# Patient Record
Sex: FEMALE | Race: WHITE | NOT HISPANIC OR LATINO | Employment: OTHER | URBAN - METROPOLITAN AREA
[De-identification: names, ages, dates, MRNs, and addresses within clinical notes are randomized per-mention and may not be internally consistent; named-entity substitution may affect disease eponyms.]

---

## 2024-08-08 ENCOUNTER — HOSPITAL ENCOUNTER (INPATIENT)
Facility: CLINIC | Age: 54
LOS: 54 days | Discharge: ANOTHER HOSPITAL | DRG: 003 | End: 2024-10-01
Attending: INTERNAL MEDICINE | Admitting: SURGERY
Payer: MEDICAID

## 2024-08-08 ENCOUNTER — APPOINTMENT (OUTPATIENT)
Dept: GENERAL RADIOLOGY | Facility: CLINIC | Age: 54
DRG: 003 | End: 2024-08-08
Attending: PHYSICIAN ASSISTANT
Payer: MEDICAID

## 2024-08-08 ENCOUNTER — APPOINTMENT (OUTPATIENT)
Dept: GENERAL RADIOLOGY | Facility: CLINIC | Age: 54
DRG: 003 | End: 2024-08-08
Attending: SURGERY
Payer: MEDICAID

## 2024-08-08 ENCOUNTER — APPOINTMENT (OUTPATIENT)
Dept: GENERAL RADIOLOGY | Facility: CLINIC | Age: 54
DRG: 003 | End: 2024-08-08
Attending: INTERNAL MEDICINE
Payer: MEDICAID

## 2024-08-08 DIAGNOSIS — R19.7 DIARRHEA, UNSPECIFIED TYPE: ICD-10-CM

## 2024-08-08 DIAGNOSIS — L89.816: ICD-10-CM

## 2024-08-08 DIAGNOSIS — R45.1 AGITATION: ICD-10-CM

## 2024-08-08 DIAGNOSIS — J30.1 SEASONAL ALLERGIC RHINITIS DUE TO POLLEN: ICD-10-CM

## 2024-08-08 DIAGNOSIS — E43 SEVERE PROTEIN-CALORIE MALNUTRITION (H): ICD-10-CM

## 2024-08-08 DIAGNOSIS — I10 PRIMARY HYPERTENSION: ICD-10-CM

## 2024-08-08 DIAGNOSIS — J96.01 ACUTE RESPIRATORY FAILURE WITH HYPOXIA (H): Primary | ICD-10-CM

## 2024-08-08 DIAGNOSIS — Z45.2 PICC (PERIPHERALLY INSERTED CENTRAL CATHETER) IN PLACE: ICD-10-CM

## 2024-08-08 DIAGNOSIS — K21.00 GASTROESOPHAGEAL REFLUX DISEASE WITH ESOPHAGITIS WITHOUT HEMORRHAGE: ICD-10-CM

## 2024-08-08 DIAGNOSIS — E11.8 TYPE 2 DIABETES MELLITUS WITH UNSPECIFIED COMPLICATIONS (H): ICD-10-CM

## 2024-08-08 DIAGNOSIS — E03.9 HYPOTHYROIDISM, UNSPECIFIED TYPE: ICD-10-CM

## 2024-08-08 DIAGNOSIS — N17.9 AKI (ACUTE KIDNEY INJURY) (H): ICD-10-CM

## 2024-08-08 LAB
ABO/RH(D): NORMAL
ACT BLD: 144 SECONDS (ref 74–150)
ACT BLD: 148 SECONDS (ref 74–150)
ALBUMIN SERPL BCG-MCNC: 3 G/DL (ref 3.5–5.2)
ALBUMIN UR-MCNC: 100 MG/DL
ALLEN'S TEST: ABNORMAL
ALP SERPL-CCNC: 500 U/L (ref 40–150)
ALT SERPL W P-5'-P-CCNC: 37 U/L (ref 0–50)
AMORPH CRY #/AREA URNS HPF: ABNORMAL /HPF
ANION GAP SERPL CALCULATED.3IONS-SCNC: 14 MMOL/L (ref 7–15)
ANION GAP SERPL CALCULATED.3IONS-SCNC: 16 MMOL/L (ref 7–15)
ANION GAP SERPL CALCULATED.3IONS-SCNC: 19 MMOL/L (ref 7–15)
ANTIBODY SCREEN: NEGATIVE
APPEARANCE UR: ABNORMAL
APTT PPP: 37 SECONDS (ref 22–38)
AST SERPL W P-5'-P-CCNC: 76 U/L (ref 0–45)
BACTERIA #/AREA URNS HPF: ABNORMAL /HPF
BASE EXCESS BLDA CALC-SCNC: -10 MMOL/L (ref -3–3)
BASE EXCESS BLDA CALC-SCNC: -10.9 MMOL/L (ref -3–3)
BASE EXCESS BLDA CALC-SCNC: -10.9 MMOL/L (ref -3–3)
BASE EXCESS BLDA CALC-SCNC: -9.5 MMOL/L (ref -3–3)
BASOPHILS # BLD AUTO: ABNORMAL 10*3/UL
BASOPHILS # BLD MANUAL: 0 10E3/UL (ref 0–0.2)
BASOPHILS NFR BLD AUTO: ABNORMAL %
BASOPHILS NFR BLD MANUAL: 0 %
BILIRUB SERPL-MCNC: 0.6 MG/DL
BILIRUB UR QL STRIP: NEGATIVE
BLD PROD TYP BPU: NORMAL
BLOOD COMPONENT TYPE: NORMAL
BUN SERPL-MCNC: 42.5 MG/DL (ref 6–20)
BUN SERPL-MCNC: 43.3 MG/DL (ref 6–20)
BUN SERPL-MCNC: 43.6 MG/DL (ref 6–20)
CA-I BLD-MCNC: 4.6 MG/DL (ref 4.4–5.2)
CA-I BLD-MCNC: 5 MG/DL (ref 4.4–5.2)
CALCIUM SERPL-MCNC: 8 MG/DL (ref 8.8–10.4)
CALCIUM SERPL-MCNC: 8.1 MG/DL (ref 8.8–10.4)
CALCIUM SERPL-MCNC: 8.9 MG/DL (ref 8.8–10.4)
CHLORIDE SERPL-SCNC: 104 MMOL/L (ref 98–107)
CHLORIDE SERPL-SCNC: 105 MMOL/L (ref 98–107)
CHLORIDE SERPL-SCNC: 106 MMOL/L (ref 98–107)
CODING SYSTEM: NORMAL
COHGB MFR BLD: 88.7 % (ref 96–97)
COHGB MFR BLD: 93.9 % (ref 96–97)
COHGB MFR BLD: 97.2 % (ref 96–97)
COHGB MFR BLD: >100 % (ref 96–97)
COLOR UR AUTO: YELLOW
CREAT SERPL-MCNC: 3.28 MG/DL (ref 0.51–0.95)
CREAT SERPL-MCNC: 3.31 MG/DL (ref 0.51–0.95)
CREAT SERPL-MCNC: 3.39 MG/DL (ref 0.51–0.95)
CROSSMATCH: NORMAL
EGFRCR SERPLBLD CKD-EPI 2021: 15 ML/MIN/1.73M2
EGFRCR SERPLBLD CKD-EPI 2021: 16 ML/MIN/1.73M2
EGFRCR SERPLBLD CKD-EPI 2021: 16 ML/MIN/1.73M2
EOSINOPHIL # BLD AUTO: ABNORMAL 10*3/UL
EOSINOPHIL # BLD MANUAL: 0.3 10E3/UL (ref 0–0.7)
EOSINOPHIL NFR BLD AUTO: ABNORMAL %
EOSINOPHIL NFR BLD MANUAL: 2 %
ERYTHROCYTE [DISTWIDTH] IN BLOOD BY AUTOMATED COUNT: 13.6 % (ref 10–15)
ERYTHROCYTE [DISTWIDTH] IN BLOOD BY AUTOMATED COUNT: 13.8 % (ref 10–15)
ERYTHROCYTE [DISTWIDTH] IN BLOOD BY AUTOMATED COUNT: 13.8 % (ref 10–15)
FIBRINOGEN PPP-MCNC: 885 MG/DL (ref 170–510)
GLUCOSE BLDC GLUCOMTR-MCNC: 131 MG/DL (ref 70–99)
GLUCOSE BLDC GLUCOMTR-MCNC: 132 MG/DL (ref 70–99)
GLUCOSE SERPL-MCNC: 145 MG/DL (ref 70–99)
GLUCOSE SERPL-MCNC: 147 MG/DL (ref 70–99)
GLUCOSE SERPL-MCNC: 153 MG/DL (ref 70–99)
GLUCOSE UR STRIP-MCNC: 30 MG/DL
HCO3 BLD-SCNC: 17 MMOL/L (ref 21–28)
HCO3 BLD-SCNC: 19 MMOL/L (ref 21–28)
HCO3 BLD-SCNC: 20 MMOL/L (ref 21–28)
HCO3 BLD-SCNC: 22 MMOL/L (ref 21–28)
HCO3 SERPL-SCNC: 15 MMOL/L (ref 22–29)
HCO3 SERPL-SCNC: 17 MMOL/L (ref 22–29)
HCO3 SERPL-SCNC: 20 MMOL/L (ref 22–29)
HCT VFR BLD AUTO: 22.9 % (ref 35–47)
HCT VFR BLD AUTO: 25.4 % (ref 35–47)
HCT VFR BLD AUTO: 33.8 % (ref 35–47)
HGB BLD-MCNC: 7 G/DL (ref 11.7–15.7)
HGB BLD-MCNC: 7.5 G/DL (ref 11.7–15.7)
HGB BLD-MCNC: 9.9 G/DL (ref 11.7–15.7)
HGB UR QL STRIP: ABNORMAL
IMM GRANULOCYTES # BLD: ABNORMAL 10*3/UL
IMM GRANULOCYTES NFR BLD: ABNORMAL %
INR PPP: 1.36 (ref 0.85–1.15)
INR PPP: 1.45 (ref 0.85–1.15)
ISSUE DATE AND TIME: NORMAL
KETONES UR STRIP-MCNC: NEGATIVE MG/DL
LACTATE SERPL-SCNC: 1.2 MMOL/L (ref 0.7–2)
LACTATE SERPL-SCNC: 1.7 MMOL/L (ref 0.7–2)
LACTATE SERPL-SCNC: 1.8 MMOL/L (ref 0.7–2)
LEUKOCYTE ESTERASE UR QL STRIP: ABNORMAL
LYMPHOCYTES # BLD AUTO: ABNORMAL 10*3/UL
LYMPHOCYTES # BLD MANUAL: 1.1 10E3/UL (ref 0.8–5.3)
LYMPHOCYTES NFR BLD AUTO: ABNORMAL %
LYMPHOCYTES NFR BLD MANUAL: 8 %
MAGNESIUM SERPL-MCNC: 2.5 MG/DL (ref 1.7–2.3)
MAGNESIUM SERPL-MCNC: 2.8 MG/DL (ref 1.7–2.3)
MCH RBC QN AUTO: 32.1 PG (ref 26.5–33)
MCH RBC QN AUTO: 32.1 PG (ref 26.5–33)
MCH RBC QN AUTO: 32.3 PG (ref 26.5–33)
MCHC RBC AUTO-ENTMCNC: 29.3 G/DL (ref 31.5–36.5)
MCHC RBC AUTO-ENTMCNC: 29.5 G/DL (ref 31.5–36.5)
MCHC RBC AUTO-ENTMCNC: 30.6 G/DL (ref 31.5–36.5)
MCV RBC AUTO: 106 FL (ref 78–100)
MCV RBC AUTO: 109 FL (ref 78–100)
MCV RBC AUTO: 110 FL (ref 78–100)
METAMYELOCYTES # BLD MANUAL: 0.3 10E3/UL
METAMYELOCYTES NFR BLD MANUAL: 2 %
MONOCYTES # BLD AUTO: ABNORMAL 10*3/UL
MONOCYTES # BLD MANUAL: 1 10E3/UL (ref 0–1.3)
MONOCYTES NFR BLD AUTO: ABNORMAL %
MONOCYTES NFR BLD MANUAL: 7 %
MRSA DNA SPEC QL NAA+PROBE: NEGATIVE
MUCOUS THREADS #/AREA URNS LPF: PRESENT /LPF
MYELOCYTES # BLD MANUAL: 0.1 10E3/UL
MYELOCYTES NFR BLD MANUAL: 1 %
NEUTROPHILS # BLD AUTO: ABNORMAL 10*3/UL
NEUTROPHILS # BLD MANUAL: 11.3 10E3/UL (ref 1.6–8.3)
NEUTROPHILS NFR BLD AUTO: ABNORMAL %
NEUTROPHILS NFR BLD MANUAL: 80 %
NITRATE UR QL: NEGATIVE
NRBC # BLD AUTO: 0.1 10E3/UL
NRBC # BLD AUTO: 0.1 10E3/UL
NRBC BLD AUTO-RTO: 0 /100
NRBC BLD MANUAL-RTO: 1 %
O2/TOTAL GAS SETTING VFR VENT: 100 %
O2/TOTAL GAS SETTING VFR VENT: 100 %
O2/TOTAL GAS SETTING VFR VENT: 60 %
O2/TOTAL GAS SETTING VFR VENT: 60 %
PCO2 BLD: 38 MM HG (ref 35–45)
PCO2 BLD: 55 MM HG (ref 35–45)
PCO2 BLD: 77 MM HG (ref 35–45)
PCO2 BLD: 91 MM HG (ref 35–45)
PEEP: 10 CM H2O
PEEP: 10 CM H2O
PEEP: 12 CM H2O
PH BLD: 6.98 [PH] (ref 7.35–7.45)
PH BLD: 7.03 [PH] (ref 7.35–7.45)
PH BLD: 7.14 [PH] (ref 7.35–7.45)
PH BLD: 7.26 [PH] (ref 7.35–7.45)
PH UR STRIP: 5.5 [PH] (ref 5–7)
PHOSPHATE SERPL-MCNC: 6.8 MG/DL (ref 2.5–4.5)
PHOSPHATE SERPL-MCNC: 8.1 MG/DL (ref 2.5–4.5)
PLAT MORPH BLD: ABNORMAL
PLATELET # BLD AUTO: 325 10E3/UL (ref 150–450)
PLATELET # BLD AUTO: 326 10E3/UL (ref 150–450)
PLATELET # BLD AUTO: 568 10E3/UL (ref 150–450)
PO2 BLD: 163 MM HG (ref 80–105)
PO2 BLD: 67 MM HG (ref 80–105)
PO2 BLD: 78 MM HG (ref 80–105)
PO2 BLD: 92 MM HG (ref 80–105)
POTASSIUM SERPL-SCNC: 4.8 MMOL/L (ref 3.4–5.3)
POTASSIUM SERPL-SCNC: 5 MMOL/L (ref 3.4–5.3)
POTASSIUM SERPL-SCNC: 5.8 MMOL/L (ref 3.4–5.3)
PROT SERPL-MCNC: 6.6 G/DL (ref 6.4–8.3)
RBC # BLD AUTO: 2.17 10E6/UL (ref 3.8–5.2)
RBC # BLD AUTO: 2.34 10E6/UL (ref 3.8–5.2)
RBC # BLD AUTO: 3.08 10E6/UL (ref 3.8–5.2)
RBC MORPH BLD: ABNORMAL
RBC URINE: 31 /HPF
SA TARGET DNA: NEGATIVE
SAO2 % BLDA: 87 % (ref 92–100)
SAO2 % BLDA: 92 % (ref 92–100)
SAO2 % BLDA: 95 % (ref 92–100)
SAO2 % BLDA: 97 % (ref 92–100)
SODIUM SERPL-SCNC: 138 MMOL/L (ref 135–145)
SODIUM SERPL-SCNC: 138 MMOL/L (ref 135–145)
SODIUM SERPL-SCNC: 140 MMOL/L (ref 135–145)
SP GR UR STRIP: 1.02 (ref 1–1.03)
SPECIMEN EXPIRATION DATE: NORMAL
UFH PPP CHRO-ACNC: 0.21 IU/ML
UNIT ABO/RH: NORMAL
UNIT NUMBER: NORMAL
UNIT STATUS: NORMAL
UNIT TYPE ISBT: 9500
UROBILINOGEN UR STRIP-MCNC: NORMAL MG/DL
WBC # BLD AUTO: 14.1 10E3/UL (ref 4–11)
WBC # BLD AUTO: 16.3 10E3/UL (ref 4–11)
WBC # BLD AUTO: 27.2 10E3/UL (ref 4–11)
WBC URINE: 7 /HPF

## 2024-08-08 PROCEDURE — 82330 ASSAY OF CALCIUM: CPT | Performed by: SURGERY

## 2024-08-08 PROCEDURE — 85730 THROMBOPLASTIN TIME PARTIAL: CPT | Performed by: SURGERY

## 2024-08-08 PROCEDURE — 71045 X-RAY EXAM CHEST 1 VIEW: CPT | Mod: 26 | Performed by: STUDENT IN AN ORGANIZED HEALTH CARE EDUCATION/TRAINING PROGRAM

## 2024-08-08 PROCEDURE — C1769 GUIDE WIRE: HCPCS

## 2024-08-08 PROCEDURE — 5A09C5K ASSISTANCE WITH RESPIRATORY VENTILATION, 8-24 CONSECUTIVE HOURS, INTUBATED PRONE POSITIONING: ICD-10-PCS | Performed by: SURGERY

## 2024-08-08 PROCEDURE — 85610 PROTHROMBIN TIME: CPT | Performed by: PHYSICIAN ASSISTANT

## 2024-08-08 PROCEDURE — C1751 CATH, INF, PER/CENT/MIDLINE: HCPCS

## 2024-08-08 PROCEDURE — 84100 ASSAY OF PHOSPHORUS: CPT | Performed by: SURGERY

## 2024-08-08 PROCEDURE — 83605 ASSAY OF LACTIC ACID: CPT | Performed by: PHYSICIAN ASSISTANT

## 2024-08-08 PROCEDURE — 258N000003 HC RX IP 258 OP 636: Performed by: PHYSICIAN ASSISTANT

## 2024-08-08 PROCEDURE — 84100 ASSAY OF PHOSPHORUS: CPT | Performed by: PHYSICIAN ASSISTANT

## 2024-08-08 PROCEDURE — 82947 ASSAY GLUCOSE BLOOD QUANT: CPT | Performed by: PHYSICIAN ASSISTANT

## 2024-08-08 PROCEDURE — 94002 VENT MGMT INPAT INIT DAY: CPT

## 2024-08-08 PROCEDURE — 86923 COMPATIBILITY TEST ELECTRIC: CPT | Performed by: SURGERY

## 2024-08-08 PROCEDURE — 87641 MR-STAPH DNA AMP PROBE: CPT | Performed by: PHYSICIAN ASSISTANT

## 2024-08-08 PROCEDURE — 999N000157 HC STATISTIC RCP TIME EA 10 MIN

## 2024-08-08 PROCEDURE — P9016 RBC LEUKOCYTES REDUCED: HCPCS | Performed by: SURGERY

## 2024-08-08 PROCEDURE — 3E043XZ INTRODUCTION OF VASOPRESSOR INTO CENTRAL VEIN, PERCUTANEOUS APPROACH: ICD-10-PCS | Performed by: SURGERY

## 2024-08-08 PROCEDURE — 272N000555 HC SENSOR NIRS OXIMETER, ADULT

## 2024-08-08 PROCEDURE — 83735 ASSAY OF MAGNESIUM: CPT | Performed by: SURGERY

## 2024-08-08 PROCEDURE — 86900 BLOOD TYPING SEROLOGIC ABO: CPT | Performed by: PHYSICIAN ASSISTANT

## 2024-08-08 PROCEDURE — 999N000065 XR CHEST PORT 1 VIEW

## 2024-08-08 PROCEDURE — 83605 ASSAY OF LACTIC ACID: CPT

## 2024-08-08 PROCEDURE — 5A1522H EXTRACORPOREAL OXYGENATION, MEMBRANE, PERIPHERAL VENO-VENOUS: ICD-10-PCS | Performed by: SURGERY

## 2024-08-08 PROCEDURE — 85027 COMPLETE CBC AUTOMATED: CPT

## 2024-08-08 PROCEDURE — 250N000011 HC RX IP 250 OP 636: Performed by: PHYSICIAN ASSISTANT

## 2024-08-08 PROCEDURE — 36415 COLL VENOUS BLD VENIPUNCTURE: CPT | Performed by: SURGERY

## 2024-08-08 PROCEDURE — 250N000013 HC RX MED GY IP 250 OP 250 PS 637: Performed by: PHYSICIAN ASSISTANT

## 2024-08-08 PROCEDURE — 82805 BLOOD GASES W/O2 SATURATION: CPT | Performed by: SURGERY

## 2024-08-08 PROCEDURE — 81001 URINALYSIS AUTO W/SCOPE: CPT | Performed by: PHYSICIAN ASSISTANT

## 2024-08-08 PROCEDURE — 85610 PROTHROMBIN TIME: CPT | Performed by: SURGERY

## 2024-08-08 PROCEDURE — 94645 CONT INHLJ TX EACH ADDL HOUR: CPT

## 2024-08-08 PROCEDURE — 86901 BLOOD TYPING SEROLOGIC RH(D): CPT | Performed by: PHYSICIAN ASSISTANT

## 2024-08-08 PROCEDURE — 94644 CONT INHLJ TX 1ST HOUR: CPT

## 2024-08-08 PROCEDURE — 99291 CRITICAL CARE FIRST HOUR: CPT | Mod: 25 | Performed by: PHYSICIAN ASSISTANT

## 2024-08-08 PROCEDURE — 250N000009 HC RX 250: Performed by: STUDENT IN AN ORGANIZED HEALTH CARE EDUCATION/TRAINING PROGRAM

## 2024-08-08 PROCEDURE — 999N000185 HC STATISTIC TRANSPORT TIME EA 15 MIN

## 2024-08-08 PROCEDURE — 94640 AIRWAY INHALATION TREATMENT: CPT

## 2024-08-08 PROCEDURE — 71045 X-RAY EXAM CHEST 1 VIEW: CPT | Mod: 26 | Performed by: RADIOLOGY

## 2024-08-08 PROCEDURE — 87205 SMEAR GRAM STAIN: CPT | Performed by: PHYSICIAN ASSISTANT

## 2024-08-08 PROCEDURE — 33946 ECMO/ECLS INITIATION VENOUS: CPT

## 2024-08-08 PROCEDURE — 85520 HEPARIN ASSAY: CPT | Performed by: SURGERY

## 2024-08-08 PROCEDURE — 33946 ECMO/ECLS INITIATION VENOUS: CPT | Mod: GC | Performed by: SURGERY

## 2024-08-08 PROCEDURE — 83605 ASSAY OF LACTIC ACID: CPT | Performed by: SURGERY

## 2024-08-08 PROCEDURE — 250N000009 HC RX 250: Performed by: PHYSICIAN ASSISTANT

## 2024-08-08 PROCEDURE — 85007 BL SMEAR W/DIFF WBC COUNT: CPT | Performed by: SURGERY

## 2024-08-08 PROCEDURE — 85384 FIBRINOGEN ACTIVITY: CPT | Performed by: PHYSICIAN ASSISTANT

## 2024-08-08 PROCEDURE — 82330 ASSAY OF CALCIUM: CPT | Performed by: PHYSICIAN ASSISTANT

## 2024-08-08 PROCEDURE — 272N000237 HC CARDIOHELP CIRCUIT

## 2024-08-08 PROCEDURE — 87040 BLOOD CULTURE FOR BACTERIA: CPT | Performed by: SURGERY

## 2024-08-08 PROCEDURE — 999N000065 XR ABDOMEN PORT 1 VIEW

## 2024-08-08 PROCEDURE — 80048 BASIC METABOLIC PNL TOTAL CA: CPT | Performed by: SURGERY

## 2024-08-08 PROCEDURE — 80053 COMPREHEN METABOLIC PANEL: CPT | Performed by: PHYSICIAN ASSISTANT

## 2024-08-08 PROCEDURE — 33952 ECMO/ECLS INSJ PRPH CANNULA: CPT | Mod: GC | Performed by: SURGERY

## 2024-08-08 PROCEDURE — 250N000011 HC RX IP 250 OP 636: Performed by: SURGERY

## 2024-08-08 PROCEDURE — 200N000002 HC R&B ICU UMMC

## 2024-08-08 PROCEDURE — 82805 BLOOD GASES W/O2 SATURATION: CPT | Performed by: PHYSICIAN ASSISTANT

## 2024-08-08 PROCEDURE — 85027 COMPLETE CBC AUTOMATED: CPT | Performed by: PHYSICIAN ASSISTANT

## 2024-08-08 PROCEDURE — 272N000053 HC CANNULA, ARTERIAL FEMORAL

## 2024-08-08 PROCEDURE — 82550 ASSAY OF CK (CPK): CPT | Performed by: STUDENT IN AN ORGANIZED HEALTH CARE EDUCATION/TRAINING PROGRAM

## 2024-08-08 PROCEDURE — 85347 COAGULATION TIME ACTIVATED: CPT

## 2024-08-08 PROCEDURE — 74018 RADEX ABDOMEN 1 VIEW: CPT | Mod: 26 | Performed by: STUDENT IN AN ORGANIZED HEALTH CARE EDUCATION/TRAINING PROGRAM

## 2024-08-08 PROCEDURE — 272N000232 HC CANNULA, VENOUS FEMORAL, ADULT

## 2024-08-08 PROCEDURE — 85027 COMPLETE CBC AUTOMATED: CPT | Performed by: SURGERY

## 2024-08-08 PROCEDURE — 5A1955Z RESPIRATORY VENTILATION, GREATER THAN 96 CONSECUTIVE HOURS: ICD-10-PCS | Performed by: SURGERY

## 2024-08-08 PROCEDURE — 83735 ASSAY OF MAGNESIUM: CPT | Performed by: PHYSICIAN ASSISTANT

## 2024-08-08 PROCEDURE — C1894 INTRO/SHEATH, NON-LASER: HCPCS

## 2024-08-08 RX ORDER — NICOTINE POLACRILEX 4 MG
15-30 LOZENGE BUCCAL
Status: DISCONTINUED | OUTPATIENT
Start: 2024-08-08 | End: 2024-08-08

## 2024-08-08 RX ORDER — ACETAMINOPHEN 650 MG/1
650 SUPPOSITORY RECTAL EVERY 4 HOURS PRN
Status: DISCONTINUED | OUTPATIENT
Start: 2024-08-08 | End: 2024-09-09

## 2024-08-08 RX ORDER — HEPARIN SODIUM 5000 [USP'U]/.5ML
5000 INJECTION, SOLUTION INTRAVENOUS; SUBCUTANEOUS EVERY 8 HOURS
Status: DISCONTINUED | OUTPATIENT
Start: 2024-08-08 | End: 2024-08-08

## 2024-08-08 RX ORDER — CEFEPIME HYDROCHLORIDE 2 G/1
2 INJECTION, POWDER, FOR SOLUTION INTRAVENOUS EVERY 12 HOURS
Status: DISCONTINUED | OUTPATIENT
Start: 2024-08-09 | End: 2024-08-16

## 2024-08-08 RX ORDER — HEPARIN SODIUM 1000 [USP'U]/ML
5000 INJECTION, SOLUTION INTRAVENOUS; SUBCUTANEOUS ONCE
Status: COMPLETED | OUTPATIENT
Start: 2024-08-08 | End: 2024-08-08

## 2024-08-08 RX ORDER — ACETAMINOPHEN 325 MG/1
650 TABLET ORAL EVERY 4 HOURS PRN
Status: DISCONTINUED | OUTPATIENT
Start: 2024-08-08 | End: 2024-09-09

## 2024-08-08 RX ORDER — CEFEPIME HYDROCHLORIDE 2 G/1
2 INJECTION, POWDER, FOR SOLUTION INTRAVENOUS EVERY 24 HOURS
Status: DISCONTINUED | OUTPATIENT
Start: 2024-08-09 | End: 2024-08-08

## 2024-08-08 RX ORDER — NOREPINEPHRINE BITARTRATE 0.06 MG/ML
.01-.6 INJECTION, SOLUTION INTRAVENOUS CONTINUOUS
Status: DISCONTINUED | OUTPATIENT
Start: 2024-08-08 | End: 2024-08-13

## 2024-08-08 RX ORDER — HEPARIN SODIUM 1000 [USP'U]/ML
5000 INJECTION, SOLUTION INTRAVENOUS; SUBCUTANEOUS ONCE
Status: DISCONTINUED | OUTPATIENT
Start: 2024-08-08 | End: 2024-08-08

## 2024-08-08 RX ORDER — NICOTINE POLACRILEX 4 MG
15-30 LOZENGE BUCCAL
Status: DISCONTINUED | OUTPATIENT
Start: 2024-08-08 | End: 2024-08-19

## 2024-08-08 RX ORDER — CHLORHEXIDINE GLUCONATE ORAL RINSE 1.2 MG/ML
15 SOLUTION DENTAL EVERY 12 HOURS
Status: DISCONTINUED | OUTPATIENT
Start: 2024-08-08 | End: 2024-08-27

## 2024-08-08 RX ORDER — CALCIUM GLUCONATE 20 MG/ML
2 INJECTION, SOLUTION INTRAVENOUS EVERY 8 HOURS PRN
Status: DISCONTINUED | OUTPATIENT
Start: 2024-08-08 | End: 2024-08-12

## 2024-08-08 RX ORDER — HEPARIN SODIUM 10000 [USP'U]/100ML
10-50 INJECTION, SOLUTION INTRAVENOUS CONTINUOUS
Status: DISCONTINUED | OUTPATIENT
Start: 2024-08-08 | End: 2024-08-11

## 2024-08-08 RX ORDER — NALOXONE HYDROCHLORIDE 0.4 MG/ML
0.2 INJECTION, SOLUTION INTRAMUSCULAR; INTRAVENOUS; SUBCUTANEOUS
Status: DISCONTINUED | OUTPATIENT
Start: 2024-08-08 | End: 2024-10-01 | Stop reason: HOSPADM

## 2024-08-08 RX ORDER — PROPOFOL 10 MG/ML
5-75 INJECTION, EMULSION INTRAVENOUS CONTINUOUS
Status: DISCONTINUED | OUTPATIENT
Start: 2024-08-08 | End: 2024-08-12

## 2024-08-08 RX ORDER — MAGNESIUM SULFATE HEPTAHYDRATE 40 MG/ML
2 INJECTION, SOLUTION INTRAVENOUS EVERY 8 HOURS PRN
Status: DISCONTINUED | OUTPATIENT
Start: 2024-08-08 | End: 2024-08-12

## 2024-08-08 RX ORDER — CALCIUM CHLORIDE, MAGNESIUM CHLORIDE, SODIUM CHLORIDE, SODIUM BICARBONATE, POTASSIUM CHLORIDE AND SODIUM PHOSPHATE DIBASIC DIHYDRATE 3.68; 3.05; 6.34; 3.09; .314; .187 G/L; G/L; G/L; G/L; G/L; G/L
12.5 INJECTION INTRAVENOUS CONTINUOUS
Status: DISCONTINUED | OUTPATIENT
Start: 2024-08-08 | End: 2024-08-12

## 2024-08-08 RX ORDER — NALOXONE HYDROCHLORIDE 0.4 MG/ML
0.4 INJECTION, SOLUTION INTRAMUSCULAR; INTRAVENOUS; SUBCUTANEOUS
Status: DISCONTINUED | OUTPATIENT
Start: 2024-08-08 | End: 2024-10-01 | Stop reason: HOSPADM

## 2024-08-08 RX ORDER — CALCIUM CHLORIDE, MAGNESIUM CHLORIDE, SODIUM CHLORIDE, SODIUM BICARBONATE, POTASSIUM CHLORIDE AND SODIUM PHOSPHATE DIBASIC DIHYDRATE 3.68; 3.05; 6.34; 3.09; .314; .187 G/L; G/L; G/L; G/L; G/L; G/L
INJECTION INTRAVENOUS CONTINUOUS
Status: DISCONTINUED | OUTPATIENT
Start: 2024-08-08 | End: 2024-08-10

## 2024-08-08 RX ORDER — LEVOTHYROXINE SODIUM 25 UG/1
25 TABLET ORAL
Status: DISCONTINUED | OUTPATIENT
Start: 2024-08-09 | End: 2024-10-01 | Stop reason: HOSPADM

## 2024-08-08 RX ORDER — POTASSIUM CHLORIDE 29.8 MG/ML
20 INJECTION INTRAVENOUS EVERY 8 HOURS PRN
Status: DISCONTINUED | OUTPATIENT
Start: 2024-08-08 | End: 2024-08-12

## 2024-08-08 RX ORDER — IPRATROPIUM BROMIDE AND ALBUTEROL SULFATE 2.5; .5 MG/3ML; MG/3ML
3 SOLUTION RESPIRATORY (INHALATION)
Status: DISCONTINUED | OUTPATIENT
Start: 2024-08-08 | End: 2024-08-09

## 2024-08-08 RX ORDER — DEXTROSE MONOHYDRATE 25 G/50ML
25-50 INJECTION, SOLUTION INTRAVENOUS
Status: DISCONTINUED | OUTPATIENT
Start: 2024-08-08 | End: 2024-08-19

## 2024-08-08 RX ORDER — MINERAL OIL AND WHITE PETROLATUM 150; 830 MG/G; MG/G
1 OINTMENT OPHTHALMIC EVERY 8 HOURS
Status: DISCONTINUED | OUTPATIENT
Start: 2024-08-08 | End: 2024-08-08

## 2024-08-08 RX ORDER — DEXTROSE MONOHYDRATE 25 G/50ML
25-50 INJECTION, SOLUTION INTRAVENOUS
Status: DISCONTINUED | OUTPATIENT
Start: 2024-08-08 | End: 2024-08-08

## 2024-08-08 RX ADMIN — CEFEPIME HYDROCHLORIDE 2 G: 2 INJECTION, POWDER, FOR SOLUTION INTRAVENOUS at 23:44

## 2024-08-08 RX ADMIN — CISATRACURIUM BESYLATE 3 MCG/KG/MIN: 10 INJECTION, SOLUTION INTRAVENOUS at 17:00

## 2024-08-08 RX ADMIN — PROPOFOL 80 MCG/KG/MIN: 10 INJECTION, EMULSION INTRAVENOUS at 16:51

## 2024-08-08 RX ADMIN — PROPOFOL 60 MCG/KG/MIN: 10 INJECTION, EMULSION INTRAVENOUS at 18:25

## 2024-08-08 RX ADMIN — NOREPINEPHRINE BITARTRATE 0.07 MCG/KG/MIN: 0.06 INJECTION, SOLUTION INTRAVENOUS at 16:52

## 2024-08-08 RX ADMIN — CHLORHEXIDINE GLUCONATE 0.12% ORAL RINSE 15 ML: 1.2 LIQUID ORAL at 22:34

## 2024-08-08 RX ADMIN — HEPARIN SODIUM 500 UNITS/HR: 10000 INJECTION, SOLUTION INTRAVENOUS at 20:44

## 2024-08-08 RX ADMIN — CALCIUM CHLORIDE, MAGNESIUM CHLORIDE, SODIUM CHLORIDE, SODIUM BICARBONATE, POTASSIUM CHLORIDE AND SODIUM PHOSPHATE DIBASIC DIHYDRATE: 3.68; 3.05; 6.34; 3.09; .314; .187 INJECTION INTRAVENOUS at 23:29

## 2024-08-08 RX ADMIN — HEPARIN SODIUM 5000 UNITS: 1000 INJECTION INTRAVENOUS; SUBCUTANEOUS at 19:19

## 2024-08-08 RX ADMIN — CALCIUM CHLORIDE, MAGNESIUM CHLORIDE, SODIUM CHLORIDE, SODIUM BICARBONATE, POTASSIUM CHLORIDE AND SODIUM PHOSPHATE DIBASIC DIHYDRATE 12.5 ML/KG/HR: 3.68; 3.05; 6.34; 3.09; .314; .187 INJECTION INTRAVENOUS at 23:29

## 2024-08-08 RX ADMIN — Medication 125 MCG/HR: at 16:52

## 2024-08-08 RX ADMIN — IPRATROPIUM BROMIDE AND ALBUTEROL SULFATE 3 ML: .5; 3 SOLUTION RESPIRATORY (INHALATION) at 21:39

## 2024-08-08 RX ADMIN — Medication 20 NG/KG/MIN: at 16:42

## 2024-08-08 RX ADMIN — NOREPINEPHRINE BITARTRATE 0.02 MCG/KG/MIN: 0.06 INJECTION, SOLUTION INTRAVENOUS at 22:26

## 2024-08-08 RX ADMIN — PROPOFOL 60 MCG/KG/MIN: 10 INJECTION, EMULSION INTRAVENOUS at 21:34

## 2024-08-08 ASSESSMENT — ACTIVITIES OF DAILY LIVING (ADL)
ADLS_ACUITY_SCORE: 35
ADLS_ACUITY_SCORE: 57
ADLS_ACUITY_SCORE: 45
ADLS_ACUITY_SCORE: 57
ADLS_ACUITY_SCORE: 45

## 2024-08-08 NOTE — PROGRESS NOTES
Admitted/transferred from: [facility]  Reason for admission/transfer:ARDS   2 RN skin assessment: completed by: Marcus DELGADO  Result of skin assessment and interventions/actions:Possible pressure injuries on legs/shins/ankles from previous SCD use? Bruising on legs/thighs/shins/feet.   Height, weight, drug calc weight: Done  Patient belongings (see Flowsheet)  MDRO education added to care planN/A    Major Shift Events:     Pt admit from [facility] around 1645. Upon arrival, pt sats in the 70s. When RT clamped ETT to switch to [facility] vent, pt de-sated to 50s. SICU called to bedside and pt sats recovered to 80s with bagging. RASS -5, paralyzed with cis. BIS 20-40s. -110, sinus tach. MAP stable on low dose Levo. Vent 100% FiO2, 30/300/10. Initial CO2 90s, now 70s. Proned emergently at~1700, supinated at 1640 to cannulate for VV at the bedside. Dr. Freeman obtained consent from momDoreen.     Plan: cannulate for VV ECMO       For vital signs and complete assessments, please see documentation flowsheets.     ?

## 2024-08-08 NOTE — CONSULTS
Veno-Venous ECMO Consult Note  2024    Massiel Flaherty  : 1970    Date of Service: 2024 5:40 PM    Assessment and Plan:  Massiel Flaherty is a 53 year old female with a past medical hx of Anxiety/depression, fibromyalgia, hypothyroidism, asthma, HLD, MURIEL on CPAP, spells, off on anti seizure medications, expressive aphasia, and hypertension who was admitted an OSH with community acquired pneumonia on 8/3. She was intubated  and developed severe ARDS requiring paralysis and proning starting today. She was transferred here for management. On arrival, patient difficult to ventilate with prominent respiratory acidosis (pH 7.03) and hypercapnia. Notable ARDS with P/F of 78. She also has elevated plateau pressures (40-45 cmH2O). Remains on full strength inhaled epoprostenol.    - Continue with aggressive medical management of ARDS including paralysis, proning, inhaled vasodilators, and lung protective ventilation  - Anticipate placement of VV ECMO tonight if she does not significantly improve    Seen with staff Dr. Pete Powell  Surgical Critical Care Fellow    History of Present Illness:    Massiel Flaherty is a 53 year old female with a past medical hx of Anxiety/depression, fibromyalgia, hypothyroidism, asthma, HLD, MURIEL on CPAP, spells, off on anti seizure medications, expressive aphasia, and hypertension who was admitted an OSH with community acquired pneumonia on 8/3. She was intubated  and developed severe ARDS requiring paralysis and proning starting today. She was transferred here for management.    She was initially seen at [facility] and was placed on Augmentin outpatient on 24.  She admitted to the outside hospital on 8/3/24 for fatigue, fever and dyspnea. Xray obtained showed multifocal bilateral opacities. She was also tachypneic and tachycardic, CT chest obtained negative for PE. Initial duplex was negative. She was also started on azithromycin and  "cefepime. She received a dose on solumedrol x1. She was initially on high flow, BiPAP, progressively got worse requiring intubation on 8/6/24. Difficulties with intubation and oxygenation requiring increased sedation, paralysis and eventual proning.     Transferred to [facility] for VV ECMO consideration. Per flight crew, desaturation in enroute, attempt with paralysis with high C02 levels, patient with  sats 75% at time of evaluation, improved to 90% with proning.      Family not present at time of evaluation, calls  Mother listed on OSH Face sheet, no answer.     Past Medical History:  Anxiety/depression  Fibromyalgia  Hypothyroidism  Asthma,  HLD  HTN  MURIEL on CPAP  Expressive aphasia    Past Surgical History  Colon surgery; colonoscopy; hysterectomy; tonsillectomy; lap susp uretal sling; lap ovarian cystectomy; Carpal tunnel release; neuro stimulator phase 1 & 2; transvaginal incision cling; vaginal taping    Family History:  No family history on file.    Social History:  No reported alcohol or tobacco use      Medications:  No current outpatient medications on file.       Allergies:   Not on File    Review of Symptoms:  A 10 point review of symptoms has been conducted and is negative except for that mentioned in the above HPI.    Physical Exam:    Pulse 112, temperature 99  F (37.2  C), resp. rate 30, height 1.575 m (5' 2.01\"), weight 88.9 kg (195 lb 15.8 oz), SpO2 92%.  Gen:    Sedated, prone  HEENT: Normocephalic and atraumatic  CV:  Tachycardic, regular rhythm on monitor  Pulm:  Intubated, equal lung sounds  Abd:  Unable to evaluate  Ext:  Scattered ecchymosis  Lines:   Right internal jugular CVC    Labs:  CBC RESULTS:   Recent Labs   Lab Test 08/08/24  1641   WBC 27.2*   RBC 3.08*   HGB 9.9*   HCT 33.8*   *   MCH 32.1   MCHC 29.3*   RDW 13.8   *     Last Basic Metabolic Panel:  Lab Results   Component Value Date     08/08/2024      Lab Results   Component Value Date    POTASSIUM 5.8 08/08/2024 "     Lab Results   Component Value Date    CHLORIDE 106 08/08/2024     Lab Results   Component Value Date    QUAN 8.9 08/08/2024     Lab Results   Component Value Date    CO2 20 08/08/2024     Lab Results   Component Value Date    BUN 42.5 08/08/2024     Lab Results   Component Value Date    CR 3.28 08/08/2024     Lab Results   Component Value Date     08/08/2024     08/08/2024       Imaging:  CXR reviewed, bilateral opacities

## 2024-08-08 NOTE — PROGRESS NOTES
Patient has arrived, oxygenating and ventilating ok- though quite acidotic and requiring maximal vent support. Has high pressures on the vent.    Have been trying to reach the family to discuss ECMO.  I am planning to initiate ECMO if consent obtained.  OSH called and no other numbers known.  Have left a message and tried back repeatedly without success yet.    Will continue trying to contact family.  Perfusionist is getting ready

## 2024-08-08 NOTE — H&P
SURGICAL ICU ADMISSION NOTE  08/08/2024      PRIMARY TEAM: SICU/VV ECMO Team   PRIMARY PHYSICIAN: Barry Hatch MD  REASON FOR CRITICAL CARE ADMISSION: VV-ECMO, Respiratory Failure   ADMITTING PHYSICIAN: Barry Hatch MD  Date of Service (when I saw the patient): 08/08/2024   ASSESSMENT:  Massiel Flaherty is a 53 year old female who was admitted to [facility] on 8/9/24 for evaluation of ECMO. Patient with a past medical hx of Anxiety/depression, fibromyalgia, hypothyroidism, asthma, HLD, MURIEL on CPAP, spells, off on anti seizure medications, expressive aphasia, hypertension was seen at [facility] and was placed on Augmentin outpatient on 7/30/24.   She admitted to the hospital on 8/3/24 for fatigue, fever and dyspnea. Xray obtained showed multifocal bilateral opacities. She was also tachypneic and tachycardic, CT chest obtained negative for PE. Initial duplesplex was negative.She was also started on azithromycin and cefepime.  She received a dose on solumedrol x1. She was initially on high flow, BiPAP, progressively got worse requiring intubation  on 8/6/24.  Difficulties with intubation and oxygenation requiring increased sedation, paralysis and eventual proning.     PLAN:    Neurological:  # Fibromyalgia   # Anxiety   # sedation and analgesia for vent compliance   - Monitor neurological status. Delirium preventions and precautions.   - Pain: Fentnayl gtt         - Sedation plan: prop  - add cisatricum gtt   - TOF 2/4. BIS monitoring   - plan to prone tonight     Pulmonary:   # Asthma  # MURIEL on home CPAP   #  Acute hypoxic and hypercapnic respiratory failure   - AC/30/300/12/ 100% FiO2   - peals 45/plats 40's also   - plan to prone now   -VV EMCO team notified   - cont flolan at full strength   - serial ABG's      Cardiovascular:    # hyperlipidemia    # shock, presumed septic  - MAP >65  - norepinephrine gtt   - Vasopressin 2.4 unit/shr      Gastroenterology/Nutrition:  # GERD   # Protein calorie deficit  "malnutrition   - protonix   - OG to LIS   - RD consult in am for SBFT and TF recommendations      Fluids/Electrolytes/Renal:  # Acute kidney injury  # hyperkalemia   # hyperphosphatemia   - K 5.8, suspect from acidosis, monitor once acidosis correcte, may need to be shifted or iHD  - creat 3.28  - K 5.8, will monitor   - continue with rolle for now.     Endocrine:  # hypothyroidism   # Stress hyperglycemia due to critical illness   - synthroid   - Sliding scale for glucose management Vs. Insulin infusion.   - Goal to keep BG< 180 for optimal wound healing      ID:  # Leukocytosis  # Pneumonia   PER OSH: 8/6: RSV, COVID, parainfluenza,  negative   Viral panel negative.   - send blood cultures   - send Resp cultures   -  continue with cefepime.   - send MRSA swab     Heme:     # Acute blood loss anemia   - Hemoglobin 9.9  - Transfuse if hgb <7.0 or signs/symptoms of hypoperfusion. Monitor and trend.   - send coags now and type and screen      Musculoskeletal:  # Weakness and deconditioning of critical illness   - Physical and occupational therapy consult when able      Skin:  # ecchymosis - BLE   - bilateral lower leg ecchymosis   - WOC consult      General Cares/Prophylaxis:    DVT Prophylaxis: Heparin SQ.   GI Prophylaxis: PPI  Restraints: Restraints for medical healing needed: NO     Lines/ tubes/ drains:  -  ETT   - Right internal jugular   - OG   - Rolle   - PIV\"s      Disposition:  -  Surgical ICU      Patient seen, findings and plan discussed with surgical ICU staff, Dr. Hatch      Time spent on this encounter  Billing:  I spent 65 minutes bedside and on the inpatient unit today managing the critical care of Massiel Flaherty in relation to the issues listed in this note.      Giovanny Guidry PA-C-     - - - - - - - - - - - - - - - - - - - - - - - - - - - - - - - - - - - - - - - - - - - - -   HISTORY PRESENTING ILLNESS:  Massiel Flaherty is a 53 year old female who was admitted to [facility] on 8/9/24 for " evaluation of ECMO. Patient with a past medical hx of Anxiety/depression, fibromyalgia, hypothyroidism, asthma, HLD, MURIEL on CPAP, spells, off on anti seizure medications, expressive aphasia, hypertension was seen at [facility] and was placed on Augmentin outpatient on 7/30/24.   She admitted to the hospital on 8/3/24 for fatigue, fever and dyspnea. Xray obtained showed multifocal bilateral opacities. She was also tachypneic and tachycardic, CT chest obtained negative for PE. Initial duplesplex was negative.She was also started on azithromycin and cefepime.  She received a dose on solumedrol x1. She was initially on high flow, BiPAP, progressively got worse requiring intubation  on 8/6/24.  Difficulties with intubation and oxygenation requiring increased sedation, paralysis and eventual proning.   Transferred to [facility] for VV ECMO consideration. Per flight crew, desaturation in enroute, attempt with paralysis with high C02 levels, patient with  sats 75% at time of evaluation, improved to 90% with proning.    Family not present at time of evaluation, calls  Mother listed on OSH Face sheet, no answer.   REVIEW OF SYSTEMS: 10 point ROS not able to be performed due to intubate and sedation.     PAST MEDICAL HISTORY:   No past medical history on file.    SURGICAL HISTORY:   No past surgical history on file.    SOCIAL HISTORY:      Social Determinants of Health     Financial Resource Strain: Medium Risk (9/25/2020)    Received from [facility]    Overall Financial Resource Strain (CARDIA)     Difficulty of Paying Living Expenses: Somewhat hard   Food Insecurity: No Food Insecurity (9/25/2020)    Received from [facility] and [facility]    Hunger Vital Sign     Worried About Running Out of Food in the Last Year: Never true     Ran Out of Food in the Last Year: Never true   Transportation Needs: No Transportation Needs (9/25/2020)    Received from [facility]    [facility]   Transportation     Lack of Transportation (Medical): No     Lack of Transportation (Non-Medical): No   Physical Activity: Insufficiently Active (4/10/2023)    Received from [facility]    Exercise Vital Sign     Days of Exercise per Week: 2 days     Minutes of Exercise per Session: 30 min   Stress: Stress Concern Present (9/8/2020)    Received from [facility]    [employer] [place] [employer] - Occupational Stress Questionnaire     Feeling of Stress : Very much   Social Connections: Moderately Isolated (9/8/2020)    Received from [facility]    Social Connection and Isolation Panel [NHANES]     Frequency of Communication with Friends and Family: More than three times a week     Frequency of Social Gatherings with Friends and Family: Twice a week     Attends Sabianism Services: 1 to 4 times per year     Active Member of Clubs or Organizations: No     Attends Club or Organization Meetings: Never     Marital Status:    Interpersonal Safety: Not At Risk (9/8/2020)    Received from [facility]    Humiliation, Afraid, Rape, and Kick questionnaire     Fear of Current or Ex-Partner: No     Emotionally Abused: No     Physically Abused: No     Sexually Abused: No     FAMILY HISTORY: No bleeding/clotting disorders nor problems with anesthesia per shyam     ALLERGIES:   Not on File    MEDICATIONS:  Current Facility-Administered Medications   Medication Dose Route Frequency Provider Last Rate Last Admin    acetaminophen (TYLENOL) tablet 650 mg  650 mg Oral or Feeding Tube Q4H PRN Giovanny Guidry PA-C        Or    acetaminophen (TYLENOL) Suppository 650 mg  650 mg Rectal Q4H PRN Giovanny Guidry PA-C        cisatracurium (NIMBEX) 200 mg in D5W 100 mL infusion  3-10 mcg/kg/min (Ideal) Intravenous Continuous Giovanny Guidry PA-C 4.5 mL/hr at 08/08/24 1700 3 mcg/kg/min at 08/08/24 1700    And    artificial tears ophthalmic ointment   Both Eyes Q8H  Giovanny Guidry PA-C        [START ON 8/9/2024] ceFEPIme (MAXIPIME) 2 g vial to attach to  mL bag for ADULTS or NS 50 mL bag for PEDS  2 g Intravenous Q24H Giovanny Guidry PA-C        chlorhexidine (PERIDEX) 0.12 % solution 15 mL  15 mL Mouth/Throat Q12H Giovanny Guidry PA-C        glucose gel 15-30 g  15-30 g Oral Q15 Min PRN Giovanny Guidry PA-C        Or    dextrose 50 % injection 25-50 mL  25-50 mL Intravenous Q15 Min PRN Giovanny Guidry PA-C        Or    glucagon injection 1 mg  1 mg Subcutaneous Q15 Min PRN Giovanny Guidry PA-C        glucose gel 15-30 g  15-30 g Oral Q15 Min PRN Giovanny Guidry PA-C        Or    dextrose 50 % injection 25-50 mL  25-50 mL Intravenous Q15 Min PRN Giovanny Guidry PA-C        Or    glucagon injection 1 mg  1 mg Subcutaneous Q15 Min PRN Giovanny Guidry PA-C        epoprostenol (VELETRI) inhalation solution  20 ng/kg/min (Ideal) Nebulization Continuous Giovanny Guidry PA-C 3 mL/hr at 08/08/24 1642 20 ng/kg/min at 08/08/24 1642    fentaNYL (SUBLIMAZE) 50 mcg/mL bolus from pump  50 mcg Intravenous Q1H PRN Giovanny Guidry PA-C        fentaNYL (SUBLIMAZE) infusion   mcg/hr Intravenous Continuous Giovanny Guidry PA-C 2.5 mL/hr at 08/08/24 1700 125 mcg/hr at 08/08/24 1700    heparin ANTICOAGULANT injection 5,000 Units  5,000 Units Subcutaneous Q8H Giovanny Guidry PA-C        insulin aspart (NovoLOG) injection (RAPID ACTING)  1-12 Units Subcutaneous Q4H Giovanny Guidry PA-C        ipratropium - albuterol 0.5 mg/2.5 mg/3 mL (DUONEB) neb solution 3 mL  3 mL Nebulization Q6H Giovanny Guidry PA-C        [START ON 8/9/2024] levothyroxine (SYNTHROID/LEVOTHROID) tablet 25 mcg  25 mcg Per Feeding Tube QAM AC Giovanny Guidry PA-C        propofol (DIPRIVAN) infusion  5-75 mcg/kg/min (Dosing Weight) Intravenous Continuous Giovanny Guidry PA-C 32 mL/hr at 08/08/24 1700 60 mcg/kg/min at 08/08/24 1700    And    propofol (DIPRIVAN) bolus from bag or syringe pump  10 mg Intravenous Q15 Min PRN Giovanny Guidry,  LIV        And    Medication Instruction   Does not apply Continuous PRN Giovanny Guidry PA-C        naloxone (NARCAN) injection 0.2 mg  0.2 mg Intravenous Q2 Min PRN Barry Hatch MD        Or    naloxone (NARCAN) injection 0.4 mg  0.4 mg Intravenous Q2 Min PRN Barry Hatch MD        Or    naloxone (NARCAN) injection 0.2 mg  0.2 mg Intramuscular Q2 Min PRN Barry Hatch MD        Or    naloxone (NARCAN) injection 0.4 mg  0.4 mg Intramuscular Q2 Min PRN Barry Hatch MD        norepinephrine (LEVOPHED) 16 mg in  mL infusion MAX CONC CENTRAL LINE  0.01-0.6 mcg/kg/min (Dosing Weight) Intravenous Continuous Giovanny Guidry PA-C 1.7 mL/hr at 08/08/24 1729 0.02 mcg/kg/min at 08/08/24 1729    [START ON 8/9/2024] pantoprazole (PROTONIX) 2 mg/mL suspension 40 mg  40 mg Per Feeding Tube QAM AC Giovanny Guidry PA-C        Or    [START ON 8/9/2024] pantoprazole (PROTONIX) IV push injection 40 mg  40 mg Intravenous QAM AC Giovanny Guidry PA-C        vasopressin 1 unit/mL MAX Conc (PITRESSIN) infusion  2.4 Units/hr Intravenous Continuous Giovanny Guidry PA-C   Held at 08/08/24 1656       PHYSICAL EXAMINATION:  Temp:  [99  F (37.2  C)-100  F (37.8  C)] 99  F (37.2  C)  Pulse:  [109-123] 109  Resp:  [25-30] 30  MAP:  [70 mmHg-95 mmHg] 70 mmHg  Arterial Line BP: ()/(56-88) 93/56  FiO2 (%):  [100 %] 100 %  SpO2:  [83 %-93 %] 92 %    General: sedated.   HEENT: Pupils 3mm and reactive. ETT present and secured. OGT secured   Neck: Right internal jugular line secured   Neuro: chemically sedated.   Pulm/Resp: BBS,  lungs without wheezes   CV: tachycardiac,  S1/S2   Abdomen: abdomen soft and compressible.   : scant amount of urine in rolle catheter in place, urine yellow and clear  Incisions/Skin: scattered ecchymosis in ankles bilaterally   MSK/Extremities:  Calves soft and compressible. Generalized edema, pulses     LABS: Reviewed.   Arterial Blood Gases   Recent Labs   Lab 08/08/24  1640   PH 6.98*    PCO2 91*   PO2 67*   HCO3 22     Complete Blood Count   Recent Labs   Lab 08/08/24  1641   WBC 27.2*   HGB 9.9*   *     Basic Metabolic Panel  Recent Labs   Lab 08/08/24  1642 08/08/24  1641   NA  --  140   POTASSIUM  --  5.8*   CHLORIDE  --  106   CO2  --  20*   BUN  --  42.5*   CR  --  3.28*   * 153*     Liver Function Tests  Recent Labs   Lab 08/08/24  1641   AST 76*   ALT 37   ALKPHOS 500*   BILITOTAL 0.6   ALBUMIN 3.0*   INR 1.36*     Pancreatic Enzymes  No lab results found in last 7 days.  Coagulation Profile  Recent Labs   Lab 08/08/24  1641   INR 1.36*       IMAGING:  Recent Results (from the past 24 hour(s))   XR Chest Port 1 View    Narrative    PORTABLE CHEST X-RAY 8:13 AM    Comparison is made with a prior examination dated 8/7/2024.    FINDINGS: An endotracheal tube is in place with its tip approximately 4.5 cm from the ivy. A temperature sensor is noted overlying the cardiac silhouette. An enteric tube is in place with its tip in the stomach. A right IJ catheter is in place with its tip at the atrial caval junction. Persistent opacification of the lungs is noted bilaterally with no significant change. Blunting of the costophrenic angles is present. Increased pulmonary vascularity appears to be present particularly in the perihilar regions.    IMPRESSION:  No significant interval change since the prior examination.    Edited by: filemon 8/8/2024 9:54 AM CDT    Finalized by: Venkat Simpson on 8/8/2024 10:31 AM CDT   ECHO Congenital Transthoracic (TTE)    Narrative      Limited echocardiogram performed for assessment of LV systolic   function.    Left Ventricle: The left ventricle appears normal in size. The ejection   fraction, measured by biplane, is 71%. There are no wall motion   abnormalities.    Limited assessment of RV.  Normal appearing right ventricular chamber   size and systolic function.    Normal IVC size with minimal respirophasic changes.    No prior study for  comparison.        Left Ventricle  The left ventricle appears normal in size. Wall thickness is normal. The ejection fraction, measured by biplane, is 71%. There are no wall motion abnormalities.    IVC/SVC  Normal IVC size with minimal respirophasic changes.    Pericardium  There is no pericardial effusion.    Study Details  A limited echo was performed with limited 2D. The sonographer requested the use of Definity- imaging enhancing agent per protocol. The patient denies contraindications and gives verbal consent for imaging enhancing agent injection.   XR Chest Port 1 View    Impression    RESIDENT PRELIMINARY INTERPRETATION  Impression:   1. Endotracheal tube terminates in mid thoracic trachea.  2. Extensive diffuse pulmonary opacities.

## 2024-08-09 ENCOUNTER — APPOINTMENT (OUTPATIENT)
Dept: CT IMAGING | Facility: CLINIC | Age: 54
DRG: 003 | End: 2024-08-09
Attending: PHYSICIAN ASSISTANT
Payer: MEDICAID

## 2024-08-09 ENCOUNTER — APPOINTMENT (OUTPATIENT)
Dept: GENERAL RADIOLOGY | Facility: CLINIC | Age: 54
DRG: 003 | End: 2024-08-09
Attending: INTERNAL MEDICINE
Payer: MEDICAID

## 2024-08-09 ENCOUNTER — APPOINTMENT (OUTPATIENT)
Dept: GENERAL RADIOLOGY | Facility: CLINIC | Age: 54
DRG: 003 | End: 2024-08-09
Attending: PHYSICIAN ASSISTANT
Payer: MEDICAID

## 2024-08-09 ENCOUNTER — APPOINTMENT (OUTPATIENT)
Dept: GENERAL RADIOLOGY | Facility: CLINIC | Age: 54
DRG: 003 | End: 2024-08-09
Attending: SURGERY
Payer: MEDICAID

## 2024-08-09 LAB
ACT BLD: 123 SECONDS (ref 74–150)
ACT BLD: 132 SECONDS (ref 74–150)
ACT BLD: 136 SECONDS (ref 74–150)
ACT BLD: 136 SECONDS (ref 74–150)
ACT BLD: 140 SECONDS (ref 74–150)
ACT BLD: 140 SECONDS (ref 74–150)
ACT BLD: 144 SECONDS (ref 74–150)
ACT BLD: 153 SECONDS (ref 74–150)
ACT BLD: 153 SECONDS (ref 74–150)
ACT BLD: 157 SECONDS (ref 74–150)
ACT BLD: 157 SECONDS (ref 74–150)
ALBUMIN SERPL BCG-MCNC: 2.2 G/DL (ref 3.5–5.2)
ALBUMIN SERPL BCG-MCNC: 2.3 G/DL (ref 3.5–5.2)
ALBUMIN SERPL BCG-MCNC: 2.3 G/DL (ref 3.5–5.2)
ALBUMIN SERPL BCG-MCNC: 2.4 G/DL (ref 3.5–5.2)
ALLEN'S TEST: ABNORMAL
ALP SERPL-CCNC: 326 U/L (ref 40–150)
ALT SERPL W P-5'-P-CCNC: 25 U/L (ref 0–50)
ANION GAP SERPL CALCULATED.3IONS-SCNC: 12 MMOL/L (ref 7–15)
ANION GAP SERPL CALCULATED.3IONS-SCNC: 12 MMOL/L (ref 7–15)
ANION GAP SERPL CALCULATED.3IONS-SCNC: 13 MMOL/L (ref 7–15)
ANION GAP SERPL CALCULATED.3IONS-SCNC: 14 MMOL/L (ref 7–15)
APPEARANCE FLD: ABNORMAL
APPEARANCE FLD: ABNORMAL
APTT PPP: 31 SECONDS (ref 22–38)
APTT PPP: 34 SECONDS (ref 22–38)
APTT PPP: 47 SECONDS (ref 22–38)
APTT PPP: 73 SECONDS (ref 22–38)
AST SERPL W P-5'-P-CCNC: NORMAL U/L
AT III ACT/NOR PPP CHRO: 59 % (ref 85–135)
BACTERIA SPEC CULT: NORMAL
BACTERIA SPEC CULT: NORMAL
BASE EXCESS BLDA CALC-SCNC: -2.2 MMOL/L (ref -3–3)
BASE EXCESS BLDA CALC-SCNC: -2.4 MMOL/L (ref -3–3)
BASE EXCESS BLDA CALC-SCNC: -2.7 MMOL/L (ref -3–3)
BASE EXCESS BLDA CALC-SCNC: -2.9 MMOL/L (ref -3–3)
BASE EXCESS BLDA CALC-SCNC: -3.1 MMOL/L (ref -3–3)
BASE EXCESS BLDA CALC-SCNC: -3.6 MMOL/L (ref -3–3)
BASE EXCESS BLDA CALC-SCNC: -4.2 MMOL/L (ref -3–3)
BASE EXCESS BLDA CALC-SCNC: -5.2 MMOL/L (ref -3–3)
BASE EXCESS BLDA CALC-SCNC: -5.3 MMOL/L (ref -3–3)
BASE EXCESS BLDA CALC-SCNC: -6.2 MMOL/L (ref -3–3)
BASE EXCESS BLDA CALC-SCNC: -6.4 MMOL/L (ref -3–3)
BASE EXCESS BLDA CALC-SCNC: -7.4 MMOL/L (ref -3–3)
BASE EXCESS BLDA CALC-SCNC: -9.3 MMOL/L (ref -3–3)
BASE EXCESS BLDV CALC-SCNC: -1.3 MMOL/L (ref -3–3)
BASE EXCESS BLDV CALC-SCNC: -1.9 MMOL/L (ref -3–3)
BASE EXCESS BLDV CALC-SCNC: -5.3 MMOL/L (ref -3–3)
BASE EXCESS BLDV CALC-SCNC: -8 MMOL/L (ref -3–3)
BASOPHILS # BLD AUTO: 0 10E3/UL (ref 0–0.2)
BASOPHILS NFR BLD AUTO: 0 %
BASOPHILS NFR FLD MANUAL: 1 %
BILIRUB DIRECT SERPL-MCNC: NORMAL MG/DL
BILIRUB SERPL-MCNC: 0.3 MG/DL
BUN SERPL-MCNC: 25.7 MG/DL (ref 6–20)
BUN SERPL-MCNC: 25.7 MG/DL (ref 6–20)
BUN SERPL-MCNC: 32.1 MG/DL (ref 6–20)
BUN SERPL-MCNC: 39.7 MG/DL (ref 6–20)
CA-I BLD-MCNC: 4.3 MG/DL (ref 4.4–5.2)
CA-I BLD-MCNC: 4.4 MG/DL (ref 4.4–5.2)
CA-I BLD-MCNC: 4.5 MG/DL (ref 4.4–5.2)
CA-I BLD-MCNC: 4.6 MG/DL (ref 4.4–5.2)
CALCIUM SERPL-MCNC: 7.8 MG/DL (ref 8.8–10.4)
CALCIUM SERPL-MCNC: 7.9 MG/DL (ref 8.8–10.4)
CALCIUM SERPL-MCNC: 7.9 MG/DL (ref 8.8–10.4)
CALCIUM SERPL-MCNC: 8.2 MG/DL (ref 8.8–10.4)
CELL COUNT BODY FLUID SOURCE: ABNORMAL
CELL COUNT BODY FLUID SOURCE: ABNORMAL
CHLORIDE SERPL-SCNC: 103 MMOL/L (ref 98–107)
CHLORIDE SERPL-SCNC: 103 MMOL/L (ref 98–107)
CHLORIDE SERPL-SCNC: 104 MMOL/L (ref 98–107)
CHLORIDE SERPL-SCNC: 107 MMOL/L (ref 98–107)
CK SERPL-CCNC: 155 U/L (ref 26–192)
CK SERPL-CCNC: 158 U/L (ref 26–192)
COHGB MFR BLD: 100 % (ref 96–97)
COHGB MFR BLD: 100 % (ref 96–97)
COHGB MFR BLD: 96.6 % (ref 96–97)
COHGB MFR BLD: 96.6 % (ref 96–97)
COHGB MFR BLD: 99.2 % (ref 96–97)
COHGB MFR BLD: 99.6 % (ref 96–97)
COHGB MFR BLD: 99.7 % (ref 96–97)
COHGB MFR BLD: >100 % (ref 96–97)
COLOR FLD: COLORLESS
COLOR FLD: YELLOW
CREAT SERPL-MCNC: 2.04 MG/DL (ref 0.51–0.95)
CREAT SERPL-MCNC: 2.06 MG/DL (ref 0.51–0.95)
CREAT SERPL-MCNC: 2.31 MG/DL (ref 0.51–0.95)
CREAT SERPL-MCNC: 2.82 MG/DL (ref 0.51–0.95)
CRYPTOC AG SPEC QL: NEGATIVE
D DIMER PPP FEU-MCNC: >20 UG/ML FEU (ref 0–0.5)
D DIMER PPP FEU-MCNC: >20 UG/ML FEU (ref 0–0.5)
EGFRCR SERPLBLD CKD-EPI 2021: 19 ML/MIN/1.73M2
EGFRCR SERPLBLD CKD-EPI 2021: 25 ML/MIN/1.73M2
EGFRCR SERPLBLD CKD-EPI 2021: 28 ML/MIN/1.73M2
EGFRCR SERPLBLD CKD-EPI 2021: 28 ML/MIN/1.73M2
EOSINOPHIL # BLD AUTO: 0.2 10E3/UL (ref 0–0.7)
EOSINOPHIL # BLD AUTO: 0.3 10E3/UL (ref 0–0.7)
EOSINOPHIL # BLD AUTO: 0.3 10E3/UL (ref 0–0.7)
EOSINOPHIL NFR BLD AUTO: 3 %
EOSINOPHIL NFR BLD AUTO: 3 %
EOSINOPHIL NFR BLD AUTO: 4 %
EOSINOPHIL NFR FLD MANUAL: 1 %
EOSINOPHIL NFR FLD MANUAL: 1 %
ERYTHROCYTE [DISTWIDTH] IN BLOOD BY AUTOMATED COUNT: 14.4 % (ref 10–15)
ERYTHROCYTE [DISTWIDTH] IN BLOOD BY AUTOMATED COUNT: 14.6 % (ref 10–15)
ERYTHROCYTE [DISTWIDTH] IN BLOOD BY AUTOMATED COUNT: 14.6 % (ref 10–15)
FIBRINOGEN PPP-MCNC: 508 MG/DL (ref 170–510)
FIBRINOGEN PPP-MCNC: 659 MG/DL (ref 170–510)
GLUCOSE BLDC GLUCOMTR-MCNC: 106 MG/DL (ref 70–99)
GLUCOSE BLDC GLUCOMTR-MCNC: 107 MG/DL (ref 70–99)
GLUCOSE BLDC GLUCOMTR-MCNC: 107 MG/DL (ref 70–99)
GLUCOSE BLDC GLUCOMTR-MCNC: 120 MG/DL (ref 70–99)
GLUCOSE BLDC GLUCOMTR-MCNC: 133 MG/DL (ref 70–99)
GLUCOSE BLDC GLUCOMTR-MCNC: 74 MG/DL (ref 70–99)
GLUCOSE BLDC GLUCOMTR-MCNC: 75 MG/DL (ref 70–99)
GLUCOSE BLDC GLUCOMTR-MCNC: 81 MG/DL (ref 70–99)
GLUCOSE SERPL-MCNC: 106 MG/DL (ref 70–99)
GLUCOSE SERPL-MCNC: 118 MG/DL (ref 70–99)
GLUCOSE SERPL-MCNC: 140 MG/DL (ref 70–99)
GLUCOSE SERPL-MCNC: 81 MG/DL (ref 70–99)
GRAM STAIN RESULT: NORMAL
HAV IGM SERPL QL IA: NONREACTIVE
HBV CORE IGM SERPL QL IA: NONREACTIVE
HBV SURFACE AB SERPL IA-ACNC: 133 M[IU]/ML
HBV SURFACE AB SERPL IA-ACNC: REACTIVE M[IU]/ML
HBV SURFACE AG SERPL QL IA: NONREACTIVE
HCO3 BLD-SCNC: 17 MMOL/L (ref 21–28)
HCO3 BLD-SCNC: 18 MMOL/L (ref 21–28)
HCO3 BLD-SCNC: 19 MMOL/L (ref 21–28)
HCO3 BLD-SCNC: 20 MMOL/L (ref 21–28)
HCO3 BLD-SCNC: 20 MMOL/L (ref 21–28)
HCO3 BLD-SCNC: 21 MMOL/L (ref 21–28)
HCO3 BLD-SCNC: 22 MMOL/L (ref 21–28)
HCO3 BLD-SCNC: 23 MMOL/L (ref 21–28)
HCO3 BLD-SCNC: 24 MMOL/L (ref 21–28)
HCO3 BLDA-SCNC: 18 MMOL/L (ref 21–28)
HCO3 BLDA-SCNC: 22 MMOL/L (ref 21–28)
HCO3 BLDV-SCNC: 20 MMOL/L (ref 21–28)
HCO3 BLDV-SCNC: 21 MMOL/L (ref 21–28)
HCO3 BLDV-SCNC: 24 MMOL/L (ref 21–28)
HCO3 BLDV-SCNC: 25 MMOL/L (ref 21–28)
HCO3 SERPL-SCNC: 18 MMOL/L (ref 22–29)
HCO3 SERPL-SCNC: 21 MMOL/L (ref 22–29)
HCO3 SERPL-SCNC: 21 MMOL/L (ref 22–29)
HCO3 SERPL-SCNC: 22 MMOL/L (ref 22–29)
HCT VFR BLD AUTO: 24.7 % (ref 35–47)
HCT VFR BLD AUTO: 24.9 % (ref 35–47)
HCT VFR BLD AUTO: 26 % (ref 35–47)
HCV AB SERPL QL IA: NONREACTIVE
HGB BLD-MCNC: 7.7 G/DL (ref 11.7–15.7)
HGB BLD-MCNC: 8 G/DL (ref 11.7–15.7)
HGB BLD-MCNC: 8.2 G/DL (ref 11.7–15.7)
HGB FREE PLAS-MCNC: 30 MG/DL
HIV 1+2 AB+HIV1 P24 AG SERPL QL IA: NONREACTIVE
IGG SERPL-MCNC: 258 MG/DL (ref 610–1616)
IMM GRANULOCYTES # BLD: 0.3 10E3/UL
IMM GRANULOCYTES # BLD: 0.4 10E3/UL
IMM GRANULOCYTES # BLD: 0.4 10E3/UL
IMM GRANULOCYTES NFR BLD: 3 %
IMM GRANULOCYTES NFR BLD: 4 %
IMM GRANULOCYTES NFR BLD: 4 %
INR PPP: 1.12 (ref 0.85–1.15)
INR PPP: 1.21 (ref 0.85–1.15)
INR PPP: 1.25 (ref 0.85–1.15)
INR PPP: 1.25 (ref 0.85–1.15)
KOH PREPARATION: NORMAL
LACTATE SERPL-SCNC: 1 MMOL/L (ref 0.7–2)
LACTATE SERPL-SCNC: 1.1 MMOL/L (ref 0.7–2)
LACTATE SERPL-SCNC: 1.1 MMOL/L (ref 0.7–2)
LACTATE SERPL-SCNC: 1.2 MMOL/L (ref 0.7–2)
LACTATE SERPL-SCNC: 1.4 MMOL/L (ref 0.7–2)
LACTATE SERPL-SCNC: 4 MMOL/L (ref 0.7–2)
LYMPHOCYTES # BLD AUTO: 0.9 10E3/UL (ref 0.8–5.3)
LYMPHOCYTES # BLD AUTO: 0.9 10E3/UL (ref 0.8–5.3)
LYMPHOCYTES # BLD AUTO: 1.1 10E3/UL (ref 0.8–5.3)
LYMPHOCYTES NFR BLD AUTO: 10 %
LYMPHOCYTES NFR BLD AUTO: 10 %
LYMPHOCYTES NFR BLD AUTO: 12 %
LYMPHOCYTES NFR FLD MANUAL: 3 %
LYMPHOCYTES NFR FLD MANUAL: 4 %
MAGNESIUM SERPL-MCNC: 2.3 MG/DL (ref 1.7–2.3)
MAGNESIUM SERPL-MCNC: 2.4 MG/DL (ref 1.7–2.3)
MAGNESIUM SERPL-MCNC: 2.4 MG/DL (ref 1.7–2.3)
MAGNESIUM SERPL-MCNC: 2.5 MG/DL (ref 1.7–2.3)
MCH RBC QN AUTO: 30.9 PG (ref 26.5–33)
MCH RBC QN AUTO: 31.6 PG (ref 26.5–33)
MCH RBC QN AUTO: 31.9 PG (ref 26.5–33)
MCHC RBC AUTO-ENTMCNC: 31.2 G/DL (ref 31.5–36.5)
MCHC RBC AUTO-ENTMCNC: 31.5 G/DL (ref 31.5–36.5)
MCHC RBC AUTO-ENTMCNC: 32.1 G/DL (ref 31.5–36.5)
MCV RBC AUTO: 101 FL (ref 78–100)
MCV RBC AUTO: 98 FL (ref 78–100)
MCV RBC AUTO: 99 FL (ref 78–100)
MONOCYTES # BLD AUTO: 0.4 10E3/UL (ref 0–1.3)
MONOCYTES # BLD AUTO: 0.4 10E3/UL (ref 0–1.3)
MONOCYTES # BLD AUTO: 0.5 10E3/UL (ref 0–1.3)
MONOCYTES NFR BLD AUTO: 5 %
MONOCYTES NFR BLD AUTO: 5 %
MONOCYTES NFR BLD AUTO: 6 %
MONOS+MACROS NFR FLD MANUAL: 15 %
MONOS+MACROS NFR FLD MANUAL: 20 %
NEUTROPHILS # BLD AUTO: 6.9 10E3/UL (ref 1.6–8.3)
NEUTROPHILS # BLD AUTO: 6.9 10E3/UL (ref 1.6–8.3)
NEUTROPHILS # BLD AUTO: 7.1 10E3/UL (ref 1.6–8.3)
NEUTROPHILS NFR BLD AUTO: 75 %
NEUTROPHILS NFR BLD AUTO: 77 %
NEUTROPHILS NFR BLD AUTO: 79 %
NEUTS BAND NFR FLD MANUAL: 76 %
NEUTS BAND NFR FLD MANUAL: 82 %
NRBC # BLD AUTO: 0 10E3/UL
NRBC BLD AUTO-RTO: 0 /100
O2/TOTAL GAS SETTING VFR VENT: 100 %
O2/TOTAL GAS SETTING VFR VENT: 100 %
O2/TOTAL GAS SETTING VFR VENT: 40 %
O2/TOTAL GAS SETTING VFR VENT: 50 %
O2/TOTAL GAS SETTING VFR VENT: 50 %
O2/TOTAL GAS SETTING VFR VENT: 60 %
OXYHGB MFR BLDA: 99 % (ref 75–100)
OXYHGB MFR BLDA: 99 % (ref 75–100)
OXYHGB MFR BLDV: 42 % (ref 70–75)
OXYHGB MFR BLDV: 43 % (ref 70–75)
OXYHGB MFR BLDV: 68 %
OXYHGB MFR BLDV: 71 %
PCO2 BLD: 35 MM HG (ref 35–45)
PCO2 BLD: 36 MM HG (ref 35–45)
PCO2 BLD: 37 MM HG (ref 35–45)
PCO2 BLD: 38 MM HG (ref 35–45)
PCO2 BLD: 39 MM HG (ref 35–45)
PCO2 BLD: 40 MM HG (ref 35–45)
PCO2 BLD: 40 MM HG (ref 35–45)
PCO2 BLD: 41 MM HG (ref 35–45)
PCO2 BLD: 48 MM HG (ref 35–45)
PCO2 BLDA: 32 MM HG (ref 35–45)
PCO2 BLDA: 35 MM HG (ref 35–45)
PCO2 BLDV: 40 MM HG (ref 40–50)
PCO2 BLDV: 44 MM HG (ref 40–50)
PCO2 BLDV: 53 MM HG (ref 40–50)
PCO2 BLDV: 56 MM HG (ref 40–50)
PEEP: 10 CM H2O
PH BLD: 7.28 [PH] (ref 7.35–7.45)
PH BLD: 7.31 [PH] (ref 7.35–7.45)
PH BLD: 7.31 [PH] (ref 7.35–7.45)
PH BLD: 7.33 [PH] (ref 7.35–7.45)
PH BLD: 7.35 [PH] (ref 7.35–7.45)
PH BLD: 7.36 [PH] (ref 7.35–7.45)
PH BLD: 7.37 [PH] (ref 7.35–7.45)
PH BLDA: 7.37 [PH] (ref 7.35–7.45)
PH BLDA: 7.41 [PH] (ref 7.35–7.45)
PH BLDV: 7.17 [PH] (ref 7.32–7.43)
PH BLDV: 7.28 [PH] (ref 7.32–7.43)
PH BLDV: 7.31 [PH] (ref 7.32–7.43)
PH BLDV: 7.35 [PH] (ref 7.32–7.43)
PHOSPHATE SERPL-MCNC: 3.9 MG/DL (ref 2.5–4.5)
PHOSPHATE SERPL-MCNC: 4.3 MG/DL (ref 2.5–4.5)
PHOSPHATE SERPL-MCNC: 4.5 MG/DL (ref 2.5–4.5)
PHOSPHATE SERPL-MCNC: 5.2 MG/DL (ref 2.5–4.5)
PLATELET # BLD AUTO: 224 10E3/UL (ref 150–450)
PLATELET # BLD AUTO: 243 10E3/UL (ref 150–450)
PLATELET # BLD AUTO: 277 10E3/UL (ref 150–450)
PO2 BLD: 104 MM HG (ref 80–105)
PO2 BLD: 106 MM HG (ref 80–105)
PO2 BLD: 126 MM HG (ref 80–105)
PO2 BLD: 136 MM HG (ref 80–105)
PO2 BLD: 167 MM HG (ref 80–105)
PO2 BLD: 173 MM HG (ref 80–105)
PO2 BLD: 182 MM HG (ref 80–105)
PO2 BLD: 190 MM HG (ref 80–105)
PO2 BLD: 196 MM HG (ref 80–105)
PO2 BLD: 79 MM HG (ref 80–105)
PO2 BLD: 79 MM HG (ref 80–105)
PO2 BLDA: 365 MM HG (ref 80–105)
PO2 BLDA: 365 MM HG (ref 80–105)
PO2 BLDV: 28 MM HG (ref 25–47)
PO2 BLDV: 29 MM HG (ref 25–47)
PO2 BLDV: 38 MM HG (ref 25–47)
PO2 BLDV: 38 MM HG (ref 25–47)
POTASSIUM SERPL-SCNC: 3.7 MMOL/L (ref 3.4–5.3)
POTASSIUM SERPL-SCNC: 3.8 MMOL/L (ref 3.4–5.3)
POTASSIUM SERPL-SCNC: 3.9 MMOL/L (ref 3.4–5.3)
POTASSIUM SERPL-SCNC: 4.5 MMOL/L (ref 3.4–5.3)
PROT SERPL-MCNC: 5.1 G/DL (ref 6.4–8.3)
RBC # BLD AUTO: 2.49 10E6/UL (ref 3.8–5.2)
RBC # BLD AUTO: 2.53 10E6/UL (ref 3.8–5.2)
RBC # BLD AUTO: 2.57 10E6/UL (ref 3.8–5.2)
RHEUMATOID FACT SERPL-ACNC: 17 IU/ML
SAO2 % BLDA: 94 % (ref 92–100)
SAO2 % BLDA: 94 % (ref 92–100)
SAO2 % BLDA: 97 % (ref 92–100)
SAO2 % BLDA: 98 % (ref 92–100)
SAO2 % BLDA: 99 % (ref 92–100)
SAO2 % BLDA: >100 % (ref 96–97)
SAO2 % BLDA: >100 % (ref 96–97)
SAO2 % BLDV: 43.3 % (ref 70–75)
SAO2 % BLDV: 44 % (ref 70–75)
SAO2 % BLDV: 69.3 % (ref 70–75)
SAO2 % BLDV: 72.9 % (ref 70–75)
SODIUM SERPL-SCNC: 136 MMOL/L (ref 135–145)
SODIUM SERPL-SCNC: 137 MMOL/L (ref 135–145)
SODIUM SERPL-SCNC: 138 MMOL/L (ref 135–145)
SODIUM SERPL-SCNC: 139 MMOL/L (ref 135–145)
TRIGL SERPL-MCNC: 284 MG/DL
UFH PPP CHRO-ACNC: 0.1 IU/ML
UFH PPP CHRO-ACNC: 0.26 IU/ML
UFH PPP CHRO-ACNC: 0.34 IU/ML
UFH PPP CHRO-ACNC: 0.56 IU/ML
WBC # BLD AUTO: 8.8 10E3/UL (ref 4–11)
WBC # BLD AUTO: 9.2 10E3/UL (ref 4–11)
WBC # BLD AUTO: 9.2 10E3/UL (ref 4–11)
WBC # FLD AUTO: 108 /UL
WBC # FLD AUTO: 109 /UL

## 2024-08-09 PROCEDURE — 70450 CT HEAD/BRAIN W/O DYE: CPT

## 2024-08-09 PROCEDURE — 89051 BODY FLUID CELL COUNT: CPT | Performed by: PHYSICIAN ASSISTANT

## 2024-08-09 PROCEDURE — 258N000001 HC RX 258: Performed by: PHYSICIAN ASSISTANT

## 2024-08-09 PROCEDURE — 83735 ASSAY OF MAGNESIUM: CPT | Performed by: STUDENT IN AN ORGANIZED HEALTH CARE EDUCATION/TRAINING PROGRAM

## 2024-08-09 PROCEDURE — 87799 DETECT AGENT NOS DNA QUANT: CPT | Performed by: PHYSICIAN ASSISTANT

## 2024-08-09 PROCEDURE — 82085 ASSAY OF ALDOLASE: CPT | Performed by: STUDENT IN AN ORGANIZED HEALTH CARE EDUCATION/TRAINING PROGRAM

## 2024-08-09 PROCEDURE — 82784 ASSAY IGA/IGD/IGG/IGM EACH: CPT | Performed by: STUDENT IN AN ORGANIZED HEALTH CARE EDUCATION/TRAINING PROGRAM

## 2024-08-09 PROCEDURE — 99291 CRITICAL CARE FIRST HOUR: CPT | Mod: FS | Performed by: STUDENT IN AN ORGANIZED HEALTH CARE EDUCATION/TRAINING PROGRAM

## 2024-08-09 PROCEDURE — 999N000157 HC STATISTIC RCP TIME EA 10 MIN

## 2024-08-09 PROCEDURE — 71250 CT THORAX DX C-: CPT

## 2024-08-09 PROCEDURE — 82330 ASSAY OF CALCIUM: CPT | Performed by: SURGERY

## 2024-08-09 PROCEDURE — 80069 RENAL FUNCTION PANEL: CPT | Performed by: STUDENT IN AN ORGANIZED HEALTH CARE EDUCATION/TRAINING PROGRAM

## 2024-08-09 PROCEDURE — 82330 ASSAY OF CALCIUM: CPT | Performed by: STUDENT IN AN ORGANIZED HEALTH CARE EDUCATION/TRAINING PROGRAM

## 2024-08-09 PROCEDURE — 82550 ASSAY OF CK (CPK): CPT | Performed by: STUDENT IN AN ORGANIZED HEALTH CARE EDUCATION/TRAINING PROGRAM

## 2024-08-09 PROCEDURE — 87205 SMEAR GRAM STAIN: CPT | Performed by: PHYSICIAN ASSISTANT

## 2024-08-09 PROCEDURE — 87449 NOS EACH ORGANISM AG IA: CPT | Performed by: STUDENT IN AN ORGANIZED HEALTH CARE EDUCATION/TRAINING PROGRAM

## 2024-08-09 PROCEDURE — 05WYX3Z REVISION OF INFUSION DEVICE IN UPPER VEIN, EXTERNAL APPROACH: ICD-10-PCS | Performed by: SURGERY

## 2024-08-09 PROCEDURE — 250N000011 HC RX IP 250 OP 636: Performed by: SURGERY

## 2024-08-09 PROCEDURE — 80074 ACUTE HEPATITIS PANEL: CPT | Performed by: PHYSICIAN ASSISTANT

## 2024-08-09 PROCEDURE — 87899 AGENT NOS ASSAY W/OPTIC: CPT | Performed by: STUDENT IN AN ORGANIZED HEALTH CARE EDUCATION/TRAINING PROGRAM

## 2024-08-09 PROCEDURE — 87210 SMEAR WET MOUNT SALINE/INK: CPT | Performed by: PHYSICIAN ASSISTANT

## 2024-08-09 PROCEDURE — 5A1D90Z PERFORMANCE OF URINARY FILTRATION, CONTINUOUS, GREATER THAN 18 HOURS PER DAY: ICD-10-PCS | Performed by: INTERNAL MEDICINE

## 2024-08-09 PROCEDURE — 82248 BILIRUBIN DIRECT: CPT | Performed by: STUDENT IN AN ORGANIZED HEALTH CARE EDUCATION/TRAINING PROGRAM

## 2024-08-09 PROCEDURE — 83051 HEMOGLOBIN PLASMA: CPT | Performed by: SURGERY

## 2024-08-09 PROCEDURE — 44500 INTRO GASTROINTESTINAL TUBE: CPT

## 2024-08-09 PROCEDURE — 70450 CT HEAD/BRAIN W/O DYE: CPT | Mod: 26 | Performed by: RADIOLOGY

## 2024-08-09 PROCEDURE — 87389 HIV-1 AG W/HIV-1&-2 AB AG IA: CPT | Performed by: STUDENT IN AN ORGANIZED HEALTH CARE EDUCATION/TRAINING PROGRAM

## 2024-08-09 PROCEDURE — 71045 X-RAY EXAM CHEST 1 VIEW: CPT | Mod: 26 | Performed by: RADIOLOGY

## 2024-08-09 PROCEDURE — 87081 CULTURE SCREEN ONLY: CPT | Performed by: PHYSICIAN ASSISTANT

## 2024-08-09 PROCEDURE — 83605 ASSAY OF LACTIC ACID: CPT | Performed by: SURGERY

## 2024-08-09 PROCEDURE — 84478 ASSAY OF TRIGLYCERIDES: CPT

## 2024-08-09 PROCEDURE — 999N000065 XR CHEST PORT 1 VIEW

## 2024-08-09 PROCEDURE — 86706 HEP B SURFACE ANTIBODY: CPT | Performed by: PHYSICIAN ASSISTANT

## 2024-08-09 PROCEDURE — 31624 DX BRONCHOSCOPE/LAVAGE: CPT | Performed by: PHYSICIAN ASSISTANT

## 2024-08-09 PROCEDURE — 82805 BLOOD GASES W/O2 SATURATION: CPT | Performed by: SURGERY

## 2024-08-09 PROCEDURE — 87305 ASPERGILLUS AG IA: CPT | Performed by: STUDENT IN AN ORGANIZED HEALTH CARE EDUCATION/TRAINING PROGRAM

## 2024-08-09 PROCEDURE — 87385 HISTOPLASMA CAPSUL AG IA: CPT | Performed by: PHYSICIAN ASSISTANT

## 2024-08-09 PROCEDURE — 999N000248 HC STATISTIC IV INSERT WITH US BY RN

## 2024-08-09 PROCEDURE — 94003 VENT MGMT INPAT SUBQ DAY: CPT

## 2024-08-09 PROCEDURE — 33958 ECMO/ECLS REPOS PERPH CNULA: CPT | Performed by: SURGERY

## 2024-08-09 PROCEDURE — 31645 BRNCHSC W/THER ASPIR 1ST: CPT | Mod: GC | Performed by: SURGERY

## 2024-08-09 PROCEDURE — 85379 FIBRIN DEGRADATION QUANT: CPT | Performed by: SURGERY

## 2024-08-09 PROCEDURE — 82040 ASSAY OF SERUM ALBUMIN: CPT | Performed by: PHYSICIAN ASSISTANT

## 2024-08-09 PROCEDURE — 71045 X-RAY EXAM CHEST 1 VIEW: CPT

## 2024-08-09 PROCEDURE — 87798 DETECT AGENT NOS DNA AMP: CPT | Performed by: PHYSICIAN ASSISTANT

## 2024-08-09 PROCEDURE — 87305 ASPERGILLUS AG IA: CPT | Performed by: PHYSICIAN ASSISTANT

## 2024-08-09 PROCEDURE — 85610 PROTHROMBIN TIME: CPT | Performed by: SURGERY

## 2024-08-09 PROCEDURE — 84460 ALANINE AMINO (ALT) (SGPT): CPT | Performed by: STUDENT IN AN ORGANIZED HEALTH CARE EDUCATION/TRAINING PROGRAM

## 2024-08-09 PROCEDURE — 85730 THROMBOPLASTIN TIME PARTIAL: CPT | Performed by: SURGERY

## 2024-08-09 PROCEDURE — G0463 HOSPITAL OUTPT CLINIC VISIT: HCPCS | Mod: 25

## 2024-08-09 PROCEDURE — 71045 X-RAY EXAM CHEST 1 VIEW: CPT | Mod: 26 | Performed by: STUDENT IN AN ORGANIZED HEALTH CARE EDUCATION/TRAINING PROGRAM

## 2024-08-09 PROCEDURE — 250N000011 HC RX IP 250 OP 636

## 2024-08-09 PROCEDURE — 90947 DIALYSIS REPEATED EVAL: CPT

## 2024-08-09 PROCEDURE — 85520 HEPARIN ASSAY: CPT | Performed by: SURGERY

## 2024-08-09 PROCEDURE — 85347 COAGULATION TIME ACTIVATED: CPT

## 2024-08-09 PROCEDURE — 33948 ECMO/ECLS DAILY MGMT-VENOUS: CPT

## 2024-08-09 PROCEDURE — 87206 SMEAR FLUORESCENT/ACID STAI: CPT | Performed by: PHYSICIAN ASSISTANT

## 2024-08-09 PROCEDURE — 36415 COLL VENOUS BLD VENIPUNCTURE: CPT | Performed by: SURGERY

## 2024-08-09 PROCEDURE — 74018 RADEX ABDOMEN 1 VIEW: CPT | Mod: 26 | Performed by: RADIOLOGY

## 2024-08-09 PROCEDURE — 99222 1ST HOSP IP/OBS MODERATE 55: CPT | Mod: 25 | Performed by: INTERNAL MEDICINE

## 2024-08-09 PROCEDURE — 250N000013 HC RX MED GY IP 250 OP 250 PS 637: Performed by: PHYSICIAN ASSISTANT

## 2024-08-09 PROCEDURE — 87106 FUNGI IDENTIFICATION YEAST: CPT | Performed by: PHYSICIAN ASSISTANT

## 2024-08-09 PROCEDURE — 86705 HEP B CORE ANTIBODY IGM: CPT | Performed by: PHYSICIAN ASSISTANT

## 2024-08-09 PROCEDURE — 84075 ASSAY ALKALINE PHOSPHATASE: CPT | Performed by: STUDENT IN AN ORGANIZED HEALTH CARE EDUCATION/TRAINING PROGRAM

## 2024-08-09 PROCEDURE — 82805 BLOOD GASES W/O2 SATURATION: CPT | Performed by: PHYSICIAN ASSISTANT

## 2024-08-09 PROCEDURE — 84155 ASSAY OF PROTEIN SERUM: CPT | Performed by: STUDENT IN AN ORGANIZED HEALTH CARE EDUCATION/TRAINING PROGRAM

## 2024-08-09 PROCEDURE — 999N000185 HC STATISTIC TRANSPORT TIME EA 15 MIN

## 2024-08-09 PROCEDURE — 99222 1ST HOSP IP/OBS MODERATE 55: CPT

## 2024-08-09 PROCEDURE — 99291 CRITICAL CARE FIRST HOUR: CPT | Mod: 25 | Performed by: PHYSICIAN ASSISTANT

## 2024-08-09 PROCEDURE — 0B9M8ZX DRAINAGE OF BILATERAL LUNGS, VIA NATURAL OR ARTIFICIAL OPENING ENDOSCOPIC, DIAGNOSTIC: ICD-10-PCS | Performed by: SURGERY

## 2024-08-09 PROCEDURE — 82330 ASSAY OF CALCIUM: CPT | Performed by: PHYSICIAN ASSISTANT

## 2024-08-09 PROCEDURE — 87541 LEGION PNEUMO DNA AMP PROB: CPT | Performed by: PHYSICIAN ASSISTANT

## 2024-08-09 PROCEDURE — 250N000011 HC RX IP 250 OP 636: Performed by: PHYSICIAN ASSISTANT

## 2024-08-09 PROCEDURE — 258N000003 HC RX IP 258 OP 636: Performed by: PHYSICIAN ASSISTANT

## 2024-08-09 PROCEDURE — 86431 RHEUMATOID FACTOR QUANT: CPT | Performed by: STUDENT IN AN ORGANIZED HEALTH CARE EDUCATION/TRAINING PROGRAM

## 2024-08-09 PROCEDURE — 258N000003 HC RX IP 258 OP 636

## 2024-08-09 PROCEDURE — 87102 FUNGUS ISOLATION CULTURE: CPT | Performed by: PHYSICIAN ASSISTANT

## 2024-08-09 PROCEDURE — 87529 HSV DNA AMP PROBE: CPT | Performed by: PHYSICIAN ASSISTANT

## 2024-08-09 PROCEDURE — 85384 FIBRINOGEN ACTIVITY: CPT | Performed by: SURGERY

## 2024-08-09 PROCEDURE — 85300 ANTITHROMBIN III ACTIVITY: CPT | Performed by: SURGERY

## 2024-08-09 PROCEDURE — 250N000009 HC RX 250: Performed by: STUDENT IN AN ORGANIZED HEALTH CARE EDUCATION/TRAINING PROGRAM

## 2024-08-09 PROCEDURE — 74176 CT ABD & PELVIS W/O CONTRAST: CPT | Mod: 26 | Performed by: STUDENT IN AN ORGANIZED HEALTH CARE EDUCATION/TRAINING PROGRAM

## 2024-08-09 PROCEDURE — 86038 ANTINUCLEAR ANTIBODIES: CPT | Performed by: STUDENT IN AN ORGANIZED HEALTH CARE EDUCATION/TRAINING PROGRAM

## 2024-08-09 PROCEDURE — 85004 AUTOMATED DIFF WBC COUNT: CPT | Performed by: SURGERY

## 2024-08-09 PROCEDURE — 999N000065 XR ABDOMEN PORT 1 VIEW

## 2024-08-09 PROCEDURE — 87581 M.PNEUMON DNA AMP PROBE: CPT | Performed by: PHYSICIAN ASSISTANT

## 2024-08-09 PROCEDURE — 33948 ECMO/ECLS DAILY MGMT-VENOUS: CPT | Mod: GC | Performed by: SURGERY

## 2024-08-09 PROCEDURE — 250N000009 HC RX 250: Performed by: PHYSICIAN ASSISTANT

## 2024-08-09 PROCEDURE — 83735 ASSAY OF MAGNESIUM: CPT | Performed by: PHYSICIAN ASSISTANT

## 2024-08-09 PROCEDURE — 82247 BILIRUBIN TOTAL: CPT | Performed by: STUDENT IN AN ORGANIZED HEALTH CARE EDUCATION/TRAINING PROGRAM

## 2024-08-09 PROCEDURE — 85007 BL SMEAR W/DIFF WBC COUNT: CPT | Performed by: SURGERY

## 2024-08-09 PROCEDURE — 87071 CULTURE AEROBIC QUANT OTHER: CPT | Performed by: PHYSICIAN ASSISTANT

## 2024-08-09 PROCEDURE — 200N000002 HC R&B ICU UMMC

## 2024-08-09 PROCEDURE — 71250 CT THORAX DX C-: CPT | Mod: 26 | Performed by: STUDENT IN AN ORGANIZED HEALTH CARE EDUCATION/TRAINING PROGRAM

## 2024-08-09 PROCEDURE — 94645 CONT INHLJ TX EACH ADDL HOUR: CPT

## 2024-08-09 PROCEDURE — 86036 ANCA SCREEN EACH ANTIBODY: CPT | Performed by: STUDENT IN AN ORGANIZED HEALTH CARE EDUCATION/TRAINING PROGRAM

## 2024-08-09 PROCEDURE — 85300 ANTITHROMBIN III ACTIVITY: CPT

## 2024-08-09 PROCEDURE — 94644 CONT INHLJ TX 1ST HOUR: CPT

## 2024-08-09 RX ORDER — DEXTROSE MONOHYDRATE 50 MG/ML
10-20 INJECTION, SOLUTION INTRAVENOUS EVERY 24 HOURS
Status: DISCONTINUED | OUTPATIENT
Start: 2024-08-09 | End: 2024-08-10

## 2024-08-09 RX ORDER — MONTELUKAST SODIUM 10 MG/1
10 TABLET ORAL AT BEDTIME
Status: ON HOLD | COMMUNITY
End: 2024-10-01

## 2024-08-09 RX ORDER — DIPHENHYDRAMINE HYDROCHLORIDE 50 MG/ML
25 INJECTION INTRAMUSCULAR; INTRAVENOUS EVERY 24 HOURS
Status: DISCONTINUED | OUTPATIENT
Start: 2024-08-09 | End: 2024-08-10

## 2024-08-09 RX ORDER — GABAPENTIN 300 MG/1
300 CAPSULE ORAL 3 TIMES DAILY
Status: ON HOLD | COMMUNITY
End: 2024-10-01

## 2024-08-09 RX ORDER — CYCLOBENZAPRINE HCL 10 MG
10 TABLET ORAL 3 TIMES DAILY PRN
Status: ON HOLD | COMMUNITY
End: 2024-10-01

## 2024-08-09 RX ORDER — DIPHENHYDRAMINE HYDROCHLORIDE 50 MG/ML
25 INJECTION INTRAMUSCULAR; INTRAVENOUS EVERY 6 HOURS PRN
Status: DISCONTINUED | OUTPATIENT
Start: 2024-08-09 | End: 2024-08-10

## 2024-08-09 RX ORDER — BISACODYL 10 MG
10 SUPPOSITORY, RECTAL RECTAL DAILY PRN
Status: DISCONTINUED | OUTPATIENT
Start: 2024-08-09 | End: 2024-08-14

## 2024-08-09 RX ORDER — DEXTROSE MONOHYDRATE 100 MG/ML
INJECTION, SOLUTION INTRAVENOUS CONTINUOUS PRN
Status: DISCONTINUED | OUTPATIENT
Start: 2024-08-09 | End: 2024-09-28

## 2024-08-09 RX ORDER — DEXTROSE MONOHYDRATE 50 MG/ML
10-20 INJECTION, SOLUTION INTRAVENOUS EVERY 24 HOURS
Status: DISCONTINUED | OUTPATIENT
Start: 2024-08-09 | End: 2024-08-09

## 2024-08-09 RX ORDER — AMOXICILLIN 250 MG
1 CAPSULE ORAL AT BEDTIME
Status: DISCONTINUED | OUTPATIENT
Start: 2024-08-09 | End: 2024-08-09

## 2024-08-09 RX ORDER — DIPHENHYDRAMINE HCL 25 MG
25 CAPSULE ORAL EVERY 6 HOURS PRN
Status: DISCONTINUED | OUTPATIENT
Start: 2024-08-09 | End: 2024-08-10

## 2024-08-09 RX ORDER — POLYETHYLENE GLYCOL 3350 17 G/17G
17 POWDER, FOR SOLUTION ORAL DAILY
Status: DISCONTINUED | OUTPATIENT
Start: 2024-08-09 | End: 2024-08-10

## 2024-08-09 RX ORDER — POLYETHYLENE GLYCOL 3350 17 G/17G
17 POWDER, FOR SOLUTION ORAL DAILY
Status: DISCONTINUED | OUTPATIENT
Start: 2024-08-09 | End: 2024-08-09

## 2024-08-09 RX ORDER — HYDROCODONE BITARTRATE AND ACETAMINOPHEN 5; 325 MG/1; MG/1
1 TABLET ORAL EVERY 6 HOURS PRN
Status: ON HOLD | COMMUNITY
End: 2024-10-01

## 2024-08-09 RX ORDER — BENZONATATE 100 MG/1
100 CAPSULE ORAL 3 TIMES DAILY PRN
Status: ON HOLD | COMMUNITY
End: 2024-10-01

## 2024-08-09 RX ORDER — IPRATROPIUM BROMIDE AND ALBUTEROL SULFATE 2.5; .5 MG/3ML; MG/3ML
1 SOLUTION RESPIRATORY (INHALATION) EVERY 6 HOURS PRN
Status: ON HOLD | COMMUNITY
End: 2024-10-01

## 2024-08-09 RX ORDER — AMINO ACIDS/PROTEIN HYDROLYS 11G-40/45
1 LIQUID IN PACKET (ML) ORAL 3 TIMES DAILY
Status: DISCONTINUED | OUTPATIENT
Start: 2024-08-09 | End: 2024-08-23

## 2024-08-09 RX ORDER — SUCRALFATE 1 G/1
1 TABLET ORAL 4 TIMES DAILY
Status: ON HOLD | COMMUNITY
End: 2024-10-01

## 2024-08-09 RX ORDER — FERROUS SULFATE 325(65) MG
325 TABLET ORAL 3 TIMES DAILY
Status: ON HOLD | COMMUNITY
End: 2024-10-01

## 2024-08-09 RX ORDER — BUDESONIDE 1 MG/2ML
1 INHALANT ORAL DAILY
Status: ON HOLD | COMMUNITY
End: 2024-10-01

## 2024-08-09 RX ORDER — ALBUTEROL SULFATE 90 UG/1
1 AEROSOL, METERED RESPIRATORY (INHALATION) 4 TIMES DAILY PRN
Status: ON HOLD | COMMUNITY
End: 2024-10-01

## 2024-08-09 RX ORDER — DEXTROSE MONOHYDRATE 100 MG/ML
0-100 INJECTION, SOLUTION INTRAVENOUS CONTINUOUS
Status: DISCONTINUED | OUTPATIENT
Start: 2024-08-09 | End: 2024-08-11

## 2024-08-09 RX ORDER — ESOMEPRAZOLE MAGNESIUM 40 MG/1
40 CAPSULE, DELAYED RELEASE ORAL
Status: ON HOLD | COMMUNITY
End: 2024-10-01

## 2024-08-09 RX ORDER — PROMETHAZINE HYDROCHLORIDE AND CODEINE PHOSPHATE 6.25; 1 MG/5ML; MG/5ML
5 SYRUP ORAL 4 TIMES DAILY PRN
Status: ON HOLD | COMMUNITY
End: 2024-10-01

## 2024-08-09 RX ORDER — CETIRIZINE HYDROCHLORIDE 10 MG/1
10 TABLET ORAL DAILY
Status: ON HOLD | COMMUNITY
End: 2024-10-01

## 2024-08-09 RX ORDER — ATORVASTATIN CALCIUM 10 MG/1
10 TABLET, FILM COATED ORAL DAILY
Status: ON HOLD | COMMUNITY
End: 2024-10-01

## 2024-08-09 RX ORDER — FLUTICASONE PROPIONATE 50 MCG
1 SPRAY, SUSPENSION (ML) NASAL DAILY PRN
Status: ON HOLD | COMMUNITY
End: 2024-10-01

## 2024-08-09 RX ORDER — CALCIUM GLUCONATE 20 MG/ML
2 INJECTION, SOLUTION INTRAVENOUS ONCE
Status: COMPLETED | OUTPATIENT
Start: 2024-08-09 | End: 2024-08-09

## 2024-08-09 RX ORDER — CLONIDINE HYDROCHLORIDE 0.1 MG/1
0.1 TABLET ORAL DAILY
Status: ON HOLD | COMMUNITY
End: 2024-10-01

## 2024-08-09 RX ORDER — VENLAFAXINE HYDROCHLORIDE 150 MG/1
150 CAPSULE, EXTENDED RELEASE ORAL DAILY
Status: ON HOLD | COMMUNITY
End: 2024-10-01

## 2024-08-09 RX ORDER — HYDROXYCHLOROQUINE SULFATE 200 MG/1
200 TABLET, FILM COATED ORAL DAILY
Status: ON HOLD | COMMUNITY
End: 2024-10-01

## 2024-08-09 RX ORDER — SENNOSIDES 8.6 MG
2 TABLET ORAL 2 TIMES DAILY
Status: DISCONTINUED | OUTPATIENT
Start: 2024-08-09 | End: 2024-08-10

## 2024-08-09 RX ORDER — LIDOCAINE HYDROCHLORIDE 20 MG/ML
5 SOLUTION OROPHARYNGEAL ONCE
Status: COMPLETED | OUTPATIENT
Start: 2024-08-09 | End: 2024-08-09

## 2024-08-09 RX ORDER — DIPHENHYDRAMINE HCL 25 MG
25 CAPSULE ORAL EVERY 24 HOURS
Status: DISCONTINUED | OUTPATIENT
Start: 2024-08-09 | End: 2024-08-10

## 2024-08-09 RX ORDER — GUAIFENESIN 600 MG/1
15 TABLET, EXTENDED RELEASE ORAL DAILY
Status: DISCONTINUED | OUTPATIENT
Start: 2024-08-09 | End: 2024-08-10

## 2024-08-09 RX ORDER — CLOTRIMAZOLE 10 MG/1
10 LOZENGE ORAL
Status: ON HOLD | COMMUNITY
End: 2024-10-01

## 2024-08-09 RX ORDER — ACETAMINOPHEN 325 MG/1
650 TABLET ORAL EVERY 24 HOURS
Status: DISCONTINUED | OUTPATIENT
Start: 2024-08-09 | End: 2024-08-10

## 2024-08-09 RX ORDER — SENNOSIDES 8.6 MG
1300 CAPSULE ORAL EVERY 8 HOURS PRN
Status: ON HOLD | COMMUNITY
End: 2024-10-01

## 2024-08-09 RX ORDER — ESTRADIOL 0.07 MG/D
1 FILM, EXTENDED RELEASE TRANSDERMAL
Status: ON HOLD | COMMUNITY
End: 2024-10-01

## 2024-08-09 RX ORDER — CLONAZEPAM 0.5 MG/1
0.25 TABLET ORAL 2 TIMES DAILY
Status: ON HOLD | COMMUNITY
Start: 2024-08-01 | End: 2024-10-01

## 2024-08-09 RX ADMIN — CALCIUM CHLORIDE, MAGNESIUM CHLORIDE, SODIUM CHLORIDE, SODIUM BICARBONATE, POTASSIUM CHLORIDE AND SODIUM PHOSPHATE DIBASIC DIHYDRATE 12.5 ML/KG/HR: 3.68; 3.05; 6.34; 3.09; .314; .187 INJECTION INTRAVENOUS at 18:54

## 2024-08-09 RX ADMIN — Medication 250 UNITS: at 17:47

## 2024-08-09 RX ADMIN — CALCIUM CHLORIDE, MAGNESIUM CHLORIDE, SODIUM CHLORIDE, SODIUM BICARBONATE, POTASSIUM CHLORIDE AND SODIUM PHOSPHATE DIBASIC DIHYDRATE 12.5 ML/KG/HR: 3.68; 3.05; 6.34; 3.09; .314; .187 INJECTION INTRAVENOUS at 09:45

## 2024-08-09 RX ADMIN — Medication 1 MG/HR: at 12:18

## 2024-08-09 RX ADMIN — HEPARIN SODIUM 2350 UNITS/HR: 10000 INJECTION, SOLUTION INTRAVENOUS at 15:44

## 2024-08-09 RX ADMIN — CALCIUM CHLORIDE, MAGNESIUM CHLORIDE, SODIUM CHLORIDE, SODIUM BICARBONATE, POTASSIUM CHLORIDE AND SODIUM PHOSPHATE DIBASIC DIHYDRATE 12.5 ML/KG/HR: 3.68; 3.05; 6.34; 3.09; .314; .187 INJECTION INTRAVENOUS at 14:25

## 2024-08-09 RX ADMIN — SENNOSIDES 2 TABLET: 8.6 TABLET, FILM COATED ORAL at 21:08

## 2024-08-09 RX ADMIN — DEXTROSE MONOHYDRATE 50 ML: 25 INJECTION, SOLUTION INTRAVENOUS at 07:55

## 2024-08-09 RX ADMIN — DEXTROSE MONOHYDRATE 100 ML/HR: 100 INJECTION, SOLUTION INTRAVENOUS at 09:12

## 2024-08-09 RX ADMIN — PANTOPRAZOLE SODIUM 40 MG: 40 INJECTION, POWDER, FOR SOLUTION INTRAVENOUS at 08:21

## 2024-08-09 RX ADMIN — PROPOFOL 50 MCG/KG/MIN: 10 INJECTION, EMULSION INTRAVENOUS at 13:02

## 2024-08-09 RX ADMIN — AZITHROMYCIN MONOHYDRATE 500 MG: 500 INJECTION, POWDER, LYOPHILIZED, FOR SOLUTION INTRAVENOUS at 10:46

## 2024-08-09 RX ADMIN — AMPHOTERICIN B 450 MG: 50 INJECTION, POWDER, LYOPHILIZED, FOR SOLUTION INTRAVENOUS at 17:57

## 2024-08-09 RX ADMIN — PROPOFOL 40 MCG/KG/MIN: 10 INJECTION, EMULSION INTRAVENOUS at 08:55

## 2024-08-09 RX ADMIN — CALCIUM CHLORIDE, MAGNESIUM CHLORIDE, SODIUM CHLORIDE, SODIUM BICARBONATE, POTASSIUM CHLORIDE AND SODIUM PHOSPHATE DIBASIC DIHYDRATE 12.5 ML/KG/HR: 3.68; 3.05; 6.34; 3.09; .314; .187 INJECTION INTRAVENOUS at 05:01

## 2024-08-09 RX ADMIN — Medication 60 ML: at 15:43

## 2024-08-09 RX ADMIN — LIDOCAINE HYDROCHLORIDE 5 ML: 20 SOLUTION OROPHARYNGEAL at 10:47

## 2024-08-09 RX ADMIN — CEFEPIME HYDROCHLORIDE 2 G: 2 INJECTION, POWDER, FOR SOLUTION INTRAVENOUS at 12:51

## 2024-08-09 RX ADMIN — PROPOFOL 40 MCG/KG/MIN: 10 INJECTION, EMULSION INTRAVENOUS at 04:37

## 2024-08-09 RX ADMIN — Medication 500 UNITS: at 09:44

## 2024-08-09 RX ADMIN — Medication 50 MCG: at 12:12

## 2024-08-09 RX ADMIN — PROPOFOL 50 MCG/KG/MIN: 10 INJECTION, EMULSION INTRAVENOUS at 15:29

## 2024-08-09 RX ADMIN — SENNOSIDES 2 TABLET: 8.6 TABLET, FILM COATED ORAL at 09:06

## 2024-08-09 RX ADMIN — Medication 500 UNITS: at 04:07

## 2024-08-09 RX ADMIN — PROPOFOL 60 MCG/KG/MIN: 10 INJECTION, EMULSION INTRAVENOUS at 21:08

## 2024-08-09 RX ADMIN — LEVOTHYROXINE SODIUM 25 MCG: 0.03 TABLET ORAL at 08:20

## 2024-08-09 RX ADMIN — DIPHENHYDRAMINE HYDROCHLORIDE 25 MG: 50 INJECTION, SOLUTION INTRAMUSCULAR; INTRAVENOUS at 17:14

## 2024-08-09 RX ADMIN — Medication 10 MG: at 18:36

## 2024-08-09 RX ADMIN — MIDAZOLAM 2 MG: 1 INJECTION INTRAMUSCULAR; INTRAVENOUS at 22:03

## 2024-08-09 RX ADMIN — Medication 500 UNITS: at 06:06

## 2024-08-09 RX ADMIN — POLYETHYLENE GLYCOL 3350 17 G: 17 POWDER, FOR SOLUTION ORAL at 09:06

## 2024-08-09 RX ADMIN — CHLORHEXIDINE GLUCONATE 0.12% ORAL RINSE 15 ML: 1.2 LIQUID ORAL at 08:20

## 2024-08-09 RX ADMIN — MIDAZOLAM 2 MG: 1 INJECTION INTRAMUSCULAR; INTRAVENOUS at 21:16

## 2024-08-09 RX ADMIN — Medication 60 ML: at 21:09

## 2024-08-09 RX ADMIN — WHITE PETROLATUM 57.7 %-MINERAL OIL 31.9 % EYE OINTMENT: at 10:52

## 2024-08-09 RX ADMIN — CALCIUM GLUCONATE 2 G: 20 INJECTION, SOLUTION INTRAVENOUS at 13:05

## 2024-08-09 RX ADMIN — PROPOFOL 50 MCG/KG/MIN: 10 INJECTION, EMULSION INTRAVENOUS at 19:00

## 2024-08-09 RX ADMIN — IPRATROPIUM BROMIDE AND ALBUTEROL SULFATE 3 ML: .5; 3 SOLUTION RESPIRATORY (INHALATION) at 02:36

## 2024-08-09 RX ADMIN — IPRATROPIUM BROMIDE AND ALBUTEROL SULFATE 3 ML: .5; 3 SOLUTION RESPIRATORY (INHALATION) at 08:31

## 2024-08-09 RX ADMIN — CHLORHEXIDINE GLUCONATE 0.12% ORAL RINSE 15 ML: 1.2 LIQUID ORAL at 21:08

## 2024-08-09 RX ADMIN — ACETAMINOPHEN 650 MG: 325 TABLET ORAL at 17:16

## 2024-08-09 RX ADMIN — CALCIUM CHLORIDE, MAGNESIUM CHLORIDE, SODIUM CHLORIDE, SODIUM BICARBONATE, POTASSIUM CHLORIDE AND SODIUM PHOSPHATE DIBASIC DIHYDRATE 12.5 ML/KG/HR: 3.68; 3.05; 6.34; 3.09; .314; .187 INJECTION INTRAVENOUS at 18:53

## 2024-08-09 RX ADMIN — WHITE PETROLATUM 57.7 %-MINERAL OIL 31.9 % EYE OINTMENT: at 02:04

## 2024-08-09 RX ADMIN — Medication 15 ML: at 15:43

## 2024-08-09 RX ADMIN — DEXTROSE MONOHYDRATE 25 ML: 50 INJECTION, SOLUTION INTRAVENOUS at 17:51

## 2024-08-09 ASSESSMENT — ACTIVITIES OF DAILY LIVING (ADL)
ADLS_ACUITY_SCORE: 55
ADLS_ACUITY_SCORE: 51
ADLS_ACUITY_SCORE: 51
ADLS_ACUITY_SCORE: 53
ADLS_ACUITY_SCORE: 51
ADLS_ACUITY_SCORE: 55
ADLS_ACUITY_SCORE: 55
ADLS_ACUITY_SCORE: 59
ADLS_ACUITY_SCORE: 55
ADLS_ACUITY_SCORE: 53
ADLS_ACUITY_SCORE: 55
ADLS_ACUITY_SCORE: 53
ADLS_ACUITY_SCORE: 55
ADLS_ACUITY_SCORE: 55
ADLS_ACUITY_SCORE: 59
ADLS_ACUITY_SCORE: 55
ADLS_ACUITY_SCORE: 53
ADLS_ACUITY_SCORE: 59
ADLS_ACUITY_SCORE: 55
ADLS_ACUITY_SCORE: 59
ADLS_ACUITY_SCORE: 53
ADLS_ACUITY_SCORE: 53
ADLS_ACUITY_SCORE: 55

## 2024-08-09 NOTE — PROGRESS NOTES
Placed on ECMO-VV at 1932. 1L of LR ran at 999cc/hr on IV pump through circuit. ECMO flowing ~3.9, 3085RPM, 100% FiO2 with a sweep of 2. Levophed gtt stopped due to elevated arterial MAPs.     Cannulation   17fr neck  25fr R-fem     Vent settings changed to rest settings per SICU   PC-PSV R16, FiO2 60, PEEP 10, Pi 25.  SpO2 improved to 100% after being placed on ECMO and rested vent settings. BISstable throughout procedure 30-50s.     Vent settings adjusted per team and RT. TV <100 at this time. Xray order pending. TL CVC to be placed by team at bedside.

## 2024-08-09 NOTE — PROGRESS NOTES
[facility]    ECLS Shift Summary:     ECMO Equipment:  Console Serial Number: [id number]  Circuit Lot Number: not recorded by Perfusion  Oxygenator Lot Number: [id number]    Circuit Assessment: Free of fibrin, clot, and air    Venous Return ECMO Cannula: 17 Fr in the Right Internal Jugular Vein  Venous Drainage ECMO Cannula: 25 Fr in the Right Femoral Vein      ECMO Cannula Arterial Right internal jugular-Site Assessment: WDL, Sutured, Secure  ECMO Cannula Venous Right femoral vein-Site Assessment: WDL, Sutured, Secure  ECMO Cannula Arterial Right internal jugular-Site Intervention: No intervention needed  ECMO Cannula Venous Right femoral vein-Site Intervention: No intervention needed    Patient remains on V-V ECMO, all equipment is functioning and alarms are appropriately set. RPM's: 3250 with Blood Flow (Circuit) LPM  Av.2 LPM  Min: 4.16 LPM  Max: 4.23 LPM L/min. Sweep is at 4 LPM and 100 %. Extremities are cool and edematous.     Significant Shift Events: Sweep/FiO2 and flow unchanged overnight with improvement in blood gases.  The patient peripheral VBG was very difficult to obtain and its corresponding values appear unreliable.  Clinically the patient dies not appear to be having any recirculation issues.  Continued to increase heparin overnight per target ACT goal of 160-180, not yet in goal.  Circuit clean of clots and fibrin with unchanged delta P's.    Vent settings:  Vent Mode: PCV Plus assist  FiO2 (%): 40 %  Resp Rate (Set): 16 breaths/min  Tidal Volume (Set, mL): 300 mL  PEEP (cm H2O): 10 cmH2O  Inspiratory Pressure (cm H2O) (Drager Jessie): 25  Resp: 16      Anticoagulation:  Dose (units/hr) HEParin: 1250 Units/hr  Rate (mL/hr) HEParin: 12.5 mL/hr  Concentration HEParin: 100 Units/mL        Most recent: ACT  (seconds): 136 seconds    Urine output, patient started on CRRT.  .  Blood loss was minimal. Product given included 1 unit of PRBCs.      Intake/Output Summary (Last 24 hours) at 8/9/2024 0611  Last data filed at 8/9/2024 0606  Gross per 24 hour   Intake 1048.07 ml   Output 1271 ml   Net -222.93 ml       Labs:  Recent Labs   Lab 08/09/24  0555 08/09/24  0514 08/09/24  0339 08/09/24  0159 08/08/24  2358   PH 7.36  --  7.33* 7.31* 7.28*   PCO2 35  --  37 36 35   PO2 190*  --  173* 196* 182*   HCO3 20*  --  19* 18* 17*   O2PER 40 40 40  100  40 50 50       Lab Results   Component Value Date    HGB 8.2 (L) 08/09/2024    PHGB 30 (H) 08/09/2024     08/09/2024    FIBR 659 (H) 08/09/2024    INR 1.25 (H) 08/09/2024    PTT 31 08/09/2024    DD >20.00 (HH) 08/09/2024       Plan is continue VV ECMO support.    Tanesha Bates, RT  ECMO Specialist  8/9/2024 6:11 AM

## 2024-08-09 NOTE — CONSULTS
Palliative Care Consultation Note  [facility]      Patient: Massiel Flaherty  Date of Admission:  8/8/2024    Requesting Clinician / Team: Surgical ICU  Reason for consult: Severe ARDS, on ECMO  Decisional support  Patient and family support       Recommendations & Counseling     GOALS OF CARE:   Restorative without limits   Briefly spoke with Massiel's mother Doreen over the phone. Doreen and Mook (son) currently in the hotel resting. I introduced the role of Palliative in ECMO patient. Doreen shared they will be back to the hospital later this evening. Family is opening to meet our team on Monday.  Palliative care plans to meet with Massiel and her family on Monday.     ADVANCE CARE PLANNING:  No health care directive on file. Per  informed consent policy, next of kin should be involved if patient becomes unable.  There is no POLST form on file, defer to patient and/or next of kin for decisions   Code status: Full Code    MEDICAL MANAGEMENT:   We are not actively managing symptoms at this time.    PSYCHOSOCIAL/SPIRITUAL SUPPORT:  Family: Mook (son)  [place] community:   Spiritual:     Palliative Care will continue to follow Massiel. Thank you for the consult and allowing us to aid in the care of Massiel Flaherty.    These recommendations have been discussed with primary team, nursing and family.    DOREEN Ritchie CNP  [facility], Palliative Care  Securely message with the [employer] Web Console (learn more here) or  Text page via [facility] Paging/Directory         Assessment      Massiel Flaherty is a 53 year old female with a past medical hx of Anxiety/depression, fibromyalgia, hypothyroidism, asthma, HLD, MURIEL on CPAP, spells, off on anti seizure medications, expressive aphasia, and hypertension who was admitted an OSH with community acquired pneumonia on 8/3. She was intubated 8/6 and developed severe ARDS requiring paralysis and proning starting today. She  was transferred here for management. On arrival, patient difficult to ventilate with prominent respiratory acidosis (pH 7.03) and hypercapnia. Patient transferred to [facility] for VV ECMO consideration. Palliative care consulted in the setting of ECMO    Spoke with Dr. Freeman outside of [place] this morning. Massiel has elevated inflammatory marks, concerns for infection with unknown etiology. Likely CAP vs other infectious vs noninfectious. ID consulted.     Overall, patient with limited functional status at baseline. Primary team briefly discussed code status DNR with Massiel's mother, further discussion pending with Massiel's rest of the family.     Today, the patient was seen for:  #Acute hypoxic and hypercapnic respiratory failure   #ECMO  #Acute kidney injury  #Fibromyalgia   #Anxiety   #Palliative encounter     History of Present Illness   Met with Massiel at bedside. Patient is sedated and intubated. Doreen (mother) outside of patient's room talking with Dr. Freeman during my visit with Massiel. Unfortunately, Doreen left prior to meeting with Palliative.     This afternoon, briefly spoke with Doreen (mother) over the phone. I introduced our role as an extra layer of support and how we help patients and families dealing with serious, potentially life-limiting illnesses. I explained the composition of the palliative care team. Introduced the role of palliative care in patient with ECMO. Palliative care is an interdisciplinary team that cares for patients with serious illness to help support symptom management, communication, coping for patients and their families as well as support with medical decision making.    Prognosis, Goals, & Planning:   Functional Status just prior to this current hospitalization:  Unable to assess     Prognosis, Goals, and/or Advance Care Planning:  Not discussed     Code Status was addressed today:   Don't discussed     Patient's decision making preferences: unable to assess         Patient has decision-making capacity today for complex decisions: Unable to assess           Coping, Meaning, & Spirituality:   Unable to assess     Social:   Living situation:lives with family  Important relationships/caregivers:family, nieces and nephews, has 2 siblings  Occupation: Did not work in the last 3 yrs. Family assisting Massiel to file for disability  Areas of fulfillment/waldo: family    Medications:  Reviewed this patient's medication profile and medications from this hospitalization.    [place] Board [employer] Pharmacy Data Base Reviewed:     ROS:  Comprehensive ROS is reviewed and is negative except as here & per HPI:     Physical Exam   Vital Signs with Ranges  Temp:  [97.7  F (36.5  C)-100  F (37.8  C)] 98.2  F (36.8  C)  Pulse:  [] 81  Resp:  [16-30] 16  MAP:  [53 mmHg-177 mmHg] 69 mmHg  Arterial Line BP: ()/(42-94) 113/42  FiO2 (%):  [40 %-100 %] 40 %  SpO2:  [41 %-100 %] 97 %  Wt Readings from Last 10 Encounters:   08/09/24 93 kg (205 lb 0.4 oz)     205 lbs .44 oz    PHYSICAL EXAM:  Intubated and sedated    Data reviewed:  CMP  Recent Labs   Lab 08/09/24  0826 08/09/24  0748 08/09/24  0346 08/09/24  0339 08/08/24  2357 08/08/24  2137 08/08/24  1959 08/08/24  1954 08/08/24  1642 08/08/24  1641   NA  --   --   --  139  --  138  --  138  --  140   POTASSIUM  --   --   --  4.5  --  5.0  --  4.8  --  5.8*   CHLORIDE  --   --   --  107  --  104  --  105  --  106   CO2  --   --   --  18*  --  15*  --  17*  --  20*   ANIONGAP  --   --   --  14  --  19*  --  16*  --  14   GLC 75 74   < > 81   < > 147*   < > 145*   < > 153*   BUN  --   --   --  39.7*  --  43.6*  --  43.3*  --  42.5*   CR  --   --   --  2.82*  --  3.39*  --  3.31*  --  3.28*   GFRESTIMATED  --   --   --  19*  --  15*  --  16*  --  16*   QUAN  --   --   --  7.9*  --  8.0*  --  8.1*  --  8.9   MAG  --   --   --  2.5*  --   --   --  2.5*  --  2.8*   PHOS  --   --   --  5.2*  --   --   --  6.8*  --  8.1*   PROTTOTAL  --   --   --   5.1*  --   --   --   --   --  6.6   ALBUMIN  --   --   --  2.4*  --   --   --   --   --  3.0*   BILITOTAL  --   --   --  0.3  --   --   --   --   --  0.6   ALKPHOS  --   --   --  326*  --   --   --   --   --  500*   AST  --   --   --   --   --   --   --   --   --  76*   ALT  --   --   --  25  --   --   --   --   --  37    < > = values in this interval not displayed.     CBC  Recent Labs   Lab 08/09/24  0339 08/08/24  2137   WBC 9.2 14.1*   RBC 2.57* 2.17*   HGB 8.2* 7.0*   HCT 26.0* 22.9*   * 106*   MCH 31.9 32.3   MCHC 31.5 30.6*   RDW 14.6 13.6    325       Exam: XR CHEST PORT 1 VIEW, 8/9/2024 12:55 AM     Comparison: Radiographs 8/8/2024     History: daily ECMO check     Findings:  Endotracheal tube tip projects over the low thoracic trachea. Stable  position of the ECMO cannulas. An esophageal temperature probe. Stable  left IJ central venous catheter. Enteric tube tip extends outside of  imaging. Unchanged complete opacification of the lungs with air  bronchograms.                                                                  Impression:   1. Stable support devices.  2. Unchanged complete opacification of the lungs with air  bronchograms.     I have personally reviewed the examination and initial interpretation  and I agree with the findings.     ALDEN TO, DO     Medical Decision Making       MANAGEMENT DISCUSSED with the following over the past 24 hours: Surgical ICU, nursing and family    NOTE(S)/MEDICAL RECORDS REVIEWED over the past 24 hours: Surgical ICU, surgery and nursing   SUPPLEMENTAL HISTORY, in addition to the patient's history, over the past 24 hours obtained from: Family   60 MINUTES SPENT BY ME on the date of service doing chart review, history, exam, documentation & further activities per the note.

## 2024-08-09 NOTE — CONSULTS
[facility]  WO Nurse Inpatient Assessment     Consulted for: ECMO Pressure Injury Prevention    Patient History (according to provider note(s):       Massiel Flaherty is a 53 year old female who was admitted to [facility].   Past medical hx of Anxiety/depression, fibromyalgia, hypothyroidism, asthma, HLD, MURIEL on CPAP, spells, off on anti seizure medications, expressive aphasia, hypertension was seen at [facility] and was placed on Augmentin outpatient on 7/30/24.   She admitted to the hospital on 8/3/24 for fatigue, fever and dyspnea and intubated 8/6/24 at OSH, proned and paralyzed without improvement. Transferred to [facility] 8/8/24, hypoxic with high plateaus/peaks and placed on VV EMCO and CRRT.     Assessment:      ECMO cannula location: Right IJ ECMO cannula and Right groin ECMO cannula  Areas assessed: Mouth, Ears, Occiput, Hands, Feet, and Heels  Positioning tolerance: Fair  Date of ECMO cannulation: 8/8/2024  Presence of ischemia: No and scattered bruising to bilateral feet and right heel- photos taken and in chart under media tab.  Location of ischemia: N/A  Pressure Injury Present::No, Initially some concern for possible tongue wound, photo taken, however mucus easily wiped away shortly after photo and no clear injury visualized, does appear as though patient may be developing thrush though, RN notified.  Pressure Injury Prevention Interventions In Place:  Optifoam Dressing under ECMO Cannula, Z flow Positioner under head, Pillows for repositioning, TAPs Wedge Positioners in use, Heel off-loading boots, and Mepilex Sacral Dressing        Pressure Injury Prevention Interventions Added:  None- all interventions in place.      Treatment Plan:     ECMO pressure injury prevention plan:      Reposition patient every 1-2 hours using positioning wedges  Reposition head with each turn or every 2 hours using fluidized positioner, remold fluidized positioner with each  reposition so that pressure is redistributed along the entire head/neck. Ensure head and neck are aligned in a neutral position without any cords or tubes resting under head or ears.   Pad ECMO IJ cannula with non adhesive foam dressing (#[id number]) along face and scalp between skin and cannula and under Coban head wraps    Pad ECMO groin and chest cannula under rigid connectors with non adhesive foam dressing or Soft cloth  Heel off-loading Boots at all times  Sacral silicon adhesive dressing for Prevention, change every 5 days and prn  Low Air loss mattress      Orders: Written    RECOMMEND PRIMARY TEAM ORDER: None, at this time  Education provided: plan of care and discussed seeing if SCD's could be discontinued  Discussed plan of care with: Nurse  [facility] nurse follow-up plan: weekly  Notify WOC if wound(s) deteriorate.  Nursing to notify the Provider(s) and re-consult the [facility] Nurse if new skin concern.    DATA:     Current support surface: Standard  Low air loss (DIA pump, Isolibrium, Pulsate)  Containment of urine/stool: Incontinent pad in bed and Indwelling catheter  BMI: Body mass index is 37.5 kg/m .   Active diet order: Orders Placed This Encounter      NPO for Medical/Clinical Reasons Except for: Meds, NPO but receiving Tube Feeding     Output: I/O last 3 completed shifts:  In: 2462.95 [I.V.:2047.95; NG/GT:115]  Out: 2986 [Urine:206; Emesis/NG output:250; Other:2530]     Labs:   Recent Labs   Lab 08/09/24  1004   ALBUMIN 2.2*   HGB 7.7*   INR 1.25*   WBC 8.8     Pressure injury risk assessment:   Sensory Perception: 1-->completely limited  Moisture: 3-->occasionally moist  Activity: 1-->bedfast  Mobility: 1-->completely immobile  Nutrition: 1-->very poor  Friction and Shear: 1-->problem  Kade Score: 8    Jaycee Pal RN, CWOCN  Pager no longer is use, please contact through [facility] group: [facility] Nurse [place]  Dept. Office Number: [phone]       FAMILY HISTORY:  Mother  Still living? Unknown  FH: thyroid disease, Age at diagnosis: Age Unknown

## 2024-08-09 NOTE — CONSULTS
Nephrology Initial Consult  August 9, 2024      Massiel Flaherty MRN:[id number] YOB: 1970  Date of Admission:8/8/2024  Primary care provider: Miranda Yoder  Requesting physician: Barry Hatch MD    ASSESSMENT AND RECOMMENDATIONS:    53 year old female with a past medical hx of Anxiety/depression, fibromyalgia, hypothyroidism, asthma, HLD, MURIEL on CPAP, expressive aphasia, and hypertension    who was admitted to an OSH with community acquired pneumonia on 8/3 s/p intubation for severe ARDS requiring paralysis and proning, transferred here on 8/8 for management and initiated on CKRT for severe acidemia in the setting of oligoanuric CHACORTA    #1 CHACORTA oligoanuric secondary to severe septic shock and ARDS. Renal function normal at baseline with a creatinine level at 0.6 mg/dL on 8/1/2024. Would obtain renal imaging when feasible along with urine studies (uACR and uPCR). Pursue for now CKRT using a 25 mL/kg dose and aim at I=O  Dose all her meds per CKRT protocol  Check electrolytes q12h    #2 Anemia secondary to severe inflammation and CHACORTA. Management pper primary team    #3 Acid base status Latest pH is 7.28 slightly improved. Will increase CKRT dose to 30 mL/kg if fails to improve further    Recommendations were communicated to primary team via note      Sammie Ortega MD   Division of Renal Disease and Hypertension  [facility]  [email or web address]  Vocera Web Console      REASON FOR CONSULT: CHACORTA    HISTORY OF PRESENT ILLNESS:  Admitting provider and nursing notes reviewed  Massiel Flaherty is a 53 year old female with a past medical hx of Anxiety/depression, fibromyalgia, hypothyroidism, asthma, HLD, MURIEL on CPAP, spells, off on anti seizure medications, expressive aphasia, and hypertension    who was admitted to an OSH with community acquired pneumonia on 8/3. She was intubated 8/6 and developed severe ARDS requiring paralysis and proning . She was transferred here on 8/8 for management. On arrival, patient  was difficult to ventilate with prominent respiratory acidosis (pH 7.03) and hypercapnia. Notable ARDS with P/F of 78.   She also had elevated plateau pressures (40-45 cmH2O). Her pH improved to 7.26 after she was cannulated with VV ECMO and her PCO2 dropped from 91 to 77 to 55 to 38.  Fio2 also went down from 100% to 50%. As she was oliguric and with some evidence of volume overload she was initiated on CKRT  with 4k bath, 25ml/kg/hr with net goal of 0-100ml/hr as tolerated     PAST MEDICAL HISTORY:    No past medical history on file.    No past surgical history on file.     MEDICATIONS:  PTA Meds  Prior to Admission medications    Not on File      Current Meds  Current Facility-Administered Medications   Medication Dose Route Frequency Provider Last Rate Last Admin    artificial tears ophthalmic ointment   Both Eyes Q8H Giovanny Guidry PA-C   Given at 08/09/24 0204    azithromycin (ZITHROMAX) 500 mg in sodium chloride 0.9 % 250 mL intermittent infusion  500 mg Intravenous Q24H Cal Lisa MD        ceFEPIme (MAXIPIME) 2 g vial to attach to  mL bag for ADULTS or NS 50 mL bag for PEDS  2 g Intravenous Q12H Barry Hatch MD   2 g at 08/08/24 2344    chlorhexidine (PERIDEX) 0.12 % solution 15 mL  15 mL Mouth/Throat Q12H Giovanny Guidry PA-C   15 mL at 08/09/24 0820    levothyroxine (SYNTHROID/LEVOTHROID) tablet 25 mcg  25 mcg Per Feeding Tube QAM AC Giovanny Guidry PA-C   25 mcg at 08/09/24 0820    lidocaine (viscous) (XYLOCAINE) 2 % solution 5 mL  5 mL Topical Once Giovanny Guidry PA-C        pantoprazole (PROTONIX) 2 mg/mL suspension 40 mg  40 mg Per Feeding Tube QAM AC Giovanny Guidry PA-C        Or    pantoprazole (PROTONIX) IV push injection 40 mg  40 mg Intravenous QAM AC Giovanny Guidry PA-C   40 mg at 08/09/24 0821    polyethylene glycol (MIRALAX) Packet 17 g  17 g Per Feeding Tube Daily Giovanny Guidry PA-C   17 g at 08/09/24 0906    sennosides (SENOKOT) tablet 2 tablet  2 tablet Per Feeding Tube BID  Giovanny Guidry PA-C   2 tablet at 08/09/24 0906     Infusion Meds  Current Facility-Administered Medications   Medication Dose Route Frequency Provider Last Rate Last Admin    cisatracurium (NIMBEX) 200 mg in D5W 100 mL infusion  3-10 mcg/kg/min (Ideal) Intravenous Continuous Giovanny Guidry PA-C 4.5 mL/hr at 08/09/24 0700 3 mcg/kg/min at 08/09/24 0700    dextrose 10% infusion  0-100 mL/hr Intravenous Continuous Giovanny Guidry PA-C 100 mL/hr at 08/09/24 0912 100 mL/hr at 08/09/24 0912    dialysate for CVVHD & CVVHDF (Phoxillum BK4/2.5)  12.5 mL/kg/hr CRRT Continuous Sony Castaneda MD 1,100 mL/hr at 08/09/24 0501 12.5 mL/kg/hr at 08/09/24 0501    fentaNYL (SUBLIMAZE) infusion   mcg/hr Intravenous Continuous Giovanny Guidry PA-C 2 mL/hr at 08/09/24 0700 100 mcg/hr at 08/09/24 0700    heparin (porcine) 100 unit/mL in 0.45% Sodium Chloride ANTICOAGULANT infusion  10-50 Units/kg/hr (Ideal) Other Continuous Janes Freeman MD 15 mL/hr at 08/09/24 0810 1,500 Units/hr at 08/09/24 0810    HYDROmorphone (DILAUDID) 0.2 mg/mL infusion ADULT/PEDS GREATER than or EQUAL to 20 kg  0.3-1 mg/hr Intravenous Continuous Giovanny Guidry PA-C        propofol (DIPRIVAN) infusion  5-75 mcg/kg/min (Dosing Weight) Intravenous Continuous Giovanny Guidry PA-C 21.3 mL/hr at 08/09/24 0855 40 mcg/kg/min at 08/09/24 0855    And    Medication Instruction   Does not apply Continuous PRN Giovanny Guidry PA-C        No heparin required   Does not apply Continuous PRN Sony Castaneda MD        norepinephrine (LEVOPHED) 16 mg in  mL infusion MAX CONC CENTRAL LINE  0.01-0.6 mcg/kg/min (Dosing Weight) Intravenous Continuous Giovanny Guidry PA-C   Stopped at 08/09/24 0029    POST-filter replacement solution for CVVHD & CVVHDF (Phoxillum BK4/2.5)   CRRT Continuous Sony Castaneda  mL/hr at 08/08/24 2329 New Bag at 08/08/24 2329    PRE-filter replacement solution for CVVHD & CVVHDF (Phoxillum BK4/2.5)  12.5 mL/kg/hr CRRT Continuous  Sony Castaneda MD 1,100 mL/hr at 08/09/24 0501 12.5 mL/kg/hr at 08/09/24 0501    vasopressin 1 unit/mL MAX Conc (PITRESSIN) infusion  2.4 Units/hr Intravenous Continuous Her-Giovanny Spence PA-C   Held at 08/08/24 8369       ALLERGIES:    Allergies   Allergen Reactions    Celecoxib Unknown    Sulfa Antibiotics Unknown    Tetracycline Unknown       REVIEW OF SYSTEMS:  A comprehensive of systems was negative except as noted above.    SOCIAL HISTORY:   Social History     Socioeconomic History    Marital status: Not on file     Spouse name: Not on file    Number of children: Not on file    Years of education: Not on file    Highest education level: Not on file   Occupational History    Not on file   Tobacco Use    Smoking status: Not on file    Smokeless tobacco: Not on file   Substance and Sexual Activity    Alcohol use: Not on file    Drug use: Not on file    Sexual activity: Not on file   Other Topics Concern    Not on file   Social History Narrative    Not on file     Social Determinants of Health     Financial Resource Strain: Medium Risk (9/25/2020)    Received from [facility]    Overall Financial Resource Strain (CARDIA)     Difficulty of Paying Living Expenses: Somewhat hard   Food Insecurity: No Food Insecurity (9/25/2020)    Received from [facility]    Hunger Vital Sign     Worried About Running Out of Food in the Last Year: Never true     Ran Out of Food in the Last Year: Never true   Transportation Needs: No Transportation Needs (9/25/2020)    Received from [facility]    PRAPARE - Transportation     Lack of Transportation (Medical): No     Lack of Transportation (Non-Medical): No   Physical Activity: Insufficiently Active (4/10/2023)    Received from [facility]    Exercise Vital Sign     Days of Exercise per Week: 2 days     Minutes of Exercise per Session: 30 min   Stress: Stress Concern Present (9/8/2020)    Received from [place]    [facility] of Occupational Health - Occupational Stress Questionnaire     Feeling of Stress : Very much   Social Connections: Moderately Isolated (2020)    Received from [facility]    Social Connection and Isolation Panel [NHANES]     Frequency of Communication with Friends and Family: More than three times a week     Frequency of Social Gatherings with Friends and Family: Twice a week     Attends Evangelical Services: 1 to 4 times per year     Active Member of Clubs or Organizations: No     Attends Club or Organization Meetings: Never     Marital Status:    Interpersonal Safety: Not At Risk (2020)    Received from [facility]    Humiliation, Afraid, Rape, and Kick questionnaire     Fear of Current or Ex-Partner: No     Emotionally Abused: No     Physically Abused: No     Sexually Abused: No   Housing Stability: Not on file       FAMILY MEDICAL HISTORY:   No family history on file.    PHYSICAL EXAM:   Temp  Av.6  F (37  C)  Min: 97.7  F (36.5  C)  Max: 100  F (37.8  C)  Arterial Line BP  Min: 65/45  Max: 185/94  Arterial Line MAP (mmHg)  Av.5 mmHg  Min: 53 mmHg  Max: 177 mmHg      Pulse  Av.4  Min: 75  Max: 127 Resp  Av.1  Min: 16  Max: 30  FiO2 (%)  Av %  Min: 40 %  Max: 100 %  SpO2  Av.7 %  Min: 41 %  Max: 100 %       Pulse 78   Temp 98.1  F (36.7  C)   Resp 16   Ht 1.575 m (5' 2\")   Wt 93 kg (205 lb 0.4 oz)   SpO2 100%   BMI 37.50 kg/m     Date 24 07 - 08/10/24 0659   Shift 5052-8315 5040-5648 2898-3676 24 Hour Total   INTAKE   I.V. 175.63   175.63   NG/   115   Shift Total(mL/kg) 290.63(3.13)   290.63(3.13)   OUTPUT   Urine 10   10   Emesis/NG output 150   150   Other 272   272   Shift Total(mL/kg) 432(4.65)   432(4.65)   Weight (kg) 93 93 93 93      Admit Weight: 88.9 kg (195 lb 15.8 oz)     GENERAL APPEARANCE: Intubated and sedated and paralyzed  EYES:  scleral icterus, pupils " equal  Lymphatics: no cervical or supraclavicular LAD  Pulmonary: lungs decreased BS over the bases  CV: regular rhythm, normal rate, no rub   - Edema none  GI: soft, nontender, normal bowel sounds  MS: no evidence of inflammation in joints, no muscle tenderness  : rolle in place  SKIN: no rash, warm, dry, no cyanosis  NEURO: unable to assess     LABS:   I have reviewed the following labs:  CMP  Recent Labs   Lab 08/09/24  0826 08/09/24  0748 08/09/24  0346 08/09/24  0339 08/08/24  2357 08/08/24  2137 08/08/24  1959 08/08/24  1954 08/08/24  1642 08/08/24  1641   NA  --   --   --  139  --  138  --  138  --  140   POTASSIUM  --   --   --  4.5  --  5.0  --  4.8  --  5.8*   CHLORIDE  --   --   --  107  --  104  --  105  --  106   CO2  --   --   --  18*  --  15*  --  17*  --  20*   ANIONGAP  --   --   --  14  --  19*  --  16*  --  14   GLC 75 74 81 81   < > 147*   < > 145*   < > 153*   BUN  --   --   --  39.7*  --  43.6*  --  43.3*  --  42.5*   CR  --   --   --  2.82*  --  3.39*  --  3.31*  --  3.28*   GFRESTIMATED  --   --   --  19*  --  15*  --  16*  --  16*   QUAN  --   --   --  7.9*  --  8.0*  --  8.1*  --  8.9   MAG  --   --   --  2.5*  --   --   --  2.5*  --  2.8*   PHOS  --   --   --  5.2*  --   --   --  6.8*  --  8.1*   PROTTOTAL  --   --   --  5.1*  --   --   --   --   --  6.6   ALBUMIN  --   --   --  2.4*  --   --   --   --   --  3.0*   BILITOTAL  --   --   --  0.3  --   --   --   --   --  0.6   ALKPHOS  --   --   --  326*  --   --   --   --   --  500*   AST  --   --   --   --   --   --   --   --   --  76*   ALT  --   --   --  25  --   --   --   --   --  37    < > = values in this interval not displayed.     CBC  Recent Labs   Lab 08/09/24  0339 08/08/24 2137 08/08/24  1954 08/08/24  1641   HGB 8.2* 7.0* 7.5* 9.9*   WBC 9.2 14.1* 16.3* 27.2*   RBC 2.57* 2.17* 2.34* 3.08*   HCT 26.0* 22.9* 25.4* 33.8*   * 106* 109* 110*   MCH 31.9 32.3 32.1 32.1   MCHC 31.5 30.6* 29.5* 29.3*   RDW 14.6 13.6 13.8 13.8     325 326 568*     INR  Recent Labs   Lab 08/09/24  0339 08/08/24  2137 08/08/24  1641   INR 1.25* 1.45* 1.36*   PTT 31 37  --      ABG  Recent Labs   Lab 08/09/24  0742 08/09/24  0555 08/09/24  0514 08/09/24  0339 08/09/24  0159   PH 7.36 7.36  --  7.33* 7.31*   PCO2 35 35  --  37 36   PO2 104 190*  --  173* 196*   HCO3 20* 20*  --  19* 18*   O2PER 40 40 40 40  100  40 50      URINE STUDIES  Recent Labs   Lab Test 08/08/24  1725   COLOR Yellow   APPEARANCE Slightly Cloudy*   URINEGLC 30*   URINEBILI Negative   URINEKETONE Negative   SG 1.019   UBLD Moderate*   URINEPH 5.5   PROTEIN 100*   NITRITE Negative   LEUKEST Trace*   RBCU 31*   WBCU 7*     No lab results found.  PTH  No lab results found.  IRON STUDIES  No lab results found.    IMAGING:  All imaging studies reviewed by me.       Sammie Ortega MD

## 2024-08-09 NOTE — PROGRESS NOTES
[facility]    ECLS Shift Summary:     ECMO Equipment:  Console Serial Number: [id number]  Circuit Lot Number: not recorded by Perfusion  Oxygenator Lot Number: [id number]    Circuit Assessment: Free of fibrin, clot, and air    Arterial Cannula: 17 Fr. In the Right Internal Jugular Vein  Venous Cannula: 25 Fr. In the Right Femoral Vein    ECMO Cannula Arterial Right internal jugular-Site Assessment: WDL, Sutured, Secure  ECMO Cannula Venous Right femoral vein-Site Assessment: WDL, Sutured, Secure  ECMO Cannula Arterial Right internal jugular-Site Intervention: Dressing changed/new dressing  ECMO Cannula Venous Right femoral vein-Site Intervention: Dressing changed/new dressing    Patient remains on V-V ECMO, all equipment is functioning and alarms are appropriately set. RPM's: 3250 with Blood Flow (Circuit) LPM  Av.17 LPM  Min: 4.17 LPM  Max: 4.17 LPM L/min. Sweep is at 4 LPM and 100 %. Extremities are perfused.     Significant Shift Events: Placed on VV ECMO; cannulated by Dr Freeman    Vent settings:  Vent Mode: CMV/AC  FiO2 (%): 60 %  Resp Rate (Set): 30 breaths/min  Tidal Volume (Set, mL): 300 mL  PEEP (cm H2O): 12 cmH2O  Resp: 16      Anticoagulation:  Dose (units/hr) HEParin: 500 Units/hr  Rate (mL/hr) HEParin: 5 mL/hr  Concentration HEParin: 100 Units/mL        Most recent: ACT  (seconds): 148 seconds    Blood loss was minimal. Product given included: no product given.     Intake/Output Summary (Last 24 hours) at 2024 2147  Last data filed at 2024 2100  Gross per 24 hour   Intake 183.79 ml   Output 175 ml   Net 8.79 ml       Labs:  Recent Labs   Lab 24  1954 24  1728 24  1640   PH 7.14* 7.03* 6.98*   PCO2 55* 77* 91*   PO2 92 78* 67*   HCO3 19* 20* 22   O2PER 60 100 100       Lab Results   Component Value Date    HGB 7.5 (L) 2024     2024    FIBR 885 (H) 2024    INR 1.36 (H) 2024       Plan is to continue VV  ECMO and wean support as able.    Luis Antonio Silverman  ECMO Specialist  8/8/2024 9:47 PM

## 2024-08-09 NOTE — SIGNIFICANT EVENT
Massiel Flaherty is a 53 year old female with a past medical hx of Anxiety/depression, fibromyalgia, hypothyroidism, asthma, HLD, MURIEL on CPAP, spells, off on anti seizure medications, expressive aphasia, and hypertension     was admitted an OSH with community acquired pneumonia on 8/3. She was intubated 8/6 and developed severe ARDS requiring paralysis and proning starting today.     She was transferred here for management. On arrival, patient was difficult to ventilate with prominent respiratory acidosis (pH 7.03) and hypercapnia. Notable ARDS with P/F of 78. She also has elevated plateau pressures (40-45 cmH2O). Remains on full strength inhaled epoprostenol.     Her PH improved to 7.26 after she was cannulated with VV ECMO and her PCO2 dropped from 91 to 77 to 55 to 38    Fio2 also went down from 100% to 50%, and is on levo 0.02 to 0.09  Oliguric and edematous   UO 190ML  Cxr shows diffuse bl lung opacities      Has HAGMA from sever CHACORTA and respiratory acidosis from ARDS  K dropped from 5.8 to 5.0 without any intervention   Bicarb is 15, PH 7.26, Lactate 1.8  CK added on (pending)    Given that she has severe ARDS and is already on ECMO (no new access needed) we will start CRRT with 4k bath, 25ml/kg/hr with net goal of 0-100ml/hr as tolerated     I called her mother, Jyoti Mckoy  and obtained consent to start CRRT    Case discussed with Dr Hayes.

## 2024-08-09 NOTE — PROGRESS NOTES
Veno-venous ECMO Progress Note  8/9/2024    Massiel Flaherty is a 53 year old female who was started on ECMO due to severe respiratory failure from ARDS related to community acquired pneumonia.     Interval events: Placed on VV ECMO last evening with improvement in hypoxia and hypercapnia. Right internal jugular cannular repositioned this morning due close proximity to groin line (see procedure note). Weaned from vasopressors.    The patients vital signs today are as follows:    Temp:  [97.7  F (36.5  C)-100  F (37.8  C)] 98.1  F (36.7  C)  Pulse:  [] 86  Resp:  [16-30] 16  MAP:  [53 mmHg-177 mmHg] 79 mmHg  Arterial Line BP: ()/(42-94) 117/60  FiO2 (%):  [40 %-100 %] 40 %  SpO2:  [41 %-100 %] 96 %    Intake/Output Summary (Last 24 hours) at 8/9/2024 1049  Last data filed at 8/9/2024 1044  Gross per 24 hour   Intake 1483.7 ml   Output 1866 ml   Net -382.3 ml    Vent Mode: PCV Plus assist  FiO2 (%): 40 %  Resp Rate (Set): 16 breaths/min  Tidal Volume (Set, mL): 300 mL  PEEP (cm H2O): 10 cmH2O  Inspiratory Pressure (cm H2O) (Drager Jessie): 25  Resp: 16     Recent Labs   Lab 08/09/24  1004 08/09/24  0742 08/09/24  0555 08/09/24  0514 08/09/24  0339   PH 7.35 7.36 7.36  --  7.33*   PCO2 38 35 35  --  37   PO2 136* 104 190*  --  173*   HCO3 21 20* 20*  --  19*   O2PER 40 40 40 40 40  100  40      Recent Labs   Lab 08/09/24  1004 08/09/24  0339 08/08/24  2137 08/08/24  1954   WBC 8.8 9.2 14.1* 16.3*   HGB 7.7* 8.2* 7.0* 7.5*     Creatinine   Date Value Ref Range Status   08/09/2024 2.82 (H) 0.51 - 0.95 mg/dL Final   08/08/2024 3.39 (H) 0.51 - 0.95 mg/dL Final   08/08/2024 3.31 (H) 0.51 - 0.95 mg/dL Final   08/08/2024 3.28 (H) 0.51 - 0.95 mg/dL Final     Physical Exam:   Right internal jugular cannula retracted 5-6 cm this AM, see procedure note. Right femoral line unchanged. No bleeding from either site.  Minimal air movement  Extremities edematous. Scattered ecchymosis of bilateral calves and  toes.    ECMO Issues including assessments and plan on DOS 8/9/2024:    Neuro: Sedated for mechanical ventilation and ECMO.  RASS goal: -4 to -5  CV: Hemodynamically stable, off vasopressors.  Pulm: Severe respiratory failure requiring ECMO and mechanical ventilation. TVs 30-50 mL   Keep vent settings at rest settings: PC 25, PEEP10, FiO2 60%.  FEN/Renal: Creatinine  Lab Results   Component Value Date    CR 2.82 08/09/2024   Currently on CRRT through ECMO circuit. UOP 2-4 mL/hr since midnight  Heme: Hemoglobin 7.7.  Goals: if O2 sat >85% Hgb may drift to 7-8.  If O2 Sat <85% keep Hgb 10-12.  ID: Cefepime, added azithromycin this AM. ID consult.     All pertinent labs, imaging studies, physical exam and medications have been reviewed by me.     Discussed with staff, Dr. Pete Powell  Surgical Critical Care Fellow

## 2024-08-09 NOTE — PROGRESS NOTES
Major Shift Events:  Weaned of paralytic. R internal jugular vv cannula pulled back 6 cm. NG placed at bedside, tube feedings started. Chest/ABD Xray completed. Bronchoscopy completed at bedside, sputum samples sent.    Neuro: Pupils equal, reactive. Sedated on propofol/dilaudid.   CV: HR NSR with occasional PVCs. BPs stable, on low dose of levo 0.01-0.03. Afebrile.   Resp: RR 16, 25 Psupport/10 peep/40 fio2. Sweep 4, 100 % on oxygenator. 3250 RPM 4L Flow.  Suctioning thick secretions, patient will occasionally have cuffleak, continue to monitor.  TV 89-150s. LS diminished.   : Minimal urine output 0-10 ml/hr.   GI: TF initiated at 15 ml/hr. Bowel regimen started.  Skin: WOC at bedside to evaluate possible pressure injury on tongue, mouth cleaned and wiped off and was confirmed to not be. Skin intact. Bruising noted on feet and generalized edema.   CRRT: Pulling 0-100 ml/hr. Goal net negative 1L by midnightl. Currently net negative 500.          Plan: Will keep sedation plan as is. Pull on CRRT. Adjust sweep as needed.   For vital signs and complete assessments, please see documentation flowsheets.

## 2024-08-09 NOTE — PROGRESS NOTES
CRRT INITIATION NOTE    Consent for CRRT Completed:  YES - phone consent done by renal w/ pt's mother  Patient s Vascular Access: Catheter              Placement Confirmed: N/A - ECMO      DATA:  Procedure:  CVVHDF  Start Time:  0031  Machine#:  9  Filter:    Blood Flow:  200  ML/min  Pre-Replacement Solution:  Phoxillum BK4/2.5  Post-Replacement Solution:  Phoxillum BK4/2.5  Dialysate Solution:  Phoxillum BK4/2.5  Pre-Replacement Solution Rate:  1100 mL/hr  Post-Replacement Solution Rate:  200 mL/hr  Dialysate Flow Rate:  1100 mL/hr   Patient Removal Rate:  0 mL/hr  Anticoagulation Type and Rate:  N/A    ASSESSMENT:  How Patient Tolerated Initiation:   Vital Signs:  /67 (85), HR 81, SpO2 98%  Initial Pressures:  Access:  75  Filter:  169  Return:  113  TMP:  32  Change in Filter Pressure:  35      INTERVENTIONS:  CRRT set primed and auto-effluent noted to leaking. Set taken down and auto-effluent replaced - second attempt was successful and CRRT started.    PLAN:  Continue CRRT for 0-100ml/hr fluid removal and electrolyte management.

## 2024-08-09 NOTE — PROGRESS NOTES
SURGICAL ICU PROGRESS NOTE  08/09/2024        Date of Service (when I saw the patient): 08/09/2024    ASSESSMENT:  Massiel Flaherty is a 53 year old female who was admitted to [facility].   Past medical hx of Anxiety/depression, fibromyalgia, hypothyroidism, asthma, HLD, MURIEL on CPAP, spells, off on anti seizure medications, expressive aphasia, hypertension was seen at [facility] and was placed on Augmentin outpatient on 7/30/24.   She admitted to the hospital on 8/3/24 for fatigue, fever and dyspnea and intubated 8/6/24 at OSH, proned and paralyzed without improvement. Transferred to [facility] 8/8/24, hypoxic with high plateaus/peaks and placed on VV EMCO and CRRT.     CHANGES and MAJOR THINGS TODAY:   Stop Fentanyl gtt and start propofol gtt ( due to circuit binding)   Stop cistaricum gtt   Stop veletri inhaled   Stop duonebs today   ECMO team to adjust cannulas based on imaging.   CRRT per Renal protocol   RD to place NJ feeding tube   D10 PRN hypoglycemia   - change labs to every 6 hours  Pharm D, to aid in medication reconciliation for pta medications   ID consult for evaluation of infectious source of respiratory failure   Azithromycin added  Bowel regimen to be added     PLAN:Neurological:  # Fibromyalgia   # Anxiety   # sedation and analgesia for vent compliance   - Monitor neurological status. Delirium preventions and precautions.   - Pain: Fentnayl gtt         - Sedation plan: propofol gtt   -  Paralysis: cisatricum gtt   - TOF 2/4. BIS monitoring target goal 40-60  -  plan to stop paralysis today, transition from fentanyl to dilaudid gtt   - will review PTA medications and resume appropriate medications as able.     Pulmonary:   # Asthma  # MURIEL on home CPAP   #  Acute hypoxic and hypercapnic respiratory failure   # s/p VV EMCO 8/8  Vent Mode: PCV Plus assist  FiO2 (%): 40 %  Resp Rate (Set): 16 breaths/min  Tidal Volume (Set, mL): 300 mL  PEEP (cm H2O): 10 cmH2O  Inspiratory Pressure (cm H2O) (Paradigm  Jessie): 25  Resp: 16  - stop inhaled epoprostenol as minimal Vt's.   - VT's 30-60ml  - stop duonebs scheduled   - continue with rest vent setting on ECMO. Plan to optimize ECMO     ECMO:  Sweep 4LPM  Flow 4.08  Heparin gt 1250, ACT goal of 160--180     Cardiovascular:    # hyperlipidemia    # shock, presumed septic  - MAP >65. Monitor HD status   - norepinephrine gtt and  Vasopressin 2.4 unit/shr --OFF since 8/8/24   - recheck lactic level 4.0--> discussed with RN and ecmo specialist, likely erroneous labs, hold off LR bolus previously ordered     Gastroenterology/Nutrition:  # GERD   # Protein calorie deficit malnutrition   - Protonix (home medication)   - OG to LIS   - RD consult in am for SBFT and TF recommendations      Fluids/Electrolytes/Renal:  # Acute kidney injury  # Hyperkalemia, resolved  # Hyperphosphatemia   - K 5.8, suspect from acidosis, now resolved   - Renal Consulted.   - CRRT started. UF pull -100Ml/hr   - labs every 6 hrs to concided with ECMO.      Endocrine:  # Hypothyroidism   # concern for stress hyperglycemia due to critical illness   # hypoglycemia   - synthroid resumed   - Blood glucose 74 this am, relatives hypoglycemia, will give D50 amp now and start PRN D10 W for BG management until feeds started   - continue with sliding scale insulin due to feed start, reassess needs. Goal to keep BG< 180 for optimal wound healing      ID:  # Leukocytosis, now resumed   # Pneumonia   PER OSH: 8/6: RSV, COVID, parainfluenza,  negative . Legionella and pneumococcal urine antigens negative. Patient has been on greater than 20 mg prednisone daily for 18 days.  CULTURES: 8/8/2024: Respiratory viral panel negative. Blood cultures negative, MRSA nasal negative, Legionella urine antigen negative, respiratory culture negative.PCR trachea for PJP is negative.  COVID: Negative. Viral panel-negative PJP    [facility] labs:   8/8: Resp negative.   MRSA negative   - Cryptococcal AG negative   Pending: Blastomycosis,  "aspergillus, Coccidioides, BC     Heme:     # Acute blood loss anemia   - transfusion parameters per EMCO protocol    - one unit prbc 8/8/24  - Transfuse if hgb <7.0 or signs/symptoms of hypoperfusion. Monitor and trend.       Musculoskeletal:  # Weakness and deconditioning of critical illness   - Physical and occupational therapy when able.      Skin:  # Ecchymosis - BLE   - bilateral lower leg ecchymosis   - WOC consult      General Cares/Prophylaxis:    DVT Prophylaxis: Heparin gtt per ECMO   GI Prophylaxis: PPI  Restraints: Restraints for medical healing needed: NO     Lines/ tubes/ drains:  - ETT   - Right  ECMO cannula   - Right Femoral cannula   - Left internal jugular   - radial arterial line   - OG   - Gonzalez   - PIV\"s      Disposition:  -  Surgical ICU       Time spent on this Encounter   Billing:  I spent 55 minutes bedside and on the inpatient unit today managing the critical care of Massiel Flaherty in relation to the issues listed in this note.      Giovanny Guidry PA-C    ====================================  INTERVAL HISTORY:  Cannulated for VV ECMO  last evening. CRRT started. Weaned off pressors overnight. Hypoglycemia this am BG 74 and treated with dextrose. ROS not able to per formed due to chemical sedation and paralysis.     OBJECTIVE:   1. VITAL SIGNS:   Temp:  [97.7  F (36.5  C)-100  F (37.8  C)] 98.1  F (36.7  C)  Pulse:  [] 78  Resp:  [16-30] 16  MAP:  [53 mmHg-177 mmHg] 74 mmHg  Arterial Line BP: ()/(45-94) 100/56  FiO2 (%):  [40 %-100 %] 40 %  SpO2:  [41 %-100 %] 100 %  Vent Mode: PCV Plus assist  FiO2 (%): 40 %  Resp Rate (Set): 16 breaths/min  Tidal Volume (Set, mL): 300 mL  PEEP (cm H2O): 10 cmH2O  Inspiratory Pressure (cm H2O) (Drager Jessie): 25  Resp: 16      2. INTAKE/ OUTPUT:   I/O last 3 completed shifts:  In: 246.51 [I.V.:246.51]  Out: 175 [Urine:175]    3. PHYSICAL EXAMINATION:  General: chemically sedated. TOF 0/4. RASS -5  HEENT: PERRLA. Pupils 2mm and reactive. " ETT present and secured. OG to LIS with thin gree drainage.   RIght neck with ECMO cannula, sutured in place.  Left internal jugular in place, secured.   Neuro: chemically paralyzed.   Pulm/Resp:  minimal breath sounds, VT's 30-50   CV: RRR, S1/S2   Abdomen: Soft, non-distended, non-tender, no rebound tenderness or guarding, no masses  : (+) rolle catheter in place, urine yellow and clear  Incisions/Skin: scattered ecchymosis in BLE in toes around ankles bilaterally   MSK/Extremities:generalize BLE 1+ edema. Calves compressible, (+) doppler tones  DP/PT on feet.     4. INVESTIGATIONS:   Arterial Blood Gases   Recent Labs   Lab 08/09/24  0555 08/09/24 0339 08/09/24 0159 08/08/24 2358   PH 7.36 7.33* 7.31* 7.28*   PCO2 35 37 36 35   PO2 190* 173* 196* 182*   HCO3 20* 19* 18* 17*     Complete Blood Count   Recent Labs   Lab 08/09/24 0339 08/08/24 2137 08/08/24 1954 08/08/24  1641   WBC 9.2 14.1* 16.3* 27.2*   HGB 8.2* 7.0* 7.5* 9.9*    325 326 568*     Basic Metabolic Panel  Recent Labs   Lab 08/09/24  0346 08/09/24 0339 08/08/24 2357 08/08/24 2137 08/08/24 1959 08/08/24 1954 08/08/24  1642 08/08/24  1641   NA  --  139  --  138  --  138  --  140   POTASSIUM  --  4.5  --  5.0  --  4.8  --  5.8*   CHLORIDE  --  107  --  104  --  105  --  106   CO2  --  18*  --  15*  --  17*  --  20*   BUN  --  39.7*  --  43.6*  --  43.3*  --  42.5*   CR  --  2.82*  --  3.39*  --  3.31*  --  3.28*   GLC 81 81 120* 147*   < > 145*   < > 153*    < > = values in this interval not displayed.     Liver Function Tests  Recent Labs   Lab 08/09/24 0339 08/08/24 2137 08/08/24  1641   AST  --   --  76*   ALT 25  --  37   ALKPHOS 326*  --  500*   BILITOTAL 0.3  --  0.6   ALBUMIN 2.4*  --  3.0*   INR 1.25* 1.45* 1.36*     Pancreatic Enzymes  No lab results found in last 7 days.  Coagulation Profile  Recent Labs   Lab 08/09/24 0339 08/08/24 2137 08/08/24  1641   INR 1.25* 1.45* 1.36*   PTT 31 37  --          5. RADIOLOGY:    Recent Results (from the past 24 hour(s))   XR Chest Port 1 View    Narrative    PORTABLE CHEST X-RAY 8:13 AM    Comparison is made with a prior examination dated 8/7/2024.    FINDINGS: An endotracheal tube is in place with its tip approximately 4.5 cm from the ivy. A temperature sensor is noted overlying the cardiac silhouette. An enteric tube is in place with its tip in the stomach. A right IJ catheter is in place with its tip at the atrial caval junction. Persistent opacification of the lungs is noted bilaterally with no significant change. Blunting of the costophrenic angles is present. Increased pulmonary vascularity appears to be present particularly in the perihilar regions.    IMPRESSION:  No significant interval change since the prior examination.    Edited by: filemon 8/8/2024 9:54 AM CDT    Finalized by: Venkat Simpson on 8/8/2024 10:31 AM CDT   ECHO Congenital Transthoracic (TTE)    Narrative      Limited echocardiogram performed for assessment of LV systolic   function.    Left Ventricle: The left ventricle appears normal in size. The ejection   fraction, measured by biplane, is 71%. There are no wall motion   abnormalities.    Limited assessment of RV.  Normal appearing right ventricular chamber   size and systolic function.    Normal IVC size with minimal respirophasic changes.    No prior study for comparison.        Left Ventricle  The left ventricle appears normal in size. Wall thickness is normal. The ejection fraction, measured by biplane, is 71%. There are no wall motion abnormalities.    IVC/SVC  Normal IVC size with minimal respirophasic changes.    Pericardium  There is no pericardial effusion.    Study Details  A limited echo was performed with limited 2D. The sonographer requested the use of Definity- imaging enhancing agent per protocol. The patient denies contraindications and gives verbal consent for imaging enhancing agent injection.   XR Chest Port 1 View    Narrative    Exam: XR  CHEST PORT 1 VIEW, 8/8/2024 4:50 PM    Indication: check ETT tube    Comparison: None    Findings:   The endotracheal tube terminates in the midthoracic trachea.  Esophageal temperature probe projects over the mid thoracic esophagus.  Enteric tube terminates below the field of view. Right internal  jugular central venous catheter tip terminates in the low SVC. Diffuse  mixed/consolidative pulmonary opacities with air bronchograms and  silhouetting of the mediastinum and hemidiaphragms. No pneumothorax.      Impression    Impression:   1. Endotracheal tube terminates in mid thoracic trachea.  2. Extensive diffuse pulmonary opacities.    I have personally reviewed the examination and initial interpretation  and I agree with the findings.    KOKO FLETCHER MD         SYSTEM ID:  [id number]   XR Chest Port 1 View    Impression    RESIDENT PRELIMINARY INTERPRETATION  Impression:   1. Inferior and superior approach ECMO cannulas appear to project over  the expected location of the right atrium.  2. Complete opacification of the lung fields with air bronchograms,  progressed compared to earlier today.   XR Abdomen Port 1 View    Impression    RESIDENT PRELIMINARY INTERPRETATION  Impression:   1. Enteric tube terminates within the stomach.  2. Inferior approach echo cannula projects over the expected location  of the right atrium; however, evaluation is limited due to dense  pulmonary opacification.   XR Chest Port 1 View    Impression    RESIDENT PRELIMINARY INTERPRETATION  Impression:   1. Stable support devices.  2. Unchanged complete opacification of the lungs with air  bronchograms.       =========================================

## 2024-08-09 NOTE — CONSULTS
Palliative Care Initial Social Work Note  Location: [facility]    Patient Info:  Per EMR, Massiel Flaherty is a 53 year old female with a past medical hx of Anxiety/depression, fibromyalgia, hypothyroidism, asthma, HLD, MURIEL on CPAP, spells, off on anti seizure medications, expressive aphasia, and hypertension who was admitted an OSH with community acquired pneumonia on 8/3. She was intubated 8/6 and developed severe ARDS requiring paralysis and proning starting today. She was transferred here for management.     Brief summary of visit: Palliative care team attempted visit with family today, family left hospital for the afternoon in attempt to rest.    Palliative NP made phone contact with pt's mother and team will follow up on Monday for more in depth introduction to role of palliative care in the setting of Massiel's IC admission and VV ECMO cannulation.    Date of Admission: 8/8/2024    Reason for consult: Patient and family support    Recommendations & Plan:  PCSW will continue to follow while palliative care team remains involved; ongoing assessment of emotional and decisional support needs, adjustment to illness and coping       HUMBERTO Medina, [facility]  [facility], Palliative Care  Securely message with the [employer] Web Console (learn more here) or  Text page via "[employer] Paging/Directory

## 2024-08-09 NOTE — PROCEDURES
"Small Bowel Feeding Tube Placement Assessment  Reason for Feeding Tube Placement: small bore enteral access, gastric position acceptable  Cortrak Start Time: 1030   Cortrak End Time: 1050  Medicine Delivered During Procedure: 2% viscous lidocaine gel   Placement Successful: yes per Cortrak tracing pending AXR confirmation      Procedure Complications: none.  OG removed with RN approval  Final Placement Carlitos at exit of nare:  68 cm  Face to Face time with patient: 20 minutes    Bridle Placement:   Reason for bridle placement: securement of nasoenteric feeding tube    Medicine delivered during procedure: lidocaine gel   Procedure: Successful   Location of top of clip on FT: @ 70 cm marker   Condition of nose/skin at time of bridle placement: Unremarkable   Face to Face time with patient: < 5 minutes.      Abbey Evans, RD, LD, CNSC  Available on [employer] - can search by name or 4E Clinical Dietitian  **Clinical Nutrition is no longer available via pager  Weekend/Holiday RD - \"Weekend Clinical Dietitian\" on [employer]    "

## 2024-08-09 NOTE — PROGRESS NOTES
CRRT STATUS NOTE    DATA:  Time:  6:00 AM  Pressures WNL:  YES  Filter Status:  WDL    Problems Reported/Alarms Noted:  None.    Supplies Present:  YES    ASSESSMENT:  Patient Net Fluid Balance:  Net -147 ml @ 0600.  Vital Signs:  HR 76, /66 (85), SpO2 100%   Labs:  K 4.5, Mg 2.5, Phos 5.2, iCa 4.5, Hgb 8.2, Plt 277, lactic 4.0  Goals of Therapy:  I = O    INTERVENTIONS:   None.    PLAN:  Continue to monitor circuit daily and change set q72 hours or PRN for clotting/clogging. Please call CRRT RN with any quesitons/problems.

## 2024-08-09 NOTE — PROGRESS NOTES
[facility]    ECLS Cannulation Note:     Date on: 8/8/2024  Time on: 1932  Cannulating Physician: Pete  Pre-cannulation ABG: pH 7.03/PaCO2 77/ PaO2 78/HCO3 20/Lactate 1.7  ABG Time: 1728    Arterial Cannula: 17 Fr. In the Right Internal Jugular Vein  Venous Cannula: 25 Fr. In the Right Femoral Vein      ECMO components include:  Console Serial Number: [id number]  Circuit Lot Number: not recorded by Perfusion  Oxygenator Lot Number: [id number]    Cannulation was performed at the patient's bedside, placement was verified by CXR, cannula position is good.    Patient's blood type is O, negative.    Luis Antonio Silverman  ECMO Specialist  8/8/2024 9:12 PM

## 2024-08-09 NOTE — PHARMACY-ADMISSION MEDICATION HISTORY
Pharmacist Admission Medication History    Admission medication history is complete. The information provided in this note is only as accurate as the sources available at the time of the update.    Information Source(s): Hospital records via  EMR and paper chart    Pertinent Information: Unable to verify with patient given current status. However, per outside records, it appears the patient's primary care is through [facility], so this list was entered based off that list. The patient does see pulmonology at [facility], but does not appear to see them for anything else.     Medications that were on the [facility] list that may be out of date and were not on the [facility] list: abilify 2 mg daily, levothyroxine 25 mcg daily, etodolac 300 mg q8h prn, rifaximin 550 mg TID, ropinirole (recently discontinued by [facility]),  mg/day, escitalopram 20 mg dialy, lorazepam 1 mg qhs prn anxiety    Changes made to PTA medication list:  Added: all  Deleted: None  Changed: None    Allergies reviewed with patient and updates made in EHR: unable to assess    Medication History Completed By: Rosario Godinez RPH 8/9/2024 2:12 PM    PTA Med List   Medication Sig Note Last Dose    acetaminophen (TYLENOL 8 HOUR) 650 MG CR tablet Take 1,300 mg by mouth every 8 hours as needed for mild pain or fever  Unknown    albuterol (PROAIR HFA/PROVENTIL HFA/VENTOLIN HFA) 108 (90 Base) MCG/ACT inhaler Inhale 1 puff into the lungs 4 times daily as needed for shortness of breath, wheezing or cough  Unknown    amitriptyline (ELAVIL) 25 MG tablet Take 50 mg by mouth at bedtime  Unknown    amoxicillin-clavulanate (AUGMENTIN) 875-125 MG tablet Take 1 tablet by mouth 2 times daily Ended 8/6/24  Unknown    atorvastatin (LIPITOR) 10 MG tablet Take 10 mg by mouth daily  Unknown    benzonatate (TESSALON) 100 MG capsule Take 100 mg by mouth 3 times daily as needed for cough  Unknown    budesonide (PULMICORT) 1 MG/2ML neb solution Take 1 mg by  nebulization daily  Unknown    cetirizine (ZYRTEC) 10 MG tablet Take 10 mg by mouth daily  Unknown    clonazePAM (KLONOPIN) 0.5 MG tablet Take 0.25 mg by mouth 2 times daily  Unknown    cloNIDine (CATAPRES) 0.1 MG tablet Take 0.1 mg by mouth daily  Unknown    clotrimazole (MYCELEX) 10 MG lozenge Place 10 mg inside cheek 5 times daily 8/9/2024: 2 week course 7/18-8/1/24 Unknown    cyclobenzaprine (FLEXERIL) 10 MG tablet Take 10 mg by mouth 3 times daily as needed for muscle spasms  Unknown    esomeprazole (NEXIUM) 40 MG DR capsule Take 40 mg by mouth 2 times daily (before meals) Take 30-60 minutes before eating.  Unknown    estradiol (VIVELLE-DOT) 0.075 MG/24HR BIW patch Place 1 patch onto the skin twice a week  Unknown    ferrous sulfate (FEROSUL) 325 (65 Fe) MG tablet Take 325 mg by mouth 3 times daily  Unknown    fluticasone (FLONASE) 50 MCG/ACT nasal spray Spray 1 spray into both nostrils daily as needed for rhinitis or allergies  Unknown    Fluticasone-Umeclidin-Vilanterol (TRELEGY ELLIPTA) 200-62.5-25 MCG/ACT oral inhaler Inhale 1 puff into the lungs daily  Unknown    gabapentin (NEURONTIN) 300 MG capsule Take 300 mg by mouth 3 times daily  Unknown    HYDROcodone-acetaminophen (NORCO) 5-325 MG tablet Take 1 tablet by mouth every 6 hours as needed for severe pain  Unknown    hydroxychloroquine (PLAQUENIL) 200 MG tablet Take 200 mg by mouth daily  Unknown    ipratropium - albuterol 0.5 mg/2.5 mg/3 mL (DUONEB) 0.5-2.5 (3) MG/3ML neb solution Take 1 vial by nebulization every 6 hours as needed for shortness of breath, wheezing or cough  Unknown    montelukast (SINGULAIR) 10 MG tablet Take 10 mg by mouth at bedtime  Unknown    promethazine-codeine (PHENERGAN WITH CODEINE) 6.25-10 MG/5ML syrup Take 5 mLs by mouth 4 times daily as needed for congestion  Unknown    sucralfate (CARAFATE) 1 GM tablet Take 1 g by mouth 4 times daily  Unknown    venlafaxine (EFFEXOR XR) 150 MG 24 hr capsule Take 150 mg by mouth daily   Unknown

## 2024-08-09 NOTE — PROCEDURES
Bronchoscopy Procedure Note - 8/9/2024   Massiel Flaherty    MRN: [id number]    Indications: Acute  Respiratory Failure   Specimens: BAL- 2 specimens sent. (One  from each lung)   Findings: erythematous patches throughout left main bronchi into superior and inferior bronchi  Complications: no immediate complications     Lidocaine instilled into airway. An adult flexible bronchoscope was advanced thru the adapter on the ET Tube without difficulty. The ET Tube was seen approximately 3 cm above the ivy. Left lung main bronchus was encountered thin clear secretions, this was suctioned without difficulty. Left main, Left superior and inferior  bronchi with  mucosa with scattered erythematous patches present, no bleeding noted from patches. 60 mL of sterile saline were introduced via the bronchoscope and gently suctioned away into left lung.  There was no trauma or collapse of the bronchi visualized. A Leukens trap was utilized to catch the specimens which were sent to Microbiology cultures.    Right main bronchi was encountered, this bronchi was without erythematous patches or collapse. Scant secretions in right main, middle or  superior bronchi.  60ml of Sterile saline introduced via bronchoscope into Right  bronchus, with good return . Specimen was sent to Microbiology for cultures.   The patient tolerated the procedure well and there were no immediate complications.  See images in media.     Dr. Hatch was present throughout the procedure.     Giovanny Guidry PA-C

## 2024-08-09 NOTE — PROCEDURES
BEDSIDE PROCEDURE - VV ECMO CANNULA REPOSITIONING  NAME: Massiel Flaherty   MRN: [id number]  : 1970    Pre-procedure Diagnosis:  Acute respiratory distress syndrome  Post-procedure Diagnosis: same as above  Procedure: Veno-venous right internal jugular cathter repositioning    Procedure: Patient was paralyzed and sedated with BIS <40. A timeout was called to review the case and patient information. The patient was positioned supine. The previous dressing over the right internal jugular vein catheter was removed and the area was prepped with chlorhexidine and draped sterilely. The previous sutures holding the catheter were cut leaving the pursestring suture at the catheter insertion site intact. The ECMO cannula was then pulled back 6 cm. The ECMO perfusionist was present and confirmed flows and oxygen saturations remained the same. The cannula was then sutured in place with two additional 0 ethibond sutures. A sterile dressing was placed over the cannula. Patient tolerated the procedure well without any complications.    Plan: CXR to confirm cannula positioning.    Dr. Freeman was available for assistance throughout the entire procedure.      Hieu Powell  Surgical Critical Care Fellow

## 2024-08-09 NOTE — PHARMACY-CONSULT NOTE
Pharmacy Tube Feeding Consult    Medication reviewed for administration by feeding tube and for potential food/drug interactions.    Recommendation: No changes are needed at this time.     Pharmacy will continue to follow as new medications are ordered.    Rosario Godinez RPH on 8/9/2024 at 12:50 PM

## 2024-08-09 NOTE — PROGRESS NOTES
CLINICAL NUTRITION SERVICES - ASSESSMENT NOTE     Nutrition Prescription    RECOMMENDATIONS FOR MDs/PROVIDERS TO ORDER:  Total daily fluid adjustments per team    Malnutrition Status:    Patient does not meet two of the established criteria necessary for diagnosing malnutrition but is at risk for malnutrition    Recommendations already ordered by Registered Dietitian (RD):  EN orders placed: Vital AF 1.2 @ 35 ml/hr + 3 pkts prosource TF20 provides 840 ml volume, 1248 kcals (21 kcal/kg), 123 g pro (2.1 g/kg), 45 g Fat, 93 g CHO, 4 g fiber, 681 ml free water   - Pending AXR confirmation of FT - initiate @ 15 ml/hr, increase 10 mlq 8 hours to goal  - Do not advance unless K+ >= 3, Mg++>= 1.5 and phos >=1.9  - HOB 30 degrees with gastric feeds  - Certavite daily  - FWF 30 ml q 4 hours    Future/Additional Recommendations:  - Monitor propofol kcals / adjust EN accordingly  - If hyperphosphatemia persists, and/or hyperkalemia recurs, change EN to [employer] Renal @ 20 ml/hr + 4 pkts prosource TF20 to provide 480 ml, 1184 kcals (20 kcal/kg), 118 g protein (2 g/kg), 82 g CHO, 321 ml free water and 10 g fiber  - Metabolic cart study when TF tolerated at goal as able if sweep remains <= 4     REASON FOR ASSESSMENT  Massiel Flaherty is a/an 53 year old female assessed by the dietitian for Provider Order - Registered Dietitian to Assess and Order TF per Medical Nutrition Therapy Protocol, consult for post pyloric feeding tube (gastric access ok per SICU)    PMH / HPI  Past medical hx of Anxiety/depression, fibromyalgia, hypothyroidism, asthma, HLD, MURIEL on CPAP, spells, off on anti seizure medications, expressive aphasia, and hypertension     Pt with presumed pneumonia, intubated 8/6 @ OSH, transferred to [facility] 8/8 and cannulated for VV ECMO.  CRRT initiated.    NUTRITION HISTORY  Feeding tube placed by writer, see procedure note   Per care everywhere, EN initiated 8/7 via OGT with peptamen VHP, goal of 30ml/hr with  "significant caloric contribution from propofol.  WOCN consult pending for BLE ecchymosis  Family at bedside report good po intake prior to intubation    CURRENT NUTRITION ORDERS  Diet: NPO    LABS  Na+ 139 (WNL)  K+ 4.5 (WNL) <-- 5.8 (H)  BUN 39.7 (H)  Cr 2.82 (H)  Mg++ 2.5 (H)  Phos 5.2 (H) <-- 8.1 (H)  Lactic acid 1.2 (WNL) <--4.0 (H)    MEDICATIONS  Levothyroxine  Propofol @ 21.3 ml/hr (562 kcals in lipid emulsion)  Pressors off  Received an amp of D50 for hypoglycemia, D10 @ 100 ml/hr (240 g dextrose, GIR 2.7 mg/kg/min)    ANTHROPOMETRICS  Height: 157.5 cm (5' 2\")  Most Recent Weight: 93 kg (205 lb 0.4 oz)    IBW: 50 kg  BMI: Obesity Grade II BMI 35-39.9 - calculated dry BMI 35.6 kg/m2  Weight History: admit weight to [facility] 88.5 kg    08/07/24 89.1 kg (196 lb 6.4 oz)   04/10/24 86.2 kg (190 lb)   10/10/23 83 kg (183 lb)   09/11/23 85.3 kg (188 lb)   04/10/23 86.2 kg (190 lb)   11/08/22 82.1 kg (181 lb)       Dosing Weight: 60 kg (adjusted using driest weight of 88 kg and IBW of 50 kg)    ASSESSED NUTRITION NEEDS  Estimated Energy Needs: 1200 - 1500 kcals/day (20 - 25 kcal /kg)  Justification: Maintenance, Obese, and Vented  Estimated Protein Needs: 120 - 150 grams protein/day (2 - 2.5 grams of pro/kg)  Justification: Increased needs, ECMO and CRRT  Estimated Fluid Needs: 1 mL/kcal  Justification: Per provider pending fluid status    MALNUTRITION  % Intake: Decreased intake does not meet criteria  % Weight Loss: None noted  Subcutaneous Fat Loss: None observed  Muscle Loss: None observed  Fluid Accumulation/Edema: Does not meet criteria  Malnutrition Diagnosis: Patient does not meet two of the established criteria necessary for diagnosing malnutrition but is at risk for malnutrition    NUTRITION DIAGNOSIS  Inadequate protein-energy intake related to Intubation with EN not yet resumed as evidenced by currently meeting 0% kcal protein needs      INTERVENTIONS  Implementation  Collaboration with other providers - " "SICU rounds  Enteral Nutrition - Initiate     Goals  Total avg nutritional intake to meet a minimum of 20 kcal/kg and 2 g PRO/kg daily (per dosing wt 60 kg).     Monitoring/Evaluation  Progress toward goals will be monitored and evaluated per protocol.    Abbey Evans, RD, LD, CNSC  Available on [employer] - can search by name or 4E Clinical Dietitian  **Clinical Nutrition is no longer available via pager  Weekend/Holiday RD - \"Weekend Clinical Dietitian\" on [employer]    "

## 2024-08-09 NOTE — PROCEDURES
BEDSIDE PROCEDURE - CENTRAL LINE    Date of Procedure: 08/09/2024     Patient: Massiel Flaherty   MRN: [id number]     RESIDENT: Anika Mead, PGY2    FELLOW: Mary Kline MD    SURGEON: Janes Freeman MD    LOCATION OF CENTRAL LINE: Left Internal Jugular       SEDATION: general anesthesia       ESTIMATED BLOOD LOSS: 10 ml      COMPLICATIONS: None.        INDICATIONS FOR PROCEDURE: Central venous access    DESCRIPTION OF PROCEDURE:  The risks, benefits and alternatives were described to the patient s family, and they were willing to proceed; consent was obtained.   The patient was placed in a dependent position appropriate for central line placement based on the vein to be cannulated. The patient s left neck was prepped and draped in sterile fashion. Each lumen of a triple lumen 7-Arabic Arrowg+claritza Blue Plus catheter was evacuated of air prior to insertion and flushed with sterile saline. The catheter was introduced into the left internal jugular vein using the Seldinger technique with serial dilation and under ultrasound guidance. The catheter was threaded smoothly over the guide wire and appropriate blood return was obtained. The catheter was then sutured in place to the skin and a sterile dressing applied. CXR was obtained showing no pneumothorax and appropriate line position. Dr. Freeman was scrubbed in for the entire procedure.  Note: Multiple puncture attempts were made with the introducer needle due to short neck and difficult access. Small subcutaneous hematoma was noted around the left internal jugular vein on ultrasonography..    - - - - - - - - - - - - - - - - - -  Anika MITCHELL  PGY2 General Surgery  [facility]

## 2024-08-09 NOTE — CONSULTS
[facility]EAS[facility] CONSULTATION     Patient:  Massiel Flaherty   Date of birth 1970, Medical record number [id number]  Date of Visit:  08/09/2024  Date of Admission: 8/8/2024  Consult Requester:Barry Hatch MD          Assessment and Recommendations:   ID Problem List  Acute hypoxic and hypercapnic respiratory failure c/b ARDS and on VV-ECMO 8/8/24  Etiology unknown, possible CAP vs other infectious (fungal) vs noninfectious  Fever  Dry cough x1 month prior to admission  CHACORTA requiring CRRT  Elevated AST, alk phos  Anemia  Antibiotic allergy - hives (Sulfa, tetracycline)    RECOMMENDATION:  Start empiric IV liposomal amphotericin B 3-5 mg/kg daily  OK to continue azithromycin for now  Continue IV cefepime - dosed for CRRT  Please check both urine and serum antigens for:  Histoplasma capsulatum  Blastomyces  Check viral hepatitis serologies (HepA IgM, Hep B surface Ag, surface Ab, core Ab, Hep C Ab  Please send BAL for cell count, gram stain, KOH, aerobic/anaerobic/fungal cultures, PJP PCR and DFA, and cytology  Recommend CT CAP and head imaging (unable to MRI brain on ECMO)  Agree with pending workup  AGM  Coccidiodes Ag  Fungitell 8/9  Blood cultures OSH 8/6 (NGTD) and 8/8 [facility]  Sputum culture- OSH 8/6 (NGTD) and 8/8 [facility]  Autoimmune workup (negative EVELIO, ANCA, MPO, proteinase 3)  ID will follow    ASSESSMENT:  Massiel Flaherty is a 53 year old female with past medical history significant for asthma, MURIEL on CPAP, HTN, anxiety,depression, expressive aphasia, fibromyalgia, and hypothyroidism who presented to OSH 8/3/24 with fever, fatigue, dyspnea with c/f CAP and requiring intubation, proning, paralysis and transferred to [facility] 8/8/24 for further cares and now placed on VV ECMO and CHACORTA requiring CRRT. ID consulted for pneumonia.     Per chart review, had sought care multiple times since early July for an acute on chronic cough. No improvement with multiple courses of steroid tapers,  Z-pack, and Augmentin as outpatient. Admitted 8/3/24 with fever and hypoxia. She has completed at least 3 days of azithromycin at OSH since first admitted 8/3 and has been on cefepime since 8/3 with clinical worsening. No infectious etiology has been identified to date. Negative workup includes: COVID, flu, RVP, Fungitell, Legionella urine Ag, Strep pneumo urine Ag, sputum PJP PCR, HIV, Aspergillus antigen, cryptococcus antigen. Negative EVELIO, ANCA, MPO, proteinase 3 at OSH. MRSA nares negative. Sputum Cx 8/6 at OSH - NGTD at 48 hrs. Bcx 8/6 at OSH NGTD and repeat pending here x1. Procal 4.16. CXR with ongoing diffuse bilateral pulmonary opacities consistent with ARDS and now paralyzed, on VV-ECMO and CRRT.     Agree with workup to date and plan for bronchoscopy. Would start empiric IV ambisome while ruling out endemic fungal disease (histo, blasto, coccidio). Continue IV cefepime. Unlikely that additional azithromycin is needed, though OK for now. Recommend imaging with CT CAP to further evaluate lungs/pattern, acute renal failure and elevated AST, alk phos. Would also consider head imaging due to reported brain fog/zoning out and recent falls per son. Additional recommendations as above.     Thank you for this consult. ID will continue to follow.   Recommendations discussed with primary team.    Danni Baez PA-C  Infectious Diseases  Pager #[phone] or Vocera    90 MINUTES SPENT BY ME on the date of service doing chart review, history, exam, documentation & further activities per the note.           History of Present Illness:     Massiel Flaherty is a 53 year old female with past medical history significant for asthma, MURIEL on CPAP, HTN, anxiety,depression, expressive aphasia, fibromyalgia, obesity, and hypothyroidism who was transferred to [facility] 8/8/24 for further cares and now placed on VV ECMO and CRRT. ID consulted for pneumonia.     She was transferred to [facility] from OSH. Presented to hospital in [place], [place]  "on 8/3 with fever, fatigue, dyspnea with concern for CAP. Xray with multifocal bilateral opacities. CT chest with severe multifocal bilateral opacities, negative for PE in OSH notes, though unable to see direct report. Negative COVID, flu, and viral testing. O2 sats reportedly 60% on arrival.     She was started on azithromycin, cefepime, vancomycin, and solumedrol x1. Had reportedly been on a steroid taper recently, so had started on atovaquone ppx on 8/7 at OSH. Prior to admission, was seen in clinic and given PO Augmentin on 7/30 for acute on chronic cough x2 weeks. Was also seen at OSH ED 7/19 for asthma exacerbation, got Z-pack and prednisone taper. Was seen prior on 7/8 with cough and got additional prednisone, azithromycin. Has also had some concern for recent \"memory issues and brain fog\" and has \"zoning out episodes\". Head imaging normal, seen by neurology who felt this was more psych issue per OSH records. She has history of seizure workup and is not on any AEDs. She is on rifaximin for unknown reason. Was febrile at OSH Tmax 101.3F and required pressors. Infectious workup at OSH with negative - RSV, COVID, flu, RVP, Fungitell, Legionella urine Ag, Strep pneumococcal urine Ag, sputum PJP PCR, HIV, Aspergillus antigen, and EVELIO. MRSA nares negative. Sputum Cx 8/6 - NGTD at 48 hrs. Bcx 8/6 at OSH NGTD. Procal 4.16.     On 8/6, was transferred to [facility] and intubated for severe ARDS requiring paralysis, proning. Remained on cefepime. Transferred to [facility] on 8/8 given continued decline, started V-V ECMO and CRRT. Son, brother, and niece/nephew at bedside on 8/9 morning. Son provides additional history primarily. Reports onset of dry nonproductive cough ~2 weeks ago and followed by zoning out spells. She had no other URI symptoms - sore throat, sinus congestion, no headache, chest pain, nausea, vomiting, abdominal pain, diarrhea that he was aware. Patient had been living with her mother due to " difficulties caring for herself. Has had multiple falls in the last month. Recently fell out of a recliner - has bruising to left foot from this fall. Unsure if she hit her head. Son denies any tobacco, alcohol, or illicit drug use by patient. No recent travel. Son was sick with viral illness ~1 months ago, no other recent sick contacts. She is around her sister's dog - who is well, no illness. No livestock or other animal exposures. Says she does have a hx of colon cancer - likely polyp that was resected. Family denies chemo/radiation/surgery. 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          Review of Systems:   Unable to assess - intubated, sedated, paralyzed         Past Medical History:   No past medical history on file.         Past Surgical History:   No past surgical history on file.         Family History:     No family history on file.  No family history of lung cancer or lung diseases.         Social History:     Social History     Tobacco Use    Smoking status: Not on file    Smokeless tobacco: Not on file   Substance Use Topics    Alcohol use: Not on file     History   Sexual Activity    Sexual activity: Not on file            Current Medications:     Current Facility-Administered Medications   Medication Dose Route Frequency Provider Last Rate Last Admin    artificial tears ophthalmic ointment   Both Eyes Q8H Giovanny Guidry PA-C   Given at 08/09/24 0204    azithromycin (ZITHROMAX) 500 mg in sodium chloride 0.9 % 250 mL intermittent infusion  500 mg Intravenous Q24H Cal Lisa MD        ceFEPIme (MAXIPIME) 2 g vial to attach to  mL bag for ADULTS or NS 50 mL bag for PEDS  2 g Intravenous Q12H Barry Hatch MD   2 g at 08/08/24 2344    chlorhexidine (PERIDEX) 0.12 % solution 15 mL  15 mL Mouth/Throat Q12H Giovanny Guidry PA-C   15 mL at 08/09/24 0820    levothyroxine (SYNTHROID/LEVOTHROID) tablet 25 mcg  25  "mcg Per Feeding Tube QAM AC ValGiovanny Spence PA-C   25 mcg at 08/09/24 0820    lidocaine (viscous) (XYLOCAINE) 2 % solution 5 mL  5 mL Topical Once Giovanny Nance PA-C        pantoprazole (PROTONIX) 2 mg/mL suspension 40 mg  40 mg Per Feeding Tube QAM AC ValGiovanny Spence PA-C        Or    pantoprazole (PROTONIX) IV push injection 40 mg  40 mg Intravenous QAM AC ValGiovanny Spence PA-C   40 mg at 08/09/24 0821    polyethylene glycol (MIRALAX) Packet 17 g  17 g Per Feeding Tube Daily ValGiovanny Spence PA-C   17 g at 08/09/24 0906    sennosides (SENOKOT) tablet 2 tablet  2 tablet Per Feeding Tube BID ValGiovanny Spence PA-C   2 tablet at 08/09/24 0906            Allergies:     Allergies   Allergen Reactions    Celecoxib Unknown    Sulfa Antibiotics Unknown    Tetracycline Unknown            Physical Exam:   Vitals were reviewed  Patient Vitals for the past 24 hrs:   Temp Temp src Pulse Resp SpO2 Height Weight   08/09/24 0615 98.1  F (36.7  C) -- 78 16 100 % -- --   08/09/24 0600 98.2  F (36.8  C) -- 77 16 100 % 1.575 m (5' 2\") 93 kg (205 lb 0.4 oz)   08/09/24 0545 98.2  F (36.8  C) -- 80 16 100 % -- --   08/09/24 0530 98.2  F (36.8  C) -- 78 16 100 % -- --   08/09/24 0515 98.2  F (36.8  C) -- 79 16 100 % -- --   08/09/24 0500 98.1  F (36.7  C) -- 79 16 100 % -- --   08/09/24 0445 98.1  F (36.7  C) -- 79 16 100 % -- --   08/09/24 0430 98.1  F (36.7  C) -- 79 16 100 % -- --   08/09/24 0415 98.1  F (36.7  C) -- 77 16 100 % -- --   08/09/24 0411 -- -- 81 -- 99 % -- --   08/09/24 0400 97.9  F (36.6  C) Esophageal 79 16 100 % -- --   08/09/24 0345 98.1  F (36.7  C) -- 78 16 100 % -- --   08/09/24 0330 97.9  F (36.6  C) -- 77 16 100 % -- --   08/09/24 0315 97.7  F (36.5  C) -- 76 16 100 % -- --   08/09/24 0300 97.7  F (36.5  C) -- 77 16 100 % -- --   08/09/24 0245 97.7  F (36.5  C) -- 75 16 100 % -- --   08/09/24 0230 97.7  F (36.5  C) -- 78 16 100 % -- --   08/09/24 0215 97.7  F (36.5  C) -- 76 16 100 % -- --   08/09/24 0200 97.9  F (36.6 "  C) -- 78 16 100 % -- --   08/09/24 0145 97.9  F (36.6  C) -- 78 16 98 % -- --   08/09/24 0130 97.9  F (36.6  C) -- 78 16 99 % -- --   08/09/24 0115 97.9  F (36.6  C) -- 79 16 99 % -- --   08/09/24 0100 97.7  F (36.5  C) -- 80 16 99 % -- --   08/09/24 0045 98.1  F (36.7  C) -- 81 16 96 % -- --   08/09/24 0030 98.4  F (36.9  C) -- 85 16 100 % -- --   08/09/24 0015 98.4  F (36.9  C) -- 82 16 98 % -- --   08/09/24 0001 -- -- 83 16 100 % -- --   08/09/24 0000 98.6  F (37  C) Esophageal 83 16 100 % -- --   08/08/24 2345 98.8  F (37.1  C) -- 82 16 100 % -- --   08/08/24 2330 98.8  F (37.1  C) -- 83 16 100 % -- --   08/08/24 2315 98.8  F (37.1  C) -- 85 16 100 % -- --   08/08/24 2300 98.8  F (37.1  C) -- 89 16 99 % -- --   08/08/24 2259 98.8  F (37.1  C) -- 89 16 100 % -- --   08/08/24 2245 98.8  F (37.1  C) -- 85 16 100 % -- --   08/08/24 2230 99  F (37.2  C) -- 85 16 100 % -- --   08/08/24 2215 99  F (37.2  C) -- 86 16 100 % -- --   08/08/24 2200 99  F (37.2  C) -- 85 16 100 % -- --   08/08/24 2145 99  F (37.2  C) -- 90 16 100 % -- --   08/08/24 2130 99  F (37.2  C) -- 87 16 99 % -- --   08/08/24 2115 99  F (37.2  C) -- 90 16 100 % -- --   08/08/24 2100 99  F (37.2  C) -- 93 16 99 % -- --   08/08/24 2045 99  F (37.2  C) -- 91 16 100 % -- --   08/08/24 2038 -- -- 92 -- 100 % -- --   08/08/24 2030 98.8  F (37.1  C) -- 94 16 100 % -- --   08/08/24 2015 98.8  F (37.1  C) -- 103 16 97 % -- --   08/08/24 2000 98.8  F (37.1  C) Esophageal 102 16 98 % -- --   08/08/24 1945 98.8  F (37.1  C) -- 96 -- 100 % -- --   08/08/24 1940 98.8  F (37.1  C) -- 91 -- 100 % -- --   08/08/24 1938 -- -- 91 -- 100 % -- --   08/08/24 1935 98.8  F (37.1  C) -- 98 -- 100 % -- --   08/08/24 1930 98.8  F (37.1  C) -- 106 -- (!) 45 % -- --   08/08/24 1925 98.8  F (37.1  C) -- 107 -- (!) 43 % -- --   08/08/24 1920 98.8  F (37.1  C) -- 107 -- (!) 43 % -- --   08/08/24 1915 98.8  F (37.1  C) -- 95 -- (!) 41 % -- --   08/08/24 1910 98.8  F (37.1  C) -- (!)  "127 -- (!) 51 % -- --   08/08/24 1905 98.8  F (37.1  C) -- 105 -- (!) 48 % -- --   08/08/24 1900 98.8  F (37.1  C) -- 105 30 (!) 52 % -- --   08/08/24 1846 99  F (37.2  C) -- 103 -- (!) 51 % -- --   08/08/24 1845 99  F (37.2  C) -- 102 -- (!) 48 % -- --   08/08/24 1840 99.1  F (37.3  C) -- 100 -- (!) 44 % -- --   08/08/24 1830 99  F (37.2  C) -- 103 -- 95 % -- --   08/08/24 1815 99  F (37.2  C) -- 105 -- 94 % -- --   08/08/24 1800 99  F (37.2  C) -- 107 30 93 % -- --   08/08/24 1745 99  F (37.2  C) -- 109 -- 92 % -- --   08/08/24 1730 99  F (37.2  C) -- 112 -- 92 % -- --   08/08/24 1729 99  F (37.2  C) -- 112 -- 92 % -- --   08/08/24 1720 -- -- 113 -- 93 % -- --   08/08/24 1715 99.1  F (37.3  C) -- 114 30 91 % -- --   08/08/24 1700 99.5  F (37.5  C) -- 118 25 (!) 84 % -- --   08/08/24 1645 99.9  F (37.7  C) -- 120 -- (!) 86 % -- --   08/08/24 1639 100  F (37.8  C) -- (!) 123 -- (!) 83 % -- --   08/08/24 1500 -- -- -- -- -- 1.575 m (5' 2.01\") 88.9 kg (195 lb 15.8 oz)       Physical Examination:  Constitutional: Adult female seen laying in bed, intubated, sedated, paralyzed  HEENT: NCAT, anicteric sclerae, conjunctiva clear. Moist mucous membranes. Facial edema.   Respiratory: Intubated, on VV ECMO. Lungs are coarse bilaterally, though minimal sounds  Cardiovascular: Regular rate and rhythm with no murmur, rub or gallop.  GI: Abdomen is soft, obese, nondistended. No rash.  Skin: Warm and dry. No new rashes or lesions on exposed surfaces. Bruising to left foot. Edema.  Musculoskeletal: Extremities with 2+ edema bilaterally, 3+ upper extremity edema.  VAD: Multiple lines in place. All are c/d/i with no erythema, drainage, or tenderness. R groin ECMO access. Gonzalez.         Laboratory Data:     Inflammatory Markers  No lab results found.    Hematology Studies    Recent Labs   Lab Test 08/09/24  0339 08/08/24  2137 08/08/24  1954 08/08/24  1641   WBC 9.2 14.1* 16.3* 27.2*   ANEU  --  11.3*  --   --    AEOS  --  0.3  --   " "--    HGB 8.2* 7.0* 7.5* 9.9*   * 106* 109* 110*    325 326 568*       Metabolic Studies     Recent Labs   Lab Test 08/09/24  0339 08/08/24 2137 08/08/24  1954 08/08/24  1641    138 138 140   POTASSIUM 4.5 5.0 4.8 5.8*   CHLORIDE 107 104 105 106   CO2 18* 15* 17* 20*   BUN 39.7* 43.6* 43.3* 42.5*   CR 2.82* 3.39* 3.31* 3.28*   GFRESTIMATED 19* 15* 16* 16*       Hepatic Studies    Recent Labs   Lab Test 08/09/24  0339 08/08/24  1641   BILITOTAL 0.3 0.6   ALKPHOS 326* 500*   ALBUMIN 2.4* 3.0*   AST  --  76*   ALT 25 37       Microbiology:  Culture   Date Value Ref Range Status   08/08/2024 No growth after 12 hours  Preliminary       Urine Studies    Recent Labs   Lab Test 08/08/24  1725   LEUKEST Trace*   WBCU 7*       Vancomycin Levels  No lab results found.    Invalid input(s): \"VANCO\"    Hepatitis B Testing No lab results found.  Hepatitis C Testing   No results found for: \"HCVAB\", \"HQTG\", \"HCGENO\", \"HCPCR\", \"HQTRNA\", \"HEPRNA\"  Respiratory Virus Testing    No results found for: \"RS\", \"FLUAG\"    IMAGING  CXR 8/9/2024  Impression: 1. Inferior and superior approach ECMO cannulas appear to project over the expected location of the right atrium. 2. Complete opacification of the lung fields with air bronchograms, progressed compared to earlier today.     XR Abdomen Port 1 View  Result Date: 8/9/2024  Impression: 1. Enteric tube terminates within the stomach. 2. Inferior approach echo cannula projects over the expected location of the right atrium; however, evaluation is limited due to dense pulmonary opacification.     CXR 8/8/2024  Impression: 1. Endotracheal tube terminates in mid thoracic trachea. 2. Extensive diffuse pulmonary opacities.    CXR 8/7/2024  PORTABLE CHEST X-RAY, 6:29 a.m. Comparison is made with the prior examination dated 8/6/2024. FINDINGS: An endotracheal tube is in place with its tip approximately 5 cm from the ivy. A temperature sensor is noted overlying the cardiac " silhouette. An enteric tube is in place with its tip in the stomach. A right IJ catheter is in place with its tip in the right atrium. Areas of consolidative changes are noted in the lungs bilaterally with slight improvement on the right. Air bronchograms are still present. The cardiac silhouette is poorly visualized. IMPRESSION: Very minimal improvement in the right lung since the prior examination. Edited by: mason 8/7/2024 9:45 AM CDT Finalized by: Venkat Simpson on 8/7/2024 12:24 PM CDT    CXR 8/6/2024  XRAY CHEST PORTABLE INDICATION: Confirm central line placement FINDINGS/IMPRESSION: Extensive infiltrates throughout the lungs have increased since earlier today. ETT in good position. Esophageal probe distal esophagus. Enteric tube with tip below the diaphragm. Finalized by: Lucio Pacheco on 8/6/2024 2:06 PM CDT    ECHO  ECHO Congenital Transthoracic (TTE)  Result Date: 8/8/2024    Limited echocardiogram performed for assessment of LV systolic function.   Left Ventricle: The left ventricle appears normal in size. The ejection fraction, measured by biplane, is 71%. There are no wall motion abnormalities.   Limited assessment of RV.  Normal appearing right ventricular chamber size and systolic function.   Normal IVC size with minimal respirophasic changes.   No prior study for comparison. Left Ventricle The left ventricle appears normal in size. Wall thickness is normal. The ejection fraction, measured by biplane, is 71%. There are no wall motion abnormalities. IVC/SVC Normal IVC size with minimal respirophasic changes. Pericardium There is no pericardial effusion. Study Details A limited echo was performed with limited 2D. The sonographer requested the use of Definity- imaging enhancing agent per protocol. The patient denies contraindications and gives verbal consent for imaging enhancing agent injection.

## 2024-08-09 NOTE — PROGRESS NOTES
[facility]    ECLS Shift Summary:     ECMO Equipment:  Console Serial Number: [id number]  Circuit Lot Number: not recorded by Perfusion  Oxygenator Lot Number: [id number]    Circuit Assessment: Fibrin  Fibrin Location: Connectors    Venous Drainage ECMO Cannula: 25 Fr in the Right Femoral Vein  Venous Return ECMO Cannula: 17 Fr in the Right Internal Jugular Vein      ECMO Cannula Arterial Right internal jugular-Site Assessment: WDL  ECMO Cannula Venous Right femoral vein-Site Assessment: WDL, Sutured  ECMO Cannula Arterial Right internal jugular-Site Intervention: No intervention needed  ECMO Cannula Venous Right femoral vein-Site Intervention: No intervention needed    Patient remains on V-V ECMO, all equipment is functioning and alarms are appropriately set. RPM's: 3250 with Blood Flow (Circuit) LPM  Av.1 LPM  Min: 4.1 LPM  Max: 4.14 LPM L/min. Sweep is at 4 LPM and 100 %. Extremities are warm.     Significant Shift Events: Feeding tube placed by dietician. Venous return ECMO cannula retracted 4 cm by SICU fellow (Dr. Freeman at bedside). Veletri and paralytic stopped today. Bronchoscopy done. Unable to get into the ACT goal of 160-180 despite increased titration of heparin and boluses  (10 a @ 1600 0.56, Antithrombin 59 @ 0400 lab). I spoke with SICU PA earlier in the day about titration up and boluses of heparin given, wondering if their were other options. SICU resident was called this evening (around 1800) and notified about being at the top of the titration goal of heparin for this patient. She was going to discuss with her team and get back to us. Antithrombin 3 lab sent.    Vent settings:  Vent Mode: PCV Plus assist  FiO2 (%): 40 %  Resp Rate (Set): 16 breaths/min  Tidal Volume (Set, mL): 300 mL  PEEP (cm H2O): 10 cmH2O  Inspiratory Pressure (cm H2O) (Drager Jessie): 25  Resp: 16      Anticoagulation:  Dose (units/hr) HEParin: 2400 Units/hr  Rate (mL/hr)  HEParin: 24 mL/hr  Concentration HEParin: 100 Units/mL        Most recent: ACT  (seconds): 153 seconds    Urine output is as documented and pt is on CRRT.  .  Blood loss was minimal. Product given included none.     Intake/Output Summary (Last 24 hours) at 8/9/2024 1738  Last data filed at 8/9/2024 1700  Gross per 24 hour   Intake 2934.57 ml   Output 3218 ml   Net -283.43 ml       Labs:  Recent Labs   Lab 08/09/24  1706 08/09/24  1625 08/09/24  1624 08/09/24  1401 08/09/24  1211 08/09/24  1004   PH  --   --  7.37 7.35 7.35 7.35   PCO2  --   --  39 40 40 38   PO2  --   --  106* 79* 79* 136*   HCO3  --   --  22 22 22 21   O2PER 40 100  40 40 40 40 40       Lab Results   Component Value Date    HGB 8.0 (L) 08/09/2024    PHGB 30 (H) 08/09/2024     08/09/2024    FIBR 508 08/09/2024    INR 1.21 (H) 08/09/2024    PTT 73 (H) 08/09/2024    DD >20.00 (HH) 08/09/2024    ANTCH 59 (L) 08/09/2024       Plan is to reman on VV ECMO support at this time.    Karyn Villalta, RT  ECMO Specialist  8/9/2024 5:38 PM

## 2024-08-09 NOTE — OP NOTE
PREOPERATIVE DIAGNOSIS: Severe acute respiratory distress syndrome  POSTOPERATIVE DIAGNOSIS: Severe acute respiratory distress syndrome  PROCEDURE: Cannulation for VV ECMO (45499) in [facility] [place]- 17French in right neck. 25French in right groin, ultrasound guidance  ANESTHESIA: General endotracheal.   Fellow: Tyson Ambrose MD  SURGEON: Janes Freeman MD  RESIDENT: Anika Mead MD  ESTIMATED BLOOD LOSS: 200 ml   COMORBIDITIES:  Patient Active Problem List   Diagnosis    Acute respiratory failure with hypoxia (H)     COMPLICATIONS: None.   OPERATIVE INDICATIONS: Massiel Flaherty is a 53 year old female who presented with with signs and symptoms of severe ARDS due to presumed bacterial pneumonia. Today she is increasingly hypoxic despite maximal support including:  maximal ventilator support, paralytic, Flolan and proning. ECMO was indicated Risks, benefits and alternatives were discussed with the patient's family (mother and son) and all agreed to proceed with the operation as planned.   OPERATIVE DETAILS: The procedure was performed in the ICU with the patient is a supine position. She had an existing right internal jugular central line- this and the neck was prepped and draped in a sterile fashion.  The chest and groins were also prepped under sterile conditions. Under ultrasound guidance the rightfemoral vein was accessed with the introducer needle. A wire was placed through the needle into the vein. This wire was manipulated under US until it was seen to go up the IVC and into the heart. This was serially dilated up to 24F.  The right internal jugular vein line was exchanged for a wire.  This tract was dilated up to 15F.  We circulated heparin (5000u). I then advanced a 17 Stateless infusion port into the right internal jugular and a 25F drainage line into the right femoral vein.  Good position of the drainage line was confirmed with US- just at the edge of the IVC and right atrium. The cannula was  connected to the venous port and to the arterialized port, taking care to avoid air in the lines. ECMO was initiated.  Sats castillo to 100%.  The cannulas were sutured in place.  There were no known complications and the patient tolerated the procedure well. All sponge, needle, instrument counts were correct at the conclusion of the case.   I was present and scrubbed for the entire procedure.

## 2024-08-09 NOTE — PLAN OF CARE
Neuros: Sedated and paralyzed on propofol, fentanyl, and nimbex. 0/4 twitches on 10 mA - nimbex at lowest ordered dose. BIS 20s-30s most of the night - sedation titrated accordingly to maintain BIS 40-60. Pupils equal and reactive.  CV: Afebrile. ST at the start of the shift - improved to SR after cannulation. MAP goal  >65  - levo off since 0030.  RR: O2 sats 40s during cannulation - improved to 90s after placed on circuit. ECMO running with no issues. Blood gases improving - see lab results. PC-AC settings: FiO2 40%, RR 16, PEEP 10. Pulling TVs of 20s-40s. Flolan neb infusing.  GI/: LBM PTA. Hypoactive BS. Gonzalez patent but pt is oliguric. CRRT initiated and ran with no issues. Tolerating pull of 0-100 mL/hr. OGT to LIS.  Labs: 1 URBCs given for hgb 7.    Plan: Continue ECMO and CRRT. Monitor respiratory status. Continue POC.    For vital signs and complete assessments, please see documentation flowsheets.

## 2024-08-10 ENCOUNTER — APPOINTMENT (OUTPATIENT)
Dept: NEUROLOGY | Facility: CLINIC | Age: 54
DRG: 003 | End: 2024-08-10
Attending: STUDENT IN AN ORGANIZED HEALTH CARE EDUCATION/TRAINING PROGRAM
Payer: MEDICAID

## 2024-08-10 ENCOUNTER — APPOINTMENT (OUTPATIENT)
Dept: CT IMAGING | Facility: CLINIC | Age: 54
DRG: 003 | End: 2024-08-10
Attending: STUDENT IN AN ORGANIZED HEALTH CARE EDUCATION/TRAINING PROGRAM
Payer: MEDICAID

## 2024-08-10 ENCOUNTER — APPOINTMENT (OUTPATIENT)
Dept: GENERAL RADIOLOGY | Facility: CLINIC | Age: 54
DRG: 003 | End: 2024-08-10
Attending: SURGERY
Payer: MEDICAID

## 2024-08-10 ENCOUNTER — APPOINTMENT (OUTPATIENT)
Dept: ULTRASOUND IMAGING | Facility: CLINIC | Age: 54
DRG: 003 | End: 2024-08-10
Attending: INTERNAL MEDICINE
Payer: MEDICAID

## 2024-08-10 LAB
1,3 BETA GLUCAN SER-MCNC: 44 PG/ML
ACT BLD: 123 SECONDS (ref 74–150)
ACT BLD: 123 SECONDS (ref 74–150)
ACT BLD: 136 SECONDS (ref 74–150)
ACT BLD: 140 SECONDS (ref 74–150)
ACT BLD: 148 SECONDS (ref 74–150)
ACT BLD: 153 SECONDS (ref 74–150)
ACT BLD: 165 SECONDS (ref 74–150)
ACT BLD: 169 SECONDS (ref 74–150)
ALBUMIN SERPL BCG-MCNC: 2.2 G/DL (ref 3.5–5.2)
ALBUMIN SERPL BCG-MCNC: 2.2 G/DL (ref 3.5–5.2)
ALBUMIN SERPL BCG-MCNC: 2.3 G/DL (ref 3.5–5.2)
ALBUMIN SERPL BCG-MCNC: 2.3 G/DL (ref 3.5–5.2)
ALDOLASE SERPL-CCNC: 20.4 U/L
ALLEN'S TEST: ABNORMAL
ALP SERPL-CCNC: 376 U/L (ref 40–150)
ALT SERPL W P-5'-P-CCNC: 25 U/L (ref 0–50)
ANION GAP SERPL CALCULATED.3IONS-SCNC: 12 MMOL/L (ref 7–15)
ANION GAP SERPL CALCULATED.3IONS-SCNC: 12 MMOL/L (ref 7–15)
ANION GAP SERPL CALCULATED.3IONS-SCNC: 13 MMOL/L (ref 7–15)
ANION GAP SERPL CALCULATED.3IONS-SCNC: 13 MMOL/L (ref 7–15)
APTT PPP: 35 SECONDS (ref 22–38)
APTT PPP: 56 SECONDS (ref 22–38)
APTT PPP: 62 SECONDS (ref 22–38)
APTT PPP: 62 SECONDS (ref 22–38)
AST SERPL W P-5'-P-CCNC: 69 U/L (ref 0–45)
AT III ACT/NOR PPP CHRO: 68 % (ref 85–135)
AT III ACT/NOR PPP CHRO: 71 % (ref 85–135)
BASE EXCESS BLDA CALC-SCNC: -0.9 MMOL/L (ref -3–3)
BASE EXCESS BLDA CALC-SCNC: -1.3 MMOL/L (ref -3–3)
BASE EXCESS BLDA CALC-SCNC: -1.4 MMOL/L (ref -3–3)
BASE EXCESS BLDA CALC-SCNC: -2.1 MMOL/L (ref -3–3)
BASE EXCESS BLDA CALC-SCNC: -2.2 MMOL/L (ref -3–3)
BASE EXCESS BLDA CALC-SCNC: -2.4 MMOL/L (ref -3–3)
BASE EXCESS BLDA CALC-SCNC: -2.4 MMOL/L (ref -3–3)
BASE EXCESS BLDA CALC-SCNC: -2.6 MMOL/L (ref -3–3)
BASE EXCESS BLDA CALC-SCNC: -3.4 MMOL/L (ref -3–3)
BASE EXCESS BLDA CALC-SCNC: -3.4 MMOL/L (ref -3–3)
BASE EXCESS BLDA CALC-SCNC: -3.5 MMOL/L (ref -3–3)
BASE EXCESS BLDA CALC-SCNC: -3.6 MMOL/L (ref -3–3)
BASE EXCESS BLDA CALC-SCNC: -3.9 MMOL/L (ref -3–3)
BASE EXCESS BLDA CALC-SCNC: -4.3 MMOL/L (ref -3–3)
BASE EXCESS BLDV CALC-SCNC: -0.5 MMOL/L (ref -3–3)
BASE EXCESS BLDV CALC-SCNC: -0.6 MMOL/L (ref -3–3)
BASE EXCESS BLDV CALC-SCNC: -2 MMOL/L (ref -3–3)
BASE EXCESS BLDV CALC-SCNC: -3 MMOL/L (ref -3–3)
BASOPHILS # BLD AUTO: ABNORMAL 10*3/UL
BASOPHILS # BLD MANUAL: 0 10E3/UL (ref 0–0.2)
BASOPHILS # BLD MANUAL: 0 10E3/UL (ref 0–0.2)
BASOPHILS # BLD MANUAL: 0.1 10E3/UL (ref 0–0.2)
BASOPHILS # BLD MANUAL: 0.3 10E3/UL (ref 0–0.2)
BASOPHILS NFR BLD AUTO: ABNORMAL %
BASOPHILS NFR BLD MANUAL: 0 %
BASOPHILS NFR BLD MANUAL: 0 %
BASOPHILS NFR BLD MANUAL: 1 %
BASOPHILS NFR BLD MANUAL: 2 %
BILIRUB DIRECT SERPL-MCNC: 0.62 MG/DL (ref 0–0.3)
BILIRUB SERPL-MCNC: 0.7 MG/DL
BLD PROD TYP BPU: NORMAL
BLOOD COMPONENT TYPE: NORMAL
BUN SERPL-MCNC: 23.6 MG/DL (ref 6–20)
BUN SERPL-MCNC: 24.7 MG/DL (ref 6–20)
BUN SERPL-MCNC: 25.3 MG/DL (ref 6–20)
BUN SERPL-MCNC: 25.6 MG/DL (ref 6–20)
C PNEUM DNA SPEC QL NAA+PROBE: NOT DETECTED
CA-I BLD-MCNC: 4.3 MG/DL (ref 4.4–5.2)
CA-I BLD-MCNC: 4.4 MG/DL (ref 4.4–5.2)
CA-I BLD-MCNC: 4.6 MG/DL (ref 4.4–5.2)
CALCIUM SERPL-MCNC: 7.5 MG/DL (ref 8.8–10.4)
CALCIUM SERPL-MCNC: 7.6 MG/DL (ref 8.8–10.4)
CALCIUM SERPL-MCNC: 7.8 MG/DL (ref 8.8–10.4)
CALCIUM SERPL-MCNC: 8.2 MG/DL (ref 8.8–10.4)
CHLORIDE SERPL-SCNC: 102 MMOL/L (ref 98–107)
CHLORIDE SERPL-SCNC: 104 MMOL/L (ref 98–107)
CHLORIDE SERPL-SCNC: 104 MMOL/L (ref 98–107)
CHLORIDE SERPL-SCNC: 110 MMOL/L (ref 98–107)
CODING SYSTEM: NORMAL
COHGB MFR BLD: 89.3 % (ref 96–97)
COHGB MFR BLD: 91.7 % (ref 96–97)
COHGB MFR BLD: 92.2 % (ref 96–97)
COHGB MFR BLD: 92.8 % (ref 96–97)
COHGB MFR BLD: 93 % (ref 96–97)
COHGB MFR BLD: 93.3 % (ref 96–97)
COHGB MFR BLD: 94.3 % (ref 96–97)
COHGB MFR BLD: 94.8 % (ref 96–97)
COHGB MFR BLD: 97.5 % (ref 96–97)
COHGB MFR BLD: 98.2 % (ref 96–97)
COHGB MFR BLD: 98.4 % (ref 96–97)
COHGB MFR BLD: 99.4 % (ref 96–97)
CREAT SERPL-MCNC: 1.36 MG/DL (ref 0.51–0.95)
CREAT SERPL-MCNC: 1.5 MG/DL (ref 0.51–0.95)
CREAT SERPL-MCNC: 1.51 MG/DL (ref 0.51–0.95)
CREAT SERPL-MCNC: 1.71 MG/DL (ref 0.51–0.95)
CROSSMATCH: NORMAL
D DIMER PPP FEU-MCNC: 17.02 UG/ML FEU (ref 0–0.5)
D DIMER PPP FEU-MCNC: 9.56 UG/ML FEU (ref 0–0.5)
EGFRCR SERPLBLD CKD-EPI 2021: 35 ML/MIN/1.73M2
EGFRCR SERPLBLD CKD-EPI 2021: 41 ML/MIN/1.73M2
EGFRCR SERPLBLD CKD-EPI 2021: 41 ML/MIN/1.73M2
EGFRCR SERPLBLD CKD-EPI 2021: 46 ML/MIN/1.73M2
EOSINOPHIL # BLD AUTO: ABNORMAL 10*3/UL
EOSINOPHIL # BLD MANUAL: 0.5 10E3/UL (ref 0–0.7)
EOSINOPHIL # BLD MANUAL: 0.6 10E3/UL (ref 0–0.7)
EOSINOPHIL # BLD MANUAL: 0.8 10E3/UL (ref 0–0.7)
EOSINOPHIL # BLD MANUAL: 1.3 10E3/UL (ref 0–0.7)
EOSINOPHIL NFR BLD AUTO: ABNORMAL %
EOSINOPHIL NFR BLD MANUAL: 4 %
EOSINOPHIL NFR BLD MANUAL: 4 %
EOSINOPHIL NFR BLD MANUAL: 7 %
EOSINOPHIL NFR BLD MANUAL: 8 %
ERYTHROCYTE [DISTWIDTH] IN BLOOD BY AUTOMATED COUNT: 14.4 % (ref 10–15)
ERYTHROCYTE [DISTWIDTH] IN BLOOD BY AUTOMATED COUNT: 14.4 % (ref 10–15)
ERYTHROCYTE [DISTWIDTH] IN BLOOD BY AUTOMATED COUNT: 14.5 % (ref 10–15)
ERYTHROCYTE [DISTWIDTH] IN BLOOD BY AUTOMATED COUNT: 14.5 % (ref 10–15)
FIBRINOGEN PPP-MCNC: 540 MG/DL (ref 170–510)
FIBRINOGEN PPP-MCNC: 565 MG/DL (ref 170–510)
FLUAV H1 2009 PAND RNA SPEC QL NAA+PROBE: NOT DETECTED
FLUAV H1 RNA SPEC QL NAA+PROBE: NOT DETECTED
FLUAV H3 RNA SPEC QL NAA+PROBE: NOT DETECTED
FLUAV RNA SPEC QL NAA+PROBE: NOT DETECTED
FLUBV RNA SPEC QL NAA+PROBE: NOT DETECTED
GALACTOMANNAN AG SERPL QL IA: NEGATIVE
GALACTOMANNAN AG SPEC IA-ACNC: 0.06
GLUCOSE BLDC GLUCOMTR-MCNC: 110 MG/DL (ref 70–99)
GLUCOSE BLDC GLUCOMTR-MCNC: 121 MG/DL (ref 70–99)
GLUCOSE BLDC GLUCOMTR-MCNC: 184 MG/DL (ref 70–99)
GLUCOSE BLDC GLUCOMTR-MCNC: 88 MG/DL (ref 70–99)
GLUCOSE BLDC GLUCOMTR-MCNC: 98 MG/DL (ref 70–99)
GLUCOSE BLDC GLUCOMTR-MCNC: 99 MG/DL (ref 70–99)
GLUCOSE SERPL-MCNC: 111 MG/DL (ref 70–99)
GLUCOSE SERPL-MCNC: 117 MG/DL (ref 70–99)
GLUCOSE SERPL-MCNC: 150 MG/DL (ref 70–99)
GLUCOSE SERPL-MCNC: 208 MG/DL (ref 70–99)
HADV DNA SPEC QL NAA+PROBE: NOT DETECTED
HCO3 BLD-SCNC: 22 MMOL/L (ref 21–28)
HCO3 BLD-SCNC: 23 MMOL/L (ref 21–28)
HCO3 BLD-SCNC: 23 MMOL/L (ref 21–28)
HCO3 BLD-SCNC: 24 MMOL/L (ref 21–28)
HCO3 BLDA-SCNC: 20 MMOL/L (ref 21–28)
HCO3 BLDA-SCNC: 23 MMOL/L (ref 21–28)
HCO3 BLDV-SCNC: 22 MMOL/L (ref 21–28)
HCO3 BLDV-SCNC: 24 MMOL/L (ref 21–28)
HCO3 BLDV-SCNC: 25 MMOL/L (ref 21–28)
HCO3 BLDV-SCNC: 26 MMOL/L (ref 21–28)
HCO3 SERPL-SCNC: 20 MMOL/L (ref 22–29)
HCO3 SERPL-SCNC: 20 MMOL/L (ref 22–29)
HCO3 SERPL-SCNC: 21 MMOL/L (ref 22–29)
HCO3 SERPL-SCNC: 21 MMOL/L (ref 22–29)
HCOV PNL SPEC NAA+PROBE: NOT DETECTED
HCT VFR BLD AUTO: 22.4 % (ref 35–47)
HCT VFR BLD AUTO: 24.9 % (ref 35–47)
HCT VFR BLD AUTO: 25.4 % (ref 35–47)
HCT VFR BLD AUTO: 25.5 % (ref 35–47)
HGB BLD-MCNC: 6.9 G/DL (ref 11.7–15.7)
HGB BLD-MCNC: 8.1 G/DL (ref 11.7–15.7)
HGB BLD-MCNC: 8.1 G/DL (ref 11.7–15.7)
HGB BLD-MCNC: 8.3 G/DL (ref 11.7–15.7)
HGB FREE PLAS-MCNC: 120 MG/DL
HMPV RNA SPEC QL NAA+PROBE: NOT DETECTED
HPIV1 RNA SPEC QL NAA+PROBE: NOT DETECTED
HPIV2 RNA SPEC QL NAA+PROBE: NOT DETECTED
HPIV3 RNA SPEC QL NAA+PROBE: NOT DETECTED
HPIV4 RNA SPEC QL NAA+PROBE: NOT DETECTED
IMM GRANULOCYTES # BLD: ABNORMAL 10*3/UL
IMM GRANULOCYTES NFR BLD: ABNORMAL %
INR PPP: 1.06 (ref 0.85–1.15)
INR PPP: 1.1 (ref 0.85–1.15)
INR PPP: 1.1 (ref 0.85–1.15)
INR PPP: 1.14 (ref 0.85–1.15)
ISSUE DATE AND TIME: NORMAL
LACTATE SERPL-SCNC: 1.1 MMOL/L (ref 0.7–2)
LACTATE SERPL-SCNC: 1.1 MMOL/L (ref 0.7–2)
LACTATE SERPL-SCNC: 1.2 MMOL/L (ref 0.7–2)
LACTATE SERPL-SCNC: 1.2 MMOL/L (ref 0.7–2)
LYMPHOCYTES # BLD AUTO: ABNORMAL 10*3/UL
LYMPHOCYTES # BLD MANUAL: 0.6 10E3/UL (ref 0.8–5.3)
LYMPHOCYTES # BLD MANUAL: 0.9 10E3/UL (ref 0.8–5.3)
LYMPHOCYTES # BLD MANUAL: 1.2 10E3/UL (ref 0.8–5.3)
LYMPHOCYTES # BLD MANUAL: 1.7 10E3/UL (ref 0.8–5.3)
LYMPHOCYTES NFR BLD AUTO: ABNORMAL %
LYMPHOCYTES NFR BLD MANUAL: 10 %
LYMPHOCYTES NFR BLD MANUAL: 11 %
LYMPHOCYTES NFR BLD MANUAL: 4 %
LYMPHOCYTES NFR BLD MANUAL: 8 %
M PNEUMO DNA SPEC QL NAA+PROBE: NOT DETECTED
MAGNESIUM SERPL-MCNC: 2.3 MG/DL (ref 1.7–2.3)
MAGNESIUM SERPL-MCNC: 2.4 MG/DL (ref 1.7–2.3)
MCH RBC QN AUTO: 31.1 PG (ref 26.5–33)
MCH RBC QN AUTO: 31.6 PG (ref 26.5–33)
MCH RBC QN AUTO: 32.1 PG (ref 26.5–33)
MCH RBC QN AUTO: 32.4 PG (ref 26.5–33)
MCHC RBC AUTO-ENTMCNC: 30.8 G/DL (ref 31.5–36.5)
MCHC RBC AUTO-ENTMCNC: 31.8 G/DL (ref 31.5–36.5)
MCHC RBC AUTO-ENTMCNC: 32.5 G/DL (ref 31.5–36.5)
MCHC RBC AUTO-ENTMCNC: 32.7 G/DL (ref 31.5–36.5)
MCV RBC AUTO: 100 FL (ref 78–100)
MCV RBC AUTO: 101 FL (ref 78–100)
MCV RBC AUTO: 99 FL (ref 78–100)
MCV RBC AUTO: 99 FL (ref 78–100)
METAMYELOCYTES # BLD MANUAL: 0.2 10E3/UL
METAMYELOCYTES # BLD MANUAL: 0.4 10E3/UL
METAMYELOCYTES NFR BLD MANUAL: 2 %
METAMYELOCYTES NFR BLD MANUAL: 3 %
MONOCYTES # BLD AUTO: ABNORMAL 10*3/UL
MONOCYTES # BLD MANUAL: 0.1 10E3/UL (ref 0–1.3)
MONOCYTES # BLD MANUAL: 0.3 10E3/UL (ref 0–1.3)
MONOCYTES # BLD MANUAL: 0.5 10E3/UL (ref 0–1.3)
MONOCYTES # BLD MANUAL: 1.3 10E3/UL (ref 0–1.3)
MONOCYTES NFR BLD AUTO: ABNORMAL %
MONOCYTES NFR BLD MANUAL: 1 %
MONOCYTES NFR BLD MANUAL: 3 %
MONOCYTES NFR BLD MANUAL: 3 %
MONOCYTES NFR BLD MANUAL: 8 %
MYELOCYTES # BLD MANUAL: 0.2 10E3/UL
MYELOCYTES NFR BLD MANUAL: 1 %
NEUTROPHILS # BLD AUTO: ABNORMAL 10*3/UL
NEUTROPHILS # BLD MANUAL: 10 10E3/UL (ref 1.6–8.3)
NEUTROPHILS # BLD MANUAL: 12.2 10E3/UL (ref 1.6–8.3)
NEUTROPHILS # BLD MANUAL: 12.3 10E3/UL (ref 1.6–8.3)
NEUTROPHILS # BLD MANUAL: 8.7 10E3/UL (ref 1.6–8.3)
NEUTROPHILS NFR BLD AUTO: ABNORMAL %
NEUTROPHILS NFR BLD MANUAL: 77 %
NEUTROPHILS NFR BLD MANUAL: 78 %
NEUTROPHILS NFR BLD MANUAL: 80 %
NEUTROPHILS NFR BLD MANUAL: 82 %
NRBC # BLD AUTO: 0.1 10E3/UL
NRBC # BLD AUTO: 0.2 10E3/UL
NRBC BLD AUTO-RTO: 0 /100
NRBC BLD AUTO-RTO: 1 /100
NRBC BLD MANUAL-RTO: 2 %
O2/TOTAL GAS SETTING VFR VENT: 100 %
O2/TOTAL GAS SETTING VFR VENT: 60 %
OBSERVATION IMP: NEGATIVE
OSMOLALITY SERPL: 304 MMOL/KG (ref 275–295)
OXYHGB MFR BLDA: 98 % (ref 75–100)
OXYHGB MFR BLDA: 99 % (ref 75–100)
OXYHGB MFR BLDV: 40 % (ref 70–75)
OXYHGB MFR BLDV: 54 % (ref 70–75)
OXYHGB MFR BLDV: 59 %
OXYHGB MFR BLDV: 80 %
PCO2 BLD: 33 MM HG (ref 35–45)
PCO2 BLD: 37 MM HG (ref 35–45)
PCO2 BLD: 38 MM HG (ref 35–45)
PCO2 BLD: 38 MM HG (ref 35–45)
PCO2 BLD: 40 MM HG (ref 35–45)
PCO2 BLD: 41 MM HG (ref 35–45)
PCO2 BLD: 41 MM HG (ref 35–45)
PCO2 BLD: 43 MM HG (ref 35–45)
PCO2 BLD: 44 MM HG (ref 35–45)
PCO2 BLD: 44 MM HG (ref 35–45)
PCO2 BLD: 45 MM HG (ref 35–45)
PCO2 BLD: 48 MM HG (ref 35–45)
PCO2 BLDA: 32 MM HG (ref 35–45)
PCO2 BLDA: 34 MM HG (ref 35–45)
PCO2 BLDV: 41 MM HG (ref 40–50)
PCO2 BLDV: 44 MM HG (ref 40–50)
PCO2 BLDV: 44 MM HG (ref 40–50)
PCO2 BLDV: 50 MM HG (ref 40–50)
PEEP: 10 CM H2O
PH BLD: 7.31 [PH] (ref 7.35–7.45)
PH BLD: 7.32 [PH] (ref 7.35–7.45)
PH BLD: 7.34 [PH] (ref 7.35–7.45)
PH BLD: 7.34 [PH] (ref 7.35–7.45)
PH BLD: 7.35 [PH] (ref 7.35–7.45)
PH BLD: 7.37 [PH] (ref 7.35–7.45)
PH BLD: 7.38 [PH] (ref 7.35–7.45)
PH BLD: 7.38 [PH] (ref 7.35–7.45)
PH BLD: 7.4 [PH] (ref 7.35–7.45)
PH BLD: 7.43 [PH] (ref 7.35–7.45)
PH BLDA: 7.41 [PH] (ref 7.35–7.45)
PH BLDA: 7.43 [PH] (ref 7.35–7.45)
PH BLDV: 7.32 [PH] (ref 7.32–7.43)
PH BLDV: 7.34 [PH] (ref 7.32–7.43)
PH BLDV: 7.35 [PH] (ref 7.32–7.43)
PH BLDV: 7.37 [PH] (ref 7.32–7.43)
PHOSPHATE SERPL-MCNC: 3.7 MG/DL (ref 2.5–4.5)
PHOSPHATE SERPL-MCNC: 4.1 MG/DL (ref 2.5–4.5)
PHOSPHATE SERPL-MCNC: 4.2 MG/DL (ref 2.5–4.5)
PHOSPHATE SERPL-MCNC: 4.4 MG/DL (ref 2.5–4.5)
PLAT MORPH BLD: ABNORMAL
PLATELET # BLD AUTO: 215 10E3/UL (ref 150–450)
PLATELET # BLD AUTO: 246 10E3/UL (ref 150–450)
PLATELET # BLD AUTO: 261 10E3/UL (ref 150–450)
PLATELET # BLD AUTO: 286 10E3/UL (ref 150–450)
PO2 BLD: 129 MM HG (ref 80–105)
PO2 BLD: 57 MM HG (ref 80–105)
PO2 BLD: 61 MM HG (ref 80–105)
PO2 BLD: 62 MM HG (ref 80–105)
PO2 BLD: 68 MM HG (ref 80–105)
PO2 BLD: 70 MM HG (ref 80–105)
PO2 BLD: 72 MM HG (ref 80–105)
PO2 BLD: 83 MM HG (ref 80–105)
PO2 BLD: 91 MM HG (ref 80–105)
PO2 BLD: 97 MM HG (ref 80–105)
PO2 BLDA: 399 MM HG (ref 80–105)
PO2 BLDA: 434 MM HG (ref 80–105)
PO2 BLDV: 25 MM HG (ref 25–47)
PO2 BLDV: 31 MM HG (ref 25–47)
PO2 BLDV: 32 MM HG (ref 25–47)
PO2 BLDV: 47 MM HG (ref 25–47)
POLYCHROMASIA BLD QL SMEAR: SLIGHT
POTASSIUM SERPL-SCNC: 3.6 MMOL/L (ref 3.4–5.3)
POTASSIUM SERPL-SCNC: 3.7 MMOL/L (ref 3.4–5.3)
POTASSIUM SERPL-SCNC: 3.9 MMOL/L (ref 3.4–5.3)
POTASSIUM SERPL-SCNC: 4 MMOL/L (ref 3.4–5.3)
PROMYELOCYTES # BLD MANUAL: 0.2 10E3/UL
PROMYELOCYTES # BLD MANUAL: 0.2 10E3/UL
PROMYELOCYTES NFR BLD MANUAL: 1 %
PROMYELOCYTES NFR BLD MANUAL: 2 %
PROT SERPL-MCNC: 4.8 G/DL (ref 6.4–8.3)
RBC # BLD AUTO: 2.22 10E6/UL (ref 3.8–5.2)
RBC # BLD AUTO: 2.52 10E6/UL (ref 3.8–5.2)
RBC # BLD AUTO: 2.56 10E6/UL (ref 3.8–5.2)
RBC # BLD AUTO: 2.56 10E6/UL (ref 3.8–5.2)
RBC MORPH BLD: ABNORMAL
RSV RNA SPEC QL NAA+PROBE: NOT DETECTED
RSV RNA SPEC QL NAA+PROBE: NOT DETECTED
RV+EV RNA SPEC QL NAA+PROBE: NOT DETECTED
SAO2 % BLDA: 87 % (ref 92–100)
SAO2 % BLDA: 89 % (ref 92–100)
SAO2 % BLDA: 90 % (ref 92–100)
SAO2 % BLDA: 91 % (ref 92–100)
SAO2 % BLDA: 92 % (ref 92–100)
SAO2 % BLDA: 92 % (ref 92–100)
SAO2 % BLDA: 95 % (ref 92–100)
SAO2 % BLDA: 96 % (ref 92–100)
SAO2 % BLDA: 96 % (ref 92–100)
SAO2 % BLDA: 97 % (ref 92–100)
SAO2 % BLDA: >100 % (ref 96–97)
SAO2 % BLDA: >100 % (ref 96–97)
SAO2 % BLDV: 40.8 % (ref 70–75)
SAO2 % BLDV: 55.6 % (ref 70–75)
SAO2 % BLDV: 60.7 % (ref 70–75)
SAO2 % BLDV: 82 % (ref 70–75)
SARS-COV-2 RNA RESP QL NAA+PROBE: NEGATIVE
SODIUM SERPL-SCNC: 136 MMOL/L (ref 135–145)
SODIUM SERPL-SCNC: 137 MMOL/L (ref 135–145)
SODIUM SERPL-SCNC: 137 MMOL/L (ref 135–145)
SODIUM SERPL-SCNC: 140 MMOL/L (ref 135–145)
SODIUM SERPL-SCNC: 142 MMOL/L (ref 135–145)
SODIUM SERPL-SCNC: 143 MMOL/L (ref 135–145)
UFH PPP CHRO-ACNC: 0.19 IU/ML
UFH PPP CHRO-ACNC: 0.54 IU/ML
UFH PPP CHRO-ACNC: 0.6 IU/ML
UFH PPP CHRO-ACNC: 0.68 IU/ML
UNIT ABO/RH: NORMAL
UNIT NUMBER: NORMAL
UNIT STATUS: NORMAL
UNIT TYPE ISBT: 5100
UNIT TYPE ISBT: 6200
UNIT TYPE ISBT: 9500
WBC # BLD AUTO: 11.3 10E3/UL (ref 4–11)
WBC # BLD AUTO: 12.2 10E3/UL (ref 4–11)
WBC # BLD AUTO: 15.3 10E3/UL (ref 4–11)
WBC # BLD AUTO: 15.8 10E3/UL (ref 4–11)

## 2024-08-10 PROCEDURE — 85730 THROMBOPLASTIN TIME PARTIAL: CPT | Performed by: SURGERY

## 2024-08-10 PROCEDURE — 85379 FIBRIN DEGRADATION QUANT: CPT | Performed by: SURGERY

## 2024-08-10 PROCEDURE — 82805 BLOOD GASES W/O2 SATURATION: CPT | Performed by: SURGERY

## 2024-08-10 PROCEDURE — 33958 ECMO/ECLS REPOS PERPH CNULA: CPT | Performed by: SURGERY

## 2024-08-10 PROCEDURE — 87635 SARS-COV-2 COVID-19 AMP PRB: CPT

## 2024-08-10 PROCEDURE — 250N000013 HC RX MED GY IP 250 OP 250 PS 637: Performed by: STUDENT IN AN ORGANIZED HEALTH CARE EDUCATION/TRAINING PROGRAM

## 2024-08-10 PROCEDURE — 70450 CT HEAD/BRAIN W/O DYE: CPT | Mod: 26 | Performed by: RADIOLOGY

## 2024-08-10 PROCEDURE — 70450 CT HEAD/BRAIN W/O DYE: CPT

## 2024-08-10 PROCEDURE — 85300 ANTITHROMBIN III ACTIVITY: CPT | Performed by: SURGERY

## 2024-08-10 PROCEDURE — 999N000157 HC STATISTIC RCP TIME EA 10 MIN

## 2024-08-10 PROCEDURE — 33948 ECMO/ECLS DAILY MGMT-VENOUS: CPT

## 2024-08-10 PROCEDURE — 99207 EEG VIDEO 12-26 HR UNMONITORED: CPT | Performed by: PSYCHIATRY & NEUROLOGY

## 2024-08-10 PROCEDURE — 87486 CHLMYD PNEUM DNA AMP PROBE: CPT

## 2024-08-10 PROCEDURE — 250N000009 HC RX 250

## 2024-08-10 PROCEDURE — 258N000003 HC RX IP 258 OP 636

## 2024-08-10 PROCEDURE — 250N000009 HC RX 250: Performed by: SURGERY

## 2024-08-10 PROCEDURE — 250N000009 HC RX 250: Performed by: STUDENT IN AN ORGANIZED HEALTH CARE EDUCATION/TRAINING PROGRAM

## 2024-08-10 PROCEDURE — 99291 CRITICAL CARE FIRST HOUR: CPT | Mod: 25 | Performed by: STUDENT IN AN ORGANIZED HEALTH CARE EDUCATION/TRAINING PROGRAM

## 2024-08-10 PROCEDURE — 85610 PROTHROMBIN TIME: CPT | Performed by: SURGERY

## 2024-08-10 PROCEDURE — 250N000011 HC RX IP 250 OP 636: Performed by: PHYSICIAN ASSISTANT

## 2024-08-10 PROCEDURE — 76857 US EXAM PELVIC LIMITED: CPT | Mod: 26 | Performed by: RADIOLOGY

## 2024-08-10 PROCEDURE — 95720 EEG PHY/QHP EA INCR W/VEEG: CPT | Performed by: PSYCHIATRY & NEUROLOGY

## 2024-08-10 PROCEDURE — 250N000011 HC RX IP 250 OP 636: Performed by: STUDENT IN AN ORGANIZED HEALTH CARE EDUCATION/TRAINING PROGRAM

## 2024-08-10 PROCEDURE — 83605 ASSAY OF LACTIC ACID: CPT | Performed by: SURGERY

## 2024-08-10 PROCEDURE — P9059 PLASMA, FRZ BETWEEN 8-24HOUR: HCPCS

## 2024-08-10 PROCEDURE — 82330 ASSAY OF CALCIUM: CPT | Performed by: PHYSICIAN ASSISTANT

## 2024-08-10 PROCEDURE — 90947 DIALYSIS REPEATED EVAL: CPT

## 2024-08-10 PROCEDURE — 83735 ASSAY OF MAGNESIUM: CPT | Performed by: PHYSICIAN ASSISTANT

## 2024-08-10 PROCEDURE — 82310 ASSAY OF CALCIUM: CPT | Performed by: PHYSICIAN ASSISTANT

## 2024-08-10 PROCEDURE — 84295 ASSAY OF SERUM SODIUM: CPT | Performed by: STUDENT IN AN ORGANIZED HEALTH CARE EDUCATION/TRAINING PROGRAM

## 2024-08-10 PROCEDURE — P9016 RBC LEUKOCYTES REDUCED: HCPCS | Performed by: SURGERY

## 2024-08-10 PROCEDURE — 250N000009 HC RX 250: Performed by: PHYSICIAN ASSISTANT

## 2024-08-10 PROCEDURE — 258N000003 HC RX IP 258 OP 636: Performed by: SURGERY

## 2024-08-10 PROCEDURE — 250N000011 HC RX IP 250 OP 636: Performed by: SURGERY

## 2024-08-10 PROCEDURE — 71045 X-RAY EXAM CHEST 1 VIEW: CPT

## 2024-08-10 PROCEDURE — 71045 X-RAY EXAM CHEST 1 VIEW: CPT | Mod: 26 | Performed by: STUDENT IN AN ORGANIZED HEALTH CARE EDUCATION/TRAINING PROGRAM

## 2024-08-10 PROCEDURE — 82330 ASSAY OF CALCIUM: CPT | Performed by: SURGERY

## 2024-08-10 PROCEDURE — 83051 HEMOGLOBIN PLASMA: CPT | Performed by: SURGERY

## 2024-08-10 PROCEDURE — 85384 FIBRINOGEN ACTIVITY: CPT | Performed by: SURGERY

## 2024-08-10 PROCEDURE — 250N000011 HC RX IP 250 OP 636

## 2024-08-10 PROCEDURE — 85520 HEPARIN ASSAY: CPT | Performed by: SURGERY

## 2024-08-10 PROCEDURE — 76857 US EXAM PELVIC LIMITED: CPT

## 2024-08-10 PROCEDURE — 99292 CRITICAL CARE ADDL 30 MIN: CPT | Mod: 25 | Performed by: NURSE PRACTITIONER

## 2024-08-10 PROCEDURE — 83930 ASSAY OF BLOOD OSMOLALITY: CPT | Performed by: PSYCHIATRY & NEUROLOGY

## 2024-08-10 PROCEDURE — 85014 HEMATOCRIT: CPT | Performed by: SURGERY

## 2024-08-10 PROCEDURE — 250N000013 HC RX MED GY IP 250 OP 250 PS 637

## 2024-08-10 PROCEDURE — 71045 X-RAY EXAM CHEST 1 VIEW: CPT | Mod: 26 | Performed by: RADIOLOGY

## 2024-08-10 PROCEDURE — 82248 BILIRUBIN DIRECT: CPT | Performed by: STUDENT IN AN ORGANIZED HEALTH CARE EDUCATION/TRAINING PROGRAM

## 2024-08-10 PROCEDURE — 85347 COAGULATION TIME ACTIVATED: CPT

## 2024-08-10 PROCEDURE — 200N000002 HC R&B ICU UMMC

## 2024-08-10 PROCEDURE — 94003 VENT MGMT INPAT SUBQ DAY: CPT

## 2024-08-10 PROCEDURE — 85007 BL SMEAR W/DIFF WBC COUNT: CPT | Performed by: SURGERY

## 2024-08-10 PROCEDURE — 250N000013 HC RX MED GY IP 250 OP 250 PS 637: Performed by: SURGERY

## 2024-08-10 PROCEDURE — 80053 COMPREHEN METABOLIC PANEL: CPT | Performed by: STUDENT IN AN ORGANIZED HEALTH CARE EDUCATION/TRAINING PROGRAM

## 2024-08-10 PROCEDURE — 84132 ASSAY OF SERUM POTASSIUM: CPT | Performed by: PHYSICIAN ASSISTANT

## 2024-08-10 PROCEDURE — 33948 ECMO/ECLS DAILY MGMT-VENOUS: CPT | Mod: GC | Performed by: SURGERY

## 2024-08-10 PROCEDURE — P9059 PLASMA, FRZ BETWEEN 8-24HOUR: HCPCS | Performed by: SURGERY

## 2024-08-10 PROCEDURE — 99291 CRITICAL CARE FIRST HOUR: CPT | Mod: 25 | Performed by: NURSE PRACTITIONER

## 2024-08-10 PROCEDURE — 999N000065 XR CHEST PORT 1 VIEW

## 2024-08-10 PROCEDURE — 250N000009 HC RX 250: Performed by: PSYCHIATRY & NEUROLOGY

## 2024-08-10 PROCEDURE — 250N000013 HC RX MED GY IP 250 OP 250 PS 637: Performed by: PHYSICIAN ASSISTANT

## 2024-08-10 PROCEDURE — 82805 BLOOD GASES W/O2 SATURATION: CPT | Performed by: STUDENT IN AN ORGANIZED HEALTH CARE EDUCATION/TRAINING PROGRAM

## 2024-08-10 PROCEDURE — 250N000011 HC RX IP 250 OP 636: Performed by: PSYCHIATRY & NEUROLOGY

## 2024-08-10 PROCEDURE — 06WYX3Z REVISION OF INFUSION DEVICE IN LOWER VEIN, EXTERNAL APPROACH: ICD-10-PCS | Performed by: SURGERY

## 2024-08-10 PROCEDURE — 95714 VEEG EA 12-26 HR UNMNTR: CPT

## 2024-08-10 PROCEDURE — 999N000185 HC STATISTIC TRANSPORT TIME EA 15 MIN

## 2024-08-10 PROCEDURE — 99233 SBSQ HOSP IP/OBS HIGH 50: CPT | Performed by: INTERNAL MEDICINE

## 2024-08-10 RX ORDER — DEXTROSE MONOHYDRATE 50 MG/ML
10-20 INJECTION, SOLUTION INTRAVENOUS EVERY 24 HOURS
Status: DISCONTINUED | OUTPATIENT
Start: 2024-08-10 | End: 2024-08-11

## 2024-08-10 RX ORDER — DIPHENHYDRAMINE HCL 25 MG
25 CAPSULE ORAL EVERY 6 HOURS PRN
Status: DISCONTINUED | OUTPATIENT
Start: 2024-08-10 | End: 2024-08-14

## 2024-08-10 RX ORDER — VECURONIUM BROMIDE 1 MG/ML
10 INJECTION, POWDER, LYOPHILIZED, FOR SOLUTION INTRAVENOUS ONCE
Status: COMPLETED | OUTPATIENT
Start: 2024-08-10 | End: 2024-08-10

## 2024-08-10 RX ORDER — MIDAZOLAM HCL IN 0.9 % NACL/PF 1 MG/ML
1 PLASTIC BAG, INJECTION (ML) INTRAVENOUS CONTINUOUS
Status: DISCONTINUED | OUTPATIENT
Start: 2024-08-10 | End: 2024-08-23

## 2024-08-10 RX ORDER — SODIUM CHLORIDE 234 MG/ML
30 SOLUTION, CONCENTRATE INTRAVENOUS ONCE
Status: COMPLETED | OUTPATIENT
Start: 2024-08-10 | End: 2024-08-10

## 2024-08-10 RX ORDER — VIT B COMP NO.3/FOLIC/C/BIOTIN 1 MG-60 MG
1 TABLET ORAL DAILY
Status: DISCONTINUED | OUTPATIENT
Start: 2024-08-10 | End: 2024-08-14

## 2024-08-10 RX ORDER — DIPHENHYDRAMINE HYDROCHLORIDE 50 MG/ML
25 INJECTION INTRAMUSCULAR; INTRAVENOUS EVERY 6 HOURS PRN
Status: DISCONTINUED | OUTPATIENT
Start: 2024-08-10 | End: 2024-08-14

## 2024-08-10 RX ORDER — DIPHENHYDRAMINE HYDROCHLORIDE 50 MG/ML
25 INJECTION INTRAMUSCULAR; INTRAVENOUS EVERY 24 HOURS
Status: DISCONTINUED | OUTPATIENT
Start: 2024-08-10 | End: 2024-08-13

## 2024-08-10 RX ORDER — SODIUM CHLORIDE 3 G/100ML
250 INJECTION, SOLUTION INTRAVENOUS ONCE
Status: COMPLETED | OUTPATIENT
Start: 2024-08-11 | End: 2024-08-10

## 2024-08-10 RX ORDER — VECURONIUM BROMIDE 1 MG/ML
INJECTION, POWDER, LYOPHILIZED, FOR SOLUTION INTRAVENOUS
Status: DISCONTINUED
Start: 2024-08-10 | End: 2024-08-11 | Stop reason: HOSPADM

## 2024-08-10 RX ORDER — POLYETHYLENE GLYCOL 3350 17 G/17G
17 POWDER, FOR SOLUTION ORAL 2 TIMES DAILY
Status: DISCONTINUED | OUTPATIENT
Start: 2024-08-10 | End: 2024-08-13

## 2024-08-10 RX ORDER — AMOXICILLIN 250 MG
2 CAPSULE ORAL 2 TIMES DAILY
Status: DISCONTINUED | OUTPATIENT
Start: 2024-08-10 | End: 2024-08-13

## 2024-08-10 RX ORDER — 3% SODIUM CHLORIDE 3 G/100ML
INJECTION, SOLUTION INTRAVENOUS CONTINUOUS
Status: DISCONTINUED | OUTPATIENT
Start: 2024-08-10 | End: 2024-08-10

## 2024-08-10 RX ORDER — METOCLOPRAMIDE HYDROCHLORIDE 5 MG/ML
10 INJECTION INTRAMUSCULAR; INTRAVENOUS
Status: DISCONTINUED | OUTPATIENT
Start: 2024-08-10 | End: 2024-08-13

## 2024-08-10 RX ORDER — DIPHENHYDRAMINE HCL 25 MG
25 CAPSULE ORAL EVERY 24 HOURS
Status: DISCONTINUED | OUTPATIENT
Start: 2024-08-10 | End: 2024-08-13

## 2024-08-10 RX ORDER — ACETAMINOPHEN 325 MG/1
650 TABLET ORAL EVERY 24 HOURS
Status: DISCONTINUED | OUTPATIENT
Start: 2024-08-10 | End: 2024-08-14

## 2024-08-10 RX ADMIN — PANTOPRAZOLE SODIUM 40 MG: 40 INJECTION, POWDER, FOR SOLUTION INTRAVENOUS at 08:10

## 2024-08-10 RX ADMIN — CHLORHEXIDINE GLUCONATE 0.12% ORAL RINSE 15 ML: 1.2 LIQUID ORAL at 08:11

## 2024-08-10 RX ADMIN — PROPOFOL 75 MCG/KG/MIN: 10 INJECTION, EMULSION INTRAVENOUS at 20:24

## 2024-08-10 RX ADMIN — DEXTROSE MONOHYDRATE 20 ML: 50 INJECTION, SOLUTION INTRAVENOUS at 15:02

## 2024-08-10 RX ADMIN — CISATRACURIUM BESYLATE 3 MCG/KG/MIN: 10 INJECTION, SOLUTION INTRAVENOUS at 18:56

## 2024-08-10 RX ADMIN — Medication 1 TABLET: at 08:10

## 2024-08-10 RX ADMIN — CALCIUM CHLORIDE, MAGNESIUM CHLORIDE, SODIUM CHLORIDE, SODIUM BICARBONATE, POTASSIUM CHLORIDE AND SODIUM PHOSPHATE DIBASIC DIHYDRATE 12.5 ML/KG/HR: 3.68; 3.05; 6.34; 3.09; .314; .187 INJECTION INTRAVENOUS at 16:24

## 2024-08-10 RX ADMIN — CALCIUM CHLORIDE, MAGNESIUM CHLORIDE, SODIUM CHLORIDE, SODIUM BICARBONATE, POTASSIUM CHLORIDE AND SODIUM PHOSPHATE DIBASIC DIHYDRATE 12.5 ML/KG/HR: 3.68; 3.05; 6.34; 3.09; .314; .187 INJECTION INTRAVENOUS at 05:21

## 2024-08-10 RX ADMIN — VECURONIUM BROMIDE 10 MG: 10 INJECTION, POWDER, LYOPHILIZED, FOR SOLUTION INTRAVENOUS at 16:53

## 2024-08-10 RX ADMIN — AZITHROMYCIN MONOHYDRATE 500 MG: 500 INJECTION, POWDER, LYOPHILIZED, FOR SOLUTION INTRAVENOUS at 10:00

## 2024-08-10 RX ADMIN — CALCIUM CHLORIDE, MAGNESIUM CHLORIDE, SODIUM CHLORIDE, SODIUM BICARBONATE, POTASSIUM CHLORIDE AND SODIUM PHOSPHATE DIBASIC DIHYDRATE: 3.68; 3.05; 6.34; 3.09; .314; .187 INJECTION INTRAVENOUS at 09:58

## 2024-08-10 RX ADMIN — SODIUM CHLORIDE 30 ML: 4 INJECTION, SOLUTION, CONCENTRATE INTRAVENOUS at 09:23

## 2024-08-10 RX ADMIN — HEPARIN SODIUM 1800 UNITS/HR: 10000 INJECTION, SOLUTION INTRAVENOUS at 17:00

## 2024-08-10 RX ADMIN — PROPOFOL 50 MCG/KG/MIN: 10 INJECTION, EMULSION INTRAVENOUS at 23:22

## 2024-08-10 RX ADMIN — CEFEPIME HYDROCHLORIDE 2 G: 2 INJECTION, POWDER, FOR SOLUTION INTRAVENOUS at 12:40

## 2024-08-10 RX ADMIN — AMPHOTERICIN B 88.9 MG: 50 INJECTION, POWDER, LYOPHILIZED, FOR SOLUTION INTRAVENOUS at 15:03

## 2024-08-10 RX ADMIN — DEXTROSE MONOHYDRATE 20 ML: 50 INJECTION, SOLUTION INTRAVENOUS at 17:26

## 2024-08-10 RX ADMIN — CALCIUM CHLORIDE, MAGNESIUM CHLORIDE, SODIUM CHLORIDE, SODIUM BICARBONATE, POTASSIUM CHLORIDE AND SODIUM PHOSPHATE DIBASIC DIHYDRATE 12.5 ML/KG/HR: 3.68; 3.05; 6.34; 3.09; .314; .187 INJECTION INTRAVENOUS at 16:22

## 2024-08-10 RX ADMIN — POLYETHYLENE GLYCOL 3350 17 G: 17 POWDER, FOR SOLUTION ORAL at 08:10

## 2024-08-10 RX ADMIN — SODIUM CHLORIDE: 3 INJECTION, SOLUTION INTRAVENOUS at 20:05

## 2024-08-10 RX ADMIN — ACETAMINOPHEN 650 MG: 325 TABLET, FILM COATED ORAL at 14:17

## 2024-08-10 RX ADMIN — CALCIUM CHLORIDE, MAGNESIUM CHLORIDE, SODIUM CHLORIDE, SODIUM BICARBONATE, POTASSIUM CHLORIDE AND SODIUM PHOSPHATE DIBASIC DIHYDRATE 12.5 ML/KG/HR: 3.68; 3.05; 6.34; 3.09; .314; .187 INJECTION INTRAVENOUS at 09:56

## 2024-08-10 RX ADMIN — SODIUM CHLORIDE: 4 INJECTION, SOLUTION, CONCENTRATE INTRAVENOUS at 22:32

## 2024-08-10 RX ADMIN — DIPHENHYDRAMINE HYDROCHLORIDE 25 MG: 50 INJECTION, SOLUTION INTRAMUSCULAR; INTRAVENOUS at 14:19

## 2024-08-10 RX ADMIN — PROPOFOL 40 MCG/KG/MIN: 10 INJECTION, EMULSION INTRAVENOUS at 10:04

## 2024-08-10 RX ADMIN — SODIUM CHLORIDE 250 ML: 3 INJECTION, SOLUTION INTRAVENOUS at 23:54

## 2024-08-10 RX ADMIN — SODIUM CHLORIDE 30 ML: 4 INJECTION, SOLUTION, CONCENTRATE INTRAVENOUS at 19:35

## 2024-08-10 RX ADMIN — LEVOTHYROXINE SODIUM 25 MCG: 0.03 TABLET ORAL at 08:10

## 2024-08-10 RX ADMIN — CEFEPIME HYDROCHLORIDE 2 G: 2 INJECTION, POWDER, FOR SOLUTION INTRAVENOUS at 23:30

## 2024-08-10 RX ADMIN — CALCIUM GLUCONATE 2 G: 20 INJECTION, SOLUTION INTRAVENOUS at 05:24

## 2024-08-10 RX ADMIN — CHLORHEXIDINE GLUCONATE 0.12% ORAL RINSE 15 ML: 1.2 LIQUID ORAL at 20:24

## 2024-08-10 RX ADMIN — Medication 10 MG: at 16:31

## 2024-08-10 RX ADMIN — PROPOFOL 75 MCG/KG/MIN: 10 INJECTION, EMULSION INTRAVENOUS at 17:52

## 2024-08-10 RX ADMIN — SENNOSIDES AND DOCUSATE SODIUM 2 TABLET: 8.6; 5 TABLET ORAL at 08:10

## 2024-08-10 RX ADMIN — CALCIUM CHLORIDE, MAGNESIUM CHLORIDE, SODIUM CHLORIDE, SODIUM BICARBONATE, POTASSIUM CHLORIDE AND SODIUM PHOSPHATE DIBASIC DIHYDRATE 12.5 ML/KG/HR: 3.68; 3.05; 6.34; 3.09; .314; .187 INJECTION INTRAVENOUS at 20:45

## 2024-08-10 RX ADMIN — SODIUM CHLORIDE 50 ML: 9 INJECTION, SOLUTION INTRAVENOUS at 15:42

## 2024-08-10 RX ADMIN — PROPOFOL 20 MCG/KG/MIN: 10 INJECTION, EMULSION INTRAVENOUS at 14:16

## 2024-08-10 RX ADMIN — Medication 60 ML: at 20:25

## 2024-08-10 RX ADMIN — CALCIUM CHLORIDE, MAGNESIUM CHLORIDE, SODIUM CHLORIDE, SODIUM BICARBONATE, POTASSIUM CHLORIDE AND SODIUM PHOSPHATE DIBASIC DIHYDRATE 12.5 ML/KG/HR: 3.68; 3.05; 6.34; 3.09; .314; .187 INJECTION INTRAVENOUS at 00:49

## 2024-08-10 RX ADMIN — PROPOFOL 60 MCG/KG/MIN: 10 INJECTION, EMULSION INTRAVENOUS at 01:09

## 2024-08-10 RX ADMIN — CALCIUM GLUCONATE 2 G: 20 INJECTION, SOLUTION INTRAVENOUS at 17:26

## 2024-08-10 RX ADMIN — MIDAZOLAM HYDROCHLORIDE 1 MG/HR: 1 INJECTION, SOLUTION INTRAVENOUS at 09:29

## 2024-08-10 RX ADMIN — CEFEPIME HYDROCHLORIDE 2 G: 2 INJECTION, POWDER, FOR SOLUTION INTRAVENOUS at 00:02

## 2024-08-10 RX ADMIN — Medication 10 MG: at 09:35

## 2024-08-10 RX ADMIN — WHITE PETROLATUM 57.7 %-MINERAL OIL 31.9 % EYE OINTMENT: at 20:25

## 2024-08-10 RX ADMIN — SODIUM CHLORIDE 250 ML: 9 INJECTION, SOLUTION INTRAVENOUS at 14:23

## 2024-08-10 RX ADMIN — Medication 10 MG: at 16:15

## 2024-08-10 RX ADMIN — SENNOSIDES AND DOCUSATE SODIUM 2 TABLET: 8.6; 5 TABLET ORAL at 20:25

## 2024-08-10 RX ADMIN — Medication 60 ML: at 14:27

## 2024-08-10 RX ADMIN — Medication 1 MG/HR: at 06:50

## 2024-08-10 RX ADMIN — Medication 60 ML: at 08:11

## 2024-08-10 ASSESSMENT — ACTIVITIES OF DAILY LIVING (ADL)
ADLS_ACUITY_SCORE: 53
ADLS_ACUITY_SCORE: 55
ADLS_ACUITY_SCORE: 53
ADLS_ACUITY_SCORE: 55
ADLS_ACUITY_SCORE: 53
ADLS_ACUITY_SCORE: 55
ADLS_ACUITY_SCORE: 53
ADLS_ACUITY_SCORE: 55
ADLS_ACUITY_SCORE: 53
ADLS_ACUITY_SCORE: 53
ADLS_ACUITY_SCORE: 55
ADLS_ACUITY_SCORE: 53
ADLS_ACUITY_SCORE: 55
ADLS_ACUITY_SCORE: 55
ADLS_ACUITY_SCORE: 53

## 2024-08-10 NOTE — PLAN OF CARE
[id number]:  Neuro: sedated dilaudid 1mg and prop @50. Pt did not tolerate head being down for CT at beginning of shift (coughing and chugging in circuit)-gave 4mg Versed IV push. Pupils equal/round/reactive. Prior to versed patient slightly moving extremites.   Cardiac: SR rates 80s, afebrile. Levo @ 0.04 for MAP goal above 65. Pulses dopplerable. Ecmo circuit chugging x1-backed off on CRRT. 4L flows and 3200 RPMS.   Resp: ETT 24 @ lip. Scant secretions. Pulling -200 at beginning of shift after 0100 patient dropped to TV 50s (sicu resident made aware and RT came to assess). PaO2 100s then also dropped with TV to 50s. Heparin in circuit maxed at 2500 units/hr without reaching goal ACT. Resident made aware and now titrating based off of hep 10a levels.   CRRT: circuit down for a few hours (CT scan and then restarting with new filter). Now tolerating pulling. I-carlos replaced this AM.  GI/: scant UO. No BM. TF at 25cc/hr FWF 30@4. Next TF advancement at 0100.   Skin: no changes    Will continue with plan of care and notify MD of any changes.

## 2024-08-10 NOTE — PROGRESS NOTES
[facility]    ECLS Shift Summary: 12D     ECMO Equipment:  Console Serial Number: [id number]  Circuit Lot Number: not recorded by Perfusion  Oxygenator Lot Number: [id number]    Circuit Assessment: Fibrin  Fibrin Location: at connectors & 9:00 on oxy    Venous Drainage ECMO Cannula: 25 Fr in the Right Femoral Vein  Venous Return ECMO Cannula: 17 Fr in the Right Internal Jugular Vein    ECMO Cannula Arterial Right internal jugular-Site Assessment: WDL  ECMO Cannula Venous Right femoral vein-Site Assessment: WDL  ECMO Cannula Arterial Right internal jugular-Site Intervention: No intervention needed  ECMO Cannula Venous Right femoral vein-Site Intervention: No intervention needed    Patient remains on V-V ECMO, all equipment is functioning and alarms are appropriately set. RPM's: 3550 with Blood Flow (Circuit) LPM  Av.6 LPM  Min: 4.47 LPM  Max: 4.6 LPM L/min. Sweep is at 6 LPM and 100 %. Extremities are warm but pale in color.     Significant Shift Events: Pt taken to CT for repeat head scan this morning.  RIJ return cannula pulled out 6 cm by Dr. Freeman at bedside, resutured and redressed.    Vent settings:  Vent Mode: PCV Plus assist  FiO2 (%): 60 %  Resp Rate (Set): 16 breaths/min  PEEP (cm H2O): 10 cmH2O  Inspiratory Pressure (cm H2O) (Drager Jessie): 25  Resp: 16      Anticoagulation:  Dose (units/hr) HEParin: 1800 Units/hr  Rate (mL/hr) HEParin: 18 mL/hr  Concentration HEParin: 100 Units/mL        Most recent: ACT  (seconds): 123 seconds    Urine output is NIL on CRRT.  Blood loss was minimal. Product given included 2 plasma and 2 RBCs.     Intake/Output Summary (Last 24 hours) at 8/10/2024 1724  Last data filed at 8/10/2024 1600  Gross per 24 hour   Intake 5179.51 ml   Output 3961.9 ml   Net 1217.61 ml       Labs:  Recent Labs   Lab 08/10/24  1609 08/10/24  1546 08/10/24  1545 08/10/24  1432 08/10/24  1251 08/10/24  0947   PH  --  7.37  --  7.31* 7.32* 7.38   PCO2   --  37  --  44 43 38   PO2  --  61*  --  129* 72* 97   HCO3  --  22  --  22 22 22   O2PER 100 100 100  100 100 60 60       Lab Results   Component Value Date    HGB 6.9 (LL) 08/10/2024    PHGB 120 (H) 08/10/2024     08/10/2024    FIBR 540 (H) 08/10/2024    INR 1.06 08/10/2024    PTT 35 08/10/2024    DD 9.56 (H) 08/10/2024    ANTCH 71 (L) 08/10/2024       Plan is to remain stable on VV ECMO.    Julián Mcneil, RRT  ECMO Specialist  8/10/2024 5:24 PM

## 2024-08-10 NOTE — PROGRESS NOTES
INITIAL REPORT of Video-EEG Monitoring              DATE OF RECORDIN/10/2024         I reviewed the first 2 hours of video-EEG monitoring of Massiel Flaherty.         The EEG during sedated coma was abnormal due to an atypical generalized burst-suppression pattern, with bursts of delta-theta activities that are lacking faster alpha-beta frequencies.  No interictal epileptiform abnormalities and no electrographic seizures were observed.         These abnormalities indicate severe electrographic encephalopathy.  This recording excludes non-convulsive status epilepticus as a possible cause of this encephalopathy.    iKke Garay M.D.

## 2024-08-10 NOTE — PROGRESS NOTES
During cannula reposition patient's O2 saturations dropped to 58%. Patient received a total of 6 mg Versed, 2 10 mg Propofol bolus. Patients O2 saturations increased to mid 80s, but was noted to have increased work of breath with use of accessory muscle and rate in mid 30s. Patient received one time dose of Vecuronium with restoration of RR to 16 and O2 saturations to low 90s.   Propofol at 75 mcg/kg/min  Versed 8mg/hr  Dilaudid 1 mg hour.     Decision was made to keep patient on paralytic overnight.

## 2024-08-10 NOTE — PROGRESS NOTES
SURGICAL ICU PROGRESS NOTE  08/10/2024        Date of Service (when I saw the patient): 08/10/2024    ASSESSMENT:  Massiel Flaherty is a 53 year old female who was admitted to [facility].   Past medical hx of Anxiety/depression, fibromyalgia, hypothyroidism, asthma, HLD, MURIEL on CPAP, spells, off on anti seizure medications, expressive aphasia, hypertension was seen at [facility] and was placed on Augmentin outpatient on 7/30/24.   She admitted to the hospital on 8/3/24 for fatigue, fever and dyspnea and intubated 8/6/24 at OSH, proned and paralyzed without improvement. Transferred to [facility] 8/8/24, hypoxic with high plateaus/peaks and placed on VV EMCO and CRRT.     OVERNIGHT EVENTS:   - Head CT, CAP CT completed overnight   - Ongoing subtherapeutic ACT levels with uptrending heparin gtt rates  - Ongoing low ABGs and dropping sats overnight despite PO2 of 399 on circuit; Sweep increased to 6   - Persistent low tidal volumes     CHANGES and MAJOR THINGS TODAY:   - Continue resting on vent  - No BM since admission; Increase bowel reg today   - Ongoing Levo gtt requirements  - Continue pulling on CRRT up to 0.1 of levo   - Head CT showing hypoxic injury with diffuse cerebral edema  - give 23% bolus x 1; trend Sodium q4h   - NCC consult   - Head CT at 1200  - Give FFP for suspected low ATIII; Drop heparin drip to 1200 before infusion   - Send resp and COVID panel   - Pelvic US for adnexal collection   - EEG     PLAN:  Neurological:  # Fibromyalgia   # Anxiety   # sedation and analgesia for vent compliance   - Monitor neurological status. Delirium preventions and precautions.   - Pain: Dilaudid gtt         - Sedation plan: propofol gtt --> Versed gtt    -  Paralysis: Off 8/9/24   - will review PTA medications and resume appropriate medications as able.     # C/f hypoxic ischemic injury   # Diffuse cerebral edema   - Head CT 8/9: Findings concerning for diffuse cerebral edema with  diffuse blurring of the gray-white  differentiation. Findings are  concerning for hypoxicischemic injury.   - give 23% bolus x 1; trend Sodium q4h   - NCC consult   - Head CT at 1200  - Discuss sodium goals with NCC   - EEG     Pulmonary:   # Asthma  # MURIEL on home CPAP   #  Acute hypoxic and hypercapnic respiratory failure   # ARDS  # s/p VV EMCO 8/8  Vent Mode: PCV Plus assist  FiO2 (%): 60 %  Resp Rate (Set): 16 breaths/min  PEEP (cm H2O): 10 cmH2O  Inspiratory Pressure (cm H2O) (Drager Jessie): 25  Resp: 16  - stop inhaled epoprostenol as minimal Vt's.   - stop duonebs scheduled   - continue with rest vent setting on ECMO. Plan to optimize ECMO   - Chest CT 8/9: Diffuse patchy groundglass and consolidative densities throughout  the lungs with small bilateral pleural effusions. Findings may  represent severe pulmonary edema, and/or  infectious /inflammatory  pathology, or ARDS. Multiple prominent and a few enlarged mediastinal lymph nodes,  possibly reactive.    ECMO:  Sweep 6LPM  FiO2 100%   Flow 4.47  Heparin gtt, ACT goal of 160--180     Cardiovascular:    # hyperlipidemia    # shock, presumed septic  - MAP >65. Monitor HD status   - norepinephrine gtt      Gastroenterology/Nutrition:  # GERD   # NPO status   # constipation   - Protonix (home medication)   - OG to LIS   - RD consult in am for SBFT and TF recommendations   - NG tube in place; OK to increase to goal   - Increase bowel reg today   - CT CAP 8/9: Mild hepatomegaly. Trace pelvic ascites, nonspecific bilateral perinephric  streakiness. Asymmetric heterogeneous bulky right adnexa, consider nonemergent  pelvic ultrasound for further evaluation.      Fluids/Electrolytes/Renal:  # Acute kidney injury  # Hyperkalemia, resolved  # Hyperphosphatemia   # Volume status   # Bilateral perinephritic stranding    # Nonspecific  right adnexa collection   - Renal Consulted.   - CRRT started. UF pull -100Ml/hr   - Trending CRRT labs  - Monitor I/Os closely   - Pelvic US pending      Endocrine:  #  "Hypothyroidism   # concern for stress hyperglycemia due to critical illness   # hypoglycemia   - synthroid resumed   - PRN D10 W for BG management until feeds started   - continue with sliding scale insulin due to feed start, reassess needs. Goal to keep BG< 180 for optimal wound healing      ID:  # Leukocytosis  # Pneumonia   # ARDS   PER OSH: 8/6: RSV, COVID, parainfluenza,  negative . Legionella and pneumococcal urine antigens negative. Patient has been on greater than 20 mg prednisone daily for 18 days.  CULTURES: 8/8/2024: Respiratory viral panel negative. Blood cultures negative, MRSA nasal negative, Legionella urine antigen negative, respiratory culture negative.PCR trachea for PJP is negative.  COVID: Negative. Viral panel-negative PJP  - ID consulted   - Resend resp panel today   - Follow up bronch cultures from 8/9    Antimicrobials:   - Azithromycin   - Cefepime   - Amphotericin     [facility] labs:   8/8: Resp negative.   MRSA negative   - Cryptococcal AG negative   Pending: Blastomycosis, aspergillus, Coccidioides, BC     Heme:     # Acute blood loss anemia   - transfusion parameters per EMCO protocol    - one unit prbc 8/8/24  - Transfuse if hgb <7.0 or signs/symptoms of hypoperfusion. Monitor and trend.       Musculoskeletal:  # Weakness and deconditioning of critical illness   - Physical and occupational therapy when able.      Skin:  # Ecchymosis - BLE   - bilateral lower leg ecchymosis   - WOC consult      General Cares/Prophylaxis:    DVT Prophylaxis: Heparin gtt per ECMO   GI Prophylaxis: PPI  Restraints: Restraints for medical healing needed: NO     Lines/ tubes/ drains:  - ETT   - Right  ECMO cannula   - Right Femoral cannula   - Left internal jugular   - radial arterial line   - OG   - NG   - Gonzalez   - PIV\"s      Disposition:  -  Surgical ICU       Time spent on this Encounter   Billing:  I spent 55 minutes bedside and on the inpatient unit today managing the critical care of Massiel Oakleytler in " relation to the issues listed in this note.      Dong Rico PA-C    ====================================  INTERVAL HISTORY:  Given FFP for low ATIII, drop heparin rate. Increase bowel reg. Pull on CRRT. Pelvic US. Diffuse cerebral edema, give hypertonic saline. Versed drip. Trend sodium. Na trend. NCC consult.      OBJECTIVE:   1. VITAL SIGNS:   Temp:  [96.8  F (36  C)-98.4  F (36.9  C)] 98.2  F (36.8  C)  Pulse:  [77-98] 87  Resp:  [16-27] 16  MAP:  [58 mmHg-97 mmHg] 69 mmHg  Arterial Line BP: ()/(40-71) 105/52  FiO2 (%):  [40 %-60 %] 60 %  SpO2:  [89 %-100 %] 97 %  Vent Mode: PCV Plus assist  FiO2 (%): 60 %  Resp Rate (Set): 16 breaths/min  PEEP (cm H2O): 10 cmH2O  Inspiratory Pressure (cm H2O) (Drager Jessie): 25  Resp: 16      2. INTAKE/ OUTPUT:   I/O last 3 completed shifts:  In: 3846.5 [I.V.:3151.3; Other:0.2; NG/GT:410]  Out: 3868.9 [Urine:33; Emesis/NG output:150; Other:3685.9]    3. PHYSICAL EXAMINATION:  General: chemically sedated.   HEENT: PERRLA. Pupils 3mm and reactive. ETT present and secured. NG tube. OG to LIS with thin green drainage.   RIght neck with ECMO cannula, sutured in place.  Left internal jugular in place, secured.   Neuro: PERRL 3mm. Minimal retraction to tactile stimulation of extremities.   Pulm/Resp:  minimal breath sounds, VT's 30-50   CV: RRR, S1/S2   Abdomen: Soft, non-distended, non-tender, no rebound tenderness or guarding, no masses  : (+) rolle catheter in place, dark yellow urine. Minimal   Incisions/Skin: scattered ecchymosis in BLE in toes around ankles bilaterally. Limbs cool.    MSK/Extremities:generalize BLE 1+ edema. Calves compressible, (+) doppler tones  DP/PT on feet.     4. INVESTIGATIONS:   Arterial Blood Gases   Recent Labs   Lab 08/10/24  0746 08/10/24  0601 08/10/24  0412 08/10/24  0211   PH 7.43 7.40 7.38 7.34*   PCO2 33* 38 40 44   PO2 83 61* 62* 57*   HCO3 22 24 24 24     Complete Blood Count   Recent Labs   Lab 08/10/24  0412 08/09/24  2201  08/09/24 1625 08/09/24  1004   WBC 15.8* 12.2* 9.2 8.8   HGB 8.1* 8.3* 8.0* 7.7*    246 243 224     Basic Metabolic Panel  Recent Labs   Lab 08/10/24  0748 08/10/24  0605 08/10/24  0424 08/10/24  0412 08/10/24  0011 08/09/24 2205 08/09/24  1809 08/09/24  1625 08/09/24  1013 08/09/24  1004   NA  --   --   --  137  --  137  --  136  --  138   POTASSIUM  --   --   --  4.0  --  3.9  --  3.8  --  3.7   CHLORIDE  --   --   --  104  --  103  --  103  --  104   CO2  --   --   --  21*  --  22  --  21*  --  21*   BUN  --   --   --  25.6*  --  25.7*  --  25.7*  --  32.1*   CR  --   --   --  1.71*  --  2.04*  --  2.06*  --  2.31*   GLC 98 99 110* 111*   < > 106*   < > 118*   < > 140*    < > = values in this interval not displayed.     Liver Function Tests  Recent Labs   Lab 08/10/24  0412 08/09/24  2205 08/09/24  1625 08/09/24  1004 08/09/24  0339 08/08/24  2137 08/08/24  1641   AST 69*  --   --   --   --   --  76*   ALT 25  --   --   --  25  --  37   ALKPHOS 376*  --   --   --  326*  --  500*   BILITOTAL 0.7  --   --   --  0.3  --  0.6   ALBUMIN 2.2* 2.3* 2.3* 2.2* 2.4*  --  3.0*   INR 1.14 1.12 1.21* 1.25* 1.25*   < > 1.36*    < > = values in this interval not displayed.     Pancreatic Enzymes  No lab results found in last 7 days.  Coagulation Profile  Recent Labs   Lab 08/10/24  0412 08/09/24  2205 08/09/24  1625 08/09/24  1004   INR 1.14 1.12 1.21* 1.25*   PTT 62* 34 73* 47*         5. RADIOLOGY:   Recent Results (from the past 24 hour(s))   XR Abdomen Port 1 View    Narrative    EXAMINATION:  XR ABDOMEN PORT 1 VIEW 8/9/2024     COMPARISON: Radiograph 8/8/2024    HISTORY: Verify small bowel feeding tube bedside placement.    TECHNIQUE: One frontal supine view of the abdomen.    FINDINGS: Feeding tube tip projects towards the pylorus, not  definitely postpyloric. ECMO cannula tip at the level of T10. No  abnormally dilated air-filled loops of bowel. Dense opacities  throughout the partially visualized lung bases.       Impression    IMPRESSION:   Feeding tube tip is likely near the pylorus.    I have personally reviewed the examination and initial interpretation  and I agree with the findings.    CORTNEY LAU MD         SYSTEM ID:  [id number]   XR Chest Port 1 View    Narrative    Portable chest 8/9/2024 at 1124 hour    INDICATION: Verify ECMO cannula placement    COMPARISON: 0049 hours earlier today    FINDINGS: Diffuse opacities in the lungs consistent with ARDS.  Endotracheal tube tip approximately 3 cm above the ivy. Right IJ  approach ECMO tip presumably in the mid to lower SVC. Feeding tube  beyond the inferior margin of the image. Left IJ catheter tip at the  lateral wall of the upper SVC.       Impression    IMPRESSION: Diffuse ARDS not significantly changed from earlier this  morning.    ENMANUEL JEFFERSON MD         SYSTEM ID:  [id number]   CT Head w/o Contrast    Narrative    EXAM: CT HEAD W/O CONTRAST  8/9/2024 10:25 PM     HISTORY: s/p VV EMCO       COMPARISON: None    TECHNIQUE: Using multidetector thin collimation helical acquisition  technique, axial, coronal and sagittal CT images from the skull base  to the vertex were obtained without intravenous contrast.   (topogram) image(s) also obtained and reviewed. Dose reduction  techniques were used.    FINDINGS:  There is diffuse blurring of the gray-white differentiation with a  loss of sulcation suggesting cerebral edema. The basal cisterns are  patent. There is no midline shift. There is no abnormal intra-axial or  extra-axial fluid collection.    Atraumatic calvarium. Layering fluid throughout the paranasal sinuses.  Fluid-filled mastoid air cells bilaterally. Small amount of fluid  within the left middle ear cavity. Nonspecific debris versus soft  tissue thickening involving the left external auditory canal..  Bilateral pseudophakia. Subcutaneous edema involving the left scalp  and posterior scalp..       Impression    IMPRESSION: Findings concerning  for diffuse cerebral edema with  diffuse blurring of the gray-white differentiation. Findings are  concerning for hypoxicischemic injury. Follow-up recommended.    I have personally reviewed the examination and initial interpretation  and I agree with the findings.    SUJEY MARTÍNEZ MD         SYSTEM ID:  [id number]   CT Chest Abdomen Pelvis w/o Contrast    Narrative    EXAMINATION: CT CHEST ABDOMEN PELVIS W/O CONTRAST, 8/9/2024 10:25 PM    TECHNIQUE:  Helical CT images from the thoracic inlet through the  symphysis pubis were obtained without IV contrast.    COMPARISON: Multiple chest and abdominal radiographs same day    HISTORY: ARDS, now on VV EMCO, unclear respiratory etiology    FINDINGS:  Support devices: Endotracheal tube tip terminates in the mid thoracic  trachea. Superior ECMO cannula tip terminates in the high right  atrium. Inferior ECMO cannula tip terminates in the right atrium near  the inferior cavoatrial junction. Enteric tube terminates at the  pylorus. Left IJ central venous catheter terminates at the superior  SVC. An esophageal temperature probe with tip in the midesophagus.    CHEST:    LUNGS: Debris present in the trachea. Diffuse patchy groundglass and  consolidative opacities throughout the lungs. Small bilateral pleural  effusions. No pneumothorax. Limited evaluation for nodules or small  masses considering diffuse widespread consolidative densities.    MEDIASTINUM: Heart size is within normal limits. No pericardial  effusion. Ascending aorta and main pulmonary artery diameters are  within normal limits. Normal appearance and configuration of the great  vessels off of the aortic arch. Multiple prominent mediastinal lymph  nodes, for example a right paratracheal lymph node measures 1.7 x 1.4  cm (series 2 image 19), and an AP window lymph node measures  approximately 1.7 x 1.3 cm (series 2 image 22). Limited evaluation of  bandar due to noncontrast technique.    Visualized thyroid and esophagus  are unremarkable.    ABDOMEN/PELVIS:    Limited evaluation of the abdominal parenchymal organs due to  noncontrast technique. Streak artifacts from side placed upper  extremities limits evaluation.    LIVER: Mild hepatomegaly with the liver measuring 17.1 cm in the  midclavicular line in craniocaudal dimension (3/57). No suspicious  focal hepatic lesion.    BILIARY: Gallbladder is distended. Otherwise unremarkable. No  intrahepatic or extrahepatic biliary ductal dilation.    PANCREAS: Minimal fatty atrophy of the pancreatic parenchyma No focal  pancreatic lesion. The main pancreatic duct is not dilated.    SPLEEN: Within normal limits.    ADRENAL GLANDS: No focal adrenal nodule.    URINARY TRACT: No suspicious renal lesion. No hydronephrosis or  hydroureter. No renal stone. Urinary bladder is decompressed with a  Gonzalez catheter in place. Mild bilateral lungs perinephric streakiness.    REPRODUCTIVE ORGANS: Hysterectomy. Asymmetric mildly heterogeneous  bulky appearing right adnexa measuring 3.7 x 2.0 cm (7/248).     STOMACH: Within normal limits.    BOWEL: Postsurgical changes involving the rectosigmoid (7/248). Normal  caliber large and small bowel. Bowel wall fat deposition along the  cecum, ascending and descending colon, possibly a normal variant.  Appendix is surgically absent.    PERITONEUM/FLUID: Trace free fluid in the pelvis (7/253).    VESSELS: No aneurysmal dilatation of the abdominal aorta.      LYMPH NODES: No significant lymphadenopathy.    BONES/SOFT TISSUES: No aggressive osseous lesions. Mild multilevel  degenerative changes of the visualized spine. Small fat-containing  inguinal hernias bilaterally. Subcutaneous soft tissue streakiness in  the right upper anterior chest wall possibly post cannulation changes.  Right posterior back subcutaneous device with tubing/lead traversing  through the subcutaneous soft tissue and along the sacral foramen with  tip in the right hemipelvis (7/248). Subcutaneous  fatty streakiness  seen along the right lower quadrant (7/236)      Impression    IMPRESSION:   1. Diffuse patchy groundglass and consolidative densities throughout  the lungs with small bilateral pleural effusions. Findings may  represent severe pulmonary edema, and/or  infectious /inflammatory  pathology, or ARDS.   2. Multiple prominent and a few enlarged mediastinal lymph nodes,  possibly reactive. Recommend attention on follow-up.  3. Support devices as detailed above.  4. Mild hepatomegaly.  5. Trace pelvic ascites, nonspecific bilateral perinephric  streakiness.  6. Asymmetric heterogeneous bulky right adnexa, consider nonemergent  pelvic ultrasound for further evaluation.  7. Additional incidental/chronic findings as detailed above    I have personally reviewed the examination and initial interpretation  and I agree with the findings.    OLIVIER SALVADOR MD         SYSTEM ID:  [id number]   XR Chest Port 1 View    Impression    RESIDENT PRELIMINARY INTERPRETATION  Impression: Superior protocol cannula tip projects over the superior  cavoatrial junction, the inferior apical cannula tip projects over the  right atrium. Additional support devices are stable. Remainder of the  chest is stable compared to prior imaging.       =========================================

## 2024-08-10 NOTE — CONSULTS
Neurocritical Care Consultation    Reason for critical care consult: Concern for hypoxic brain injury  Admitting Team: SICU  Date of Service:  08/10/2024  Date of Admission:  8/8/2024  Hospital Day: 3    History of Present Illness:  Massiel Flaherty is a 53 year old female with a past medical history including severe anxiety and depression, fibromyalgia, myalgic encephalomyelitis, hypothyroidism, MURIEL on CPAP, history of GTC seizures and myoclonic epilepsy currently not on medication,  spells with staring and BUE posturing previously described as pseudoseizures, expressive aphasia, and hypertension was admitted to an OSH on 8/3/3024 for evaluation and management of CAP for which she required intubation on 8/6/2024. Hypoxia remained refractory and on 8/8/2024 she required prone positioning and paralysis for ARDS. She was deemed suitable for transfer to [facility] for consideration of VV ECMO on 8/8/2024; she was cannulated for VV ECMO on arrival to [facility].     Head CT on 8/9/2024 was concerning for diffuse cerebral edema with diffuse blurring of gray-white differentiation concerning for hypoxic injury. NCC was consulted on 8/10/2024 for concern of hypoxic brain injury. pCO2 was 91 around the time the 8/9/2024 head CT was obtained. CT this AM with improved edema with pCO2 43.     Per EHR review on 12/4/2023 she had a 3 to 4 day EEG monitor at [facility] prior the visit on 12/4/2023 which did not capture typical events, documented normal interictal EEG, and captured one- short event that occurred with photic stimulation during which the patient reported head numbness with arm and leg twitching without EEG correlate. It was felt the head numbness with arm and leg twitching was related to stress and not epilepsy.     Neuro  #Concern for anoxic brain injury   #History of GTC  #History of myoclonic epilepsy  #History of pseudoseizures   It is very likely that some of the cerebral edema on the 8/9 CT was secondary to  hypercapnia and with normocapnia this AM CT appears improved; however there remains concern for underlying cerebral edema secondary to hypoxia  -Recommend generally avoiding hypotension, optimize oxygenation, pCO2 within normal range   -vEEG  -MRI when able    TIME SPENT ON THIS ENCOUNTER   I spent 50 minutes of critical care time on the unit/floor managing the care of Massiel Flaherty excluding time performing procedures. Upon evaluation, this patient had a high probability of imminent or life-threatening deterioration due to concern for anoxic brain injury, which required my direct attention, intervention, and personal management. Greater than 50% of my time was spent at the bedside counseling the patient and/or coordinating care including chart review, history, exam, documentation, and further activities per this note. I have personally reviewed the following data/imaging over the past 24 hours.     The patient was seen and discussed with the NCC attending, Dr. Silva.    DOREEN Ruiz, CNP  Neurocritical Care *[id number]    Allergies   Allergen Reactions    Celecoxib Unknown    Sulfa Antibiotics Unknown    Tetracycline Unknown       Current Medications:  Current Facility-Administered Medications   Medication Dose Route Frequency Provider Last Rate Last Admin    acetaminophen (TYLENOL) tablet 650 mg  650 mg Oral Q24H Aniceto Adame MD        dextrose 5 % flush PRE/POST medication  10-20 mL Intravenous Q24H Aniceto Adame MD        And    amphotericin B (FUNGIZONE) 88.9 mg in D5W 1,000 mL IVPB  1 mg/kg (Dosing Weight) Intravenous Q24H Aniceto Adame MD        And    dextrose 5 % flush PRE/POST medication  10-20 mL Intravenous Q24H Aniceto Adame MD        azithromycin (ZITHROMAX) 500 mg in sodium chloride 0.9 % 250 mL intermittent infusion  500 mg Intravenous Q24H Cal Lisa MD   500 mg at 08/10/24 1000    ceFEPIme (MAXIPIME) 2 g vial to attach to  mL bag  for ADULTS or NS 50 mL bag for PEDS  2 g Intravenous Q12H Barry Hatch MD   2 g at 08/10/24 0002    chlorhexidine (PERIDEX) 0.12 % solution 15 mL  15 mL Mouth/Throat Q12H Giovanny Guidry PA-C   15 mL at 08/10/24 0811    diphenhydrAMINE (BENADRYL) capsule 25 mg  25 mg Oral Q24H Aniceto Adame MD        Or    diphenhydrAMINE (BENADRYL) injection 25 mg  25 mg Intravenous Q24H Aniceto Adame MD        levothyroxine (SYNTHROID/LEVOTHROID) tablet 25 mcg  25 mcg Per Feeding Tube QAM  Giovanny Guidry PA-C   25 mcg at 08/10/24 0810    multivitamin RENAL (RENAVITE RX/NEPHROVITE) tablet 1 tablet  1 tablet Oral or Feeding Tube Daily Barry Hatch MD   1 tablet at 08/10/24 0810    pantoprazole (PROTONIX) 2 mg/mL suspension 40 mg  40 mg Per Feeding Tube QAM  Giovanny Guidry PA-C        Or    pantoprazole (PROTONIX) IV push injection 40 mg  40 mg Intravenous QAM AC Giovanny Guidry PA-C   40 mg at 08/10/24 0810    polyethylene glycol (MIRALAX) Packet 17 g  17 g Per Feeding Tube BID Dong Rico PA-C   17 g at 08/10/24 0810    Prosource TF20 ENfit Compatibl EN LIQD (PROSOURCE TF20) packet 60 mL  1 packet Per Feeding Tube TID Giovanny Guidry PA-C   60 mL at 08/10/24 0811    senna-docusate (SENOKOT-S/PERICOLACE) 8.6-50 MG per tablet 2 tablet  2 tablet Oral or Feeding Tube BID Dong Rico PA-C   2 tablet at 08/10/24 0810    sodium chloride 0.9% BOLUS 250 mL  250 mL Intravenous Q24H Barry Hatch MD           PRN Medications:  Current Facility-Administered Medications   Medication Dose Route Frequency Provider Last Rate Last Admin    acetaminophen (TYLENOL) tablet 650 mg  650 mg Oral or Feeding Tube Q4H PRN Giovanny Guidry PA-C        Or    acetaminophen (TYLENOL) Suppository 650 mg  650 mg Rectal Q4H PRN Giovanny Guidry PA-C        bisacodyl (DULCOLAX) suppository 10 mg  10 mg Rectal Daily PRN Giovanny Guidry PA-C        calcium gluconate 2 g in  mL intermittent  infusion  2 g Intravenous Q8H PRN Sony Castaneda MD   2 g at 08/10/24 0524    calcium gluconate 4 g in sodium chloride 0.9 % 100 mL intermittent infusion  4 g Intravenous Q8H PRN Sony Castaneda MD        dextrose 10% infusion   Intravenous Continuous PRN Giovanny Guidry PA-C        glucose gel 15-30 g  15-30 g Oral Q15 Min PRN Giovanny Guidry PA-C        Or    dextrose 50 % injection 25-50 mL  25-50 mL Intravenous Q15 Min PRN Giovanny Guidry PA-C   50 mL at 08/09/24 0755    Or    glucagon injection 1 mg  1 mg Subcutaneous Q15 Min PRN Giovanny Guidry PA-C        diphenhydrAMINE (BENADRYL) capsule 25 mg  25 mg Oral Q6H PRN Aniceto Adame MD        Or    diphenhydrAMINE (BENADRYL) injection 25 mg  25 mg Intravenous Q6H PRN Aniceto Adame MD        heparin ANTICOAGULANT bolus dose from infusion pump 250.5-501 Units  5-10 Units/kg (Ideal) Other Q30 Min PRN Janes Freeman MD   250 Units at 08/09/24 1747    hydromorphone (DILAUDID) 0.2 mg/mL bolus dose from infusion pump 0.3 mg  0.3 mg Intravenous Q2H PRN Giovanny Guidry PA-C   0.3 mg at 08/10/24 0935    magnesium sulfate 2 g in 50 mL sterile water intermittent infusion  2 g Intravenous Q8H PRN Sony Castaneda MD        propofol (DIPRIVAN) bolus from bag or syringe pump  10 mg Intravenous Q15 Min PRN Giovanny Guidry PA-C   10 mg at 08/10/24 0935    And    Medication Instruction   Does not apply Continuous PRN Giovanny Guidry PA-C        metoclopramide (REGLAN) injection 10 mg  10 mg Intravenous Once PRN Dong Rico PA-C        naloxone (NARCAN) injection 0.2 mg  0.2 mg Intravenous Q2 Min PRN Barry Hatch MD        Or    naloxone (NARCAN) injection 0.4 mg  0.4 mg Intravenous Q2 Min PRN Barry Hatch MD        Or    naloxone (NARCAN) injection 0.2 mg  0.2 mg Intramuscular Q2 Min PRN Barry Hatch MD        Or    naloxone (NARCAN) injection 0.4 mg  0.4 mg Intramuscular Q2 Min PRN Barry Hatch MD        No  heparin required   Does not apply Continuous PRN Sony Castaneda MD        potassium chloride 20 mEq in 50 mL intermittent infusion  20 mEq Intravenous Q8H PRN Sony Castaneda MD        sodium chloride 0.9% BOLUS 1-250 mL  1-250 mL Intravenous Q1H PRN Aniceto Adame MD        sodium phosphate 15 mmol in sodium chloride 0.9 % 250 mL intermittent infusion  15 mmol Intravenous Q8H PRN Sony Castaneda MD           Infusions:  Current Facility-Administered Medications   Medication Dose Route Frequency Provider Last Rate Last Admin    dextrose 10% infusion  0-100 mL/hr Intravenous Continuous Giovanny Guidry PA-C   Stopped at 08/09/24 1630    dextrose 10% infusion   Intravenous Continuous PRN Giovanny Guidry PA-C        dialysate for CVVHD & CVVHDF (Phoxillum BK4/2.5)  12.5 mL/kg/hr CRRT Continuous Sony Castaneda MD 1,100 mL/hr at 08/10/24 0956 12.5 mL/kg/hr at 08/10/24 0956    heparin (porcine) 100 unit/mL in 0.45% Sodium Chloride ANTICOAGULANT infusion  10-50 Units/kg/hr (Ideal) Other Continuous Janes Freeman MD 12 mL/hr at 08/10/24 1000 1,200 Units/hr at 08/10/24 1000    HYDROmorphone (DILAUDID) 0.2 mg/mL infusion ADULT/PEDS GREATER than or EQUAL to 20 kg  0.3-1 mg/hr Intravenous Continuous Giovanny Guidry PA-C 5 mL/hr at 08/10/24 1000 1 mg/hr at 08/10/24 1000    propofol (DIPRIVAN) infusion  5-75 mcg/kg/min (Dosing Weight) Intravenous Continuous Giovanny Guidry PA-C 21.3 mL/hr at 08/10/24 1004 40 mcg/kg/min at 08/10/24 1004    And    Medication Instruction   Does not apply Continuous PRN Giovanny Guidry PA-C        midazolam (VERSED) 100 mg/100 mL NS infusion - ADULT  1-8 mg/hr Intravenous Continuous Aniceto Adame MD 2 mL/hr at 08/10/24 1000 2 mg/hr at 08/10/24 1000    No heparin required   Does not apply Continuous PRN Sony Castaneda MD        norepinephrine (LEVOPHED) 16 mg in  mL infusion MAX CONC CENTRAL LINE  0.01-0.6 mcg/kg/min (Dosing Weight) Intravenous Continuous  "-Jordy GiovannyLIV salcedo 5 mL/hr at 08/10/24 1000 0.06 mcg/kg/min at 08/10/24 1000    POST-filter replacement solution for CVVHD & CVVHDF (Phoxillum BK4/2.5)   CRRT Continuous Sony Castaneda  mL/hr at 08/10/24 0958 New Bag at 08/10/24 0958    PRE-filter replacement solution for CVVHD & CVVHDF (Phoxillum BK4/2.5)  12.5 mL/kg/hr CRRT Continuous Sony Castaneda MD 1,100 mL/hr at 08/10/24 0956 12.5 mL/kg/hr at 08/10/24 0956       Physical Examination:  Vitals: Pulse 84   Temp 98.4  F (36.9  C) (Esophageal)   Resp 16   Ht 1.575 m (5' 2\")   Wt 92.5 kg (203 lb 14.8 oz)   SpO2 98%   BMI 37.30 kg/m    General: Adult female patient, lying in bed, critically-ill.  HEENT: Normocephalic, atraumatic.  Cardiac: She appears adequately perfused.   Pulm: Synchronous with mechanical ventilator.   Abdomen: Non-distended.  Extremities: Warm, distal extremities do not appear acutely threatened.   Skin: No obvious rashes or lesions on exposed skin.   Psych: Sedated.  Neuro:  Mental status: Sedated. No eye opening, no verbal, does not follow commands.   Cranial nerves: Limited by sedation and ETT. NPI 3.6 bilaterally, pupils 3 mm bilaterally. Face appears symmetric though exam limited by ETT. No gag, cough present.   Motor: Normal bulk and tone. No abnormal movements.   Sensory: Deferred.  Coordination: Deferred.  Gait: Deferred.    Labs and Imaging:  All relevant imaging and laboratory values personally reviewed.    "

## 2024-08-10 NOTE — PROGRESS NOTES
CRRT STATUS NOTE    DATA:  Time:  5:31 PM  Pressures WNL:  YES  Filter Status:  WDL    Problems Reported/Alarms Noted:  none    Supplies Present:  YES    ASSESSMENT:  Patient Net Fluid Balance:  +913cc  Vital Signs:    Temp: 98.8  F (37.1  C)  Pulse: 108   Resp: 16 SpO2: 98 %        Labs:  Last Comprehensive Metabolic Panel:  Lab Results   Component Value Date     08/10/2024    POTASSIUM 3.6 08/10/2024    CHLORIDE 104 08/10/2024    CO2 20 (L) 08/10/2024    ANIONGAP 13 08/10/2024     (H) 08/10/2024    BUN 25.3 (H) 08/10/2024    CR 1.50 (H) 08/10/2024    GFRESTIMATED 41 (L) 08/10/2024    QUAN 7.5 (L) 08/10/2024        Goals of Therapy:  0-100mL/hr    INTERVENTIONS:   Recirc for CT run    PLAN:  Continue CRRT. Contact resource with any questions or concerns

## 2024-08-10 NOTE — PROGRESS NOTES
CRRT STATUS NOTE    DATA:  Time:  8:07 PM  Pressures WNL:  NO  Filter Status:  Clotting     Problems Reported/Alarms Noted:  none    Supplies Present:  YES    ASSESSMENT:  Patient Net Fluid Balance:  -346.96 ml since midnight  Vital Signs:  B/P: 117/56 (76), T: 97.9, P: 84, R: 16  Labs:  Last Comprehensive Metabolic Panel:  Sodium   Date Value Ref Range Status   08/09/2024 136 135 - 145 mmol/L Final     Potassium   Date Value Ref Range Status   08/09/2024 3.8 3.4 - 5.3 mmol/L Final     Chloride   Date Value Ref Range Status   08/09/2024 103 98 - 107 mmol/L Final     Carbon Dioxide (CO2)   Date Value Ref Range Status   08/09/2024 21 (L) 22 - 29 mmol/L Final     Anion Gap   Date Value Ref Range Status   08/09/2024 12 7 - 15 mmol/L Final     Glucose   Date Value Ref Range Status   08/09/2024 118 (H) 70 - 99 mg/dL Final     GLUCOSE BY METER POCT   Date Value Ref Range Status   08/09/2024 106 (H) 70 - 99 mg/dL Final     Urea Nitrogen   Date Value Ref Range Status   08/09/2024 25.7 (H) 6.0 - 20.0 mg/dL Final     Creatinine   Date Value Ref Range Status   08/09/2024 2.06 (H) 0.51 - 0.95 mg/dL Final     GFR Estimate   Date Value Ref Range Status   08/09/2024 28 (L) >60 mL/min/1.73m2 Final     Comment:     eGFR calculated using 2021 CKD-EPI equation.     Calcium   Date Value Ref Range Status   08/09/2024 8.2 (L) 8.8 - 10.4 mg/dL Final     Comment:     Reference intervals for this test were updated on 7/16/2024 to reflect our healthy population more accurately. There may be differences in the flagging of prior results with similar values performed with this method. Those prior results can be interpreted in the context of the updated reference intervals.     Bilirubin Total   Date Value Ref Range Status   08/09/2024 0.3 <=1.2 mg/dL Final     Alkaline Phosphatase   Date Value Ref Range Status   08/09/2024 326 (H) 40 - 150 U/L Final     ALT   Date Value Ref Range Status   08/09/2024 25 0 - 50 U/L Final     AST   Date Value Ref  Range Status   08/09/2024   Final     Comment:     Unsatisfactory specimen - hemolyzed      Ical 4.6          Goals of Therapy:  0-100 ml/hr    INTERVENTIONS:   none    PLAN:  Continue with plan of care. Change circuit Q72 hours and prn. Please contact CRRT resource RN with any questions via [email or web address].

## 2024-08-10 NOTE — PROGRESS NOTES
Nephrology Progress Note    Massiel Flaherty MRN:[id number] YOB: 1970  Date of Admission:8/8/2024  Primary care provider: Miranda Yoder  Requesting physician: Barry Hatch MD    ASSESSMENT AND RECOMMENDATIONS:    53 year old female with a past medical hx of Anxiety/depression, fibromyalgia, hypothyroidism, asthma, HLD, MURIEL on CPAP, expressive aphasia, and hypertension    who was admitted to an OSH with community acquired pneumonia on 8/3 s/p intubation for severe ARDS requiring paralysis and proning, transferred here on 8/8 for management and initiated on CKRT for severe acidemia in the setting of oligoanuric CHACORTA    #1 CHACORTA oligoanuric secondary to severe septic shock and ARDS. Renal function normal at baseline with a creatinine level at 0.6 mg/dL on 8/1/2024. Would obtain renal imaging when feasible along with urine studies (uACR and uPCR). Pursue for now CKRT using a 25 mL/kg dose and aim at I=O  Dose all her meds per CKRT protocol  - acidosis corrected/controlled.   - continue CKRT at current prescription. ,     #2 Anemia secondary to severe inflammation and CHACORTA. Management pper primary team    Recommendations were communicated to primary team via note    I spent a total of 45 minutes on the date of this encounter in reviewing the medical records, face-to-face evaluation and physical exam, review of labs, counseling, orders, care coordination and documentation        Marlon Armenta MD   Division of Renal Disease and Hypertension  [facility]  Vocera Web Console      REASON FOR CONSULT: CHACORTA    HISTORY OF PRESENT ILLNESS:  Admitting provider and nursing notes reviewed  Massiel Flaherty is a 53 year old female with a past medical hx of Anxiety/depression, fibromyalgia, hypothyroidism, asthma, HLD, MURIEL on CPAP, spells, off on anti seizure medications, expressive aphasia, and hypertension    who was admitted to an OSH with community acquired pneumonia on 8/3. She was intubated 8/6  and developed severe ARDS requiring paralysis and proning . She was transferred here on 8/8 for management. On arrival, patient was difficult to ventilate with prominent respiratory acidosis (pH 7.03) and hypercapnia. Notable ARDS with P/F of 78.   She also had elevated plateau pressures (40-45 cmH2O). Her pH improved to 7.26 after she was cannulated with VV ECMO and her PCO2 dropped from 91 to 77 to 55 to 38.  Fio2 also went down from 100% to 50%. As she was oliguric and with some evidence of volume overload she was initiated on CKRT  with 4k bath, 25ml/kg/hr with net goal of 0-100ml/hr as tolerated     August 10, 2024  Interval history  Provider notes and events of last 24 hours reviewed.  Patient remains on ECMO.  On Norepi gtt.  CRRT initiated yesterday around 12:30 am.  Remains anuric      MEDICATIONS:  Current Meds  Current Facility-Administered Medications   Medication Dose Route Frequency Provider Last Rate Last Admin    acetaminophen (TYLENOL) tablet 650 mg  650 mg Oral Q24H Giovanny Guidry PA-C   650 mg at 08/09/24 1716    dextrose 5 % flush PRE/POST medication  10-20 mL Intravenous Q24H Giovanny Guidry PA-C   25 mL at 08/09/24 1751    And    amphotericin B LIPOSOME (AMBISOME) 450 mg in D5W 387.5 mL intermittent infusion  5 mg/kg (Dosing Weight) Intravenous Q24H Giovanny Guidry PA-C 193.8 mL/hr at 08/09/24 1757 450 mg at 08/09/24 1757    And    dextrose 5 % flush PRE/POST medication  10-20 mL Intravenous Q24H Giovanny Guidry PA-C        azithromycin (ZITHROMAX) 500 mg in sodium chloride 0.9 % 250 mL intermittent infusion  500 mg Intravenous Q24H Cal Lisa MD   500 mg at 08/09/24 1046    ceFEPIme (MAXIPIME) 2 g vial to attach to  mL bag for ADULTS or NS 50 mL bag for PEDS  2 g Intravenous Q12H Barry Hatch MD   2 g at 08/10/24 0002    chlorhexidine (PERIDEX) 0.12 % solution 15 mL  15 mL Mouth/Throat Q12H Giovanny Guidry PA-C   15 mL at 08/09/24 2108    diphenhydrAMINE (BENADRYL) capsule 25 mg   25 mg Oral Q24H Giovanny Guidry PA-C        Or    diphenhydrAMINE (BENADRYL) injection 25 mg  25 mg Intravenous Q24H Giovanny Guidry PA-C   25 mg at 08/09/24 1714    levothyroxine (SYNTHROID/LEVOTHROID) tablet 25 mcg  25 mcg Per Feeding Tube QAM AC Giovanny Guidry PA-C   25 mcg at 08/09/24 0820    multivitamin RENAL (RENAVITE RX/NEPHROVITE) tablet 1 tablet  1 tablet Oral or Feeding Tube Daily Barry Hatch MD        pantoprazole (PROTONIX) 2 mg/mL suspension 40 mg  40 mg Per Feeding Tube QAM AC Giovanny Guidry PA-C        Or    pantoprazole (PROTONIX) IV push injection 40 mg  40 mg Intravenous QAM AC Giovanny Guidry PA-C   40 mg at 08/09/24 0821    polyethylene glycol (MIRALAX) Packet 17 g  17 g Per Feeding Tube BID Dong Rico PA-C        Prosource TF20 ENfit Compatibl EN LIQD (PROSOURCE TF20) packet 60 mL  1 packet Per Feeding Tube TID Giovanny Guidry PA-C   60 mL at 08/09/24 2109    senna-docusate (SENOKOT-S/PERICOLACE) 8.6-50 MG per tablet 2 tablet  2 tablet Oral or Feeding Tube BID Dong Rico PA-C         Infusion Meds  Current Facility-Administered Medications   Medication Dose Route Frequency Provider Last Rate Last Admin    dextrose 10% infusion  0-100 mL/hr Intravenous Continuous Giovanny Guidry PA-C   Stopped at 08/09/24 1630    dextrose 10% infusion   Intravenous Continuous PRN Giovanny Guidry PA-C        dialysate for CVVHD & CVVHDF (Phoxillum BK4/2.5)  12.5 mL/kg/hr CRRT Continuous Sony Castaneda MD 1,100 mL/hr at 08/10/24 0521 12.5 mL/kg/hr at 08/10/24 0521    heparin (porcine) 100 unit/mL in 0.45% Sodium Chloride ANTICOAGULANT infusion  10-50 Units/kg/hr (Ideal) Other Continuous Janes Freeman MD 25 mL/hr at 08/10/24 0700 2,500 Units/hr at 08/10/24 0700    HYDROmorphone (DILAUDID) 0.2 mg/mL infusion ADULT/PEDS GREATER than or EQUAL to 20 kg  0.3-1 mg/hr Intravenous Continuous Her-Giovanny Spence PA-C 5 mL/hr at 08/10/24 0700 1 mg/hr at 08/10/24 0700    propofol  "(DIPRIVAN) infusion  5-75 mcg/kg/min (Dosing Weight) Intravenous Continuous Giovanny Guidry PA-C 26.7 mL/hr at 08/10/24 0700 50 mcg/kg/min at 08/10/24 0700    And    Medication Instruction   Does not apply Continuous PRN Giovanny Guidry PA-C        No heparin required   Does not apply Continuous PRN Sony Castaneda MD        norepinephrine (LEVOPHED) 16 mg in  mL infusion MAX CONC CENTRAL LINE  0.01-0.6 mcg/kg/min (Dosing Weight) Intravenous Continuous Giovanny Guidry PA-C 2.5 mL/hr at 08/10/24 0700 0.03 mcg/kg/min at 08/10/24 0700    POST-filter replacement solution for CVVHD & CVVHDF (Phoxillum BK4/2.5)   CRRT Continuous Sony Castaneda  mL/hr at 24 2329 New Bag at 24 2329    PRE-filter replacement solution for CVVHD & CVVHDF (Phoxillum BK4/2.5)  12.5 mL/kg/hr CRRT Continuous Sony Castaneda MD 1,100 mL/hr at 08/10/24 0521 12.5 mL/kg/hr at 08/10/24 0521    vasopressin 1 unit/mL MAX Conc (PITRESSIN) infusion  2.4 Units/hr Intravenous Continuous Giovanny Guidry PA-C   Held at 24 1656       ALLERGIES:    Allergies   Allergen Reactions    Celecoxib Unknown    Sulfa Antibiotics Unknown    Tetracycline Unknown       REVIEW OF SYSTEMS:  A comprehensive of systems was negative except as noted above.    PHYSICAL EXAM:   Temp  Av.6  F (37  C)  Min: 97.7  F (36.5  C)  Max: 100  F (37.8  C)  Arterial Line BP  Min: 65/45  Max: 185/94  Arterial Line MAP (mmHg)  Av.5 mmHg  Min: 53 mmHg  Max: 177 mmHg      Pulse  Av.4  Min: 75  Max: 127 Resp  Av.1  Min: 16  Max: 30  FiO2 (%)  Av %  Min: 40 %  Max: 100 %  SpO2  Av.7 %  Min: 41 %  Max: 100 %       Pulse 80   Temp 98.1  F (36.7  C)   Resp 16   Ht 1.575 m (5' 2\")   Wt 92.5 kg (203 lb 14.8 oz)   SpO2 97%   BMI 37.30 kg/m     Date 24 07 - 08/10/24 0659   Shift 5108-5331 5651-9614 1655-3423 24 Hour Total   INTAKE   I.V. 175.63   175.63   NG/   115   Shift Total(mL/kg) 290.63(3.13)   290.63(3.13)   OUTPUT "   Urine 10   10   Emesis/NG output 150   150   Other 272   272   Shift Total(mL/kg) 432(4.65)   432(4.65)   Weight (kg) 93 93 93 93      Admit Weight: 88.9 kg (195 lb 15.8 oz)     GENERAL APPEARANCE: Intubated and sedated and paralyzed  Pulmonary: lungs decreased BS over the bases  CV: regular rhythm, normal rate, no rub   - Edema 1+  GI: soft, nontender, normal bowel sounds  MS: no evidence of inflammation in joints, no muscle tenderness  : rolle in place  SKIN:  warm, dry, no cyanosis, echymoses over lower ext.  NEURO: unable to assess     LABS:   I have reviewed the following labs:  CMP  Recent Labs   Lab 08/10/24  0605 08/10/24  0424 08/10/24  0412 08/10/24  0011 08/09/24  2205 08/09/24  1809 08/09/24  1625 08/09/24  1013 08/09/24  1004 08/09/24  0346 08/09/24  0339 08/08/24  1642 08/08/24  1641   NA  --   --  137  --  137  --  136  --  138  --  139   < > 140   POTASSIUM  --   --  4.0  --  3.9  --  3.8  --  3.7  --  4.5   < > 5.8*   CHLORIDE  --   --  104  --  103  --  103  --  104  --  107   < > 106   CO2  --   --  21*  --  22  --  21*  --  21*  --  18*   < > 20*   ANIONGAP  --   --  12  --  12  --  12  --  13  --  14   < > 14   GLC 99 110* 111* 88 106*   < > 118*   < > 140*   < > 81   < > 153*   BUN  --   --  25.6*  --  25.7*  --  25.7*  --  32.1*  --  39.7*   < > 42.5*   CR  --   --  1.71*  --  2.04*  --  2.06*  --  2.31*  --  2.82*   < > 3.28*   GFRESTIMATED  --   --  35*  --  28*  --  28*  --  25*  --  19*   < > 16*   QUAN  --   --  7.6*  --  7.9*  --  8.2*  --  7.8*  --  7.9*   < > 8.9   MAG  --   --  2.3  --  2.3  --  2.4*  --  2.4*  --  2.5*   < > 2.8*   PHOS  --   --  3.7  --  4.3  --  3.9  --  4.5  --  5.2*   < > 8.1*   PROTTOTAL  --   --  4.8*  --   --   --   --   --   --   --  5.1*  --  6.6   ALBUMIN  --   --  2.2*  --  2.3*  --  2.3*  --  2.2*  --  2.4*  --  3.0*   BILITOTAL  --   --  0.7  --   --   --   --   --   --   --  0.3  --  0.6   ALKPHOS  --   --  376*  --   --   --   --   --   --   --   326*  --  500*   AST  --   --  69*  --   --   --   --   --   --   --   --   --  76*   ALT  --   --  25  --   --   --   --   --   --   --  25  --  37    < > = values in this interval not displayed.     CBC  Recent Labs   Lab 08/10/24  0412 08/09/24  2205 08/09/24  1625 08/09/24  1004   HGB 8.1* 8.3* 8.0* 7.7*   WBC 15.8* 12.2* 9.2 8.8   RBC 2.52* 2.56* 2.53* 2.49*   HCT 24.9* 25.4* 24.9* 24.7*   MCV 99 99 98 99   MCH 32.1 32.4 31.6 30.9   MCHC 32.5 32.7 32.1 31.2*   RDW 14.4 14.5 14.4 14.6    246 243 224     INR  Recent Labs   Lab 08/10/24  0412 08/09/24  2205 08/09/24  1625 08/09/24  1004   INR 1.14 1.12 1.21* 1.25*   PTT 62* 34 73* 47*     ABG  Recent Labs   Lab 08/10/24  0601 08/10/24  0414 08/10/24  0412 08/10/24  0211 08/10/24  0006   PH 7.40  --  7.38 7.34* 7.31*   PCO2 38  --  40 44 48*   PO2 61*  --  62* 57* 91   HCO3 24  --  24 24 24   O2PER 60 60 60  100  60 60 60      URINE STUDIES  Recent Labs   Lab Test 08/08/24  1725   COLOR Yellow   APPEARANCE Slightly Cloudy*   URINEGLC 30*   URINEBILI Negative   URINEKETONE Negative   SG 1.019   UBLD Moderate*   URINEPH 5.5   PROTEIN 100*   NITRITE Negative   LEUKEST Trace*   RBCU 31*   WBCU 7*       Marlon Armenta MD

## 2024-08-10 NOTE — PROGRESS NOTES
Major Shift Events:  Repeat head CT completed d/t concerns for cerebral edema. Patient was given one dose of 23 % sodium with no improvement in sodium levels. Neurocrit was consulted, patient was placed on EEG. Pelvic ultrasound was completed at bedside. Patient stooling, rectal tube placed. Unable to achieve goal -160. Patient received 2 units of thawed plasma with no improvement in ACT levels. Spoke to SICU and increased rate back to 2400. XRAY showed RIJ ECMO catheter was too deep, was pulled back 6 cm, and repeat XRAY completed. Rectal tube placed for loose stools. Received one unit PRBCs for hgb 6.9.     Neuro: Pupils 3 mm NPIs variable (R side consistently 2 L side consistently 3). Sedated on Versed/propofol/dilaudid.  CV: HSR NSR-Sinus tachycardia. BPs labile, levo ranging from 0.03-0.08. Afebrile.   Resp: 16/25/10/100 TV runnings 13-40. LS diminished. O2 sats 91-94% post cannula reposition. Sweep currently at 6 at 100 % flowing 4.5 LPM 3550 RPM  : No urine output for shift.   GI: TF infusing at 45 ml/hr. Rectal tube placed for liquid thick stools.  Skin: Pale, cool throughout.   CRRT: Due to the critical nature of patient's condition,, travel to CT, need for product patient 1 L fluid up for this shift.         Plan:  monitor pupils. Monitor TOF. Monitor labs and notify MD as appropriate.   For vital signs and complete assessments, please see documentation flowsheets.

## 2024-08-10 NOTE — PROGRESS NOTES
Veno-venous ECMO Progress Note  8/10/2024    Massiel Flaherty is a 53 year old female who was started on ECMO due to severe respiratory failure from ARDS related to community acquired pneumonia.     Interval events: Placed on VV ECMO 2 evenings ago with improvement in hypoxia and hypercapnia. Right internal jugular cannula retracted yesterday morning due close proximity to groin line.     Today:  On Levo 0.06    Patient remains on V-V ECMO. Return and drainage cannulas noted to be only 1.8 cm from each other. . RPM's: 3550 with Blood Flow (Circuit) LPM  Av.2 LPM  Min: 4.04 LPM  Max: 4.47 LPM L/min. Sweep is at 6 LPM and 100 %. Extremities are cool.     The patients vital signs today are as follows:    Temp:  [96.8  F (36  C)-98.4  F (36.9  C)] 98.1  F (36.7  C)  Pulse:  [77-98] 80  Resp:  [16-27] 16  MAP:  [58 mmHg-97 mmHg] 69 mmHg  Arterial Line BP: ()/(40-71) 105/52  FiO2 (%):  [40 %-60 %] 60 %  SpO2:  [89 %-100 %] 97 %    Intake/Output Summary (Last 24 hours) at 2024 1049  Last data filed at 2024 1044  Gross per 24 hour   Intake 1483.7 ml   Output 1866 ml   Net -382.3 ml    Vent Mode: PCV Plus assist  FiO2 (%): 60 %  Resp Rate (Set): 16 breaths/min  PEEP (cm H2O): 10 cmH2O  Inspiratory Pressure (cm H2O) (Drager Jessie): 25  Resp: 16     Recent Labs   Lab 08/10/24  0746 08/10/24  0601 08/10/24  0414 08/10/24  0412 08/10/24  0211   PH 7.43 7.40  --  7.38 7.34*   PCO2 33* 38  --  40 44   PO2 83 61*  --  62* 57*   HCO3 22 24  --  24 24   O2PER 60 60 60 60  100  60 60      Recent Labs   Lab 08/10/24  0412 24  2205 24  1625 24  1004   WBC 15.8* 12.2* 9.2 8.8   HGB 8.1* 8.3* 8.0* 7.7*     Creatinine   Date Value Ref Range Status   08/10/2024 1.71 (H) 0.51 - 0.95 mg/dL Final   2024 2.04 (H) 0.51 - 0.95 mg/dL Final   2024 2.06 (H) 0.51 - 0.95 mg/dL Final   2024 2.31 (H) 0.51 - 0.95 mg/dL Final     Physical Exam:   Right internal jugular cannula in place. Right  femoral line unchanged. No bleeding from either site.  Minimal air movement  Extremities edematous. Scattered ecchymosis of bilateral calves and toes.    ECMO Issues including assessments and plan on DOS 8/9/2024:      Neuro: Sedated for mechanical ventilation and ECMO.  RASS goal: -4 to -5  CV: Hemodynamically stable, off vasopressors.  Pulm: Severe respiratory failure requiring ECMO and mechanical ventilation. TVs 30-50 mL   Keep vent settings at rest settings: PC 25, PEEP10, FiO2 60%.  Return ECMO cannula retracted 4-5 cm with improvement in O2 sats  FEN/Renal: Creatinine  Lab Results   Component Value Date    CR 2.82 08/09/2024   Currently on CRRT through ECMO circuit. UOP 2-4 mL/hr since midnight  Heme: Hemoglobin 8.1 from 7.7.  Goals: if O2 sat >85% Hgb may drift to 7-8.  If O2 Sat <85% keep Hgb 10-12.  ID: Cefepime, added azithromycin this AM. ID consult.     All pertinent labs, imaging studies, physical exam and medications have been reviewed by me.     Discussed with staff, Dr. Pete Schaffer MD, FACS  Surgical Critical Care Fellow

## 2024-08-10 NOTE — PROGRESS NOTES
"CRRT STATUS NOTE    DATA:  Time: 5:02 AM   Pressures WNL:  YES  Filter Status:  WDL    Problems Reported/Alarms Noted:  None    Supplies Present:  YES    ASSESSMENT:  Patient Net Fluid Balance:  At midnight -444.8mL at 0700 -92.4mL    Vital Signs:    Arterial Line BP: 105/53 (71) mmHg Pulse 92   Temp 98.2  F (36.8  C)   Resp 24   Ht 1.575 m (5' 2\")   Wt 92.5 kg (203 lb 14.8 oz)   SpO2 92%   BMI 37.30 kg/m      Labs:   Na: 137  K:  4.0  Cr: 1.71  M.3  Phos: 3.7   iCal: 4.3  Electrolytes replaced per protocol.        Goals of Therapy:  0-100mL/hr as tolerated    INTERVENTIONS:   Tried to recirc for CT but filter clotted, circuit was changed.    PLAN:  Continue with treatment goal. Call Resource RN with questions and concerns. [facility] [phone] CRRT resource  "

## 2024-08-10 NOTE — PROGRESS NOTES
[facility]    ECLS Shift Summary:     ECMO Equipment:  Console Serial Number: [id number]  Circuit Lot Number: not recorded by Perfusion  Oxygenator Lot Number: [id number]    Circuit Assessment: Fibrin  Fibrin Location: connecotrs/ 9 o'clock oxy    Venous Drainage ECMO Cannula: 25 Fr in the Right Femoral Vein  Venous Return ECMO Cannula: 17 Fr in the Right Internal Jugular Vein      ECMO Cannula Arterial Right internal jugular-Site Assessment: WDL  ECMO Cannula Venous Right femoral vein-Site Assessment: WDL  ECMO Cannula Arterial Right internal jugular-Site Intervention: No intervention needed  ECMO Cannula Venous Right femoral vein-Site Intervention: No intervention needed    Patient remains on V-V ECMO, all equipment is functioning and alarms are appropriately set. RPM's: 3550 with Blood Flow (Circuit) LPM  Av.2 LPM  Min: 4.04 LPM  Max: 4.47 LPM L/min. Sweep is at 6 LPM and 100 %. Extremities are cool.     Significant Shift Events: Patient taken to CT for a routine scan without incident.  Pt PaO2 dropped overnight with no obvious catalyst, flow increased from 4 -4.5 with minimal result.  Sweep increased overnight per ABG values.  Per provider overnight may trend Xa 0.3-0.7 d/t ACT persistently resulting outside goal at max dose of heparin, ACT at ~0400 finally in goal at 165.      Vent settings:  Vent Mode: PCV Plus assist  FiO2 (%): 60 %  Resp Rate (Set): 16 breaths/min  PEEP (cm H2O): 10 cmH2O  Inspiratory Pressure (cm H2O) (Drager Jessie): 25  Resp: 16      Anticoagulation:  Dose (units/hr) HEParin: 2500 Units/hr  Rate (mL/hr) HEParin: 25 mL/hr  Concentration HEParin: 100 Units/mL        Most recent: ACT  (seconds): 165 seconds    Urine output, pt on CRRT.  .  Blood loss was minimal. Product given included none.     Intake/Output Summary (Last 24 hours) at 8/10/2024 0559  Last data filed at 8/10/2024 0500  Gross per 24 hour   Intake 3815.1 ml   Output 3976.9 ml    Net -161.8 ml       Labs:  Recent Labs   Lab 08/10/24  0414 08/10/24  0412 08/10/24  0211 08/10/24  0006 08/09/24  2205   PH  --  7.38 7.34* 7.31* 7.31*   PCO2  --  40 44 48* 48*   PO2  --  62* 57* 91 126*   HCO3  --  24 24 24 24   O2PER 60 60  100  60 60 60 60       Lab Results   Component Value Date    HGB 8.1 (L) 08/10/2024    PHGB 120 (H) 08/10/2024     08/10/2024    FIBR 565 (H) 08/10/2024    INR 1.14 08/10/2024    PTT 62 (H) 08/10/2024    DD 17.02 (H) 08/10/2024    ANTCH 59 (L) 08/09/2024       Plan is to continue VV ECMO.    Tanesha Bates, RT  ECMO Specialist  8/10/2024 5:59 AM

## 2024-08-11 ENCOUNTER — APPOINTMENT (OUTPATIENT)
Dept: GENERAL RADIOLOGY | Facility: CLINIC | Age: 54
DRG: 003 | End: 2024-08-11
Attending: SURGERY
Payer: MEDICAID

## 2024-08-11 ENCOUNTER — APPOINTMENT (OUTPATIENT)
Dept: GENERAL RADIOLOGY | Facility: CLINIC | Age: 54
DRG: 003 | End: 2024-08-11
Attending: PSYCHIATRY & NEUROLOGY
Payer: MEDICAID

## 2024-08-11 ENCOUNTER — APPOINTMENT (OUTPATIENT)
Dept: NEUROLOGY | Facility: CLINIC | Age: 54
DRG: 003 | End: 2024-08-11
Attending: STUDENT IN AN ORGANIZED HEALTH CARE EDUCATION/TRAINING PROGRAM
Payer: MEDICAID

## 2024-08-11 LAB
ABO/RH(D): NORMAL
ACT BLD: 170 SECONDS (ref 74–150)
ACT BLD: 173 SECONDS (ref 74–150)
ACT BLD: 178 SECONDS (ref 74–150)
ACT BLD: 182 SECONDS (ref 74–150)
ALBUMIN SERPL BCG-MCNC: 2.3 G/DL (ref 3.5–5.2)
ALBUMIN SERPL BCG-MCNC: 2.4 G/DL (ref 3.5–5.2)
ALLEN'S TEST: ABNORMAL
ALP SERPL-CCNC: 318 U/L (ref 40–150)
ALT SERPL W P-5'-P-CCNC: 21 U/L (ref 0–50)
ANION GAP SERPL CALCULATED.3IONS-SCNC: 10 MMOL/L (ref 7–15)
ANION GAP SERPL CALCULATED.3IONS-SCNC: 11 MMOL/L (ref 7–15)
ANION GAP SERPL CALCULATED.3IONS-SCNC: 12 MMOL/L (ref 7–15)
ANION GAP SERPL CALCULATED.3IONS-SCNC: 12 MMOL/L (ref 7–15)
ANTIBODY SCREEN: NEGATIVE
APTT PPP: 33 SECONDS (ref 22–38)
APTT PPP: 35 SECONDS (ref 22–38)
APTT PPP: 36 SECONDS (ref 22–38)
APTT PPP: 41 SECONDS (ref 22–38)
APTT PPP: 80 SECONDS (ref 22–38)
APTT PPP: 93 SECONDS (ref 22–38)
APTT PPP: 96 SECONDS (ref 22–38)
AST SERPL W P-5'-P-CCNC: 47 U/L (ref 0–45)
AT III ACT/NOR PPP CHRO: 68 % (ref 85–135)
BACTERIA BRONCH: NO GROWTH
BACTERIA BRONCH: NO GROWTH
BACTERIA SPT CULT: ABNORMAL
BASE EXCESS BLDA CALC-SCNC: -1.8 MMOL/L (ref -3–3)
BASE EXCESS BLDA CALC-SCNC: -2.1 MMOL/L (ref -3–3)
BASE EXCESS BLDA CALC-SCNC: -2.1 MMOL/L (ref -3–3)
BASE EXCESS BLDA CALC-SCNC: -2.2 MMOL/L (ref -3–3)
BASE EXCESS BLDA CALC-SCNC: -2.3 MMOL/L (ref -3–3)
BASE EXCESS BLDA CALC-SCNC: -2.4 MMOL/L (ref -3–3)
BASE EXCESS BLDA CALC-SCNC: -2.5 MMOL/L (ref -3–3)
BASE EXCESS BLDA CALC-SCNC: -2.5 MMOL/L (ref -3–3)
BASE EXCESS BLDA CALC-SCNC: -2.6 MMOL/L (ref -3–3)
BASE EXCESS BLDA CALC-SCNC: -2.7 MMOL/L (ref -3–3)
BASE EXCESS BLDA CALC-SCNC: -2.7 MMOL/L (ref -3–3)
BASE EXCESS BLDA CALC-SCNC: -3 MMOL/L (ref -3–3)
BASE EXCESS BLDA CALC-SCNC: -3.2 MMOL/L (ref -3–3)
BASE EXCESS BLDA CALC-SCNC: -3.6 MMOL/L (ref -3–3)
BASE EXCESS BLDV CALC-SCNC: -1.4 MMOL/L (ref -3–3)
BASE EXCESS BLDV CALC-SCNC: -1.4 MMOL/L (ref -3–3)
BASE EXCESS BLDV CALC-SCNC: -1.9 MMOL/L (ref -3–3)
BASE EXCESS BLDV CALC-SCNC: -2.7 MMOL/L (ref -3–3)
BASOPHILS # BLD AUTO: ABNORMAL 10*3/UL
BASOPHILS # BLD MANUAL: 0 10E3/UL (ref 0–0.2)
BASOPHILS # BLD MANUAL: 0.1 10E3/UL (ref 0–0.2)
BASOPHILS NFR BLD AUTO: ABNORMAL %
BASOPHILS NFR BLD MANUAL: 0 %
BASOPHILS NFR BLD MANUAL: 1 %
BILIRUB DIRECT SERPL-MCNC: 0.5 MG/DL (ref 0–0.3)
BILIRUB SERPL-MCNC: 0.6 MG/DL
BUN SERPL-MCNC: 23.5 MG/DL (ref 6–20)
BUN SERPL-MCNC: 24.7 MG/DL (ref 6–20)
BUN SERPL-MCNC: 24.8 MG/DL (ref 6–20)
BUN SERPL-MCNC: 25.4 MG/DL (ref 6–20)
CA-I BLD-MCNC: 4.3 MG/DL (ref 4.4–5.2)
CA-I BLD-MCNC: 4.4 MG/DL (ref 4.4–5.2)
CA-I BLD-MCNC: 4.4 MG/DL (ref 4.4–5.2)
CA-I BLD-MCNC: 4.6 MG/DL (ref 4.4–5.2)
CALCIUM SERPL-MCNC: 7.3 MG/DL (ref 8.8–10.4)
CALCIUM SERPL-MCNC: 7.5 MG/DL (ref 8.8–10.4)
CALCIUM SERPL-MCNC: 7.6 MG/DL (ref 8.8–10.4)
CALCIUM SERPL-MCNC: 7.7 MG/DL (ref 8.8–10.4)
CHLORIDE SERPL-SCNC: 114 MMOL/L (ref 98–107)
CHLORIDE SERPL-SCNC: 114 MMOL/L (ref 98–107)
CHLORIDE SERPL-SCNC: 116 MMOL/L (ref 98–107)
CHLORIDE SERPL-SCNC: 118 MMOL/L (ref 98–107)
COHGB MFR BLD: 92.2 % (ref 96–97)
COHGB MFR BLD: 94.1 % (ref 96–97)
COHGB MFR BLD: 94.5 % (ref 96–97)
COHGB MFR BLD: 94.6 % (ref 96–97)
COHGB MFR BLD: 94.7 % (ref 96–97)
COHGB MFR BLD: 95.3 % (ref 96–97)
COHGB MFR BLD: 95.3 % (ref 96–97)
COHGB MFR BLD: 95.4 % (ref 96–97)
COHGB MFR BLD: 95.9 % (ref 96–97)
COHGB MFR BLD: 95.9 % (ref 96–97)
COHGB MFR BLD: 96.1 % (ref 96–97)
COHGB MFR BLD: 96.4 % (ref 96–97)
CREAT SERPL-MCNC: 1.27 MG/DL (ref 0.51–0.95)
CREAT SERPL-MCNC: 1.28 MG/DL (ref 0.51–0.95)
CREAT SERPL-MCNC: 1.28 MG/DL (ref 0.51–0.95)
CREAT SERPL-MCNC: 1.29 MG/DL (ref 0.51–0.95)
D DIMER PPP FEU-MCNC: 5 UG/ML FEU (ref 0–0.5)
D DIMER PPP FEU-MCNC: 8.94 UG/ML FEU (ref 0–0.5)
EGFRCR SERPLBLD CKD-EPI 2021: 49 ML/MIN/1.73M2
EGFRCR SERPLBLD CKD-EPI 2021: 50 ML/MIN/1.73M2
EOSINOPHIL # BLD AUTO: ABNORMAL 10*3/UL
EOSINOPHIL # BLD MANUAL: 0.6 10E3/UL (ref 0–0.7)
EOSINOPHIL # BLD MANUAL: 0.7 10E3/UL (ref 0–0.7)
EOSINOPHIL # BLD MANUAL: 0.8 10E3/UL (ref 0–0.7)
EOSINOPHIL # BLD MANUAL: 0.8 10E3/UL (ref 0–0.7)
EOSINOPHIL NFR BLD AUTO: ABNORMAL %
EOSINOPHIL NFR BLD MANUAL: 5 %
EOSINOPHIL NFR BLD MANUAL: 6 %
ERYTHROCYTE [DISTWIDTH] IN BLOOD BY AUTOMATED COUNT: 16.1 % (ref 10–15)
ERYTHROCYTE [DISTWIDTH] IN BLOOD BY AUTOMATED COUNT: 16.1 % (ref 10–15)
ERYTHROCYTE [DISTWIDTH] IN BLOOD BY AUTOMATED COUNT: 16.3 % (ref 10–15)
ERYTHROCYTE [DISTWIDTH] IN BLOOD BY AUTOMATED COUNT: 16.5 % (ref 10–15)
FIBRINOGEN PPP-MCNC: 593 MG/DL (ref 170–510)
FIBRINOGEN PPP-MCNC: 605 MG/DL (ref 170–510)
GALACTOMANNAN AG BAL QL: NEGATIVE
GALACTOMANNAN AG SPEC IA-ACNC: 0.03
GLUCOSE SERPL-MCNC: 116 MG/DL (ref 70–99)
GLUCOSE SERPL-MCNC: 132 MG/DL (ref 70–99)
GLUCOSE SERPL-MCNC: 140 MG/DL (ref 70–99)
GLUCOSE SERPL-MCNC: 173 MG/DL (ref 70–99)
GRAM STAIN RESULT: ABNORMAL
GRAM STAIN RESULT: ABNORMAL
HCO3 BLD-SCNC: 22 MMOL/L (ref 21–28)
HCO3 BLD-SCNC: 23 MMOL/L (ref 21–28)
HCO3 BLD-SCNC: 24 MMOL/L (ref 21–28)
HCO3 BLDA-SCNC: 21 MMOL/L (ref 21–28)
HCO3 BLDA-SCNC: 22 MMOL/L (ref 21–28)
HCO3 BLDV-SCNC: 23 MMOL/L (ref 21–28)
HCO3 BLDV-SCNC: 24 MMOL/L (ref 21–28)
HCO3 SERPL-SCNC: 20 MMOL/L (ref 22–29)
HCO3 SERPL-SCNC: 21 MMOL/L (ref 22–29)
HCO3 SERPL-SCNC: 21 MMOL/L (ref 22–29)
HCO3 SERPL-SCNC: 22 MMOL/L (ref 22–29)
HCT VFR BLD AUTO: 24.4 % (ref 35–47)
HCT VFR BLD AUTO: 24.7 % (ref 35–47)
HCT VFR BLD AUTO: 25.3 % (ref 35–47)
HCT VFR BLD AUTO: 25.8 % (ref 35–47)
HGB BLD-MCNC: 7.9 G/DL (ref 11.7–15.7)
HGB BLD-MCNC: 8 G/DL (ref 11.7–15.7)
HGB BLD-MCNC: 8.1 G/DL (ref 11.7–15.7)
HGB BLD-MCNC: 8.4 G/DL (ref 11.7–15.7)
HGB FREE PLAS-MCNC: 90 MG/DL
IMM GRANULOCYTES # BLD: ABNORMAL 10*3/UL
IMM GRANULOCYTES NFR BLD: ABNORMAL %
INR PPP: 1.11 (ref 0.85–1.15)
INR PPP: 1.14 (ref 0.85–1.15)
INR PPP: 1.15 (ref 0.85–1.15)
INR PPP: 1.24 (ref 0.85–1.15)
LACTATE SERPL-SCNC: 0.7 MMOL/L (ref 0.7–2)
LACTATE SERPL-SCNC: 0.9 MMOL/L (ref 0.7–2)
LACTATE SERPL-SCNC: 1 MMOL/L (ref 0.7–2)
LACTATE SERPL-SCNC: 1.1 MMOL/L (ref 0.7–2)
LYMPHOCYTES # BLD AUTO: ABNORMAL 10*3/UL
LYMPHOCYTES # BLD MANUAL: 0.4 10E3/UL (ref 0.8–5.3)
LYMPHOCYTES # BLD MANUAL: 0.5 10E3/UL (ref 0.8–5.3)
LYMPHOCYTES # BLD MANUAL: 0.7 10E3/UL (ref 0.8–5.3)
LYMPHOCYTES # BLD MANUAL: 1.1 10E3/UL (ref 0.8–5.3)
LYMPHOCYTES NFR BLD AUTO: ABNORMAL %
LYMPHOCYTES NFR BLD MANUAL: 3 %
LYMPHOCYTES NFR BLD MANUAL: 4 %
LYMPHOCYTES NFR BLD MANUAL: 7 %
LYMPHOCYTES NFR BLD MANUAL: 9 %
MAGNESIUM SERPL-MCNC: 2.3 MG/DL (ref 1.7–2.3)
MAGNESIUM SERPL-MCNC: 2.4 MG/DL (ref 1.7–2.3)
MAGNESIUM SERPL-MCNC: 2.5 MG/DL (ref 1.7–2.3)
MAGNESIUM SERPL-MCNC: 2.5 MG/DL (ref 1.7–2.3)
MCH RBC QN AUTO: 30.9 PG (ref 26.5–33)
MCH RBC QN AUTO: 31.3 PG (ref 26.5–33)
MCH RBC QN AUTO: 31.5 PG (ref 26.5–33)
MCH RBC QN AUTO: 31.9 PG (ref 26.5–33)
MCHC RBC AUTO-ENTMCNC: 32 G/DL (ref 31.5–36.5)
MCHC RBC AUTO-ENTMCNC: 32.4 G/DL (ref 31.5–36.5)
MCHC RBC AUTO-ENTMCNC: 32.4 G/DL (ref 31.5–36.5)
MCHC RBC AUTO-ENTMCNC: 32.6 G/DL (ref 31.5–36.5)
MCV RBC AUTO: 97 FL (ref 78–100)
MCV RBC AUTO: 98 FL (ref 78–100)
METAMYELOCYTES # BLD MANUAL: 0.1 10E3/UL
METAMYELOCYTES NFR BLD MANUAL: 1 %
MONOCYTES # BLD AUTO: ABNORMAL 10*3/UL
MONOCYTES # BLD MANUAL: 0.3 10E3/UL (ref 0–1.3)
MONOCYTES # BLD MANUAL: 0.4 10E3/UL (ref 0–1.3)
MONOCYTES # BLD MANUAL: 0.7 10E3/UL (ref 0–1.3)
MONOCYTES # BLD MANUAL: 0.9 10E3/UL (ref 0–1.3)
MONOCYTES NFR BLD AUTO: ABNORMAL %
MONOCYTES NFR BLD MANUAL: 2 %
MONOCYTES NFR BLD MANUAL: 4 %
MONOCYTES NFR BLD MANUAL: 5 %
MONOCYTES NFR BLD MANUAL: 7 %
MYELOCYTES # BLD MANUAL: 0.2 10E3/UL
MYELOCYTES # BLD MANUAL: 0.3 10E3/UL
MYELOCYTES # BLD MANUAL: 0.4 10E3/UL
MYELOCYTES NFR BLD MANUAL: 2 %
MYELOCYTES NFR BLD MANUAL: 2 %
MYELOCYTES NFR BLD MANUAL: 3 %
NEUTROPHILS # BLD AUTO: ABNORMAL 10*3/UL
NEUTROPHILS # BLD MANUAL: 11 10E3/UL (ref 1.6–8.3)
NEUTROPHILS # BLD MANUAL: 11.8 10E3/UL (ref 1.6–8.3)
NEUTROPHILS # BLD MANUAL: 8.1 10E3/UL (ref 1.6–8.3)
NEUTROPHILS # BLD MANUAL: 9.5 10E3/UL (ref 1.6–8.3)
NEUTROPHILS NFR BLD AUTO: ABNORMAL %
NEUTROPHILS NFR BLD MANUAL: 75 %
NEUTROPHILS NFR BLD MANUAL: 81 %
NEUTROPHILS NFR BLD MANUAL: 83 %
NEUTROPHILS NFR BLD MANUAL: 88 %
NRBC # BLD AUTO: 0.2 10E3/UL
NRBC # BLD AUTO: 0.3 10E3/UL
NRBC # BLD AUTO: 0.3 10E3/UL
NRBC # BLD AUTO: 0.5 10E3/UL
NRBC BLD AUTO-RTO: 1 /100
NRBC BLD AUTO-RTO: 2 /100
NRBC BLD MANUAL-RTO: 3 %
NRBC BLD MANUAL-RTO: 4 %
O2/TOTAL GAS SETTING VFR VENT: 100 %
O2/TOTAL GAS SETTING VFR VENT: 60 %
OSMOLALITY SERPL: 306 MMOL/KG (ref 275–295)
OSMOLALITY SERPL: 307 MMOL/KG (ref 275–295)
OSMOLALITY SERPL: 308 MMOL/KG (ref 275–295)
OSMOLALITY SERPL: 312 MMOL/KG (ref 275–295)
OSMOLALITY SERPL: 312 MMOL/KG (ref 275–295)
OSMOLALITY SERPL: 314 MMOL/KG (ref 275–295)
OSMOLALITY SERPL: 316 MMOL/KG (ref 275–295)
OXYHGB MFR BLDA: 98 % (ref 75–100)
OXYHGB MFR BLDA: 98 % (ref 75–100)
OXYHGB MFR BLDV: 54 % (ref 70–75)
OXYHGB MFR BLDV: 67 %
OXYHGB MFR BLDV: 68 % (ref 70–75)
OXYHGB MFR BLDV: 85 %
PCO2 BLD: 36 MM HG (ref 35–45)
PCO2 BLD: 37 MM HG (ref 35–45)
PCO2 BLD: 38 MM HG (ref 35–45)
PCO2 BLD: 39 MM HG (ref 35–45)
PCO2 BLD: 40 MM HG (ref 35–45)
PCO2 BLD: 41 MM HG (ref 35–45)
PCO2 BLD: 42 MM HG (ref 35–45)
PCO2 BLD: 42 MM HG (ref 35–45)
PCO2 BLD: 43 MM HG (ref 35–45)
PCO2 BLD: 43 MM HG (ref 35–45)
PCO2 BLD: 44 MM HG (ref 35–45)
PCO2 BLD: 46 MM HG (ref 35–45)
PCO2 BLDA: 31 MM HG (ref 35–45)
PCO2 BLDA: 34 MM HG (ref 35–45)
PCO2 BLDV: 39 MM HG (ref 40–50)
PCO2 BLDV: 39 MM HG (ref 40–50)
PCO2 BLDV: 43 MM HG (ref 40–50)
PCO2 BLDV: 43 MM HG (ref 40–50)
PEEP: 10 CM H2O
PH BLD: 7.3 [PH] (ref 7.35–7.45)
PH BLD: 7.33 [PH] (ref 7.35–7.45)
PH BLD: 7.34 [PH] (ref 7.35–7.45)
PH BLD: 7.36 [PH] (ref 7.35–7.45)
PH BLD: 7.36 [PH] (ref 7.35–7.45)
PH BLD: 7.37 [PH] (ref 7.35–7.45)
PH BLD: 7.38 [PH] (ref 7.35–7.45)
PH BLD: 7.4 [PH] (ref 7.35–7.45)
PH BLDA: 7.42 [PH] (ref 7.35–7.45)
PH BLDA: 7.45 [PH] (ref 7.35–7.45)
PH BLDV: 7.35 [PH] (ref 7.32–7.43)
PH BLDV: 7.36 [PH] (ref 7.32–7.43)
PH BLDV: 7.37 [PH] (ref 7.32–7.43)
PH BLDV: 7.39 [PH] (ref 7.32–7.43)
PHOSPHATE SERPL-MCNC: 3.6 MG/DL (ref 2.5–4.5)
PHOSPHATE SERPL-MCNC: 3.9 MG/DL (ref 2.5–4.5)
PHOSPHATE SERPL-MCNC: 3.9 MG/DL (ref 2.5–4.5)
PHOSPHATE SERPL-MCNC: 4.4 MG/DL (ref 2.5–4.5)
PLAT MORPH BLD: ABNORMAL
PLATELET # BLD AUTO: 181 10E3/UL (ref 150–450)
PLATELET # BLD AUTO: 192 10E3/UL (ref 150–450)
PLATELET # BLD AUTO: 208 10E3/UL (ref 150–450)
PLATELET # BLD AUTO: 211 10E3/UL (ref 150–450)
PO2 BLD: 66 MM HG (ref 80–105)
PO2 BLD: 67 MM HG (ref 80–105)
PO2 BLD: 70 MM HG (ref 80–105)
PO2 BLD: 71 MM HG (ref 80–105)
PO2 BLD: 72 MM HG (ref 80–105)
PO2 BLD: 75 MM HG (ref 80–105)
PO2 BLD: 76 MM HG (ref 80–105)
PO2 BLD: 77 MM HG (ref 80–105)
PO2 BLD: 77 MM HG (ref 80–105)
PO2 BLDA: 372 MM HG (ref 80–105)
PO2 BLDA: 443 MM HG (ref 80–105)
PO2 BLDV: 30 MM HG (ref 25–47)
PO2 BLDV: 35 MM HG (ref 25–47)
PO2 BLDV: 37 MM HG (ref 25–47)
PO2 BLDV: 52 MM HG (ref 25–47)
POLYCHROMASIA BLD QL SMEAR: SLIGHT
POTASSIUM SERPL-SCNC: 3.7 MMOL/L (ref 3.4–5.3)
POTASSIUM SERPL-SCNC: 3.7 MMOL/L (ref 3.4–5.3)
POTASSIUM SERPL-SCNC: 3.8 MMOL/L (ref 3.4–5.3)
POTASSIUM SERPL-SCNC: 4.1 MMOL/L (ref 3.4–5.3)
PROT SERPL-MCNC: 4.9 G/DL (ref 6.4–8.3)
RBC # BLD AUTO: 2.51 10E6/UL (ref 3.8–5.2)
RBC # BLD AUTO: 2.56 10E6/UL (ref 3.8–5.2)
RBC # BLD AUTO: 2.62 10E6/UL (ref 3.8–5.2)
RBC # BLD AUTO: 2.63 10E6/UL (ref 3.8–5.2)
RBC MORPH BLD: ABNORMAL
SAO2 % BLDA: 89 % (ref 92–100)
SAO2 % BLDA: 91 % (ref 92–100)
SAO2 % BLDA: 91 % (ref 92–100)
SAO2 % BLDA: 92 % (ref 92–100)
SAO2 % BLDA: 92 % (ref 92–100)
SAO2 % BLDA: 93 % (ref 92–100)
SAO2 % BLDA: 94 % (ref 92–100)
SAO2 % BLDA: >100 % (ref 96–97)
SAO2 % BLDA: >100 % (ref 96–97)
SAO2 % BLDV: 55.8 % (ref 70–75)
SAO2 % BLDV: 68.9 % (ref 70–75)
SAO2 % BLDV: 69.9 % (ref 70–75)
SAO2 % BLDV: 87.6 % (ref 70–75)
SODIUM SERPL-SCNC: 145 MMOL/L (ref 135–145)
SODIUM SERPL-SCNC: 146 MMOL/L (ref 135–145)
SODIUM SERPL-SCNC: 147 MMOL/L (ref 135–145)
SODIUM SERPL-SCNC: 148 MMOL/L (ref 135–145)
SODIUM SERPL-SCNC: 149 MMOL/L (ref 135–145)
SODIUM SERPL-SCNC: 150 MMOL/L (ref 135–145)
SPECIMEN EXPIRATION DATE: NORMAL
TRIGL SERPL-MCNC: 420 MG/DL
UFH PPP CHRO-ACNC: 0.72 IU/ML
UFH PPP CHRO-ACNC: 0.79 IU/ML
UFH PPP CHRO-ACNC: <0.1 IU/ML
UFH PPP CHRO-ACNC: <0.1 IU/ML
WBC # BLD AUTO: 10 10E3/UL (ref 4–11)
WBC # BLD AUTO: 12.6 10E3/UL (ref 4–11)
WBC # BLD AUTO: 13.3 10E3/UL (ref 4–11)
WBC # BLD AUTO: 13.4 10E3/UL (ref 4–11)

## 2024-08-11 PROCEDURE — 71045 X-RAY EXAM CHEST 1 VIEW: CPT | Mod: 26 | Performed by: RADIOLOGY

## 2024-08-11 PROCEDURE — 95711 VEEG 2-12 HR UNMONITORED: CPT

## 2024-08-11 PROCEDURE — 200N000002 HC R&B ICU UMMC

## 2024-08-11 PROCEDURE — 99291 CRITICAL CARE FIRST HOUR: CPT | Performed by: STUDENT IN AN ORGANIZED HEALTH CARE EDUCATION/TRAINING PROGRAM

## 2024-08-11 PROCEDURE — 258N000003 HC RX IP 258 OP 636: Performed by: STUDENT IN AN ORGANIZED HEALTH CARE EDUCATION/TRAINING PROGRAM

## 2024-08-11 PROCEDURE — 250N000009 HC RX 250: Performed by: STUDENT IN AN ORGANIZED HEALTH CARE EDUCATION/TRAINING PROGRAM

## 2024-08-11 PROCEDURE — 85027 COMPLETE CBC AUTOMATED: CPT | Performed by: SURGERY

## 2024-08-11 PROCEDURE — 84478 ASSAY OF TRIGLYCERIDES: CPT | Performed by: SURGERY

## 2024-08-11 PROCEDURE — 258N000003 HC RX IP 258 OP 636: Performed by: SURGERY

## 2024-08-11 PROCEDURE — 33948 ECMO/ECLS DAILY MGMT-VENOUS: CPT | Performed by: SURGERY

## 2024-08-11 PROCEDURE — 85379 FIBRIN DEGRADATION QUANT: CPT | Performed by: SURGERY

## 2024-08-11 PROCEDURE — 85384 FIBRINOGEN ACTIVITY: CPT | Performed by: SURGERY

## 2024-08-11 PROCEDURE — 250N000011 HC RX IP 250 OP 636

## 2024-08-11 PROCEDURE — 250N000009 HC RX 250: Performed by: SURGERY

## 2024-08-11 PROCEDURE — 94645 CONT INHLJ TX EACH ADDL HOUR: CPT

## 2024-08-11 PROCEDURE — 82040 ASSAY OF SERUM ALBUMIN: CPT | Performed by: PHYSICIAN ASSISTANT

## 2024-08-11 PROCEDURE — 94644 CONT INHLJ TX 1ST HOUR: CPT

## 2024-08-11 PROCEDURE — 250N000009 HC RX 250: Performed by: PHYSICIAN ASSISTANT

## 2024-08-11 PROCEDURE — 33948 ECMO/ECLS DAILY MGMT-VENOUS: CPT

## 2024-08-11 PROCEDURE — 250N000011 HC RX IP 250 OP 636: Mod: JZ | Performed by: STUDENT IN AN ORGANIZED HEALTH CARE EDUCATION/TRAINING PROGRAM

## 2024-08-11 PROCEDURE — 83605 ASSAY OF LACTIC ACID: CPT | Performed by: SURGERY

## 2024-08-11 PROCEDURE — 95718 EEG PHYS/QHP 2-12 HR W/VEEG: CPT | Performed by: PSYCHIATRY & NEUROLOGY

## 2024-08-11 PROCEDURE — 85730 THROMBOPLASTIN TIME PARTIAL: CPT | Performed by: SURGERY

## 2024-08-11 PROCEDURE — 80069 RENAL FUNCTION PANEL: CPT | Performed by: PHYSICIAN ASSISTANT

## 2024-08-11 PROCEDURE — 82805 BLOOD GASES W/O2 SATURATION: CPT | Performed by: SURGERY

## 2024-08-11 PROCEDURE — 83735 ASSAY OF MAGNESIUM: CPT | Performed by: PHYSICIAN ASSISTANT

## 2024-08-11 PROCEDURE — 99291 CRITICAL CARE FIRST HOUR: CPT | Mod: FS | Performed by: NURSE PRACTITIONER

## 2024-08-11 PROCEDURE — 250N000013 HC RX MED GY IP 250 OP 250 PS 637

## 2024-08-11 PROCEDURE — 85520 HEPARIN ASSAY: CPT | Performed by: SURGERY

## 2024-08-11 PROCEDURE — 82330 ASSAY OF CALCIUM: CPT | Performed by: SURGERY

## 2024-08-11 PROCEDURE — 250N000011 HC RX IP 250 OP 636: Performed by: PHYSICIAN ASSISTANT

## 2024-08-11 PROCEDURE — 85610 PROTHROMBIN TIME: CPT | Performed by: SURGERY

## 2024-08-11 PROCEDURE — 84295 ASSAY OF SERUM SODIUM: CPT | Performed by: STUDENT IN AN ORGANIZED HEALTH CARE EDUCATION/TRAINING PROGRAM

## 2024-08-11 PROCEDURE — 87385 HISTOPLASMA CAPSUL AG IA: CPT | Performed by: STUDENT IN AN ORGANIZED HEALTH CARE EDUCATION/TRAINING PROGRAM

## 2024-08-11 PROCEDURE — 83930 ASSAY OF BLOOD OSMOLALITY: CPT | Performed by: PSYCHIATRY & NEUROLOGY

## 2024-08-11 PROCEDURE — 250N000011 HC RX IP 250 OP 636: Performed by: SURGERY

## 2024-08-11 PROCEDURE — 85300 ANTITHROMBIN III ACTIVITY: CPT | Performed by: SURGERY

## 2024-08-11 PROCEDURE — 86901 BLOOD TYPING SEROLOGIC RH(D): CPT | Performed by: SURGERY

## 2024-08-11 PROCEDURE — 99233 SBSQ HOSP IP/OBS HIGH 50: CPT | Performed by: INTERNAL MEDICINE

## 2024-08-11 PROCEDURE — 82248 BILIRUBIN DIRECT: CPT | Performed by: STUDENT IN AN ORGANIZED HEALTH CARE EDUCATION/TRAINING PROGRAM

## 2024-08-11 PROCEDURE — 85007 BL SMEAR W/DIFF WBC COUNT: CPT | Performed by: SURGERY

## 2024-08-11 PROCEDURE — 94003 VENT MGMT INPAT SUBQ DAY: CPT

## 2024-08-11 PROCEDURE — 250N000013 HC RX MED GY IP 250 OP 250 PS 637: Performed by: SURGERY

## 2024-08-11 PROCEDURE — 71045 X-RAY EXAM CHEST 1 VIEW: CPT

## 2024-08-11 PROCEDURE — 82330 ASSAY OF CALCIUM: CPT | Performed by: PHYSICIAN ASSISTANT

## 2024-08-11 PROCEDURE — 999N000157 HC STATISTIC RCP TIME EA 10 MIN

## 2024-08-11 PROCEDURE — 99292 CRITICAL CARE ADDL 30 MIN: CPT | Mod: FS | Performed by: NURSE PRACTITIONER

## 2024-08-11 PROCEDURE — 250N000011 HC RX IP 250 OP 636: Performed by: STUDENT IN AN ORGANIZED HEALTH CARE EDUCATION/TRAINING PROGRAM

## 2024-08-11 PROCEDURE — 85347 COAGULATION TIME ACTIVATED: CPT

## 2024-08-11 PROCEDURE — 85730 THROMBOPLASTIN TIME PARTIAL: CPT | Performed by: STUDENT IN AN ORGANIZED HEALTH CARE EDUCATION/TRAINING PROGRAM

## 2024-08-11 PROCEDURE — 999N000065 XR ABDOMEN PORT 1 VIEW

## 2024-08-11 PROCEDURE — 272N000272 HC CONTINUOUS NEBULIZER MICRO PUMP

## 2024-08-11 PROCEDURE — 86923 COMPATIBILITY TEST ELECTRIC: CPT | Performed by: SURGERY

## 2024-08-11 PROCEDURE — 258N000003 HC RX IP 258 OP 636

## 2024-08-11 PROCEDURE — 85730 THROMBOPLASTIN TIME PARTIAL: CPT | Performed by: PSYCHIATRY & NEUROLOGY

## 2024-08-11 PROCEDURE — 82805 BLOOD GASES W/O2 SATURATION: CPT | Performed by: STUDENT IN AN ORGANIZED HEALTH CARE EDUCATION/TRAINING PROGRAM

## 2024-08-11 PROCEDURE — 250N000011 HC RX IP 250 OP 636: Performed by: PSYCHIATRY & NEUROLOGY

## 2024-08-11 PROCEDURE — 74018 RADEX ABDOMEN 1 VIEW: CPT | Mod: 26 | Performed by: RADIOLOGY

## 2024-08-11 PROCEDURE — 250N000009 HC RX 250

## 2024-08-11 PROCEDURE — 250N000013 HC RX MED GY IP 250 OP 250 PS 637: Performed by: PHYSICIAN ASSISTANT

## 2024-08-11 PROCEDURE — 84295 ASSAY OF SERUM SODIUM: CPT

## 2024-08-11 PROCEDURE — 83051 HEMOGLOBIN PLASMA: CPT | Performed by: SURGERY

## 2024-08-11 PROCEDURE — 86900 BLOOD TYPING SEROLOGIC ABO: CPT | Performed by: SURGERY

## 2024-08-11 RX ORDER — METRONIDAZOLE 500 MG/100ML
500 INJECTION, SOLUTION INTRAVENOUS EVERY 8 HOURS
Status: DISCONTINUED | OUTPATIENT
Start: 2024-08-11 | End: 2024-08-16

## 2024-08-11 RX ORDER — SODIUM CHLORIDE 3 G/100ML
250 INJECTION, SOLUTION INTRAVENOUS ONCE
Status: COMPLETED | OUTPATIENT
Start: 2024-08-11 | End: 2024-08-11

## 2024-08-11 RX ORDER — DEXTROSE MONOHYDRATE 50 MG/ML
10-20 INJECTION, SOLUTION INTRAVENOUS EVERY 24 HOURS
Status: DISCONTINUED | OUTPATIENT
Start: 2024-08-11 | End: 2024-08-13

## 2024-08-11 RX ORDER — METRONIDAZOLE 500 MG/1
500 TABLET ORAL 3 TIMES DAILY
Status: DISCONTINUED | OUTPATIENT
Start: 2024-08-11 | End: 2024-08-11

## 2024-08-11 RX ADMIN — CALCIUM CHLORIDE, MAGNESIUM CHLORIDE, SODIUM CHLORIDE, SODIUM BICARBONATE, POTASSIUM CHLORIDE AND SODIUM PHOSPHATE DIBASIC DIHYDRATE 12.5 ML/KG/HR: 3.68; 3.05; 6.34; 3.09; .314; .187 INJECTION INTRAVENOUS at 01:34

## 2024-08-11 RX ADMIN — CALCIUM CHLORIDE, MAGNESIUM CHLORIDE, SODIUM CHLORIDE, SODIUM BICARBONATE, POTASSIUM CHLORIDE AND SODIUM PHOSPHATE DIBASIC DIHYDRATE 12.5 ML/KG/HR: 3.68; 3.05; 6.34; 3.09; .314; .187 INJECTION INTRAVENOUS at 10:56

## 2024-08-11 RX ADMIN — CALCIUM CHLORIDE, MAGNESIUM CHLORIDE, SODIUM CHLORIDE, SODIUM BICARBONATE, POTASSIUM CHLORIDE AND SODIUM PHOSPHATE DIBASIC DIHYDRATE 12.5 ML/KG/HR: 3.68; 3.05; 6.34; 3.09; .314; .187 INJECTION INTRAVENOUS at 16:13

## 2024-08-11 RX ADMIN — PROPOFOL 30 MCG/KG/MIN: 10 INJECTION, EMULSION INTRAVENOUS at 08:46

## 2024-08-11 RX ADMIN — WHITE PETROLATUM 57.7 %-MINERAL OIL 31.9 % EYE OINTMENT: at 01:44

## 2024-08-11 RX ADMIN — Medication 1 MG/HR: at 22:21

## 2024-08-11 RX ADMIN — PROPOFOL 30 MCG/KG/MIN: 10 INJECTION, EMULSION INTRAVENOUS at 19:53

## 2024-08-11 RX ADMIN — WHITE PETROLATUM 57.7 %-MINERAL OIL 31.9 % EYE OINTMENT: at 10:31

## 2024-08-11 RX ADMIN — CISATRACURIUM BESYLATE 4.5 MCG/KG/MIN: 10 INJECTION, SOLUTION INTRAVENOUS at 01:34

## 2024-08-11 RX ADMIN — SODIUM CHLORIDE: 4 INJECTION, SOLUTION, CONCENTRATE INTRAVENOUS at 05:05

## 2024-08-11 RX ADMIN — DEXTROSE MONOHYDRATE 10 ML: 50 INJECTION, SOLUTION INTRAVENOUS at 14:01

## 2024-08-11 RX ADMIN — CISATRACURIUM BESYLATE 5.5 MCG/KG/MIN: 10 INJECTION, SOLUTION INTRAVENOUS at 23:06

## 2024-08-11 RX ADMIN — EPOPROSTENOL 20 NG/KG/MIN: 1.5 INJECTION, POWDER, LYOPHILIZED, FOR SOLUTION INTRAVENOUS at 11:09

## 2024-08-11 RX ADMIN — Medication 1 TABLET: at 10:30

## 2024-08-11 RX ADMIN — PANTOPRAZOLE SODIUM 40 MG: 40 INJECTION, POWDER, FOR SOLUTION INTRAVENOUS at 08:45

## 2024-08-11 RX ADMIN — SODIUM CHLORIDE 250 ML: 3 INJECTION, SOLUTION INTRAVENOUS at 18:45

## 2024-08-11 RX ADMIN — CALCIUM CHLORIDE, MAGNESIUM CHLORIDE, SODIUM CHLORIDE, SODIUM BICARBONATE, POTASSIUM CHLORIDE AND SODIUM PHOSPHATE DIBASIC DIHYDRATE 12.5 ML/KG/HR: 3.68; 3.05; 6.34; 3.09; .314; .187 INJECTION INTRAVENOUS at 06:05

## 2024-08-11 RX ADMIN — METRONIDAZOLE 500 MG: 5 INJECTION, SOLUTION INTRAVENOUS at 18:22

## 2024-08-11 RX ADMIN — MIDAZOLAM HYDROCHLORIDE 5 MG/HR: 1 INJECTION, SOLUTION INTRAVENOUS at 00:46

## 2024-08-11 RX ADMIN — SODIUM CHLORIDE: 4 INJECTION, SOLUTION, CONCENTRATE INTRAVENOUS at 01:34

## 2024-08-11 RX ADMIN — SODIUM CHLORIDE: 4 INJECTION, SOLUTION, CONCENTRATE INTRAVENOUS at 08:21

## 2024-08-11 RX ADMIN — Medication 60 ML: at 14:11

## 2024-08-11 RX ADMIN — ACETAMINOPHEN 650 MG: 325 TABLET, FILM COATED ORAL at 13:27

## 2024-08-11 RX ADMIN — CALCIUM CHLORIDE, MAGNESIUM CHLORIDE, SODIUM CHLORIDE, SODIUM BICARBONATE, POTASSIUM CHLORIDE AND SODIUM PHOSPHATE DIBASIC DIHYDRATE 12.5 ML/KG/HR: 3.68; 3.05; 6.34; 3.09; .314; .187 INJECTION INTRAVENOUS at 19:53

## 2024-08-11 RX ADMIN — WHITE PETROLATUM 57.7 %-MINERAL OIL 31.9 % EYE OINTMENT: at 18:15

## 2024-08-11 RX ADMIN — HEPARIN SODIUM 25 UNITS/KG/HR: 10000 INJECTION, SOLUTION INTRAVENOUS at 03:54

## 2024-08-11 RX ADMIN — PROPOFOL 30 MCG/KG/MIN: 10 INJECTION, EMULSION INTRAVENOUS at 15:29

## 2024-08-11 RX ADMIN — Medication 1 MG/HR: at 02:19

## 2024-08-11 RX ADMIN — LEVOTHYROXINE SODIUM 25 MCG: 0.03 TABLET ORAL at 08:45

## 2024-08-11 RX ADMIN — CHLORHEXIDINE GLUCONATE 0.12% ORAL RINSE 15 ML: 1.2 LIQUID ORAL at 19:53

## 2024-08-11 RX ADMIN — DEXTROSE MONOHYDRATE 10 ML: 50 INJECTION, SOLUTION INTRAVENOUS at 18:13

## 2024-08-11 RX ADMIN — SODIUM CHLORIDE 250 ML: 3 INJECTION, SOLUTION INTRAVENOUS at 05:27

## 2024-08-11 RX ADMIN — PROPOFOL 30 MCG/KG/MIN: 10 INJECTION, EMULSION INTRAVENOUS at 03:53

## 2024-08-11 RX ADMIN — MIDAZOLAM HYDROCHLORIDE 8 MG/HR: 1 INJECTION, SOLUTION INTRAVENOUS at 15:29

## 2024-08-11 RX ADMIN — SODIUM CHLORIDE 250 ML: 9 INJECTION, SOLUTION INTRAVENOUS at 13:31

## 2024-08-11 RX ADMIN — CALCIUM CHLORIDE, MAGNESIUM CHLORIDE, SODIUM CHLORIDE, SODIUM BICARBONATE, POTASSIUM CHLORIDE AND SODIUM PHOSPHATE DIBASIC DIHYDRATE 12.5 ML/KG/HR: 3.68; 3.05; 6.34; 3.09; .314; .187 INJECTION INTRAVENOUS at 16:14

## 2024-08-11 RX ADMIN — AZITHROMYCIN MONOHYDRATE 500 MG: 500 INJECTION, POWDER, LYOPHILIZED, FOR SOLUTION INTRAVENOUS at 10:37

## 2024-08-11 RX ADMIN — CALCIUM GLUCONATE 2 G: 20 INJECTION, SOLUTION INTRAVENOUS at 20:04

## 2024-08-11 RX ADMIN — Medication 60 ML: at 19:54

## 2024-08-11 RX ADMIN — AMPHOTERICIN B 88.9 MG: 50 INJECTION, POWDER, LYOPHILIZED, FOR SOLUTION INTRAVENOUS at 14:02

## 2024-08-11 RX ADMIN — BIVALIRUDIN 0.02 MG/KG/HR: 250 INJECTION, POWDER, LYOPHILIZED, FOR SOLUTION INTRAVENOUS at 11:20

## 2024-08-11 RX ADMIN — CHLORHEXIDINE GLUCONATE 0.12% ORAL RINSE 15 ML: 1.2 LIQUID ORAL at 08:46

## 2024-08-11 RX ADMIN — SODIUM CHLORIDE: 4 INJECTION, SOLUTION, CONCENTRATE INTRAVENOUS at 21:42

## 2024-08-11 RX ADMIN — CEFEPIME HYDROCHLORIDE 2 G: 2 INJECTION, POWDER, FOR SOLUTION INTRAVENOUS at 12:43

## 2024-08-11 RX ADMIN — METRONIDAZOLE 500 MG: 5 INJECTION, SOLUTION INTRAVENOUS at 12:20

## 2024-08-11 RX ADMIN — DIPHENHYDRAMINE HYDROCHLORIDE 25 MG: 50 INJECTION, SOLUTION INTRAMUSCULAR; INTRAVENOUS at 13:31

## 2024-08-11 ASSESSMENT — ACTIVITIES OF DAILY LIVING (ADL)
ADLS_ACUITY_SCORE: 53
ADLS_ACUITY_SCORE: 61
ADLS_ACUITY_SCORE: 57
ADLS_ACUITY_SCORE: 53
ADLS_ACUITY_SCORE: 57
ADLS_ACUITY_SCORE: 53
ADLS_ACUITY_SCORE: 61
ADLS_ACUITY_SCORE: 53
ADLS_ACUITY_SCORE: 57
ADLS_ACUITY_SCORE: 53
ADLS_ACUITY_SCORE: 57
ADLS_ACUITY_SCORE: 61
ADLS_ACUITY_SCORE: 53
ADLS_ACUITY_SCORE: 53
DEPENDENT_IADLS:: LAUNDRY
ADLS_ACUITY_SCORE: 53

## 2024-08-11 NOTE — PROGRESS NOTES
Brief nephrology note.  A 53 year old female with a past medical hx of Anxiety/depression, fibromyalgia, hypothyroidism, asthma, HLD, MURIEL on CPAP, expressive aphasia, and hypertension admitted with sepsis with septic shock and ARDS.She is in a critical condition as the result of hypoxic ischemic encephalopathy, cerebral edema, acute hypoxic respiratory failure, ARDS, VV ECMO, CRRT and undifferentiated shock.  Nephrology consulted to help prescribe hypertonic saline via CRRT to increase serum sodium  to goal  range of 150-155 for brain edema.  Current sodium 143.  Rec;    - Change CRRT Post replacement  fluid to 2% saline at a rate of 100ml / hr  - Calculated rise will be 0.5 meq/hr   - Check sodium Q4      Tati Tomlin MD  Nephrology fellow

## 2024-08-11 NOTE — PLAN OF CARE
Neuro: Sedated/paralyzed. Pupillometer q2h, NPI remain >3 throughout the day.   Cardiac: SR-ST, HR 60-90s w/ no noted ectopy. Small amts of levo needed to keep MAP >65. Normothermic.  Resp: PC + assist, 16/10/35/60%, lungs diminished. Dopplerable pulses. Minimal cloudy secretions.  ECMO: 100%, Sweep of 6. 3200 rpm, 4.5L flows. No chugging throughout the day.  GI: NG repositioned to NJ (XRAY confirmation) w/ TF at 35ml/hr with q4h 30ml free water flush.  Rectal tube with 300ml liquid/brn output.  : Gonzalez DC'd, anuric. BS q6h - 20mls.  LADs: L CVC x3, PIV x3, ETT, NJ, ECMO cannula x2,   Gtts:  -Propofol at 30mcg/kg/min  -Versed at 8ml/hr  -Dilaudid at 1mg/hr  -Cis at 5.5ml/hr  -Levo 0.01-0.06mcg/kg/hr  -Bival at 2.6ml/hr (goal PTT 44-88 seconds, currently adjusting to attain goal range)  Integ:  -Gen ecchymosis  -Yogesh erythema on coccyx  -Bruising on L foot r/t prior fall pre-hospitalization  Labs:  -Q4H Na (goal 150-155 - not currently attaining, 3% Na bolus of 250ml given)    Plan: Continue to closely monitor NPIs and sodium levels. Continue to adjust bival to achieve goal PTT range. Update MD with changes.

## 2024-08-11 NOTE — PLAN OF CARE
Goal Outcome Evaluation:      Plan of Care Reviewed With: patient          Outcome Evaluation: Pt discharge plan TBD

## 2024-08-11 NOTE — CONSULTS
"GYN chart review note    Massiel Flaherty is a 53 year old admitted to to outside hospital with fatigue, fever and dyspnea found to have acute respiratory failure who was transferred to [facility] and is now intubated and sedated on ECMO and CRRT with leukocytosis. ICU team contacted gynecology for input on possible PID given finding on CT scan 8/9. I reviewed her chart and discussed her case with Dong Rico PA-C. Per ICU team, patient's family states she was complaining of some pelvic pain prior to admission.     Review of notes indicates patient has relevant past surgical history of hysterectomy for menorrhagia, ovarian cystectomy, appendectomy, midurethral sling, interstim. Unable to find any notes from gynecology or any op notes.     CT scan 8/9:  \"Asymmetric mildly heterogeneous bulky appearing right adnexa measuring 3.7 x 2.0 cm\"    Transabdominal pelvic ultrasound 8/10 was not able to clearly visualize ovaries.   \"Post hysterectomy. No overt adnexal abnormality identified on transabdominal imaging.\"    Discussed with ICU team that 3.7cm right adnexal mass might be her ovary and may be normal. Discussed that she cannot consent to a transvaginal pelvic ultrasound in her current state, that a pelvic MRI may give more detail of the adnexa but is not feasible at this time. Regardless, I do not think that additional imaging would add much useful information at this point. If that does represent an abscess it is small and should be adequately addressed with the broad spectrum antibiotics they are already giving (cefepime and metronidazole). We do not recommend surgical management or attempted IR drainage of a potential pelvic abscess of this small size.     Radha Junior MD   OBGYN    Please contact our team for any additional questions.  "

## 2024-08-11 NOTE — PROGRESS NOTES
CRRT STATUS NOTE    DATA:  Time: 1900  Pressures WNL:  YES  Filter Status:  WDL  Problems Reported/Alarms Noted:  none  Supplies Present:  YES      ASSESSMENT:  Patient Net Fluid Balance:  Yesterday, Net even; Today thus far net -1L.    Vital Signs: On stable minimal levophed. Vented 60% PEEP 20. Paralyzed. ECMO. B/P: 139/63, T: 98.6, P: 86, R: 16      Labs: lytes stable on 4k baths. K 3.8, ical 4.3 - giving 2g. LA 1.1.     Goals of Therapy:  Net -0-100mL/hr.  Meeting goals of therapy.       INTERVENTIONS:    None needed at this time.       PLAN:  Continue CRRT to meet goals of therapy while on ECMO and pressors.     Please contact CRRT RN 1 (or CRRT RN 2) on Vocera with questions/updates.

## 2024-08-11 NOTE — PROGRESS NOTES
[facility]    ECLS Shift Summary:     ECMO Equipment:  Console Serial Number: [id number]  Circuit Lot Number: not recorded by [employer]  Oxygenator Lot Number: [id number]    Circuit Assessment: Fibrin  Fibrin Location: connectors, each corner of the oxygenator    Arterial ECMO Cannula: 17 Fr in the Right Internal Jugular Vein  Venous ECMO Cannula: 25 Fr in the Right Femoral Vein    ECMO Cannula Arterial Right internal jugular-Site Assessment: WDL, Sutured, Secure  ECMO Cannula Venous Right femoral vein-Site Assessment: WDL, Sutured, Secure  ECMO Cannula Arterial Right internal jugular-Site Intervention: No intervention needed  ECMO Cannula Venous Right femoral vein-Site Intervention: No intervention needed    Patient remains on V-V ECMO, all equipment is functioning and alarms are appropriately set. RPM's: 3550 with Blood Flow (Circuit) LPM  Av.5 LPM  Min: 4.48 LPM  Max: 4.55 LPM L/min. Sweep is at 6 LPM and 100 %.    Significant Shift Events: none    Vent settings:  Vent Mode: PCV Plus assist  FiO2 (%): 100 %  Resp Rate (Set): 16 breaths/min  PEEP (cm H2O): 10 cmH2O  Inspiratory Pressure (cm H2O) (Drager Jessie): 25  Resp: 16      Anticoagulation:  Dose (units/hr) HEParin: 1252.5 Units/hr  Rate (mL/hr) HEParin: 12.5 mL/hr  Concentration HEParin: 100 Units/mL        Most recent: ACT  (seconds): 182 seconds    The patient is on CRRT.  No blood loss was observed. No blood product given overnight.     Intake/Output Summary (Last 24 hours) at 2024 0639  Last data filed at 2024 0600  Gross per 24 hour   Intake 6259.93 ml   Output 7096 ml   Net -836.07 ml       Labs:  Recent Labs   Lab 24  0557 24  0407 24  0406 24  0405 24  0158 08/10/24  2358   PH 7.37  --  7.40  --  7.38 7.30*   PCO2 39  --  36  --  37 46*   PO2 77*  --  72*  --  72* 66*   HCO3 22  --  23  --  22 23   O2PER 60 60 60 60  100 60 100       Lab Results   Component Value  Date    HGB 8.1 (L) 08/11/2024    PHGB 90 (H) 08/11/2024     08/11/2024    FIBR 593 (H) 08/11/2024    INR 1.11 08/11/2024    PTT 80 (H) 08/11/2024    DD 5.00 (H) 08/11/2024    ANTCH 71 (L) 08/10/2024       Plan is to continue VV ECMO support and adjust settings as needed.    Venkat Barry, RT  ECMO Specialist  8/11/2024 6:39 AM

## 2024-08-11 NOTE — PROGRESS NOTES
Neurocritical Care Progress Note    Reason for critical care admission: Concern for anoxic brain injury   Admitting Team: SICU  Date of Service:  08/11/2024  Date of Admission:  8/8/2024  Hospital Day: 4    Assessment/Plan  Massiel Flaherty is a 53 year old female with a past medical history including severe anxiety and depression, fibromyalgia, myalgic encephalomyelitis, hypothyroidism, MURIEL on CPAP, history of GTC seizures and myoclonic epilepsy currently not on medication,  spells with staring and BUE posturing previously described as pseudoseizures, expressive aphasia, and hypertension was admitted to an OSH on 8/3/3024 for evaluation and management of CAP for which she required intubation on 8/6/2024. Hypoxia remained refractory and on 8/8/2024 she required prone positioning and paralysis for ARDS. She was deemed suitable for transfer to [facility] for consideration of VV ECMO on 8/8/2024; she was cannulated for VV ECMO on arrival to [facility].      Head CT on 8/9/2024 was concerning for diffuse cerebral edema with diffuse blurring of gray-white differentiation concerning for hypoxic injury. NCC was consulted on 8/10/2024 for concern of hypoxic brain injury. pCO2 was 91 around the time the 8/9/2024 head CT was obtained. CT this AM with improved edema with pCO2 43.      Per EHR review on 12/4/2023 she had a 3 to 4 day EEG monitor at [facility] prior the visit on 12/4/2023 which did not capture typical events, documented normal interictal EEG, and captured one- short event that occurred with photic stimulation during which the patient reported head numbness with arm and leg twitching without EEG correlate. It was felt the head numbness with arm and leg twitching was related to stress and not epilepsy.     24 hour update: Currently on sedation and paralytic drip for oxygenation.     Neuro  #Concern for anoxic brain injury   #History of GTC  #History of myoclonic epilepsy  #History of pseudoseizures   -Recommend  generally avoiding hypotension, optimize oxygenation, pCO2 within normal range   -stop vEEG for now; vEEG, 8/10 - atypical burst pattern  -MRI when able    NCC will continue to follow. Please call with questions or concerns.     TIME SPENT ON THIS ENCOUNTER   I spent 30 minutes of critical care time on the unit/floor managing the care of Massiel Flaherty excluding time performing procedures. Upon evaluation, this patient had a high probability of imminent or life-threatening deterioration due to concern for anoxic brain injury, which required my direct attention, intervention, and personal management. Greater than 50% of my time was spent at the bedside counseling the patient and/or coordinating care including chart review, history, exam, documentation, and further activities per this note. I have personally reviewed the following data/imaging over the past 24 hours.      The patient was seen and discussed with the NCC attending, Dr. Silva.     DOREEN Ruiz, CNP  Neurocritical Care *[id number]    24 Hour Vital Signs Summary:  Temp: 98.4  F (36.9  C) Temp  Min: 97.7  F (36.5  C)  Max: 98.8  F (37.1  C)  Resp: 16 Resp  Min: 16  Max: 16  SpO2: 93 % SpO2  Min: 90 %  Max: 100 %  Pulse: 83 Pulse  Min: 75  Max: 108    No data recorded    No data recorded.  No data recorded.      Respiratory monitoring:   Vent Mode: PCV Plus assist  FiO2 (%): 60 %  Resp Rate (Set): 16 breaths/min  PEEP (cm H2O): 10 cmH2O  Inspiratory Pressure (cm H2O) (Drager Jessie): 25  Resp: 16      I/O last 3 completed shifts:  In: 6259.93 [I.V.:4049.93; NG/GT:580; IV Piggyback:250]  Out: 7096 [Other:6021; Stool:1075]    Current Medications:  Current Facility-Administered Medications   Medication Dose Route Frequency Provider Last Rate Last Admin    acetaminophen (TYLENOL) tablet 650 mg  650 mg Oral Q24H Aniceto Adame MD   650 mg at 08/11/24 1327    dextrose 5 % flush PRE/POST medication  10-20 mL Intravenous Q24H Barry Hatch,  MD   10 mL at 08/11/24 1401    And    amphotericin B (FUNGIZONE) 88.9 mg in D5W 500 mL IVPB  1 mg/kg (Dosing Weight) Intravenous Q24H Barry Hatch  mL/hr at 08/11/24 1402 88.9 mg at 08/11/24 1402    And    dextrose 5 % flush PRE/POST medication  10-20 mL Intravenous Q24H Barry Hatch MD        artificial tears ophthalmic ointment   Both Eyes Q8H Alethea Schaffer MD   Given at 08/11/24 1031    ceFEPIme (MAXIPIME) 2 g vial to attach to  mL bag for ADULTS or NS 50 mL bag for PEDS  2 g Intravenous Q12H Barry Hatch MD   2 g at 08/11/24 1243    chlorhexidine (PERIDEX) 0.12 % solution 15 mL  15 mL Mouth/Throat Q12H Giovanny Guidry PA-C   15 mL at 08/11/24 0846    diphenhydrAMINE (BENADRYL) capsule 25 mg  25 mg Oral Q24H Aniceto Adame MD        Or    diphenhydrAMINE (BENADRYL) injection 25 mg  25 mg Intravenous Q24H Aniceto Adame MD   25 mg at 08/11/24 1331    levothyroxine (SYNTHROID/LEVOTHROID) tablet 25 mcg  25 m[facility] Giovanny Guidry PA-C   25 mcg at 08/11/24 0845    metroNIDAZOLE (FLAGYL) infusion 500 mg  500 mg Intravenous Q8H Dong Rico PA-C   500 mg at 08/11/24 1220    multivitamin RENAL (RENAVITE RX/NEPHROVITE) tablet 1 tablet  1 tablet Oral or Feeding Tube Daily Barry Hatch MD   1 tablet at 08/11/24 1030    pantoprazole (PROTONIX) 2 mg/mL suspension 40 mg  40 mg Per Feeding Tube QAM  Giovanny Guidry PA-C        Or    pantoprazole (PROTONIX) IV push injection 40 mg  40 [facility] Giovanny Guidry PA-C   40 mg at 08/11/24 0845    polyethylene glycol (MIRALAX) Packet 17 g  17 g Per Feeding Tube BID Dong Rico PA-C   17 g at 08/10/24 0810    Prosource TF20 ENfit Compatibl EN LIQD (PROSOURCE TF20) packet 60 mL  1 packet Per Feeding Tube TID -Giovanny Spence PA-C   60 mL at 08/11/24 1411    senna-docusate (SENOKOT-S/PERICOLACE) 8.6-50 MG per tablet 2 tablet  2 tablet Oral or Feeding Tube BID  Dong Rico PA-C   2 tablet at 08/10/24 2025    sodium chloride 0.9% BOLUS 250 mL  250 mL Intravenous Q24H Barry Hatch  mL/hr at 08/11/24 1331 250 mL at 08/11/24 1331       PRN Medications:  Current Facility-Administered Medications   Medication Dose Route Frequency Provider Last Rate Last Admin    acetaminophen (TYLENOL) tablet 650 mg  650 mg Oral or Feeding Tube Q4H PRN Giovanny Guidry PA-C        Or    acetaminophen (TYLENOL) Suppository 650 mg  650 mg Rectal Q4H PRN Giovanny Guidry PA-C        bisacodyl (DULCOLAX) suppository 10 mg  10 mg Rectal Daily PRN Giovanny Guidry PA-C        calcium gluconate 2 g in  mL intermittent infusion  2 g Intravenous Q8H PRN Sony Castaneda MD   2 g at 08/10/24 1726    calcium gluconate 4 g in sodium chloride 0.9 % 100 mL intermittent infusion  4 g Intravenous Q8H PRN Sony Castaneda MD        dextrose 10% infusion   Intravenous Continuous PRN Giovanny Guidry PA-C        glucose gel 15-30 g  15-30 g Oral Q15 Min PRN Giovanny Guidry PA-C        Or    dextrose 50 % injection 25-50 mL  25-50 mL Intravenous Q15 Min PRN Giovanny Guidry PA-C   50 mL at 08/09/24 0755    Or    glucagon injection 1 mg  1 mg Subcutaneous Q15 Min PRN Giovanny Guidry PA-C        diphenhydrAMINE (BENADRYL) capsule 25 mg  25 mg Oral Q6H PRN Aniceto Adame MD        Or    diphenhydrAMINE (BENADRYL) injection 25 mg  25 mg Intravenous Q6H PRN Aniceto Adame MD        hydromorphone (DILAUDID) 0.2 mg/mL bolus dose from infusion pump 0.3 mg  0.3 mg Intravenous Q2H PRN Giovanny Guidry PA-C   0.3 mg at 08/10/24 1630    magnesium sulfate 2 g in 50 mL sterile water intermittent infusion  2 g Intravenous Q8H PRN Sony Castaneda MD        propofol (DIPRIVAN) bolus from bag or syringe pump  10 mg Intravenous Q15 Min PRN Giovanny Guidry PA-C   10 mg at 08/10/24 1631    And    Medication Instruction   Does not apply Continuous PRN Giovanny Guidry PA-C        metoclopramide  (REGLAN) injection 10 mg  10 mg Intravenous Once PRN Dong Rico PA-C        naloxone (NARCAN) injection 0.2 mg  0.2 mg Intravenous Q2 Min PRN Barry Hatch MD        Or    naloxone (NARCAN) injection 0.4 mg  0.4 mg Intravenous Q2 Min PRN Barry Hatch MD        Or    naloxone (NARCAN) injection 0.2 mg  0.2 mg Intramuscular Q2 Min PRN Barry Hatch MD        Or    naloxone (NARCAN) injection 0.4 mg  0.4 mg Intramuscular Q2 Min PRN Barry Hatch MD        No heparin required   Does not apply Continuous PRN Sony Castaneda MD        potassium chloride 20 mEq in 50 mL intermittent infusion  20 mEq Intravenous Q8H PRN Sony Castaneda MD        sodium chloride 0.9% BOLUS 1-250 mL  1-250 mL Intravenous Q1H PRN Aniceto Adame MD   50 mL at 08/10/24 1542    sodium phosphate 15 mmol in sodium chloride 0.9 % 250 mL intermittent infusion  15 mmol Intravenous Q8H PRN Sony Castaneda MD           Infusions:  Current Facility-Administered Medications   Medication Dose Route Frequency Provider Last Rate Last Admin    bivalirudin (ANGIOMAX) 250 mg in sodium chloride 0.9 % 250 mL ANTICOAGULANT infusion  0-0.3 mg/kg/hr (Dosing Weight) Intravenous Continuous Dong Rico PA-C 1.8 mL/hr at 08/11/24 1400 0.02 mg/kg/hr at 08/11/24 1400    cisatracurium (NIMBEX) 200 mg in D5W 100 mL infusion  3-10 mcg/kg/min (Ideal) Intravenous Continuous Alethea Schaffer MD 8.3 mL/hr at 08/11/24 1400 5.5 mcg/kg/min at 08/11/24 1400    dextrose 10% infusion   Intravenous Continuous PRN Giovanny Guidry PA-C        dialysate for CVVHD & CVVHDF (Phoxillum BK4/2.5)  12.5 mL/kg/hr CRRT Continuous Sony Castaneda MD 1,100 mL/hr at 08/11/24 1056 12.5 mL/kg/hr at 08/11/24 1056    epoprostenol (VELETRI) inhalation solution  20 ng/kg/min (Ideal) Nebulization Continuous Dong Rico PA-C 3 mL/hr at 08/11/24 1132 20 ng/kg/min at 08/11/24 1132    HYDROmorphone (DILAUDID) 0.2  "mg/mL infusion ADULT/PEDS GREATER than or EQUAL to 20 kg  0.3-1 mg/hr Intravenous Continuous Giovanny Guidry PA-C 5 mL/hr at 08/11/24 1400 1 mg/hr at 08/11/24 1400    propofol (DIPRIVAN) infusion  5-75 mcg/kg/min (Dosing Weight) Intravenous Continuous Giovanny Guidry PA-C 16 mL/hr at 08/11/24 1400 30 mcg/kg/min at 08/11/24 1400    And    Medication Instruction   Does not apply Continuous PRN Giovanny Guidry PA-C        midazolam (VERSED) 100 mg/100 mL NS infusion - ADULT  1-8 mg/hr Intravenous Continuous Aniceto Adame MD 8 mL/hr at 08/11/24 1400 8 mg/hr at 08/11/24 1400    No heparin required   Does not apply Continuous PRN Sony Castaneda MD        norepinephrine (LEVOPHED) 16 mg in  mL infusion MAX CONC CENTRAL LINE  0.01-0.6 mcg/kg/min (Dosing Weight) Intravenous Continuous Giovanny Guidry PA-C 4.2 mL/hr at 08/11/24 1400 0.05 mcg/kg/min at 08/11/24 1400    POST-filter replacement solution for CVVHD & CVVHDF (2% sodium chloride)   Intravenous Continuous Barry Hatch  mL/hr at 08/11/24 0900 Rate Verify at 08/11/24 0900    PRE-filter replacement solution for CVVHD & CVVHDF (Phoxillum BK4/2.5)  12.5 mL/kg/hr CRRT Continuous Sony Castaneda MD 1,100 mL/hr at 08/11/24 1056 12.5 mL/kg/hr at 08/11/24 1056       Allergies   Allergen Reactions    Celecoxib Unknown    Sulfa Antibiotics Unknown    Tetracycline Unknown       Physical Examination:  Vitals: Pulse 83   Temp 98.4  F (36.9  C)   Resp 16   Ht 1.575 m (5' 2\")   Wt 92.5 kg (203 lb 14.8 oz)   SpO2 93%   BMI 37.30 kg/m    General: Adult female patient, lying in bed, critically-ill.  HEENT: Normocephalic, atraumatic.  Cardiac: She appears adequately perfused.   Pulm: Synchronous with mechanical ventilator.   Abdomen: Non-distended.  Extremities: Warm, distal extremities do not appear acutely threatened.   Skin: No obvious rashes or lesions on exposed skin.   Psych: Sedated.  Neuro:  Mental status: Sedated. No eye opening, no verbal, " does not follow commands.   Cranial nerves: Limited by sedation and ETT. NPI 4.0 bilaterally. Face appears symmetric though exam limited by ETT. No gag, cough present.   Motor: Normal bulk and tone. No abnormal movements.   Sensory: Deferred.  Coordination: Deferred.  Gait: Deferred.    Labs and Imaging:    Results for orders placed or performed during the hospital encounter of 08/08/24 (from the past 24 hour(s))   Glucose by meter   Result Value Ref Range    GLUCOSE BY METER POCT 184 (H) 70 - 99 mg/dL   Lactic acid whole blood   Result Value Ref Range    Lactic Acid 1.1 0.7 - 2.0 mmol/L   Calcium Ionized CRRT   Result Value Ref Range    Calcium Ionized Whole Blood 4.3 (L) 4.4 - 5.2 mg/dL   Blood gas venous   Result Value Ref Range    pH Venous 7.34 7.32 - 7.43    pCO2 Venous 44 40 - 50 mm Hg    pO2 Venous 31 25 - 47 mm Hg    Bicarbonate Venous 24 21 - 28 mmol/L    Base Excess/Deficit Venous -2.0 -3.0 - 3.0 mmol/L    FIO2 100     Oxyhemoglobin Venous 54 (L) 70 - 75 %    O2 Sat, Venous 55.6 (L) 70.0 - 75.0 %    Narrative    In healthy individuals, oxyhemoglobin (O2Hb) and oxygen saturation (SO2) are approximately equal. In the presence of dyshemoglobins, oxyhemoglobin can be considerably lower than oxygen saturation.   XR Chest Port 1 View    Narrative    Examination:  XR CHEST PORT 1 VIEW    Date:  8/10/2024 5:23 PM     Clinical Information: ECMO cannula repostion     Comparison: 8/10/2024.    Findings:   AP view of the chest. Interval repositioning of superior approach ECMO  cannula with tip projecting over the mid SVC. Stable positioning of  endotracheal tube, esophageal temperature probe, enteric tube, left IJ  central venous catheter and lower approach ECMO cannula. Interval  decreased bilateral lung aeration with diffuse  groundglass/consolidative opacities. Cardiomediastinal silhouette is  obscured. No pneumothorax.      Impression    Impression:  1. Interval repositioning of superior approach ECMO cannula  with tip  projecting over the mid SVC. Stable additional support devices.  2. Interval decreased bilateral lung aeration with diffuse  groundglass/consolidative opacities.    CORTNEY LAU MD         SYSTEM ID:  [id number]   Activated clotting time celite, POCT   Result Value Ref Range    Activated Clotting Time (Celite) POCT 123 74 - 150 seconds   Blood gas arterial   Result Value Ref Range    pH Arterial 7.34 (L) 7.35 - 7.45    pCO2 Arterial 41 35 - 45 mm Hg    pO2 Arterial 61 (L) 80 - 105 mm Hg    FIO2 100     Bicarbonate Arterial 22 21 - 28 mmol/L    Base Excess/Deficit Arterial -3.4 (L) -3.0 - 3.0 mmol/L    Oswald's Test Artline     Oxyhemoglobin Arterial 89 (L) 92 - 100 %    O2 Sat, Arterial 91.7 (L) 96.0 - 97.0 %    Narrative    In healthy individuals, oxyhemoglobin (O2Hb) and oxygen saturation (SO2) are approximately equal. In the presence of dyshemoglobins, oxyhemoglobin can be considerably lower than oxygen saturation.   Sodium   Result Value Ref Range    Sodium 143 135 - 145 mmol/L   Blood gas arterial   Result Value Ref Range    pH Arterial 7.35 7.35 - 7.45    pCO2 Arterial 41 35 - 45 mm Hg    pO2 Arterial 70 (L) 80 - 105 mm Hg    FIO2 100     Bicarbonate Arterial 23 21 - 28 mmol/L    Base Excess/Deficit Arterial -2.4 -3.0 - 3.0 mmol/L    Oswald's Test Artline     Oxyhemoglobin Arterial 92 92 - 100 %    O2 Sat, Arterial 94.8 (L) 96.0 - 97.0 %    Narrative    In healthy individuals, oxyhemoglobin (O2Hb) and oxygen saturation (SO2) are approximately equal. In the presence of dyshemoglobins, oxyhemoglobin can be considerably lower than oxygen saturation.   Osmolality   Result Value Ref Range    Osmolality Blood 304 (H) 275 - 295 mmol/kg    Narrative    Greater than 385 mmol/kg relates to stupor in hyperglycemia   Greater than 400 mmol/kg can relate to seizures   Greater than 420 mmol/kg can be lethal    Serum Osmalar Gap:   Normal <10   Larger suggest unmeasured substances present in serum (ethanol, methanol,  isopropanol, mannitol, ethylene glycol).   Glucose by meter   Result Value Ref Range    GLUCOSE BY METER POCT 121 (H) 70 - 99 mg/dL   Activated clotting time celite, POCT   Result Value Ref Range    Activated Clotting Time (Celite) POCT 153 (H) 74 - 150 seconds   CBC with Platelets & Differential    Narrative    The following orders were created for panel order CBC with Platelets & Differential.  Procedure                               Abnormality         Status                     ---------                               -----------         ------                     CBC with platelets and d...[[id number]]  Abnormal            Final result               Manual Differential[[id number]]          Abnormal            Final result                 Please view results for these tests on the individual orders.   Blood gas arterial   Result Value Ref Range    pH Arterial 7.31 (L) 7.35 - 7.45    pCO2 Arterial 45 35 - 45 mm Hg    pO2 Arterial 68 (L) 80 - 105 mm Hg    FIO2 100     Bicarbonate Arterial 23 21 - 28 mmol/L    Base Excess/Deficit Arterial -3.4 (L) -3.0 - 3.0 mmol/L    Oswald's Test Artline     Oxyhemoglobin Arterial 91 (L) 92 - 100 %    O2 Sat, Arterial 93.3 (L) 96.0 - 97.0 %    Narrative    In healthy individuals, oxyhemoglobin (O2Hb) and oxygen saturation (SO2) are approximately equal. In the presence of dyshemoglobins, oxyhemoglobin can be considerably lower than oxygen saturation.   Ionized Calcium   Result Value Ref Range    Calcium Ionized Whole Blood 4.4 4.4 - 5.2 mg/dL   Lactic acid whole blood   Result Value Ref Range    Lactic Acid 1.2 0.7 - 2.0 mmol/L   Heparin Unfractionated Anti Xa Level   Result Value Ref Range    Anti Xa Unfractionated Heparin 0.54 For Reference Range, See Comment IU/mL    Narrative    Therapeutic Range: UFH: 0.25-0.50 IU/mL for low intensity dosing,  0.30-0.70 IU/mL for high intensity dosing DVT and PE.  This test is not validated for other direct factor X inhibitors (e.g. rivaroxaban,  apixaban, edoxaban, betrixaban, fondaparinux) and should not be used for monitoring of other medications.   INR   Result Value Ref Range    INR 1.10 0.85 - 1.15   Partial thromboplastin time   Result Value Ref Range    aPTT 62 (H) 22 - 38 Seconds   Magnesium CRRT   Result Value Ref Range    Magnesium 2.4 (H) 1.7 - 2.3 mg/dL   Renal panel CRRT   Result Value Ref Range    Sodium 142 135 - 145 mmol/L    Potassium 3.7 3.4 - 5.3 mmol/L    Chloride 110 (H) 98 - 107 mmol/L    Carbon Dioxide (CO2) 20 (L) 22 - 29 mmol/L    Anion Gap 12 7 - 15 mmol/L    Glucose 150 (H) 70 - 99 mg/dL    Urea Nitrogen 23.6 (H) 6.0 - 20.0 mg/dL    Creatinine 1.36 (H) 0.51 - 0.95 mg/dL    GFR Estimate 46 (L) >60 mL/min/1.73m2    Calcium 7.8 (L) 8.8 - 10.4 mg/dL    Albumin 2.3 (L) 3.5 - 5.2 g/dL    Phosphorus 4.1 2.5 - 4.5 mg/dL   CBC with platelets and differential   Result Value Ref Range    WBC Count 13.4 (H) 4.0 - 11.0 10e3/uL    RBC Count 2.63 (L) 3.80 - 5.20 10e6/uL    Hemoglobin 8.4 (L) 11.7 - 15.7 g/dL    Hematocrit 25.8 (L) 35.0 - 47.0 %    MCV 98 78 - 100 fL    MCH 31.9 26.5 - 33.0 pg    MCHC 32.6 31.5 - 36.5 g/dL    RDW 16.1 (H) 10.0 - 15.0 %    Platelet Count 211 150 - 450 10e3/uL    % Neutrophils      % Lymphocytes      % Monocytes      % Eosinophils      % Basophils      % Immature Granulocytes      NRBCs per 100 WBC 1 (H) <1 /100    Absolute Neutrophils      Absolute Lymphocytes      Absolute Monocytes      Absolute Eosinophils      Absolute Basophils      Absolute Immature Granulocytes      Absolute NRBCs 0.2 10e3/uL   Manual Differential   Result Value Ref Range    % Neutrophils 88 %    % Lymphocytes 3 %    % Monocytes 2 %    % Eosinophils 6 %    % Basophils 0 %    % Metamyelocytes 1 %    Absolute Neutrophils 11.8 (H) 1.6 - 8.3 10e3/uL    Absolute Lymphocytes 0.4 (L) 0.8 - 5.3 10e3/uL    Absolute Monocytes 0.3 0.0 - 1.3 10e3/uL    Absolute Eosinophils 0.8 (H) 0.0 - 0.7 10e3/uL    Absolute Basophils 0.0 0.0 - 0.2 10e3/uL     Absolute Metamyelocytes 0.1 (H) <=0.0 10e3/uL    RBC Morphology Confirmed RBC Indices     Platelet Assessment  Automated Count Confirmed. Platelet morphology is normal.     Automated Count Confirmed. Platelet morphology is normal.   Blood gas arterial   Result Value Ref Range    pH Arterial 7.30 (L) 7.35 - 7.45    pCO2 Arterial 46 (H) 35 - 45 mm Hg    pO2 Arterial 66 (L) 80 - 105 mm Hg    FIO2 100     Bicarbonate Arterial 23 21 - 28 mmol/L    Base Excess/Deficit Arterial -3.6 (L) -3.0 - 3.0 mmol/L    Oswald's Test Artline     Oxyhemoglobin Arterial 89 (L) 92 - 100 %    O2 Sat, Arterial 92.2 (L) 96.0 - 97.0 %    Narrative    In healthy individuals, oxyhemoglobin (O2Hb) and oxygen saturation (SO2) are approximately equal. In the presence of dyshemoglobins, oxyhemoglobin can be considerably lower than oxygen saturation.   Sodium   Result Value Ref Range    Sodium 145 135 - 145 mmol/L   Osmolality   Result Value Ref Range    Osmolality Blood 308 (H) 275 - 295 mmol/kg    Narrative    Greater than 385 mmol/kg relates to stupor in hyperglycemia   Greater than 400 mmol/kg can relate to seizures   Greater than 420 mmol/kg can be lethal    Serum Osmalar Gap:   Normal <10   Larger suggest unmeasured substances present in serum (ethanol, methanol, isopropanol, mannitol, ethylene glycol).   Activated clotting time celite, POCT   Result Value Ref Range    Activated Clotting Time (Celite) POCT 170 (H) 74 - 150 seconds   Blood gas arterial   Result Value Ref Range    pH Arterial 7.38 7.35 - 7.45    pCO2 Arterial 37 35 - 45 mm Hg    pO2 Arterial 72 (L) 80 - 105 mm Hg    FIO2 60     Bicarbonate Arterial 22 21 - 28 mmol/L    Base Excess/Deficit Arterial -2.6 -3.0 - 3.0 mmol/L    Oswald's Test Artline     Oxyhemoglobin Arterial 93 92 - 100 %    O2 Sat, Arterial 95.3 (L) 96.0 - 97.0 %    Narrative    In healthy individuals, oxyhemoglobin (O2Hb) and oxygen saturation (SO2) are approximately equal. In the presence of dyshemoglobins,  oxyhemoglobin can be considerably lower than oxygen saturation.   XR Chest Port 1 View    Narrative    Exam: XR CHEST PORT 1 VIEW, 8/11/2024 2:35 AM    Comparison: Radiograph 8/10/2024, 8/9/2024, CT 8/9/2024     History: daily ECMO check    Findings:  Endotracheal tube tip projects over the midthoracic trachea. Superior  and inferior approach ECMO cannula tips project in similar position.  Stable left IJ central venous catheter. Esophageal temperature probe.  Partially visualized feeding tube. Persistent opacification of the  chest with air bronchograms.      Impression    Impression:   1. No significant change in positioning of the support devices  compared to 8/10/2024.  2. Persistent opacification of the chest with air bronchograms.    I have personally reviewed the examination and initial interpretation  and I agree with the findings.    CORTNEY LAU MD         SYSTEM ID:  [id number]   CBC with Platelets & Differential    Narrative    The following orders were created for panel order CBC with Platelets & Differential.  Procedure                               Abnormality         Status                     ---------                               -----------         ------                     CBC with platelets and d...[[id number]]  Abnormal            Final result               Manual Differential[[id number]]          Abnormal            Final result                 Please view results for these tests on the individual orders.   Heparin Unfractionated Anti Xa Level   Result Value Ref Range    Anti Xa Unfractionated Heparin 0.72 For Reference Range, See Comment IU/mL    Narrative    Therapeutic Range: UFH: 0.25-0.50 IU/mL for low intensity dosing,  0.30-0.70 IU/mL for high intensity dosing DVT and PE.  This test is not validated for other direct factor X inhibitors (e.g. rivaroxaban, apixaban, edoxaban, betrixaban, fondaparinux) and should not be used for monitoring of other medications.   Blood gas ELS venous    Result Value Ref Range    pH ELS Venous 7.39 7.32 - 7.43    pCO2 ELS Venous 39 (L) 40 - 50 mm Hg    pO2 ELS Venous 35 25 - 47 mm Hg    Bicarbonate ELS Venous 24 21 - 28 mmol/L    Base Excess/Deficit Venous -1.4 -3.0 - 3.0 mmol/L    FIO2 60     Oxyhemoglobin ELS Venous 67 %    O2 Saturation ELS Venous 68.9 (L) 70.0 - 75.0 %    Narrative    In healthy individuals, oxyhemoglobin (O2Hb) and oxygen saturation (SO2) are approximately equal. In the presence of dyshemoglobins, oxyhemoglobin can be considerably lower than oxygen saturation.   Blood gas ELS arterial   Result Value Ref Range    pH ELS Arterial 7.45 7.35 - 7.45    pCO2 ELS Arterial 31 (L) 35 - 45 mm Hg    pO2 ELS Arterial 372 (H) 80 - 105 mm Hg    Bicarbonate ELS Arterial 21 21 - 28 mmol/L    Base Excess/Deficit Arterial -2.2 -3.0 - 3.0 mmol/L    FIO2 100     Oxyhemoglobin ELS Arterial 98 75 - 100 %    O2 Saturation ELS Arterial >100.0 (H) 96.0 - 97.0 %    Narrative    In healthy individuals, oxyhemoglobin (O2Hb) and oxygen saturation (SO2) are approximately equal. In the presence of dyshemoglobins, oxyhemoglobin can be considerably lower than oxygen saturation.   Fibrinogen activity   Result Value Ref Range    Fibrinogen Activity 593 (H) 170 - 510 mg/dL   INR   Result Value Ref Range    INR 1.11 0.85 - 1.15   Partial thromboplastin time   Result Value Ref Range    aPTT 80 (H) 22 - 38 Seconds   Antithrombin III   Result Value Ref Range    Antithrombin III 68 (L) 85 - 135 %   D dimer quantitative   Result Value Ref Range    D-Dimer Quantitative 5.00 (H) 0.00 - 0.50 ug/mL FEU    Narrative    This D-dimer assay is intended for use in conjunction with a clinical pretest probability assessment model to exclude pulmonary embolism (PE) and deep venous thrombosis (DVT) in outpatients suspected of PE or DVT. The cut-off value is 0.50 ug/mL FEU.    For patients 50 years of age or older, the application of age-adjusted cut-off values for D-Dimer may increase the  specificity without significant effect on sensitivity. The literature suggested calculation age adjusted cut-off in ug/L = age in years x 10 ug/L. The results in this laboratory are reported as ug/mL rather than ug/L. The calculation for age adjusted cut off in ug/mL= age in years x 0.01 ug/mL. For example, the cut off for a 76 year old male is 76 x 0.01 ug/mL = 0.76 ug/mL (760 ug/L).    M Emelyn et al. Age adjusted D-dimer cut-off levels to rule out pulmonary embolism: The ADJUST-PE Study. JONATHON 2014;311:9028-0288.; HJ Whitney et al. Diagnostic accuracy of conventional or age adjusted D-dimer cutoff values in older patients with suspected venous thromboembolism. Systemic review and meta-analysis. BMJ 2013:346:f2492.   Hemoglobin plasma   Result Value Ref Range    Hemoglobin Plasma 90 (H) <30 mg/dL   Magnesium CRRT   Result Value Ref Range    Magnesium 2.5 (H) 1.7 - 2.3 mg/dL   Renal panel CRRT   Result Value Ref Range    Sodium 147 (H) 135 - 145 mmol/L    Potassium 3.7 3.4 - 5.3 mmol/L    Chloride 114 (H) 98 - 107 mmol/L    Carbon Dioxide (CO2) 21 (L) 22 - 29 mmol/L    Anion Gap 12 7 - 15 mmol/L    Glucose 132 (H) 70 - 99 mg/dL    Urea Nitrogen 24.7 (H) 6.0 - 20.0 mg/dL    Creatinine 1.29 (H) 0.51 - 0.95 mg/dL    GFR Estimate 49 (L) >60 mL/min/1.73m2    Calcium 7.5 (L) 8.8 - 10.4 mg/dL    Albumin 2.3 (L) 3.5 - 5.2 g/dL    Phosphorus 3.6 2.5 - 4.5 mg/dL   Triglycerides   Result Value Ref Range    Triglycerides 420 (H) <150 mg/dL   Osmolality   Result Value Ref Range    Osmolality Blood 307 (H) 275 - 295 mmol/kg    Narrative    Greater than 385 mmol/kg relates to stupor in hyperglycemia   Greater than 400 mmol/kg can relate to seizures   Greater than 420 mmol/kg can be lethal    Serum Osmalar Gap:   Normal <10   Larger suggest unmeasured substances present in serum (ethanol, methanol, isopropanol, mannitol, ethylene glycol).   ALT   Result Value Ref Range    ALT 21 0 - 50 U/L   AST   Result Value Ref Range    AST 47  (H) 0 - 45 U/L   Alkaline phosphatase   Result Value Ref Range    Alkaline Phosphatase 318 (H) 40 - 150 U/L   Bilirubin direct   Result Value Ref Range    Bilirubin Direct 0.50 (H) 0.00 - 0.30 mg/dL   Bilirubin  total   Result Value Ref Range    Bilirubin Total 0.6 <=1.2 mg/dL   Protein total   Result Value Ref Range    Protein Total 4.9 (L) 6.4 - 8.3 g/dL   Sodium   Result Value Ref Range    Sodium 147 (H) 135 - 145 mmol/L   CBC with platelets and differential   Result Value Ref Range    WBC Count 10.0 4.0 - 11.0 10e3/uL    RBC Count 2.62 (L) 3.80 - 5.20 10e6/uL    Hemoglobin 8.1 (L) 11.7 - 15.7 g/dL    Hematocrit 25.3 (L) 35.0 - 47.0 %    MCV 97 78 - 100 fL    MCH 30.9 26.5 - 33.0 pg    MCHC 32.0 31.5 - 36.5 g/dL    RDW 16.1 (H) 10.0 - 15.0 %    Platelet Count 181 150 - 450 10e3/uL    % Neutrophils      % Lymphocytes      % Monocytes      % Eosinophils      % Basophils      % Immature Granulocytes      NRBCs per 100 WBC 2 (H) <1 /100    Absolute Neutrophils      Absolute Lymphocytes      Absolute Monocytes      Absolute Eosinophils      Absolute Basophils      Absolute Immature Granulocytes      Absolute NRBCs 0.2 10e3/uL   Manual Differential   Result Value Ref Range    % Neutrophils 81 %    % Lymphocytes 7 %    % Monocytes 4 %    % Eosinophils 6 %    % Basophils 0 %    % Myelocytes 2 %    NRBCs per 100 WBC 3 (H) <=0 %    Absolute Neutrophils 8.1 1.6 - 8.3 10e3/uL    Absolute Lymphocytes 0.7 (L) 0.8 - 5.3 10e3/uL    Absolute Monocytes 0.4 0.0 - 1.3 10e3/uL    Absolute Eosinophils 0.6 0.0 - 0.7 10e3/uL    Absolute Basophils 0.0 0.0 - 0.2 10e3/uL    Absolute Myelocytes 0.2 (H) <=0.0 10e3/uL    Absolute NRBCs 0.3 (H) <=0.0 10e3/uL    RBC Morphology Confirmed RBC Indices     Platelet Assessment  Automated Count Confirmed. Platelet morphology is normal.     Automated Count Confirmed. Platelet morphology is normal.   Blood gas arterial   Result Value Ref Range    pH Arterial 7.40 7.35 - 7.45    pCO2 Arterial 36 35 - 45  mm Hg    pO2 Arterial 72 (L) 80 - 105 mm Hg    FIO2 60     Bicarbonate Arterial 23 21 - 28 mmol/L    Base Excess/Deficit Arterial -2.1 -3.0 - 3.0 mmol/L    Oswald's Test Artline     Oxyhemoglobin Arterial 93 92 - 100 %    O2 Sat, Arterial 95.9 (L) 96.0 - 97.0 %    Narrative    In healthy individuals, oxyhemoglobin (O2Hb) and oxygen saturation (SO2) are approximately equal. In the presence of dyshemoglobins, oxyhemoglobin can be considerably lower than oxygen saturation.   Lactic acid whole blood   Result Value Ref Range    Lactic Acid 1.0 0.7 - 2.0 mmol/L   Calcium Ionized CRRT   Result Value Ref Range    Calcium Ionized Whole Blood 4.4 4.4 - 5.2 mg/dL   Blood gas venous   Result Value Ref Range    pH Venous 7.36 7.32 - 7.43    pCO2 Venous 43 40 - 50 mm Hg    pO2 Venous 30 25 - 47 mm Hg    Bicarbonate Venous 24 21 - 28 mmol/L    Base Excess/Deficit Venous -1.4 -3.0 - 3.0 mmol/L    FIO2 60     Oxyhemoglobin Venous 54 (L) 70 - 75 %    O2 Sat, Venous 55.8 (L) 70.0 - 75.0 %    Narrative    In healthy individuals, oxyhemoglobin (O2Hb) and oxygen saturation (SO2) are approximately equal. In the presence of dyshemoglobins, oxyhemoglobin can be considerably lower than oxygen saturation.   Activated clotting time celite, POCT   Result Value Ref Range    Activated Clotting Time (Celite) POCT 182 (H) 74 - 150 seconds   Blood gas arterial   Result Value Ref Range    pH Arterial 7.37 7.35 - 7.45    pCO2 Arterial 39 35 - 45 mm Hg    pO2 Arterial 77 (L) 80 - 105 mm Hg    FIO2 60     Bicarbonate Arterial 22 21 - 28 mmol/L    Base Excess/Deficit Arterial -2.7 -3.0 - 3.0 mmol/L    Oswald's Test Artline     Oxyhemoglobin Arterial 93 92 - 100 %    O2 Sat, Arterial 96.1 96.0 - 97.0 %    Narrative    In healthy individuals, oxyhemoglobin (O2Hb) and oxygen saturation (SO2) are approximately equal. In the presence of dyshemoglobins, oxyhemoglobin can be considerably lower than oxygen saturation.   Sodium   Result Value Ref Range    Sodium  150 (H) 135 - 145 mmol/L   Activated clotting time celite, POCT   Result Value Ref Range    Activated Clotting Time (Celite) POCT 173 (H) 74 - 150 seconds   Sodium   Result Value Ref Range    Sodium 149 (H) 135 - 145 mmol/L   Osmolality   Result Value Ref Range    Osmolality Blood 314 (H) 275 - 295 mmol/kg    Narrative    Greater than 385 mmol/kg relates to stupor in hyperglycemia   Greater than 400 mmol/kg can relate to seizures   Greater than 420 mmol/kg can be lethal    Serum Osmalar Gap:   Normal <10   Larger suggest unmeasured substances present in serum (ethanol, methanol, isopropanol, mannitol, ethylene glycol).   Blood gas arterial   Result Value Ref Range    pH Arterial 7.36 7.35 - 7.45    pCO2 Arterial 41 35 - 45 mm Hg    pO2 Arterial 76 (L) 80 - 105 mm Hg    FIO2 60     Bicarbonate Arterial 23 21 - 28 mmol/L    Base Excess/Deficit Arterial -2.4 -3.0 - 3.0 mmol/L    Oswald's Test Artline     Oxyhemoglobin Arterial 93 92 - 100 %    O2 Sat, Arterial 95.9 (L) 96.0 - 97.0 %    Peep 10 cm H2O    Narrative    In healthy individuals, oxyhemoglobin (O2Hb) and oxygen saturation (SO2) are approximately equal. In the presence of dyshemoglobins, oxyhemoglobin can be considerably lower than oxygen saturation.   Activated clotting time celite, POCT   Result Value Ref Range    Activated Clotting Time (Celite) POCT 178 (H) 74 - 150 seconds   CBC with Platelets & Differential    Narrative    The following orders were created for panel order CBC with Platelets & Differential.  Procedure                               Abnormality         Status                     ---------                               -----------         ------                     CBC with platelets and d...[[id number]]  Abnormal            Final result               Manual Differential[[id number]]          Abnormal            Final result                 Please view results for these tests on the individual orders.   Heparin Unfractionated Anti Xa Level    Result Value Ref Range    Anti Xa Unfractionated Heparin 0.79 For Reference Range, See Comment IU/mL    Narrative    Therapeutic Range: UFH: 0.25-0.50 IU/mL for low intensity dosing,  0.30-0.70 IU/mL for high intensity dosing DVT and PE.  This test is not validated for other direct factor X inhibitors (e.g. rivaroxaban, apixaban, edoxaban, betrixaban, fondaparinux) and should not be used for monitoring of other medications.   INR   Result Value Ref Range    INR 1.14 0.85 - 1.15   Partial thromboplastin time   Result Value Ref Range    aPTT 96 (H) 22 - 38 Seconds   Magnesium CRRT   Result Value Ref Range    Magnesium 2.5 (H) 1.7 - 2.3 mg/dL   Renal panel CRRT   Result Value Ref Range    Sodium 149 (H) 135 - 145 mmol/L    Potassium 3.7 3.4 - 5.3 mmol/L    Chloride 116 (H) 98 - 107 mmol/L    Carbon Dioxide (CO2) 22 22 - 29 mmol/L    Anion Gap 11 7 - 15 mmol/L    Glucose 116 (H) 70 - 99 mg/dL    Urea Nitrogen 23.5 (H) 6.0 - 20.0 mg/dL    Creatinine 1.28 (H) 0.51 - 0.95 mg/dL    GFR Estimate 50 (L) >60 mL/min/1.73m2    Calcium 7.6 (L) 8.8 - 10.4 mg/dL    Albumin 2.3 (L) 3.5 - 5.2 g/dL    Phosphorus 3.9 2.5 - 4.5 mg/dL   ABO/Rh type and screen    Narrative    The following orders were created for panel order ABO/Rh type and screen.  Procedure                               Abnormality         Status                     ---------                               -----------         ------                     Adult Type and Screen[[id number]]                            Final result                 Please view results for these tests on the individual orders.   Partial thromboplastin time   Result Value Ref Range    aPTT 93 (H) 22 - 38 Seconds   CBC with platelets and differential   Result Value Ref Range    WBC Count 12.6 (H) 4.0 - 11.0 10e3/uL    RBC Count 2.51 (L) 3.80 - 5.20 10e6/uL    Hemoglobin 7.9 (L) 11.7 - 15.7 g/dL    Hematocrit 24.4 (L) 35.0 - 47.0 %    MCV 97 78 - 100 fL    MCH 31.5 26.5 - 33.0 pg    MCHC 32.4 31.5  - 36.5 g/dL    RDW 16.3 (H) 10.0 - 15.0 %    Platelet Count 192 150 - 450 10e3/uL    % Neutrophils      % Lymphocytes      % Monocytes      % Eosinophils      % Basophils      % Immature Granulocytes      NRBCs per 100 WBC 2 (H) <1 /100    Absolute Neutrophils      Absolute Lymphocytes      Absolute Monocytes      Absolute Eosinophils      Absolute Basophils      Absolute Immature Granulocytes      Absolute NRBCs 0.2 10e3/uL   Adult Type and Screen   Result Value Ref Range    ABO/RH(D) O NEG     Antibody Screen Negative Negative    SPECIMEN EXPIRATION DATE [id number]    Manual Differential   Result Value Ref Range    % Neutrophils 75 %    % Lymphocytes 9 %    % Monocytes 7 %    % Eosinophils 6 %    % Basophils 1 %    % Myelocytes 2 %    NRBCs per 100 WBC 4 (H) <=0 %    Absolute Neutrophils 9.5 (H) 1.6 - 8.3 10e3/uL    Absolute Lymphocytes 1.1 0.8 - 5.3 10e3/uL    Absolute Monocytes 0.9 0.0 - 1.3 10e3/uL    Absolute Eosinophils 0.8 (H) 0.0 - 0.7 10e3/uL    Absolute Basophils 0.1 0.0 - 0.2 10e3/uL    Absolute Myelocytes 0.3 (H) <=0.0 10e3/uL    Absolute NRBCs 0.5 (H) <=0.0 10e3/uL    RBC Morphology Confirmed RBC Indices     Platelet Assessment  Automated Count Confirmed. Platelet morphology is normal.     Automated Count Confirmed. Platelet morphology is normal.    Polychromasia Slight (A) None Seen   Blood gas arterial   Result Value Ref Range    pH Arterial 7.36 7.35 - 7.45    pCO2 Arterial 42 35 - 45 mm Hg    pO2 Arterial 70 (L) 80 - 105 mm Hg    FIO2 60     Bicarbonate Arterial 24 21 - 28 mmol/L    Base Excess/Deficit Arterial -1.8 -3.0 - 3.0 mmol/L    Oswald's Test Artline     Oxyhemoglobin Arterial 92 92 - 100 %    O2 Sat, Arterial 94.6 (L) 96.0 - 97.0 %    Peep 10 cm H2O    Narrative    In healthy individuals, oxyhemoglobin (O2Hb) and oxygen saturation (SO2) are approximately equal. In the presence of dyshemoglobins, oxyhemoglobin can be considerably lower than oxygen saturation.   Ionized Calcium   Result  Value Ref Range    Calcium Ionized Whole Blood 4.4 4.4 - 5.2 mg/dL   Lactic acid whole blood   Result Value Ref Range    Lactic Acid 0.9 0.7 - 2.0 mmol/L   Blood gas arterial   Result Value Ref Range    pH Arterial 7.34 (L) 7.35 - 7.45    pCO2 Arterial 44 35 - 45 mm Hg    pO2 Arterial 75 (L) 80 - 105 mm Hg    FIO2 60     Bicarbonate Arterial 23 21 - 28 mmol/L    Base Excess/Deficit Arterial -2.3 -3.0 - 3.0 mmol/L    Oswald's Test Artline     Oxyhemoglobin Arterial 93 92 - 100 %    O2 Sat, Arterial 95.4 (L) 96.0 - 97.0 %    Peep 10 cm H2O    Narrative    In healthy individuals, oxyhemoglobin (O2Hb) and oxygen saturation (SO2) are approximately equal. In the presence of dyshemoglobins, oxyhemoglobin can be considerably lower than oxygen saturation.   Sodium   Result Value Ref Range    Sodium 147 (H) 135 - 145 mmol/L   Osmolality   Result Value Ref Range    Osmolality Blood 306 (H) 275 - 295 mmol/kg    Narrative    Greater than 385 mmol/kg relates to stupor in hyperglycemia   Greater than 400 mmol/kg can relate to seizures   Greater than 420 mmol/kg can be lethal    Serum Osmalar Gap:   Normal <10   Larger suggest unmeasured substances present in serum (ethanol, methanol, isopropanol, mannitol, ethylene glycol).   XR Abdomen Port 1 View    Narrative    Exam: XR ABDOMEN PORT 1 VIEW, 8/11/2024 2:40 PM    Indication: NG repositioned to be NJ    Comparison: CT chest abdomen pelvis 8/9/2024.    Findings:   Frontal supine radiograph of the abdomen. Right lower quadrant ostomy  device with stable positioning of leads traversing the sacral foramina  and terminating in the right hemipelvis. Right lower approach ECMO  catheter with tip outside of the field of view. Feeding tube with tip  projecting postpyloric, in the proximal jejunum.    Nonobstructive bowel gas pattern, gasless abdomen no portal venous gas  or pneumatosis. No acute osseous abnormalities, mild degenerative  changes of the lumbar spine      Impression     Impression:   1. Nonobstructive bowel gas pattern.  2. Interval advancement of a feeding tube, which now projects  postpyloric with tip in proximal jejunum. Remainder of support devices  are stable.    I have personally reviewed the examination and initial interpretation  and I agree with the findings.    PILAR SAHU MD         SYSTEM ID:  [id number]   Blood gas arterial   Result Value Ref Range    pH Arterial 7.33 (L) 7.35 - 7.45    pCO2 Arterial 43 35 - 45 mm Hg    pO2 Arterial 71 (L) 80 - 105 mm Hg    FIO2 60     Bicarbonate Arterial 23 21 - 28 mmol/L    Base Excess/Deficit Arterial -3.2 (L) -3.0 - 3.0 mmol/L    Oswald's Test Artline     Oxyhemoglobin Arterial 91 (L) 92 - 100 %    O2 Sat, Arterial 94.5 (L) 96.0 - 97.0 %    Peep 10 cm H2O    Narrative    In healthy individuals, oxyhemoglobin (O2Hb) and oxygen saturation (SO2) are approximately equal. In the presence of dyshemoglobins, oxyhemoglobin can be considerably lower than oxygen saturation.   Partial thromboplastin time   Result Value Ref Range    aPTT 41 (H) 22 - 38 Seconds       All relevant imaging and laboratory values personally reviewed.

## 2024-08-11 NOTE — PLAN OF CARE
[id number]: sedated on versed 4mg, prop @ 30, Dilaudid @ 1. Paralyzed Cis @ 5.5 (still getting 4/4 twitches but compliant with ventilator). BIS 20s even with going down on sedation. Pupils equal/reactive. NPI q 2hrs per neuro-updated if below 2. R) pupil always slightly lower NPI than L).  Beginning of shift, 2, 23% boluses given. 3% NaCl given x2 (250cc each) goal Na 150-155. Na checks q 2 hrs. On eeg.   SR, rates 80s. Afebrile. Levo 0.01-0.06. edematous. Pulses with doppler. ETT 23 @ lip-cuff leak continues, passed onto SICU for possible tube exchange today. Pressure control + assist. RR 16, PEEP 10, pressure support 25, FiO2 100 at start of shift, titrated down to 60%. Patient pulling TV 20-30s throughout shift. Scant secretions, LS diminished. Sweep @ 6 RPMs 3200, 4.5L flow. ACT within goal, heparin maxed at 2400 units/hr. ACT goal 160-180. Rectal tube with 1L out since placement. Gonzalez with no UO. NG @ 68-TF @ 35cc/hr, FWF 30Q4. CRRT goal [id number] goal. Post bag 2% NaCl. Skin unchanged see flowsheets. L) CVC-prop/versed/dilaudid, SL, levo w/carrier. R) PIV cis. R) PIV TKO. L) PIV SL for VBGs.   Will continue to monitor and follow POC.

## 2024-08-11 NOTE — PROGRESS NOTES
CRRT STATUS NOTE    DATA:  Time:  6:00 AM  Pressures WNL:  YES  Filter Status:  WDL    Problems Reported/Alarms Noted:  None.    Supplies Present:  YES    ASSESSMENT:  Patient Net Fluid Balance:  Net -639 ml @ 0600.  Vital Signs:  HR 79, /42, MAP 63  Labs:  K 3.7, Mg 2.5, Phos 3.6, iCa 4.4, Hgb 8.1, Plt 181  Goals of Therapy:  0-100ml/hr as tolerated.    INTERVENTIONS:   None.    PLAN:  Continue to monitor circuit daily and change set q72 hours or PRN for clotting/clogging. Please call CRRT RN with any quesitons/problems.

## 2024-08-11 NOTE — PROGRESS NOTES
Nephrology Progress Note    Massiel Flaherty MRN:[id number] YOB: 1970  Date of Admission:8/8/2024  Primary care provider: Miranda Yoder  Requesting physician: Barry Hatch MD    ASSESSMENT AND RECOMMENDATIONS:    53 year old female with a past medical hx of Anxiety/depression, fibromyalgia, hypothyroidism, asthma, HLD, MURIEL on CPAP, expressive aphasia, and hypertension    who was admitted to an OSH with community acquired pneumonia on 8/3 s/p intubation for severe ARDS requiring paralysis and proning, transferred here on 8/8 for management and initiated on CKRT for severe acidemia in the setting of oligoanuric CHACORTA    #1 CHACORTA oligoanuric secondary to severe septic shock and ARDS. Renal function normal at baseline with a creatinine level at 0.6 mg/dL on 8/1/2024. Would obtain renal imaging when feasible along with urine studies (uACR and uPCR). Pursue for now CKRT using a 25 mL/kg dose and aim at I=O  Dose all her meds per CKRT protocol  - acidosis corrected/controlled.   - continue CKRT at current prescription, including hypertonic NaCl post filter.  Will adjust that dose as needed. It is essentially at goal of 150 mEq/l    Recommendations were communicated to primary team via note    I spent a total of 50 minutes on the date of this encounter in reviewing the medical records, face-to-face evaluation and physical exam, review of labs, counseling, orders, care coordination and documentation    Marlon Armenta MD   Division of Renal Disease and Hypertension  [facility]  [employer]  Vocera Web Console      REASON FOR CONSULT: CHACORTA    HISTORY OF PRESENT ILLNESS:  Admitting provider and nursing notes reviewed  Massiel Flaherty is a 53 year old female with a past medical hx of Anxiety/depression, fibromyalgia, hypothyroidism, asthma, HLD, MURIEL on CPAP, spells, off on anti seizure medications, expressive aphasia, and hypertension    who was admitted to an OSH with community acquired pneumonia on 8/3.  She was intubated 8/6 and developed severe ARDS requiring paralysis and proning . She was transferred here on 8/8 for management. On arrival, patient was difficult to ventilate with prominent respiratory acidosis (pH 7.03) and hypercapnia. Notable ARDS with P/F of 78.   She also had elevated plateau pressures (40-45 cmH2O). Her pH improved to 7.26 after she was cannulated with VV ECMO and her PCO2 dropped from 91 to 77 to 55 to 38.  Fio2 also went down from 100% to 50%. As she was oliguric and with some evidence of volume overload she was initiated on CKRT  with 4k bath, 25ml/kg/hr with net goal of 0-100ml/hr as tolerated     August 11, 2024  Interval history  Provider notes and events of last 24 hours reviewed.  Patient remains on ECMO.  FiO2 increased to 100%, PEEP 10  On Norepi gtt, with some BP lability.  Remains anuric.  Request from ICU team to help increase serum Na to ~ 150 for management of cerebral swelling.   Post filter replacement changed to 2% NaCl at 100 ml/hr after 10 pm last night.  S Na increased from 137  yest pm to 147 -> 149-150 this morning.         MEDICATIONS:  Current Meds  Current Facility-Administered Medications   Medication Dose Route Frequency Provider Last Rate Last Admin    acetaminophen (TYLENOL) tablet 650 mg  650 mg Oral Q24H Aniceto Adame MD   650 mg at 08/10/24 1417    dextrose 5 % flush PRE/POST medication  10-20 mL Intravenous Q24H Barry Hatch MD        And    amphotericin B (FUNGIZONE) 88.9 mg in D5W 500 mL IVPB  1 mg/kg (Dosing Weight) Intravenous Q24H Barry Hatch MD        And    dextrose 5 % flush PRE/POST medication  10-20 mL Intravenous Q24H Barry Hatch MD        artificial tears ophthalmic ointment   Both Eyes Q8H Alethea Schaffer MD   Given at 08/11/24 1031    ceFEPIme (MAXIPIME) 2 g vial to attach to  mL bag for ADULTS or NS 50 mL bag for PEDS  2 g Intravenous Q12H Barry Hatch MD   2 g at 08/10/24 4267     chlorhexidine (PERIDEX) 0.12 % solution 15 mL  15 mL Mouth/Throat Q12H Giovanny Guidry PA-C   15 mL at 08/11/24 0846    diphenhydrAMINE (BENADRYL) capsule 25 mg  25 mg Oral Q24H Aniceto Adame MD        Or    diphenhydrAMINE (BENADRYL) injection 25 mg  25 mg Intravenous Q24H Aniceto Adame MD   25 mg at 08/10/24 1419    levothyroxine (SYNTHROID/LEVOTHROID) tablet 25 mcg  25 mcg Per Feeding Tube QAM  Giovanny Guidry PA-C   25 mcg at 08/11/24 0845    metroNIDAZOLE (FLAGYL) tablet 500 mg  500 mg Oral or Feeding Tube TID Dong Rico PA-C        multivitamin RENAL (RENAVITE RX/NEPHROVITE) tablet 1 tablet  1 tablet Oral or Feeding Tube Daily Barry Hatch MD   1 tablet at 08/11/24 1030    pantoprazole (PROTONIX) 2 mg/mL suspension 40 mg  40 mg Per Feeding Tube QAM  Giovanny Guidry PA-C        Or    pantoprazole (PROTONIX) IV push injection 40 mg  40 mg Intravenous QAM  Giovanny Guidry PA-C   40 mg at 08/11/24 0845    polyethylene glycol (MIRALAX) Packet 17 g  17 g Per Feeding Tube BID Dong Rico PA-C   17 g at 08/10/24 0810    Prosource TF20 ENfit Compatibl EN LIQD (PROSOURCE TF20) packet 60 mL  1 packet Per Feeding Tube TID Giovanny Guidry PA-C   60 mL at 08/10/24 2025    senna-docusate (SENOKOT-S/PERICOLACE) 8.6-50 MG per tablet 2 tablet  2 tablet Oral or Feeding Tube BID Dong Rico PA-C   2 tablet at 08/10/24 2025    sodium chloride 0.9% BOLUS 250 mL  250 mL Intravenous Q24H Barry Hatch  mL/hr at 08/10/24 1423 250 mL at 08/10/24 1423     Infusion Meds  Current Facility-Administered Medications   Medication Dose Route Frequency Provider Last Rate Last Admin    bivalirudin (ANGIOMAX) 250 mg in sodium chloride 0.9 % 250 mL ANTICOAGULANT infusion  0-0.3 mg/kg/hr (Dosing Weight) Intravenous Continuous Dong Rico PA-C        cisatracurium (NIMBEX) 200 mg in D5W 100 mL infusion  3-10 mcg/kg/min (Ideal) Intravenous Continuous  Alethea Schaffer MD 8.3 mL/hr at 08/11/24 1000 5.5 mcg/kg/min at 08/11/24 1000    dextrose 10% infusion   Intravenous Continuous PRN Giovanny Guidry PA-C        dialysate for CVVHD & CVVHDF (Phoxillum BK4/2.5)  12.5 mL/kg/hr CRRT Continuous Sony Castaneda MD 1,100 mL/hr at 08/11/24 0605 12.5 mL/kg/hr at 08/11/24 0605    epoprostenol (VELETRI) inhalation solution  20 ng/kg/min (Ideal) Nebulization Continuous Dong Rico PA-C        HYDROmorphone (DILAUDID) 0.2 mg/mL infusion ADULT/PEDS GREATER than or EQUAL to 20 kg  0.3-1 mg/hr Intravenous Continuous Giovanny Guidry PA-C 5 mL/hr at 08/11/24 1000 1 mg/hr at 08/11/24 1000    propofol (DIPRIVAN) infusion  5-75 mcg/kg/min (Dosing Weight) Intravenous Continuous Giovanny Guidry PA-C 16 mL/hr at 08/11/24 1000 30 mcg/kg/min at 08/11/24 1000    And    Medication Instruction   Does not apply Continuous PRN Giovanny Guidry PA-C        midazolam (VERSED) 100 mg/100 mL NS infusion - ADULT  1-8 mg/hr Intravenous Continuous Aniceto Adame MD 8 mL/hr at 08/11/24 1000 8 mg/hr at 08/11/24 1000    No heparin required   Does not apply Continuous PRN Sony Castaneda MD        norepinephrine (LEVOPHED) 16 mg in  mL infusion MAX CONC CENTRAL LINE  0.01-0.6 mcg/kg/min (Dosing Weight) Intravenous Continuous Giovnany Guidry PA-C 2.5 mL/hr at 08/11/24 1000 0.03 mcg/kg/min at 08/11/24 1000    POST-filter replacement solution for CVVHD & CVVHDF (2% sodium chloride)   Intravenous Continuous Barry Hatch  mL/hr at 08/11/24 0900 Rate Verify at 08/11/24 0900    PRE-filter replacement solution for CVVHD & CVVHDF (Phoxillum BK4/2.5)  12.5 mL/kg/hr CRRT Continuous Sony Castaneda MD 1,100 mL/hr at 08/11/24 0605 12.5 mL/kg/hr at 08/11/24 0605       ALLERGIES:    Allergies   Allergen Reactions    Celecoxib Unknown    Sulfa Antibiotics Unknown    Tetracycline Unknown       REVIEW OF SYSTEMS:  A comprehensive of systems was negative except as noted  "above.    PHYSICAL EXAM:   Temp  Av.6  F (37  C)  Min: 97.7  F (36.5  C)  Max: 100  F (37.8  C)  Arterial Line BP  Min: 65/45  Max: 185/94  Arterial Line MAP (mmHg)  Av.5 mmHg  Min: 53 mmHg  Max: 177 mmHg      Pulse  Av.4  Min: 75  Max: 127 Resp  Av.1  Min: 16  Max: 30  FiO2 (%)  Av %  Min: 40 %  Max: 100 %  SpO2  Av.7 %  Min: 41 %  Max: 100 %       Pulse 81   Temp 98.4  F (36.9  C)   Resp 16   Ht 1.575 m (5' 2\")   Wt 92.5 kg (203 lb 14.8 oz)   SpO2 96%   BMI 37.30 kg/m     Date 24 07 - 08/10/24 0659   Shift 4208-0652 5397-2602 0162-5770 24 Hour Total   INTAKE   I.V. 175.63   175.63   NG/   115   Shift Total(mL/kg) 290.63(3.13)   290.63(3.13)   OUTPUT   Urine 10   10   Emesis/NG output 150   150   Other 272   272   Shift Total(mL/kg) 432(4.65)   432(4.65)   Weight (kg) 93 93 93 93      Admit Weight: 88.9 kg (195 lb 15.8 oz)     GENERAL APPEARANCE: Intubated and sedated and paralyzed  Pulmonary: lungs decreased BS over the bases  CV: regular rhythm, normal rate, no rub   - Edema 1-2+ diffuse  GI: soft, nontender, normal bowel sounds  MS: no evidence of inflammation in joints, no muscle tenderness  : rolle in place  SKIN:  warm, dry, no cyanosis, echymoses over lower ext.  NEURO: unable to assess     LABS:   I have reviewed the following labs:  CMP  Recent Labs   Lab 24  0800 24  0557 24  0405 08/10/24  2358 08/10/24  2220 08/10/24  1957 08/10/24  1951 08/10/24  1551 08/10/24  1545 08/10/24  0947 08/10/24  0424 08/10/24  0412 24  0346 24  0339 24  1642 24  1641   * 150* 147*  147* 145 142  --    < >  --  137 140  136  --  137   < > 139   < > 140   POTASSIUM  --   --  3.7  --  3.7  --   --   --  3.6 3.9  --  4.0   < > 4.5   < > 5.8*   CHLORIDE  --   --  114*  --  110*  --   --   --  104 102  --  104   < > 107   < > 106   CO2  --   --  21*  --  20*  --   --   --  20* 21*  --  21*   < > 18*   < > 20*   ANIONGAP  --   --  " 12  --  12  --   --   --  13 13  --  12   < > 14   < > 14   GLC  --   --  132*  --  150* 121*  --  184* 208* 117*   < > 111*   < > 81   < > 153*   BUN  --   --  24.7*  --  23.6*  --   --   --  25.3* 24.7*  --  25.6*   < > 39.7*   < > 42.5*   CR  --   --  1.29*  --  1.36*  --   --   --  1.50* 1.51*  --  1.71*   < > 2.82*   < > 3.28*   GFRESTIMATED  --   --  49*  --  46*  --   --   --  41* 41*  --  35*   < > 19*   < > 16*   QUAN  --   --  7.5*  --  7.8*  --   --   --  7.5* 8.2*  --  7.6*   < > 7.9*   < > 8.9   MAG  --   --  2.5*  --  2.4*  --   --   --  2.3 2.3  --  2.3   < > 2.5*   < > 2.8*   PHOS  --   --  3.6  --  4.1  --   --   --  4.4 4.2  --  3.7   < > 5.2*   < > 8.1*   PROTTOTAL  --   --  4.9*  --   --   --   --   --   --   --   --  4.8*  --  5.1*  --  6.6   ALBUMIN  --   --  2.3*  --  2.3*  --   --   --  2.3* 2.2*  --  2.2*   < > 2.4*  --  3.0*   BILITOTAL  --   --  0.6  --   --   --   --   --   --   --   --  0.7  --  0.3  --  0.6   ALKPHOS  --   --  318*  --   --   --   --   --   --   --   --  376*  --  326*  --  500*   AST  --   --  47*  --   --   --   --   --   --   --   --  69*  --   --   --  76*   ALT  --   --  21  --   --   --   --   --   --   --   --  25  --  25  --  37    < > = values in this interval not displayed.     CBC  Recent Labs   Lab 08/11/24  1021 08/11/24  0405 08/10/24  2220 08/10/24  1545   HGB 7.9* 8.1* 8.4* 6.9*   WBC 12.6* 10.0 13.4* 11.3*   RBC 2.51* 2.62* 2.63* 2.22*   HCT 24.4* 25.3* 25.8* 22.4*   MCV 97 97 98 101*   MCH 31.5 30.9 31.9 31.1   MCHC 32.4 32.0 32.6 30.8*   RDW 16.3* 16.1* 16.1* 14.5    181 211 215     INR  Recent Labs   Lab 08/11/24  0405 08/10/24  2220 08/10/24  1545 08/10/24  0947   INR 1.11 1.10 1.06 1.10   PTT 80* 62* 35 56*     ABG  Recent Labs   Lab 08/11/24  1029 08/11/24  0801 08/11/24  0557 08/11/24  0407 08/11/24  0406   PH 7.36 7.36 7.37  --  7.40   PCO2 42 41 39  --  36   PO2 70* 76* 77*  --  72*   HCO3 24 23 22  --  23   O2PER 60 60 60 60 60       URINE STUDIES  Recent Labs   Lab Test 08/08/24  1725   COLOR Yellow   APPEARANCE Slightly Cloudy*   URINEGLC 30*   URINEBILI Negative   URINEKETONE Negative   SG 1.019   UBLD Moderate*   URINEPH 5.5   PROTEIN 100*   NITRITE Negative   LEUKEST Trace*   RBCU 31*   WBCU 7*       Marlon Armenta MD

## 2024-08-11 NOTE — PROGRESS NOTES
[facility]    ECLS Shift Summary: 12D     ECMO Equipment:  Console Serial Number: [id number]  Circuit Lot Number: not recorded by Perfusion  Oxygenator Lot Number: [id number]    Circuit Assessment: Fibrin  Fibrin Location: connectors & each corner of oxygenator    Venous Drainage ECMO Cannula: 25 Fr in the Right Femoral Vein  Venous Return ECMO Cannula: 17 Fr in the Right Internal Jugular Vein      ECMO Cannula Arterial Right internal jugular-Site Assessment: WDL  ECMO Cannula Venous Right femoral vein-Site Assessment: WDL  ECMO Cannula Arterial Right internal jugular-Site Intervention: No intervention needed  ECMO Cannula Venous Right femoral vein-Site Intervention: No intervention needed    Patient remains on V-V ECMO, all equipment is functioning and alarms are appropriately set. RPM's: 3550 with Blood Flow (Circuit) LPM  Av.5 LPM  Min: 4.45 LPM  Max: 4.56 LPM L/min. Sweep is at 6 LPM and 100 %. Extremities are warm and pale.     Significant Shift Events: pt switched from heparin to Bivalirudin for anticoagulation.  Now following PTT with goal 44-88.      Vent settings:  Vent Mode: PCV Plus assist  FiO2 (%): 60 %  Resp Rate (Set): 16 breaths/min  PEEP (cm H2O): 10 cmH2O  Inspiratory Pressure (cm H2O) (Drager Jessie): 25  Resp: 16      Anticoagulation:  Dose (units/hr) HEParin: 0 Units/hr  Rate (mL/hr) HEParin: 0 mL/hr  Concentration HEParin: 100 Units/mL     Dose (mg/kg/hr) Bivalirudin: 0.029 mg/kg/hr  Rate (mL/hr) Bivalirudin: 2.6 mL/hr  Concentration Bivalirudin: 1 mg/mL  Most recent: ACT  (seconds):  (folowing PTTs)    Urine output is NIL on CRRT.  Blood loss was minimal. Product given included none.     Intake/Output Summary (Last 24 hours) at 2024 1803  Last data filed at 2024 1700  Gross per 24 hour   Intake 4857.09 ml   Output 6059 ml   Net -1201.91 ml       Labs:  Recent Labs   Lab 24  1605 24  1603 24  1601 24  1413  08/11/24  1244 08/11/24  1029   PH  --   --  7.37 7.33* 7.34* 7.36   PCO2  --   --  38 43 44 42   PO2  --   --  77* 71* 75* 70*   HCO3  --   --  22 23 23 24   O2PER 60 100  60 60 60 60 60       Lab Results   Component Value Date    HGB 8.0 (L) 08/11/2024    PHGB 90 (H) 08/11/2024     08/11/2024    FIBR 605 (H) 08/11/2024    INR 1.15 08/11/2024    PTT 35 08/11/2024    DD 8.94 (H) 08/11/2024    ANTCH 68 (L) 08/11/2024       Plan is to remain stable on VV ECMO.    Julián Mcneil, RRT  ECMO Specialist  8/11/2024 6:03 PM

## 2024-08-11 NOTE — PROGRESS NOTES
SURGICAL ICU PROGRESS NOTE  08/11/2024        Date of Service (when I saw the patient): 08/11/2024    ASSESSMENT:  Massiel Flaherty is a 53 year old female who was admitted to [facility].   Past medical hx of Anxiety/depression, fibromyalgia, hypothyroidism, asthma, HLD, MURIEL on CPAP, spells, off on anti seizure medications, expressive aphasia, hypertension was seen at [facility] and was placed on Augmentin outpatient on 7/30/24.   She admitted to the hospital on 8/3/24 for fatigue, fever and dyspnea and intubated 8/6/24 at OSH, proned and paralyzed without improvement. Transferred to [facility] 8/8/24, hypoxic with high plateaus/peaks and placed on VV EMCO and CRRT.     OVERNIGHT EVENTS:   - Ongoing pupillary changes overnight (NPI under 3), given 23% bolus x 2, 3% bolus x 2 and started on 2% infusion   - Ongoing hypoxia with acute drop in sats during cannula retraction yesterday, restarted on paralysis   - Levo weaned   - Heparin infusion increased   - Vent FiO2 weaned to 60%    CHANGES and MAJOR THINGS TODAY:   - Trend sodium per NCC for Na goal 150-155   - Increase versed drip to previous infusion rates now that on paralysis  - Gyn consult for further eval of right adnexal findings on CT   - Continue pulling on CRRT up to 0.1 of levo   - Advance NG to NJ   - Resume Veletri today  - Increase inspiratory pressure to 35   - Heparin infusion back up despite FFP yesterday  - transition to bival given concern for heparin resistance   - Remove rolle   - Stop azithromycin, add Flagyl for PID     PLAN:  Neurological:  # Fibromyalgia   # Hx myalgic encephalomyelitis   # Anxiety   # sedation and analgesia for vent compliance   - Monitor neurological status. Delirium preventions and precautions.   - Pain: Dilaudid gtt         - Sedation plan: propofol gtt --> Versed gtt    -  Paralysis: Off 8/9/24; Resumed overnight 8/10-11   - will review PTA medications and resume appropriate medications as able.     # Hx spells with staring  and BUE posturing previously described as pseudoseizures   # Hx history of GTC seizures and myoclonic epilepsy, weaned off AEDs   # C/f hypoxic ischemic injury   # Diffuse cerebral edema   - Head CT 8/9: Findings concerning for diffuse cerebral edema with  diffuse blurring of the gray-white differentiation. Findings are  concerning for hypoxicischemic injury.   - 2% NaCl infusion at 100cc/hr; trend Sodium q4h    - Sodium goals: 150-155   - NCC consult   - EEG     Pulmonary:   # Asthma  # MURIEL on home CPAP   # Acute hypoxic and hypercapnic respiratory failure   # ARDS  # s/p VV EMCO 8/8  Vent Mode: PCV Plus assist  FiO2 (%): 60 %  Resp Rate (Set): 16 breaths/min  PEEP (cm H2O): 10 cmH2O  Inspiratory Pressure (cm H2O) (Drager Jessie): 25  Resp: 16    - continue with rest vent setting on ECMO. Plan to optimize ECMO   - Chest CT 8/9: Diffuse patchy groundglass and consolidative densities throughout  the lungs with small bilateral pleural effusions. Findings may  represent severe pulmonary edema, and/or  infectious /inflammatory  pathology, or ARDS. Multiple prominent and a few enlarged mediastinal lymph nodes,  possibly reactive.  - 8/11: Restart veletri continuous neb  - 8/11: Increase inspiratory pressure to 35 to attempt achieving slightly higher tidal volumes     ECMO:  Sweep 6LPM  FiO2 100%   Flow 4.5  Bival gtt with PTT goals 44-88 // ACT goal of 160-180   - Superior cannula retracted 8/10 for ongoing low PaO2   - 8/11: Given concern for heparin resistance, transition to bival infusion     Cardiovascular:    # hyperlipidemia    # shock, presumed septic  - MAP >65. Monitor HD status   - norepinephrine gtt      Gastroenterology/Nutrition:  # GERD   # NPO status   # constipation; resolved   - Protonix (home medication)   - OG to LIS   - RD consult in am for SBFT and TF recommendations   - NG tube in place; OK to increase to goal   - CT CAP 8/9: Mild hepatomegaly. Trace pelvic ascites, nonspecific bilateral  perinephric  streakiness. Asymmetric heterogeneous bulky right adnexa, consider nonemergent  pelvic ultrasound for further evaluation.   - Advance NG to NJ      Fluids/Electrolytes/Renal:  # Acute kidney injury  # Hyperkalemia, resolved  # Hyperphosphatemia   # Volume status   # Bilateral perinephritic stranding    # Nonspecific  right adnexa collection   - Renal Consulted.   - CRRT started. UF pull -100Ml/hr   - Trending CRRT labs  - Monitor I/Os closely   - Pelvic US without findings of collection 8/10 (transabdominal, not transvaginal)    - Sodium goals as above   - 8/11: Gyn consulted for right adnexal finding on CT. Low suspicion for abscess but would treat as we are currently. Collection too small for surgical intervention and too small for IR drainage as well. Could follow up with pelvic MRI, however we will treat as current with IV antibiotics.       Endocrine:  # Hypothyroidism   # concern for stress hyperglycemia due to critical illness   # hypoglycemia; resolved    - synthroid resumed   - PRN D10 W for BG management until feeds started   - continue with sliding scale insulin due to feed start, reassess needs.   Goal to keep BG< 180 for optimal wound healing      ID:  # Leukocytosis  # Pneumonia   # ARDS   # Concern for PID   PER OSH: 8/6: RSV, COVID, parainfluenza,  negative . Legionella and pneumococcal urine antigens negative. Patient has been on greater than 20 mg prednisone daily for 18 days.  CULTURES: 8/8/2024: Respiratory viral panel negative. Blood cultures negative, MRSA nasal negative, Legionella urine antigen negative, respiratory culture negative.PCR trachea for PJP is negative.  COVID: Negative. Viral panel-negative PJP  - ID consulted   - Follow up bronch cultures from 8/9    Antimicrobials:   - Azithromycin- stopped 8/11   - Cefepime   - Amphotericin   - Flagyl added 8/11     [facility] labs:   8/8: sputum growing candida albicans    MRSA negative   - Cryptococcal AG negative   Pending:  "Blastomycosis, aspergillus, Coccidioides, BC     Heme:     # Acute blood loss anemia   - transfusion parameters per EMCO protocol    - one unit prbc 8/8/24  - Transfuse if hgb <7.0 or signs/symptoms of hypoperfusion. Monitor and trend.       Musculoskeletal:  # Weakness and deconditioning of critical illness  # Left ankle injury pre-hospitalization    - Physical and occupational therapy when able.   - Ongoing ecchymosis to LLE     Skin:  # Ecchymosis - BLE   - bilateral lower leg ecchymosis   - WOC consult      General Cares/Prophylaxis:    DVT Prophylaxis: Heparin gtt per ECMO   GI Prophylaxis: PPI  Restraints: Restraints for medical healing needed: NO     Lines/ tubes/ drains:  - ETT   - Right  ECMO cannula   - Right Femoral cannula   - Left internal jugular   - radial arterial line   - OG   - NG --> NJ   - PIV\"s      Disposition:  -  Surgical ICU       Time spent on this Encounter   Billing:  I spent 55 minutes bedside and on the inpatient unit today managing the critical care of Massiel Flaherty in relation to the issues listed in this note.      Dong Rico PA-C    ====================================  INTERVAL HISTORY:  Ongoing sodium trends for goal. Continue high sedation and paralysis. Transition to bival. Resume veletri. Add flagyl today. Gyn consult. Pull on CRRT. Sodium trending per NCC.      OBJECTIVE:   1. VITAL SIGNS:   Temp:  [97.7  F (36.5  C)-98.8  F (37.1  C)] 98.4  F (36.9  C)  Pulse:  [] 81  Resp:  [16] 16  MAP:  [59 mmHg-266 mmHg] 67 mmHg  Arterial Line BP: (100-131)/(42-68) 100/49  FiO2 (%):  [60 %-100 %] 60 %  SpO2:  [90 %-100 %] 96 %  Vent Mode: PCV Plus assist  FiO2 (%): 60 %  Resp Rate (Set): 16 breaths/min  PEEP (cm H2O): 10 cmH2O  Inspiratory Pressure (cm H2O) (Drager Jessie): 25  Resp: 16      2. INTAKE/ OUTPUT:   I/O last 3 completed shifts:  In: 6259.93 [I.V.:4049.93; NG/GT:580; IV Piggyback:250]  Out: 7096 [Other:6021; Stool:1075]    3. PHYSICAL EXAMINATION:  General: " chemically sedated.   HEENT: PERRLA. Pupils 3mm and reactive. ETT present and secured. NG tube. OG to LIS with thin green drainage.   RIght neck with ECMO cannula, sutured in place.  Left internal jugular CVC in place, secured.   Neuro: PERRL 3mm. NPI 3.9. Patient paralyzed.   Pulm/Resp:  minimal breath sounds, VT's 30-50   CV: RRR, S1/S2   Abdomen: Soft, non-distended, non-tender, no rebound tenderness or guarding, no masses  : (+) rolle catheter in place, dark yellow urine. Minimal   Incisions/Skin: scattered ecchymosis in BLE in toes around ankles bilaterally. Limbs cool.    MSK/Extremities:generalize BLE 1+ edema. Calves compressible, (+) doppler tones  DP/PT on feet.     4. INVESTIGATIONS:   Arterial Blood Gases   Recent Labs   Lab 08/11/24  1029 08/11/24  0801 08/11/24  0557 08/11/24  0406   PH 7.36 7.36 7.37 7.40   PCO2 42 41 39 36   PO2 70* 76* 77* 72*   HCO3 24 23 22 23     Complete Blood Count   Recent Labs   Lab 08/11/24  1021 08/11/24  0405 08/10/24  2220 08/10/24  1545   WBC 12.6* 10.0 13.4* 11.3*   HGB 7.9* 8.1* 8.4* 6.9*    181 211 215     Basic Metabolic Panel  Recent Labs   Lab 08/11/24  1021 08/11/24  0800 08/11/24  0557 08/11/24  0405 08/10/24  2358 08/10/24  2220 08/10/24  1957 08/10/24  1551 08/10/24  1545   * 149* 150* 147*  147*   < > 142  --    < > 137   POTASSIUM 3.7  --   --  3.7  --  3.7  --   --  3.6   CHLORIDE 116*  --   --  114*  --  110*  --   --  104   CO2 22  --   --  21*  --  20*  --   --  20*   BUN 23.5*  --   --  24.7*  --  23.6*  --   --  25.3*   CR 1.28*  --   --  1.29*  --  1.36*  --   --  1.50*   *  --   --  132*  --  150* 121*   < > 208*    < > = values in this interval not displayed.     Liver Function Tests  Recent Labs   Lab 08/11/24  1021 08/11/24  0405 08/10/24  2220 08/10/24  1545 08/10/24  0947 08/10/24  0412 08/09/24  1004 08/09/24  0339 08/08/24  2137 08/08/24  1641   AST  --  47*  --   --   --  69*  --   --   --  76*   ALT  --  21  --   --    --  25  --  25  --  37   ALKPHOS  --  318*  --   --   --  376*  --  326*  --  500*   BILITOTAL  --  0.6  --   --   --  0.7  --  0.3  --  0.6   ALBUMIN 2.3* 2.3* 2.3* 2.3*   < > 2.2*   < > 2.4*  --  3.0*   INR 1.14 1.11 1.10 1.06   < > 1.14   < > 1.25*   < > 1.36*    < > = values in this interval not displayed.     Pancreatic Enzymes  No lab results found in last 7 days.  Coagulation Profile  Recent Labs   Lab 08/11/24  1021 08/11/24  0405 08/10/24  2220 08/10/24  1545   INR 1.14 1.11 1.10 1.06   PTT 93*  96* 80* 62* 35         5. RADIOLOGY:   Recent Results (from the past 24 hour(s))   CT Head w/o Contrast    Narrative    EXAM: CT HEAD W/O CONTRAST  8/10/2024 12:21 PM     HISTORY:  recheck cerebral edema       COMPARISON:  8/9/2024    TECHNIQUE: Using multidetector thin collimation helical acquisition  technique, axial, coronal and sagittal CT images from the skull base  to the vertex were obtained without intravenous contrast.   (topogram) image(s) also obtained and reviewed.    FINDINGS:  Slightly improved differentiation of the gray-white matter and  sulcation. Basal cisterns are clear. No acute intracranial hemorrhage,  mass effect, or midline shift. The ventricles are proportionate to the  cerebral sulci.    The bony calvaria in the bones of the skull base are normal. Layering  fluid in the sphenoid sinuses. Otherwise, the visualized portions of  the paranasal sinuses are clear. Bilateral mastoid effusions. Orbits  are unremarkable.         Impression    IMPRESSION:   Slightly improved differentiation of the gray-white matter and  sulcation suggesting that the prior study may have been impacted by  artifact.    I have personally reviewed the examination and initial interpretation  and I agree with the findings.    SUJEY MARTÍNEZ MD         SYSTEM ID:  [id number]   US Pelvic Limited    Narrative    EXAMINATION: US PELVIC LIMITED, 8/10/2024 3:18 PM     COMPARISON: CT chest, abdomen and pelvis dated 8/9/2024.      HISTORY: evaluation of source    TECHNIQUE: The pelvis was scanned in standard fashion with a  transabdominal transducer(s) using both grey scale and limited color  Doppler techniques.    FINDINGS/    Impression    IMPRESSION:    Post hysterectomy. No overt adnexal abnormality identified on  transabdominal imaging.    CORTNEY LAU MD         SYSTEM ID:  [id number]   XR Chest Port 1 View    Narrative    Examination:  XR CHEST PORT 1 VIEW    Date:  8/10/2024 5:23 PM     Clinical Information: ECMO cannula repostion     Comparison: 8/10/2024.    Findings:   AP view of the chest. Interval repositioning of superior approach ECMO  cannula with tip projecting over the mid SVC. Stable positioning of  endotracheal tube, esophageal temperature probe, enteric tube, left IJ  central venous catheter and lower approach ECMO cannula. Interval  decreased bilateral lung aeration with diffuse  groundglass/consolidative opacities. Cardiomediastinal silhouette is  obscured. No pneumothorax.      Impression    Impression:  1. Interval repositioning of superior approach ECMO cannula with tip  projecting over the mid SVC. Stable additional support devices.  2. Interval decreased bilateral lung aeration with diffuse  groundglass/consolidative opacities.    CORTNEY LAU MD         SYSTEM ID:  [id number]   XR Chest Port 1 View    Narrative    Exam: XR CHEST PORT 1 VIEW, 8/11/2024 2:35 AM    Comparison: Radiograph 8/10/2024, 8/9/2024, CT 8/9/2024     History: daily ECMO check    Findings:  Endotracheal tube tip projects over the midthoracic trachea. Superior  and inferior approach ECMO cannula tips project in similar position.  Stable left IJ central venous catheter. Esophageal temperature probe.  Partially visualized feeding tube. Persistent opacification of the  chest with air bronchograms.      Impression    Impression:   1. No significant change in positioning of the support devices  compared to 8/10/2024.  2. Persistent  opacification of the chest with air bronchograms.    I have personally reviewed the examination and initial interpretation  and I agree with the findings.    CORTNEY LAU MD         SYSTEM ID:  [id number]       =========================================

## 2024-08-11 NOTE — CONSULTS
Care Management Initial Consult    General Information  Assessment completed with: Family, Pt sister Katerina and Pt son Mook  Type of CM/SW Visit: Initial Assessment    Primary Care Provider verified and updated as needed: Yes   Readmission within the last 30 days: no previous admission in last 30 days      Reason for Consult: discharge planning  Advance Care Planning: Advance Care Planning Reviewed: other (see comments) (pt doesnt have a HCD)          Communication Assessment  Patient's communication style: spoken language (English or Bilingual)             Cognitive  Cognitive/Neuro/Behavioral: .WDL except  Level of Consciousness: sedated  Arousal Level: unresponsive  Orientation: other (see comments) (saumya)  Mood/Behavior: other (see comments) (saumya)  Best Language:  (saumya)  Speech: endotracheal tube    Living Environment:   People in home: child(elicia), adult  Mook  Current living Arrangements: house      Able to return to prior arrangements: yes       Family/Social Support:  Care provided by: self, child(elicia)  Provides care for: no one, unable/limited ability to care for self  Marital Status:   Children, Sibling(s), Parent(s)          Description of Support System: Supportive, Involved    Support Assessment: Adequate family and caregiver support, Adequate social supports    Current Resources:   Patient receiving home care services: No     Community Resources: Other (see comment) (pt was receiving help cordinate DME through [facility])  Equipment currently used at home: cane, straight, shower chair, walker, standard, wheelchair, manual  Supplies currently used at home: Incontinence Supplies    Employment/Financial:  Employment Status: disabled        Financial Concerns: none   Referral to Financial Worker: No       Does the patient's insurance plan have a 3 day qualifying hospital stay waiver?  No    Lifestyle & Psychosocial Needs:  Social Determinants of Health     Food Insecurity: No  Food Insecurity (9/25/2020)    Received from [facility]    Hunger Vital Sign     Worried About Running Out of Food in the Last Year: Never true     Ran Out of Food in the Last Year: Never true   Depression: At risk (5/17/2023)    Received from [facility]    PHQ-2     PHQ-2 Score: 4   Housing Stability: Not on file   Tobacco Use: Low Risk  (4/10/2024)    Received from [facility]    Patient History     Smoking Tobacco Use: Never     Smokeless Tobacco Use: Never     Passive Exposure: Not on file   Financial Resource Strain: Medium Risk (9/25/2020)    Received from [facility]    Overall Financial Resource Strain (CARDIA)     Difficulty of Paying Living Expenses: Somewhat hard   Alcohol Use: Not At Risk (9/11/2023)    Received from [facility]    AUDIT-C     Frequency of Alcohol Consumption: Never     Average Number of Drinks: Patient does not drink     Frequency of Binge Drinking: Never   Transportation Needs: No Transportation Needs (9/25/2020)    Received from [facility]    PRAPARE - Transportation     Lack of Transportation (Medical): No     Lack of Transportation (Non-Medical): No   Physical Activity: Insufficiently Active (4/10/2023)    Received from [facility]    Exercise Vital Sign     Days of Exercise per Week: 2 days     Minutes of Exercise per Session: 30 min   Interpersonal Safety: Not At Risk (9/8/2020)    Received from [facility]    Humiliation, Afraid, Rape, and Kick questionnaire     Fear of Current or Ex-Partner: No     Emotionally Abused: No     Physically Abused: No     Sexually Abused: No   Stress: Stress Concern Present (9/8/2020)    Received from [facility]    [employer] - Occupational Stress Questionnaire     Feeling of Stress : Very much   Social Connections: Moderately Isolated  (9/8/2020)    Received from [facility]    Social Connection and Isolation Panel [NHANES]     Frequency of Communication with Friends and Family: More than three times a week     Frequency of Social Gatherings with Friends and Family: Twice a week     Attends [employer] Services: 1 to 4 times per year     Active Member of Clubs or Organizations: No     Attends Club or Organization Meetings: Never     Marital Status:    Health Literacy: Not on file       Functional Status:  Prior to admission patient needed assistance:   Dependent ADLs:: Wheelchair-independent, Ambulation-walker, Ambulation-cane  Dependent IADLs:: Laundry  Assesssment of Functional Status: Not at baseline with ADL Functioning    Mental Health Status:  Mental Health Status: Current Concern  Mental Health Management: Medication, Individual Therapy    Chemical Dependency Status:  Chemical Dependency Status: No Current Concerns             Values/Beliefs:  Spiritual, Cultural Beliefs, [employer] Practices, Values that affect care: yes  Description of Beliefs that Will Affect Care: Mera            Additional Information:  Writer met with pt who is currently sedated and family at bed side and introduced self and explain social work role. Family includes pt 22 year old son Mook and her sister Katerina. Writer met with pt in order to complete initial assessment. Pt is currently residing at a house and  lives with her adult son Mook. Pt sometimes lives with her sister (Katerina) and mother (Doreen) in their handicap accessible house . Pt was receiving services prior to admission.    At baseline, pt IS NOT independent with ADLs. Writer confirmed pt contact information and housing was accurate as of this admission. Pt doesn't have a HCD on file. Writer spoke with NOK who is pt son Mook and he is in agreement to make decisions on behalf of pt if needed. Writer reviewed insurance and PCP information on file and confirmed this is accurate  in the chart.     Pt is currently disabled and has court on the 26th this month to determine if she meets disability criteria or not. Family has been in contact with the pt's  to push back the court date. Family reports pt has no money due to being unable to work for the last 2 years. Family is curretnyl styaing at the [employer] [facility] down the street from [facility]. Family currently in [facility] includes pt mother (Doreen) pt sister (Katerina) and pt son (Mook). The family brought their dog to the [employer] and rotate out who stays back to be with the dog.     SW will continue to follow for discharge needs.       XIOMARA Lan  8/11/2024       Social Work and Care Management Department       SEARCHABLE in [facility] - search SOCIAL WORK       [place] (0800 - 1630) Saturday and Sunday     Units: 4A Vocera, 4C Vocera, & 4E Vocera        Units: 5A 7871-3708 Vocera, 5A 1414-9321 Vocera , BMT SW 1 BMT SW 2, BMT SW 3 & BMT SW 4  5C Off Service 5401 - 5416  5C Off Service [id number]     Units: 6A Vocera & 6B Vocera      Units: 6C Vocera     Units: 7A Vocera & 7B Vocera      Units: 7C Med Surg 7401 thru 7418 and 7C Med Surg 7502 thru 7521      Unit: [place] ED Vocera & [place] Obs Vocera     [facility] [place]2711-2225) Saturday and Sunday      Units: 5 Ortho Vocera, 5 Med Surg Vocera & WB ED Vocera     Units: 6 Med Surg Vocera, 8 Med Surg Vocera, & 10 ICU Vocera      [place] [facility] [place] Hours [place]     Saturday & Sunday (1630 - 0000)    Mon-Fri (5027-1100)     [facility] Recognized Holidays  ([id number])    Units: ALL   - see above VOCERA links to units and after hours

## 2024-08-12 ENCOUNTER — APPOINTMENT (OUTPATIENT)
Dept: GENERAL RADIOLOGY | Facility: CLINIC | Age: 54
DRG: 003 | End: 2024-08-12
Attending: SURGERY
Payer: MEDICAID

## 2024-08-12 ENCOUNTER — APPOINTMENT (OUTPATIENT)
Dept: CARDIOLOGY | Facility: CLINIC | Age: 54
DRG: 003 | End: 2024-08-12
Attending: STUDENT IN AN ORGANIZED HEALTH CARE EDUCATION/TRAINING PROGRAM
Payer: MEDICAID

## 2024-08-12 LAB
ALBUMIN SERPL BCG-MCNC: 2.4 G/DL (ref 3.5–5.2)
ALBUMIN SERPL BCG-MCNC: 2.5 G/DL (ref 3.5–5.2)
ALBUMIN SERPL BCG-MCNC: 2.5 G/DL (ref 3.5–5.2)
ALBUMIN SERPL BCG-MCNC: 2.6 G/DL (ref 3.5–5.2)
ALLEN'S TEST: ABNORMAL
ALP SERPL-CCNC: 281 U/L (ref 40–150)
ALT SERPL W P-5'-P-CCNC: 21 U/L (ref 0–50)
ANA SER QL IF: NEGATIVE
ANCA AB PATTERN SER IF-IMP: NORMAL
ANION GAP SERPL CALCULATED.3IONS-SCNC: 10 MMOL/L (ref 7–15)
ANION GAP SERPL CALCULATED.3IONS-SCNC: 10 MMOL/L (ref 7–15)
ANION GAP SERPL CALCULATED.3IONS-SCNC: 11 MMOL/L (ref 7–15)
ANION GAP SERPL CALCULATED.3IONS-SCNC: 9 MMOL/L (ref 7–15)
APTT PPP: 39 SECONDS (ref 22–38)
APTT PPP: 42 SECONDS (ref 22–38)
APTT PPP: 43 SECONDS (ref 22–38)
APTT PPP: 44 SECONDS (ref 22–38)
APTT PPP: 48 SECONDS (ref 22–38)
APTT PPP: 50 SECONDS (ref 22–38)
APTT PPP: 52 SECONDS (ref 22–38)
AST SERPL W P-5'-P-CCNC: 48 U/L (ref 0–45)
AT III ACT/NOR PPP CHRO: 77 % (ref 85–135)
BASE EXCESS BLDA CALC-SCNC: -1.5 MMOL/L (ref -3–3)
BASE EXCESS BLDA CALC-SCNC: -2 MMOL/L (ref -3–3)
BASE EXCESS BLDA CALC-SCNC: -2 MMOL/L (ref -3–3)
BASE EXCESS BLDA CALC-SCNC: -2.4 MMOL/L (ref -3–3)
BASE EXCESS BLDA CALC-SCNC: -2.7 MMOL/L (ref -3–3)
BASE EXCESS BLDA CALC-SCNC: -3 MMOL/L (ref -3–3)
BASE EXCESS BLDA CALC-SCNC: -3.1 MMOL/L (ref -3–3)
BASE EXCESS BLDA CALC-SCNC: -3.2 MMOL/L (ref -3–3)
BASE EXCESS BLDA CALC-SCNC: -3.5 MMOL/L (ref -3–3)
BASE EXCESS BLDA CALC-SCNC: -3.5 MMOL/L (ref -3–3)
BASE EXCESS BLDA CALC-SCNC: -3.9 MMOL/L (ref -3–3)
BASE EXCESS BLDA CALC-SCNC: -4.3 MMOL/L (ref -3–3)
BASE EXCESS BLDV CALC-SCNC: -0.7 MMOL/L (ref -3–3)
BASE EXCESS BLDV CALC-SCNC: -0.8 MMOL/L (ref -3–3)
BASE EXCESS BLDV CALC-SCNC: -2.5 MMOL/L (ref -3–3)
BASE EXCESS BLDV CALC-SCNC: -2.7 MMOL/L (ref -3–3)
BASOPHILS # BLD AUTO: ABNORMAL 10*3/UL
BASOPHILS # BLD MANUAL: 0 10E3/UL (ref 0–0.2)
BASOPHILS # BLD MANUAL: 0.1 10E3/UL (ref 0–0.2)
BASOPHILS NFR BLD AUTO: ABNORMAL %
BASOPHILS NFR BLD MANUAL: 0 %
BASOPHILS NFR BLD MANUAL: 1 %
BILIRUB DIRECT SERPL-MCNC: 0.31 MG/DL (ref 0–0.3)
BILIRUB SERPL-MCNC: 0.4 MG/DL
BUN SERPL-MCNC: 27.2 MG/DL (ref 6–20)
BUN SERPL-MCNC: 29.9 MG/DL (ref 6–20)
BUN SERPL-MCNC: 35.5 MG/DL (ref 6–20)
BUN SERPL-MCNC: 39.7 MG/DL (ref 6–20)
BURR CELLS BLD QL SMEAR: SLIGHT
C-ANCA TITR SER IF: NORMAL {TITER}
CA-I BLD-MCNC: 4.5 MG/DL (ref 4.4–5.2)
CA-I BLD-MCNC: 4.6 MG/DL (ref 4.4–5.2)
CA-I BLD-MCNC: 4.6 MG/DL (ref 4.4–5.2)
CALCIUM SERPL-MCNC: 7.5 MG/DL (ref 8.8–10.4)
CALCIUM SERPL-MCNC: 7.6 MG/DL (ref 8.8–10.4)
CALCIUM SERPL-MCNC: 7.7 MG/DL (ref 8.8–10.4)
CALCIUM SERPL-MCNC: 7.8 MG/DL (ref 8.8–10.4)
CHLORIDE SERPL-SCNC: 120 MMOL/L (ref 98–107)
CHLORIDE SERPL-SCNC: 120 MMOL/L (ref 98–107)
CHLORIDE SERPL-SCNC: 123 MMOL/L (ref 98–107)
CHLORIDE SERPL-SCNC: 124 MMOL/L (ref 98–107)
COHGB MFR BLD: 90.5 % (ref 96–97)
COHGB MFR BLD: 91.3 % (ref 96–97)
COHGB MFR BLD: 91.9 % (ref 96–97)
COHGB MFR BLD: 92.9 % (ref 96–97)
COHGB MFR BLD: 93.2 % (ref 96–97)
COHGB MFR BLD: 93.4 % (ref 96–97)
COHGB MFR BLD: 94.4 % (ref 96–97)
COHGB MFR BLD: 94.6 % (ref 96–97)
COHGB MFR BLD: 94.6 % (ref 96–97)
COHGB MFR BLD: 95.7 % (ref 96–97)
COHGB MFR BLD: 96 % (ref 96–97)
COHGB MFR BLD: 96.7 % (ref 96–97)
CREAT SERPL-MCNC: 1.3 MG/DL (ref 0.51–0.95)
CREAT SERPL-MCNC: 1.33 MG/DL (ref 0.51–0.95)
CREAT SERPL-MCNC: 1.46 MG/DL (ref 0.51–0.95)
CREAT SERPL-MCNC: 1.55 MG/DL (ref 0.51–0.95)
D DIMER PPP FEU-MCNC: 11.72 UG/ML FEU (ref 0–0.5)
D DIMER PPP FEU-MCNC: 14.58 UG/ML FEU (ref 0–0.5)
EGFRCR SERPLBLD CKD-EPI 2021: 40 ML/MIN/1.73M2
EGFRCR SERPLBLD CKD-EPI 2021: 43 ML/MIN/1.73M2
EGFRCR SERPLBLD CKD-EPI 2021: 48 ML/MIN/1.73M2
EGFRCR SERPLBLD CKD-EPI 2021: 49 ML/MIN/1.73M2
EOSINOPHIL # BLD AUTO: ABNORMAL 10*3/UL
EOSINOPHIL # BLD MANUAL: 0 10E3/UL (ref 0–0.7)
EOSINOPHIL # BLD MANUAL: 0.1 10E3/UL (ref 0–0.7)
EOSINOPHIL # BLD MANUAL: 0.3 10E3/UL (ref 0–0.7)
EOSINOPHIL # BLD MANUAL: 0.3 10E3/UL (ref 0–0.7)
EOSINOPHIL # BLD MANUAL: 0.6 10E3/UL (ref 0–0.7)
EOSINOPHIL NFR BLD AUTO: ABNORMAL %
EOSINOPHIL NFR BLD MANUAL: 0 %
EOSINOPHIL NFR BLD MANUAL: 1 %
EOSINOPHIL NFR BLD MANUAL: 2 %
EOSINOPHIL NFR BLD MANUAL: 2 %
EOSINOPHIL NFR BLD MANUAL: 4 %
ERYTHROCYTE [DISTWIDTH] IN BLOOD BY AUTOMATED COUNT: 16.6 % (ref 10–15)
ERYTHROCYTE [DISTWIDTH] IN BLOOD BY AUTOMATED COUNT: 16.6 % (ref 10–15)
ERYTHROCYTE [DISTWIDTH] IN BLOOD BY AUTOMATED COUNT: 16.8 % (ref 10–15)
ERYTHROCYTE [DISTWIDTH] IN BLOOD BY AUTOMATED COUNT: 16.8 % (ref 10–15)
ERYTHROCYTE [DISTWIDTH] IN BLOOD BY AUTOMATED COUNT: 17 % (ref 10–15)
FIBRINOGEN PPP-MCNC: 547 MG/DL (ref 170–510)
FIBRINOGEN PPP-MCNC: 588 MG/DL (ref 170–510)
GLUCOSE BLDC GLUCOMTR-MCNC: 175 MG/DL (ref 70–99)
GLUCOSE BLDC GLUCOMTR-MCNC: 179 MG/DL (ref 70–99)
GLUCOSE BLDC GLUCOMTR-MCNC: 188 MG/DL (ref 70–99)
GLUCOSE SERPL-MCNC: 149 MG/DL (ref 70–99)
GLUCOSE SERPL-MCNC: 152 MG/DL (ref 70–99)
GLUCOSE SERPL-MCNC: 199 MG/DL (ref 70–99)
GLUCOSE SERPL-MCNC: 202 MG/DL (ref 70–99)
HADV DNA SPEC QL NAA+PROBE: NOT DETECTED
HCO3 BLD-SCNC: 22 MMOL/L (ref 21–28)
HCO3 BLD-SCNC: 23 MMOL/L (ref 21–28)
HCO3 BLD-SCNC: 24 MMOL/L (ref 21–28)
HCO3 BLD-SCNC: 24 MMOL/L (ref 21–28)
HCO3 BLDA-SCNC: 21 MMOL/L (ref 21–28)
HCO3 BLDA-SCNC: 22 MMOL/L (ref 21–28)
HCO3 BLDV-SCNC: 23 MMOL/L (ref 21–28)
HCO3 BLDV-SCNC: 23 MMOL/L (ref 21–28)
HCO3 BLDV-SCNC: 24 MMOL/L (ref 21–28)
HCO3 BLDV-SCNC: 25 MMOL/L (ref 21–28)
HCO3 SERPL-SCNC: 21 MMOL/L (ref 22–29)
HCO3 SERPL-SCNC: 22 MMOL/L (ref 22–29)
HCT VFR BLD AUTO: 23.4 % (ref 35–47)
HCT VFR BLD AUTO: 24.2 % (ref 35–47)
HCT VFR BLD AUTO: 24.4 % (ref 35–47)
HCT VFR BLD AUTO: 25.7 % (ref 35–47)
HCT VFR BLD AUTO: 25.7 % (ref 35–47)
HGB BLD-MCNC: 7.6 G/DL (ref 11.7–15.7)
HGB BLD-MCNC: 7.9 G/DL (ref 11.7–15.7)
HGB BLD-MCNC: 8 G/DL (ref 11.7–15.7)
HGB BLD-MCNC: 8.2 G/DL (ref 11.7–15.7)
HGB BLD-MCNC: 8.2 G/DL (ref 11.7–15.7)
HGB FREE PLAS-MCNC: 40 MG/DL
HSV1 DNA SPEC QL NAA+PROBE: NEGATIVE
HSV1 DNA SPEC QL NAA+PROBE: NEGATIVE
HSV2 DNA SPEC QL NAA+PROBE: NEGATIVE
HSV2 DNA SPEC QL NAA+PROBE: NEGATIVE
IMM GRANULOCYTES # BLD: ABNORMAL 10*3/UL
IMM GRANULOCYTES NFR BLD: ABNORMAL %
INR PPP: 1.3 (ref 0.85–1.15)
INR PPP: 1.37 (ref 0.85–1.15)
L PNEUMO DNA SPEC QL NAA+PROBE: NOT DETECTED
LACTATE SERPL-SCNC: 0.6 MMOL/L (ref 0.7–2)
LACTATE SERPL-SCNC: 0.7 MMOL/L (ref 0.7–2)
LACTATE SERPL-SCNC: 0.8 MMOL/L (ref 0.7–2)
LACTATE SERPL-SCNC: 0.8 MMOL/L (ref 0.7–2)
LEGIONELLA DNA SPEC NAA+PROBE: NOT DETECTED
LVEF ECHO: NORMAL
LYMPHOCYTES # BLD AUTO: ABNORMAL 10*3/UL
LYMPHOCYTES # BLD MANUAL: 0.5 10E3/UL (ref 0.8–5.3)
LYMPHOCYTES # BLD MANUAL: 0.5 10E3/UL (ref 0.8–5.3)
LYMPHOCYTES # BLD MANUAL: 0.8 10E3/UL (ref 0.8–5.3)
LYMPHOCYTES # BLD MANUAL: 0.9 10E3/UL (ref 0.8–5.3)
LYMPHOCYTES # BLD MANUAL: 1.3 10E3/UL (ref 0.8–5.3)
LYMPHOCYTES NFR BLD AUTO: ABNORMAL %
LYMPHOCYTES NFR BLD MANUAL: 4 %
LYMPHOCYTES NFR BLD MANUAL: 6 %
LYMPHOCYTES NFR BLD MANUAL: 9 %
Lab: NORMAL
MAGNESIUM SERPL-MCNC: 2.4 MG/DL (ref 1.7–2.3)
MAGNESIUM SERPL-MCNC: 2.5 MG/DL (ref 1.7–2.3)
MCH RBC QN AUTO: 31.1 PG (ref 26.5–33)
MCH RBC QN AUTO: 31.3 PG (ref 26.5–33)
MCH RBC QN AUTO: 31.4 PG (ref 26.5–33)
MCH RBC QN AUTO: 31.4 PG (ref 26.5–33)
MCH RBC QN AUTO: 31.6 PG (ref 26.5–33)
MCHC RBC AUTO-ENTMCNC: 31.9 G/DL (ref 31.5–36.5)
MCHC RBC AUTO-ENTMCNC: 31.9 G/DL (ref 31.5–36.5)
MCHC RBC AUTO-ENTMCNC: 32.5 G/DL (ref 31.5–36.5)
MCHC RBC AUTO-ENTMCNC: 32.6 G/DL (ref 31.5–36.5)
MCHC RBC AUTO-ENTMCNC: 32.8 G/DL (ref 31.5–36.5)
MCV RBC AUTO: 96 FL (ref 78–100)
MCV RBC AUTO: 96 FL (ref 78–100)
MCV RBC AUTO: 97 FL (ref 78–100)
MCV RBC AUTO: 97 FL (ref 78–100)
MCV RBC AUTO: 99 FL (ref 78–100)
METAMYELOCYTES # BLD MANUAL: 0.3 10E3/UL
METAMYELOCYTES # BLD MANUAL: 0.5 10E3/UL
METAMYELOCYTES # BLD MANUAL: 0.5 10E3/UL
METAMYELOCYTES # BLD MANUAL: 1.3 10E3/UL
METAMYELOCYTES NFR BLD MANUAL: 10 %
METAMYELOCYTES NFR BLD MANUAL: 2 %
METAMYELOCYTES NFR BLD MANUAL: 2 %
METAMYELOCYTES NFR BLD MANUAL: 4 %
MONOCYTES # BLD AUTO: ABNORMAL 10*3/UL
MONOCYTES # BLD MANUAL: 0.2 10E3/UL (ref 0–1.3)
MONOCYTES # BLD MANUAL: 0.3 10E3/UL (ref 0–1.3)
MONOCYTES # BLD MANUAL: 0.3 10E3/UL (ref 0–1.3)
MONOCYTES # BLD MANUAL: 0.4 10E3/UL (ref 0–1.3)
MONOCYTES # BLD MANUAL: 0.7 10E3/UL (ref 0–1.3)
MONOCYTES NFR BLD AUTO: ABNORMAL %
MONOCYTES NFR BLD MANUAL: 1 %
MONOCYTES NFR BLD MANUAL: 2 %
MONOCYTES NFR BLD MANUAL: 2 %
MONOCYTES NFR BLD MANUAL: 3 %
MONOCYTES NFR BLD MANUAL: 5 %
MYELOCYTES # BLD MANUAL: 0.1 10E3/UL
MYELOCYTES # BLD MANUAL: 0.1 10E3/UL
MYELOCYTES # BLD MANUAL: 0.3 10E3/UL
MYELOCYTES # BLD MANUAL: 0.4 10E3/UL
MYELOCYTES # BLD MANUAL: 1.4 10E3/UL
MYELOCYTES NFR BLD MANUAL: 1 %
MYELOCYTES NFR BLD MANUAL: 1 %
MYELOCYTES NFR BLD MANUAL: 2 %
MYELOCYTES NFR BLD MANUAL: 3 %
MYELOCYTES NFR BLD MANUAL: 6 %
NEUTROPHILS # BLD AUTO: ABNORMAL 10*3/UL
NEUTROPHILS # BLD MANUAL: 10.5 10E3/UL (ref 1.6–8.3)
NEUTROPHILS # BLD MANUAL: 10.5 10E3/UL (ref 1.6–8.3)
NEUTROPHILS # BLD MANUAL: 11.5 10E3/UL (ref 1.6–8.3)
NEUTROPHILS # BLD MANUAL: 11.6 10E3/UL (ref 1.6–8.3)
NEUTROPHILS # BLD MANUAL: 20.3 10E3/UL (ref 1.6–8.3)
NEUTROPHILS NFR BLD AUTO: ABNORMAL %
NEUTROPHILS NFR BLD MANUAL: 79 %
NEUTROPHILS NFR BLD MANUAL: 80 %
NEUTROPHILS NFR BLD MANUAL: 83 %
NEUTROPHILS NFR BLD MANUAL: 87 %
NEUTROPHILS NFR BLD MANUAL: 89 %
NRBC # BLD AUTO: 0.3 10E3/UL
NRBC # BLD AUTO: 0.4 10E3/UL
NRBC # BLD AUTO: 0.7 10E3/UL
NRBC BLD AUTO-RTO: 2 /100
NRBC BLD AUTO-RTO: 3 /100
NRBC BLD MANUAL-RTO: 2 %
NRBC BLD MANUAL-RTO: 3 %
O2/TOTAL GAS SETTING VFR VENT: 100 %
O2/TOTAL GAS SETTING VFR VENT: 100 %
O2/TOTAL GAS SETTING VFR VENT: 60 %
O2/TOTAL GAS SETTING VFR VENT: 70 %
O2/TOTAL GAS SETTING VFR VENT: 70 %
OSMOLALITY SERPL: 316 MMOL/KG (ref 275–295)
OSMOLALITY SERPL: 327 MMOL/KG (ref 275–295)
OSMOLALITY SERPL: 331 MMOL/KG (ref 275–295)
OSMOLALITY SERPL: 339 MMOL/KG (ref 275–295)
OSMOLALITY SERPL: 342 MMOL/KG (ref 275–295)
OXYHGB MFR BLDA: 98 % (ref 75–100)
OXYHGB MFR BLDA: 98 % (ref 75–100)
OXYHGB MFR BLDV: 40 % (ref 70–75)
OXYHGB MFR BLDV: 57 % (ref 70–75)
OXYHGB MFR BLDV: 62 %
OXYHGB MFR BLDV: 65 %
PCO2 BLD: 35 MM HG (ref 35–45)
PCO2 BLD: 35 MM HG (ref 35–45)
PCO2 BLD: 36 MM HG (ref 35–45)
PCO2 BLD: 40 MM HG (ref 35–45)
PCO2 BLD: 41 MM HG (ref 35–45)
PCO2 BLD: 42 MM HG (ref 35–45)
PCO2 BLD: 43 MM HG (ref 35–45)
PCO2 BLD: 45 MM HG (ref 35–45)
PCO2 BLD: 45 MM HG (ref 35–45)
PCO2 BLD: 49 MM HG (ref 35–45)
PCO2 BLDA: 30 MM HG (ref 35–45)
PCO2 BLDA: 34 MM HG (ref 35–45)
PCO2 BLDV: 37 MM HG (ref 40–50)
PCO2 BLDV: 43 MM HG (ref 40–50)
PCO2 BLDV: 43 MM HG (ref 40–50)
PCO2 BLDV: 45 MM HG (ref 40–50)
PEEP: 10 CM H2O
PERFORMING LABORATORY: NORMAL
PH BLD: 7.29 [PH] (ref 7.35–7.45)
PH BLD: 7.31 [PH] (ref 7.35–7.45)
PH BLD: 7.31 [PH] (ref 7.35–7.45)
PH BLD: 7.32 [PH] (ref 7.35–7.45)
PH BLD: 7.33 [PH] (ref 7.35–7.45)
PH BLD: 7.35 [PH] (ref 7.35–7.45)
PH BLD: 7.35 [PH] (ref 7.35–7.45)
PH BLD: 7.36 [PH] (ref 7.35–7.45)
PH BLD: 7.37 [PH] (ref 7.35–7.45)
PH BLD: 7.41 [PH] (ref 7.35–7.45)
PH BLD: 7.42 [PH] (ref 7.35–7.45)
PH BLD: 7.42 [PH] (ref 7.35–7.45)
PH BLDA: 7.4 [PH] (ref 7.35–7.45)
PH BLDA: 7.47 [PH] (ref 7.35–7.45)
PH BLDV: 7.34 [PH] (ref 7.32–7.43)
PH BLDV: 7.34 [PH] (ref 7.32–7.43)
PH BLDV: 7.35 [PH] (ref 7.32–7.43)
PH BLDV: 7.41 [PH] (ref 7.32–7.43)
PHOSPHATE SERPL-MCNC: 3.9 MG/DL (ref 2.5–4.5)
PHOSPHATE SERPL-MCNC: 5 MG/DL (ref 2.5–4.5)
PHOSPHATE SERPL-MCNC: 5.3 MG/DL (ref 2.5–4.5)
PHOSPHATE SERPL-MCNC: 5.7 MG/DL (ref 2.5–4.5)
PLAT MORPH BLD: ABNORMAL
PLATELET # BLD AUTO: 132 10E3/UL (ref 150–450)
PLATELET # BLD AUTO: 187 10E3/UL (ref 150–450)
PLATELET # BLD AUTO: 187 10E3/UL (ref 150–450)
PLATELET # BLD AUTO: 188 10E3/UL (ref 150–450)
PLATELET # BLD AUTO: 196 10E3/UL (ref 150–450)
PO2 BLD: 60 MM HG (ref 80–105)
PO2 BLD: 60 MM HG (ref 80–105)
PO2 BLD: 63 MM HG (ref 80–105)
PO2 BLD: 66 MM HG (ref 80–105)
PO2 BLD: 67 MM HG (ref 80–105)
PO2 BLD: 68 MM HG (ref 80–105)
PO2 BLD: 69 MM HG (ref 80–105)
PO2 BLD: 69 MM HG (ref 80–105)
PO2 BLD: 71 MM HG (ref 80–105)
PO2 BLD: 72 MM HG (ref 80–105)
PO2 BLD: 76 MM HG (ref 80–105)
PO2 BLD: 76 MM HG (ref 80–105)
PO2 BLDA: 366 MM HG (ref 80–105)
PO2 BLDA: 384 MM HG (ref 80–105)
PO2 BLDV: 24 MM HG (ref 25–47)
PO2 BLDV: 31 MM HG (ref 25–47)
PO2 BLDV: 34 MM HG (ref 25–47)
PO2 BLDV: 34 MM HG (ref 25–47)
POLYCHROMASIA BLD QL SMEAR: SLIGHT
POTASSIUM SERPL-SCNC: 4.3 MMOL/L (ref 3.4–5.3)
POTASSIUM SERPL-SCNC: 4.6 MMOL/L (ref 3.4–5.3)
POTASSIUM SERPL-SCNC: 5.3 MMOL/L (ref 3.4–5.3)
POTASSIUM SERPL-SCNC: 5.4 MMOL/L (ref 3.4–5.3)
PROMYELOCYTES # BLD MANUAL: 0.1 10E3/UL
PROMYELOCYTES NFR BLD MANUAL: 1 %
PROT SERPL-MCNC: 5.1 G/DL (ref 6.4–8.3)
RBC # BLD AUTO: 2.42 10E6/UL (ref 3.8–5.2)
RBC # BLD AUTO: 2.52 10E6/UL (ref 3.8–5.2)
RBC # BLD AUTO: 2.53 10E6/UL (ref 3.8–5.2)
RBC # BLD AUTO: 2.61 10E6/UL (ref 3.8–5.2)
RBC # BLD AUTO: 2.64 10E6/UL (ref 3.8–5.2)
RBC MORPH BLD: ABNORMAL
SAO2 % BLDA: 88 % (ref 92–100)
SAO2 % BLDA: 89 % (ref 92–100)
SAO2 % BLDA: 89 % (ref 92–100)
SAO2 % BLDA: 90 % (ref 92–100)
SAO2 % BLDA: 91 % (ref 92–100)
SAO2 % BLDA: 91 % (ref 92–100)
SAO2 % BLDA: 92 % (ref 92–100)
SAO2 % BLDA: 93 % (ref 92–100)
SAO2 % BLDA: 94 % (ref 92–100)
SAO2 % BLDA: 94 % (ref 92–100)
SAO2 % BLDA: >100 % (ref 96–97)
SAO2 % BLDA: >100 % (ref 96–97)
SAO2 % BLDV: 40.7 % (ref 70–75)
SAO2 % BLDV: 58.7 % (ref 70–75)
SAO2 % BLDV: 63.6 % (ref 70–75)
SAO2 % BLDV: 66.9 % (ref 70–75)
SODIUM SERPL-SCNC: 149 MMOL/L (ref 135–145)
SODIUM SERPL-SCNC: 149 MMOL/L (ref 135–145)
SODIUM SERPL-SCNC: 151 MMOL/L (ref 135–145)
SODIUM SERPL-SCNC: 152 MMOL/L (ref 135–145)
SODIUM SERPL-SCNC: 152 MMOL/L (ref 135–145)
SODIUM SERPL-SCNC: 154 MMOL/L (ref 135–145)
SODIUM SERPL-SCNC: 154 MMOL/L (ref 135–145)
SPECIMEN STATUS: NORMAL
TARGETS BLD QL SMEAR: SLIGHT
TEST NAME: NORMAL
TRIGL SERPL-MCNC: 458 MG/DL
UFH PPP CHRO-ACNC: <0.1 IU/ML
WBC # BLD AUTO: 12.6 10E3/UL (ref 4–11)
WBC # BLD AUTO: 12.9 10E3/UL (ref 4–11)
WBC # BLD AUTO: 13.1 10E3/UL (ref 4–11)
WBC # BLD AUTO: 14.7 10E3/UL (ref 4–11)
WBC # BLD AUTO: 23.3 10E3/UL (ref 4–11)

## 2024-08-12 PROCEDURE — 90947 DIALYSIS REPEATED EVAL: CPT

## 2024-08-12 PROCEDURE — 87468 ANAPLSMA PHGCYTOPHLM AMP PRB: CPT | Performed by: PHYSICIAN ASSISTANT

## 2024-08-12 PROCEDURE — 82805 BLOOD GASES W/O2 SATURATION: CPT | Performed by: SURGERY

## 2024-08-12 PROCEDURE — 250N000013 HC RX MED GY IP 250 OP 250 PS 637

## 2024-08-12 PROCEDURE — 258N000003 HC RX IP 258 OP 636: Performed by: SURGERY

## 2024-08-12 PROCEDURE — 85007 BL SMEAR W/DIFF WBC COUNT: CPT | Performed by: SURGERY

## 2024-08-12 PROCEDURE — 84295 ASSAY OF SERUM SODIUM: CPT | Performed by: PHYSICIAN ASSISTANT

## 2024-08-12 PROCEDURE — 85027 COMPLETE CBC AUTOMATED: CPT | Performed by: SURGERY

## 2024-08-12 PROCEDURE — 99233 SBSQ HOSP IP/OBS HIGH 50: CPT | Performed by: PHYSICIAN ASSISTANT

## 2024-08-12 PROCEDURE — 250N000009 HC RX 250: Performed by: SURGERY

## 2024-08-12 PROCEDURE — 83930 ASSAY OF BLOOD OSMOLALITY: CPT | Performed by: PSYCHIATRY & NEUROLOGY

## 2024-08-12 PROCEDURE — 85384 FIBRINOGEN ACTIVITY: CPT | Performed by: SURGERY

## 2024-08-12 PROCEDURE — 250N000011 HC RX IP 250 OP 636: Mod: JZ | Performed by: STUDENT IN AN ORGANIZED HEALTH CARE EDUCATION/TRAINING PROGRAM

## 2024-08-12 PROCEDURE — 94003 VENT MGMT INPAT SUBQ DAY: CPT

## 2024-08-12 PROCEDURE — 250N000013 HC RX MED GY IP 250 OP 250 PS 637: Performed by: PHYSICIAN ASSISTANT

## 2024-08-12 PROCEDURE — 99418 PROLNG IP/OBS E/M EA 15 MIN: CPT | Performed by: INTERNAL MEDICINE

## 2024-08-12 PROCEDURE — 250N000009 HC RX 250: Performed by: STUDENT IN AN ORGANIZED HEALTH CARE EDUCATION/TRAINING PROGRAM

## 2024-08-12 PROCEDURE — 86720 LEPTOSPIRA ANTIBODY: CPT | Performed by: PHYSICIAN ASSISTANT

## 2024-08-12 PROCEDURE — 82330 ASSAY OF CALCIUM: CPT | Performed by: PHYSICIAN ASSISTANT

## 2024-08-12 PROCEDURE — 94645 CONT INHLJ TX EACH ADDL HOUR: CPT

## 2024-08-12 PROCEDURE — 200N000002 HC R&B ICU UMMC

## 2024-08-12 PROCEDURE — 250N000009 HC RX 250: Performed by: CLINICAL NURSE SPECIALIST

## 2024-08-12 PROCEDURE — 71045 X-RAY EXAM CHEST 1 VIEW: CPT

## 2024-08-12 PROCEDURE — 33948 ECMO/ECLS DAILY MGMT-VENOUS: CPT | Performed by: SURGERY

## 2024-08-12 PROCEDURE — 85300 ANTITHROMBIN III ACTIVITY: CPT | Performed by: SURGERY

## 2024-08-12 PROCEDURE — 84478 ASSAY OF TRIGLYCERIDES: CPT

## 2024-08-12 PROCEDURE — 250N000011 HC RX IP 250 OP 636: Performed by: STUDENT IN AN ORGANIZED HEALTH CARE EDUCATION/TRAINING PROGRAM

## 2024-08-12 PROCEDURE — 85610 PROTHROMBIN TIME: CPT | Performed by: SURGERY

## 2024-08-12 PROCEDURE — 250N000011 HC RX IP 250 OP 636: Performed by: SURGERY

## 2024-08-12 PROCEDURE — 85730 THROMBOPLASTIN TIME PARTIAL: CPT | Performed by: SURGERY

## 2024-08-12 PROCEDURE — 86790 VIRUS ANTIBODY NOS: CPT | Performed by: PHYSICIAN ASSISTANT

## 2024-08-12 PROCEDURE — 250N000011 HC RX IP 250 OP 636

## 2024-08-12 PROCEDURE — 85520 HEPARIN ASSAY: CPT | Performed by: SURGERY

## 2024-08-12 PROCEDURE — 83605 ASSAY OF LACTIC ACID: CPT | Performed by: SURGERY

## 2024-08-12 PROCEDURE — 82330 ASSAY OF CALCIUM: CPT | Performed by: CLINICAL NURSE SPECIALIST

## 2024-08-12 PROCEDURE — 84295 ASSAY OF SERUM SODIUM: CPT | Performed by: STUDENT IN AN ORGANIZED HEALTH CARE EDUCATION/TRAINING PROGRAM

## 2024-08-12 PROCEDURE — 84295 ASSAY OF SERUM SODIUM: CPT | Performed by: CLINICAL NURSE SPECIALIST

## 2024-08-12 PROCEDURE — 250N000013 HC RX MED GY IP 250 OP 250 PS 637: Performed by: SURGERY

## 2024-08-12 PROCEDURE — 71045 X-RAY EXAM CHEST 1 VIEW: CPT | Mod: 26 | Performed by: STUDENT IN AN ORGANIZED HEALTH CARE EDUCATION/TRAINING PROGRAM

## 2024-08-12 PROCEDURE — 258N000003 HC RX IP 258 OP 636: Performed by: STUDENT IN AN ORGANIZED HEALTH CARE EDUCATION/TRAINING PROGRAM

## 2024-08-12 PROCEDURE — 83051 HEMOGLOBIN PLASMA: CPT | Performed by: SURGERY

## 2024-08-12 PROCEDURE — 83735 ASSAY OF MAGNESIUM: CPT | Performed by: PHYSICIAN ASSISTANT

## 2024-08-12 PROCEDURE — 99291 CRITICAL CARE FIRST HOUR: CPT | Mod: 25 | Performed by: STUDENT IN AN ORGANIZED HEALTH CARE EDUCATION/TRAINING PROGRAM

## 2024-08-12 PROCEDURE — 250N000009 HC RX 250: Performed by: PHYSICIAN ASSISTANT

## 2024-08-12 PROCEDURE — 250N000011 HC RX IP 250 OP 636: Performed by: PHYSICIAN ASSISTANT

## 2024-08-12 PROCEDURE — 82248 BILIRUBIN DIRECT: CPT | Performed by: STUDENT IN AN ORGANIZED HEALTH CARE EDUCATION/TRAINING PROGRAM

## 2024-08-12 PROCEDURE — 85379 FIBRIN DEGRADATION QUANT: CPT | Performed by: SURGERY

## 2024-08-12 PROCEDURE — 85730 THROMBOPLASTIN TIME PARTIAL: CPT | Performed by: STUDENT IN AN ORGANIZED HEALTH CARE EDUCATION/TRAINING PROGRAM

## 2024-08-12 PROCEDURE — 83735 ASSAY OF MAGNESIUM: CPT | Performed by: CLINICAL NURSE SPECIALIST

## 2024-08-12 PROCEDURE — 99233 SBSQ HOSP IP/OBS HIGH 50: CPT | Mod: FS | Performed by: INTERNAL MEDICINE

## 2024-08-12 PROCEDURE — 93306 TTE W/DOPPLER COMPLETE: CPT

## 2024-08-12 PROCEDURE — 99291 CRITICAL CARE FIRST HOUR: CPT | Mod: FS | Performed by: NURSE PRACTITIONER

## 2024-08-12 PROCEDURE — 33948 ECMO/ECLS DAILY MGMT-VENOUS: CPT

## 2024-08-12 PROCEDURE — 999N000157 HC STATISTIC RCP TIME EA 10 MIN

## 2024-08-12 PROCEDURE — 82330 ASSAY OF CALCIUM: CPT | Performed by: SURGERY

## 2024-08-12 PROCEDURE — 93306 TTE W/DOPPLER COMPLETE: CPT | Mod: 26 | Performed by: INTERNAL MEDICINE

## 2024-08-12 PROCEDURE — 99292 CRITICAL CARE ADDL 30 MIN: CPT | Mod: FS | Performed by: NURSE PRACTITIONER

## 2024-08-12 PROCEDURE — 82805 BLOOD GASES W/O2 SATURATION: CPT | Performed by: STUDENT IN AN ORGANIZED HEALTH CARE EDUCATION/TRAINING PROGRAM

## 2024-08-12 RX ORDER — CALCIUM CHLORIDE, MAGNESIUM CHLORIDE, DEXTROSE MONOHYDRATE, LACTIC ACID, SODIUM CHLORIDE, SODIUM BICARBONATE AND POTASSIUM CHLORIDE 5.15; 2.03; 22; 5.4; 6.46; 3.09; .157 G/L; G/L; G/L; G/L; G/L; G/L; G/L
12.5 INJECTION INTRAVENOUS CONTINUOUS
Status: DISCONTINUED | OUTPATIENT
Start: 2024-08-12 | End: 2024-08-16

## 2024-08-12 RX ORDER — POTASSIUM CHLORIDE 29.8 MG/ML
20 INJECTION INTRAVENOUS EVERY 8 HOURS PRN
Status: DISCONTINUED | OUTPATIENT
Start: 2024-08-12 | End: 2024-08-16

## 2024-08-12 RX ORDER — DEXTROSE MONOHYDRATE 25 G/50ML
25-50 INJECTION, SOLUTION INTRAVENOUS
Status: DISCONTINUED | OUTPATIENT
Start: 2024-08-12 | End: 2024-08-12

## 2024-08-12 RX ORDER — CALCIUM GLUCONATE 20 MG/ML
2 INJECTION, SOLUTION INTRAVENOUS EVERY 8 HOURS PRN
Status: DISCONTINUED | OUTPATIENT
Start: 2024-08-12 | End: 2024-08-16

## 2024-08-12 RX ORDER — NICOTINE POLACRILEX 4 MG
15-30 LOZENGE BUCCAL
Status: DISCONTINUED | OUTPATIENT
Start: 2024-08-12 | End: 2024-08-12

## 2024-08-12 RX ORDER — DEXAMETHASONE SODIUM PHOSPHATE 10 MG/ML
20 INJECTION, SOLUTION INTRAMUSCULAR; INTRAVENOUS DAILY
Status: COMPLETED | OUTPATIENT
Start: 2024-08-12 | End: 2024-08-16

## 2024-08-12 RX ORDER — DEXAMETHASONE SODIUM PHOSPHATE 10 MG/ML
10 INJECTION, SOLUTION INTRAMUSCULAR; INTRAVENOUS DAILY
Status: COMPLETED | OUTPATIENT
Start: 2024-08-17 | End: 2024-08-21

## 2024-08-12 RX ORDER — MAGNESIUM SULFATE HEPTAHYDRATE 40 MG/ML
2 INJECTION, SOLUTION INTRAVENOUS EVERY 8 HOURS PRN
Status: DISCONTINUED | OUTPATIENT
Start: 2024-08-12 | End: 2024-08-16

## 2024-08-12 RX ORDER — SODIUM CHLORIDE 3 G/100ML
250 INJECTION, SOLUTION INTRAVENOUS ONCE
Status: COMPLETED | OUTPATIENT
Start: 2024-08-12 | End: 2024-08-12

## 2024-08-12 RX ORDER — CALCIUM CHLORIDE, MAGNESIUM CHLORIDE, SODIUM CHLORIDE, SODIUM BICARBONATE, POTASSIUM CHLORIDE AND SODIUM PHOSPHATE DIBASIC DIHYDRATE 3.68; 3.05; 6.34; 3.09; .314; .187 G/L; G/L; G/L; G/L; G/L; G/L
12.5 INJECTION INTRAVENOUS CONTINUOUS
Status: DISCONTINUED | OUTPATIENT
Start: 2024-08-12 | End: 2024-08-16

## 2024-08-12 RX ADMIN — DEXTROSE MONOHYDRATE 20 ML: 50 INJECTION, SOLUTION INTRAVENOUS at 13:05

## 2024-08-12 RX ADMIN — DEXAMETHASONE SODIUM PHOSPHATE 20 MG: 10 INJECTION, SOLUTION INTRAMUSCULAR; INTRAVENOUS at 09:14

## 2024-08-12 RX ADMIN — ACETAMINOPHEN 650 MG: 325 TABLET, FILM COATED ORAL at 20:05

## 2024-08-12 RX ADMIN — CEFEPIME HYDROCHLORIDE 2 G: 2 INJECTION, POWDER, FOR SOLUTION INTRAVENOUS at 00:04

## 2024-08-12 RX ADMIN — CALCIUM CHLORIDE, MAGNESIUM CHLORIDE, DEXTROSE MONOHYDRATE, LACTIC ACID, SODIUM CHLORIDE, SODIUM BICARBONATE AND POTASSIUM CHLORIDE 12.5 ML/KG/HR: 5.15; 2.03; 22; 5.4; 6.46; 3.09; .157 INJECTION INTRAVENOUS at 18:00

## 2024-08-12 RX ADMIN — CHLORHEXIDINE GLUCONATE 0.12% ORAL RINSE 15 ML: 1.2 LIQUID ORAL at 20:04

## 2024-08-12 RX ADMIN — CHLORHEXIDINE GLUCONATE 0.12% ORAL RINSE 15 ML: 1.2 LIQUID ORAL at 07:49

## 2024-08-12 RX ADMIN — CALCIUM CHLORIDE, MAGNESIUM CHLORIDE, SODIUM CHLORIDE, SODIUM BICARBONATE, POTASSIUM CHLORIDE AND SODIUM PHOSPHATE DIBASIC DIHYDRATE 12.5 ML/KG/HR: 3.68; 3.05; 6.34; 3.09; .314; .187 INJECTION INTRAVENOUS at 00:15

## 2024-08-12 RX ADMIN — SODIUM CHLORIDE 250 ML: 9 INJECTION, SOLUTION INTRAVENOUS at 10:31

## 2024-08-12 RX ADMIN — METRONIDAZOLE 500 MG: 5 INJECTION, SOLUTION INTRAVENOUS at 03:48

## 2024-08-12 RX ADMIN — ACETAMINOPHEN 650 MG: 325 TABLET, FILM COATED ORAL at 10:31

## 2024-08-12 RX ADMIN — PANTOPRAZOLE SODIUM 40 MG: 40 INJECTION, POWDER, FOR SOLUTION INTRAVENOUS at 07:49

## 2024-08-12 RX ADMIN — WHITE PETROLATUM 57.7 %-MINERAL OIL 31.9 % EYE OINTMENT: at 15:12

## 2024-08-12 RX ADMIN — CALCIUM CHLORIDE, MAGNESIUM CHLORIDE, SODIUM CHLORIDE, SODIUM BICARBONATE, POTASSIUM CHLORIDE AND SODIUM PHOSPHATE DIBASIC DIHYDRATE 12.5 ML/KG/HR: 3.68; 3.05; 6.34; 3.09; .314; .187 INJECTION INTRAVENOUS at 18:01

## 2024-08-12 RX ADMIN — MIDAZOLAM HYDROCHLORIDE 14 MG/HR: 1 INJECTION, SOLUTION INTRAVENOUS at 21:03

## 2024-08-12 RX ADMIN — Medication 60 ML: at 07:49

## 2024-08-12 RX ADMIN — DIPHENHYDRAMINE HYDROCHLORIDE 25 MG: 50 INJECTION, SOLUTION INTRAMUSCULAR; INTRAVENOUS at 10:31

## 2024-08-12 RX ADMIN — CEFEPIME HYDROCHLORIDE 2 G: 2 INJECTION, POWDER, FOR SOLUTION INTRAVENOUS at 11:08

## 2024-08-12 RX ADMIN — WHITE PETROLATUM 57.7 %-MINERAL OIL 31.9 % EYE OINTMENT: at 07:50

## 2024-08-12 RX ADMIN — LEVOTHYROXINE SODIUM 25 MCG: 0.03 TABLET ORAL at 07:49

## 2024-08-12 RX ADMIN — CALCIUM CHLORIDE, MAGNESIUM CHLORIDE, SODIUM CHLORIDE, SODIUM BICARBONATE, POTASSIUM CHLORIDE AND SODIUM PHOSPHATE DIBASIC DIHYDRATE 12.5 ML/KG/HR: 3.68; 3.05; 6.34; 3.09; .314; .187 INJECTION INTRAVENOUS at 09:22

## 2024-08-12 RX ADMIN — CISATRACURIUM BESYLATE 5.5 MCG/KG/MIN: 10 INJECTION, SOLUTION INTRAVENOUS at 10:37

## 2024-08-12 RX ADMIN — CALCIUM CHLORIDE, MAGNESIUM CHLORIDE, SODIUM CHLORIDE, SODIUM BICARBONATE, POTASSIUM CHLORIDE AND SODIUM PHOSPHATE DIBASIC DIHYDRATE 12.5 ML/KG/HR: 3.68; 3.05; 6.34; 3.09; .314; .187 INJECTION INTRAVENOUS at 13:26

## 2024-08-12 RX ADMIN — Medication 60 ML: at 20:05

## 2024-08-12 RX ADMIN — CALCIUM CHLORIDE, MAGNESIUM CHLORIDE, SODIUM CHLORIDE, SODIUM BICARBONATE, POTASSIUM CHLORIDE AND SODIUM PHOSPHATE DIBASIC DIHYDRATE 12.5 ML/KG/HR: 3.68; 3.05; 6.34; 3.09; .314; .187 INJECTION INTRAVENOUS at 09:23

## 2024-08-12 RX ADMIN — PROPOFOL 30 MCG/KG/MIN: 10 INJECTION, EMULSION INTRAVENOUS at 06:01

## 2024-08-12 RX ADMIN — EPOPROSTENOL 20 NG/KG/MIN: 1.5 INJECTION, POWDER, LYOPHILIZED, FOR SOLUTION INTRAVENOUS at 18:27

## 2024-08-12 RX ADMIN — DEXTROSE MONOHYDRATE 10 ML: 50 INJECTION, SOLUTION INTRAVENOUS at 11:01

## 2024-08-12 RX ADMIN — CALCIUM CHLORIDE, MAGNESIUM CHLORIDE, SODIUM CHLORIDE, SODIUM BICARBONATE, POTASSIUM CHLORIDE AND SODIUM PHOSPHATE DIBASIC DIHYDRATE 12.5 ML/KG/HR: 3.68; 3.05; 6.34; 3.09; .314; .187 INJECTION INTRAVENOUS at 03:48

## 2024-08-12 RX ADMIN — EPOPROSTENOL 20 NG/KG/MIN: 1.5 INJECTION, POWDER, LYOPHILIZED, FOR SOLUTION INTRAVENOUS at 02:24

## 2024-08-12 RX ADMIN — BIVALIRUDIN 0.06 MG/KG/HR: 250 INJECTION, POWDER, LYOPHILIZED, FOR SOLUTION INTRAVENOUS at 10:02

## 2024-08-12 RX ADMIN — METRONIDAZOLE 500 MG: 5 INJECTION, SOLUTION INTRAVENOUS at 10:00

## 2024-08-12 RX ADMIN — MIDAZOLAM HYDROCHLORIDE 14 MG/HR: 1 INJECTION, SOLUTION INTRAVENOUS at 14:04

## 2024-08-12 RX ADMIN — CALCIUM CHLORIDE, MAGNESIUM CHLORIDE, DEXTROSE MONOHYDRATE, LACTIC ACID, SODIUM CHLORIDE, SODIUM BICARBONATE AND POTASSIUM CHLORIDE 12.5 ML/KG/HR: 5.15; 2.03; 22; 5.4; 6.46; 3.09; .157 INJECTION INTRAVENOUS at 23:39

## 2024-08-12 RX ADMIN — SODIUM CHLORIDE 250 ML: 3 INJECTION, SOLUTION INTRAVENOUS at 15:03

## 2024-08-12 RX ADMIN — WHITE PETROLATUM 57.7 %-MINERAL OIL 31.9 % EYE OINTMENT: at 03:48

## 2024-08-12 RX ADMIN — CALCIUM CHLORIDE, MAGNESIUM CHLORIDE, SODIUM CHLORIDE, SODIUM BICARBONATE, POTASSIUM CHLORIDE AND SODIUM PHOSPHATE DIBASIC DIHYDRATE 12.5 ML/KG/HR: 3.68; 3.05; 6.34; 3.09; .314; .187 INJECTION INTRAVENOUS at 23:38

## 2024-08-12 RX ADMIN — Medication 60 ML: at 13:00

## 2024-08-12 RX ADMIN — MIDAZOLAM HYDROCHLORIDE 8 MG/HR: 1 INJECTION, SOLUTION INTRAVENOUS at 03:48

## 2024-08-12 RX ADMIN — AMPHOTERICIN B 88.9 MG: 50 INJECTION, POWDER, LYOPHILIZED, FOR SOLUTION INTRAVENOUS at 11:02

## 2024-08-12 RX ADMIN — METRONIDAZOLE 500 MG: 5 INJECTION, SOLUTION INTRAVENOUS at 17:09

## 2024-08-12 RX ADMIN — Medication 1 TABLET: at 07:49

## 2024-08-12 RX ADMIN — Medication 1.5 MG/HR: at 15:31

## 2024-08-12 ASSESSMENT — ACTIVITIES OF DAILY LIVING (ADL)
ADLS_ACUITY_SCORE: 55
ADLS_ACUITY_SCORE: 57
ADLS_ACUITY_SCORE: 55
ADLS_ACUITY_SCORE: 53
ADLS_ACUITY_SCORE: 57
ADLS_ACUITY_SCORE: 55
ADLS_ACUITY_SCORE: 57
ADLS_ACUITY_SCORE: 57
ADLS_ACUITY_SCORE: 51
ADLS_ACUITY_SCORE: 53
ADLS_ACUITY_SCORE: 57
ADLS_ACUITY_SCORE: 57
ADLS_ACUITY_SCORE: 51
ADLS_ACUITY_SCORE: 53
ADLS_ACUITY_SCORE: 51
ADLS_ACUITY_SCORE: 53
ADLS_ACUITY_SCORE: 57
ADLS_ACUITY_SCORE: 53
ADLS_ACUITY_SCORE: 57

## 2024-08-12 ASSESSMENT — VISUAL ACUITY
OU: NOT TESTABLE

## 2024-08-12 NOTE — PROGRESS NOTES
Neurocritical Care Progress Note    Reason for critical care admission: Concern for anoxic brain injury   Admitting Team: SICU  Date of Service:  08/12/2024  Date of Admission:  8/8/2024  Hospital Day: 5    Assessment/Plan  Massiel Flaherty is a 53 year old female with a past medical history including severe anxiety and depression, fibromyalgia, myalgic encephalomyelitis, hypothyroidism, MURIEL on CPAP, history of GTC seizures and myoclonic epilepsy currently not on medication,  spells with staring and BUE posturing previously described as pseudoseizures, expressive aphasia, and hypertension was admitted to an OSH on 8/3/3024 for evaluation and management of CAP for which she required intubation on 8/6/2024. Hypoxia remained refractory and on 8/8/2024 she required prone positioning and paralysis for ARDS. She was deemed suitable for transfer to [facility] for consideration of VV ECMO on 8/8/2024; she was cannulated for VV ECMO on arrival to [facility].      Head CT on 8/9/2024 was concerning for diffuse cerebral edema with diffuse blurring of gray-white differentiation concerning for hypoxic injury. NCC was consulted on 8/10/2024 for concern of hypoxic brain injury. pCO2 was 91 around the time the 8/9/2024 head CT was obtained. CT this AM with improved edema with pCO2 43.      Per EHR review on 12/4/2023 she had a 3 to 4 day EEG monitor at [facility] prior the visit on 12/4/2023 which did not capture typical events, documented normal interictal EEG, and captured one- short event that occurred with photic stimulation during which the patient reported head numbness with arm and leg twitching without EEG correlate. It was felt the head numbness with arm and leg twitching was related to stress and not epilepsy.     24 hour update: NEI.    Neuro  #Concern for anoxic brain injury   #History of GTC  #History of myoclonic epilepsy  #History of pseudoseizures   -Recommend generally avoiding hypotension, optimize oxygenation, pCO2  within normal range   -If CT is obtained for any indication please consider adding head CT  -Call NCC with NPI < 3.0 with lights off in the room   -MRI when able    NCC will continue to follow. Please call with questions or concerns.     TIME SPENT ON THIS ENCOUNTER   I spent 30 minutes of critical care time on the unit/floor managing the care of Massiel Flaherty excluding time performing procedures. Upon evaluation, this patient had a high probability of imminent or life-threatening deterioration due to concern for anoxic brain injury, which required my direct attention, intervention, and personal management. Greater than 50% of my time was spent at the bedside counseling the patient and/or coordinating care including chart review, history, exam, documentation, and further activities per this note. I have personally reviewed the following data/imaging over the past 24 hours.      The patient was seen and discussed with the NCC attending, Dr. Silva.     DOREEN Ruiz, CNP  Neurocritical Care *[id number]    24 Hour Vital Signs Summary:  Temp: 98.4  F (36.9  C) Temp  Min: 97.2  F (36.2  C)  Max: 98.8  F (37.1  C)  Resp: 10 Resp  Min: 10  Max: 16  SpO2: 90 % SpO2  Min: 90 %  Max: 100 %  Pulse: 93 Pulse  Min: 78  Max: 170    No data recorded    No data recorded.  No data recorded.      Respiratory monitoring:   FiO2 (%): 60 %, Resp: 10, Inspiratory Pressure (cm H2O) (Drager Jessie): 25, Vent Mode: PCV Plus assist, Resp Rate (Set): 10 breaths/min, PEEP (cm H2O): 10 cmH2O, Resp Rate (Set): 10 breaths/min, PEEP (cm H2O): 10 cmH2O      I/O last 3 completed shifts:  In: 4323.53 [I.V.:3158.53; NG/GT:360]  Out: 6178.1 [Urine:10; Other:5518.1; Stool:650]    Current Medications:  Current Facility-Administered Medications   Medication Dose Route Frequency Provider Last Rate Last Admin    acetaminophen (TYLENOL) tablet 650 mg  650 mg Oral Q24H Aniceto Adame MD   650 mg at 08/12/24 1031    dextrose 5 % flush  PRE/POST medication  10-20 mL Intravenous Q24H Barry Hatch MD   10 mL at 08/12/24 1101    And    amphotericin B (FUNGIZONE) 88.9 mg in D5W 500 mL IVPB  1 mg/kg (Dosing Weight) Intravenous Q24H Barry Hatch  mL/hr at 08/12/24 1102 88.9 mg at 08/12/24 1102    And    dextrose 5 % flush PRE/POST medication  10-20 mL Intravenous Q24H Barry Hatch MD   20 mL at 08/12/24 1305    artificial tears ophthalmic ointment   Both Eyes Q8H Alethea Schaffer MD   Given at 08/12/24 0750    ceFEPIme (MAXIPIME) 2 g vial to attach to  mL bag for ADULTS or NS 50 mL bag for PEDS  2 g Intravenous Q12H Barry Hatch MD   2 g at 08/12/24 1108    chlorhexidine (PERIDEX) 0.12 % solution 15 mL  15 mL Mouth/Throat Q12H Giovanny Guidry PA-C   15 mL at 08/12/24 0749    dexAMETHasone PF (DECADRON) injection 20 mg  20 mg Intravenous Daily Cal Lisa MD   20 mg at 08/12/24 0914    Followed by    [START ON 8/17/2024] dexAMETHasone PF (DECADRON) injection 10 mg  10 mg Intravenous Daily Cal Lisa MD        diphenhydrAMINE (BENADRYL) capsule 25 mg  25 mg Oral Q24H Aniceto Adame MD        Or    diphenhydrAMINE (BENADRYL) injection 25 mg  25 mg Intravenous Q24H Aniceto Adame MD   25 mg at 08/12/24 1031    insulin aspart (NovoLOG) injection (RAPID ACTING)  1-12 Units Subcutaneous Q4H Dong Rico PA-C   2 Units at 08/12/24 1154    levothyroxine (SYNTHROID/LEVOTHROID) tablet 25 mcg  25 mcg Per Feeding Tube QAM AC Giovanny Guidry PA-C   25 mcg at 08/12/24 0749    metroNIDAZOLE (FLAGYL) infusion 500 mg  500 mg Intravenous Q8H Dong Rico PA-C   500 mg at 08/12/24 1000    multivitamin RENAL (RENAVITE RX/NEPHROVITE) tablet 1 tablet  1 tablet Oral or Feeding Tube Daily Barry Hatch MD   1 tablet at 08/12/24 0749    pantoprazole (PROTONIX) 2 mg/mL suspension 40 mg  40 mg Per Feeding Tube [facility] Barry Hatch MD        polyethylene glycol (MIRALAX)  Packet 17 g  17 g Per Feeding Tube BID Dong Rico PA-C   17 g at 08/10/24 0810    Prosource TF20 ENfit Compatibl EN LIQD (PROSOURCE TF20) packet 60 mL  1 packet Per Feeding Tube TID Giovanny Guidry PA-C   60 mL at 08/12/24 1300    senna-docusate (SENOKOT-S/PERICOLACE) 8.6-50 MG per tablet 2 tablet  2 tablet Oral or Feeding Tube BID Dong Rico PA-C   2 tablet at 08/10/24 2025    sodium chloride 0.9% BOLUS 250 mL  250 mL Intravenous Q24H Barry Hatch  mL/hr at 08/12/24 1031 250 mL at 08/12/24 1031       PRN Medications:  Current Facility-Administered Medications   Medication Dose Route Frequency Provider Last Rate Last Admin    acetaminophen (TYLENOL) tablet 650 mg  650 mg Oral or Feeding Tube Q4H PRN Giovanny Guidry PA-C        Or    acetaminophen (TYLENOL) Suppository 650 mg  650 mg Rectal Q4H PRN Giovanny Guidry PA-C        bisacodyl (DULCOLAX) suppository 10 mg  10 mg Rectal Daily PRN Giovanny Guidry PA-C        calcium gluconate 2 g in  mL intermittent infusion  2 g Intravenous Q8H PRN Sony Castaneda MD   2 g at 08/11/24 2004    calcium gluconate 4 g in sodium chloride 0.9 % 100 mL intermittent infusion  4 g Intravenous Q8H PRN Sony Castaneda MD        dextrose 10% infusion   Intravenous Continuous PRN Giovanny Guidry PA-C        glucose gel 15-30 g  15-30 g Oral Q15 Min PRN Giovanny Guidry PA-C        Or    dextrose 50 % injection 25-50 mL  25-50 mL Intravenous Q15 Min PRN Giovanny Guidry PA-C   50 mL at 08/09/24 0755    Or    glucagon injection 1 mg  1 mg Subcutaneous Q15 Min PRN Giovanny Guidry PA-C        diphenhydrAMINE (BENADRYL) capsule 25 mg  25 mg Oral Q6H PRN Aniceto Adame MD        Or    diphenhydrAMINE (BENADRYL) injection 25 mg  25 mg Intravenous Q6H PRN Aniceto Adame MD        hydromorphone (DILAUDID) 0.2 mg/mL bolus dose from infusion pump 0.5 mg  0.5 mg Intravenous Q1H PRN Dong Rico PA-C        magnesium sulfate  2 g in 50 mL sterile water intermittent infusion  2 g Intravenous Q8H PRN Sony Castaneda MD        metoclopramide (REGLAN) injection 10 mg  10 mg Intravenous Once PRN Dong Rico PA-C        midazolam (VERSED) 1 mg/mL bolus from syringe/bag pump ADULT  2 mg Intravenous Q1H PRN Dong Rico PA-C   2 mg at 08/12/24 1325    naloxone (NARCAN) injection 0.2 mg  0.2 mg Intravenous Q2 Min PRN Barry Hatch MD        Or    naloxone (NARCAN) injection 0.4 mg  0.4 mg Intravenous Q2 Min PRN Barry Hatch MD        Or    naloxone (NARCAN) injection 0.2 mg  0.2 mg Intramuscular Q2 Min PRN Barry Hatch MD        Or    naloxone (NARCAN) injection 0.4 mg  0.4 mg Intramuscular Q2 Min PRN Barry Hatch MD        No heparin required   Does not apply Continuous PRN Sony Castaneda MD        potassium chloride 20 mEq in 50 mL intermittent infusion  20 mEq Intravenous Q8H PRN Sony Castaneda MD        sodium chloride 0.9% BOLUS 1-250 mL  1-250 mL Intravenous Q1H PRN Aniceto Adame MD   50 mL at 08/10/24 1542    sodium phosphate 15 mmol in sodium chloride 0.9 % 250 mL intermittent infusion  15 mmol Intravenous Q8H PRN Sony Castaneda MD           Infusions:  Current Facility-Administered Medications   Medication Dose Route Frequency Provider Last Rate Last Admin    bivalirudin (ANGIOMAX) 250 mg in sodium chloride 0.9 % 250 mL ANTICOAGULANT infusion  0-0.3 mg/kg/hr (Dosing Weight) Intravenous Continuous Dong Rico PA-C 5.3 mL/hr at 08/12/24 1200 0.06 mg/kg/hr at 08/12/24 1200    cisatracurium (NIMBEX) 200 mg in D5W 100 mL infusion  3-10 mcg/kg/min (Ideal) Intravenous Continuous Alethea Schaffer MD 9 mL/hr at 08/12/24 1231 6 mcg/kg/min at 08/12/24 1231    dextrose 10% infusion   Intravenous Continuous PRN Giovanny Guidry PA-C        dialysate for CVVHD & CVVHDF (Phoxillum BK4/2.5)  12.5 mL/kg/hr CRRT Continuous Sony Castaneda MD 1,100 mL/hr at  "08/12/24 1326 12.5 mL/kg/hr at 08/12/24 1326    epoprostenol (VELETRI) inhalation solution  20 ng/kg/min (Ideal) Nebulization Continuous Dong Rico PA-C 3 mL/hr at 08/12/24 1200 20 ng/kg/min at 08/12/24 1200    HYDROmorphone (DILAUDID) 0.2 mg/mL infusion ADULT/PEDS GREATER than or EQUAL to 20 kg  0.3-1.5 mg/hr Intravenous Continuous Dong Rico PA-C 7.5 mL/hr at 08/12/24 1325 1.5 mg/hr at 08/12/24 1325    midazolam (VERSED) 100 mg/100 mL NS infusion - ADULT  1-14 mg/hr Intravenous Continuous Dong Rico PA-C 14 mL/hr at 08/12/24 1319 14 mg/hr at 08/12/24 1319    No heparin required   Does not apply Continuous PRN Sony Castaneda MD        norepinephrine (LEVOPHED) 16 mg in  mL infusion MAX CONC CENTRAL LINE  0.01-0.6 mcg/kg/min (Dosing Weight) Intravenous Continuous -Giovanny Spence PA-C   Stopped at 08/12/24 0000    POST-Filter REPLACEMENT SOlution for CVVHD/CVVHDF (3% sodium chloride)   Intravenous Continuous Barry Hatch  mL/hr at 08/11/24 2142 New Bag at 08/11/24 2142    PRE-filter replacement solution for CVVHD & CVVHDF (Phoxillum BK4/2.5)  12.5 mL/kg/hr CRRT Continuous Sony Castaneda MD 1,100 mL/hr at 08/12/24 1326 12.5 mL/kg/hr at 08/12/24 1326       Allergies   Allergen Reactions    Celecoxib Unknown    Sulfa Antibiotics Hives     Per careeverywhere    Tetracycline Hives     Per careeverywhere       Physical Examination:  Vitals: Pulse 93   Temp 98.4  F (36.9  C)   Resp 10   Ht 1.575 m (5' 2\")   Wt 87.2 kg (192 lb 3.9 oz)   SpO2 90%   BMI 35.16 kg/m    General: Adult female patient, lying in bed, critically-ill.  HEENT: Normocephalic, atraumatic.  Cardiac: She appears adequately perfused.   Pulm: Synchronous with mechanical ventilator.   Abdomen: Non-distended.  Extremities: Warm, distal extremities do not appear acutely threatened.   Skin: No obvious rashes or lesions on exposed skin.   Psych: Sedated.  Neuro:  Mental status: Sedated. No " eye opening, no verbal, does not follow commands.   Cranial nerves: Limited by sedation and ETT. NPI 4.0 bilaterally. Face appears symmetric though exam limited by ETT.   Motor: Normal bulk and tone. No abnormal movements.   Sensory: Deferred.  Coordination: Deferred.  Gait: Deferred.    Labs and Imaging:    Results for orders placed or performed during the hospital encounter of 08/08/24 (from the past 24 hour(s))   XR Abdomen Port 1 View    Narrative    Exam: XR ABDOMEN PORT 1 VIEW, 8/11/2024 2:40 PM    Indication: NG repositioned to be NJ    Comparison: CT chest abdomen pelvis 8/9/2024.    Findings:   Frontal supine radiograph of the abdomen. Right lower quadrant ostomy  device with stable positioning of leads traversing the sacral foramina  and terminating in the right hemipelvis. Right lower approach ECMO  catheter with tip outside of the field of view. Feeding tube with tip  projecting postpyloric, in the proximal jejunum.    Nonobstructive bowel gas pattern, gasless abdomen no portal venous gas  or pneumatosis. No acute osseous abnormalities, mild degenerative  changes of the lumbar spine      Impression    Impression:   1. Nonobstructive bowel gas pattern.  2. Interval advancement of a feeding tube, which now projects  postpyloric with tip in proximal jejunum. Remainder of support devices  are stable.    I have personally reviewed the examination and initial interpretation  and I agree with the findings.    IPLAR SAHU MD         SYSTEM ID:  [id number]   Blood gas arterial   Result Value Ref Range    pH Arterial 7.33 (L) 7.35 - 7.45    pCO2 Arterial 43 35 - 45 mm Hg    pO2 Arterial 71 (L) 80 - 105 mm Hg    FIO2 60     Bicarbonate Arterial 23 21 - 28 mmol/L    Base Excess/Deficit Arterial -3.2 (L) -3.0 - 3.0 mmol/L    Oswald's Test Artline     Oxyhemoglobin Arterial 91 (L) 92 - 100 %    O2 Sat, Arterial 94.5 (L) 96.0 - 97.0 %    Peep 10 cm H2O    Narrative    In healthy individuals, oxyhemoglobin (O2Hb) and  oxygen saturation (SO2) are approximately equal. In the presence of dyshemoglobins, oxyhemoglobin can be considerably lower than oxygen saturation.   Partial thromboplastin time   Result Value Ref Range    aPTT 41 (H) 22 - 38 Seconds   Blood gas arterial   Result Value Ref Range    pH Arterial 7.37 7.35 - 7.45    pCO2 Arterial 38 35 - 45 mm Hg    pO2 Arterial 77 (L) 80 - 105 mm Hg    FIO2 60     Bicarbonate Arterial 22 21 - 28 mmol/L    Base Excess/Deficit Arterial -2.7 -3.0 - 3.0 mmol/L    Oswald's Test Artline     Oxyhemoglobin Arterial 94 92 - 100 %    O2 Sat, Arterial 96.4 96.0 - 97.0 %    Peep 10 cm H2O    Narrative    In healthy individuals, oxyhemoglobin (O2Hb) and oxygen saturation (SO2) are approximately equal. In the presence of dyshemoglobins, oxyhemoglobin can be considerably lower than oxygen saturation.   CBC with Platelets & Differential    Narrative    The following orders were created for panel order CBC with Platelets & Differential.  Procedure                               Abnormality         Status                     ---------                               -----------         ------                     CBC with platelets and d...[[id number]]  Abnormal            Final result               Manual Differential[[id number]]          Abnormal            Final result                 Please view results for these tests on the individual orders.   Heparin Unfractionated Anti Xa Level   Result Value Ref Range    Anti Xa Unfractionated Heparin <0.10 For Reference Range, See Comment IU/mL    Narrative    Therapeutic Range: UFH: 0.25-0.50 IU/mL for low intensity dosing,  0.30-0.70 IU/mL for high intensity dosing DVT and PE.  This test is not validated for other direct factor X inhibitors (e.g. rivaroxaban, apixaban, edoxaban, betrixaban, fondaparinux) and should not be used for monitoring of other medications.   Blood gas ELS venous   Result Value Ref Range    pH ELS Venous 7.37 7.32 - 7.43    pCO2 ELS Venous  39 (L) 40 - 50 mm Hg    pO2 ELS Venous 52 (H) 25 - 47 mm Hg    Bicarbonate ELS Venous 23 21 - 28 mmol/L    Base Excess/Deficit Venous -2.7 -3.0 - 3.0 mmol/L    FIO2 60     Oxyhemoglobin ELS Venous 85 %    O2 Saturation ELS Venous 87.6 (H) 70.0 - 75.0 %    Narrative    In healthy individuals, oxyhemoglobin (O2Hb) and oxygen saturation (SO2) are approximately equal. In the presence of dyshemoglobins, oxyhemoglobin can be considerably lower than oxygen saturation.   Blood gas ELS arterial   Result Value Ref Range    pH ELS Arterial 7.42 7.35 - 7.45    pCO2 ELS Arterial 34 (L) 35 - 45 mm Hg    pO2 ELS Arterial 443 (H) 80 - 105 mm Hg    Bicarbonate ELS Arterial 22 21 - 28 mmol/L    Base Excess/Deficit Arterial -2.5 -3.0 - 3.0 mmol/L    FIO2 100     Oxyhemoglobin ELS Arterial 98 75 - 100 %    O2 Saturation ELS Arterial >100.0 (H) 96.0 - 97.0 %    Narrative    In healthy individuals, oxyhemoglobin (O2Hb) and oxygen saturation (SO2) are approximately equal. In the presence of dyshemoglobins, oxyhemoglobin can be considerably lower than oxygen saturation.   Fibrinogen activity   Result Value Ref Range    Fibrinogen Activity 605 (H) 170 - 510 mg/dL   INR   Result Value Ref Range    INR 1.15 0.85 - 1.15   Partial thromboplastin time   Result Value Ref Range    aPTT 35 22 - 38 Seconds   D dimer quantitative   Result Value Ref Range    D-Dimer Quantitative 8.94 (H) 0.00 - 0.50 ug/mL FEU    Narrative    This D-dimer assay is intended for use in conjunction with a clinical pretest probability assessment model to exclude pulmonary embolism (PE) and deep venous thrombosis (DVT) in outpatients suspected of PE or DVT. The cut-off value is 0.50 ug/mL FEU.    For patients 50 years of age or older, the application of age-adjusted cut-off values for D-Dimer may increase the specificity without significant effect on sensitivity. The literature suggested calculation age adjusted cut-off in ug/L = age in years x 10 ug/L. The results in this  laboratory are reported as ug/mL rather than ug/L. The calculation for age adjusted cut off in ug/mL= age in years x 0.01 ug/mL. For example, the cut off for a 76 year old male is 76 x 0.01 ug/mL = 0.76 ug/mL (760 ug/L).    M Emelyn et al. Age adjusted D-dimer cut-off levels to rule out pulmonary embolism: The ADJUST-PE Study. JONATHON 2014;311:6838-6678.; HJ Whitney et al. Diagnostic accuracy of conventional or age adjusted D-dimer cutoff values in older patients with suspected venous thromboembolism. Systemic review and meta-analysis. BMJ 2013:346:f2492.   Magnesium CRRT   Result Value Ref Range    Magnesium 2.3 1.7 - 2.3 mg/dL   Renal panel CRRT   Result Value Ref Range    Sodium 146 (H) 135 - 145 mmol/L    Potassium 3.8 3.4 - 5.3 mmol/L    Chloride 114 (H) 98 - 107 mmol/L    Carbon Dioxide (CO2) 20 (L) 22 - 29 mmol/L    Anion Gap 12 7 - 15 mmol/L    Glucose 173 (H) 70 - 99 mg/dL    Urea Nitrogen 24.8 (H) 6.0 - 20.0 mg/dL    Creatinine 1.28 (H) 0.51 - 0.95 mg/dL    GFR Estimate 50 (L) >60 mL/min/1.73m2    Calcium 7.3 (L) 8.8 - 10.4 mg/dL    Albumin 2.3 (L) 3.5 - 5.2 g/dL    Phosphorus 3.9 2.5 - 4.5 mg/dL   Osmolality   Result Value Ref Range    Osmolality Blood 312 (H) 275 - 295 mmol/kg    Narrative    Greater than 385 mmol/kg relates to stupor in hyperglycemia   Greater than 400 mmol/kg can relate to seizures   Greater than 420 mmol/kg can be lethal    Serum Osmalar Gap:   Normal <10   Larger suggest unmeasured substances present in serum (ethanol, methanol, isopropanol, mannitol, ethylene glycol).   CBC with platelets and differential   Result Value Ref Range    WBC Count 13.3 (H) 4.0 - 11.0 10e3/uL    RBC Count 2.56 (L) 3.80 - 5.20 10e6/uL    Hemoglobin 8.0 (L) 11.7 - 15.7 g/dL    Hematocrit 24.7 (L) 35.0 - 47.0 %    MCV 97 78 - 100 fL    MCH 31.3 26.5 - 33.0 pg    MCHC 32.4 31.5 - 36.5 g/dL    RDW 16.5 (H) 10.0 - 15.0 %    Platelet Count 208 150 - 450 10e3/uL    % Neutrophils      % Lymphocytes      % Monocytes       % Eosinophils      % Basophils      % Immature Granulocytes      NRBCs per 100 WBC 2 (H) <1 /100    Absolute Neutrophils      Absolute Lymphocytes      Absolute Monocytes      Absolute Eosinophils      Absolute Basophils      Absolute Immature Granulocytes      Absolute NRBCs 0.3 10e3/uL   Manual Differential   Result Value Ref Range    % Neutrophils 83 %    % Lymphocytes 4 %    % Monocytes 5 %    % Eosinophils 5 %    % Basophils 0 %    % Myelocytes 3 %    Absolute Neutrophils 11.0 (H) 1.6 - 8.3 10e3/uL    Absolute Lymphocytes 0.5 (L) 0.8 - 5.3 10e3/uL    Absolute Monocytes 0.7 0.0 - 1.3 10e3/uL    Absolute Eosinophils 0.7 0.0 - 0.7 10e3/uL    Absolute Basophils 0.0 0.0 - 0.2 10e3/uL    Absolute Myelocytes 0.4 (H) <=0.0 10e3/uL    RBC Morphology Confirmed RBC Indices     Platelet Assessment  Automated Count Confirmed. Platelet morphology is normal.     Automated Count Confirmed. Platelet morphology is normal.   Ionized Calcium   Result Value Ref Range    Calcium Ionized Whole Blood 4.3 (L) 4.4 - 5.2 mg/dL   Lactic acid whole blood   Result Value Ref Range    Lactic Acid 1.1 0.7 - 2.0 mmol/L   Blood gas venous   Result Value Ref Range    pH Venous 7.35 7.32 - 7.43    pCO2 Venous 43 40 - 50 mm Hg    pO2 Venous 37 25 - 47 mm Hg    Bicarbonate Venous 24 21 - 28 mmol/L    Base Excess/Deficit Venous -1.9 -3.0 - 3.0 mmol/L    FIO2 60     Oxyhemoglobin Venous 68 (L) 70 - 75 %    O2 Sat, Venous 69.9 (L) 70.0 - 75.0 %    Narrative    In healthy individuals, oxyhemoglobin (O2Hb) and oxygen saturation (SO2) are approximately equal. In the presence of dyshemoglobins, oxyhemoglobin can be considerably lower than oxygen saturation.   Blood gas arterial   Result Value Ref Range    pH Arterial 7.37 7.35 - 7.45    pCO2 Arterial 40 35 - 45 mm Hg    pO2 Arterial 67 (L) 80 - 105 mm Hg    FIO2 60     Bicarbonate Arterial 23 21 - 28 mmol/L    Base Excess/Deficit Arterial -2.1 -3.0 - 3.0 mmol/L    Oswald's Test Artline      Oxyhemoglobin Arterial 91 (L) 92 - 100 %    O2 Sat, Arterial 94.1 (L) 96.0 - 97.0 %    Peep 10 cm H2O    Narrative    In healthy individuals, oxyhemoglobin (O2Hb) and oxygen saturation (SO2) are approximately equal. In the presence of dyshemoglobins, oxyhemoglobin can be considerably lower than oxygen saturation.   Partial thromboplastin time   Result Value Ref Range    aPTT 33 22 - 38 Seconds   Blood gas arterial   Result Value Ref Range    pH Arterial 7.34 (L) 7.35 - 7.45    pCO2 Arterial 42 35 - 45 mm Hg    pO2 Arterial 72 (L) 80 - 105 mm Hg    FIO2 60     Bicarbonate Arterial 23 21 - 28 mmol/L    Base Excess/Deficit Arterial -3.0 -3.0 - 3.0 mmol/L    Oswald's Test Artline     Oxyhemoglobin Arterial 93 92 - 100 %    O2 Sat, Arterial 95.3 (L) 96.0 - 97.0 %    Narrative    In healthy individuals, oxyhemoglobin (O2Hb) and oxygen saturation (SO2) are approximately equal. In the presence of dyshemoglobins, oxyhemoglobin can be considerably lower than oxygen saturation.   Sodium   Result Value Ref Range    Sodium 148 (H) 135 - 145 mmol/L   Osmolality   Result Value Ref Range    Osmolality Blood 312 (H) 275 - 295 mmol/kg    Narrative    Greater than 385 mmol/kg relates to stupor in hyperglycemia   Greater than 400 mmol/kg can relate to seizures   Greater than 420 mmol/kg can be lethal    Serum Osmalar Gap:   Normal <10   Larger suggest unmeasured substances present in serum (ethanol, methanol, isopropanol, mannitol, ethylene glycol).   CBC with Platelets & Differential    Narrative    The following orders were created for panel order CBC with Platelets & Differential.  Procedure                               Abnormality         Status                     ---------                               -----------         ------                     CBC with platelets and d...[[id number]]  Abnormal            Final result               Manual Differential[[id number]]          Abnormal            Final result                 Please  view results for these tests on the individual orders.   Blood gas arterial   Result Value Ref Range    pH Arterial 7.34 (L) 7.35 - 7.45    pCO2 Arterial 43 35 - 45 mm Hg    pO2 Arterial 72 (L) 80 - 105 mm Hg    FIO2 60     Bicarbonate Arterial 23 21 - 28 mmol/L    Base Excess/Deficit Arterial -2.5 -3.0 - 3.0 mmol/L    Oswald's Test Artline     Oxyhemoglobin Arterial 92 92 - 100 %    O2 Sat, Arterial 94.7 (L) 96.0 - 97.0 %    Narrative    In healthy individuals, oxyhemoglobin (O2Hb) and oxygen saturation (SO2) are approximately equal. In the presence of dyshemoglobins, oxyhemoglobin can be considerably lower than oxygen saturation.   Ionized Calcium   Result Value Ref Range    Calcium Ionized Whole Blood 4.6 4.4 - 5.2 mg/dL   Lactic acid whole blood   Result Value Ref Range    Lactic Acid 0.7 0.7 - 2.0 mmol/L   Heparin Unfractionated Anti Xa Level   Result Value Ref Range    Anti Xa Unfractionated Heparin <0.10 For Reference Range, See Comment IU/mL    Narrative    Therapeutic Range: UFH: 0.25-0.50 IU/mL for low intensity dosing,  0.30-0.70 IU/mL for high intensity dosing DVT and PE.  This test is not validated for other direct factor X inhibitors (e.g. rivaroxaban, apixaban, edoxaban, betrixaban, fondaparinux) and should not be used for monitoring of other medications.   INR   Result Value Ref Range    INR 1.24 (H) 0.85 - 1.15   Partial thromboplastin time   Result Value Ref Range    aPTT 36 22 - 38 Seconds   Magnesium CRRT   Result Value Ref Range    Magnesium 2.4 (H) 1.7 - 2.3 mg/dL   Renal panel CRRT   Result Value Ref Range    Sodium 149 (H) 135 - 145 mmol/L    Potassium 4.1 3.4 - 5.3 mmol/L    Chloride 118 (H) 98 - 107 mmol/L    Carbon Dioxide (CO2) 21 (L) 22 - 29 mmol/L    Anion Gap 10 7 - 15 mmol/L    Glucose 140 (H) 70 - 99 mg/dL    Urea Nitrogen 25.4 (H) 6.0 - 20.0 mg/dL    Creatinine 1.27 (H) 0.51 - 0.95 mg/dL    GFR Estimate 50 (L) >60 mL/min/1.73m2    Calcium 7.7 (L) 8.8 - 10.4 mg/dL    Albumin 2.4 (L)  3.5 - 5.2 g/dL    Phosphorus 4.4 2.5 - 4.5 mg/dL   Osmolality   Result Value Ref Range    Osmolality Blood 316 (H) 275 - 295 mmol/kg    Narrative    Greater than 385 mmol/kg relates to stupor in hyperglycemia   Greater than 400 mmol/kg can relate to seizures   Greater than 420 mmol/kg can be lethal    Serum Osmalar Gap:   Normal <10   Larger suggest unmeasured substances present in serum (ethanol, methanol, isopropanol, mannitol, ethylene glycol).   CBC with platelets and differential   Result Value Ref Range    WBC Count 12.6 (H) 4.0 - 11.0 10e3/uL    RBC Count 2.53 (L) 3.80 - 5.20 10e6/uL    Hemoglobin 8.0 (L) 11.7 - 15.7 g/dL    Hematocrit 24.4 (L) 35.0 - 47.0 %    MCV 96 78 - 100 fL    MCH 31.6 26.5 - 33.0 pg    MCHC 32.8 31.5 - 36.5 g/dL    RDW 16.6 (H) 10.0 - 15.0 %    Platelet Count 187 150 - 450 10e3/uL    % Neutrophils      % Lymphocytes      % Monocytes      % Eosinophils      % Basophils      % Immature Granulocytes      NRBCs per 100 WBC 3 (H) <1 /100    Absolute Neutrophils      Absolute Lymphocytes      Absolute Monocytes      Absolute Eosinophils      Absolute Basophils      Absolute Immature Granulocytes      Absolute NRBCs 0.4 10e3/uL   Manual Differential   Result Value Ref Range    % Neutrophils 83 %    % Lymphocytes 6 %    % Monocytes 3 %    % Eosinophils 2 %    % Basophils 1 %    % Metamyelocytes 4 %    % Myelocytes 1 %    NRBCs per 100 WBC 2 (H) <=0 %    Absolute Neutrophils 10.5 (H) 1.6 - 8.3 10e3/uL    Absolute Lymphocytes 0.8 0.8 - 5.3 10e3/uL    Absolute Monocytes 0.4 0.0 - 1.3 10e3/uL    Absolute Eosinophils 0.3 0.0 - 0.7 10e3/uL    Absolute Basophils 0.1 0.0 - 0.2 10e3/uL    Absolute Metamyelocytes 0.5 (H) <=0.0 10e3/uL    Absolute Myelocytes 0.1 (H) <=0.0 10e3/uL    Absolute NRBCs 0.3 (H) <=0.0 10e3/uL    RBC Morphology Confirmed RBC Indices     Platelet Assessment  Automated Count Confirmed. Platelet morphology is normal.     Automated Count Confirmed. Platelet morphology is normal.     [place] Cells Slight (A) None Seen    Polychromasia Slight (A) None Seen   Sodium   Result Value Ref Range    Sodium 149 (H) 135 - 145 mmol/L   Blood gas arterial   Result Value Ref Range    pH Arterial 7.42 7.35 - 7.45    pCO2 Arterial 35 35 - 45 mm Hg    pO2 Arterial 69 (L) 80 - 105 mm Hg    FIO2 60     Bicarbonate Arterial 23 21 - 28 mmol/L    Base Excess/Deficit Arterial -1.5 -3.0 - 3.0 mmol/L    Oswald's Test Artline     Oxyhemoglobin Arterial 94 92 - 100 %    O2 Sat, Arterial 96.0 96.0 - 97.0 %    Narrative    In healthy individuals, oxyhemoglobin (O2Hb) and oxygen saturation (SO2) are approximately equal. In the presence of dyshemoglobins, oxyhemoglobin can be considerably lower than oxygen saturation.   Partial thromboplastin time   Result Value Ref Range    aPTT 39 (H) 22 - 38 Seconds   XR Chest Port 1 View    Narrative    EXAM: XR CHEST PORT 1 VIEW  8/12/2024 12:45 AM      HISTORY: daily ECMO check    COMPARISON: 8/11/2024.    FINDINGS:   Single view of the chest. Endotracheal tube tip projects over the mid  thoracic trachea. Feeding tube courses outside of the field of view.  Esophageal temperature probe tip projects over the mid thoracic  esophagus. Superior and inferior approach ECMO cannulae are unchanged  in position. Trachea is midline. Cardiac silhouette is obscured. Dense  consolidative opacities bilaterally with minimally improved areas of  aeration throughout. No pneumothorax.      Impression    IMPRESSION:   1. Stable support devices.  2. Dense consolidative opacities bilaterally with minimally improved  areas of parenchymal aeration.    I have personally reviewed the examination and initial interpretation  and I agree with the findings.    ALDEN TO DO         SYSTEM ID:  [id number]   Blood gas arterial   Result Value Ref Range    pH Arterial 7.41 7.35 - 7.45    pCO2 Arterial 36 35 - 45 mm Hg    pO2 Arterial 66 (L) 80 - 105 mm Hg    FIO2 60     Bicarbonate Arterial 22 21 - 28 mmol/L    Base  Excess/Deficit Arterial -2.0 -3.0 - 3.0 mmol/L    Oswald's Test Artline     Oxyhemoglobin Arterial 92 92 - 100 %    O2 Sat, Arterial 94.6 (L) 96.0 - 97.0 %    Narrative    In healthy individuals, oxyhemoglobin (O2Hb) and oxygen saturation (SO2) are approximately equal. In the presence of dyshemoglobins, oxyhemoglobin can be considerably lower than oxygen saturation.   CBC with Platelets & Differential    Narrative    The following orders were created for panel order CBC with Platelets & Differential.  Procedure                               Abnormality         Status                     ---------                               -----------         ------                     CBC with platelets and d...[[id number]]  Abnormal            Final result               Manual Differential[[id number]]          Abnormal            Final result                 Please view results for these tests on the individual orders.   Heparin Unfractionated Anti Xa Level   Result Value Ref Range    Anti Xa Unfractionated Heparin <0.10 For Reference Range, See Comment IU/mL    Narrative    Therapeutic Range: UFH: 0.25-0.50 IU/mL for low intensity dosing,  0.30-0.70 IU/mL for high intensity dosing DVT and PE.  This test is not validated for other direct factor X inhibitors (e.g. rivaroxaban, apixaban, edoxaban, betrixaban, fondaparinux) and should not be used for monitoring of other medications.   Blood gas ELS venous   Result Value Ref Range    pH ELS Venous 7.41 7.32 - 7.43    pCO2 ELS Venous 37 (L) 40 - 50 mm Hg    pO2 ELS Venous 34 25 - 47 mm Hg    Bicarbonate ELS Venous 24 21 - 28 mmol/L    Base Excess/Deficit Venous -0.7 -3.0 - 3.0 mmol/L    FIO2 60     Oxyhemoglobin ELS Venous 65 %    O2 Saturation ELS Venous 66.9 (L) 70.0 - 75.0 %    Narrative    In healthy individuals, oxyhemoglobin (O2Hb) and oxygen saturation (SO2) are approximately equal. In the presence of dyshemoglobins, oxyhemoglobin can be considerably lower than oxygen  saturation.   Blood gas ELS arterial   Result Value Ref Range    pH ELS Arterial 7.47 (H) 7.35 - 7.45    pCO2 ELS Arterial 30 (L) 35 - 45 mm Hg    pO2 ELS Arterial 366 (H) 80 - 105 mm Hg    Bicarbonate ELS Arterial 22 21 - 28 mmol/L    Base Excess/Deficit Arterial -1.5 -3.0 - 3.0 mmol/L    FIO2 100     Oxyhemoglobin ELS Arterial 98 75 - 100 %    O2 Saturation ELS Arterial >100.0 (H) 96.0 - 97.0 %    Narrative    In healthy individuals, oxyhemoglobin (O2Hb) and oxygen saturation (SO2) are approximately equal. In the presence of dyshemoglobins, oxyhemoglobin can be considerably lower than oxygen saturation.   Fibrinogen activity   Result Value Ref Range    Fibrinogen Activity 588 (H) 170 - 510 mg/dL   INR   Result Value Ref Range    INR 1.30 (H) 0.85 - 1.15   Antithrombin III   Result Value Ref Range    Antithrombin III 77 (L) 85 - 135 %   D dimer quantitative   Result Value Ref Range    D-Dimer Quantitative 11.72 (H) 0.00 - 0.50 ug/mL FEU    Narrative    This D-dimer assay is intended for use in conjunction with a clinical pretest probability assessment model to exclude pulmonary embolism (PE) and deep venous thrombosis (DVT) in outpatients suspected of PE or DVT. The cut-off value is 0.50 ug/mL FEU.    For patients 50 years of age or older, the application of age-adjusted cut-off values for D-Dimer may increase the specificity without significant effect on sensitivity. The literature suggested calculation age adjusted cut-off in ug/L = age in years x 10 ug/L. The results in this laboratory are reported as ug/mL rather than ug/L. The calculation for age adjusted cut off in ug/mL= age in years x 0.01 ug/mL. For example, the cut off for a 76 year old male is 76 x 0.01 ug/mL = 0.76 ug/mL (760 ug/L).    M Emelyn et al. Age adjusted D-dimer cut-off levels to rule out pulmonary embolism: The ADJUST-PE Study. JONATHON 2014;311:6392-7319.; HJ Whitney et al. Diagnostic accuracy of conventional or age adjusted D-dimer cutoff  values in older patients with suspected venous thromboembolism. Systemic review and meta-analysis. BMJ 2013:346:f2492.   Hemoglobin plasma   Result Value Ref Range    Hemoglobin Plasma 40 (H) <30 mg/dL    Narrative    Results are from an ultracentrifuged sample    Magnesium CRRT   Result Value Ref Range    Magnesium 2.5 (H) 1.7 - 2.3 mg/dL   Renal panel CRRT   Result Value Ref Range    Sodium 152 (H) 135 - 145 mmol/L    Potassium 4.3 3.4 - 5.3 mmol/L    Chloride 120 (H) 98 - 107 mmol/L    Carbon Dioxide (CO2) 21 (L) 22 - 29 mmol/L    Anion Gap 11 7 - 15 mmol/L    Glucose 149 (H) 70 - 99 mg/dL    Urea Nitrogen 27.2 (H) 6.0 - 20.0 mg/dL    Creatinine 1.30 (H) 0.51 - 0.95 mg/dL    GFR Estimate 49 (L) >60 mL/min/1.73m2    Calcium 7.7 (L) 8.8 - 10.4 mg/dL    Albumin 2.4 (L) 3.5 - 5.2 g/dL    Phosphorus 3.9 2.5 - 4.5 mg/dL   Osmolality   Result Value Ref Range    Osmolality Blood 316 (H) 275 - 295 mmol/kg    Narrative    Greater than 385 mmol/kg relates to stupor in hyperglycemia   Greater than 400 mmol/kg can relate to seizures   Greater than 420 mmol/kg can be lethal    Serum Osmalar Gap:   Normal <10   Larger suggest unmeasured substances present in serum (ethanol, methanol, isopropanol, mannitol, ethylene glycol).   ALT   Result Value Ref Range    ALT 21 0 - 50 U/L   AST   Result Value Ref Range    AST 48 (H) 0 - 45 U/L   Alkaline phosphatase   Result Value Ref Range    Alkaline Phosphatase 281 (H) 40 - 150 U/L   Bilirubin direct   Result Value Ref Range    Bilirubin Direct 0.31 (H) 0.00 - 0.30 mg/dL   Bilirubin  total   Result Value Ref Range    Bilirubin Total 0.4 <=1.2 mg/dL   Protein total   Result Value Ref Range    Protein Total 5.1 (L) 6.4 - 8.3 g/dL   Sodium   Result Value Ref Range    Sodium 151 (H) 135 - 145 mmol/L   CBC with platelets and differential   Result Value Ref Range    WBC Count 13.1 (H) 4.0 - 11.0 10e3/uL    RBC Count 2.52 (L) 3.80 - 5.20 10e6/uL    Hemoglobin 7.9 (L) 11.7 - 15.7 g/dL     Hematocrit 24.2 (L) 35.0 - 47.0 %    MCV 96 78 - 100 fL    MCH 31.3 26.5 - 33.0 pg    MCHC 32.6 31.5 - 36.5 g/dL    RDW 16.6 (H) 10.0 - 15.0 %    Platelet Count 187 150 - 450 10e3/uL    % Neutrophils      % Lymphocytes      % Monocytes      % Eosinophils      % Basophils      % Immature Granulocytes      NRBCs per 100 WBC 2 (H) <1 /100    Absolute Neutrophils      Absolute Lymphocytes      Absolute Monocytes      Absolute Eosinophils      Absolute Basophils      Absolute Immature Granulocytes      Absolute NRBCs 0.3 10e3/uL   Partial thromboplastin time   Result Value Ref Range    aPTT 42 (H) 22 - 38 Seconds   Manual Differential   Result Value Ref Range    % Neutrophils 80 %    % Lymphocytes 4 %    % Monocytes 2 %    % Eosinophils 2 %    % Basophils 0 %    % Metamyelocytes 10 %    % Myelocytes 2 %    NRBCs per 100 WBC 3 (H) <=0 %    Absolute Neutrophils 10.5 (H) 1.6 - 8.3 10e3/uL    Absolute Lymphocytes 0.5 (L) 0.8 - 5.3 10e3/uL    Absolute Monocytes 0.3 0.0 - 1.3 10e3/uL    Absolute Eosinophils 0.3 0.0 - 0.7 10e3/uL    Absolute Basophils 0.0 0.0 - 0.2 10e3/uL    Absolute Metamyelocytes 1.3 (H) <=0.0 10e3/uL    Absolute Myelocytes 0.3 (H) <=0.0 10e3/uL    Absolute NRBCs 0.4 (H) <=0.0 10e3/uL    RBC Morphology Confirmed RBC Indices     Platelet Assessment  Automated Count Confirmed. Platelet morphology is normal.     Automated Count Confirmed. Platelet morphology is normal.    Polychromasia Slight (A) None Seen   Triglycerides   Result Value Ref Range    Triglycerides 458 (H) <150 mg/dL   Blood gas arterial   Result Value Ref Range    pH Arterial 7.42 7.35 - 7.45    pCO2 Arterial 35 35 - 45 mm Hg    pO2 Arterial 76 (L) 80 - 105 mm Hg    FIO2 60     Bicarbonate Arterial 23 21 - 28 mmol/L    Base Excess/Deficit Arterial -1.5 -3.0 - 3.0 mmol/L    Oswald's Test Artline     Oxyhemoglobin Arterial 94 92 - 100 %    O2 Sat, Arterial 96.7 96.0 - 97.0 %    Narrative    In healthy individuals, oxyhemoglobin (O2Hb) and oxygen  saturation (SO2) are approximately equal. In the presence of dyshemoglobins, oxyhemoglobin can be considerably lower than oxygen saturation.   Lactic acid whole blood   Result Value Ref Range    Lactic Acid 0.8 0.7 - 2.0 mmol/L   Calcium Ionized CRRT   Result Value Ref Range    Calcium Ionized Whole Blood 4.5 4.4 - 5.2 mg/dL   Blood gas venous   Result Value Ref Range    pH Venous 7.35 7.32 - 7.43    pCO2 Venous 45 40 - 50 mm Hg    pO2 Venous 24 (L) 25 - 47 mm Hg    Bicarbonate Venous 25 21 - 28 mmol/L    Base Excess/Deficit Venous -0.8 -3.0 - 3.0 mmol/L    FIO2 60     Oxyhemoglobin Venous 40 (L) 70 - 75 %    O2 Sat, Venous 40.7 (L) 70.0 - 75.0 %    Narrative    In healthy individuals, oxyhemoglobin (O2Hb) and oxygen saturation (SO2) are approximately equal. In the presence of dyshemoglobins, oxyhemoglobin can be considerably lower than oxygen saturation.   Blood gas arterial   Result Value Ref Range    pH Arterial 7.37 7.35 - 7.45    pCO2 Arterial 41 35 - 45 mm Hg    pO2 Arterial 76 (L) 80 - 105 mm Hg    FIO2 60     Bicarbonate Arterial 23 21 - 28 mmol/L    Base Excess/Deficit Arterial -2.0 -3.0 - 3.0 mmol/L    Oswald's Test Artline     Oxyhemoglobin Arterial 93 92 - 100 %    O2 Sat, Arterial 95.7 (L) 96.0 - 97.0 %    Narrative    In healthy individuals, oxyhemoglobin (O2Hb) and oxygen saturation (SO2) are approximately equal. In the presence of dyshemoglobins, oxyhemoglobin can be considerably lower than oxygen saturation.   Blood gas arterial   Result Value Ref Range    pH Arterial 7.33 (L) 7.35 - 7.45    pCO2 Arterial 45 35 - 45 mm Hg    pO2 Arterial 68 (L) 80 - 105 mm Hg    FIO2 60     Bicarbonate Arterial 24 21 - 28 mmol/L    Base Excess/Deficit Arterial -2.4 -3.0 - 3.0 mmol/L    Oswald's Test Artline     Oxyhemoglobin Arterial 90 (L) 92 - 100 %    O2 Sat, Arterial 92.9 (L) 96.0 - 97.0 %    Peep 10 cm H2O    Narrative    In healthy individuals, oxyhemoglobin (O2Hb) and oxygen saturation (SO2) are approximately  equal. In the presence of dyshemoglobins, oxyhemoglobin can be considerably lower than oxygen saturation.   Osmolality   Result Value Ref Range    Osmolality Blood 327 (H) 275 - 295 mmol/kg    Narrative    Greater than 385 mmol/kg relates to stupor in hyperglycemia   Greater than 400 mmol/kg can relate to seizures   Greater than 420 mmol/kg can be lethal    Serum Osmalar Gap:   Normal <10   Larger suggest unmeasured substances present in serum (ethanol, methanol, isopropanol, mannitol, ethylene glycol).   Partial thromboplastin time   Result Value Ref Range    aPTT 43 (H) 22 - 38 Seconds   CBC with Platelets & Differential    Narrative    The following orders were created for panel order CBC with Platelets & Differential.  Procedure                               Abnormality         Status                     ---------                               -----------         ------                     CBC with platelets and d...[[id number]]  Abnormal            Final result               Manual Differential[[id number]]          Abnormal            Final result                 Please view results for these tests on the individual orders.   Blood gas arterial   Result Value Ref Range    pH Arterial 7.29 (L) 7.35 - 7.45    pCO2 Arterial 49 (H) 35 - 45 mm Hg    pO2 Arterial 71 (L) 80 - 105 mm Hg    FIO2 60     Bicarbonate Arterial 24 21 - 28 mmol/L    Base Excess/Deficit Arterial -3.1 (L) -3.0 - 3.0 mmol/L    Oswald's Test Artline     Oxyhemoglobin Arterial 91 (L) 92 - 100 %    O2 Sat, Arterial 93.2 (L) 96.0 - 97.0 %    Peep 10 cm H2O    Narrative    In healthy individuals, oxyhemoglobin (O2Hb) and oxygen saturation (SO2) are approximately equal. In the presence of dyshemoglobins, oxyhemoglobin can be considerably lower than oxygen saturation.   Ionized Calcium   Result Value Ref Range    Calcium Ionized Whole Blood 4.6 4.4 - 5.2 mg/dL   Lactic acid whole blood   Result Value Ref Range    Lactic Acid 0.6 (L) 0.7 - 2.0 mmol/L    Heparin Unfractionated Anti Xa Level   Result Value Ref Range    Anti Xa Unfractionated Heparin <0.10 For Reference Range, See Comment IU/mL    Narrative    Therapeutic Range: UFH: 0.25-0.50 IU/mL for low intensity dosing,  0.30-0.70 IU/mL for high intensity dosing DVT and PE.  This test is not validated for other direct factor X inhibitors (e.g. rivaroxaban, apixaban, edoxaban, betrixaban, fondaparinux) and should not be used for monitoring of other medications.   INR   Result Value Ref Range    INR 1.37 (H) 0.85 - 1.15   Partial thromboplastin time   Result Value Ref Range    aPTT 44 (H) 22 - 38 Seconds   Magnesium CRRT   Result Value Ref Range    Magnesium 2.5 (H) 1.7 - 2.3 mg/dL   Renal panel CRRT   Result Value Ref Range    Sodium 152 (H) 135 - 145 mmol/L    Potassium 4.6 3.4 - 5.3 mmol/L    Chloride 120 (H) 98 - 107 mmol/L    Carbon Dioxide (CO2) 22 22 - 29 mmol/L    Anion Gap 10 7 - 15 mmol/L    Glucose 152 (H) 70 - 99 mg/dL    Urea Nitrogen 29.9 (H) 6.0 - 20.0 mg/dL    Creatinine 1.33 (H) 0.51 - 0.95 mg/dL    GFR Estimate 48 (L) >60 mL/min/1.73m2    Calcium 7.8 (L) 8.8 - 10.4 mg/dL    Albumin 2.5 (L) 3.5 - 5.2 g/dL    Phosphorus 5.0 (H) 2.5 - 4.5 mg/dL   CBC with platelets and differential   Result Value Ref Range    WBC Count 14.7 (H) 4.0 - 11.0 10e3/uL    RBC Count 2.61 (L) 3.80 - 5.20 10e6/uL    Hemoglobin 8.2 (L) 11.7 - 15.7 g/dL    Hematocrit 25.7 (L) 35.0 - 47.0 %    MCV 99 78 - 100 fL    MCH 31.4 26.5 - 33.0 pg    MCHC 31.9 31.5 - 36.5 g/dL    RDW 16.8 (H) 10.0 - 15.0 %    Platelet Count 188 150 - 450 10e3/uL    % Neutrophils      % Lymphocytes      % Monocytes      % Eosinophils      % Basophils      % Immature Granulocytes      NRBCs per 100 WBC 2 (H) <1 /100    Absolute Neutrophils      Absolute Lymphocytes      Absolute Monocytes      Absolute Eosinophils      Absolute Basophils      Absolute Immature Granulocytes      Absolute NRBCs 0.3 10e3/uL   Manual Differential   Result Value Ref Range     % Neutrophils 79 %    % Lymphocytes 9 %    % Monocytes 5 %    % Eosinophils 4 %    % Basophils 0 %    % Metamyelocytes 2 %    % Myelocytes 1 %    NRBCs per 100 WBC 3 (H) <=0 %    Absolute Neutrophils 11.6 (H) 1.6 - 8.3 10e3/uL    Absolute Lymphocytes 1.3 0.8 - 5.3 10e3/uL    Absolute Monocytes 0.7 0.0 - 1.3 10e3/uL    Absolute Eosinophils 0.6 0.0 - 0.7 10e3/uL    Absolute Basophils 0.0 0.0 - 0.2 10e3/uL    Absolute Metamyelocytes 0.3 (H) <=0.0 10e3/uL    Absolute Myelocytes 0.1 (H) <=0.0 10e3/uL    Absolute NRBCs 0.4 (H) <=0.0 10e3/uL    RBC Morphology Confirmed RBC Indices     Platelet Assessment  Automated Count Confirmed. Platelet morphology is normal.     Automated Count Confirmed. Platelet morphology is normal.    Polychromasia Slight (A) None Seen   Echo Complete   Result Value Ref Range    LVEF  60-65%     [facility]    [id number]  [id number]  [id number]  [id number]^DELFINO^TRUDY^WILLIAM     [facility],[place]  Echocardiography Laboratory  [address] [place] [id number]     Name: NA ALATORRE  MRN: [id number]  : 1970  Study Date: 2024 08:59 AM  Age: 53 yrs  Gender: Female  Patient Location: [facility]  Reason For Study: Right Ventricular Hypertrophy  Ordering Physician: TRUDY SALEH  Referring Physician: SYSTEM, PROVIDER NOT IN  Performed By: Aysha Murphy RDCS     BSA: 1.9 m2  Height: 62 in  Weight: 192 lb  HR: 102  BP: 111/51 mmHg  ______________________________________________________________________________  Procedure  Complete Portable Echo Adult.  ______________________________________________________________________________  Interpretation Summary  Patient on VV-ECMO at 4.4LPM during the study.     Left ventricular size, wall motion and function are normal. The ejection  fraction is 60-65%. Flattened septum is consistent with right ventricular  pressure and volume overload.  Right ventricular function, chamber size, wall motion, and  thickness are  normal.  Trace tricuspid insufficiency is present.  Unable to assess mean RA pressure given the patient is on a ventilator.  Venous cannula noted in the IVC No pericardial effusion is present.     There is no prior study for direct comparison.  ______________________________________________________________________________  Left Ventricle  Left ventricular size, wall motion and function are normal. The ejection  fraction is 60-65%. No regional wall motion abnormalities are seen. Flattened  septum is consistent with right ventricular pressure and volume overload.     Right Ventricle  Right ventricular function, chamber size, wall motion, and thickness are  normal.     Atria  Both atria appear normal.     Mitral Valve  The mitral valve is normal. Trace mitral insufficiency is present.     Aortic Valve  Aortic valve is normal in structure and function. The aortic valve is  tricuspid. No aortic regurgitation is present.     Tricuspid Valve  The tricuspid valve is normal. Trace tricuspid insufficiency is present. The  peak velocity of the tricuspid regurgitant jet is not obtainable.     Pulmonic Valve  The pulmonic valve is normal.     Vessels  Venous cannula noted in the IVC. Unable to assess mean RA pressure given the  patient is on a ventilator.     Pericardium  No pericardial effusion is present.     Compared to Previous Study  There is no prior study for direct comparison.  ______________________________________________________________________________  MMode/2D Measurements & Calculations  IVSd: 0.74 cm     LVIDd: 5.0 cm  LVIDs: 2.7 cm  LVPWd: 0.84 cm  FS: 44.9 %  LV mass(C)d: 132.4 grams  LV mass(C)dI: 70.5 grams/m2  LVOT diam: 2.1 cm  LVOT area: 3.5 cm2  RVOT diam: 2.3 cm  RWT: 0.34  TAPSE: 2.1 cm     Doppler Measurements & Calculations  MV E max lisa: 114.0 cm/sec  MV A max lisa: 91.7 cm/sec  MV E/A: 1.2  MV dec slope: 579.4 cm/sec2  MV dec time: 0.20 sec  LV V1 max P.0 mmHg  LV V1 max: 132.1  cm/sec  LV V1 VTI: 25.2 cm  SV(LVOT): 89.5 ml  SI(LVOT): 47.6 ml/m2  Qp/Qs: 1.0/1.2  E/E' av.4  Lateral E/e': 12.6     Medial E/e': 16.1     ______________________________________________________________________________  Report approved by: Falguni CURTIS 2024 10:38 AM         Other Laboratory; [facility]; ANGELA-CRE PPS (Laboratory Miscellaneous Order)   Result Value Ref Range    Specimen Status       Specimen received. Reordered and sent to performing laboratory. Report to follow upon completion.    Performing Laboratory [facility]     Test Name CP-CRE PPS     Test Code CARBAR    Sodium   Result Value Ref Range    Sodium 149 (H) 135 - 145 mmol/L   Osmolality   Result Value Ref Range    Osmolality Blood 331 (H) 275 - 295 mmol/kg    Narrative    Greater than 385 mmol/kg relates to stupor in hyperglycemia   Greater than 400 mmol/kg can relate to seizures   Greater than 420 mmol/kg can be lethal    Serum Osmalar Gap:   Normal <10   Larger suggest unmeasured substances present in serum (ethanol, methanol, isopropanol, mannitol, ethylene glycol).   Glucose by meter   Result Value Ref Range    GLUCOSE BY METER POCT 179 (H) 70 - 99 mg/dL   Blood gas arterial   Result Value Ref Range    pH Arterial 7.35 7.35 - 7.45    pCO2 Arterial 41 35 - 45 mm Hg    pO2 Arterial 69 (L) 80 - 105 mm Hg    FIO2 60     Bicarbonate Arterial 23 21 - 28 mmol/L    Base Excess/Deficit Arterial -2.7 -3.0 - 3.0 mmol/L    Oswald's Test Artline     Oxyhemoglobin Arterial 92 92 - 100 %    O2 Sat, Arterial 94.4 (L) 96.0 - 97.0 %    Peep 10 cm H2O    Narrative    In healthy individuals, oxyhemoglobin (O2Hb) and oxygen saturation (SO2) are approximately equal. In the presence of dyshemoglobins, oxyhemoglobin can be considerably lower than oxygen saturation.   Partial thromboplastin time   Result Value Ref Range    aPTT 48 (H) 22 - 38 Seconds       All relevant imaging and laboratory values personally reviewed.

## 2024-08-12 NOTE — PROGRESS NOTES
PALLIATIVE CARE PROGRESS NOTE  [facility]     Patient Name: Massiel Flaherty  Date of Admission: 8/8/2024   Today the patient was seen for: ECMO, ARDS     Recommendations & Counseling     Medical summary: Massiel Flaherty is a 53 year old female admitted on 8/3/2024 concerning for fatigue, fever and dyspnea requiring intubation on 8/6/2024 at OSH. Subsequently patient transferred to [facility] on 8/8/24 concerning for hypoxic with high plateaus/peaks and placed on VV ECMO.  Palliative care consulted for patient/family support.     GOALS OF CARE:   Restorative without limits     ADVANCE CARE PLANNING:  No health care directive on file. Per  informed consent policy, next of kin should be involved if patient becomes unable.  There is no POLST form on file, defer to patient and/or next of kin for decisions   Code status: Full Code     MEDICAL MANAGEMENT:   We are not actively managing symptoms at this time.     PSYCHOSOCIAL/SPIRITUAL SUPPORT:  Family: Massiel's support system consists of son Mook, mom Doreen, sister Katerina, family's dog Mara as well as Massiel's brother, sister in law and their children.   [place] community:    Spiritual: Declined     Palliative Care will continue to follow. Thank you for the consult and allowing us to aid in the care of Massiel Flaherty.    These recommendations have been discussed with primary team, nursing and family.    DOREEN Ritchie CNP  [facility], Palliative Care  Securely message with the [employer] Web Console (learn more here) or  Text page via [facility] Paging/Directory        Assessment          Massiel Flaherty is a 53 year old female with a past medical hx of Anxiety/depression, fibromyalgia, hypothyroidism, asthma, HLD, MURIEL on CPAP, spells, off on anti seizure medications, expressive aphasia, and hypertension who was admitted an OSH with community acquired pneumonia on 8/3. She was intubated 8/6 and developed severe  ARDS requiring paralysis and proning starting today. She was transferred here for management. On arrival, patient difficult to ventilate with prominent respiratory acidosis (pH 7.03) and hypercapnia. Patient transferred to [facility] for VV ECMO consideration. Palliative care consulted in the setting of ECMO.     Overall, patient with limited functional status at baseline. Primary team briefly discussed code status DNR with Massiel's mother, further discussion pending with Massiel's rest of the family.      Today, the patient was seen for:  #Acute hypoxic and hypercapnic respiratory failure   #ECMO  #Acute kidney injury  #Fibromyalgia   #Anxiety   #Palliative encounter       Interval History:     Multidisciplinary collaboration:  Chart reviewed, Massiel remains intubated and sedated and on VV EMCO and CRRT.     Notable medications:  Reviewed    Patient/family narrative  Joined visited Massiel with Palliative care SW (Shannen) this morning for patient/family support. Doreen (mother) and Mook (son) also at bedside. Family shared Massiel struggled with health complication in the past few years but more so in the last 2 years and was unable to driving due to seizures. Additionally, she also lost some of her physical functions and was depending on her family. Overall, family reports they are hopeful Massiel will pull through but remained concerned as they are not sure what is driving her to be this sick. Family plans to return to [place] this weekend and expressed interest to continue receiving medical updates. Palliative care will continue to follow for patient and family support.     Review of Systems:     Besides above, ROS was reviewed and is unremarkable        Physical Exam:   Temp:  [97.9  F (36.6  C)-98.8  F (37.1  C)] 98.4  F (36.9  C)  Pulse:  [] 102  Resp:  [10-16] 10  MAP:  [63 mmHg-89 mmHg] 71 mmHg  Arterial Line BP: ()/(44-63) 105/51  FiO2 (%):  [60 %] 60 %  SpO2:  [90 %-100 %] 93 %  192  lbs 3.86 oz    Physical Exam  Intubated and sedated on ECMO and CRRT         Data Reviewed:     CMP  Recent Labs   Lab 08/12/24  0401 08/12/24  0005 08/11/24  2154 08/11/24  0557 08/11/24  0405   *  151* 149* 149*   < > 147*  147*   POTASSIUM 4.3  --  4.1   < > 3.7   CHLORIDE 120*  --  118*   < > 114*   CO2 21*  --  21*   < > 21*   ANIONGAP 11  --  10   < > 12   *  --  140*   < > 132*   BUN 27.2*  --  25.4*   < > 24.7*   CR 1.30*  --  1.27*   < > 1.29*   GFRESTIMATED 49*  --  50*   < > 49*   QUAN 7.7*  --  7.7*   < > 7.5*   MAG 2.5*  --  2.4*   < > 2.5*   PHOS 3.9  --  4.4   < > 3.6   PROTTOTAL 5.1*  --   --   --  4.9*   ALBUMIN 2.4*  --  2.4*   < > 2.3*   BILITOTAL 0.4  --   --   --  0.6   ALKPHOS 281*  --   --   --  318*   AST 48*  --   --   --  47*   ALT 21  --   --   --  21    < > = values in this interval not displayed.     CBC  Recent Labs   Lab 08/12/24  0401 08/11/24 2154   WBC 13.1* 12.6*   RBC 2.52* 2.53*   HGB 7.9* 8.0*   HCT 24.2* 24.4*   MCV 96 96   MCH 31.3 31.6   MCHC 32.6 32.8   RDW 16.6* 16.6*    187       Medical Decision Making       MANAGEMENT DISCUSSED with the following over the past 24 hours: ECMO, nursing and family   NOTE(S)/MEDICAL RECORDS REVIEWED over the past 24 hours: ECMO, nephrology, surgical ICU and nursing  60 MINUTES SPENT BY ME on the date of service doing chart review, history, exam, documentation & further activities per the note.

## 2024-08-12 NOTE — PROGRESS NOTES
ECMO Attending Progress Note  8/12/2024    Massiel Flaherty is a 53 year old female who was started on VV ECMO due to severe respiratory failure from presumed pneumonia although source has not been identified. Treating for yeast though only a bronchial culture has been positive for this.     Interval events:   Pressors weaned off. Weight is down. Oxygenation is okay, though tidal volumes remain dismal.     The patients vital signs today are as follows:    Temp:  [97.2  F (36.2  C)-98.8  F (37.1  C)] 98.4  F (36.9  C)  Pulse:  [] 90  Resp:  [10-16] 10  MAP:  [48 mmHg-111 mmHg] 111 mmHg  Arterial Line BP: ()/(44-79) 153/79  FiO2 (%):  [60 %] 60 %  SpO2:  [90 %-100 %] 94 %    Intake/Output Summary (Last 24 hours) at 8/12/2024 1225  Last data filed at 8/12/2024 1200  Gross per 24 hour   Intake 4683.69 ml   Output 6299.9 ml   Net -1616.21 ml    FiO2 (%): 60 %, Resp: 10, Inspiratory Pressure (cm H2O) (Drager Jessie): 25, Vent Mode: PCV Plus assist, Resp Rate (Set): 10 breaths/min, PEEP (cm H2O): 10 cmH2O, Resp Rate (Set): 10 breaths/min, PEEP (cm H2O): 10 cmH2O   Recent Labs   Lab 08/12/24  1152 08/12/24  0945 08/12/24  0801 08/12/24  0601   PH 7.35 7.29* 7.33* 7.37   PCO2 41 49* 45 41   PO2 69* 71* 68* 76*   HCO3 23 24 24 23   O2PER 60 60 60 60      Recent Labs   Lab 08/12/24  0945 08/12/24  0401 08/11/24  2154 08/11/24  1603   WBC 14.7* 13.1* 12.6* 13.3*   HGB 8.2* 7.9* 8.0* 8.0*     Creatinine   Date Value Ref Range Status   08/12/2024 1.33 (H) 0.51 - 0.95 mg/dL Final   08/12/2024 1.30 (H) 0.51 - 0.95 mg/dL Final   08/11/2024 1.27 (H) 0.51 - 0.95 mg/dL Final   08/11/2024 1.28 (H) 0.51 - 0.95 mg/dL Final     Physical Exam:   Cannula unchanged. Minimal oozing.  Minimal air movement bilaterally.  Extremities edematous.    ECMO Issues including assessments and plan on DOS 8/12/2024:    Neuro: Sedated and paralyzed for mechanical ventilation and ECMO. RASS goal: -4 on paralytic. Can attempt to wean paralytic  in the coming day or two, per SICU   CV: Hemodynamically stable now off pressors   Pulm: Severe respiratory failure requiring ECMO and mechanical ventilation. Flows unchanged at ~4.3   Keep vent settings at rest settings, do not titrate FiO2 above 60%.  FEN/Renal: pulling on CRRT, agree/continue  Heme: Hemoglobin low 8's .  Goals: if O2 sat >85% Hgb may drift to 7-8.  If O2 Sat <85% keep Hgb 10-12.  ID: on broad-spectrum antimicrobials and antifungals; workup for Blasto, Aspergillosis, and Cocci still pending     All pertinent labs, imaging studies, physical exam and medications have been reviewed by me.     This patient requires continued ICU monitoring and cares.  I apprecitate the input of the SICU team for these issues.  I have discussed patient care and treatment plan with the SICU team.     ECMO [id number]    Yuriy Tee MD   Critical Care and Acute Care Surgery

## 2024-08-12 NOTE — PROVIDER NOTIFICATION
1619: Notified SICU that patient's SBP has been sitting in low 160s, not improved with boluses of Dilaudid and Versed. Of note, did receive 3% Sodium bolus.  Also was on pressors until 0000 this AM.  Requested clarification regarding BP goals and meds, awaiting response.  1650: Per SICU, keep SBP<170.  Call if sustained over 170 and not responding to other interventions.

## 2024-08-12 NOTE — PROGRESS NOTES
Nephrology CRRT Note.  August 11, 2024  8:48 PM  Patient's serum Na has drifted down over the course of the day from 150 this morning to 146 mEq/L this afternoon.  THis is despite her receiving boluses of 3% NaCl and despite the post filter 2%NaCl at 100 ml/hr.  It would be preferable and more predictable to customize the dialysate fluids to 150mEq/L Na - however this is not available this evening as it requires special preparation by the IV pharmacy.  Discussed with SICU pharmacist.  We will change the post filter fluid to 3% NaCl (from 2% NaCl).  Starting in the morning,   Will need to add 2.5ml of 4mEq/ml NaCl solution per 5 liter bag of dialysate (phoxillum BK 4/2.5), and continue CRRT with these customized dialysate bags.     Marlon Armenta MD  Division of Nephrology and Hypertension  August 11, 2024  8:53 PM

## 2024-08-12 NOTE — PROGRESS NOTES
[facility] General ID Service: Follow Up Note      Patient:  Massiel Flaherty   Date of birth 1970, Medical record number [id number]  Date of Visit:  08/12/2024  Date of Admission: 8/8/2024         Assessment and Recommendations:   ID Problem List:  Acute hypoxic and hypercapnic respiratory failure c/b ARDS and on VV-ECMO 8/8/24  Etiology unknown, possible CAP vs other infectious (fungal) vs noninfectious  Fever  Dry cough x1 month prior to admission  CHACORTA requiring CRRT  Right adnexal mass  Elevated AST, alk phos  Anemia  Recent Augmentin, Z-pack and steroid tapers  Antibiotic allergy - Sulfa (hives), tetracycline (hives)    Recommendations:  Continue cefepime  Continue Flagyl  Continue liposomal amphotericin B 3-5 mg/kg daily   - Anticipate stopping if fungal antigens negative  Diagnostics as below  Sending: tick-borne disease panel (Babesia, anaplasma, ehrlichia), leptospira IgM and hantavirus antibodies, GC/chlamydia urine (if able to collect)   Consider MRI brain when able    Discussion:  Massiel Flaherty is a 53 year old female with past medical history significant for asthma, MURIEL on CPAP, HTN, anxiety,depression, expressive aphasia, fibromyalgia, and hypothyroidism who presented to OSH 8/3/24 with fever, fatigue, dyspnea with c/f CAP and requiring intubation, proning, paralysis and transferred to [facility] 8/8/24 for further cares and now placed on VV ECMO and CHACORTA requiring CRRT. ID consulted for pneumonia.      Per chart review, had sought care multiple times since early July for an acute on chronic cough. No improvement with multiple courses of steroid tapers, Z-pack, and Augmentin as outpatient. Admitted 8/3/24 with fever and hypoxia. She has completed at least 3 days of azithromycin at OSH since first admitted 8/3 and has been on cefepime since 8/3 with clinical worsening. No infectious etiology has been identified to date. Negative ID work up outlined below. Negative EVELIO, ANCA, MPO, proteinase 3 at  OSH. CXR with ongoing diffuse bilateral pulmonary opacities consistent with ARDS and now paralyzed, on VV-ECMO and CRRT.      Started on Amphotericin B due to high level of concern for endemic fungal pneumonia given multiple presentations and lack of improvement on abx, with steroids prescribed. Will discontinue amphotericin if fungal antigens negative, given unrevealing bronch. Continues on cefepime for more typical pneumonia. Has completed 3 days of Azithromycin without improvement. Flagyl added for possible PID- will check GC/chlamydia urine if possible (oliguric on CRRT).       8/6/24 Legionella urine ag Neg    MRSA nares Neg    Bcx x2  No growth    PJP PCR Neg    Respiratory bacterial cx No growth   8/7/24 BD glucan Neg    HIV Ag Ab Neg    Aspergillus antigen blood Neg   8/8/24 IgG 302 (low)    Respiratory aerobic cx C.albicans    MRSA nares; SA target DNA Neg/Neg    BCx NGTD   8/9 Blastomyces Ag EIA blood PENDING    BD glucan Neg (44)    Aspergillus galactomannan Neg    IgG 258    HIV Ag Ab combo Neg    Cryptococcal Ag blood Neg    Coccidioides Ag EIA blood PENDING    Histoplasma capsulatum Ag blood PENDING    Hep B core Ab IgM Neg    Hep A Ab IgM Neg    Hep B surface Ab Pos    Hep C Ab  Neg    Hep B surface Ag Neg    Respiratory aerobic cx BAL No growth    AFB stain and Cx BAL  PENDING    Fungal cx BAL  Yeast    KOH prep BAL Neg    CMV PCR BAL PENDING    Nocardia cx BAL NGTD    Legionella cx BAL NGTD    HSV PCR BAL Neg    PJP PCR BAL PENDING    Aspergillus galactomannan BAL Neg    EBV PCR BAL PENDING    Adenovirus PCR BAL Neg    Respiratory aerobic cx BAL No growth    AFB culture and stain BAL PENDING    Fungal Cx BAL NGTD    KOH prep BAL Neg    Legionella species cx BAL NGTD    Nocardia species cx BAL NGTD    Legionella PCR BAL Neg    HSV PCR BAL Neg   8/10/24 Respiratory panel PCR neg    COVID-19 Neg   8/11/24 Histoplasma capsulatum BAL PENDING    Histoplasma capsulatum blood PENDING   8/12/24 Tick-borne  disease ORDERED    Leptospira IgM ORDERED    Hantavirus antibodies ORDERED       Recs were discussed with primary team today. Don't hesitate to call with questions.     Attestation:  I have reviewed today's vital signs, medications, labs and imaging.    Marce Black PA-C  Pronouns: she/her/hers  Infectious Diseases  Contact via [employer] game platform or [employer] Paging/Directory        50 MINUTES SPENT BY ME on the date of service doing chart review, history, exam, documentation & further activities per the note.             Interval History:      Afebile, off of pressors this AM. Remains on CRRT, pulling fluid. Remains on vent support.          Current Antimicrobials   Current:  - amphotericin B 8/9-prsent  - cefepime *-present  - flagyl 8/11-present    Prior:  - azithromycin 8/9-/811         History of Present Illness:    Per ID consult note 8/9/24:  Massiel Flaherty is a 53 year old female with past medical history significant for asthma, MURIEL on CPAP, HTN, anxiety,depression, expressive aphasia, fibromyalgia, obesity, and hypothyroidism who was transferred to [facility] 8/8/24 for further cares and now placed on VV ECMO and CRRT. ID consulted for pneumonia.      She was transferred to [facility] from OSH. Presented to hospital in [place], [place] on 8/3 with fever, fatigue, dyspnea with concern for CAP. Xray with multifocal bilateral opacities. CT chest with severe multifocal bilateral opacities, negative for PE in OSH notes, though unable to see direct report. Negative COVID, flu, and viral testing. O2 sats reportedly 60% on arrival.      She was started on azithromycin, cefepime, vancomycin, and solumedrol x1. Had reportedly been on a steroid taper recently, so had started on atovaquone ppx on 8/7 at OSH. Prior to admission, was seen in clinic and given PO Augmentin on 7/30 for acute on chronic cough x2 weeks. Was also seen at OSH ED 7/19 for asthma exacerbation, got Z-pack and prednisone taper. Was seen prior on 7/8 with cough and got  "additional prednisone, azithromycin. Has also had some concern for recent \"memory issues and brain fog\" and has \"zoning out episodes\". Head imaging normal, seen by neurology who felt this was more psych issue per OSH records. She has history of seizure workup and is not on any AEDs. She is on rifaximin for unknown reason. Was febrile at OSH Tmax 101.3F and required pressors. Infectious workup at OSH with negative - RSV, COVID, flu, RVP, Fungitell, Legionella urine Ag, Strep pneumococcal urine Ag, sputum PJP PCR, HIV, Aspergillus antigen, and EVELIO. MRSA nares negative. Sputum Cx 8/6 - NGTD at 48 hrs. Bcx 8/6 at OSH NGTD. Procal 4.16.      On 8/6, was transferred to [facility] and intubated for severe ARDS requiring paralysis, proning. Remained on cefepime. Transferred to [facility] on 8/8 given continued decline, started V-V ECMO and CRRT. Son, brother, and niece/nephew at bedside on 8/9 morning. Son provides additional history primarily. Reports onset of dry nonproductive cough ~2 weeks ago and followed by zoning out spells. She had no other URI symptoms - sore throat, sinus congestion, no headache, chest pain, nausea, vomiting, abdominal pain, diarrhea that he was aware. Patient had been living with her mother due to difficulties caring for herself. Has had multiple falls in the last month. Recently fell out of a recliner - has bruising to left foot from this fall. Unsure if she hit her head. Son denies any tobacco, alcohol, or illicit drug use by patient. No recent travel. Son was sick with viral illness ~1 months ago, no other recent sick contacts. She is around her sister's dog - who is well, no illness. No livestock or other animal exposures. Says she does have a hx of colon cancer - likely polyp that was resected. Family denies chemo/radiation/surgery. 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        Physical Exam:   Ranges for " vital signs:  Temp:  [97  F (36.1  C)-98.8  F (37.1  C)] 97  F (36.1  C)  Pulse:  [] 97  Resp:  [10-16] 10  MAP:  [48 mmHg-116 mmHg] 107 mmHg  Arterial Line BP: ()/(44-83) 146/78  FiO2 (%):  [60 %] 60 %  SpO2:  [88 %-100 %] 93 %    Intake/Output Summary (Last 24 hours) at 8/12/2024 1456  Last data filed at 8/12/2024 1400  Gross per 24 hour   Intake 4354.59 ml   Output 6225.9 ml   Net -1871.31 ml     Exam:  GENERAL:  Intubated, paralyzed and sedated in ICU. +VV ECMo. Supine in bed.   ENT:  Head is normocephalic, atraumatic. Oropharynx is moist, ETT in place.  EYES:  Eyes have anicteric sclerae, noninjected conjunctivae without discharge or crusting on lashes.    LUNGS:  Clear to anterior auscultation, diminished throughout. +mechanical ventilation.  CARDIOVASCULAR:  RRR, +S1/S2, no murmur appreciated  ABDOMEN:  soft, non-distended. +hypoactive bowel sounds.  EXT: Extremities warm, trace to 1+ BLE edema.  SKIN:  No acute rashes, jaundice, or diaphoresis.  Left internal jugular line and PIV x3, L radial art line, +ECMO cannula R groin in place and all without any surrounding erythema.  NEUROLOGIC:  sedated and paralyzed         Laboratory Data:   Reviewed.  Pertinent for:    Culture data:  Culture   Date Value Ref Range Status   08/09/2024 No Growth  Final   08/09/2024 Yeast (A)  Preliminary   08/09/2024 No growth after 2 days  Preliminary   08/09/2024 Culture negative, monitoring continues  Preliminary   08/09/2024 No Growth  Final   08/09/2024 No growth after 2 days  Preliminary   08/09/2024 Culture negative, monitoring continues  Preliminary   08/09/2024 No growth after 2 days  Preliminary   08/08/2024 No growth after 3 days  Preliminary   08/08/2024 1+ Candida albicans (A)  Final     Comment:     Susceptibilities not routinely done, refer to antibiogram to view typical susceptibility profiles     GS Culture   Date Value Ref Range Status   08/09/2024 See corresponding culture for results  Final    08/09/2024 See corresponding culture for results  Final       Inflammatory Markers  No lab results found.    Hematology Studies    Recent Labs   Lab Test 08/12/24  0945 08/12/24  0401 08/11/24 2154 08/11/24  1603   WBC 14.7* 13.1* 12.6* 13.3*   HGB 8.2* 7.9* 8.0* 8.0*   MCV 99 96 96 97    187 187 208     Recent Labs   Lab Test 08/12/24  0945 08/12/24  0401 08/11/24 2154 08/11/24  1603   ANEU 11.6* 10.5* 10.5* 11.0*   AEOS 0.6 0.3 0.3 0.7       Metabolic Studies     Recent Labs   Lab Test 08/12/24  1151 08/12/24  0945 08/12/24 0401 08/12/24  0005 08/11/24 2154 08/11/24 1956 08/11/24  1603   * 152* 152*  151* 149* 149*   < > 146*   POTASSIUM  --  4.6 4.3  --  4.1  --  3.8   CHLORIDE  --  120* 120*  --  118*  --  114*   CO2  --  22 21*  --  21*  --  20*   BUN  --  29.9* 27.2*  --  25.4*  --  24.8*   CR  --  1.33* 1.30*  --  1.27*  --  1.28*   GFRESTIMATED  --  48* 49*  --  50*  --  50*    < > = values in this interval not displayed.       Hepatic Studies    Recent Labs   Lab Test 08/12/24  0945 08/12/24 0401 08/11/24 2154 08/11/24  1021 08/11/24  0405 08/10/24  0947 08/10/24  0412   BILITOTAL  --  0.4  --   --  0.6  --  0.7   ALKPHOS  --  281*  --   --  318*  --  376*   ALBUMIN 2.5* 2.4* 2.4*   < > 2.3*   < > 2.2*   AST  --  48*  --   --  47*  --  69*   ALT  --  21  --   --  21  --  25    < > = values in this interval not displayed.            Imaging:     CXR 8/12/24  IMPRESSION:   1. Stable support devices.  2. Dense consolidative opacities bilaterally with minimally improved  areas of parenchymal aeration.    US pelvic limited 8/10/24  IMPRESSION: Post hysterectomy. No overt adnexal abnormality identified on  transabdominal imaging.    CT head 8/10/24  IMPRESSION:   Slightly improved differentiation of the gray-white matter and  sulcation suggesting that the prior study may have been impacted by  artifact.     CT Chest/abd/pelvis 8/9/24  IMPRESSION:   1. Diffuse patchy groundglass and  consolidative densities throughout  the lungs with small bilateral pleural effusions. Findings may  represent severe pulmonary edema, and/or  infectious /inflammatory  pathology, or ARDS.   2. Multiple prominent and a few enlarged mediastinal lymph nodes,  possibly reactive. Recommend attention on follow-up.  3. Support devices as detailed above.  4. Mild hepatomegaly.  5. Trace pelvic ascites, nonspecific bilateral perinephric  streakiness.  6. Asymmetric heterogeneous bulky right adnexa, consider nonemergent  pelvic ultrasound for further evaluation.  7. Additional incidental/chronic findings as detailed above      ECHO  ECHO Congenital Transthoracic (TTE)  Result Date: 8/8/2024    Limited echocardiogram performed for assessment of LV systolic function.   Left Ventricle: The left ventricle appears normal in size. The ejection fraction, measured by biplane, is 71%. There are no wall motion abnormalities.   Limited assessment of RV.  Normal appearing right ventricular chamber size and systolic function.   Normal IVC size with minimal respirophasic changes.   No prior study for comparison. Left Ventricle The left ventricle appears normal in size. Wall thickness is normal. The ejection fraction, measured by biplane, is 71%. There are no wall motion abnormalities. IVC/SVC Normal IVC size with minimal respirophasic changes. Pericardium There is no pericardial effusion. Study Details A limited echo was performed with limited 2D. The sonographer requested the use of Definity- imaging enhancing agent per protocol. The patient denies contraindications and gives verbal consent for imaging enhancing agent injection.

## 2024-08-12 NOTE — PROGRESS NOTES
SURGICAL ICU PROGRESS NOTE  08/12/2024        Date of Service (when I saw the patient): 08/12/2024    ASSESSMENT:  Massiel Flaherty is a 53 year old female who was admitted to [facility].   Past medical hx of Anxiety/depression, fibromyalgia, hypothyroidism, asthma, HLD, MURIEL on CPAP, spells, off on anti seizure medications, expressive aphasia, hypertension was seen at [facility] and was placed on Augmentin outpatient on 7/30/24.   She admitted to the hospital on 8/3/24 for fatigue, fever and dyspnea and intubated 8/6/24 at OSH, proned and paralyzed without improvement. Transferred to [facility] 8/8/24, hypoxic with high plateaus/peaks and placed on VV EMCO and CRRT.     OVERNIGHT EVENTS:   - Downtrending sodium levels, given hypertonic bolus per NCC; dialysate changed to 3% NaCl per nephrology   - Sweep increased to 7 overnight, back down to 6 this AM   - NE weaned off     CHANGES and MAJOR THINGS TODAY:   - Trend sodium per NCC for Na goal 150-155   - Repeat ECHO today to assess overall heart function, RV status   - Begin decardon with taper today   - Try to coordinate care conference for this coming weekend   - Vent: Drop RR to 10, insp pressure to 25 for true resting vent settings while on ECMO   - Brief run of SVT this AM, self resolved without intervention   - Update family at bedside this AM     PLAN:  Neurological:  # Fibromyalgia   # Hx myalgic encephalomyelitis   # Anxiety   # sedation and analgesia for vent compliance   - Monitor neurological status. Delirium preventions and precautions.   - Pain: Dilaudid gtt         - Sedation plan: propofol gtt and Versed gtt     - Trig continuing to rise, wean off propofol and increase versed gtt if needed   -  Paralysis: Off 8/9/24; Resumed overnight 8/10-11   - will review PTA medications and resume appropriate medications as able.     # Hx spells with staring and BUE posturing previously described as pseudoseizures   # Hx history of GTC seizures and myoclonic  epilepsy, weaned off AEDs   # C/f hypoxic ischemic injury   # Diffuse cerebral edema   - Head CT 8/9: Findings concerning for diffuse cerebral edema with  diffuse blurring of the gray-white differentiation. Findings are  concerning for hypoxicischemic injury.   - 3% NaCl dialysate in coordination with nephrology; trend Sodiums   - Sodium goals: 150-155   - NCC consult   - EEG now off     Pulmonary:   # Asthma  # MURIEL on home CPAP   # Acute hypoxic and hypercapnic respiratory failure   # ARDS  # s/p VV EMCO 8/8  FiO2 (%): 60 %, Resp: 10, Inspiratory Pressure (cm H2O) (Drager Jessie): (S) 25, Vent Mode: PCV Plus assist, Resp Rate (Set): (S) 10 breaths/min, PEEP (cm H2O): 10 cmH2O, Resp Rate (Set): (S) 10 breaths/min, PEEP (cm H2O): 10 cmH2O    - continue with rest vent setting on ECMO. Plan to optimize ECMO   - Chest CT 8/9: Diffuse patchy groundglass and consolidative densities throughout  the lungs with small bilateral pleural effusions. Findings may  represent severe pulmonary edema, and/or  infectious /inflammatory  pathology, or ARDS. Multiple prominent and a few enlarged mediastinal lymph nodes,  possibly reactive.  - 8/11: Restart veletri continuous neb  - 8/11: Increase inspiratory pressure to 35 to attempt achieving slightly higher tidal volumes   - 8/12: Decadron 20mg daily x 5 days, then 10mg x 5 days     ECMO:  Sweep 6LPM  FiO2 100%   Flow 4.44  Bival gtt with PTT goals 44-88 // ACT goal of 160-180   - Superior cannula retracted 8/10 for ongoing low PaO2   - 8/11: bival infusion replaced for heparin given concern for heparin resistance     Cardiovascular:    # hyperlipidemia    # shock, presumed septic  - MAP >65. Monitor HD status   - norepinephrine gtt   - Repeat ECHO today: Left ventricular size, wall motion and function are normal. The ejection fraction is 60-65%. Flattened septum is consistent with right ventricular pressure and volume overload. Right ventricular function, chamber size, wall motion, and  thickness are normal.     Gastroenterology/Nutrition:  # GERD   # NPO status   # constipation; resolved   # Noninfectious diarrhea   - Protonix (home medication)   - RD consult in am for SBFT and TF recommendations   - CT CAP 8/9: Mild hepatomegaly. Trace pelvic ascites, nonspecific bilateral perinephric  streakiness. Asymmetric heterogeneous bulky right adnexa, consider nonemergent  pelvic ultrasound for further evaluation.   - NJ tube for meds, TF      Fluids/Electrolytes/Renal:  # Acute kidney injury  # Hyperkalemia, resolved  # Hyperphosphatemia   # Volume status   # Bilateral perinephritic stranding    # Nonspecific  right adnexa collection   - Renal Consulted.   - CRRT started. UF pull -100Ml/hr   - Trending CRRT labs  - Monitor I/Os closely   - Pelvic US without findings of collection 8/10 (transabdominal, not transvaginal)    - Sodium goals as above   - 8/11: Gyn consulted for right adnexal finding on CT. Low suspicion for abscess but would treat as we are currently. Collection too small for surgical intervention and too small for IR drainage as well. Could follow up with pelvic MRI, however we will treat as current with IV antibiotics.       Endocrine:  # Hypothyroidism   # concern for stress hyperglycemia due to critical illness   # hypoglycemia; resolved    - synthroid resumed   - continue with sliding scale insulin due to feed start, reassess needs.   Goal to keep BG< 180 for optimal wound healing      ID:  # Leukocytosis  # Pneumonia   # ARDS   # Concern for PID   PER OSH: 8/6: RSV, COVID, parainfluenza,  negative . Legionella and pneumococcal urine antigens negative. Patient has been on greater than 20 mg prednisone daily for 18 days.  CULTURES: 8/8/2024: Respiratory viral panel negative. Blood cultures negative, MRSA nasal negative, Legionella urine antigen negative, respiratory culture negative.PCR trachea for PJP is negative.  COVID: Negative. Viral panel-negative PJP  - ID consulted   - Follow up  "bronch cultures from 8/9    Antimicrobials:   - Azithromycin- stopped 8/11   - Cefepime   - Amphotericin   - Flagyl added 8/11     [facility] labs:   8/8: sputum growing candida albicans    8/9: Bronch BAL with yeast +   MRSA negative   - Cryptococcal AG negative   Pending: Blastomycosis, aspergillus, Coccidioides, BC     Heme:     # Acute blood loss anemia   - transfusion parameters per EMCO protocol    - one unit prbc 8/8/24  - Transfuse if hgb <7.0 or signs/symptoms of hypoperfusion. Monitor and trend.       Musculoskeletal:  # Weakness and deconditioning of critical illness  # Left ankle injury pre-hospitalization    - Physical and occupational therapy when able.   - Ongoing ecchymosis to LLE     Skin:  # Ecchymosis - BLE   - bilateral lower leg ecchymosis   - WOC consult      General Cares/Prophylaxis:    DVT Prophylaxis: Bival gtt per ECMO   GI Prophylaxis: PPI  Restraints: Restraints for medical healing needed: NO     Lines/ tubes/ drains:  - ETT   - Right  ECMO cannula   - Right Femoral cannula   - Left internal jugular   - radial arterial line   - NJ   - PIV\"s      Disposition:  -  Surgical ICU       Time spent on this Encounter   Billing:  I spent 55 minutes bedside and on the inpatient unit today managing the critical care of Massiel Flaherty in relation to the issues listed in this note.      Dong Rico PA-C    ====================================  INTERVAL HISTORY:  Ongoing sodium trends for goal. Continue high sedation and paralysis. Pull on CRRT. Sodium trending per NCC. Transition to PC vent settings. Add steroids today.     OBJECTIVE:   1. VITAL SIGNS:   Temp:  [97.2  F (36.2  C)-98.8  F (37.1  C)] 98.2  F (36.8  C)  Pulse:  [] 96  Resp:  [10-16] 10  MAP:  [48 mmHg-89 mmHg] 72 mmHg  Arterial Line BP: ()/(44-63) 99/53  FiO2 (%):  [60 %] 60 %  SpO2:  [90 %-100 %] 95 %  FiO2 (%): 60 %, Resp: 10, Inspiratory Pressure (cm H2O) (Drager Jessie): (S) 25, Vent Mode: PCV Plus assist, Resp " Rate (Set): (S) 10 breaths/min, PEEP (cm H2O): 10 cmH2O, Resp Rate (Set): (S) 10 breaths/min, PEEP (cm H2O): 10 cmH2O      2. INTAKE/ OUTPUT:   I/O last 3 completed shifts:  In: 4323.53 [I.V.:3158.53; NG/GT:360]  Out: 6178.1 [Urine:10; Other:5518.1; Stool:650]    3. PHYSICAL EXAMINATION:  General: chemically sedated.   HEENT: PERRLA. Pupils 3mm and reactive. ETT present and secured. NJ tube. OG to LIS with thin green drainage.   RIght neck with ECMO cannula, sutured in place.  Left internal jugular CVC in place, secured.   Neuro: PERRL 3mm. NPI 3.9. Patient paralyzed.   Pulm/Resp:  minimal breath sounds, VT's 30-50   CV: RRR, S1/S2   Abdomen: Soft, non-distended, non-tender, no rebound tenderness or guarding, no masses  : (+) rolle catheter in place, dark yellow urine. Minimal   Incisions/Skin: scattered ecchymosis in BLE in toes around ankles bilaterally. Limbs cool.    MSK/Extremities:generalize BLE 1+ edema. Calves compressible, (+) doppler tones  DP/PT on feet.     4. INVESTIGATIONS:   Arterial Blood Gases   Recent Labs   Lab 08/12/24  0945 08/12/24  0801 08/12/24  0601 08/12/24  0402   PH 7.29* 7.33* 7.37 7.42   PCO2 49* 45 41 35   PO2 71* 68* 76* 76*   HCO3 24 24 23 23     Complete Blood Count   Recent Labs   Lab 08/12/24  0945 08/12/24  0401 08/11/24  2154 08/11/24  1603   WBC 14.7* 13.1* 12.6* 13.3*   HGB 8.2* 7.9* 8.0* 8.0*    187 187 208     Basic Metabolic Panel  Recent Labs   Lab 08/12/24  0945 08/12/24  0401 08/12/24  0005 08/11/24  2154 08/11/24  1956 08/11/24  1603   * 152*  151* 149* 149*   < > 146*   POTASSIUM 4.6 4.3  --  4.1  --  3.8   CHLORIDE 120* 120*  --  118*  --  114*   CO2 22 21*  --  21*  --  20*   BUN 29.9* 27.2*  --  25.4*  --  24.8*   CR 1.33* 1.30*  --  1.27*  --  1.28*   * 149*  --  140*  --  173*    < > = values in this interval not displayed.     Liver Function Tests  Recent Labs   Lab 08/12/24  0945 08/12/24  0401 08/11/24  2154 08/11/24  1603 08/11/24  1021  08/11/24  0405 08/10/24  0947 08/10/24  0412 08/09/24  1004 08/09/24  0339 08/08/24  2137 08/08/24  1641   AST  --  48*  --   --   --  47*  --  69*  --   --   --  76*   ALT  --  21  --   --   --  21  --  25  --  25  --  37   ALKPHOS  --  281*  --   --   --  318*  --  376*  --  326*  --  500*   BILITOTAL  --  0.4  --   --   --  0.6  --  0.7  --  0.3  --  0.6   ALBUMIN 2.5* 2.4* 2.4* 2.3*   < > 2.3*   < > 2.2*   < > 2.4*  --  3.0*   INR 1.37* 1.30* 1.24* 1.15   < > 1.11   < > 1.14   < > 1.25*   < > 1.36*    < > = values in this interval not displayed.     Pancreatic Enzymes  No lab results found in last 7 days.  Coagulation Profile  Recent Labs   Lab 08/12/24  0945 08/12/24  0844 08/12/24  0401 08/12/24  0008 08/11/24  2154 08/11/24  1831 08/11/24  1603   INR 1.37*  --  1.30*  --  1.24*  --  1.15   PTT 44* 43* 42* 39* 36   < > 35    < > = values in this interval not displayed.         5. RADIOLOGY:   Recent Results (from the past 24 hour(s))   XR Abdomen Port 1 View    Narrative    Exam: XR ABDOMEN PORT 1 VIEW, 8/11/2024 2:40 PM    Indication: NG repositioned to be NJ    Comparison: CT chest abdomen pelvis 8/9/2024.    Findings:   Frontal supine radiograph of the abdomen. Right lower quadrant ostomy  device with stable positioning of leads traversing the sacral foramina  and terminating in the right hemipelvis. Right lower approach ECMO  catheter with tip outside of the field of view. Feeding tube with tip  projecting postpyloric, in the proximal jejunum.    Nonobstructive bowel gas pattern, gasless abdomen no portal venous gas  or pneumatosis. No acute osseous abnormalities, mild degenerative  changes of the lumbar spine      Impression    Impression:   1. Nonobstructive bowel gas pattern.  2. Interval advancement of a feeding tube, which now projects  postpyloric with tip in proximal jejunum. Remainder of support devices  are stable.    I have personally reviewed the examination and initial interpretation  and I  agree with the findings.    PILAR SAHU MD         SYSTEM ID:  [id number]   XR Chest Port 1 View    Narrative    EXAM: XR CHEST PORT 1 VIEW  2024 12:45 AM      HISTORY: daily ECMO check    COMPARISON: 2024.    FINDINGS:   Single view of the chest. Endotracheal tube tip projects over the mid  thoracic trachea. Feeding tube courses outside of the field of view.  Esophageal temperature probe tip projects over the mid thoracic  esophagus. Superior and inferior approach ECMO cannulae are unchanged  in position. Trachea is midline. Cardiac silhouette is obscured. Dense  consolidative opacities bilaterally with minimally improved areas of  aeration throughout. No pneumothorax.      Impression    IMPRESSION:   1. Stable support devices.  2. Dense consolidative opacities bilaterally with minimally improved  areas of parenchymal aeration.    I have personally reviewed the examination and initial interpretation  and I agree with the findings.    ALDEN TO DO         SYSTEM ID:  [id number]   Echo Complete   Result Value    LVEF  60-65%    Narrative    [id number]  [id number]  [id number]  [id number]^DELFINO^TRUDY^WILLIAM     [facility]  Echocardiography Laboratory  [address] [place] [id number]     Name: NA ALATORRE  MRN: [id number]  : 1970  Study Date: 2024 08:59 AM  Age: 53 yrs  Gender: Female  Patient Location: [facility]  Reason For Study: Right Ventricular Hypertrophy  Ordering Physician: TRUDY SALEH  Referring Physician: SYSTEM, PROVIDER NOT IN  Performed By: Aysha Murphy RDCS     BSA: 1.9 m2  Height: 62 in  Weight: 192 lb  HR: 102  BP: 111/51 mmHg  ______________________________________________________________________________  Procedure  Complete Portable Echo Adult.  ______________________________________________________________________________  Interpretation Summary  Patient on VV-ECMO at 4.4LPM during the study.     Left ventricular  size, wall motion and function are normal. The ejection  fraction is 60-65%. Flattened septum is consistent with right ventricular  pressure and volume overload.  Right ventricular function, chamber size, wall motion, and thickness are  normal.  Trace tricuspid insufficiency is present.  Unable to assess mean RA pressure given the patient is on a ventilator.  Venous cannula noted in the IVC No pericardial effusion is present.     There is no prior study for direct comparison.  ______________________________________________________________________________  Left Ventricle  Left ventricular size, wall motion and function are normal. The ejection  fraction is 60-65%. No regional wall motion abnormalities are seen. Flattened  septum is consistent with right ventricular pressure and volume overload.     Right Ventricle  Right ventricular function, chamber size, wall motion, and thickness are  normal.     Atria  Both atria appear normal.     Mitral Valve  The mitral valve is normal. Trace mitral insufficiency is present.     Aortic Valve  Aortic valve is normal in structure and function. The aortic valve is  tricuspid. No aortic regurgitation is present.     Tricuspid Valve  The tricuspid valve is normal. Trace tricuspid insufficiency is present. The  peak velocity of the tricuspid regurgitant jet is not obtainable.     Pulmonic Valve  The pulmonic valve is normal.     Vessels  Venous cannula noted in the IVC. Unable to assess mean RA pressure given the  patient is on a ventilator.     Pericardium  No pericardial effusion is present.     Compared to Previous Study  There is no prior study for direct comparison.  ______________________________________________________________________________  MMode/2D Measurements & Calculations  IVSd: 0.74 cm     LVIDd: 5.0 cm  LVIDs: 2.7 cm  LVPWd: 0.84 cm  FS: 44.9 %  LV mass(C)d: 132.4 grams  LV mass(C)dI: 70.5 grams/m2  LVOT diam: 2.1 cm  LVOT area: 3.5 cm2  RVOT diam: 2.3 cm  RWT:  0.34  TAPSE: 2.1 cm     Doppler Measurements & Calculations  MV E max lisa: 114.0 cm/sec  MV A max lisa: 91.7 cm/sec  MV E/A: 1.2  MV dec slope: 579.4 cm/sec2  MV dec time: 0.20 sec  LV V1 max P.0 mmHg  LV V1 max: 132.1 cm/sec  LV V1 VTI: 25.2 cm  SV(LVOT): 89.5 ml  SI(LVOT): 47.6 ml/m2  Qp/Qs: 1.0/1.2  E/E' av.4  Lateral E/e': 12.6     Medial E/e': 16.1     ______________________________________________________________________________  Report approved by: Falguni CURTIS 2024 10:38 AM             =========================================

## 2024-08-12 NOTE — PROGRESS NOTES
CRRT STATUS NOTE    DATA:  Time:  1813  Pressures WNL:  increasing during shift  Filter Status: small clots at top of filter    Problems Reported/Alarms Noted:  none noted during shift    Supplies Present:  YES    ASSESSMENT:  Patient Net Fluid Balance:  - 1049 ml since midnight    Vital Signs:  Temp: 97.5  F (36.4  C) Temp src: Esophageal   Pulse: 97   Resp: 10 SpO2: 94 % O2 Device: Mechanical Ventilator            Labs:   Recent Labs   Lab 08/12/24  1539 08/12/24  1510 08/12/24  1151 08/12/24  0945 08/12/24  0401 08/12/24  0005 08/11/24  2154 08/11/24  0557 08/11/24  0405   WBC 12.9*  --   --  14.7* 13.1*  --  12.6*   < > 10.0   HGB 7.6*  --   --  8.2* 7.9*  --  8.0*   < > 8.1*   MCV 97  --   --  99 96  --  96   < > 97   *  --   --  188 187  --  187   < > 181   INR  --   --   --  1.37* 1.30*  --  1.24*   < > 1.11   *  --  149* 152* 152*  151*   < > 149*   < > 147*  147*   POTASSIUM 5.4*  --   --  4.6 4.3  --  4.1   < > 3.7   CHLORIDE 124*  --   --  120* 120*  --  118*   < > 114*   CO2 21*  --   --  22 21*  --  21*   < > 21*   BUN 35.5*  --   --  29.9* 27.2*  --  25.4*   < > 24.7*   CR 1.46*  --   --  1.33* 1.30*  --  1.27*   < > 1.29*   ANIONGAP 9  --   --  10 11  --  10   < > 12   QUAN 7.5*  --   --  7.8* 7.7*  --  7.7*   < > 7.5*   * 188* 179* 152* 149*  --  140*   < > 132*   ALBUMIN 2.5*  --   --  2.5* 2.4*  --  2.4*   < > 2.3*   PROTTOTAL  --   --   --   --  5.1*  --   --   --  4.9*   BILITOTAL  --   --   --   --  0.4  --   --   --  0.6   ALKPHOS  --   --   --   --  281*  --   --   --  318*   ALT  --   --   --   --  21  --   --   --  21   AST  --   --   --   --  48*  --   --   --  47*    < > = values in this interval not displayed.                  Goals of Therapy:  meeting goals of therapy    INTERVENTIONS:   Afternoon labs resulted K 5.4, dialysate changed to 2K bath.    PLAN:  Continue CRRT as per MD order. Notify CRRT Resource RN with any questions or concerns.

## 2024-08-12 NOTE — PROGRESS NOTES
[facility]    ECLS Shift Summary:     ECMO Equipment:  Console Serial Number: [id number]  Circuit Lot Number: not recorded by Perfusion  Oxygenator Lot Number: [id number]    Circuit Assessment: Fibrin  Fibrin Location: connectors and each corner of the oxygenator    Arterial ECMO Cannula: 17 Fr in the Right Internal Jugular Vein  Venous ECMO Cannula: 25 Fr in the Right Femoral Vein    ECMO Cannula Arterial Right internal jugular-Site Assessment: WDL, Sutured, Secure  ECMO Cannula Venous Right femoral vein-Site Assessment: WDL, Sutured, Secure  ECMO Cannula Arterial Right internal jugular-Site Intervention: No intervention needed  ECMO Cannula Venous Right femoral vein-Site Intervention: No intervention needed    Patient remains on V-V ECMO, all equipment is functioning and alarms are appropriately set. RPM's: 3550 with Blood Flow (Circuit) LPM  Av.4 LPM  Min: 4.34 LPM  Max: 4.48 LPM L/min. Sweep is at 6 LPM and 100 %.    Significant Shift Events: increased bivalirudin overnight    Vent settings:  Vent Mode: PCV Plus assist  FiO2 (%): 60 %  Resp Rate (Set): 16 breaths/min  PEEP (cm H2O): 10 cmH2O  Inspiratory Pressure (cm H2O) (Drager Jessie): 35  Resp: 16      Anticoagulation:    Dose (mg/kg/hr) Bivalirudin: 0.05 mg/kg/hr  Rate (mL/hr) Bivalirudin: 4.4 mL/hr  Concentration Bivalirudin: 1 mg/mL  Most recent: ACT  (seconds):  (folowing PTTs)    The patient is on CRRT.  No outward blood loss was observed. No blood product given overnight.     Intake/Output Summary (Last 24 hours) at 2024 0625  Last data filed at 2024 0600  Gross per 24 hour   Intake 4314.73 ml   Output 6178.1 ml   Net -1863.37 ml       Labs:  Recent Labs   Lab 24  0601 24  0403 24  0402 24  0401 24  0201 24  0006   PH 7.37  --  7.42  --  7.41 7.42   PCO2 41  --  35  --  36 35   PO2 76*  --  76*  --  66* 69*   HCO3 23  --  23  --  22 23   O2PER 60 60 60 100  60  60 60       Lab Results   Component Value Date    HGB 7.9 (L) 08/12/2024    PHGB 40 (H) 08/12/2024     08/12/2024    FIBR 588 (H) 08/12/2024    INR 1.30 (H) 08/12/2024    PTT 39 (H) 08/12/2024    DD 11.72 (H) 08/12/2024    ANTCH 68 (L) 08/11/2024       Plan is for a bronch and to continue [facility] ECMO support and adjust settings as needed.    Venkat Barry, RT  ECMO Specialist  8/12/2024 6:25 AM

## 2024-08-12 NOTE — PROGRESS NOTES
Nephrology Progress Note  08/12/2024       Mrs Flaherty is a  53 yof w/ Anxiety, depression, fibromyalgia, hypothyroidism, asthma, HLD, MURIEL on CPAP, expressive aphasia, and hypertension who was admitted to an OSH with community acquired pneumonia on 8/3 s/p intubation.  Found to have severe ARDS requiring paralysis and proning, transferred here on 8/8 for management and initiated on CKRT for severe acidemia in the setting of oligoanuric CHACORTA.  Started CRRT on 8/9 for volume management.      Interval History :   Mrs Flaherty continues on CRRT, able to be negative yesterday 1.6L, ordered for 0-50cc/h net negative as BP's allow as she was on/off pressors overnight. Na at goal of 150-155 with post filter 3% which we will continue and give more time for neuro and pulm status to stabilize.      Assessment & Recommendations:   CHACORTA-Baseline Cr 0.6, at baseline on admission.  CHACORTA due to septic shock in setting of ARDS, UA on 8/6/2024 essentially bland (30 protein but no blood or casts), no dedicated renal imaging.  No dedicated renal imaging at this time.  Started CRRT 8/9 for volume management, now anuric.    -CHACORTA, started CRRT on 8/9.  Continuing 4k baths with post filter 3% at 100cc/h, 0-50cc/h volume goal.     -Dialysis consent signed and in chart.      Volume status-Net negative 1.6L yesterday and nearly 3L overall, planning 0-50cc/h net negative today as long as BP's support.      Electrolytes/pH-Last Na 152, continuing post filter 3% at 100cc/h and adjust as needed to keep 150-155 for now.      Ca/phos/pth-No acute issues, Mg and Phos mildly up but no major intervention needed today.     Anemia-Hgb 8.2, acute management per team.      Nutrition-Vital TF started 8/9.    Time spent: 55 minutes on this date of encounter for chart review, physical exam, medical decision making and co-ordination of care.     Seen and discussed with Dr Ortega    Recommendations were communicated to primary team via verbal communication.  "    Dakota Dorsey, DOREEN CNS  Clinical Nurse Specialist  [phone]      Review of Systems:   I reviewed the following systems:  ROS not done due to vent/sedation.     Physical Exam:   I/O last 3 completed shifts:  In: 4354.59 [I.V.:3139.59; NG/GT:410]  Out: 6225.9 [Other:5300.9; Stool:925]   Pulse 97   Temp 98.6  F (37  C)   Resp 10   Ht 1.575 m (5' 2\")   Wt 87.2 kg (192 lb 3.9 oz)   SpO2 93%   BMI 35.16 kg/m       GENERAL APPEARANCE: Intubated and sedated and paralyzed  Pulmonary: Intubated and sedated,   CV: regular rhythm, normal rate   - Edema 1-2+ diffuse  GI: soft, nontender, normal bowel sounds  MS: no evidence of inflammation in joints, no muscle tenderness  SKIN:  warm, dry  NEURO: No focal deficits    Labs:   All labs reviewed by me  Electrolytes/Renal -   Recent Labs   Lab Test 08/12/24  1151 08/12/24  0945 08/12/24  0401 08/12/24  0005 08/11/24 2154   * 152* 152*  151*   < > 149*   POTASSIUM  --  4.6 4.3  --  4.1   CHLORIDE  --  120* 120*  --  118*   CO2  --  22 21*  --  21*   BUN  --  29.9* 27.2*  --  25.4*   CR  --  1.33* 1.30*  --  1.27*   * 152* 149*  --  140*   QUAN  --  7.8* 7.7*  --  7.7*   MAG  --  2.5* 2.5*  --  2.4*   PHOS  --  5.0* 3.9  --  4.4    < > = values in this interval not displayed.       CBC -   Recent Labs   Lab Test 08/12/24  0945 08/12/24  0401 08/11/24 2154   WBC 14.7* 13.1* 12.6*   HGB 8.2* 7.9* 8.0*    187 187       LFTs -   Recent Labs   Lab Test 08/12/24  0945 08/12/24  0401 08/11/24  2154 08/11/24  1021 08/11/24  0405 08/10/24  0947 08/10/24  0412   ALKPHOS  --  281*  --   --  318*  --  376*   BILITOTAL  --  0.4  --   --  0.6  --  0.7   ALT  --  21  --   --  21  --  25   AST  --  48*  --   --  47*  --  69*   PROTTOTAL  --  5.1*  --   --  4.9*  --  4.8*   ALBUMIN 2.5* 2.4* 2.4*   < > 2.3*   < > 2.2*    < > = values in this interval not displayed.       Iron Panel - No lab results found.        Current Medications:  Current Facility-Administered " Medications   Medication Dose Route Frequency Provider Last Rate Last Admin    acetaminophen (TYLENOL) tablet 650 mg  650 mg Oral Q24H Aniceto Adame MD   650 mg at 08/12/24 1031    dextrose 5 % flush PRE/POST medication  10-20 mL Intravenous Q24H Barry Hatch MD   10 mL at 08/12/24 1101    And    amphotericin B (FUNGIZONE) 88.9 mg in D5W 500 mL IVPB  1 mg/kg (Dosing Weight) Intravenous Q24H Barry Hatch  mL/hr at 08/12/24 1102 88.9 mg at 08/12/24 1102    And    dextrose 5 % flush PRE/POST medication  10-20 mL Intravenous Q24H Barry Hatch MD   20 mL at 08/12/24 1305    artificial tears ophthalmic ointment   Both Eyes Q8H Alethea Schaffer MD   Given at 08/12/24 0750    ceFEPIme (MAXIPIME) 2 g vial to attach to  mL bag for ADULTS or NS 50 mL bag for PEDS  2 g Intravenous Q12H Barry Hatch MD   2 g at 08/12/24 1108    chlorhexidine (PERIDEX) 0.12 % solution 15 mL  15 mL Mouth/Throat Q12H Giovanny Guidry PA-C   15 mL at 08/12/24 0749    dexAMETHasone PF (DECADRON) injection 20 mg  20 mg Intravenous Daily Cal Lisa MD   20 mg at 08/12/24 0914    Followed by    [START ON 8/17/2024] dexAMETHasone PF (DECADRON) injection 10 mg  10 mg Intravenous Daily Cal Lisa MD        diphenhydrAMINE (BENADRYL) capsule 25 mg  25 mg Oral Q24H Aniceto Adame MD        Or    diphenhydrAMINE (BENADRYL) injection 25 mg  25 mg Intravenous Q24H Aniceto Adame MD   25 mg at 08/12/24 1031    insulin aspart (NovoLOG) injection (RAPID ACTING)  1-12 Units Subcutaneous Q4H Dong Rico PA-C   2 Units at 08/12/24 1154    levothyroxine (SYNTHROID/LEVOTHROID) tablet 25 mcg  25 mcg Per Feeding Tube QAM AC Giovanny Guidry PA-C   25 mcg at 08/12/24 0749    metroNIDAZOLE (FLAGYL) infusion 500 mg  500 mg Intravenous Q8H Dong Rico PA-C   500 mg at 08/12/24 1000    multivitamin RENAL (RENAVITE RX/NEPHROVITE) tablet 1 tablet  1 tablet Oral or  Feeding Tube Daily Barry Hatch MD   1 tablet at 08/12/24 0749    pantoprazole (PROTONIX) 2 mg/mL suspension 40 mg  40 mg Per Feeding Tube QAM AC Barry Hatch MD        polyethylene glycol (MIRALAX) Packet 17 g  17 g Per Feeding Tube BID Dong Rico PA-C   17 g at 08/10/24 0810    Prosource TF20 ENfit Compatibl EN LIQD (PROSOURCE TF20) packet 60 mL  1 packet Per Feeding Tube TID Giovanny Guidry PA-C   60 mL at 08/12/24 1300    senna-docusate (SENOKOT-S/PERICOLACE) 8.6-50 MG per tablet 2 tablet  2 tablet Oral or Feeding Tube BID Dong Rico PA-C   2 tablet at 08/10/24 2025    sodium chloride 0.9% BOLUS 250 mL  250 mL Intravenous Q24H Barry Hatch  mL/hr at 08/12/24 1031 250 mL at 08/12/24 1031    sodium chloride 3% BOLUS 250 mL  250 mL Intravenous Once Dong Rico PA-C 600 mL/hr at 08/12/24 1503 250 mL at 08/12/24 1503     Current Facility-Administered Medications   Medication Dose Route Frequency Provider Last Rate Last Admin    bivalirudin (ANGIOMAX) 250 mg in sodium chloride 0.9 % 250 mL ANTICOAGULANT infusion  0-0.3 mg/kg/hr (Dosing Weight) Intravenous Continuous Dong Rico PA-C 5.3 mL/hr at 08/12/24 1400 0.06 mg/kg/hr at 08/12/24 1400    cisatracurium (NIMBEX) 200 mg in D5W 100 mL infusion  3-10 mcg/kg/min (Ideal) Intravenous Continuous Alethea Schaffer MD 9 mL/hr at 08/12/24 1400 6 mcg/kg/min at 08/12/24 1400    dextrose 10% infusion   Intravenous Continuous PRN Giovanny Guidry PA-C        dialysate for CVVHD & CVVHDF (Phoxillum BK4/2.5)  12.5 mL/kg/hr CRRT Continuous Sony Castaneda MD 1,100 mL/hr at 08/12/24 1326 12.5 mL/kg/hr at 08/12/24 1326    epoprostenol (VELETRI) inhalation solution  20 ng/kg/min (Ideal) Nebulization Continuous Dong Rico PA-C 3 mL/hr at 08/12/24 1200 20 ng/kg/min at 08/12/24 1200    HYDROmorphone (DILAUDID) 0.2 mg/mL infusion ADULT/PEDS GREATER than or EQUAL to 20 kg  0.3-1.5  mg/hr Intravenous Continuous Dong Rico PA-C 7.5 mL/hr at 08/12/24 1400 1.5 mg/hr at 08/12/24 1400    midazolam (VERSED) 100 mg/100 mL NS infusion - ADULT  1-14 mg/hr Intravenous Continuous Dong Rico PA-C 14 mL/hr at 08/12/24 1404 14 mg/hr at 08/12/24 1404    No heparin required   Does not apply Continuous PRN Sony Castaneda MD        norepinephrine (LEVOPHED) 16 mg in  mL infusion MAX CONC CENTRAL LINE  0.01-0.6 mcg/kg/min (Dosing Weight) Intravenous Continuous -Giovanny Spence PA-C   Stopped at 08/12/24 0000    POST-Filter REPLACEMENT SOlution for CVVHD/CVVHDF (3% sodium chloride)   Intravenous Continuous Barry Hatch  mL/hr at 08/11/24 2142 New Bag at 08/11/24 2142    PRE-filter replacement solution for CVVHD & CVVHDF (Phoxillum BK4/2.5)  12.5 mL/kg/hr CRRT Continuous Sony Castaneda MD 1,100 mL/hr at 08/12/24 1326 12.5 mL/kg/hr at 08/12/24 1326

## 2024-08-12 NOTE — PROGRESS NOTES
CRRT STATUS NOTE    DATA:  Time:  6:00 AM  Pressures WNL:  YES  Filter Status:  WDL    Problems Reported/Alarms Noted:  None.    Supplies Present:  YES    ASSESSMENT:  Patient Net Fluid Balance:  Net -1068 ml @ 0600.  Vital Signs:  HR 95, /53, MAP 76  Labs:  K 4.3, Mg 2.5, Phos 3.9, iCa 4.5, Hgb 7.9, Plt 187  Goals of Therapy:  0-100ml/hr as tolerated.     INTERVENTIONS:   None.    PLAN:  Continue to monitor circuit daily and change set q72 hours or PRN for clotting/clogging. Please call CRRT RN with any quesitons/problems.

## 2024-08-12 NOTE — PLAN OF CARE
Major Shift Events:      I/A:  Neuro: Paralyzed on Cis, sedated on max Dilaudid and Versed (Dilaudid and Versed increased today, transitioned off Propofol). Multiple boluses of Versed and Dilaudid given per discussion with SICU due to hypertension and decreased O2S. BIS 40s. Q2h pupillometer checks with all NPis>3.5.  Pulm: Lungs coarse, very diminished. ETT@ 22cm lip. PCV+ RR 10 (decreased from 16), Pinsp 25 (decreased from 35), PEEP 10, FIO2 60%. Tidal volumes 20-30. Required bag suctioning with RT X2 for small plugs. Veletri through vent.  ECMO: SWEEP 7 FiO2 100% RPMs 3550, Flows 4.5. No episodes of chugging this shift. Angiomax through circuit, therapeutic per protocol.  CV: HR 80-100s SR. Goal MAP>65 maintained without pressors, hypertensive to -160s at times. Afebrile. Had one short run of SVT w/HR 170s around 0800, resolved without intervention.  PV: 1+ palpable pulses. Mild edema throughout.  GI: Soft, obese abdomen, +BS. Rectal tube in place with watery output (bowel meds held). NJT with tube feeds at goal.  : Anuric. Bladder scanned for 12cc. On CRRT with goal now 0-50cc/hour. 3% post-blood pump.  Skin: Excoriated area upper perineum/lower abdomen, cracked/excoriated anal fissure. See flowsheets for other details.  MS: Bedrest.  Endo: Sliding scale insulin initiated today due to hyperglycemia (Decadron taper initiated today, large volume Ampho B in D5).  Lines: LIJ TL CVC, R PIVx2, L PIV, L radial art line, RIJ and L femoral ECMO cannulas.  Labs: 250cc bolus of 3% saline for Na 149 (goal 150-155).  Drips: Versed, Dilaudid, Cis, Veletri, Angiomax.  Psych/Social: Updated patient's family at bedside--supportive of patient and POC.  Goal Outcome Evaluation: Plan of Care Reviewed With: patient, parent, child, family. Overall Patient Progress: no change.    Plan: Continue to monitor, notify SICU of any concerns.  For vital signs and complete assessments, please see documentation flowsheets.

## 2024-08-12 NOTE — PROGRESS NOTES
[facility]    ECLS Shift Summary:     ECMO Equipment:  Console Serial Number: [id number]  Circuit Lot Number: not recorded by Perfusion  Oxygenator Lot Number: [id number]    Circuit Assessment: Fibrin  Fibrin Location: connectors, corners of oxygenator    Arterial ECMO Cannula: 17 Fr in the Right Internal Jugular Vein  Venous ECMO Cannula: 25 Fr in the Right Femoral Vein      Patient remains on V-V ECMO, all equipment is functioning and alarms are appropriately set. RPM's: 3600 with Blood Flow (Circuit) LPM  Av.4 LPM  Min: 4.37 LPM  Max: 4.45 LPM L/min. Sweep is at 7 LPM and 100 %. Extremities are warm.     Significant Shift Events:   Recirculation calculation continued to be high, tonight's calculation is 57%. Plan for possible readjustment of cannulas tomorrow.       Vent settings:  FiO2 (%): 60 %, Resp: 10, Inspiratory Pressure (cm H2O) (Drager Jessie): 25, Vent Mode: PCV Plus assist, Resp Rate (Set): 10 breaths/min, PEEP (cm H2O): 10 cmH2O, Resp Rate (Set): 10 breaths/min, PEEP (cm H2O): 10 cmH2O    Anticoagulation:  Dose (mg/kg/hr) Bivalirudin: 0.06 mg/kg/hr  Rate (mL/hr) Bivalirudin: 5.3 mL/hr  Concentration Bivalirudin: 1 mg/mL  Most recent: ACT  (seconds):  (folowing PTTs)    Patient on CRRT.  Blood loss was minimal. Product given included None.     Intake/Output Summary (Last 24 hours) at 2024 1655  Last data filed at 2024 1600  Gross per 24 hour   Intake 4347.67 ml   Output 5946.1 ml   Net -1598.43 ml       Labs:  Recent Labs   Lab 24  1542 24  1541 24  1539 24  1406 24  1152 24  0945   PH  --  7.36  --  7.32* 7.35 7.29*   PCO2  --  40  --  42 41 49*   PO2  --  60*  --  60* 69* 71*   HCO3  --  22  --  22 23 24   O2PER 60 60 60  100 60 60 60       Lab Results   Component Value Date    HGB 7.6 (L) 2024    PHGB 40 (H) 2024     (L) 2024    FIBR 547 (H) 2024    INR 1.37 (H) 2024     PTT 52 (H) 08/12/2024    DD 14.58 (H) 08/12/2024    ANTCH 77 (L) 08/12/2024       Plan is continue VV ECMO at this time.    Berna Rosen, RT  ECMO Specialist  8/12/2024 4:55 PM

## 2024-08-12 NOTE — PROVIDER NOTIFICATION
Notified SICU that patient's PaO2 60, SPO2 88-91, tidal volumes 20-30, remains hypertensive -160s despite increase in midazolam infusion and multiple Dilaudid and Midazolam boluses (see MAR). BIS 40s, adequately paralyzed (TOF 0-2/4). No new orders at this time.

## 2024-08-12 NOTE — PROGRESS NOTES
Veno-venous ECMO Progress Note  8/10/2024    Massiel Flaherty is a 53 year old female who was started on ECMO due to severe respiratory failure from ARDS related to community acquired pneumonia.     Interval events: Placed on VV ECMO 2 evenings ago with improvement in hypoxia and hypercapnia. Right internal jugular cannula retracted yesterday morning due close proximity to groin line.     Today:  On Levo 0.06    Patient remains on V-V ECMO. Return and drainage cannulas noted to be only 1.8 cm from each other. . RPM's: 3550 with Blood Flow (Circuit) LPM  Av.2 LPM  Min: 4.04 LPM  Max: 4.47 LPM L/min. Sweep is at 6 LPM and 100 %. Extremities are cool.     The patients vital signs today are as follows:    Temp:  [98.1  F (36.7  C)-98.8  F (37.1  C)] 98.8  F (37.1  C)  Pulse:  [75-95] 81  Resp:  [16] 16  MAP:  [59 mmHg-266 mmHg] 78 mmHg  Arterial Line BP: ()/(42-68) 118/57  FiO2 (%):  [60 %-100 %] 60 %  SpO2:  [91 %-100 %] 99 %     Vent Mode: PCV Plus assist  FiO2 (%): 60 %  Resp Rate (Set): 16 breaths/min  PEEP (cm H2O): 10 cmH2O  Inspiratory Pressure (cm H2O) (Drager Jessie): 35  Resp: 16     Recent Labs   Lab 24  1829 24  1605 24  1603 24  1601   PH 7.34* 7.34* 7.37  --   --  7.37   PCO2 43 42 40  --   --  38   PO2 72* 72* 67*  --   --  77*   HCO3 23 23 23  --   --  22   O2PER 60 60 60 60   < > 60    < > = values in this interval not displayed.      Recent Labs   Lab 24  1603 24  1021 24  0405   WBC 12.6* 13.3* 12.6* 10.0   HGB 8.0* 8.0* 7.9* 8.1*     Creatinine   Date Value Ref Range Status   2024 1.27 (H) 0.51 - 0.95 mg/dL Final   2024 1.28 (H) 0.51 - 0.95 mg/dL Final   2024 1.28 (H) 0.51 - 0.95 mg/dL Final   2024 1.29 (H) 0.51 - 0.95 mg/dL Final     Physical Exam:   Right internal jugular cannula in place. Right femoral line unchanged. No bleeding from either site.  Minimal air  movement  Extremities edematous. Scattered ecchymosis of bilateral calves and toes.    ECMO Issues including assessments and plan on /2024:      Neuro: Sedated for mechanical ventilation and ECMO.  RASS goal: -4 to -5  CV: Hemodynamically stable, off vasopressors.  Pulm: Severe respiratory failure requiring ECMO and mechanical ventilation. TVs 30-50 mL   Keep vent settings at rest settings: PC 25, PEEP10, FiO2 60%.  Return ECMO cannula retracted 4-5 cm with improvement in O2 sats  FEN/Renal: Creatinine  Lab Results   Component Value Date    CR 2.82 08/09/2024   Currently on CRRT through ECMO circuit.   Heme: Hemoglobin 8.0 from 7.7.  Goals: if O2 sat >85% Hgb may drift to 7-8.  If O2 Sat <85% keep Hgb 10-12.  ID: Cefepime, added azithromycin this AM. ID consult.     All pertinent labs, imaging studies, physical exam and medications have been reviewed by me.     Discussed with staff, Dr. Pete Schaffer MD, FACS  Surgical Critical Care Fellow

## 2024-08-12 NOTE — PLAN OF CARE
[id number]: Paralyzed on cis @ 5.5, TOF 0-2/4 twitches (inconsistent but vent compliant). Sedated on versed @ 8, dilaudid @ 1, prop @ 30. Measuring NPIs q 2 hrs-both pupils above 4 this shift. Na within goal this AM at 151. SR, afebrile. Levo off since midnight. MAP>65. Pulses dopplerable. Ecmo: 100%, sweep 6, RPMs 3200, 4.5L flow, no chugging. Bival through circuit-trending PTTs.  ETT 22 @ lip, still having intermittent cuff leaks. Pressure control + assist. RR 16, PEEP 10, pressure support 25, 60% FiO2. Pulling 50-80 TVs. PaO2s 60-70s, satting high 90s. Rectal tube with 300cc out this shift (held stool softeners and miralax per SICU). No rolle, no UO. NJ at 92 TF 35cc/hr, FWF 30 Q 4. CRRT goal 0-100/hr (easily met). Post bag 3 % NaCl @ 100cc/hr. Skin unchanged. R) PIV x2, L) PIC SL. L) CVC-sedation, levo in line off, SL. L) radial A-line. Family (mother and son) updated at bedside.   Will continue to monitor and follow POC.

## 2024-08-13 ENCOUNTER — APPOINTMENT (OUTPATIENT)
Dept: GENERAL RADIOLOGY | Facility: CLINIC | Age: 54
DRG: 003 | End: 2024-08-13
Attending: SURGERY
Payer: MEDICAID

## 2024-08-13 LAB
A PHAGOCYTOPH DNA BLD QL NAA+PROBE: NOT DETECTED
ALBUMIN SERPL BCG-MCNC: 2.5 G/DL (ref 3.5–5.2)
ALBUMIN SERPL BCG-MCNC: 2.5 G/DL (ref 3.5–5.2)
ALBUMIN SERPL BCG-MCNC: 2.9 G/DL (ref 3.5–5.2)
ALLEN'S TEST: ABNORMAL
ALP SERPL-CCNC: 237 U/L (ref 40–150)
ALT SERPL W P-5'-P-CCNC: 21 U/L (ref 0–50)
ANION GAP SERPL CALCULATED.3IONS-SCNC: 10 MMOL/L (ref 7–15)
ANION GAP SERPL CALCULATED.3IONS-SCNC: 11 MMOL/L (ref 7–15)
ANION GAP SERPL CALCULATED.3IONS-SCNC: 11 MMOL/L (ref 7–15)
ANION GAP SERPL CALCULATED.3IONS-SCNC: 8 MMOL/L (ref 7–15)
APTT PPP: 47 SECONDS (ref 22–38)
APTT PPP: 50 SECONDS (ref 22–38)
AST SERPL W P-5'-P-CCNC: 59 U/L (ref 0–45)
AT III ACT/NOR PPP CHRO: 95 % (ref 85–135)
BABESIA DNA BLD QL NAA+PROBE: NOT DETECTED
BACTERIA BLD CULT: NO GROWTH
BASE EXCESS BLDA CALC-SCNC: -0.1 MMOL/L (ref -3–3)
BASE EXCESS BLDA CALC-SCNC: -0.1 MMOL/L (ref -3–3)
BASE EXCESS BLDA CALC-SCNC: -0.2 MMOL/L (ref -3–3)
BASE EXCESS BLDA CALC-SCNC: -0.7 MMOL/L (ref -3–3)
BASE EXCESS BLDA CALC-SCNC: -1 MMOL/L (ref -3–3)
BASE EXCESS BLDA CALC-SCNC: -1.2 MMOL/L (ref -3–3)
BASE EXCESS BLDA CALC-SCNC: -1.4 MMOL/L (ref -3–3)
BASE EXCESS BLDA CALC-SCNC: -1.6 MMOL/L (ref -3–3)
BASE EXCESS BLDA CALC-SCNC: -1.7 MMOL/L (ref -3–3)
BASE EXCESS BLDA CALC-SCNC: -2 MMOL/L (ref -3–3)
BASE EXCESS BLDA CALC-SCNC: -2.4 MMOL/L (ref -3–3)
BASE EXCESS BLDA CALC-SCNC: 0.3 MMOL/L (ref -3–3)
BASE EXCESS BLDV CALC-SCNC: -0.1 MMOL/L (ref -3–3)
BASE EXCESS BLDV CALC-SCNC: -0.4 MMOL/L (ref -3–3)
BASE EXCESS BLDV CALC-SCNC: -0.4 MMOL/L (ref -3–3)
BASE EXCESS BLDV CALC-SCNC: -0.9 MMOL/L (ref -3–3)
BASOPHILS # BLD AUTO: ABNORMAL 10*3/UL
BASOPHILS # BLD MANUAL: 0 10E3/UL (ref 0–0.2)
BASOPHILS # BLD MANUAL: 0 10E3/UL (ref 0–0.2)
BASOPHILS # BLD MANUAL: 0.2 10E3/UL (ref 0–0.2)
BASOPHILS # BLD MANUAL: 0.2 10E3/UL (ref 0–0.2)
BASOPHILS NFR BLD AUTO: ABNORMAL %
BASOPHILS NFR BLD MANUAL: 0 %
BASOPHILS NFR BLD MANUAL: 0 %
BASOPHILS NFR BLD MANUAL: 1 %
BASOPHILS NFR BLD MANUAL: 1 %
BILIRUB DIRECT SERPL-MCNC: 0.25 MG/DL (ref 0–0.3)
BILIRUB SERPL-MCNC: 0.4 MG/DL
BUN SERPL-MCNC: 40.1 MG/DL (ref 6–20)
BUN SERPL-MCNC: 42.4 MG/DL (ref 6–20)
BUN SERPL-MCNC: 42.7 MG/DL (ref 6–20)
BUN SERPL-MCNC: 43.2 MG/DL (ref 6–20)
BUN SERPL-MCNC: 45.4 MG/DL (ref 6–20)
BUN SERPL-MCNC: 46.9 MG/DL (ref 6–20)
C DIFF TOX B STL QL: NEGATIVE
C TRACH DNA SPEC QL PROBE+SIG AMP: NEGATIVE
CA-I BLD-MCNC: 4.6 MG/DL (ref 4.4–5.2)
CA-I BLD-MCNC: 4.7 MG/DL (ref 4.4–5.2)
CA-I BLD-MCNC: 4.8 MG/DL (ref 4.4–5.2)
CA-I BLD-MCNC: 4.9 MG/DL (ref 4.4–5.2)
CALCIUM SERPL-MCNC: 7.8 MG/DL (ref 8.8–10.4)
CALCIUM SERPL-MCNC: 8.1 MG/DL (ref 8.8–10.4)
CALCIUM SERPL-MCNC: 8.1 MG/DL (ref 8.8–10.4)
CALCIUM SERPL-MCNC: 8.3 MG/DL (ref 8.8–10.4)
CALCIUM SERPL-MCNC: 8.7 MG/DL (ref 8.8–10.4)
CALCIUM SERPL-MCNC: 8.7 MG/DL (ref 8.8–10.4)
CHLORIDE SERPL-SCNC: 117 MMOL/L (ref 98–107)
CHLORIDE SERPL-SCNC: 117 MMOL/L (ref 98–107)
CHLORIDE SERPL-SCNC: 118 MMOL/L (ref 98–107)
CHLORIDE SERPL-SCNC: 121 MMOL/L (ref 98–107)
CHLORIDE SERPL-SCNC: 121 MMOL/L (ref 98–107)
CHLORIDE SERPL-SCNC: 122 MMOL/L (ref 98–107)
COHGB MFR BLD: 93.8 % (ref 96–97)
COHGB MFR BLD: 94.9 % (ref 96–97)
COHGB MFR BLD: 95.3 % (ref 96–97)
COHGB MFR BLD: 96 % (ref 96–97)
COHGB MFR BLD: 96.2 % (ref 96–97)
COHGB MFR BLD: 96.5 % (ref 96–97)
COHGB MFR BLD: 96.8 % (ref 96–97)
COHGB MFR BLD: 97.6 % (ref 96–97)
COHGB MFR BLD: 97.7 % (ref 96–97)
COHGB MFR BLD: 98.1 % (ref 96–97)
COHGB MFR BLD: 98.3 % (ref 96–97)
COHGB MFR BLD: 99.3 % (ref 96–97)
CREAT SERPL-MCNC: 1.39 MG/DL (ref 0.51–0.95)
CREAT SERPL-MCNC: 1.4 MG/DL (ref 0.51–0.95)
CREAT SERPL-MCNC: 1.41 MG/DL (ref 0.51–0.95)
CREAT SERPL-MCNC: 1.41 MG/DL (ref 0.51–0.95)
CREAT SERPL-MCNC: 1.44 MG/DL (ref 0.51–0.95)
CREAT SERPL-MCNC: 1.46 MG/DL (ref 0.51–0.95)
D DIMER PPP FEU-MCNC: 10.76 UG/ML FEU (ref 0–0.5)
D DIMER PPP FEU-MCNC: 3.97 UG/ML FEU (ref 0–0.5)
EGFRCR SERPLBLD CKD-EPI 2021: 43 ML/MIN/1.73M2
EGFRCR SERPLBLD CKD-EPI 2021: 43 ML/MIN/1.73M2
EGFRCR SERPLBLD CKD-EPI 2021: 44 ML/MIN/1.73M2
EGFRCR SERPLBLD CKD-EPI 2021: 44 ML/MIN/1.73M2
EGFRCR SERPLBLD CKD-EPI 2021: 45 ML/MIN/1.73M2
EGFRCR SERPLBLD CKD-EPI 2021: 45 ML/MIN/1.73M2
EHRLICHIA DNA SPEC QL NAA+PROBE: NOT DETECTED
EOSINOPHIL # BLD AUTO: ABNORMAL 10*3/UL
EOSINOPHIL # BLD MANUAL: 0 10E3/UL (ref 0–0.7)
EOSINOPHIL NFR BLD AUTO: ABNORMAL %
EOSINOPHIL NFR BLD MANUAL: 0 %
ERYTHROCYTE [DISTWIDTH] IN BLOOD BY AUTOMATED COUNT: 16.8 % (ref 10–15)
ERYTHROCYTE [DISTWIDTH] IN BLOOD BY AUTOMATED COUNT: 16.9 % (ref 10–15)
ERYTHROCYTE [DISTWIDTH] IN BLOOD BY AUTOMATED COUNT: 16.9 % (ref 10–15)
ERYTHROCYTE [DISTWIDTH] IN BLOOD BY AUTOMATED COUNT: 17.1 % (ref 10–15)
FIBRINOGEN PPP-MCNC: 410 MG/DL (ref 170–510)
FIBRINOGEN PPP-MCNC: 484 MG/DL (ref 170–510)
FRAGMENTS BLD QL SMEAR: SLIGHT
GLUCOSE BLDC GLUCOMTR-MCNC: 150 MG/DL (ref 70–99)
GLUCOSE BLDC GLUCOMTR-MCNC: 159 MG/DL (ref 70–99)
GLUCOSE BLDC GLUCOMTR-MCNC: 164 MG/DL (ref 70–99)
GLUCOSE BLDC GLUCOMTR-MCNC: 179 MG/DL (ref 70–99)
GLUCOSE BLDC GLUCOMTR-MCNC: 197 MG/DL (ref 70–99)
GLUCOSE BLDC GLUCOMTR-MCNC: 237 MG/DL (ref 70–99)
GLUCOSE SERPL-MCNC: 175 MG/DL (ref 70–99)
GLUCOSE SERPL-MCNC: 179 MG/DL (ref 70–99)
GLUCOSE SERPL-MCNC: 196 MG/DL (ref 70–99)
GLUCOSE SERPL-MCNC: 203 MG/DL (ref 70–99)
GLUCOSE SERPL-MCNC: 223 MG/DL (ref 70–99)
GLUCOSE SERPL-MCNC: 268 MG/DL (ref 70–99)
HCO3 BLD-SCNC: 22 MMOL/L (ref 21–28)
HCO3 BLD-SCNC: 23 MMOL/L (ref 21–28)
HCO3 BLD-SCNC: 24 MMOL/L (ref 21–28)
HCO3 BLD-SCNC: 25 MMOL/L (ref 21–28)
HCO3 BLDA-SCNC: 22 MMOL/L (ref 21–28)
HCO3 BLDA-SCNC: 23 MMOL/L (ref 21–28)
HCO3 BLDV-SCNC: 24 MMOL/L (ref 21–28)
HCO3 BLDV-SCNC: 25 MMOL/L (ref 21–28)
HCO3 SERPL-SCNC: 22 MMOL/L (ref 22–29)
HCO3 SERPL-SCNC: 23 MMOL/L (ref 22–29)
HCO3 SERPL-SCNC: 25 MMOL/L (ref 22–29)
HCT VFR BLD AUTO: 23.5 % (ref 35–47)
HCT VFR BLD AUTO: 23.6 % (ref 35–47)
HCT VFR BLD AUTO: 23.8 % (ref 35–47)
HCT VFR BLD AUTO: 25.4 % (ref 35–47)
HGB BLD-MCNC: 7.7 G/DL (ref 11.7–15.7)
HGB BLD-MCNC: 7.7 G/DL (ref 11.7–15.7)
HGB BLD-MCNC: 7.8 G/DL (ref 11.7–15.7)
HGB BLD-MCNC: 8.4 G/DL (ref 11.7–15.7)
HGB FREE PLAS-MCNC: 40 MG/DL
IMM GRANULOCYTES # BLD: ABNORMAL 10*3/UL
IMM GRANULOCYTES NFR BLD: ABNORMAL %
LACTATE SERPL-SCNC: 0.7 MMOL/L (ref 0.7–2)
LACTATE SERPL-SCNC: 0.8 MMOL/L (ref 0.7–2)
LACTATE SERPL-SCNC: 1.2 MMOL/L (ref 0.7–2)
LACTATE SERPL-SCNC: 1.3 MMOL/L (ref 0.7–2)
LYMPHOCYTES # BLD AUTO: ABNORMAL 10*3/UL
LYMPHOCYTES # BLD MANUAL: 0.8 10E3/UL (ref 0.8–5.3)
LYMPHOCYTES # BLD MANUAL: 1 10E3/UL (ref 0.8–5.3)
LYMPHOCYTES # BLD MANUAL: 1.9 10E3/UL (ref 0.8–5.3)
LYMPHOCYTES # BLD MANUAL: 2.2 10E3/UL (ref 0.8–5.3)
LYMPHOCYTES NFR BLD AUTO: ABNORMAL %
LYMPHOCYTES NFR BLD MANUAL: 10 %
LYMPHOCYTES NFR BLD MANUAL: 4 %
LYMPHOCYTES NFR BLD MANUAL: 6 %
LYMPHOCYTES NFR BLD MANUAL: 9 %
M PNEUMO DNA SPEC QL NAA+PROBE: NOT DETECTED
MAGNESIUM SERPL-MCNC: 2.2 MG/DL (ref 1.7–2.3)
MAGNESIUM SERPL-MCNC: 2.2 MG/DL (ref 1.7–2.3)
MAGNESIUM SERPL-MCNC: 2.3 MG/DL (ref 1.7–2.3)
MCH RBC QN AUTO: 31.3 PG (ref 26.5–33)
MCH RBC QN AUTO: 31.5 PG (ref 26.5–33)
MCH RBC QN AUTO: 31.5 PG (ref 26.5–33)
MCH RBC QN AUTO: 31.6 PG (ref 26.5–33)
MCHC RBC AUTO-ENTMCNC: 32.6 G/DL (ref 31.5–36.5)
MCHC RBC AUTO-ENTMCNC: 32.8 G/DL (ref 31.5–36.5)
MCHC RBC AUTO-ENTMCNC: 32.8 G/DL (ref 31.5–36.5)
MCHC RBC AUTO-ENTMCNC: 33.1 G/DL (ref 31.5–36.5)
MCV RBC AUTO: 95 FL (ref 78–100)
MCV RBC AUTO: 96 FL (ref 78–100)
METAMYELOCYTES # BLD MANUAL: 0.5 10E3/UL
METAMYELOCYTES # BLD MANUAL: 0.8 10E3/UL
METAMYELOCYTES # BLD MANUAL: 1 10E3/UL
METAMYELOCYTES # BLD MANUAL: 1.3 10E3/UL
METAMYELOCYTES NFR BLD MANUAL: 3 %
METAMYELOCYTES NFR BLD MANUAL: 4 %
METAMYELOCYTES NFR BLD MANUAL: 4 %
METAMYELOCYTES NFR BLD MANUAL: 7 %
MONOCYTES # BLD AUTO: ABNORMAL 10*3/UL
MONOCYTES # BLD MANUAL: 0.2 10E3/UL (ref 0–1.3)
MONOCYTES # BLD MANUAL: 1 10E3/UL (ref 0–1.3)
MONOCYTES # BLD MANUAL: 1 10E3/UL (ref 0–1.3)
MONOCYTES # BLD MANUAL: 1.5 10E3/UL (ref 0–1.3)
MONOCYTES NFR BLD AUTO: ABNORMAL %
MONOCYTES NFR BLD MANUAL: 1 %
MONOCYTES NFR BLD MANUAL: 5 %
MONOCYTES NFR BLD MANUAL: 5 %
MONOCYTES NFR BLD MANUAL: 6 %
MYELOCYTES # BLD MANUAL: 0.5 10E3/UL
MYELOCYTES # BLD MANUAL: 0.6 10E3/UL
MYELOCYTES # BLD MANUAL: 0.7 10E3/UL
MYELOCYTES # BLD MANUAL: 1 10E3/UL
MYELOCYTES NFR BLD MANUAL: 3 %
MYELOCYTES NFR BLD MANUAL: 5 %
N GONORRHOEA DNA SPEC QL NAA+PROBE: NEGATIVE
NEUTROPHILS # BLD AUTO: ABNORMAL 10*3/UL
NEUTROPHILS # BLD MANUAL: 14.2 10E3/UL (ref 1.6–8.3)
NEUTROPHILS # BLD MANUAL: 14.6 10E3/UL (ref 1.6–8.3)
NEUTROPHILS # BLD MANUAL: 16.9 10E3/UL (ref 1.6–8.3)
NEUTROPHILS # BLD MANUAL: 19.3 10E3/UL (ref 1.6–8.3)
NEUTROPHILS NFR BLD AUTO: ABNORMAL %
NEUTROPHILS NFR BLD MANUAL: 74 %
NEUTROPHILS NFR BLD MANUAL: 78 %
NEUTROPHILS NFR BLD MANUAL: 82 %
NEUTROPHILS NFR BLD MANUAL: 86 %
NRBC # BLD AUTO: 0.2 10E3/UL
NRBC # BLD AUTO: 0.3 10E3/UL
NRBC # BLD AUTO: 0.4 10E3/UL
NRBC # BLD AUTO: 0.5 10E3/UL
NRBC # BLD AUTO: 0.5 10E3/UL
NRBC # BLD AUTO: 0.6 10E3/UL
NRBC # BLD AUTO: 0.8 10E3/UL
NRBC # BLD AUTO: 1 10E3/UL
NRBC BLD AUTO-RTO: 1 /100
NRBC BLD AUTO-RTO: 2 /100
NRBC BLD MANUAL-RTO: 2 %
NRBC BLD MANUAL-RTO: 3 %
NRBC BLD MANUAL-RTO: 4 %
NRBC BLD MANUAL-RTO: 4 %
O2/TOTAL GAS SETTING VFR VENT: 100 %
O2/TOTAL GAS SETTING VFR VENT: 100 %
O2/TOTAL GAS SETTING VFR VENT: 60 %
O2/TOTAL GAS SETTING VFR VENT: 70 %
O2/TOTAL GAS SETTING VFR VENT: 70 %
OSMOLALITY SERPL: 332 MMOL/KG (ref 275–295)
OSMOLALITY SERPL: 333 MMOL/KG (ref 275–295)
OSMOLALITY SERPL: 334 MMOL/KG (ref 275–295)
OSMOLALITY SERPL: 335 MMOL/KG (ref 275–295)
OXYHGB MFR BLDA: 98 % (ref 75–100)
OXYHGB MFR BLDA: 98 % (ref 75–100)
OXYHGB MFR BLDV: 43 % (ref 70–75)
OXYHGB MFR BLDV: 55 % (ref 70–75)
OXYHGB MFR BLDV: 65 %
OXYHGB MFR BLDV: 66 %
P JIROVECII DNA SPEC QL NAA+PROBE: NOT DETECTED
PCO2 BLD: 37 MM HG (ref 35–45)
PCO2 BLD: 37 MM HG (ref 35–45)
PCO2 BLD: 38 MM HG (ref 35–45)
PCO2 BLD: 38 MM HG (ref 35–45)
PCO2 BLD: 39 MM HG (ref 35–45)
PCO2 BLD: 39 MM HG (ref 35–45)
PCO2 BLD: 40 MM HG (ref 35–45)
PCO2 BLD: 41 MM HG (ref 35–45)
PCO2 BLD: 41 MM HG (ref 35–45)
PCO2 BLD: 42 MM HG (ref 35–45)
PCO2 BLD: 42 MM HG (ref 35–45)
PCO2 BLD: 43 MM HG (ref 35–45)
PCO2 BLDA: 32 MM HG (ref 35–45)
PCO2 BLDA: 35 MM HG (ref 35–45)
PCO2 BLDV: 40 MM HG (ref 40–50)
PCO2 BLDV: 42 MM HG (ref 40–50)
PCO2 BLDV: 45 MM HG (ref 40–50)
PCO2 BLDV: 48 MM HG (ref 40–50)
PEEP: 10 CM H2O
PH BLD: 7.35 [PH] (ref 7.35–7.45)
PH BLD: 7.36 [PH] (ref 7.35–7.45)
PH BLD: 7.37 [PH] (ref 7.35–7.45)
PH BLD: 7.38 [PH] (ref 7.35–7.45)
PH BLD: 7.39 [PH] (ref 7.35–7.45)
PH BLD: 7.4 [PH] (ref 7.35–7.45)
PH BLD: 7.41 [PH] (ref 7.35–7.45)
PH BLD: 7.43 [PH] (ref 7.35–7.45)
PH BLDA: 7.43 [PH] (ref 7.35–7.45)
PH BLDA: 7.45 [PH] (ref 7.35–7.45)
PH BLDV: 7.33 [PH] (ref 7.32–7.43)
PH BLDV: 7.35 [PH] (ref 7.32–7.43)
PH BLDV: 7.38 [PH] (ref 7.32–7.43)
PH BLDV: 7.39 [PH] (ref 7.32–7.43)
PHOSPHATE SERPL-MCNC: 4.1 MG/DL (ref 2.5–4.5)
PHOSPHATE SERPL-MCNC: 4.3 MG/DL (ref 2.5–4.5)
PHOSPHATE SERPL-MCNC: 4.4 MG/DL (ref 2.5–4.5)
PLAT MORPH BLD: ABNORMAL
PLATELET # BLD AUTO: 176 10E3/UL (ref 150–450)
PLATELET # BLD AUTO: 183 10E3/UL (ref 150–450)
PLATELET # BLD AUTO: 186 10E3/UL (ref 150–450)
PLATELET # BLD AUTO: 211 10E3/UL (ref 150–450)
PO2 BLD: 114 MM HG (ref 80–105)
PO2 BLD: 64 MM HG (ref 80–105)
PO2 BLD: 67 MM HG (ref 80–105)
PO2 BLD: 70 MM HG (ref 80–105)
PO2 BLD: 76 MM HG (ref 80–105)
PO2 BLD: 77 MM HG (ref 80–105)
PO2 BLD: 77 MM HG (ref 80–105)
PO2 BLD: 78 MM HG (ref 80–105)
PO2 BLD: 85 MM HG (ref 80–105)
PO2 BLD: 85 MM HG (ref 80–105)
PO2 BLD: 89 MM HG (ref 80–105)
PO2 BLD: 93 MM HG (ref 80–105)
PO2 BLDA: 356 MM HG (ref 80–105)
PO2 BLDA: 413 MM HG (ref 80–105)
PO2 BLDV: 25 MM HG (ref 25–47)
PO2 BLDV: 30 MM HG (ref 25–47)
PO2 BLDV: 34 MM HG (ref 25–47)
PO2 BLDV: 35 MM HG (ref 25–47)
POLYCHROMASIA BLD QL SMEAR: SLIGHT
POLYCHROMASIA BLD QL SMEAR: SLIGHT
POTASSIUM SERPL-SCNC: 4.3 MMOL/L (ref 3.4–5.3)
POTASSIUM SERPL-SCNC: 4.4 MMOL/L (ref 3.4–5.3)
POTASSIUM SERPL-SCNC: 4.5 MMOL/L (ref 3.4–5.3)
POTASSIUM SERPL-SCNC: 4.6 MMOL/L (ref 3.4–5.3)
PROT SERPL-MCNC: 5 G/DL (ref 6.4–8.3)
RBC # BLD AUTO: 2.44 10E6/UL (ref 3.8–5.2)
RBC # BLD AUTO: 2.46 10E6/UL (ref 3.8–5.2)
RBC # BLD AUTO: 2.48 10E6/UL (ref 3.8–5.2)
RBC # BLD AUTO: 2.67 10E6/UL (ref 3.8–5.2)
RBC MORPH BLD: ABNORMAL
SAO2 % BLDA: 90 % (ref 92–100)
SAO2 % BLDA: 92 % (ref 92–100)
SAO2 % BLDA: 93 % (ref 92–100)
SAO2 % BLDA: 93 % (ref 92–100)
SAO2 % BLDA: 94 % (ref 92–100)
SAO2 % BLDA: 96 % (ref 92–100)
SAO2 % BLDA: >100 % (ref 96–97)
SAO2 % BLDA: >100 % (ref 96–97)
SAO2 % BLDV: 43.9 % (ref 70–75)
SAO2 % BLDV: 56.5 % (ref 70–75)
SAO2 % BLDV: 67 % (ref 70–75)
SAO2 % BLDV: 67.6 % (ref 70–75)
SCANNED LAB RESULT: NORMAL
SODIUM SERPL-SCNC: 150 MMOL/L (ref 135–145)
SODIUM SERPL-SCNC: 150 MMOL/L (ref 135–145)
SODIUM SERPL-SCNC: 151 MMOL/L (ref 135–145)
SODIUM SERPL-SCNC: 153 MMOL/L (ref 135–145)
SODIUM SERPL-SCNC: 153 MMOL/L (ref 135–145)
SODIUM SERPL-SCNC: 155 MMOL/L (ref 135–145)
TEST NAME: NORMAL
UFH PPP CHRO-ACNC: <0.1 IU/ML
WBC # BLD AUTO: 17 10E3/UL (ref 4–11)
WBC # BLD AUTO: 19.2 10E3/UL (ref 4–11)
WBC # BLD AUTO: 20.6 10E3/UL (ref 4–11)
WBC # BLD AUTO: 24.8 10E3/UL (ref 4–11)

## 2024-08-13 PROCEDURE — 250N000009 HC RX 250: Performed by: STUDENT IN AN ORGANIZED HEALTH CARE EDUCATION/TRAINING PROGRAM

## 2024-08-13 PROCEDURE — 250N000011 HC RX IP 250 OP 636: Mod: JZ | Performed by: STUDENT IN AN ORGANIZED HEALTH CARE EDUCATION/TRAINING PROGRAM

## 2024-08-13 PROCEDURE — 85300 ANTITHROMBIN III ACTIVITY: CPT | Performed by: SURGERY

## 2024-08-13 PROCEDURE — 90947 DIALYSIS REPEATED EVAL: CPT

## 2024-08-13 PROCEDURE — 83605 ASSAY OF LACTIC ACID: CPT | Performed by: SURGERY

## 2024-08-13 PROCEDURE — 250N000011 HC RX IP 250 OP 636

## 2024-08-13 PROCEDURE — 85730 THROMBOPLASTIN TIME PARTIAL: CPT | Performed by: SURGERY

## 2024-08-13 PROCEDURE — 99233 SBSQ HOSP IP/OBS HIGH 50: CPT | Mod: FS | Performed by: CLINICAL NURSE SPECIALIST

## 2024-08-13 PROCEDURE — 82805 BLOOD GASES W/O2 SATURATION: CPT | Performed by: SURGERY

## 2024-08-13 PROCEDURE — 83930 ASSAY OF BLOOD OSMOLALITY: CPT | Performed by: PSYCHIATRY & NEUROLOGY

## 2024-08-13 PROCEDURE — 258N000003 HC RX IP 258 OP 636: Performed by: STUDENT IN AN ORGANIZED HEALTH CARE EDUCATION/TRAINING PROGRAM

## 2024-08-13 PROCEDURE — 33948 ECMO/ECLS DAILY MGMT-VENOUS: CPT | Performed by: SURGERY

## 2024-08-13 PROCEDURE — 250N000013 HC RX MED GY IP 250 OP 250 PS 637: Performed by: PHYSICIAN ASSISTANT

## 2024-08-13 PROCEDURE — 82248 BILIRUBIN DIRECT: CPT | Performed by: CLINICAL NURSE SPECIALIST

## 2024-08-13 PROCEDURE — 258N000003 HC RX IP 258 OP 636: Performed by: SURGERY

## 2024-08-13 PROCEDURE — 83051 HEMOGLOBIN PLASMA: CPT | Performed by: SURGERY

## 2024-08-13 PROCEDURE — 250N000009 HC RX 250: Performed by: SURGERY

## 2024-08-13 PROCEDURE — 250N000011 HC RX IP 250 OP 636: Performed by: STUDENT IN AN ORGANIZED HEALTH CARE EDUCATION/TRAINING PROGRAM

## 2024-08-13 PROCEDURE — 87491 CHLMYD TRACH DNA AMP PROBE: CPT

## 2024-08-13 PROCEDURE — 84295 ASSAY OF SERUM SODIUM: CPT | Performed by: STUDENT IN AN ORGANIZED HEALTH CARE EDUCATION/TRAINING PROGRAM

## 2024-08-13 PROCEDURE — 99233 SBSQ HOSP IP/OBS HIGH 50: CPT | Performed by: PHYSICIAN ASSISTANT

## 2024-08-13 PROCEDURE — 250N000009 HC RX 250

## 2024-08-13 PROCEDURE — 85007 BL SMEAR W/DIFF WBC COUNT: CPT | Performed by: SURGERY

## 2024-08-13 PROCEDURE — 82805 BLOOD GASES W/O2 SATURATION: CPT

## 2024-08-13 PROCEDURE — 250N000013 HC RX MED GY IP 250 OP 250 PS 637: Performed by: SURGERY

## 2024-08-13 PROCEDURE — 83735 ASSAY OF MAGNESIUM: CPT | Performed by: CLINICAL NURSE SPECIALIST

## 2024-08-13 PROCEDURE — 71045 X-RAY EXAM CHEST 1 VIEW: CPT | Mod: 26 | Performed by: STUDENT IN AN ORGANIZED HEALTH CARE EDUCATION/TRAINING PROGRAM

## 2024-08-13 PROCEDURE — 0152U NFCT DS DNA UNTRGT NGNRJ SEQ: CPT | Performed by: PHYSICIAN ASSISTANT

## 2024-08-13 PROCEDURE — 250N000013 HC RX MED GY IP 250 OP 250 PS 637

## 2024-08-13 PROCEDURE — 80069 RENAL FUNCTION PANEL: CPT | Performed by: CLINICAL NURSE SPECIALIST

## 2024-08-13 PROCEDURE — 85379 FIBRIN DEGRADATION QUANT: CPT | Performed by: SURGERY

## 2024-08-13 PROCEDURE — 71045 X-RAY EXAM CHEST 1 VIEW: CPT

## 2024-08-13 PROCEDURE — 85520 HEPARIN ASSAY: CPT | Performed by: SURGERY

## 2024-08-13 PROCEDURE — 87493 C DIFF AMPLIFIED PROBE: CPT | Performed by: STUDENT IN AN ORGANIZED HEALTH CARE EDUCATION/TRAINING PROGRAM

## 2024-08-13 PROCEDURE — 85027 COMPLETE CBC AUTOMATED: CPT | Performed by: SURGERY

## 2024-08-13 PROCEDURE — 250N000011 HC RX IP 250 OP 636: Performed by: SURGERY

## 2024-08-13 PROCEDURE — 82435 ASSAY OF BLOOD CHLORIDE: CPT | Performed by: SURGERY

## 2024-08-13 PROCEDURE — 272N000272 HC CONTINUOUS NEBULIZER MICRO PUMP

## 2024-08-13 PROCEDURE — 99291 CRITICAL CARE FIRST HOUR: CPT | Mod: 25 | Performed by: STUDENT IN AN ORGANIZED HEALTH CARE EDUCATION/TRAINING PROGRAM

## 2024-08-13 PROCEDURE — 85384 FIBRINOGEN ACTIVITY: CPT | Performed by: SURGERY

## 2024-08-13 PROCEDURE — 82330 ASSAY OF CALCIUM: CPT | Performed by: SURGERY

## 2024-08-13 PROCEDURE — 94003 VENT MGMT INPAT SUBQ DAY: CPT

## 2024-08-13 PROCEDURE — 82330 ASSAY OF CALCIUM: CPT | Performed by: CLINICAL NURSE SPECIALIST

## 2024-08-13 PROCEDURE — 250N000009 HC RX 250: Performed by: CLINICAL NURSE SPECIALIST

## 2024-08-13 PROCEDURE — 82805 BLOOD GASES W/O2 SATURATION: CPT | Performed by: STUDENT IN AN ORGANIZED HEALTH CARE EDUCATION/TRAINING PROGRAM

## 2024-08-13 PROCEDURE — 99233 SBSQ HOSP IP/OBS HIGH 50: CPT

## 2024-08-13 PROCEDURE — 33948 ECMO/ECLS DAILY MGMT-VENOUS: CPT

## 2024-08-13 PROCEDURE — 200N000002 HC R&B ICU UMMC

## 2024-08-13 PROCEDURE — 94645 CONT INHLJ TX EACH ADDL HOUR: CPT

## 2024-08-13 PROCEDURE — 84295 ASSAY OF SERUM SODIUM: CPT | Performed by: CLINICAL NURSE SPECIALIST

## 2024-08-13 PROCEDURE — 999N000157 HC STATISTIC RCP TIME EA 10 MIN

## 2024-08-13 RX ORDER — HYDRALAZINE HYDROCHLORIDE 20 MG/ML
10 INJECTION INTRAMUSCULAR; INTRAVENOUS EVERY 4 HOURS PRN
Status: DISCONTINUED | OUTPATIENT
Start: 2024-08-13 | End: 2024-08-20

## 2024-08-13 RX ORDER — IPRATROPIUM BROMIDE AND ALBUTEROL SULFATE 2.5; .5 MG/3ML; MG/3ML
3 SOLUTION RESPIRATORY (INHALATION)
Status: DISCONTINUED | OUTPATIENT
Start: 2024-08-13 | End: 2024-08-17

## 2024-08-13 RX ORDER — LABETALOL HYDROCHLORIDE 5 MG/ML
10 INJECTION, SOLUTION INTRAVENOUS
Status: DISCONTINUED | OUTPATIENT
Start: 2024-08-13 | End: 2024-08-13

## 2024-08-13 RX ORDER — LABETALOL HYDROCHLORIDE 5 MG/ML
10-20 INJECTION, SOLUTION INTRAVENOUS
Status: DISCONTINUED | OUTPATIENT
Start: 2024-08-13 | End: 2024-08-19

## 2024-08-13 RX ORDER — ACETYLCYSTEINE 100 MG/ML
4 SOLUTION ORAL; RESPIRATORY (INHALATION) EVERY 4 HOURS
Status: DISCONTINUED | OUTPATIENT
Start: 2024-08-13 | End: 2024-08-17

## 2024-08-13 RX ADMIN — Medication 60 ML: at 19:44

## 2024-08-13 RX ADMIN — CALCIUM CHLORIDE, MAGNESIUM CHLORIDE, DEXTROSE MONOHYDRATE, LACTIC ACID, SODIUM CHLORIDE, SODIUM BICARBONATE AND POTASSIUM CHLORIDE 12.5 ML/KG/HR: 5.15; 2.03; 22; 5.4; 6.46; 3.09; .157 INJECTION INTRAVENOUS at 22:47

## 2024-08-13 RX ADMIN — CALCIUM CHLORIDE, MAGNESIUM CHLORIDE, SODIUM CHLORIDE, SODIUM BICARBONATE, POTASSIUM CHLORIDE AND SODIUM PHOSPHATE DIBASIC DIHYDRATE 12.5 ML/KG/HR: 3.68; 3.05; 6.34; 3.09; .314; .187 INJECTION INTRAVENOUS at 04:16

## 2024-08-13 RX ADMIN — IPRATROPIUM BROMIDE AND ALBUTEROL SULFATE 3 ML: .5; 3 SOLUTION RESPIRATORY (INHALATION) at 16:13

## 2024-08-13 RX ADMIN — LABETALOL HYDROCHLORIDE 20 MG: 5 INJECTION, SOLUTION INTRAVENOUS at 17:19

## 2024-08-13 RX ADMIN — IPRATROPIUM BROMIDE AND ALBUTEROL SULFATE 3 ML: .5; 3 SOLUTION RESPIRATORY (INHALATION) at 13:19

## 2024-08-13 RX ADMIN — SODIUM CHLORIDE 250 ML: 9 INJECTION, SOLUTION INTRAVENOUS at 11:38

## 2024-08-13 RX ADMIN — HYDRALAZINE HYDROCHLORIDE 10 MG: 20 INJECTION INTRAMUSCULAR; INTRAVENOUS at 18:50

## 2024-08-13 RX ADMIN — CISATRACURIUM BESYLATE 10 MCG/KG/MIN: 10 INJECTION, SOLUTION INTRAVENOUS at 14:05

## 2024-08-13 RX ADMIN — METRONIDAZOLE 500 MG: 5 INJECTION, SOLUTION INTRAVENOUS at 02:03

## 2024-08-13 RX ADMIN — MIDAZOLAM HYDROCHLORIDE 14 MG/HR: 1 INJECTION, SOLUTION INTRAVENOUS at 18:50

## 2024-08-13 RX ADMIN — CHLORHEXIDINE GLUCONATE 0.12% ORAL RINSE 15 ML: 1.2 LIQUID ORAL at 19:43

## 2024-08-13 RX ADMIN — DEXTROSE MONOHYDRATE 20 ML: 50 INJECTION, SOLUTION INTRAVENOUS at 12:17

## 2024-08-13 RX ADMIN — DIPHENHYDRAMINE HYDROCHLORIDE 25 MG: 50 INJECTION, SOLUTION INTRAMUSCULAR; INTRAVENOUS at 11:31

## 2024-08-13 RX ADMIN — CISATRACURIUM BESYLATE 9 MCG/KG/MIN: 10 INJECTION, SOLUTION INTRAVENOUS at 06:32

## 2024-08-13 RX ADMIN — LABETALOL HYDROCHLORIDE 20 MG: 5 INJECTION, SOLUTION INTRAVENOUS at 19:43

## 2024-08-13 RX ADMIN — Medication 1 TABLET: at 07:47

## 2024-08-13 RX ADMIN — LABETALOL HYDROCHLORIDE 10 MG: 5 INJECTION, SOLUTION INTRAVENOUS at 15:13

## 2024-08-13 RX ADMIN — CHLORHEXIDINE GLUCONATE 0.12% ORAL RINSE 15 ML: 1.2 LIQUID ORAL at 07:47

## 2024-08-13 RX ADMIN — ACETAMINOPHEN 650 MG: 325 TABLET, FILM COATED ORAL at 11:30

## 2024-08-13 RX ADMIN — Medication 40 MG: at 07:48

## 2024-08-13 RX ADMIN — Medication 60 ML: at 07:49

## 2024-08-13 RX ADMIN — WHITE PETROLATUM 57.7 %-MINERAL OIL 31.9 % EYE OINTMENT: at 07:48

## 2024-08-13 RX ADMIN — ACETYLCYSTEINE 4 ML: 100 SOLUTION ORAL; RESPIRATORY (INHALATION) at 16:13

## 2024-08-13 RX ADMIN — CEFEPIME HYDROCHLORIDE 2 G: 2 INJECTION, POWDER, FOR SOLUTION INTRAVENOUS at 11:31

## 2024-08-13 RX ADMIN — ACETYLCYSTEINE 4 ML: 100 SOLUTION ORAL; RESPIRATORY (INHALATION) at 20:51

## 2024-08-13 RX ADMIN — WHITE PETROLATUM 57.7 %-MINERAL OIL 31.9 % EYE OINTMENT: at 00:45

## 2024-08-13 RX ADMIN — EPOPROSTENOL 20 NG/KG/MIN: 1.5 INJECTION, POWDER, LYOPHILIZED, FOR SOLUTION INTRAVENOUS at 10:39

## 2024-08-13 RX ADMIN — BIVALIRUDIN 0.06 MG/KG/HR: 250 INJECTION, POWDER, LYOPHILIZED, FOR SOLUTION INTRAVENOUS at 07:42

## 2024-08-13 RX ADMIN — METRONIDAZOLE 500 MG: 5 INJECTION, SOLUTION INTRAVENOUS at 10:04

## 2024-08-13 RX ADMIN — Medication 1.5 MG/HR: at 17:43

## 2024-08-13 RX ADMIN — METRONIDAZOLE 500 MG: 5 INJECTION, SOLUTION INTRAVENOUS at 17:20

## 2024-08-13 RX ADMIN — CEFEPIME HYDROCHLORIDE 2 G: 2 INJECTION, POWDER, FOR SOLUTION INTRAVENOUS at 23:42

## 2024-08-13 RX ADMIN — CISATRACURIUM BESYLATE 10 MCG/KG/MIN: 10 INJECTION, SOLUTION INTRAVENOUS at 21:17

## 2024-08-13 RX ADMIN — ACETYLCYSTEINE 4 ML: 100 SOLUTION ORAL; RESPIRATORY (INHALATION) at 13:19

## 2024-08-13 RX ADMIN — DEXAMETHASONE SODIUM PHOSPHATE 20 MG: 10 INJECTION, SOLUTION INTRAMUSCULAR; INTRAVENOUS at 07:48

## 2024-08-13 RX ADMIN — CEFEPIME HYDROCHLORIDE 2 G: 2 INJECTION, POWDER, FOR SOLUTION INTRAVENOUS at 00:42

## 2024-08-13 RX ADMIN — Medication 60 ML: at 14:05

## 2024-08-13 RX ADMIN — CALCIUM CHLORIDE, MAGNESIUM CHLORIDE, SODIUM CHLORIDE, SODIUM BICARBONATE, POTASSIUM CHLORIDE AND SODIUM PHOSPHATE DIBASIC DIHYDRATE 12.5 ML/KG/HR: 3.68; 3.05; 6.34; 3.09; .314; .187 INJECTION INTRAVENOUS at 08:55

## 2024-08-13 RX ADMIN — CALCIUM CHLORIDE, MAGNESIUM CHLORIDE, DEXTROSE MONOHYDRATE, LACTIC ACID, SODIUM CHLORIDE, SODIUM BICARBONATE AND POTASSIUM CHLORIDE 12.5 ML/KG/HR: 5.15; 2.03; 22; 5.4; 6.46; 3.09; .157 INJECTION INTRAVENOUS at 18:07

## 2024-08-13 RX ADMIN — AMPHOTERICIN B 88.9 MG: 50 INJECTION, POWDER, LYOPHILIZED, FOR SOLUTION INTRAVENOUS at 12:26

## 2024-08-13 RX ADMIN — SODIUM CHLORIDE: 4 INJECTION, SOLUTION, CONCENTRATE INTRAVENOUS at 03:21

## 2024-08-13 RX ADMIN — MIDAZOLAM HYDROCHLORIDE 14 MG/HR: 1 INJECTION, SOLUTION INTRAVENOUS at 04:31

## 2024-08-13 RX ADMIN — LEVOTHYROXINE SODIUM 25 MCG: 0.03 TABLET ORAL at 07:47

## 2024-08-13 RX ADMIN — IPRATROPIUM BROMIDE AND ALBUTEROL SULFATE 3 ML: .5; 3 SOLUTION RESPIRATORY (INHALATION) at 20:51

## 2024-08-13 RX ADMIN — CALCIUM CHLORIDE, MAGNESIUM CHLORIDE, SODIUM CHLORIDE, SODIUM BICARBONATE, POTASSIUM CHLORIDE AND SODIUM PHOSPHATE DIBASIC DIHYDRATE 12.5 ML/KG/HR: 3.68; 3.05; 6.34; 3.09; .314; .187 INJECTION INTRAVENOUS at 22:47

## 2024-08-13 RX ADMIN — CALCIUM CHLORIDE, MAGNESIUM CHLORIDE, SODIUM CHLORIDE, SODIUM BICARBONATE, POTASSIUM CHLORIDE AND SODIUM PHOSPHATE DIBASIC DIHYDRATE 12.5 ML/KG/HR: 3.68; 3.05; 6.34; 3.09; .314; .187 INJECTION INTRAVENOUS at 18:07

## 2024-08-13 RX ADMIN — WHITE PETROLATUM 57.7 %-MINERAL OIL 31.9 % EYE OINTMENT: at 16:04

## 2024-08-13 RX ADMIN — MIDAZOLAM HYDROCHLORIDE 14 MG/HR: 1 INJECTION, SOLUTION INTRAVENOUS at 11:47

## 2024-08-13 RX ADMIN — CALCIUM CHLORIDE, MAGNESIUM CHLORIDE, SODIUM CHLORIDE, SODIUM BICARBONATE, POTASSIUM CHLORIDE AND SODIUM PHOSPHATE DIBASIC DIHYDRATE 12.5 ML/KG/HR: 3.68; 3.05; 6.34; 3.09; .314; .187 INJECTION INTRAVENOUS at 13:30

## 2024-08-13 RX ADMIN — CALCIUM CHLORIDE, MAGNESIUM CHLORIDE, DEXTROSE MONOHYDRATE, LACTIC ACID, SODIUM CHLORIDE, SODIUM BICARBONATE AND POTASSIUM CHLORIDE 12.5 ML/KG/HR: 5.15; 2.03; 22; 5.4; 6.46; 3.09; .157 INJECTION INTRAVENOUS at 04:17

## 2024-08-13 RX ADMIN — CALCIUM CHLORIDE, MAGNESIUM CHLORIDE, DEXTROSE MONOHYDRATE, LACTIC ACID, SODIUM CHLORIDE, SODIUM BICARBONATE AND POTASSIUM CHLORIDE 12.5 ML/KG/HR: 5.15; 2.03; 22; 5.4; 6.46; 3.09; .157 INJECTION INTRAVENOUS at 13:31

## 2024-08-13 RX ADMIN — LABETALOL HYDROCHLORIDE 10 MG: 5 INJECTION, SOLUTION INTRAVENOUS at 08:26

## 2024-08-13 RX ADMIN — CALCIUM CHLORIDE, MAGNESIUM CHLORIDE, DEXTROSE MONOHYDRATE, LACTIC ACID, SODIUM CHLORIDE, SODIUM BICARBONATE AND POTASSIUM CHLORIDE 12.5 ML/KG/HR: 5.15; 2.03; 22; 5.4; 6.46; 3.09; .157 INJECTION INTRAVENOUS at 08:52

## 2024-08-13 RX ADMIN — Medication 1.5 MG/HR: at 04:37

## 2024-08-13 ASSESSMENT — ACTIVITIES OF DAILY LIVING (ADL)
ADLS_ACUITY_SCORE: 55

## 2024-08-13 NOTE — PROGRESS NOTES
[facility] General ID Service: Follow Up Note      Patient:  Massiel Flaherty   Date of birth 1970, Medical record number [id number]  Date of Visit:  08/13/2024  Date of Admission: 8/8/2024         Assessment and Recommendations:   ID Problem List:  Acute hypoxic and hypercapnic respiratory failure c/b ARDS and on VV-ECMO 8/8/24  Etiology unknown, possible CAP vs other infectious (fungal) vs noninfectious  Fever  Dry cough x1 month prior to admission  CHACORTA requiring CRRT  Cerebral edema; intentional hypernatremia  Right adnexal mass  Elevated AST, alk phos  Anemia  Recent Augmentin, Z-pack and steroid tapers  Antibiotic allergy - Sulfa (hives), tetracycline (hives)    Recommendations:  Continue cefepime  Continue Flagyl  Stop amphotericin B- fungal antigens negative  Diagnostics as below  Sending: Demond  Consider MRI brain when able    Discussion:  Massiel Flaherty is a 53 year old female with past medical history significant for asthma, MURIEL on CPAP, HTN, anxiety,depression, expressive aphasia, fibromyalgia, and hypothyroidism who presented to OSH 8/3/24 with fever, fatigue, dyspnea with c/f CAP and requiring intubation, proning, paralysis and transferred to [facility] 8/8/24 for further cares and now placed on VV ECMO and CHACORTA requiring CRRT. ID consulted for pneumonia.      Per chart review, had sought care multiple times since early July for an acute on chronic cough. No improvement with multiple courses of steroid tapers, Z-pack, and Augmentin as outpatient. Admitted 8/3/24 with fever and hypoxia. She has completed at least 3 days of azithromycin at OSH since first admitted 8/3 and has been on cefepime since 8/3 with clinical worsening. No infectious etiology has been identified to date. Negative ID work up outlined below. Negative EVELIO, ANCA, MPO, proteinase 3 at OSH. CXR with ongoing diffuse bilateral pulmonary opacities consistent with ARDS and now paralyzed, on VV-ECMO and CRRT.      Started on  Amphotericin B due to high level of concern for endemic fungal pneumonia given multiple presentations and lack of improvement on abx, with steroids prescribed. Will discontinue amphotericin if fungal antigens negative, given unrevealing bronch. Continues on cefepime for more possible bacterial pneumonia. Respiratory cultures and testing is all negative to date. Has completed 3 days of Azithromycin without improvement. Sending Karius on 8/13 given ongoing critical illness and negative work up thus far.       8/6/24 Legionella urine ag Neg    MRSA nares Neg    Bcx x2  No growth    PJP PCR Neg    Respiratory bacterial cx No growth   8/7/24 BD glucan Neg    HIV Ag Ab Neg    Aspergillus antigen blood Neg   8/8/24 IgG 302 (low)    Respiratory aerobic cx C.albicans    MRSA nares; SA target DNA Neg/Neg    BCx NGTD   8/9 Blastomyces Ag EIA blood Neg    BD glucan Neg (44)    Aspergillus galactomannan Neg    IgG 258    HIV Ag Ab combo Neg    Cryptococcal Ag blood Neg    Coccidioides Ag EIA blood Neg    Histoplasma capsulatum Ag blood Neg    Hep B core Ab IgM Neg    Hep A Ab IgM Neg    Hep B surface Ab Pos    Hep C Ab  Neg    Hep B surface Ag Neg    Respiratory aerobic cx BAL No growth    AFB stain and Cx BAL  Stain neg, NGTD    Fungal cx BAL  Yeast    KOH prep BAL Neg    CMV PCR BAL PENDING    Nocardia cx BAL NGTD    Legionella cx BAL NGTD    HSV PCR BAL Neg    PJP PCR BAL Neg    Aspergillus galactomannan BAL Neg    EBV PCR BAL PENDING    Adenovirus PCR BAL Neg    Respiratory aerobic cx BAL No growth    AFB culture and stain BAL Stain neg, NGTD    Fungal Cx BAL NGTD    KOH prep BAL Neg    Legionella species cx BAL NGTD    Nocardia species cx BAL NGTD    Legionella PCR BAL Neg    HSV PCR BAL Neg   8/10/24 Respiratory panel PCR neg    COVID-19 Neg   8/11/24 Histoplasma capsulatum BAL PENDING    Histoplasma capsulatum blood PENDING   8/12/24 Tick-borne disease neg    Leptospira IgM PENDING    Hantavirus antibodies PENDING     GC/Chlamydia swab Neg   8/13/24 C.diff Neg       Recs were discussed with primary team today. Don't hesitate to call with questions.     Attestation:  I have reviewed today's vital signs, medications, labs and imaging.    Marce Black PA-C  Pronouns: she/her/hers  Infectious Diseases  Contact via [employer] or "[employer] Paging/Directory        50 MINUTES SPENT BY ME on the date of service doing chart review, history, exam, documentation & further activities per the note.             Interval History:      Afebile, off of pressors. Remains on CRRT. Remains on VV ECMO with high sweep gas needs 7LPM at 100% FiO2. WBC sharply increased on PM of 8/12. C.diff testing negative. Fungal antigens negative.          Current Antimicrobials   Current:  - amphotericin B 8/9-prsent  - cefepime *-present  - flagyl 8/11-present    Prior:  - azithromycin 8/9-/811         History of Present Illness:    Per ID consult note 8/9/24:  Massiel Flaherty is a 53 year old female with past medical history significant for asthma, MURIEL on CPAP, HTN, anxiety,depression, expressive aphasia, fibromyalgia, obesity, and hypothyroidism who was transferred to [facility] 8/8/24 for further cares and now placed on VV ECMO and CRRT. ID consulted for pneumonia.      She was transferred to [facility] from OSH. Presented to hospital in [place], [place] on 8/3 with fever, fatigue, dyspnea with concern for CAP. Xray with multifocal bilateral opacities. CT chest with severe multifocal bilateral opacities, negative for PE in OSH notes, though unable to see direct report. Negative COVID, flu, and viral testing. O2 sats reportedly 60% on arrival.      She was started on azithromycin, cefepime, vancomycin, and solumedrol x1. Had reportedly been on a steroid taper recently, so had started on atovaquone ppx on 8/7 at OSH. Prior to admission, was seen in clinic and given PO Augmentin on 7/30 for acute on chronic cough x2 weeks. Was also seen at OSH ED 7/19 for asthma exacerbation, got  "Z-pack and prednisone taper. Was seen prior on 7/8 with cough and got additional prednisone, azithromycin. Has also had some concern for recent \"memory issues and brain fog\" and has \"zoning out episodes\". Head imaging normal, seen by neurology who felt this was more psych issue per OSH records. She has history of seizure workup and is not on any AEDs. She is on rifaximin for unknown reason. Was febrile at OSH Tmax 101.3F and required pressors. Infectious workup at OSH with negative - RSV, COVID, flu, RVP, Fungitell, Legionella urine Ag, Strep pneumococcal urine Ag, sputum PJP PCR, HIV, Aspergillus antigen, and EVELIO. MRSA nares negative. Sputum Cx 8/6 - NGTD at 48 hrs. Bcx 8/6 at OSH NGTD. Procal 4.16.      On 8/6, was transferred to [facility] and intubated for severe ARDS requiring paralysis, proning. Remained on cefepime. Transferred to [facility] on 8/8 given continued decline, started V-V ECMO and CRRT. Son, brother, and niece/nephew at bedside on 8/9 morning. Son provides additional history primarily. Reports onset of dry nonproductive cough ~2 weeks ago and followed by zoning out spells. She had no other URI symptoms - sore throat, sinus congestion, no headache, chest pain, nausea, vomiting, abdominal pain, diarrhea that he was aware. Patient had been living with her mother due to difficulties caring for herself. Has had multiple falls in the last month. Recently fell out of a recliner - has bruising to left foot from this fall. Unsure if she hit her head. Son denies any tobacco, alcohol, or illicit drug use by patient. No recent travel. Son was sick with viral illness ~1 months ago, no other recent sick contacts. She is around her sister's dog - who is well, no illness. No livestock or other animal exposures. Says she does have a hx of colon cancer - likely polyp that was resected. Family denies chemo/radiation/surgery. 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        Physical Exam:   Ranges for vital signs:  Temp:  [96.8  F (36  C)-99.1  F (37.3  C)] 98.4  F (36.9  C)  Pulse:  [] 96  Resp:  [10] 10  BP: (142)/(84) 142/84  MAP:  [48 mmHg-268 mmHg] 94 mmHg  Arterial Line BP: ()/(44-88) 145/65  FiO2 (%):  [60 %-70 %] 60 %  SpO2:  [88 %-100 %] 97 %    Intake/Output Summary (Last 24 hours) at 8/12/2024 1456  Last data filed at 8/12/2024 1400  Gross per 24 hour   Intake 4354.59 ml   Output 6225.9 ml   Net -1871.31 ml     Exam:  GENERAL:  sedated, paralyzed, and intubated in ICU. Supine in bed.   ENT:  Head is normocephalic, atraumatic. Oropharynx is moist, ETT in place.  EYES:  Eyes have anicteric sclerae, noninjected conjunctivae without discharge or crusting on lashes.    LUNGS:  Minimal air movement on auscultation. +mechanical ventilation.   CARDIOVASCULAR:  RRR, +S1/S2, no murmur appreciated  ABDOMEN:  soft, non-distended. +hypoactive bowel sounds.  EXT: Extremities warm, trace to 1+ BLE edema.  SKIN:  No acute rashes, jaundice, or diaphoresis.  Left internal jugular line and PIV x3, L radial art line, +ECMO cannula R groin in place and all without any surrounding erythema.  NEUROLOGIC:  sedated and paralyzed         Laboratory Data:   Reviewed.  Pertinent for:    Culture data:  Culture   Date Value Ref Range Status   08/09/2024 No Growth  Final   08/09/2024 Yeast (A)  Preliminary   08/09/2024 No growth after 3 days  Preliminary   08/09/2024 Culture negative, monitoring continues  Preliminary   08/09/2024 No Growth  Final   08/09/2024 No growth after 3 days  Preliminary   08/09/2024 Culture negative, monitoring continues  Preliminary   08/09/2024 No growth after 3 days  Preliminary   08/08/2024 No growth after 4 days  Preliminary   08/08/2024 1+ Candida albicans (A)  Final     Comment:     Susceptibilities not routinely done, refer to antibiogram to view typical susceptibility profiles     GS Culture   Date Value Ref Range  Status   08/09/2024 See corresponding culture for results  Final   08/09/2024 See corresponding culture for results  Final       Inflammatory Markers  No lab results found.    Hematology Studies    Recent Labs   Lab Test 08/13/24 0405 08/12/24 2211 08/12/24  1539 08/12/24  0945   WBC 20.6* 23.3* 12.9* 14.7*   HGB 7.8* 8.2* 7.6* 8.2*   MCV 96 97 97 99    196 132* 188     Recent Labs   Lab Test 08/13/24 0405 08/12/24 2211 08/12/24  1539 08/12/24  0945   ANEU 16.9* 20.3* 11.5* 11.6*   AEOS 0.0 0.0 0.1 0.6       Metabolic Studies     Recent Labs   Lab Test 08/13/24  0756 08/13/24 0405 08/13/24  0024 08/12/24 2026 08/12/24  1539 08/12/24  1151 08/12/24  0945   * 153* 155* 154* 154*   < > 152*   POTASSIUM  --  4.5  --  5.3 5.4*  --  4.6   CHLORIDE  --  121*  --  123* 124*  --  120*   CO2  --  22  --  21* 21*  --  22   BUN  --  40.1*  --  39.7* 35.5*  --  29.9*   CR  --  1.46*  --  1.55* 1.46*  --  1.33*   GFRESTIMATED  --  43*  --  40* 43*  --  48*    < > = values in this interval not displayed.       Hepatic Studies    Recent Labs   Lab Test 08/13/24 0405 08/12/24 2026 08/12/24  1539 08/12/24  0945 08/12/24  0401 08/11/24  1021 08/11/24  0405   BILITOTAL 0.4  --   --   --  0.4  --  0.6   ALKPHOS 237*  --   --   --  281*  --  318*   ALBUMIN 2.5* 2.6* 2.5*   < > 2.4*   < > 2.3*   AST 59*  --   --   --  48*  --  47*   ALT 21  --   --   --  21  --  21    < > = values in this interval not displayed.            Imaging:     CXR 8/13/24  IMPRESSION:   1. Endotracheal tube tip is obscured and likely projects over the  upper/mid thoracic trachea. Otherwise Stable support devices.  2. Unchanged dense consolidative opacities bilaterally.    CXR 8/12/24  IMPRESSION:   1. Stable support devices.  2. Dense consolidative opacities bilaterally with minimally improved  areas of parenchymal aeration.    US pelvic limited 8/10/24  IMPRESSION: Post hysterectomy. No overt adnexal abnormality identified on  transabdominal  imaging.    CT head 8/10/24  IMPRESSION:   Slightly improved differentiation of the gray-white matter and  sulcation suggesting that the prior study may have been impacted by  artifact.     CT Chest/abd/pelvis 8/9/24  IMPRESSION:   1. Diffuse patchy groundglass and consolidative densities throughout  the lungs with small bilateral pleural effusions. Findings may  represent severe pulmonary edema, and/or  infectious /inflammatory  pathology, or ARDS.   2. Multiple prominent and a few enlarged mediastinal lymph nodes,  possibly reactive. Recommend attention on follow-up.  3. Support devices as detailed above.  4. Mild hepatomegaly.  5. Trace pelvic ascites, nonspecific bilateral perinephric  streakiness.  6. Asymmetric heterogeneous bulky right adnexa, consider nonemergent  pelvic ultrasound for further evaluation.  7. Additional incidental/chronic findings as detailed above      ECHO  ECHO Congenital Transthoracic (TTE)  Result Date: 8/8/2024    Limited echocardiogram performed for assessment of LV systolic function.   Left Ventricle: The left ventricle appears normal in size. The ejection fraction, measured by biplane, is 71%. There are no wall motion abnormalities.   Limited assessment of RV.  Normal appearing right ventricular chamber size and systolic function.   Normal IVC size with minimal respirophasic changes.   No prior study for comparison. Left Ventricle The left ventricle appears normal in size. Wall thickness is normal. The ejection fraction, measured by biplane, is 71%. There are no wall motion abnormalities. IVC/SVC Normal IVC size with minimal respirophasic changes. Pericardium There is no pericardial effusion. Study Details A limited echo was performed with limited 2D. The sonographer requested the use of Definity- imaging enhancing agent per protocol. The patient denies contraindications and gives verbal consent for imaging enhancing agent injection.

## 2024-08-13 NOTE — PROGRESS NOTES
SURGICAL ICU PROGRESS NOTE  08/13/2024        Date of Service (when I saw the patient): 08/13/2024    ASSESSMENT:  Massiel Flaherty is a 53 year old female who was admitted to [facility].   Past medical hx of Anxiety/depression, fibromyalgia, hypothyroidism, asthma, HLD, MURIEL on CPAP, spells, off on anti seizure medications, expressive aphasia, hypertension was seen at [facility] and was placed on Augmentin outpatient on 7/30/24.   She admitted to the hospital on 8/3/24 for fatigue, fever and dyspnea and intubated 8/6/24 at OSH, proned and paralyzed without improvement. Transferred to [facility] 8/8/24, hypoxic with high plateaus/peaks and placed on VV EMCO and CRRT.     OVERNIGHT EVENTS:   - WBC increasing, afebrile   - Sodium stable   - ABGs relatively stable   - Notable recirculation of 60%   - Sweep remaining 7     CHANGES and MAJOR THINGS TODAY:   - Ongoing hypertension, add PRN labetalol   - Recirculation 42% this AM; No further cannula changes indicated   - Discuss stopping ampo with ID (wanted to continue yesterday)   - Ask nephrology to increase pull rate to 100cc/hr   - Send C diff, if C diff negative, add PRN imodium today     PLAN:  Neurological:  # Fibromyalgia   # Hx myalgic encephalomyelitis   # Anxiety   # sedation and analgesia for vent compliance   - Monitor neurological status. Delirium preventions and precautions.   - Pain: Dilaudid gtt         - Sedation plan: propofol gtt and Versed gtt     - Trig continuing to rise, wean off propofol and increase versed gtt if needed   -  Paralysis: Off 8/9/24; Resumed overnight 8/10-11   - will review PTA medications and resume appropriate medications as able.     # Hx spells with staring and BUE posturing previously described as pseudoseizures   # Hx history of GTC seizures and myoclonic epilepsy, weaned off AEDs   # C/f hypoxic ischemic injury   # Diffuse cerebral edema   - Head CT 8/9: Findings concerning for diffuse cerebral edema with  diffuse blurring of  the gray-white differentiation. Findings are  concerning for hypoxicischemic injury.   - 3% NaCl dialysate in coordination with nephrology; trend Sodiums   - Sodium goals: 150-155   - NCC consult   - EEG now off     Pulmonary:   # Asthma  # MURIEL on home CPAP   # Acute hypoxic and hypercapnic respiratory failure   # ARDS  # s/p VV EMCO 8/8  FiO2 (%): 60 %, Resp: 10, Inspiratory Pressure (cm H2O) (Drager Jessie): 25, Vent Mode: PCV Plus assist, Resp Rate (Set): 10 breaths/min, PEEP (cm H2O): 10 cmH2O, Resp Rate (Set): 10 breaths/min, PEEP (cm H2O): 10 cmH2O    - continue with rest vent setting on ECMO. Plan to optimize ECMO   - Chest CT 8/9: Diffuse patchy groundglass and consolidative densities throughout  the lungs with small bilateral pleural effusions. Findings may  represent severe pulmonary edema, and/or  infectious /inflammatory  pathology, or ARDS. Multiple prominent and a few enlarged mediastinal lymph nodes,  possibly reactive.  - 8/11: Restart veletri continuous neb  - 8/11: Increase inspiratory pressure to 35 to attempt achieving slightly higher tidal volumes   - 8/12: Decadron 20mg daily x 5 days, then 10mg x 5 days   - Mucomyst, duonebs q4h     ECMO:  Sweep 7LPM  FiO2 100%   Flow 4.5  Bival gtt with PTT goals 44-88 // ACT goal of 160-180   - Superior cannula retracted 8/10 for ongoing low PaO2   - 8/11: bival infusion replaced for heparin given concern for heparin resistance     Cardiovascular:    # hyperlipidemia    # shock, presumed septic  - MAP >65  - Repeat ECHO 8/12: Left ventricular size, wall motion and function are normal. The ejection fraction is 60-65%. Flattened septum is consistent with right ventricular pressure and volume overload. Right ventricular function, chamber size, wall motion, and thickness are normal.  - OK for PRN labetalol for SBP < 140      Gastroenterology/Nutrition:  # GERD   # NPO status   # constipation; resolved   # diarrhea   - Protonix (home medication)   - RD consult in  am for SBFT and TF recommendations   - CT CAP 8/9: Mild hepatomegaly. Trace pelvic ascites, nonspecific bilateral perinephric  streakiness. Asymmetric heterogeneous bulky right adnexa, consider nonemergent  pelvic ultrasound for further evaluation.   - NJ tube for meds, TF   - Rectal tube: Once rule out C diff will add imodium      Fluids/Electrolytes/Renal:  # Acute kidney injury  # Hyperkalemia, resolved  # Hyperphosphatemia   # Volume status   # Bilateral perinephritic stranding    # Nonspecific  right adnexa collection   - Renal Consulted.   - CRRT started. UF pull -100Ml/hr   - Trending CRRT labs  - Monitor I/Os closely   - Pelvic US without findings of collection 8/10 (transabdominal, not transvaginal)    - Sodium goals as above   - 8/11: Gyn consulted for right adnexal finding on CT. Low suspicion for abscess but would treat as we are currently. Collection too small for surgical intervention and too small for IR drainage as well. Could follow up with pelvic MRI, however we will treat as current with IV antibiotics.       Endocrine:  # Hypothyroidism   # concern for stress hyperglycemia due to critical illness   # hypoglycemia; resolved    - synthroid resumed   - continue with sliding scale insulin due to feed start, reassess needs.   - Goal to keep BG< 180 for optimal wound healing      ID:  # Leukocytosis  # Pneumonia   # ARDS   # Concern for PID   PER OSH: 8/6: RSV, COVID, parainfluenza,  negative . Legionella and pneumococcal urine antigens negative. Patient has been on greater than 20 mg prednisone daily for 18 days.  CULTURES: 8/8/2024: Respiratory viral panel negative. Blood cultures negative, MRSA nasal negative, Legionella urine antigen negative, respiratory culture negative.PCR trachea for PJP is negative.  COVID: Negative. Viral panel-negative PJP  - ID consulted   - Follow up bronch cultures from 8/9    Antimicrobials:   - Azithromycin- stopped 8/11   - Cefepime   - Amphotericin   - Flagyl added  "8/11     [facility] labs:   8/8: sputum growing candida albicans    8/9: Bronch BAL with yeast +   MRSA negative   - Cryptococcal AG, aspergillus negative   Pending: Blastomycosis, histo     Heme:     # Acute blood loss anemia   - transfusion parameters per EMCO protocol    - one unit prbc 8/8/24  - Transfuse if hgb <7.0 or signs/symptoms of hypoperfusion. Monitor and trend.       Musculoskeletal:  # Weakness and deconditioning of critical illness  # Left ankle injury pre-hospitalization    - Physical and occupational therapy when able.   - Ongoing ecchymosis to LLE     Skin:  # Ecchymosis - BLE   # Left toe duskiness   - bilateral lower leg ecchymosis   - WOC consult   - 8/13: Worsening duskiness to left 3rd, 4th toes noted; Off pressors      General Cares/Prophylaxis:    DVT Prophylaxis: Bival gtt per ECMO   GI Prophylaxis: PPI  Restraints: Restraints for medical healing needed: NO     Lines/ tubes/ drains:  - ETT   - Right  ECMO cannula   - Right Femoral cannula   - Left internal jugular   - radial arterial line   - NJ   - PIV\"s      Disposition:  -  Surgical ICU       Time spent on this Encounter   Billing:  I spent 55 minutes bedside and on the inpatient unit today managing the critical care of Massiel Flaherty in relation to the issues listed in this note.      Dong Rico PA-C    ====================================  INTERVAL HISTORY:  Ongoing sodium trends for goal. Continue high sedation and paralysis. Pull on CRRT. Duonebs, mucomyst today. C diff testing.       OBJECTIVE:   1. VITAL SIGNS:   Temp:  [96.8  F (36  C)-99.1  F (37.3  C)] 98.4  F (36.9  C)  Pulse:  [] 90  Resp:  [10] 10  BP: (142)/(84) 142/84  MAP:  [78 mmHg-268 mmHg] 82 mmHg  Arterial Line BP: (127-171)/(53-88) 127/56  FiO2 (%):  [60 %-70 %] 60 %  SpO2:  [88 %-100 %] 97 %  FiO2 (%): 60 %, Resp: 10, Inspiratory Pressure (cm H2O) (Drager Jessie): 25, Vent Mode: PCV Plus assist, Resp Rate (Set): 10 breaths/min, PEEP (cm H2O): 10 cmH2O, " Resp Rate (Set): 10 breaths/min, PEEP (cm H2O): 10 cmH2O      2. INTAKE/ OUTPUT:   I/O last 3 completed shifts:  In: 3889.09 [I.V.:2359.09; NG/GT:440; IV Piggyback:250]  Out: 4453.1 [Other:3728.1; Stool:725]    3. PHYSICAL EXAMINATION:  General: chemically sedated.   HEENT: PERRLA. Pupils 3mm and reactive. ETT present and secured. NJ tube. OG to LIS with thin green drainage.   RIght neck with ECMO cannula, sutured in place.  Left internal jugular CVC in place, secured.   Neuro: PERRL 3mm. NPI 3.9. Patient paralyzed.   Pulm/Resp:  minimal breath sounds, VT's 30-50   CV: RRR, S1/S2   Abdomen: Soft, non-distended, non-tender, no rebound tenderness or guarding, no masses  : (+) rolle catheter in place, dark yellow urine. Minimal   Incisions/Skin: scattered ecchymosis in BLE in toes around ankles bilaterally. Limbs cool.    MSK/Extremities:generalize BLE 1+ edema. Calves compressible, (+) doppler tones  DP/PT on feet.     4. INVESTIGATIONS:   Arterial Blood Gases   Recent Labs   Lab 08/13/24  1011 08/13/24  0756 08/13/24  0609 08/13/24  0406   PH 7.40 7.40 7.43 7.41   PCO2 41 40 37 37   PO2 85 67* 77* 85   HCO3 25 25 24 23     Complete Blood Count   Recent Labs   Lab 08/13/24  1012 08/13/24  0405 08/12/24  2211 08/12/24  1539   WBC 19.2* 20.6* 23.3* 12.9*   HGB 7.7* 7.8* 8.2* 7.6*    183 196 132*     Basic Metabolic Panel  Recent Labs   Lab 08/13/24  1012 08/13/24  0802 08/13/24  0756 08/13/24  0407 08/13/24  0405 08/13/24  0025 08/13/24  0024 08/12/24  2026 08/12/24  1539   *  --  155*  --  153*  --  155* 154* 154*   POTASSIUM 4.3  --   --   --  4.5  --   --  5.3 5.4*   CHLORIDE 122*  --   --   --  121*  --   --  123* 124*   CO2 25  --   --   --  22  --   --  21* 21*   BUN 42.4*  --   --   --  40.1*  --   --  39.7* 35.5*   CR 1.40*  --   --   --  1.46*  --   --  1.55* 1.46*   * 159*  --  150* 179*   < >  --  175*  202* 199*    < > = values in this interval not displayed.     Liver Function  Tests  Recent Labs   Lab 08/13/24  0405 08/12/24  2026 08/12/24  1539 08/12/24  0945 08/12/24  0401 08/11/24  2154 08/11/24  1603 08/11/24  1021 08/11/24  0405 08/10/24  0947 08/10/24  0412   AST 59*  --   --   --  48*  --   --   --  47*  --  69*   ALT 21  --   --   --  21  --   --   --  21  --  25   ALKPHOS 237*  --   --   --  281*  --   --   --  318*  --  376*   BILITOTAL 0.4  --   --   --  0.4  --   --   --  0.6  --  0.7   ALBUMIN 2.5* 2.6* 2.5* 2.5* 2.4* 2.4* 2.3*   < > 2.3*   < > 2.2*   INR  --   --   --  1.37* 1.30* 1.24* 1.15   < > 1.11   < > 1.14    < > = values in this interval not displayed.     Pancreatic Enzymes  No lab results found in last 7 days.  Coagulation Profile  Recent Labs   Lab 08/13/24  1012 08/13/24  0405 08/12/24  2211 08/12/24  1539 08/12/24  1153 08/12/24  0945 08/12/24  0844 08/12/24  0401 08/12/24  0008 08/11/24  2154 08/11/24  1831 08/11/24  1603   INR  --   --   --   --   --  1.37*  --  1.30*  --  1.24*  --  1.15   PTT 47* 50* 50* 52*   < > 44*   < > 42*   < > 36   < > 35    < > = values in this interval not displayed.         5. RADIOLOGY:   Recent Results (from the past 24 hour(s))   XR Chest Port 1 View    Narrative    EXAM: XR CHEST PORT 1 VIEW  8/13/2024 1:59 AM      HISTORY: daily ECMO check    COMPARISON: 8/12/2024.    FINDINGS: Single view of the chest. Endotracheal tube tip is obscured  and likely projects over the upper/mid thoracic trachea. Feeding tube  courses outside the field of view. Right IJ and inferior approach ECMO  cannulae in stable position. Esophageal temperature probe tip projects  on the mid thoracic esophagus. Left IJ CVC with tip projecting over  the mid SVC.    Trachea is midline. Cardiac silhouette is obscured. Diffuse  consolidative opacities bilaterally.      Impression    IMPRESSION:     1. Endotracheal tube tip is obscured and likely projects over the  upper/mid thoracic trachea. Otherwise Stable support devices.  2. Unchanged dense consolidative  opacities bilaterally.    I have personally reviewed the examination and initial interpretation  and I agree with the findings.    OLIVIER SALVADOR MD         SYSTEM ID:  [id number]       =========================================

## 2024-08-13 NOTE — PLAN OF CARE
Patient is sedated on dilaudid, versed and paralyzed with cis, pupillometry q2hour, pupils brisk 2-3, BIS 30-40s; sinus rhythm/ sinus tachycardia, HR 90-110s, MAPs>65, -160s, afebrile; PC settings (60%/RR10/PC 25/peep 10), veletri via vent, ETT with red streaked secretions, ECMO sweep 7, no chugging, bival through ECMO circuit; NJ with TF at 35 mL/hour, rectal tube in place; CRRT 0-50 mL/hour.  Continue with current plan of care and notify MD as needed.    Odalis Gibson RN      Problem: ARDS (Acute Respiratory Distress Syndrome)  Goal: Effective Oxygenation  8/13/2024 0647 by Odalis Gibson RN  Outcome: Not Progressing  8/13/2024 0646 by Odalis Gibson RN  Outcome: Not Progressing  Intervention: Optimize Oxygenation, Ventilation and Perfusion  Recent Flowsheet Documentation  Taken 8/13/2024 0600 by Odalis Gibson RN  Head of Bed (HOB) Positioning: HOB at 20-30 degrees  Taken 8/13/2024 0400 by Odalis Gibson RN  Lung Protection Measures:   lung compliance monitored   ventilator synchrony promoted  Airway/Ventilation Management:   airway patency maintained   calming measures promoted   humidification applied   pulmonary hygiene promoted  Head of Bed (HOB) Positioning: HOB at 20-30 degrees  Taken 8/13/2024 0200 by Odalis Gibson RN  Head of Bed (HOB) Positioning: HOB at 20-30 degrees  Taken 8/13/2024 0100 by Odalis Gibson RN  Head of Bed (HOB) Positioning: HOB flat  Taken 8/13/2024 0000 by Odalis Gibson RN  Lung Protection Measures:   lung compliance monitored   ventilator synchrony promoted  Airway/Ventilation Management:   airway patency maintained   calming measures promoted   humidification applied   pulmonary hygiene promoted  Head of Bed (HOB) Positioning: HOB at 20 degrees  Taken 8/12/2024 2200 by Odalis Gibson RN  Head of Bed (HOB) Positioning: HOB at 20 degrees  Taken 8/12/2024 2000 by Odalis Gibson RN  Lung Protection Measures:   lung compliance monitored   ventilator synchrony  promoted  Airway/Ventilation Management:   airway patency maintained   calming measures promoted   humidification applied   pulmonary hygiene promoted  Head of Bed (HOB) Positioning: HOB at 30 degrees  Problem: Adult Inpatient Plan of Care  Goal: Plan of Care Review  Description: The Plan of Care Review/Shift note should be completed every shift.  The Outcome Evaluation is a brief statement about your assessment that the patient is improving, declining, or no change.  This information will be displayed automatically on your shift  note.  Flowsheets (Taken 8/13/2024 9873)  Plan of Care Reviewed With: patient  Overall Patient Progress: no change

## 2024-08-13 NOTE — PROGRESS NOTES
Nephrology Progress Note  08/13/2024       Mrs Flaherty is a  53 yof w/ Anxiety, depression, fibromyalgia, hypothyroidism, asthma, HLD, MURIEL on CPAP, expressive aphasia, and hypertension who was admitted to an OSH with community acquired pneumonia on 8/3 s/p intubation.  Found to have severe ARDS requiring paralysis and proning, transferred here on 8/8 for management and initiated on CKRT for severe acidemia in the setting of oligoanuric CHACORTA.  Started CRRT on 8/9 for volume management.      Interval History :   Mrs Flaherty continues on CRRT, able to be negative yesterday 1.2L so achieved the goal of 50cc/h.  Off of pressor so can increase goal a bit to 50-100cc/h.  We did need to change to mix of 2k/4k baths yesterday as K was >5, stable at 4.5 today.  Doing well from fluid removal and clearance standpoint, neuro status is biggest concern at the moment.        Assessment & Recommendations:   CHACORTA-Baseline Cr 0.6, at baseline on admission.  CHACORTA due to septic shock in setting of ARDS, UA on 8/6/2024 essentially bland (30 protein but no blood or casts), no dedicated renal imaging.  No dedicated renal imaging at this time.  Started CRRT 8/9 for volume management, now anuric.    -CHACORTA, started CRRT on 8/9.  Continuing mix of 2k/4k baths with post filter 3% at 80cc/h, 0-50cc/h volume goal.     -Access is ECMO circuit   -Dialysis consent signed and in chart.      Volume status-Net negative 1.2L yesterday, planning 50-100cc/h net negative today as long as BP's support.      Electrolytes/pH-Last Na 152, continuing post filter 3% at 100cc/h and adjust as needed to keep 150-155 for now.      Ca/phos/pth-No acute issues, Mg and Phos mildly up but no major intervention needed today.     Anemia-Hgb 7.8, acute management per team.      Nutrition-Vital TF started 8/9.    Time spent: 55 minutes on this date of encounter for chart review, physical exam, medical decision making and co-ordination of care.     Seen and discussed with   "Jordan    Recommendations were communicated to primary team via verbal communication.     DOREEN Sequeira CNS  Clinical Nurse Specialist  [phone]      Review of Systems:   I reviewed the following systems:  ROS not done due to vent/sedation.     Physical Exam:   I/O last 3 completed shifts:  In: 3889.09 [I.V.:2359.09; NG/GT:440; IV Piggyback:250]  Out: 4453.1 [Other:3728.1; Stool:725]   BP (!) 142/84   Pulse 96   Temp 98.4  F (36.9  C)   Resp 10   Ht 1.575 m (5' 2\")   Wt 86.5 kg (190 lb 11.2 oz)   SpO2 97%   BMI 34.88 kg/m       GENERAL APPEARANCE: Intubated and sedated and paralyzed  Pulmonary: Intubated and sedated,   CV: regular rhythm, normal rate   - Edema 1-2+ diffuse  GI: soft, nontender, normal bowel sounds  MS: no evidence of inflammation in joints, no muscle tenderness  SKIN:  warm, dry  NEURO: No focal deficits    Labs:   All labs reviewed by me  Electrolytes/Renal -   Recent Labs   Lab Test 08/13/24 0407 08/13/24  0405 08/13/24  0025 08/13/24  0024 08/12/24 2026 08/12/24  1539   NA  --  153*  --  155* 154* 154*   POTASSIUM  --  4.5  --   --  5.3 5.4*   CHLORIDE  --  121*  --   --  123* 124*   CO2  --  22  --   --  21* 21*   BUN  --  40.1*  --   --  39.7* 35.5*   CR  --  1.46*  --   --  1.55* 1.46*   * 179* 164*  --  175*  202* 199*   QUAN  --  7.8*  --   --  7.6* 7.5*   MAG  --  2.3  --   --  2.5* 2.4*   PHOS  --  4.4  --   --  5.7* 5.3*       CBC -   Recent Labs   Lab Test 08/13/24  0405 08/12/24 2211 08/12/24  1539   WBC 20.6* 23.3* 12.9*   HGB 7.8* 8.2* 7.6*    196 132*       LFTs -   Recent Labs   Lab Test 08/13/24  0405 08/12/24  2026 08/12/24  1539 08/12/24  0945 08/12/24  0401 08/11/24  1021 08/11/24 0405   ALKPHOS 237*  --   --   --  281*  --  318*   BILITOTAL 0.4  --   --   --  0.4  --  0.6   ALT 21  --   --   --  21  --  21   AST 59*  --   --   --  48*  --  47*   PROTTOTAL 5.0*  --   --   --  5.1*  --  4.9*   ALBUMIN 2.5* 2.6* 2.5*   < > 2.4*   < > 2.3*    < > = " values in this interval not displayed.       Iron Panel - No lab results found.        Current Medications:  Current Facility-Administered Medications   Medication Dose Route Frequency Provider Last Rate Last Admin    acetaminophen (TYLENOL) tablet 650 mg  650 mg Oral Q24H Aniceto Adame MD   650 mg at 08/12/24 1031    dextrose 5 % flush PRE/POST medication  10-20 mL Intravenous Q24H Barry Hatch MD   10 mL at 08/12/24 1101    And    amphotericin B (FUNGIZONE) 88.9 mg in D5W 500 mL IVPB  1 mg/kg (Dosing Weight) Intravenous Q24H Barry Hatch  mL/hr at 08/12/24 1102 88.9 mg at 08/12/24 1102    And    dextrose 5 % flush PRE/POST medication  10-20 mL Intravenous Q24H Barry Hatch MD   20 mL at 08/12/24 1305    artificial tears ophthalmic ointment   Both Eyes Q8H Alethea Schaffer MD   Given at 08/13/24 0045    ceFEPIme (MAXIPIME) 2 g vial to attach to  mL bag for ADULTS or NS 50 mL bag for PEDS  2 g Intravenous Q12H Barry Hatch MD   2 g at 08/13/24 0042    chlorhexidine (PERIDEX) 0.12 % solution 15 mL  15 mL Mouth/Throat Q12H Giovanny Guidry PA-C   15 mL at 08/12/24 2004    dexAMETHasone PF (DECADRON) injection 20 mg  20 mg Intravenous Daily Cal Lisa MD   20 mg at 08/12/24 0914    Followed by    [START ON 8/17/2024] dexAMETHasone PF (DECADRON) injection 10 mg  10 mg Intravenous Daily Cal Lisa MD        diphenhydrAMINE (BENADRYL) capsule 25 mg  25 mg Oral Q24H Aniceto Adame MD        Or    diphenhydrAMINE (BENADRYL) injection 25 mg  25 mg Intravenous Q24H Aniceto Adame MD   25 mg at 08/12/24 1031    insulin aspart (NovoLOG) injection (RAPID ACTING)  1-12 Units Subcutaneous Q4H Dong Rico PA-C   1 Units at 08/13/24 0408    levothyroxine (SYNTHROID/LEVOTHROID) tablet 25 mcg  25 mcg Per Feeding Tube QAM AC Giovanny Guidry PA-C   25 mcg at 08/12/24 0749    metroNIDAZOLE (FLAGYL) infusion 500 mg  500 mg Intravenous Q8H  Dong Rico PA-C   500 mg at 08/13/24 0203    multivitamin RENAL (RENAVITE RX/NEPHROVITE) tablet 1 tablet  1 tablet Oral or Feeding Tube Daily Barry Hatch MD   1 tablet at 08/12/24 0749    pantoprazole (PROTONIX) 2 mg/mL suspension 40 mg  40 mg Per Feeding Tube QAM AC Barry Hatch MD        polyethylene glycol (MIRALAX) Packet 17 g  17 g Per Feeding Tube BID Dong Rico PA-C   17 g at 08/10/24 0810    Prosource TF20 ENfit Compatibl EN LIQD (PROSOURCE TF20) packet 60 mL  1 packet Per Feeding Tube TID Giovanny Guidry PA-C   60 mL at 08/12/24 2005    senna-docusate (SENOKOT-S/PERICOLACE) 8.6-50 MG per tablet 2 tablet  2 tablet Oral or Feeding Tube BID Dong Rico PA-C   2 tablet at 08/10/24 2025    sodium chloride 0.9% BOLUS 250 mL  250 mL Intravenous Q24H Barry Hatch  mL/hr at 08/12/24 1031 250 mL at 08/12/24 1031     Current Facility-Administered Medications   Medication Dose Route Frequency Provider Last Rate Last Admin    bivalirudin (ANGIOMAX) 250 mg in sodium chloride 0.9 % 250 mL ANTICOAGULANT infusion  0-0.3 mg/kg/hr (Dosing Weight) Intravenous Continuous Dong Rico PA-C 5.3 mL/hr at 08/13/24 0700 0.06 mg/kg/hr at 08/13/24 0700    cisatracurium (NIMBEX) 200 mg in D5W 100 mL infusion  3-10 mcg/kg/min (Ideal) Intravenous Continuous Alethea Schaffer MD 13.5 mL/hr at 08/13/24 0700 9 mcg/kg/min at 08/13/24 0700    dextrose 10% infusion   Intravenous Continuous PRN Giovanny Guidry PA-C        dialysate for CVVHD & CVVHDF (PrismaSol BGK 2/3.5)  12.5 mL/kg/hr CRRT Continuous Dakota Dorsey APRN CNS 1,100 mL/hr at 08/13/24 0417 12.5 mL/kg/hr at 08/13/24 0417    epoprostenol (VELETRI) inhalation solution  20 ng/kg/min (Ideal) Nebulization Continuous Dong Rico PA-C 3 mL/hr at 08/12/24 1827 20 ng/kg/min at 08/12/24 1827    HYDROmorphone (DILAUDID) 0.2 mg/mL infusion ADULT/PEDS GREATER than or EQUAL to 20 kg   0.3-1.5 mg/hr Intravenous Continuous Dong Rico PA-C 7.5 mL/hr at 08/13/24 0700 1.5 mg/hr at 08/13/24 0700    midazolam (VERSED) 100 mg/100 mL NS infusion - ADULT  1-14 mg/hr Intravenous Continuous Dong Rico PA-C 14 mL/hr at 08/13/24 0700 14 mg/hr at 08/13/24 0700    No heparin required   Does not apply Continuous PRN Dakota Dorsey APRN CNS        norepinephrine (LEVOPHED) 16 mg in  mL infusion MAX CONC CENTRAL LINE  0.01-0.6 mcg/kg/min (Dosing Weight) Intravenous Continuous Her-Giovanny Spence PA-C   Stopped at 08/12/24 0000    POST-Filter REPLACEMENT SOlution for CVVHD/CVVHDF (3% sodium chloride)   Intravenous Continuous Barry Hatch  mL/hr at 08/13/24 0321 New Bag at 08/13/24 0321    PRE-filter replacement solution for CVVHD & CVVHDF (Phoxillum BK4/2.5)  12.5 mL/kg/hr CRRT Continuous Dakota Dorsey APRN CNS 1,100 mL/hr at 08/13/24 0416 12.5 mL/kg/hr at 08/13/24 0416

## 2024-08-13 NOTE — PROGRESS NOTES
[facility]    ECLS Shift Summary:     ECMO Equipment:  Console Serial Number: [id number]  Circuit Lot Number: not recorded by Perfusion  Oxygenator Lot Number: [id number]    Circuit Assessment: Fibrin  Fibrin Location: connectors, each corner of oxy    Arterial ECMO Cannula: 17 Fr in the Right Internal Jugular Vein  Venous ECMO Cannula: 25 Fr in the Right Femoral Vein      ECMO Cannula Arterial Right internal jugular-Site Assessment: WDL, Sutured, Secure  ECMO Cannula Venous Right femoral vein-Site Assessment: WDL, Sutured, Secure  ECMO Cannula Arterial Right internal jugular-Site Intervention: No intervention needed  ECMO Cannula Venous Right femoral vein-Site Intervention: No intervention needed    Patient remains on V-V ECMO, all equipment is functioning and alarms are appropriately set. RPM's: 3600 with Blood Flow (Circuit) LPM  Av.5 LPM  Min: 4.47 LPM  Max: 4.56 LPM L/min. Sweep is at 7 LPM and 100 %.     Significant Shift Events: Recirculation 23%    Vent settings:  FiO2 (%): 60 %, Resp: 10, Inspiratory Pressure (cm H2O) (Drager Jessie): 25, Vent Mode: PCV Plus assist, Resp Rate (Set): 10 breaths/min, PEEP (cm H2O): 10 cmH2O, Resp Rate (Set): 10 breaths/min, PEEP (cm H2O): 10 cmH2O    Anticoagulation:    Dose (mg/kg/hr) Bivalirudin: 0.06 mg/kg/hr  Rate (mL/hr) Bivalirudin: 5.3 mL/hr  Concentration Bivalirudin: 1 mg/mL  Most recent: ACT  (seconds):  (folowing PTTs)    Patient on CRRT.  Blood loss was minimal. Product given included none.     Intake/Output Summary (Last 24 hours) at 2024 1811  Last data filed at 2024 1800  Gross per 24 hour   Intake 3687.41 ml   Output 4920.1 ml   Net -1232.69 ml       Labs:  Recent Labs   Lab 24  1747 24  1549 24  1545 24  1544 24  1408 24  1151   PH 7.41  --  7.40  --  7.37 7.39   PCO2 38  --  39  --  42 41   PO2 93  --  64*  --  89 78*   HCO3 24  --  24  --  24 25   O2PER 60 60 60 100   60 60 60       Lab Results   Component Value Date    HGB 7.7 (L) 08/13/2024    PHGB 40 (H) 08/13/2024     08/13/2024    FIBR 410 08/13/2024    INR 1.37 (H) 08/12/2024    PTT 50 (H) 08/13/2024    DD 3.97 (H) 08/13/2024    ANTCH 95 08/13/2024       Plan is continue VV ECMO at this time.    Berna Rosen, RT  ECMO Specialist  8/13/2024 6:11 PM

## 2024-08-13 NOTE — PROGRESS NOTES
[facility]    ECLS Shift Summary:     ECMO Equipment:  Console Serial Number: [id number]  Circuit Lot Number: not recorded by Perfusion  Oxygenator Lot Number: [id number]    Circuit Assessment: Fibrin  Fibrin Location: connectors, each corner of the oxygenator    Arterial ECMO Cannula: 17 Fr in the Right Internal Jugular Vein  Venous ECMO Cannula: 25 Fr in the Right Femoral Vein    ECMO Cannula Arterial Right internal jugular-Site Assessment: WDL, Sutured, Secure  ECMO Cannula Venous Right femoral vein-Site Assessment: WDL, Sutured, Secure  ECMO Cannula Arterial Right internal jugular-Site Intervention: No intervention needed  ECMO Cannula Venous Right femoral vein-Site Intervention: No intervention needed    Patient remains on V-V ECMO, all equipment is functioning and alarms are appropriately set. RPM's: 3600 with Blood Flow (Circuit) LPM  Av.5 LPM  Min: 4.41 LPM  Max: 4.61 LPM L/min. Sweep is at 7 LPM and 100 %.    Significant Shift Events: none    Vent settings:  FiO2 (%): 60 %, Resp: 10, Inspiratory Pressure (cm H2O) (Drager Jessie): 25, Vent Mode: PCV Plus assist, Resp Rate (Set): 10 breaths/min, PEEP (cm H2O): 10 cmH2O, Resp Rate (Set): 10 breaths/min, PEEP (cm H2O): 10 cmH2O    Anticoagulation:  Dose (units/hr) HEParin: 0 Units/hr  Rate (mL/hr) HEParin: 0 mL/hr  Concentration HEParin: 100 Units/mL     Dose (mg/kg/hr) Bivalirudin: 0.06 mg/kg/hr  Rate (mL/hr) Bivalirudin: 5.3 mL/hr  Concentration Bivalirudin: 1 mg/mL  Most recent: ACT  (seconds):  (folowing PTTs)    The patient is on CRRT.  Blood loss was minimal. No blood product given overnight.     Intake/Output Summary (Last 24 hours) at 2024 0640  Last data filed at 2024 0600  Gross per 24 hour   Intake 3889.09 ml   Output 4453.1 ml   Net -564.01 ml       Labs:  Recent Labs   Lab 24  0609 24  0406 24  0405 24  0201 24  0024   PH 7.43 7.41  --  7.36 7.35   PCO2 37 37  --   42 43   PO2 77* 85  --  76* 70*   HCO3 24 23  --  24 24   O2PER 60 60 100  60  60 70 70       Lab Results   Component Value Date    HGB 7.8 (L) 08/13/2024    PHGB 40 (H) 08/13/2024     08/13/2024    FIBR 484 08/13/2024    INR 1.37 (H) 08/12/2024    PTT 50 (H) 08/13/2024    DD 10.76 (H) 08/13/2024    ANTCH 77 (L) 08/12/2024       Plan is for potential cannula repositioning. Will continue [facility] ECMO support and adjust settings as needed.    Venkat Barry, RT  ECMO Specialist  8/13/2024 6:40 AM

## 2024-08-13 NOTE — PROGRESS NOTES
CRRT STATUS NOTE    DATA:  Time:  5:17 AM  Pressures WNL:  YES  Filter Status:  WDL    Problems Reported/Alarms Noted:  None    Supplies Present:  YES    ASSESSMENT:  Patient Net Fluid Balance:  -1280 mL 8/12, -287 mL since MN  Vital Signs:  Temp:  [96.8  F (36  C)-99.1  F (37.3  C)] 98.4  F (36.9  C)  Pulse:  [] 92  Resp:  [10-16] 10  BP: (142)/(84) 142/84  MAP:  [48 mmHg-268 mmHg] 107 mmHg  Arterial Line BP: ()/(44-88) 156/76  FiO2 (%):  [60 %-70 %] 60 %  SpO2:  [88 %-100 %] 99 %    Labs:    Lab Results   Component Value Date    WBC 20.6 (H) 08/13/2024    HGB 7.8 (L) 08/13/2024    HCT 23.8 (L) 08/13/2024     08/13/2024     (H) 08/13/2024    POTASSIUM 4.5 08/13/2024    CHLORIDE 121 (H) 08/13/2024    CO2 22 08/13/2024    BUN 40.1 (H) 08/13/2024    CR 1.46 (H) 08/13/2024     (H) 08/13/2024    DD 10.76 (H) 08/13/2024    AST 59 (H) 08/13/2024    ALT 21 08/13/2024    ALKPHOS 237 (H) 08/13/2024    BILITOTAL 0.4 08/13/2024    INR 1.37 (H) 08/12/2024       Goals of Therapy:  net negative 0-50 mL/hr    INTERVENTIONS:   None needed.     PLAN:  Continue plan of care. Contact CRRT Resource via [employer] with any questions or concerns.

## 2024-08-13 NOTE — PLAN OF CARE
Neuro- CIS moved to 10 for 4/4 TOF on temple. Pupilometer stable, moved to q4 per neuro  CV-  New systolic goal of <140, labetalol given X3, effective X1, hydralazine given X1  Pulm- Unchanged, tidal volumes 20-25, VV ECMO- sweep remains at 7  GI- 300 ml output through rectal tube, C diff sent- negative  - CRRT moved to  goal, tolerating well  Gtts-   CIS- 10, Versed-14, dilaudid- 1.5, bival- .06,   Skin- Cool extremities, diffuse bruising    Lines remain unchanged

## 2024-08-13 NOTE — PROGRESS NOTES
Palliative Care Initial Social Work Note  Location: [facility]    Patient Info:  Per EMR, Massiel Flaherty is a 53 year old female with past medical hx of Anxiety/depression, fibromyalgia, hypothyroidism, asthma, HLD, MURIEL on CPAP, spells, off on anti seizure medications, expressive aphasia, hypertension was seen at [facility] and was placed on Augmentin outpatient on 7/30/24. She admitted to the hospital on 8/3/24 for fatigue, fever and dyspnea and intubated 8/6/24 at OSH, proned and paralyzed without improvement. Transferred to [facility] 8/8/24, hypoxic with high plateaus/peaks and placed on VV EMCO and CRRT.     Brief summary of visit: Palliative team, NP and PCSW met this morning with Massiel's mother Doreen and adult son, Mook (22) at bedside.    Introduced palliative care as an extra layer of support, helping patients and families dealing with chronic and/or serious illnesses.    Palliative care helps patients and families navigate their care while focusing on the whole person; providing emotional, social and spiritual support.   Palliative care social work can assist with adjustment to illness and coping, processing of information, emotional and decisional support needs, non-pharm techniques for stress and anxiety and can provide effective communication and caring support.    Today's visit primarily focused on learning more about Massiel and assessing coping of and emotional support needs for family.    Team learned from Mook and Doreen that Massiel has learned to acclimate to her increased dependence on others over the last 2-3 years, as she has faced health changes and struggles. Massiel has not been able to drive since late 2022 due to seizures. Doreen shared that when Massiel is having an exacerbation of symptoms or not feeling well, she will often go stay with Doreen who is home 24/7 vs staying home with Mook who has to go to work.   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    Massiel's support system consists of son Mook, mom Doreen, sister Katerina, family's dog Mara as well as Massiel's brother, sister in law and their children. Doreen and Mook able to acknowledge the stress of this situation, easily engaged in conversation and grateful for all teams involved.    Family likely to return to [place] this weekend, family members need to return to work. Validated and normalized that one of the difficulties associated with having a loved when hospitalized is that life responsibilities outside the hospital continue. Reviewed ways in which family will continue to be updated and can stay in touch with pt while they're back home.    Interventions used today: trust and rapport building, active and reflective listening, validation, re-framing and processing of feelings    Date of Admission: 8/8/2024    Reason for consult: Patient and family support    Sources of information: Family member maik Logan, son Mook  Staff bedside RN, medical teams  Other chart review    Recommendations & Plan:  PCSW will continue to follow while palliative care team remains involved; anticipate check ins with family 1-2x/week whether in person or via phone if they return home; continue to assess coping as well as emotional and decisional support needs       Symptoms & Concerns Addressed Today:  Emotional support provided to family today and needs will continue to be assessed  Family  Physical family identified pt's loss of independence over recent years due to health struggles    Strengths Identified:    Strong family support    Relationships & Support:  Aspects of relationships and support assessed today:  Identified family members: mom Doreen and son Mook; sister Katerina  Professional supports: medical teams  Family coping: adequate at present  Bereavement Risk concerns: no acute concerns identified, continue to assess    Coping, Mental Health  & Adjustment to Illness:   Adjustment to Illness/Hospitalization  Anxiety    Goals, Decision Making & Advance Care Planning:   Prognosis, Goals, and/or Advance Care Planning were assessed today: No  If yes, brief summary of discussion: Restorative goals at this time  Preferred language: English  Patient's decision making preferences: not assessed  I have concerns about the patient/family's health literacy today: No  Patient has a completed Health Care Directive: No.   Code status per chart review: Full Code      Clinical Social Work Interventions:   Assessment of palliative specific issues    Introduction of Palliative clinical social work interventions   Adjustment to illness counseling  Facilitation of processing of thoughts/feelings  Family communication facilitated  Life review facilitation  Re-framing      HUMBERTO Medina, [facility]  [facility], Palliative Care  Securely message with the [employer] Web Console (learn more here) or  Text page via [employer] Paging/Directory

## 2024-08-14 ENCOUNTER — APPOINTMENT (OUTPATIENT)
Dept: GENERAL RADIOLOGY | Facility: CLINIC | Age: 54
DRG: 003 | End: 2024-08-14
Attending: INTERNAL MEDICINE
Payer: MEDICAID

## 2024-08-14 ENCOUNTER — APPOINTMENT (OUTPATIENT)
Dept: GENERAL RADIOLOGY | Facility: CLINIC | Age: 54
DRG: 003 | End: 2024-08-14
Attending: SURGERY
Payer: MEDICAID

## 2024-08-14 LAB
ALBUMIN SERPL BCG-MCNC: 2.7 G/DL (ref 3.5–5.2)
ALBUMIN SERPL BCG-MCNC: 3 G/DL (ref 3.5–5.2)
ALBUMIN SERPL BCG-MCNC: 3 G/DL (ref 3.5–5.2)
ALLEN'S TEST: ABNORMAL
ALP SERPL-CCNC: 224 U/L (ref 40–150)
ALT SERPL W P-5'-P-CCNC: 23 U/L (ref 0–50)
ANION GAP SERPL CALCULATED.3IONS-SCNC: 12 MMOL/L (ref 7–15)
ANION GAP SERPL CALCULATED.3IONS-SCNC: 13 MMOL/L (ref 7–15)
ANION GAP SERPL CALCULATED.3IONS-SCNC: 14 MMOL/L (ref 7–15)
APTT PPP: 45 SECONDS (ref 22–38)
APTT PPP: 45 SECONDS (ref 22–38)
APTT PPP: 47 SECONDS (ref 22–38)
APTT PPP: 48 SECONDS (ref 22–38)
AST SERPL W P-5'-P-CCNC: 43 U/L (ref 0–45)
AT III ACT/NOR PPP CHRO: 122 % (ref 85–135)
BASE EXCESS BLDA CALC-SCNC: -0.1 MMOL/L (ref -3–3)
BASE EXCESS BLDA CALC-SCNC: -0.1 MMOL/L (ref -3–3)
BASE EXCESS BLDA CALC-SCNC: -0.2 MMOL/L (ref -3–3)
BASE EXCESS BLDA CALC-SCNC: -0.2 MMOL/L (ref -3–3)
BASE EXCESS BLDA CALC-SCNC: -0.4 MMOL/L (ref -3–3)
BASE EXCESS BLDA CALC-SCNC: -0.6 MMOL/L (ref -3–3)
BASE EXCESS BLDA CALC-SCNC: -1.1 MMOL/L (ref -3–3)
BASE EXCESS BLDA CALC-SCNC: -1.7 MMOL/L (ref -3–3)
BASE EXCESS BLDA CALC-SCNC: -2.9 MMOL/L (ref -3–3)
BASE EXCESS BLDA CALC-SCNC: -3 MMOL/L (ref -3–3)
BASE EXCESS BLDA CALC-SCNC: 0.1 MMOL/L (ref -3–3)
BASE EXCESS BLDA CALC-SCNC: 0.3 MMOL/L (ref -3–3)
BASE EXCESS BLDA CALC-SCNC: 0.8 MMOL/L (ref -3–3)
BASE EXCESS BLDA CALC-SCNC: 1.1 MMOL/L (ref -3–3)
BASE EXCESS BLDV CALC-SCNC: -2.3 MMOL/L (ref -3–3)
BASE EXCESS BLDV CALC-SCNC: -2.8 MMOL/L (ref -3–3)
BASE EXCESS BLDV CALC-SCNC: 0.9 MMOL/L (ref -3–3)
BASE EXCESS BLDV CALC-SCNC: 1.3 MMOL/L (ref -3–3)
BASOPHILS # BLD AUTO: ABNORMAL 10*3/UL
BASOPHILS # BLD MANUAL: 0 10E3/UL (ref 0–0.2)
BASOPHILS NFR BLD AUTO: ABNORMAL %
BASOPHILS NFR BLD MANUAL: 0 %
BILIRUB DIRECT SERPL-MCNC: 0.24 MG/DL (ref 0–0.3)
BILIRUB SERPL-MCNC: 0.4 MG/DL
BLD PROD TYP BPU: NORMAL
BLOOD COMPONENT TYPE: NORMAL
BUN SERPL-MCNC: 45.9 MG/DL (ref 6–20)
BUN SERPL-MCNC: 48.1 MG/DL (ref 6–20)
BUN SERPL-MCNC: 50.1 MG/DL (ref 6–20)
BUN SERPL-MCNC: 55.7 MG/DL (ref 6–20)
BUN SERPL-MCNC: 57.9 MG/DL (ref 6–20)
CA-I BLD-MCNC: 4.6 MG/DL (ref 4.4–5.2)
CA-I BLD-MCNC: 4.7 MG/DL (ref 4.4–5.2)
CA-I BLD-MCNC: 4.8 MG/DL (ref 4.4–5.2)
CA-I BLD-MCNC: 4.9 MG/DL (ref 4.4–5.2)
CALCIUM SERPL-MCNC: 8.1 MG/DL (ref 8.8–10.4)
CALCIUM SERPL-MCNC: 8.1 MG/DL (ref 8.8–10.4)
CALCIUM SERPL-MCNC: 8.5 MG/DL (ref 8.8–10.4)
CALCIUM SERPL-MCNC: 8.6 MG/DL (ref 8.8–10.4)
CALCIUM SERPL-MCNC: 8.7 MG/DL (ref 8.8–10.4)
CHLORIDE SERPL-SCNC: 115 MMOL/L (ref 98–107)
CHLORIDE SERPL-SCNC: 115 MMOL/L (ref 98–107)
CHLORIDE SERPL-SCNC: 116 MMOL/L (ref 98–107)
CHLORIDE SERPL-SCNC: 117 MMOL/L (ref 98–107)
CHLORIDE SERPL-SCNC: 117 MMOL/L (ref 98–107)
CODING SYSTEM: NORMAL
COHGB MFR BLD: 97.9 % (ref 96–97)
COHGB MFR BLD: 98.3 % (ref 96–97)
COHGB MFR BLD: 98.6 % (ref 96–97)
COHGB MFR BLD: >100 % (ref 96–97)
CREAT SERPL-MCNC: 1.37 MG/DL (ref 0.51–0.95)
CREAT SERPL-MCNC: 1.37 MG/DL (ref 0.51–0.95)
CREAT SERPL-MCNC: 1.48 MG/DL (ref 0.51–0.95)
CREAT SERPL-MCNC: 1.51 MG/DL (ref 0.51–0.95)
CREAT SERPL-MCNC: 1.68 MG/DL (ref 0.51–0.95)
CROSSMATCH: NORMAL
D DIMER PPP FEU-MCNC: 3.03 UG/ML FEU (ref 0–0.5)
D DIMER PPP FEU-MCNC: 3.32 UG/ML FEU (ref 0–0.5)
EGFRCR SERPLBLD CKD-EPI 2021: 36 ML/MIN/1.73M2
EGFRCR SERPLBLD CKD-EPI 2021: 41 ML/MIN/1.73M2
EGFRCR SERPLBLD CKD-EPI 2021: 42 ML/MIN/1.73M2
EGFRCR SERPLBLD CKD-EPI 2021: 46 ML/MIN/1.73M2
EGFRCR SERPLBLD CKD-EPI 2021: 46 ML/MIN/1.73M2
EOSINOPHIL # BLD AUTO: ABNORMAL 10*3/UL
EOSINOPHIL # BLD MANUAL: 0 10E3/UL (ref 0–0.7)
EOSINOPHIL NFR BLD AUTO: ABNORMAL %
EOSINOPHIL NFR BLD MANUAL: 0 %
ERYTHROCYTE [DISTWIDTH] IN BLOOD BY AUTOMATED COUNT: 17.1 % (ref 10–15)
ERYTHROCYTE [DISTWIDTH] IN BLOOD BY AUTOMATED COUNT: 17.2 % (ref 10–15)
ERYTHROCYTE [DISTWIDTH] IN BLOOD BY AUTOMATED COUNT: 17.2 % (ref 10–15)
ERYTHROCYTE [DISTWIDTH] IN BLOOD BY AUTOMATED COUNT: 17.5 % (ref 10–15)
FIBRINOGEN PPP-MCNC: 351 MG/DL (ref 170–510)
FIBRINOGEN PPP-MCNC: 407 MG/DL (ref 170–510)
GLUCOSE BLDC GLUCOMTR-MCNC: 148 MG/DL (ref 70–99)
GLUCOSE BLDC GLUCOMTR-MCNC: 161 MG/DL (ref 70–99)
GLUCOSE BLDC GLUCOMTR-MCNC: 179 MG/DL (ref 70–99)
GLUCOSE BLDC GLUCOMTR-MCNC: 181 MG/DL (ref 70–99)
GLUCOSE BLDC GLUCOMTR-MCNC: 219 MG/DL (ref 70–99)
GLUCOSE BLDC GLUCOMTR-MCNC: 221 MG/DL (ref 70–99)
GLUCOSE SERPL-MCNC: 180 MG/DL (ref 70–99)
GLUCOSE SERPL-MCNC: 204 MG/DL (ref 70–99)
GLUCOSE SERPL-MCNC: 216 MG/DL (ref 70–99)
GLUCOSE SERPL-MCNC: 243 MG/DL (ref 70–99)
GLUCOSE SERPL-MCNC: 253 MG/DL (ref 70–99)
HCO3 BLD-SCNC: 22 MMOL/L (ref 21–28)
HCO3 BLD-SCNC: 23 MMOL/L (ref 21–28)
HCO3 BLD-SCNC: 24 MMOL/L (ref 21–28)
HCO3 BLD-SCNC: 25 MMOL/L (ref 21–28)
HCO3 BLDA-SCNC: 22 MMOL/L (ref 21–28)
HCO3 BLDA-SCNC: 23 MMOL/L (ref 21–28)
HCO3 BLDV-SCNC: 23 MMOL/L (ref 21–28)
HCO3 BLDV-SCNC: 24 MMOL/L (ref 21–28)
HCO3 BLDV-SCNC: 25 MMOL/L (ref 21–28)
HCO3 BLDV-SCNC: 26 MMOL/L (ref 21–28)
HCO3 SERPL-SCNC: 20 MMOL/L (ref 22–29)
HCO3 SERPL-SCNC: 22 MMOL/L (ref 22–29)
HCO3 SERPL-SCNC: 22 MMOL/L (ref 22–29)
HCO3 SERPL-SCNC: 23 MMOL/L (ref 22–29)
HCO3 SERPL-SCNC: 23 MMOL/L (ref 22–29)
HCT VFR BLD AUTO: 24.9 % (ref 35–47)
HCT VFR BLD AUTO: 25.9 % (ref 35–47)
HCT VFR BLD AUTO: 26.3 % (ref 35–47)
HCT VFR BLD AUTO: 27.6 % (ref 35–47)
HGB BLD-MCNC: 8.2 G/DL (ref 11.7–15.7)
HGB BLD-MCNC: 8.5 G/DL (ref 11.7–15.7)
HGB BLD-MCNC: 8.7 G/DL (ref 11.7–15.7)
HGB BLD-MCNC: 9 G/DL (ref 11.7–15.7)
HGB FREE PLAS-MCNC: 40 MG/DL
IMM GRANULOCYTES # BLD: ABNORMAL 10*3/UL
IMM GRANULOCYTES NFR BLD: ABNORMAL %
ISSUE DATE AND TIME: NORMAL
LACTATE SERPL-SCNC: 1 MMOL/L (ref 0.7–2)
LACTATE SERPL-SCNC: 1.1 MMOL/L (ref 0.7–2)
LACTATE SERPL-SCNC: 1.5 MMOL/L (ref 0.7–2)
LACTATE SERPL-SCNC: 2.1 MMOL/L (ref 0.7–2)
LEPTOSPIRA IGM SER QL: NEGATIVE
LYMPHOCYTES # BLD AUTO: ABNORMAL 10*3/UL
LYMPHOCYTES # BLD MANUAL: 2.2 10E3/UL (ref 0.8–5.3)
LYMPHOCYTES # BLD MANUAL: 2.5 10E3/UL (ref 0.8–5.3)
LYMPHOCYTES # BLD MANUAL: 2.9 10E3/UL (ref 0.8–5.3)
LYMPHOCYTES # BLD MANUAL: 5.2 10E3/UL (ref 0.8–5.3)
LYMPHOCYTES NFR BLD AUTO: ABNORMAL %
LYMPHOCYTES NFR BLD MANUAL: 12 %
LYMPHOCYTES NFR BLD MANUAL: 20 %
LYMPHOCYTES NFR BLD MANUAL: 8 %
LYMPHOCYTES NFR BLD MANUAL: 9 %
MAGNESIUM SERPL-MCNC: 2.1 MG/DL (ref 1.7–2.3)
MAGNESIUM SERPL-MCNC: 2.2 MG/DL (ref 1.7–2.3)
MAGNESIUM SERPL-MCNC: 2.3 MG/DL (ref 1.7–2.3)
MCH RBC QN AUTO: 31.1 PG (ref 26.5–33)
MCH RBC QN AUTO: 31.4 PG (ref 26.5–33)
MCH RBC QN AUTO: 31.5 PG (ref 26.5–33)
MCH RBC QN AUTO: 31.5 PG (ref 26.5–33)
MCHC RBC AUTO-ENTMCNC: 32.3 G/DL (ref 31.5–36.5)
MCHC RBC AUTO-ENTMCNC: 32.6 G/DL (ref 31.5–36.5)
MCHC RBC AUTO-ENTMCNC: 32.9 G/DL (ref 31.5–36.5)
MCHC RBC AUTO-ENTMCNC: 33.6 G/DL (ref 31.5–36.5)
MCV RBC AUTO: 94 FL (ref 78–100)
MCV RBC AUTO: 96 FL (ref 78–100)
METAMYELOCYTES # BLD MANUAL: 0.5 10E3/UL
METAMYELOCYTES # BLD MANUAL: 0.5 10E3/UL
METAMYELOCYTES # BLD MANUAL: 1 10E3/UL
METAMYELOCYTES # BLD MANUAL: 1.3 10E3/UL
METAMYELOCYTES NFR BLD MANUAL: 2 %
METAMYELOCYTES NFR BLD MANUAL: 2 %
METAMYELOCYTES NFR BLD MANUAL: 4 %
METAMYELOCYTES NFR BLD MANUAL: 4 %
MONOCYTES # BLD AUTO: ABNORMAL 10*3/UL
MONOCYTES # BLD MANUAL: 0.7 10E3/UL (ref 0–1.3)
MONOCYTES # BLD MANUAL: 1.8 10E3/UL (ref 0–1.3)
MONOCYTES # BLD MANUAL: 2 10E3/UL (ref 0–1.3)
MONOCYTES # BLD MANUAL: 2.2 10E3/UL (ref 0–1.3)
MONOCYTES NFR BLD AUTO: ABNORMAL %
MONOCYTES NFR BLD MANUAL: 3 %
MONOCYTES NFR BLD MANUAL: 7 %
MONOCYTES NFR BLD MANUAL: 7 %
MONOCYTES NFR BLD MANUAL: 8 %
MYELOCYTES # BLD MANUAL: 0.6 10E3/UL
MYELOCYTES # BLD MANUAL: 1 10E3/UL
MYELOCYTES # BLD MANUAL: 1.2 10E3/UL
MYELOCYTES # BLD MANUAL: 1.7 10E3/UL
MYELOCYTES NFR BLD MANUAL: 2 %
MYELOCYTES NFR BLD MANUAL: 4 %
MYELOCYTES NFR BLD MANUAL: 5 %
MYELOCYTES NFR BLD MANUAL: 7 %
NEUTROPHILS # BLD AUTO: ABNORMAL 10*3/UL
NEUTROPHILS # BLD MANUAL: 17.3 10E3/UL (ref 1.6–8.3)
NEUTROPHILS # BLD MANUAL: 18.4 10E3/UL (ref 1.6–8.3)
NEUTROPHILS # BLD MANUAL: 18.5 10E3/UL (ref 1.6–8.3)
NEUTROPHILS # BLD MANUAL: 25.1 10E3/UL (ref 1.6–8.3)
NEUTROPHILS NFR BLD AUTO: ABNORMAL %
NEUTROPHILS NFR BLD MANUAL: 67 %
NEUTROPHILS NFR BLD MANUAL: 74 %
NEUTROPHILS NFR BLD MANUAL: 76 %
NEUTROPHILS NFR BLD MANUAL: 79 %
NRBC # BLD AUTO: 0.2 10E3/UL
NRBC # BLD AUTO: 0.3 10E3/UL
NRBC # BLD AUTO: 0.4 10E3/UL
NRBC # BLD AUTO: 0.6 10E3/UL
NRBC # BLD AUTO: 0.8 10E3/UL
NRBC # BLD AUTO: 1.1 10E3/UL
NRBC # BLD AUTO: 1.5 10E3/UL
NRBC BLD AUTO-RTO: 1 /100
NRBC BLD AUTO-RTO: 2 /100
NRBC BLD AUTO-RTO: 3 /100
NRBC BLD AUTO-RTO: 3 /100
NRBC BLD MANUAL-RTO: 1 %
NRBC BLD MANUAL-RTO: 2 %
NRBC BLD MANUAL-RTO: 6 %
O2/TOTAL GAS SETTING VFR VENT: 100 %
O2/TOTAL GAS SETTING VFR VENT: 100 %
O2/TOTAL GAS SETTING VFR VENT: 60 %
OSMOLALITY SERPL: 328 MMOL/KG (ref 275–295)
OSMOLALITY SERPL: 332 MMOL/KG (ref 275–295)
OSMOLALITY SERPL: 335 MMOL/KG (ref 275–295)
OSMOLALITY SERPL: 336 MMOL/KG (ref 275–295)
OSMOLALITY SERPL: 337 MMOL/KG (ref 275–295)
OSMOLALITY SERPL: 342 MMOL/KG (ref 275–295)
OXYHGB MFR BLDA: 98 % (ref 75–100)
OXYHGB MFR BLDA: 98 % (ref 75–100)
OXYHGB MFR BLDV: 37 % (ref 70–75)
OXYHGB MFR BLDV: 63 % (ref 70–75)
OXYHGB MFR BLDV: 69 %
OXYHGB MFR BLDV: 73 %
PCO2 BLD: 30 MM HG (ref 35–45)
PCO2 BLD: 32 MM HG (ref 35–45)
PCO2 BLD: 37 MM HG (ref 35–45)
PCO2 BLD: 38 MM HG (ref 35–45)
PCO2 BLD: 38 MM HG (ref 35–45)
PCO2 BLD: 39 MM HG (ref 35–45)
PCO2 BLD: 39 MM HG (ref 35–45)
PCO2 BLD: 40 MM HG (ref 35–45)
PCO2 BLD: 40 MM HG (ref 35–45)
PCO2 BLD: 41 MM HG (ref 35–45)
PCO2 BLDA: 27 MM HG (ref 35–45)
PCO2 BLDA: 35 MM HG (ref 35–45)
PCO2 BLDV: 33 MM HG (ref 40–50)
PCO2 BLDV: 42 MM HG (ref 40–50)
PCO2 BLDV: 43 MM HG (ref 40–50)
PCO2 BLDV: 47 MM HG (ref 40–50)
PEEP: 10 CM H2O
PH BLD: 7.38 [PH] (ref 7.35–7.45)
PH BLD: 7.39 [PH] (ref 7.35–7.45)
PH BLD: 7.4 [PH] (ref 7.35–7.45)
PH BLD: 7.41 [PH] (ref 7.35–7.45)
PH BLD: 7.42 [PH] (ref 7.35–7.45)
PH BLD: 7.42 [PH] (ref 7.35–7.45)
PH BLD: 7.47 [PH] (ref 7.35–7.45)
PH BLD: 7.51 [PH] (ref 7.35–7.45)
PH BLDA: 7.4 [PH] (ref 7.35–7.45)
PH BLDA: 7.55 [PH] (ref 7.35–7.45)
PH BLDV: 7.31 [PH] (ref 7.32–7.43)
PH BLDV: 7.35 [PH] (ref 7.32–7.43)
PH BLDV: 7.4 [PH] (ref 7.32–7.43)
PH BLDV: 7.48 [PH] (ref 7.32–7.43)
PHOSPHATE SERPL-MCNC: 3.6 MG/DL (ref 2.5–4.5)
PHOSPHATE SERPL-MCNC: 4.9 MG/DL (ref 2.5–4.5)
PHOSPHATE SERPL-MCNC: 5.4 MG/DL (ref 2.5–4.5)
PLAT MORPH BLD: ABNORMAL
PLATELET # BLD AUTO: 174 10E3/UL (ref 150–450)
PLATELET # BLD AUTO: 196 10E3/UL (ref 150–450)
PLATELET # BLD AUTO: 216 10E3/UL (ref 150–450)
PLATELET # BLD AUTO: 260 10E3/UL (ref 150–450)
PO2 BLD: 146 MM HG (ref 80–105)
PO2 BLD: 152 MM HG (ref 80–105)
PO2 BLD: 173 MM HG (ref 80–105)
PO2 BLD: 176 MM HG (ref 80–105)
PO2 BLD: 191 MM HG (ref 80–105)
PO2 BLD: 195 MM HG (ref 80–105)
PO2 BLD: 202 MM HG (ref 80–105)
PO2 BLD: 210 MM HG (ref 80–105)
PO2 BLD: 214 MM HG (ref 80–105)
PO2 BLD: 85 MM HG (ref 80–105)
PO2 BLD: 87 MM HG (ref 80–105)
PO2 BLD: 96 MM HG (ref 80–105)
PO2 BLDA: 338 MM HG (ref 80–105)
PO2 BLDA: 424 MM HG (ref 80–105)
PO2 BLDV: 23 MM HG (ref 25–47)
PO2 BLDV: 36 MM HG (ref 25–47)
PO2 BLDV: 37 MM HG (ref 25–47)
PO2 BLDV: 39 MM HG (ref 25–47)
POLYCHROMASIA BLD QL SMEAR: SLIGHT
POTASSIUM SERPL-SCNC: 3.7 MMOL/L (ref 3.4–5.3)
POTASSIUM SERPL-SCNC: 4.1 MMOL/L (ref 3.4–5.3)
POTASSIUM SERPL-SCNC: 4.1 MMOL/L (ref 3.4–5.3)
POTASSIUM SERPL-SCNC: 4.5 MMOL/L (ref 3.4–5.3)
POTASSIUM SERPL-SCNC: 4.5 MMOL/L (ref 3.4–5.3)
PROT SERPL-MCNC: 5.8 G/DL (ref 6.4–8.3)
RBC # BLD AUTO: 2.6 10E6/UL (ref 3.8–5.2)
RBC # BLD AUTO: 2.73 10E6/UL (ref 3.8–5.2)
RBC # BLD AUTO: 2.76 10E6/UL (ref 3.8–5.2)
RBC # BLD AUTO: 2.87 10E6/UL (ref 3.8–5.2)
RBC MORPH BLD: ABNORMAL
SAO2 % BLDA: 95 % (ref 92–100)
SAO2 % BLDA: 96 % (ref 92–100)
SAO2 % BLDA: 96 % (ref 92–100)
SAO2 % BLDA: 97 % (ref 92–100)
SAO2 % BLDA: 98 % (ref 92–100)
SAO2 % BLDA: 99 % (ref 92–100)
SAO2 % BLDA: 99 % (ref 92–100)
SAO2 % BLDA: >100 % (ref 96–97)
SAO2 % BLDA: >100 % (ref 96–97)
SAO2 % BLDV: 37.9 % (ref 70–75)
SAO2 % BLDV: 64.9 % (ref 70–75)
SAO2 % BLDV: 71.3 % (ref 70–75)
SAO2 % BLDV: 74.6 % (ref 70–75)
SCANNED LAB RESULT: ABNORMAL
SCANNED LAB RESULT: NORMAL
SODIUM SERPL-SCNC: 150 MMOL/L (ref 135–145)
SODIUM SERPL-SCNC: 151 MMOL/L (ref 135–145)
UNIT ABO/RH: NORMAL
UNIT NUMBER: NORMAL
UNIT STATUS: NORMAL
UNIT TYPE ISBT: 9500
WBC # BLD AUTO: 24.4 10E3/UL (ref 4–11)
WBC # BLD AUTO: 24.8 10E3/UL (ref 4–11)
WBC # BLD AUTO: 25.8 10E3/UL (ref 4–11)
WBC # BLD AUTO: 31.8 10E3/UL (ref 4–11)

## 2024-08-14 PROCEDURE — 82330 ASSAY OF CALCIUM: CPT | Performed by: CLINICAL NURSE SPECIALIST

## 2024-08-14 PROCEDURE — 90947 DIALYSIS REPEATED EVAL: CPT

## 2024-08-14 PROCEDURE — 33948 ECMO/ECLS DAILY MGMT-VENOUS: CPT | Mod: GC | Performed by: SURGERY

## 2024-08-14 PROCEDURE — 83930 ASSAY OF BLOOD OSMOLALITY: CPT | Performed by: PSYCHIATRY & NEUROLOGY

## 2024-08-14 PROCEDURE — 83051 HEMOGLOBIN PLASMA: CPT | Performed by: SURGERY

## 2024-08-14 PROCEDURE — 94003 VENT MGMT INPAT SUBQ DAY: CPT

## 2024-08-14 PROCEDURE — 250N000011 HC RX IP 250 OP 636: Performed by: STUDENT IN AN ORGANIZED HEALTH CARE EDUCATION/TRAINING PROGRAM

## 2024-08-14 PROCEDURE — 83605 ASSAY OF LACTIC ACID: CPT | Performed by: SURGERY

## 2024-08-14 PROCEDURE — 85379 FIBRIN DEGRADATION QUANT: CPT | Performed by: SURGERY

## 2024-08-14 PROCEDURE — 99232 SBSQ HOSP IP/OBS MODERATE 35: CPT | Performed by: PHYSICIAN ASSISTANT

## 2024-08-14 PROCEDURE — 87077 CULTURE AEROBIC IDENTIFY: CPT | Performed by: STUDENT IN AN ORGANIZED HEALTH CARE EDUCATION/TRAINING PROGRAM

## 2024-08-14 PROCEDURE — 272N000555 HC SENSOR NIRS OXIMETER, ADULT

## 2024-08-14 PROCEDURE — 0B978ZX DRAINAGE OF LEFT MAIN BRONCHUS, VIA NATURAL OR ARTIFICIAL OPENING ENDOSCOPIC, DIAGNOSTIC: ICD-10-PCS | Performed by: SURGERY

## 2024-08-14 PROCEDURE — 82805 BLOOD GASES W/O2 SATURATION: CPT | Performed by: SURGERY

## 2024-08-14 PROCEDURE — 258N000003 HC RX IP 258 OP 636: Performed by: STUDENT IN AN ORGANIZED HEALTH CARE EDUCATION/TRAINING PROGRAM

## 2024-08-14 PROCEDURE — 999N000157 HC STATISTIC RCP TIME EA 10 MIN

## 2024-08-14 PROCEDURE — 250N000011 HC RX IP 250 OP 636

## 2024-08-14 PROCEDURE — 250N000009 HC RX 250

## 2024-08-14 PROCEDURE — 94645 CONT INHLJ TX EACH ADDL HOUR: CPT

## 2024-08-14 PROCEDURE — 258N000003 HC RX IP 258 OP 636: Performed by: SURGERY

## 2024-08-14 PROCEDURE — 83735 ASSAY OF MAGNESIUM: CPT | Performed by: CLINICAL NURSE SPECIALIST

## 2024-08-14 PROCEDURE — 85027 COMPLETE CBC AUTOMATED: CPT | Performed by: SURGERY

## 2024-08-14 PROCEDURE — 200N000002 HC R&B ICU UMMC

## 2024-08-14 PROCEDURE — 85730 THROMBOPLASTIN TIME PARTIAL: CPT | Performed by: SURGERY

## 2024-08-14 PROCEDURE — 250N000011 HC RX IP 250 OP 636: Mod: JZ | Performed by: STUDENT IN AN ORGANIZED HEALTH CARE EDUCATION/TRAINING PROGRAM

## 2024-08-14 PROCEDURE — 250N000009 HC RX 250: Performed by: CLINICAL NURSE SPECIALIST

## 2024-08-14 PROCEDURE — 999N000065 XR CHEST PORT 1 VIEW

## 2024-08-14 PROCEDURE — 250N000009 HC RX 250: Performed by: SURGERY

## 2024-08-14 PROCEDURE — 82310 ASSAY OF CALCIUM: CPT | Performed by: SURGERY

## 2024-08-14 PROCEDURE — 94640 AIRWAY INHALATION TREATMENT: CPT | Mod: 76

## 2024-08-14 PROCEDURE — 71045 X-RAY EXAM CHEST 1 VIEW: CPT | Mod: 26 | Performed by: RADIOLOGY

## 2024-08-14 PROCEDURE — 87106 FUNGI IDENTIFICATION YEAST: CPT | Performed by: STUDENT IN AN ORGANIZED HEALTH CARE EDUCATION/TRAINING PROGRAM

## 2024-08-14 PROCEDURE — 85384 FIBRINOGEN ACTIVITY: CPT | Performed by: SURGERY

## 2024-08-14 PROCEDURE — 71045 X-RAY EXAM CHEST 1 VIEW: CPT

## 2024-08-14 PROCEDURE — 84295 ASSAY OF SERUM SODIUM: CPT | Performed by: STUDENT IN AN ORGANIZED HEALTH CARE EDUCATION/TRAINING PROGRAM

## 2024-08-14 PROCEDURE — 82805 BLOOD GASES W/O2 SATURATION: CPT | Performed by: STUDENT IN AN ORGANIZED HEALTH CARE EDUCATION/TRAINING PROGRAM

## 2024-08-14 PROCEDURE — 33948 ECMO/ECLS DAILY MGMT-VENOUS: CPT

## 2024-08-14 PROCEDURE — 250N000013 HC RX MED GY IP 250 OP 250 PS 637: Performed by: PHYSICIAN ASSISTANT

## 2024-08-14 PROCEDURE — 85300 ANTITHROMBIN III ACTIVITY: CPT | Performed by: SURGERY

## 2024-08-14 PROCEDURE — 31624 DX BRONCHOSCOPE/LAVAGE: CPT

## 2024-08-14 PROCEDURE — 85007 BL SMEAR W/DIFF WBC COUNT: CPT | Performed by: SURGERY

## 2024-08-14 PROCEDURE — 99233 SBSQ HOSP IP/OBS HIGH 50: CPT | Mod: GC | Performed by: PSYCHIATRY & NEUROLOGY

## 2024-08-14 PROCEDURE — 250N000009 HC RX 250: Performed by: STUDENT IN AN ORGANIZED HEALTH CARE EDUCATION/TRAINING PROGRAM

## 2024-08-14 PROCEDURE — P9016 RBC LEUKOCYTES REDUCED: HCPCS | Performed by: SURGERY

## 2024-08-14 PROCEDURE — 84295 ASSAY OF SERUM SODIUM: CPT | Performed by: CLINICAL NURSE SPECIALIST

## 2024-08-14 PROCEDURE — 258N000003 HC RX IP 258 OP 636

## 2024-08-14 PROCEDURE — 80069 RENAL FUNCTION PANEL: CPT | Performed by: CLINICAL NURSE SPECIALIST

## 2024-08-14 PROCEDURE — 99291 CRITICAL CARE FIRST HOUR: CPT | Mod: 25 | Performed by: STUDENT IN AN ORGANIZED HEALTH CARE EDUCATION/TRAINING PROGRAM

## 2024-08-14 PROCEDURE — 250N000012 HC RX MED GY IP 250 OP 636 PS 637

## 2024-08-14 PROCEDURE — 82805 BLOOD GASES W/O2 SATURATION: CPT

## 2024-08-14 PROCEDURE — 94640 AIRWAY INHALATION TREATMENT: CPT

## 2024-08-14 PROCEDURE — 99232 SBSQ HOSP IP/OBS MODERATE 35: CPT | Mod: FS | Performed by: INTERNAL MEDICINE

## 2024-08-14 PROCEDURE — 82248 BILIRUBIN DIRECT: CPT | Performed by: CLINICAL NURSE SPECIALIST

## 2024-08-14 PROCEDURE — 99418 PROLNG IP/OBS E/M EA 15 MIN: CPT | Mod: FS | Performed by: INTERNAL MEDICINE

## 2024-08-14 PROCEDURE — 250N000013 HC RX MED GY IP 250 OP 250 PS 637: Performed by: SURGERY

## 2024-08-14 PROCEDURE — 250N000011 HC RX IP 250 OP 636: Performed by: SURGERY

## 2024-08-14 PROCEDURE — 82330 ASSAY OF CALCIUM: CPT | Performed by: SURGERY

## 2024-08-14 PROCEDURE — 250N000013 HC RX MED GY IP 250 OP 250 PS 637

## 2024-08-14 RX ORDER — LOPERAMIDE HCL 2 MG
2 CAPSULE ORAL EVERY 6 HOURS PRN
Status: DISCONTINUED | OUTPATIENT
Start: 2024-08-14 | End: 2024-08-18

## 2024-08-14 RX ORDER — LOPERAMIDE HCL 1 MG/7.5ML
2 SOLUTION ORAL EVERY 6 HOURS PRN
Status: DISCONTINUED | OUTPATIENT
Start: 2024-08-14 | End: 2024-08-14

## 2024-08-14 RX ORDER — VIT B COMP NO.3/FOLIC/C/BIOTIN 1 MG-60 MG
1 TABLET ORAL DAILY
Status: DISCONTINUED | OUTPATIENT
Start: 2024-08-15 | End: 2024-10-01 | Stop reason: HOSPADM

## 2024-08-14 RX ORDER — DEXMEDETOMIDINE HYDROCHLORIDE 4 UG/ML
.1-1.2 INJECTION, SOLUTION INTRAVENOUS CONTINUOUS
Status: DISCONTINUED | OUTPATIENT
Start: 2024-08-14 | End: 2024-08-15

## 2024-08-14 RX ORDER — NOREPINEPHRINE BITARTRATE 0.06 MG/ML
.01-.6 INJECTION, SOLUTION INTRAVENOUS CONTINUOUS
Status: DISCONTINUED | OUTPATIENT
Start: 2024-08-14 | End: 2024-08-23

## 2024-08-14 RX ORDER — CLONIDINE HYDROCHLORIDE 0.1 MG/1
0.1 TABLET ORAL EVERY 8 HOURS
Status: DISCONTINUED | OUTPATIENT
Start: 2024-08-14 | End: 2024-08-16

## 2024-08-14 RX ADMIN — MIDAZOLAM HYDROCHLORIDE 14 MG/HR: 1 INJECTION, SOLUTION INTRAVENOUS at 02:07

## 2024-08-14 RX ADMIN — Medication 40 MG: at 07:41

## 2024-08-14 RX ADMIN — IPRATROPIUM BROMIDE AND ALBUTEROL SULFATE 3 ML: .5; 3 SOLUTION RESPIRATORY (INHALATION) at 19:52

## 2024-08-14 RX ADMIN — LEVOTHYROXINE SODIUM 25 MCG: 0.03 TABLET ORAL at 07:41

## 2024-08-14 RX ADMIN — BIVALIRUDIN 0.06 MG/KG/HR: 250 INJECTION, POWDER, LYOPHILIZED, FOR SOLUTION INTRAVENOUS at 08:03

## 2024-08-14 RX ADMIN — METRONIDAZOLE 500 MG: 5 INJECTION, SOLUTION INTRAVENOUS at 17:26

## 2024-08-14 RX ADMIN — CHLORHEXIDINE GLUCONATE 0.12% ORAL RINSE 15 ML: 1.2 LIQUID ORAL at 20:18

## 2024-08-14 RX ADMIN — MIDAZOLAM HYDROCHLORIDE 14 MG/HR: 1 INJECTION, SOLUTION INTRAVENOUS at 09:36

## 2024-08-14 RX ADMIN — METRONIDAZOLE 500 MG: 5 INJECTION, SOLUTION INTRAVENOUS at 02:14

## 2024-08-14 RX ADMIN — IPRATROPIUM BROMIDE AND ALBUTEROL SULFATE 3 ML: .5; 3 SOLUTION RESPIRATORY (INHALATION) at 08:57

## 2024-08-14 RX ADMIN — IPRATROPIUM BROMIDE AND ALBUTEROL SULFATE 3 ML: .5; 3 SOLUTION RESPIRATORY (INHALATION) at 00:56

## 2024-08-14 RX ADMIN — WHITE PETROLATUM 57.7 %-MINERAL OIL 31.9 % EYE OINTMENT: at 00:33

## 2024-08-14 RX ADMIN — WHITE PETROLATUM 57.7 %-MINERAL OIL 31.9 % EYE OINTMENT: at 07:42

## 2024-08-14 RX ADMIN — CISATRACURIUM BESYLATE 10 MCG/KG/MIN: 10 INJECTION, SOLUTION INTRAVENOUS at 04:31

## 2024-08-14 RX ADMIN — ACETYLCYSTEINE 4 ML: 100 SOLUTION ORAL; RESPIRATORY (INHALATION) at 00:56

## 2024-08-14 RX ADMIN — CHLORHEXIDINE GLUCONATE 0.12% ORAL RINSE 15 ML: 1.2 LIQUID ORAL at 07:41

## 2024-08-14 RX ADMIN — ACETYLCYSTEINE 4 ML: 100 SOLUTION ORAL; RESPIRATORY (INHALATION) at 08:57

## 2024-08-14 RX ADMIN — LABETALOL HYDROCHLORIDE 20 MG: 5 INJECTION, SOLUTION INTRAVENOUS at 00:54

## 2024-08-14 RX ADMIN — CEFEPIME HYDROCHLORIDE 2 G: 2 INJECTION, POWDER, FOR SOLUTION INTRAVENOUS at 12:15

## 2024-08-14 RX ADMIN — CALCIUM CHLORIDE, MAGNESIUM CHLORIDE, SODIUM CHLORIDE, SODIUM BICARBONATE, POTASSIUM CHLORIDE AND SODIUM PHOSPHATE DIBASIC DIHYDRATE 12.5 ML/KG/HR: 3.68; 3.05; 6.34; 3.09; .314; .187 INJECTION INTRAVENOUS at 12:29

## 2024-08-14 RX ADMIN — IPRATROPIUM BROMIDE AND ALBUTEROL SULFATE 3 ML: .5; 3 SOLUTION RESPIRATORY (INHALATION) at 05:33

## 2024-08-14 RX ADMIN — MIDAZOLAM HYDROCHLORIDE 14 MG/HR: 1 INJECTION, SOLUTION INTRAVENOUS at 17:26

## 2024-08-14 RX ADMIN — CALCIUM CHLORIDE, MAGNESIUM CHLORIDE, DEXTROSE MONOHYDRATE, LACTIC ACID, SODIUM CHLORIDE, SODIUM BICARBONATE AND POTASSIUM CHLORIDE 12.5 ML/KG/HR: 5.15; 2.03; 22; 5.4; 6.46; 3.09; .157 INJECTION INTRAVENOUS at 03:24

## 2024-08-14 RX ADMIN — LOPERAMIDE HYDROCHLORIDE 2 MG: 2 CAPSULE ORAL at 09:36

## 2024-08-14 RX ADMIN — CLONIDINE HYDROCHLORIDE 0.1 MG: 0.1 TABLET ORAL at 09:36

## 2024-08-14 RX ADMIN — ACETYLCYSTEINE 4 ML: 100 SOLUTION ORAL; RESPIRATORY (INHALATION) at 05:33

## 2024-08-14 RX ADMIN — IPRATROPIUM BROMIDE AND ALBUTEROL SULFATE 3 ML: .5; 3 SOLUTION RESPIRATORY (INHALATION) at 11:44

## 2024-08-14 RX ADMIN — METRONIDAZOLE 500 MG: 5 INJECTION, SOLUTION INTRAVENOUS at 09:37

## 2024-08-14 RX ADMIN — IPRATROPIUM BROMIDE AND ALBUTEROL SULFATE 3 ML: .5; 3 SOLUTION RESPIRATORY (INHALATION) at 16:05

## 2024-08-14 RX ADMIN — CALCIUM CHLORIDE, MAGNESIUM CHLORIDE, DEXTROSE MONOHYDRATE, LACTIC ACID, SODIUM CHLORIDE, SODIUM BICARBONATE AND POTASSIUM CHLORIDE 12.5 ML/KG/HR: 5.15; 2.03; 22; 5.4; 6.46; 3.09; .157 INJECTION INTRAVENOUS at 17:46

## 2024-08-14 RX ADMIN — SODIUM CHLORIDE: 4 INJECTION, SOLUTION, CONCENTRATE INTRAVENOUS at 13:58

## 2024-08-14 RX ADMIN — Medication 60 ML: at 13:58

## 2024-08-14 RX ADMIN — Medication 1 TABLET: at 07:41

## 2024-08-14 RX ADMIN — HYDRALAZINE HYDROCHLORIDE 10 MG: 20 INJECTION INTRAMUSCULAR; INTRAVENOUS at 00:31

## 2024-08-14 RX ADMIN — CALCIUM CHLORIDE, MAGNESIUM CHLORIDE, DEXTROSE MONOHYDRATE, LACTIC ACID, SODIUM CHLORIDE, SODIUM BICARBONATE AND POTASSIUM CHLORIDE 12.5 ML/KG/HR: 5.15; 2.03; 22; 5.4; 6.46; 3.09; .157 INJECTION INTRAVENOUS at 07:46

## 2024-08-14 RX ADMIN — Medication 1.5 MG/HR: at 07:09

## 2024-08-14 RX ADMIN — CALCIUM CHLORIDE, MAGNESIUM CHLORIDE, SODIUM CHLORIDE, SODIUM BICARBONATE, POTASSIUM CHLORIDE AND SODIUM PHOSPHATE DIBASIC DIHYDRATE 12.5 ML/KG/HR: 3.68; 3.05; 6.34; 3.09; .314; .187 INJECTION INTRAVENOUS at 22:21

## 2024-08-14 RX ADMIN — Medication 60 ML: at 07:41

## 2024-08-14 RX ADMIN — HYDRALAZINE HYDROCHLORIDE 10 MG: 20 INJECTION INTRAMUSCULAR; INTRAVENOUS at 05:02

## 2024-08-14 RX ADMIN — DEXMEDETOMIDINE HYDROCHLORIDE 0.8 MCG/KG/HR: 400 INJECTION INTRAVENOUS at 21:32

## 2024-08-14 RX ADMIN — EPOPROSTENOL 20 NG/KG/MIN: 1.5 INJECTION, POWDER, LYOPHILIZED, FOR SOLUTION INTRAVENOUS at 03:35

## 2024-08-14 RX ADMIN — ACETYLCYSTEINE 4 ML: 100 SOLUTION ORAL; RESPIRATORY (INHALATION) at 19:52

## 2024-08-14 RX ADMIN — CALCIUM CHLORIDE, MAGNESIUM CHLORIDE, SODIUM CHLORIDE, SODIUM BICARBONATE, POTASSIUM CHLORIDE AND SODIUM PHOSPHATE DIBASIC DIHYDRATE 12.5 ML/KG/HR: 3.68; 3.05; 6.34; 3.09; .314; .187 INJECTION INTRAVENOUS at 17:46

## 2024-08-14 RX ADMIN — CALCIUM CHLORIDE, MAGNESIUM CHLORIDE, DEXTROSE MONOHYDRATE, LACTIC ACID, SODIUM CHLORIDE, SODIUM BICARBONATE AND POTASSIUM CHLORIDE 12.5 ML/KG/HR: 5.15; 2.03; 22; 5.4; 6.46; 3.09; .157 INJECTION INTRAVENOUS at 12:29

## 2024-08-14 RX ADMIN — CALCIUM CHLORIDE, MAGNESIUM CHLORIDE, SODIUM CHLORIDE, SODIUM BICARBONATE, POTASSIUM CHLORIDE AND SODIUM PHOSPHATE DIBASIC DIHYDRATE 12.5 ML/KG/HR: 3.68; 3.05; 6.34; 3.09; .314; .187 INJECTION INTRAVENOUS at 07:46

## 2024-08-14 RX ADMIN — EPOPROSTENOL 20 NG/KG/MIN: 1.5 INJECTION, POWDER, LYOPHILIZED, FOR SOLUTION INTRAVENOUS at 20:17

## 2024-08-14 RX ADMIN — ACETYLCYSTEINE 4 ML: 100 SOLUTION ORAL; RESPIRATORY (INHALATION) at 11:44

## 2024-08-14 RX ADMIN — ACETYLCYSTEINE 4 ML: 100 SOLUTION ORAL; RESPIRATORY (INHALATION) at 16:05

## 2024-08-14 RX ADMIN — NOREPINEPHRINE BITARTRATE 0.03 MCG/KG/MIN: 0.06 INJECTION, SOLUTION INTRAVENOUS at 18:37

## 2024-08-14 RX ADMIN — Medication 60 ML: at 20:18

## 2024-08-14 RX ADMIN — DEXAMETHASONE SODIUM PHOSPHATE 20 MG: 10 INJECTION, SOLUTION INTRAMUSCULAR; INTRAVENOUS at 07:41

## 2024-08-14 RX ADMIN — Medication 1.5 MG/HR: at 20:45

## 2024-08-14 RX ADMIN — CALCIUM CHLORIDE, MAGNESIUM CHLORIDE, DEXTROSE MONOHYDRATE, LACTIC ACID, SODIUM CHLORIDE, SODIUM BICARBONATE AND POTASSIUM CHLORIDE 12.5 ML/KG/HR: 5.15; 2.03; 22; 5.4; 6.46; 3.09; .157 INJECTION INTRAVENOUS at 22:23

## 2024-08-14 RX ADMIN — SODIUM CHLORIDE, POTASSIUM CHLORIDE, SODIUM LACTATE AND CALCIUM CHLORIDE 1000 ML: 600; 310; 30; 20 INJECTION, SOLUTION INTRAVENOUS at 18:02

## 2024-08-14 RX ADMIN — CALCIUM CHLORIDE, MAGNESIUM CHLORIDE, SODIUM CHLORIDE, SODIUM BICARBONATE, POTASSIUM CHLORIDE AND SODIUM PHOSPHATE DIBASIC DIHYDRATE 12.5 ML/KG/HR: 3.68; 3.05; 6.34; 3.09; .314; .187 INJECTION INTRAVENOUS at 03:24

## 2024-08-14 RX ADMIN — DEXMEDETOMIDINE HYDROCHLORIDE 0.7 MCG/KG/HR: 400 INJECTION INTRAVENOUS at 16:22

## 2024-08-14 RX ADMIN — INSULIN GLARGINE 5 UNITS: 100 INJECTION, SOLUTION SUBCUTANEOUS at 12:15

## 2024-08-14 ASSESSMENT — ACTIVITIES OF DAILY LIVING (ADL)
ADLS_ACUITY_SCORE: 55

## 2024-08-14 NOTE — PROGRESS NOTES
"CLINICAL NUTRITION SERVICES - BRIEF NOTE     Reason for RD note: Adding fiber in the setting of loose stools    New Findings/Chart Review:  Nutrition support: via NJT: Vital AF 1.2 @ 35 ml/hr + 3 pkts prosource TF20 provides 840 ml volume, 1248 kcals (21 kcal/kg), 123 g pro (2.1 g/kg), 45 g Fat, 93 g CHO, 4 g fiber, 681 ml free water     Pt with 875 ml stool output yesterday  Plan to remove rectal tube  Cdiff negative, team to add PRN imodium    Interventions:  - Collaboration with providers - SICU rounds  - Nephronex (liquid, hyperosmolar) changed to Renavite in the setting of loose stools  - Banatrol TF BID added for additional 10g fiber    Future/Additional Recommendations:  - Monitor stool trends  - Metabolic cart study as able with MEEP protocol if sweep <=4    Nutrition will continue to follow per protocol.    Abbey Evans, RD, LD, CNSC  Available on [employer] - can search by name or 4E Clinical Dietitian  **Clinical Nutrition is no longer available via pager  Weekend/Holiday RD - \"Weekend Clinical Dietitian\" on [employer]    "

## 2024-08-14 NOTE — PROGRESS NOTES
CRRT STATUS NOTE    DATA:  Time:  6:35 PM  Pressures WNL:  YES  Filter Status:  Filter changed this afternoon.    Problems Reported/Alarms Noted:  None    Supplies Present:  YES    ASSESSMENT:  Patient Net Fluid Balance: +700 ml since MN.  Vital Signs:  Temp: 98.6  F (37  C) Temp src: Esophageal BP: 120/72 Pulse: 91   Resp: 10 SpO2: 100 % O2 Device: Mechanical Ventilator      Labs:  Last Comprehensive Metabolic Panel:  Sodium   Date Value Ref Range Status   08/14/2024 150 (H) 135 - 145 mmol/L Final     Potassium   Date Value Ref Range Status   08/14/2024 4.5 3.4 - 5.3 mmol/L Final     Chloride   Date Value Ref Range Status   08/14/2024 115 (H) 98 - 107 mmol/L Final     Carbon Dioxide (CO2)   Date Value Ref Range Status   08/14/2024 23 22 - 29 mmol/L Final     Anion Gap   Date Value Ref Range Status   08/14/2024 12 7 - 15 mmol/L Final     Glucose   Date Value Ref Range Status   08/14/2024 243 (H) 70 - 99 mg/dL Final     GLUCOSE BY METER POCT   Date Value Ref Range Status   08/14/2024 221 (H) 70 - 99 mg/dL Final     Urea Nitrogen   Date Value Ref Range Status   08/14/2024 50.1 (H) 6.0 - 20.0 mg/dL Final     Creatinine   Date Value Ref Range Status   08/14/2024 1.48 (H) 0.51 - 0.95 mg/dL Final     GFR Estimate   Date Value Ref Range Status   08/14/2024 42 (L) >60 mL/min/1.73m2 Final     Comment:     eGFR calculated using 2021 CKD-EPI equation.     Calcium   Date Value Ref Range Status   08/14/2024 8.5 (L) 8.8 - 10.4 mg/dL Final     Comment:     Reference intervals for this test were updated on 7/16/2024 to reflect our healthy population more accurately. There may be differences in the flagging of prior results with similar values performed with this method. Those prior results can be interpreted in the context of the updated reference intervals.      Lab Results   Component Value Date    WBC 24.4 08/14/2024     Lab Results   Component Value Date    RBC 2.73 08/14/2024     Lab Results   Component Value Date    HGB 8.5  "08/14/2024     Lab Results   Component Value Date    HCT 26.3 08/14/2024     No components found for: \"MCT\"  Lab Results   Component Value Date    MCV 96 08/14/2024     Lab Results   Component Value Date    MCH 31.1 08/14/2024     Lab Results   Component Value Date    MCHC 32.3 08/14/2024     Lab Results   Component Value Date    RDW 17.1 08/14/2024     Lab Results   Component Value Date     08/14/2024        Goals of Therapy:  50-100cc/h; not meeting fluid removal goal. Received one unit Prbc's and 1L LR for hypotension, now starting levophed.    INTERVENTIONS:   Circuit restarted this afternoon for high TMP and filter pressures.    PLAN:  Continue fluid removal per goals of care as patient tolerates. Check filter daily. Change filter q72 hours and PRN. Please call CRRT resource RN on [employer] with any questions or concerns.       "

## 2024-08-14 NOTE — PROGRESS NOTES
Veno-venous ECMO Progress Note  8/14/2024    Massiel Flaherty is a 53 year old female who was started on VV ECMO due to severe respiratory failure from presumed pneumonia although source has not been identified.      Interval events:   Additional suction events needed overnight.     The patients vital signs today are as follows:    Temp:  [98.1  F (36.7  C)-98.6  F (37  C)] 98.2  F (36.8  C)  Pulse:  [] 95  Resp:  [10] 10  BP: (120-143)/(72-83) 120/72  MAP:  [71 mmHg-123 mmHg] 71 mmHg  Arterial Line BP: ()/(51-87) 118/51  FiO2 (%):  [60 %] 60 %  SpO2:  [93 %-100 %] 98 %       FiO2 (%): 60 %, Resp: 10, Inspiratory Pressure (cm H2O) (Drager Jessie): 25, Vent Mode: PCV Plus assist, Resp Rate (Set): 10 breaths/min, PEEP (cm H2O): 10 cmH2O, Resp Rate (Set): 10 breaths/min, PEEP (cm H2O): 10 cmH2O   Recent Labs   Lab 08/14/24  0943 08/14/24  0756 08/14/24  0611 08/14/24  0427   PH 7.39 7.41 7.47* 7.51*   PCO2 41 39 32* 30*   PO2 146* 173* 152* 87   HCO3 25 24 23 24   O2PER 60 60 60 60      Recent Labs   Lab 08/14/24  0942 08/14/24  0426 08/13/24  2213 08/13/24  1544   WBC 31.8* 24.8* 24.8* 17.0*   HGB 9.0* 8.7* 8.4* 7.7*     Creatinine   Date Value Ref Range Status   08/14/2024 1.37 (H) 0.51 - 0.95 mg/dL Final   08/14/2024 1.37 (H) 0.51 - 0.95 mg/dL Final   08/13/2024 1.41 (H) 0.51 - 0.95 mg/dL Final   08/13/2024 1.39 (H) 0.51 - 0.95 mg/dL Final     Physical Exam:   Cannula unchanged. Minimal oozing.  Minimal air movement bilaterally.  Extremities edematous.    ECMO Issues including assessments and plan on DOS [id number]:    Neuro: Sedated and paralyzed for mechanical ventilation and ECMO. Will wean paralysis today. RASS goal: -1 to 0 once off paralytic.   CV: Hemodynamically stable now off pressors   Pulm: Severe respiratory failure requiring ECMO and mechanical ventilation. Bronchoscopy today with small amount of thick secretions just distal to ETT, airways otherwise clear. Flows unchanged at ~4.3 LPM   Keep  vent settings at rest settings, do not titrate FiO2 above 60%.  FEN/Renal: pulling on CRRT, agree/continue  Heme: Hemoglobin low 8's .  Goals: if O2 sat >85% Hgb may drift to 7-8.  If O2 Sat <85% keep Hgb 10-12. Bivalrudin gtt for anticoagulation, changed over the weekend due to concern for heparin resistance. PTT goal 44-88.  ID: on broad-spectrum antimicrobials, antifungals discontinued yesterday with negative fungal antigens     All pertinent labs, imaging studies, physical exam and medications have been reviewed by me.     Will discuss with staff, Dr. Jose Carlos [place]  Surgical Critical Care Fellow

## 2024-08-14 NOTE — PLAN OF CARE
Patient is sedated on dilaudid, versed and paralyzed with cis, pupillometry q4hour, pupils brisk 2-3, BIS 40s; sinus rhythm, HR 90s, MAPs>65, -160s, PRN hydralazine and labetalol given multiple times, afebrile; PC settings (60%/RR10/PC 25/peep 10), veletri via vent, lavaged and bagged by RT, large thick secretions, ECMO sweep turned down to 5, no chugging, bival through ECMO circuit; NJ with TF at 35 mL/hour, rectal tube in place; CRRT  mL/hour.  Continue with current plan of care and notify MD as needed.     Odalis Gibson RN      Problem: ARDS (Acute Respiratory Distress Syndrome)  Goal: Effective Oxygenation  Outcome: Not Progressing  Intervention: Optimize Oxygenation, Ventilation and Perfusion  Recent Flowsheet Documentation  Taken 8/14/2024 0600 by Odalis Gibson RN  Head of Bed (HOB) Positioning: HOB at 20-30 degrees  Taken 8/14/2024 0400 by Odalis Gibson RN  Lung Protection Measures:   lung compliance monitored   ventilator synchrony promoted  Airway/Ventilation Management:   airway patency maintained   calming measures promoted   humidification applied   pulmonary hygiene promoted  Head of Bed (HOB) Positioning: HOB at 20-30 degrees  Taken 8/14/2024 0200 by Odalis Gibson RN  Head of Bed (HOB) Positioning: HOB at 20-30 degrees  Taken 8/14/2024 0000 by Odalis Gibson RN  Lung Protection Measures:   lung compliance monitored   ventilator synchrony promoted  Airway/Ventilation Management:   airway patency maintained   calming measures promoted   humidification applied   pulmonary hygiene promoted  Head of Bed (HOB) Positioning: HOB at 20-30 degrees  Taken 8/13/2024 2300 by Odalis Gibson RN  Head of Bed (HOB) Positioning: HOB flat  Taken 8/13/2024 2200 by Odalis Gibson RN  Head of Bed (HOB) Positioning: HOB at 20-30 degrees  Taken 8/13/2024 2000 by Odalis Gibson RN  Lung Protection Measures:   lung compliance monitored   ventilator synchrony promoted  Airway/Ventilation Management:   airway  patency maintained   calming measures promoted   humidification applied   pulmonary hygiene promoted  Head of Bed (HOB) Positioning: HOB at 15 degrees   Goal Outcome Evaluation:      Plan of Care Reviewed With: patient    Overall Patient Progress: no changeOverall Patient Progress: no change

## 2024-08-14 NOTE — PROGRESS NOTES
"CRRT STATUS NOTE    DATA:  Time:  5:47 AM2  Pressures WNL:  YES  Filter Status:  WDL    Problems Reported/Alarms Noted:  No problems reported by bedside RN.    Supplies Present:  YES    ASSESSMENT:  Patient Net Fluid Balance:  8/13 -2663cc and -444cc since midnight  Vital Signs:  /72   Pulse 94   Temp 98.2  F (36.8  C)   Resp 10   Ht 1.575 m (5' 2\")   Wt 85.5 kg (188 lb 7.9 oz)   SpO2 100%   BMI 34.48 kg/m      Labs:  Last Renal Panel:  Sodium   Date Value Ref Range Status   08/14/2024 151 (H) 135 - 145 mmol/L Final     Potassium   Date Value Ref Range Status   08/14/2024 4.1 3.4 - 5.3 mmol/L Final     Chloride   Date Value Ref Range Status   08/14/2024 116 (H) 98 - 107 mmol/L Final     Carbon Dioxide (CO2)   Date Value Ref Range Status   08/14/2024 22 22 - 29 mmol/L Final     Anion Gap   Date Value Ref Range Status   08/14/2024 13 7 - 15 mmol/L Final     Glucose   Date Value Ref Range Status   08/14/2024 180 (H) 70 - 99 mg/dL Final     GLUCOSE BY METER POCT   Date Value Ref Range Status   08/14/2024 148 (H) 70 - 99 mg/dL Final     Urea Nitrogen   Date Value Ref Range Status   08/14/2024 45.9 (H) 6.0 - 20.0 mg/dL Final     Creatinine   Date Value Ref Range Status   08/14/2024 1.37 (H) 0.51 - 0.95 mg/dL Final     GFR Estimate   Date Value Ref Range Status   08/14/2024 46 (L) >60 mL/min/1.73m2 Final     Comment:     eGFR calculated using 2021 CKD-EPI equation.     Calcium   Date Value Ref Range Status   08/14/2024 8.7 (L) 8.8 - 10.4 mg/dL Final     Comment:     Reference intervals for this test were updated on 7/16/2024 to reflect our healthy population more accurately. There may be differences in the flagging of prior results with similar values performed with this method. Those prior results can be interpreted in the context of the updated reference intervals.     Phosphorus   Date Value Ref Range Status   08/14/2024 3.6 2.5 - 4.5 mg/dL Final     Albumin   Date Value Ref Range Status   08/14/2024 3.0 " (L) 3.5 - 5.2 g/dL Final       Goals of Therapy:  50-100ml/hr    INTERVENTIONS:   None required overnight. Na 151 with post 3% at 80cc/hr.    PLAN:  Continue with plan of care. Call CRRT resource RN with questions or concerns.

## 2024-08-14 NOTE — PROGRESS NOTES
Neurocritical Care Progress Note    Reason for critical care admission: Concern for anoxic brain injury   Admitting Team: SICU  Date of Service:  08/14/2024  Date of Admission:  8/8/2024  Hospital Day: 7    Assessment/Plan  Massiel Flaherty is a 53 year old female with a past medical history including severe anxiety and depression, fibromyalgia, myalgic encephalomyelitis, hypothyroidism, MURIEL on CPAP, history of GTC seizures and myoclonic epilepsy currently not on medication,  spells with staring and BUE posturing previously described as pseudoseizures, expressive aphasia, and hypertension was admitted to an OSH on 8/3/3024 for evaluation and management of CAP for which she required intubation on 8/6/2024. Hypoxia remained refractory and on 8/8/2024 she required prone positioning and paralysis for ARDS. She was deemed suitable for transfer to [facility] for consideration of VV ECMO on 8/8/2024; she was cannulated for VV ECMO on arrival to [facility].      Head CT on 8/9/2024 was concerning for diffuse cerebral edema with diffuse blurring of gray-white differentiation concerning for hypoxic injury. NCC was consulted on 8/10/2024 for concern of hypoxic brain injury. pCO2 was 91 around the time the 8/9/2024 head CT was obtained. CT this AM with improved edema with pCO2 43.      Per EHR review on 12/4/2023 she had a 3 to 4 day EEG monitor at [facility] prior the visit on 12/4/2023 which did not capture typical events, documented normal interictal EEG, and captured one- short event that occurred with photic stimulation during which the patient reported head numbness with arm and leg twitching without EEG correlate. It was felt the head numbness with arm and leg twitching was related to stress and not epilepsy.     24 hour update: Remains critically ill, requiring respiratory optimization including paralysis.    Neuro  #Concern for anoxic brain injury   #History of GTC  #History of myoclonic epilepsy  #History of pseudoseizures    -Continue to recommend generally avoiding hypotension, optimize oxygenation, pCO2 within normal range  -Ok liberalizing Na goal to 145-150 at this point  -Will follow tomorrow [facility] 4 am  -Call NCC with NPI < 3.0 with lights off in the room   -MRI when able    NCC will continue to follow. Please call with questions or concerns.      The patient was seen and discussed with the NCC attending, Dr. Silva.    Lorenzo Gray MD on 2024 at 6:19 PM  Neurocritical Care  *[id number]    24 Hour Vital Signs Summary:  Temp: 98.8  F (37.1  C) Temp  Min: 98.2  F (36.8  C)  Max: 98.8  F (37.1  C)  Resp: 10 Resp  Min: 10  Max: 10  SpO2: 100 % SpO2  Min: 93 %  Max: 100 %  Pulse: 103 Pulse  Min: 78  Max: 103    No data recorded  BP: 120/72 Systolic (24hrs), Av , Min:120 , Max:143   Diastolic (24hrs), Av, Min:72, Max:83      Respiratory monitoring:   FiO2 (%): 60 %, Resp: 10, Inspiratory Pressure (cm H2O) (Drager Jessie): 25, Vent Mode: PCV Plus assist, Resp Rate (Set): 10 breaths/min, PEEP (cm H2O): 10 cmH2O, Resp Rate (Set): 10 breaths/min, PEEP (cm H2O): 10 cmH2O      I/O last 3 completed shifts:  In: 3356.95 [I.V.:1966.95; NG/GT:550]  Out: 6375.7 [Other:5775.7; Stool:600]    Current Medications:  Current Facility-Administered Medications   Medication Dose Route Frequency Provider Last Rate Last Admin    acetylcysteine (MUCOMYST) 10 % nebulizer solution 4 mL  4 mL Nebulization Q4H Aniceto Adame MD   4 mL at 24 1605    ceFEPIme (MAXIPIME) 2 g vial to attach to  mL bag for ADULTS or NS 50 mL bag for PEDS  2 g Intravenous Q12H Barry Hatch MD   2 g at 24 1215    chlorhexidine (PERIDEX) 0.12 % solution 15 mL  15 mL Mouth/Throat Q12H -Giovanny Spence PA-C   15 mL at 24 0741    [Held by provider] cloNIDine (CATAPRES) tablet 0.1 mg  0.1 mg Oral or Feeding Tube Q8H Cal Lisa MD   0.1 mg at 24 0936    dexAMETHasone PF (DECADRON) injection 20 mg  20 mg  Intravenous Daily Cal Lisa MD   20 mg at 08/14/24 0741    Followed by    [START ON 8/17/2024] dexAMETHasone PF (DECADRON) injection 10 mg  10 mg Intravenous Daily Cal Lisa MD        fiber modular (BANATROL TF) packet 1 packet  1 packet Per Feeding Tube BID Giovanny Guidry PA-C        insulin aspart (NovoLOG) injection (RAPID ACTING)  1-12 Units Subcutaneous Q4H Dong Rico PA-C   4 Units at 08/14/24 1624    insulin glargine (LANTUS PEN) injection 5 Units  5 Units Subcutaneous Daily Cal Lisa MD   5 Units at 08/14/24 1215    ipratropium - albuterol 0.5 mg/2.5 mg/3 mL (DUONEB) neb solution 3 mL  3 mL Nebulization Q4H Aniceto Adame MD   3 mL at 08/14/24 1605    levothyroxine (SYNTHROID/LEVOTHROID) tablet 25 mcg  25 mcg Per Feeding Tube QAM AC Giovanny Guidry PA-C   25 mcg at 08/14/24 0741    metroNIDAZOLE (FLAGYL) infusion 500 mg  500 mg Intravenous Q8H Dong Rico PA-C   500 mg at 08/14/24 1726    [START ON 8/15/2024] multivitamin RENAL (RENAVITE RX/NEPHROVITE) tablet 1 tablet  1 tablet Oral or Feeding Tube Daily Giovanny Guidry PA-C        pantoprazole (PROTONIX) 2 mg/mL suspension 40 mg  40 mg Per Feeding Tube QAM AC Barry Hatch MD   40 mg at 08/14/24 0741    Prosource TF20 ENfit Compatibl EN LIQD (PROSOURCE TF20) packet 60 mL  1 packet Per Feeding Tube TID Giovanny Guidry PA-C   60 mL at 08/14/24 1358       PRN Medications:  Current Facility-Administered Medications   Medication Dose Route Frequency Provider Last Rate Last Admin    acetaminophen (TYLENOL) tablet 650 mg  650 mg Oral or Feeding Tube Q4H PRN Giovanny Guidry PA-C   650 mg at 08/12/24 2005    Or    acetaminophen (TYLENOL) Suppository 650 mg  650 mg Rectal Q4H PRN Giovanny Guidry PA-C        calcium gluconate 2 g in  mL intermittent infusion  2 g Intravenous Q8H PRN Dakota Dorsey, APRN CNS        calcium gluconate 4 g in sodium chloride 0.9 % 100 mL intermittent infusion  4 g  Intravenous Q8H PRN Dakota Dorsey APRN CNS        dextrose 10% infusion   Intravenous Continuous PRN Giovanny Guidry PA-C        glucose gel 15-30 g  15-30 g Oral Q15 Min PRN Giovanny Guidry PA-C        Or    dextrose 50 % injection 25-50 mL  25-50 mL Intravenous Q15 Min PRN Giovanny Guidry PA-C   50 mL at 08/09/24 0755    Or    glucagon injection 1 mg  1 mg Subcutaneous Q15 Min PRN Giovanny Guidry PA-C        hydrALAZINE (APRESOLINE) injection 10 mg  10 mg Intravenous Q4H PRN Anika Mead MD   10 mg at 08/14/24 0502    hydromorphone (DILAUDID) 0.2 mg/mL bolus dose from infusion pump 0.5 mg  0.5 mg Intravenous Q1H PRN Dong Rico PA-C   0.5 mg at 08/13/24 0822    labetalol (NORMODYNE/TRANDATE) injection 10-20 mg  10-20 mg Intravenous Q2H PRN Dong Rico PA-C   20 mg at 08/14/24 0054    loperamide (IMODIUM) capsule 2 mg  2 mg Oral or Feeding Tube Q6H PRN Barry Hatch MD   2 mg at 08/14/24 0936    magnesium sulfate 2 g in 50 mL sterile water intermittent infusion  2 g Intravenous Q8H PRN Dakota Dorsey APRN CNS        midazolam (VERSED) 1 mg/mL bolus from syringe/bag pump ADULT  2 mg Intravenous Q1H PRN Dong Rico PA-C   2 mg at 08/12/24 1534    naloxone (NARCAN) injection 0.2 mg  0.2 mg Intravenous Q2 Min PRN Barry Hatch MD        Or    naloxone (NARCAN) injection 0.4 mg  0.4 mg Intravenous Q2 Min PRN Barry Hatch MD        Or    naloxone (NARCAN) injection 0.2 mg  0.2 mg Intramuscular Q2 Min PRN Barry Hatch MD        Or    naloxone (NARCAN) injection 0.4 mg  0.4 mg Intramuscular Q2 Min PRN Barry Hatch MD        potassium chloride 20 mEq in 50 mL intermittent infusion  20 mEq Intravenous Q8H PRN Dakota Dorsey APRN CNS        sodium chloride 0.9% BOLUS 1-250 mL  1-250 mL Intravenous Q1H PRN Ainceto Adame MD   50 mL at 08/10/24 1542    sodium phosphate 15 mmol in sodium chloride 0.9 % 250 mL intermittent  infusion  15 mmol Intravenous Q8H PRN Dakota Dorsey APRN CNS           Infusions:  Current Facility-Administered Medications   Medication Dose Route Frequency Provider Last Rate Last Admin    bivalirudin (ANGIOMAX) 250 mg in sodium chloride 0.9 % 250 mL ANTICOAGULANT infusion  0-0.3 mg/kg/hr (Dosing Weight) Intravenous Continuous Dong Rico PA-C 5.3 mL/hr at 08/14/24 1600 0.06 mg/kg/hr at 08/14/24 1600    dexmedeTOMIDine (PRECEDEX) 4 mcg/mL in sodium chloride 0.9 % 100 mL infusion  0.1-1.2 mcg/kg/hr (Dosing Weight) Intravenous Continuous Dong Rico PA-C 15.6 mL/hr at 08/14/24 1622 0.7 mcg/kg/hr at 08/14/24 1622    dextrose 10% infusion   Intravenous Continuous PRN Giovanny Guidry PA-C        dialysate for CVVHD & CVVHDF (PrismaSol BGK 2/3.5)  12.5 mL/kg/hr CRRT Continuous Dakota Dorsey APRN CNS 1,100 mL/hr at 08/14/24 1229 12.5 mL/kg/hr at 08/14/24 1229    epoprostenol (VELETRI) inhalation solution  20 ng/kg/min (Ideal) Nebulization Continuous Dong Rico PA-C 3 mL/hr at 08/14/24 0537 20 ng/kg/min at 08/14/24 0537    HYDROmorphone (DILAUDID) 0.2 mg/mL infusion ADULT/PEDS GREATER than or EQUAL to 20 kg  0.3-1.5 mg/hr Intravenous Continuous Dong Rico PA-C 7.5 mL/hr at 08/14/24 1600 1.5 mg/hr at 08/14/24 1600    midazolam (VERSED) 100 mg/100 mL NS infusion - ADULT  1-14 mg/hr Intravenous Continuous Dong Rico PA-C 14 mL/hr at 08/14/24 1726 14 mg/hr at 08/14/24 1726    POST-Filter REPLACEMENT SOlution for CVVHD/CVVHDF (3% sodium chloride)   Intravenous Continuous Dakota Dorsey APRN CNS 80 mL/hr at 08/14/24 1358 New Bag at 08/14/24 1358    PRE-filter replacement solution for CVVHD & CVVHDF (Phoxillum BK4/2.5)  12.5 mL/kg/hr CRRT Continuous Dakota Dorsey, DOREEN CNS 1,100 mL/hr at 08/14/24 1229 12.5 mL/kg/hr at 08/14/24 1229       Allergies   Allergen Reactions    Celecoxib Unknown    Sulfa Antibiotics Hives     Per  "careeverywhere    Tetracycline Hives     Per careeverywhere       Physical Examination:  Vitals: /72   Pulse 103   Temp 98.8  F (37.1  C) (Esophageal)   Resp 10   Ht 1.575 m (5' 2\")   Wt 85.5 kg (188 lb 7.9 oz)   SpO2 100%   BMI 34.48 kg/m    General: Adult female patient, lying in bed, critically-ill.  HEENT: Normocephalic, atraumatic.  Cardiac: She appears adequately perfused.   Pulm: Synchronous with mechanical ventilator.   Abdomen: Non-distended.  Extremities: Warm, distal extremities do not appear acutely threatened.   Skin: No obvious rashes or lesions on exposed skin.   Psych: Sedated.  Neuro:  Mental status: Sedated. Partial eye opening to strong verbal and noxious stimuli, no verbal, does not follow commands.   Cranial nerves: Limited by sedation and ETT. NPI 4.0 bilaterally. Face appears symmetric though exam limited by ETT.   Motor: Normal bulk and tone. No abnormal movements.   Sensory: Deferred.  Coordination: Deferred.  Gait: Deferred.    Labs and Imaging:    All relevant imaging and laboratory values personally reviewed.    "

## 2024-08-14 NOTE — PROGRESS NOTES
[facility] General ID Service: Follow Up Note      Patient:  Massiel Flaherty   Date of birth 1970, Medical record number [id number]  Date of Visit:  08/14/2024  Date of Admission: 8/8/2024         Assessment and Recommendations:   ID Problem List:  Acute hypoxic and hypercapnic respiratory failure c/b ARDS and on VV-ECMO 8/8/24  Etiology unknown, possible CAP vs other infectious (fungal) vs noninfectious  Fever  Dry cough x1 month prior to admission  CHACORTA requiring CRRT  Cerebral edema; intentional hypernatremia  Right adnexal mass  Elevated AST, alk phos  Anemia  Recent Augmentin, Z-pack and steroid tapers  Antibiotic allergy - Sulfa (hives), tetracycline (hives)    Recommendations:  Continue cefepime  Continue Flagyl  Awaiting diagnostics outlined below  Consider MRI brain when able    Discussion:  Massiel Flaherty is a 53 year old female with past medical history significant for asthma, MURIEL on CPAP, HTN, anxiety,depression, expressive aphasia, fibromyalgia, and hypothyroidism who presented to OSH 8/3/24 with fever, fatigue, dyspnea with c/f CAP and requiring intubation, proning, paralysis and transferred to [facility] 8/8/24 for further cares and now placed on VV ECMO and CHACORTA requiring CRRT. ID consulted for pneumonia.      Per chart review, had sought care multiple times since early July for an acute on chronic cough. No improvement with multiple courses of steroid tapers, Z-pack, and Augmentin as outpatient. Admitted 8/3/24 with fever and hypoxia. She has completed at least 3 days of azithromycin at OSH since first admitted 8/3 and has been on cefepime since 8/3 with clinical worsening. No infectious etiology has been identified to date. Negative ID work up outlined below. Negative EVELIO, ANCA, MPO, proteinase 3 at OSH. CXR with ongoing diffuse bilateral pulmonary opacities consistent with ARDS and now paralyzed, on VV-ECMO and CRRT.      Started on Amphotericin B 8/9 due to high level of concern for  endemic fungal pneumonia given multiple presentations and lack of improvement on abx, with steroids prescribed, discontinued on 8/13 with antigens negative, unrevealing bronch. Continues on cefepime for more possible bacterial pneumonia. Respiratory cultures and testing is all negative to date. Has completed 3 days of Azithromycin without improvement. Sent Karius on 8/13 given ongoing critical illness and negative work up thus far.       8/6/24 Legionella urine ag Neg    MRSA nares Neg    Bcx x2  No growth    PJP PCR Neg    Respiratory bacterial cx No growth   8/7/24 BD glucan Neg    HIV Ag Ab Neg    Aspergillus antigen blood Neg   8/8/24 IgG 302 (low)    Respiratory aerobic cx C.albicans    MRSA nares; SA target DNA Neg/Neg    BCx NGTD   8/9 Blastomyces Ag EIA blood Neg    BD glucan Neg (44)    Aspergillus galactomannan Neg    IgG 258    HIV Ag Ab combo Neg    Cryptococcal Ag blood Neg    Coccidioides Ag EIA blood Neg    Histoplasma capsulatum Ag blood Neg    Hep B core Ab IgM Neg    Hep A Ab IgM Neg    Hep B surface Ab Pos    Hep C Ab  Neg    Hep B surface Ag Neg    Respiratory aerobic cx BAL No growth    AFB stain and Cx BAL  Stain neg, NGTD    Fungal cx BAL  C.albicans    KOH prep BAL Neg    CMV PCR BAL PENDING    Nocardia cx BAL NGTD    Legionella cx BAL NGTD    HSV PCR BAL Neg    PJP PCR BAL Neg    Aspergillus galactomannan BAL Neg    EBV PCR BAL PENDING    Adenovirus PCR BAL Neg    Respiratory aerobic cx BAL No growth    AFB culture and stain BAL Stain neg, NGTD    Fungal Cx BAL NGTD    KOH prep BAL Neg    Legionella species cx BAL NGTD    Nocardia species cx BAL NGTD    Legionella PCR BAL Neg    HSV PCR BAL Neg   8/10/24 Respiratory panel PCR neg    COVID-19 Neg   8/11/24 Histoplasma capsulatum BAL PENDING    Histoplasma capsulatum blood PENDING   8/12/24 Tick-borne disease neg    Leptospira IgM PENDING    Hantavirus antibodies PENDING    GC/Chlamydia swab Neg   8/13/24 C.diff Neg    Karius PENDING        Recs were discussed with primary team today. Don't hesitate to call with questions.     Attestation:  I have reviewed today's vital signs, medications, labs and imaging.    Marce Black PA-C  Pronouns: she/her/hers  Infectious Diseases  Contact via [employer] or [employer] Paging/Directory        40 MINUTES SPENT BY ME on the date of service doing chart review, history, exam, documentation & further activities per the note.             Interval History:      Afebile, off of pressors. Remains on CRRT. Remains on VV ECMO. Bronch this morning. C.diff negative         Current Antimicrobials   Current:  - cefepime *-present  - flagyl 8/11-present    Prior:  - amphotericin B 8/9-8/13  - azithromycin 8/9-/811         History of Present Illness:    Per ID consult note 8/9/24:  Massiel Flaherty is a 53 year old female with past medical history significant for asthma, MURIEL on CPAP, HTN, anxiety,depression, expressive aphasia, fibromyalgia, obesity, and hypothyroidism who was transferred to [facility] 8/8/24 for further cares and now placed on VV ECMO and CRRT. ID consulted for pneumonia.      She was transferred to [facility] from OSH. Presented to hospital in [place], [place] on 8/3 with fever, fatigue, dyspnea with concern for CAP. Xray with multifocal bilateral opacities. CT chest with severe multifocal bilateral opacities, negative for PE in OSH notes, though unable to see direct report. Negative COVID, flu, and viral testing. O2 sats reportedly 60% on arrival.      She was started on azithromycin, cefepime, vancomycin, and solumedrol x1. Had reportedly been on a steroid taper recently, so had started on atovaquone ppx on 8/7 at OSH. Prior to admission, was seen in clinic and given PO Augmentin on 7/30 for acute on chronic cough x2 weeks. Was also seen at OSH ED 7/19 for asthma exacerbation, got Z-pack and prednisone taper. Was seen prior on 7/8 with cough and got additional prednisone, azithromycin. Has also had some concern for  "recent \"memory issues and brain fog\" and has \"zoning out episodes\". Head imaging normal, seen by neurology who felt this was more psych issue per OSH records. She has history of seizure workup and is not on any AEDs. She is on rifaximin for unknown reason. Was febrile at OSH Tmax 101.3F and required pressors. Infectious workup at OSH with negative - RSV, COVID, flu, RVP, Fungitell, Legionella urine Ag, Strep pneumococcal urine Ag, sputum PJP PCR, HIV, Aspergillus antigen, and EVELIO. MRSA nares negative. Sputum Cx 8/6 - NGTD at 48 hrs. Bcx 8/6 at OSH NGTD. Procal 4.16.      On 8/6, was transferred to [facility] and intubated for severe ARDS requiring paralysis, proning. Remained on cefepime. Transferred to [facility] on 8/8 given continued decline, started V-V ECMO and CRRT. Son, brother, and niece/nephew at bedside on 8/9 morning. Son provides additional history primarily. Reports onset of dry nonproductive cough ~2 weeks ago and followed by zoning out spells. She had no other URI symptoms - sore throat, sinus congestion, no headache, chest pain, nausea, vomiting, abdominal pain, diarrhea that he was aware. Patient had been living with her mother due to difficulties caring for herself. Has had multiple falls in the last month. Recently fell out of a recliner - has bruising to left foot from this fall. Unsure if she hit her head. Son denies any tobacco, alcohol, or illicit drug use by patient. No recent travel. Son was sick with viral illness ~1 months ago, no other recent sick contacts. She is around her sister's dog - who is well, no illness. No livestock or other animal exposures. Says she does have a hx of colon cancer - likely polyp that was resected. Family denies chemo/radiation/surgery. 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        Physical Exam:   Ranges for vital signs:  Temp:  [97.9  F (36.6  C)-98.6  F (37  C)] 98.4  F (36.9 "  C)  Pulse:  [76-94] 93  Resp:  [10] 10  BP: (120-143)/(72-83) 120/72  MAP:  [72 mmHg-123 mmHg] 72 mmHg  Arterial Line BP: ()/(51-87) 128/51  FiO2 (%):  [60 %] 60 %  SpO2:  [93 %-100 %] 100 %    Intake/Output Summary (Last 24 hours) at 8/12/2024 1456  Last data filed at 8/12/2024 1400  Gross per 24 hour   Intake 4354.59 ml   Output 6225.9 ml   Net -1871.31 ml     Exam:  GENERAL:  sedated, paralyzed, and intubated in ICU. Supine in bed.   ENT:  Head is normocephalic, atraumatic. Oropharynx is moist, ETT in place.  EYES:  Eyes have anicteric sclerae, noninjected conjunctivae without discharge or crusting on lashes.    LUNGS:  Minimal air movement on auscultation. +mechanical ventilation.   CARDIOVASCULAR:  RRR, +S1/S2, no murmur appreciated  ABDOMEN:  soft, non-distended. +hypoactive bowel sounds.  EXT: Extremities cool, 1-2+ BLE edema.  SKIN:  No acute rashes, jaundice, or diaphoresis.  Left internal jugular line and PIV x3, L radial art line, +ECMO cannula R groin in place and all without any surrounding erythema.  NEUROLOGIC:  sedated and paralyzed         Laboratory Data:   Reviewed.  Pertinent for:    Culture data:  Culture   Date Value Ref Range Status   08/09/2024 No Growth  Final   08/09/2024 Yeast (A)  Preliminary   08/09/2024 No growth after 4 days  Preliminary   08/09/2024 Culture negative, monitoring continues  Preliminary   08/09/2024 No Growth  Final   08/09/2024 No growth after 4 days  Preliminary   08/09/2024 Culture negative, monitoring continues  Preliminary   08/09/2024 No growth after 4 days  Preliminary   08/08/2024 No Growth  Final   08/08/2024 1+ Candida albicans (A)  Final     Comment:     Susceptibilities not routinely done, refer to antibiogram to view typical susceptibility profiles     GS Culture   Date Value Ref Range Status   08/09/2024 See corresponding culture for results  Final   08/09/2024 See corresponding culture for results  Final       Inflammatory Markers  No lab results  found.    Hematology Studies    Recent Labs   Lab Test 08/14/24  0426 08/13/24 2213 08/13/24  1544 08/13/24  1012   WBC 24.8* 24.8* 17.0* 19.2*   HGB 8.7* 8.4* 7.7* 7.7*   MCV 94 95 96 96    211 186 176     Recent Labs   Lab Test 08/14/24  0426 08/13/24 2213 08/13/24  1544 08/13/24  1012   ANEU 18.4* 19.3* 14.6* 14.2*   AEOS 0.0 0.0 0.0 0.0       Metabolic Studies     Recent Labs   Lab Test 08/14/24  0747 08/14/24 0426 08/14/24  0037 08/13/24 2213 08/13/24 2002 08/13/24  1544   * 151* 150* 151* 150* 150*   POTASSIUM  --  4.1  --  4.5 4.4 4.6   CHLORIDE  --  116*  --  117* 117* 118*   CO2  --  22  --  23 22 22   BUN  --  45.9*  --  46.9* 43.2* 45.4*   CR  --  1.37*  --  1.41* 1.39* 1.44*   GFRESTIMATED  --  46*  --  44* 45* 43*       Hepatic Studies    Recent Labs   Lab Test 08/14/24 0426 08/13/24 2002 08/13/24  1151 08/13/24  0405 08/12/24  0945 08/12/24  0401   BILITOTAL 0.4  --   --  0.4  --  0.4   ALKPHOS 224*  --   --  237*  --  281*   ALBUMIN 3.0* 2.9* 2.5* 2.5*   < > 2.4*   AST 43  --   --  59*  --  48*   ALT 23  --   --  21  --  21    < > = values in this interval not displayed.            Imaging:     CXR 8/13/24  IMPRESSION:   1. Endotracheal tube tip is obscured and likely projects over the  upper/mid thoracic trachea. Otherwise Stable support devices.  2. Unchanged dense consolidative opacities bilaterally.    CXR 8/12/24  IMPRESSION:   1. Stable support devices.  2. Dense consolidative opacities bilaterally with minimally improved  areas of parenchymal aeration.    US pelvic limited 8/10/24  IMPRESSION: Post hysterectomy. No overt adnexal abnormality identified on  transabdominal imaging.    CT head 8/10/24  IMPRESSION:   Slightly improved differentiation of the gray-white matter and  sulcation suggesting that the prior study may have been impacted by  artifact.     CT Chest/abd/pelvis 8/9/24  IMPRESSION:   1. Diffuse patchy groundglass and consolidative densities throughout  the  lungs with small bilateral pleural effusions. Findings may  represent severe pulmonary edema, and/or  infectious /inflammatory  pathology, or ARDS.   2. Multiple prominent and a few enlarged mediastinal lymph nodes,  possibly reactive. Recommend attention on follow-up.  3. Support devices as detailed above.  4. Mild hepatomegaly.  5. Trace pelvic ascites, nonspecific bilateral perinephric  streakiness.  6. Asymmetric heterogeneous bulky right adnexa, consider nonemergent  pelvic ultrasound for further evaluation.  7. Additional incidental/chronic findings as detailed above      ECHO  ECHO Congenital Transthoracic (TTE)  Result Date: 8/8/2024    Limited echocardiogram performed for assessment of LV systolic function.   Left Ventricle: The left ventricle appears normal in size. The ejection fraction, measured by biplane, is 71%. There are no wall motion abnormalities.   Limited assessment of RV.  Normal appearing right ventricular chamber size and systolic function.   Normal IVC size with minimal respirophasic changes.   No prior study for comparison. Left Ventricle The left ventricle appears normal in size. Wall thickness is normal. The ejection fraction, measured by biplane, is 71%. There are no wall motion abnormalities. IVC/SVC Normal IVC size with minimal respirophasic changes. Pericardium There is no pericardial effusion. Study Details A limited echo was performed with limited 2D. The sonographer requested the use of Definity- imaging enhancing agent per protocol. The patient denies contraindications and gives verbal consent for imaging enhancing agent injection.

## 2024-08-14 NOTE — PROGRESS NOTES
[facility]    ECLS Shift Summary:     ECMO Equipment:  Console Serial Number: [id number]  Circuit Lot Number: not recorded by Perfusion  Oxygenator Lot Number: [id number]    Circuit Assessment: Fibrin  Fibrin Location: connectors, corners of oxygenator.    Venous Drainage ECMO Cannula: 25 Fr in the Right Femoral Vein  Venous Return ECMO Cannula: 17 Fr in the Right Internal Jugular Vein      ECMO Cannula Arterial Right internal jugular-Site Assessment: WDL, Sutured, Secure  ECMO Cannula Venous Right femoral vein-Site Assessment: WDL, Sutured, Secure  ECMO Cannula Arterial Right internal jugular-Site Intervention: No intervention needed  ECMO Cannula Venous Right femoral vein-Site Intervention: No intervention needed    Patient remains on V-V ECMO, all equipment is functioning and alarms are appropriately set. RPM's: 3600 with Blood Flow (Circuit) LPM  Av.5 LPM  Min: 4.46 LPM  Max: 4.51 LPM L/min. Sweep is at (S) 4 LPM and 100 %. Extremities are cool.     Significant Shift Events: Recirculation 17%    Vent settings:  FiO2 (%): 60 %, Resp: 10, Inspiratory Pressure (cm H2O) (Drager Jesise): 25, Vent Mode: PCV Plus assist, Resp Rate (Set): 10 breaths/min, PEEP (cm H2O): 10 cmH2O, Resp Rate (Set): 10 breaths/min, PEEP (cm H2O): 10 cmH2O    Anticoagulation:  Dose (units/hr) HEParin: 0 Units/hr  Rate (mL/hr) HEParin: 0 mL/hr  Concentration HEParin: 100 Units/mL     Dose (mg/kg/hr) Bivalirudin: 0.06 mg/kg/hr  Rate (mL/hr) Bivalirudin: 5.3 mL/hr  Concentration Bivalirudin: 1 mg/mL  Most recent: ACT  (seconds):  (folowing PTTs)    Patient on CRRT.  Blood loss was minimal. Product given included PRBC x1.     Intake/Output Summary (Last 24 hours) at 2024 1701  Last data filed at 2024 1644  Gross per 24 hour   Intake 3288.15 ml   Output 5275.7 ml   Net -1987.55 ml       Labs:  Recent Labs   Lab 24  1608 24  1607 24  1358 24  1204 24  0943   PH  7.38  --  7.41 7.40 7.39   PCO2 37  --  38 40 41   PO2 202*  --  214* 176* 146*   HCO3 22  --  24 25 25   O2PER 60 60  60  100 60 60 60       Lab Results   Component Value Date    HGB 8.5 (L) 08/14/2024    PHGB 40 (H) 08/14/2024     08/14/2024    FIBR 351 08/14/2024    INR 1.37 (H) 08/12/2024    PTT 48 (H) 08/14/2024    DD 3.03 (H) 08/14/2024    ANTCH 122 08/14/2024       Plan is continue VV ECMO at this time.    Berna Rosen, RT  ECMO Specialist  8/14/2024 5:01 PM

## 2024-08-14 NOTE — PROCEDURES
THORACIC SURGERY BEDSIDE PROCEDURE   NAME: Massiel Flaherty   MRN: [id number]  : 1970    Diagnosis: Respiratory failure. Mucous plugging  Procedure: Flexible bronchoscopy     Specimen: Left BAL   Premedication: Patient sedated and paralyzed. 3mL of 1% lidocaine placed in ET tube  Procedure Meds:  1% topical lidocaine solution at the ivy  Procedure: A timeout was called to review the case and patient information. The patient was positioned supine. ET tube was utilized to gain access into the trachea. appropriate apposition was achieved.  Bilateral tracheobronchial trees were inspected closely to the level of the subsegmental bronchi.  Secretions were found to be in the upper airway and moderately thickened. Lower airways had mild secretions.  These were lavaged and evacuated without difficulty. The sample from the left BAL was sent to the laboratory. The procedure was completed and the patient tolerated the procedure well and without complications.      Findings:  Moderate thick clear secretions in the upper airways, mildly thick secretions in lower airways. Amount was less than upper airway    Plan: CXR post bronchoscopy    SICU fellow and Dr. Rowe was available for assistance throughout the entire procedure.      Cal Lisa MD  SICU Resident

## 2024-08-14 NOTE — PROGRESS NOTES
"CRRT STATUS NOTE    DATA:  Time:    Pressures WNL:  YES  Filter Status:  WDL    Problems Reported/Alarms Noted:  None    Supplies Present:  Yes    ASSESSMENT:  Patient Net Fluid Balance:    Intake/Output Summary (Last 24 hours) at 8/13/2024 1954  Last data filed at 8/13/2024 1900  Gross per 24 hour   Intake 3762.71 ml   Output 5531 ml   Net -1768.29 ml       Vital Signs:  Vital signs:  Temp: 98.6  F (37  C) Temp src: Esophageal BP: (!) 143/83 Pulse: 91   Resp: 10 SpO2: 94 % O2 Device: Mechanical Ventilator   Height: 157.5 cm (5' 2\") Weight: 86.5 kg (190 lb 11.2 oz)    Labs:    Lab Results   Component Value Date    PH 7.41 08/13/2024    PO2 93 08/13/2024    PCO2 38 08/13/2024    HCO3 24 08/13/2024    THONG -0.2 08/13/2024     iCal   4.8  Mg    2.2  Phos 4.3  Lactic 0.8       Lab Results   Component Value Date     08/13/2024    Lab Results   Component Value Date    CHLORIDE 118 08/13/2024    Lab Results   Component Value Date    BUN 45.4 08/13/2024      Lab Results   Component Value Date    POTASSIUM 4.6 08/13/2024    Lab Results   Component Value Date    CO2 22 08/13/2024    Lab Results   Component Value Date    CR 1.44 08/13/2024        Lab Results   Component Value Date    WBC 17.0 (H) 08/13/2024    HGB 7.7 (L) 08/13/2024    HCT 23.6 (L) 08/13/2024    MCV 96 08/13/2024     08/13/2024        Goals of Therapy:   mL/hr    INTERVENTIONS: Goals and rates adjusted today. No other changes.    PLAN: Continue fluid removal per goals of care as patient tolerates. Check filter daily. Change filter q72 hours and PRN. Please call CRRT Resource RN on Vocera with any questions or concerns.      "

## 2024-08-14 NOTE — PROGRESS NOTES
SURGICAL ICU PROGRESS NOTE  08/14/2024        Date of Service (when I saw the patient): 08/14/2024    ASSESSMENT:  Massiel Flaherty is a 53 year old female who was admitted to [facility].   Past medical hx of Anxiety/depression, fibromyalgia, hypothyroidism, asthma, HLD, MURIEL on CPAP, spells, off on anti seizure medications, expressive aphasia, hypertension was seen at [facility] and was placed on Augmentin outpatient on 7/30/24.   She admitted to the hospital on 8/3/24 for fatigue, fever and dyspnea and intubated 8/6/24 at OSH, proned and paralyzed without improvement. Transferred to [facility] 8/8/24, hypoxic with high plateaus/peaks and placed on VV EMCO and CRRT.     OVERNIGHT EVENTS:   - Episode of abrupt drop in tidal volumes overnight; Bag lavaged and suctioned with moderate amount of thick mucous return   - Oxygen improving, sweep down to 5   - Hydral added PRN for ongoing HTN despite PRN labetalol     CHANGES and MAJOR THINGS TODAY:   - Tidal volumes this AM up to 100-130   - Stop paralysis today   - Plan for bronch ~ 1100   - Continue pulling on CRRT   - Resume PTA clonidine in setting of multiple PRNs overnight    - Remove rectal tube; OK to use pouch if needed   - Considering CPT based on bronch today   - Add lantus 5u daily   - Update family at bedside   - Head CT tomorrow per NCC     PLAN:  Neurological:  # Fibromyalgia   # Hx myalgic encephalomyelitis   # Anxiety   # sedation and analgesia for vent compliance   - Monitor neurological status. Delirium preventions and precautions.   - Pain: Dilaudid gtt         - Sedation plan: Versed gtt    -  Paralysis: Off 8/9/24; Resumed overnight 8/10-11 --> Wean off today (8/14)    - will review PTA medications and resume appropriate medications as able.     # Hx spells with staring and BUE posturing previously described as pseudoseizures   # Hx history of GTC seizures and myoclonic epilepsy, weaned off AEDs   # C/f hypoxic ischemic injury   # Diffuse cerebral edema    - Head CT 8/9: Findings concerning for diffuse cerebral edema with  diffuse blurring of the gray-white differentiation. Findings are  concerning for hypoxicischemic injury.   - 3% NaCl dialysate in coordination with nephrology; trend Sodiums   - Sodium goals: 150-155   - NCC consult   - EEG now off   - 8/14: Plan for repeat routine head CT tomorrow AM per NCC. OK to continue sodium goals at 150-155 until off paralysis and heavy sedation.     Pulmonary:   # Asthma  # MURIEL on home CPAP   # Acute hypoxic and hypercapnic respiratory failure   # ARDS  # s/p VV EMCO 8/8  FiO2 (%): 60 %, Resp: 10, Inspiratory Pressure (cm H2O) (Drager Jessie): 25, Vent Mode: PCV Plus assist, Resp Rate (Set): 10 breaths/min, PEEP (cm H2O): 10 cmH2O, Resp Rate (Set): 10 breaths/min, PEEP (cm H2O): 10 cmH2O    - continue with rest vent setting on ECMO. Plan to optimize ECMO   - Chest CT 8/9: Diffuse patchy groundglass and consolidative densities throughout  the lungs with small bilateral pleural effusions. Findings may  represent severe pulmonary edema, and/or  infectious /inflammatory  pathology, or ARDS. Multiple prominent and a few enlarged mediastinal lymph nodes,  possibly reactive.  - 8/11: Restart veletri continuous neb  - 8/11: Increase inspiratory pressure to 35 to attempt achieving slightly higher tidal volumes   - 8/12: Decadron 20mg daily x 5 days, then 10mg x 5 days   - Mucomyst, duonebs q4h   - Bronch today     ECMO:  Sweep 5LPM  FiO2 100%   Flow 4.5  Bival gtt with PTT goals 44-88 // ACT goal of 160-180   - Superior cannula retracted 8/10 for ongoing low PaO2   - 8/11: bival infusion replaced for heparin given concern for heparin resistance     Cardiovascular:    # hyperlipidemia    # shock, presumed septic  - MAP >65  - Repeat ECHO 8/12: Left ventricular size, wall motion and function are normal. The ejection fraction is 60-65%. Flattened septum is consistent with right ventricular pressure and volume overload. Right  ventricular function, chamber size, wall motion, and thickness are normal.  - PRN labetalol, hydral for SBP goal < 140   - 8/14: Start PTA clonidine 0.1mg q8h      Gastroenterology/Nutrition:  # GERD   # NPO status   # constipation; resolved   # diarrhea   - Protonix (home medication)   - RD consult in am for SBFT and TF recommendations   - CT CAP 8/9: Mild hepatomegaly. Trace pelvic ascites, nonspecific bilateral perinephric  streakiness. Asymmetric heterogeneous bulky right adnexa, consider nonemergent  pelvic ultrasound for further evaluation.   - NJ tube for meds, TF   - Rectal tube in place; remove today  - imodium PRN      Fluids/Electrolytes/Renal:  # Acute kidney injury  # Hyperkalemia, resolved  # Hyperphosphatemia   # Volume status   # Bilateral perinephritic stranding    # Nonspecific  right adnexa collection   - Renal Consulted.   - CRRT started. UF pull -100Ml/hr   - Trending CRRT labs  - Monitor I/Os closely   - Pelvic US without findings of collection 8/10 (transabdominal, not transvaginal)    - Sodium goals as above   - 8/11: Gyn consulted for right adnexal finding on CT. Low suspicion for abscess but would treat as we are currently. Collection too small for surgical intervention and too small for IR drainage as well. Could follow up with pelvic MRI, however we will treat as current with IV antibiotics.       Endocrine:  # Hypothyroidism   # concern for stress hyperglycemia due to critical illness   # hypoglycemia; resolved    - synthroid resumed   - continue with sliding scale insulin due to feed start, reassess needs.   - Goal to keep BG< 180 for optimal wound healing   - 8/14: Lantus 5u daily      ID:  # Leukocytosis  # Pneumonia   # ARDS   # Concern for PID   PER OSH: 8/6: RSV, COVID, parainfluenza,  negative . Legionella and pneumococcal urine antigens negative. Patient has been on greater than 20 mg prednisone daily for 18 days.  CULTURES: 8/8/2024: Respiratory viral panel negative. Blood  "cultures negative, MRSA nasal negative, Legionella urine antigen negative, respiratory culture negative.PCR trachea for PJP is negative.  COVID: Negative. Viral panel-negative PJP  - ID consulted   - Follow up bronch cultures from 8/14  - Bronch again today     Antimicrobials:   - Azithromycin- stopped 8/11   - Cefepime   - Amphotericin - stopped 8/13   - Flagyl added 8/11     [facility] labs:   8/8: sputum growing candida albicans    8/9: Bronch BAL with yeast +   MRSA negative   - Cryptococcal AG, aspergillus negative   - Blastomycosis, histo - negative     Heme:     # Acute blood loss anemia   - transfusion parameters per EMCO protocol    - one unit prbc 8/8/24  - Transfuse if hgb <7.0 or signs/symptoms of hypoperfusion. Monitor and trend.       Musculoskeletal:  # Weakness and deconditioning of critical illness  # Left ankle injury pre-hospitalization    - Physical and occupational therapy when able.   - Ongoing ecchymosis to LLE     Skin:  # Ecchymosis - BLE   # Left toe duskiness   - bilateral lower leg ecchymosis   - WOC consult   - 8/13: Worsening duskiness to left 3rd, 4th toes noted; Off pressors      General Cares/Prophylaxis:    DVT Prophylaxis: Bival gtt per ECMO   GI Prophylaxis: PPI  Restraints: Restraints for medical healing needed: NO     Lines/ tubes/ drains:  - ETT   - Right  ECMO cannula   - Right Femoral cannula   - Left internal jugular   - radial arterial line   - NJ   - PIV\"s      Disposition:  -  Surgical ICU       Time spent on this Encounter   Billing:  I spent 55 minutes bedside and on the inpatient unit today managing the critical care of Massiel Flaherty in relation to the issues listed in this note.      Dong Rico PA-C    ====================================  INTERVAL HISTORY:  Ongoing sodium trends for goal. Continue high sedation, however STOP paralysis. Pull on CRRT. Bronch today. Start lantus.       OBJECTIVE:   1. VITAL SIGNS:   Temp:  [98.1  F (36.7  C)-98.6  F (37  C)] " 98.2  F (36.8  C)  Pulse:  [] 95  Resp:  [10] 10  BP: (120-143)/(72-83) 120/72  MAP:  [71 mmHg-123 mmHg] 71 mmHg  Arterial Line BP: ()/(51-87) 118/51  FiO2 (%):  [60 %] 60 %  SpO2:  [93 %-100 %] 98 %  FiO2 (%): 60 %, Resp: 10, Inspiratory Pressure (cm H2O) (Drager Jessie): 25, Vent Mode: PCV Plus assist, Resp Rate (Set): 10 breaths/min, PEEP (cm H2O): 10 cmH2O, Resp Rate (Set): 10 breaths/min, PEEP (cm H2O): 10 cmH2O      2. INTAKE/ OUTPUT:   I/O last 3 completed shifts:  In: 3900.38 [I.V.:2510.38; NG/GT:550]  Out: 6975 [Other:6275; Stool:700]    3. PHYSICAL EXAMINATION:  General: chemically sedated.   HEENT: PERRLA. Pupils 3mm and reactive. ETT present and secured. NJ tube. OG to LIS with thin green drainage.   RIght neck with ECMO cannula, sutured in place.  Left internal jugular CVC in place, secured.   Neuro: PERRL 3mm. Patient paralyzed.   Pulm/Resp:  minimal breath sounds, VT's   CV: RRR, S1/S2   Abdomen: Soft, non-distended, non-tender, no rebound tenderness or guarding, no masses  Incisions/Skin: scattered ecchymosis in BLE in toes around ankles bilaterally. Limbs cool.    MSK/Extremities:generalize BLE 1+ edema. Calves compressible, (+) doppler tones  DP/PT on feet.     4. INVESTIGATIONS:   Arterial Blood Gases   Recent Labs   Lab 08/14/24  0943 08/14/24  0756 08/14/24  0611 08/14/24  0427   PH 7.39 7.41 7.47* 7.51*   PCO2 41 39 32* 30*   PO2 146* 173* 152* 87   HCO3 25 24 23 24     Complete Blood Count   Recent Labs   Lab 08/14/24  0942 08/14/24  0426 08/13/24  2213 08/13/24  1544   WBC 31.8* 24.8* 24.8* 17.0*   HGB 9.0* 8.7* 8.4* 7.7*    216 211 186     Basic Metabolic Panel  Recent Labs   Lab 08/14/24  0942 08/14/24  0804 08/14/24  0747 08/14/24  0431 08/14/24  0426 08/14/24  0038 08/14/24  0037 08/13/24  2213 08/13/24 2004 08/13/24 2002   *  --  151*  --  151*  --  150* 151*  --  150*   POTASSIUM 4.1  --   --   --  4.1  --   --  4.5  --  4.4   CHLORIDE 115*  --   --   --   116*  --   --  117*  --  117*   CO2 23  --   --   --  22  --   --  23  --  22   BUN 48.1*  --   --   --  45.9*  --   --  46.9*  --  43.2*   CR 1.37*  --   --   --  1.37*  --   --  1.41*  --  1.39*   * 181*  --  148* 180*   < >  --  175*   < > 223*    < > = values in this interval not displayed.     Liver Function Tests  Recent Labs   Lab 08/14/24  0426 08/13/24 2002 08/13/24  1151 08/13/24  0405 08/12/24  1539 08/12/24  0945 08/12/24  0401 08/11/24 2154 08/11/24  1603 08/11/24  1021 08/11/24  0405   AST 43  --   --  59*  --   --  48*  --   --   --  47*   ALT 23  --   --  21  --   --  21  --   --   --  21   ALKPHOS 224*  --   --  237*  --   --  281*  --   --   --  318*   BILITOTAL 0.4  --   --  0.4  --   --  0.4  --   --   --  0.6   ALBUMIN 3.0* 2.9* 2.5* 2.5*   < > 2.5* 2.4* 2.4* 2.3*   < > 2.3*   INR  --   --   --   --   --  1.37* 1.30* 1.24* 1.15   < > 1.11    < > = values in this interval not displayed.     Pancreatic Enzymes  No lab results found in last 7 days.  Coagulation Profile  Recent Labs   Lab 08/14/24 0942 08/14/24 0426 08/13/24 2213 08/13/24  1544 08/12/24  1153 08/12/24  0945 08/12/24  0844 08/12/24  0401 08/12/24  0008 08/11/24  2154 08/11/24  1831 08/11/24  1603   INR  --   --   --   --   --  1.37*  --  1.30*  --  1.24*  --  1.15   PTT 45* 47* 50* 50*   < > 44*   < > 42*   < > 36   < > 35    < > = values in this interval not displayed.         5. RADIOLOGY:   Recent Results (from the past 24 hour(s))   XR Chest Port 1 View    Narrative    EXAM: XR CHEST PORT 1 VIEW  8/14/2024 1:25 AM      HISTORY: daily ECMO check    COMPARISON: 8/13/2024    FINDINGS: Single view of the chest. Stable position of right IJ and  inferior approach ECMO cannulae. Esophageal temperature probe tip  projects over the mid thoracic esophagus. Endotracheal tube tip  projects in the mid thoracic trachea. Feeding tube courses outside of  the field of view. Left IJ CVC tip projects near the confluence of  the  innominate veins.    Trachea is midline. Cardiac silhouette is obscured. Dense  consolidative opacities bilaterally with diffuse air bronchograms,  which appears similar to prior. No discernible pneumothorax.      Impression    IMPRESSION:   1. Stable support devices.  2. Dense consolidative opacities bilaterally, which appear stable from  prior, overall improved since 8/11/2024 radiograph.    I have personally reviewed the examination and initial interpretation  and I agree with the findings.    PILAR SAHU MD         SYSTEM ID:  [id number]       =========================================

## 2024-08-14 NOTE — PLAN OF CARE
Neuro: BIS 40s, Versed- 14, CIS turned off, dilaudid- 1.5, started shivering 1 hour post CIS off started shivering, precedex started for shivering + bear hugger. Temp normothermic but extremities remain very cool and mottled    CV:   Rate/rhythm: Sinus rhythm, Hypotensive, 1 L fluid bolus minimally effective, levo ordered     Pulm:  60% PC-AC - RR 10, Pinsp 25 PEEP 10, pulling TVs in 200s PaO2 180s.  Mechanical: ECMO - sweep 4 flows 4.4 100% O2.     GI: NJT with feeds at goal. Rectal tube removed, pouch placed.    : Anuric, bladder scan 12cc. CRRT w/goal   cc/hour, stopped pulling around 1100 due to increasing hypotension.    Skin: scattered bruising, tongue wound    Drains:     Drips:   Versed 14  Dilaudid 1.5  Angiomax 0.06  Veletri 20  Precedex- 0.7          Goal Outcome Evaluation:      Plan of Care Reviewed With: patient    Problem: ARDS (Acute Respiratory Distress Syndrome)  Goal: Effective Oxygenation  Outcome: Progressing  Intervention: Optimize Oxygenation, Ventilation and Perfusion  Recent Flowsheet Documentation  Taken 8/14/2024 1600 by Miguel Jha, RN  Lung Protection Measures:   lung compliance monitored   ventilator synchrony promoted  Airway/Ventilation Management:   airway patency maintained   calming measures promoted   humidification applied   pulmonary hygiene promoted  Head of Bed (HOB) Positioning: HOB at 15 degrees  Taken 8/14/2024 1400 by Miguel Jha, DANNY  Head of Bed (HOB) Positioning: HOB at 15 degrees  Taken 8/14/2024 1200 by Miguel Jha, RN  Lung Protection Measures:   lung compliance monitored   ventilator synchrony promoted  Airway/Ventilation Management:   airway patency maintained   calming measures promoted   humidification applied   pulmonary hygiene promoted  Head of Bed (HOB) Positioning: HOB at 15 degrees  Taken 8/14/2024 1000 by Miguel Jha, RN  Head of Bed (HOB) Positioning: HOB at 15 degrees  Taken 8/14/2024 0800 by Miguel Jha, RN  Lung Protection Measures:    lung compliance monitored   ventilator synchrony promoted  Airway/Ventilation Management:   airway patency maintained   calming measures promoted   humidification applied   pulmonary hygiene promoted  Head of Bed (HOB) Positioning: HOB at 15 degrees

## 2024-08-14 NOTE — PROGRESS NOTES
"Bronchoscopy Risk Assessment Guidelines      A. Patient symptoms to consider when assessing pulmonary TB risk are:    I. Cough greater than 3 weeks; and fever, hemoptysis, pleuritic chest    pain, weight loss greater than 10 lbs, night sweats, fatigue, infiltrates on    upper lobes or superior segments of lower lobes, cavitation on chest    x-ray.   B. Patient risk factors to consider when assessing pulmonary TB risk are:    I. Exposure to known TB case, foreign-born persons (within 5 years of    arrival to [place]), residence in a crowded setting (correctional facility,     long-term care center, etc.), persons with HIV or immunosuppression.    Patients with symptoms and risk factors should generally be considered \"suspect risk\" and bronchoscopies should be performed in airborne precautions.    This patient has NO KNOWN RISK of Tuberculosis (proceed with bronchoscopy)    Specimens sent: yes  Complications: None  Scope used: #[id number]  Slim  Attending Physician: Dr. Jae Bird, RT on 8/14/2024 at 11:24 AM      "

## 2024-08-14 NOTE — PROGRESS NOTES
[facility]    ECLS Shift Summary:     ECMO Equipment:  Console Serial Number: [id number]  Circuit Lot Number: not recorded by Perfusion  Oxygenator Lot Number: [id number]    Circuit Assessment: Fibrin  Fibrin Location: conectors, corners of oxy    Venous Drainage ECMO Cannula: 25 Fr in the Right Femoral Vein  Venous Return ECMO Cannula: 17 Fr in the Right Internal Jugular Vein      ECMO Cannula Arterial Right internal jugular-Site Assessment: WDL, Sutured, Secure  ECMO Cannula Venous Right femoral vein-Site Assessment: WDL, Sutured, Secure  ECMO Cannula Arterial Right internal jugular-Site Intervention: No intervention needed  ECMO Cannula Venous Right femoral vein-Site Intervention: No intervention needed    Patient remains on V-V ECMO, all equipment is functioning and alarms are appropriately set. RPM's: 3600 with Blood Flow (Circuit) LPM  Av.4 LPM  Min: 4.22 LPM  Max: 4.52 LPM L/min. Sweep is at (S) 6 LPM and 10 %. Extremities are warm.     Significant Shift Events: Sweep titrated per pt ABGs.  Bivalirudin dose unchanged, PTT in range. Recirculation calculating effective flow ~2 LPM    Vent settings:  FiO2 (%): 60 %, Resp: 10, Inspiratory Pressure (cm H2O) (Drager Jessie): 25, Vent Mode: PCV Plus assist, Resp Rate (Set): 10 breaths/min, PEEP (cm H2O): 10 cmH2O, Resp Rate (Set): 10 breaths/min, PEEP (cm H2O): 10 cmH2O    Anticoagulation:  Dose (units/hr) HEParin: 0 Units/hr  Rate (mL/hr) HEParin: 0 mL/hr  Concentration HEParin: 100 Units/mL     Dose (mg/kg/hr) Bivalirudin: 0.06 mg/kg/hr  Rate (mL/hr) Bivalirudin: 5.3 mL/hr  Concentration Bivalirudin: 1 mg/mL  Most recent: ACT  (seconds):  (folowing PTTs)    Urine output, patient on CRRT.  .  Blood loss was minimal. Product given included none.     Intake/Output Summary (Last 24 hours) at 2024 0620  Last data filed at 2024 0600  Gross per 24 hour   Intake 3900.38 ml   Output 6675 ml   Net -2774.62 ml        Labs:  Recent Labs   Lab 08/14/24  0427 08/14/24  0426 08/14/24  0425 08/14/24  0211 08/14/24  0037 08/13/24  2213   PH 7.51*  --   --  7.42 7.40 7.41   PCO2 30*  --   --  39 40 39   PO2 87  --   --  96 85 114*   HCO3 24  --   --  25 25 24   O2PER 60 60  100 60 60 60 60       Lab Results   Component Value Date    HGB 8.7 (L) 08/14/2024    PHGB 40 (H) 08/14/2024     08/14/2024    FIBR 407 08/14/2024    INR 1.37 (H) 08/12/2024    PTT 47 (H) 08/14/2024    DD 3.32 (H) 08/14/2024    ANTCH 95 08/13/2024       Plan is continue VV ECMO support.    Tanesha Bates, RT  ECMO Specialist  8/14/2024 6:20 AM

## 2024-08-14 NOTE — PROGRESS NOTES
ECMO Attending Progress Note  8/13/2024    Massiel Flaherty is a 53 year old female who was started on VV ECMO due to severe respiratory failure from presumed pneumonia although source has not been identified. Treating for yeast though only a bronchial culture has been positive for this.     Interval events:   No major events or changes.     The patients vital signs today are as follows:    Temp:  [97.9  F (36.6  C)-98.6  F (37  C)] 98.4  F (36.9  C)  Pulse:  [76-94] 93  Resp:  [10] 10  BP: (120-143)/(72-83) 120/72  MAP:  [72 mmHg-123 mmHg] 72 mmHg  Arterial Line BP: ()/(51-87) 128/51  FiO2 (%):  [60 %] 60 %  SpO2:  [93 %-100 %] 100 %       FiO2 (%): 60 %, Resp: 10, Inspiratory Pressure (cm H2O) (Drager Jessie): 25, Vent Mode: PCV Plus assist, Resp Rate (Set): 10 breaths/min, PEEP (cm H2O): 10 cmH2O, Resp Rate (Set): 10 breaths/min, PEEP (cm H2O): 10 cmH2O   Recent Labs   Lab 08/14/24  0756 08/14/24  0611 08/14/24  0427 08/14/24  0426 08/14/24  0425 08/14/24  0211   PH 7.41 7.47* 7.51*  --   --  7.42   PCO2 39 32* 30*  --   --  39   PO2 173* 152* 87  --   --  96   HCO3 24 23 24  --   --  25   O2PER 60 60 60 60  100   < > 60    < > = values in this interval not displayed.      Recent Labs   Lab 08/14/24  0426 08/13/24  2213 08/13/24  1544 08/13/24  1012   WBC 24.8* 24.8* 17.0* 19.2*   HGB 8.7* 8.4* 7.7* 7.7*     Creatinine   Date Value Ref Range Status   08/14/2024 1.37 (H) 0.51 - 0.95 mg/dL Final   08/13/2024 1.41 (H) 0.51 - 0.95 mg/dL Final   08/13/2024 1.39 (H) 0.51 - 0.95 mg/dL Final   08/13/2024 1.44 (H) 0.51 - 0.95 mg/dL Final     Physical Exam:   Cannula unchanged. Minimal oozing.  Minimal air movement bilaterally.  Extremities edematous.    ECMO Issues including assessments and plan on DOS 8/13/2024:    Neuro: Sedated and paralyzed for mechanical ventilation and ECMO. RASS goal: -4 on paralytic. Can attempt to wean paralytic in the coming day or two, per SICU   CV: Hemodynamically stable now off  pressors   Pulm: Severe respiratory failure requiring ECMO and mechanical ventilation. Flows unchanged at ~4.3   Keep vent settings at rest settings, do not titrate FiO2 above 60%. Okay to wean off veletri on ecmo.  FEN/Renal: pulling on CRRT, agree/continue  Heme: Hemoglobin low 8's .  Goals: if O2 sat >85% Hgb may drift to 7-8.  If O2 Sat <85% keep Hgb 10-12.  ID: on broad-spectrum antimicrobials and antifungals; workup for Blasto, Aspergillosis, and Cocci still pending     All pertinent labs, imaging studies, physical exam and medications have been reviewed by me.     This patient requires continued ICU monitoring and cares.  I apprecitate the input of the SICU team for these issues.  I have discussed patient care and treatment plan with the SICU team.     ECMO [id number]    Yuriy Tee MD   Critical Care and Acute Care Surgery

## 2024-08-14 NOTE — PROGRESS NOTES
Nephrology Progress Note  08/14/2024       Mrs Flaherty is a  53 yof w/ Anxiety, depression, fibromyalgia, hypothyroidism, asthma, HLD, MURIEL on CPAP, expressive aphasia, and hypertension who was admitted to an OSH with community acquired pneumonia on 8/3 s/p intubation.  Found to have severe ARDS requiring paralysis and proning, transferred here on 8/8 for management and initiated on CKRT for severe acidemia in the setting of oligoanuric CHACORTA.  Started CRRT on 8/9 for volume management.      Interval History :   Mrs Flaherty continues on CRRT, doing well from hemodynamic standpoint as we have pulled ~7L in the past 4 days since initiation.  Continuing 100cc/h goal fluid removal, labs stable on 4k baths, neuro status is concerning but continuing to give more time.   Was on AmphoB but now stopped with negative fungal cultures.       Assessment & Recommendations:   CHACORTA-Baseline Cr 0.6, at baseline on admission.  CHACORTA due to septic shock in setting of ARDS, UA on 8/6/2024 essentially bland (30 protein but no blood or casts), no dedicated renal imaging.  No dedicated renal imaging at this time.  Started CRRT 8/9 for volume management, now anuric.    -CHACORTA, started CRRT on 8/9.  Continuing mix of 2k/4k baths with post filter 3% at 80cc/h, 50-100cc/h volume goal.     -Access is ECMO circuit   -Dialysis consent signed and in chart.      Volume status-Net negative ~2.5L yesterday, planning 50-100cc/h net negative today as long as BP's support.      Electrolytes/pH-Last Na 151, continuing post filter 3% at 80cc/h and adjust as needed to keep 150-155 for now.      Ca/phos/pth-No acute issues, Mg and Phos mildly up but no major intervention needed today.     Anemia-Hgb 8.7, acute management per team.      Nutrition-Vital TF started 8/9.    Time spent: 55 minutes on this date of encounter for chart review, physical exam, medical decision making and co-ordination of care.     Seen and discussed with Dr Ortega    Recommendations were  "communicated to primary team via verbal communication.     Dakota Dorsey, APRN CNS  Clinical Nurse Specialist  [phone]      Review of Systems:   I reviewed the following systems:  ROS not done due to vent/sedation.     Physical Exam:   I/O last 3 completed shifts:  In: 3900.38 [I.V.:2510.38; NG/GT:550]  Out: 6975 [Other:6275; Stool:700]   /72   Pulse 93   Temp 98.4  F (36.9  C)   Resp 10   Ht 1.575 m (5' 2\")   Wt 85.5 kg (188 lb 7.9 oz)   SpO2 100%   BMI 34.48 kg/m       GENERAL APPEARANCE: Intubated and sedated and paralyzed  Pulmonary: Intubated and sedated,   CV: regular rhythm, normal rate   - Edema 1-2+ diffuse  GI: soft, nontender, normal bowel sounds  MS: no evidence of inflammation in joints, no muscle tenderness  SKIN:  warm, dry  NEURO: No focal deficits    Labs:   All labs reviewed by me  Electrolytes/Renal -   Recent Labs   Lab Test 08/14/24  0431 08/14/24  0426 08/14/24  0038 08/14/24  0037 08/13/24  2213 08/13/24  2004 08/13/24  2002 08/13/24  1157 08/13/24  1151   NA  --  151*  --  150* 151*  --  150*   < > 153*   POTASSIUM  --  4.1  --   --  4.5  --  4.4   < > 4.5   CHLORIDE  --  116*  --   --  117*  --  117*   < > 121*   CO2  --  22  --   --  23  --  22   < > 22   BUN  --  45.9*  --   --  46.9*  --  43.2*   < > 42.7*   CR  --  1.37*  --   --  1.41*  --  1.39*   < > 1.41*   * 180* 161*  --  175*   < > 223*   < > 203*   QUAN  --  8.7*  --   --  8.7*  --  8.7*   < > 8.1*   MAG  --  2.2  --   --   --   --  2.2  --  2.2   PHOS  --  3.6  --   --   --   --  4.1  --  4.3    < > = values in this interval not displayed.       CBC -   Recent Labs   Lab Test 08/14/24 0426 08/13/24 2213 08/13/24  1544   WBC 24.8* 24.8* 17.0*   HGB 8.7* 8.4* 7.7*    211 186       LFTs -   Recent Labs   Lab Test 08/14/24 0426 08/13/24 2002 08/13/24  1151 08/13/24  0405 08/12/24  0945 08/12/24  0401   ALKPHOS 224*  --   --  237*  --  281*   BILITOTAL 0.4  --   --  0.4  --  0.4   ALT 23  --   --  " 21  --  21   AST 43  --   --  59*  --  48*   PROTTOTAL 5.8*  --   --  5.0*  --  5.1*   ALBUMIN 3.0* 2.9* 2.5* 2.5*   < > 2.4*    < > = values in this interval not displayed.       Iron Panel - No lab results found.        Current Medications:  Current Facility-Administered Medications   Medication Dose Route Frequency Provider Last Rate Last Admin    acetaminophen (TYLENOL) tablet 650 mg  650 mg Oral Q24H Aniceto Adame MD   650 mg at 08/13/24 1130    acetylcysteine (MUCOMYST) 10 % nebulizer solution 4 mL  4 mL Nebulization Q4H Aniceto Adame MD   4 mL at 08/14/24 0533    artificial tears ophthalmic ointment   Both Eyes Q8H Alethea Schaffer MD   Given at 08/14/24 0033    ceFEPIme (MAXIPIME) 2 g vial to attach to  mL bag for ADULTS or NS 50 mL bag for PEDS  2 g Intravenous Q12H Barry Hatch MD   2 g at 08/13/24 2342    chlorhexidine (PERIDEX) 0.12 % solution 15 mL  15 mL Mouth/Throat Q12H Giovanny Guidry PA-C   15 mL at 08/13/24 1943    dexAMETHasone PF (DECADRON) injection 20 mg  20 mg Intravenous Daily Cal Lisa MD   20 mg at 08/13/24 0748    Followed by    [START ON 8/17/2024] dexAMETHasone PF (DECADRON) injection 10 mg  10 mg Intravenous Daily Cal Lisa MD        insulin aspart (NovoLOG) injection (RAPID ACTING)  1-12 Units Subcutaneous Q4H Dong Rico PA-C   1 Units at 08/14/24 0432    ipratropium - albuterol 0.5 mg/2.5 mg/3 mL (DUONEB) neb solution 3 mL  3 mL Nebulization Q4H Aniceto Adame MD   3 mL at 08/14/24 0533    levothyroxine (SYNTHROID/LEVOTHROID) tablet 25 mcg  25 mcg Per Feeding Tube QAM AC Giovanny Guidry PA-C   25 mcg at 08/13/24 0747    metroNIDAZOLE (FLAGYL) infusion 500 mg  500 mg Intravenous Q8H Dong Rico PA-C   500 mg at 08/14/24 0214    multivitamin RENAL (RENAVITE RX/NEPHROVITE) tablet 1 tablet  1 tablet Oral or Feeding Tube Daily Barry Hatch MD   1 tablet at 08/13/24 0735    pantoprazole  (PROTONIX) 2 mg/mL suspension 40 mg  40 mg Per Feeding Tube QAM AC Barry Hatch MD   40 mg at 08/13/24 0748    Prosource TF20 ENfit Compatibl EN LIQD (PROSOURCE TF20) packet 60 mL  1 packet Per Feeding Tube TID Giovanny Guidry PA-C   60 mL at 08/13/24 1944    sodium chloride 0.9% BOLUS 250 mL  250 mL Intravenous Q24H Barry Hatch  mL/hr at 08/13/24 1138 250 mL at 08/13/24 1138     Current Facility-Administered Medications   Medication Dose Route Frequency Provider Last Rate Last Admin    bivalirudin (ANGIOMAX) 250 mg in sodium chloride 0.9 % 250 mL ANTICOAGULANT infusion  0-0.3 mg/kg/hr (Dosing Weight) Intravenous Continuous Dong Rico PA-C 5.3 mL/hr at 08/14/24 0600 0.06 mg/kg/hr at 08/14/24 0600    cisatracurium (NIMBEX) 200 mg in D5W 100 mL infusion  3-10 mcg/kg/min (Ideal) Intravenous Continuous Alethea Schaffer MD 15 mL/hr at 08/14/24 0600 10 mcg/kg/min at 08/14/24 0600    dextrose 10% infusion   Intravenous Continuous PRN Giovanny Guidry PA-C        dialysate for CVVHD & CVVHDF (PrismaSol BGK 2/3.5)  12.5 mL/kg/hr CRRT Continuous Dakota Dorsey APRN CNS 1,100 mL/hr at 08/14/24 0324 12.5 mL/kg/hr at 08/14/24 0324    epoprostenol (VELETRI) inhalation solution  20 ng/kg/min (Ideal) Nebulization Continuous Dong Rico PA-C 3 mL/hr at 08/14/24 0537 20 ng/kg/min at 08/14/24 0537    HYDROmorphone (DILAUDID) 0.2 mg/mL infusion ADULT/PEDS GREATER than or EQUAL to 20 kg  0.3-1.5 mg/hr Intravenous Continuous Dong Rico PA-C 7.5 mL/hr at 08/14/24 0709 1.5 mg/hr at 08/14/24 0709    midazolam (VERSED) 100 mg/100 mL NS infusion - ADULT  1-14 mg/hr Intravenous Continuous Dong Rico PA-C 14 mL/hr at 08/14/24 0600 14 mg/hr at 08/14/24 0600    POST-Filter REPLACEMENT SOlution for CVVHD/CVVHDF (3% sodium chloride)   Intravenous Continuous Dakota Dorsey APRN CNS 80 mL/hr at 08/13/24 0925 Rate Change at 08/13/24 0925    PRE-filter  replacement solution for CVVHD & CVVHDF (Phoxillum BK4/2.5)  12.5 mL/kg/hr CRRT Continuous Dakota Dorsey, APRN CNS 1,100 mL/hr at 08/14/24 0324 12.5 mL/kg/hr at 08/14/24 0324

## 2024-08-15 ENCOUNTER — APPOINTMENT (OUTPATIENT)
Dept: GENERAL RADIOLOGY | Facility: CLINIC | Age: 54
DRG: 003 | End: 2024-08-15
Attending: SURGERY
Payer: MEDICAID

## 2024-08-15 ENCOUNTER — APPOINTMENT (OUTPATIENT)
Dept: CT IMAGING | Facility: CLINIC | Age: 54
DRG: 003 | End: 2024-08-15
Attending: INTERNAL MEDICINE
Payer: MEDICAID

## 2024-08-15 LAB
ABO/RH(D): NORMAL
ALBUMIN SERPL BCG-MCNC: 2.7 G/DL (ref 3.5–5.2)
ALBUMIN SERPL BCG-MCNC: 3.6 G/DL (ref 3.5–5.2)
ALLEN'S TEST: ABNORMAL
ALP SERPL-CCNC: 170 U/L (ref 40–150)
ALT SERPL W P-5'-P-CCNC: 21 U/L (ref 0–50)
ANION GAP SERPL CALCULATED.3IONS-SCNC: 10 MMOL/L (ref 7–15)
ANION GAP SERPL CALCULATED.3IONS-SCNC: 10 MMOL/L (ref 7–15)
ANION GAP SERPL CALCULATED.3IONS-SCNC: 11 MMOL/L (ref 7–15)
ANION GAP SERPL CALCULATED.3IONS-SCNC: 12 MMOL/L (ref 7–15)
ANION GAP SERPL CALCULATED.3IONS-SCNC: 12 MMOL/L (ref 7–15)
ANTIBODY SCREEN: NEGATIVE
APTT PPP: 42 SECONDS (ref 22–38)
APTT PPP: 46 SECONDS (ref 22–38)
APTT PPP: 50 SECONDS (ref 22–38)
APTT PPP: 51 SECONDS (ref 22–38)
AST SERPL W P-5'-P-CCNC: 41 U/L (ref 0–45)
AT III ACT/NOR PPP CHRO: 112 % (ref 85–135)
BASE EXCESS BLDA CALC-SCNC: -0.3 MMOL/L (ref -3–3)
BASE EXCESS BLDA CALC-SCNC: -0.3 MMOL/L (ref -3–3)
BASE EXCESS BLDA CALC-SCNC: -0.6 MMOL/L (ref -3–3)
BASE EXCESS BLDA CALC-SCNC: -0.8 MMOL/L (ref -3–3)
BASE EXCESS BLDA CALC-SCNC: -0.8 MMOL/L (ref -3–3)
BASE EXCESS BLDA CALC-SCNC: -0.9 MMOL/L (ref -3–3)
BASE EXCESS BLDA CALC-SCNC: -0.9 MMOL/L (ref -3–3)
BASE EXCESS BLDA CALC-SCNC: -1.5 MMOL/L (ref -3–3)
BASE EXCESS BLDA CALC-SCNC: -1.8 MMOL/L (ref -3–3)
BASE EXCESS BLDA CALC-SCNC: 0 MMOL/L (ref -3–3)
BASE EXCESS BLDA CALC-SCNC: 0.1 MMOL/L (ref -3–3)
BASE EXCESS BLDA CALC-SCNC: 0.6 MMOL/L (ref -3–3)
BASE EXCESS BLDV CALC-SCNC: -0.3 MMOL/L (ref -3–3)
BASE EXCESS BLDV CALC-SCNC: -1 MMOL/L (ref -3–3)
BASE EXCESS BLDV CALC-SCNC: 0.2 MMOL/L (ref -3–3)
BASE EXCESS BLDV CALC-SCNC: 0.7 MMOL/L (ref -3–3)
BASOPHILS # BLD AUTO: ABNORMAL 10*3/UL
BASOPHILS # BLD MANUAL: 0 10E3/UL (ref 0–0.2)
BASOPHILS # BLD MANUAL: 0.4 10E3/UL (ref 0–0.2)
BASOPHILS NFR BLD AUTO: ABNORMAL %
BASOPHILS NFR BLD MANUAL: 0 %
BASOPHILS NFR BLD MANUAL: 2 %
BILIRUB DIRECT SERPL-MCNC: 0.21 MG/DL (ref 0–0.3)
BILIRUB SERPL-MCNC: 0.4 MG/DL
BLASTS # BLD MANUAL: 0.2 10E3/UL
BLASTS NFR BLD MANUAL: 1 %
BLD PROD TYP BPU: NORMAL
BLD PROD TYP BPU: NORMAL
BLOOD COMPONENT TYPE: NORMAL
BLOOD COMPONENT TYPE: NORMAL
BUN SERPL-MCNC: 49 MG/DL (ref 6–20)
BUN SERPL-MCNC: 51.4 MG/DL (ref 6–20)
BUN SERPL-MCNC: 51.8 MG/DL (ref 6–20)
BUN SERPL-MCNC: 51.9 MG/DL (ref 6–20)
BUN SERPL-MCNC: 54.1 MG/DL (ref 6–20)
CA-I BLD-MCNC: 4.6 MG/DL (ref 4.4–5.2)
CA-I BLD-MCNC: 4.7 MG/DL (ref 4.4–5.2)
CA-I BLD-MCNC: 4.8 MG/DL (ref 4.4–5.2)
CA-I BLD-MCNC: 4.8 MG/DL (ref 4.4–5.2)
CA-I BLD-MCNC: 4.9 MG/DL (ref 4.4–5.2)
CA-I BLD-MCNC: 4.9 MG/DL (ref 4.4–5.2)
CALCIUM SERPL-MCNC: 8 MG/DL (ref 8.8–10.4)
CALCIUM SERPL-MCNC: 8.3 MG/DL (ref 8.8–10.4)
CALCIUM SERPL-MCNC: 8.4 MG/DL (ref 8.8–10.4)
CALCIUM SERPL-MCNC: 8.8 MG/DL (ref 8.8–10.4)
CALCIUM SERPL-MCNC: 8.9 MG/DL (ref 8.8–10.4)
CHLORIDE SERPL-SCNC: 110 MMOL/L (ref 98–107)
CHLORIDE SERPL-SCNC: 112 MMOL/L (ref 98–107)
CHLORIDE SERPL-SCNC: 113 MMOL/L (ref 98–107)
CHLORIDE SERPL-SCNC: 115 MMOL/L (ref 98–107)
CHLORIDE SERPL-SCNC: 118 MMOL/L (ref 98–107)
CODING SYSTEM: NORMAL
CODING SYSTEM: NORMAL
COHGB MFR BLD: 100 % (ref 96–97)
COHGB MFR BLD: 100 % (ref 96–97)
COHGB MFR BLD: 99.3 % (ref 96–97)
COHGB MFR BLD: 99.9 % (ref 96–97)
COHGB MFR BLD: >100 % (ref 96–97)
CREAT SERPL-MCNC: 1.5 MG/DL (ref 0.51–0.95)
CREAT SERPL-MCNC: 1.5 MG/DL (ref 0.51–0.95)
CREAT SERPL-MCNC: 1.51 MG/DL (ref 0.51–0.95)
CREAT SERPL-MCNC: 1.52 MG/DL (ref 0.51–0.95)
CREAT SERPL-MCNC: 1.57 MG/DL (ref 0.51–0.95)
CROSSMATCH: NORMAL
CROSSMATCH: NORMAL
D DIMER PPP FEU-MCNC: 2.04 UG/ML FEU (ref 0–0.5)
D DIMER PPP FEU-MCNC: 2.76 UG/ML FEU (ref 0–0.5)
EGFRCR SERPLBLD CKD-EPI 2021: 39 ML/MIN/1.73M2
EGFRCR SERPLBLD CKD-EPI 2021: 41 ML/MIN/1.73M2
EOSINOPHIL # BLD AUTO: ABNORMAL 10*3/UL
EOSINOPHIL # BLD MANUAL: 0 10E3/UL (ref 0–0.7)
EOSINOPHIL NFR BLD AUTO: ABNORMAL %
EOSINOPHIL NFR BLD MANUAL: 0 %
ERYTHROCYTE [DISTWIDTH] IN BLOOD BY AUTOMATED COUNT: 17.1 % (ref 10–15)
ERYTHROCYTE [DISTWIDTH] IN BLOOD BY AUTOMATED COUNT: 17.4 % (ref 10–15)
ERYTHROCYTE [DISTWIDTH] IN BLOOD BY AUTOMATED COUNT: 17.7 % (ref 10–15)
ERYTHROCYTE [DISTWIDTH] IN BLOOD BY AUTOMATED COUNT: 17.9 % (ref 10–15)
FIBRINOGEN PPP-MCNC: 317 MG/DL (ref 170–510)
FIBRINOGEN PPP-MCNC: 352 MG/DL (ref 170–510)
GLUCOSE BLDC GLUCOMTR-MCNC: 147 MG/DL (ref 70–99)
GLUCOSE BLDC GLUCOMTR-MCNC: 162 MG/DL (ref 70–99)
GLUCOSE BLDC GLUCOMTR-MCNC: 177 MG/DL (ref 70–99)
GLUCOSE BLDC GLUCOMTR-MCNC: 189 MG/DL (ref 70–99)
GLUCOSE BLDC GLUCOMTR-MCNC: 190 MG/DL (ref 70–99)
GLUCOSE BLDC GLUCOMTR-MCNC: 233 MG/DL (ref 70–99)
GLUCOSE SERPL-MCNC: 179 MG/DL (ref 70–99)
GLUCOSE SERPL-MCNC: 205 MG/DL (ref 70–99)
GLUCOSE SERPL-MCNC: 211 MG/DL (ref 70–99)
GLUCOSE SERPL-MCNC: 227 MG/DL (ref 70–99)
GLUCOSE SERPL-MCNC: 257 MG/DL (ref 70–99)
HCO3 BLD-SCNC: 23 MMOL/L (ref 21–28)
HCO3 BLD-SCNC: 23 MMOL/L (ref 21–28)
HCO3 BLD-SCNC: 24 MMOL/L (ref 21–28)
HCO3 BLD-SCNC: 25 MMOL/L (ref 21–28)
HCO3 BLDA-SCNC: 24 MMOL/L (ref 21–28)
HCO3 BLDA-SCNC: 24 MMOL/L (ref 21–28)
HCO3 BLDV-SCNC: 24 MMOL/L (ref 21–28)
HCO3 BLDV-SCNC: 25 MMOL/L (ref 21–28)
HCO3 BLDV-SCNC: 26 MMOL/L (ref 21–28)
HCO3 BLDV-SCNC: 26 MMOL/L (ref 21–28)
HCO3 SERPL-SCNC: 22 MMOL/L (ref 22–29)
HCO3 SERPL-SCNC: 23 MMOL/L (ref 22–29)
HCT VFR BLD AUTO: 23 % (ref 35–47)
HCT VFR BLD AUTO: 23.9 % (ref 35–47)
HCT VFR BLD AUTO: 25.5 % (ref 35–47)
HCT VFR BLD AUTO: 25.9 % (ref 35–47)
HGB BLD-MCNC: 7.1 G/DL (ref 11.7–15.7)
HGB BLD-MCNC: 7.7 G/DL (ref 11.7–15.7)
HGB BLD-MCNC: 8.3 G/DL (ref 11.7–15.7)
HGB BLD-MCNC: 8.3 G/DL (ref 11.7–15.7)
HGB FREE PLAS-MCNC: 50 MG/DL
IMM GRANULOCYTES # BLD: ABNORMAL 10*3/UL
IMM GRANULOCYTES NFR BLD: ABNORMAL %
ISSUE DATE AND TIME: NORMAL
LACTATE SERPL-SCNC: 0.7 MMOL/L (ref 0.7–2)
LACTATE SERPL-SCNC: 0.9 MMOL/L (ref 0.7–2)
LACTATE SERPL-SCNC: 1 MMOL/L (ref 0.7–2)
LACTATE SERPL-SCNC: 1.1 MMOL/L (ref 0.7–2)
LYMPHOCYTES # BLD AUTO: ABNORMAL 10*3/UL
LYMPHOCYTES # BLD MANUAL: 0.5 10E3/UL (ref 0.8–5.3)
LYMPHOCYTES # BLD MANUAL: 1.9 10E3/UL (ref 0.8–5.3)
LYMPHOCYTES # BLD MANUAL: 2.3 10E3/UL (ref 0.8–5.3)
LYMPHOCYTES # BLD MANUAL: 2.5 10E3/UL (ref 0.8–5.3)
LYMPHOCYTES NFR BLD AUTO: ABNORMAL %
LYMPHOCYTES NFR BLD MANUAL: 10 %
LYMPHOCYTES NFR BLD MANUAL: 11 %
LYMPHOCYTES NFR BLD MANUAL: 3 %
LYMPHOCYTES NFR BLD MANUAL: 9 %
MAGNESIUM SERPL-MCNC: 2.1 MG/DL (ref 1.7–2.3)
MAGNESIUM SERPL-MCNC: 2.3 MG/DL (ref 1.7–2.3)
MCH RBC QN AUTO: 30.2 PG (ref 26.5–33)
MCH RBC QN AUTO: 30.7 PG (ref 26.5–33)
MCH RBC QN AUTO: 31.4 PG (ref 26.5–33)
MCH RBC QN AUTO: 31.4 PG (ref 26.5–33)
MCHC RBC AUTO-ENTMCNC: 30.9 G/DL (ref 31.5–36.5)
MCHC RBC AUTO-ENTMCNC: 32 G/DL (ref 31.5–36.5)
MCHC RBC AUTO-ENTMCNC: 32.2 G/DL (ref 31.5–36.5)
MCHC RBC AUTO-ENTMCNC: 32.5 G/DL (ref 31.5–36.5)
MCV RBC AUTO: 96 FL (ref 78–100)
MCV RBC AUTO: 97 FL (ref 78–100)
MCV RBC AUTO: 98 FL (ref 78–100)
MCV RBC AUTO: 98 FL (ref 78–100)
METAMYELOCYTES # BLD MANUAL: 0.5 10E3/UL
METAMYELOCYTES # BLD MANUAL: 0.8 10E3/UL
METAMYELOCYTES # BLD MANUAL: 1.1 10E3/UL
METAMYELOCYTES # BLD MANUAL: 1.1 10E3/UL
METAMYELOCYTES NFR BLD MANUAL: 3 %
METAMYELOCYTES NFR BLD MANUAL: 4 %
METAMYELOCYTES NFR BLD MANUAL: 4 %
METAMYELOCYTES NFR BLD MANUAL: 5 %
MONOCYTES # BLD AUTO: ABNORMAL 10*3/UL
MONOCYTES # BLD MANUAL: 0.5 10E3/UL (ref 0–1.3)
MONOCYTES # BLD MANUAL: 1.3 10E3/UL (ref 0–1.3)
MONOCYTES # BLD MANUAL: 1.3 10E3/UL (ref 0–1.3)
MONOCYTES # BLD MANUAL: 1.9 10E3/UL (ref 0–1.3)
MONOCYTES NFR BLD AUTO: ABNORMAL %
MONOCYTES NFR BLD MANUAL: 3 %
MONOCYTES NFR BLD MANUAL: 6 %
MONOCYTES NFR BLD MANUAL: 7 %
MONOCYTES NFR BLD MANUAL: 7 %
MRSA DNA SPEC QL NAA+PROBE: NEGATIVE
MYELOCYTES # BLD MANUAL: 0.4 10E3/UL
MYELOCYTES # BLD MANUAL: 1.1 10E3/UL
MYELOCYTES # BLD MANUAL: 1.4 10E3/UL
MYELOCYTES # BLD MANUAL: 1.7 10E3/UL
MYELOCYTES NFR BLD MANUAL: 2 %
MYELOCYTES NFR BLD MANUAL: 5 %
MYELOCYTES NFR BLD MANUAL: 6 %
MYELOCYTES NFR BLD MANUAL: 8 %
NEUTROPHILS # BLD AUTO: ABNORMAL 10*3/UL
NEUTROPHILS # BLD MANUAL: 14.4 10E3/UL (ref 1.6–8.3)
NEUTROPHILS # BLD MANUAL: 14.8 10E3/UL (ref 1.6–8.3)
NEUTROPHILS # BLD MANUAL: 15.1 10E3/UL (ref 1.6–8.3)
NEUTROPHILS # BLD MANUAL: 20.5 10E3/UL (ref 1.6–8.3)
NEUTROPHILS NFR BLD AUTO: ABNORMAL %
NEUTROPHILS NFR BLD MANUAL: 70 %
NEUTROPHILS NFR BLD MANUAL: 75 %
NEUTROPHILS NFR BLD MANUAL: 76 %
NEUTROPHILS NFR BLD MANUAL: 83 %
NRBC # BLD AUTO: 0.2 10E3/UL
NRBC # BLD AUTO: 0.3 10E3/UL
NRBC # BLD AUTO: 0.4 10E3/UL
NRBC BLD AUTO-RTO: 1 /100
NRBC BLD AUTO-RTO: 2 /100
NRBC BLD MANUAL-RTO: 1 %
NRBC BLD MANUAL-RTO: 2 %
NRBC BLD MANUAL-RTO: 2 %
O2/TOTAL GAS SETTING VFR VENT: 100 %
O2/TOTAL GAS SETTING VFR VENT: 100 %
O2/TOTAL GAS SETTING VFR VENT: 60 %
OSMOLALITY SERPL: 325 MMOL/KG (ref 275–295)
OSMOLALITY SERPL: 329 MMOL/KG (ref 275–295)
OSMOLALITY SERPL: 329 MMOL/KG (ref 275–295)
OSMOLALITY SERPL: 330 MMOL/KG (ref 275–295)
OSMOLALITY SERPL: 331 MMOL/KG (ref 275–295)
OSMOLALITY SERPL: 333 MMOL/KG (ref 275–295)
OXYHGB MFR BLDA: 98 % (ref 75–100)
OXYHGB MFR BLDA: 98 % (ref 75–100)
OXYHGB MFR BLDV: 77 %
OXYHGB MFR BLDV: 78 %
OXYHGB MFR BLDV: 78 % (ref 70–75)
OXYHGB MFR BLDV: 92 % (ref 70–75)
PCO2 BLD: 36 MM HG (ref 35–45)
PCO2 BLD: 36 MM HG (ref 35–45)
PCO2 BLD: 38 MM HG (ref 35–45)
PCO2 BLD: 39 MM HG (ref 35–45)
PCO2 BLD: 39 MM HG (ref 35–45)
PCO2 BLD: 40 MM HG (ref 35–45)
PCO2 BLD: 41 MM HG (ref 35–45)
PCO2 BLD: 41 MM HG (ref 35–45)
PCO2 BLD: 42 MM HG (ref 35–45)
PCO2 BLDA: 35 MM HG (ref 35–45)
PCO2 BLDA: 38 MM HG (ref 35–45)
PCO2 BLDV: 43 MM HG (ref 40–50)
PCO2 BLDV: 43 MM HG (ref 40–50)
PCO2 BLDV: 44 MM HG (ref 40–50)
PCO2 BLDV: 46 MM HG (ref 40–50)
PEEP: 10 CM H2O
PH BLD: 7.37 [PH] (ref 7.35–7.45)
PH BLD: 7.38 [PH] (ref 7.35–7.45)
PH BLD: 7.39 [PH] (ref 7.35–7.45)
PH BLD: 7.4 [PH] (ref 7.35–7.45)
PH BLD: 7.41 [PH] (ref 7.35–7.45)
PH BLD: 7.41 [PH] (ref 7.35–7.45)
PH BLD: 7.42 [PH] (ref 7.35–7.45)
PH BLD: 7.43 [PH] (ref 7.35–7.45)
PH BLD: 7.44 [PH] (ref 7.35–7.45)
PH BLDA: 7.41 [PH] (ref 7.35–7.45)
PH BLDA: 7.44 [PH] (ref 7.35–7.45)
PH BLDV: 7.36 [PH] (ref 7.32–7.43)
PH BLDV: 7.39 [PH] (ref 7.32–7.43)
PHOSPHATE SERPL-MCNC: 3.5 MG/DL (ref 2.5–4.5)
PHOSPHATE SERPL-MCNC: 4.7 MG/DL (ref 2.5–4.5)
PLAT MORPH BLD: ABNORMAL
PLATELET # BLD AUTO: 157 10E3/UL (ref 150–450)
PLATELET # BLD AUTO: 160 10E3/UL (ref 150–450)
PLATELET # BLD AUTO: 162 10E3/UL (ref 150–450)
PLATELET # BLD AUTO: 185 10E3/UL (ref 150–450)
PO2 BLD: 119 MM HG (ref 80–105)
PO2 BLD: 137 MM HG (ref 80–105)
PO2 BLD: 150 MM HG (ref 80–105)
PO2 BLD: 167 MM HG (ref 80–105)
PO2 BLD: 178 MM HG (ref 80–105)
PO2 BLD: 180 MM HG (ref 80–105)
PO2 BLD: 182 MM HG (ref 80–105)
PO2 BLD: 184 MM HG (ref 80–105)
PO2 BLD: 184 MM HG (ref 80–105)
PO2 BLD: 187 MM HG (ref 80–105)
PO2 BLD: 197 MM HG (ref 80–105)
PO2 BLD: 209 MM HG (ref 80–105)
PO2 BLDA: 363 MM HG (ref 80–105)
PO2 BLDA: 410 MM HG (ref 80–105)
PO2 BLDV: 42 MM HG (ref 25–47)
PO2 BLDV: 44 MM HG (ref 25–47)
PO2 BLDV: 45 MM HG (ref 25–47)
PO2 BLDV: 69 MM HG (ref 25–47)
POLYCHROMASIA BLD QL SMEAR: SLIGHT
POTASSIUM SERPL-SCNC: 3.5 MMOL/L (ref 3.4–5.3)
POTASSIUM SERPL-SCNC: 3.7 MMOL/L (ref 3.4–5.3)
POTASSIUM SERPL-SCNC: 3.8 MMOL/L (ref 3.4–5.3)
POTASSIUM SERPL-SCNC: 4 MMOL/L (ref 3.4–5.3)
POTASSIUM SERPL-SCNC: 4 MMOL/L (ref 3.4–5.3)
PROT SERPL-MCNC: 5.1 G/DL (ref 6.4–8.3)
RBC # BLD AUTO: 2.35 10E6/UL (ref 3.8–5.2)
RBC # BLD AUTO: 2.45 10E6/UL (ref 3.8–5.2)
RBC # BLD AUTO: 2.64 10E6/UL (ref 3.8–5.2)
RBC # BLD AUTO: 2.7 10E6/UL (ref 3.8–5.2)
RBC MORPH BLD: ABNORMAL
SA TARGET DNA: NEGATIVE
SAO2 % BLDA: 97 % (ref 92–100)
SAO2 % BLDA: 98 % (ref 92–100)
SAO2 % BLDA: 99 % (ref 92–100)
SAO2 % BLDA: >100 % (ref 96–97)
SAO2 % BLDA: >100 % (ref 96–97)
SAO2 % BLDV: 78.5 % (ref 70–75)
SAO2 % BLDV: 79.7 % (ref 70–75)
SAO2 % BLDV: 79.9 % (ref 70–75)
SAO2 % BLDV: 94.7 % (ref 70–75)
SCANNED LAB RESULT: ABNORMAL
SCANNED LAB RESULT: NORMAL
SCANNED LAB RESULT: NORMAL
SODIUM SERPL-SCNC: 144 MMOL/L (ref 135–145)
SODIUM SERPL-SCNC: 146 MMOL/L (ref 135–145)
SODIUM SERPL-SCNC: 147 MMOL/L (ref 135–145)
SODIUM SERPL-SCNC: 148 MMOL/L (ref 135–145)
SODIUM SERPL-SCNC: 151 MMOL/L (ref 135–145)
SODIUM SERPL-SCNC: 151 MMOL/L (ref 135–145)
SODIUM SERPL-SCNC: 152 MMOL/L (ref 135–145)
SPECIMEN EXPIRATION DATE: NORMAL
UNIT ABO/RH: NORMAL
UNIT ABO/RH: NORMAL
UNIT NUMBER: NORMAL
UNIT NUMBER: NORMAL
UNIT STATUS: NORMAL
UNIT STATUS: NORMAL
UNIT TYPE ISBT: 9500
UNIT TYPE ISBT: 9500
WBC # BLD AUTO: 18.2 10E3/UL (ref 4–11)
WBC # BLD AUTO: 19 10E3/UL (ref 4–11)
WBC # BLD AUTO: 21.2 10E3/UL (ref 4–11)
WBC # BLD AUTO: 27.3 10E3/UL (ref 4–11)

## 2024-08-15 PROCEDURE — 250N000009 HC RX 250: Performed by: CLINICAL NURSE SPECIALIST

## 2024-08-15 PROCEDURE — 82040 ASSAY OF SERUM ALBUMIN: CPT | Performed by: CLINICAL NURSE SPECIALIST

## 2024-08-15 PROCEDURE — 85384 FIBRINOGEN ACTIVITY: CPT | Performed by: SURGERY

## 2024-08-15 PROCEDURE — 94640 AIRWAY INHALATION TREATMENT: CPT | Mod: 76

## 2024-08-15 PROCEDURE — 82805 BLOOD GASES W/O2 SATURATION: CPT

## 2024-08-15 PROCEDURE — 99418 PROLNG IP/OBS E/M EA 15 MIN: CPT | Mod: FS | Performed by: INTERNAL MEDICINE

## 2024-08-15 PROCEDURE — 36415 COLL VENOUS BLD VENIPUNCTURE: CPT | Performed by: STUDENT IN AN ORGANIZED HEALTH CARE EDUCATION/TRAINING PROGRAM

## 2024-08-15 PROCEDURE — 84295 ASSAY OF SERUM SODIUM: CPT | Performed by: STUDENT IN AN ORGANIZED HEALTH CARE EDUCATION/TRAINING PROGRAM

## 2024-08-15 PROCEDURE — 94645 CONT INHLJ TX EACH ADDL HOUR: CPT

## 2024-08-15 PROCEDURE — 250N000013 HC RX MED GY IP 250 OP 250 PS 637: Performed by: NURSE PRACTITIONER

## 2024-08-15 PROCEDURE — P9016 RBC LEUKOCYTES REDUCED: HCPCS | Performed by: SURGERY

## 2024-08-15 PROCEDURE — 83051 HEMOGLOBIN PLASMA: CPT | Performed by: SURGERY

## 2024-08-15 PROCEDURE — 87040 BLOOD CULTURE FOR BACTERIA: CPT | Performed by: STUDENT IN AN ORGANIZED HEALTH CARE EDUCATION/TRAINING PROGRAM

## 2024-08-15 PROCEDURE — 82330 ASSAY OF CALCIUM: CPT | Performed by: SURGERY

## 2024-08-15 PROCEDURE — 83930 ASSAY OF BLOOD OSMOLALITY: CPT | Performed by: PSYCHIATRY & NEUROLOGY

## 2024-08-15 PROCEDURE — 258N000003 HC RX IP 258 OP 636: Performed by: STUDENT IN AN ORGANIZED HEALTH CARE EDUCATION/TRAINING PROGRAM

## 2024-08-15 PROCEDURE — 83735 ASSAY OF MAGNESIUM: CPT | Performed by: CLINICAL NURSE SPECIALIST

## 2024-08-15 PROCEDURE — 999N000157 HC STATISTIC RCP TIME EA 10 MIN

## 2024-08-15 PROCEDURE — 94681 O2 UPTK CO2 OUTP % O2 XTRC: CPT

## 2024-08-15 PROCEDURE — P9047 ALBUMIN (HUMAN), 25%, 50ML: HCPCS

## 2024-08-15 PROCEDURE — 33948 ECMO/ECLS DAILY MGMT-VENOUS: CPT

## 2024-08-15 PROCEDURE — 86900 BLOOD TYPING SEROLOGIC ABO: CPT

## 2024-08-15 PROCEDURE — 250N000011 HC RX IP 250 OP 636

## 2024-08-15 PROCEDURE — G0463 HOSPITAL OUTPT CLINIC VISIT: HCPCS | Mod: 25

## 2024-08-15 PROCEDURE — 85730 THROMBOPLASTIN TIME PARTIAL: CPT | Performed by: SURGERY

## 2024-08-15 PROCEDURE — 71045 X-RAY EXAM CHEST 1 VIEW: CPT | Mod: 26 | Performed by: RADIOLOGY

## 2024-08-15 PROCEDURE — 250N000011 HC RX IP 250 OP 636: Mod: JZ | Performed by: STUDENT IN AN ORGANIZED HEALTH CARE EDUCATION/TRAINING PROGRAM

## 2024-08-15 PROCEDURE — 87641 MR-STAPH DNA AMP PROBE: CPT

## 2024-08-15 PROCEDURE — 250N000011 HC RX IP 250 OP 636: Performed by: SURGERY

## 2024-08-15 PROCEDURE — 99233 SBSQ HOSP IP/OBS HIGH 50: CPT | Performed by: PHYSICIAN ASSISTANT

## 2024-08-15 PROCEDURE — 82805 BLOOD GASES W/O2 SATURATION: CPT | Performed by: SURGERY

## 2024-08-15 PROCEDURE — 71045 X-RAY EXAM CHEST 1 VIEW: CPT

## 2024-08-15 PROCEDURE — 70450 CT HEAD/BRAIN W/O DYE: CPT | Mod: 26 | Performed by: RADIOLOGY

## 2024-08-15 PROCEDURE — 250N000011 HC RX IP 250 OP 636: Performed by: STUDENT IN AN ORGANIZED HEALTH CARE EDUCATION/TRAINING PROGRAM

## 2024-08-15 PROCEDURE — 99291 CRITICAL CARE FIRST HOUR: CPT | Mod: 25 | Performed by: NURSE PRACTITIONER

## 2024-08-15 PROCEDURE — 82310 ASSAY OF CALCIUM: CPT | Performed by: SURGERY

## 2024-08-15 PROCEDURE — 85007 BL SMEAR W/DIFF WBC COUNT: CPT | Performed by: SURGERY

## 2024-08-15 PROCEDURE — 85379 FIBRIN DEGRADATION QUANT: CPT | Performed by: SURGERY

## 2024-08-15 PROCEDURE — 85018 HEMOGLOBIN: CPT | Performed by: SURGERY

## 2024-08-15 PROCEDURE — 80048 BASIC METABOLIC PNL TOTAL CA: CPT | Performed by: STUDENT IN AN ORGANIZED HEALTH CARE EDUCATION/TRAINING PROGRAM

## 2024-08-15 PROCEDURE — 82248 BILIRUBIN DIRECT: CPT | Performed by: CLINICAL NURSE SPECIALIST

## 2024-08-15 PROCEDURE — 82247 BILIRUBIN TOTAL: CPT | Performed by: CLINICAL NURSE SPECIALIST

## 2024-08-15 PROCEDURE — 90947 DIALYSIS REPEATED EVAL: CPT

## 2024-08-15 PROCEDURE — 94003 VENT MGMT INPAT SUBQ DAY: CPT

## 2024-08-15 PROCEDURE — 999N000185 HC STATISTIC TRANSPORT TIME EA 15 MIN

## 2024-08-15 PROCEDURE — 84075 ASSAY ALKALINE PHOSPHATASE: CPT | Performed by: CLINICAL NURSE SPECIALIST

## 2024-08-15 PROCEDURE — 83605 ASSAY OF LACTIC ACID: CPT | Performed by: SURGERY

## 2024-08-15 PROCEDURE — 84460 ALANINE AMINO (ALT) (SGPT): CPT | Performed by: CLINICAL NURSE SPECIALIST

## 2024-08-15 PROCEDURE — 250N000009 HC RX 250

## 2024-08-15 PROCEDURE — 33948 ECMO/ECLS DAILY MGMT-VENOUS: CPT | Mod: GC | Performed by: SURGERY

## 2024-08-15 PROCEDURE — 80048 BASIC METABOLIC PNL TOTAL CA: CPT | Performed by: SURGERY

## 2024-08-15 PROCEDURE — 82330 ASSAY OF CALCIUM: CPT | Performed by: CLINICAL NURSE SPECIALIST

## 2024-08-15 PROCEDURE — 250N000009 HC RX 250: Performed by: NURSE PRACTITIONER

## 2024-08-15 PROCEDURE — 250N000013 HC RX MED GY IP 250 OP 250 PS 637: Performed by: SURGERY

## 2024-08-15 PROCEDURE — 250N000009 HC RX 250: Performed by: STUDENT IN AN ORGANIZED HEALTH CARE EDUCATION/TRAINING PROGRAM

## 2024-08-15 PROCEDURE — 70450 CT HEAD/BRAIN W/O DYE: CPT

## 2024-08-15 PROCEDURE — 250N000013 HC RX MED GY IP 250 OP 250 PS 637: Performed by: PHYSICIAN ASSISTANT

## 2024-08-15 PROCEDURE — 86901 BLOOD TYPING SEROLOGIC RH(D): CPT

## 2024-08-15 PROCEDURE — 86923 COMPATIBILITY TEST ELECTRIC: CPT | Performed by: SURGERY

## 2024-08-15 PROCEDURE — 84295 ASSAY OF SERUM SODIUM: CPT | Performed by: CLINICAL NURSE SPECIALIST

## 2024-08-15 PROCEDURE — 200N000002 HC R&B ICU UMMC

## 2024-08-15 PROCEDURE — 85300 ANTITHROMBIN III ACTIVITY: CPT | Performed by: SURGERY

## 2024-08-15 PROCEDURE — 250N000011 HC RX IP 250 OP 636: Performed by: NURSE PRACTITIONER

## 2024-08-15 PROCEDURE — 99233 SBSQ HOSP IP/OBS HIGH 50: CPT | Mod: FS | Performed by: INTERNAL MEDICINE

## 2024-08-15 RX ORDER — ACETAMINOPHEN 325 MG/1
650 TABLET ORAL ONCE
Status: COMPLETED | OUTPATIENT
Start: 2024-08-15 | End: 2024-08-15

## 2024-08-15 RX ORDER — DIPHENHYDRAMINE HYDROCHLORIDE 50 MG/ML
50 INJECTION INTRAMUSCULAR; INTRAVENOUS
Status: DISCONTINUED | OUTPATIENT
Start: 2024-08-15 | End: 2024-08-16

## 2024-08-15 RX ORDER — DIPHENHYDRAMINE HCL 50 MG
50 CAPSULE ORAL ONCE
Status: COMPLETED | OUTPATIENT
Start: 2024-08-15 | End: 2024-08-15

## 2024-08-15 RX ORDER — DIPHENHYDRAMINE HCL 50 MG
50 CAPSULE ORAL
Status: DISCONTINUED | OUTPATIENT
Start: 2024-08-15 | End: 2024-08-16

## 2024-08-15 RX ORDER — DIPHENHYDRAMINE HYDROCHLORIDE 50 MG/ML
50 INJECTION INTRAMUSCULAR; INTRAVENOUS ONCE
Status: COMPLETED | OUTPATIENT
Start: 2024-08-15 | End: 2024-08-15

## 2024-08-15 RX ORDER — METHYLPREDNISOLONE SODIUM SUCCINATE 125 MG/2ML
125 INJECTION, POWDER, LYOPHILIZED, FOR SOLUTION INTRAMUSCULAR; INTRAVENOUS
Status: DISCONTINUED | OUTPATIENT
Start: 2024-08-15 | End: 2024-08-16

## 2024-08-15 RX ORDER — ACETAMINOPHEN 325 MG/1
650 TABLET ORAL
Status: DISCONTINUED | OUTPATIENT
Start: 2024-08-15 | End: 2024-08-16

## 2024-08-15 RX ORDER — DEXMEDETOMIDINE HYDROCHLORIDE 4 UG/ML
.1-1 INJECTION, SOLUTION INTRAVENOUS CONTINUOUS
Status: DISCONTINUED | OUTPATIENT
Start: 2024-08-15 | End: 2024-08-29

## 2024-08-15 RX ORDER — ALBUMIN (HUMAN) 12.5 G/50ML
25 SOLUTION INTRAVENOUS EVERY 8 HOURS
Status: COMPLETED | OUTPATIENT
Start: 2024-08-15 | End: 2024-08-16

## 2024-08-15 RX ADMIN — BIVALIRUDIN 0.06 MG/KG/HR: 250 INJECTION, POWDER, LYOPHILIZED, FOR SOLUTION INTRAVENOUS at 08:21

## 2024-08-15 RX ADMIN — HUMAN IMMUNOGLOBULIN G 25 G: 20 LIQUID INTRAVENOUS at 14:34

## 2024-08-15 RX ADMIN — MIDAZOLAM HYDROCHLORIDE 9 MG/HR: 1 INJECTION, SOLUTION INTRAVENOUS at 14:52

## 2024-08-15 RX ADMIN — CALCIUM CHLORIDE, MAGNESIUM CHLORIDE, SODIUM CHLORIDE, SODIUM BICARBONATE, POTASSIUM CHLORIDE AND SODIUM PHOSPHATE DIBASIC DIHYDRATE 12.5 ML/KG/HR: 3.68; 3.05; 6.34; 3.09; .314; .187 INJECTION INTRAVENOUS at 22:28

## 2024-08-15 RX ADMIN — IPRATROPIUM BROMIDE AND ALBUTEROL SULFATE 3 ML: .5; 3 SOLUTION RESPIRATORY (INHALATION) at 13:18

## 2024-08-15 RX ADMIN — Medication 1.5 MG/HR: at 10:07

## 2024-08-15 RX ADMIN — METRONIDAZOLE 500 MG: 5 INJECTION, SOLUTION INTRAVENOUS at 17:44

## 2024-08-15 RX ADMIN — ACETYLCYSTEINE 4 ML: 100 SOLUTION ORAL; RESPIRATORY (INHALATION) at 08:57

## 2024-08-15 RX ADMIN — DEXMEDETOMIDINE HYDROCHLORIDE IN SODIUM CHLORIDE 0.8 MCG/KG/HR: 4 INJECTION INTRAVENOUS at 17:03

## 2024-08-15 RX ADMIN — DEXAMETHASONE SODIUM PHOSPHATE 20 MG: 10 INJECTION, SOLUTION INTRAMUSCULAR; INTRAVENOUS at 07:37

## 2024-08-15 RX ADMIN — MIDAZOLAM HYDROCHLORIDE 14 MG/HR: 1 INJECTION, SOLUTION INTRAVENOUS at 07:13

## 2024-08-15 RX ADMIN — DIPHENHYDRAMINE HYDROCHLORIDE 50 MG: 50 CAPSULE ORAL at 13:54

## 2024-08-15 RX ADMIN — Medication 1.5 MG/HR: at 23:29

## 2024-08-15 RX ADMIN — CALCIUM CHLORIDE, MAGNESIUM CHLORIDE, SODIUM CHLORIDE, SODIUM BICARBONATE, POTASSIUM CHLORIDE AND SODIUM PHOSPHATE DIBASIC DIHYDRATE 12.5 ML/KG/HR: 3.68; 3.05; 6.34; 3.09; .314; .187 INJECTION INTRAVENOUS at 13:09

## 2024-08-15 RX ADMIN — CALCIUM CHLORIDE, MAGNESIUM CHLORIDE, DEXTROSE MONOHYDRATE, LACTIC ACID, SODIUM CHLORIDE, SODIUM BICARBONATE AND POTASSIUM CHLORIDE 12.5 ML/KG/HR: 5.15; 2.03; 22; 5.4; 6.46; 3.09; .157 INJECTION INTRAVENOUS at 22:28

## 2024-08-15 RX ADMIN — ALBUMIN HUMAN 25 G: 0.25 SOLUTION INTRAVENOUS at 17:41

## 2024-08-15 RX ADMIN — DEXMEDETOMIDINE HYDROCHLORIDE IN SODIUM CHLORIDE 0.8 MCG/KG/HR: 4 INJECTION INTRAVENOUS at 23:25

## 2024-08-15 RX ADMIN — IPRATROPIUM BROMIDE AND ALBUTEROL SULFATE 3 ML: .5; 3 SOLUTION RESPIRATORY (INHALATION) at 08:57

## 2024-08-15 RX ADMIN — Medication 1 TABLET: at 07:37

## 2024-08-15 RX ADMIN — CALCIUM CHLORIDE, MAGNESIUM CHLORIDE, SODIUM CHLORIDE, SODIUM BICARBONATE, POTASSIUM CHLORIDE AND SODIUM PHOSPHATE DIBASIC DIHYDRATE 12.5 ML/KG/HR: 3.68; 3.05; 6.34; 3.09; .314; .187 INJECTION INTRAVENOUS at 07:36

## 2024-08-15 RX ADMIN — Medication 60 ML: at 07:38

## 2024-08-15 RX ADMIN — DEXMEDETOMIDINE HYDROCHLORIDE IN SODIUM CHLORIDE 0.8 MCG/KG/HR: 4 INJECTION INTRAVENOUS at 12:08

## 2024-08-15 RX ADMIN — ACETYLCYSTEINE 4 ML: 100 SOLUTION ORAL; RESPIRATORY (INHALATION) at 13:18

## 2024-08-15 RX ADMIN — CISATRACURIUM BESYLATE 13.4 MG: 2 INJECTION, SOLUTION INTRAVENOUS at 09:22

## 2024-08-15 RX ADMIN — Medication 60 ML: at 21:10

## 2024-08-15 RX ADMIN — ACETYLCYSTEINE 4 ML: 100 SOLUTION ORAL; RESPIRATORY (INHALATION) at 04:52

## 2024-08-15 RX ADMIN — IPRATROPIUM BROMIDE AND ALBUTEROL SULFATE 3 ML: .5; 3 SOLUTION RESPIRATORY (INHALATION) at 04:52

## 2024-08-15 RX ADMIN — IPRATROPIUM BROMIDE AND ALBUTEROL SULFATE 3 ML: .5; 3 SOLUTION RESPIRATORY (INHALATION) at 17:23

## 2024-08-15 RX ADMIN — CALCIUM CHLORIDE, MAGNESIUM CHLORIDE, SODIUM CHLORIDE, SODIUM BICARBONATE, POTASSIUM CHLORIDE AND SODIUM PHOSPHATE DIBASIC DIHYDRATE 12.5 ML/KG/HR: 3.68; 3.05; 6.34; 3.09; .314; .187 INJECTION INTRAVENOUS at 17:47

## 2024-08-15 RX ADMIN — ACETYLCYSTEINE 4 ML: 100 SOLUTION ORAL; RESPIRATORY (INHALATION) at 20:23

## 2024-08-15 RX ADMIN — METRONIDAZOLE 500 MG: 5 INJECTION, SOLUTION INTRAVENOUS at 11:14

## 2024-08-15 RX ADMIN — ALBUMIN HUMAN 25 G: 0.25 SOLUTION INTRAVENOUS at 10:11

## 2024-08-15 RX ADMIN — METRONIDAZOLE 500 MG: 5 INJECTION, SOLUTION INTRAVENOUS at 02:19

## 2024-08-15 RX ADMIN — ACETYLCYSTEINE 4 ML: 100 SOLUTION ORAL; RESPIRATORY (INHALATION) at 01:07

## 2024-08-15 RX ADMIN — CEFEPIME HYDROCHLORIDE 2 G: 2 INJECTION, POWDER, FOR SOLUTION INTRAVENOUS at 12:05

## 2024-08-15 RX ADMIN — CEFEPIME HYDROCHLORIDE 2 G: 2 INJECTION, POWDER, FOR SOLUTION INTRAVENOUS at 00:10

## 2024-08-15 RX ADMIN — IPRATROPIUM BROMIDE AND ALBUTEROL SULFATE 3 ML: .5; 3 SOLUTION RESPIRATORY (INHALATION) at 01:07

## 2024-08-15 RX ADMIN — Medication 60 ML: at 13:57

## 2024-08-15 RX ADMIN — CALCIUM CHLORIDE, MAGNESIUM CHLORIDE, DEXTROSE MONOHYDRATE, LACTIC ACID, SODIUM CHLORIDE, SODIUM BICARBONATE AND POTASSIUM CHLORIDE 12.5 ML/KG/HR: 5.15; 2.03; 22; 5.4; 6.46; 3.09; .157 INJECTION INTRAVENOUS at 13:09

## 2024-08-15 RX ADMIN — CALCIUM CHLORIDE, MAGNESIUM CHLORIDE, SODIUM CHLORIDE, SODIUM BICARBONATE, POTASSIUM CHLORIDE AND SODIUM PHOSPHATE DIBASIC DIHYDRATE 12.5 ML/KG/HR: 3.68; 3.05; 6.34; 3.09; .314; .187 INJECTION INTRAVENOUS at 02:57

## 2024-08-15 RX ADMIN — ACETAMINOPHEN 650 MG: 325 TABLET, FILM COATED ORAL at 13:54

## 2024-08-15 RX ADMIN — LEVOTHYROXINE SODIUM 25 MCG: 0.03 TABLET ORAL at 07:37

## 2024-08-15 RX ADMIN — ACETYLCYSTEINE 4 ML: 100 SOLUTION ORAL; RESPIRATORY (INHALATION) at 17:23

## 2024-08-15 RX ADMIN — CALCIUM CHLORIDE, MAGNESIUM CHLORIDE, DEXTROSE MONOHYDRATE, LACTIC ACID, SODIUM CHLORIDE, SODIUM BICARBONATE AND POTASSIUM CHLORIDE 12.5 ML/KG/HR: 5.15; 2.03; 22; 5.4; 6.46; 3.09; .157 INJECTION INTRAVENOUS at 02:59

## 2024-08-15 RX ADMIN — MIDAZOLAM HYDROCHLORIDE 14 MG/HR: 1 INJECTION, SOLUTION INTRAVENOUS at 00:10

## 2024-08-15 RX ADMIN — CEFEPIME HYDROCHLORIDE 2 G: 2 INJECTION, POWDER, FOR SOLUTION INTRAVENOUS at 23:25

## 2024-08-15 RX ADMIN — CHLORHEXIDINE GLUCONATE 0.12% ORAL RINSE 15 ML: 1.2 LIQUID ORAL at 21:10

## 2024-08-15 RX ADMIN — CHLORHEXIDINE GLUCONATE 0.12% ORAL RINSE 15 ML: 1.2 LIQUID ORAL at 07:36

## 2024-08-15 RX ADMIN — Medication 40 MG: at 07:37

## 2024-08-15 RX ADMIN — CALCIUM CHLORIDE, MAGNESIUM CHLORIDE, DEXTROSE MONOHYDRATE, LACTIC ACID, SODIUM CHLORIDE, SODIUM BICARBONATE AND POTASSIUM CHLORIDE 12.5 ML/KG/HR: 5.15; 2.03; 22; 5.4; 6.46; 3.09; .157 INJECTION INTRAVENOUS at 17:47

## 2024-08-15 RX ADMIN — IPRATROPIUM BROMIDE AND ALBUTEROL SULFATE 3 ML: .5; 3 SOLUTION RESPIRATORY (INHALATION) at 20:23

## 2024-08-15 RX ADMIN — LOPERAMIDE HYDROCHLORIDE 2 MG: 2 CAPSULE ORAL at 13:54

## 2024-08-15 RX ADMIN — CALCIUM CHLORIDE, MAGNESIUM CHLORIDE, DEXTROSE MONOHYDRATE, LACTIC ACID, SODIUM CHLORIDE, SODIUM BICARBONATE AND POTASSIUM CHLORIDE 12.5 ML/KG/HR: 5.15; 2.03; 22; 5.4; 6.46; 3.09; .157 INJECTION INTRAVENOUS at 07:36

## 2024-08-15 RX ADMIN — DEXMEDETOMIDINE HYDROCHLORIDE 0.8 MCG/KG/HR: 400 INJECTION INTRAVENOUS at 02:40

## 2024-08-15 ASSESSMENT — ACTIVITIES OF DAILY LIVING (ADL)
ADLS_ACUITY_SCORE: 55

## 2024-08-15 NOTE — PLAN OF CARE
Neuro- CT performed at 0900, results stable. Versed weaned 1 mg/ hour following, currently at 7 mg/ hour. Has cough and gag, no other neurological activity. PERRL, NPIs always> 4.0  CV-  Levo at .03, NSR. Normothermic but aggressive shivering following turning off of precedex. Precedex resumed, shivering stopped  Pulm- Sweep weaned to 3, tidal volumes in 300s, tolerating well. Veletri turned off.  GI- Rectal pouch remains, 300 ml of output  - Anuric, CRRT, pulling as tolerated, increasing pull rate as day progresses, currently pulling 300 ml/ hour  Gtts- Dilaudid- 1.5, precedex- 0.8, versed- 7  Skin- ECMO dressing changed at 1600  1 unit rbcs given for hgb 7.0.           Problem: ARDS (Acute Respiratory Distress Syndrome)  Goal: Effective Oxygenation  Outcome: Progressing  Intervention: Optimize Oxygenation, Ventilation and Perfusion  Recent Flowsheet Documentation  Taken 8/15/2024 1800 by Miguel Jha RN  Head of Bed (HOB) Positioning: HOB at 20-30 degrees  Taken 8/15/2024 1600 by Miguel Jha RN  Lung Protection Measures:   lung compliance monitored   ventilator synchrony promoted  Airway/Ventilation Management:   airway patency maintained   calming measures promoted   humidification applied   pulmonary hygiene promoted  Head of Bed (HOB) Positioning: HOB at 20-30 degrees  Taken 8/15/2024 1400 by Miguel Jha RN  Head of Bed (HOB) Positioning: HOB at 20-30 degrees  Taken 8/15/2024 1200 by Miguel Jha RN  Lung Protection Measures:   lung compliance monitored   ventilator synchrony promoted  Airway/Ventilation Management:   airway patency maintained   calming measures promoted   humidification applied   pulmonary hygiene promoted  Head of Bed (HOB) Positioning: HOB at 20-30 degrees  Taken 8/15/2024 1000 by Miguel Jha RN  Head of Bed (HOB) Positioning: HOB at 20-30 degrees  Taken 8/15/2024 0800 by Miguel Jha RN  Lung Protection Measures:   lung compliance monitored   ventilator synchrony  promoted  Airway/Ventilation Management:   airway patency maintained   calming measures promoted   humidification applied   pulmonary hygiene promoted  Head of Bed (HOB) Positioning: HOB at 20-30 degrees

## 2024-08-15 NOTE — PROGRESS NOTES
[facility]    ECLS Shift Summary:     ECMO Equipment:  Console Serial Number: [id number]  Circuit Lot Number: not recorded by Perfusion  Oxygenator Lot Number: [id number]    Circuit Assessment: Fibrin  Fibrin Location: connectors, corner of oxy    Venous ECMO Cannula: 17 Fr in the Right Internal Jugular Vein  Venous ECMO Cannula: 25 Fr in the Right Femoral Vein      ECMO Cannula Arterial Right internal jugular-Site Assessment: WDL, Sutured, Secure  ECMO Cannula Venous Right femoral vein-Site Assessment: Bleeding, Sutured, Secure  ECMO Cannula Arterial Right internal jugular-Site Intervention: No intervention needed  ECMO Cannula Venous Right femoral vein-Site Intervention: Dressing changed/new dressing    Patient remains on V-V ECMO, all equipment is functioning and alarms are appropriately set. RPM's: 3600 with Blood Flow (Circuit) LPM  Av.7 LPM  Min: 4.52 LPM  Max: 4.73 LPM L/min. Sweep is at 3 LPM and 100 %. Extremities are warm.     Significant Shift Events: Repeat head CT done. Redressed RFV cannula. Weaned sweep x1.     Vent settings:  FiO2 (%): 60 %, Resp: 13, Inspiratory Pressure (cm H2O) (Drager Jessie): 25, Vent Mode: PCV Plus assist, Resp Rate (Set): 10 breaths/min, PEEP (cm H2O): 10 cmH2O, Resp Rate (Set): 10 breaths/min, PEEP (cm H2O): 10 cmH2O    Anticoagulation:  Dose (mg/kg/hr) Bivalirudin: 0.06 mg/kg/hr  Rate (mL/hr) Bivalirudin: 5.3 mL/hr  Concentration Bivalirudin: 1 mg/mL  Most recent: PTT 50    Urine output is nil.  .  Blood loss was mod amount of oozing from Right fem cannula site. Product given included 1 unit of PRBC's.     Intake/Output Summary (Last 24 hours) at 8/15/2024 1750  Last data filed at 8/15/2024 1700  Gross per 24 hour   Intake 4428.43 ml   Output 2548 ml   Net 1880.43 ml       Labs:  Recent Labs   Lab 08/15/24  1558 08/15/24  1557 08/15/24  1400 08/15/24  1209 08/15/24  1026   PH 7.38  --  7.39 7.37 7.38   PCO2 41  --  40 42 41    PO2 178*  --  197* 119* 137*   HCO3 24  --  24 25 24   O2PER 60 60  60  100 60 60 60       Lab Results   Component Value Date    HGB 7.1 (L) 08/15/2024    PHGB 50 (H) 08/15/2024     08/15/2024    FIBR 317 08/15/2024    INR 1.37 (H) 08/12/2024    PTT 50 (H) 08/15/2024    DD 2.04 (H) 08/15/2024    ANTCH 112 08/15/2024       Plan is to continue VV ECMO.    Otilia Jackson, RT  ECMO Specialist  8/15/2024 5:50 PM

## 2024-08-15 NOTE — PROGRESS NOTES
SURGICAL ICU PROGRESS NOTE  08/15/2024        Date of Service (when I saw the patient): 08/15/2024    ASSESSMENT:  Massiel Flaherty is a 53 year old female who was admitted to [facility].   Past medical hx of Anxiety/depression, fibromyalgia, hypothyroidism, asthma, HLD, MURIEL on CPAP, spells, off on anti seizure medications, expressive aphasia, hypertension was seen at [facility] and was placed on Augmentin outpatient on 7/30/24.   She admitted to the hospital on 8/3/24 for fatigue, fever and dyspnea and intubated 8/6/24 at OSH, proned and paralyzed without improvement. Transferred to [facility] 8/8/24, hypoxic with high plateaus/peaks and placed on VV ECMO and CRRT.         CHANGES and MAJOR THINGS TODAY:   - Repeat Head CT this am  - Liberalize sedation  - Increase lantus  - Increase UF goal  - Concentrated albumin x 3  - Hold clonidine  - Start fiber  - Stop veletri  - Stop precedex, now resumed for shivering  - Bc x 1  - MRSA swab  - IVIG x 1 today      PLAN:  Neurological:  # Fibromyalgia   # Hx myalgic encephalomyelitis   # Acute pain  # Sedation and analgesia for vent compliance   - Monitor neurological status. Delirium preventions and precautions.   - Pain: Dilaudid gtt         - Sedation plan: Versed gtt    - Shivering: Precedex. Attempted discontinuation with severe shivering. Required one push of Vecuronium in order to obtain CT head quality images. Will resume again.   -  Paralysis: Off 8/14  - will review PTA medications and resume appropriate medications as able.   - After head CT, liberalize sedation to RAAS of 0 to -1.     # Hx spells with staring and BUE posturing previously described as pseudoseizures   # Hx history of GTC seizures and myoclonic epilepsy, weaned off AEDs   # C/f hypoxic ischemic injury   # Diffuse cerebral edema - improved  - Head CT 8/9: Findings concerning for diffuse cerebral edema with  diffuse blurring of the gray-white differentiation. Findings are  concerning for  hypoxicischemic injury.   - 3% NaCl dialysate in coordination with nephrology  - Sodium goals liberalized to 140-145  - Repeat Head CT 8/15 continued improvement in gray white matter differentiation and continued effacement of the sulci and ventricles.     Pulmonary:   #ARDS s/p VV ECMO cannulation 8/8  # Asthma  # MURIEL on home CPAP   FiO2 (%): 60 %, Resp: 10, Inspiratory Pressure (cm H2O) (Drager Jessie): 25, Vent Mode: PCV Plus assist, Resp Rate (Set): 10 breaths/min, PEEP (cm H2O): 10 cmH2O, Resp Rate (Set): 10 breaths/min, PEEP (cm H2O): 10 cmH2O    - continue with rest vent setting on ECMO. Plan to optimize ECMO   - Chest CT 8/9: Diffuse patchy groundglass and consolidative densities throughout  the lungs with small bilateral pleural effusions. Findings may  represent severe pulmonary edema, and/or  infectious /inflammatory  pathology, or ARDS. Multiple prominent and a few enlarged mediastinal lymph nodes,  possibly reactive.  - 8/11: Restart veletri continuous neb  - 8/11: Increase inspiratory pressure to 35 to attempt achieving slightly higher tidal volumes   - 8/12: Decadron 20mg daily x 5 days, then 10mg x 5 days   - 8/14 Bronchoscopy with mucous plugging.  Continue Mucomyst and Duonebs q4 hours. No acute indication to repeat bronchoscopy today.   - 8/15 Stop Veletri today    ECMO:  Sweep 5LPM  FiO2 100%   Flow 4.5  Bival gtt with PTT goals 44-88 // ACT goal of 160-180   - Superior cannula retracted 8/10 for ongoing low PaO2   - 8/11: bival infusion replaced for heparin given concern for heparin resistance     Cardiovascular:    # hyperlipidemia    # Hypotension, likely iatrogenic  - MAP >65  - Prior HTN, Clonidine on hold after one dose with subsequent hypotension. Still likely medication effect on board in the setting of renal failure/Clonidine is not dialyzable.   - Continue Norepinephrine  - Lactate normal   - Repeat ECHO 8/12: Left ventricular size, wall motion and function are normal. The ejection  fraction is 60-65%. Flattened septum is consistent with right ventricular pressure and volume overload. Right ventricular function, chamber size, wall motion, and thickness are normal.       Gastroenterology/Nutrition:  # GERD   # Diarrhea  - Protonix (home medication)   - RD consult in am for SBFT and TF recommendations   - CT CAP 8/9: Mild hepatomegaly. Trace pelvic ascites, nonspecific bilateral perinephric  streakiness. Asymmetric heterogeneous bulky right adnexa, consider nonemergent  pelvic ultrasound for further evaluation.   - NJ tube for meds, TF   - Stool output 250/550  - imodium PRN      Fluids/Electrolytes/Renal:  # Acute kidney injury  # Hypernatremia  - Continue CRRT. UF has been limited by hypotension. Add trial of concentrated albumin x 3 doses today.   - Trending CRRT labs  - Monitor I/Os closely   - Normalize sodium goals.     Endocrine:  # Hypothyroidism   # Steroid and stress induced hyperglycemia  - synthroid resumed   - continue with sliding scale insulin due to feed start, reassess needs.   - Goal to keep BG< 180 for optimal wound healing   - 8/14: Lantus 5u daily, increase to 10 units      ID:  # Leukocytosis  # Presumed pneumonia  # Concern for PID   PER OSH: 8/6: RSV, COVID, parainfluenza,  negative . Legionella and pneumococcal urine antigens negative. Patient has been on greater than 20 mg prednisone daily for 18 days.  CULTURES: 8/8/2024: Respiratory viral panel negative. Blood cultures negative, MRSA nasal negative, Legionella urine antigen negative, respiratory culture negative.PCR trachea for PJP is negative.  COVID: Negative. Viral panel-negative PJP  - ID consulted   - 8/11: Gyn consulted for right adnexal finding on CT. Low suspicion for abscess but would treat as we are currently. Collection too small for surgical intervention and too small for IR drainage as well. Could follow up with pelvic MRI, however we will treat as current with IV antibiotics.  \  - EBV PCR, per ID likely  "reactivation in the setting of acute illness.   - IGG levels low, ID recommend Immunoglobulin  - Check MRSA swab. Repeat BC x 1 now  - Persistent leukocytosis, afebrile.     Antimicrobials:   - Azithromycin- stopped 8/11   - Cefepime 8/3 (started at OSH)  - Amphotericin - stopped 8/13   - Flagyl 8/11-    Heme:     # Acute blood loss anemia   - transfusion parameters per ECMO protocol    - one unit prbc 8/8/24  - Transfuse if hgb <7.0 or signs/symptoms of hypoperfusion. Monitor and trend.    - Continue Bivalrudin gtt     Musculoskeletal:  # Weakness and deconditioning of critical illness  # Left ankle injury pre-hospitalization    - Physical and occupational therapy when able.      Skin:  # Ecchymosis - BLE   # Left toe duskiness   - bilateral lower leg ecchymosis   - WOC consult   - 8/13: Worsening duskiness to left 3rd, 4th toes noted; Off pressors      General Cares/Prophylaxis:    DVT Prophylaxis: Bival gtt per ECMO   GI Prophylaxis: PPI  Restraints: Restraints for medical healing needed: NO     Lines/ tubes/ drains:  - ETT   - Right  ECMO cannula   - Right Femoral cannula   - Left internal jugular   - radial arterial line   - NJ   - PIV\"s      Disposition:  -  Surgical ICU       Time spent on this Encounter   Billing:  I spent 55 minutes bedside and on the inpatient unit today managing the critical care of Massiel Flaherty in relation to the issues listed in this note.      Tiara De La Mater    ====================================  INTERVAL HISTORY:  Intubated, sedated. VTs variable 200-350. Unable to obtain ROS      OBJECTIVE:   1. VITAL SIGNS:   Temp:  [97  F (36.1  C)-99  F (37.2  C)] 97  F (36.1  C)  Pulse:  [] 78  Resp:  [10] 10  BP: (116)/(61) 116/61  MAP:  [54 mmHg-89 mmHg] 66 mmHg  Arterial Line BP: ()/(38-64) 97/47  FiO2 (%):  [60 %] 60 %  SpO2:  [92 %-100 %] 100 %  FiO2 (%): 60 %, Resp: 10, Inspiratory Pressure (cm H2O) (Drager Jessie): 25, Vent Mode: PCV Plus assist, Resp Rate (Set): 10 " breaths/min, PEEP (cm H2O): 10 cmH2O, Resp Rate (Set): 10 breaths/min, PEEP (cm H2O): 10 cmH2O      2. INTAKE/ OUTPUT:   I/O last 3 completed shifts:  In: 4318.51 [I.V.:1673.51; NG/GT:510; IV Piggyback:1000]  Out: 3375.7 [Other:2975.7; Stool:400]    3. PHYSICAL EXAMINATION:  General: NAD  HEENT: PERRLA. Pupils 3mm and reactive. ETT present and secured. NJ tube. OG to LIS with thin green drainage.   RIght neck with ECMO cannula, sutured in place.  Left internal jugular CVC in place, secured.   Neuro: PERRL 3mm. Patient paralyzed.   Pulm/Resp:  minimal breath sounds.   CV: RRR, S1/S2   Abdomen: Soft, non-distended, non-tender, no rebound tenderness or guarding, no masses  Incisions/Skin: scattered ecchymosis in BLE in toes around ankles bilaterally. Limbs cool.    MSK/Extremities:generalize BLE 1+ edema. Calves compressible, (+) doppler tones  DP/PT on feet.     4. INVESTIGATIONS:   Arterial Blood Gases   Recent Labs   Lab 08/15/24  0612 08/15/24  0411 08/15/24  0211 08/15/24  0022   PH 7.42 7.41 7.44 7.43   PCO2 38 39 36 36   PO2 182* 167* 209* 180*   HCO3 25 25 25 24     Complete Blood Count   Recent Labs   Lab 08/15/24  0414 08/14/24 2209 08/14/24  1607 08/14/24  0942   WBC 27.3* 25.8* 24.4* 31.8*   HGB 8.3* 8.2* 8.5* 9.0*    174 196 260     Basic Metabolic Panel  Recent Labs   Lab 08/15/24  0414 08/15/24  0413 08/15/24  0024 08/15/24  0022 08/14/24 2209 08/14/24  1616 08/14/24  1607 08/14/24  1208 08/14/24  1205   *  --   --  151* 151*  --  151*  --  150*   POTASSIUM 3.5  --   --   --  3.7  --  4.5  --  4.5   CHLORIDE 118*  --   --   --  117*  --  117*  --  115*   CO2 23  --   --   --  22  --  20*  --  23   BUN 51.8*  --   --   --  55.7*  --  57.9*  --  50.1*   CR 1.51*  --   --   --  1.51*  --  1.68*  --  1.48*   * 162* 147*  --  204*   < > 253*   < > 243*    < > = values in this interval not displayed.     Liver Function Tests  Recent Labs   Lab 08/15/24  0414 08/14/24  1607 08/14/24  1204  08/14/24  0426 08/13/24  1151 08/13/24  0405 08/12/24  1539 08/12/24  0945 08/12/24  0401 08/11/24  2154 08/11/24  1603   AST 41  --   --  43  --  59*  --   --  48*  --   --    ALT 21  --   --  23  --  21  --   --  21  --   --    ALKPHOS 170*  --   --  224*  --  237*  --   --  281*  --   --    BILITOTAL 0.4  --   --  0.4  --  0.4  --   --  0.4  --   --    ALBUMIN 2.7* 2.7* 3.0* 3.0*   < > 2.5*   < > 2.5* 2.4* 2.4* 2.3*   INR  --   --   --   --   --   --   --  1.37* 1.30* 1.24* 1.15    < > = values in this interval not displayed.     Pancreatic Enzymes  No lab results found in last 7 days.  Coagulation Profile  Recent Labs   Lab 08/15/24  0414 08/14/24  2209 08/14/24  1607 08/14/24  0942 08/12/24  1153 08/12/24  0945 08/12/24  0844 08/12/24  0401 08/12/24  0008 08/11/24  2154 08/11/24  1831 08/11/24  1603   INR  --   --   --   --   --  1.37*  --  1.30*  --  1.24*  --  1.15   PTT 46* 45* 48* 45*   < > 44*   < > 42*   < > 36   < > 35    < > = values in this interval not displayed.         5. RADIOLOGY:   Recent Results (from the past 24 hour(s))   XR Chest Port 1 View    Narrative    EXAM: XR CHEST PORT 1 VIEW  8/14/2024 11:37 AM      HISTORY: The pateint will be post-bronch    COMPARISON: Same-day chest radiograph at 0118 hours 8/14/2024    FINDINGS: Single view of the chest. The trachea is midline. ET tube is  present with the tip lying in the mid thoracic trachea approximately  4.2 cm and the ivy. Esophageal temperature probe. No pneumothorax.  Mild blunting of bilateral costophrenic angles, likely trace pleural  effusions. Diffuse right greater than left consolidative opacities and  peribronchial cuffing most consistent with pulmonary edema, interval  improvement from same day radiograph with improved aeration of the  left lung. Inferior ECMO cannula projects over the right atrium.  Enteric tube is again seen with sidehole and tip projecting beyond the  field-of-view. Right IJ ECMO cannula is stable in position  with the  tip lying in the proximal SVC. Left-sided IJ catheter is stable in  position with the tip lying again at the confluence of the innominate  veins. No acute bony abnormalities. Visualized upper abdomen is  unremarkable.      Impression    IMPRESSION:   1. Interval improvement of bilateral consolidative opacities with  improved aeration of the left lung.   2. Stable position of support devices.    I have personally reviewed the examination and initial interpretation  and I agree with the findings.    ENMANUEL JEFFERSON MD         SYSTEM ID:  [id number]   XR Chest Port 1 View    Impression    RESIDENT PRELIMINARY INTERPRETATION  IMPRESSION:   1. Stable support devices.  2. Grossly unchanged patchy airspace opacities.       =========================================

## 2024-08-15 NOTE — PROGRESS NOTES
Metabolic cart study done with patient. After five minutes there are was inadequate date  so I recalibrated the machine and and attempted another 20 minutes  but was still unable to collect sufficient data.

## 2024-08-15 NOTE — PROGRESS NOTES
[facility]    ECLS Shift Summary:     ECMO Equipment:  Console Serial Number: [id number]  Circuit Lot Number: not recorded by [employer]fusion  Oxygenator Lot Number: [id number]    Circuit Assessment: Fibrin  Fibrin Location: connectors, 3 and 9 of oxy    Drain ECMO Cannula: 25 Fr in the Right Femoral Vein  Return ECMO Cannula: 17 Fr in the Right Internal Jugular Vein      ECMO Cannula Arterial Right internal jugular-Site Assessment: WDL, Sutured, Secure  ECMO Cannula Venous Right femoral vein-Site Assessment: Bleeding, Sutured, Secure  ECMO Cannula Arterial Right internal jugular-Site Intervention: Dressing reinforced  ECMO Cannula Venous Right femoral vein-Site Intervention: Dressing changed/new dressing, Dressing reinforced    Patient remains on V-V ECMO, all equipment is functioning and alarms are appropriately set. RPM's: 3600 with Blood Flow (Circuit) LPM  Av.6 LPM  Min: 4.56 LPM  Max: 4.62 LPM L/min. Sweep is at 4 LPM and 100 %. Extremities are cool.     Significant Shift Events: Recirculation 3%. Changed cannula dressings.    Vent settings:  FiO2 (%): 60 %, Resp: 10, Inspiratory Pressure (cm H2O) (Drager Jessie): 25, Vent Mode: PCV Plus assist, Resp Rate (Set): 10 breaths/min, PEEP (cm H2O): 10 cmH2O, Resp Rate (Set): 10 breaths/min, PEEP (cm H2O): 10 cmH2O    Anticoagulation:  Dose (units/hr) HEParin: 0 Units/hr  Rate (mL/hr) HEParin: 0 mL/hr  Concentration HEParin: 100 Units/mL     Dose (mg/kg/hr) Bivalirudin: 0.06 mg/kg/hr  Rate (mL/hr) Bivalirudin: 5.3 mL/hr  Concentration Bivalirudin: 1 mg/mL  Most recent: ACT  (seconds):  (folowing PTTs)    Patient is on CRRT  Blood loss was minimal. No product given.     Intake/Output Summary (Last 24 hours) at 8/15/2024 0612  Last data filed at 8/15/2024 0500  Gross per 24 hour   Intake 4256.06 ml   Output 2084.7 ml   Net 2171.36 ml       Labs:  Recent Labs   Lab 08/15/24  0500 08/15/24  0414 08/15/24  0411 08/15/24  0211  08/15/24  0022 08/14/24 2209   PH  --   --  7.41 7.44 7.43 7.41   PCO2  --   --  39 36 36 37   PO2  --   --  167* 209* 180* 210*   HCO3  --   --  25 25 24 23   O2PER 60 60  100 60 60 60 60       Lab Results   Component Value Date    HGB 8.3 (L) 08/15/2024    PHGB 50 (H) 08/15/2024     08/15/2024    FIBR 352 08/15/2024    INR 1.37 (H) 08/12/2024    PTT 46 (H) 08/15/2024    DD 2.76 (H) 08/15/2024    ANTCH 122 08/14/2024       Plan is continue with VV ECMO at this time.    Jax Burnett RN  ECMO Specialist  8/15/2024 6:12 AM

## 2024-08-15 NOTE — PROGRESS NOTES
CRRT STATUS NOTE    DATA:  Time:  4:52 PM  Pressures WNL:  YES  Filter Status:  WDL    Problems Reported/Alarms Noted:  none    Supplies Present:  YES    ASSESSMENT:  Patient Net Fluid Balance:  +424cc  Vital Signs:   Temp: 97.5  F (36.4  C) Temp src: Esophageal BP: 116/61 Pulse: 72   Resp: 13 SpO2: 100 %       Labs:  K 3.7 ical 4.6  Goals of Therapy:  50-100cc/hr    INTERVENTIONS:   Recirc for CT run    PLAN:  Continue POC. Contact resource RN with any questions or concerns

## 2024-08-15 NOTE — PROGRESS NOTES
Nephrology Progress Note  08/15/2024       Mrs Flaherty is a  53 yof w/ Anxiety, depression, fibromyalgia, hypothyroidism, asthma, HLD, MURIEL on CPAP, expressive aphasia, and hypertension who was admitted to an OSH with community acquired pneumonia on 8/3 s/p intubation.  Found to have severe ARDS requiring paralysis and proning, transferred here on 8/8 for management and initiated on CKRT for severe acidemia in the setting of oligoanuric CHACORTA.  Started CRRT on 8/9 for volume management.      Interval History :   Mrs Flaherty continues on CRRT, doing well from hemodynamic standpoint as we have pulled ~7L in the past 4 days since initiation.  Continuing 100cc/h goal fluid removal, labs stable on 4k baths, neuro status is concerning but continuing to give more time.   Was on AmphoB but now stopped with negative fungal cultures.       Assessment & Recommendations:   CHACORTA-Baseline Cr 0.6, at baseline on admission.  CHACORTA due to septic shock in setting of ARDS, UA on 8/6/2024 essentially bland (30 protein but no blood or casts), no dedicated renal imaging.  No dedicated renal imaging at this time.  Started CRRT 8/9 for volume management, now anuric.    -CHACORTA, started CRRT on 8/9.  Continuing mix of 2k/4k baths with post filter 3% at 80cc/h, 50-100cc/h volume goal.     -Access is ECMO circuit   -Dialysis consent signed and in chart.      Volume status-Net negative ~2.5L yesterday, planning 50-100cc/h net negative today as long as BP's support.      Electrolytes/pH-Last Na 149, continuing post filter 3% but decreasing to 40cc/h as goal is down to 145-150 for sodium.     Ca/phos/pth-No acute issues, Mg and Phos mildly up but no major intervention needed today.     Anemia-Hgb 7.7, acute management per team.      Nutrition-Vital TF started 8/9.    Time spent: 55 minutes on this date of encounter for chart review, physical exam, medical decision making and co-ordination of care.     Seen and discussed with Dr Jordan Vernon  "were communicated to primary team via verbal communication.     DOREEN Sequeira CNS  Clinical Nurse Specialist  [phone]      Review of Systems:   I reviewed the following systems:  ROS not done due to vent/sedation.     Physical Exam:   I/O last 3 completed shifts:  In: 4346.96 [I.V.:1701.96; NG/GT:510; IV Piggyback:1000]  Out: 2170.7 [Other:1720.7; Stool:450]   /61   Pulse 76   Temp 98.4  F (36.9  C)   Resp 10   Ht 1.575 m (5' 2\")   Wt 87.1 kg (192 lb 0.3 oz)   SpO2 100%   BMI 35.12 kg/m       GENERAL APPEARANCE: Intubated and sedated and paralyzed  Pulmonary: Intubated and sedated,   CV: regular rhythm, normal rate   - Edema 1-2+ diffuse  GI: soft, nontender, normal bowel sounds  MS: no evidence of inflammation in joints, no muscle tenderness  SKIN:  warm, dry  NEURO: No focal deficits    Labs:   All labs reviewed by me  Electrolytes/Renal -   Recent Labs   Lab Test 08/15/24  1212 08/15/24  1020 08/15/24  0747 08/15/24  0744 08/15/24  0414 08/15/24  0022 08/14/24  2209 08/14/24  2026 08/14/24  2024 08/14/24  1616 08/14/24  1607 08/14/24  1208 08/14/24  1205   NA  --  148*  --  151* 152*   < > 151*  --   --   --  151*  --  150*   POTASSIUM  --  3.7  --   --  3.5  --  3.7  --   --   --  4.5  --  4.5   CHLORIDE  --  115*  --   --  118*  --  117*  --   --   --  117*  --  115*   CO2  --  23  --   --  23  --  22  --   --   --  20*  --  23   BUN  --  49.0*  --   --  51.8*  --  55.7*  --   --   --  57.9*  --  50.1*   CR  --  1.50*  --   --  1.51*  --  1.51*  --   --   --  1.68*  --  1.48*   * 205* 177*  --  179*   < > 204*   < >  --    < > 253*   < > 243*   QUAN  --  8.0*  --   --  8.3*  --  8.1*  --   --   --  8.1*  --  8.5*   MAG  --   --   --   --  2.1  --   --   --  2.1  --   --   --  2.3   PHOS  --   --   --   --  3.5  --   --   --   --   --  4.9*  --  5.4*    < > = values in this interval not displayed.       CBC -   Recent Labs   Lab Test 08/15/24  1020 08/15/24  0414 08/14/24  0050 "   WBC 21.2* 27.3* 25.8*   HGB 7.7* 8.3* 8.2*    185 174       LFTs -   Recent Labs   Lab Test 08/15/24  0414 08/14/24  1607 08/14/24  1205 08/14/24  0426 08/13/24  1151 08/13/24  0405   ALKPHOS 170*  --   --  224*  --  237*   BILITOTAL 0.4  --   --  0.4  --  0.4   ALT 21  --   --  23  --  21   AST 41  --   --  43  --  59*   PROTTOTAL 5.1*  --   --  5.8*  --  5.0*   ALBUMIN 2.7* 2.7* 3.0* 3.0*   < > 2.5*    < > = values in this interval not displayed.       Iron Panel - No lab results found.        Current Medications:  Current Facility-Administered Medications   Medication Dose Route Frequency Provider Last Rate Last Admin    acetaminophen (TYLENOL) tablet 650 mg  650 mg Oral Once Tiara Martin CNP        acetylcysteine (MUCOMYST) 10 % nebulizer solution 4 mL  4 mL Nebulization Q4H Aniceto Adame MD   4 mL at 08/15/24 1318    albumin human 25 % injection 25 g  25 g Intravenous Q8H Aniceto Adame MD   25 g at 08/15/24 1011    ceFEPIme (MAXIPIME) 2 g vial to attach to  mL bag for ADULTS or NS 50 mL bag for PEDS  2 g Intravenous Q12H Barry Hatch MD   2 g at 08/15/24 1205    chlorhexidine (PERIDEX) 0.12 % solution 15 mL  15 mL Mouth/Throat Q12H Giovanny Guidry PA-C   15 mL at 08/15/24 0736    [Held by provider] cloNIDine (CATAPRES) tablet 0.1 mg  0.1 mg Oral or Feeding Tube Q8H Cal Lisa MD   0.1 mg at 08/14/24 0936    dexAMETHasone PF (DECADRON) injection 20 mg  20 mg Intravenous Daily Cal Lisa MD   20 mg at 08/15/24 0737    Followed by    [START ON 8/17/2024] dexAMETHasone PF (DECADRON) injection 10 mg  10 mg Intravenous Daily Cal Lisa MD        diphenhydrAMINE (BENADRYL) capsule 50 mg  50 mg Oral Once de Tiara Macedo CNP        Or    diphenhydrAMINE (BENADRYL) injection 50 mg  50 mg Intravenous Once de Tiara Macedo CNP        fiber modular (BANATROL TF) packet 1 packet  1 packet Per Feeding Tube BID -Giovanny Spence,  LIV   1 packet at 08/15/24 1205    immune globulin - sucrose free 10 % injection 25 g  25 g Intravenous Once Tiara Martin CNP        insulin aspart (NovoLOG) injection (RAPID ACTING)  1-12 Units Subcutaneous Q4H Dong Rico PA-C   2 Units at 08/15/24 1219    insulin glargine (LANTUS PEN) injection 10 Units  10 Units Subcutaneous Daily de Tiara Macedo CNP   10 Units at 08/15/24 0738    ipratropium - albuterol 0.5 mg/2.5 mg/3 mL (DUONEB) neb solution 3 mL  3 mL Nebulization Q4H Aniceto Adame MD   3 mL at 08/15/24 1318    levothyroxine (SYNTHROID/LEVOTHROID) tablet 25 mcg  25 mcg Per Feeding Tube [facility] Giovanny Guidry PA-C   25 mcg at 08/15/24 0737    metroNIDAZOLE (FLAGYL) infusion 500 mg  500 mg Intravenous Q8H Dong Rico PA-C   500 mg at 08/15/24 1114    multivitamin RENAL (RENAVITE RX/NEPHROVITE) tablet 1 tablet  1 tablet Oral or Feeding Tube Daily Giovanny Guidry PA-C   1 tablet at 08/15/24 0737    pantoprazole (PROTONIX) 2 mg/mL suspension 40 mg  40 mg Per Feeding Tube [facility] Barry Hatch MD   40 mg at 08/15/24 0737    Prosource TF20 ENfit Compatibl EN LIQD (PROSOURCE TF20) packet 60 mL  1 packet Per Feeding Tube TID Giovanny Guidry PA-C   60 mL at 08/15/24 0738     Current Facility-Administered Medications   Medication Dose Route Frequency Provider Last Rate Last Admin    bivalirudin (ANGIOMAX) 250 mg in sodium chloride 0.9 % 250 mL ANTICOAGULANT infusion  0-0.3 mg/kg/hr (Dosing Weight) Intravenous Continuous Dong Rico PA-C 5.3 mL/hr at 08/15/24 1200 0.06 mg/kg/hr at 08/15/24 1200    dexmedeTOMIDine (PRECEDEX) 4 mcg/mL in sodium chloride 0.9 % 100 mL infusion  0.1-1.2 mcg/kg/hr (Dosing Weight) Intravenous Continuous Tiara Martin, CNP 17.8 mL/hr at 08/15/24 1208 0.8 mcg/kg/hr at 08/15/24 1208    dextrose 10% infusion   Intravenous Continuous PRN Giovanny Guidry PA-C        dialysate for CVVHD & CVVHDF  (PrismaSol BGK 2/3.5)  12.5 mL/kg/hr CRRT Continuous Dakota Dorsey APRN CNS 1,100 mL/hr at 08/15/24 1309 12.5 mL/kg/hr at 08/15/24 1309    HYDROmorphone (DILAUDID) 0.2 mg/mL infusion ADULT/PEDS GREATER than or EQUAL to 20 kg  0.3-1.5 mg/hr Intravenous Continuous Dong Rico PA-C 7.5 mL/hr at 08/15/24 1200 1.5 mg/hr at 08/15/24 1200    midazolam (VERSED) 100 mg/100 mL NS infusion - ADULT  1-14 mg/hr Intravenous Continuous Dong Rico PA-C 10 mL/hr at 08/15/24 1314 10 mg/hr at 08/15/24 1314    norepinephrine (LEVOPHED) 16 mg in  mL infusion MAX CONC CENTRAL LINE  0.01-0.6 mcg/kg/min (Dosing Weight) Intravenous Continuous Anika Mead MD 3.3 mL/hr at 08/15/24 1227 0.04 mcg/kg/min at 08/15/24 1227    POST-Filter REPLACEMENT SOlution for CVVHD/CVVHDF (3% sodium chloride)   Intravenous Continuous Dakota Dorsey APRN CNS 40 mL/hr at 08/15/24 1002 Rate Change at 08/15/24 1002    PRE-filter replacement solution for CVVHD & CVVHDF (Phoxillum BK4/2.5)  12.5 mL/kg/hr CRRT Continuous Dakota Dorsey APRN CNS 1,100 mL/hr at 08/15/24 1309 12.5 mL/kg/hr at 08/15/24 1309

## 2024-08-15 NOTE — PROGRESS NOTES
Veno-venous ECMO Progress Note  8/15/2024    Massiel Flaherty is a 53 year old female who was started on VV ECMO due to severe respiratory failure from presumed pneumonia although source has not been identified.      Interval events:   Increased norepinephrine requirements overnight, stable today     The patients vital signs today are as follows:    Temp:  [97  F (36.1  C)-99  F (37.2  C)] 98.4  F (36.9  C)  Pulse:  [] 105  Resp:  [10] 10  BP: (116)/(61) 116/61  MAP:  [54 mmHg-89 mmHg] 67 mmHg  Arterial Line BP: ()/(38-64) 107/51  FiO2 (%):  [60 %] 60 %  SpO2:  [92 %-100 %] 100 %       FiO2 (%): 60 %, Resp: 10, Inspiratory Pressure (cm H2O) (Drager Jessie): 25, Vent Mode: PCV Plus assist, Resp Rate (Set): 10 breaths/min, PEEP (cm H2O): 10 cmH2O, Resp Rate (Set): 10 breaths/min, PEEP (cm H2O): 10 cmH2O   Recent Labs   Lab 08/15/24  1209 08/15/24  1026 08/15/24  0744 08/15/24  0612   PH 7.37 7.38 7.41 7.42   PCO2 42 41 40 38   PO2 119* 137* 184* 182*   HCO3 25 24 25 25   O2PER 60 60 60 60      Recent Labs   Lab 08/15/24  1020 08/15/24  0414 08/14/24  2209 08/14/24  1607   WBC 21.2* 27.3* 25.8* 24.4*   HGB 7.7* 8.3* 8.2* 8.5*     Creatinine   Date Value Ref Range Status   08/15/2024 1.50 (H) 0.51 - 0.95 mg/dL Final   08/15/2024 1.51 (H) 0.51 - 0.95 mg/dL Final   08/14/2024 1.51 (H) 0.51 - 0.95 mg/dL Final   08/14/2024 1.68 (H) 0.51 - 0.95 mg/dL Final     Physical Exam:   Cannula unchanged. No oozing.  Increased air movement bilaterally.  Extremities edematous.    ECMO Issues including assessments and plan on DOS 8/15/2024:    Neuro: Sedated, off paralytic since yesterday. RASS goal: -1 to 0 once off paralytic.   CV: Levophed restarted overnight last night following restarting home clonidine, hold clonidine  Pulm: Severe respiratory failure requiring ECMO and mechanical ventilation. Flows unchanged at ~4.3 LPM   Keep vent settings at rest settings, do not titrate FiO2 above 60%. TVs increased to 400+  mL.  FEN/Renal: pulling on CRRT, agree/continue  Heme: Hemoglobin 7.7.  Goals: if O2 sat >85% Hgb 7-8.  If O2 Sat <85% keep Hgb 10-12. Bivalrudin gtt for anticoagulation, changed over the weekend due to concern for heparin resistance. PTT goal 44-88.  ID: on broad-spectrum antimicrobials, continue     All pertinent labs, imaging studies, physical exam and medications have been reviewed by me.     Will discuss with staff, Dr. Jose Carlos Powell  Surgical Critical Care Fellow

## 2024-08-15 NOTE — PROGRESS NOTES
[facility] General ID Service: Follow Up Note      Patient:  Massiel Flaherty   Date of birth 1970, Medical record number [id number]  Date of Visit:  08/15/2024  Date of Admission: 8/8/2024         Assessment and Recommendations:   ID Problem List:  Acute hypoxic and hypercapnic respiratory failure c/b ARDS and on VV-ECMO 8/8/24  Etiology unknown, possible CAP vs other infectious (fungal) vs noninfectious  Fever  Dry cough x1 month prior to admission  CHACORTA requiring CRRT  Cerebral edema; intentional hypernatremia  Right adnexal mass  Hypogammaglobulinemia  Elevated AST, alk phos  Anemia  Recent Augmentin, Z-pack and steroid tapers  Antibiotic allergy - Sulfa (hives), tetracycline (hives)    Recommendations:  Continue cefepime (started 8/3 at OSH; today is day #12-13 of empiric 14 day course)  Continue Flagyl (today is day #5)  Recommend IVIG   Agree with Bcx  Awaiting diagnostics outlined below - awaiting Karius  EBV PCR from BAL positive- this is likely reactivation in setting of acute illness and less likely to be cause of acute illness and pneumonia. EBV reactivation in ICU patients is associated with increased morbidity.      Discussion:  Massiel Flaherty is a 53 year old female with past medical history significant for asthma, MURIEL on CPAP, HTN, anxiety,depression, expressive aphasia, fibromyalgia, and hypothyroidism who presented to OSH 8/3/24 with fever, fatigue, dyspnea with c/f CAP and requiring intubation, proning, paralysis and transferred to [facility] 8/8/24 for further cares and now placed on VV ECMO and CHACORTA requiring CRRT. ID consulted for pneumonia.      Per chart review, had sought care multiple times since early July for an acute on chronic cough. No improvement with multiple courses of steroid tapers, Z-pack, and Augmentin as outpatient. Admitted 8/3/24 with fever and hypoxia. She has completed at least 3 days of azithromycin at OSH since first admitted 8/3 and has been on cefepime since 8/3  with clinical worsening. No infectious etiology has been identified to date. Negative ID work up outlined below. Negative EVELIO, ANCA, MPO, proteinase 3 at OSH. CXR with ongoing diffuse bilateral pulmonary opacities consistent with ARDS and now paralyzed, on VV-ECMO and CRRT.      Started on Amphotericin B 8/9 due to high level of concern for endemic fungal pneumonia given multiple presentations and lack of improvement on abx, with steroids prescribed, discontinued on 8/13 with antigens negative, unrevealing bronch. Continues on cefepime for possible bacterial pneumonia. Respiratory cultures negative. Low level EBV positivity from BAL- likely reactivation rather than initial cause of worsening status. Has completed 3 days of Azithromycin at [facility] without improvement; received azithromycin at OSH prior to transfer as well. Sent Karius on 8/13 given ongoing critical illness and negative work up thus far.       8/6/24 Legionella urine ag Neg    MRSA nares Neg    Bcx x2  No growth    PJP PCR Neg    Respiratory bacterial cx No growth   8/7/24 BD glucan Neg    HIV Ag Ab Neg    Aspergillus antigen blood Neg   8/8/24 IgG 302 (low)    Respiratory aerobic cx C.albicans    MRSA nares; SA target DNA Neg/Neg    BCx NGTD   8/9 Blastomyces Ag EIA blood Neg    BD glucan Neg (44)    Aspergillus galactomannan Neg    IgG 258    HIV Ag Ab combo Neg    Cryptococcal Ag blood Neg    Coccidioides Ag EIA blood Neg    Histoplasma capsulatum Ag blood Neg    Hep B core Ab IgM Neg    Hep A Ab IgM Neg    Hep B surface Ab Pos    Hep C Ab  Neg    Hep B surface Ag Neg    Respiratory aerobic cx BAL No growth    AFB stain and Cx BAL  Stain neg, NGTD    Fungal cx BAL  C.albicans    KOH prep BAL Neg    CMV PCR BAL Neg    Nocardia cx BAL NGTD    Legionella cx BAL NGTD    HSV PCR BAL Neg    PJP PCR BAL Neg    Aspergillus galactomannan BAL Neg    EBV PCR BAL Positive    Adenovirus PCR BAL Neg    Respiratory aerobic cx BAL No growth    AFB culture and stain  BAL Stain neg, NGTD    Fungal Cx BAL NGTD    KOH prep BAL Neg    Legionella species cx BAL NGTD    Nocardia species cx BAL NGTD    Legionella PCR BAL Neg    HSV PCR BAL Neg   8/10/24 Respiratory panel PCR neg    COVID-19 Neg   8/11/24 Histoplasma capsulatum BAL Neg    Histoplasma capsulatum blood Neg   8/12/24 Tick-borne disease neg    Leptospira IgM Neg    Hantavirus antibodies PENDING    GC/Chlamydia swab Neg   8/13/24 C.diff Neg    Karius PENDING   8/14/24 Fungal/yeast cx BAL NGTD    Respiratory Aerobic cx BAL GNB   8/15/24 MRSA nares neg    BCX PENDING       Recs were discussed with primary team today. Don't hesitate to call with questions.     Attestation:  I have reviewed today's vital signs, medications, labs and imaging.    Marce Black PA-C  Pronouns: she/her/hers  Infectious Diseases  Contact via [employer] or [employer] Paging/Directory        50 MINUTES SPENT BY ME on the date of service doing chart review, history, exam, documentation & further activities per the note.             Interval History:      Afebile. Remains on CRRT. Remains on VV ECMO. Intermittent shaking when paralytic is off.          Current Antimicrobials   Current:  - cefepime 8/3-present  - flagyl 8/11-present    Prior:  - amphotericin B 8/9-8/13  - azithromycin 8/9-/811; given at OSH as well  - IV pentamidine given at OSH         History of Present Illness:    Per ID consult note 8/9/24:  Massiel Flaherty is a 53 year old female with past medical history significant for asthma, MURIEL on CPAP, HTN, anxiety,depression, expressive aphasia, fibromyalgia, obesity, and hypothyroidism who was transferred to [facility] 8/8/24 for further cares and now placed on VV ECMO and CRRT. ID consulted for pneumonia.      She was transferred to [facility] from OSH. Presented to hospital in [place], [place] on 8/3 with fever, fatigue, dyspnea with concern for CAP. Xray with multifocal bilateral opacities. CT chest with severe multifocal bilateral opacities, negative for PE  "in OSH notes, though unable to see direct report. Negative COVID, flu, and viral testing. O2 sats reportedly 60% on arrival.      She was started on azithromycin, cefepime, vancomycin, and solumedrol x1. Had reportedly been on a steroid taper recently, so had started on atovaquone ppx on 8/7 at OSH. Prior to admission, was seen in clinic and given PO Augmentin on 7/30 for acute on chronic cough x2 weeks. Was also seen at OSH ED 7/19 for asthma exacerbation, got Z-pack and prednisone taper. Was seen prior on 7/8 with cough and got additional prednisone, azithromycin. Has also had some concern for recent \"memory issues and brain fog\" and has \"zoning out episodes\". Head imaging normal, seen by neurology who felt this was more psych issue per OSH records. She has history of seizure workup and is not on any AEDs. She is on rifaximin for unknown reason. Was febrile at OSH Tmax 101.3F and required pressors. Infectious workup at OSH with negative - RSV, COVID, flu, RVP, Fungitell, Legionella urine Ag, Strep pneumococcal urine Ag, sputum PJP PCR, HIV, Aspergillus antigen, and EVELIO. MRSA nares negative. Sputum Cx 8/6 - NGTD at 48 hrs. Bcx 8/6 at OSH NGTD. Procal 4.16.      On 8/6, was transferred to [facility] and intubated for severe ARDS requiring paralysis, proning. Remained on cefepime. Transferred to [facility] on 8/8 given continued decline, started V-V ECMO and CRRT. Son, brother, and niece/nephew at bedside on 8/9 morning. Son provides additional history primarily. Reports onset of dry nonproductive cough ~2 weeks ago and followed by zoning out spells. She had no other URI symptoms - sore throat, sinus congestion, no headache, chest pain, nausea, vomiting, abdominal pain, diarrhea that he was aware. Patient had been living with her mother due to difficulties caring for herself. Has had multiple falls in the last month. Recently fell out of a recliner - has bruising to left foot from this fall. Unsure if she hit her " head. Son denies any tobacco, alcohol, or illicit drug use by patient. No recent travel. Son was sick with viral illness ~1 months ago, no other recent sick contacts. She is around her sister's dog - who is well, no illness. No livestock or other animal exposures. Says she does have a hx of colon cancer - likely polyp that was resected. Family denies chemo/radiation/surgery. 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        Physical Exam:   Ranges for vital signs:  Temp:  [97  F (36.1  C)-99  F (37.2  C)] 98.4  F (36.9  C)  Pulse:  [] 77  Resp:  [10] 10  BP: (116)/(61) 116/61  MAP:  [54 mmHg-89 mmHg] 65 mmHg  Arterial Line BP: ()/(38-64) 96/47  FiO2 (%):  [60 %] 60 %  SpO2:  [92 %-100 %] 100 %    Intake/Output Summary (Last 24 hours) at 8/12/2024 1456  Last data filed at 8/12/2024 1400  Gross per 24 hour   Intake 4354.59 ml   Output 6225.9 ml   Net -1871.31 ml     Exam:  GENERAL:  Supine in bed. Sedated in ICU. +shaking  ENT:  Head is normocephalic, atraumatic. Oropharynx is moist, ETT in place.  EYES:  Eyes have anicteric sclerae, noninjected conjunctivae without discharge or crusting on lashes.    LUNGS:  Diminished air movement on auscultation. No wheeze or crackles. +mechanical ventilation.   CARDIOVASCULAR:  RRR, +S1/S2, no murmur appreciated  ABDOMEN:  soft, non-distended. +hypoactive bowel sounds.  EXT: Extremities cool, 1-2+ BLE edema.  SKIN:  No acute rashes, jaundice, or diaphoresis.  Left internal jugular line and PIV x3, L radial art line, +ECMO cannula R groin in place and all without any surrounding erythema.  NEUROLOGIC:  sedated and paralyzed         Laboratory Data:   Reviewed.  Pertinent for:    Culture data:  Culture   Date Value Ref Range Status   08/09/2024 No Growth  Final   08/09/2024 Candida albicans (A)  Preliminary   08/09/2024 No growth after 5 days  Preliminary   08/09/2024 Culture negative,  monitoring continues  Preliminary   08/09/2024 No Growth  Final   08/09/2024 No growth after 5 days  Preliminary   08/09/2024 Culture negative, monitoring continues  Preliminary   08/09/2024 No growth after 5 days  Preliminary   08/08/2024 No Growth  Final   08/08/2024 1+ Candida albicans (A)  Final     Comment:     Susceptibilities not routinely done, refer to antibiogram to view typical susceptibility profiles     GS Culture   Date Value Ref Range Status   08/09/2024 See corresponding culture for results  Final   08/09/2024 See corresponding culture for results  Final       Inflammatory Markers  No lab results found.    Hematology Studies    Recent Labs   Lab Test 08/15/24  0414 08/14/24  2209 08/14/24  1607 08/14/24  0942   WBC 27.3* 25.8* 24.4* 31.8*   HGB 8.3* 8.2* 8.5* 9.0*   MCV 96 96 96 96    174 196 260     Recent Labs   Lab Test 08/15/24  0414 08/14/24  2209 08/14/24  1607 08/14/24  0942   ANEU 20.5* 17.3* 18.5* 25.1*   AEOS 0.0 0.0 0.0 0.0       Metabolic Studies     Recent Labs   Lab Test 08/15/24  0414 08/15/24  0022 08/14/24 2209 08/14/24  1607 08/14/24  1205   * 151* 151* 151* 150*   POTASSIUM 3.5  --  3.7 4.5 4.5   CHLORIDE 118*  --  117* 117* 115*   CO2 23  --  22 20* 23   BUN 51.8*  --  55.7* 57.9* 50.1*   CR 1.51*  --  1.51* 1.68* 1.48*   GFRESTIMATED 41*  --  41* 36* 42*       Hepatic Studies    Recent Labs   Lab Test 08/15/24  0414 08/14/24  1607 08/14/24  1205 08/14/24  0426 08/13/24  1151 08/13/24  0405   BILITOTAL 0.4  --   --  0.4  --  0.4   ALKPHOS 170*  --   --  224*  --  237*   ALBUMIN 2.7* 2.7* 3.0* 3.0*   < > 2.5*   AST 41  --   --  43  --  59*   ALT 21  --   --  23  --  21    < > = values in this interval not displayed.            Imaging:   CT head 8/15/2024  IMPRESSION: Continued improvement in gray-white matter differentiation  and continued decreased effacement of the sulci and ventricles. No  acute intracranial abnormality.     CXR 8/15/2024   IMPRESSION:   1. Stable  support devices.  2. Grossly unchanged patchy airspace opacities.    CXR 8/12/24  IMPRESSION:   1. Stable support devices.  2. Dense consolidative opacities bilaterally with minimally improved  areas of parenchymal aeration.    US pelvic limited 8/10/24  IMPRESSION: Post hysterectomy. No overt adnexal abnormality identified on  transabdominal imaging.    CT head 8/10/24  IMPRESSION:   Slightly improved differentiation of the gray-white matter and  sulcation suggesting that the prior study may have been impacted by  artifact.     CT Chest/abd/pelvis 8/9/24  IMPRESSION:   1. Diffuse patchy groundglass and consolidative densities throughout  the lungs with small bilateral pleural effusions. Findings may  represent severe pulmonary edema, and/or  infectious /inflammatory  pathology, or ARDS.   2. Multiple prominent and a few enlarged mediastinal lymph nodes,  possibly reactive. Recommend attention on follow-up.  3. Support devices as detailed above.  4. Mild hepatomegaly.  5. Trace pelvic ascites, nonspecific bilateral perinephric  streakiness.  6. Asymmetric heterogeneous bulky right adnexa, consider nonemergent  pelvic ultrasound for further evaluation.  7. Additional incidental/chronic findings as detailed above      ECHO  ECHO Congenital Transthoracic (TTE)  Result Date: 8/8/2024    Limited echocardiogram performed for assessment of LV systolic function.   Left Ventricle: The left ventricle appears normal in size. The ejection fraction, measured by biplane, is 71%. There are no wall motion abnormalities.   Limited assessment of RV.  Normal appearing right ventricular chamber size and systolic function.   Normal IVC size with minimal respirophasic changes.   No prior study for comparison. Left Ventricle The left ventricle appears normal in size. Wall thickness is normal. The ejection fraction, measured by biplane, is 71%. There are no wall motion abnormalities. IVC/SVC Normal IVC size with minimal respirophasic  changes. Pericardium There is no pericardial effusion. Study Details A limited echo was performed with limited 2D. The sonographer requested the use of Definity- imaging enhancing agent per protocol. The patient denies contraindications and gives verbal consent for imaging enhancing agent injection.

## 2024-08-15 NOTE — PLAN OF CARE
Patient is sedated on dilaudid, versed; pupillometry q4hour, pupils brisk 2-3; precedex on for shivering; sinus rhythm, HR 70-90s, levo titrated to keep MAPs>65, afebrile, nba hugger in place, warming through the ECMO circuit; PC settings (60%/RR10/PC 25/peep 10), -350s, suctioned via ETT q3 hours, veletri via vent, ECMO sweep 4, no chugging, bival through ECMO circuit; NJ with TF at 35 mL/hour, rectal pouch in place; CRRT goal  mL/hour, did not pull fluid due to hypotension, around 0200, attempted to make patient net even; continue with current plan of care and notify MD as needed.        Problem: Adult Inpatient Plan of Care  Goal: Plan of Care Review  Outcome: Not Progressing  Flowsheets (Taken 8/15/2024 0600)  Plan of Care Reviewed With: patient  Overall Patient Progress: no change     Problem: Gas Exchange Impaired  Goal: Optimal Gas Exchange  Outcome: Progressing  Intervention: Optimize Oxygenation and Ventilation  Recent Flowsheet Documentation  Taken 8/15/2024 0400 by Odalis Gibson, DANNY  Airway/Ventilation Management:   airway patency maintained   calming measures promoted   humidification applied   pulmonary hygiene promoted  Head of Bed (HOB) Positioning: HOB at 20-30 degrees  Taken 8/15/2024 0200 by Odalis Gibson, DANNY  Head of Bed (HOB) Positioning: HOB at 20-30 degrees  Taken 8/15/2024 0000 by Odalis Gibson, RN  Airway/Ventilation Management:   airway patency maintained   calming measures promoted   humidification applied   pulmonary hygiene promoted  Head of Bed (HOB) Positioning: HOB at 20-30 degrees  Taken 8/14/2024 2100 by Odalis Gibson, DANNY  Head of Bed (HOB) Positioning: HOB at 15 degrees  Taken 8/14/2024 2000 by Odalis Gibson, RN  Airway/Ventilation Management:   airway patency maintained   calming measures promoted   humidification applied   pulmonary hygiene promoted  Head of Bed (HOB) Positioning: HOB not elevated due to hypotension     Problem: ARDS (Acute Respiratory Distress  Syndrome)  Goal: Effective Oxygenation  Outcome: Progressing  Intervention: Optimize Oxygenation, Ventilation and Perfusion  Recent Flowsheet Documentation  Taken 8/15/2024 0400 by Odalis Gibson RN  Lung Protection Measures:   lung compliance monitored   ventilator synchrony promoted  Airway/Ventilation Management:   airway patency maintained   calming measures promoted   humidification applied   pulmonary hygiene promoted  Head of Bed (HOB) Positioning: HOB at 20-30 degrees  Taken 8/15/2024 0200 by Odalis Gibson RN  Head of Bed (HOB) Positioning: HOB at 20-30 degrees  Taken 8/15/2024 0000 by Odalis Gibson RN  Lung Protection Measures:   lung compliance monitored   ventilator synchrony promoted  Airway/Ventilation Management:   airway patency maintained   calming measures promoted   humidification applied   pulmonary hygiene promoted  Head of Bed (HOB) Positioning: HOB at 20-30 degrees  Taken 8/14/2024 2100 by Odalis Gibson RN  Head of Bed (HOB) Positioning: HOB at 15 degrees  Taken 8/14/2024 2000 by Odalis Gibson RN  Lung Protection Measures:   lung compliance monitored   ventilator synchrony promoted  Airway/Ventilation Management:   airway patency maintained   calming measures promoted   humidification applied   pulmonary hygiene promoted  Head of Bed (HOB) Positioning: HOB not elevated due to hypotension

## 2024-08-15 NOTE — PROGRESS NOTES
CRRT STATUS NOTE    DATA:  Time:  6:33 AM  Pressures WNL:  YES  Filter Status:  WDL    Problems Reported/Alarms Noted:  none    Supplies Present:  YES    ASSESSMENT:  Patient Net Fluid Balance:  8/14 +1208  8/15 0600 +223  Vital Signs:  Temp: 97  F (36.1  C) Temp src: Esophageal BP: 116/61 Pulse: 78   Resp: 10 SpO2: 100 % O2 Device: Mechanical Ventilator  VV ECMO  Levo to keep MAP > 65    Labs:  K 3.5    ICa 4.8  MG 2.1  LA 1.1 (decreasing)  Hgb 8.3  WBC 27.3  Goals of Therapy:  50-100ml/hr, unable to meet goals of therapy due to increase in pressor and previous shift rising lactate.     INTERVENTIONS:   none    PLAN:  Continue per Renal orders.  Replace K if <3.5 (2K dialysate).  Call CRRT resource RN via [employer] with concerns.

## 2024-08-15 NOTE — PROGRESS NOTES
Brief Note    Reason for critical care admission: Concern for anoxic brain injury   Admitting Team: SICU  Date of Service:  08/14/2024  Date of Admission:  8/8/2024  Hospital Day: 7    Interval Events  -Remains on VV ECMO and CRRT  -Shivering, precedex and bear hugger  -New CTH done    Based on the new CTH, imaging is stable, do not notice further progression of the cerebral edema.  Would continue to recommend Na goal 145-150 and MRI when able.    CTH 8/15 Final report: Continued improvement in gray-white matter differentiation and continued decreased effacement of the sulci and ventricles. No acute intracranial abnormality.    NCC will continue to follow. Please call with questions or concerns.    This patient was discussed with the NCC Attending, Dr. Tapia.    Lorenzo Gray MD on 8/15/2024 at 1:52 PM  Neurocritical Care  *[phone]

## 2024-08-15 NOTE — PROGRESS NOTES
[facility]  WO Nurse Inpatient Assessment     Consulted for: ECMO Pressure Injury Prevention    Patient History (according to provider note(s):       Massiel Flaherty is a 53 year old female who was admitted to [facility].   Past medical hx of Anxiety/depression, fibromyalgia, hypothyroidism, asthma, HLD, MURIEL on CPAP, spells, off on anti seizure medications, expressive aphasia, hypertension was seen at [facility] and was placed on Augmentin outpatient on 7/30/24.   She admitted to the hospital on 8/3/24 for fatigue, fever and dyspnea and intubated 8/6/24 at OSH, proned and paralyzed without improvement. Transferred to [facility] 8/8/24, hypoxic with high plateaus/peaks and placed on VV EMCO and CRRT.     Assessment:      ECMO cannula location: Right IJ ECMO cannula and Right groin ECMO cannula  Areas assessed: Mouth, Ears, Occiput, Hands, Feet, and Heels  Positioning tolerance: Fair  Date of ECMO cannulation: 8/8/2024  Presence of ischemia: No and scattered bruising to bilateral feet and right heel- photos taken and in chart under media tab.  Location of ischemia: N/A  Pressure Injury Present::No, Initially some concern for possible tongue wound, photo taken, however mucus easily wiped away shortly after photo and no clear injury visualized, does appear as though patient may be developing thrush though, RN notified. Blanchable erythema present on back of the head, but Z-flow in place and band holding ECMO cannulas does not appear to be consistently applying pressure. EEG monitoring in place, discussed with RN.   Pressure Injury Prevention Interventions In Place:  Optifoam Dressing under ECMO Cannula, Z flow Positioner under head, Pillows for repositioning, TAPs Wedge Positioners in use, Heel off-loading boots, and Mepilex Sacral Dressing        Pressure Injury Prevention Interventions Added:  None- all interventions in place.      Treatment Plan:     ECMO pressure injury  prevention plan:      Reposition patient every 1-2 hours using positioning wedges  Reposition head with each turn or every 2 hours using fluidized positioner, remold fluidized positioner with each reposition so that pressure is redistributed along the entire head/neck. Ensure head and neck are aligned in a neutral position without any cords or tubes resting under head or ears.   Pad ECMO IJ cannula with non adhesive foam dressing (#[id number]) along face and scalp between skin and cannula and under Coban head wraps    Pad ECMO groin and chest cannula under rigid connectors with non adhesive foam dressing or Soft cloth  Heel off-loading Boots at all times  Sacral silicon adhesive dressing for Prevention, change every 5 days and prn  Low Air loss mattress      Orders: Reviewed    RECOMMEND PRIMARY TEAM ORDER: None, at this time  Education provided: importance of repositioning, plan of care, and Off-loading pressure  Discussed plan of care with: Nurse  WOC nurse follow-up plan: weekly  Notify WOC if wound(s) deteriorate.  Nursing to notify the Provider(s) and re-consult the WOC Nurse if new skin concern.    DATA:     Current support surface: Standard  Low air loss (DIA pump, Isolibrium, Pulsate)  Containment of urine/stool: Incontinent pad in bed, Indwelling catheter, and External rectal pouch  BMI: Body mass index is 35.12 kg/m .   Active diet order: Orders Placed This Encounter      NPO for Medical/Clinical Reasons Except for: Meds, NPO but receiving Tube Feeding     Output: I/O last 3 completed shifts:  In: 4539.19 [I.V.:1734.19; NG/GT:570; IV Piggyback:1000]  Out: 1863 [Other:1263; Stool:600]     Labs:   Recent Labs   Lab 08/15/24  1020 08/15/24  0414 08/12/24  1539 08/12/24  0945   ALBUMIN  --  2.7*   < > 2.5*   HGB 7.7* 8.3*   < > 8.2*   INR  --   --   --  1.37*   WBC 21.2* 27.3*   < > 14.7*    < > = values in this interval not displayed.     Pressure injury risk assessment:   Sensory Perception: 1-->completely  limited  Moisture: 3-->occasionally moist  Activity: 1-->bedfast  Mobility: 1-->completely immobile  Nutrition: 3-->adequate  Friction and Shear: 1-->problem  Kade Score: 10    Jaycee Pal RN, CWOCN  Pager no longer is use, please contact through [facility]: [facility] Nurse [place]  Dept. Office Number: [phone]

## 2024-08-16 ENCOUNTER — APPOINTMENT (OUTPATIENT)
Dept: GENERAL RADIOLOGY | Facility: CLINIC | Age: 54
DRG: 003 | End: 2024-08-16
Attending: SURGERY
Payer: MEDICAID

## 2024-08-16 LAB
ALBUMIN SERPL BCG-MCNC: 3.6 G/DL (ref 3.5–5.2)
ALBUMIN SERPL BCG-MCNC: 3.6 G/DL (ref 3.5–5.2)
ALBUMIN SERPL BCG-MCNC: 3.9 G/DL (ref 3.5–5.2)
ALLEN'S TEST: ABNORMAL
ALP SERPL-CCNC: 132 U/L (ref 40–150)
ALT SERPL W P-5'-P-CCNC: 19 U/L (ref 0–50)
ANION GAP SERPL CALCULATED.3IONS-SCNC: 12 MMOL/L (ref 7–15)
ANION GAP SERPL CALCULATED.3IONS-SCNC: 13 MMOL/L (ref 7–15)
ANION GAP SERPL CALCULATED.3IONS-SCNC: 14 MMOL/L (ref 7–15)
ANION GAP SERPL CALCULATED.3IONS-SCNC: 14 MMOL/L (ref 7–15)
APTT PPP: 50 SECONDS (ref 22–38)
APTT PPP: 50 SECONDS (ref 22–38)
APTT PPP: 51 SECONDS (ref 22–38)
APTT PPP: 51 SECONDS (ref 22–38)
AST SERPL W P-5'-P-CCNC: 28 U/L (ref 0–45)
AT III ACT/NOR PPP CHRO: 103 % (ref 85–135)
BASE EXCESS BLDA CALC-SCNC: -0.3 MMOL/L (ref -3–3)
BASE EXCESS BLDA CALC-SCNC: -0.4 MMOL/L (ref -3–3)
BASE EXCESS BLDA CALC-SCNC: -0.6 MMOL/L (ref -3–3)
BASE EXCESS BLDA CALC-SCNC: -0.7 MMOL/L (ref -3–3)
BASE EXCESS BLDA CALC-SCNC: -1 MMOL/L (ref -3–3)
BASE EXCESS BLDA CALC-SCNC: -1.1 MMOL/L (ref -3–3)
BASE EXCESS BLDA CALC-SCNC: -1.6 MMOL/L (ref -3–3)
BASE EXCESS BLDA CALC-SCNC: -1.9 MMOL/L (ref -3–3)
BASE EXCESS BLDA CALC-SCNC: -2.3 MMOL/L (ref -3–3)
BASE EXCESS BLDA CALC-SCNC: -2.5 MMOL/L (ref -3–3)
BASE EXCESS BLDA CALC-SCNC: -2.6 MMOL/L (ref -3–3)
BASE EXCESS BLDA CALC-SCNC: -2.8 MMOL/L (ref -3–3)
BASE EXCESS BLDA CALC-SCNC: -3.8 MMOL/L (ref -3–3)
BASE EXCESS BLDV CALC-SCNC: -0.1 MMOL/L (ref -3–3)
BASE EXCESS BLDV CALC-SCNC: -0.6 MMOL/L (ref -3–3)
BASE EXCESS BLDV CALC-SCNC: -1.7 MMOL/L (ref -3–3)
BASE EXCESS BLDV CALC-SCNC: -2.5 MMOL/L (ref -3–3)
BASOPHILS # BLD AUTO: ABNORMAL 10*3/UL
BASOPHILS # BLD MANUAL: 0 10E3/UL (ref 0–0.2)
BASOPHILS # BLD MANUAL: 0 10E3/UL (ref 0–0.2)
BASOPHILS # BLD MANUAL: 0.2 10E3/UL (ref 0–0.2)
BASOPHILS # BLD MANUAL: 0.2 10E3/UL (ref 0–0.2)
BASOPHILS NFR BLD AUTO: ABNORMAL %
BASOPHILS NFR BLD MANUAL: 0 %
BASOPHILS NFR BLD MANUAL: 0 %
BASOPHILS NFR BLD MANUAL: 1 %
BASOPHILS NFR BLD MANUAL: 1 %
BILIRUB DIRECT SERPL-MCNC: 0.27 MG/DL (ref 0–0.3)
BILIRUB SERPL-MCNC: 0.6 MG/DL
BUN SERPL-MCNC: 48.9 MG/DL (ref 6–20)
BUN SERPL-MCNC: 49 MG/DL (ref 6–20)
BUN SERPL-MCNC: 49.5 MG/DL (ref 6–20)
BUN SERPL-MCNC: 51.4 MG/DL (ref 6–20)
BUN SERPL-MCNC: 52.7 MG/DL (ref 6–20)
BUN SERPL-MCNC: 53.4 MG/DL (ref 6–20)
CA-I BLD-MCNC: 4.7 MG/DL (ref 4.4–5.2)
CA-I BLD-MCNC: 4.7 MG/DL (ref 4.4–5.2)
CA-I BLD-MCNC: 4.8 MG/DL (ref 4.4–5.2)
CA-I BLD-MCNC: 4.9 MG/DL (ref 4.4–5.2)
CALCIUM SERPL-MCNC: 8.2 MG/DL (ref 8.8–10.4)
CALCIUM SERPL-MCNC: 8.3 MG/DL (ref 8.8–10.4)
CALCIUM SERPL-MCNC: 8.3 MG/DL (ref 8.8–10.4)
CALCIUM SERPL-MCNC: 8.4 MG/DL (ref 8.8–10.4)
CALCIUM SERPL-MCNC: 8.5 MG/DL (ref 8.8–10.4)
CALCIUM SERPL-MCNC: 9.1 MG/DL (ref 8.8–10.4)
CHLORIDE SERPL-SCNC: 109 MMOL/L (ref 98–107)
CHLORIDE SERPL-SCNC: 110 MMOL/L (ref 98–107)
CHLORIDE SERPL-SCNC: 111 MMOL/L (ref 98–107)
CHLORIDE SERPL-SCNC: 111 MMOL/L (ref 98–107)
CHLORIDE SERPL-SCNC: 113 MMOL/L (ref 98–107)
CHLORIDE SERPL-SCNC: 113 MMOL/L (ref 98–107)
COHGB MFR BLD: 100 % (ref 96–97)
COHGB MFR BLD: 94.6 % (ref 96–97)
COHGB MFR BLD: 99.7 % (ref 96–97)
COHGB MFR BLD: >100 % (ref 96–97)
CREAT SERPL-MCNC: 1.48 MG/DL (ref 0.51–0.95)
CREAT SERPL-MCNC: 1.49 MG/DL (ref 0.51–0.95)
CREAT SERPL-MCNC: 1.52 MG/DL (ref 0.51–0.95)
CREAT SERPL-MCNC: 1.55 MG/DL (ref 0.51–0.95)
CREAT SERPL-MCNC: 1.57 MG/DL (ref 0.51–0.95)
CREAT SERPL-MCNC: 1.61 MG/DL (ref 0.51–0.95)
D DIMER PPP FEU-MCNC: 2.2 UG/ML FEU (ref 0–0.5)
D DIMER PPP FEU-MCNC: 2.37 UG/ML FEU (ref 0–0.5)
EGFRCR SERPLBLD CKD-EPI 2021: 38 ML/MIN/1.73M2
EGFRCR SERPLBLD CKD-EPI 2021: 39 ML/MIN/1.73M2
EGFRCR SERPLBLD CKD-EPI 2021: 40 ML/MIN/1.73M2
EGFRCR SERPLBLD CKD-EPI 2021: 41 ML/MIN/1.73M2
EGFRCR SERPLBLD CKD-EPI 2021: 42 ML/MIN/1.73M2
EGFRCR SERPLBLD CKD-EPI 2021: 42 ML/MIN/1.73M2
EOSINOPHIL # BLD AUTO: ABNORMAL 10*3/UL
EOSINOPHIL # BLD MANUAL: 0 10E3/UL (ref 0–0.7)
EOSINOPHIL # BLD MANUAL: 0.5 10E3/UL (ref 0–0.7)
EOSINOPHIL NFR BLD AUTO: ABNORMAL %
EOSINOPHIL NFR BLD MANUAL: 0 %
EOSINOPHIL NFR BLD MANUAL: 3 %
ERYTHROCYTE [DISTWIDTH] IN BLOOD BY AUTOMATED COUNT: 17.4 % (ref 10–15)
ERYTHROCYTE [DISTWIDTH] IN BLOOD BY AUTOMATED COUNT: 17.5 % (ref 10–15)
ERYTHROCYTE [DISTWIDTH] IN BLOOD BY AUTOMATED COUNT: 17.6 % (ref 10–15)
ERYTHROCYTE [DISTWIDTH] IN BLOOD BY AUTOMATED COUNT: 17.6 % (ref 10–15)
FIBRINOGEN PPP-MCNC: 270 MG/DL (ref 170–510)
FIBRINOGEN PPP-MCNC: 307 MG/DL (ref 170–510)
GLUCOSE BLDC GLUCOMTR-MCNC: 134 MG/DL (ref 70–99)
GLUCOSE BLDC GLUCOMTR-MCNC: 137 MG/DL (ref 70–99)
GLUCOSE BLDC GLUCOMTR-MCNC: 142 MG/DL (ref 70–99)
GLUCOSE BLDC GLUCOMTR-MCNC: 142 MG/DL (ref 70–99)
GLUCOSE BLDC GLUCOMTR-MCNC: 147 MG/DL (ref 70–99)
GLUCOSE BLDC GLUCOMTR-MCNC: 151 MG/DL (ref 70–99)
GLUCOSE BLDC GLUCOMTR-MCNC: 157 MG/DL (ref 70–99)
GLUCOSE BLDC GLUCOMTR-MCNC: 159 MG/DL (ref 70–99)
GLUCOSE BLDC GLUCOMTR-MCNC: 161 MG/DL (ref 70–99)
GLUCOSE BLDC GLUCOMTR-MCNC: 204 MG/DL (ref 70–99)
GLUCOSE BLDC GLUCOMTR-MCNC: 207 MG/DL (ref 70–99)
GLUCOSE SERPL-MCNC: 160 MG/DL (ref 70–99)
GLUCOSE SERPL-MCNC: 162 MG/DL (ref 70–99)
GLUCOSE SERPL-MCNC: 174 MG/DL (ref 70–99)
GLUCOSE SERPL-MCNC: 213 MG/DL (ref 70–99)
GLUCOSE SERPL-MCNC: 230 MG/DL (ref 70–99)
GLUCOSE SERPL-MCNC: 266 MG/DL (ref 70–99)
HCO3 BLD-SCNC: 23 MMOL/L (ref 21–28)
HCO3 BLD-SCNC: 24 MMOL/L (ref 21–28)
HCO3 BLD-SCNC: 25 MMOL/L (ref 21–28)
HCO3 BLD-SCNC: 25 MMOL/L (ref 21–28)
HCO3 BLDA-SCNC: 24 MMOL/L (ref 21–28)
HCO3 BLDA-SCNC: 24 MMOL/L (ref 21–28)
HCO3 BLDV-SCNC: 23 MMOL/L (ref 21–28)
HCO3 BLDV-SCNC: 25 MMOL/L (ref 21–28)
HCO3 SERPL-SCNC: 20 MMOL/L (ref 22–29)
HCO3 SERPL-SCNC: 21 MMOL/L (ref 22–29)
HCO3 SERPL-SCNC: 22 MMOL/L (ref 22–29)
HCO3 SERPL-SCNC: 22 MMOL/L (ref 22–29)
HCO3 SERPL-SCNC: 23 MMOL/L (ref 22–29)
HCO3 SERPL-SCNC: 23 MMOL/L (ref 22–29)
HCT VFR BLD AUTO: 23.4 % (ref 35–47)
HCT VFR BLD AUTO: 24 % (ref 35–47)
HCT VFR BLD AUTO: 24.4 % (ref 35–47)
HCT VFR BLD AUTO: 24.9 % (ref 35–47)
HGB BLD-MCNC: 7.5 G/DL (ref 11.7–15.7)
HGB BLD-MCNC: 7.7 G/DL (ref 11.7–15.7)
HGB BLD-MCNC: 7.8 G/DL (ref 11.7–15.7)
HGB BLD-MCNC: 7.9 G/DL (ref 11.7–15.7)
HGB FREE PLAS-MCNC: 40 MG/DL
IMM GRANULOCYTES # BLD: ABNORMAL 10*3/UL
IMM GRANULOCYTES NFR BLD: ABNORMAL %
LACTATE SERPL-SCNC: 0.7 MMOL/L (ref 0.7–2)
LACTATE SERPL-SCNC: 0.8 MMOL/L (ref 0.7–2)
LACTATE SERPL-SCNC: 0.8 MMOL/L (ref 0.7–2)
LACTATE SERPL-SCNC: 0.9 MMOL/L (ref 0.7–2)
LYMPHOCYTES # BLD AUTO: ABNORMAL 10*3/UL
LYMPHOCYTES # BLD MANUAL: 0.5 10E3/UL (ref 0.8–5.3)
LYMPHOCYTES # BLD MANUAL: 1.2 10E3/UL (ref 0.8–5.3)
LYMPHOCYTES # BLD MANUAL: 1.8 10E3/UL (ref 0.8–5.3)
LYMPHOCYTES # BLD MANUAL: 2 10E3/UL (ref 0.8–5.3)
LYMPHOCYTES NFR BLD AUTO: ABNORMAL %
LYMPHOCYTES NFR BLD MANUAL: 11 %
LYMPHOCYTES NFR BLD MANUAL: 3 %
LYMPHOCYTES NFR BLD MANUAL: 6 %
LYMPHOCYTES NFR BLD MANUAL: 9 %
MAGNESIUM SERPL-MCNC: 2.2 MG/DL (ref 1.7–2.3)
MAGNESIUM SERPL-MCNC: 2.4 MG/DL (ref 1.7–2.3)
MAGNESIUM SERPL-MCNC: 2.6 MG/DL (ref 1.7–2.3)
MCH RBC QN AUTO: 31.2 PG (ref 26.5–33)
MCH RBC QN AUTO: 31.3 PG (ref 26.5–33)
MCH RBC QN AUTO: 31.4 PG (ref 26.5–33)
MCH RBC QN AUTO: 31.8 PG (ref 26.5–33)
MCHC RBC AUTO-ENTMCNC: 31.3 G/DL (ref 31.5–36.5)
MCHC RBC AUTO-ENTMCNC: 32.1 G/DL (ref 31.5–36.5)
MCHC RBC AUTO-ENTMCNC: 32.1 G/DL (ref 31.5–36.5)
MCHC RBC AUTO-ENTMCNC: 32.4 G/DL (ref 31.5–36.5)
MCV RBC AUTO: 100 FL (ref 78–100)
MCV RBC AUTO: 97 FL (ref 78–100)
MCV RBC AUTO: 98 FL (ref 78–100)
MCV RBC AUTO: 99 FL (ref 78–100)
METAMYELOCYTES # BLD MANUAL: 0.4 10E3/UL
METAMYELOCYTES # BLD MANUAL: 0.5 10E3/UL
METAMYELOCYTES # BLD MANUAL: 0.8 10E3/UL
METAMYELOCYTES # BLD MANUAL: 0.9 10E3/UL
METAMYELOCYTES NFR BLD MANUAL: 2 %
METAMYELOCYTES NFR BLD MANUAL: 3 %
METAMYELOCYTES NFR BLD MANUAL: 4 %
METAMYELOCYTES NFR BLD MANUAL: 5 %
MONOCYTES # BLD AUTO: ABNORMAL 10*3/UL
MONOCYTES # BLD MANUAL: 0.8 10E3/UL (ref 0–1.3)
MONOCYTES # BLD MANUAL: 1 10E3/UL (ref 0–1.3)
MONOCYTES # BLD MANUAL: 1.1 10E3/UL (ref 0–1.3)
MONOCYTES # BLD MANUAL: 1.3 10E3/UL (ref 0–1.3)
MONOCYTES NFR BLD AUTO: ABNORMAL %
MONOCYTES NFR BLD MANUAL: 5 %
MONOCYTES NFR BLD MANUAL: 5 %
MONOCYTES NFR BLD MANUAL: 6 %
MONOCYTES NFR BLD MANUAL: 6 %
MYELOCYTES # BLD MANUAL: 0.3 10E3/UL
MYELOCYTES # BLD MANUAL: 0.5 10E3/UL
MYELOCYTES # BLD MANUAL: 0.7 10E3/UL
MYELOCYTES # BLD MANUAL: 0.8 10E3/UL
MYELOCYTES NFR BLD MANUAL: 2 %
MYELOCYTES NFR BLD MANUAL: 3 %
MYELOCYTES NFR BLD MANUAL: 3 %
MYELOCYTES NFR BLD MANUAL: 4 %
NEUTROPHILS # BLD AUTO: ABNORMAL 10*3/UL
NEUTROPHILS # BLD MANUAL: 11.7 10E3/UL (ref 1.6–8.3)
NEUTROPHILS # BLD MANUAL: 15 10E3/UL (ref 1.6–8.3)
NEUTROPHILS # BLD MANUAL: 15.9 10E3/UL (ref 1.6–8.3)
NEUTROPHILS # BLD MANUAL: 17.5 10E3/UL (ref 1.6–8.3)
NEUTROPHILS NFR BLD AUTO: ABNORMAL %
NEUTROPHILS NFR BLD MANUAL: 73 %
NEUTROPHILS NFR BLD MANUAL: 78 %
NEUTROPHILS NFR BLD MANUAL: 83 %
NEUTROPHILS NFR BLD MANUAL: 84 %
NRBC # BLD AUTO: 0.1 10E3/UL
NRBC # BLD AUTO: 0.2 10E3/UL
NRBC # BLD AUTO: 0.6 10E3/UL
NRBC BLD AUTO-RTO: 0 /100
NRBC BLD AUTO-RTO: 1 /100
NRBC BLD MANUAL-RTO: 3 %
O2/TOTAL GAS SETTING VFR VENT: 100 %
O2/TOTAL GAS SETTING VFR VENT: 60 %
OSMOLALITY SERPL: 321 MMOL/KG (ref 275–295)
OSMOLALITY SERPL: 321 MMOL/KG (ref 275–295)
OSMOLALITY SERPL: 322 MMOL/KG (ref 275–295)
OSMOLALITY SERPL: 323 MMOL/KG (ref 275–295)
OSMOLALITY SERPL: 323 MMOL/KG (ref 275–295)
OSMOLALITY SERPL: 328 MMOL/KG (ref 275–295)
OXYHGB MFR BLDA: 98 % (ref 75–100)
OXYHGB MFR BLDA: 98 % (ref 75–100)
OXYHGB MFR BLDV: 74 %
OXYHGB MFR BLDV: 77 %
OXYHGB MFR BLDV: 92 % (ref 70–75)
OXYHGB MFR BLDV: 93 % (ref 70–75)
PCO2 BLD: 40 MM HG (ref 35–45)
PCO2 BLD: 41 MM HG (ref 35–45)
PCO2 BLD: 42 MM HG (ref 35–45)
PCO2 BLD: 43 MM HG (ref 35–45)
PCO2 BLD: 43 MM HG (ref 35–45)
PCO2 BLD: 47 MM HG (ref 35–45)
PCO2 BLD: 48 MM HG (ref 35–45)
PCO2 BLDA: 39 MM HG (ref 35–45)
PCO2 BLDA: 49 MM HG (ref 35–45)
PCO2 BLDV: 43 MM HG (ref 40–50)
PCO2 BLDV: 45 MM HG (ref 40–50)
PCO2 BLDV: 45 MM HG (ref 40–50)
PCO2 BLDV: 49 MM HG (ref 40–50)
PEEP: 10 CM H2O
PH BLD: 7.29 [PH] (ref 7.35–7.45)
PH BLD: 7.32 [PH] (ref 7.35–7.45)
PH BLD: 7.34 [PH] (ref 7.35–7.45)
PH BLD: 7.36 [PH] (ref 7.35–7.45)
PH BLD: 7.38 [PH] (ref 7.35–7.45)
PH BLD: 7.4 [PH] (ref 7.35–7.45)
PH BLDA: 7.3 [PH] (ref 7.35–7.45)
PH BLDA: 7.4 [PH] (ref 7.35–7.45)
PH BLDV: 7.31 [PH] (ref 7.32–7.43)
PH BLDV: 7.33 [PH] (ref 7.32–7.43)
PH BLDV: 7.36 [PH] (ref 7.32–7.43)
PH BLDV: 7.37 [PH] (ref 7.32–7.43)
PHOSPHATE SERPL-MCNC: 3.9 MG/DL (ref 2.5–4.5)
PHOSPHATE SERPL-MCNC: 4.9 MG/DL (ref 2.5–4.5)
PHOSPHATE SERPL-MCNC: 5.2 MG/DL (ref 2.5–4.5)
PLAT MORPH BLD: ABNORMAL
PLATELET # BLD AUTO: 146 10E3/UL (ref 150–450)
PLATELET # BLD AUTO: 147 10E3/UL (ref 150–450)
PLATELET # BLD AUTO: 155 10E3/UL (ref 150–450)
PLATELET # BLD AUTO: 178 10E3/UL (ref 150–450)
PO2 BLD: 146 MM HG (ref 80–105)
PO2 BLD: 168 MM HG (ref 80–105)
PO2 BLD: 171 MM HG (ref 80–105)
PO2 BLD: 178 MM HG (ref 80–105)
PO2 BLD: 179 MM HG (ref 80–105)
PO2 BLD: 194 MM HG (ref 80–105)
PO2 BLD: 197 MM HG (ref 80–105)
PO2 BLD: 205 MM HG (ref 80–105)
PO2 BLD: 206 MM HG (ref 80–105)
PO2 BLD: 217 MM HG (ref 80–105)
PO2 BLD: 70 MM HG (ref 80–105)
PO2 BLDA: 375 MM HG (ref 80–105)
PO2 BLDA: 396 MM HG (ref 80–105)
PO2 BLDV: 42 MM HG (ref 25–47)
PO2 BLDV: 43 MM HG (ref 25–47)
PO2 BLDV: 66 MM HG (ref 25–47)
PO2 BLDV: 75 MM HG (ref 25–47)
POLYCHROMASIA BLD QL SMEAR: ABNORMAL
POLYCHROMASIA BLD QL SMEAR: SLIGHT
POLYCHROMASIA BLD QL SMEAR: SLIGHT
POTASSIUM SERPL-SCNC: 3.3 MMOL/L (ref 3.4–5.3)
POTASSIUM SERPL-SCNC: 3.5 MMOL/L (ref 3.4–5.3)
POTASSIUM SERPL-SCNC: 4 MMOL/L (ref 3.4–5.3)
POTASSIUM SERPL-SCNC: 4.1 MMOL/L (ref 3.4–5.3)
POTASSIUM SERPL-SCNC: 4.4 MMOL/L (ref 3.4–5.3)
POTASSIUM SERPL-SCNC: 4.5 MMOL/L (ref 3.4–5.3)
PROT SERPL-MCNC: 6.5 G/DL (ref 6.4–8.3)
RBC # BLD AUTO: 2.39 10E6/UL (ref 3.8–5.2)
RBC # BLD AUTO: 2.42 10E6/UL (ref 3.8–5.2)
RBC # BLD AUTO: 2.5 10E6/UL (ref 3.8–5.2)
RBC # BLD AUTO: 2.52 10E6/UL (ref 3.8–5.2)
RBC MORPH BLD: ABNORMAL
SAO2 % BLDA: 92 % (ref 92–100)
SAO2 % BLDA: 97 % (ref 92–100)
SAO2 % BLDA: 98 % (ref 92–100)
SAO2 % BLDA: 99 % (ref 92–100)
SAO2 % BLDA: >100 % (ref 96–97)
SAO2 % BLDA: >100 % (ref 96–97)
SAO2 % BLDV: 76.4 % (ref 70–75)
SAO2 % BLDV: 79.5 % (ref 70–75)
SAO2 % BLDV: 94.3 % (ref 70–75)
SAO2 % BLDV: 95.9 % (ref 70–75)
SODIUM SERPL-SCNC: 144 MMOL/L (ref 135–145)
SODIUM SERPL-SCNC: 145 MMOL/L (ref 135–145)
SODIUM SERPL-SCNC: 146 MMOL/L (ref 135–145)
SODIUM SERPL-SCNC: 146 MMOL/L (ref 135–145)
SODIUM SERPL-SCNC: 147 MMOL/L (ref 135–145)
SODIUM SERPL-SCNC: 148 MMOL/L (ref 135–145)
WBC # BLD AUTO: 16 10E3/UL (ref 4–11)
WBC # BLD AUTO: 17.9 10E3/UL (ref 4–11)
WBC # BLD AUTO: 19.2 10E3/UL (ref 4–11)
WBC # BLD AUTO: 22.4 10E3/UL (ref 4–11)

## 2024-08-16 PROCEDURE — 82805 BLOOD GASES W/O2 SATURATION: CPT | Performed by: SURGERY

## 2024-08-16 PROCEDURE — 71045 X-RAY EXAM CHEST 1 VIEW: CPT | Mod: 26 | Performed by: RADIOLOGY

## 2024-08-16 PROCEDURE — 250N000011 HC RX IP 250 OP 636: Performed by: NURSE PRACTITIONER

## 2024-08-16 PROCEDURE — 82805 BLOOD GASES W/O2 SATURATION: CPT

## 2024-08-16 PROCEDURE — 200N000002 HC R&B ICU UMMC

## 2024-08-16 PROCEDURE — 250N000013 HC RX MED GY IP 250 OP 250 PS 637

## 2024-08-16 PROCEDURE — 250N000009 HC RX 250

## 2024-08-16 PROCEDURE — 250N000011 HC RX IP 250 OP 636: Performed by: STUDENT IN AN ORGANIZED HEALTH CARE EDUCATION/TRAINING PROGRAM

## 2024-08-16 PROCEDURE — 85730 THROMBOPLASTIN TIME PARTIAL: CPT | Performed by: SURGERY

## 2024-08-16 PROCEDURE — 250N000013 HC RX MED GY IP 250 OP 250 PS 637: Performed by: SURGERY

## 2024-08-16 PROCEDURE — 85300 ANTITHROMBIN III ACTIVITY: CPT | Performed by: SURGERY

## 2024-08-16 PROCEDURE — 83735 ASSAY OF MAGNESIUM: CPT | Performed by: CLINICAL NURSE SPECIALIST

## 2024-08-16 PROCEDURE — 84295 ASSAY OF SERUM SODIUM: CPT | Performed by: CLINICAL NURSE SPECIALIST

## 2024-08-16 PROCEDURE — 82330 ASSAY OF CALCIUM: CPT | Performed by: SURGERY

## 2024-08-16 PROCEDURE — 999N000157 HC STATISTIC RCP TIME EA 10 MIN

## 2024-08-16 PROCEDURE — P9047 ALBUMIN (HUMAN), 25%, 50ML: HCPCS | Mod: JZ

## 2024-08-16 PROCEDURE — 83930 ASSAY OF BLOOD OSMOLALITY: CPT | Performed by: PSYCHIATRY & NEUROLOGY

## 2024-08-16 PROCEDURE — 250N000011 HC RX IP 250 OP 636: Mod: JZ

## 2024-08-16 PROCEDURE — 94640 AIRWAY INHALATION TREATMENT: CPT | Mod: 76

## 2024-08-16 PROCEDURE — 94003 VENT MGMT INPAT SUBQ DAY: CPT

## 2024-08-16 PROCEDURE — 33948 ECMO/ECLS DAILY MGMT-VENOUS: CPT | Mod: GC | Performed by: SURGERY

## 2024-08-16 PROCEDURE — 85384 FIBRINOGEN ACTIVITY: CPT | Performed by: SURGERY

## 2024-08-16 PROCEDURE — 258N000003 HC RX IP 258 OP 636: Performed by: STUDENT IN AN ORGANIZED HEALTH CARE EDUCATION/TRAINING PROGRAM

## 2024-08-16 PROCEDURE — 94640 AIRWAY INHALATION TREATMENT: CPT

## 2024-08-16 PROCEDURE — 84132 ASSAY OF SERUM POTASSIUM: CPT

## 2024-08-16 PROCEDURE — 82248 BILIRUBIN DIRECT: CPT | Performed by: CLINICAL NURSE SPECIALIST

## 2024-08-16 PROCEDURE — 99233 SBSQ HOSP IP/OBS HIGH 50: CPT | Performed by: INTERNAL MEDICINE

## 2024-08-16 PROCEDURE — 250N000011 HC RX IP 250 OP 636: Mod: JZ | Performed by: STUDENT IN AN ORGANIZED HEALTH CARE EDUCATION/TRAINING PROGRAM

## 2024-08-16 PROCEDURE — 250N000011 HC RX IP 250 OP 636: Performed by: CLINICAL NURSE SPECIALIST

## 2024-08-16 PROCEDURE — 33948 ECMO/ECLS DAILY MGMT-VENOUS: CPT

## 2024-08-16 PROCEDURE — 84295 ASSAY OF SERUM SODIUM: CPT | Performed by: STUDENT IN AN ORGANIZED HEALTH CARE EDUCATION/TRAINING PROGRAM

## 2024-08-16 PROCEDURE — 250N000011 HC RX IP 250 OP 636

## 2024-08-16 PROCEDURE — 82330 ASSAY OF CALCIUM: CPT

## 2024-08-16 PROCEDURE — 83051 HEMOGLOBIN PLASMA: CPT | Performed by: SURGERY

## 2024-08-16 PROCEDURE — 250N000013 HC RX MED GY IP 250 OP 250 PS 637: Performed by: NURSE PRACTITIONER

## 2024-08-16 PROCEDURE — 71045 X-RAY EXAM CHEST 1 VIEW: CPT

## 2024-08-16 PROCEDURE — 83605 ASSAY OF LACTIC ACID: CPT | Performed by: SURGERY

## 2024-08-16 PROCEDURE — 90947 DIALYSIS REPEATED EVAL: CPT

## 2024-08-16 PROCEDURE — 99233 SBSQ HOSP IP/OBS HIGH 50: CPT | Performed by: PHYSICIAN ASSISTANT

## 2024-08-16 PROCEDURE — 250N000013 HC RX MED GY IP 250 OP 250 PS 637: Performed by: PHYSICIAN ASSISTANT

## 2024-08-16 PROCEDURE — 85007 BL SMEAR W/DIFF WBC COUNT: CPT | Performed by: SURGERY

## 2024-08-16 PROCEDURE — 84155 ASSAY OF PROTEIN SERUM: CPT | Performed by: CLINICAL NURSE SPECIALIST

## 2024-08-16 PROCEDURE — 80069 RENAL FUNCTION PANEL: CPT | Performed by: STUDENT IN AN ORGANIZED HEALTH CARE EDUCATION/TRAINING PROGRAM

## 2024-08-16 PROCEDURE — 85379 FIBRIN DEGRADATION QUANT: CPT | Performed by: SURGERY

## 2024-08-16 PROCEDURE — 250N000009 HC RX 250: Performed by: NURSE PRACTITIONER

## 2024-08-16 PROCEDURE — 82435 ASSAY OF BLOOD CHLORIDE: CPT | Performed by: SURGERY

## 2024-08-16 PROCEDURE — 85014 HEMATOCRIT: CPT | Performed by: SURGERY

## 2024-08-16 PROCEDURE — 99291 CRITICAL CARE FIRST HOUR: CPT | Mod: 25 | Performed by: NURSE PRACTITIONER

## 2024-08-16 PROCEDURE — 83735 ASSAY OF MAGNESIUM: CPT

## 2024-08-16 PROCEDURE — 250N000009 HC RX 250: Performed by: CLINICAL NURSE SPECIALIST

## 2024-08-16 PROCEDURE — 82330 ASSAY OF CALCIUM: CPT | Performed by: CLINICAL NURSE SPECIALIST

## 2024-08-16 RX ORDER — PIPERACILLIN SODIUM, TAZOBACTAM SODIUM 4; .5 G/20ML; G/20ML
4.5 INJECTION, POWDER, LYOPHILIZED, FOR SOLUTION INTRAVENOUS EVERY 6 HOURS
Status: DISCONTINUED | OUTPATIENT
Start: 2024-08-16 | End: 2024-08-19

## 2024-08-16 RX ORDER — OXYCODONE HYDROCHLORIDE 5 MG/1
5 TABLET ORAL EVERY 4 HOURS PRN
Status: DISCONTINUED | OUTPATIENT
Start: 2024-08-16 | End: 2024-08-18

## 2024-08-16 RX ORDER — POTASSIUM CHLORIDE 29.8 MG/ML
20 INJECTION INTRAVENOUS EVERY 8 HOURS PRN
Status: DISCONTINUED | OUTPATIENT
Start: 2024-08-16 | End: 2024-08-19

## 2024-08-16 RX ORDER — AMINOCAPROIC ACID 0.25 G/ML
1 SYRUP ORAL EVERY 8 HOURS SCHEDULED
Status: DISCONTINUED | OUTPATIENT
Start: 2024-08-16 | End: 2024-08-18

## 2024-08-16 RX ORDER — HYDROMORPHONE HCL IN WATER/PF 6 MG/30 ML
.2-.4 PATIENT CONTROLLED ANALGESIA SYRINGE INTRAVENOUS
Status: DISCONTINUED | OUTPATIENT
Start: 2024-08-16 | End: 2024-08-16

## 2024-08-16 RX ORDER — CALCIUM CHLORIDE, MAGNESIUM CHLORIDE, SODIUM CHLORIDE, SODIUM BICARBONATE, POTASSIUM CHLORIDE AND SODIUM PHOSPHATE DIBASIC DIHYDRATE 3.68; 3.05; 6.34; 3.09; .314; .187 G/L; G/L; G/L; G/L; G/L; G/L
12.5 INJECTION INTRAVENOUS CONTINUOUS
Status: DISCONTINUED | OUTPATIENT
Start: 2024-08-16 | End: 2024-08-19

## 2024-08-16 RX ORDER — DEXTROSE MONOHYDRATE 100 MG/ML
INJECTION, SOLUTION INTRAVENOUS CONTINUOUS PRN
Status: DISCONTINUED | OUTPATIENT
Start: 2024-08-16 | End: 2024-08-19

## 2024-08-16 RX ORDER — AMITRIPTYLINE HCL 25 MG
50 TABLET ORAL AT BEDTIME
Status: DISCONTINUED | OUTPATIENT
Start: 2024-08-16 | End: 2024-08-17

## 2024-08-16 RX ORDER — CALCIUM CHLORIDE, MAGNESIUM CHLORIDE, SODIUM CHLORIDE, SODIUM BICARBONATE, POTASSIUM CHLORIDE AND SODIUM PHOSPHATE DIBASIC DIHYDRATE 3.68; 3.05; 6.34; 3.09; .314; .187 G/L; G/L; G/L; G/L; G/L; G/L
INJECTION INTRAVENOUS CONTINUOUS
Status: DISCONTINUED | OUTPATIENT
Start: 2024-08-16 | End: 2024-08-16

## 2024-08-16 RX ORDER — VENLAFAXINE HYDROCHLORIDE 75 MG/1
75 CAPSULE, EXTENDED RELEASE ORAL 2 TIMES DAILY
Status: DISCONTINUED | OUTPATIENT
Start: 2024-08-16 | End: 2024-08-17

## 2024-08-16 RX ORDER — MAGNESIUM SULFATE HEPTAHYDRATE 40 MG/ML
2 INJECTION, SOLUTION INTRAVENOUS EVERY 8 HOURS PRN
Status: DISCONTINUED | OUTPATIENT
Start: 2024-08-16 | End: 2024-08-19

## 2024-08-16 RX ORDER — CALCIUM GLUCONATE 20 MG/ML
2 INJECTION, SOLUTION INTRAVENOUS EVERY 8 HOURS PRN
Status: DISCONTINUED | OUTPATIENT
Start: 2024-08-16 | End: 2024-08-19

## 2024-08-16 RX ADMIN — OXYCODONE HYDROCHLORIDE 5 MG: 5 TABLET ORAL at 11:08

## 2024-08-16 RX ADMIN — LEVOTHYROXINE SODIUM 25 MCG: 0.03 TABLET ORAL at 08:11

## 2024-08-16 RX ADMIN — IPRATROPIUM BROMIDE AND ALBUTEROL SULFATE 3 ML: .5; 3 SOLUTION RESPIRATORY (INHALATION) at 16:27

## 2024-08-16 RX ADMIN — INSULIN HUMAN 1.5 UNITS/HR: 1 INJECTION, SOLUTION INTRAVENOUS at 15:37

## 2024-08-16 RX ADMIN — VENLAFAXINE HYDROCHLORIDE 75 MG: 75 CAPSULE, EXTENDED RELEASE ORAL at 19:48

## 2024-08-16 RX ADMIN — OXYCODONE HYDROCHLORIDE 5 MG: 5 TABLET ORAL at 21:04

## 2024-08-16 RX ADMIN — WHITE PETROLATUM 57.7 %-MINERAL OIL 31.9 % EYE OINTMENT: at 15:10

## 2024-08-16 RX ADMIN — IPRATROPIUM BROMIDE AND ALBUTEROL SULFATE 3 ML: .5; 3 SOLUTION RESPIRATORY (INHALATION) at 00:14

## 2024-08-16 RX ADMIN — ACETYLCYSTEINE 4 ML: 100 SOLUTION ORAL; RESPIRATORY (INHALATION) at 23:36

## 2024-08-16 RX ADMIN — Medication 60 ML: at 19:47

## 2024-08-16 RX ADMIN — MIDAZOLAM HYDROCHLORIDE 3 MG/HR: 1 INJECTION, SOLUTION INTRAVENOUS at 09:45

## 2024-08-16 RX ADMIN — Medication 40 MG: at 07:49

## 2024-08-16 RX ADMIN — IPRATROPIUM BROMIDE AND ALBUTEROL SULFATE 3 ML: .5; 3 SOLUTION RESPIRATORY (INHALATION) at 12:51

## 2024-08-16 RX ADMIN — CALCIUM CHLORIDE, MAGNESIUM CHLORIDE, SODIUM CHLORIDE, SODIUM BICARBONATE, POTASSIUM CHLORIDE AND SODIUM PHOSPHATE DIBASIC DIHYDRATE 12.5 ML/KG/HR: 3.68; 3.05; 6.34; 3.09; .314; .187 INJECTION INTRAVENOUS at 16:00

## 2024-08-16 RX ADMIN — DEXAMETHASONE SODIUM PHOSPHATE 20 MG: 10 INJECTION, SOLUTION INTRAMUSCULAR; INTRAVENOUS at 09:10

## 2024-08-16 RX ADMIN — ALBUMIN HUMAN 25 G: 0.25 SOLUTION INTRAVENOUS at 00:56

## 2024-08-16 RX ADMIN — CALCIUM CHLORIDE, MAGNESIUM CHLORIDE, SODIUM CHLORIDE, SODIUM BICARBONATE, POTASSIUM CHLORIDE AND SODIUM PHOSPHATE DIBASIC DIHYDRATE 12.5 ML/KG/HR: 3.68; 3.05; 6.34; 3.09; .314; .187 INJECTION INTRAVENOUS at 18:53

## 2024-08-16 RX ADMIN — ACETYLCYSTEINE 4 ML: 100 SOLUTION ORAL; RESPIRATORY (INHALATION) at 12:51

## 2024-08-16 RX ADMIN — ACETYLCYSTEINE 4 ML: 100 SOLUTION ORAL; RESPIRATORY (INHALATION) at 16:27

## 2024-08-16 RX ADMIN — Medication 1 TABLET: at 08:11

## 2024-08-16 RX ADMIN — POTASSIUM CHLORIDE 20 MEQ: 29.8 INJECTION, SOLUTION INTRAVENOUS at 05:14

## 2024-08-16 RX ADMIN — ACETYLCYSTEINE 4 ML: 100 SOLUTION ORAL; RESPIRATORY (INHALATION) at 04:41

## 2024-08-16 RX ADMIN — IPRATROPIUM BROMIDE AND ALBUTEROL SULFATE 3 ML: .5; 3 SOLUTION RESPIRATORY (INHALATION) at 19:28

## 2024-08-16 RX ADMIN — WHITE PETROLATUM 57.7 %-MINERAL OIL 31.9 % EYE OINTMENT: at 23:08

## 2024-08-16 RX ADMIN — METRONIDAZOLE 500 MG: 5 INJECTION, SOLUTION INTRAVENOUS at 02:14

## 2024-08-16 RX ADMIN — IPRATROPIUM BROMIDE AND ALBUTEROL SULFATE 3 ML: .5; 3 SOLUTION RESPIRATORY (INHALATION) at 04:41

## 2024-08-16 RX ADMIN — Medication 60 ML: at 14:07

## 2024-08-16 RX ADMIN — DEXMEDETOMIDINE HYDROCHLORIDE IN SODIUM CHLORIDE 0.8 MCG/KG/HR: 4 INJECTION INTRAVENOUS at 10:32

## 2024-08-16 RX ADMIN — ACETYLCYSTEINE 4 ML: 100 SOLUTION ORAL; RESPIRATORY (INHALATION) at 00:14

## 2024-08-16 RX ADMIN — IPRATROPIUM BROMIDE AND ALBUTEROL SULFATE 3 ML: .5; 3 SOLUTION RESPIRATORY (INHALATION) at 23:36

## 2024-08-16 RX ADMIN — CALCIUM CHLORIDE, MAGNESIUM CHLORIDE, SODIUM CHLORIDE, SODIUM BICARBONATE, POTASSIUM CHLORIDE AND SODIUM PHOSPHATE DIBASIC DIHYDRATE 12.5 ML/KG/HR: 3.68; 3.05; 6.34; 3.09; .314; .187 INJECTION INTRAVENOUS at 12:18

## 2024-08-16 RX ADMIN — CALCIUM CHLORIDE, MAGNESIUM CHLORIDE, SODIUM CHLORIDE, SODIUM BICARBONATE, POTASSIUM CHLORIDE AND SODIUM PHOSPHATE DIBASIC DIHYDRATE 12.5 ML/KG/HR: 3.68; 3.05; 6.34; 3.09; .314; .187 INJECTION INTRAVENOUS at 10:22

## 2024-08-16 RX ADMIN — DEXMEDETOMIDINE HYDROCHLORIDE IN SODIUM CHLORIDE 0.8 MCG/KG/HR: 4 INJECTION INTRAVENOUS at 21:54

## 2024-08-16 RX ADMIN — PIPERACILLIN AND TAZOBACTAM 4.5 G: 4; .5 INJECTION, POWDER, LYOPHILIZED, FOR SOLUTION INTRAVENOUS at 13:30

## 2024-08-16 RX ADMIN — SODIUM CHLORIDE: 4 INJECTION, SOLUTION, CONCENTRATE INTRAVENOUS at 17:33

## 2024-08-16 RX ADMIN — PIPERACILLIN AND TAZOBACTAM 4.5 G: 4; .5 INJECTION, POWDER, LYOPHILIZED, FOR SOLUTION INTRAVENOUS at 08:09

## 2024-08-16 RX ADMIN — Medication 60 ML: at 08:11

## 2024-08-16 RX ADMIN — WHITE PETROLATUM 57.7 %-MINERAL OIL 31.9 % EYE OINTMENT: at 07:52

## 2024-08-16 RX ADMIN — CHLORHEXIDINE GLUCONATE 0.12% ORAL RINSE 15 ML: 1.2 LIQUID ORAL at 19:45

## 2024-08-16 RX ADMIN — CALCIUM CHLORIDE, MAGNESIUM CHLORIDE, DEXTROSE MONOHYDRATE, LACTIC ACID, SODIUM CHLORIDE, SODIUM BICARBONATE AND POTASSIUM CHLORIDE 12.5 ML/KG/HR: 5.15; 2.03; 22; 5.4; 6.46; 3.09; .157 INJECTION INTRAVENOUS at 03:04

## 2024-08-16 RX ADMIN — CALCIUM CHLORIDE, MAGNESIUM CHLORIDE, SODIUM CHLORIDE, SODIUM BICARBONATE, POTASSIUM CHLORIDE AND SODIUM PHOSPHATE DIBASIC DIHYDRATE 12.5 ML/KG/HR: 3.68; 3.05; 6.34; 3.09; .314; .187 INJECTION INTRAVENOUS at 05:43

## 2024-08-16 RX ADMIN — AMINOCAPROIC ACID 1 G: 0.25 SOLUTION ORAL at 21:43

## 2024-08-16 RX ADMIN — PIPERACILLIN AND TAZOBACTAM 4.5 G: 4; .5 INJECTION, POWDER, LYOPHILIZED, FOR SOLUTION INTRAVENOUS at 19:45

## 2024-08-16 RX ADMIN — IPRATROPIUM BROMIDE AND ALBUTEROL SULFATE 3 ML: .5; 3 SOLUTION RESPIRATORY (INHALATION) at 08:13

## 2024-08-16 RX ADMIN — CHLORHEXIDINE GLUCONATE 0.12% ORAL RINSE 15 ML: 1.2 LIQUID ORAL at 08:11

## 2024-08-16 RX ADMIN — CALCIUM CHLORIDE, MAGNESIUM CHLORIDE, SODIUM CHLORIDE, SODIUM BICARBONATE, POTASSIUM CHLORIDE AND SODIUM PHOSPHATE DIBASIC DIHYDRATE 12.5 ML/KG/HR: 3.68; 3.05; 6.34; 3.09; .314; .187 INJECTION INTRAVENOUS at 08:00

## 2024-08-16 RX ADMIN — DEXMEDETOMIDINE HYDROCHLORIDE IN SODIUM CHLORIDE 0.8 MCG/KG/HR: 4 INJECTION INTRAVENOUS at 05:14

## 2024-08-16 RX ADMIN — CALCIUM CHLORIDE, MAGNESIUM CHLORIDE, SODIUM CHLORIDE, SODIUM BICARBONATE, POTASSIUM CHLORIDE AND SODIUM PHOSPHATE DIBASIC DIHYDRATE 12.5 ML/KG/HR: 3.68; 3.05; 6.34; 3.09; .314; .187 INJECTION INTRAVENOUS at 23:08

## 2024-08-16 RX ADMIN — Medication 1 MG/HR: at 14:28

## 2024-08-16 RX ADMIN — DEXMEDETOMIDINE HYDROCHLORIDE IN SODIUM CHLORIDE 0.8 MCG/KG/HR: 4 INJECTION INTRAVENOUS at 16:18

## 2024-08-16 RX ADMIN — Medication 50 MG: at 21:44

## 2024-08-16 RX ADMIN — ACETYLCYSTEINE 4 ML: 100 SOLUTION ORAL; RESPIRATORY (INHALATION) at 19:28

## 2024-08-16 RX ADMIN — AMINOCAPROIC ACID 1 G: 0.25 SOLUTION ORAL at 14:08

## 2024-08-16 RX ADMIN — CALCIUM CHLORIDE, MAGNESIUM CHLORIDE, SODIUM CHLORIDE, SODIUM BICARBONATE, POTASSIUM CHLORIDE AND SODIUM PHOSPHATE DIBASIC DIHYDRATE 12.5 ML/KG/HR: 3.68; 3.05; 6.34; 3.09; .314; .187 INJECTION INTRAVENOUS at 03:05

## 2024-08-16 RX ADMIN — CALCIUM CHLORIDE, MAGNESIUM CHLORIDE, SODIUM CHLORIDE, SODIUM BICARBONATE, POTASSIUM CHLORIDE AND SODIUM PHOSPHATE DIBASIC DIHYDRATE 12.5 ML/KG/HR: 3.68; 3.05; 6.34; 3.09; .314; .187 INJECTION INTRAVENOUS at 14:57

## 2024-08-16 RX ADMIN — AMINOCAPROIC ACID 1 G: 0.25 SOLUTION ORAL at 09:47

## 2024-08-16 RX ADMIN — BIVALIRUDIN 0.06 MG/KG/HR: 250 INJECTION, POWDER, LYOPHILIZED, FOR SOLUTION INTRAVENOUS at 08:52

## 2024-08-16 RX ADMIN — ACETYLCYSTEINE 4 ML: 100 SOLUTION ORAL; RESPIRATORY (INHALATION) at 08:13

## 2024-08-16 ASSESSMENT — ACTIVITIES OF DAILY LIVING (ADL)
ADLS_ACUITY_SCORE: 55

## 2024-08-16 ASSESSMENT — VISUAL ACUITY
OU: NOT TESTABLE

## 2024-08-16 NOTE — PROGRESS NOTES
Veno-venous ECMO Progress Note  8/11/2024    Massiel Flaherty is a 53 year old female who was started on ECMO due to severe respiratory failure from ARDS related to community acquired pneumonia.     Interval events: Placed on VV ECMO Right internal jugular cannula repositioned again yesterday due close proximity to groin line and acute hypoxia.            The patients vital signs were reviewed  I's and O's reviewed      Physical Exam:   Right internal jugular cannula in place and well dressed Right femoral line unchanged. No bleeding from either site.  Minimal air movement  Extremities edematous. Scattered ecchymosis of bilateral calves and toes-unchanged from prior    ECMO Issues including assessments and plan      Neuro: Sedated for mechanical ventilation and ECMO.  RASS goal: -4 to -5  CV: Hemodynamically stable, off vasopressors.  Pulm: Severe respiratory failure requiring ECMO and mechanical ventilation. TVs 30-50 mL   On rest settings: PC 25, PEEP10, FiO2 60%.  Return ECMO cannula retracted 4-5 cm with improvement in O2 sats  FEN/Renal: rolle in place, on CRRT through ECMO circuit.   Heme: Hemoglobin stable at 8.  Goals: if O2 sat >85% Hgb may drift to 7-8.  If O2 Sat <85% keep Hgb 10-12.  ID:  ID consulted, on ABX     All pertinent labs, imaging studies, physical exam and medications have been reviewed by me.

## 2024-08-16 NOTE — PROGRESS NOTES
CRRT STATUS NOTE    DATA:  Time:  6:00 AM  Pressures WNL:  YES  Filter Status:  WDL    Problems Reported/Alarms Noted:  None.    Supplies Present:  YES    ASSESSMENT:  Patient Net Fluid Balance:  Net -425 ml @ midnight, -713 ml @ 0600.  Vital Signs:  , /54, MAP 71  Labs:  K 3.3, Mg 2.2, Phos 3.9, iCa 4.8, Hgb 7.9, Plt 155  Goals of Therapy:  50-100mL/hr net neg    INTERVENTIONS:   Dialysate changed to Phoxillum BK4/2.5. 3% sodium chloride POST rate changed to 50mL/hr to maintian goal Na of 145-150.     PLAN:  Continue to monitor circuit daily and change set q72 hours or PRN for clotting/clogging. Please call CRRT RN with any questions/problems.

## 2024-08-16 NOTE — PROGRESS NOTES
"Nephrology Progress Note  08/16/2024       Mrs Flaherty is a  53 yof w/ Anxiety, depression, fibromyalgia, hypothyroidism, asthma, HLD, MURIEL on CPAP, expressive aphasia, and hypertension who was admitted to an OSH with community acquired pneumonia on 8/3 s/p intubation.  Found to have severe ARDS requiring paralysis and proning, transferred here on 8/8 for management and initiated on CKRT for severe acidemia in the setting of oligoanuric CHACORTA.  Started CRRT on 8/9 for volume management.      Interval History :   Remains on CRRT in ICU on VV ECMO. On K4 bath. Intubated and sedated.    Assessment & Recommendations:   CHACORTA-Baseline Cr 0.6, at baseline on admission.  CHACORTA due to septic shock in setting of ARDS, UA on 8/6/2024 essentially bland (30 protein but no blood or casts), no dedicated renal imaging.  No dedicated renal imaging at this time.  Started CRRT 8/9 for volume management, now anuric.    -Access is ECMO circuit   -Dialysis consent signed and in chart.     - continue CRRT on K4 bath, I=O    Volume status- I=O on CRRT    Electrolytes/pH- sodium 145, goal liberalized to 140-145, CT showed improvement on 8/15, no longer receiving 3% saline    Ca/phos/pth- phos slightly up at 4.9 - no treatment indicated    Anemia- hgb 7.7, acute management per primary team    Nutrition-Vital TF started 8/9.    Recommendations were communicated to primary team via note    Radha Silva MD  [facility]  Department of Medicine  Division of Nephrology and Hypertension  Formerly [facility]  [email or web address] Web Console       Review of Systems:   I reviewed the following systems:  ROS not done due to vent/sedation.     Physical Exam:   I/O last 3 completed shifts:  In: 3743.5 [I.V.:1753.5; NG/GT:670]  Out: 5602.3 [Other:5402.3; Stool:200]   /61   Pulse 78   Temp 98.2  F (36.8  C)   Resp (!) 31   Ht 1.575 m (5' 2\")   Wt 85 kg (187 lb 6.3 oz)   SpO2 99%   BMI 34.27 kg/m       GENERAL APPEARANCE: Intubated and sedated " and paralyzed  Pulmonary: Intubated and sedated,   CV: regular rhythm, normal rate   - Edema 1-2+ diffuse  GI: soft, nontender, normal bowel sounds  MS: no evidence of inflammation in joints, no muscle tenderness  SKIN:  warm, dry  NEURO: No focal deficits    Labs:   All labs reviewed by me  Electrolytes/Renal -   Recent Labs   Lab Test 08/16/24  1724 08/16/24  1616 08/16/24  1559 08/16/24  1204 08/16/24  1158 08/16/24  1002 08/16/24  0805 08/16/24  0419 08/15/24  2020 08/15/24  2019   NA  --   --  145  --  145 146*   < > 144   < > 147*   POTASSIUM  --   --  4.5  --  4.1 3.5  --  3.3*   < > 4.0   CHLORIDE  --   --  110*  --  111* 111*  --  109*   < > 112*   CO2  --   --  23  --  22 22  --  23   < > 23   BUN  --   --  53.4*  --  49.0* 48.9*  --  49.5*   < > 51.4*   CR  --   --  1.57*  --  1.52* 1.49*  --  1.48*   < > 1.50*   * 204* 266*   < > 230* 213*   < > 174*   < > 211*   QUAN  --   --  8.2*  --  8.5* 8.3*  --  9.1   < > 8.8   MAG  --   --   --   --  2.4*  --   --  2.2  --  2.3   PHOS  --   --   --   --  4.9*  --   --  3.9  --  4.7*    < > = values in this interval not displayed.       CBC -   Recent Labs   Lab Test 08/16/24  1559 08/16/24  1002 08/16/24  0419   WBC 17.9* 16.0* 19.2*   HGB 7.7* 7.5* 7.9*   * 146* 155       LFTs -   Recent Labs   Lab Test 08/16/24  1158 08/16/24  0419 08/15/24  2019 08/15/24  0414 08/14/24  1205 08/14/24  0426   ALKPHOS  --  132  --  170*  --  224*   BILITOTAL  --  0.6  --  0.4  --  0.4   ALT  --  19  --  21  --  23   AST  --  28  --  41  --  43   PROTTOTAL  --  6.5  --  5.1*  --  5.8*   ALBUMIN 3.6 3.9 3.6 2.7*   < > 3.0*    < > = values in this interval not displayed.       Iron Panel - No lab results found.        Current Medications:  Current Facility-Administered Medications   Medication Dose Route Frequency Provider Last Rate Last Admin    acetylcysteine (MUCOMYST) 10 % nebulizer solution 4 mL  4 mL Nebulization Q4H Aniceto Adame MD   4 mL at  08/16/24 1627    aminocaproic acid (AMICAR) solution 1 g  1 g Topical Q8H TERE de Tiara Macedodra CNP   1 g at 08/16/24 1408    amitriptyline (ELAVIL) suspension 50 mg  50 mg Oral or Feeding Tube At Bedtime de Tiara Macedo Carlee, HEATHER        artificial tears ophthalmic ointment   Both Eyes Q8H de Tiara Macedo Carlee, CNP   Given at 08/16/24 1510    chlorhexidine (PERIDEX) 0.12 % solution 15 mL  15 mL Mouth/Throat Q12H Giovanny Guidry PA-C   15 mL at 08/16/24 0811    [START ON 8/17/2024] dexAMETHasone PF (DECADRON) injection 10 mg  10 mg Intravenous Daily Cal Lisa MD        fiber modular (BANATROL TF) packet 1 packet  1 packet Per Feeding Tube TID Cal Lisa MD   1 packet at 08/16/24 1407    insulin glargine (LANTUS PEN) injection 16 Units  16 Units Subcutaneous Daily de Tiara Macedo Carlee, CNP   16 Units at 08/16/24 0848    ipratropium - albuterol 0.5 mg/2.5 mg/3 mL (DUONEB) neb solution 3 mL  3 mL Nebulization Q4H Aniceto Adame MD   3 mL at 08/16/24 1627    levothyroxine (SYNTHROID/LEVOTHROID) tablet 25 mcg  25 mcg Per Feeding Tube QAM AC Giovanny Guidry PA-C   25 mcg at 08/16/24 0811    multivitamin RENAL (RENAVITE RX/NEPHROVITE) tablet 1 tablet  1 tablet Oral or Feeding Tube Daily Giovanny Guidry PA-C   1 tablet at 08/16/24 0811    pantoprazole (PROTONIX) 2 mg/mL suspension 40 mg  40 mg Per Feeding Tube QAM AC Barry Hatch MD   40 mg at 08/16/24 0749    piperacillin-tazobactam (ZOSYN) 4.5 g vial to attach to  mL bag  4.5 g Intravenous Q6H de Tiara Macedo Carlee, CNP   4.5 g at 08/16/24 1330    Prosource TF20 ENfit Compatibl EN LIQD (PROSOURCE TF20) packet 60 mL  1 packet Per Feeding Tube TID Her-Giovanny Spence, PA-C   60 mL at 08/16/24 1407    venlafaxine (EFFEXOR) suspension 75 mg  75 mg Oral or Feeding Tube BID Tiara Martin, CNP         Current Facility-Administered Medications   Medication Dose Route Frequency Provider Last Rate  Last Admin    bivalirudin (ANGIOMAX) 250 mg in sodium chloride 0.9 % 250 mL ANTICOAGULANT infusion  0-0.3 mg/kg/hr (Dosing Weight) Intravenous Continuous Dong Rico PA-C 5.3 mL/hr at 08/16/24 1600 0.06 mg/kg/hr at 08/16/24 1600    dexmedeTOMIDine (PRECEDEX) 4 mcg/mL in sodium chloride 0.9 % 100 mL infusion  0.1-1.2 mcg/kg/hr (Dosing Weight) Intravenous Continuous Tiara Martin CNP 17.8 mL/hr at 08/16/24 1618 0.8 mcg/kg/hr at 08/16/24 1618    dextrose 10% infusion   Intravenous Continuous PRN Tiara Martin CNP        dextrose 10% infusion   Intravenous Continuous PRN Giovanny Guidry PA-C        dialysate for CVVHD & CVVHDF (Phoxillum BK4/2.5)  12.5 mL/kg/hr CRRT Continuous Tati Tomlin MD 1,100 mL/hr at 08/16/24 1457 12.5 mL/kg/hr at 08/16/24 1457    HYDROmorphone (DILAUDID) 0.2 mg/mL infusion ADULT/PEDS GREATER than or EQUAL to 20 kg  0.3-1.5 mg/hr Intravenous Continuous Dong Rico PA-C 5 mL/hr at 08/16/24 1600 1 mg/hr at 08/16/24 1600    insulin regular (MYXREDLIN) 1 unit/mL infusion  0-24 Units/hr Intravenous Continuous Tiara Martin CNP 3 mL/hr at 08/16/24 1720 3 Units/hr at 08/16/24 1720    midazolam (VERSED) 100 mg/100 mL NS infusion - ADULT  1-14 mg/hr Intravenous Continuous Dong Rico PA-C 3 mL/hr at 08/16/24 1600 3 mg/hr at 08/16/24 1600    No heparin required   Does not apply Continuous PRN Tati Tomlin MD        norepinephrine (LEVOPHED) 16 mg in  mL infusion MAX CONC CENTRAL LINE  0.01-0.6 mcg/kg/min (Dosing Weight) Intravenous Continuous Anika Mead MD 3.3 mL/hr at 08/16/24 1745 0.04 mcg/kg/min at 08/16/24 1745    POST-Filter REPLACEMENT SOlution for CVVHD/CVVHDF (3% sodium chloride)   Intravenous Continuous Tati Tomlin MD 50 mL/hr at 08/16/24 1733 New Bag at 08/16/24 1733    PRE-filter replacement solution for CVVHD & CVVHDF (Phoxillum BK4/2.5)  12.5 mL/kg/hr CRRT  Continuous Tati Tomlin MD 1,100 mL/hr at 08/16/24 1218 12.5 mL/kg/hr at 08/16/24 1218

## 2024-08-16 NOTE — PLAN OF CARE
Patient is sedated on dilaudid, versed, versed being titrated down to achieve RASS 0 to -1 slight withdrawal in lower extremities, eye opening to pain; pupillometry q4hour, pupils brisk 2-3; precedex on for shivering; sinus rhythm, HR 70-90s, levo titrated to keep MAPs>65, afebrile, nba hugger in place, warming through the ECMO circuit; PC settings (60%/RR10/PC 25/peep 10), -450s, suctioned via ETT q3 hours, ECMO sweep 3, no chugging, bival through ECMO circuit, R groin ECMO site with some oozing, mucousal bleeding of the mouth; NJ with TF at 35 mL/hour, rectal pouch in place; CRRT goal  mL/hour, K 3.3, replaced and CRRT dialysate bath changed to 4K; continue with current plan of care and notify MD as needed.     Problem: ECLS (Extracorporeal Life Support)  Goal: Absence of Bleeding  Outcome: Not Progressing  Intervention: Prevent and Manage Bleeding  Recent Flowsheet Documentation  Taken 8/16/2024 0400 by Odalis Gibson RN  Bleeding Precautions:   blood pressure closely monitored   coagulation study results reviewed   foot protection facilitated   gentle oral care promoted   monitored for signs of bleeding  Bleeding Management: dressing monitored  Taken 8/16/2024 0000 by Odalis Gibson RN  Bleeding Precautions:   blood pressure closely monitored   coagulation study results reviewed   foot protection facilitated   gentle oral care promoted   monitored for signs of bleeding  Bleeding Management: dressing monitored  Taken 8/15/2024 2000 by Odalis Gibson RN  Bleeding Precautions:   blood pressure closely monitored   coagulation study results reviewed   foot protection facilitated   gentle oral care promoted   monitored for signs of bleeding  Bleeding Management: dressing monitored  Goal: Absence of Infection Signs and Symptoms  Outcome: Not Progressing  Intervention: Prevent or Manage Infection  Recent Flowsheet Documentation  Taken 8/16/2024 0400 by Odalis Gibson RN  Infection Prevention:    environmental surveillance performed   equipment surfaces disinfected   hand hygiene promoted   personal protective equipment utilized   single patient room provided   rest/sleep promoted   visitors restricted/screened  Infection Management: aseptic technique maintained  Isolation Precautions: enteric precautions initiated  Taken 8/16/2024 0000 by Odalis Gibson RN  Infection Prevention:   environmental surveillance performed   equipment surfaces disinfected   hand hygiene promoted   personal protective equipment utilized   single patient room provided   rest/sleep promoted   visitors restricted/screened  Infection Management: aseptic technique maintained  Isolation Precautions: enteric precautions initiated  Taken 8/15/2024 2000 by Odalis Gibson RN  Infection Prevention:   environmental surveillance performed   equipment surfaces disinfected   hand hygiene promoted   personal protective equipment utilized   single patient room provided   rest/sleep promoted   visitors restricted/screened  Infection Management: aseptic technique maintained  Isolation Precautions: enteric precautions initiated   Goal Outcome Evaluation:      Plan of Care Reviewed With: patient

## 2024-08-16 NOTE — PROGRESS NOTES
SURGICAL ICU PROGRESS NOTE  08/16/2024        Date of Service (when I saw the patient): 08/16/2024    ASSESSMENT:  Massiel Flaherty is a 53 year old female who was admitted to [facility].   Past medical hx of Anxiety/depression, fibromyalgia, hypothyroidism, asthma, HLD, MURIEL on CPAP, spells, off on anti seizure medications, expressive aphasia, hypertension was seen at [facility] and was placed on Augmentin outpatient on 7/30/24.   She admitted to the hospital on 8/3/24 for fatigue, fever and dyspnea and intubated 8/6/24 at OSH, proned and paralyzed without improvement. Transferred to [facility] 8/8/24, hypoxic with high plateaus/peaks and placed on VV ECMO and CRRT.         CHANGES and MAJOR THINGS TODAY:     - Midazolam weaned down overnight, hold further wean  - Continue UF   - Increase Lantus  - Start amicar oral rinse x 48 hours  - Increase fiber  - Change Cefepime/Flagyl to Zosyn  - eye ointment + shields  - Resume Venlafaxine and Amitriptyline    PLAN:  Neurological:  # Fibromyalgia   # Hx myalgic encephalomyelitis   # Acute pain  # Sedation and analgesia for vent compliance   - Monitor neurological status. Delirium preventions and precautions.   - Pain: Dilaudid gtt         - Sedation plan: Versed gtt . Weaned from 14 to 3 mg/hr overnight. Will halt further wean for now to avoid risk of benzodiazepine withdrawal as she has been exposed to high doses for greater than 1 week.   - Shivering: Precedex. Attempted discontinuation with severe shivering. Resumed again.   -  Paralysis: Off 8/14  - Resume Venlafaxine and Amitriptyline today.       # Hx spells with staring and BUE posturing previously described as pseudoseizures   # Hx history of GTC seizures and myoclonic epilepsy, weaned off AEDs   # C/f hypoxic ischemic injury   # Diffuse cerebral edema - improved  - Head CT 8/9: Findings concerning for diffuse cerebral edema with  diffuse blurring of the gray-white differentiation. Findings are  concerning for  hypoxicischemic injury.   - Sodium goals liberalized to 140-145  - Repeat Head CT 8/15 continued improvement in gray white matter differentiation and continued effacement of the sulci and ventricles.     Pulmonary:   #ARDS s/p VV ECMO cannulation 8/8  # Asthma  # MURIEL on home CPAP   FiO2 (%): 60 %, Resp: 11, Inspiratory Pressure (cm H2O) (Drager Jessie): 25, Vent Mode: PCV Plus assist, Resp Rate (Set): 10 breaths/min, PEEP (cm H2O): 10 cmH2O, Resp Rate (Set): 10 breaths/min, PEEP (cm H2O): 10 cmH2O    - continue with rest vent setting on ECMO. Plan to optimize ECMO   - Chest CT 8/9: Diffuse patchy groundglass and consolidative densities throughout  the lungs with small bilateral pleural effusions. Findings may  represent severe pulmonary edema, and/or  infectious /inflammatory  pathology, or ARDS. Multiple prominent and a few enlarged mediastinal lymph nodes,  possibly reactive.  - 8/11: Restart veletri continuous neb  - 8/11: Increase inspiratory pressure to 35 to attempt achieving slightly higher tidal volumes   - 8/12: Decadron 20mg daily x 5 days, then 10mg x 5 days   - 8/14 Bronchoscopy with mucous plugging.  Continue Mucomyst and Duonebs q4 hours. No acute indication to repeat bronchoscopy today.   - 8/15 Stop Veletri    ECMO:  Sweep 2 L. Wean down to 0 today, tolerated for about 2 hours with respiratory acidosis, tachypnea and increased work of breathing.   FiO2 100%   Flow 4.5  Bival gtt with PTT goals 44-88 // ACT goal of 160-180   - Superior cannula retracted 8/10 for ongoing low PaO2   - 8/11: bival infusion replaced for heparin given concern for heparin resistance   - Persistent oozing from R femoral cannula site. PTT currently at low end of goal. Apply pressure.     Cardiovascular:    # hyperlipidemia    # Hypotension, likely iatrogenic  - MAP >65  - Prior HTN, Clonidine on hold after one dose with subsequent hypotension.Potentially still some medication effect on board in the setting of renal  failure/Clonidine is not dialyzable.   - Continue Norepinephrine, low dose.   - Lactate normal   - Repeat ECHO 8/12: Left ventricular size, wall motion and function are normal. The ejection fraction is 60-65%. Flattened septum is consistent with right ventricular pressure and volume overload. Right ventricular function, chamber size, wall motion, and thickness are normal.       Gastroenterology/Nutrition:  # GERD   # Diarrhea  - Protonix (home medication)   - RD consult in am for SBFT and TF recommendations   - CT CAP 8/9: Mild hepatomegaly. Trace pelvic ascites, nonspecific bilateral perinephric  streakiness. Asymmetric heterogeneous bulky right adnexa, consider nonemergent  pelvic ultrasound for further evaluation.   - NJ tube for meds, TF   - Stool output 650/100  - imodium PRN   - RD to increase fiber today.      Fluids/Electrolytes/Renal:  # Acute kidney injury  # Hypernatremia  - Continue CRRT. UF has been limited by hypotension. Add trial of concentrated albumin x 3 doses today.   - Trending CRRT labs  - Monitor I/Os closely   - Normalize sodium goals.     Endocrine:  # Hypothyroidism   # Steroid and stress induced hyperglycemia  - synthroid resumed   - continue with sliding scale insulin due to feed start, reassess needs.   - Goal to keep BG< 180 for optimal wound healing   - 8/14: Lantus increase to 16     ID:  # Leukocytosis  # Presumed pneumonia  # Concern for PID   PER OSH: 8/6: RSV, COVID, parainfluenza,  negative . Legionella and pneumococcal urine antigens negative. Patient has been on greater than 20 mg prednisone daily for 18 days.  CULTURES: 8/8/2024: Respiratory viral panel negative. Blood cultures negative, MRSA nasal negative, Legionella urine antigen negative, respiratory culture negative.PCR trachea for PJP is negative.  COVID: Negative. Viral panel-negative PJP  - ID consulted   - 8/11: Gyn consulted for right adnexal finding on CT. Low suspicion for abscess but would treat as we are  "currently. Collection too small for surgical intervention and too small for IR drainage as well. Could follow up with pelvic MRI, however we will treat as current with IV antibiotics.  \  - EBV PCR, per ID likely reactivation I/n the setting of acute illness.   - IGG levels low, ID recommend IVIG infusion, done 8/15  - Persistent leukocytosis, afebrile.   - 8/15 Check MRSA swab negative Repeat BC  with NGTD  - 8/14 BAL: Pseudomonas aeruginosa grown in the setting of Cefepime since 8/3. Will change to Zosyn today. Follow susceptibilities.     Antimicrobials:   - Azithromycin- stopped 8/11   - Cefepime 8/3 (started at OSH)-8/16  - Amphotericin - stopped 8/13   - Flagyl 8/11-8/16  - Zosyn 8/16    Heme:     # Acute blood loss anemia   #Anemia of critical illness  #Oral bleeding  - transfusion parameters per ECMO protocol    - one unit prbc 8/8/24  - Transfuse if hgb <7.0 or signs/symptoms of hypoperfusion. Monitor and trend.    - Continue Bivalrudin gtt  - Increase bleeding from right femoral cannula site and gum bleeding.   - ATIII normal, no indication for FFP.  - Amicar solution swish and spit intraoral x 48 hours  - Required 1 PRBC daily over the last two days.      Musculoskeletal:  # Weakness and deconditioning of critical illness  # Left ankle injury pre-hospitalization    - Physical and occupational therapy when able.      Skin:  # Ecchymosis - BLE   # Left toe duskiness   - bilateral lower leg ecchymosis   - WOC consult   - 8/13: Worsening duskiness to left 3rd, 4th toes noted; Off pressors      General Cares/Prophylaxis:    DVT Prophylaxis: Bival gtt per ECMO   GI Prophylaxis: PPI  Restraints: Restraints for medical healing needed: NO     Lines/ tubes/ drains:  - ETT   - Right  ECMO cannula   - Right Femoral cannula   - Left internal jugular   - radial arterial line   - NJ   - PIV\"s      Disposition:  -  Surgical ICU       Time spent on this Encounter   Billing:  I spent 55 minutes bedside and on the inpatient " unit today managing the critical care of Massiel Flaherty in relation to the issues listed in this note.      Tiara De La Mater    ====================================  INTERVAL HISTORY:  Intubated, sedated. VTs variable 400s. Unable to obtain ROS      OBJECTIVE:   1. VITAL SIGNS:   Temp:  [97.2  F (36.2  C)-99.1  F (37.3  C)] 97.2  F (36.2  C)  Pulse:  [] 82  Resp:  [10-13] 11  MAP:  [61 mmHg-92 mmHg] 68 mmHg  Arterial Line BP: ()/(42-66) 104/49  FiO2 (%):  [60 %] 60 %  SpO2:  [98 %-100 %] 100 %  FiO2 (%): 60 %, Resp: 11, Inspiratory Pressure (cm H2O) (Drager Jessie): 25, Vent Mode: PCV Plus assist, Resp Rate (Set): 10 breaths/min, PEEP (cm H2O): 10 cmH2O, Resp Rate (Set): 10 breaths/min, PEEP (cm H2O): 10 cmH2O      2. INTAKE/ OUTPUT:   I/O last 3 completed shifts:  In: 3989.08 [I.V.:1909.08; NG/GT:660]  Out: 5351 [Other:4851; Stool:500]    3. PHYSICAL EXAMINATION:  General: NAD  HEENT: PERRLA. Pupils 3mm and reactive. ETT present and secured. NJ tube. OG to LIS with thin green drainage.   RIght neck with ECMO cannula, sutured in place.  Left internal jugular CVC in place, secured.   Neuro: PERRL 3mm. No movement of extremities.   Pulm/Resp:  minimal breath sounds. coarse  CV: RRR, S1/S2   Abdomen: Soft, non-distended, non-tender, no rebound tenderness or guarding, no masses  Incisions/Skin: scattered ecchymosis in BLE in toes around ankles bilaterally. Limbs warm. R femoral cannula with bleeding collected under dressing.   MSK/Extremities:generalize BLE 1+ edema. Calves compressible, (+) doppler tones  DP/PT on feet.     4. INVESTIGATIONS:   Arterial Blood Gases   Recent Labs   Lab 08/16/24  0629 08/16/24  0417 08/16/24  0039 08/15/24  2210   PH 7.38 7.38 7.40 7.38   PCO2 41 41 40 39   PO2 197* 206* 217* 150*   HCO3 24 24 24 23     Complete Blood Count   Recent Labs   Lab 08/16/24  0419 08/15/24  2210 08/15/24  1557 08/15/24  1020   WBC 19.2* 19.0* 18.2* 21.2*   HGB 7.9* 8.3* 7.1* 7.7*     160 157 162     Basic Metabolic Panel  Recent Labs   Lab 08/16/24 0419 08/16/24 0413 08/16/24  0040 08/16/24  0036 08/15/24  2210 08/15/24  2020 08/15/24  2019 08/15/24  1608 08/15/24  1557     --   --  145 144  --  147*  --  146*   POTASSIUM 3.3*  --   --   --  3.8  --  4.0  --  4.0   CHLORIDE 109*  --   --   --  110*  --  112*  --  113*   CO2 23  --   --   --  22  --  23  --  23   BUN 49.5*  --   --   --  51.9*  --  51.4*  --  54.1*   CR 1.48*  --   --   --  1.52*  --  1.50*  --  1.57*   * 159* 137*  --  227*   < > 211*   < > 257*    < > = values in this interval not displayed.     Liver Function Tests  Recent Labs   Lab 08/16/24  0419 08/15/24  2019 08/15/24  0414 08/14/24  1607 08/14/24  1205 08/14/24  0426 08/13/24  1151 08/13/24  0405 08/12/24  1539 08/12/24  0945 08/12/24  0401 08/11/24  2154 08/11/24  1603   AST 28  --  41  --   --  43  --  59*  --   --  48*  --   --    ALT 19  --  21  --   --  23  --  21  --   --  21  --   --    ALKPHOS 132  --  170*  --   --  224*  --  237*  --   --  281*  --   --    BILITOTAL 0.6  --  0.4  --   --  0.4  --  0.4  --   --  0.4  --   --    ALBUMIN 3.9 3.6 2.7* 2.7*   < > 3.0*   < > 2.5*   < > 2.5* 2.4* 2.4* 2.3*   INR  --   --   --   --   --   --   --   --   --  1.37* 1.30* 1.24* 1.15    < > = values in this interval not displayed.     Pancreatic Enzymes  No lab results found in last 7 days.  Coagulation Profile  Recent Labs   Lab 08/16/24  0419 08/15/24  2210 08/15/24  1557 08/15/24  1020 08/12/24  1153 08/12/24  0945 08/12/24  0844 08/12/24  0401 08/12/24  0008 08/11/24  2154 08/11/24  1831 08/11/24  1603   INR  --   --   --   --   --  1.37*  --  1.30*  --  1.24*  --  1.15   PTT 51* 51* 50* 42*   < > 44*   < > 42*   < > 36   < > 35    < > = values in this interval not displayed.         5. RADIOLOGY:   Recent Results (from the past 24 hour(s))   CT Head w/o Contrast    Narrative    EXAM: CT HEAD W/O CONTRAST  8/15/2024 9:40 AM     HISTORY:  Cerebral edema  monitoring       COMPARISON:  Head CT 8/10/2024, 8/9/2024    TECHNIQUE: Using multidetector thin collimation helical acquisition  technique, axial, coronal and sagittal CT images from the skull base  to the vertex were obtained without intravenous contrast.   (topogram) image(s) also obtained and reviewed.    FINDINGS:  No acute intracranial hemorrhage, mass effect, or midline shift. No  acute loss of gray-white matter differentiation in the cerebral  hemispheres. Continued improvement in previously seen sulcal and  ventricular effacement. There is normal-appearing gray-white matter  differentiation. Clear basal cisterns.    The bony calvaria and the bones of the skull base are normal. The  visualized portions of the paranasal sinuses and mastoid air cells are  clear. Grossly normal orbits.     Partially visualized feeding tube.      Impression    IMPRESSION: Continued improvement in gray-white matter differentiation  and continued decreased effacement of the sulci and ventricles. No  acute intracranial abnormality.     I have personally reviewed the examination and initial interpretation  and I agree with the findings.    SUJEY MARTÍNEZ MD         SYSTEM ID:  [id number]   XR Chest Port 1 View    Impression    RESIDENT PRELIMINARY INTERPRETATION  IMPRESSION:   1. Stable support devices.  2. Mildly improved patchy airspace opacities.       =========================================

## 2024-08-16 NOTE — PROGRESS NOTES
Veno-venous ECMO Progress Note  8/16/2024    Massiel Flaherty is a 53 year old female who was started on VV ECMO due to severe respiratory failure from presumed pneumonia although source has not been identified.      Interval events:   Attempted trial with sweep off this morning, RR increased to 40s with increased effort of breathing, sweep placed back on.    The patients vital signs today are as follows:    Temp:  [94.1  F (34.5  C)-99.1  F (37.3  C)] 94.6  F (34.8  C)  Pulse:  [] 91  Resp:  [10-34] 29  MAP:  [58 mmHg-92 mmHg] 67 mmHg  Arterial Line BP: ()/(42-66) 105/51  FiO2 (%):  [60 %] 60 %  SpO2:  [92 %-100 %] 94 %       FiO2 (%): 60 %, Resp: 29, Inspiratory Pressure (cm H2O) (Drager Jessie): 25, Vent Mode: PCV Plus assist, Resp Rate (Set): 10 breaths/min, PEEP (cm H2O): 10 cmH2O, Resp Rate (Set): 10 breaths/min, PEEP (cm H2O): 10 cmH2O   Recent Labs   Lab 08/16/24  1157 08/16/24  1002 08/16/24  0802 08/16/24  0629   PH 7.32* 7.38 7.38 7.38   PCO2 48* 41 42 41   PO2 70* 178* 205* 197*   HCO3 25 24 25 24   O2PER 60 60 60 60      Recent Labs   Lab 08/16/24  1002 08/16/24  0419 08/15/24  2210 08/15/24  1557   WBC 16.0* 19.2* 19.0* 18.2*   HGB 7.5* 7.9* 8.3* 7.1*     Creatinine   Date Value Ref Range Status   08/16/2024 1.49 (H) 0.51 - 0.95 mg/dL Final   08/16/2024 1.48 (H) 0.51 - 0.95 mg/dL Final   08/15/2024 1.52 (H) 0.51 - 0.95 mg/dL Final   08/15/2024 1.50 (H) 0.51 - 0.95 mg/dL Final     Physical Exam:   Cannula unchanged. Oozing from right groin cannula.  Air movement bilaterally.  Extremities edematous.    ECMO Issues including assessments and plan on DOS 8/16/2024:    Neuro: Sedated, lightening sedation progressively over the last 2 days. RASS goal: -1 to 0.   CV: Levophed at low dose, stable  Pulm: Severe respiratory failure requiring ECMO and mechanical ventilation. Flows unchanged at ~4.3 LPM   Keep vent settings at rest settings, do not titrate FiO2 above 60%. TVs improving, still more  than 400 mL. Increased RR with sweep off this morning, will trial again tomorrow.  FEN/Renal: pulling on CRRT, agree/continue  Heme: Hemoglobin 7.5.  Goals: if O2 sat >85% Hgb 7-8.  If O2 Sat <85% keep Hgb 10-12. Bivalrudin gtt for anticoagulation due to concern for heparin resistance. PTT goal 44-88.  ID: on broad-spectrum antimicrobials, continue     All pertinent labs, imaging studies, physical exam and medications have been reviewed by me.     Discussed with staff, Dr. Jose Carlos Powell  Surgical Critical Care Fellow

## 2024-08-16 NOTE — PROCEDURES
BEDSIDE PROCEDURE - VV ECMO CANNULA REPOSITIONING  NAME: Massiel Flaherty   MRN: [id number]  : 1970     Pre-procedure Diagnosis:  Acute respiratory distress syndrome  Post-procedure Diagnosis: same as above  Procedure: Veno-venous right internal jugular cathter repositioning     Procedure: A timeout was called to review the case and patient information. The patient was positioned supine. The previous dressing over the right internal jugular vein catheter was removed and the area was prepped with chlorhexidine and draped sterilely. The previous sutures holding the catheter were cut leaving the pursestring suture at the catheter insertion site intact. The ECMO cannula was then pulled back 6 cm. The ECMO perfusionist was present and confirmed flows and oxygen saturations remained the same. The cannula was then sutured in place with multiple 0 silk sutures. A sterile dressing was placed over the cannula. Patient tolerated the procedure well without any complications.     Plan: CXR to confirm cannula positioning.     I performed the procedure.

## 2024-08-16 NOTE — PLAN OF CARE
Major Shift Events:  Pt continues on VV ECMO and was off sweep for two hours and with slight increased in CO2 and RR more tachypneic to 35-42 and sweep returned to 1 and CO2 and RR both improved to 4.2 and mid 20's respectively. No more changes in sedation until tomorrow per SICU. Remains on CRRT but goal changed to I=O. BP stable on 0.03-0.05mcg/kg/min of levophed. RLE ECMO cannulation site continued to have a small leak and dressing changed and stat seal and quick clot placed with pressure held for 10 minutes at site and it currently appears to have stopped. Family updated at bedside.  Plan: Continue to follow POC as ordered. Will attempt to decrease VV ECMO again tomorrow for possible decannulation. Will decrease sedation tomorrow as well per SICU teams recommendations. For vital signs and complete assessments, please see documentation flowsheets.    Goal Outcome Evaluation:    Plan of Care Reviewed With: patient, family  Overall Patient Progress: improvingOverall Patient Progress: improving  Outcome Evaluation: Sweep was to 0 for two hours and currently at 1

## 2024-08-16 NOTE — PROGRESS NOTES
CRRT STATUS NOTE    DATA:  Time:  6:19 PM  Pressures WNL:  YES  Filter Status:  WDL    Problems Reported/Alarms Noted:  none    Supplies Present:  YES    ASSESSMENT:  Patient Net Fluid Balance:  -1.2 L  Vital Sign:  see chart  Labs:  K 4.5, iCa 4.7, LA 0.9, WBC 17.9, hgb 7.7, plts 147  Goals of Therapy:  I = O    INTERVENTIONS:   None    PLAN:  Continue to monitor. Notify providers of concerns.

## 2024-08-16 NOTE — PROGRESS NOTES
[facility]    ECLS Shift Summary:     ECMO Equipment:  Console Serial Number: [id number]  Circuit Lot Number: not recorded by Perfusion  Oxygenator Lot Number: [id number]    Circuit Assessment: Fibrin  Fibrin Location: connectors, 3 and 9 of oxy    Drain ECMO Cannula: 25 Fr in the Right Femoral Vein  Return ECMO Cannula: 17 Fr in the Right Internal Jugular Vein    ECMO Cannula Arterial Right internal jugular-Site Assessment: WDL, Sutured, Secure  ECMO Cannula Venous Right femoral vein-Site Assessment: Bleeding, Sutured, Secure  ECMO Cannula Arterial Right internal jugular-Site Intervention: No intervention needed  ECMO Cannula Venous Right femoral vein-Site Intervention: No intervention needed    Patient remains on V-V ECMO, all equipment is functioning and alarms are appropriately set. RPM's: 3600 with Blood Flow (Circuit) LPM  Av.5 LPM  Min: 4.4 LPM  Max: 4.63 LPM L/min. Sweep is at 3 LPM and 100 %. Extremities are cool.     Significant Shift Events: no acute events overnight. Moderate oozing from femoral cannula, reinforced dressing.    Vent settings:  FiO2 (%): 60 %, Resp: 12, Inspiratory Pressure (cm H2O) (Drager Jessie): 25, Vent Mode: PCV Plus assist, Resp Rate (Set): 10 breaths/min, PEEP (cm H2O): 10 cmH2O, Resp Rate (Set): 10 breaths/min, PEEP (cm H2O): 10 cmH2O    Anticoagulation:  Dose (units/hr) HEParin: 0 Units/hr  Rate (mL/hr) HEParin: 0 mL/hr  Concentration HEParin: 100 Units/mL     Dose (mg/kg/hr) Bivalirudin: 0.06 mg/kg/hr  Rate (mL/hr) Bivalirudin: 5.3 mL/hr  Concentration Bivalirudin: 1 mg/mL  Most recent: ACT  (seconds):  (folowing PTTs)    Pt is on CRRT.  Blood loss was small. Nno product given.    Intake/Output Summary (Last 24 hours) at 2024 0623  Last data filed at 2024 0600  Gross per 24 hour   Intake 3989.08 ml   Output 5351 ml   Net -1361.92 ml       Labs:  Recent Labs   Lab 24  0419 24  0417 24  0039 08/15/24  2210  08/15/24  2019   PH  --  7.38 7.40 7.38 7.40   PCO2  --  41 40 39 40   PO2  --  206* 217* 150* 184*   HCO3  --  24 24 23 24   O2PER 60  100  60 60 60 60 60       Lab Results   Component Value Date    HGB 7.9 (L) 08/16/2024    PHGB 40 (H) 08/16/2024     08/16/2024    FIBR 307 08/16/2024    INR 1.37 (H) 08/12/2024    PTT 51 (H) 08/16/2024    DD 2.20 (H) 08/16/2024    ANTCH 112 08/15/2024       Plan is continue with VV ECMO support.    Jax Burnett RN  ECMO Specialist  8/16/2024 6:23 AM

## 2024-08-16 NOTE — PROGRESS NOTES
[facility]    ECLS Shift Summary:     ECMO Equipment:  Console Serial Number: [id number]  Circuit Lot Number: not recorded by Perfusion  Oxygenator Lot Number: [id number]    Circuit Assessment: Fibrin  Fibrin Location: connectors, corners of oxy    Venous ECMO Cannula: 17 Fr in the Right Internal Jugular Vein  Venous ECMO Cannula: 25 Fr in the Right Femoral Vein    ECMO Cannula Arterial Right internal jugular-Site Assessment: WDL, Sutured, Secure  ECMO Cannula Venous Right femoral vein-Site Assessment: Bleeding, Sutured, Secure  ECMO Cannula Arterial Right internal jugular-Site Intervention: No intervention needed  ECMO Cannula Venous Right femoral vein-Site Intervention: Dressing changed/new dressing    Patient remains on V-V ECMO, all equipment is functioning and alarms are appropriately set. RPM's: 3600 with Blood Flow (Circuit) LPM  Av.6 LPM  Min: 4.54 LPM  Max: 4.66 LPM L/min. Sweep is at 1 LPM and 100 %. Extremities are warm.     Significant Shift Events: Attempted sweep off this morning, increased RR, SpO2 92-94%, SvO2 50's, ABG 7.32/48/70/25/92%, returned sweep to 1LPM.  Continued bleeding from Rt fem cannula site, cannula redressed.     Vent settings:  FiO2 (%): 60 %, Resp: (!) 31, Inspiratory Pressure (cm H2O) (Drager Jessie): 25, Vent Mode: PCV Plus assist, Resp Rate (Set): 10 breaths/min, PEEP (cm H2O): 10 cmH2O, Resp Rate (Set): 10 breaths/min, PEEP (cm H2O): 10 cmH2O    Anticoagulation:     Dose (mg/kg/hr) Bivalirudin: 0.06 mg/kg/hr  Rate (mL/hr) Bivalirudin: 5.3 mL/hr  Concentration Bivalirudin: 1 mg/mL  Most recent: PTT: 50    Urine output is none.  Blood loss was moderate from Rt fem cannula site. Product given included none.     Intake/Output Summary (Last 24 hours) at 2024 1740  Last data filed at 2024 1700  Gross per 24 hour   Intake 3519.01 ml   Output 5462.4 ml   Net -1943.39 ml       Labs:  Recent Labs   Lab 24  1601 24  1559  08/16/24  1400 08/16/24  1157 08/16/24  1002   PH 7.34*  --  7.36 7.32* 7.38   PCO2 43  --  42 48* 41   PO2 194*  --  179* 70* 178*   HCO3 23  --  23 25 24   O2PER 60 60  100  100 60 60 60       Lab Results   Component Value Date    HGB 7.7 (L) 08/16/2024    PHGB 40 (H) 08/16/2024     (L) 08/16/2024    FIBR 270 08/16/2024    INR 1.37 (H) 08/12/2024    PTT 50 (H) 08/16/2024    DD 2.37 (H) 08/16/2024    ANTCH 103 08/16/2024       Plan is to continue VV ECMO.    Otilia Jackson, RT  ECMO Specialist  8/16/2024 5:40 PM

## 2024-08-16 NOTE — PROGRESS NOTES
CLINICAL NUTRITION SERVICES - REASSESSMENT NOTE     Nutrition Prescription    Malnutrition Status:    Moderate malnutrition in the context of acute illness    Recommendations already ordered by Registered Dietitian (RD):  Increase Banatrol to TID - TF + fiber to provide 19 g fiber daily (within recommended range of 10 to 20 g per day per [facility] critical care guidelines)    Future/Additional Recommendations:  - Metabolic cart study when off ECMO  - Ongoing EN tolerance / stool trends  - Weight trends       EVALUATION OF THE PROGRESS TOWARD GOALS   Diet: NPO    Nutrition Support: Vital AF 1.2 @ 35 ml/hr + 3 pkts prosource TF20 provides 840 ml volume, 1248 kcals (21 kcal/kg), 123 g pro (2.1 g/kg), 45 g Fat, 93 g CHO, 4 g fiber, 681 ml free water     Access: NJT    FWF: 30 ml q 4 hours    Intake: EN initiated 8/9, reached goal 8/10 @ 1300.  7 day daily average intake of 712 ml EN + 257 ml propofol + 2.7 pkts prosource TF20 provided 1354 kcals (23 kcal /kg), 107 g protein (1.8g/kg) for 100% kcal needs, 90% protein needs     NEW FINDINGS   Weight: 85 kg, down from prior dry weight of 88 kg ~ 10 days ago (PTA at OSH).  Continue using adjusted dosing weight of 60 kg    Labs:   BUN 49 (H)  Cr 1.52 (H)  K+ 4.1 <-- 3.3 (L)  Phos 4.7 (H) - intermittent mild elevation  POC glucose ranging 137 to 207    Meds: high resistance novolog, lantus 16 units in am, renavite, norepi gtt (steady lower dose, currently @ 0.05 mcg/kg/min)    GI: rectal tube removed 8/13.  650 ml stool output (rectal pouch) yesterday, 100 ml since midnight, thicker per overnight RN. Received first two doses of banatrol and one 2 mg dose of PRN imodium.  Cdiff negative    Skin: Skin beneath nasal bridle was inspected; appears appropriate, with no skin breakdown or tension on the bridle string.    Renal: CRRT.  Currently no indication for electrolyte restricted renal formula    Respiratory: Intubated, VV ECMO with decreasing sweep, RT attempted metabolic cart  "study 8/15 but unable to get adequate data (challenging with ECMO circuit)      MALNUTRITION  % Intake: Decreased intake does not meet criteria  % Weight Loss: > 2% in 1 week (severe)  Subcutaneous Fat Loss: None observed  Muscle Loss: Temporal:  mild and Upper arm (bicep, tricep):  mild  Fluid Accumulation/Edema: Does not meet criteria  Malnutrition Diagnosis: Moderate malnutrition in the context of acute illness    Previous Goals   Total avg nutritional intake to meet a minimum of 20 kcal/kg and 2 g PRO/kg daily (per dosing wt 60 kg)   Evaluation: met for kcals (with propofol), not met for protein    Previous Nutrition Diagnosis  Inadequate protein-energy intake related to Intubation with EN not yet resumed as evidenced by currently meeting 0% kcal protein needs   Evaluation: Resolved    CURRENT NUTRITION DIAGNOSIS  Inadequate oral intake related to NPO with mechanical ventilation as evidenced by reliant on EN via NJT to meet 100% nutrition needs      INTERVENTIONS  Implementation  Collaboration with other providers  Enteral Nutrition - Continue    Goals  Total avg nutritional intake to meet a minimum of 20 kcal/kg and 2 g PRO/kg daily (per dosing wt 60 kg).    Monitoring/Evaluation  Progress toward goals will be monitored and evaluated per protocol.    Abbey Evans, RD, LD, CNSC  Available on [employer] - can search by name or 4E Clinical Dietitian  **Clinical Nutrition is no longer available via pager  Weekend/Holiday RD - \"Weekend Clinical Dietitian\" on [employer]    "

## 2024-08-16 NOTE — PROGRESS NOTES
[facility] General ID Service: Follow Up Note      Patient:  Massiel Flaherty   Date of birth 1970, Medical record number [id number]  Date of Visit:  08/16/2024  Date of Admission: 8/8/2024         Assessment and Recommendations:   ID Problem List:  Acute hypoxic and hypercapnic respiratory failure c/b ARDS and on VV-ECMO 8/8/24  Etiology unknown, possible CAP vs other infectious (fungal) vs noninfectious  Fever  Dry cough x1 month prior to admission  CHACORTA requiring CRRT  Cerebral edema; intentional hypernatremia  Right adnexal mass  Hypogammaglobulinemia  Elevated AST, alk phos  Anemia  Recent Augmentin, Z-pack and steroid tapers  Antibiotic allergy - Sulfa (hives), tetracycline (hives)    Recommendations:  Reasonable to continue Zosyn while awaiting sensitivities of PsA from BAL culture 8/14.   If PsA is Cefepime sensitive, we presume this is a respiratory colonizer and not causing active infection.   If PsA is resistant to Cefepime (as it grew while on long course of Cefepime), then Zosyn should be reasonable coverage unless susceptibilities demonstrate otherwise.  Demond returned with EBV and HSV- which along with the EBV positivity from BAL sample likely represents reactivation in setting of critical illness. No treatment recommended.   Will continue to follow pending cultures.     Infectious diseases will follow peripherally over the weekend. Dr. Mir and BARBARA Palmer will be covering the General ID services over the weekend. Dr. Pathak will take over the [facility] ID service on Monday.     Discussion:  Massiel Flaherty is a 53 year old female with past medical history significant for asthma, MURIEL on CPAP, HTN, anxiety,depression, expressive aphasia, fibromyalgia, and hypothyroidism who presented to OSH 8/3/24 with fever, fatigue, dyspnea with c/f CAP and requiring intubation, proning, paralysis and transferred to [facility] 8/8/24 for further cares and now placed on VV ECMO and CHACORTA requiring CRRT.  ID consulted for pneumonia.      Per chart review, had sought care multiple times since early July for an acute on chronic cough. No improvement with multiple courses of steroid tapers, Z-pack, and Augmentin as outpatient. Admitted 8/3/24 with fever and hypoxia. She has completed at least 3 days of azithromycin at OSH since first admitted 8/3 and has been on cefepime since 8/3 with clinical worsening. No infectious etiology has been identified to date. Negative ID work up outlined below. Negative EVELIO, ANCA, MPO, proteinase 3 at OSH. CXR with ongoing diffuse bilateral pulmonary opacities consistent with ARDS and now paralyzed, on VV-ECMO and CRRT.      Started on Amphotericin B 8/9 due to high level of concern for endemic fungal pneumonia given multiple presentations and lack of improvement on abx, with steroids prescribed, discontinued on 8/13 with antigens negative, unrevealing bronch. Low level EBV positivity from BAL- likely reactivation rather than initial cause of worsening status. BAL culture 8/14 now with growth of PsA, senses pending. This could represent colonization of respiratory tract if it is Cefepime susceptible as she has been on ~2 wks of coverage. However, if Cefepime resistant, this could represent acute VAP and would cover with a short course of treatment. ICU switched to Zosyn which we will continue while awaiting senses. Has completed 3 days of Azithromycin at [facility] without improvement; received azithromycin at OSH prior to transfer as well. Sent [facility] on 8/13 given ongoing critical illness and negative work up thus far- no bacterial or fungal organisms identified. Did reflect elevated EBV and HSV on results- but this is likely reactivation of previously suppressed viruses in setting of critical illness- does not require treatment.       8/6/24 Legionella urine ag Neg    MRSA nares Neg    Bcx x2  No growth    PJP PCR Neg    Respiratory bacterial cx No growth   8/7/24 BD glucan Neg    HIV Ag Ab  Neg    Aspergillus antigen blood Neg   8/8/24 IgG 302 (low)    Respiratory aerobic cx C.albicans    MRSA nares; SA target DNA Neg/Neg    BCx NGTD   8/9 Blastomyces Ag EIA blood Neg    BD glucan Neg (44)    Aspergillus galactomannan Neg    IgG 258    HIV Ag Ab combo Neg    Cryptococcal Ag blood Neg    Coccidioides Ag EIA blood Neg    Histoplasma capsulatum Ag blood Neg    Hep B core Ab IgM Neg    Hep A Ab IgM Neg    Hep B surface Ab Pos    Hep C Ab  Neg    Hep B surface Ag Neg    Respiratory aerobic cx BAL No growth    AFB stain and Cx BAL  Stain neg, NGTD    Fungal cx BAL  C.albicans    KOH prep BAL Neg    CMV PCR BAL Neg    Nocardia cx BAL NGTD    Legionella cx BAL NGTD    HSV PCR BAL Neg    PJP PCR BAL Neg    Aspergillus galactomannan BAL Neg    EBV PCR BAL Positive    Adenovirus PCR BAL Neg    Respiratory aerobic cx BAL No growth    AFB culture and stain BAL Stain neg, NGTD    Fungal Cx BAL NGTD    KOH prep BAL Neg    Legionella species cx BAL NGTD    Nocardia species cx BAL NGTD    Legionella PCR BAL Neg    HSV PCR BAL Neg   8/10/24 Respiratory panel PCR neg    COVID-19 Neg   8/11/24 Histoplasma capsulatum BAL Neg    Histoplasma capsulatum blood Neg   8/12/24 Tick-borne disease neg    Leptospira IgM Neg    Hantavirus antibodies PENDING    GC/Chlamydia swab Neg   8/13/24 C.diff Neg    Karius  and  (known to cause disease but may represent normal microbiota)   8/14/24 Fungal/yeast cx BAL NGTD    Respiratory Aerobic cx BAL GNB   8/15/24 MRSA nares neg    BCX PENDING       Recs were discussed with primary team today. Don't hesitate to call with questions.     Estela Meza PA-C  Infectious Diseases  Contact via [employer] or [employer] Paging/Directory       50 MINUTES SPENT BY ME on the date of service doing chart review, history, exam, documentation & further activities per the note.             Interval History:      Afebile. Remains on CRRT and VV ECMO. Switch to Zosyn per ICU today to cover PsA  "growing on BAL culture. Sedated.         Current Antimicrobials   Current:  -Zosyn 8/16-    Prior:  - amphotericin B 8/9-8/13  - azithromycin 8/9-/811; given at OSH as well  - IV pentamidine given at OSH  - cefepime 8/3-15  - flagyl 8/11-16         History of Present Illness:    Per ID consult note 8/9/24:  Massiel Flaherty is a 53 year old female with past medical history significant for asthma, MURIEL on CPAP, HTN, anxiety,depression, expressive aphasia, fibromyalgia, obesity, and hypothyroidism who was transferred to [facility] 8/8/24 for further cares and now placed on VV ECMO and CRRT. ID consulted for pneumonia.      She was transferred to [facility] from OSH. Presented to hospital in [place], [place] on 8/3 with fever, fatigue, dyspnea with concern for CAP. Xray with multifocal bilateral opacities. CT chest with severe multifocal bilateral opacities, negative for PE in OSH notes, though unable to see direct report. Negative COVID, flu, and viral testing. O2 sats reportedly 60% on arrival.      She was started on azithromycin, cefepime, vancomycin, and solumedrol x1. Had reportedly been on a steroid taper recently, so had started on atovaquone ppx on 8/7 at OSH. Prior to admission, was seen in clinic and given PO Augmentin on 7/30 for acute on chronic cough x2 weeks. Was also seen at OSH ED 7/19 for asthma exacerbation, got Z-pack and prednisone taper. Was seen prior on 7/8 with cough and got additional prednisone, azithromycin. Has also had some concern for recent \"memory issues and brain fog\" and has \"zoning out episodes\". Head imaging normal, seen by neurology who felt this was more psych issue per OSH records. She has history of seizure workup and is not on any AEDs. She is on rifaximin for unknown reason. Was febrile at OSH Tmax 101.3F and required pressors. Infectious workup at OSH with negative - RSV, COVID, flu, RVP, Fungitell, Legionella urine Ag, Strep pneumococcal urine Ag, sputum PJP PCR, HIV, Aspergillus antigen, " and EVELIO. MRSA nares negative. Sputum Cx 8/6 - NGTD at 48 hrs. Bcx 8/6 at OSH NGTD. Procal 4.16.      On 8/6, was transferred to [facility] and intubated for severe ARDS requiring paralysis, proning. Remained on cefepime. Transferred to [facility] on 8/8 given continued decline, started V-V ECMO and CRRT. Son, brother, and niece/nephew at bedside on 8/9 morning. Son provides additional history primarily. Reports onset of dry nonproductive cough ~2 weeks ago and followed by zoning out spells. She had no other URI symptoms - sore throat, sinus congestion, no headache, chest pain, nausea, vomiting, abdominal pain, diarrhea that he was aware. Patient had been living with her mother due to difficulties caring for herself. Has had multiple falls in the last month. Recently fell out of a recliner - has bruising to left foot from this fall. Unsure if she hit her head. Son denies any tobacco, alcohol, or illicit drug use by patient. No recent travel. Son was sick with viral illness ~1 months ago, no other recent sick contacts. She is around her sister's dog - who is well, no illness. No livestock or other animal exposures. Says she does have a hx of colon cancer - likely polyp that was resected. Family denies chemo/radiation/surgery. 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        Physical Exam:   Ranges for vital signs:  Temp:  [97  F (36.1  C)-99.1  F (37.3  C)] 97  F (36.1  C)  Pulse:  [] 75  Resp:  [10-13] 11  MAP:  [60 mmHg-92 mmHg] 60 mmHg  Arterial Line BP: ()/(42-66) 88/43  FiO2 (%):  [60 %] 60 %  SpO2:  [98 %-100 %] 99 %    Intake/Output Summary (Last 24 hours) at 8/12/2024 1456  Last data filed at 8/12/2024 1400  Gross per 24 hour   Intake 4354.59 ml   Output 6225.9 ml   Net -1871.31 ml     Exam:  Constitutional: Female patient seen lying in bed, in NAD. Sedated.  HEENT: NCAT, anicteric sclerae. MMM. ET tube in  place.  Respiratory: Non-labored breathing on vent. Good cough with suctioning by nursing. Lungs CTAB, mildly coarse in RUL. Minimal sputum production.  Cardiovascular: Regular rate and rhythm.  GI: Normoactive BS. Abdomen is soft, non-distended.  Skin: Warm and dry.   Musculoskeletal: Extremities grossly normal.   Neurologic: Sedated.   VAD: VV ECMO, LIJ CVC, L radial art line           Laboratory Data:   Reviewed.  Pertinent for:    Culture data:  Culture   Date Value Ref Range Status   08/15/2024 No growth after 12 hours  Preliminary   08/14/2024 Culture in progress  Preliminary   08/14/2024 10,000-50,000 CFU/mL Pseudomonas aeruginosa (A)  Preliminary     Comment:     Identification is preliminary, confirmation in progress   08/14/2024 No growth after 1 day  Preliminary   08/09/2024 No Growth  Final   08/09/2024 Candida albicans (A)  Preliminary   08/09/2024 No growth after 6 days  Preliminary   08/09/2024 Culture negative, monitoring continues  Preliminary   08/09/2024 No Growth  Final   08/09/2024 No growth after 6 days  Preliminary   08/09/2024 Culture negative, monitoring continues  Preliminary   08/09/2024 No growth after 6 days  Preliminary   08/08/2024 No Growth  Final   08/08/2024 1+ Candida albicans (A)  Final     Comment:     Susceptibilities not routinely done, refer to antibiogram to view typical susceptibility profiles     GS Culture   Date Value Ref Range Status   08/09/2024 See corresponding culture for results  Final   08/09/2024 See corresponding culture for results  Final       Inflammatory Markers  No lab results found.    Hematology Studies    Recent Labs   Lab Test 08/16/24  0419 08/15/24  2210 08/15/24  1557 08/15/24  1020   WBC 19.2* 19.0* 18.2* 21.2*   HGB 7.9* 8.3* 7.1* 7.7*   MCV 97 97 98 98    160 157 162     Recent Labs   Lab Test 08/16/24  0419 08/15/24  2210 08/15/24  1557 08/15/24  1020   ANEU 15.9* 14.4* 15.1* 14.8*   AEOS 0.0 0.0 0.0 0.0       Metabolic Studies     Recent  Labs   Lab Test 08/16/24  0805 08/16/24  0419 08/16/24  0036 08/15/24  2210 08/15/24  2019 08/15/24  1557   * 144 145 144 147* 146*   POTASSIUM  --  3.3*  --  3.8 4.0 4.0   CHLORIDE  --  109*  --  110* 112* 113*   CO2  --  23  --  22 23 23   BUN  --  49.5*  --  51.9* 51.4* 54.1*   CR  --  1.48*  --  1.52* 1.50* 1.57*   GFRESTIMATED  --  42*  --  41* 41* 39*       Hepatic Studies    Recent Labs   Lab Test 08/16/24  0419 08/15/24  2019 08/15/24  0414 08/14/24  1205 08/14/24  0426   BILITOTAL 0.6  --  0.4  --  0.4   ALKPHOS 132  --  170*  --  224*   ALBUMIN 3.9 3.6 2.7*   < > 3.0*   AST 28  --  41  --  43   ALT 19  --  21  --  23    < > = values in this interval not displayed.            Imaging:   CXR 8/16/24  RESIDENT PRELIMINARY INTERPRETATION  IMPRESSION:   1. Stable support devices.  2. Mildly improved patchy airspace opacities.   This result has not been signed. Information might be incomplete.       CT head 8/15/2024  IMPRESSION: Continued improvement in gray-white matter differentiation  and continued decreased effacement of the sulci and ventricles. No  acute intracranial abnormality.     CXR 8/15/2024   IMPRESSION:   1. Stable support devices.  2. Grossly unchanged patchy airspace opacities.    CXR 8/12/24  IMPRESSION:   1. Stable support devices.  2. Dense consolidative opacities bilaterally with minimally improved  areas of parenchymal aeration.    US pelvic limited 8/10/24  IMPRESSION: Post hysterectomy. No overt adnexal abnormality identified on  transabdominal imaging.    CT head 8/10/24  IMPRESSION:   Slightly improved differentiation of the gray-white matter and  sulcation suggesting that the prior study may have been impacted by  artifact.     CT Chest/abd/pelvis 8/9/24  IMPRESSION:   1. Diffuse patchy groundglass and consolidative densities throughout  the lungs with small bilateral pleural effusions. Findings may  represent severe pulmonary edema, and/or  infectious /inflammatory  pathology,  or ARDS.   2. Multiple prominent and a few enlarged mediastinal lymph nodes,  possibly reactive. Recommend attention on follow-up.  3. Support devices as detailed above.  4. Mild hepatomegaly.  5. Trace pelvic ascites, nonspecific bilateral perinephric  streakiness.  6. Asymmetric heterogeneous bulky right adnexa, consider nonemergent  pelvic ultrasound for further evaluation.  7. Additional incidental/chronic findings as detailed above      ECHO  ECHO Congenital Transthoracic (TTE)  Result Date: 8/8/2024    Limited echocardiogram performed for assessment of LV systolic function.   Left Ventricle: The left ventricle appears normal in size. The ejection fraction, measured by biplane, is 71%. There are no wall motion abnormalities.   Limited assessment of RV.  Normal appearing right ventricular chamber size and systolic function.   Normal IVC size with minimal respirophasic changes.   No prior study for comparison. Left Ventricle The left ventricle appears normal in size. Wall thickness is normal. The ejection fraction, measured by biplane, is 71%. There are no wall motion abnormalities. IVC/SVC Normal IVC size with minimal respirophasic changes. Pericardium There is no pericardial effusion. Study Details A limited echo was performed with limited 2D. The sonographer requested the use of Definity- imaging enhancing agent per protocol. The patient denies contraindications and gives verbal consent for imaging enhancing agent injection.

## 2024-08-17 ENCOUNTER — APPOINTMENT (OUTPATIENT)
Dept: GENERAL RADIOLOGY | Facility: CLINIC | Age: 54
DRG: 003 | End: 2024-08-17
Attending: SURGERY
Payer: MEDICAID

## 2024-08-17 LAB
ALBUMIN SERPL BCG-MCNC: 3.3 G/DL (ref 3.5–5.2)
ALLEN'S TEST: ABNORMAL
ALP SERPL-CCNC: 119 U/L (ref 40–150)
ALT SERPL W P-5'-P-CCNC: 18 U/L (ref 0–50)
ANION GAP SERPL CALCULATED.3IONS-SCNC: 11 MMOL/L (ref 7–15)
ANION GAP SERPL CALCULATED.3IONS-SCNC: 11 MMOL/L (ref 7–15)
ANION GAP SERPL CALCULATED.3IONS-SCNC: 12 MMOL/L (ref 7–15)
ANION GAP SERPL CALCULATED.3IONS-SCNC: 13 MMOL/L (ref 7–15)
APTT PPP: 47 SECONDS (ref 22–38)
APTT PPP: 49 SECONDS (ref 22–38)
APTT PPP: 51 SECONDS (ref 22–38)
APTT PPP: 52 SECONDS (ref 22–38)
AST SERPL W P-5'-P-CCNC: 37 U/L (ref 0–45)
AT III ACT/NOR PPP CHRO: 104 % (ref 85–135)
BASE EXCESS BLDA CALC-SCNC: -1.9 MMOL/L (ref -3–3)
BASE EXCESS BLDA CALC-SCNC: -2.3 MMOL/L (ref -3–3)
BASE EXCESS BLDA CALC-SCNC: -2.3 MMOL/L (ref -3–3)
BASE EXCESS BLDA CALC-SCNC: -2.6 MMOL/L (ref -3–3)
BASE EXCESS BLDA CALC-SCNC: -2.7 MMOL/L (ref -3–3)
BASE EXCESS BLDA CALC-SCNC: -2.7 MMOL/L (ref -3–3)
BASE EXCESS BLDA CALC-SCNC: -2.8 MMOL/L (ref -3–3)
BASE EXCESS BLDA CALC-SCNC: -2.9 MMOL/L (ref -3–3)
BASE EXCESS BLDA CALC-SCNC: -2.9 MMOL/L (ref -3–3)
BASE EXCESS BLDA CALC-SCNC: -3 MMOL/L (ref -3–3)
BASE EXCESS BLDA CALC-SCNC: -3.1 MMOL/L (ref -3–3)
BASE EXCESS BLDA CALC-SCNC: -3.2 MMOL/L (ref -3–3)
BASE EXCESS BLDA CALC-SCNC: -3.9 MMOL/L (ref -3–3)
BASE EXCESS BLDV CALC-SCNC: -2.2 MMOL/L (ref -3–3)
BASE EXCESS BLDV CALC-SCNC: -2.3 MMOL/L (ref -3–3)
BASE EXCESS BLDV CALC-SCNC: -3.2 MMOL/L (ref -3–3)
BASE EXCESS BLDV CALC-SCNC: -3.5 MMOL/L (ref -3–3)
BASOPHILS # BLD AUTO: ABNORMAL 10*3/UL
BASOPHILS # BLD MANUAL: 0 10E3/UL (ref 0–0.2)
BASOPHILS # BLD MANUAL: 0 10E3/UL (ref 0–0.2)
BASOPHILS # BLD MANUAL: 0.2 10E3/UL (ref 0–0.2)
BASOPHILS # BLD MANUAL: 0.3 10E3/UL (ref 0–0.2)
BASOPHILS NFR BLD AUTO: ABNORMAL %
BASOPHILS NFR BLD MANUAL: 0 %
BASOPHILS NFR BLD MANUAL: 0 %
BASOPHILS NFR BLD MANUAL: 1 %
BASOPHILS NFR BLD MANUAL: 2 %
BILIRUB DIRECT SERPL-MCNC: 0.22 MG/DL (ref 0–0.3)
BILIRUB SERPL-MCNC: 0.5 MG/DL
BLASTS # BLD MANUAL: 0.7 10E3/UL
BLASTS NFR BLD MANUAL: 4 %
BLD PROD TYP BPU: NORMAL
BLOOD COMPONENT TYPE: NORMAL
BUN SERPL-MCNC: 44.7 MG/DL (ref 6–20)
BUN SERPL-MCNC: 45.6 MG/DL (ref 6–20)
BUN SERPL-MCNC: 47.8 MG/DL (ref 6–20)
BUN SERPL-MCNC: 48 MG/DL (ref 6–20)
CA-I BLD-MCNC: 4.7 MG/DL (ref 4.4–5.2)
CA-I BLD-MCNC: 4.8 MG/DL (ref 4.4–5.2)
CALCIUM SERPL-MCNC: 7.9 MG/DL (ref 8.8–10.4)
CALCIUM SERPL-MCNC: 7.9 MG/DL (ref 8.8–10.4)
CALCIUM SERPL-MCNC: 8.1 MG/DL (ref 8.8–10.4)
CALCIUM SERPL-MCNC: 8.1 MG/DL (ref 8.8–10.4)
CALCIUM SERPL-MCNC: 8.2 MG/DL (ref 8.8–10.4)
CALCIUM SERPL-MCNC: 8.2 MG/DL (ref 8.8–10.4)
CHLORIDE SERPL-SCNC: 112 MMOL/L (ref 98–107)
CHLORIDE SERPL-SCNC: 112 MMOL/L (ref 98–107)
CHLORIDE SERPL-SCNC: 114 MMOL/L (ref 98–107)
CHLORIDE SERPL-SCNC: 115 MMOL/L (ref 98–107)
CHLORIDE SERPL-SCNC: 116 MMOL/L (ref 98–107)
CHLORIDE SERPL-SCNC: 116 MMOL/L (ref 98–107)
CODING SYSTEM: NORMAL
COHGB MFR BLD: 95.3 % (ref 96–97)
COHGB MFR BLD: 97 % (ref 96–97)
COHGB MFR BLD: 97 % (ref 96–97)
COHGB MFR BLD: 97.4 % (ref 96–97)
COHGB MFR BLD: 98.9 % (ref 96–97)
COHGB MFR BLD: 99.8 % (ref 96–97)
COHGB MFR BLD: >100 % (ref 96–97)
CREAT SERPL-MCNC: 1.55 MG/DL (ref 0.51–0.95)
CREAT SERPL-MCNC: 1.55 MG/DL (ref 0.51–0.95)
CREAT SERPL-MCNC: 1.58 MG/DL (ref 0.51–0.95)
CREAT SERPL-MCNC: 1.62 MG/DL (ref 0.51–0.95)
CREAT SERPL-MCNC: 1.62 MG/DL (ref 0.51–0.95)
CREAT SERPL-MCNC: 1.7 MG/DL (ref 0.51–0.95)
CROSSMATCH: NORMAL
D DIMER PPP FEU-MCNC: 2.32 UG/ML FEU (ref 0–0.5)
D DIMER PPP FEU-MCNC: 2.43 UG/ML FEU (ref 0–0.5)
EGFRCR SERPLBLD CKD-EPI 2021: 35 ML/MIN/1.73M2
EGFRCR SERPLBLD CKD-EPI 2021: 38 ML/MIN/1.73M2
EGFRCR SERPLBLD CKD-EPI 2021: 38 ML/MIN/1.73M2
EGFRCR SERPLBLD CKD-EPI 2021: 39 ML/MIN/1.73M2
EGFRCR SERPLBLD CKD-EPI 2021: 40 ML/MIN/1.73M2
EGFRCR SERPLBLD CKD-EPI 2021: 40 ML/MIN/1.73M2
EOSINOPHIL # BLD AUTO: ABNORMAL 10*3/UL
EOSINOPHIL # BLD MANUAL: 0 10E3/UL (ref 0–0.7)
EOSINOPHIL # BLD MANUAL: 0 10E3/UL (ref 0–0.7)
EOSINOPHIL # BLD MANUAL: 0.1 10E3/UL (ref 0–0.7)
EOSINOPHIL # BLD MANUAL: 0.2 10E3/UL (ref 0–0.7)
EOSINOPHIL NFR BLD AUTO: ABNORMAL %
EOSINOPHIL NFR BLD MANUAL: 0 %
EOSINOPHIL NFR BLD MANUAL: 0 %
EOSINOPHIL NFR BLD MANUAL: 1 %
EOSINOPHIL NFR BLD MANUAL: 1 %
ERYTHROCYTE [DISTWIDTH] IN BLOOD BY AUTOMATED COUNT: 17.6 % (ref 10–15)
ERYTHROCYTE [DISTWIDTH] IN BLOOD BY AUTOMATED COUNT: 17.7 % (ref 10–15)
ERYTHROCYTE [DISTWIDTH] IN BLOOD BY AUTOMATED COUNT: 17.7 % (ref 10–15)
ERYTHROCYTE [DISTWIDTH] IN BLOOD BY AUTOMATED COUNT: 17.8 % (ref 10–15)
FIBRINOGEN PPP-MCNC: 271 MG/DL (ref 170–510)
FIBRINOGEN PPP-MCNC: 285 MG/DL (ref 170–510)
GLUCOSE BLDC GLUCOMTR-MCNC: 105 MG/DL (ref 70–99)
GLUCOSE BLDC GLUCOMTR-MCNC: 110 MG/DL (ref 70–99)
GLUCOSE BLDC GLUCOMTR-MCNC: 113 MG/DL (ref 70–99)
GLUCOSE BLDC GLUCOMTR-MCNC: 114 MG/DL (ref 70–99)
GLUCOSE BLDC GLUCOMTR-MCNC: 116 MG/DL (ref 70–99)
GLUCOSE BLDC GLUCOMTR-MCNC: 120 MG/DL (ref 70–99)
GLUCOSE BLDC GLUCOMTR-MCNC: 126 MG/DL (ref 70–99)
GLUCOSE BLDC GLUCOMTR-MCNC: 127 MG/DL (ref 70–99)
GLUCOSE BLDC GLUCOMTR-MCNC: 128 MG/DL (ref 70–99)
GLUCOSE BLDC GLUCOMTR-MCNC: 130 MG/DL (ref 70–99)
GLUCOSE BLDC GLUCOMTR-MCNC: 131 MG/DL (ref 70–99)
GLUCOSE BLDC GLUCOMTR-MCNC: 132 MG/DL (ref 70–99)
GLUCOSE BLDC GLUCOMTR-MCNC: 135 MG/DL (ref 70–99)
GLUCOSE BLDC GLUCOMTR-MCNC: 142 MG/DL (ref 70–99)
GLUCOSE BLDC GLUCOMTR-MCNC: 153 MG/DL (ref 70–99)
GLUCOSE BLDC GLUCOMTR-MCNC: 99 MG/DL (ref 70–99)
GLUCOSE SERPL-MCNC: 116 MG/DL (ref 70–99)
GLUCOSE SERPL-MCNC: 116 MG/DL (ref 70–99)
GLUCOSE SERPL-MCNC: 129 MG/DL (ref 70–99)
GLUCOSE SERPL-MCNC: 143 MG/DL (ref 70–99)
GLUCOSE SERPL-MCNC: 165 MG/DL (ref 70–99)
GLUCOSE SERPL-MCNC: 165 MG/DL (ref 70–99)
HCO3 BLD-SCNC: 23 MMOL/L (ref 21–28)
HCO3 BLD-SCNC: 24 MMOL/L (ref 21–28)
HCO3 BLDA-SCNC: 23 MMOL/L (ref 21–28)
HCO3 BLDA-SCNC: 24 MMOL/L (ref 21–28)
HCO3 BLDV-SCNC: 23 MMOL/L (ref 21–28)
HCO3 BLDV-SCNC: 24 MMOL/L (ref 21–28)
HCO3 SERPL-SCNC: 20 MMOL/L (ref 22–29)
HCO3 SERPL-SCNC: 21 MMOL/L (ref 22–29)
HCO3 SERPL-SCNC: 21 MMOL/L (ref 22–29)
HCO3 SERPL-SCNC: 22 MMOL/L (ref 22–29)
HCT VFR BLD AUTO: 22.7 % (ref 35–47)
HCT VFR BLD AUTO: 22.8 % (ref 35–47)
HCT VFR BLD AUTO: 23.1 % (ref 35–47)
HCT VFR BLD AUTO: 23.2 % (ref 35–47)
HCT VFR BLD AUTO: 23.4 % (ref 35–47)
HCT VFR BLD AUTO: 23.7 % (ref 35–47)
HGB BLD-MCNC: 7 G/DL (ref 11.7–15.7)
HGB BLD-MCNC: 7.1 G/DL (ref 11.7–15.7)
HGB BLD-MCNC: 7.1 G/DL (ref 11.7–15.7)
HGB BLD-MCNC: 7.2 G/DL (ref 11.7–15.7)
HGB BLD-MCNC: 7.3 G/DL (ref 11.7–15.7)
HGB BLD-MCNC: 7.3 G/DL (ref 11.7–15.7)
HGB FREE PLAS-MCNC: 40 MG/DL
IMM GRANULOCYTES # BLD: ABNORMAL 10*3/UL
IMM GRANULOCYTES NFR BLD: ABNORMAL %
ISSUE DATE AND TIME: NORMAL
LACTATE SERPL-SCNC: 0.7 MMOL/L (ref 0.7–2)
LACTATE SERPL-SCNC: 0.8 MMOL/L (ref 0.7–2)
LACTATE SERPL-SCNC: 0.8 MMOL/L (ref 0.7–2)
LACTATE SERPL-SCNC: 1 MMOL/L (ref 0.7–2)
LYMPHOCYTES # BLD AUTO: ABNORMAL 10*3/UL
LYMPHOCYTES # BLD MANUAL: 0.5 10E3/UL (ref 0.8–5.3)
LYMPHOCYTES # BLD MANUAL: 1 10E3/UL (ref 0.8–5.3)
LYMPHOCYTES # BLD MANUAL: 1.4 10E3/UL (ref 0.8–5.3)
LYMPHOCYTES # BLD MANUAL: 2.1 10E3/UL (ref 0.8–5.3)
LYMPHOCYTES NFR BLD AUTO: ABNORMAL %
LYMPHOCYTES NFR BLD MANUAL: 13 %
LYMPHOCYTES NFR BLD MANUAL: 3 %
LYMPHOCYTES NFR BLD MANUAL: 8 %
LYMPHOCYTES NFR BLD MANUAL: 9 %
MAGNESIUM SERPL-MCNC: 2.4 MG/DL (ref 1.7–2.3)
MAGNESIUM SERPL-MCNC: 2.5 MG/DL (ref 1.7–2.3)
MAGNESIUM SERPL-MCNC: 2.5 MG/DL (ref 1.7–2.3)
MCH RBC QN AUTO: 31 PG (ref 26.5–33)
MCH RBC QN AUTO: 31 PG (ref 26.5–33)
MCH RBC QN AUTO: 31.2 PG (ref 26.5–33)
MCH RBC QN AUTO: 31.3 PG (ref 26.5–33)
MCH RBC QN AUTO: 31.5 PG (ref 26.5–33)
MCH RBC QN AUTO: 31.5 PG (ref 26.5–33)
MCHC RBC AUTO-ENTMCNC: 30.6 G/DL (ref 31.5–36.5)
MCHC RBC AUTO-ENTMCNC: 30.8 G/DL (ref 31.5–36.5)
MCHC RBC AUTO-ENTMCNC: 31.1 G/DL (ref 31.5–36.5)
MCHC RBC AUTO-ENTMCNC: 31.6 G/DL (ref 31.5–36.5)
MCV RBC AUTO: 100 FL (ref 78–100)
MCV RBC AUTO: 101 FL (ref 78–100)
MCV RBC AUTO: 101 FL (ref 78–100)
MCV RBC AUTO: 102 FL (ref 78–100)
METAMYELOCYTES # BLD MANUAL: 0.1 10E3/UL
METAMYELOCYTES # BLD MANUAL: 0.2 10E3/UL
METAMYELOCYTES # BLD MANUAL: 0.2 10E3/UL
METAMYELOCYTES # BLD MANUAL: 0.6 10E3/UL
METAMYELOCYTES NFR BLD MANUAL: 1 %
METAMYELOCYTES NFR BLD MANUAL: 4 %
MONOCYTES # BLD AUTO: ABNORMAL 10*3/UL
MONOCYTES # BLD MANUAL: 0.6 10E3/UL (ref 0–1.3)
MONOCYTES # BLD MANUAL: 1.1 10E3/UL (ref 0–1.3)
MONOCYTES # BLD MANUAL: 1.3 10E3/UL (ref 0–1.3)
MONOCYTES # BLD MANUAL: 2.4 10E3/UL (ref 0–1.3)
MONOCYTES NFR BLD AUTO: ABNORMAL %
MONOCYTES NFR BLD MANUAL: 10 %
MONOCYTES NFR BLD MANUAL: 14 %
MONOCYTES NFR BLD MANUAL: 4 %
MONOCYTES NFR BLD MANUAL: 7 %
MYELOCYTES # BLD MANUAL: 0.2 10E3/UL
MYELOCYTES # BLD MANUAL: 0.5 10E3/UL
MYELOCYTES # BLD MANUAL: 0.5 10E3/UL
MYELOCYTES NFR BLD MANUAL: 1 %
MYELOCYTES NFR BLD MANUAL: 3 %
MYELOCYTES NFR BLD MANUAL: 4 %
NEUTROPHILS # BLD AUTO: ABNORMAL 10*3/UL
NEUTROPHILS # BLD MANUAL: 11.9 10E3/UL (ref 1.6–8.3)
NEUTROPHILS # BLD MANUAL: 12.2 10E3/UL (ref 1.6–8.3)
NEUTROPHILS # BLD MANUAL: 12.7 10E3/UL (ref 1.6–8.3)
NEUTROPHILS # BLD MANUAL: 9.7 10E3/UL (ref 1.6–8.3)
NEUTROPHILS NFR BLD AUTO: ABNORMAL %
NEUTROPHILS NFR BLD MANUAL: 75 %
NEUTROPHILS NFR BLD MANUAL: 76 %
NEUTROPHILS NFR BLD MANUAL: 77 %
NEUTROPHILS NFR BLD MANUAL: 79 %
NRBC # BLD AUTO: 0.1 10E3/UL
NRBC # BLD AUTO: 0.2 10E3/UL
NRBC # BLD AUTO: 0.2 10E3/UL
NRBC BLD AUTO-RTO: 0 /100
NRBC BLD AUTO-RTO: 1 /100
NRBC BLD MANUAL-RTO: 1 %
NRBC BLD MANUAL-RTO: 1 %
O2/TOTAL GAS SETTING VFR VENT: 100 %
O2/TOTAL GAS SETTING VFR VENT: 60 %
OSMOLALITY SERPL: 312 MMOL/KG (ref 275–295)
OSMOLALITY SERPL: 320 MMOL/KG (ref 275–295)
OSMOLALITY SERPL: 321 MMOL/KG (ref 275–295)
OSMOLALITY SERPL: 323 MMOL/KG (ref 275–295)
OSMOLALITY SERPL: 326 MMOL/KG (ref 275–295)
OSMOLALITY SERPL: 327 MMOL/KG (ref 275–295)
OXYHGB MFR BLDA: 49 % (ref 75–100)
OXYHGB MFR BLDA: 98 % (ref 75–100)
OXYHGB MFR BLDV: 58 %
OXYHGB MFR BLDV: 76 % (ref 70–75)
OXYHGB MFR BLDV: 78 %
OXYHGB MFR BLDV: 78 % (ref 70–75)
PCO2 BLD: 42 MM HG (ref 35–45)
PCO2 BLD: 43 MM HG (ref 35–45)
PCO2 BLD: 43 MM HG (ref 35–45)
PCO2 BLD: 44 MM HG (ref 35–45)
PCO2 BLD: 45 MM HG (ref 35–45)
PCO2 BLD: 46 MM HG (ref 35–45)
PCO2 BLD: 46 MM HG (ref 35–45)
PCO2 BLD: 47 MM HG (ref 35–45)
PCO2 BLD: 51 MM HG (ref 35–45)
PCO2 BLDA: 45 MM HG (ref 35–45)
PCO2 BLDA: 56 MM HG (ref 35–45)
PCO2 BLDV: 49 MM HG (ref 40–50)
PCO2 BLDV: 50 MM HG (ref 40–50)
PCO2 BLDV: 51 MM HG (ref 40–50)
PCO2 BLDV: 56 MM HG (ref 40–50)
PH BLD: 7.26 [PH] (ref 7.35–7.45)
PH BLD: 7.31 [PH] (ref 7.35–7.45)
PH BLD: 7.32 [PH] (ref 7.35–7.45)
PH BLD: 7.33 [PH] (ref 7.35–7.45)
PH BLD: 7.33 [PH] (ref 7.35–7.45)
PH BLD: 7.34 [PH] (ref 7.35–7.45)
PH BLD: 7.36 [PH] (ref 7.35–7.45)
PH BLDA: 7.25 [PH] (ref 7.35–7.45)
PH BLDA: 7.32 [PH] (ref 7.35–7.45)
PH BLDV: 7.25 [PH] (ref 7.32–7.43)
PH BLDV: 7.27 [PH] (ref 7.32–7.43)
PH BLDV: 7.3 [PH] (ref 7.32–7.43)
PH BLDV: 7.3 [PH] (ref 7.32–7.43)
PHOSPHATE SERPL-MCNC: 4.9 MG/DL (ref 2.5–4.5)
PHOSPHATE SERPL-MCNC: 5.1 MG/DL (ref 2.5–4.5)
PHOSPHATE SERPL-MCNC: 5.2 MG/DL (ref 2.5–4.5)
PLAT MORPH BLD: ABNORMAL
PLATELET # BLD AUTO: 121 10E3/UL (ref 150–450)
PLATELET # BLD AUTO: 124 10E3/UL (ref 150–450)
PLATELET # BLD AUTO: 139 10E3/UL (ref 150–450)
PLATELET # BLD AUTO: 139 10E3/UL (ref 150–450)
PLATELET # BLD AUTO: 142 10E3/UL (ref 150–450)
PLATELET # BLD AUTO: 159 10E3/UL (ref 150–450)
PO2 BLD: 142 MM HG (ref 80–105)
PO2 BLD: 153 MM HG (ref 80–105)
PO2 BLD: 155 MM HG (ref 80–105)
PO2 BLD: 158 MM HG (ref 80–105)
PO2 BLD: 166 MM HG (ref 80–105)
PO2 BLD: 172 MM HG (ref 80–105)
PO2 BLD: 187 MM HG (ref 80–105)
PO2 BLD: 187 MM HG (ref 80–105)
PO2 BLD: 69 MM HG (ref 80–105)
PO2 BLD: 79 MM HG (ref 80–105)
PO2 BLD: 79 MM HG (ref 80–105)
PO2 BLD: 83 MM HG (ref 80–105)
PO2 BLD: 98 MM HG (ref 80–105)
PO2 BLDA: 29 MM HG (ref 80–105)
PO2 BLDA: 371 MM HG (ref 80–105)
PO2 BLDV: 33 MM HG (ref 25–47)
PO2 BLDV: 44 MM HG (ref 25–47)
PO2 BLDV: 45 MM HG (ref 25–47)
PO2 BLDV: 45 MM HG (ref 25–47)
POLYCHROMASIA BLD QL SMEAR: SLIGHT
POTASSIUM SERPL-SCNC: 3.7 MMOL/L (ref 3.4–5.3)
POTASSIUM SERPL-SCNC: 3.8 MMOL/L (ref 3.4–5.3)
POTASSIUM SERPL-SCNC: 4 MMOL/L (ref 3.4–5.3)
POTASSIUM SERPL-SCNC: 4.2 MMOL/L (ref 3.4–5.3)
POTASSIUM SERPL-SCNC: 4.2 MMOL/L (ref 3.4–5.3)
POTASSIUM SERPL-SCNC: 4.5 MMOL/L (ref 3.4–5.3)
PROMYELOCYTES # BLD MANUAL: 0.2 10E3/UL
PROMYELOCYTES NFR BLD MANUAL: 1 %
PROT SERPL-MCNC: 5.9 G/DL (ref 6.4–8.3)
RBC # BLD AUTO: 2.26 10E6/UL (ref 3.8–5.2)
RBC # BLD AUTO: 2.27 10E6/UL (ref 3.8–5.2)
RBC # BLD AUTO: 2.29 10E6/UL (ref 3.8–5.2)
RBC # BLD AUTO: 2.31 10E6/UL (ref 3.8–5.2)
RBC # BLD AUTO: 2.32 10E6/UL (ref 3.8–5.2)
RBC # BLD AUTO: 2.32 10E6/UL (ref 3.8–5.2)
RBC MORPH BLD: ABNORMAL
SAO2 % BLDA: 50.7 % (ref 96–97)
SAO2 % BLDA: 93 % (ref 92–100)
SAO2 % BLDA: 94 % (ref 92–100)
SAO2 % BLDA: 95 % (ref 92–100)
SAO2 % BLDA: 95 % (ref 92–100)
SAO2 % BLDA: 96 % (ref 92–100)
SAO2 % BLDA: 97 % (ref 92–100)
SAO2 % BLDA: 98 % (ref 92–100)
SAO2 % BLDA: >100 % (ref 96–97)
SAO2 % BLDV: 59.9 % (ref 70–75)
SAO2 % BLDV: 77.9 % (ref 70–75)
SAO2 % BLDV: 80.3 % (ref 70–75)
SAO2 % BLDV: 80.7 % (ref 70–75)
SODIUM SERPL-SCNC: 145 MMOL/L (ref 135–145)
SODIUM SERPL-SCNC: 145 MMOL/L (ref 135–145)
SODIUM SERPL-SCNC: 147 MMOL/L (ref 135–145)
SODIUM SERPL-SCNC: 148 MMOL/L (ref 135–145)
SODIUM SERPL-SCNC: 149 MMOL/L (ref 135–145)
SODIUM SERPL-SCNC: 150 MMOL/L (ref 135–145)
UNIT ABO/RH: NORMAL
UNIT NUMBER: NORMAL
UNIT STATUS: NORMAL
UNIT TYPE ISBT: 9500
WBC # BLD AUTO: 11.9 10E3/UL (ref 4–11)
WBC # BLD AUTO: 12.7 10E3/UL (ref 4–11)
WBC # BLD AUTO: 15.1 10E3/UL (ref 4–11)
WBC # BLD AUTO: 15.4 10E3/UL (ref 4–11)
WBC # BLD AUTO: 15.8 10E3/UL (ref 4–11)
WBC # BLD AUTO: 16.9 10E3/UL (ref 4–11)

## 2024-08-17 PROCEDURE — 71045 X-RAY EXAM CHEST 1 VIEW: CPT | Mod: 26 | Performed by: RADIOLOGY

## 2024-08-17 PROCEDURE — 85730 THROMBOPLASTIN TIME PARTIAL: CPT | Performed by: SURGERY

## 2024-08-17 PROCEDURE — 85007 BL SMEAR W/DIFF WBC COUNT: CPT | Performed by: SURGERY

## 2024-08-17 PROCEDURE — 33948 ECMO/ECLS DAILY MGMT-VENOUS: CPT

## 2024-08-17 PROCEDURE — 250N000013 HC RX MED GY IP 250 OP 250 PS 637: Performed by: NURSE PRACTITIONER

## 2024-08-17 PROCEDURE — 82805 BLOOD GASES W/O2 SATURATION: CPT | Performed by: SURGERY

## 2024-08-17 PROCEDURE — 82330 ASSAY OF CALCIUM: CPT

## 2024-08-17 PROCEDURE — P9016 RBC LEUKOCYTES REDUCED: HCPCS | Performed by: SURGERY

## 2024-08-17 PROCEDURE — 83930 ASSAY OF BLOOD OSMOLALITY: CPT | Performed by: PSYCHIATRY & NEUROLOGY

## 2024-08-17 PROCEDURE — 94003 VENT MGMT INPAT SUBQ DAY: CPT

## 2024-08-17 PROCEDURE — 250N000013 HC RX MED GY IP 250 OP 250 PS 637: Performed by: SURGERY

## 2024-08-17 PROCEDURE — 250N000011 HC RX IP 250 OP 636: Performed by: STUDENT IN AN ORGANIZED HEALTH CARE EDUCATION/TRAINING PROGRAM

## 2024-08-17 PROCEDURE — 85384 FIBRINOGEN ACTIVITY: CPT | Performed by: SURGERY

## 2024-08-17 PROCEDURE — 80069 RENAL FUNCTION PANEL: CPT

## 2024-08-17 PROCEDURE — 82248 BILIRUBIN DIRECT: CPT

## 2024-08-17 PROCEDURE — 250N000013 HC RX MED GY IP 250 OP 250 PS 637

## 2024-08-17 PROCEDURE — 82310 ASSAY OF CALCIUM: CPT | Performed by: SURGERY

## 2024-08-17 PROCEDURE — 83051 HEMOGLOBIN PLASMA: CPT | Performed by: SURGERY

## 2024-08-17 PROCEDURE — 250N000011 HC RX IP 250 OP 636: Mod: JZ | Performed by: NURSE PRACTITIONER

## 2024-08-17 PROCEDURE — 84460 ALANINE AMINO (ALT) (SGPT): CPT

## 2024-08-17 PROCEDURE — 85027 COMPLETE CBC AUTOMATED: CPT | Performed by: SURGERY

## 2024-08-17 PROCEDURE — 85379 FIBRIN DEGRADATION QUANT: CPT | Performed by: SURGERY

## 2024-08-17 PROCEDURE — 99233 SBSQ HOSP IP/OBS HIGH 50: CPT | Performed by: INTERNAL MEDICINE

## 2024-08-17 PROCEDURE — 82805 BLOOD GASES W/O2 SATURATION: CPT

## 2024-08-17 PROCEDURE — 83735 ASSAY OF MAGNESIUM: CPT

## 2024-08-17 PROCEDURE — 99291 CRITICAL CARE FIRST HOUR: CPT | Mod: GC | Performed by: PSYCHIATRY & NEUROLOGY

## 2024-08-17 PROCEDURE — 94640 AIRWAY INHALATION TREATMENT: CPT

## 2024-08-17 PROCEDURE — 250N000011 HC RX IP 250 OP 636: Performed by: NURSE PRACTITIONER

## 2024-08-17 PROCEDURE — 250N000013 HC RX MED GY IP 250 OP 250 PS 637: Performed by: PHYSICIAN ASSISTANT

## 2024-08-17 PROCEDURE — 33948 ECMO/ECLS DAILY MGMT-VENOUS: CPT | Mod: GC | Performed by: SURGERY

## 2024-08-17 PROCEDURE — 258N000003 HC RX IP 258 OP 636: Performed by: STUDENT IN AN ORGANIZED HEALTH CARE EDUCATION/TRAINING PROGRAM

## 2024-08-17 PROCEDURE — 83605 ASSAY OF LACTIC ACID: CPT | Performed by: SURGERY

## 2024-08-17 PROCEDURE — 80053 COMPREHEN METABOLIC PANEL: CPT

## 2024-08-17 PROCEDURE — 94640 AIRWAY INHALATION TREATMENT: CPT | Mod: 76

## 2024-08-17 PROCEDURE — 84295 ASSAY OF SERUM SODIUM: CPT | Performed by: STUDENT IN AN ORGANIZED HEALTH CARE EDUCATION/TRAINING PROGRAM

## 2024-08-17 PROCEDURE — 71045 X-RAY EXAM CHEST 1 VIEW: CPT

## 2024-08-17 PROCEDURE — 82247 BILIRUBIN TOTAL: CPT

## 2024-08-17 PROCEDURE — 84295 ASSAY OF SERUM SODIUM: CPT

## 2024-08-17 PROCEDURE — 90947 DIALYSIS REPEATED EVAL: CPT

## 2024-08-17 PROCEDURE — 250N000009 HC RX 250

## 2024-08-17 PROCEDURE — 250N000009 HC RX 250: Performed by: NURSE PRACTITIONER

## 2024-08-17 PROCEDURE — 200N000002 HC R&B ICU UMMC

## 2024-08-17 PROCEDURE — 82330 ASSAY OF CALCIUM: CPT | Performed by: SURGERY

## 2024-08-17 PROCEDURE — 84075 ASSAY ALKALINE PHOSPHATASE: CPT

## 2024-08-17 PROCEDURE — 85018 HEMOGLOBIN: CPT

## 2024-08-17 PROCEDURE — 250N000011 HC RX IP 250 OP 636

## 2024-08-17 PROCEDURE — 999N000157 HC STATISTIC RCP TIME EA 10 MIN

## 2024-08-17 PROCEDURE — 85300 ANTITHROMBIN III ACTIVITY: CPT | Performed by: SURGERY

## 2024-08-17 PROCEDURE — 99291 CRITICAL CARE FIRST HOUR: CPT | Mod: 25 | Performed by: NURSE PRACTITIONER

## 2024-08-17 RX ORDER — IPRATROPIUM BROMIDE AND ALBUTEROL SULFATE 2.5; .5 MG/3ML; MG/3ML
3 SOLUTION RESPIRATORY (INHALATION)
Status: DISCONTINUED | OUTPATIENT
Start: 2024-08-18 | End: 2024-08-29

## 2024-08-17 RX ORDER — VENLAFAXINE 37.5 MG/1
75 TABLET ORAL 2 TIMES DAILY
Status: DISCONTINUED | OUTPATIENT
Start: 2024-08-17 | End: 2024-08-29

## 2024-08-17 RX ORDER — ACETYLCYSTEINE 100 MG/ML
4 SOLUTION ORAL; RESPIRATORY (INHALATION) 4 TIMES DAILY
Status: DISCONTINUED | OUTPATIENT
Start: 2024-08-18 | End: 2024-08-29

## 2024-08-17 RX ORDER — LOPERAMIDE HCL 1 MG/7.5ML
2 SOLUTION ORAL EVERY 6 HOURS
Status: DISCONTINUED | OUTPATIENT
Start: 2024-08-17 | End: 2024-08-18

## 2024-08-17 RX ADMIN — AMINOCAPROIC ACID 1 G: 0.25 SOLUTION ORAL at 14:06

## 2024-08-17 RX ADMIN — CALCIUM CHLORIDE, MAGNESIUM CHLORIDE, SODIUM CHLORIDE, SODIUM BICARBONATE, POTASSIUM CHLORIDE AND SODIUM PHOSPHATE DIBASIC DIHYDRATE 12.5 ML/KG/HR: 3.68; 3.05; 6.34; 3.09; .314; .187 INJECTION INTRAVENOUS at 10:59

## 2024-08-17 RX ADMIN — DEXMEDETOMIDINE HYDROCHLORIDE IN SODIUM CHLORIDE 1.2 MCG/KG/HR: 4 INJECTION INTRAVENOUS at 16:08

## 2024-08-17 RX ADMIN — IPRATROPIUM BROMIDE AND ALBUTEROL SULFATE 3 ML: .5; 3 SOLUTION RESPIRATORY (INHALATION) at 16:52

## 2024-08-17 RX ADMIN — CALCIUM CHLORIDE, MAGNESIUM CHLORIDE, SODIUM CHLORIDE, SODIUM BICARBONATE, POTASSIUM CHLORIDE AND SODIUM PHOSPHATE DIBASIC DIHYDRATE 12.5 ML/KG/HR: 3.68; 3.05; 6.34; 3.09; .314; .187 INJECTION INTRAVENOUS at 06:18

## 2024-08-17 RX ADMIN — CALCIUM CHLORIDE, MAGNESIUM CHLORIDE, SODIUM CHLORIDE, SODIUM BICARBONATE, POTASSIUM CHLORIDE AND SODIUM PHOSPHATE DIBASIC DIHYDRATE 12.5 ML/KG/HR: 3.68; 3.05; 6.34; 3.09; .314; .187 INJECTION INTRAVENOUS at 08:51

## 2024-08-17 RX ADMIN — LOPERAMIDE HCL 2 MG: 1 SOLUTION ORAL at 14:06

## 2024-08-17 RX ADMIN — BIVALIRUDIN 0.06 MG/KG/HR: 250 INJECTION, POWDER, LYOPHILIZED, FOR SOLUTION INTRAVENOUS at 07:37

## 2024-08-17 RX ADMIN — PIPERACILLIN AND TAZOBACTAM 4.5 G: 4; .5 INJECTION, POWDER, LYOPHILIZED, FOR SOLUTION INTRAVENOUS at 19:38

## 2024-08-17 RX ADMIN — PIPERACILLIN AND TAZOBACTAM 4.5 G: 4; .5 INJECTION, POWDER, LYOPHILIZED, FOR SOLUTION INTRAVENOUS at 01:54

## 2024-08-17 RX ADMIN — Medication 40 MG: at 07:48

## 2024-08-17 RX ADMIN — IPRATROPIUM BROMIDE AND ALBUTEROL SULFATE 3 ML: .5; 3 SOLUTION RESPIRATORY (INHALATION) at 04:17

## 2024-08-17 RX ADMIN — CALCIUM CHLORIDE, MAGNESIUM CHLORIDE, SODIUM CHLORIDE, SODIUM BICARBONATE, POTASSIUM CHLORIDE AND SODIUM PHOSPHATE DIBASIC DIHYDRATE 12.5 ML/KG/HR: 3.68; 3.05; 6.34; 3.09; .314; .187 INJECTION INTRAVENOUS at 06:19

## 2024-08-17 RX ADMIN — CHLORHEXIDINE GLUCONATE 0.12% ORAL RINSE 15 ML: 1.2 LIQUID ORAL at 19:38

## 2024-08-17 RX ADMIN — LOPERAMIDE HCL 2 MG: 1 SOLUTION ORAL at 09:10

## 2024-08-17 RX ADMIN — DEXAMETHASONE SODIUM PHOSPHATE 10 MG: 10 INJECTION, SOLUTION INTRAMUSCULAR; INTRAVENOUS at 07:49

## 2024-08-17 RX ADMIN — ACETYLCYSTEINE 4 ML: 100 SOLUTION ORAL; RESPIRATORY (INHALATION) at 08:36

## 2024-08-17 RX ADMIN — Medication 0.5 MG/HR: at 10:48

## 2024-08-17 RX ADMIN — WHITE PETROLATUM 57.7 %-MINERAL OIL 31.9 % EYE OINTMENT: at 23:58

## 2024-08-17 RX ADMIN — IPRATROPIUM BROMIDE AND ALBUTEROL SULFATE 3 ML: .5; 3 SOLUTION RESPIRATORY (INHALATION) at 19:25

## 2024-08-17 RX ADMIN — AMINOCAPROIC ACID 1 G: 0.25 SOLUTION ORAL at 21:52

## 2024-08-17 RX ADMIN — Medication 60 ML: at 07:51

## 2024-08-17 RX ADMIN — AMITRIPTYLINE HYDROCHLORIDE 50 MG: 50 TABLET, FILM COATED ORAL at 21:52

## 2024-08-17 RX ADMIN — OXYCODONE HYDROCHLORIDE 5 MG: 5 TABLET ORAL at 12:15

## 2024-08-17 RX ADMIN — DEXMEDETOMIDINE HYDROCHLORIDE IN SODIUM CHLORIDE 1.2 MCG/KG/HR: 4 INJECTION INTRAVENOUS at 01:54

## 2024-08-17 RX ADMIN — ACETYLCYSTEINE 4 ML: 100 SOLUTION ORAL; RESPIRATORY (INHALATION) at 04:17

## 2024-08-17 RX ADMIN — Medication 60 ML: at 14:06

## 2024-08-17 RX ADMIN — Medication 1 TABLET: at 07:48

## 2024-08-17 RX ADMIN — INSULIN HUMAN 1 UNITS/HR: 1 INJECTION, SOLUTION INTRAVENOUS at 20:33

## 2024-08-17 RX ADMIN — CALCIUM CHLORIDE, MAGNESIUM CHLORIDE, SODIUM CHLORIDE, SODIUM BICARBONATE, POTASSIUM CHLORIDE AND SODIUM PHOSPHATE DIBASIC DIHYDRATE 12.5 ML/KG/HR: 3.68; 3.05; 6.34; 3.09; .314; .187 INJECTION INTRAVENOUS at 14:42

## 2024-08-17 RX ADMIN — VENLAFAXINE 75 MG: 25 TABLET ORAL at 19:38

## 2024-08-17 RX ADMIN — ACETYLCYSTEINE 4 ML: 100 SOLUTION ORAL; RESPIRATORY (INHALATION) at 13:32

## 2024-08-17 RX ADMIN — DEXMEDETOMIDINE HYDROCHLORIDE IN SODIUM CHLORIDE 1.2 MCG/KG/HR: 4 INJECTION INTRAVENOUS at 05:29

## 2024-08-17 RX ADMIN — DEXMEDETOMIDINE HYDROCHLORIDE IN SODIUM CHLORIDE 1.2 MCG/KG/HR: 4 INJECTION INTRAVENOUS at 12:47

## 2024-08-17 RX ADMIN — OXYCODONE HYDROCHLORIDE 5 MG: 5 TABLET ORAL at 07:48

## 2024-08-17 RX ADMIN — LOPERAMIDE HCL 2 MG: 1 SOLUTION ORAL at 19:38

## 2024-08-17 RX ADMIN — OXYCODONE HYDROCHLORIDE 5 MG: 5 TABLET ORAL at 21:52

## 2024-08-17 RX ADMIN — LEVOTHYROXINE SODIUM 25 MCG: 0.03 TABLET ORAL at 07:49

## 2024-08-17 RX ADMIN — WHITE PETROLATUM 57.7 %-MINERAL OIL 31.9 % EYE OINTMENT: at 07:49

## 2024-08-17 RX ADMIN — IPRATROPIUM BROMIDE AND ALBUTEROL SULFATE 3 ML: .5; 3 SOLUTION RESPIRATORY (INHALATION) at 13:33

## 2024-08-17 RX ADMIN — Medication 60 ML: at 19:38

## 2024-08-17 RX ADMIN — PIPERACILLIN AND TAZOBACTAM 4.5 G: 4; .5 INJECTION, POWDER, LYOPHILIZED, FOR SOLUTION INTRAVENOUS at 07:48

## 2024-08-17 RX ADMIN — CALCIUM CHLORIDE, MAGNESIUM CHLORIDE, SODIUM CHLORIDE, SODIUM BICARBONATE, POTASSIUM CHLORIDE AND SODIUM PHOSPHATE DIBASIC DIHYDRATE 12.5 ML/KG/HR: 3.68; 3.05; 6.34; 3.09; .314; .187 INJECTION INTRAVENOUS at 16:29

## 2024-08-17 RX ADMIN — DEXMEDETOMIDINE HYDROCHLORIDE IN SODIUM CHLORIDE 1.2 MCG/KG/HR: 4 INJECTION INTRAVENOUS at 18:36

## 2024-08-17 RX ADMIN — DEXMEDETOMIDINE HYDROCHLORIDE IN SODIUM CHLORIDE 1.2 MCG/KG/HR: 4 INJECTION INTRAVENOUS at 22:04

## 2024-08-17 RX ADMIN — PIPERACILLIN AND TAZOBACTAM 4.5 G: 4; .5 INJECTION, POWDER, LYOPHILIZED, FOR SOLUTION INTRAVENOUS at 14:06

## 2024-08-17 RX ADMIN — CALCIUM CHLORIDE, MAGNESIUM CHLORIDE, SODIUM CHLORIDE, SODIUM BICARBONATE, POTASSIUM CHLORIDE AND SODIUM PHOSPHATE DIBASIC DIHYDRATE 12.5 ML/KG/HR: 3.68; 3.05; 6.34; 3.09; .314; .187 INJECTION INTRAVENOUS at 21:37

## 2024-08-17 RX ADMIN — DEXMEDETOMIDINE HYDROCHLORIDE IN SODIUM CHLORIDE 1.2 MCG/KG/HR: 4 INJECTION INTRAVENOUS at 07:46

## 2024-08-17 RX ADMIN — CALCIUM CHLORIDE, MAGNESIUM CHLORIDE, SODIUM CHLORIDE, SODIUM BICARBONATE, POTASSIUM CHLORIDE AND SODIUM PHOSPHATE DIBASIC DIHYDRATE 12.5 ML/KG/HR: 3.68; 3.05; 6.34; 3.09; .314; .187 INJECTION INTRAVENOUS at 18:36

## 2024-08-17 RX ADMIN — ACETYLCYSTEINE 4 ML: 100 SOLUTION ORAL; RESPIRATORY (INHALATION) at 16:52

## 2024-08-17 RX ADMIN — CHLORHEXIDINE GLUCONATE 0.12% ORAL RINSE 15 ML: 1.2 LIQUID ORAL at 07:48

## 2024-08-17 RX ADMIN — VENLAFAXINE HYDROCHLORIDE 75 MG: 75 CAPSULE, EXTENDED RELEASE ORAL at 07:51

## 2024-08-17 RX ADMIN — ACETYLCYSTEINE 4 ML: 100 SOLUTION ORAL; RESPIRATORY (INHALATION) at 19:25

## 2024-08-17 RX ADMIN — OXYCODONE HYDROCHLORIDE 5 MG: 5 TABLET ORAL at 16:48

## 2024-08-17 RX ADMIN — IPRATROPIUM BROMIDE AND ALBUTEROL SULFATE 3 ML: .5; 3 SOLUTION RESPIRATORY (INHALATION) at 08:36

## 2024-08-17 RX ADMIN — MIDAZOLAM HYDROCHLORIDE 2 MG/HR: 1 INJECTION, SOLUTION INTRAVENOUS at 20:34

## 2024-08-17 RX ADMIN — AMINOCAPROIC ACID 1 G: 0.25 SOLUTION ORAL at 05:46

## 2024-08-17 RX ADMIN — WHITE PETROLATUM 57.7 %-MINERAL OIL 31.9 % EYE OINTMENT: at 16:16

## 2024-08-17 ASSESSMENT — ACTIVITIES OF DAILY LIVING (ADL)
ADLS_ACUITY_SCORE: 55
ADLS_ACUITY_SCORE: 51
ADLS_ACUITY_SCORE: 55
ADLS_ACUITY_SCORE: 59
ADLS_ACUITY_SCORE: 59
ADLS_ACUITY_SCORE: 55
ADLS_ACUITY_SCORE: 51
ADLS_ACUITY_SCORE: 59
ADLS_ACUITY_SCORE: 51
ADLS_ACUITY_SCORE: 55
ADLS_ACUITY_SCORE: 51
ADLS_ACUITY_SCORE: 51
ADLS_ACUITY_SCORE: 55

## 2024-08-17 ASSESSMENT — VISUAL ACUITY: OU: NOT TESTABLE

## 2024-08-17 NOTE — PLAN OF CARE
[id number]:  Neuro: sedated versed @ 3, dilaudid @ 1, Dex @ 1.2. PRN oxy x1 and Dilaudid bumps x3. Pupillometer q 4hrs WNL. Goal Na 140-145. + cough +gag.  Cardiac: SR rates 70-80s. MAP goal above 65. Levo 0.02-0.06. barehugger on for shivering. Cool extremities but palpable pulses. +1-2 edema throughout.   Resp: ETT 23 @lip PC +assist RR 10/ PC 25/ PEEP 10/ 60%. Moderate secretions. Bloody oral secretions at times from mouth sores. Pulling 300s TV. LS coarse/dim. Beginning of shift-pt's RR high 30s-low 40s-PRN bumps given of versed, dilaudid, oxy without much change. Maxed dex to 1.2 and repositioned-RR rest of night high teens low 20s. (Pt seemed not to like laying on R) side).   ECMO: bival @ 0.06 through circuit, PTTs therapeutic. (Goal 44-88). 4.5L flows, 3500 RPMs Sweep @ 1. Did not trial sweep off this shift-see gases.   GI/: anuric. Rectal pouch leaking x1 large amounts (600cc in bag and copious amounts around pouch)-discussed with resident and tube placed. Stool was at risk of leaking into fem cannula site. NJ TF @ 35cc/hr, FWF 30q4. Insulin gtt alg 2, 1-3 units/hr.  Skin: bruising throughout. Moisture erythema under rectal pouch site. Mouth sores-oral cares and vaseline applied. Groin site not noted to be leaking.  Access: L) PIV SL for VBGs. L) radial A-line. R) PIV SL, R) PIV insulin gtt. L) internal jugular-levo, sedation, TKO w/ abx.  AM xray resulted that internal jugular cannula had    Will continue with plan of care and notify MD of any changes.

## 2024-08-17 NOTE — PROGRESS NOTES
CRRT STATUS NOTE    DATA:  Time:  0534  Pressures WNL:  YES  Filter Status:  WDL    Problems Reported/Alarms Noted:  none    Supplies Present:  YES    ASSESSMENT:    Patient Net Fluid Balance:  -288 ml net since midnight       Intake/Output Summary (Last 24 hours) at 8/17/2024 0534  Last data filed at 8/17/2024 0500  Gross per 24 hour   Intake 3046.06 ml   Output 3802 ml   Net -755.94 ml       Vital Signs: Temp:  [94.1  F (34.5  C)-99.1  F (37.3  C)] 98.2  F (36.8  C)  Pulse:  [] 77  Resp:  [11-36] 24  MAP:  [58 mmHg-95 mmHg] 68 mmHg  Arterial Line BP: ()/(38-69) 126/49  FiO2 (%):  [60 %] 60 %  SpO2:  [91 %-100 %] 100 %       Most Recent BMP's:  Recent Labs   Lab Test 08/16/24  2354 08/16/24 2145 08/16/24 1955 08/16/24  1559 08/16/24  1158 08/16/24  1002 08/16/24  0805 08/16/24  0419   * 147* 148* 145 145 146*   < > 144   POTASSIUM  --  4.0 4.4 4.5 4.1 3.5  --  3.3*   CHLORIDE  --  113* 113* 110* 111* 111*  --  109*   CO2  --  20* 21* 23 22 22  --  23   BUN  --  52.7* 51.4* 53.4* 49.0* 48.9*  --  49.5*   CR  --  1.61* 1.55* 1.57* 1.52* 1.49*  --  1.48*   ANIONGAP  --  14 14 12 12 13  --  12   QUAN  --  8.3* 8.4* 8.2* 8.5* 8.3*  --  9.1    < > = values in this interval not displayed.     Most Recent CBC's:  Recent Labs   Lab Test 08/17/24  0402 08/16/24 2145 08/16/24  1559 08/16/24  1002   WBC 15.1* 22.4* 17.9* 16.0*   HGB 7.3* 7.8* 7.7* 7.5*    100 99 98   * 178 147* 146*     Most Recent ABG's:  Recent Labs   Lab Test 08/17/24  0403 08/17/24  0402 08/17/24  0155 08/16/24  2354   PH 7.32*  --  7.32* 7.33*   PO2 172*  --  155* 187*   PCO2 45  --  45 43   HCO3 23  --  23 23   THONG -2.8 -2.8 -3.1* -3.2*       Goals of Therapy:  I=O     INTERVENTIONS:   No interventions overnight by CRRT RN    Charting reviewed. Rounding completed. Treatment plan discussed with bedside nurse.     PLAN:  Continue with current plan of care please contact CRRT resource with any questions or concerns via  [employer].

## 2024-08-17 NOTE — PROGRESS NOTES
[facility]    ECLS Shift Summary:     ECMO Equipment:  Console Serial Number: [id number]  Circuit Lot Number: not recorded by Perfusion  Oxygenator Lot Number: [id number]    Circuit Assessment: Fibrin  Fibrin Location: connectors, oxy corners    Arterial ECMO Cannula: 17 Fr in the Right Internal Jugular Vein  Venous ECMO Cannula: 25 Fr in the Right Femoral Vein    ECMO Cannula Arterial Right internal jugular-Site Assessment: WDL, Sutured, Secure  ECMO Cannula Venous Right femoral vein-Site Assessment: Bleeding, Sutured, Secure  ECMO Cannula Arterial Right internal jugular-Site Intervention: No intervention needed  ECMO Cannula Venous Right femoral vein-Site Intervention: No intervention needed    Patient remains on V-V ECMO, all equipment is functioning and alarms are appropriately set. RPM's: 3600 with Blood Flow (Circuit) LPM  Av.5 LPM  Min: 4.48 LPM  Max: 4.59 LPM L/min. Sweep is at 1.5 LPM and 100 %. Extremities are warm and perfused.     Significant Shift Events: During shift pt began having multiple liquid stools resulting in rectal tube placement. Attempts to wean off sweep were not attempted due to the need to increase sweeps from 1-2 LPM to maintain ABG goals.     Vent settings:  FiO2 (%): 60 %, Resp: 19, Inspiratory Pressure (cm H2O) (Drager Jessie): 25, Vent Mode: PCV PLUS, Resp Rate (Set): 10 breaths/min, PEEP (cm H2O): 10 cmH2O, Resp Rate (Set): 10 breaths/min, PEEP (cm H2O): 10 cmH2O    Anticoagulation:  Dose (units/hr) HEParin: 0 Units/hr  Rate (mL/hr) HEParin: 0 mL/hr  Concentration HEParin: 100 Units/mL     Dose (mg/kg/hr) Bivalirudin: 0.06 mg/kg/hr  Rate (mL/hr) Bivalirudin: 5.3 mL/hr  Concentration Bivalirudin: 1 mg/mL  Most recent: ACT  (seconds):  (folowing PTTs)    Urine output is minimal, CRRT.  Blood loss was minimal. Product given included none.     Intake/Output Summary (Last 24 hours) at 2024 0630  Last data filed at 2024 0600  Gross  per 24 hour   Intake 3051.78 ml   Output 3927 ml   Net -875.22 ml       Labs:  Recent Labs   Lab 08/17/24  0550 08/17/24  0403 08/17/24  0402 08/17/24  0155 08/16/24  2354   PH 7.32* 7.32*  --  7.32* 7.33*   PCO2 44 45  --  45 43   PO2 158* 172*  --  155* 187*   HCO3 23 23  --  23 23   O2PER 60 60 60  100  60 60 60       Lab Results   Component Value Date    HGB 7.3 (L) 08/17/2024    PHGB 40 (H) 08/17/2024     (L) 08/17/2024    FIBR 271 08/17/2024    INR 1.37 (H) 08/12/2024    PTT 51 (H) 08/17/2024    DD 2.43 (H) 08/17/2024    ANTCH 103 08/16/2024       Plan is for continued VV-ECMO cares, attempts to wean from ECMO throughout the day.    Oneil Yañez RN  ECMO Specialist  8/17/2024 6:30 AM

## 2024-08-17 NOTE — PROGRESS NOTES
[facility]    ECLS Shift Summary:     ECMO Equipment:  Console Serial Number: [id number]  Circuit Lot Number: not recorded by Perfusion  Oxygenator Lot Number: [id number]    Circuit Assessment: Fibrin  Fibrin Location: connectors    Arterial ECMO Cannula: 17 Fr in the Right Internal Jugular Vein  Venous ECMO Cannula: 25 Fr in the Right Femoral Vein            Patient remains on V-V ECMO, all equipment is functioning and alarms are appropriately set. RPM's: 3599 with Blood Flow (Circuit) LPM  Av.7 LPM  Min: 4.59 LPM  Max: 4.7 LPM L/min. Sweep is at 0 LPM and 0 %. Extremities are warm and perfused.     Significant Shift Events: weaned sweep to off    Vent settings:  FiO2 (%): 60 %, Resp: (!) 31, Inspiratory Pressure (cm H2O) (Drager Jessie): 25, Vent Mode: PCV Plus assist, Resp Rate (Set): 10 breaths/min, PEEP (cm H2O): 10 cmH2O, Resp Rate (Set): 10 breaths/min, PEEP (cm H2O): 10 cmH2O    Anticoagulation:  Dose (units/hr) HEParin: 0 Units/hr  Rate (mL/hr) HEParin: 0 mL/hr  Concentration HEParin: 100 Units/mL     Dose (mg/kg/hr) Bivalirudin: 0.06 mg/kg/hr  Rate (mL/hr) Bivalirudin: 5.3 mL/hr  Concentration Bivalirudin: 1 mg/mL  Most recent: ACT  (seconds):  (folowing PTTs)    Urine output is per RN chart.  .  Blood loss was minimal. Product given included none.     Intake/Output Summary (Last 24 hours) at 2024 1808  Last data filed at 2024 1800  Gross per 24 hour   Intake 2831.12 ml   Output 2398.1 ml   Net 433.02 ml       Labs:  Recent Labs   Lab 24  1658 24  1545 24  1400 24  1206   PH 7.31* 7.26* 7.31* 7.33*   PCO2 46* 51* 46* 45   PO2 79* 83 166* 187*   HCO3 23 23 23 23   O2PER 60 100  100  60  60 60 60       Lab Results   Component Value Date    HGB 7.3 (L) 2024    PHGB 40 (H) 2024     (L) 2024    FIBR 285 2024    INR 1.37 (H) 2024    PTT 49 (H) 2024    DD 2.32 (H) 2024    ANTCH 104  08/17/2024       Plan is Keep sweep off overnight and decann in the morning if able to stay off sweep overnight.    Megan E. Dressler, RN  ECMO Specialist  8/17/2024 6:08 PM

## 2024-08-17 NOTE — PROGRESS NOTES
Neurocritical Care Progress Note    Reason for critical care admission: Concern for anoxic brain injury   Admitting Team: SICU  Date of Service:  08/17/2024  Date of Admission:  8/8/2024  Hospital Day: 10    Assessment/Plan  Massiel Flaherty is a 53 year old female with a past medical history including severe anxiety and depression, fibromyalgia, myalgic encephalomyelitis, hypothyroidism, MURIEL on CPAP, history of GTC seizures and myoclonic epilepsy currently not on medication,  spells with staring and BUE posturing previously described as pseudoseizures, expressive aphasia, and hypertension was admitted to an OSH on 8/3/3024 for evaluation and management of CAP for which she required intubation on 8/6/2024. Hypoxia remained refractory and on 8/8/2024 she required prone positioning and paralysis for ARDS. She was deemed suitable for transfer to [facility] for consideration of VV ECMO on 8/8/2024; she was cannulated for VV ECMO on arrival to [facility].      Head CT on 8/9/2024 was concerning for diffuse cerebral edema with diffuse blurring of gray-white differentiation concerning for hypoxic injury. NCC was consulted on 8/10/2024 for concern of hypoxic brain injury. pCO2 was 91 around the time the 8/9/2024 head CT was obtained. CT this AM with improved edema with pCO2 43.      Per EHR review on 12/4/2023 she had a 3 to 4 day EEG monitor at [facility] prior the visit on 12/4/2023 which did not capture typical events, documented normal interictal EEG, and captured one- short event that occurred with photic stimulation during which the patient reported head numbness with arm and leg twitching without EEG correlate. It was felt the head numbness with arm and leg twitching was related to stress and not epilepsy.     24 hour events  -Attempt to wean off sweep but did not tolerate yet  -Remains on CRRT    Neuro  #Concern for anoxic brain injury in the setting of severe hypercarbia (resolved)  #History of GTC  #History of myoclonic  epilepsy  #History of pseudoseizures  -Continue to recommend generally avoiding hypotension, optimize oxygenation, pCO2 within normal range  -Can slowly and progressively bring down Na to eunatremia  -MRI of the Brain wwo contrast when able after decannulation    NCC will sign off at this point. Please call with questions/concerns or if obtain MRI of the brain.      The patient was seen and discussed with the NCC attending, Dr. Silva.    Lorenzo Gray MD  Neurocritical Care  *[phone]    24 Hour Vital Signs Summary:  Temp: 98.4  F (36.9  C) Temp  Min: 97.3  F (36.3  C)  Max: 99.1  F (37.3  C)  Resp: (!) 36 Resp  Min: 19  Max: 37  SpO2: 100 % SpO2  Min: 98 %  Max: 100 %  Pulse: 75 Pulse  Min: 69  Max: 106    No data recorded    No data recorded.  No data recorded.      Respiratory monitoring:   FiO2 (%): 60 %, Resp: (!) 36, Inspiratory Pressure (cm H2O) (Drager Jessie): 25, Vent Mode: PCV Plus assist, Resp Rate (Set): 10 breaths/min, PEEP (cm H2O): 10 cmH2O, Resp Rate (Set): 10 breaths/min, PEEP (cm H2O): 10 cmH2O      I/O last 3 completed shifts:  In: 3051.78 [I.V.:1566.78; NG/GT:645]  Out: 3927 [Other:3127; Stool:800]    Current Medications:  Current Facility-Administered Medications   Medication Dose Route Frequency Provider Last Rate Last Admin    acetylcysteine (MUCOMYST) 10 % nebulizer solution 4 mL  4 mL Nebulization Q4H Aniceto Adame MD   4 mL at 08/17/24 0836    aminocaproic acid (AMICAR) solution 1 g  1 g Topical Q8H [facility] Serrato CNP   1 g at 08/17/24 0546    amitriptyline (ELAVIL) tablet 50 mg  50 mg Oral or Feeding Tube At Bedtime Barry Hatch MD        artificial tears ophthalmic ointment   Both Eyes Q8H de [place] Serrato CNP   Given at 08/17/24 0749    chlorhexidine (PERIDEX) 0.12 % solution 15 mL  15 mL Mouth/Throat Q12H Her-Giovanny Spence PA-C   15 mL at 08/17/24 0748    dexAMETHasone PF (DECADRON) injection 10 mg  10 mg Intravenous  Daily Cal Lisa MD   10 mg at 08/17/24 0749    fiber modular (BANATROL TF) packet 1 packet  1 packet Per Feeding Tube TID Cal Lisa MD   1 packet at 08/17/24 0750    insulin glargine (LANTUS PEN) injection 16 Units  16 Units Subcutaneous Daily de Chika Serrato Tiaraforrest Bejarano CNP   16 Units at 08/17/24 0802    ipratropium - albuterol 0.5 mg/2.5 mg/3 mL (DUONEB) neb solution 3 mL  3 mL Nebulization Q4H Aniceto Adame MD   3 mL at 08/17/24 0836    levothyroxine (SYNTHROID/LEVOTHROID) tablet 25 mcg  25 mcg Per Feeding Tube [facility] Giovanny Guidry PA-C   25 mcg at 08/17/24 0749    loperamide (IMODIUM) liquid 2 mg  2 mg Oral Q6H de Chika SerratoTiara CNP   2 mg at 08/17/24 0910    multivitamin RENAL (RENAVITE RX/NEPHROVITE) tablet 1 tablet  1 tablet Oral or Feeding Tube Daily Giovanny Guidry PA-C   1 tablet at 08/17/24 0748    pantoprazole (PROTONIX) 2 mg/mL suspension 40 mg  40 mg Per Feeding Tube [facility] Barry Hatch MD   40 mg at 08/17/24 0748    piperacillin-tazobactam (ZOSYN) 4.5 g vial to attach to  mL bag  4.5 g Intravenous Q6H de Chika SerratoTiara CNP   4.5 g at 08/17/24 0748    Prosource TF20 ENfit Compatibl EN LIQD (PROSOURCE TF20) packet 60 mL  1 packet Per Feeding Tube TID Giovanny Guidry PA-C   60 mL at 08/17/24 0751    venlafaxine (EFFEXOR) tablet 75 mg  75 mg Oral or Feeding Tube BID Barry Hatch MD           PRN Medications:  Current Facility-Administered Medications   Medication Dose Route Frequency Provider Last Rate Last Admin    acetaminophen (TYLENOL) tablet 650 mg  650 mg Oral or Feeding Tube Q4H PRN Giovanny Guidry PA-C   650 mg at 08/12/24 2005    Or    acetaminophen (TYLENOL) Suppository 650 mg  650 mg Rectal Q4H PRN -Giovanny Spence PA-C        calcium gluconate 2 g in  mL intermittent infusion  2 g Intravenous Q8H PRN Tati Tomlin MD        calcium gluconate 4 g in sodium chloride 0.9 % 100 mL intermittent infusion  4 g  Intravenous Q8H PRN Tati Tomlin MD        dextrose 10% infusion   Intravenous Continuous PRN de Tiara Macedo CNP        dextrose 10% infusion   Intravenous Continuous PRN Giovanny Guidry PA-C        glucose gel 15-30 g  15-30 g Oral Q15 Min PRN Giovanny Guidry PA-C        Or    dextrose 50 % injection 25-50 mL  25-50 mL Intravenous Q15 Min PRN Giovanny Guidry PA-C   50 mL at 08/09/24 0755    Or    glucagon injection 1 mg  1 mg Subcutaneous Q15 Min PRN Giovanny Guidry PA-C        hydrALAZINE (APRESOLINE) injection 10 mg  10 mg Intravenous Q4H PRN Anika Mead MD   10 mg at 08/14/24 0502    hydromorphone (DILAUDID) 0.2 mg/mL bolus dose from infusion pump 0.2 mg  0.2 mg Intravenous Q2H PRN Tiara Martin, CNP   0.2 mg at 08/17/24 1242    labetalol (NORMODYNE/TRANDATE) injection 10-20 mg  10-20 mg Intravenous Q2H PRN Dong Rico PA-C   20 mg at 08/14/24 0054    loperamide (IMODIUM) capsule 2 mg  2 mg Oral or Feeding Tube Q6H PRN Barry Hatch MD   2 mg at 08/15/24 1354    magnesium sulfate 2 g in 50 mL sterile water intermittent infusion  2 g Intravenous Q8H PRN Tati Tomlin MD        midazolam (VERSED) 1 mg/mL bolus from syringe/bag pump ADULT  2 mg Intravenous Q1H PRN Dong Rico PA-C   2 mg at 08/17/24 0624    naloxone (NARCAN) injection 0.2 mg  0.2 mg Intravenous Q2 Min PRN Barry Hatch MD        Or    naloxone (NARCAN) injection 0.4 mg  0.4 mg Intravenous Q2 Min PRN Barry Hatch MD        Or    naloxone (NARCAN) injection 0.2 mg  0.2 mg Intramuscular Q2 Min PRN Barry Hatch MD        Or    naloxone (NARCAN) injection 0.4 mg  0.4 mg Intramuscular Q2 Min PRN Barry Hatch MD        No heparin required   Does not apply Continuous PRN Tati Tomlin MD        oxyCODONE (ROXICODONE) tablet 5 mg  5 mg Oral Q4H PRN Cal Lisa MD   5 mg at 08/17/24 1215    potassium chloride 20 mEq in 50 mL  intermittent infusion  20 mEq Intravenous Q8H PRN Tati Tomlin MD        sodium chloride 0.9% BOLUS 1-250 mL  1-250 mL Intravenous Q1H PRN Aniceto Adame MD   50 mL at 08/10/24 1542    sodium phosphate 15 mmol in sodium chloride 0.9 % 250 mL intermittent infusion  15 mmol Intravenous Q8H PRN Tati Tomlin MD           Infusions:  Current Facility-Administered Medications   Medication Dose Route Frequency Provider Last Rate Last Admin    bivalirudin (ANGIOMAX) 250 mg in sodium chloride 0.9 % 250 mL ANTICOAGULANT infusion  0-0.3 mg/kg/hr (Dosing Weight) Intravenous Continuous Dong Rico PA-C 5.3 mL/hr at 08/17/24 1200 0.06 mg/kg/hr at 08/17/24 1200    dexmedeTOMIDine (PRECEDEX) 4 mcg/mL in sodium chloride 0.9 % 100 mL infusion  0.1-1.2 mcg/kg/hr (Dosing Weight) Intravenous Continuous de La MaterTiara CNP 26.7 mL/hr at 08/17/24 1247 1.2 mcg/kg/hr at 08/17/24 1247    dextrose 10% infusion   Intravenous Continuous PRN de [place] Serarto CNP        dextrose 10% infusion   Intravenous Continuous PRN Giovanny Guidry PA-C        dialysate for CVVHD & CVVHDF (Phoxillum BK4/2.5)  12.5 mL/kg/hr CRRT Continuous Tati Tomlin MD 1,100 mL/hr at 08/17/24 0851 12.5 mL/kg/hr at 08/17/24 0851    HYDROmorphone (DILAUDID) 0.2 mg/mL infusion ADULT/PEDS GREATER than or EQUAL to 20 kg  0.3-1.5 mg/hr Intravenous Continuous Dong Rico PA-C 2.5 mL/hr at 08/17/24 1200 0.5 mg/hr at 08/17/24 1200    insulin regular (MYXREDLIN) 1 unit/mL infusion  0-24 Units/hr Intravenous Continuous de [place] Serrato CNP 3 mL/hr at 08/17/24 1200 3 Units/hr at 08/17/24 1200    midazolam (VERSED) 100 mg/100 mL NS infusion - ADULT  2 mg/hr Intravenous Continuous de La MaterTiara, CNP 2 mL/hr at 08/17/24 1200 2 mg/hr at 08/17/24 1200    No heparin required   Does not apply Continuous PRN Tati Tomlin MD        norepinephrine  "(LEVOPHED) 16 mg in  mL infusion MAX CONC CENTRAL LINE  0.01-0.6 mcg/kg/min (Dosing Weight) Intravenous Continuous Anika Mead MD 1.7 mL/hr at 08/17/24 1245 0.02 mcg/kg/min at 08/17/24 1245    PRE-filter replacement solution for CVVHD & CVVHDF (Phoxillum BK4/2.5)  12.5 mL/kg/hr CRRT Continuous Tati Tomlin MD 1,100 mL/hr at 08/17/24 1059 12.5 mL/kg/hr at 08/17/24 1059       Allergies   Allergen Reactions    Celecoxib Unknown    Sulfa Antibiotics Hives     Per careeverywhere    Tetracycline Hives     Per careeverywhere       Physical Examination:  Vitals: /61   Pulse 75   Temp 98.4  F (36.9  C)   Resp (!) 36   Ht 1.575 m (5' 2\")   Wt 83.6 kg (184 lb 4.9 oz)   SpO2 100%   BMI 33.71 kg/m    General: Adult female patient, lying in bed, critically-ill.  HEENT: Normocephalic, atraumatic.  Cardiac: She appears adequately perfused.   Pulm: Synchronous with mechanical ventilator.   Abdomen: Non-distended.  Extremities: Warm, distal extremities do not appear acutely threatened.   Skin: No obvious rashes or lesions on exposed skin.   Psych: Sedated.  Neuro:  Mental status: Sedated. Partial eye opening to strong verbal and noxious stimuli, no verbal, does not follow commands.   Cranial nerves: Limited by sedation and ETT. NPI 4.0 bilaterally, pupils symmetric and dilated (size 5-6 mm). Face appears symmetric though exam limited by ETT.  Motor: Normal bulk and tone. No abnormal movements. Bilateral upgoing toes to sensory stimuli.  Sensory: See above. Deferred other stimuli.  Coordination: Deferred.  Gait: Deferred.    Labs and Imaging:    All relevant imaging and laboratory values personally reviewed.    "

## 2024-08-17 NOTE — PROGRESS NOTES
"Nephrology Progress Note  08/17/2024       Mrs Flaherty is a  53 yof w/ Anxiety, depression, fibromyalgia, hypothyroidism, asthma, HLD, MURIEL on CPAP, expressive aphasia, and hypertension who was admitted to an OSH with community acquired pneumonia on 8/3 s/p intubation.  Found to have severe ARDS requiring paralysis and proning, transferred here on 8/8 for management and initiated on CKRT for severe acidemia in the setting of oligoanuric CHACORTA.  Started CRRT on 8/9 for volume management.      Interval History :   Remains on CRRT in ICU on VV ECMO. On K4 bath. Intubated and sedated.    Assessment & Recommendations:   CHACORTA-Baseline Cr 0.6, at baseline on admission.  CHACORTA due to septic shock in setting of ARDS, UA on 8/6/2024 essentially bland (30 protein but no blood or casts), no dedicated renal imaging.  No dedicated renal imaging at this time.  Started CRRT 8/9 for volume management, now anuric.    -Access is ECMO circuit   -Dialysis consent signed and in chart.     - continue CRRT on K4 bath, I=O    Volume status- I=O on CRRT, was negative 1 liter yesterday. FiO2 60% this am, monitor    Electrolytes/pH- sodium up to 150, will stop 3% as saline goal liberalized to 140-145, CT showed improvement on 8/15    Ca/phos/pth- phos slightly up at 5.1 - no treatment indicated    Anemia- hgb 7.1, acute management per primary team    Nutrition-Vital TF started 8/9.    Recommendations were communicated to primary team via note    [employer] Web Console       Review of Systems:   I reviewed the following systems:  ROS not done due to vent/sedation.     Physical Exam:   I/O last 3 completed shifts:  In: 3051.78 [I.V.:1566.78; NG/GT:645]  Out: 3927 [Other:3127; Stool:800]   /61   Pulse 75   Temp 98.1  F (36.7  C)   Resp 27   Ht 1.575 m (5' 2\")   Wt 83.6 kg (184 lb 4.9 oz)   SpO2 100%   BMI 33.71 kg/m       GENERAL APPEARANCE: Intubated and sedated and paralyzed  Pulmonary: Intubated and sedated,   CV: regular rhythm, " normal rate   - Edema 1-2+ diffuse  GI: soft, nontender, normal bowel sounds  MS: no evidence of inflammation in joints, no muscle tenderness  SKIN:  warm, dry  NEURO: No focal deficits    Labs:   All labs reviewed by me  Electrolytes/Renal -   Recent Labs   Lab Test 08/17/24  1107 08/17/24  1009 08/17/24  1005 08/17/24  0801 08/17/24  0756 08/17/24  0423 08/17/24  0402 08/16/24 2149 08/16/24 2145 08/16/24 2001 08/16/24 1955 08/16/24  1204 08/16/24  1158   NA  --   --  150*  --  148*  --  148*   < > 147*  --  148*   < > 145   POTASSIUM  --   --  3.7  --   --   --  3.8  --  4.0  --  4.4   < > 4.1   CHLORIDE  --   --  116*  --   --   --  115*  --  113*  --  113*   < > 111*   CO2  --   --  22  --   --   --  20*  --  20*  --  21*   < > 22   BUN  --   --  44.7*  --   --   --  45.6*  --  52.7*  --  51.4*   < > 49.0*   CR  --   --  1.58*  --   --   --  1.62*  --  1.61*  --  1.55*   < > 1.52*   * 153* 165*   < >  --    < > 129*   < > 162*   < > 160*   < > 230*   QUAN  --   --  7.9*  --   --   --  8.2*  --  8.3*  --  8.4*   < > 8.5*   MAG  --   --   --   --   --   --  2.5*  --   --   --  2.6*  --  2.4*   PHOS  --   --   --   --   --   --  5.1*  --   --   --  5.2*  --  4.9*    < > = values in this interval not displayed.       CBC -   Recent Labs   Lab Test 08/17/24  1005 08/17/24  0402 08/16/24 2145   WBC 12.7* 15.1* 22.4*   HGB 7.1* 7.3* 7.8*   * 139* 178       LFTs -   Recent Labs   Lab Test 08/17/24 0402 08/16/24 1955 08/16/24  1158 08/16/24  0419 08/15/24  2019 08/15/24  0414   ALKPHOS 119  --   --  132  --  170*   BILITOTAL 0.5  --   --  0.6  --  0.4   ALT 18  --   --  19  --  21   AST 37  --   --  28  --  41   PROTTOTAL 5.9*  --   --  6.5  --  5.1*   ALBUMIN 3.3* 3.6 3.6 3.9   < > 2.7*    < > = values in this interval not displayed.       Iron Panel - No lab results found.        Current Medications:  Current Facility-Administered Medications   Medication Dose Route Frequency Provider Last Rate Last  Admin    acetylcysteine (MUCOMYST) 10 % nebulizer solution 4 mL  4 mL Nebulization Q4H Aniceto Adame MD   4 mL at 08/17/24 0836    aminocaproic acid (AMICAR) solution 1 g  1 g Topical Q8H [facility] de Chika SerratoTiara CNP   1 g at 08/17/24 0546    amitriptyline (ELAVIL) tablet 50 mg  50 mg Oral or Feeding Tube At Bedtime Barry Htach MD        artificial tears ophthalmic ointment   Both Eyes Q8H [facility] RojelioTiara, CNP   Given at 08/17/24 0749    chlorhexidine (PERIDEX) 0.12 % solution 15 mL  15 mL Mouth/Throat Q12H Giovanny Guidry PA-C   15 mL at 08/17/24 0748    dexAMETHasone PF (DECADRON) injection 10 mg  10 mg Intravenous Daily Cal Lisa MD   10 mg at 08/17/24 0749    fiber modular (BANATROL TF) packet 1 packet  1 packet Per Feeding Tube TID Cal Lisa MD   1 packet at 08/17/24 0750    insulin glargine (LANTUS PEN) injection 16 Units  16 Units Subcutaneous Daily de Chika SerratoTiara CNP   16 Units at 08/17/24 0802    ipratropium - albuterol 0.5 mg/2.5 mg/3 mL (DUONEB) neb solution 3 mL  3 mL Nebulization Q4H Aniceto Adame MD   3 mL at 08/17/24 0836    levothyroxine (SYNTHROID/LEVOTHROID) tablet 25 mcg  25 mcg Per Feeding Tube QAM AC Giovanny Guidry PA-C   25 mcg at 08/17/24 0749    loperamide (IMODIUM) liquid 2 mg  2 mg Oral Q6H de Chika SerratoTiara CNP   2 mg at 08/17/24 0910    multivitamin RENAL (RENAVITE RX/NEPHROVITE) tablet 1 tablet  1 tablet Oral or Feeding Tube Daily Giovanny Guidry PA-C   1 tablet at 08/17/24 0748    pantoprazole (PROTONIX) 2 mg/mL suspension 40 mg  40 mg Per Feeding Tube QAM AC Barry Hatch MD   40 mg at 08/17/24 0748    piperacillin-tazobactam (ZOSYN) 4.5 g vial to attach to  mL bag  4.5 g Intravenous Q6H de Tiara Macedo CNP   4.5 g at 08/17/24 0748    Prosource TF20 ENfit Compatibl EN LIQD (PROSOURCE TF20) packet 60 mL  1 packet Per Feeding Tube TID -Giovanny Spence, LIV   60 mL at  08/17/24 0751    venlafaxine (EFFEXOR) tablet 75 mg  75 mg Oral or Feeding Tube BID Barry Hatch MD         Current Facility-Administered Medications   Medication Dose Route Frequency Provider Last Rate Last Admin    bivalirudin (ANGIOMAX) 250 mg in sodium chloride 0.9 % 250 mL ANTICOAGULANT infusion  0-0.3 mg/kg/hr (Dosing Weight) Intravenous Continuous Dong Rico PA-C 5.3 mL/hr at 08/17/24 1100 0.06 mg/kg/hr at 08/17/24 1100    dexmedeTOMIDine (PRECEDEX) 4 mcg/mL in sodium chloride 0.9 % 100 mL infusion  0.1-1.2 mcg/kg/hr (Dosing Weight) Intravenous Continuous de La Mater, Tiara Carlee, CNP 26.7 mL/hr at 08/17/24 1100 1.2 mcg/kg/hr at 08/17/24 1100    dextrose 10% infusion   Intravenous Continuous PRN de [place]ater Tiara Carlee CNP        dextrose 10% infusion   Intravenous Continuous PRN Giovanny Guidry PA-C        dialysate for CVVHD & CVVHDF (Phoxillum BK4/2.5)  12.5 mL/kg/hr CRRT Continuous Tati Tomlin MD 1,100 mL/hr at 08/17/24 0851 12.5 mL/kg/hr at 08/17/24 0851    HYDROmorphone (DILAUDID) 0.2 mg/mL infusion ADULT/PEDS GREATER than or EQUAL to 20 kg  0.3-1.5 mg/hr Intravenous Continuous Dong Rico PA-C 2.5 mL/hr at 08/17/24 1100 0.5 mg/hr at 08/17/24 1100    insulin regular (MYXREDLIN) 1 unit/mL infusion  0-24 Units/hr Intravenous Continuous de La Mater, Tiara Carlee, CNP 3 mL/hr at 08/17/24 1100 3 Units/hr at 08/17/24 1100    midazolam (VERSED) 100 mg/100 mL NS infusion - ADULT  2 mg/hr Intravenous Continuous de La Mater, Tiara Carlee, CNP 2 mL/hr at 08/17/24 1100 2 mg/hr at 08/17/24 1100    No heparin required   Does not apply Continuous PRN Tati Tomlin MD        norepinephrine (LEVOPHED) 16 mg in  mL infusion MAX CONC CENTRAL LINE  0.01-0.6 mcg/kg/min (Dosing Weight) Intravenous Continuous Anika Mead MD 0.8 mL/hr at 08/17/24 1100 0.01 mcg/kg/min at 08/17/24 1100    PRE-filter replacement solution for CVVHD & CVVHDF  (Phoxillum BK4/2.5)  12.5 mL/kg/hr CRRT Continuous Ahmed, Tati Barcenas MD 1,100 mL/hr at 08/17/24 1059 12.5 mL/kg/hr at 08/17/24 1059         Emmanuelle Donato MD  Associate Professor of Medicine  Department of Nephrology  [facility]

## 2024-08-17 NOTE — PROGRESS NOTES
CRRT STATUS NOTE    DATA:  Time:  1816  Pressures WNL:  YES  Filter Status:  WDL    Problems Reported/Alarms Noted:  None    Supplies Present:  YES    ASSESSMENT:  Patient Net Fluid Balance:  +267ml/0000    Vital Signs:  Temp: 98.4  F (36.9  C) Temp src: Esophageal   Pulse: 81   Resp: (!) 31 SpO2: 98 % O2 Device: Mechanical Ventilator        Labs:   Recent Labs   Lab 08/17/24  1554 08/17/24  1545 08/17/24  1402 08/17/24  1211 08/17/24  1206 08/17/24  1009 08/17/24  1005 08/17/24  0423 08/17/24  0402 08/16/24  0805 08/16/24  0419 08/12/24  1151 08/12/24  0945 08/12/24  0401 08/12/24  0005 08/11/24  2154   WBC  --  15.8*  --   --  11.9*  --  12.7*  --  15.1*   < > 19.2*   < > 14.7* 13.1*  --  12.6*   HGB  --  7.3*  --   --  7.2*  --  7.1*  --  7.3*   < > 7.9*   < > 8.2* 7.9*  --  8.0*   MCV  --  102*  --   --  101*  --  101*  --  100   < > 97   < > 99 96  --  96   PLT  --  142*  --   --  121*  --  124*  --  139*   < > 155   < > 188 187  --  187   INR  --   --   --   --   --   --   --   --   --   --   --   --  1.37* 1.30*  --  1.24*   NA  --  147*  --   --  149*  --  150*   < > 148*   < > 144   < > 152* 152*  151*   < > 149*   POTASSIUM  --  4.5  --   --  4.0  --  3.7  --  3.8   < > 3.3*   < > 4.6 4.3  --  4.1   CHLORIDE  --  114*  --   --  116*  --  116*  --  115*   < > 109*   < > 120* 120*  --  118*   CO2  --  21*  --   --  21*  --  22  --  20*   < > 23   < > 22 21*  --  21*   BUN  --  47.8*  --   --  48.0*  --  44.7*  --  45.6*   < > 49.5*   < > 29.9* 27.2*  --  25.4*   CR  --  1.62*  --   --  1.70*  --  1.58*  --  1.62*   < > 1.48*   < > 1.33* 1.30*  --  1.27*   ANIONGAP  --  12  --   --  12  --  12  --  13   < > 12   < > 10 11  --  10   QUAN  --  7.9*  --   --  8.2*  --  7.9*  --  8.2*   < > 9.1   < > 7.8* 7.7*  --  7.7*   * 165* 126*   < > 143*   < > 165*   < > 129*   < > 174*   < > 152* 149*  --  140*   ALBUMIN  --   --   --   --  3.3*  --   --   --  3.3*   < > 3.9   < > 2.5* 2.4*  --  2.4*   PROTTOTAL   --   --   --   --   --   --   --   --  5.9*  --  6.5   < >  --  5.1*  --   --    BILITOTAL  --   --   --   --   --   --   --   --  0.5  --  0.6   < >  --  0.4  --   --    ALKPHOS  --   --   --   --   --   --   --   --  119  --  132   < >  --  281*  --   --    ALT  --   --   --   --   --   --   --   --  18  --  19   < >  --  21  --   --    AST  --   --   --   --   --   --   --   --  37  --  28   < >  --  48*  --   --     < > = values in this interval not displayed.      Goals of Therapy:  I=O    INTERVENTIONS:   New circuit at 1239, timed out.     PLAN:  Continue goals of therapy. Questions/concerns call CRRT resource RN on Vocera.

## 2024-08-17 NOTE — PROGRESS NOTES
SURGICAL ICU PROGRESS NOTE  08/17/2024        Date of Service (when I saw the patient): 08/17/2024    ASSESSMENT:  Massiel Flaherty is a 53 year old female who was admitted to [facility].   Past medical hx of Anxiety/depression, fibromyalgia, hypothyroidism, asthma, HLD, MURIEL on CPAP, spells, off on anti seizure medications, expressive aphasia, hypertension was seen at [facility] and was placed on Augmentin outpatient on 7/30/24.   She admitted to the hospital on 8/3/24 for fatigue, fever and dyspnea and intubated 8/6/24 at OSH, proned and paralyzed without improvement. Transferred to [facility] 8/8/24, hypoxic with high plateaus/peaks and placed on VV ECMO and CRRT.         CHANGES and MAJOR THINGS TODAY:     - wean sweep  - decrease midazolam to 2 mg/hr  - continue dilaudid wean  - continue additional 24 hours of amicar oral solution  - await sensitivities  - no tx for EBV and HSV in [place]  - schedule imodium  - Advance ETT      PLAN:  Neurological:  # Fibromyalgia   # Hx myalgic encephalomyelitis   # Acute pain  # Sedation and analgesia for vent compliance   - Monitor neurological status. Delirium preventions and precautions.   - Pain: Dilaudid gtt 1.5-->1 over the last 24 hours. Continue to wean further.   - Sedation plan: Versed gtt, weaned aggressively from 14 to 3 mg/hr. Wean held for 24 hours. Will decrease now from 3 to 2 mg/hr. Caution to avoid risk of benzodiazepine withdrawal as she has been exposed to high doses for greater than 1 week.   - Shivering: Precedex. Attempted discontinuation with severe shivering. Resumed again.   -  Paralysis: Off 8/14  - Resumed PTA Venlafaxine and Amitriptyline       # Hx spells with staring and BUE posturing previously described as pseudoseizures   # Hx history of GTC seizures and myoclonic epilepsy, weaned off AEDs   # C/f hypoxic ischemic injury   # Diffuse cerebral edema - improved  - Head CT 8/9: Findings concerning for diffuse cerebral edema with  diffuse blurring of  the gray-white differentiation. Findings concerning for hypoxicischemic injury.   - Sodium goals liberalized to 140-145  - Repeat Head CT 8/15 continued improvement in gray white matter differentiation and continued effacement of the sulci and ventricles.   - MRI Brain wwo contrast after decannulation  - NCC signed off.     Pulmonary:   #ARDS s/p VV ECMO cannulation 8/8  # Asthma  # MURIEL on home CPAP   FiO2 (%): 60 %, Resp: 19, Inspiratory Pressure (cm H2O) (Drager Jessie): 25, Vent Mode: PCV PLUS, Resp Rate (Set): 10 breaths/min, PEEP (cm H2O): 10 cmH2O, Resp Rate (Set): 10 breaths/min, PEEP (cm H2O): 10 cmH2O    - continue with rest vent setting on ECMO. Plan to optimize ECMO   - Advance ETT 2 cm     - Chest CT 8/9: Diffuse patchy groundglass and consolidative densities throughout  the lungs with small bilateral pleural effusions. Findings may  represent severe pulmonary edema, and/or  infectious /inflammatory  pathology, or ARDS. Multiple prominent and a few enlarged mediastinal lymph nodes,  possibly reactive.  - 8/11: Restart veletri continuous neb  - 8/11: Increase inspiratory pressure to 35 to attempt achieving slightly higher tidal volumes   - 8/12: Decadron 20mg daily x 5 days, then 10mg x 5 days   - 8/14 Bronchoscopy with mucous plugging.  Continue Mucomyst and Duonebs q4 hours. No acute indication to repeat bronchoscopy today.   - 8/15 Stop Veletri    ECMO:  Sweep 1.5 L. Trial off sweep 8/16 for two hours. Failed due to hypercapnia, increased work of breathing.   FiO2 100%   Flow 4.5  Bival gtt with PTT goals 44-88 / ACT goal of 160-180   - Superior cannula retracted 8/10 for ongoing low PaO2   - 8/11: bival infusion replaced for heparin given concern for heparin resistance     Cardiovascular:    # Hyperlipidemia  # Hypotension  - MAP >65  - Prior HTN, Clonidine on hold   - Continue Norepinephrine, low dose.   - Lactate normal   - Repeat ECHO 8/12: Left ventricular size, wall motion and function are normal.  The ejection fraction is 60-65%. Flattened septum is consistent with right ventricular pressure and volume overload. Right ventricular function, chamber size, wall motion, and thickness are normal.       Gastroenterology/Nutrition:  #Moderate protein calorie malnutrition  # GERD   # Diarrhea  - Protonix (home medication)   - RD consult in am for SBFT and TF recommendations   - CT CAP 8/9: Mild hepatomegaly. Trace pelvic ascites, nonspecific bilateral perinephric  streakiness. Asymmetric heterogeneous bulky right adnexa, consider nonemergent  pelvic ultrasound for further evaluation.   - NJ tube for meds, TF   - Stool output 200/1050  - imodium PRN   - RD to increase fiber today.      Fluids/Electrolytes/Renal:  # Acute kidney injury  # Hypernatremia  - Continue CRRT.   - Trending CRRT labs  - Monitor I/Os closely   - Normalize sodium goals. Stop 3% saline.     Endocrine:  # Hypothyroidism   # Steroid and stress induced hyperglycemia  - synthroid resumed   - Goal to keep BG< 180 for optimal wound healing   - 8/14: Lantus increase to 16  - Sliding scale changed back to gtt on 8/16 pm     ID:  # Leukocytosis  # Presumed pneumonia  # Concern for PID   PER OSH: 8/6: RSV, COVID, parainfluenza,  negative . Legionella and pneumococcal urine antigens negative. Patient has been on greater than 20 mg prednisone daily for 18 days.  CULTURES: 8/8/2024: Respiratory viral panel negative. Blood cultures negative, MRSA nasal negative, Legionella urine antigen negative, respiratory culture negative.PCR trachea for PJP is negative.  COVID: Negative. Viral panel-negative PJP  - ID consulted   - 8/11: Gyn consulted for right adnexal finding on CT. Low suspicion for abscess but would treat as we are currently. Collection too small for surgical intervention and too small for IR drainage as well. Could follow up with pelvic MRI, however we will treat as current with IV antibiotics.    - EBV PCR, per ID likely reactivation I/n the setting  "of acute illness.   - IGG levels low, ID recommend IVIG infusion, done 8/15  - Persistent leukocytosis, afebrile.   - 8/15 Check MRSA swab negative Repeat BC  with NGTD  - 8/14 BAL: Pseudomonas aeruginosa grown in the setting of Cefepime since 8/3.  8/16 Changed to Zosyn. Follow susceptibilities.   - [place] returned with EBV and HSV, along with EBV positivity in BAL, per ID represents reactivation  in critical illness, no treatment at this time.     Antimicrobials:   - Azithromycin- stopped 8/11   - Cefepime 8/3 (started at OSH)-8/16  - Amphotericin - stopped 8/13   - Flagyl 8/11-8/16  - Zosyn 8/16-    Heme:     # Acute blood loss anemia   #Anemia of critical illness  #Oral bleeding  - transfusion parameters per ECMO protocol   - Transfuse if hgb <7.0 or signs/symptoms of hypoperfusion. Monitor and trend.  No transfusions in the last 24 hours.   - Continue Bivalrudin gtt  - Intraoral bleeding improved, continue amicar oral solution for another 24 hours.      Musculoskeletal:  # Weakness and deconditioning of critical illness  # Left ankle injury pre-hospitalization    - Physical and occupational therapy when able.      Skin:  # Ecchymosis - BLE   # Left toe duskiness   - bilateral lower leg ecchymosis   - WOC consult   - 8/13: Worsening duskiness to left 3rd, 4th toes noted; Off pressors      General Cares/Prophylaxis:    DVT Prophylaxis: Bival gtt per ECMO   GI Prophylaxis: PPI  Restraints: Restraints for medical healing needed: NO     Lines/ tubes/ drains:  - ETT   - Right  ECMO cannula   - Right Femoral cannula   - Left internal jugular   - radial arterial line   - NJ   - PIV\"s      Disposition:  -  Surgical ICU       Time spent on this Encounter   Billing:  I spent 55 minutes bedside and on the inpatient unit today managing the critical care of Massiel Flaherty in relation to the issues listed in this note.      Tiara De La Mater    ====================================  INTERVAL HISTORY:  Intubated, sedated. " VTs variable 300-400s. Unable to obtain ROS      OBJECTIVE:   1. VITAL SIGNS:   Temp:  [94.1  F (34.5  C)-99.1  F (37.3  C)] 98.2  F (36.8  C)  Pulse:  [] 77  Resp:  [11-36] 19  MAP:  [58 mmHg-95 mmHg] 69 mmHg  Arterial Line BP: ()/(38-69) 115/51  FiO2 (%):  [60 %] 60 %  SpO2:  [91 %-100 %] 100 %  FiO2 (%): 60 %, Resp: 19, Inspiratory Pressure (cm H2O) (Drager Jessie): 25, Vent Mode: PCV PLUS, Resp Rate (Set): 10 breaths/min, PEEP (cm H2O): 10 cmH2O, Resp Rate (Set): 10 breaths/min, PEEP (cm H2O): 10 cmH2O      2. INTAKE/ OUTPUT:   I/O last 3 completed shifts:  In: 3216.97 [I.V.:1631.97; NG/GT:645]  Out: 4458 [Other:4258; Stool:200]    3. PHYSICAL EXAMINATION:  General: NAD  HEENT: PERRLA. Pupils 3mm and reactive. ETT present and secured. NJ tube. OG to LIS with thin green drainage.   RIght neck with ECMO cannula, sutured in place.  Left internal jugular CVC in place, secured.   Neuro: PERRL 3mm. No movement of extremities.   Pulm/Resp:  minimal breath sounds. coarse  CV: RRR, S1/S2   Abdomen: Soft, non-distended, non-tender, no rebound tenderness or guarding, no masses  Incisions/Skin: scattered ecchymosis in BLE in toes around ankles bilaterally. Limbs warm.   MSK/Extremities:generalize BLE 1+ edema. Calves compressible, (+) doppler tones  DP/PT on feet.     4. INVESTIGATIONS:   Arterial Blood Gases   Recent Labs   Lab 08/17/24  0550 08/17/24  0403 08/17/24  0155 08/16/24  2354   PH 7.32* 7.32* 7.32* 7.33*   PCO2 44 45 45 43   PO2 158* 172* 155* 187*   HCO3 23 23 23 23     Complete Blood Count   Recent Labs   Lab 08/17/24  0402 08/16/24  2145 08/16/24  1559 08/16/24  1002   WBC 15.1* 22.4* 17.9* 16.0*   HGB 7.3* 7.8* 7.7* 7.5*   * 178 147* 146*     Basic Metabolic Panel  Recent Labs   Lab 08/17/24  0620 08/17/24  0423 08/17/24  0402 08/17/24  0309 08/16/24  2359 08/16/24  2354 08/16/24 2149 08/16/24  2145 08/16/24 2001 08/16/24  1955 08/16/24  1616 08/16/24  1559   NA  --   --  148*  --   --   148*  --  147*  --  148*  --  145   POTASSIUM  --   --  3.8  --   --   --   --  4.0  --  4.4  --  4.5   CHLORIDE  --   --  115*  --   --   --   --  113*  --  113*  --  110*   CO2  --   --  20*  --   --   --   --  20*  --  21*  --  23   BUN  --   --  45.6*  --   --   --   --  52.7*  --  51.4*  --  53.4*   CR  --   --  1.62*  --   --   --   --  1.61*  --  1.55*  --  1.57*   * 128* 129* 99   < >  --    < > 162*   < > 160*   < > 266*    < > = values in this interval not displayed.     Liver Function Tests  Recent Labs   Lab 08/17/24  0402 08/16/24  1955 08/16/24  1158 08/16/24  0419 08/15/24  2019 08/15/24  0414 08/14/24  1205 08/14/24  0426 08/12/24  1539 08/12/24  0945 08/12/24  0401 08/11/24  2154 08/11/24  1603   AST 37  --   --  28  --  41  --  43   < >  --  48*  --   --    ALT 18  --   --  19  --  21  --  23   < >  --  21  --   --    ALKPHOS 119  --   --  132  --  170*  --  224*   < >  --  281*  --   --    BILITOTAL 0.5  --   --  0.6  --  0.4  --  0.4   < >  --  0.4  --   --    ALBUMIN 3.3* 3.6 3.6 3.9   < > 2.7*   < > 3.0*   < > 2.5* 2.4* 2.4* 2.3*   INR  --   --   --   --   --   --   --   --   --  1.37* 1.30* 1.24* 1.15    < > = values in this interval not displayed.     Pancreatic Enzymes  No lab results found in last 7 days.  Coagulation Profile  Recent Labs   Lab 08/17/24 0402 08/16/24  2145 08/16/24  1559 08/16/24  1002 08/12/24  1153 08/12/24  0945 08/12/24  0844 08/12/24  0401 08/12/24  0008 08/11/24  2154 08/11/24  1831 08/11/24  1603   INR  --   --   --   --   --  1.37*  --  1.30*  --  1.24*  --  1.15   PTT 51* 50* 50* 51*   < > 44*   < > 42*   < > 36   < > 35    < > = values in this interval not displayed.         5. RADIOLOGY:   Recent Results (from the past 24 hour(s))   XR Chest Port 1 View    Impression    RESIDENT PRELIMINARY INTERPRETATION  IMPRESSION:   1. Inferior ECMO cannula has been slightly advanced with the tip now  projecting over the right mid atrium. Otherwise stable  support  devices.  2. Mildly increased diffuse airspace opacities. Pleural effusions are  likely not substantially changed.       =========================================

## 2024-08-17 NOTE — PROGRESS NOTES
Veno-venous ECMO Progress Note  8/17/2024    Massiel Flaherty is a 53 year old female who was started on VV ECMO due to severe respiratory failure from presumed pneumonia although source has not been identified.      Interval events:   Sweep up to 1.5 overnight, otherwise stable.    The patients vital signs today are as follows:    Temp:  [94.1  F (34.5  C)-99.1  F (37.3  C)] 98.1  F (36.7  C)  Pulse:  [] 75  Resp:  [19-37] 27  MAP:  [53 mmHg-95 mmHg] 73 mmHg  Arterial Line BP: ()/(34-69) 123/51  FiO2 (%):  [60 %] 60 %  SpO2:  [91 %-100 %] 100 %       FiO2 (%): 60 %, Resp: 27, Inspiratory Pressure (cm H2O) (Drager Jessie): 25, Vent Mode: PCV Plus assist, Resp Rate (Set): 10 breaths/min, PEEP (cm H2O): 10 cmH2O, Resp Rate (Set): 10 breaths/min, PEEP (cm H2O): 10 cmH2O   Recent Labs   Lab 08/17/24  1005 08/17/24  0756 08/17/24  0550 08/17/24  0403   PH 7.31* 7.34* 7.32* 7.32*   PCO2 47* 44 44 45   PO2 153* 142* 158* 172*   HCO3 24 23 23 23   O2PER 60 60 60 60      Recent Labs   Lab 08/17/24  1005 08/17/24  0402 08/16/24  2145 08/16/24  1559   WBC 12.7* 15.1* 22.4* 17.9*   HGB 7.1* 7.3* 7.8* 7.7*     Creatinine   Date Value Ref Range Status   08/17/2024 1.58 (H) 0.51 - 0.95 mg/dL Final   08/17/2024 1.62 (H) 0.51 - 0.95 mg/dL Final   08/16/2024 1.61 (H) 0.51 - 0.95 mg/dL Final   08/16/2024 1.55 (H) 0.51 - 0.95 mg/dL Final     Physical Exam:   Cannula unchanged. Oozing from right groin cannula.  Air movement bilaterally.  Extremities edematous.    ECMO Issues including assessments and plan on DOS 8/17/2024:    Neuro: Sedated, lightening sedation progressively. RASS goal: -1 to 0.   CV: Levophed at low dose, stable  Pulm: Severe respiratory failure requiring ECMO and mechanical ventilation. Flows unchanged at ~4.3 LPM   Keep vent settings at rest settings, do not titrate FiO2 above 60%. TVs improving, still more than 400 mL. Will attempt trial of sweep off again today.  FEN/Renal: remains on CRRT  Heme:  Hemoglobin 7.1.  Goals: if O2 sat >85% Hgb 7-8.  If O2 Sat <85% keep Hgb 10-12. Bivalrudin gtt for anticoagulation due to concern for heparin resistance. PTT goal 44-88.  ID: on broad-spectrum antimicrobials, continue     All pertinent labs, imaging studies, physical exam and medications have been reviewed by me.     Discussed with staff, Dr. Jose Carlos Powell  Surgical Critical Care Fellow

## 2024-08-17 NOTE — PLAN OF CARE
Major Shift Events:   Neuro- Opens eyes spontaneously. Pupils brisk, equal. Pupillometer every 4 hours per order. Coughs/ bites down on ETT with suctioning. Dorsiflexion bilateral toes to pain. Not following commands. Weaned Versed to 2 mg/hr.   CV- SR with HR 70-90's. Rare ectopy. Afebrile. MAP > 65 maintained with Levophed gtt. Palpable pulses. Generalized edema.   Resp- Lung sounds crackles/diminished. Scant secretions from ETT. Continued red tinged moderate amount of oral secretions. Vent mode PCV + assist. Tv in 300's. Weaned off ECMO sweep.   GI- TF at goal. Abdomen soft, rounded. Bowel sounds auscultated X 4. Rectal  tube in place with large output.   - Anuric. CRRT with goal of I=O, met. Bladder can volume 24 mL.   Skin- Mild oozing R Fem cannula site, dressing reinforced.   Family updated at bedside.   Plan: Continue cares per orders.   For vital signs and complete assessments, please see documentation flowsheets.      Goal Outcome Evaluation: Progressing     Problem: ARDS (Acute Respiratory Distress Syndrome)  Goal: Effective Oxygenation  Intervention: Optimize Oxygenation, Ventilation and Perfusion  Recent Flowsheet Documentation  Taken 8/17/2024 1400 by Antoinette Antonio, RN  Head of Bed (HOB) Positioning: HOB at 20-30 degrees  Taken 8/17/2024 1200 by Antoinette Antonio, RN  Lung Protection Measures:   lung compliance monitored   ventilator synchrony promoted   fluid excess minimized  Airway/Ventilation Management:   airway patency maintained   calming measures promoted   humidification applied   pulmonary hygiene promoted  Head of Bed (HOB) Positioning: HOB at 20-30 degrees  Taken 8/17/2024 1000 by Antoinette Antonio, RN  Head of Bed (HOB) Positioning: HOB at 20-30 degrees  Taken 8/17/2024 0800 by Antoinette Antonio, RN  Lung Protection Measures:   lung compliance monitored   ventilator synchrony promoted   fluid excess minimized  Airway/Ventilation Management:   airway patency maintained   calming measures  promoted   humidification applied   pulmonary hygiene promoted  Head of Bed (HOB) Positioning: HOB at 20-30 degrees

## 2024-08-17 NOTE — PROGRESS NOTES
SURGICAL ICU PROGRESS NOTE  08/09/2024           Date of Service (when I saw the patient): 08/09/2024     ASSESSMENT:  Massiel Flaherty is a 53 year old female who was admitted to [facility].   Past medical hx of Anxiety/depression, fibromyalgia, hypothyroidism, asthma, HLD, MURIEL on CPAP, spells, off on anti seizure medications, expressive aphasia, hypertension was seen at [facility] and was placed on Augmentin outpatient on 7/30/24.   She admitted to the hospital on 8/3/24 for fatigue, fever and dyspnea and intubated 8/6/24 at OSH, proned and paralyzed without improvement. Transferred to [facility] 8/8/24, hypoxic with high plateaus/peaks and placed on VV EMCO and CRRT.      CHANGES and MAJOR THINGS TODAY:   Stop Fentanyl gtt and start propofol gtt ( due to circuit binding)   Stop cistaricum gtt   Stop veletri inhaled   Stop duonebs today   ECMO team to adjust cannulas based on imaging.   CRRT per Renal protocol   RD to place NJ feeding tube   D10 PRN hypoglycemia   - change labs to every 6 hours  Pharm D, to aid in medication reconciliation for pta medications   ID consult for evaluation of infectious source of respiratory failure   Azithromycin added  Bowel regimen to be added      PLAN:Neurological:  # Fibromyalgia   # Anxiety   # sedation and analgesia for vent compliance   - Monitor neurological status. Delirium preventions and precautions.   - Pain: Fentnayl gtt         - Sedation plan: propofol gtt   -  Paralysis: cisatricum gtt   - TOF 2/4. BIS monitoring target goal 40-60  -  plan to stop paralysis today, transition from fentanyl to dilaudid gtt   - will review PTA medications and resume appropriate medications as able.      Pulmonary:   # Asthma  # MURIEL on home CPAP   #  Acute hypoxic and hypercapnic respiratory failure   # s/p VV EMCO 8/8  Vent Mode: PCV Plus assist  FiO2 (%): 40 %  Resp Rate (Set): 16 breaths/min  Tidal Volume (Set, mL): 300 mL  PEEP (cm H2O): 10 cmH2O  Inspiratory Pressure (cm H2O)  (Drager Jessie): 25  Resp: 16  - stop inhaled epoprostenol as minimal Vt's.   - VT's 30-60ml  - stop duonebs scheduled   - continue with rest vent setting on ECMO. Plan to optimize ECMO      ECMO:  Sweep 4LPM  Flow 4.08  Heparin gt 1250, ACT goal of 160--180      Cardiovascular:    # hyperlipidemia    # shock, presumed septic  - MAP >65. Monitor HD status   - norepinephrine gtt and  Vasopressin 2.4 unit/shr --OFF since 8/8/24   - recheck lactic level 4.0--> discussed with RN and ecmo specialist, likely erroneous labs, hold off LR bolus previously ordered     Gastroenterology/Nutrition:  # GERD   # Protein calorie deficit malnutrition   - Protonix (home medication)   - OG to LIS   - RD consult in am for SBFT and TF recommendations      Fluids/Electrolytes/Renal:  # Acute kidney injury  # Hyperkalemia, resolved  # Hyperphosphatemia   - K 5.8, suspect from acidosis, now resolved   - Renal Consulted.               - CRRT started. UF pull -100Ml/hr   - labs every 6 hrs to concided with ECMO.      Endocrine:  # Hypothyroidism   # concern for stress hyperglycemia due to critical illness   # hypoglycemia   - synthroid resumed   - Blood glucose 74 this am, relatives hypoglycemia, will give D50 amp now and start PRN D10 W for BG management until feeds started   - continue with sliding scale insulin due to feed start, reassess needs. Goal to keep BG< 180 for optimal wound healing      ID:  # Leukocytosis, now resumed   # Pneumonia   PER OSH: 8/6: RSV, COVID, parainfluenza,  negative . Legionella and pneumococcal urine antigens negative. Patient has been on greater than 20 mg prednisone daily for 18 days.  CULTURES: 8/8/2024: Respiratory viral panel negative. Blood cultures negative, MRSA nasal negative, Legionella urine antigen negative, respiratory culture negative.PCR trachea for PJP is negative.  COVID: Negative. Viral panel-negative PJP     [facility] labs:   8/8: Resp negative.   MRSA negative   - Cryptococcal AG negative  "  Pending: Blastomycosis, aspergillus, Coccidioides, BC      Heme:     # Acute blood loss anemia   - transfusion parameters per EMCO protocol                - one unit prbc 8/8/24  - Transfuse if hgb <7.0 or signs/symptoms of hypoperfusion. Monitor and trend.       Musculoskeletal:  # Weakness and deconditioning of critical illness   - Physical and occupational therapy when able.      Skin:  # Ecchymosis - BLE   - bilateral lower leg ecchymosis   - WOC consult      General Cares/Prophylaxis:    DVT Prophylaxis: Heparin gtt per ECMO   GI Prophylaxis: PPI  Restraints: Restraints for medical healing needed: NO     Lines/ tubes/ drains:  - ETT   - Right  ECMO cannula   - Right Femoral cannula   - Left internal jugular   - radial arterial line   - OG   - Gonzalez   - PIV\"s      Disposition:  -  Surgical ICU         Time spent on this Encounter   Billing:  I spent 55 minutes bedside and on the inpatient unit today managing the critical care of Massiel Flaherty, EXCLUSIVE of procedures,  in relation to the issues listed in this note.        Giovanny Guidry PA-C     ====================================  INTERVAL HISTORY:  Cannulated for VV ECMO  last evening. CRRT started. Weaned off pressors overnight. Hypoglycemia this am BG 74 and treated with dextrose. ROS not able to per formed due to chemical sedation and paralysis.      OBJECTIVE:   1. VITAL SIGNS:   Temp:  [97.7  F (36.5  C)-100  F (37.8  C)] 98.1  F (36.7  C)  Pulse:  [] 78  Resp:  [16-30] 16  MAP:  [53 mmHg-177 mmHg] 74 mmHg  Arterial Line BP: ()/(45-94) 100/56  FiO2 (%):  [40 %-100 %] 40 %  SpO2:  [41 %-100 %] 100 %  Vent Mode: PCV Plus assist  FiO2 (%): 40 %  Resp Rate (Set): 16 breaths/min  Tidal Volume (Set, mL): 300 mL  PEEP (cm H2O): 10 cmH2O  Inspiratory Pressure (cm H2O) (Drager Jessie): 25  Resp: 16        2. INTAKE/ OUTPUT:   I/O last 3 completed shifts:  In: 246.51 [I.V.:246.51]  Out: 175 [Urine:175]     3. PHYSICAL EXAMINATION:  General: " chemically sedated. TOF 0/4. RASS -5  HEENT: PERRLA. Pupils 2mm and reactive. ETT present and secured. OG to LIS with thin gree drainage.   RIght neck with ECMO cannula, sutured in place.  Left internal jugular in place, secured.   Neuro: chemically paralyzed.   Pulm/Resp:  minimal breath sounds, VT's 30-50   CV: RRR, S1/S2   Abdomen: Soft, non-distended, non-tender, no rebound tenderness or guarding, no masses  : (+) rolle catheter in place, urine yellow and clear  Incisions/Skin: scattered ecchymosis in BLE in toes around ankles bilaterally   MSK/Extremities:generalize BLE 1+ edema. Calves compressible, (+) doppler tones  DP/PT on feet.      4. INVESTIGATIONS:   Arterial Blood Gases          Recent Labs   Lab 08/09/24  0555 08/09/24 0339 08/09/24 0159 08/08/24 2358   PH 7.36 7.33* 7.31* 7.28*   PCO2 35 37 36 35   PO2 190* 173* 196* 182*   HCO3 20* 19* 18* 17*      Complete Blood Count          Recent Labs   Lab 08/09/24  0339 08/08/24 2137 08/08/24 1954 08/08/24  1641   WBC 9.2 14.1* 16.3* 27.2*   HGB 8.2* 7.0* 7.5* 9.9*    325 326 568*      Basic Metabolic Panel             Recent Labs   Lab 08/09/24  0346 08/09/24 0339 08/08/24 2357 08/08/24 2137 08/08/24 1959 08/08/24 1954 08/08/24  1642 08/08/24  1641   NA  --  139  --  138  --  138  --  140   POTASSIUM  --  4.5  --  5.0  --  4.8  --  5.8*   CHLORIDE  --  107  --  104  --  105  --  106   CO2  --  18*  --  15*  --  17*  --  20*   BUN  --  39.7*  --  43.6*  --  43.3*  --  42.5*   CR  --  2.82*  --  3.39*  --  3.31*  --  3.28*   GLC 81 81 120* 147*   < > 145*   < > 153*    < > = values in this interval not displayed.      Liver Function Tests        Recent Labs   Lab 08/09/24  0339 08/08/24  2137 08/08/24  1641   AST  --   --  76*   ALT 25  --  37   ALKPHOS 326*  --  500*   BILITOTAL 0.3  --  0.6   ALBUMIN 2.4*  --  3.0*   INR 1.25* 1.45* 1.36*      Pancreatic Enzymes  No lab results found in last 7 days.  Coagulation Profile        Recent Labs    Lab 08/09/24  0339 08/08/24  2137 08/08/24  1641   INR 1.25* 1.45* 1.36*   PTT 31 37  --             5. RADIOLOGY:       Recent Results (from the past 24 hour(s))   XR Chest Port 1 View     Narrative     PORTABLE CHEST X-RAY 8:13 AM     Comparison is made with a prior examination dated 8/7/2024.     FINDINGS: An endotracheal tube is in place with its tip approximately 4.5 cm from the ivy. A temperature sensor is noted overlying the cardiac silhouette. An enteric tube is in place with its tip in the stomach. A right IJ catheter is in place with its tip at the atrial caval junction. Persistent opacification of the lungs is noted bilaterally with no significant change. Blunting of the costophrenic angles is present. Increased pulmonary vascularity appears to be present particularly in the perihilar regions.     IMPRESSION:  No significant interval change since the prior examination.     Edited by: filemon 8/8/2024 9:54 AM CDT     Finalized by: Venkat Simpson on 8/8/2024 10:31 AM CDT   ECHO Congenital Transthoracic (TTE)     Narrative       Limited echocardiogram performed for assessment of LV systolic   function.    Left Ventricle: The left ventricle appears normal in size. The ejection   fraction, measured by biplane, is 71%. There are no wall motion   abnormalities.    Limited assessment of RV.  Normal appearing right ventricular chamber   size and systolic function.    Normal IVC size with minimal respirophasic changes.    No prior study for comparison.           Left Ventricle  The left ventricle appears normal in size. Wall thickness is normal. The ejection fraction, measured by biplane, is 71%. There are no wall motion abnormalities.     IVC/SVC  Normal IVC size with minimal respirophasic changes.     Pericardium  There is no pericardial effusion.     Study Details  A limited echo was performed with limited 2D. The sonographer requested the use of Definity- imaging enhancing agent per protocol. The patient  denies contraindications and gives verbal consent for imaging enhancing agent injection.   XR Chest Port 1 View     Narrative     Exam: XR CHEST PORT 1 VIEW, 8/8/2024 4:50 PM     Indication: check ETT tube     Comparison: None     Findings:   The endotracheal tube terminates in the midthoracic trachea.  Esophageal temperature probe projects over the mid thoracic esophagus.  Enteric tube terminates below the field of view. Right internal  jugular central venous catheter tip terminates in the low SVC. Diffuse  mixed/consolidative pulmonary opacities with air bronchograms and  silhouetting of the mediastinum and hemidiaphragms. No pneumothorax.        Impression     Impression:   1. Endotracheal tube terminates in mid thoracic trachea.  2. Extensive diffuse pulmonary opacities.     I have personally reviewed the examination and initial interpretation  and I agree with the findings.     KOKO FLETCHER MD         SYSTEM ID:  [id number]   XR Chest Port 1 View     Impression     RESIDENT PRELIMINARY INTERPRETATION  Impression:   1. Inferior and superior approach ECMO cannulas appear to project over  the expected location of the right atrium.  2. Complete opacification of the lung fields with air bronchograms,  progressed compared to earlier today.   XR Abdomen Port 1 View     Impression     RESIDENT PRELIMINARY INTERPRETATION  Impression:   1. Enteric tube terminates within the stomach.  2. Inferior approach echo cannula projects over the expected location  of the right atrium; however, evaluation is limited due to dense  pulmonary opacification.   XR Chest Port 1 View     Impression     RESIDENT PRELIMINARY INTERPRETATION  Impression:   1. Stable support devices.  2. Unchanged complete opacification of the lungs with air  bronchograms.         =========================================TE

## 2024-08-18 ENCOUNTER — APPOINTMENT (OUTPATIENT)
Dept: GENERAL RADIOLOGY | Facility: CLINIC | Age: 54
DRG: 003 | End: 2024-08-18
Attending: SURGERY
Payer: MEDICAID

## 2024-08-18 LAB
ABO/RH(D): NORMAL
ALBUMIN SERPL BCG-MCNC: 3 G/DL (ref 3.5–5.2)
ALBUMIN SERPL BCG-MCNC: 3.1 G/DL (ref 3.5–5.2)
ALBUMIN SERPL BCG-MCNC: 3.3 G/DL (ref 3.5–5.2)
ALLEN'S TEST: ABNORMAL
ALP SERPL-CCNC: 112 U/L (ref 40–150)
ALT SERPL W P-5'-P-CCNC: 18 U/L (ref 0–50)
ANION GAP SERPL CALCULATED.3IONS-SCNC: 11 MMOL/L (ref 7–15)
ANION GAP SERPL CALCULATED.3IONS-SCNC: 12 MMOL/L (ref 7–15)
ANION GAP SERPL CALCULATED.3IONS-SCNC: 12 MMOL/L (ref 7–15)
ANION GAP SERPL CALCULATED.3IONS-SCNC: 13 MMOL/L (ref 7–15)
ANTIBODY SCREEN: NEGATIVE
APTT PPP: 32 SECONDS (ref 22–38)
APTT PPP: 45 SECONDS (ref 22–38)
AST SERPL W P-5'-P-CCNC: 34 U/L (ref 0–45)
AT III ACT/NOR PPP CHRO: 105 % (ref 85–135)
BASE EXCESS BLDA CALC-SCNC: -2.4 MMOL/L (ref -3–3)
BASE EXCESS BLDA CALC-SCNC: -2.5 MMOL/L (ref -3–3)
BASE EXCESS BLDA CALC-SCNC: -2.6 MMOL/L (ref -3–3)
BASE EXCESS BLDA CALC-SCNC: -3.2 MMOL/L (ref -3–3)
BASE EXCESS BLDA CALC-SCNC: -3.6 MMOL/L (ref -3–3)
BASE EXCESS BLDA CALC-SCNC: -4.2 MMOL/L (ref -3–3)
BASE EXCESS BLDA CALC-SCNC: -5 MMOL/L (ref -3–3)
BASE EXCESS BLDA CALC-SCNC: -5.9 MMOL/L (ref -3–3)
BASE EXCESS BLDV CALC-SCNC: -1.5 MMOL/L (ref -3–3)
BASE EXCESS BLDV CALC-SCNC: -2.1 MMOL/L (ref -3–3)
BASOPHILS # BLD AUTO: ABNORMAL 10*3/UL
BASOPHILS # BLD MANUAL: 0 10E3/UL (ref 0–0.2)
BASOPHILS # BLD MANUAL: 0.1 10E3/UL (ref 0–0.2)
BASOPHILS # BLD MANUAL: 0.3 10E3/UL (ref 0–0.2)
BASOPHILS # BLD MANUAL: 0.4 10E3/UL (ref 0–0.2)
BASOPHILS NFR BLD AUTO: ABNORMAL %
BASOPHILS NFR BLD MANUAL: 0 %
BASOPHILS NFR BLD MANUAL: 1 %
BASOPHILS NFR BLD MANUAL: 2 %
BASOPHILS NFR BLD MANUAL: 3 %
BILIRUB DIRECT SERPL-MCNC: <0.2 MG/DL (ref 0–0.3)
BILIRUB SERPL-MCNC: 0.5 MG/DL
BLD PROD TYP BPU: NORMAL
BLD PROD TYP BPU: NORMAL
BLOOD COMPONENT TYPE: NORMAL
BLOOD COMPONENT TYPE: NORMAL
BUN SERPL-MCNC: 39.8 MG/DL (ref 6–20)
BUN SERPL-MCNC: 43.1 MG/DL (ref 6–20)
BUN SERPL-MCNC: 47.4 MG/DL (ref 6–20)
BUN SERPL-MCNC: 50.2 MG/DL (ref 6–20)
BUN SERPL-MCNC: 51.5 MG/DL (ref 6–20)
BUN SERPL-MCNC: 58.2 MG/DL (ref 6–20)
CA-I BLD-MCNC: 4.4 MG/DL (ref 4.4–5.2)
CA-I BLD-MCNC: 4.6 MG/DL (ref 4.4–5.2)
CA-I BLD-MCNC: 4.6 MG/DL (ref 4.4–5.2)
CA-I BLD-MCNC: 4.7 MG/DL (ref 4.4–5.2)
CA-I BLD-MCNC: 4.7 MG/DL (ref 4.4–5.2)
CA-I BLD-MCNC: 4.8 MG/DL (ref 4.4–5.2)
CALCIUM SERPL-MCNC: 7.6 MG/DL (ref 8.8–10.4)
CALCIUM SERPL-MCNC: 7.8 MG/DL (ref 8.8–10.4)
CALCIUM SERPL-MCNC: 7.9 MG/DL (ref 8.8–10.4)
CALCIUM SERPL-MCNC: 8 MG/DL (ref 8.8–10.4)
CALCIUM SERPL-MCNC: 8.2 MG/DL (ref 8.8–10.4)
CALCIUM SERPL-MCNC: 8.2 MG/DL (ref 8.8–10.4)
CHLORIDE SERPL-SCNC: 105 MMOL/L (ref 98–107)
CHLORIDE SERPL-SCNC: 106 MMOL/L (ref 98–107)
CHLORIDE SERPL-SCNC: 106 MMOL/L (ref 98–107)
CHLORIDE SERPL-SCNC: 108 MMOL/L (ref 98–107)
CHLORIDE SERPL-SCNC: 108 MMOL/L (ref 98–107)
CHLORIDE SERPL-SCNC: 110 MMOL/L (ref 98–107)
CODING SYSTEM: NORMAL
CODING SYSTEM: NORMAL
COHGB MFR BLD: 100 % (ref 96–97)
COHGB MFR BLD: 92.1 % (ref 96–97)
COHGB MFR BLD: 92.4 % (ref 96–97)
COHGB MFR BLD: 93.2 % (ref 96–97)
COHGB MFR BLD: 93.7 % (ref 96–97)
COHGB MFR BLD: 97.3 % (ref 96–97)
COHGB MFR BLD: 97.6 % (ref 96–97)
COHGB MFR BLD: 98.3 % (ref 96–97)
COHGB MFR BLD: 99.7 % (ref 96–97)
COHGB MFR BLD: >100 % (ref 96–97)
CREAT SERPL-MCNC: 1.49 MG/DL (ref 0.51–0.95)
CREAT SERPL-MCNC: 1.51 MG/DL (ref 0.51–0.95)
CREAT SERPL-MCNC: 1.79 MG/DL (ref 0.51–0.95)
CREAT SERPL-MCNC: 1.84 MG/DL (ref 0.51–0.95)
CREAT SERPL-MCNC: 1.92 MG/DL (ref 0.51–0.95)
CREAT SERPL-MCNC: 2.08 MG/DL (ref 0.51–0.95)
CROSSMATCH: NORMAL
CROSSMATCH: NORMAL
D DIMER PPP FEU-MCNC: 2.92 UG/ML FEU (ref 0–0.5)
EGFRCR SERPLBLD CKD-EPI 2021: 28 ML/MIN/1.73M2
EGFRCR SERPLBLD CKD-EPI 2021: 31 ML/MIN/1.73M2
EGFRCR SERPLBLD CKD-EPI 2021: 32 ML/MIN/1.73M2
EGFRCR SERPLBLD CKD-EPI 2021: 33 ML/MIN/1.73M2
EGFRCR SERPLBLD CKD-EPI 2021: 41 ML/MIN/1.73M2
EGFRCR SERPLBLD CKD-EPI 2021: 42 ML/MIN/1.73M2
EOSINOPHIL # BLD AUTO: ABNORMAL 10*3/UL
EOSINOPHIL # BLD MANUAL: 0.3 10E3/UL (ref 0–0.7)
EOSINOPHIL # BLD MANUAL: 0.4 10E3/UL (ref 0–0.7)
EOSINOPHIL # BLD MANUAL: 0.5 10E3/UL (ref 0–0.7)
EOSINOPHIL # BLD MANUAL: 0.7 10E3/UL (ref 0–0.7)
EOSINOPHIL NFR BLD AUTO: ABNORMAL %
EOSINOPHIL NFR BLD MANUAL: 2 %
EOSINOPHIL NFR BLD MANUAL: 3 %
EOSINOPHIL NFR BLD MANUAL: 4 %
EOSINOPHIL NFR BLD MANUAL: 4 %
ERYTHROCYTE [DISTWIDTH] IN BLOOD BY AUTOMATED COUNT: 18.6 % (ref 10–15)
ERYTHROCYTE [DISTWIDTH] IN BLOOD BY AUTOMATED COUNT: 18.7 % (ref 10–15)
ERYTHROCYTE [DISTWIDTH] IN BLOOD BY AUTOMATED COUNT: 18.8 % (ref 10–15)
ERYTHROCYTE [DISTWIDTH] IN BLOOD BY AUTOMATED COUNT: 19 % (ref 10–15)
ERYTHROCYTE [DISTWIDTH] IN BLOOD BY AUTOMATED COUNT: 19 % (ref 10–15)
ERYTHROCYTE [DISTWIDTH] IN BLOOD BY AUTOMATED COUNT: 19.3 % (ref 10–15)
FIBRINOGEN PPP-MCNC: 289 MG/DL (ref 170–510)
GLUCOSE BLDC GLUCOMTR-MCNC: 104 MG/DL (ref 70–99)
GLUCOSE BLDC GLUCOMTR-MCNC: 109 MG/DL (ref 70–99)
GLUCOSE BLDC GLUCOMTR-MCNC: 114 MG/DL (ref 70–99)
GLUCOSE BLDC GLUCOMTR-MCNC: 121 MG/DL (ref 70–99)
GLUCOSE BLDC GLUCOMTR-MCNC: 123 MG/DL (ref 70–99)
GLUCOSE BLDC GLUCOMTR-MCNC: 127 MG/DL (ref 70–99)
GLUCOSE BLDC GLUCOMTR-MCNC: 131 MG/DL (ref 70–99)
GLUCOSE BLDC GLUCOMTR-MCNC: 149 MG/DL (ref 70–99)
GLUCOSE BLDC GLUCOMTR-MCNC: 154 MG/DL (ref 70–99)
GLUCOSE BLDC GLUCOMTR-MCNC: 157 MG/DL (ref 70–99)
GLUCOSE BLDC GLUCOMTR-MCNC: 169 MG/DL (ref 70–99)
GLUCOSE BLDC GLUCOMTR-MCNC: 85 MG/DL (ref 70–99)
GLUCOSE BLDC GLUCOMTR-MCNC: 86 MG/DL (ref 70–99)
GLUCOSE BLDC GLUCOMTR-MCNC: 98 MG/DL (ref 70–99)
GLUCOSE SERPL-MCNC: 123 MG/DL (ref 70–99)
GLUCOSE SERPL-MCNC: 129 MG/DL (ref 70–99)
GLUCOSE SERPL-MCNC: 137 MG/DL (ref 70–99)
GLUCOSE SERPL-MCNC: 138 MG/DL (ref 70–99)
GLUCOSE SERPL-MCNC: 173 MG/DL (ref 70–99)
GLUCOSE SERPL-MCNC: 183 MG/DL (ref 70–99)
HCO3 BLD-SCNC: 20 MMOL/L (ref 21–28)
HCO3 BLD-SCNC: 21 MMOL/L (ref 21–28)
HCO3 BLD-SCNC: 22 MMOL/L (ref 21–28)
HCO3 BLD-SCNC: 22 MMOL/L (ref 21–28)
HCO3 BLD-SCNC: 23 MMOL/L (ref 21–28)
HCO3 BLD-SCNC: 23 MMOL/L (ref 21–28)
HCO3 BLD-SCNC: 24 MMOL/L (ref 21–28)
HCO3 BLDV-SCNC: 25 MMOL/L (ref 21–28)
HCO3 BLDV-SCNC: 25 MMOL/L (ref 21–28)
HCO3 SERPL-SCNC: 18 MMOL/L (ref 22–29)
HCO3 SERPL-SCNC: 19 MMOL/L (ref 22–29)
HCO3 SERPL-SCNC: 20 MMOL/L (ref 22–29)
HCO3 SERPL-SCNC: 21 MMOL/L (ref 22–29)
HCO3 SERPL-SCNC: 21 MMOL/L (ref 22–29)
HCO3 SERPL-SCNC: 22 MMOL/L (ref 22–29)
HCT VFR BLD AUTO: 24.9 % (ref 35–47)
HCT VFR BLD AUTO: 25.3 % (ref 35–47)
HCT VFR BLD AUTO: 25.6 % (ref 35–47)
HCT VFR BLD AUTO: 25.6 % (ref 35–47)
HCT VFR BLD AUTO: 26 % (ref 35–47)
HCT VFR BLD AUTO: 26.8 % (ref 35–47)
HGB BLD-MCNC: 7.8 G/DL (ref 11.7–15.7)
HGB BLD-MCNC: 7.9 G/DL (ref 11.7–15.7)
HGB BLD-MCNC: 8.2 G/DL (ref 11.7–15.7)
HGB BLD-MCNC: 8.3 G/DL (ref 11.7–15.7)
HGB FREE PLAS-MCNC: 30 MG/DL
IMM GRANULOCYTES # BLD: ABNORMAL 10*3/UL
IMM GRANULOCYTES NFR BLD: ABNORMAL %
ISSUE DATE AND TIME: NORMAL
ISSUE DATE AND TIME: NORMAL
LACTATE SERPL-SCNC: 0.6 MMOL/L (ref 0.7–2)
LACTATE SERPL-SCNC: 0.6 MMOL/L (ref 0.7–2)
LACTATE SERPL-SCNC: 0.7 MMOL/L (ref 0.7–2)
LACTATE SERPL-SCNC: 1.1 MMOL/L (ref 0.7–2)
LYMPHOCYTES # BLD AUTO: ABNORMAL 10*3/UL
LYMPHOCYTES # BLD MANUAL: 0.6 10E3/UL (ref 0.8–5.3)
LYMPHOCYTES # BLD MANUAL: 0.8 10E3/UL (ref 0.8–5.3)
LYMPHOCYTES # BLD MANUAL: 1.2 10E3/UL (ref 0.8–5.3)
LYMPHOCYTES # BLD MANUAL: 2.1 10E3/UL (ref 0.8–5.3)
LYMPHOCYTES NFR BLD AUTO: ABNORMAL %
LYMPHOCYTES NFR BLD MANUAL: 12 %
LYMPHOCYTES NFR BLD MANUAL: 4 %
LYMPHOCYTES NFR BLD MANUAL: 6 %
LYMPHOCYTES NFR BLD MANUAL: 9 %
MAGNESIUM SERPL-MCNC: 2.3 MG/DL (ref 1.7–2.3)
MAGNESIUM SERPL-MCNC: 2.4 MG/DL (ref 1.7–2.3)
MAGNESIUM SERPL-MCNC: 2.4 MG/DL (ref 1.7–2.3)
MCH RBC QN AUTO: 30.1 PG (ref 26.5–33)
MCH RBC QN AUTO: 30.4 PG (ref 26.5–33)
MCH RBC QN AUTO: 30.5 PG (ref 26.5–33)
MCH RBC QN AUTO: 30.5 PG (ref 26.5–33)
MCH RBC QN AUTO: 30.6 PG (ref 26.5–33)
MCH RBC QN AUTO: 30.8 PG (ref 26.5–33)
MCHC RBC AUTO-ENTMCNC: 31 G/DL (ref 31.5–36.5)
MCHC RBC AUTO-ENTMCNC: 31.2 G/DL (ref 31.5–36.5)
MCHC RBC AUTO-ENTMCNC: 31.3 G/DL (ref 31.5–36.5)
MCHC RBC AUTO-ENTMCNC: 31.9 G/DL (ref 31.5–36.5)
MCHC RBC AUTO-ENTMCNC: 32 G/DL (ref 31.5–36.5)
MCHC RBC AUTO-ENTMCNC: 32.4 G/DL (ref 31.5–36.5)
MCV RBC AUTO: 94 FL (ref 78–100)
MCV RBC AUTO: 94 FL (ref 78–100)
MCV RBC AUTO: 96 FL (ref 78–100)
MCV RBC AUTO: 98 FL (ref 78–100)
METAMYELOCYTES # BLD MANUAL: 0.2 10E3/UL
METAMYELOCYTES # BLD MANUAL: 0.3 10E3/UL
METAMYELOCYTES NFR BLD MANUAL: 1 %
METAMYELOCYTES NFR BLD MANUAL: 2 %
MONOCYTES # BLD AUTO: ABNORMAL 10*3/UL
MONOCYTES # BLD MANUAL: 0.3 10E3/UL (ref 0–1.3)
MONOCYTES # BLD MANUAL: 0.7 10E3/UL (ref 0–1.3)
MONOCYTES # BLD MANUAL: 0.9 10E3/UL (ref 0–1.3)
MONOCYTES # BLD MANUAL: 1.2 10E3/UL (ref 0–1.3)
MONOCYTES NFR BLD AUTO: ABNORMAL %
MONOCYTES NFR BLD MANUAL: 2 %
MONOCYTES NFR BLD MANUAL: 4 %
MONOCYTES NFR BLD MANUAL: 6 %
MONOCYTES NFR BLD MANUAL: 9 %
MYELOCYTES # BLD MANUAL: 0.1 10E3/UL
MYELOCYTES # BLD MANUAL: 0.7 10E3/UL
MYELOCYTES NFR BLD MANUAL: 1 %
MYELOCYTES NFR BLD MANUAL: 4 %
NEUTROPHILS # BLD AUTO: ABNORMAL 10*3/UL
NEUTROPHILS # BLD MANUAL: 11.8 10E3/UL (ref 1.6–8.3)
NEUTROPHILS # BLD MANUAL: 12.6 10E3/UL (ref 1.6–8.3)
NEUTROPHILS # BLD MANUAL: 12.8 10E3/UL (ref 1.6–8.3)
NEUTROPHILS # BLD MANUAL: 9.9 10E3/UL (ref 1.6–8.3)
NEUTROPHILS NFR BLD AUTO: ABNORMAL %
NEUTROPHILS NFR BLD MANUAL: 73 %
NEUTROPHILS NFR BLD MANUAL: 76 %
NEUTROPHILS NFR BLD MANUAL: 86 %
NEUTROPHILS NFR BLD MANUAL: 86 %
NRBC # BLD AUTO: 0.1 10E3/UL
NRBC BLD AUTO-RTO: 1 /100
NRBC BLD MANUAL-RTO: 1 %
O2/TOTAL GAS SETTING VFR VENT: 100 %
O2/TOTAL GAS SETTING VFR VENT: 60 %
O2/TOTAL GAS SETTING VFR VENT: 70 %
O2/TOTAL GAS SETTING VFR VENT: 70 %
O2/TOTAL GAS SETTING VFR VENT: 80 %
OSMOLALITY SERPL: 302 MMOL/KG (ref 275–295)
OSMOLALITY SERPL: 308 MMOL/KG (ref 275–295)
OSMOLALITY SERPL: 312 MMOL/KG (ref 275–295)
OSMOLALITY SERPL: 323 MMOL/KG (ref 275–295)
OXYHGB MFR BLDV: 50 %
OXYHGB MFR BLDV: 64 % (ref 70–75)
PCO2 BLD: 40 MM HG (ref 35–45)
PCO2 BLD: 41 MM HG (ref 35–45)
PCO2 BLD: 43 MM HG (ref 35–45)
PCO2 BLD: 43 MM HG (ref 35–45)
PCO2 BLD: 45 MM HG (ref 35–45)
PCO2 BLD: 46 MM HG (ref 35–45)
PCO2 BLDV: 52 MM HG (ref 40–50)
PCO2 BLDV: 52 MM HG (ref 40–50)
PEEP: 10 CM H2O
PH BLD: 7.3 [PH] (ref 7.35–7.45)
PH BLD: 7.31 [PH] (ref 7.35–7.45)
PH BLD: 7.31 [PH] (ref 7.35–7.45)
PH BLD: 7.32 [PH] (ref 7.35–7.45)
PH BLD: 7.33 [PH] (ref 7.35–7.45)
PH BLDV: 7.29 [PH] (ref 7.32–7.43)
PH BLDV: 7.3 [PH] (ref 7.32–7.43)
PHOSPHATE SERPL-MCNC: 4.6 MG/DL (ref 2.5–4.5)
PHOSPHATE SERPL-MCNC: 5.8 MG/DL (ref 2.5–4.5)
PHOSPHATE SERPL-MCNC: 6 MG/DL (ref 2.5–4.5)
PLAT MORPH BLD: ABNORMAL
PLATELET # BLD AUTO: 135 10E3/UL (ref 150–450)
PLATELET # BLD AUTO: 146 10E3/UL (ref 150–450)
PLATELET # BLD AUTO: 147 10E3/UL (ref 150–450)
PLATELET # BLD AUTO: 149 10E3/UL (ref 150–450)
PLATELET # BLD AUTO: 154 10E3/UL (ref 150–450)
PLATELET # BLD AUTO: 161 10E3/UL (ref 150–450)
PO2 BLD: 124 MM HG (ref 80–105)
PO2 BLD: 150 MM HG (ref 80–105)
PO2 BLD: 208 MM HG (ref 80–105)
PO2 BLD: 60 MM HG (ref 80–105)
PO2 BLD: 62 MM HG (ref 80–105)
PO2 BLD: 63 MM HG (ref 80–105)
PO2 BLD: 64 MM HG (ref 80–105)
PO2 BLD: 84 MM HG (ref 80–105)
PO2 BLD: 85 MM HG (ref 80–105)
PO2 BLD: 92 MM HG (ref 80–105)
PO2 BLDV: 28 MM HG (ref 25–47)
PO2 BLDV: 36 MM HG (ref 25–47)
POLYCHROMASIA BLD QL SMEAR: SLIGHT
POLYCHROMASIA BLD QL SMEAR: SLIGHT
POTASSIUM SERPL-SCNC: 3.8 MMOL/L (ref 3.4–5.3)
POTASSIUM SERPL-SCNC: 4 MMOL/L (ref 3.4–5.3)
POTASSIUM SERPL-SCNC: 4.4 MMOL/L (ref 3.4–5.3)
POTASSIUM SERPL-SCNC: 4.6 MMOL/L (ref 3.4–5.3)
PROT SERPL-MCNC: 5.8 G/DL (ref 6.4–8.3)
RBC # BLD AUTO: 2.53 10E6/UL (ref 3.8–5.2)
RBC # BLD AUTO: 2.58 10E6/UL (ref 3.8–5.2)
RBC # BLD AUTO: 2.72 10E6/UL (ref 3.8–5.2)
RBC # BLD AUTO: 2.73 10E6/UL (ref 3.8–5.2)
RBC MORPH BLD: ABNORMAL
SAO2 % BLDA: 90 % (ref 92–100)
SAO2 % BLDA: 90 % (ref 92–100)
SAO2 % BLDA: 91 % (ref 92–100)
SAO2 % BLDA: 91 % (ref 92–100)
SAO2 % BLDA: 95 % (ref 92–100)
SAO2 % BLDA: 97 % (ref 92–100)
SAO2 % BLDA: 98 % (ref 92–100)
SAO2 % BLDA: 98 % (ref 92–100)
SAO2 % BLDV: 51.3 % (ref 70–75)
SAO2 % BLDV: 65.3 % (ref 70–75)
SODIUM SERPL-SCNC: 137 MMOL/L (ref 135–145)
SODIUM SERPL-SCNC: 138 MMOL/L (ref 135–145)
SODIUM SERPL-SCNC: 139 MMOL/L (ref 135–145)
SODIUM SERPL-SCNC: 140 MMOL/L (ref 135–145)
SODIUM SERPL-SCNC: 143 MMOL/L (ref 135–145)
SPECIMEN EXPIRATION DATE: NORMAL
UNIT ABO/RH: NORMAL
UNIT ABO/RH: NORMAL
UNIT NUMBER: NORMAL
UNIT NUMBER: NORMAL
UNIT STATUS: NORMAL
UNIT STATUS: NORMAL
UNIT TYPE ISBT: 9500
UNIT TYPE ISBT: 9500
WBC # BLD AUTO: 11.2 10E3/UL (ref 4–11)
WBC # BLD AUTO: 13 10E3/UL (ref 4–11)
WBC # BLD AUTO: 13.1 10E3/UL (ref 4–11)
WBC # BLD AUTO: 13.7 10E3/UL (ref 4–11)
WBC # BLD AUTO: 14.9 10E3/UL (ref 4–11)
WBC # BLD AUTO: 17.2 10E3/UL (ref 4–11)

## 2024-08-18 PROCEDURE — 94640 AIRWAY INHALATION TREATMENT: CPT | Mod: 76

## 2024-08-18 PROCEDURE — 82330 ASSAY OF CALCIUM: CPT

## 2024-08-18 PROCEDURE — 85384 FIBRINOGEN ACTIVITY: CPT | Performed by: SURGERY

## 2024-08-18 PROCEDURE — 82805 BLOOD GASES W/O2 SATURATION: CPT | Performed by: SURGERY

## 2024-08-18 PROCEDURE — 99233 SBSQ HOSP IP/OBS HIGH 50: CPT | Performed by: INTERNAL MEDICINE

## 2024-08-18 PROCEDURE — 85379 FIBRIN DEGRADATION QUANT: CPT | Performed by: SURGERY

## 2024-08-18 PROCEDURE — P9016 RBC LEUKOCYTES REDUCED: HCPCS | Performed by: SURGERY

## 2024-08-18 PROCEDURE — 83605 ASSAY OF LACTIC ACID: CPT | Performed by: SURGERY

## 2024-08-18 PROCEDURE — 250N000009 HC RX 250: Performed by: SURGERY

## 2024-08-18 PROCEDURE — 85014 HEMATOCRIT: CPT | Performed by: SURGERY

## 2024-08-18 PROCEDURE — 82248 BILIRUBIN DIRECT: CPT

## 2024-08-18 PROCEDURE — 250N000011 HC RX IP 250 OP 636: Performed by: STUDENT IN AN ORGANIZED HEALTH CARE EDUCATION/TRAINING PROGRAM

## 2024-08-18 PROCEDURE — 83735 ASSAY OF MAGNESIUM: CPT

## 2024-08-18 PROCEDURE — 36556 INSERT NON-TUNNEL CV CATH: CPT | Mod: GC | Performed by: SURGERY

## 2024-08-18 PROCEDURE — 82805 BLOOD GASES W/O2 SATURATION: CPT | Performed by: NURSE PRACTITIONER

## 2024-08-18 PROCEDURE — 86923 COMPATIBILITY TEST ELECTRIC: CPT | Performed by: SURGERY

## 2024-08-18 PROCEDURE — 82330 ASSAY OF CALCIUM: CPT | Performed by: SURGERY

## 2024-08-18 PROCEDURE — 94003 VENT MGMT INPAT SUBQ DAY: CPT

## 2024-08-18 PROCEDURE — 250N000013 HC RX MED GY IP 250 OP 250 PS 637: Performed by: PHYSICIAN ASSISTANT

## 2024-08-18 PROCEDURE — 84295 ASSAY OF SERUM SODIUM: CPT | Performed by: STUDENT IN AN ORGANIZED HEALTH CARE EDUCATION/TRAINING PROGRAM

## 2024-08-18 PROCEDURE — 250N000009 HC RX 250

## 2024-08-18 PROCEDURE — 33966 ECMO/ECLS RMVL PRPH CANNULA: CPT | Performed by: SURGERY

## 2024-08-18 PROCEDURE — 82310 ASSAY OF CALCIUM: CPT

## 2024-08-18 PROCEDURE — 06PYX3Z REMOVAL OF INFUSION DEVICE FROM LOWER VEIN, EXTERNAL APPROACH: ICD-10-PCS | Performed by: SURGERY

## 2024-08-18 PROCEDURE — 82805 BLOOD GASES W/O2 SATURATION: CPT

## 2024-08-18 PROCEDURE — 250N000013 HC RX MED GY IP 250 OP 250 PS 637

## 2024-08-18 PROCEDURE — 250N000013 HC RX MED GY IP 250 OP 250 PS 637: Performed by: SURGERY

## 2024-08-18 PROCEDURE — 999N000157 HC STATISTIC RCP TIME EA 10 MIN

## 2024-08-18 PROCEDURE — 86900 BLOOD TYPING SEROLOGIC ABO: CPT | Performed by: SURGERY

## 2024-08-18 PROCEDURE — 86850 RBC ANTIBODY SCREEN: CPT | Performed by: SURGERY

## 2024-08-18 PROCEDURE — 250N000013 HC RX MED GY IP 250 OP 250 PS 637: Performed by: NURSE PRACTITIONER

## 2024-08-18 PROCEDURE — 71045 X-RAY EXAM CHEST 1 VIEW: CPT

## 2024-08-18 PROCEDURE — 33946 ECMO/ECLS INITIATION VENOUS: CPT

## 2024-08-18 PROCEDURE — 85730 THROMBOPLASTIN TIME PARTIAL: CPT | Performed by: SURGERY

## 2024-08-18 PROCEDURE — 200N000002 HC R&B ICU UMMC

## 2024-08-18 PROCEDURE — 99291 CRITICAL CARE FIRST HOUR: CPT | Mod: 25 | Performed by: NURSE PRACTITIONER

## 2024-08-18 PROCEDURE — 83051 HEMOGLOBIN PLASMA: CPT | Performed by: SURGERY

## 2024-08-18 PROCEDURE — 250N000011 HC RX IP 250 OP 636: Performed by: NURSE PRACTITIONER

## 2024-08-18 PROCEDURE — 85007 BL SMEAR W/DIFF WBC COUNT: CPT | Performed by: SURGERY

## 2024-08-18 PROCEDURE — 83930 ASSAY OF BLOOD OSMOLALITY: CPT | Performed by: PSYCHIATRY & NEUROLOGY

## 2024-08-18 PROCEDURE — 84295 ASSAY OF SERUM SODIUM: CPT

## 2024-08-18 PROCEDURE — 33948 ECMO/ECLS DAILY MGMT-VENOUS: CPT

## 2024-08-18 PROCEDURE — 85300 ANTITHROMBIN III ACTIVITY: CPT | Performed by: SURGERY

## 2024-08-18 PROCEDURE — 250N000009 HC RX 250: Performed by: NURSE PRACTITIONER

## 2024-08-18 PROCEDURE — 05PYX3Z REMOVAL OF INFUSION DEVICE FROM UPPER VEIN, EXTERNAL APPROACH: ICD-10-PCS | Performed by: SURGERY

## 2024-08-18 PROCEDURE — 84295 ASSAY OF SERUM SODIUM: CPT | Performed by: SURGERY

## 2024-08-18 PROCEDURE — 84450 TRANSFERASE (AST) (SGOT): CPT

## 2024-08-18 PROCEDURE — 250N000011 HC RX IP 250 OP 636

## 2024-08-18 PROCEDURE — 94640 AIRWAY INHALATION TREATMENT: CPT

## 2024-08-18 PROCEDURE — 71045 X-RAY EXAM CHEST 1 VIEW: CPT | Mod: 26 | Performed by: RADIOLOGY

## 2024-08-18 PROCEDURE — 90947 DIALYSIS REPEATED EVAL: CPT

## 2024-08-18 PROCEDURE — 84460 ALANINE AMINO (ALT) (SGPT): CPT

## 2024-08-18 PROCEDURE — 85027 COMPLETE CBC AUTOMATED: CPT

## 2024-08-18 RX ORDER — OXYCODONE HYDROCHLORIDE 10 MG/1
10 TABLET ORAL EVERY 6 HOURS
Status: DISCONTINUED | OUTPATIENT
Start: 2024-08-18 | End: 2024-08-19

## 2024-08-18 RX ORDER — OXYCODONE HYDROCHLORIDE 5 MG/1
5 TABLET ORAL ONCE
Status: COMPLETED | OUTPATIENT
Start: 2024-08-18 | End: 2024-08-18

## 2024-08-18 RX ORDER — OXYCODONE HYDROCHLORIDE 10 MG/1
10 TABLET ORAL EVERY 4 HOURS PRN
Status: DISCONTINUED | OUTPATIENT
Start: 2024-08-18 | End: 2024-08-21

## 2024-08-18 RX ORDER — LOPERAMIDE HCL 1 MG/7.5ML
4 SOLUTION ORAL EVERY 6 HOURS
Status: DISCONTINUED | OUTPATIENT
Start: 2024-08-18 | End: 2024-08-19

## 2024-08-18 RX ADMIN — WHITE PETROLATUM 57.7 %-MINERAL OIL 31.9 % EYE OINTMENT: at 07:23

## 2024-08-18 RX ADMIN — OXYCODONE HYDROCHLORIDE 5 MG: 5 TABLET ORAL at 04:21

## 2024-08-18 RX ADMIN — AMINOCAPROIC ACID 1 G: 0.25 SOLUTION ORAL at 05:39

## 2024-08-18 RX ADMIN — DEXMEDETOMIDINE HYDROCHLORIDE IN SODIUM CHLORIDE 1.2 MCG/KG/HR: 4 INJECTION INTRAVENOUS at 05:57

## 2024-08-18 RX ADMIN — VENLAFAXINE 75 MG: 25 TABLET ORAL at 20:06

## 2024-08-18 RX ADMIN — CALCIUM CHLORIDE, MAGNESIUM CHLORIDE, SODIUM CHLORIDE, SODIUM BICARBONATE, POTASSIUM CHLORIDE AND SODIUM PHOSPHATE DIBASIC DIHYDRATE 12.5 ML/KG/HR: 3.68; 3.05; 6.34; 3.09; .314; .187 INJECTION INTRAVENOUS at 01:51

## 2024-08-18 RX ADMIN — ACETAMINOPHEN 650 MG: 325 TABLET, FILM COATED ORAL at 15:41

## 2024-08-18 RX ADMIN — IPRATROPIUM BROMIDE AND ALBUTEROL SULFATE 3 ML: .5; 3 SOLUTION RESPIRATORY (INHALATION) at 20:31

## 2024-08-18 RX ADMIN — ACETYLCYSTEINE 4 ML: 100 SOLUTION ORAL; RESPIRATORY (INHALATION) at 08:23

## 2024-08-18 RX ADMIN — CALCIUM CHLORIDE, MAGNESIUM CHLORIDE, SODIUM CHLORIDE, SODIUM BICARBONATE, POTASSIUM CHLORIDE AND SODIUM PHOSPHATE DIBASIC DIHYDRATE 12.5 ML/KG/HR: 3.68; 3.05; 6.34; 3.09; .314; .187 INJECTION INTRAVENOUS at 17:10

## 2024-08-18 RX ADMIN — Medication 40 MG: at 07:20

## 2024-08-18 RX ADMIN — OXYCODONE HYDROCHLORIDE 10 MG: 10 TABLET ORAL at 14:14

## 2024-08-18 RX ADMIN — CHLORHEXIDINE GLUCONATE 0.12% ORAL RINSE 15 ML: 1.2 LIQUID ORAL at 07:22

## 2024-08-18 RX ADMIN — IPRATROPIUM BROMIDE AND ALBUTEROL SULFATE 3 ML: .5; 3 SOLUTION RESPIRATORY (INHALATION) at 08:23

## 2024-08-18 RX ADMIN — IPRATROPIUM BROMIDE AND ALBUTEROL SULFATE 3 ML: .5; 3 SOLUTION RESPIRATORY (INHALATION) at 16:36

## 2024-08-18 RX ADMIN — CALCIUM CHLORIDE, MAGNESIUM CHLORIDE, SODIUM CHLORIDE, SODIUM BICARBONATE, POTASSIUM CHLORIDE AND SODIUM PHOSPHATE DIBASIC DIHYDRATE 12.5 ML/KG/HR: 3.68; 3.05; 6.34; 3.09; .314; .187 INJECTION INTRAVENOUS at 19:33

## 2024-08-18 RX ADMIN — LOPERAMIDE HCL 4 MG: 1 SOLUTION ORAL at 20:09

## 2024-08-18 RX ADMIN — CALCIUM CHLORIDE, MAGNESIUM CHLORIDE, SODIUM CHLORIDE, SODIUM BICARBONATE, POTASSIUM CHLORIDE AND SODIUM PHOSPHATE DIBASIC DIHYDRATE 12.5 ML/KG/HR: 3.68; 3.05; 6.34; 3.09; .314; .187 INJECTION INTRAVENOUS at 01:50

## 2024-08-18 RX ADMIN — ACETYLCYSTEINE 4 ML: 100 SOLUTION ORAL; RESPIRATORY (INHALATION) at 16:36

## 2024-08-18 RX ADMIN — Medication 0.5 MG/HR: at 19:55

## 2024-08-18 RX ADMIN — ACETYLCYSTEINE 4 ML: 100 SOLUTION ORAL; RESPIRATORY (INHALATION) at 20:31

## 2024-08-18 RX ADMIN — OXYCODONE HYDROCHLORIDE 10 MG: 10 TABLET ORAL at 08:14

## 2024-08-18 RX ADMIN — PIPERACILLIN AND TAZOBACTAM 4.5 G: 4; .5 INJECTION, POWDER, LYOPHILIZED, FOR SOLUTION INTRAVENOUS at 14:14

## 2024-08-18 RX ADMIN — LEVOTHYROXINE SODIUM 25 MCG: 0.03 TABLET ORAL at 07:24

## 2024-08-18 RX ADMIN — DEXAMETHASONE SODIUM PHOSPHATE 10 MG: 10 INJECTION, SOLUTION INTRAMUSCULAR; INTRAVENOUS at 07:23

## 2024-08-18 RX ADMIN — AMITRIPTYLINE HYDROCHLORIDE 50 MG: 50 TABLET, FILM COATED ORAL at 22:02

## 2024-08-18 RX ADMIN — Medication 60 ML: at 14:20

## 2024-08-18 RX ADMIN — LOPERAMIDE HCL 4 MG: 1 SOLUTION ORAL at 07:24

## 2024-08-18 RX ADMIN — IPRATROPIUM BROMIDE AND ALBUTEROL SULFATE 3 ML: .5; 3 SOLUTION RESPIRATORY (INHALATION) at 12:32

## 2024-08-18 RX ADMIN — Medication 1 TABLET: at 07:24

## 2024-08-18 RX ADMIN — CALCIUM CHLORIDE, MAGNESIUM CHLORIDE, SODIUM CHLORIDE, SODIUM BICARBONATE, POTASSIUM CHLORIDE AND SODIUM PHOSPHATE DIBASIC DIHYDRATE 12.5 ML/KG/HR: 3.68; 3.05; 6.34; 3.09; .314; .187 INJECTION INTRAVENOUS at 22:12

## 2024-08-18 RX ADMIN — VENLAFAXINE 75 MG: 25 TABLET ORAL at 07:24

## 2024-08-18 RX ADMIN — DEXMEDETOMIDINE HYDROCHLORIDE IN SODIUM CHLORIDE 1.2 MCG/KG/HR: 4 INJECTION INTRAVENOUS at 20:19

## 2024-08-18 RX ADMIN — CHLORHEXIDINE GLUCONATE 0.12% ORAL RINSE 15 ML: 1.2 LIQUID ORAL at 20:11

## 2024-08-18 RX ADMIN — PIPERACILLIN AND TAZOBACTAM 4.5 G: 4; .5 INJECTION, POWDER, LYOPHILIZED, FOR SOLUTION INTRAVENOUS at 01:48

## 2024-08-18 RX ADMIN — OXYCODONE HYDROCHLORIDE 5 MG: 5 TABLET ORAL at 05:39

## 2024-08-18 RX ADMIN — DEXMEDETOMIDINE HYDROCHLORIDE IN SODIUM CHLORIDE 1.2 MCG/KG/HR: 4 INJECTION INTRAVENOUS at 14:14

## 2024-08-18 RX ADMIN — CALCIUM CHLORIDE, MAGNESIUM CHLORIDE, SODIUM CHLORIDE, SODIUM BICARBONATE, POTASSIUM CHLORIDE AND SODIUM PHOSPHATE DIBASIC DIHYDRATE 12.5 ML/KG/HR: 3.68; 3.05; 6.34; 3.09; .314; .187 INJECTION INTRAVENOUS at 06:09

## 2024-08-18 RX ADMIN — Medication 60 ML: at 20:11

## 2024-08-18 RX ADMIN — WHITE PETROLATUM 57.7 %-MINERAL OIL 31.9 % EYE OINTMENT: at 23:55

## 2024-08-18 RX ADMIN — PIPERACILLIN AND TAZOBACTAM 4.5 G: 4; .5 INJECTION, POWDER, LYOPHILIZED, FOR SOLUTION INTRAVENOUS at 20:00

## 2024-08-18 RX ADMIN — CALCIUM CHLORIDE, MAGNESIUM CHLORIDE, SODIUM CHLORIDE, SODIUM BICARBONATE, POTASSIUM CHLORIDE AND SODIUM PHOSPHATE DIBASIC DIHYDRATE 12.5 ML/KG/HR: 3.68; 3.05; 6.34; 3.09; .314; .187 INJECTION INTRAVENOUS at 08:13

## 2024-08-18 RX ADMIN — WHITE PETROLATUM 57.7 %-MINERAL OIL 31.9 % EYE OINTMENT: at 15:42

## 2024-08-18 RX ADMIN — Medication 60 ML: at 07:25

## 2024-08-18 RX ADMIN — OXYCODONE HYDROCHLORIDE 10 MG: 10 TABLET ORAL at 20:06

## 2024-08-18 RX ADMIN — ACETYLCYSTEINE 4 ML: 100 SOLUTION ORAL; RESPIRATORY (INHALATION) at 12:32

## 2024-08-18 RX ADMIN — LOPERAMIDE HCL 2 MG: 1 SOLUTION ORAL at 01:48

## 2024-08-18 RX ADMIN — PIPERACILLIN AND TAZOBACTAM 4.5 G: 4; .5 INJECTION, POWDER, LYOPHILIZED, FOR SOLUTION INTRAVENOUS at 07:22

## 2024-08-18 RX ADMIN — LOPERAMIDE HCL 4 MG: 1 SOLUTION ORAL at 14:20

## 2024-08-18 RX ADMIN — DEXMEDETOMIDINE HYDROCHLORIDE IN SODIUM CHLORIDE 1.2 MCG/KG/HR: 4 INJECTION INTRAVENOUS at 02:12

## 2024-08-18 ASSESSMENT — ACTIVITIES OF DAILY LIVING (ADL)
ADLS_ACUITY_SCORE: 55
ADLS_ACUITY_SCORE: 59
ADLS_ACUITY_SCORE: 55
ADLS_ACUITY_SCORE: 59
ADLS_ACUITY_SCORE: 59
ADLS_ACUITY_SCORE: 55
ADLS_ACUITY_SCORE: 59
ADLS_ACUITY_SCORE: 59
ADLS_ACUITY_SCORE: 55
ADLS_ACUITY_SCORE: 59
ADLS_ACUITY_SCORE: 55
ADLS_ACUITY_SCORE: 59
ADLS_ACUITY_SCORE: 55
ADLS_ACUITY_SCORE: 59
ADLS_ACUITY_SCORE: 55

## 2024-08-18 NOTE — PROGRESS NOTES
Veno-venous ECMO Progress Note  8/18/2024    Massiel Flaherty is a 53 year old female who was started on VV ECMO due to severe respiratory failure from presumed pneumonia although source has not been identified.      Interval events:   Sweep off since yesterday afternoon, plan to decannulate today    The patients vital signs today are as follows:    Temp:  [96.1  F (35.6  C)-99.5  F (37.5  C)] 97.5  F (36.4  C)  Pulse:  [] 98  Resp:  [25-41] 35  MAP:  [51 mmHg-93 mmHg] 69 mmHg  Arterial Line BP: ()/(34-66) 107/52  FiO2 (%):  [60 %-100 %] 80 %  SpO2:  [88 %-100 %] 97 %       FiO2 (%): 80 %, Resp: (!) 35, Inspiratory Pressure (cm H2O) (Drager Jessie): 25, Vent Mode: PCV Plus assist, Resp Rate (Set): 10 breaths/min, PEEP (cm H2O): 10 cmH2O, Resp Rate (Set): 10 breaths/min, PEEP (cm H2O): 10 cmH2O   Recent Labs   Lab 08/18/24  1208 08/18/24  0946 08/18/24  0745 08/18/24  0604   PH 7.31* 7.32* 7.33* 7.33*   PCO2 46* 46* 45 45   PO2 84 92 85 208*   HCO3 23 24 24 23   O2PER 80 80 80 100      Recent Labs   Lab 08/18/24  0946 08/18/24  0433 08/17/24  2154 08/17/24  1950   WBC 13.7* 17.2* 16.9* 15.4*   HGB 7.9* 8.3* 7.0* 7.1*     Creatinine   Date Value Ref Range Status   08/18/2024 1.51 (H) 0.51 - 0.95 mg/dL Final   08/18/2024 1.49 (H) 0.51 - 0.95 mg/dL Final   08/17/2024 1.55 (H) 0.51 - 0.95 mg/dL Final   08/17/2024 1.55 (H) 0.51 - 0.95 mg/dL Final     Physical Exam:   Cannula unchanged. Oozing from right groin cannula.  Air movement bilaterally.  Extremities edematous.    ECMO Issues including assessments and plan on DOS 8/18/2024:    Neuro: Sedated, lightening sedation progressively. RASS goal: -1 to 0.   CV: Levophed at low dose, stable  Pulm: Severe respiratory failure requiring ECMO and mechanical ventilation. Flows unchanged at ~4.3 LPM   Keep vent settings at rest settings, do not titrate FiO2 above 60%. TVs improving, still more than 400 mL. Off sweep since yesterday afternoon, will plan to decannulate  today  FEN/Renal: remains on CRRT  Heme: Hemoglobin 7.9.  Goals: if O2 sat >85% Hgb 7-8.  If O2 Sat <85% keep Hgb 10-12. Bivalrudin gtt held. PTT goal 44-88.  ID: on broad-spectrum antimicrobials, continue     All pertinent labs, imaging studies, physical exam and medications have been reviewed by me.     Discussed with staff, Dr. Jose Carlos Powell  Surgical Critical Care Fellow

## 2024-08-18 NOTE — PLAN OF CARE
Major Shift Events:   Neuro- Opens eyes spontaneously. Pupils brisk, equal. Pupillometer every 4 hours per order. Coughs/ bites down on ETT. Dorsiflexion bilateral toes to pain. Not following commands. Weaned Versed to 1 mg/hr.   CV- SR with HR 80-90's. Rare ectopy. MAP > 65 or SBP > 110 maintained with Levophed gtt. Palpable pulses. Generalized edema.   Resp- Lung sounds clear over diminished. Scant secretions from ETT. Continued red tinged moderate amount of oral secretions. Vent mode PCV + assist. Tv in 300-400's. RR 35-43. Decannulated from ECMO.   GI- TF at goal. Abdomen soft, rounded. Bowel sounds auscultated X 4. Rectal  tube in place with large output.   - Anuric. CRRT with goal of net negative 0-25 mL/hr, unmet due to being off CRRT post decannulation (lost access until HD line placed).    Plan: Continue cares per orders.   For vital signs and complete assessments, please see documentation flowsheets.      Goal Outcome Evaluation: Progressing     Problem: ARDS (Acute Respiratory Distress Syndrome)  Goal: Effective Oxygenation  Intervention: Optimize Oxygenation, Ventilation and Perfusion  Recent Flowsheet Documentation  Taken 8/17/2024 1400 by Antoinette Antonio, RN  Head of Bed (HOB) Positioning: HOB at 20-30 degrees  Taken 8/17/2024 1200 by Antoinette Antonio, RN  Lung Protection Measures:   lung compliance monitored   ventilator synchrony promoted   fluid excess minimized  Airway/Ventilation Management:   airway patency maintained   calming measures promoted   humidification applied   pulmonary hygiene promoted  Head of Bed (HOB) Positioning: HOB at 20-30 degrees  Taken 8/17/2024 1000 by Antoinette Antonio RN  Head of Bed (HOB) Positioning: HOB at 20-30 degrees  Taken 8/17/2024 0800 by Antoinette Antonio, RN  Lung Protection Measures:   lung compliance monitored   ventilator synchrony promoted   fluid excess minimized  Airway/Ventilation Management:   airway patency maintained   calming measures promoted    humidification applied   pulmonary hygiene promoted  Head of Bed (HOB) Positioning: HOB at 20-30 degrees

## 2024-08-18 NOTE — PROCEDURES
PROCEDURE NOTE  8/18/24    Pre-op diagnosis: Resolving ARDS  Post-op diagnosis: Resolving ARDS  Surgeon: Blanca Branch MD  Fellow: Hieu Powell MD  Resident: Cal Lisa MD  Anesthesia: IV sedation with 2 mg Versed  Blood Loss: 50 ml  Comorbidities:   Patient Active Problem List   Diagnosis    Acute respiratory failure with hypoxia (H)     INDICATION: Massiel Flaherty is a 53 year old female who initially presented to the hospital with acute respiratory failure requiring ECMO. Patient is deemed safe for decannulation. Not hypoxemic or hypercapneic on moderate vent settings during trial off.    PROCEDURE: The ECMO circuit was turned down to 1 LPM flow.  The conventional ventilator was turned up to 100% FiO2.  Under sterile conditions, the neck and groin dressings were removed and stiches cut. The circuit was clamped off and both the cannulas were withdrawn. Steady direct pressure was held for 20 mins at both sites. Hemostasis was assured. A stitch was placed in the skin at the cannula site with 0 silk in both the neck and the groin. A sterile dressing was applied. The patient tolerated the procedure well, with no immediate complications.    Dr. Branch was present for the procedure    Hieu Powell  Surgical Critical Care Fellow

## 2024-08-18 NOTE — PLAN OF CARE
Goal Outcome Evaluation:      Plan of Care Reviewed With: patient, family    Overall Patient Progress: improvingOverall Patient Progress: improving    Outcome Evaluation: Sweep gas remains off    Neuro- PERRL. Opens eyes spontaneously but does not follow commands. Withdraws to pain on BLE. No response to stimulus in BUE. Sedated on versed, dilaudid and precedex. Shivering intermittently.  CV- Afebrile. Sinus rhythm 60s-80s. Some PACs noted. Pulses dopplered to BLE. Remains on low dose Levo to achieve MAP >65.   Pulm- On PCV+ RR 10, 25/10. Small amount of kody secretions via ETT. Bloody oral secretions noted. Increased FiO2 to 100% on vent per SICU team d/t low SvO2 on circuit. Tachypnic into the high 30s. Additional dose of dilaudid given as well as Oxycodone increased to 10mg PRN Q4.   ECMO sweep gas remains off throughout the night. Flows around 4.5. 1 unit of PRBCs given for hgb of 6.8 on the circuit. Hgb 8.3 this am.   GI- TF@ goal of 35ml/hr via NJ tube. Abdomen softly rounded. Rectal tube in place with moderate amount of loose green/brown stool.  - Anuric. On CRRT with goal I=O.    Will continue current plan of care and update team with any changes.

## 2024-08-18 NOTE — PROGRESS NOTES
[facility]    ECLS Shift Summary:     ECMO Equipment:  Console Serial Number: [id number]  Circuit Lot Number: not recorded by Perfusion  Oxygenator Lot Number: [id number]    Circuit Assessment: Fibrin  Fibrin Location: connectors    Arterial ECMO Cannula: 17 Fr in the Right Internal Jugular Vein  Venous ECMO Cannula: 25 Fr in the Right Femoral Vein  Distal Perfusion Catheter: 8 Fr in the Right Superficial Femoral Artery    ECMO Cannula Arterial Right internal jugular-Site Assessment: WDL, Sutured, Secure  ECMO Cannula Venous Right femoral vein-Site Assessment: Sutured, Secure  ECMO Cannula Arterial Right internal jugular-Site Intervention: No intervention needed  ECMO Cannula Venous Right femoral vein-Site Intervention: Dressing reinforced    Patient remains on V-V ECMO, all equipment is functioning and alarms are appropriately set. RPM's: 3601 with Blood Flow (Circuit) LPM  Av.6 LPM  Min: 4.56 LPM  Max: 4.65 LPM L/min. Sweep is at 0 LPM and 0 %. Extremities are warm and perfused.     Significant Shift Events: Throughout shift pt had periods of shivering and wakefulness, biting on ETT and blinking eyes, resulting in frequent losses of ECMO SvO2. Sweep remained off throughout shift I preparation for probable decannulation. RBC administered to improve O2 carrying capacity and maintain Hgb goals. Venous cannula dressing reinforced at beginning of shift to curtail minimal bleeding from site.    Vent settings:  FiO2 (%): (S) 100 % (Per SICU attending), Resp: (!) 32, Inspiratory Pressure (cm H2O) (Drager Jessie): 25, Vent Mode: PCV Plus assist, Resp Rate (Set): 10 breaths/min, PEEP (cm H2O): 10 cmH2O, Resp Rate (Set): 10 breaths/min, PEEP (cm H2O): 10 cmH2O    Anticoagulation:  Dose (units/hr) HEParin: 0 Units/hr  Rate (mL/hr) HEParin: 0 mL/hr  Concentration HEParin: 100 Units/mL     Dose (mg/kg/hr) Bivalirudin: 0.06 mg/kg/hr  Rate (mL/hr) Bivalirudin: 5.3  mL/hr  Concentration Bivalirudin: 1 mg/mL  Most recent: ACT  (seconds):  (folowing PTTs)    Urine output is minimal, CRRT.  Blood loss was minimal. Product given included RBC x1 unit.     Intake/Output Summary (Last 24 hours) at 8/18/2024 0624  Last data filed at 8/18/2024 0600  Gross per 24 hour   Intake 3235.36 ml   Output 2823 ml   Net 412.36 ml       Labs:  Recent Labs   Lab 08/18/24  0604 08/18/24  0433 08/18/24  0200 08/17/24  2344   PH 7.33* 7.32* 7.33* 7.36   PCO2 45 46* 45 42   PO2 208* 62* 60* 98   HCO3 23 24 24 24   O2PER 100 60  60  60 60 60       Lab Results   Component Value Date    HGB 8.3 (L) 08/18/2024    PHGB 30 (H) 08/18/2024     08/18/2024    FIBR 289 08/18/2024    INR 1.37 (H) 08/12/2024    PTT 45 (H) 08/18/2024    DD 2.92 (H) 08/18/2024    ANTCH 104 08/17/2024       Plan is for probable VV-ECMO decannulation today.    Oneil Yañez RN  ECMO Specialist  8/18/2024 6:24 AM

## 2024-08-18 NOTE — PROGRESS NOTES
CRRT STATUS NOTE    DATA:  Time:  1731  Pressures WNL:  YES  Filter Status:  WDL    Problems Reported/Alarms Noted:  none    Supplies Present:  YES    ASSESSMENT:    Patient Net Fluid Balance:  +466 ml net since midnight       Intake/Output Summary (Last 24 hours) at 8/18/2024 1731  Last data filed at 8/18/2024 1700  Gross per 24 hour   Intake 3309.29 ml   Output 3023.3 ml   Net 285.99 ml       Vital Signs: Temp:  [96.1  F (35.6  C)-101.1  F (38.4  C)] 99.5  F (37.5  C)  Pulse:  [] 87  Resp:  [25-38] 35  MAP:  [45 mmHg-108 mmHg] 70 mmHg  Arterial Line BP: ()/(36-77) 119/50  FiO2 (%):  [60 %-100 %] 60 %  SpO2:  [91 %-100 %] 97 %       Most Recent BMP's:  Recent Labs   Lab Test 08/18/24  1559 08/18/24  1208 08/18/24  0946 08/18/24  0433 08/17/24  2344 08/17/24  1950 08/17/24  1545    137 139 140  140 143 145  145 147*   POTASSIUM 4.4 4.4 4.0 3.8  --  4.2  4.2 4.5   CHLORIDE 110* 105 106 108*  --  112*  112* 114*   CO2 18* 21* 22 21*  --  22  22 21*   BUN 58.2* 50.2* 43.1* 39.8*  --  44.7*  44.7* 47.8*   CR 2.08* 1.79* 1.51* 1.49*  --  1.55*  1.55* 1.62*   ANIONGAP 12 11 11 11  --  11  11 12   QUAN 7.6* 8.2* 8.0* 8.2*  --  8.1*  8.1* 7.9*     Most Recent CBC's:  Recent Labs   Lab Test 08/18/24  1559 08/18/24  1208 08/18/24  0946 08/18/24  0433   WBC 14.9* 11.2* 13.7* 17.2*   HGB 8.3* 7.8* 7.9* 8.3*   MCV 96 98 98 98   * 135* 154 161     Most Recent ABG's:  Recent Labs   Lab Test 08/18/24  1559 08/18/24  1421 08/18/24  1208   PH 7.31* 7.30* 7.31*   PO2 124* 150* 84   PCO2 40 43 46*   HCO3 20* 21 23   THONG -5.9* -5.0* -3.2*       Goals of Therapy:  0-25 cc net negative as tolerated     INTERVENTIONS:   Pt was decannulated from VV ECMO. New dialysis line was placed in Left femoral vein. Restarted CRRT at 1555.    Charting reviewed. Rounding completed. Treatment plan discussed with bedside nurse.     PLAN:  Continue with current plan of care please contact CRRT resource with any questions or  concerns via Vocera.

## 2024-08-18 NOTE — PROGRESS NOTES
CRRT STATUS NOTE    DATA:  Time:  0559  Pressures WNL:  YES  Filter Status:  WDL    Problems Reported/Alarms Noted:  none    Supplies Present:  YES    ASSESSMENT:    Patient Net Fluid Balance:  -238 ml net since midnight       Intake/Output Summary (Last 24 hours) at 8/18/2024 0559  Last data filed at 8/18/2024 0500  Gross per 24 hour   Intake 3202.06 ml   Output 3033 ml   Net 169.06 ml       Vital Signs: Temp:  [97.3  F (36.3  C)-99  F (37.2  C)] 98.1  F (36.7  C)  Pulse:  [] 76  Resp:  [19-41] 35  MAP:  [51 mmHg-93 mmHg] 68 mmHg  Arterial Line BP: ()/(34-66) 114/48  FiO2 (%):  [60 %-100 %] 100 %  SpO2:  [88 %-100 %] 100 %       Most Recent BMP's:  Recent Labs   Lab Test 08/18/24  0433 08/17/24  2344 08/17/24  1950 08/17/24  1545 08/17/24  1206 08/17/24  1005 08/17/24  0756 08/17/24  0402     140 143 145  145 147* 149* 150*   < > 148*   POTASSIUM 3.8  --  4.2  4.2 4.5 4.0 3.7  --  3.8   CHLORIDE 108*  --  112*  112* 114* 116* 116*  --  115*   CO2 21*  --  22  22 21* 21* 22  --  20*   BUN 39.8*  --  44.7*  44.7* 47.8* 48.0* 44.7*  --  45.6*   CR 1.49*  --  1.55*  1.55* 1.62* 1.70* 1.58*  --  1.62*   ANIONGAP 11  --  11  11 12 12 12  --  13   QUAN 8.2*  --  8.1*  8.1* 7.9* 8.2* 7.9*  --  8.2*    < > = values in this interval not displayed.     Most Recent CBC's:  Recent Labs   Lab Test 08/18/24  0433 08/17/24  2154 08/17/24  1950 08/17/24  1545   WBC 17.2* 16.9* 15.4* 15.8*   HGB 8.3* 7.0* 7.1* 7.3*   MCV 98 100 100 102*    159 139* 142*     Most Recent ABG's:  Recent Labs   Lab Test 08/18/24  0433 08/18/24  0200 08/17/24  2344   PH 7.32* 7.33* 7.36   PO2 62* 60* 98   PCO2 46* 45 42   HCO3 24 24 24   THONG -2.4 -2.4 -1.9       Goals of Therapy:  I=O     INTERVENTIONS:   No interventions by CRRT RN overnight.     Charting reviewed. Rounding completed. Treatment plan discussed with bedside nurse.     PLAN:  Continue with current plan of care please contact CRRT resource with any questions  or concerns via Vocera.      Potential for decannulation soon and has no HD line when ECMO removed.

## 2024-08-18 NOTE — PROCEDURES
[facility]    Central line    Date/Time: 8/18/2024 3:19 PM    Performed by: Cal Lisa MD  Authorized by: Cal Lisa MD  Indications: vascular access (Access for CRRT)      UNIVERSAL PROTOCOL   Site Marked: Yes  Prior Images Obtained and Reviewed:  No  Required items: Required blood products, implants, devices and special equipment available    Patient identity confirmed:  Arm band, provided demographic data, hospital-assigned identification number and anonymous protocol, patient vented/unresponsive  Patient was reevaluated immediately before administering moderate or deep sedation or anesthesia  Confirmation Checklist:  Patient's identity using two indicators, correct equipment/implants were available, procedure was appropriate and matched the consent or emergent situation and relevant allergies  Time out: Immediately prior to the procedure a time out was called    Universal Protocol: the Joint Commission Universal Protocol was followed    Preparation: Patient was prepped and draped in usual sterile fashion    ESBL (mL):  10     ANESTHESIA    Local Anesthetic:  Lidocaine 1% without epinephrine  Anesthetic Total (mL):  5      SEDATION  Patient Sedated: Yes    Sedation Type:  Deep  Vital signs: Vital signs monitored during sedation      Preparation: skin prepped with 2% chlorhexidine  Skin prep agent dried: skin prep agent completely dried prior to procedure  Sterile barriers: all five maximum sterile barriers used - cap, mask, sterile gown, sterile gloves, and large sterile sheet  Hand hygiene: hand hygiene performed prior to central venous catheter insertion  Patient position: flat  Catheter type: double lumen  Catheter size: 12 Fr  Pre-procedure: landmarks identified  Ultrasound guidance: yes  Sterile ultrasound techniques: sterile gel and sterile probe covers were used  Number of attempts: 1  Successful placement: yes  Post-procedure: line sutured and dressing  applied  Assessment: blood return through all ports      PROCEDURE  Describe Procedure: The patient was given consent to undergo procedure by her son. She was laid flat. The patient was prepped and draped in a sterile fashion. A time out was called to identity the patient and the procedure: Placement of a left femoral hemodialysis catheter. The femoral vein was identified using ultrasound. The femoral vein was cannulated using a modified Seldinger technique a wire was placed into the femoral vein.  A 10 Maltese dilator was placed over the wire and removed.  A 12 Maltese dilator was then placed over the wire and removed.  The 12 Maltese dialysis catheter was placed over the wire and into the femoral vein.  The wire was removed from the catheter and bleeding was observed.  The catheter was flushed and the venous side.  The arterial side had draw back and was flushed as well.  She replaced on arterial and venous lines respectively.  The catheter was trimmed and placed with 3-0 nylon suture.  An occlusive sterile dressing was placed over the catheter.  The patient tolerated procedure well.  The SICU fellow was bedside and assisted when needed.  Dr. Rowe, SICU staff, was at bedside to assist with any aspect of the procedure.  Patient Tolerance:  Patient tolerated the procedure well with no immediate complications  Length of time physician/provider present for 1:1 monitoring during sedation: 25      Cal Lisa MD  SICU resident

## 2024-08-18 NOTE — PROGRESS NOTES
"Nephrology Progress Note  08/18/2024       Mrs Flaherty is a  53 yof w/ Anxiety, depression, fibromyalgia, hypothyroidism, asthma, HLD, MURIEL on CPAP, expressive aphasia, and hypertension who was admitted to an OSH with community acquired pneumonia on 8/3 s/p intubation.  Found to have severe ARDS requiring paralysis and proning, transferred here on 8/8 for management and initiated on CKRT for severe acidemia in the setting of oligoanuric CHACORTA.  Started CRRT on 8/9 for volume management.      Interval History :   Remains on CRRT but being decannulated today.  ICU team will replace catheter for CRRT. On K4 bath. Intubated and sedated.    Assessment & Recommendations:   CHACORTA-Baseline Cr 0.6, at baseline on admission.  CHACORTA due to septic shock in setting of ARDS, UA on 8/6/2024 essentially bland (30 protein but no blood or casts), no dedicated renal imaging.  No dedicated renal imaging at this time.  Started CRRT 8/9 for volume management, now anuric.    -Access is ECMO circuit- will have line placed today to resume CRRT   -Dialysis consent signed and in chart.     - continue CRRT on K4 bath, I=O                  Volume status- I=O on CRRT, was net even yesterday, FiO2 requirement still significant. Aim for even to mild negative    Electrolytes/pH- sodium up to 150, will stop 3% as saline goal liberalized to 140-145, CT showed improvement on 8/15    Ca/phos/pth- phos high normal - no treatment indicated    Anemia- hgb 7.8, acute management per primary team    Nutrition-Vital TF started 8/9.    Recommendations were communicated to primary team via note    [employer] Web Console       Review of Systems:   I reviewed the following systems:  ROS not done due to vent/sedation.     Physical Exam:   I/O last 3 completed shifts:  In: 3235.36 [I.V.:1615.36; NG/GT:480]  Out: 2823 [Other:2173; Stool:650]   /61   Pulse 101   Temp 100.4  F (38  C)   Resp (!) 35   Ht 1.575 m (5' 2\")   Wt 85.1 kg (187 lb 9.8 oz)   SpO2 98%  "  BMI 34.31 kg/m       GENERAL APPEARANCE: Intubated and sedated and paralyzed  Pulmonary: Intubated and sedated,   CV: regular rhythm, normal rate   - Edema 1-2+ diffuse  GI: soft, nontender, normal bowel sounds  MS: no evidence of inflammation in joints, no muscle tenderness  SKIN:  warm, dry  NEURO: No focal deficits    Labs:   All labs reviewed by me  Electrolytes/Renal -   Recent Labs   Lab Test 08/18/24  1206 08/18/24  0950 08/18/24  0946 08/18/24  0608 08/18/24  0433 08/17/24  2357 08/17/24  2344 08/17/24  1953 08/17/24  1950 08/17/24  1211 08/17/24  1206   NA  --   --  139  --  140  140  --  143  --  145  145   < > 149*   POTASSIUM  --   --  4.0  --  3.8  --   --   --  4.2  4.2   < > 4.0   CHLORIDE  --   --  106  --  108*  --   --   --  112*  112*   < > 116*   CO2  --   --  22  --  21*  --   --   --  22  22   < > 21*   BUN  --   --  43.1*  --  39.8*  --   --   --  44.7*  44.7*   < > 48.0*   CR  --   --  1.51*  --  1.49*  --   --   --  1.55*  1.55*   < > 1.70*   * 169* 183*   < > 123*   < >  --    < > 116*  116*   < > 143*   QUAN  --   --  8.0*  --  8.2*  --   --   --  8.1*  8.1*   < > 8.2*   MAG  --   --   --   --  2.4*  --   --   --  2.4*  --  2.5*   PHOS  --   --   --   --  4.6*  --   --   --  4.9*  --  5.2*    < > = values in this interval not displayed.       CBC -   Recent Labs   Lab Test 08/18/24  1208 08/18/24  0946 08/18/24  0433   WBC 11.2* 13.7* 17.2*   HGB 7.8* 7.9* 8.3*   * 154 161       LFTs -   Recent Labs   Lab Test 08/18/24  0433 08/17/24  1950 08/17/24  1206 08/17/24  0402 08/16/24  1158 08/16/24  0419   ALKPHOS 112  --   --  119  --  132   BILITOTAL 0.5  --   --  0.5  --  0.6   ALT 18  --   --  18  --  19   AST 34  --   --  37  --  28   PROTTOTAL 5.8*  --   --  5.9*  --  6.5   ALBUMIN 3.3* 3.3* 3.3* 3.3*   < > 3.9    < > = values in this interval not displayed.       Iron Panel - No lab results found.        Current Medications:  Current Facility-Administered  Medications   Medication Dose Route Frequency Provider Last Rate Last Admin    acetylcysteine (MUCOMYST) 10 % nebulizer solution 4 mL  4 mL Nebulization 4x Daily Barry Hatch MD   4 mL at 08/18/24 1232    amitriptyline (ELAVIL) tablet 50 mg  50 mg Oral or Feeding Tube At Bedtime Barry Hatch MD   50 mg at 08/17/24 2152    artificial tears ophthalmic ointment   Both Eyes Q8H de Tiara Macedo CNP   Given at 08/18/24 0723    chlorhexidine (PERIDEX) 0.12 % solution 15 mL  15 mL Mouth/Throat Q12H Giovanny Guidry PA-C   15 mL at 08/18/24 0722    dexAMETHasone PF (DECADRON) injection 10 mg  10 mg Intravenous Daily Cal Lisa MD   10 mg at 08/18/24 0723    fiber modular (BANATROL TF) packet 1 packet  1 packet Per Feeding Tube TID Cal Lisa MD   1 packet at 08/18/24 0723    insulin glargine (LANTUS PEN) injection 16 Units  16 Units Subcutaneous Daily de Tiara Macedo CNP   16 Units at 08/18/24 0745    ipratropium - albuterol 0.5 mg/2.5 mg/3 mL (DUONEB) neb solution 3 mL  3 mL Nebulization 4x daily Barry Hatch MD   3 mL at 08/18/24 1232    levothyroxine (SYNTHROID/LEVOTHROID) tablet 25 mcg  25 mcg Per Feeding Tube QAM AC Giovanny Guidry PA-C   25 mcg at 08/18/24 0724    loperamide (IMODIUM) liquid 4 mg  4 mg Oral Q6H de Tiara Macedo CNP   4 mg at 08/18/24 0724    multivitamin RENAL (RENAVITE RX/NEPHROVITE) tablet 1 tablet  1 tablet Oral or Feeding Tube Daily Giovanny Guidry PA-C   1 tablet at 08/18/24 0724    oxyCODONE IR (ROXICODONE) tablet 10 mg  10 mg Oral Q6H de Tiara Macedo CNP   10 mg at 08/18/24 0814    pantoprazole (PROTONIX) 2 mg/mL suspension 40 mg  40 mg Per Feeding Tube QAM AC Barry Hatch MD   40 mg at 08/18/24 0720    piperacillin-tazobactam (ZOSYN) 4.5 g vial to attach to  mL bag  4.5 g Intravenous Q6H Tiara Martin CNP   4.5 g at 08/18/24 0722    Prosource TF20 ENfit Compatibl EN LIQD (PROSOURCE  TF20) packet 60 mL  1 packet Per Feeding Tube TID Giovanny Guidry PA-C   60 mL at 08/18/24 0725    venlafaxine (EFFEXOR) tablet 75 mg  75 mg Oral or Feeding Tube BID Barry Hatch MD   75 mg at 08/18/24 0724     Current Facility-Administered Medications   Medication Dose Route Frequency Provider Last Rate Last Admin    [Held by provider] bivalirudin (ANGIOMAX) 250 mg in sodium chloride 0.9 % 250 mL ANTICOAGULANT infusion  0-0.3 mg/kg/hr (Dosing Weight) Intravenous Continuous Dong Rico PA-C   Stopped at 08/18/24 0655    dexmedeTOMIDine (PRECEDEX) 4 mcg/mL in sodium chloride 0.9 % 100 mL infusion  0.1-1.2 mcg/kg/hr (Dosing Weight) Intravenous Continuous de [place] Tiara Serrato CNP 26.7 mL/hr at 08/18/24 1100 1.2 mcg/kg/hr at 08/18/24 1100    dextrose 10% infusion   Intravenous Continuous PRN de [place] Tiara Serrato CNP        dextrose 10% infusion   Intravenous Continuous PRN Giovanny Guidry PA-C        dialysate for CVVHD & CVVHDF (Phoxillum BK4/2.5)  12.5 mL/kg/hr CRRT Continuous Tati Tomlin MD 1,100 mL/hr at 08/18/24 0813 12.5 mL/kg/hr at 08/18/24 0813    HYDROmorphone (DILAUDID) 0.2 mg/mL infusion ADULT/PEDS GREATER than or EQUAL to 20 kg  0.3-1.5 mg/hr Intravenous Continuous Dong Rico PA-C 2.5 mL/hr at 08/18/24 1100 0.5 mg/hr at 08/18/24 1100    insulin regular (MYXREDLIN) 1 unit/mL infusion  0-24 Units/hr Intravenous Continuous de [place] Tiara Serrato CNP 5 mL/hr at 08/18/24 1206 5 Units/hr at 08/18/24 1206    midazolam (VERSED) 100 mg/100 mL NS infusion - ADULT  1 mg/hr Intravenous Continuous de La MaterShaggyl Carlee CNP 1 mL/hr at 08/18/24 1100 1 mg/hr at 08/18/24 1100    No heparin required   Does not apply Continuous PRN Tati Tomlin MD        norepinephrine (LEVOPHED) 16 mg in  mL infusion MAX CONC CENTRAL LINE  0.01-0.6 mcg/kg/min (Dosing Weight) Intravenous Continuous Cal Lisa MD 2.5 mL/hr at 08/18/24 7186  0.03 mcg/kg/min at 08/18/24 1255    PRE-filter replacement solution for CVVHD & CVVHDF (Phoxillum BK4/2.5)  12.5 mL/kg/hr CRRT Continuous Tati Tomlin MD 1,100 mL/hr at 08/18/24 0609 12.5 mL/kg/hr at 08/18/24 0609         Emmanuelle Donato MD  Associate Professor of Medicine  Department of Nephrology  [facility]

## 2024-08-18 NOTE — PROGRESS NOTES
[facility]    ECLS Discontinuation Note:     ECLS was discontinued 8/18/2024 at 11:58.    Amparo Salazar, RT  ECMO Specialist  8/18/2024 12:03 PM

## 2024-08-18 NOTE — PROGRESS NOTES
SURGICAL ICU PROGRESS NOTE  08/18/2024        Date of Service (when I saw the patient): 08/18/2024    ASSESSMENT:  Massiel Flaherty is a 53 year old female who was admitted to [facility].   Past medical hx of Anxiety/depression, fibromyalgia, hypothyroidism, asthma, HLD, MURIEL on CPAP, spells, off on anti seizure medications, expressive aphasia, hypertension was seen at [facility] and was placed on Augmentin outpatient on 7/30/24.   She admitted to the hospital on 8/3/24 for fatigue, fever and dyspnea and intubated 8/6/24 at OSH, proned and paralyzed without improvement. Transferred to [facility] 8/8/24, hypoxic with high plateaus/peaks and placed on VV ECMO and CRRT.     CHANGES and MAJOR THINGS TODAY:     - Sweep off since 1400 8/17  - Required multiple boluses of sedation/narcotics overnight for tachypnea  - On 100% FIO2 on the vent this am, wean down  - 1 PRBC overnight  - Increase loperamide  - Schedule oxycodone 10 mg every 6 hours  - Midazolam infusion to 1 mg/hr  - hold Bivalrudin at 0645 this am in anticipation for decannulation  - Norepinephrine goal SBP > 110 or MAP > 65  - Decannulation  - 1 PRBC      PLAN:  Neurological:  # Fibromyalgia   # Hx myalgic encephalomyelitis   # Acute pain  # Sedation and analgesia for vent compliance   - Monitor neurological status. Delirium preventions and precautions.   - Pain: Dilaudid gtt 0.5  Continue to wean further.  Will add scheduled oxycodone 10 mg every 6 hours today as required multiple boluses overnight.   - Sedation plan: Will plan to decrease to 1 mg/hr. Caution to avoid risk of benzodiazepine withdrawal as she has been exposed to high doses for greater than 1 week.   - Shivering: Precedex. Attempted discontinuation with severe shivering. Resumed again. Will keep on for the time being.   -  Paralysis: Off 8/14  - Resumed PTA Venlafaxine and Amitriptyline       # Hx spells with staring and BUE posturing previously described as pseudoseizures   # Hx history of GTC  seizures and myoclonic epilepsy, weaned off AEDs   # C/f hypoxic ischemic injury   # Diffuse cerebral edema - improved  - Head CT 8/9: Findings concerning for diffuse cerebral edema with  diffuse blurring of the gray-white differentiation. Findings concerning for hypoxicischemic injury.   - Sodium goals liberalized to 140-145  - Repeat Head CT 8/15 continued improvement in gray white matter differentiation and continued effacement of the sulci and ventricles.   - MRI Brain wwo contrast after decannulation. Consider tomorrow.   - NCC signed off.     Pulmonary:   #ARDS s/p VV ECMO cannulation 8/8  # Asthma  # MURIEL on home CPAP   FiO2 (%): 85 %, Resp: (!) 32, Inspiratory Pressure (cm H2O) (Drager Jessie): 25, Vent Mode: PCV Plus assist, Resp Rate (Set): 10 breaths/min, PEEP (cm H2O): 10 cmH2O, Resp Rate (Set): 10 breaths/min, PEEP (cm H2O): 10 cmH2O    - Continue with rest vent setting on ECMO. Plan to optimize ECMO     - Chest CT 8/9: Diffuse patchy groundglass and consolidative densities throughout  the lungs with small bilateral pleural effusions. Findings may  represent severe pulmonary edema, and/or  infectious /inflammatory  pathology, or ARDS. Multiple prominent and a few enlarged mediastinal lymph nodes,  possibly reactive.  - 8/11: Restart veletri continuous neb  - 8/11: Increase inspiratory pressure to 35 to attempt achieving slightly higher tidal volumes   - 8/12: Decadron 20mg daily x 5 days, then 10mg x 5 days   - 8/14 Bronchoscopy with mucous plugging.  Continue Mucomyst and Duonebs q4 hours. No acute indication to repeat bronchoscopy today.   - 8/15 Stop Veletri    ECMO:  Sweep OFF since 1400 8/17  Flow 4.5  Bival gtt with PTT goals 44-88 / ACT goal of 160-180 . Hold in am for decannulation.   - Superior cannula retracted 8/10 for ongoing low PaO2   - 8/11: bival infusion replaced for heparin given concern for heparin resistance     Cardiovascular:    # Hyperlipidemia  # Hypotension  - MAP >65  - Prior  HTN, Clonidine on hold   - Continue Norepinephrine, low dose. Titrate to SBP > 110 or MAP > 65  - Lactate normal   - Repeat ECHO 8/12: Left ventricular size, wall motion and function are normal. The ejection fraction is 60-65%. Flattened septum is consistent with right ventricular pressure and volume overload. Right ventricular function, chamber size, wall motion, and thickness are normal.       Gastroenterology/Nutrition:  #Moderate protein calorie malnutrition  # GERD   # Diarrhea  - Protonix (home medication)   - RD consult in am for SBFT and TF recommendations   - CT CAP 8/9: Mild hepatomegaly. Trace pelvic ascites, nonspecific bilateral perinephric  streakiness. Asymmetric heterogeneous bulky right adnexa, consider nonemergent  pelvic ultrasound for further evaluation.   - NJ tube for meds, TF   - Stool output 100/-122  -Increase Imodium  - Continue fiber       Fluids/Electrolytes/Renal:  # Acute kidney injury  # Hypernatremia  - Continue CRRT.   - Trending CRRT labs  - Monitor I/Os closely   - UF goal of I=0    Endocrine:  # Hypothyroidism   # Steroid and stress induced hyperglycemia  - synthroid resumed   - Goal to keep BG< 180 for optimal wound healing   - 8/14: Lantus 16 units daily  - Insulin gtt     ID:  # Leukocytosis  # Presumed pneumonia  # Concern for PID   PER OSH: 8/6: RSV, COVID, parainfluenza,  negative . Legionella and pneumococcal urine antigens negative. Patient has been on greater than 20 mg prednisone daily for 18 days.  CULTURES: 8/8/2024: Respiratory viral panel negative. Blood cultures negative, MRSA nasal negative, Legionella urine antigen negative, respiratory culture negative.PCR trachea for PJP is negative.  COVID: Negative. Viral panel-negative PJP  - ID consulted   - 8/11: Gyn consulted for right adnexal finding on CT. Low suspicion for abscess but would treat as we are currently. Collection too small for surgical intervention and too small for IR drainage as well. Could follow up  "with pelvic MRI, however we will treat as current with IV antibiotics.    - EBV PCR, per ID likely reactivation I/n the setting of acute illness.   - IGG levels low, ID recommend IVIG infusion, done 8/15  - Persistent leukocytosis, afebrile.   - 8/15 Check MRSA swab negative Repeat BC  with NGTD  - 8/14 BAL: Pseudomonas aeruginosa grown in the setting of Cefepime since 8/3.  8/16 Changed to Zosyn. Follow susceptibilities.   - Karius returned with EBV and HSV, along with EBV positivity in BAL, per ID represents reactivation  in critical illness, no treatment at this time.     Antimicrobials:   - Azithromycin- stopped 8/11   - Cefepime 8/3 (started at OSH)-8/16  - Amphotericin - stopped 8/13   - Flagyl 8/11-8/16  - Zosyn 8/16-    Heme:     # Acute blood loss anemia   #Anemia of critical illness  #Oral bleeding  - Transfusion parameters per ECMO protocol   - Transfuse if hgb <7.0 or signs/symptoms of hypoperfusion. Monitor and trend.  No transfusions in the last 24 hours.   - Stop bivalrudin gtt. Resume prophylactic anticoagulation in am if indicated  - Intraoral bleeding improved, complete Amicar intraoral solution for 48 hours.   - 1 PRBC given overnight. 1 PRBC today post decannulation.      Musculoskeletal:  # Weakness and deconditioning of critical illness  # Left ankle injury pre-hospitalization    - Physical and occupational therapy when able.      Skin:  # Ecchymosis - BLE   # Left toe duskiness   - bilateral lower leg ecchymosis   - WOC consult   - 8/13: Worsening duskiness to left 3rd, 4th toes noted; Off pressors      General Cares/Prophylaxis:    DVT Prophylaxis: Bival gtt per ECMO   GI Prophylaxis: PPI  Restraints: Restraints for medical healing needed: NO     Lines/ tubes/ drains:  - ETT   - Right  ECMO cannula - remove  - Right Femoral cannula - remove  - Left internal jugular TLC  - radial arterial line   - NJ   - PIV\"s      Disposition:  -  Surgical ICU       Time spent on this Encounter   Billing:  I " spent 55 minutes bedside and on the inpatient unit today managing the critical care of Massiel Flaherty in relation to the issues listed in this note.      Tiara De La Mater    ====================================  INTERVAL HISTORY:  Intubated, sedated. VTs variable 300-400s. Unable to obtain ROS      OBJECTIVE:   1. VITAL SIGNS:   Temp:  [96.1  F (35.6  C)-99  F (37.2  C)] 97  F (36.1  C)  Pulse:  [] 68  Resp:  [21-41] 32  MAP:  [51 mmHg-93 mmHg] 75 mmHg  Arterial Line BP: ()/(34-66) 124/52  FiO2 (%):  [60 %-100 %] 85 %  SpO2:  [88 %-100 %] 100 %  FiO2 (%): 85 %, Resp: (!) 32, Inspiratory Pressure (cm H2O) (Drager Jessie): 25, Vent Mode: PCV Plus assist, Resp Rate (Set): 10 breaths/min, PEEP (cm H2O): 10 cmH2O, Resp Rate (Set): 10 breaths/min, PEEP (cm H2O): 10 cmH2O      2. INTAKE/ OUTPUT:   I/O last 3 completed shifts:  In: 2880.4 [I.V.:1625.4; NG/GT:415]  Out: 2712 [Other:1562; Stool:1150]    3. PHYSICAL EXAMINATION:  General: NAD  HEENT: PERRLA. Pupils 3mm and reactive. ETT present and secured. NJ tube. OG to LIS with thin green drainage.   RIght neck with ECMO cannula, sutured in place.  Left internal jugular CVC in place, secured.   Neuro: PERRL 3mm. No movement of extremities.   Pulm/Resp:  minimal breath sounds. coarse  CV: RRR, S1/S2   Abdomen: Soft, non-distended, non-tender, no rebound tenderness or guarding, no masses  Incisions/Skin: scattered ecchymosis in BLE in toes around ankles bilaterally. Limbs warm.   MSK/Extremities:generalize BLE 1+ edema. Calves compressible, (+) doppler tones  DP/PT on feet.     4. INVESTIGATIONS:   Arterial Blood Gases   Recent Labs   Lab 08/18/24  0604 08/18/24  0433 08/18/24  0200 08/17/24  2344   PH 7.33* 7.32* 7.33* 7.36   PCO2 45 46* 45 42   PO2 208* 62* 60* 98   HCO3 23 24 24 24     Complete Blood Count   Recent Labs   Lab 08/18/24  0433 08/17/24  2154 08/17/24  1950 08/17/24  1545   WBC 17.2* 16.9* 15.4* 15.8*   HGB 8.3* 7.0* 7.1* 7.3*    159  139* 142*     Basic Metabolic Panel  Recent Labs   Lab 08/18/24  0608 08/18/24  0433 08/18/24  0400 08/18/24  0158 08/17/24  2357 08/17/24  2344 08/17/24  1953 08/17/24  1950 08/17/24  1554 08/17/24  1545 08/17/24  1211 08/17/24  1206   NA  --  140  140  --   --   --  143  --  145  145  --  147*  --  149*   POTASSIUM  --  3.8  --   --   --   --   --  4.2  4.2  --  4.5  --  4.0   CHLORIDE  --  108*  --   --   --   --   --  112*  112*  --  114*  --  116*   CO2  --  21*  --   --   --   --   --  22  22  --  21*  --  21*   BUN  --  39.8*  --   --   --   --   --  44.7*  44.7*  --  47.8*  --  48.0*   CR  --  1.49*  --   --   --   --   --  1.55*  1.55*  --  1.62*  --  1.70*   * 123* 98 109*   < >  --    < > 116*  116*   < > 165*   < > 143*    < > = values in this interval not displayed.     Liver Function Tests  Recent Labs   Lab 08/18/24  0433 08/17/24  1950 08/17/24  1206 08/17/24  0402 08/16/24  1158 08/16/24  0419 08/15/24  2019 08/15/24  0414 08/12/24  1539 08/12/24  0945 08/12/24  0401 08/11/24  2154 08/11/24  1603   AST 34  --   --  37  --  28  --  41   < >  --  48*  --   --    ALT 18  --   --  18  --  19  --  21   < >  --  21  --   --    ALKPHOS 112  --   --  119  --  132  --  170*   < >  --  281*  --   --    BILITOTAL 0.5  --   --  0.5  --  0.6  --  0.4   < >  --  0.4  --   --    ALBUMIN 3.3* 3.3* 3.3* 3.3*   < > 3.9   < > 2.7*   < > 2.5* 2.4* 2.4* 2.3*   INR  --   --   --   --   --   --   --   --   --  1.37* 1.30* 1.24* 1.15    < > = values in this interval not displayed.     Pancreatic Enzymes  No lab results found in last 7 days.  Coagulation Profile  Recent Labs   Lab 08/18/24  0433 08/17/24  2154 08/17/24  1545 08/17/24  1005 08/12/24  1153 08/12/24  0945 08/12/24  0844 08/12/24  0401 08/12/24  0008 08/11/24  2154 08/11/24  1831 08/11/24  1603   INR  --   --   --   --   --  1.37*  --  1.30*  --  1.24*  --  1.15   PTT 45* 47* 49* 52*   < > 44*   < > 42*   < > 36   < > 35    < > = values in this  interval not displayed.         5. RADIOLOGY:   Recent Results (from the past 24 hour(s))   XR Chest Port 1 View    Impression    RESIDENT PRELIMINARY INTERPRETATION  IMPRESSION:   1. Stable support devices.  2. Mildly improved diffuse airspace opacities. Suspected small pleural  effusions are not substantially changed.       =========================================

## 2024-08-19 ENCOUNTER — APPOINTMENT (OUTPATIENT)
Dept: GENERAL RADIOLOGY | Facility: CLINIC | Age: 54
DRG: 003 | End: 2024-08-19
Attending: SURGERY
Payer: MEDICAID

## 2024-08-19 LAB
ALBUMIN SERPL BCG-MCNC: 3.1 G/DL (ref 3.5–5.2)
ALBUMIN SERPL BCG-MCNC: 3.1 G/DL (ref 3.5–5.2)
ALBUMIN SERPL BCG-MCNC: 3.2 G/DL (ref 3.5–5.2)
ALLEN'S TEST: ABNORMAL
ALP SERPL-CCNC: 101 U/L (ref 40–150)
ALT SERPL W P-5'-P-CCNC: 16 U/L (ref 0–50)
ANION GAP SERPL CALCULATED.3IONS-SCNC: 11 MMOL/L (ref 7–15)
ANION GAP SERPL CALCULATED.3IONS-SCNC: 12 MMOL/L (ref 7–15)
AST SERPL W P-5'-P-CCNC: 29 U/L (ref 0–45)
BACTERIA BRONCH: ABNORMAL
BASE EXCESS BLDA CALC-SCNC: -3.1 MMOL/L (ref -3–3)
BASE EXCESS BLDA CALC-SCNC: -3.1 MMOL/L (ref -3–3)
BASE EXCESS BLDA CALC-SCNC: -3.3 MMOL/L (ref -3–3)
BASE EXCESS BLDA CALC-SCNC: -3.4 MMOL/L (ref -3–3)
BASE EXCESS BLDA CALC-SCNC: -3.5 MMOL/L (ref -3–3)
BASE EXCESS BLDA CALC-SCNC: -3.5 MMOL/L (ref -3–3)
BASE EXCESS BLDA CALC-SCNC: -3.9 MMOL/L (ref -3–3)
BASE EXCESS BLDA CALC-SCNC: -4 MMOL/L (ref -3–3)
BASE EXCESS BLDA CALC-SCNC: -4.2 MMOL/L (ref -3–3)
BASE EXCESS BLDA CALC-SCNC: -5 MMOL/L (ref -3–3)
BASOPHILS # BLD AUTO: 0.1 10E3/UL (ref 0–0.2)
BASOPHILS # BLD AUTO: 0.1 10E3/UL (ref 0–0.2)
BASOPHILS # BLD AUTO: ABNORMAL 10*3/UL
BASOPHILS # BLD AUTO: ABNORMAL 10*3/UL
BASOPHILS # BLD MANUAL: 0 10E3/UL (ref 0–0.2)
BASOPHILS # BLD MANUAL: 0.4 10E3/UL (ref 0–0.2)
BASOPHILS NFR BLD AUTO: 0 %
BASOPHILS NFR BLD AUTO: 1 %
BASOPHILS NFR BLD AUTO: ABNORMAL %
BASOPHILS NFR BLD AUTO: ABNORMAL %
BASOPHILS NFR BLD MANUAL: 0 %
BASOPHILS NFR BLD MANUAL: 2 %
BILIRUB DIRECT SERPL-MCNC: <0.2 MG/DL (ref 0–0.3)
BILIRUB SERPL-MCNC: 0.4 MG/DL
BUN SERPL-MCNC: 39.6 MG/DL (ref 6–20)
BUN SERPL-MCNC: 41.2 MG/DL (ref 6–20)
BUN SERPL-MCNC: 41.8 MG/DL (ref 6–20)
BUN SERPL-MCNC: 41.8 MG/DL (ref 6–20)
BUN SERPL-MCNC: 42.5 MG/DL (ref 6–20)
BURR CELLS BLD QL SMEAR: SLIGHT
CA-I BLD-MCNC: 4.6 MG/DL (ref 4.4–5.2)
CA-I BLD-MCNC: 4.6 MG/DL (ref 4.4–5.2)
CA-I BLD-MCNC: 4.7 MG/DL (ref 4.4–5.2)
CA-I BLD-MCNC: 4.7 MG/DL (ref 4.4–5.2)
CALCIUM SERPL-MCNC: 7.8 MG/DL (ref 8.8–10.4)
CALCIUM SERPL-MCNC: 7.8 MG/DL (ref 8.8–10.4)
CALCIUM SERPL-MCNC: 7.9 MG/DL (ref 8.8–10.4)
CALCIUM SERPL-MCNC: 8.1 MG/DL (ref 8.8–10.4)
CALCIUM SERPL-MCNC: 8.2 MG/DL (ref 8.8–10.4)
CHLORIDE SERPL-SCNC: 104 MMOL/L (ref 98–107)
CHLORIDE SERPL-SCNC: 105 MMOL/L (ref 98–107)
CHLORIDE SERPL-SCNC: 107 MMOL/L (ref 98–107)
COHGB MFR BLD: 90.9 % (ref 96–97)
COHGB MFR BLD: 93.3 % (ref 96–97)
COHGB MFR BLD: 94.1 % (ref 96–97)
COHGB MFR BLD: 94.7 % (ref 96–97)
COHGB MFR BLD: 94.7 % (ref 96–97)
COHGB MFR BLD: 95.4 % (ref 96–97)
COHGB MFR BLD: 96.9 % (ref 96–97)
COHGB MFR BLD: 97.7 % (ref 96–97)
COHGB MFR BLD: 97.9 % (ref 96–97)
COHGB MFR BLD: 98.6 % (ref 96–97)
COHGB MFR BLD: 99 % (ref 96–97)
COHGB MFR BLD: >100 % (ref 96–97)
CREAT SERPL-MCNC: 1.54 MG/DL (ref 0.51–0.95)
CREAT SERPL-MCNC: 1.63 MG/DL (ref 0.51–0.95)
CREAT SERPL-MCNC: 1.73 MG/DL (ref 0.51–0.95)
CREAT SERPL-MCNC: 1.74 MG/DL (ref 0.51–0.95)
CREAT SERPL-MCNC: 1.74 MG/DL (ref 0.51–0.95)
EGFRCR SERPLBLD CKD-EPI 2021: 34 ML/MIN/1.73M2
EGFRCR SERPLBLD CKD-EPI 2021: 34 ML/MIN/1.73M2
EGFRCR SERPLBLD CKD-EPI 2021: 35 ML/MIN/1.73M2
EGFRCR SERPLBLD CKD-EPI 2021: 37 ML/MIN/1.73M2
EGFRCR SERPLBLD CKD-EPI 2021: 40 ML/MIN/1.73M2
EOSINOPHIL # BLD AUTO: 0.1 10E3/UL (ref 0–0.7)
EOSINOPHIL # BLD AUTO: 0.5 10E3/UL (ref 0–0.7)
EOSINOPHIL # BLD AUTO: ABNORMAL 10*3/UL
EOSINOPHIL # BLD AUTO: ABNORMAL 10*3/UL
EOSINOPHIL # BLD MANUAL: 0 10E3/UL (ref 0–0.7)
EOSINOPHIL # BLD MANUAL: 0.6 10E3/UL (ref 0–0.7)
EOSINOPHIL NFR BLD AUTO: 1 %
EOSINOPHIL NFR BLD AUTO: 3 %
EOSINOPHIL NFR BLD AUTO: ABNORMAL %
EOSINOPHIL NFR BLD AUTO: ABNORMAL %
EOSINOPHIL NFR BLD MANUAL: 0 %
EOSINOPHIL NFR BLD MANUAL: 3 %
ERYTHROCYTE [DISTWIDTH] IN BLOOD BY AUTOMATED COUNT: 18.3 % (ref 10–15)
ERYTHROCYTE [DISTWIDTH] IN BLOOD BY AUTOMATED COUNT: 18.4 % (ref 10–15)
ERYTHROCYTE [DISTWIDTH] IN BLOOD BY AUTOMATED COUNT: 18.6 % (ref 10–15)
ERYTHROCYTE [DISTWIDTH] IN BLOOD BY AUTOMATED COUNT: 18.7 % (ref 10–15)
GLUCOSE BLDC GLUCOMTR-MCNC: 112 MG/DL (ref 70–99)
GLUCOSE BLDC GLUCOMTR-MCNC: 113 MG/DL (ref 70–99)
GLUCOSE BLDC GLUCOMTR-MCNC: 114 MG/DL (ref 70–99)
GLUCOSE BLDC GLUCOMTR-MCNC: 117 MG/DL (ref 70–99)
GLUCOSE BLDC GLUCOMTR-MCNC: 125 MG/DL (ref 70–99)
GLUCOSE BLDC GLUCOMTR-MCNC: 134 MG/DL (ref 70–99)
GLUCOSE BLDC GLUCOMTR-MCNC: 141 MG/DL (ref 70–99)
GLUCOSE BLDC GLUCOMTR-MCNC: 166 MG/DL (ref 70–99)
GLUCOSE BLDC GLUCOMTR-MCNC: 190 MG/DL (ref 70–99)
GLUCOSE SERPL-MCNC: 123 MG/DL (ref 70–99)
GLUCOSE SERPL-MCNC: 129 MG/DL (ref 70–99)
GLUCOSE SERPL-MCNC: 184 MG/DL (ref 70–99)
GLUCOSE SERPL-MCNC: 184 MG/DL (ref 70–99)
GLUCOSE SERPL-MCNC: 212 MG/DL (ref 70–99)
HBA1C MFR BLD: 5.7 %
HCO3 BLD-SCNC: 21 MMOL/L (ref 21–28)
HCO3 BLD-SCNC: 22 MMOL/L (ref 21–28)
HCO3 BLD-SCNC: 23 MMOL/L (ref 21–28)
HCO3 SERPL-SCNC: 20 MMOL/L (ref 22–29)
HCO3 SERPL-SCNC: 21 MMOL/L (ref 22–29)
HCT VFR BLD AUTO: 25.1 % (ref 35–47)
HCT VFR BLD AUTO: 25.8 % (ref 35–47)
HCT VFR BLD AUTO: 26.5 % (ref 35–47)
HCT VFR BLD AUTO: 27 % (ref 35–47)
HGB BLD-MCNC: 8.1 G/DL (ref 11.7–15.7)
HGB BLD-MCNC: 8.2 G/DL (ref 11.7–15.7)
HGB BLD-MCNC: 8.3 G/DL (ref 11.7–15.7)
HGB BLD-MCNC: 8.6 G/DL (ref 11.7–15.7)
HGB FREE PLAS-MCNC: 30 MG/DL
IMM GRANULOCYTES # BLD: 0.6 10E3/UL
IMM GRANULOCYTES # BLD: 0.7 10E3/UL
IMM GRANULOCYTES # BLD: ABNORMAL 10*3/UL
IMM GRANULOCYTES # BLD: ABNORMAL 10*3/UL
IMM GRANULOCYTES NFR BLD: 4 %
IMM GRANULOCYTES NFR BLD: 4 %
IMM GRANULOCYTES NFR BLD: ABNORMAL %
IMM GRANULOCYTES NFR BLD: ABNORMAL %
LACTATE SERPL-SCNC: 0.5 MMOL/L (ref 0.7–2)
LACTATE SERPL-SCNC: 0.6 MMOL/L (ref 0.7–2)
LACTATE SERPL-SCNC: 0.7 MMOL/L (ref 0.7–2)
LACTATE SERPL-SCNC: 0.7 MMOL/L (ref 0.7–2)
LYMPHOCYTES # BLD AUTO: 2.7 10E3/UL (ref 0.8–5.3)
LYMPHOCYTES # BLD AUTO: 2.9 10E3/UL (ref 0.8–5.3)
LYMPHOCYTES # BLD AUTO: ABNORMAL 10*3/UL
LYMPHOCYTES # BLD AUTO: ABNORMAL 10*3/UL
LYMPHOCYTES # BLD MANUAL: 0.5 10E3/UL (ref 0.8–5.3)
LYMPHOCYTES # BLD MANUAL: 2.2 10E3/UL (ref 0.8–5.3)
LYMPHOCYTES NFR BLD AUTO: 17 %
LYMPHOCYTES NFR BLD AUTO: 18 %
LYMPHOCYTES NFR BLD AUTO: ABNORMAL %
LYMPHOCYTES NFR BLD AUTO: ABNORMAL %
LYMPHOCYTES NFR BLD MANUAL: 12 %
LYMPHOCYTES NFR BLD MANUAL: 4 %
MAGNESIUM SERPL-MCNC: 2.2 MG/DL (ref 1.7–2.3)
MAGNESIUM SERPL-MCNC: 2.3 MG/DL (ref 1.7–2.3)
MAGNESIUM SERPL-MCNC: 2.4 MG/DL (ref 1.7–2.3)
MCH RBC QN AUTO: 29.8 PG (ref 26.5–33)
MCH RBC QN AUTO: 30.4 PG (ref 26.5–33)
MCH RBC QN AUTO: 31.2 PG (ref 26.5–33)
MCH RBC QN AUTO: 31.2 PG (ref 26.5–33)
MCHC RBC AUTO-ENTMCNC: 30.9 G/DL (ref 31.5–36.5)
MCHC RBC AUTO-ENTMCNC: 31.9 G/DL (ref 31.5–36.5)
MCHC RBC AUTO-ENTMCNC: 32.2 G/DL (ref 31.5–36.5)
MCHC RBC AUTO-ENTMCNC: 32.3 G/DL (ref 31.5–36.5)
MCV RBC AUTO: 95 FL (ref 78–100)
MCV RBC AUTO: 96 FL (ref 78–100)
MCV RBC AUTO: 97 FL (ref 78–100)
MCV RBC AUTO: 97 FL (ref 78–100)
METAMYELOCYTES # BLD MANUAL: 0.6 10E3/UL
METAMYELOCYTES NFR BLD MANUAL: 3 %
MONOCYTES # BLD AUTO: 1.4 10E3/UL (ref 0–1.3)
MONOCYTES # BLD AUTO: 1.5 10E3/UL (ref 0–1.3)
MONOCYTES # BLD AUTO: ABNORMAL 10*3/UL
MONOCYTES # BLD AUTO: ABNORMAL 10*3/UL
MONOCYTES # BLD MANUAL: 0.5 10E3/UL (ref 0–1.3)
MONOCYTES # BLD MANUAL: 1.1 10E3/UL (ref 0–1.3)
MONOCYTES NFR BLD AUTO: 9 %
MONOCYTES NFR BLD AUTO: 9 %
MONOCYTES NFR BLD AUTO: ABNORMAL %
MONOCYTES NFR BLD AUTO: ABNORMAL %
MONOCYTES NFR BLD MANUAL: 4 %
MONOCYTES NFR BLD MANUAL: 6 %
MYELOCYTES # BLD MANUAL: 0.2 10E3/UL
MYELOCYTES # BLD MANUAL: 0.3 10E3/UL
MYELOCYTES NFR BLD MANUAL: 1 %
MYELOCYTES NFR BLD MANUAL: 2 %
NEUTROPHILS # BLD AUTO: 10.4 10E3/UL (ref 1.6–8.3)
NEUTROPHILS # BLD AUTO: 10.6 10E3/UL (ref 1.6–8.3)
NEUTROPHILS # BLD AUTO: ABNORMAL 10*3/UL
NEUTROPHILS # BLD AUTO: ABNORMAL 10*3/UL
NEUTROPHILS # BLD MANUAL: 11.3 10E3/UL (ref 1.6–8.3)
NEUTROPHILS # BLD MANUAL: 13.6 10E3/UL (ref 1.6–8.3)
NEUTROPHILS NFR BLD AUTO: 65 %
NEUTROPHILS NFR BLD AUTO: 69 %
NEUTROPHILS NFR BLD AUTO: ABNORMAL %
NEUTROPHILS NFR BLD AUTO: ABNORMAL %
NEUTROPHILS NFR BLD MANUAL: 73 %
NEUTROPHILS NFR BLD MANUAL: 90 %
NRBC # BLD AUTO: 0 10E3/UL
NRBC # BLD AUTO: 0.1 10E3/UL
NRBC BLD AUTO-RTO: 0 /100
NRBC BLD AUTO-RTO: 1 /100
O2/TOTAL GAS SETTING VFR VENT: 55 %
O2/TOTAL GAS SETTING VFR VENT: 60 %
O2/TOTAL GAS SETTING VFR VENT: 65 %
O2/TOTAL GAS SETTING VFR VENT: 70 %
O2/TOTAL GAS SETTING VFR VENT: 75 %
O2/TOTAL GAS SETTING VFR VENT: 80 %
PCO2 BLD: 41 MM HG (ref 35–45)
PCO2 BLD: 42 MM HG (ref 35–45)
PCO2 BLD: 42 MM HG (ref 35–45)
PCO2 BLD: 43 MM HG (ref 35–45)
PCO2 BLD: 44 MM HG (ref 35–45)
PCO2 BLD: 45 MM HG (ref 35–45)
PCO2 BLD: 47 MM HG (ref 35–45)
PEEP: 10 CM H2O
PH BLD: 7.28 [PH] (ref 7.35–7.45)
PH BLD: 7.29 [PH] (ref 7.35–7.45)
PH BLD: 7.3 [PH] (ref 7.35–7.45)
PH BLD: 7.31 [PH] (ref 7.35–7.45)
PH BLD: 7.31 [PH] (ref 7.35–7.45)
PH BLD: 7.32 [PH] (ref 7.35–7.45)
PH BLD: 7.33 [PH] (ref 7.35–7.45)
PH BLD: 7.34 [PH] (ref 7.35–7.45)
PH BLD: 7.34 [PH] (ref 7.35–7.45)
PHOSPHATE SERPL-MCNC: 4.9 MG/DL (ref 2.5–4.5)
PHOSPHATE SERPL-MCNC: 5.3 MG/DL (ref 2.5–4.5)
PHOSPHATE SERPL-MCNC: 6.4 MG/DL (ref 2.5–4.5)
PLAT MORPH BLD: ABNORMAL
PLAT MORPH BLD: ABNORMAL
PLATELET # BLD AUTO: 152 10E3/UL (ref 150–450)
PLATELET # BLD AUTO: 171 10E3/UL (ref 150–450)
PLATELET # BLD AUTO: 179 10E3/UL (ref 150–450)
PLATELET # BLD AUTO: 205 10E3/UL (ref 150–450)
PO2 BLD: 104 MM HG (ref 80–105)
PO2 BLD: 144 MM HG (ref 80–105)
PO2 BLD: 62 MM HG (ref 80–105)
PO2 BLD: 65 MM HG (ref 80–105)
PO2 BLD: 67 MM HG (ref 80–105)
PO2 BLD: 70 MM HG (ref 80–105)
PO2 BLD: 70 MM HG (ref 80–105)
PO2 BLD: 71 MM HG (ref 80–105)
PO2 BLD: 80 MM HG (ref 80–105)
PO2 BLD: 88 MM HG (ref 80–105)
PO2 BLD: 91 MM HG (ref 80–105)
PO2 BLD: 93 MM HG (ref 80–105)
POLYCHROMASIA BLD QL SMEAR: SLIGHT
POTASSIUM SERPL-SCNC: 3.8 MMOL/L (ref 3.4–5.3)
POTASSIUM SERPL-SCNC: 3.8 MMOL/L (ref 3.4–5.3)
POTASSIUM SERPL-SCNC: 3.9 MMOL/L (ref 3.4–5.3)
POTASSIUM SERPL-SCNC: 4.6 MMOL/L (ref 3.4–5.3)
POTASSIUM SERPL-SCNC: 4.7 MMOL/L (ref 3.4–5.3)
PROT SERPL-MCNC: 5.7 G/DL (ref 6.4–8.3)
RBC # BLD AUTO: 2.6 10E6/UL (ref 3.8–5.2)
RBC # BLD AUTO: 2.66 10E6/UL (ref 3.8–5.2)
RBC # BLD AUTO: 2.75 10E6/UL (ref 3.8–5.2)
RBC # BLD AUTO: 2.83 10E6/UL (ref 3.8–5.2)
RBC MORPH BLD: ABNORMAL
RBC MORPH BLD: ABNORMAL
SAO2 % BLDA: 89 % (ref 92–100)
SAO2 % BLDA: 91 % (ref 92–100)
SAO2 % BLDA: 92 % (ref 92–100)
SAO2 % BLDA: 93 % (ref 92–100)
SAO2 % BLDA: 95 % (ref 92–100)
SAO2 % BLDA: 95 % (ref 92–100)
SAO2 % BLDA: 96 % (ref 92–100)
SAO2 % BLDA: 96 % (ref 92–100)
SAO2 % BLDA: 97 % (ref 92–100)
SAO2 % BLDA: 98 % (ref 92–100)
SODIUM SERPL-SCNC: 136 MMOL/L (ref 135–145)
SODIUM SERPL-SCNC: 137 MMOL/L (ref 135–145)
SODIUM SERPL-SCNC: 139 MMOL/L (ref 135–145)
WBC # BLD AUTO: 12.5 10E3/UL (ref 4–11)
WBC # BLD AUTO: 15.5 10E3/UL (ref 4–11)
WBC # BLD AUTO: 16 10E3/UL (ref 4–11)
WBC # BLD AUTO: 18.6 10E3/UL (ref 4–11)

## 2024-08-19 PROCEDURE — 82805 BLOOD GASES W/O2 SATURATION: CPT

## 2024-08-19 PROCEDURE — 82330 ASSAY OF CALCIUM: CPT | Performed by: SURGERY

## 2024-08-19 PROCEDURE — 85007 BL SMEAR W/DIFF WBC COUNT: CPT | Performed by: SURGERY

## 2024-08-19 PROCEDURE — 99291 CRITICAL CARE FIRST HOUR: CPT | Performed by: STUDENT IN AN ORGANIZED HEALTH CARE EDUCATION/TRAINING PROGRAM

## 2024-08-19 PROCEDURE — 250N000013 HC RX MED GY IP 250 OP 250 PS 637: Performed by: SURGERY

## 2024-08-19 PROCEDURE — 250N000011 HC RX IP 250 OP 636: Performed by: CLINICAL NURSE SPECIALIST

## 2024-08-19 PROCEDURE — 250N000011 HC RX IP 250 OP 636: Performed by: STUDENT IN AN ORGANIZED HEALTH CARE EDUCATION/TRAINING PROGRAM

## 2024-08-19 PROCEDURE — 82248 BILIRUBIN DIRECT: CPT

## 2024-08-19 PROCEDURE — 85025 COMPLETE CBC W/AUTO DIFF WBC: CPT | Performed by: SURGERY

## 2024-08-19 PROCEDURE — 999N000157 HC STATISTIC RCP TIME EA 10 MIN

## 2024-08-19 PROCEDURE — 83605 ASSAY OF LACTIC ACID: CPT | Performed by: SURGERY

## 2024-08-19 PROCEDURE — 250N000009 HC RX 250: Performed by: CLINICAL NURSE SPECIALIST

## 2024-08-19 PROCEDURE — 83735 ASSAY OF MAGNESIUM: CPT | Performed by: CLINICAL NURSE SPECIALIST

## 2024-08-19 PROCEDURE — 250N000011 HC RX IP 250 OP 636: Performed by: NURSE PRACTITIONER

## 2024-08-19 PROCEDURE — 84460 ALANINE AMINO (ALT) (SGPT): CPT

## 2024-08-19 PROCEDURE — 80069 RENAL FUNCTION PANEL: CPT

## 2024-08-19 PROCEDURE — 250N000013 HC RX MED GY IP 250 OP 250 PS 637: Performed by: PHYSICIAN ASSISTANT

## 2024-08-19 PROCEDURE — 83735 ASSAY OF MAGNESIUM: CPT

## 2024-08-19 PROCEDURE — 94003 VENT MGMT INPAT SUBQ DAY: CPT

## 2024-08-19 PROCEDURE — 250N000009 HC RX 250

## 2024-08-19 PROCEDURE — 06HN33Z INSERTION OF INFUSION DEVICE INTO LEFT FEMORAL VEIN, PERCUTANEOUS APPROACH: ICD-10-PCS | Performed by: SURGERY

## 2024-08-19 PROCEDURE — 200N000002 HC R&B ICU UMMC

## 2024-08-19 PROCEDURE — 94640 AIRWAY INHALATION TREATMENT: CPT | Mod: 76

## 2024-08-19 PROCEDURE — 99233 SBSQ HOSP IP/OBS HIGH 50: CPT | Performed by: PHYSICIAN ASSISTANT

## 2024-08-19 PROCEDURE — 250N000009 HC RX 250: Performed by: NURSE PRACTITIONER

## 2024-08-19 PROCEDURE — 90947 DIALYSIS REPEATED EVAL: CPT

## 2024-08-19 PROCEDURE — 94681 O2 UPTK CO2 OUTP % O2 XTRC: CPT

## 2024-08-19 PROCEDURE — 250N000011 HC RX IP 250 OP 636

## 2024-08-19 PROCEDURE — 82310 ASSAY OF CALCIUM: CPT | Performed by: SURGERY

## 2024-08-19 PROCEDURE — 71045 X-RAY EXAM CHEST 1 VIEW: CPT

## 2024-08-19 PROCEDURE — 83051 HEMOGLOBIN PLASMA: CPT | Performed by: SURGERY

## 2024-08-19 PROCEDURE — 84100 ASSAY OF PHOSPHORUS: CPT | Performed by: CLINICAL NURSE SPECIALIST

## 2024-08-19 PROCEDURE — 250N000013 HC RX MED GY IP 250 OP 250 PS 637: Performed by: NURSE PRACTITIONER

## 2024-08-19 PROCEDURE — 99233 SBSQ HOSP IP/OBS HIGH 50: CPT | Mod: FS | Performed by: CLINICAL NURSE SPECIALIST

## 2024-08-19 PROCEDURE — 71045 X-RAY EXAM CHEST 1 VIEW: CPT | Mod: 26 | Performed by: RADIOLOGY

## 2024-08-19 PROCEDURE — 94640 AIRWAY INHALATION TREATMENT: CPT

## 2024-08-19 PROCEDURE — 250N000009 HC RX 250: Performed by: SURGERY

## 2024-08-19 PROCEDURE — 83036 HEMOGLOBIN GLYCOSYLATED A1C: CPT

## 2024-08-19 PROCEDURE — 80053 COMPREHEN METABOLIC PANEL: CPT

## 2024-08-19 PROCEDURE — 250N000013 HC RX MED GY IP 250 OP 250 PS 637

## 2024-08-19 RX ORDER — HEPARIN SODIUM 5000 [USP'U]/.5ML
5000 INJECTION, SOLUTION INTRAVENOUS; SUBCUTANEOUS EVERY 8 HOURS SCHEDULED
Status: DISCONTINUED | OUTPATIENT
Start: 2024-08-19 | End: 2024-08-19

## 2024-08-19 RX ORDER — DEXTROSE MONOHYDRATE 25 G/50ML
25-50 INJECTION, SOLUTION INTRAVENOUS
Status: DISCONTINUED | OUTPATIENT
Start: 2024-08-19 | End: 2024-10-01 | Stop reason: HOSPADM

## 2024-08-19 RX ORDER — MAGNESIUM SULFATE HEPTAHYDRATE 40 MG/ML
2 INJECTION, SOLUTION INTRAVENOUS EVERY 8 HOURS PRN
Status: DISCONTINUED | OUTPATIENT
Start: 2024-08-19 | End: 2024-08-22

## 2024-08-19 RX ORDER — POTASSIUM CHLORIDE 29.8 MG/ML
20 INJECTION INTRAVENOUS EVERY 8 HOURS PRN
Status: DISCONTINUED | OUTPATIENT
Start: 2024-08-19 | End: 2024-08-24

## 2024-08-19 RX ORDER — CALCIUM CHLORIDE, MAGNESIUM CHLORIDE, SODIUM CHLORIDE, SODIUM BICARBONATE, POTASSIUM CHLORIDE AND SODIUM PHOSPHATE DIBASIC DIHYDRATE 3.68; 3.05; 6.34; 3.09; .314; .187 G/L; G/L; G/L; G/L; G/L; G/L
12.5 INJECTION INTRAVENOUS CONTINUOUS
Status: DISCONTINUED | OUTPATIENT
Start: 2024-08-19 | End: 2024-08-24

## 2024-08-19 RX ORDER — LABETALOL HYDROCHLORIDE 5 MG/ML
10 INJECTION, SOLUTION INTRAVENOUS
Status: DISCONTINUED | OUTPATIENT
Start: 2024-08-19 | End: 2024-08-20

## 2024-08-19 RX ORDER — LOPERAMIDE HCL 2 MG
4 CAPSULE ORAL EVERY 6 HOURS
Status: DISCONTINUED | OUTPATIENT
Start: 2024-08-19 | End: 2024-09-04

## 2024-08-19 RX ORDER — CALCIUM GLUCONATE 20 MG/ML
2 INJECTION, SOLUTION INTRAVENOUS EVERY 8 HOURS PRN
Status: DISCONTINUED | OUTPATIENT
Start: 2024-08-19 | End: 2024-08-24

## 2024-08-19 RX ORDER — OXYCODONE HYDROCHLORIDE 10 MG/1
10 TABLET ORAL EVERY 4 HOURS
Status: DISCONTINUED | OUTPATIENT
Start: 2024-08-19 | End: 2024-08-21

## 2024-08-19 RX ORDER — NICOTINE POLACRILEX 4 MG
15-30 LOZENGE BUCCAL
Status: DISCONTINUED | OUTPATIENT
Start: 2024-08-19 | End: 2024-10-01 | Stop reason: HOSPADM

## 2024-08-19 RX ADMIN — DEXMEDETOMIDINE HYDROCHLORIDE IN SODIUM CHLORIDE 1.2 MCG/KG/HR: 4 INJECTION INTRAVENOUS at 08:00

## 2024-08-19 RX ADMIN — IPRATROPIUM BROMIDE AND ALBUTEROL SULFATE 3 ML: .5; 3 SOLUTION RESPIRATORY (INHALATION) at 12:35

## 2024-08-19 RX ADMIN — DEXMEDETOMIDINE HYDROCHLORIDE IN SODIUM CHLORIDE 1.2 MCG/KG/HR: 4 INJECTION INTRAVENOUS at 11:27

## 2024-08-19 RX ADMIN — LEVOTHYROXINE SODIUM 25 MCG: 0.03 TABLET ORAL at 07:47

## 2024-08-19 RX ADMIN — CALCIUM CHLORIDE, MAGNESIUM CHLORIDE, SODIUM CHLORIDE, SODIUM BICARBONATE, POTASSIUM CHLORIDE AND SODIUM PHOSPHATE DIBASIC DIHYDRATE 12.5 ML/KG/HR: 3.68; 3.05; 6.34; 3.09; .314; .187 INJECTION INTRAVENOUS at 00:07

## 2024-08-19 RX ADMIN — AMITRIPTYLINE HYDROCHLORIDE 50 MG: 50 TABLET, FILM COATED ORAL at 22:40

## 2024-08-19 RX ADMIN — DEXMEDETOMIDINE HYDROCHLORIDE IN SODIUM CHLORIDE 1.2 MCG/KG/HR: 4 INJECTION INTRAVENOUS at 03:57

## 2024-08-19 RX ADMIN — OXYCODONE HYDROCHLORIDE 10 MG: 10 TABLET ORAL at 15:51

## 2024-08-19 RX ADMIN — WHITE PETROLATUM 57.7 %-MINERAL OIL 31.9 % EYE OINTMENT: at 08:09

## 2024-08-19 RX ADMIN — ACETAMINOPHEN 650 MG: 325 TABLET, FILM COATED ORAL at 07:48

## 2024-08-19 RX ADMIN — OXYCODONE HYDROCHLORIDE 10 MG: 10 TABLET ORAL at 20:40

## 2024-08-19 RX ADMIN — WHITE PETROLATUM 57.7 %-MINERAL OIL 31.9 % EYE OINTMENT: at 23:59

## 2024-08-19 RX ADMIN — OXYCODONE HYDROCHLORIDE 10 MG: 10 TABLET ORAL at 11:41

## 2024-08-19 RX ADMIN — Medication 60 ML: at 08:09

## 2024-08-19 RX ADMIN — LOPERAMIDE HCL 4 MG: 1 SOLUTION ORAL at 08:09

## 2024-08-19 RX ADMIN — CALCIUM CHLORIDE, MAGNESIUM CHLORIDE, SODIUM CHLORIDE, SODIUM BICARBONATE, POTASSIUM CHLORIDE AND SODIUM PHOSPHATE DIBASIC DIHYDRATE 12.5 ML/KG/HR: 3.68; 3.05; 6.34; 3.09; .314; .187 INJECTION INTRAVENOUS at 14:44

## 2024-08-19 RX ADMIN — DEXMEDETOMIDINE HYDROCHLORIDE IN SODIUM CHLORIDE 1.2 MCG/KG/HR: 4 INJECTION INTRAVENOUS at 15:14

## 2024-08-19 RX ADMIN — CALCIUM CHLORIDE, MAGNESIUM CHLORIDE, SODIUM CHLORIDE, SODIUM BICARBONATE, POTASSIUM CHLORIDE AND SODIUM PHOSPHATE DIBASIC DIHYDRATE 12.5 ML/KG/HR: 3.68; 3.05; 6.34; 3.09; .314; .187 INJECTION INTRAVENOUS at 21:38

## 2024-08-19 RX ADMIN — CALCIUM CHLORIDE, MAGNESIUM CHLORIDE, SODIUM CHLORIDE, SODIUM BICARBONATE, POTASSIUM CHLORIDE AND SODIUM PHOSPHATE DIBASIC DIHYDRATE 12.5 ML/KG/HR: 3.68; 3.05; 6.34; 3.09; .314; .187 INJECTION INTRAVENOUS at 04:45

## 2024-08-19 RX ADMIN — LOPERAMIDE HYDROCHLORIDE 4 MG: 2 CAPSULE ORAL at 20:40

## 2024-08-19 RX ADMIN — DEXMEDETOMIDINE HYDROCHLORIDE IN SODIUM CHLORIDE 1.2 MCG/KG/HR: 4 INJECTION INTRAVENOUS at 19:26

## 2024-08-19 RX ADMIN — CALCIUM CHLORIDE, MAGNESIUM CHLORIDE, SODIUM CHLORIDE, SODIUM BICARBONATE, POTASSIUM CHLORIDE AND SODIUM PHOSPHATE DIBASIC DIHYDRATE 12.5 ML/KG/HR: 3.68; 3.05; 6.34; 3.09; .314; .187 INJECTION INTRAVENOUS at 17:09

## 2024-08-19 RX ADMIN — IPRATROPIUM BROMIDE AND ALBUTEROL SULFATE 3 ML: .5; 3 SOLUTION RESPIRATORY (INHALATION) at 17:10

## 2024-08-19 RX ADMIN — VENLAFAXINE 75 MG: 25 TABLET ORAL at 20:40

## 2024-08-19 RX ADMIN — IPRATROPIUM BROMIDE AND ALBUTEROL SULFATE 3 ML: .5; 3 SOLUTION RESPIRATORY (INHALATION) at 09:07

## 2024-08-19 RX ADMIN — PIPERACILLIN AND TAZOBACTAM 4.5 G: 4; .5 INJECTION, POWDER, LYOPHILIZED, FOR SOLUTION INTRAVENOUS at 02:03

## 2024-08-19 RX ADMIN — HEPARIN SODIUM 5000 UNITS: 5000 INJECTION, SOLUTION INTRAVENOUS; SUBCUTANEOUS at 09:17

## 2024-08-19 RX ADMIN — CALCIUM CHLORIDE, MAGNESIUM CHLORIDE, SODIUM CHLORIDE, SODIUM BICARBONATE, POTASSIUM CHLORIDE AND SODIUM PHOSPHATE DIBASIC DIHYDRATE 12.5 ML/KG/HR: 3.68; 3.05; 6.34; 3.09; .314; .187 INJECTION INTRAVENOUS at 23:58

## 2024-08-19 RX ADMIN — OXYCODONE HYDROCHLORIDE 10 MG: 10 TABLET ORAL at 08:09

## 2024-08-19 RX ADMIN — HEPARIN SODIUM 500 UNITS/HR: 1000 INJECTION, SOLUTION INTRAVENOUS; SUBCUTANEOUS at 11:10

## 2024-08-19 RX ADMIN — CALCIUM CHLORIDE, MAGNESIUM CHLORIDE, SODIUM CHLORIDE, SODIUM BICARBONATE, POTASSIUM CHLORIDE AND SODIUM PHOSPHATE DIBASIC DIHYDRATE 12.5 ML/KG/HR: 3.68; 3.05; 6.34; 3.09; .314; .187 INJECTION INTRAVENOUS at 19:26

## 2024-08-19 RX ADMIN — VENLAFAXINE 75 MG: 25 TABLET ORAL at 07:47

## 2024-08-19 RX ADMIN — IPRATROPIUM BROMIDE AND ALBUTEROL SULFATE 3 ML: .5; 3 SOLUTION RESPIRATORY (INHALATION) at 20:26

## 2024-08-19 RX ADMIN — Medication 40 MG: at 07:48

## 2024-08-19 RX ADMIN — LOPERAMIDE HCL 4 MG: 1 SOLUTION ORAL at 02:06

## 2024-08-19 RX ADMIN — CALCIUM CHLORIDE, MAGNESIUM CHLORIDE, SODIUM CHLORIDE, SODIUM BICARBONATE, POTASSIUM CHLORIDE AND SODIUM PHOSPHATE DIBASIC DIHYDRATE 12.5 ML/KG/HR: 3.68; 3.05; 6.34; 3.09; .314; .187 INJECTION INTRAVENOUS at 12:42

## 2024-08-19 RX ADMIN — DEXMEDETOMIDINE HYDROCHLORIDE IN SODIUM CHLORIDE 1.2 MCG/KG/HR: 4 INJECTION INTRAVENOUS at 22:40

## 2024-08-19 RX ADMIN — CALCIUM CHLORIDE, MAGNESIUM CHLORIDE, SODIUM CHLORIDE, SODIUM BICARBONATE, POTASSIUM CHLORIDE AND SODIUM PHOSPHATE DIBASIC DIHYDRATE 12.5 ML/KG/HR: 3.68; 3.05; 6.34; 3.09; .314; .187 INJECTION INTRAVENOUS at 02:42

## 2024-08-19 RX ADMIN — DEXAMETHASONE SODIUM PHOSPHATE 10 MG: 10 INJECTION, SOLUTION INTRAMUSCULAR; INTRAVENOUS at 08:09

## 2024-08-19 RX ADMIN — Medication 60 ML: at 20:41

## 2024-08-19 RX ADMIN — PIPERACILLIN AND TAZOBACTAM 4.5 G: 4; .5 INJECTION, POWDER, LYOPHILIZED, FOR SOLUTION INTRAVENOUS at 07:47

## 2024-08-19 RX ADMIN — ACETYLCYSTEINE 4 ML: 100 SOLUTION ORAL; RESPIRATORY (INHALATION) at 12:35

## 2024-08-19 RX ADMIN — Medication 1 MG/HR: at 22:39

## 2024-08-19 RX ADMIN — CHLORHEXIDINE GLUCONATE 0.12% ORAL RINSE 15 ML: 1.2 LIQUID ORAL at 20:41

## 2024-08-19 RX ADMIN — CALCIUM CHLORIDE, MAGNESIUM CHLORIDE, SODIUM CHLORIDE, SODIUM BICARBONATE, POTASSIUM CHLORIDE AND SODIUM PHOSPHATE DIBASIC DIHYDRATE 12.5 ML/KG/HR: 3.68; 3.05; 6.34; 3.09; .314; .187 INJECTION INTRAVENOUS at 10:05

## 2024-08-19 RX ADMIN — ACETYLCYSTEINE 4 ML: 100 SOLUTION ORAL; RESPIRATORY (INHALATION) at 20:26

## 2024-08-19 RX ADMIN — ACETYLCYSTEINE 4 ML: 100 SOLUTION ORAL; RESPIRATORY (INHALATION) at 09:07

## 2024-08-19 RX ADMIN — DEXMEDETOMIDINE HYDROCHLORIDE IN SODIUM CHLORIDE 1.2 MCG/KG/HR: 4 INJECTION INTRAVENOUS at 00:09

## 2024-08-19 RX ADMIN — Medication 1 TABLET: at 07:47

## 2024-08-19 RX ADMIN — LOPERAMIDE HYDROCHLORIDE 4 MG: 2 CAPSULE ORAL at 14:01

## 2024-08-19 RX ADMIN — Medication 60 ML: at 13:56

## 2024-08-19 RX ADMIN — WHITE PETROLATUM 57.7 %-MINERAL OIL 31.9 % EYE OINTMENT: at 15:51

## 2024-08-19 RX ADMIN — ACETYLCYSTEINE 4 ML: 100 SOLUTION ORAL; RESPIRATORY (INHALATION) at 17:10

## 2024-08-19 RX ADMIN — CALCIUM CHLORIDE, MAGNESIUM CHLORIDE, SODIUM CHLORIDE, SODIUM BICARBONATE, POTASSIUM CHLORIDE AND SODIUM PHOSPHATE DIBASIC DIHYDRATE 12.5 ML/KG/HR: 3.68; 3.05; 6.34; 3.09; .314; .187 INJECTION INTRAVENOUS at 08:04

## 2024-08-19 RX ADMIN — CHLORHEXIDINE GLUCONATE 0.12% ORAL RINSE 15 ML: 1.2 LIQUID ORAL at 07:47

## 2024-08-19 RX ADMIN — OXYCODONE HYDROCHLORIDE 10 MG: 10 TABLET ORAL at 02:06

## 2024-08-19 ASSESSMENT — ACTIVITIES OF DAILY LIVING (ADL)
ADLS_ACUITY_SCORE: 55

## 2024-08-19 NOTE — PLAN OF CARE
Major Shift Events:   -Adjustments to vent settings made by SICU fellow throughout the day and trended with ABGs. Initially patient was on PC, adjusted to VC for patient comfort and ultimately returned to PC this afternoon.    RASS -2. Pupils round and reactive. Wiggles bilateral toes to command inconsistently. No purposeful movement of BUE. Coughs/bites on ETT when restless. Intermittent shivering, on and off bearhugger. ETT 24 @ lip- PC-AC 60%/18/18(Pinsp)/10, RR's 25-upper 30s, team aware. SR (70s-100's). MAP goal > 65.  Rectal tube- patent. Receiving scheduled imodium. Anuric (BS for 240 @ 1600)- on CRRT 4 K baths (goal 0-50ml/hr), net - 680 mL. No new skin concerns.     Versed @ 1, dilaudid @ 1, dex @ 1.2    Plan: Wean O2 settings as able, monitor neuro status. MRI tomorrow?  For vital signs and complete assessments, please see documentation flowsheets.     Danni Bermudez RN on 8/19/2024 at 6:56 PM

## 2024-08-19 NOTE — PROGRESS NOTES
SURGICAL ICU PROGRESS NOTE  08/19/2024        Date of Service (when I saw the patient): 08/19/2024    ASSESSMENT:  Massiel Flaherty is a 53 year old female who was admitted to [facility].   Past medical hx of Anxiety/depression, fibromyalgia, hypothyroidism, asthma, HLD, MURIEL on CPAP, spells, off on anti seizure medications, expressive aphasia, hypertension was seen at [facility] and was placed on Augmentin outpatient on 7/30/24.   She admitted to the hospital on 8/3/24 for fatigue, fever and dyspnea and intubated 8/6/24 at OSH, proned and paralyzed without improvement. Transferred to [facility] 8/8/24, hypoxic with high plateaus/peaks and placed on VV ECMO and CRRT.     CHANGES and MAJOR THINGS TODAY:   - Heparinize CRRT circuit  - Increase oxy 10 mg q 4hr and continue to wean on sedation gtt as able   - Discontinue Zosyn today   - Discontinue insulin gtt and start high sliding scale insulin   - Switched vent from PC to VC for pt comfort   - Pt off of NE this afternoon and hypertensive with SBP 150s restarted PRN hydralazine and labetalol pushes     PLAN:  Neurological:  # Fibromyalgia   # Hx myalgic encephalomyelitis   # Acute pain  # Sedation and analgesia for vent compliance   - Monitor neurological status. Delirium preventions and precautions.   - Pain: Gtt: Dilaudid gtt - wean as able   Scheduled: oxy 10 mg increase to q 4 hr from q 6 hr  PRN:  Dilaudid boluses, oxy 10 mg q 4hr   - Sedation plan: Gtt: Versed - wean as able, Precedex - wean as able   - Resumed PTA Venlafaxine and Amitriptyline     # Hx spells with staring and BUE posturing previously described as pseudoseizures   # Hx history of GTC seizures and myoclonic epilepsy, weaned off AEDs   # C/f hypoxic ischemic injury   # Diffuse cerebral edema - improved  - Head CT 8/9: Findings concerning for diffuse cerebral edema with  diffuse blurring of the gray-white differentiation. Findings concerning for hypoxicischemic injury.   - Sodium goals liberalized to  140-145  - Repeat Head CT 8/15 continued improvement in gray white matter differentiation and continued effacement of the sulci and ventricles.   - MRI Brain wwo contrast after decannulation. Will hold off on 8/19 until pt is more hemodynamically stable.   - NCC signed off.     Pulmonary:   #ARDS s/p VV ECMO cannulation 8/8- decannulated on 8/18   # Asthma  # MURIEL on home CPAP   FiO2 (%): 65 %, Resp: (!) 33, Inspiratory Pressure (cm H2O) (Drager Jessie): 18, Vent Mode: PCV Plus assist, Resp Rate (Set): 16 breaths/min, PEEP (cm H2O): 10 cmH2O, Resp Rate (Set): 16 breaths/min, PEEP (cm H2O): 10 cmH2O  - Chest CT 8/9: Diffuse patchy groundglass and consolidative densities throughout  the lungs with small bilateral pleural effusions. Findings may  represent severe pulmonary edema, and/or  infectious /inflammatory  pathology, or ARDS. Multiple prominent and a few enlarged mediastinal lymph nodes,  possibly reactive.  - 8/11: Restart veletri continuous neb  - 8/11: Increase inspiratory pressure to 35 to attempt achieving slightly higher tidal volumes   - 8/12: Decadron 20mg daily x 5 days, then 10mg x 5 days   - 8/14 Bronchoscopy with mucous plugging.  Continue Mucomyst and Duonebs q4 hours. No acute indication to repeat bronchoscopy today.   - 8/15 Stop Veletri  - 8/18 pt decannulated off of VV ECMO   - Pt switched over to VC today from       Cardiovascular:    # Hyperlipidemia  # Hypotension  - MAP >65  - Prior HTN, Clonidine on hold   - NE for pressors support wean as able; Titration based on SBP goals >110, MAP >65   - Repeat ECHO 8/12: Left ventricular size, wall motion and function are normal. The ejection fraction is 60-65%. Flattened septum is consistent with right ventricular pressure and volume overload. Right ventricular function, chamber size, wall motion, and thickness are normal.       Gastroenterology/Nutrition:  #Moderate protein calorie malnutrition  # GERD   # Non-infectious Diarrhea  - Protonix (home  medication)   - Diet: TF @ goal with fiber supplementation   - Rectal tube in place with 800 ml of output /24 years  - Scheduled loperamide 4 mg q 6hr       Fluids/Electrolytes/Renal:  # Acute kidney injury  - Cr today is 1.73 down from 1.83   - Continue CRRT; fluid goal net even   - Okay to heparinize CRRT circuit   - Strict I&Os   - Avoid nephrotoxins as able     Endocrine:  # Hypothyroidism   # Steroid and stress induced hyperglycemia  - Restarted PTA synthroid   - Goal to keep BG< 180 for optimal wound healing   - 8/14: Lantus 16 units daily  - Discontinue insulin gtt start high sliding scale insulin        ID:  # Leukocytosis  # Presumed pneumonia  # Concern for PID   PER OSH: 8/6: RSV, COVID, parainfluenza,  negative . Legionella and pneumococcal urine antigens negative. Patient has been on greater than 20 mg prednisone daily for 18 days.  CULTURES: 8/8/2024: Respiratory viral panel negative. Blood cultures negative, MRSA nasal negative, Legionella urine antigen negative, respiratory culture negative.PCR trachea for PJP is negative.  COVID: Negative. Viral panel-negative PJP  - ID consulted   - 8/11: Gyn consulted for right adnexal finding on CT. Low suspicion for abscess but would treat as we are currently. Collection too small for surgical intervention and too small for IR drainage as well. Could follow up with pelvic MRI, however we will treat as current with IV antibiotics.    - EBV PCR, per ID likely reactivation I/n the setting of acute illness.   - IGG levels low, ID recommend IVIG infusion, done 8/15  - 8/15 Check MRSA swab negative Repeat BC  with NGTD  - 8/14 BAL: Pseudomonas aeruginosa grown in the setting of Cefepime since 8/3.  8/16 Changed to Zosyn. Follow susceptibilities.   - Karius returned with EBV and HSV, along with EBV positivity in BAL, per ID represents reactivation  in critical illness, no treatment at this time.   - Pt has been afebrile T max of 100.4 overnight, leukocytosis uptrending  "to 18.6 from 13.0 felt likely secondary to recent decannulation of VV ECMO   - Will discontinue Zosyn today   - Continue to monitor fever curve and inflammatory markers as appropriate    Antimicrobials:   - Azithromycin- stopped 8/11   - Cefepime 8/3 (started at OSH)-8/16  - Amphotericin - stopped 8/13   - Flagyl 8/11-8/16  - Zosyn 8/16-will discontinue today 8/19    Heme:     # Acute blood loss anemia   #Anemia of critical illness  #Oral bleeding  - Transfuse if hgb <7.0 or signs/symptoms of hypoperfusion. Monitor and trend.  No transfusions in the last 24 hours.   - Will heparinize CRRT circuit   - Intraoral bleeding improved, complete Amicar intraoral solution for 48 hours.        Musculoskeletal:  # Weakness and deconditioning of critical illness  # Left ankle injury pre-hospitalization    - Physical and occupational therapy when able.      Skin:  # Ecchymosis - BLE   # Left toe duskiness   - Bilateral lower leg ecchymosis   - WOC consult   - 8/13: Worsening duskiness to left 3rd, 4th toes noted; Off pressors      General Cares/Prophylaxis:    DVT Prophylaxis: Heparinized CRRT circuit  GI Prophylaxis: PPI  Restraints: Restraints for medical healing needed: NO     Lines/ tubes/ drains:  - ETT   - Left internal jugular TLC  - radial arterial line   - NJ   - PIV\"s      Disposition: SICU      Discussed plan today with pt's  family member. All questions were answered. They are in agreement with plan.      Patient seen, findings and plan discussed with SICU staff.     Total Critical Care time spent by me, excluding procedures, was 40 minutes.     Andres Payne PA-C         ====================================  INTERVAL HISTORY:  Unable to obtain due to pt's critical condition.       OBJECTIVE:   1. VITAL SIGNS:   Temp:  [97.5  F (36.4  C)-101.1  F (38.4  C)] 99.5  F (37.5  C)  Pulse:  [] 96  Resp:  [25-43] 33  MAP:  [45 mmHg-108 mmHg] 73 mmHg  Arterial Line BP: ()/(36-77) 136/51  FiO2 (%):  [60 %-80 %] 65 " %  SpO2:  [90 %-100 %] 90 %  FiO2 (%): 65 %, Resp: (!) 33, Inspiratory Pressure (cm H2O) (Drager Jessie): 18, Vent Mode: PCV Plus assist, Resp Rate (Set): 16 breaths/min, PEEP (cm H2O): 10 cmH2O, Resp Rate (Set): 16 breaths/min, PEEP (cm H2O): 10 cmH2O      2. INTAKE/ OUTPUT:   I/O last 3 completed shifts:  In: 2801.59 [I.V.:1451.59; NG/GT:510]  Out: 2742.3 [Other:2092.3; Stool:650]    3. PHYSICAL EXAMINATION:  Neuro: Intubated and Sedated. Wiggles toes in BLE, no movement of BUE to noxious stimuli. NAD.   HEENT: Normocephalic, atraumatic. PERRL, and nonicteric.   CV: RRR on monitor, all extremities well perfused   Respiratory: Normal respiratory effort on PC inspiratory pressure 18, PEEP 10, FiO2 70%, equal chest rise b/l   GI: Abdomen soft and non-tender to light palpation. Non-distended. Rectal tube in place with green watery stools 800 ml /24 hrs.   : Voiding with Gonzalez   MSK: See neuro.   Skin:  L great toe pale with ecchymosis. No rashes or skin lesions.         4. INVESTIGATIONS:   Arterial Blood Gases   Recent Labs   Lab 08/19/24  0800 08/19/24  0554 08/19/24  0359 08/19/24  0156   PH 7.31* 7.34* 7.34* 7.33*   PCO2 42 41 42 43   PO2 88 71* 93 104   HCO3 21 22 22 23     Complete Blood Count   Recent Labs   Lab 08/19/24  0358 08/18/24  2155 08/18/24  1945 08/18/24  1559   WBC 18.6* 13.0* 13.1* 14.9*   HGB 8.6* 8.2* 8.3* 8.3*    149* 146* 147*     Basic Metabolic Panel  Recent Labs   Lab 08/19/24  0808 08/19/24  0558 08/19/24  0403 08/19/24  0358 08/18/24  2200 08/18/24  2155 08/18/24 1947 08/18/24 1945 08/18/24  1708 08/18/24  1559   NA  --   --   --  137  --  140  --  138  --  140   POTASSIUM  --   --   --  3.9  --  4.4  --  4.6  --  4.4   CHLORIDE  --   --   --  105  --  108*  --  106  --  110*   CO2  --   --   --  21*  --  19*  --  20*  --  18*   BUN  --   --   --  41.2*  --  47.4*  --  51.5*  --  58.2*   CR  --   --   --  1.73*  --  1.84*  --  1.92*  --  2.08*   * 134* 117* 129*   < > 137*    < > 129*   < > 138*    < > = values in this interval not displayed.     Liver Function Tests  Recent Labs   Lab 08/19/24  0358 08/18/24  1945 08/18/24  1208 08/18/24  0433 08/17/24  1206 08/17/24  0402 08/16/24  1158 08/16/24  0419   AST 29  --   --  34  --  37  --  28   ALT 16  --   --  18  --  18  --  19   ALKPHOS 101  --   --  112  --  119  --  132   BILITOTAL 0.4  --   --  0.5  --  0.5  --  0.6   ALBUMIN 3.2* 3.0* 3.1* 3.3*   < > 3.3*   < > 3.9    < > = values in this interval not displayed.     Pancreatic Enzymes  No lab results found in last 7 days.  Coagulation Profile  Recent Labs   Lab 08/18/24  0946 08/18/24  0433 08/17/24  2154 08/17/24  1545   PTT 32 45* 47* 49*         5. RADIOLOGY:   Recent Results (from the past 24 hour(s))   XR Chest Port 1 View    Narrative    Exam: XR CHEST PORT 1 VIEW, 8/19/2024 1:50 AM    Indication: daily ECMO check    Comparison: X-ray chest 8/18/2024, 0132 hours.    Findings:   Portable semiupright radiograph of the chest. Endotracheal tube tip is  in the high thoracic trachea approximately 5.8 cm above the ivy.  Stable placement of esophageal temperature probe in the midesophagus,  enteric tube courses below the diaphragm out of the field of view,  left IJ central venous catheter tip terminates near the  brachiocephalic junction/superior SVC. Silhouetting of the heart.  Interval increase in the diffuse airspace opacities throughout all  lung fields. Likely small bilateral pleural effusions, no definitive  pneumothorax.      Impression    Impression:   1. Interval increase in the diffuse airspace opacities suspicious for  pneumonia/ARDS pattern. Small associated pleural effusions.  2. ECMO lines have been removed. Other support devices are stable.    I have personally reviewed the examination and initial interpretation  and I agree with the findings.    IPLAR SAHU MD         SYSTEM ID:  [id number]       =========================================

## 2024-08-19 NOTE — PROGRESS NOTES
[facility] General ID Service: Follow Up Note      Patient:  Massiel Flaherty   Date of birth 1970, Medical record number [id number]  Date of Visit:  08/19/2024  Date of Admission: 8/8/2024         Assessment and Recommendations:   ID Problem List:  Acute hypoxic and hypercapnic respiratory failure c/b ARDS and s/p VV-ECMO (8/8/24-8/18/24)  Etiology unknown, possible CAP vs viral pneumonia vs other  Fever  Dry cough x1 month prior to admission  CHACORTA requiring CRRT  Cerebral edema; intentional hypernatremia  Subconjunctival hemorrhage  Right adnexal mass  Hypogammaglobulinemia  Elevated AST, alk phos  Anemia  Recent Augmentin, Z-pack and steroid tapers  Antibiotic allergy - Sulfa (hives), tetracycline (hives)    Recommendations:  Agree with stopping Zosyn has completed >2 weeks of abx   PsA from BAL on 8/14 c/w respiratory colonization- susceptible to cefepime and isolated during prolonged course of cefepime  Repeat blood culture today with fever post-decannulation  Consider repeat CT chest w/ contrast if possible as it has been 10 days since last img  Will continue to follow pending cultures.       Discussion:  Massiel Flaherty is a 53 year old female with past medical history significant for asthma, MURIEL on CPAP, HTN, anxiety,depression, expressive aphasia, fibromyalgia, and hypothyroidism who presented to OSH 8/3/24 with fever, fatigue, dyspnea with c/f CAP and requiring intubation, proning, paralysis and transferred to [facility] 8/8/24 for further cares and now s/p VV ECMO (decannulated 8/18) and CHACORTA requiring CRRT. ID consulted for pneumonia.      Per chart review, had sought care multiple times since early July for an acute on chronic cough. No improvement with multiple courses of steroid tapers, Z-pack, and Augmentin as outpatient. Admitted 8/3/24 with fever and hypoxia. She has completed at least 3 days of azithromycin at OSH since first admitted 8/3 and has been on cefepime since 8/3 with clinical  worsening. No infectious etiology has been identified to date. Negative ID work up outlined below. Negative EVELIO, ANCA, MPO, proteinase 3 at OSH. CXR with ongoing diffuse bilateral pulmonary opacities consistent with ARDS. S/p VV ECMO 8/8-8/18. Requiring mechanical ventilation.     Started on Amphotericin B 8/9 due to high level of concern for endemic fungal pneumonia given multiple presentations and lack of improvement on abx, with steroids prescribed, discontinued on 8/13 with antigens negative, unrevealing bronch. Low level EBV positivity from BAL- likely reactivation rather than initial cause of worsening status. BAL culture 8/14 now with growth of PsA, (pan-s). This is consistent with colonization of respiratory tract as it is Cefepime susceptible as she has been on ~2 wks of coverage. Has completed 3 days of Azithromycin at [facility] without improvement; received azithromycin at OSH prior to transfer as well. Sent Karius on 8/13 given ongoing critical illness and negative work up thus far- no bacterial or fungal organisms identified. Did reflect elevated EBV and HSV on results- but this is likely reactivation of previously suppressed viruses in setting of critical illness- does not require treatment.       8/6/24 Legionella urine ag Neg    MRSA nares Neg    Bcx x2  No growth    PJP PCR Neg    Respiratory bacterial cx No growth   8/7/24 BD glucan Neg    HIV Ag Ab Neg    Aspergillus antigen blood Neg   8/8/24 IgG 302 (low)    Respiratory aerobic cx C.albicans    MRSA nares; SA target DNA Neg/Neg    BCx NGTD   8/9 Blastomyces Ag EIA blood Neg    BD glucan Neg (44)    Aspergillus galactomannan Neg    IgG 258    HIV Ag Ab combo Neg    Cryptococcal Ag blood Neg    Coccidioides Ag EIA blood Neg    Histoplasma capsulatum Ag blood Neg    Hep B core Ab IgM Neg    Hep A Ab IgM Neg    Hep B surface Ab Pos    Hep C Ab  Neg    Hep B surface Ag Neg    Respiratory aerobic cx BAL No growth    AFB stain and Cx BAL  Stain neg, NGTD     Fungal cx BAL  C.albicans    KOH prep BAL Neg    CMV PCR BAL Neg    Nocardia cx BAL NGTD    Legionella cx BAL NGTD    HSV PCR BAL Neg    PJP PCR BAL Neg    Aspergillus galactomannan BAL Neg    EBV PCR BAL Positive    Adenovirus PCR BAL Neg    Respiratory aerobic cx BAL No growth    AFB culture and stain BAL Stain neg, NGTD    Fungal Cx BAL NGTD    KOH prep BAL Neg    Legionella species cx BAL NGTD    Nocardia species cx BAL NGTD    Legionella PCR BAL Neg    HSV PCR BAL Neg   8/10/24 Respiratory panel PCR neg    COVID-19 Neg   8/11/24 Histoplasma capsulatum BAL Neg    Histoplasma capsulatum blood Neg   8/12/24 Tick-borne disease neg    Leptospira IgM Neg    Hantavirus antibodies PENDING    GC/Chlamydia swab Neg   8/13/24 C.diff Neg    Karius  and  (known to cause disease but may represent normal microbiota)   8/14/24 Fungal/yeast cx BAL NGTD    Respiratory Aerobic cx BAL Pseudomonas aeruginosa (pan-S)   8/15/24 MRSA nares neg    BCX NGTD       Don't hesitate to call with questions.     Marce Black PA-C  Pronouns: she/her/hers  Infectious Diseases  Contact via [facility] or [facility] Paging/Directory      50 MINUTES SPENT BY ME on the date of service doing chart review, history, exam, documentation & further activities per the note.             Interval History:      S/p EMCO decannulation on 8/18. Fever over last 48hrs with Tmax 100.4 in last 24hr. On CRRT. Intermittent pressor requirement.         Current Antimicrobials   Current:  -Zosyn 8/16-    Prior:  - amphotericin B 8/9-8/13  - azithromycin 8/9-/811; given at OSH as well  - IV pentamidine given at OSH  - cefepime 8/3-15  - flagyl 8/11-16         History of Present Illness:    Per ID consult note 8/9/24:  Massiel Flaherty is a 53 year old female with past medical history significant for asthma, MURIEL on CPAP, HTN, anxiety,depression, expressive aphasia, fibromyalgia, obesity, and hypothyroidism who was transferred to [facility] 8/8/24 for further cares and  "now placed on VV ECMO and CRRT. ID consulted for pneumonia.      She was transferred to [facility] from OSH. Presented to hospital in [place], [place] on 8/3 with fever, fatigue, dyspnea with concern for CAP. Xray with multifocal bilateral opacities. CT chest with severe multifocal bilateral opacities, negative for PE in OSH notes, though unable to see direct report. Negative COVID, flu, and viral testing. O2 sats reportedly 60% on arrival.      She was started on azithromycin, cefepime, vancomycin, and solumedrol x1. Had reportedly been on a steroid taper recently, so had started on atovaquone ppx on 8/7 at OSH. Prior to admission, was seen in clinic and given PO Augmentin on 7/30 for acute on chronic cough x2 weeks. Was also seen at OSH ED 7/19 for asthma exacerbation, got Z-pack and prednisone taper. Was seen prior on 7/8 with cough and got additional prednisone, azithromycin. Has also had some concern for recent \"memory issues and brain fog\" and has \"zoning out episodes\". Head imaging normal, seen by neurology who felt this was more psych issue per OSH records. She has history of seizure workup and is not on any AEDs. She is on rifaximin for unknown reason. Was febrile at OSH Tmax 101.3F and required pressors. Infectious workup at OSH with negative - RSV, COVID, flu, RVP, Fungitell, Legionella urine Ag, Strep pneumococcal urine Ag, sputum PJP PCR, HIV, Aspergillus antigen, and EVELIO. MRSA nares negative. Sputum Cx 8/6 - NGTD at 48 hrs. Bcx 8/6 at OSH NGTD. Procal 4.16.      On 8/6, was transferred to [facility] and intubated for severe ARDS requiring paralysis, proning. Remained on cefepime. Transferred to [facility] on 8/8 given continued decline, started V-V ECMO and CRRT. Son, brother, and niece/nephew at bedside on 8/9 morning. Son provides additional history primarily. Reports onset of dry nonproductive cough ~2 weeks ago and followed by zoning out spells. She had no other URI symptoms - sore throat, sinus congestion, " no headache, chest pain, nausea, vomiting, abdominal pain, diarrhea that he was aware. Patient had been living with her mother due to difficulties caring for herself. Has had multiple falls in the last month. Recently fell out of a recliner - has bruising to left foot from this fall. Unsure if she hit her head. Son denies any tobacco, alcohol, or illicit drug use by patient. No recent travel. Son was sick with viral illness ~1 months ago, no other recent sick contacts. She is around her sister's dog - who is well, no illness. No livestock or other animal exposures. Says she does have a hx of colon cancer - likely polyp that was resected. Family denies chemo/radiation/surgery. 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        Physical Exam:   Ranges for vital signs:  Temp:  [97.5  F (36.4  C)-101.1  F (38.4  C)] 99.5  F (37.5  C)  Pulse:  [] 96  Resp:  [25-43] 33  MAP:  [45 mmHg-108 mmHg] 73 mmHg  Arterial Line BP: ()/(36-77) 136/51  FiO2 (%):  [60 %-80 %] 65 %  SpO2:  [90 %-100 %] 90 %    Intake/Output Summary (Last 24 hours) at 8/12/2024 1456  Last data filed at 8/12/2024 1400  Gross per 24 hour   Intake 4354.59 ml   Output 6225.9 ml   Net -1871.31 ml     Exam:  Constitutional: Female patient seen lying in bed, in NAD. Lightly sedated.  HEENT: NCAT, anicteric sclerae. +subconjunctival hemorrhage L>R. MMM. ET tube in place.  Respiratory: Non-labored breathing on vent. Good cough with suctioning by nursing. Lungs CTABL. Minimal sputum production.  Cardiovascular: Regular rate and rhythm.  GI: Normoactive BS. Abdomen is soft, non-distended.  Skin: Warm and dry. No jaundice or acute rashes. Former ECMO cannulation site has clean, dry dressing in place. No surrounding erythema, induration, or swelling.  Musculoskeletal: Extremities grossly normal.   Neurologic: Sedated. Opens eyes to voice.  VAD:  LIJ CVC, L radial art  line           Laboratory Data:   Reviewed.  Pertinent for:    Culture data:  Culture   Date Value Ref Range Status   08/15/2024 No growth after 3 days  Preliminary   08/14/2024 10,000-50,000 CFU/mL Pseudomonas aeruginosa (A)  Final   08/14/2024 Candida albicans (A)  Preliminary   08/09/2024 No Growth  Final   08/09/2024 Candida albicans (A)  Preliminary   08/09/2024 No growth after 9 days  Preliminary   08/09/2024 Culture negative, monitoring continues  Preliminary   08/09/2024 No Growth  Final   08/09/2024 No growth after 9 days  Preliminary   08/09/2024 Culture negative, monitoring continues  Preliminary   08/09/2024 No growth after 9 days  Preliminary   08/08/2024 No Growth  Final   08/08/2024 1+ Candida albicans (A)  Final     Comment:     Susceptibilities not routinely done, refer to antibiogram to view typical susceptibility profiles     GS Culture   Date Value Ref Range Status   08/09/2024 See corresponding culture for results  Final   08/09/2024 See corresponding culture for results  Final       Inflammatory Markers  No lab results found.    Hematology Studies    Recent Labs   Lab Test 08/19/24 0358 08/18/24 2155 08/18/24  1945 08/18/24  1559   WBC 18.6* 13.0* 13.1* 14.9*   HGB 8.6* 8.2* 8.3* 8.3*   MCV 95 94 94 96    149* 146* 147*     Recent Labs   Lab Test 08/19/24 0358 08/18/24 2155 08/18/24  1559 08/18/24  0946   ANEU 13.6* 9.9* 12.8* 11.8*   AEOS 0.6 0.4 0.3 0.5       Metabolic Studies     Recent Labs   Lab Test 08/19/24 0358 08/18/24 2155 08/18/24  1945 08/18/24  1559    140 138 140   POTASSIUM 3.9 4.4 4.6 4.4   CHLORIDE 105 108* 106 110*   CO2 21* 19* 20* 18*   BUN 41.2* 47.4* 51.5* 58.2*   CR 1.73* 1.84* 1.92* 2.08*   GFRESTIMATED 35* 32* 31* 28*       Hepatic Studies    Recent Labs   Lab Test 08/19/24 0358 08/18/24  1945 08/18/24  1208 08/18/24  0433 08/17/24  1206 08/17/24  0402   BILITOTAL 0.4  --   --  0.5  --  0.5   ALKPHOS 101  --   --  112  --  119   ALBUMIN 3.2* 3.0*  3.1* 3.3*   < > 3.3*   AST 29  --   --  34  --  37   ALT 16  --   --  18  --  18    < > = values in this interval not displayed.            Imaging:   CXR 8/19/24  Impression:   1. Interval increase in the diffuse airspace opacities suspicious for  pneumonia/ARDS pattern. Small associated pleural effusions.  2. ECMO lines have been removed. Other support devices are stable.      CT head 8/15/2024  IMPRESSION: Continued improvement in gray-white matter differentiation  and continued decreased effacement of the sulci and ventricles. No  acute intracranial abnormality.     CXR 8/15/2024   IMPRESSION:   1. Stable support devices.  2. Grossly unchanged patchy airspace opacities.    CXR 8/12/24  IMPRESSION:   1. Stable support devices.  2. Dense consolidative opacities bilaterally with minimally improved  areas of parenchymal aeration.    US pelvic limited 8/10/24  IMPRESSION: Post hysterectomy. No overt adnexal abnormality identified on  transabdominal imaging.    CT head 8/10/24  IMPRESSION:   Slightly improved differentiation of the gray-white matter and  sulcation suggesting that the prior study may have been impacted by  artifact.     CT Chest/abd/pelvis 8/9/24  IMPRESSION:   1. Diffuse patchy groundglass and consolidative densities throughout  the lungs with small bilateral pleural effusions. Findings may  represent severe pulmonary edema, and/or  infectious /inflammatory  pathology, or ARDS.   2. Multiple prominent and a few enlarged mediastinal lymph nodes,  possibly reactive. Recommend attention on follow-up.  3. Support devices as detailed above.  4. Mild hepatomegaly.  5. Trace pelvic ascites, nonspecific bilateral perinephric  streakiness.  6. Asymmetric heterogeneous bulky right adnexa, consider nonemergent  pelvic ultrasound for further evaluation.  7. Additional incidental/chronic findings as detailed above      ECHO  ECHO Congenital Transthoracic (TTE)  Result Date: 8/8/2024    Limited echocardiogram  performed for assessment of LV systolic function.   Left Ventricle: The left ventricle appears normal in size. The ejection fraction, measured by biplane, is 71%. There are no wall motion abnormalities.   Limited assessment of RV.  Normal appearing right ventricular chamber size and systolic function.   Normal IVC size with minimal respirophasic changes.   No prior study for comparison. Left Ventricle The left ventricle appears normal in size. Wall thickness is normal. The ejection fraction, measured by biplane, is 71%. There are no wall motion abnormalities. IVC/SVC Normal IVC size with minimal respirophasic changes. Pericardium There is no pericardial effusion. Study Details A limited echo was performed with limited 2D. The sonographer requested the use of Definity- imaging enhancing agent per protocol. The patient denies contraindications and gives verbal consent for imaging enhancing agent injection.

## 2024-08-19 NOTE — PROGRESS NOTES
Nephrology Progress Note  08/19/2024       Mrs Flaherty is a  53 yof w/ Anxiety, depression, fibromyalgia, hypothyroidism, asthma, HLD, MURIEL on CPAP, expressive aphasia, and hypertension who was admitted to an OSH with community acquired pneumonia on 8/3 s/p intubation.  Found to have severe ARDS requiring paralysis and proning, transferred here on 8/8 for management and initiated on CKRT for severe acidemia in the setting of oligoanuric CHACORTA.  Started CRRT on 8/9 for volume management.      Interval History :   Mrs Flaherty continues on CRRT post VV ECMO decannulation yesterday. Continuing CRRT via temp line, stable on 4k baths.  Will consider iHD in coming days particularly if intake can come down a bit as she is still needing ~2-3L of UF to keep up with intake for now.  Checking labs BID to avoid excessive blood draws as labs are stable.      Assessment & Recommendations:   CHACORTA-Baseline Cr 0.6, at baseline on admission.  CHACORTA due to septic shock in setting of ARDS, UA on 8/6/2024 essentially bland (30 protein but no blood or casts), no dedicated renal imaging.  No dedicated renal imaging at this time.  Started CRRT 8/9 for volume management, now anuric.    -CHACORTA due to hypoperfusion, started CRRT on 8/9.    -Access is LFV temp line.   -CRRT, 4k baths, I=O volume goal.     -Dialysis consent signed and in chart.      Volume status-Net even yesterday, ordered for I=O again today.      Electrolytes/pH-Labs stable on 4k baths.      Ca/phos/pth-No acute issues, Phos mildly up but no major intervention needed today.     Anemia-Hgb 8.2, acute management per team.      Nutrition-Vital TF started 8/9.    Time spent: 55 minutes on this date of encounter for chart review, physical exam, medical decision making and co-ordination of care.     Seen and discussed with Dr Donato    Recommendations were communicated to primary team via verbal communication.     DOREEN Sequeira CNS  Clinical Nurse  "Specialist  [phone]      Review of Systems:   I reviewed the following systems:  ROS not done due to vent/sedation.     Physical Exam:   I/O last 3 completed shifts:  In: 2801.59 [I.V.:1451.59; NG/GT:510]  Out: 2742.3 [Other:2092.3; Stool:650]   /61   Pulse 81   Temp 99.1  F (37.3  C)   Resp (!) 31   Ht 1.575 m (5' 2\")   Wt 84.5 kg (186 lb 4.6 oz)   SpO2 93%   BMI 34.07 kg/m       GENERAL APPEARANCE: Intubated and sedated and paralyzed  Pulmonary: Intubated and sedated,   CV: regular rhythm, normal rate   - Edema 1-2+ diffuse  GI: soft, nontender, normal bowel sounds  MS: no evidence of inflammation in joints, no muscle tenderness  SKIN:  warm, dry  NEURO: No focal deficits    Labs:   All labs reviewed by me  Electrolytes/Renal -   Recent Labs   Lab Test 08/19/24  0949 08/19/24  0808 08/19/24  0558 08/19/24  0403 08/19/24  0358 08/18/24  2200 08/18/24  2155 08/18/24  1947 08/18/24 1945     137  --   --   --  137  --  140  --  138   POTASSIUM 3.8  3.8  --   --   --  3.9  --  4.4  --  4.6   CHLORIDE 105  105  --   --   --  105  --  108*  --  106   CO2 21*  21*  --   --   --  21*  --  19*  --  20*   BUN 41.8*  41.8*  --   --   --  41.2*  --  47.4*  --  51.5*   CR 1.74*  1.74*  --   --   --  1.73*  --  1.84*  --  1.92*   *  184* 141* 134*   < > 129*   < > 137*   < > 129*   QUAN 7.8*  7.8*  --   --   --  8.1*  --  7.9*  --  7.8*   MAG 2.2  --   --   --  2.3  --   --   --  2.3   PHOS 4.9*  --   --   --  5.3*  --   --   --  5.8*    < > = values in this interval not displayed.       CBC -   Recent Labs   Lab Test 08/19/24  0949 08/19/24  0358 08/18/24  2155   WBC 16.0* 18.6* 13.0*   HGB 8.2* 8.6* 8.2*    179 149*       LFTs -   Recent Labs   Lab Test 08/19/24  0949 08/19/24  0358 08/18/24  1945 08/18/24  1208 08/18/24  0433 08/17/24  1206 08/17/24  0402   ALKPHOS  --  101  --   --  112  --  119   BILITOTAL  --  0.4  --   --  0.5  --  0.5   ALT  --  16  --   --  18  --  18   AST  " --  29  --   --  34  --  37   PROTTOTAL  --  5.7*  --   --  5.8*  --  5.9*   ALBUMIN 3.1* 3.2* 3.0*   < > 3.3*   < > 3.3*    < > = values in this interval not displayed.       Iron Panel - No lab results found.        Current Medications:  Current Facility-Administered Medications   Medication Dose Route Frequency Provider Last Rate Last Admin    acetylcysteine (MUCOMYST) 10 % nebulizer solution 4 mL  4 mL Nebulization 4x Daily Barry Hatch MD   4 mL at 08/19/24 0907    amitriptyline (ELAVIL) tablet 50 mg  50 mg Oral or Feeding Tube At Bedtime Barry Hatch MD   50 mg at 08/18/24 2202    artificial tears ophthalmic ointment   Both Eyes Q8H Tiara Martin CNP   Given at 08/19/24 0809    chlorhexidine (PERIDEX) 0.12 % solution 15 mL  15 mL Mouth/Throat Q12H Giovanny Guidry PA-C   15 mL at 08/19/24 0747    dexAMETHasone PF (DECADRON) injection 10 mg  10 mg Intravenous Daily Cal Lisa MD   10 mg at 08/19/24 0809    fiber modular (BANATROL TF) packet 1 packet  1 packet Per Feeding Tube TID Cal Lisa MD   1 packet at 08/19/24 0809    insulin aspart (NovoLOG) injection (RAPID ACTING)  1-12 Units Subcutaneous Q4H Aniceto Adame MD   2 Units at 08/19/24 1133    insulin glargine (LANTUS PEN) injection 16 Units  16 Units Subcutaneous Daily Tiara Martin CNP   16 Units at 08/19/24 0808    ipratropium - albuterol 0.5 mg/2.5 mg/3 mL (DUONEB) neb solution 3 mL  3 mL Nebulization 4x daily Barry Hatch MD   3 mL at 08/19/24 0907    levothyroxine (SYNTHROID/LEVOTHROID) tablet 25 mcg  25 mcg Per Feeding Tube QAM AC Giovanny Guidry PA-C   25 mcg at 08/19/24 0747    loperamide (IMODIUM) capsule 4 mg  4 mg Oral or Feeding Tube Q6H Barry Hatch MD        multivitamin RENAL (RENAVITE RX/NEPHROVITE) tablet 1 tablet  1 tablet Oral or Feeding Tube Daily Her-Giovanny Spence PA-C   1 tablet at 08/19/24 0714    oxyCODONE IR (ROXICODONE) tablet 10 mg  10 mg Oral or Feeding  Tube Q4H Aniceto Adame MD        pantoprazole (PROTONIX) 2 mg/mL suspension 40 mg  40 mg Per Feeding Tube QAM AC Barry Hatch MD   40 mg at 08/19/24 0748    Prosource TF20 ENfit Compatibl EN LIQD (PROSOURCE TF20) packet 60 mL  1 packet Per Feeding Tube TID Giovanny Guidry PA-C   60 mL at 08/19/24 0809    venlafaxine (EFFEXOR) tablet 75 mg  75 mg Oral or Feeding Tube BID Barry Hatch MD   75 mg at 08/19/24 0747     Current Facility-Administered Medications   Medication Dose Route Frequency Provider Last Rate Last Admin    dexmedeTOMIDine (PRECEDEX) 4 mcg/mL in sodium chloride 0.9 % 100 mL infusion  0.1-1.2 mcg/kg/hr (Dosing Weight) Intravenous Continuous de La Mater, Tiara Carlee, CNP 26.7 mL/hr at 08/19/24 1127 1.2 mcg/kg/hr at 08/19/24 1127    dextrose 10% infusion   Intravenous Continuous PRN Giovanny Guidry PA-C        dialysate for CVVHD & CVVHDF (Phoxillum BK4/2.5)  12.5 mL/kg/hr CRRT Continuous Dakota Dorsey APRN CNS 1,100 mL/hr at 08/19/24 1005 12.5 mL/kg/hr at 08/19/24 1005    heparin (porcine) 20,000 units in 20 mL ANTICOAGULANT infusion (syringe from pharmacy)  500 Units/hr CRRT Continuous Dakota Dorsey APRN CNS 0.5 mL/hr at 08/19/24 1110 500 Units/hr at 08/19/24 1110    HYDROmorphone (DILAUDID) 0.2 mg/mL infusion ADULT/PEDS GREATER than or EQUAL to 20 kg  0.3-1.5 mg/hr Intravenous Continuous Dong Rico PA-C 2.5 mL/hr at 08/19/24 1100 0.5 mg/hr at 08/19/24 1100    midazolam (VERSED) 100 mg/100 mL NS infusion - ADULT  1 mg/hr Intravenous Continuous de La Mater, Tiara Carlee, CNP 1 mL/hr at 08/19/24 1100 1 mg/hr at 08/19/24 1100    No POST-filter replacement required   Does not apply Continuous PRN Dakota Dorsey APRN CNS        norepinephrine (LEVOPHED) 16 mg in  mL infusion MAX CONC CENTRAL LINE  0.01-0.6 mcg/kg/min (Dosing Weight) Intravenous Continuous Cal Lisa MD 1.7 mL/hr at 08/19/24 1115 0.02 mcg/kg/min at 08/19/24 1115     PRE-filter replacement solution for CVVHD & CVVHDF (Phoxillum BK4/2.5)  12.5 mL/kg/hr CRRT Continuous Dakota Dorsey, APRN CNS 1,100 mL/hr at 08/19/24 1004 12.5 mL/kg/hr at 08/19/24 1004       I, Emmanuelle Donato, saw and evaluated this patient as part of a shared visit.  I have reviewed and discussed with the advanced practice provider their history, physical and plan.  I have personally performed the substantive portion of the medical decision making for this visit - please see the TRI's documentation for the full details  I personally reviewed the vital signs, medications, labs and imaging.    Key findings and management decisions made by me:  CHACORTA on CRRT for acidemia, in setting of pneumonia/ ARDS.  Continue CRRT and avoid positive balance.    Emmanuelle Donato  Date of Service (when I saw the patient): 8/19

## 2024-08-19 NOTE — PROGRESS NOTES
CRRT STATUS NOTE    DATA:  Time:  0519  Pressures WNL:  YES  Filter Status:  WDL    Problems Reported/Alarms Noted:  none    Supplies Present:  YES    ASSESSMENT:    Patient Net Fluid Balance:  -288 ml net since midnight       Intake/Output Summary (Last 24 hours) at 8/19/2024 0519  Last data filed at 8/19/2024 0500  Gross per 24 hour   Intake 2847.39 ml   Output 2642.3 ml   Net 205.09 ml       Vital Signs: Temp:  [96.1  F (35.6  C)-101.1  F (38.4  C)] 99  F (37.2  C)  Pulse:  [] 82  Resp:  [25-43] 35  MAP:  [45 mmHg-108 mmHg] 71 mmHg  Arterial Line BP: ()/(36-77) 118/49  FiO2 (%):  [60 %-100 %] 70 %  SpO2:  [90 %-100 %] 100 %       Most Recent BMP's:  Recent Labs   Lab Test 08/19/24 0358 08/18/24 2155 08/18/24 1945 08/18/24  1559 08/18/24  1208 08/18/24  0946    140 138 140 137 139   POTASSIUM 3.9 4.4 4.6 4.4 4.4 4.0   CHLORIDE 105 108* 106 110* 105 106   CO2 21* 19* 20* 18* 21* 22   BUN 41.2* 47.4* 51.5* 58.2* 50.2* 43.1*   CR 1.73* 1.84* 1.92* 2.08* 1.79* 1.51*   ANIONGAP 11 13 12 12 11 11   QUAN 8.1* 7.9* 7.8* 7.6* 8.2* 8.0*     Most Recent CBC's:  Recent Labs   Lab Test 08/19/24 0358 08/18/24 2155 08/18/24 1945 08/18/24  1559   WBC 18.6* 13.0* 13.1* 14.9*   HGB 8.6* 8.2* 8.3* 8.3*   MCV 95 94 94 96    149* 146* 147*     Most Recent ABG's:  Recent Labs   Lab Test 08/19/24 0359 08/19/24  0156 08/18/24  2356   PH 7.34* 7.33* 7.31*   PO2 93 104 144*   PCO2 42 43 45   HCO3 22 23 23   THONG -3.4* -3.3* -3.5*       Goals of Therapy:  0-25ml/hr net neg     INTERVENTIONS:   Charting reviewed. Rounding completed. Treatment plan discussed with bedside nurse.     PLAN:  Continue with current plan of care please contact CRRT resource with any questions or concerns via [facility].

## 2024-08-19 NOTE — PROGRESS NOTES
"CRRT STATUS NOTE    DATA:  Time:  5:32 PM  Pressures WNL:  YES  Filter Status:  WDL    Problems Reported/Alarms Noted:  None reported    Supplies Present:  YES    ASSESSMENT:  Patient Net Fluid Balance:    Intake/Output Summary (Last 24 hours) at 8/19/2024 1733  Last data filed at 8/19/2024 1700  Gross per 24 hour   Intake 2832.16 ml   Output 3792 ml   Net -959.84 ml       Vital Signs:  Temp: 99.1  F (37.3  C) Temp src: Esophageal BP: 128/75 Pulse: 94   Resp: 26 SpO2: 95 % O2 Device: Mechanical Ventilator      Labs:    Lab Results   Component Value Date    WBC 12.5 08/19/2024     Lab Results   Component Value Date    RBC 2.60 08/19/2024     Lab Results   Component Value Date    HGB 8.1 08/19/2024     Lab Results   Component Value Date    HCT 25.1 08/19/2024     No components found for: \"MCT\"  Lab Results   Component Value Date    MCV 97 08/19/2024     Lab Results   Component Value Date    MCH 31.2 08/19/2024     Lab Results   Component Value Date    MCHC 32.3 08/19/2024     Lab Results   Component Value Date    RDW 18.3 08/19/2024     Lab Results   Component Value Date     08/19/2024     Last Comprehensive Metabolic Panel:  Lab Results   Component Value Date     08/19/2024    POTASSIUM 4.7 08/19/2024    CHLORIDE 104 08/19/2024    CO2 20 (L) 08/19/2024    ANIONGAP 12 08/19/2024     (H) 08/19/2024    BUN 42.5 (H) 08/19/2024    CR 1.63 (H) 08/19/2024    GFRESTIMATED 37 (L) 08/19/2024    QUAN 7.9 (L) 08/19/2024         Goals of Therapy:  50cc/hr    INTERVENTIONS:   Restarted CVVHDF. Checked in with bedside RN    PLAN:  Continue with treatment goals. Call CRRT RN on [employer] with questions and concerns.    "

## 2024-08-20 ENCOUNTER — APPOINTMENT (OUTPATIENT)
Dept: GENERAL RADIOLOGY | Facility: CLINIC | Age: 54
DRG: 003 | End: 2024-08-20
Attending: SURGERY
Payer: MEDICAID

## 2024-08-20 LAB
ALBUMIN SERPL BCG-MCNC: 3.1 G/DL (ref 3.5–5.2)
ALBUMIN SERPL BCG-MCNC: 3.1 G/DL (ref 3.5–5.2)
ALLEN'S TEST: ABNORMAL
ALP SERPL-CCNC: 104 U/L (ref 40–150)
ALT SERPL W P-5'-P-CCNC: 18 U/L (ref 0–50)
ANION GAP SERPL CALCULATED.3IONS-SCNC: 10 MMOL/L (ref 7–15)
ANION GAP SERPL CALCULATED.3IONS-SCNC: 11 MMOL/L (ref 7–15)
ANION GAP SERPL CALCULATED.3IONS-SCNC: 11 MMOL/L (ref 7–15)
AST SERPL W P-5'-P-CCNC: 25 U/L (ref 0–45)
BACTERIA BLD CULT: NO GROWTH
BASE EXCESS BLDA CALC-SCNC: -2.1 MMOL/L (ref -3–3)
BASE EXCESS BLDA CALC-SCNC: -2.5 MMOL/L (ref -3–3)
BASE EXCESS BLDA CALC-SCNC: -2.9 MMOL/L (ref -3–3)
BASE EXCESS BLDA CALC-SCNC: -3 MMOL/L (ref -3–3)
BASE EXCESS BLDA CALC-SCNC: -3 MMOL/L (ref -3–3)
BASE EXCESS BLDA CALC-SCNC: -3.1 MMOL/L (ref -3–3)
BASE EXCESS BLDA CALC-SCNC: -3.5 MMOL/L (ref -3–3)
BASE EXCESS BLDA CALC-SCNC: -3.7 MMOL/L (ref -3–3)
BASE EXCESS BLDA CALC-SCNC: -3.7 MMOL/L (ref -3–3)
BASOPHILS # BLD AUTO: 0.1 10E3/UL (ref 0–0.2)
BASOPHILS NFR BLD AUTO: 1 %
BILIRUB DIRECT SERPL-MCNC: <0.2 MG/DL (ref 0–0.3)
BILIRUB SERPL-MCNC: 0.2 MG/DL
BUN SERPL-MCNC: 36 MG/DL (ref 6–20)
BUN SERPL-MCNC: 40.7 MG/DL (ref 6–20)
BUN SERPL-MCNC: 42 MG/DL (ref 6–20)
CA-I BLD-MCNC: 4.7 MG/DL (ref 4.4–5.2)
CALCIUM SERPL-MCNC: 8 MG/DL (ref 8.8–10.4)
CALCIUM SERPL-MCNC: 8.1 MG/DL (ref 8.8–10.4)
CALCIUM SERPL-MCNC: 8.3 MG/DL (ref 8.8–10.4)
CHLORIDE SERPL-SCNC: 104 MMOL/L (ref 98–107)
CHLORIDE SERPL-SCNC: 105 MMOL/L (ref 98–107)
CHLORIDE SERPL-SCNC: 106 MMOL/L (ref 98–107)
COHGB MFR BLD: 100 % (ref 96–97)
COHGB MFR BLD: 92.9 % (ref 96–97)
COHGB MFR BLD: 93.4 % (ref 96–97)
COHGB MFR BLD: 94.4 % (ref 96–97)
COHGB MFR BLD: 95.4 % (ref 96–97)
COHGB MFR BLD: 95.5 % (ref 96–97)
COHGB MFR BLD: 96.4 % (ref 96–97)
COHGB MFR BLD: 96.5 % (ref 96–97)
COHGB MFR BLD: 98.8 % (ref 96–97)
COHGB MFR BLD: 99 % (ref 96–97)
COHGB MFR BLD: >100 % (ref 96–97)
CREAT SERPL-MCNC: 1.47 MG/DL (ref 0.51–0.95)
CREAT SERPL-MCNC: 1.48 MG/DL (ref 0.51–0.95)
CREAT SERPL-MCNC: 1.6 MG/DL (ref 0.51–0.95)
EGFRCR SERPLBLD CKD-EPI 2021: 38 ML/MIN/1.73M2
EGFRCR SERPLBLD CKD-EPI 2021: 42 ML/MIN/1.73M2
EGFRCR SERPLBLD CKD-EPI 2021: 42 ML/MIN/1.73M2
EOSINOPHIL # BLD AUTO: 0.3 10E3/UL (ref 0–0.7)
EOSINOPHIL NFR BLD AUTO: 2 %
ERYTHROCYTE [DISTWIDTH] IN BLOOD BY AUTOMATED COUNT: 18.1 % (ref 10–15)
ERYTHROCYTE [DISTWIDTH] IN BLOOD BY AUTOMATED COUNT: 18.5 % (ref 10–15)
GLUCOSE BLD-MCNC: 145 MG/DL (ref 70–99)
GLUCOSE BLD-MCNC: 90 MG/DL (ref 70–99)
GLUCOSE BLDC GLUCOMTR-MCNC: 122 MG/DL (ref 70–99)
GLUCOSE BLDC GLUCOMTR-MCNC: 145 MG/DL (ref 70–99)
GLUCOSE BLDC GLUCOMTR-MCNC: 168 MG/DL (ref 70–99)
GLUCOSE BLDC GLUCOMTR-MCNC: 176 MG/DL (ref 70–99)
GLUCOSE SERPL-MCNC: 112 MG/DL (ref 70–99)
GLUCOSE SERPL-MCNC: 132 MG/DL (ref 70–99)
GLUCOSE SERPL-MCNC: 187 MG/DL (ref 70–99)
HCO3 BLD-SCNC: 23 MMOL/L (ref 21–28)
HCO3 BLD-SCNC: 24 MMOL/L (ref 21–28)
HCO3 SERPL-SCNC: 20 MMOL/L (ref 22–29)
HCO3 SERPL-SCNC: 21 MMOL/L (ref 22–29)
HCO3 SERPL-SCNC: 21 MMOL/L (ref 22–29)
HCT VFR BLD AUTO: 24.3 % (ref 35–47)
HCT VFR BLD AUTO: 25.5 % (ref 35–47)
HGB BLD-MCNC: 7.5 G/DL (ref 11.7–15.7)
HGB BLD-MCNC: 8.1 G/DL (ref 11.7–15.7)
HGB FREE PLAS-MCNC: 50 MG/DL
IMM GRANULOCYTES # BLD: 0.6 10E3/UL
IMM GRANULOCYTES NFR BLD: 4 %
LACTATE SERPL-SCNC: 0.5 MMOL/L (ref 0.7–2)
LACTATE SERPL-SCNC: 0.5 MMOL/L (ref 0.7–2)
LYMPHOCYTES # BLD AUTO: 2.6 10E3/UL (ref 0.8–5.3)
LYMPHOCYTES NFR BLD AUTO: 20 %
MAGNESIUM SERPL-MCNC: 2.4 MG/DL (ref 1.7–2.3)
MCH RBC QN AUTO: 30.7 PG (ref 26.5–33)
MCH RBC QN AUTO: 30.8 PG (ref 26.5–33)
MCHC RBC AUTO-ENTMCNC: 30.9 G/DL (ref 31.5–36.5)
MCHC RBC AUTO-ENTMCNC: 31.8 G/DL (ref 31.5–36.5)
MCV RBC AUTO: 100 FL (ref 78–100)
MCV RBC AUTO: 97 FL (ref 78–100)
MONOCYTES # BLD AUTO: 1.1 10E3/UL (ref 0–1.3)
MONOCYTES NFR BLD AUTO: 9 %
NEUTROPHILS # BLD AUTO: 8.3 10E3/UL (ref 1.6–8.3)
NEUTROPHILS NFR BLD AUTO: 64 %
NRBC # BLD AUTO: 0.1 10E3/UL
NRBC BLD AUTO-RTO: 1 /100
O2/TOTAL GAS SETTING VFR VENT: 50 %
O2/TOTAL GAS SETTING VFR VENT: 55 %
O2/TOTAL GAS SETTING VFR VENT: 60 %
O2/TOTAL GAS SETTING VFR VENT: 70 %
O2/TOTAL GAS SETTING VFR VENT: 70 %
O2/TOTAL GAS SETTING VFR VENT: 80 %
O2/TOTAL GAS SETTING VFR VENT: 80 %
PCO2 BLD: 45 MM HG (ref 35–45)
PCO2 BLD: 45 MM HG (ref 35–45)
PCO2 BLD: 46 MM HG (ref 35–45)
PCO2 BLD: 46 MM HG (ref 35–45)
PCO2 BLD: 47 MM HG (ref 35–45)
PCO2 BLD: 47 MM HG (ref 35–45)
PCO2 BLD: 48 MM HG (ref 35–45)
PCO2 BLD: 48 MM HG (ref 35–45)
PCO2 BLD: 49 MM HG (ref 35–45)
PCO2 BLD: 50 MM HG (ref 35–45)
PCO2 BLD: 50 MM HG (ref 35–45)
PEEP: 10 CM H2O
PH BLD: 7.28 [PH] (ref 7.35–7.45)
PH BLD: 7.28 [PH] (ref 7.35–7.45)
PH BLD: 7.29 [PH] (ref 7.35–7.45)
PH BLD: 7.29 [PH] (ref 7.35–7.45)
PH BLD: 7.3 [PH] (ref 7.35–7.45)
PH BLD: 7.31 [PH] (ref 7.35–7.45)
PH BLD: 7.31 [PH] (ref 7.35–7.45)
PH BLD: 7.32 [PH] (ref 7.35–7.45)
PH BLD: 7.33 [PH] (ref 7.35–7.45)
PHOSPHATE SERPL-MCNC: 5.7 MG/DL (ref 2.5–4.5)
PHOSPHATE SERPL-MCNC: 6.1 MG/DL (ref 2.5–4.5)
PLATELET # BLD AUTO: 188 10E3/UL (ref 150–450)
PLATELET # BLD AUTO: 249 10E3/UL (ref 150–450)
PO2 BLD: 107 MM HG (ref 80–105)
PO2 BLD: 108 MM HG (ref 80–105)
PO2 BLD: 133 MM HG (ref 80–105)
PO2 BLD: 134 MM HG (ref 80–105)
PO2 BLD: 64 MM HG (ref 80–105)
PO2 BLD: 66 MM HG (ref 80–105)
PO2 BLD: 68 MM HG (ref 80–105)
PO2 BLD: 74 MM HG (ref 80–105)
PO2 BLD: 75 MM HG (ref 80–105)
PO2 BLD: 78 MM HG (ref 80–105)
PO2 BLD: 78 MM HG (ref 80–105)
POTASSIUM SERPL-SCNC: 4.1 MMOL/L (ref 3.4–5.3)
POTASSIUM SERPL-SCNC: 4.2 MMOL/L (ref 3.4–5.3)
POTASSIUM SERPL-SCNC: 4.7 MMOL/L (ref 3.4–5.3)
PROT SERPL-MCNC: 5.6 G/DL (ref 6.4–8.3)
RBC # BLD AUTO: 2.44 10E6/UL (ref 3.8–5.2)
RBC # BLD AUTO: 2.63 10E6/UL (ref 3.8–5.2)
SAO2 % BLDA: 91 % (ref 92–100)
SAO2 % BLDA: 91 % (ref 92–100)
SAO2 % BLDA: 92 % (ref 92–100)
SAO2 % BLDA: 93 % (ref 92–100)
SAO2 % BLDA: 93 % (ref 92–100)
SAO2 % BLDA: 94 % (ref 92–100)
SAO2 % BLDA: 94 % (ref 92–100)
SAO2 % BLDA: 96 % (ref 92–100)
SAO2 % BLDA: 96 % (ref 92–100)
SAO2 % BLDA: 98 % (ref 92–100)
SAO2 % BLDA: 98 % (ref 92–100)
SODIUM SERPL-SCNC: 136 MMOL/L (ref 135–145)
SODIUM SERPL-SCNC: 136 MMOL/L (ref 135–145)
SODIUM SERPL-SCNC: 137 MMOL/L (ref 135–145)
UFH PPP CHRO-ACNC: <0.1 IU/ML
WBC # BLD AUTO: 12.9 10E3/UL (ref 4–11)
WBC # BLD AUTO: 15.9 10E3/UL (ref 4–11)

## 2024-08-20 PROCEDURE — 83051 HEMOGLOBIN PLASMA: CPT | Performed by: SURGERY

## 2024-08-20 PROCEDURE — 250N000011 HC RX IP 250 OP 636: Performed by: STUDENT IN AN ORGANIZED HEALTH CARE EDUCATION/TRAINING PROGRAM

## 2024-08-20 PROCEDURE — 99291 CRITICAL CARE FIRST HOUR: CPT | Performed by: STUDENT IN AN ORGANIZED HEALTH CARE EDUCATION/TRAINING PROGRAM

## 2024-08-20 PROCEDURE — 85014 HEMATOCRIT: CPT

## 2024-08-20 PROCEDURE — 83735 ASSAY OF MAGNESIUM: CPT

## 2024-08-20 PROCEDURE — 82330 ASSAY OF CALCIUM: CPT | Performed by: CLINICAL NURSE SPECIALIST

## 2024-08-20 PROCEDURE — 82330 ASSAY OF CALCIUM: CPT | Performed by: SURGERY

## 2024-08-20 PROCEDURE — 83605 ASSAY OF LACTIC ACID: CPT | Performed by: SURGERY

## 2024-08-20 PROCEDURE — 82805 BLOOD GASES W/O2 SATURATION: CPT

## 2024-08-20 PROCEDURE — 999N000157 HC STATISTIC RCP TIME EA 10 MIN

## 2024-08-20 PROCEDURE — 84075 ASSAY ALKALINE PHOSPHATASE: CPT | Performed by: CLINICAL NURSE SPECIALIST

## 2024-08-20 PROCEDURE — 250N000013 HC RX MED GY IP 250 OP 250 PS 637

## 2024-08-20 PROCEDURE — 83735 ASSAY OF MAGNESIUM: CPT | Performed by: CLINICAL NURSE SPECIALIST

## 2024-08-20 PROCEDURE — 250N000011 HC RX IP 250 OP 636: Mod: JZ | Performed by: NURSE PRACTITIONER

## 2024-08-20 PROCEDURE — 82040 ASSAY OF SERUM ALBUMIN: CPT | Performed by: CLINICAL NURSE SPECIALIST

## 2024-08-20 PROCEDURE — 250N000013 HC RX MED GY IP 250 OP 250 PS 637: Performed by: SURGERY

## 2024-08-20 PROCEDURE — 85520 HEPARIN ASSAY: CPT | Performed by: SURGERY

## 2024-08-20 PROCEDURE — 82330 ASSAY OF CALCIUM: CPT

## 2024-08-20 PROCEDURE — 83605 ASSAY OF LACTIC ACID: CPT

## 2024-08-20 PROCEDURE — 250N000013 HC RX MED GY IP 250 OP 250 PS 637: Performed by: PHYSICIAN ASSISTANT

## 2024-08-20 PROCEDURE — 80069 RENAL FUNCTION PANEL: CPT | Performed by: CLINICAL NURSE SPECIALIST

## 2024-08-20 PROCEDURE — 200N000002 HC R&B ICU UMMC

## 2024-08-20 PROCEDURE — 250N000009 HC RX 250: Performed by: CLINICAL NURSE SPECIALIST

## 2024-08-20 PROCEDURE — 80053 COMPREHEN METABOLIC PANEL: CPT | Performed by: CLINICAL NURSE SPECIALIST

## 2024-08-20 PROCEDURE — 99232 SBSQ HOSP IP/OBS MODERATE 35: CPT | Performed by: PHYSICIAN ASSISTANT

## 2024-08-20 PROCEDURE — 94640 AIRWAY INHALATION TREATMENT: CPT

## 2024-08-20 PROCEDURE — 82247 BILIRUBIN TOTAL: CPT | Performed by: CLINICAL NURSE SPECIALIST

## 2024-08-20 PROCEDURE — 250N000011 HC RX IP 250 OP 636

## 2024-08-20 PROCEDURE — 71045 X-RAY EXAM CHEST 1 VIEW: CPT | Mod: 26 | Performed by: RADIOLOGY

## 2024-08-20 PROCEDURE — 85025 COMPLETE CBC W/AUTO DIFF WBC: CPT | Performed by: SURGERY

## 2024-08-20 PROCEDURE — 250N000009 HC RX 250: Performed by: NURSE PRACTITIONER

## 2024-08-20 PROCEDURE — 250N000011 HC RX IP 250 OP 636: Performed by: CLINICAL NURSE SPECIALIST

## 2024-08-20 PROCEDURE — 82947 ASSAY GLUCOSE BLOOD QUANT: CPT

## 2024-08-20 PROCEDURE — 250N000009 HC RX 250: Performed by: SURGERY

## 2024-08-20 PROCEDURE — 94640 AIRWAY INHALATION TREATMENT: CPT | Mod: 76

## 2024-08-20 PROCEDURE — 99233 SBSQ HOSP IP/OBS HIGH 50: CPT | Mod: FS | Performed by: CLINICAL NURSE SPECIALIST

## 2024-08-20 PROCEDURE — 71045 X-RAY EXAM CHEST 1 VIEW: CPT

## 2024-08-20 PROCEDURE — 82310 ASSAY OF CALCIUM: CPT

## 2024-08-20 PROCEDURE — 90947 DIALYSIS REPEATED EVAL: CPT

## 2024-08-20 PROCEDURE — 84460 ALANINE AMINO (ALT) (SGPT): CPT | Performed by: CLINICAL NURSE SPECIALIST

## 2024-08-20 PROCEDURE — 94003 VENT MGMT INPAT SUBQ DAY: CPT

## 2024-08-20 PROCEDURE — 82248 BILIRUBIN DIRECT: CPT | Performed by: CLINICAL NURSE SPECIALIST

## 2024-08-20 PROCEDURE — 250N000009 HC RX 250

## 2024-08-20 RX ADMIN — ACETYLCYSTEINE 4 ML: 100 SOLUTION ORAL; RESPIRATORY (INHALATION) at 11:33

## 2024-08-20 RX ADMIN — HEPARIN SODIUM 500 UNITS/HR: 1000 INJECTION, SOLUTION INTRAVENOUS; SUBCUTANEOUS at 21:33

## 2024-08-20 RX ADMIN — CALCIUM CHLORIDE, MAGNESIUM CHLORIDE, SODIUM CHLORIDE, SODIUM BICARBONATE, POTASSIUM CHLORIDE AND SODIUM PHOSPHATE DIBASIC DIHYDRATE 12.5 ML/KG/HR: 3.68; 3.05; 6.34; 3.09; .314; .187 INJECTION INTRAVENOUS at 04:35

## 2024-08-20 RX ADMIN — DEXMEDETOMIDINE HYDROCHLORIDE IN SODIUM CHLORIDE 1.2 MCG/KG/HR: 4 INJECTION INTRAVENOUS at 02:22

## 2024-08-20 RX ADMIN — AMITRIPTYLINE HYDROCHLORIDE 50 MG: 50 TABLET, FILM COATED ORAL at 21:34

## 2024-08-20 RX ADMIN — ACETYLCYSTEINE 4 ML: 100 SOLUTION ORAL; RESPIRATORY (INHALATION) at 19:47

## 2024-08-20 RX ADMIN — NOREPINEPHRINE BITARTRATE 0.01 MCG/KG/MIN: 0.06 INJECTION, SOLUTION INTRAVENOUS at 17:03

## 2024-08-20 RX ADMIN — MIDAZOLAM HYDROCHLORIDE 1 MG/HR: 1 INJECTION, SOLUTION INTRAVENOUS at 07:59

## 2024-08-20 RX ADMIN — Medication 60 ML: at 19:56

## 2024-08-20 RX ADMIN — LOPERAMIDE HYDROCHLORIDE 4 MG: 2 CAPSULE ORAL at 02:22

## 2024-08-20 RX ADMIN — Medication 60 ML: at 07:52

## 2024-08-20 RX ADMIN — LOPERAMIDE HYDROCHLORIDE 4 MG: 2 CAPSULE ORAL at 07:47

## 2024-08-20 RX ADMIN — DEXMEDETOMIDINE HYDROCHLORIDE IN SODIUM CHLORIDE 1.2 MCG/KG/HR: 4 INJECTION INTRAVENOUS at 05:26

## 2024-08-20 RX ADMIN — OXYCODONE HYDROCHLORIDE 10 MG: 10 TABLET ORAL at 15:40

## 2024-08-20 RX ADMIN — DEXAMETHASONE SODIUM PHOSPHATE 10 MG: 10 INJECTION, SOLUTION INTRAMUSCULAR; INTRAVENOUS at 08:26

## 2024-08-20 RX ADMIN — OXYCODONE HYDROCHLORIDE 10 MG: 10 TABLET ORAL at 05:25

## 2024-08-20 RX ADMIN — IPRATROPIUM BROMIDE AND ALBUTEROL SULFATE 3 ML: .5; 3 SOLUTION RESPIRATORY (INHALATION) at 15:33

## 2024-08-20 RX ADMIN — VENLAFAXINE 75 MG: 25 TABLET ORAL at 07:47

## 2024-08-20 RX ADMIN — DEXMEDETOMIDINE HYDROCHLORIDE IN SODIUM CHLORIDE 1 MCG/KG/HR: 4 INJECTION INTRAVENOUS at 16:23

## 2024-08-20 RX ADMIN — DEXMEDETOMIDINE HYDROCHLORIDE IN SODIUM CHLORIDE 1.2 MCG/KG/HR: 4 INJECTION INTRAVENOUS at 20:03

## 2024-08-20 RX ADMIN — IPRATROPIUM BROMIDE AND ALBUTEROL SULFATE 3 ML: .5; 3 SOLUTION RESPIRATORY (INHALATION) at 07:29

## 2024-08-20 RX ADMIN — CALCIUM CHLORIDE, MAGNESIUM CHLORIDE, SODIUM CHLORIDE, SODIUM BICARBONATE, POTASSIUM CHLORIDE AND SODIUM PHOSPHATE DIBASIC DIHYDRATE 12.5 ML/KG/HR: 3.68; 3.05; 6.34; 3.09; .314; .187 INJECTION INTRAVENOUS at 13:49

## 2024-08-20 RX ADMIN — CALCIUM CHLORIDE, MAGNESIUM CHLORIDE, SODIUM CHLORIDE, SODIUM BICARBONATE, POTASSIUM CHLORIDE AND SODIUM PHOSPHATE DIBASIC DIHYDRATE 12.5 ML/KG/HR: 3.68; 3.05; 6.34; 3.09; .314; .187 INJECTION INTRAVENOUS at 11:31

## 2024-08-20 RX ADMIN — CALCIUM CHLORIDE, MAGNESIUM CHLORIDE, SODIUM CHLORIDE, SODIUM BICARBONATE, POTASSIUM CHLORIDE AND SODIUM PHOSPHATE DIBASIC DIHYDRATE 12.5 ML/KG/HR: 3.68; 3.05; 6.34; 3.09; .314; .187 INJECTION INTRAVENOUS at 16:23

## 2024-08-20 RX ADMIN — IPRATROPIUM BROMIDE AND ALBUTEROL SULFATE 3 ML: .5; 3 SOLUTION RESPIRATORY (INHALATION) at 11:34

## 2024-08-20 RX ADMIN — CALCIUM CHLORIDE, MAGNESIUM CHLORIDE, SODIUM CHLORIDE, SODIUM BICARBONATE, POTASSIUM CHLORIDE AND SODIUM PHOSPHATE DIBASIC DIHYDRATE 12.5 ML/KG/HR: 3.68; 3.05; 6.34; 3.09; .314; .187 INJECTION INTRAVENOUS at 18:34

## 2024-08-20 RX ADMIN — LOPERAMIDE HYDROCHLORIDE 4 MG: 2 CAPSULE ORAL at 19:57

## 2024-08-20 RX ADMIN — OXYCODONE HYDROCHLORIDE 10 MG: 10 TABLET ORAL at 11:52

## 2024-08-20 RX ADMIN — OXYCODONE HYDROCHLORIDE 10 MG: 10 TABLET ORAL at 07:48

## 2024-08-20 RX ADMIN — CALCIUM CHLORIDE, MAGNESIUM CHLORIDE, SODIUM CHLORIDE, SODIUM BICARBONATE, POTASSIUM CHLORIDE AND SODIUM PHOSPHATE DIBASIC DIHYDRATE 12.5 ML/KG/HR: 3.68; 3.05; 6.34; 3.09; .314; .187 INJECTION INTRAVENOUS at 21:44

## 2024-08-20 RX ADMIN — IPRATROPIUM BROMIDE AND ALBUTEROL SULFATE 3 ML: .5; 3 SOLUTION RESPIRATORY (INHALATION) at 19:48

## 2024-08-20 RX ADMIN — Medication 1 MG/HR: at 17:02

## 2024-08-20 RX ADMIN — DEXMEDETOMIDINE HYDROCHLORIDE IN SODIUM CHLORIDE 1 MCG/KG/HR: 4 INJECTION INTRAVENOUS at 17:04

## 2024-08-20 RX ADMIN — Medication 60 ML: at 13:50

## 2024-08-20 RX ADMIN — WHITE PETROLATUM 57.7 %-MINERAL OIL 31.9 % EYE OINTMENT: at 08:26

## 2024-08-20 RX ADMIN — Medication 1 TABLET: at 07:47

## 2024-08-20 RX ADMIN — CHLORHEXIDINE GLUCONATE 0.12% ORAL RINSE 15 ML: 1.2 LIQUID ORAL at 19:56

## 2024-08-20 RX ADMIN — ACETYLCYSTEINE 4 ML: 100 SOLUTION ORAL; RESPIRATORY (INHALATION) at 07:29

## 2024-08-20 RX ADMIN — CHLORHEXIDINE GLUCONATE 0.12% ORAL RINSE 15 ML: 1.2 LIQUID ORAL at 07:48

## 2024-08-20 RX ADMIN — LEVOTHYROXINE SODIUM 25 MCG: 0.03 TABLET ORAL at 07:47

## 2024-08-20 RX ADMIN — OXYCODONE HYDROCHLORIDE 10 MG: 10 TABLET ORAL at 19:57

## 2024-08-20 RX ADMIN — CALCIUM CHLORIDE, MAGNESIUM CHLORIDE, SODIUM CHLORIDE, SODIUM BICARBONATE, POTASSIUM CHLORIDE AND SODIUM PHOSPHATE DIBASIC DIHYDRATE 12.5 ML/KG/HR: 3.68; 3.05; 6.34; 3.09; .314; .187 INJECTION INTRAVENOUS at 09:12

## 2024-08-20 RX ADMIN — CALCIUM CHLORIDE, MAGNESIUM CHLORIDE, SODIUM CHLORIDE, SODIUM BICARBONATE, POTASSIUM CHLORIDE AND SODIUM PHOSPHATE DIBASIC DIHYDRATE 12.5 ML/KG/HR: 3.68; 3.05; 6.34; 3.09; .314; .187 INJECTION INTRAVENOUS at 23:37

## 2024-08-20 RX ADMIN — WHITE PETROLATUM 57.7 %-MINERAL OIL 31.9 % EYE OINTMENT: at 15:40

## 2024-08-20 RX ADMIN — OXYCODONE HYDROCHLORIDE 10 MG: 10 TABLET ORAL at 00:59

## 2024-08-20 RX ADMIN — VENLAFAXINE 75 MG: 25 TABLET ORAL at 19:56

## 2024-08-20 RX ADMIN — DEXMEDETOMIDINE HYDROCHLORIDE IN SODIUM CHLORIDE 1 MCG/KG/HR: 4 INJECTION INTRAVENOUS at 11:52

## 2024-08-20 RX ADMIN — Medication 40 MG: at 07:47

## 2024-08-20 RX ADMIN — LOPERAMIDE HYDROCHLORIDE 4 MG: 2 CAPSULE ORAL at 13:49

## 2024-08-20 RX ADMIN — ACETYLCYSTEINE 4 ML: 100 SOLUTION ORAL; RESPIRATORY (INHALATION) at 15:33

## 2024-08-20 ASSESSMENT — ACTIVITIES OF DAILY LIVING (ADL)
ADLS_ACUITY_SCORE: 55
ADLS_ACUITY_SCORE: 57
ADLS_ACUITY_SCORE: 55

## 2024-08-20 NOTE — PLAN OF CARE
PMH: Anxiety/depression, fibromyalgia, hypothyroidism, asthma, HLD, MURIEL on CPAP, off+on anti seizure medications, expressive aphasia, HTN    8/3 [place]: fatigue, fever and dyspnea. CXR: multifocal bilateral opacities, tachypneic and tachycardic, CT chest: (-) PE.   8/6 Difficulties with intubation and oxygenation requiring increased sedation, paralysis and eventual proning.   8/8 Admit to [facility], emergently proned, then supinated to cannulate VV ECMO, bronch  8/9 Cannula repositioned, diffuse cerebral edema- hypoxic injury  8/11 Ampho started  8/14 Bronch, samples sent, stopped paralytic, ampho stopped  8/15 CTH unchanged, Na goal 145-150, IVIG given  8/17 SWEEP 0   8/18 Decannulated     Nights  JUNE    Neuro: Intermittently wiggling toes to command, no movement of BUE, PERRL, shivering intermittantly- nba hugger on  Needing bumps of versed and Dilaudid for increased RR, HR, BP and agitation.    CV: SR/ST (70s-110s).  Levo titrated to keep MAP>65 or SBP> 110    Pulm: PCV +assist 70%/35/18/10,  RR 25-30s.  Pt has high RR with agitation.  Abg pco2 high. Resp Acidosis.  PaO2 low at times 50-60's. Increased Fio2 on vent to 80%.  Resp therapy and team to adjust Vent today.     GI: NJT@ 92- feeds at 35 mL/hour (goal) w/ SFWF. Rectal tube in place.     : Anuric (BS for 240). CRRT w/goal 0-50. Currently meeting goal.     Skin: scattered bruising, tongue wound. Right groin (old ecmo), L foot/ankle-bruised from fall pre-hospital, forehead abrasion    Drips:   Versed 1  Dilaudid 1.0  Precedex 1.2  Levo- .02    Access: x3 lumen L IJ, L PIV, x2 R PIV, L fem CRRT, L radial art line    Plan  RT and day team to address respiratory issues.  MRI when able per team- bladder stimulator needs to be turned off before MRI (communicator brought in by family, in patient's room)

## 2024-08-20 NOTE — PROGRESS NOTES
CRRT STATUS NOTE    DATA:  Time:  6:00 PM  Pressures WNL:  YES  Filter Status:  WDL    Problems Reported/Alarms Noted:  None    Supplies Present:  YES    ASSESSMENT:  Patient Net Fluid Balance:  Net negative 450 mL since 0000.    Vital Signs:    Temp: 99.1  F (37.3  C) Temp  Min: 95  F (35  C)  Max: 99.9  F (37.7  C)  Resp: 21 Resp  Min: 17  Max: 41  SpO2: 97 % SpO2  Min: 90 %  Max: 100 % Mechanical Ventilator  Pulse: 91 Pulse  Min: 70  Max: 117  BP: 98/46 Systolic (24hrs), Av , Min:88 , Max:111  Diastolic (24hrs), Av, Min:43, Max:61      Labs:  No electrolytes needed replacement.  Lab Results   Component Value Date     2024    POTASSIUM 4.7 2024    CHLORIDE 105 2024    CO2 20 (L) 2024    ANIONGAP 11 2024     (H) 2024    BUN 40.7 (H) 2024    CR 1.47 (H) 2024    GFRESTIMATED 42 (L) 2024    QUAN 8.3 (L) 2024        Goals of Therapy:  Net negative 0-50 ml/hr    INTERVENTIONS:   Rounded with bedside nurse    PLAN:  MRI scheduled for this evening - will need recirculation. Continue to monitor circuit daily and change set q72 hours or PRN for clotting/clogging. Please call CRRT RN with any questions/problems.

## 2024-08-20 NOTE — PLAN OF CARE
Major Shift Events:   -Attempted to wean dex gtt but patient was shivering despite nba hugger use- notified team and titrated dex back up to 1.2  -MRI checklist completed and sent to MRI- verified model of [employer] bladder stimulator (noted on MRI checklist)  -ABGs improved today, pt tolerating PC vent settings. Per SICU, titrate FiO2 for PaO2 > 60 and SpO2 > 92%.    RASS -2 to -3. Pupils round and reactive. Wiggles bilateral toes to command inconsistently. No purposeful movement of BUE for much of day, @ 1730, RN was able to get patient to squeeze bilateral hands to command. Coughs/bites on ETT when restless, bite block added. Intermittent shivering, on and off bearhugger. ETT 24 @ lip- PC-AC 55%/18/18(Pinsp)/10, RR's 20-upper 30s, team aware. SR (70s-110's). MAP goal > 65. Rectal tube- patent w/ 500 ml watery OP today, receiving scheduled imodium. Anuric (BS for 182)- on CRRT 4 K baths (goal 0-50ml/hr), net - 650 mL so far today. No new skin concerns.     Versed @ 1, dilaudid @ 1, dex @ 1.2, levo on standby.    Plan: Brain MRI once available- bladder stimulator shut off but bring device remote with down to MRI just in case.  For vital signs and complete assessments, please see documentation flowsheets.     Danni Bermudez RN on 8/20/2024 at 6:56 PM

## 2024-08-20 NOTE — PROGRESS NOTES
CRRT STATUS NOTE    DATA:  Time:  6:00 AM  Pressures WNL:  YES  Filter Status:  WDL    Problems Reported/Alarms Noted:  None.    Supplies Present:  YES    ASSESSMENT:  Patient Net Fluid Balance:  Net -808 ml @ midnight, -97 ml @ 0600.    Vital Signs: See flowsheets for recent vitals, remains stable on levo to maintain MAPs    Labs: See results page for complete labs, no electrolyte replacements needed overnight    Goals of Therapy:  net negative 50ml/hr    INTERVENTIONS:   None.    PLAN:  Continue to monitor circuit daily and change set q72 hours or PRN for clotting/clogging. Please call CRRT RN with any questions/problems.

## 2024-08-20 NOTE — PROGRESS NOTES
SURGICAL ICU PROGRESS NOTE  08/20/2024        Date of Service (when I saw the patient): 08/20/2024    ASSESSMENT:  Massiel Flaherty is a 53 year old female who was admitted to [facility].   Past medical hx of Anxiety/depression, fibromyalgia, hypothyroidism, asthma, HLD, MURIEL on CPAP, spells, off on anti seizure medications, expressive aphasia, hypertension was seen at [facility] and was placed on Augmentin outpatient on 7/30/24.   She admitted to the hospital on 8/3/24 for fatigue, fever and dyspnea and intubated 8/6/24 at OSH, proned and paralyzed without improvement. Transferred to [facility] 8/8/24, hypoxic with high plateaus/peaks and placed on VV ECMO and CRRT.     CHANGES and MAJOR THINGS TODAY:   - Switched back to PC from VC  - Wean Precedex gtt as able   - CRRT fluid goal net negative   - Will try to get follow up brain MRI today    PLAN:  Neurological:  # Fibromyalgia   # Hx myalgic encephalomyelitis   # Acute pain  # Sedation and analgesia for vent compliance   - Monitor neurological status. Delirium preventions and precautions.   - Pain: Gtt: Dilaudid gtt - wean as able   Scheduled: oxy 10 mg increase to q 4   PRN:  Dilaudid boluses, oxy 10 mg q 4hr   - Sedation plan: Gtt: Versed - wean as able, Precedex - wean as able   - Resumed PTA Venlafaxine and Amitriptyline     # Hx spells with staring and BUE posturing previously described as pseudoseizures   # Hx history of GTC seizures and myoclonic epilepsy, weaned off AEDs   # C/f hypoxic ischemic injury   # Diffuse cerebral edema - improved  - Head CT 8/9: Findings concerning for diffuse cerebral edema with  diffuse blurring of the gray-white differentiation. Findings concerning for hypoxicischemic injury.   - Sodium goals liberalized to 140-145  - Repeat Head CT 8/15 continued improvement in gray white matter differentiation and continued effacement of the sulci and ventricles.   - MRI Brain wwo contrast after decannulation. Will try to obtain today.   - [facility]  signed off.     Pulmonary:   #ARDS s/p VV ECMO cannulation 8/8- decannulated on 8/18   # Asthma  # MURIEL on home CPAP   FiO2 (%): 70 %, Resp: 25, Inspiratory Pressure (cm H2O) (Drager Jessie): 18, Vent Mode: PCV Plus assist, Resp Rate (Set): 18 breaths/min, Tidal Volume (Set, mL): 400 mL, PEEP (cm H2O): 10 cmH2O, Resp Rate (Set): 18 breaths/min, Tidal Volume (Set, mL): 400 mL, PEEP (cm H2O): 10 cmH2O  - Chest CT 8/9: Diffuse patchy groundglass and consolidative densities throughout  the lungs with small bilateral pleural effusions. Findings may  represent severe pulmonary edema, and/or  infectious /inflammatory  pathology, or ARDS. Multiple prominent and a few enlarged mediastinal lymph nodes,  possibly reactive.  - 8/11: Restart veletri continuous neb  - 8/11: Increase inspiratory pressure to 35 to attempt achieving slightly higher tidal volumes   - 8/12: Decadron 20mg daily x 5 days, then 10mg x 5 days   - 8/14 Bronchoscopy with mucous plugging.  Continue Mucomyst and Duonebs q4 hours. No acute indication to repeat bronchoscopy today.   - 8/15 Stop Veletri  - 8/18 pt decannulated off of VV ECMO   - Pt switched back to PC  - SpO2 goals >92%, PaO2 goals >60   - Wean vent as able  - VAP bundle while intubated     Cardiovascular:    # Hyperlipidemia  # Hypotension  - MAP >65  - Prior HTN, Clonidine on hold   - NE for pressors support wean as able; Titration based on SBP goals >110, MAP >65   - Repeat ECHO 8/12: Left ventricular size, wall motion and function are normal. The ejection fraction is 60-65%. Flattened septum is consistent with right ventricular pressure and volume overload. Right ventricular function, chamber size, wall motion, and thickness are normal.       Gastroenterology/Nutrition:  #Moderate protein calorie malnutrition  # GERD   # Non-infectious Diarrhea  - Protonix (home medication)   - Diet: TF @ goal with fiber supplementation   - Rectal tube in place with 550 ml of output /24 hours  - Scheduled  loperamide 4 mg q 6hr       Fluids/Electrolytes/Renal:  # Acute kidney injury  - Cr today is 1.48 from 1.73  - Continue CRRT; fluid goal net negative - 500 ml to 1.0 L   -  Heparinized CRRT circuit   - Strict I&Os   - Avoid nephrotoxins as able     Endocrine:  # Hypothyroidism   # Steroid and stress induced hyperglycemia  - Restarted PTA synthroid   - Goal to keep BG< 180 for optimal wound healing   - 8/14: Lantus 16 units daily  - High sliding scale insulin        ID:  # Leukocytosis- improving   # Concern for PID   PER OSH: 8/6: RSV, COVID, parainfluenza,  negative . Legionella and pneumococcal urine antigens negative. Patient has been on greater than 20 mg prednisone daily for 18 days.  CULTURES: 8/8/2024: Respiratory viral panel negative. Blood cultures negative, MRSA nasal negative, Legionella urine antigen negative, respiratory culture negative.PCR trachea for PJP is negative.  COVID: Negative. Viral panel-negative PJP  - ID consulted   - 8/11: Gyn consulted for right adnexal finding on CT. Low suspicion for abscess but would treat as we are currently. Collection too small for surgical intervention and too small for IR drainage as well. Could follow up with pelvic MRI, however we will treat as current with IV antibiotics.    - EBV PCR, per ID likely reactivation I/n the setting of acute illness.   - IGG levels low, ID recommend IVIG infusion, done 8/15  - 8/15 Check MRSA swab negative Repeat BC  with NGTD  - 8/14 BAL: Pseudomonas aeruginosa grown in the setting of Cefepime since 8/3.  8/16 Changed to Zosyn. Follow susceptibilities.   - Karius returned with EBV and HSV, along with EBV positivity in BAL, per ID represents reactivation  in critical illness, no treatment at this time.   - Pt has been afebrile T max of 99.5  overnight, leukocytosis downtrending to 12.9 from 18.6   - Zosyn discontinued on 8/19   - Continue to monitor fever curve and inflammatory markers as appropriate    Antimicrobials:   -  "Azithromycin- stopped 8/11   - Cefepime 8/3 (started at OSH)-8/16  - Amphotericin - stopped 8/13   - Flagyl 8/11-8/16  - Zosyn 8/16- 8/19    Heme:     # Acute blood loss anemia   #Anemia of critical illness  - Transfuse if hgb <7.0 or signs/symptoms of hypoperfusion. Monitor and trend.  No transfusions in the last 24 hours.   - Heparinize CRRT circuit      Musculoskeletal:  # Weakness and deconditioning of critical illness  # Left ankle injury pre-hospitalization    - Physical and occupational therapy when able.      Skin:  # Ecchymosis - BLE   # Left toe duskiness   - Bilateral lower leg ecchymosis   - WOC consult   - 8/13: Worsening duskiness to left 3rd, 4th toes noted; Off pressors   - 8/20: no changes in L 3rd, 4th toes      General Cares/Prophylaxis:    DVT Prophylaxis: Heparinized CRRT circuit  GI Prophylaxis: PPI  Restraints: Restraints for medical healing needed: NO     Lines/ tubes/ drains:  - ETT   - Left internal jugular TLC  - radial arterial line   - NJ   - PIV\"s      Disposition: SICU      Discussed plan today with pt's  family member. All questions were answered. They are in agreement with plan.      Patient seen, findings and plan discussed with SICU staff.     Total Critical Care time spent by me, excluding procedures, was 40 minutes.     Andres Payne PA-C         ====================================  INTERVAL HISTORY:  Unable to obtain due to pt's critical condition.       OBJECTIVE:   1. VITAL SIGNS:   Temp:  [95  F (35  C)-99.5  F (37.5  C)] 99.3  F (37.4  C)  Pulse:  [] 100  Resp:  [17-41] 25  BP: ()/(43-75) 111/60  MAP:  [51 mmHg-104 mmHg] 80 mmHg  Arterial Line BP: ()/(39-74) 129/59  FiO2 (%):  [55 %-80 %] 70 %  SpO2:  [90 %-100 %] 100 %  FiO2 (%): 70 %, Resp: 25, Inspiratory Pressure (cm H2O) (Drager Jessie): 18, Vent Mode: PCV Plus assist, Resp Rate (Set): 18 breaths/min, Tidal Volume (Set, mL): 400 mL, PEEP (cm H2O): 10 cmH2O, Resp Rate (Set): 18 breaths/min, Tidal " Volume (Set, mL): 400 mL, PEEP (cm H2O): 10 cmH2O      2. INTAKE/ OUTPUT:   I/O last 3 completed shifts:  In: 2656.33 [I.V.:1111.33; NG/GT:705]  Out: 3337 [Other:2637; Stool:700]    3. PHYSICAL EXAMINATION:  Neuro: Intubated and Sedated. Wiggles toes in BLE, no movement of BUE to noxious stimuli. NAD.   HEENT: Normocephalic, atraumatic. PERRL, and nonicteric.   CV: RRR on monitor, all extremities well perfused   Respiratory: Normal respiratory effort on PC inspiratory pressure 18, PEEP 10, FiO2 80%, equal chest rise b/l   GI: Abdomen soft and non-tender to light palpation. Non-distended. Rectal tube in place with green watery stools 550 ml /24 hrs.   : Anuric  MSK: See neuro.   Skin:  L great toe pale with ecchymosis. No rashes or skin lesions.         4. INVESTIGATIONS:   Arterial Blood Gases   Recent Labs   Lab 08/20/24  0959 08/20/24  0740 08/20/24  0548 08/20/24  0415   PH 7.30* 7.29* 7.28* 7.29*   PCO2 48* 48* 50* 49*   PO2 133* 134* 108* 64*   HCO3 23 23 23 24     Complete Blood Count   Recent Labs   Lab 08/20/24  0413 08/19/24  2246 08/19/24  1544 08/19/24  0949   WBC 12.9* 15.5* 12.5* 16.0*   HGB 8.1* 8.3* 8.1* 8.2*    205 152 171     Basic Metabolic Panel  Recent Labs   Lab 08/20/24  0732 08/20/24  0413 08/19/24  2254 08/19/24  2246 08/19/24  1548 08/19/24  1544 08/19/24  1133 08/19/24  0949   NA  --  136  --  139  --  136  --  137  137   POTASSIUM  --  4.2  --  4.6  --  4.7  --  3.8  3.8   CHLORIDE  --  104  --  107  --  104  --  105  105   CO2  --  21*  --  21*  --  20*  --  21*  21*   BUN  --  36.0*  --  39.6*  --  42.5*  --  41.8*  41.8*   CR  --  1.48*  --  1.54*  --  1.63*  --  1.74*  1.74*   * 132* 113* 123*   < > 212*   < > 184*  184*    < > = values in this interval not displayed.     Liver Function Tests  Recent Labs   Lab 08/20/24  0413 08/19/24  1544 08/19/24  0949 08/19/24  0358 08/18/24  1208 08/18/24  0433 08/17/24  1206 08/17/24  0402   AST 25  --   --  29  --  34  --   37   ALT 18  --   --  16  --  18  --  18   ALKPHOS 104  --   --  101  --  112  --  119   BILITOTAL 0.2  --   --  0.4  --  0.5  --  0.5   ALBUMIN 3.1* 3.1* 3.1* 3.2*   < > 3.3*   < > 3.3*    < > = values in this interval not displayed.     Pancreatic Enzymes  No lab results found in last 7 days.  Coagulation Profile  Recent Labs   Lab 08/18/24  0946 08/18/24  0433 08/17/24  2154 08/17/24  1545   PTT 32 45* 47* 49*         5. RADIOLOGY:   Recent Results (from the past 24 hour(s))   XR Chest Port 1 View    Narrative    Exam: XR CHEST PORT 1 VIEW, 8/20/2024 3:24 AM    Indication: ARDS    Comparison: X-ray chest 8/19/2024, 0034 hours    Findings:   Frontal supine radiograph of the chest, 0043 hours. Endotracheal tube  tip is in the midthoracic trachea approximately 4.5 cm above the  ivy. Stable placement of the esophageal temperature probe, feeding  tube, left IJ CVC.    Midline trachea. Stable appearance of the cardiomediastinal  silhouette. Unchanged diffuse airspace opacities, air bronchograms  over the lung bases. Small bilateral pleural effusions. No definitive  pneumothorax.    Osseous structures are unchanged.      Impression    Impression:   1. Stable support devices, endotracheal tube tip projects  approximately 4.5 cm above the ivy.  2. Stable diffuse airspace opacities throughout both lungs and small  bilateral pleural effusions.    I have personally reviewed the examination and initial interpretation  and I agree with the findings.    SANJANA MARIE MD         SYSTEM ID:  [id number]       =========================================

## 2024-08-20 NOTE — PROGRESS NOTES
[facility] General ID Service: Follow Up Note      Patient:  Massiel Flaherty   Date of birth 1970, Medical record number [id number]  Date of Visit:  08/20/2024  Date of Admission: 8/8/2024         Assessment and Recommendations:   ID Problem List:  Acute hypoxic and hypercapnic respiratory failure c/b ARDS and s/p VV-ECMO (8/8/24-8/18/24)  Etiology unknown, possible CAP vs viral pneumonia vs other  Fever  Dry cough x1 month prior to admission  CHACORTA requiring CRRT  Cerebral edema; intentional hypernatremia  Subconjunctival hemorrhage  Right adnexal mass  Hypogammaglobulinemia  Elevated AST, alk phos  Anemia  Recent Augmentin, Z-pack and steroid tapers  Antibiotic allergy - Sulfa (hives), tetracycline (hives)    Recommendations:  Monitor clinical status off of antimicrobials, has completed empiric course for pneumonia with no other source of infection identified  Pending: hantavirus antibodies (8/12), Bcx (8/15,NGTD), AFB cultures from BAL (NGTD)  ID will sign off at this time, please don't hesitate to contact the [facility] ID team should further questions arise.      Discussion:  Massiel Flaherty is a 53 year old female with past medical history significant for asthma, MURIEL on CPAP, HTN, anxiety,depression, expressive aphasia, fibromyalgia, and hypothyroidism who presented to OSH 8/3/24 with fever, fatigue, dyspnea with c/f CAP and requiring intubation, proning, paralysis and transferred to [facility] 8/8/24 for further cares and now s/p VV ECMO (decannulated 8/18) and CHACORTA requiring CRRT. ID consulted for pneumonia.      Per chart review, had sought care multiple times since early July for an acute on chronic cough. No improvement with multiple courses of steroid tapers, Z-pack, and Augmentin as outpatient. Admitted 8/3/24 with fever and hypoxia. Was on trial of Azithromycin (at OSH then 3 days at [facility]) without improvement. Received >14 days of cefepime, transitioned to Zosyn. No infectious etiology has been  identified to date. Started on Amphotericin B 8/9 due to high level of concern for endemic fungal pneumonia given multiple presentations and lack of improvement on abx, with steroids prescribed, discontinued on 8/13 with antigens negative, unrevealing bronch. Negative ID work up outlined below. Negative EVELIO, ANCA, MPO, proteinase 3 at OSH. CXR with ongoing diffuse bilateral pulmonary opacities consistent with ARDS. S/p VV ECMO 8/8-8/18. Requiring mechanical ventilation.      ow level EBV positivity from BAL- likely reactivation rather than initial cause of worsening status. BAL culture 8/14 now with growth of PsA, (pan-s). This is consistent with colonization of respiratory tract as it is Cefepime susceptible as she has been on ~2 wks of coverage. Has completed 3 days of Azithromycin at [facility] without improvement; received azithromycin at OSH prior to transfer as well. Sent Karius on 8/13 given ongoing critical illness and negative work up thus far- no bacterial or fungal organisms identified. Did reflect elevated EBV and HSV on results- but this is likely reactivation of previously suppressed viruses in setting of critical illness- does not require treatment.       8/6/24 Legionella urine ag Neg    MRSA nares Neg    Bcx x2  No growth    PJP PCR Neg    Respiratory bacterial cx No growth   8/7/24 BD glucan Neg    HIV Ag Ab Neg    Aspergillus antigen blood Neg   8/8/24 IgG 302 (low)    Respiratory aerobic cx C.albicans    MRSA nares; SA target DNA Neg/Neg    BCx NGTD   8/9 Blastomyces Ag EIA blood Neg    BD glucan Neg (44)    Aspergillus galactomannan Neg    IgG 258    HIV Ag Ab combo Neg    Cryptococcal Ag blood Neg    Coccidioides Ag EIA blood Neg    Histoplasma capsulatum Ag blood Neg    Hep B core Ab IgM Neg    Hep A Ab IgM Neg    Hep B surface Ab Pos    Hep C Ab  Neg    Hep B surface Ag Neg    Respiratory aerobic cx BAL No growth    AFB stain and Cx BAL  Stain neg, NGTD    Fungal cx BAL  C.albicans    KOH prep BAL  Neg    CMV PCR BAL Neg    Nocardia cx BAL NGTD    Legionella cx BAL NGTD    HSV PCR BAL Neg    PJP PCR BAL Neg    Aspergillus galactomannan BAL Neg    EBV PCR BAL Positive    Adenovirus PCR BAL Neg    Respiratory aerobic cx BAL No growth    AFB culture and stain BAL Stain neg, NGTD    Fungal Cx BAL NGTD    KOH prep BAL Neg    Legionella species cx BAL NGTD    Nocardia species cx BAL NGTD    Legionella PCR BAL Neg    HSV PCR BAL Neg   8/10/24 Respiratory panel PCR neg    COVID-19 Neg   8/11/24 Histoplasma capsulatum BAL Neg    Histoplasma capsulatum blood Neg   8/12/24 Tick-borne disease neg    Leptospira IgM Neg    Hantavirus antibodies PENDING    GC/Chlamydia swab Neg   8/13/24 C.diff Neg    Karius  and  (known to cause disease but may represent normal microbiota)   8/14/24 Fungal/yeast cx BAL NGTD    Respiratory Aerobic cx BAL Pseudomonas aeruginosa (pan-S)   8/15/24 MRSA nares neg    BCX NGTD       Don't hesitate to call with questions.     Marce Black PA-C  Pronouns: she/her/hers  Infectious Diseases  Contact via [employer] or [employer] Paging/Directory      35 MINUTES SPENT BY ME on the date of service doing chart review, history, exam, documentation & further activities per the note.             Interval History:     Afebrile. Ongoing intermittent shivering, on nba hugger. No new changes. Off of pressors in late AM. Remains intubated and on CRRT. Plan for MRI brain today.          Current Antimicrobials   Current:    Prior:  - amphotericin B 8/9-8/13  - azithromycin 8/9-/811; given at OSH as well  - IV pentamidine given at OSH  - cefepime 8/3-8/15  - flagyl 8/11-16  -Zosyn 8/16-8/19         History of Present Illness:    Per ID consult note 8/9/24:  Massiel Flaherty is a 53 year old female with past medical history significant for asthma, MURIEL on CPAP, HTN, anxiety,depression, expressive aphasia, fibromyalgia, obesity, and hypothyroidism who was transferred to [facility] 8/8/24 for further cares and now  "placed on VV ECMO and CRRT. ID consulted for pneumonia.      She was transferred to [facility] from OSH. Presented to hospital in [place], [place] on 8/3 with fever, fatigue, dyspnea with concern for CAP. Xray with multifocal bilateral opacities. CT chest with severe multifocal bilateral opacities, negative for PE in OSH notes, though unable to see direct report. Negative COVID, flu, and viral testing. O2 sats reportedly 60% on arrival.      She was started on azithromycin, cefepime, vancomycin, and solumedrol x1. Had reportedly been on a steroid taper recently, so had started on atovaquone ppx on 8/7 at OSH. Prior to admission, was seen in clinic and given PO Augmentin on 7/30 for acute on chronic cough x2 weeks. Was also seen at OSH ED 7/19 for asthma exacerbation, got Z-pack and prednisone taper. Was seen prior on 7/8 with cough and got additional prednisone, azithromycin. Has also had some concern for recent \"memory issues and brain fog\" and has \"zoning out episodes\". Head imaging normal, seen by neurology who felt this was more psych issue per OSH records. She has history of seizure workup and is not on any AEDs. She is on rifaximin for unknown reason. Was febrile at OSH Tmax 101.3F and required pressors. Infectious workup at OSH with negative - RSV, COVID, flu, RVP, Fungitell, Legionella urine Ag, Strep pneumococcal urine Ag, sputum PJP PCR, HIV, Aspergillus antigen, and EVELIO. MRSA nares negative. Sputum Cx 8/6 - NGTD at 48 hrs. Bcx 8/6 at OSH NGTD. Procal 4.16.      On 8/6, was transferred to [facility] and intubated for severe ARDS requiring paralysis, proning. Remained on cefepime. Transferred to [facility] on 8/8 given continued decline, started V-V ECMO and CRRT. Son, brother, and niece/nephew at bedside on 8/9 morning. Son provides additional history primarily. Reports onset of dry nonproductive cough ~2 weeks ago and followed by zoning out spells. She had no other URI symptoms - sore throat, sinus congestion, no " headache, chest pain, nausea, vomiting, abdominal pain, diarrhea that he was aware. Patient had been living with her mother due to difficulties caring for herself. Has had multiple falls in the last month. Recently fell out of a recliner - has bruising to left foot from this fall. Unsure if she hit her head. Son denies any tobacco, alcohol, or illicit drug use by patient. No recent travel. Son was sick with viral illness ~1 months ago, no other recent sick contacts. She is around her sister's dog - who is well, no illness. No livestock or other animal exposures. Says she does have a hx of colon cancer - likely polyp that was resected. Family denies chemo/radiation/surgery. 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        Physical Exam:   Ranges for vital signs:  Temp:  [95  F (35  C)-99.9  F (37.7  C)] 99.9  F (37.7  C)  Pulse:  [] 108  Resp:  [17-41] 30  BP: ()/(43-75) 111/60  MAP:  [51 mmHg-104 mmHg] 83 mmHg  Arterial Line BP: ()/(39-74) 128/61  FiO2 (%):  [55 %-80 %] 55 %  SpO2:  [90 %-100 %] 95 %    Intake/Output Summary (Last 24 hours) at 8/12/2024 1456  Last data filed at 8/12/2024 1400  Gross per 24 hour   Intake 4354.59 ml   Output 6225.9 ml   Net -1871.31 ml     Exam:  Constitutional: Female patient seen lying in bed. Opens eyes to voice.  HEENT: NCAT, anicteric sclerae. +subconjunctival hemorrhage L>R. MMM. ET tube in place.  Respiratory: Non-labored breathing on vent. Mildly coarse RUL, otherwise clear. No wheezing.   Cardiovascular: Regular rate and rhythm, +S1/S2, no murmur.  GI: Normoactive BS. Abdomen is soft, non-distended.  Skin: Warm and dry. No jaundice or acute rashes.   Musculoskeletal: Extremities grossly normal.   Neurologic: Sedated. Opens eyes to voice.  VAD:  LIJ CVC, L radial art line           Laboratory Data:   Reviewed.  Pertinent for:    Culture data:  Culture   Date Value Ref Range  Status   08/15/2024 No growth after 4 days  Preliminary   08/14/2024 10,000-50,000 CFU/mL Pseudomonas aeruginosa (A)  Final   08/14/2024 Candida albicans (A)  Preliminary   08/09/2024 No Growth  Final   08/09/2024 Candida albicans (A)  Preliminary   08/09/2024 No growth after 10 days  Preliminary   08/09/2024 Culture negative, monitoring continues  Preliminary   08/09/2024 No Growth  Final   08/09/2024 No growth after 10 days  Preliminary   08/09/2024 Culture negative, monitoring continues  Preliminary   08/09/2024 No growth after 10 days  Preliminary   08/08/2024 No Growth  Final   08/08/2024 1+ Candida albicans (A)  Final     Comment:     Susceptibilities not routinely done, refer to antibiogram to view typical susceptibility profiles     GS Culture   Date Value Ref Range Status   08/09/2024 See corresponding culture for results  Final   08/09/2024 See corresponding culture for results  Final       Inflammatory Markers  No lab results found.    Hematology Studies    Recent Labs   Lab Test 08/20/24 0413 08/19/24 2246 08/19/24  1544 08/19/24  0949   WBC 12.9* 15.5* 12.5* 16.0*   HGB 8.1* 8.3* 8.1* 8.2*   MCV 97 97 97 96    205 152 171     Recent Labs   Lab Test 08/19/24  1544 08/19/24  0358 08/18/24  2155 08/18/24  1559   ANEU 11.3* 13.6* 9.9* 12.8*   AEOS 0.0 0.6 0.4 0.3       Metabolic Studies     Recent Labs   Lab Test 08/20/24  0413 08/19/24 2246 08/19/24  1544 08/19/24  0949    139 136 137  137   POTASSIUM 4.2 4.6 4.7 3.8  3.8   CHLORIDE 104 107 104 105  105   CO2 21* 21* 20* 21*  21*   BUN 36.0* 39.6* 42.5* 41.8*  41.8*   CR 1.48* 1.54* 1.63* 1.74*  1.74*   GFRESTIMATED 42* 40* 37* 34*  34*       Hepatic Studies    Recent Labs   Lab Test 08/20/24 0413 08/19/24  1544 08/19/24  0949 08/19/24  0358 08/18/24  1208 08/18/24  0433   BILITOTAL 0.2  --   --  0.4  --  0.5   ALKPHOS 104  --   --  101  --  112   ALBUMIN 3.1* 3.1* 3.1* 3.2*   < > 3.3*   AST 25  --   --  29  --  34   ALT 18  --   --   16  --  18    < > = values in this interval not displayed.            Imaging:   CXR 8/20/24  Impression:   1. Stable support devices, endotracheal tube tip projects  approximately 4.5 cm above the ivy.  2. Stable diffuse airspace opacities throughout both lungs and small  bilateral pleural effusions.      CT head 8/15/2024  IMPRESSION: Continued improvement in gray-white matter differentiation  and continued decreased effacement of the sulci and ventricles. No  acute intracranial abnormality.     CXR 8/15/2024   IMPRESSION:   1. Stable support devices.  2. Grossly unchanged patchy airspace opacities.    CXR 8/12/24  IMPRESSION:   1. Stable support devices.  2. Dense consolidative opacities bilaterally with minimally improved  areas of parenchymal aeration.    US pelvic limited 8/10/24  IMPRESSION: Post hysterectomy. No overt adnexal abnormality identified on  transabdominal imaging.    CT head 8/10/24  IMPRESSION:   Slightly improved differentiation of the gray-white matter and  sulcation suggesting that the prior study may have been impacted by  artifact.     CT Chest/abd/pelvis 8/9/24  IMPRESSION:   1. Diffuse patchy groundglass and consolidative densities throughout  the lungs with small bilateral pleural effusions. Findings may  represent severe pulmonary edema, and/or  infectious /inflammatory  pathology, or ARDS.   2. Multiple prominent and a few enlarged mediastinal lymph nodes,  possibly reactive. Recommend attention on follow-up.  3. Support devices as detailed above.  4. Mild hepatomegaly.  5. Trace pelvic ascites, nonspecific bilateral perinephric  streakiness.  6. Asymmetric heterogeneous bulky right adnexa, consider nonemergent  pelvic ultrasound for further evaluation.  7. Additional incidental/chronic findings as detailed above      ECHO  ECHO Congenital Transthoracic (TTE)  Result Date: 8/8/2024    Limited echocardiogram performed for assessment of LV systolic function.   Left Ventricle: The  left ventricle appears normal in size. The ejection fraction, measured by biplane, is 71%. There are no wall motion abnormalities.   Limited assessment of RV.  Normal appearing right ventricular chamber size and systolic function.   Normal IVC size with minimal respirophasic changes.   No prior study for comparison. Left Ventricle The left ventricle appears normal in size. Wall thickness is normal. The ejection fraction, measured by biplane, is 71%. There are no wall motion abnormalities. IVC/SVC Normal IVC size with minimal respirophasic changes. Pericardium There is no pericardial effusion. Study Details A limited echo was performed with limited 2D. The sonographer requested the use of Definity- imaging enhancing agent per protocol. The patient denies contraindications and gives verbal consent for imaging enhancing agent injection.

## 2024-08-20 NOTE — PROGRESS NOTES
Nephrology Progress Note  08/20/2024       Mrs Flaherty is a  53 yof w/ Anxiety, depression, fibromyalgia, hypothyroidism, asthma, HLD, MURIEL on CPAP, expressive aphasia, and hypertension who was admitted to an OSH with community acquired pneumonia on 8/3 s/p intubation.  Found to have severe ARDS requiring paralysis and proning, transferred here on 8/8 for management and initiated on CKRT for severe acidemia in the setting of oligoanuric CHACORTA.  Started CRRT on 8/9 for volume management.      Interval History :   Mrs Flaherty continues on CRRT post VV ECMO decannulation. Pulm status still tenuous so continuing to pull to optimize.  Able to be 0.8L negative yesterday with similar goal today, ordered for 0-50cc/h.  BP's reasonable but intake is high so not able to consider iHD modality yet.        Assessment & Recommendations:   CHACORTA-Baseline Cr 0.6, at baseline on admission.  CHACORTA due to septic shock in setting of ARDS, UA on 8/6/2024 essentially bland (30 protein but no blood or casts), no dedicated renal imaging.  No dedicated renal imaging at this time.  Started CRRT 8/9 for volume management, now anuric.    -CHACORTA due to hypoperfusion, started CRRT on 8/9.    -Access is LFV temp line.   -CRRT, 4k baths, 0-50cc/h volume goal.     -Dialysis consent signed and in chart.      Volume status-Net even yesterday, ordered for 0-50cc/h today.      Electrolytes/pH-Labs stable on 4k baths.      Ca/phos/pth-No acute issues, Phos mildly up but no major intervention needed today.     Anemia-Hgb 8.1, acute management per team.      Nutrition-Vital TF started 8/9.    Time spent: 55 minutes on this date of encounter for chart review, physical exam, medical decision making and co-ordination of care.     Seen and discussed with Dr Donato    Recommendations were communicated to primary team via verbal communication.     DOREEN Sequeira CNS  Clinical Nurse Specialist  [phone]      Review of Systems:   I reviewed the following  "systems:  ROS not done due to vent/sedation.     Physical Exam:   I/O last 3 completed shifts:  In: 2656.33 [I.V.:1111.33; NG/GT:705]  Out: 3337 [Other:2637; Stool:700]   /54   Pulse 102   Temp 99.7  F (37.6  C)   Resp 28   Ht 1.575 m (5' 2\")   Wt 83.2 kg (183 lb 6.8 oz)   SpO2 96%   BMI 33.55 kg/m       GENERAL APPEARANCE: Intubated and sedated and paralyzed  Pulmonary: Intubated and sedated,   CV: regular rhythm, normal rate   - Edema 1-2+ diffuse  GI: soft, nontender, normal bowel sounds  MS: no evidence of inflammation in joints, no muscle tenderness  SKIN:  warm, dry  NEURO: No focal deficits    Labs:   All labs reviewed by me  Electrolytes/Renal -   Recent Labs   Lab Test 08/20/24  1157 08/20/24  0732 08/20/24  0413 08/19/24  2254 08/19/24  2246 08/19/24  1548 08/19/24  1544 08/19/24  1133 08/19/24  0949   NA  --   --  136  --  139  --  136  --  137  137   POTASSIUM  --   --  4.2  --  4.6  --  4.7  --  3.8  3.8   CHLORIDE  --   --  104  --  107  --  104  --  105  105   CO2  --   --  21*  --  21*  --  20*  --  21*  21*   BUN  --   --  36.0*  --  39.6*  --  42.5*  --  41.8*  41.8*   CR  --   --  1.48*  --  1.54*  --  1.63*  --  1.74*  1.74*   * 122* 132*   < > 123*   < > 212*   < > 184*  184*   QUAN  --   --  8.1*  --  8.2*  --  7.9*  --  7.8*  7.8*   MAG  --   --  2.4*  --   --   --  2.4*  --  2.2   PHOS  --   --  5.7*  --   --   --  6.4*  --  4.9*    < > = values in this interval not displayed.       CBC -   Recent Labs   Lab Test 08/20/24  0413 08/19/24  2246 08/19/24  1544   WBC 12.9* 15.5* 12.5*   HGB 8.1* 8.3* 8.1*    205 152       LFTs -   Recent Labs   Lab Test 08/20/24  0413 08/19/24  1544 08/19/24  0949 08/19/24  0358 08/18/24  1208 08/18/24  0433   ALKPHOS 104  --   --  101  --  112   BILITOTAL 0.2  --   --  0.4  --  0.5   ALT 18  --   --  16  --  18   AST 25  --   --  29  --  34   PROTTOTAL 5.6*  --   --  5.7*  --  5.8*   ALBUMIN 3.1* 3.1* 3.1* 3.2*   < > 3.3*    < > " = values in this interval not displayed.       Iron Panel - No lab results found.        Current Medications:  Current Facility-Administered Medications   Medication Dose Route Frequency Provider Last Rate Last Admin    acetylcysteine (MUCOMYST) 10 % nebulizer solution 4 mL  4 mL Nebulization 4x Daily Barry Hatch MD   4 mL at 08/20/24 1133    amitriptyline (ELAVIL) tablet 50 mg  50 mg Oral or Feeding Tube At Bedtime Barry Hatch MD   50 mg at 08/19/24 2240    artificial tears ophthalmic ointment   Both Eyes Q8H Tiara Martin CNP   Given at 08/20/24 0826    chlorhexidine (PERIDEX) 0.12 % solution 15 mL  15 mL Mouth/Throat Q12H Giovanny Guidry PA-C   15 mL at 08/20/24 0748    dexAMETHasone PF (DECADRON) injection 10 mg  10 mg Intravenous Daily Cal Lisa MD   10 mg at 08/20/24 0826    fiber modular (BANATROL TF) packet 1 packet  1 packet Per Feeding Tube TID Cal Lisa MD   1 packet at 08/20/24 1350    insulin aspart (NovoLOG) injection (RAPID ACTING)  1-12 Units Subcutaneous Q4H Aniceto Adame MD   2 Units at 08/20/24 1158    insulin glargine (LANTUS PEN) injection 16 Units  16 Units Subcutaneous Daily Tiara Martin CNP   16 Units at 08/20/24 0826    ipratropium - albuterol 0.5 mg/2.5 mg/3 mL (DUONEB) neb solution 3 mL  3 mL Nebulization 4x daily Barry Hatch MD   3 mL at 08/20/24 1134    levothyroxine (SYNTHROID/LEVOTHROID) tablet 25 mcg  25 mcg Per Feeding Tube QAM AC Giovanny Guidry PA-C   25 mcg at 08/20/24 0747    loperamide (IMODIUM) capsule 4 mg  4 mg Oral or Feeding Tube Q6H Barry Hatch MD   4 mg at 08/20/24 1349    multivitamin RENAL (RENAVITE RX/NEPHROVITE) tablet 1 tablet  1 tablet Oral or Feeding Tube Daily Giovanny Guidry PA-C   1 tablet at 08/20/24 0747    oxyCODONE IR (ROXICODONE) tablet 10 mg  10 mg Oral or Feeding Tube Q4H Aniceto Adame MD   10 mg at 08/20/24 9594    pantoprazole (PROTONIX) 2 mg/mL suspension 40  mg  40 mg Per Feeding Tube QAM AC Barry Hatch MD   40 mg at 08/20/24 0747    Prosource TF20 ENfit Compatibl EN LIQD (PROSOURCE TF20) packet 60 mL  1 packet Per Feeding Tube TID Giovanny Guidry PA-C   60 mL at 08/20/24 1350    venlafaxine (EFFEXOR) tablet 75 mg  75 mg Oral or Feeding Tube BID Barry Hatch MD   75 mg at 08/20/24 0747     Current Facility-Administered Medications   Medication Dose Route Frequency Provider Last Rate Last Admin    dexmedeTOMIDine (PRECEDEX) 4 mcg/mL in sodium chloride 0.9 % 100 mL infusion  0.1-1.2 mcg/kg/hr (Dosing Weight) Intravenous Continuous de La MaterTiara, CNP 22.2 mL/hr at 08/20/24 1400 1 mcg/kg/hr at 08/20/24 1400    dextrose 10% infusion   Intravenous Continuous PRN Giovanny Guidry PA-C        dialysate for CVVHD & CVVHDF (Phoxillum BK4/2.5)  12.5 mL/kg/hr CRRT Continuous Dakota Dorsey APRN CNS 1,100 mL/hr at 08/20/24 1349 12.5 mL/kg/hr at 08/20/24 1349    heparin (porcine) 20,000 units in 20 mL ANTICOAGULANT infusion (syringe from pharmacy)  500 Units/hr CRRT Continuous Dakota Dorsey APRN CNS 0.5 mL/hr at 08/19/24 1110 500 Units/hr at 08/19/24 1110    HYDROmorphone (DILAUDID) 0.2 mg/mL infusion ADULT/PEDS GREATER than or EQUAL to 20 kg  0.3-1.5 mg/hr Intravenous Continuous Dong Rico PA-C 5 mL/hr at 08/20/24 1400 1 mg/hr at 08/20/24 1400    midazolam (VERSED) 100 mg/100 mL NS infusion - ADULT  1 mg/hr Intravenous Continuous de La Mater Tiara Carlee, CNP 1 mL/hr at 08/20/24 1400 1 mg/hr at 08/20/24 1400    No POST-filter replacement required   Does not apply Continuous PRN Dakota Dorsey APRN CNS        norepinephrine (LEVOPHED) 16 mg in  mL infusion MAX CONC CENTRAL LINE  0.01-0.6 mcg/kg/min (Dosing Weight) Intravenous Continuous Cal Lisa MD   Stopped at 08/20/24 1340    PRE-filter replacement solution for CVVHD & CVVHDF (Phoxillum BK4/2.5)  12.5 mL/kg/hr CRRT Continuous Dakota Dorsey, APRN  CNS 1,100 mL/hr at 08/20/24 1131 12.5 mL/kg/hr at 08/20/24 1131       I, Emmanuelle Donato, saw and evaluated this patient as part of a shared visit.  I have reviewed and discussed with the advanced practice provider their history, physical and plan.  I have personally performed the substantive portion of the medical decision making for this visit - please see the TRI's documentation for the full details  I personally reviewed the vital signs, medications, labs and imaging.    Key findings and management decisions made by me:  Lulu in setting of respiratory failure/ ARDS, on CRRT sicne 8/9 for volume and acidemia.  Continue CRRT and UF given ongoing respiratory issues.      Emmanuelle Donato  Date of Service (when I saw the patient): 8/20

## 2024-08-21 ENCOUNTER — APPOINTMENT (OUTPATIENT)
Dept: GENERAL RADIOLOGY | Facility: CLINIC | Age: 54
DRG: 003 | End: 2024-08-21
Attending: INTERNAL MEDICINE
Payer: MEDICAID

## 2024-08-21 ENCOUNTER — APPOINTMENT (OUTPATIENT)
Dept: GENERAL RADIOLOGY | Facility: CLINIC | Age: 54
DRG: 003 | End: 2024-08-21
Attending: STUDENT IN AN ORGANIZED HEALTH CARE EDUCATION/TRAINING PROGRAM
Payer: MEDICAID

## 2024-08-21 ENCOUNTER — APPOINTMENT (OUTPATIENT)
Dept: MRI IMAGING | Facility: CLINIC | Age: 54
DRG: 003 | End: 2024-08-21
Attending: STUDENT IN AN ORGANIZED HEALTH CARE EDUCATION/TRAINING PROGRAM
Payer: MEDICAID

## 2024-08-21 LAB
ABO/RH(D): NORMAL
ALBUMIN SERPL BCG-MCNC: 3.1 G/DL (ref 3.5–5.2)
ALBUMIN SERPL BCG-MCNC: 3.2 G/DL (ref 3.5–5.2)
ALLEN'S TEST: ABNORMAL
ALP SERPL-CCNC: 93 U/L (ref 40–150)
ALT SERPL W P-5'-P-CCNC: 17 U/L (ref 0–50)
ANION GAP SERPL CALCULATED.3IONS-SCNC: 13 MMOL/L (ref 7–15)
ANION GAP SERPL CALCULATED.3IONS-SCNC: 13 MMOL/L (ref 7–15)
ANTIBODY SCREEN: NEGATIVE
AST SERPL W P-5'-P-CCNC: 24 U/L (ref 0–45)
BASE EXCESS BLDA CALC-SCNC: -3.1 MMOL/L (ref -3–3)
BASE EXCESS BLDA CALC-SCNC: -3.8 MMOL/L (ref -3–3)
BASE EXCESS BLDA CALC-SCNC: -3.9 MMOL/L (ref -3–3)
BASE EXCESS BLDA CALC-SCNC: -4.2 MMOL/L (ref -3–3)
BASE EXCESS BLDA CALC-SCNC: -4.3 MMOL/L (ref -3–3)
BASE EXCESS BLDA CALC-SCNC: -4.4 MMOL/L (ref -3–3)
BASE EXCESS BLDA CALC-SCNC: -4.5 MMOL/L (ref -3–3)
BASE EXCESS BLDA CALC-SCNC: -4.6 MMOL/L (ref -3–3)
BASE EXCESS BLDA CALC-SCNC: -4.9 MMOL/L (ref -3–3)
BILIRUB DIRECT SERPL-MCNC: <0.2 MG/DL (ref 0–0.3)
BILIRUB SERPL-MCNC: 0.2 MG/DL
BUN SERPL-MCNC: 40.6 MG/DL (ref 6–20)
BUN SERPL-MCNC: 42.5 MG/DL (ref 6–20)
CA-I BLD-MCNC: 4.7 MG/DL (ref 4.4–5.2)
CA-I BLD-MCNC: 4.7 MG/DL (ref 4.4–5.2)
CALCIUM SERPL-MCNC: 8.2 MG/DL (ref 8.8–10.4)
CALCIUM SERPL-MCNC: 8.4 MG/DL (ref 8.8–10.4)
CHLORIDE SERPL-SCNC: 102 MMOL/L (ref 98–107)
CHLORIDE SERPL-SCNC: 106 MMOL/L (ref 98–107)
COHGB MFR BLD: 93.1 % (ref 96–97)
COHGB MFR BLD: 93.8 % (ref 96–97)
COHGB MFR BLD: 93.9 % (ref 96–97)
COHGB MFR BLD: 94.2 % (ref 96–97)
COHGB MFR BLD: 94.8 % (ref 96–97)
COHGB MFR BLD: 95.9 % (ref 96–97)
COHGB MFR BLD: 96.5 % (ref 96–97)
COHGB MFR BLD: 97.1 % (ref 96–97)
COHGB MFR BLD: 97.2 % (ref 96–97)
COHGB MFR BLD: 98.2 % (ref 96–97)
COHGB MFR BLD: 99.3 % (ref 96–97)
CREAT SERPL-MCNC: 1.51 MG/DL (ref 0.51–0.95)
CREAT SERPL-MCNC: 1.73 MG/DL (ref 0.51–0.95)
EGFRCR SERPLBLD CKD-EPI 2021: 35 ML/MIN/1.73M2
EGFRCR SERPLBLD CKD-EPI 2021: 41 ML/MIN/1.73M2
ERYTHROCYTE [DISTWIDTH] IN BLOOD BY AUTOMATED COUNT: 18.3 % (ref 10–15)
GLUCOSE BLD-MCNC: 111 MG/DL (ref 70–99)
GLUCOSE BLD-MCNC: 133 MG/DL (ref 70–99)
GLUCOSE BLDC GLUCOMTR-MCNC: 117 MG/DL (ref 70–99)
GLUCOSE BLDC GLUCOMTR-MCNC: 164 MG/DL (ref 70–99)
GLUCOSE BLDC GLUCOMTR-MCNC: 173 MG/DL (ref 70–99)
GLUCOSE SERPL-MCNC: 112 MG/DL (ref 70–99)
GLUCOSE SERPL-MCNC: 184 MG/DL (ref 70–99)
HCO3 BLD-SCNC: 21 MMOL/L (ref 21–28)
HCO3 BLD-SCNC: 22 MMOL/L (ref 21–28)
HCO3 BLD-SCNC: 23 MMOL/L (ref 21–28)
HCO3 BLD-SCNC: 23 MMOL/L (ref 21–28)
HCO3 SERPL-SCNC: 19 MMOL/L (ref 22–29)
HCO3 SERPL-SCNC: 20 MMOL/L (ref 22–29)
HCT VFR BLD AUTO: 25.7 % (ref 35–47)
HGB BLD-MCNC: 7.9 G/DL (ref 11.7–15.7)
LACTATE SERPL-SCNC: 0.5 MMOL/L (ref 0.7–2)
MAGNESIUM SERPL-MCNC: 2.4 MG/DL (ref 1.7–2.3)
MAGNESIUM SERPL-MCNC: 2.5 MG/DL (ref 1.7–2.3)
MCH RBC QN AUTO: 30.7 PG (ref 26.5–33)
MCHC RBC AUTO-ENTMCNC: 30.7 G/DL (ref 31.5–36.5)
MCV RBC AUTO: 100 FL (ref 78–100)
O2/TOTAL GAS SETTING VFR VENT: 50 %
O2/TOTAL GAS SETTING VFR VENT: 50 %
O2/TOTAL GAS SETTING VFR VENT: 55 %
PCO2 BLD: 39 MM HG (ref 35–45)
PCO2 BLD: 40 MM HG (ref 35–45)
PCO2 BLD: 41 MM HG (ref 35–45)
PCO2 BLD: 44 MM HG (ref 35–45)
PCO2 BLD: 46 MM HG (ref 35–45)
PCO2 BLD: 48 MM HG (ref 35–45)
PCO2 BLD: 49 MM HG (ref 35–45)
PEEP: 10 CM H2O
PH BLD: 7.26 [PH] (ref 7.35–7.45)
PH BLD: 7.27 [PH] (ref 7.35–7.45)
PH BLD: 7.27 [PH] (ref 7.35–7.45)
PH BLD: 7.28 [PH] (ref 7.35–7.45)
PH BLD: 7.29 [PH] (ref 7.35–7.45)
PH BLD: 7.29 [PH] (ref 7.35–7.45)
PH BLD: 7.3 [PH] (ref 7.35–7.45)
PH BLD: 7.31 [PH] (ref 7.35–7.45)
PH BLD: 7.33 [PH] (ref 7.35–7.45)
PH BLD: 7.34 [PH] (ref 7.35–7.45)
PH BLD: 7.34 [PH] (ref 7.35–7.45)
PHOSPHATE SERPL-MCNC: 5.7 MG/DL (ref 2.5–4.5)
PHOSPHATE SERPL-MCNC: 6.4 MG/DL (ref 2.5–4.5)
PLATELET # BLD AUTO: 261 10E3/UL (ref 150–450)
PO2 BLD: 107 MM HG (ref 80–105)
PO2 BLD: 67 MM HG (ref 80–105)
PO2 BLD: 68 MM HG (ref 80–105)
PO2 BLD: 69 MM HG (ref 80–105)
PO2 BLD: 70 MM HG (ref 80–105)
PO2 BLD: 72 MM HG (ref 80–105)
PO2 BLD: 72 MM HG (ref 80–105)
PO2 BLD: 77 MM HG (ref 80–105)
PO2 BLD: 79 MM HG (ref 80–105)
PO2 BLD: 79 MM HG (ref 80–105)
PO2 BLD: 98 MM HG (ref 80–105)
POTASSIUM SERPL-SCNC: 4.1 MMOL/L (ref 3.4–5.3)
POTASSIUM SERPL-SCNC: 4.9 MMOL/L (ref 3.4–5.3)
PROT SERPL-MCNC: 5.8 G/DL (ref 6.4–8.3)
RBC # BLD AUTO: 2.57 10E6/UL (ref 3.8–5.2)
SAO2 % BLDA: 90 % (ref 92–100)
SAO2 % BLDA: 91 % (ref 92–100)
SAO2 % BLDA: 92 % (ref 92–100)
SAO2 % BLDA: 94 % (ref 92–100)
SAO2 % BLDA: 94 % (ref 92–100)
SAO2 % BLDA: 95 % (ref 92–100)
SAO2 % BLDA: 95 % (ref 92–100)
SAO2 % BLDA: 96 % (ref 92–100)
SAO2 % BLDA: 97 % (ref 92–100)
SODIUM SERPL-SCNC: 135 MMOL/L (ref 135–145)
SODIUM SERPL-SCNC: 138 MMOL/L (ref 135–145)
SPECIMEN EXPIRATION DATE: NORMAL
UFH PPP CHRO-ACNC: <0.1 IU/ML
WBC # BLD AUTO: 18.3 10E3/UL (ref 4–11)

## 2024-08-21 PROCEDURE — 86923 COMPATIBILITY TEST ELECTRIC: CPT

## 2024-08-21 PROCEDURE — 85027 COMPLETE CBC AUTOMATED: CPT | Performed by: PHYSICIAN ASSISTANT

## 2024-08-21 PROCEDURE — 87075 CULTR BACTERIA EXCEPT BLOOD: CPT

## 2024-08-21 PROCEDURE — 250N000013 HC RX MED GY IP 250 OP 250 PS 637: Performed by: SURGERY

## 2024-08-21 PROCEDURE — 84450 TRANSFERASE (AST) (SGOT): CPT | Performed by: CLINICAL NURSE SPECIALIST

## 2024-08-21 PROCEDURE — 250N000011 HC RX IP 250 OP 636: Performed by: STUDENT IN AN ORGANIZED HEALTH CARE EDUCATION/TRAINING PROGRAM

## 2024-08-21 PROCEDURE — 94003 VENT MGMT INPAT SUBQ DAY: CPT

## 2024-08-21 PROCEDURE — 71045 X-RAY EXAM CHEST 1 VIEW: CPT | Mod: 77

## 2024-08-21 PROCEDURE — 86900 BLOOD TYPING SEROLOGIC ABO: CPT | Performed by: SURGERY

## 2024-08-21 PROCEDURE — 82947 ASSAY GLUCOSE BLOOD QUANT: CPT

## 2024-08-21 PROCEDURE — 200N000002 HC R&B ICU UMMC

## 2024-08-21 PROCEDURE — 99233 SBSQ HOSP IP/OBS HIGH 50: CPT | Mod: FS | Performed by: CLINICAL NURSE SPECIALIST

## 2024-08-21 PROCEDURE — 83735 ASSAY OF MAGNESIUM: CPT | Performed by: CLINICAL NURSE SPECIALIST

## 2024-08-21 PROCEDURE — 71045 X-RAY EXAM CHEST 1 VIEW: CPT | Mod: 26 | Performed by: RADIOLOGY

## 2024-08-21 PROCEDURE — 94640 AIRWAY INHALATION TREATMENT: CPT | Mod: 76

## 2024-08-21 PROCEDURE — 93010 ELECTROCARDIOGRAM REPORT: CPT | Performed by: INTERNAL MEDICINE

## 2024-08-21 PROCEDURE — 87070 CULTURE OTHR SPECIMN AEROBIC: CPT

## 2024-08-21 PROCEDURE — 250N000013 HC RX MED GY IP 250 OP 250 PS 637: Performed by: PHYSICIAN ASSISTANT

## 2024-08-21 PROCEDURE — 255N000002 HC RX 255 OP 636: Performed by: STUDENT IN AN ORGANIZED HEALTH CARE EDUCATION/TRAINING PROGRAM

## 2024-08-21 PROCEDURE — 82805 BLOOD GASES W/O2 SATURATION: CPT

## 2024-08-21 PROCEDURE — 70553 MRI BRAIN STEM W/O & W/DYE: CPT | Mod: 26 | Performed by: STUDENT IN AN ORGANIZED HEALTH CARE EDUCATION/TRAINING PROGRAM

## 2024-08-21 PROCEDURE — 999N000157 HC STATISTIC RCP TIME EA 10 MIN

## 2024-08-21 PROCEDURE — 250N000013 HC RX MED GY IP 250 OP 250 PS 637: Performed by: STUDENT IN AN ORGANIZED HEALTH CARE EDUCATION/TRAINING PROGRAM

## 2024-08-21 PROCEDURE — 86901 BLOOD TYPING SEROLOGIC RH(D): CPT | Performed by: SURGERY

## 2024-08-21 PROCEDURE — 90947 DIALYSIS REPEATED EVAL: CPT

## 2024-08-21 PROCEDURE — 250N000009 HC RX 250: Performed by: NURSE PRACTITIONER

## 2024-08-21 PROCEDURE — 250N000009 HC RX 250: Performed by: SURGERY

## 2024-08-21 PROCEDURE — 87205 SMEAR GRAM STAIN: CPT | Performed by: SURGERY

## 2024-08-21 PROCEDURE — 87081 CULTURE SCREEN ONLY: CPT

## 2024-08-21 PROCEDURE — A9585 GADOBUTROL INJECTION: HCPCS | Performed by: STUDENT IN AN ORGANIZED HEALTH CARE EDUCATION/TRAINING PROGRAM

## 2024-08-21 PROCEDURE — 84100 ASSAY OF PHOSPHORUS: CPT | Performed by: CLINICAL NURSE SPECIALIST

## 2024-08-21 PROCEDURE — 71045 X-RAY EXAM CHEST 1 VIEW: CPT

## 2024-08-21 PROCEDURE — 83605 ASSAY OF LACTIC ACID: CPT

## 2024-08-21 PROCEDURE — 0B938ZX DRAINAGE OF RIGHT MAIN BRONCHUS, VIA NATURAL OR ARTIFICIAL OPENING ENDOSCOPIC, DIAGNOSTIC: ICD-10-PCS | Performed by: SURGERY

## 2024-08-21 PROCEDURE — 82330 ASSAY OF CALCIUM: CPT | Performed by: CLINICAL NURSE SPECIALIST

## 2024-08-21 PROCEDURE — 250N000013 HC RX MED GY IP 250 OP 250 PS 637

## 2024-08-21 PROCEDURE — 86789 WEST NILE VIRUS ANTIBODY: CPT

## 2024-08-21 PROCEDURE — 250N000011 HC RX IP 250 OP 636

## 2024-08-21 PROCEDURE — 99291 CRITICAL CARE FIRST HOUR: CPT | Mod: GC | Performed by: SURGERY

## 2024-08-21 PROCEDURE — 85520 HEPARIN ASSAY: CPT | Performed by: SURGERY

## 2024-08-21 PROCEDURE — 250N000009 HC RX 250: Performed by: CLINICAL NURSE SPECIALIST

## 2024-08-21 PROCEDURE — 93005 ELECTROCARDIOGRAM TRACING: CPT

## 2024-08-21 PROCEDURE — 87077 CULTURE AEROBIC IDENTIFY: CPT | Performed by: SURGERY

## 2024-08-21 PROCEDURE — 70553 MRI BRAIN STEM W/O & W/DYE: CPT

## 2024-08-21 PROCEDURE — 999N000254 HC STATISTIC VENTILATOR TRANSFER

## 2024-08-21 PROCEDURE — 80069 RENAL FUNCTION PANEL: CPT | Performed by: CLINICAL NURSE SPECIALIST

## 2024-08-21 PROCEDURE — 82248 BILIRUBIN DIRECT: CPT | Performed by: CLINICAL NURSE SPECIALIST

## 2024-08-21 RX ORDER — HYDROMORPHONE HCL IN WATER/PF 6 MG/30 ML
0.2 PATIENT CONTROLLED ANALGESIA SYRINGE INTRAVENOUS
Status: DISCONTINUED | OUTPATIENT
Start: 2024-08-21 | End: 2024-08-26

## 2024-08-21 RX ORDER — HYDRALAZINE HYDROCHLORIDE 20 MG/ML
10 INJECTION INTRAMUSCULAR; INTRAVENOUS EVERY 4 HOURS PRN
Status: DISCONTINUED | OUTPATIENT
Start: 2024-08-21 | End: 2024-08-22

## 2024-08-21 RX ORDER — GADOBUTROL 604.72 MG/ML
8 INJECTION INTRAVENOUS ONCE
Status: COMPLETED | OUTPATIENT
Start: 2024-08-21 | End: 2024-08-21

## 2024-08-21 RX ORDER — QUETIAPINE FUMARATE 25 MG/1
25 TABLET, FILM COATED ORAL 2 TIMES DAILY
Status: DISCONTINUED | OUTPATIENT
Start: 2024-08-21 | End: 2024-08-22

## 2024-08-21 RX ORDER — OXYCODONE HYDROCHLORIDE 5 MG/1
5 TABLET ORAL EVERY 6 HOURS PRN
Status: DISCONTINUED | OUTPATIENT
Start: 2024-08-21 | End: 2024-09-13

## 2024-08-21 RX ORDER — OXYCODONE HYDROCHLORIDE 5 MG/1
5 TABLET ORAL EVERY 4 HOURS
Status: DISCONTINUED | OUTPATIENT
Start: 2024-08-21 | End: 2024-08-21

## 2024-08-21 RX ORDER — ACETAMINOPHEN 325 MG/1
975 TABLET ORAL EVERY 8 HOURS
Status: DISCONTINUED | OUTPATIENT
Start: 2024-08-21 | End: 2024-09-02

## 2024-08-21 RX ORDER — BUDESONIDE 1 MG/2ML
1 INHALANT ORAL DAILY
Status: DISCONTINUED | OUTPATIENT
Start: 2024-08-22 | End: 2024-09-14

## 2024-08-21 RX ADMIN — LOPERAMIDE HYDROCHLORIDE 4 MG: 2 CAPSULE ORAL at 08:38

## 2024-08-21 RX ADMIN — DEXAMETHASONE SODIUM PHOSPHATE 10 MG: 10 INJECTION, SOLUTION INTRAMUSCULAR; INTRAVENOUS at 08:39

## 2024-08-21 RX ADMIN — AMITRIPTYLINE HYDROCHLORIDE 50 MG: 50 TABLET, FILM COATED ORAL at 22:11

## 2024-08-21 RX ADMIN — CALCIUM CHLORIDE, MAGNESIUM CHLORIDE, SODIUM CHLORIDE, SODIUM BICARBONATE, POTASSIUM CHLORIDE AND SODIUM PHOSPHATE DIBASIC DIHYDRATE 12.5 ML/KG/HR: 3.68; 3.05; 6.34; 3.09; .314; .187 INJECTION INTRAVENOUS at 12:41

## 2024-08-21 RX ADMIN — ACETYLCYSTEINE 4 ML: 100 SOLUTION ORAL; RESPIRATORY (INHALATION) at 11:59

## 2024-08-21 RX ADMIN — WHITE PETROLATUM 57.7 %-MINERAL OIL 31.9 % EYE OINTMENT: at 23:18

## 2024-08-21 RX ADMIN — ACETAMINOPHEN 975 MG: 325 TABLET, FILM COATED ORAL at 23:22

## 2024-08-21 RX ADMIN — Medication 60 ML: at 19:40

## 2024-08-21 RX ADMIN — Medication 1 TABLET: at 08:37

## 2024-08-21 RX ADMIN — CHLORHEXIDINE GLUCONATE 0.12% ORAL RINSE 15 ML: 1.2 LIQUID ORAL at 19:39

## 2024-08-21 RX ADMIN — ACETYLCYSTEINE 4 ML: 100 SOLUTION ORAL; RESPIRATORY (INHALATION) at 16:29

## 2024-08-21 RX ADMIN — IPRATROPIUM BROMIDE AND ALBUTEROL SULFATE 3 ML: .5; 3 SOLUTION RESPIRATORY (INHALATION) at 19:36

## 2024-08-21 RX ADMIN — LOPERAMIDE HYDROCHLORIDE 4 MG: 2 CAPSULE ORAL at 02:00

## 2024-08-21 RX ADMIN — WHITE PETROLATUM 57.7 %-MINERAL OIL 31.9 % EYE OINTMENT: at 16:36

## 2024-08-21 RX ADMIN — CALCIUM CHLORIDE, MAGNESIUM CHLORIDE, SODIUM CHLORIDE, SODIUM BICARBONATE, POTASSIUM CHLORIDE AND SODIUM PHOSPHATE DIBASIC DIHYDRATE 12.5 ML/KG/HR: 3.68; 3.05; 6.34; 3.09; .314; .187 INJECTION INTRAVENOUS at 17:18

## 2024-08-21 RX ADMIN — HYDRALAZINE HYDROCHLORIDE 10 MG: 20 INJECTION INTRAMUSCULAR; INTRAVENOUS at 16:41

## 2024-08-21 RX ADMIN — DEXMEDETOMIDINE HYDROCHLORIDE IN SODIUM CHLORIDE 1.2 MCG/KG/HR: 4 INJECTION INTRAVENOUS at 01:19

## 2024-08-21 RX ADMIN — OXYCODONE HYDROCHLORIDE 15 MG: 5 TABLET ORAL at 23:22

## 2024-08-21 RX ADMIN — IPRATROPIUM BROMIDE AND ALBUTEROL SULFATE 3 ML: .5; 3 SOLUTION RESPIRATORY (INHALATION) at 08:23

## 2024-08-21 RX ADMIN — CALCIUM CHLORIDE, MAGNESIUM CHLORIDE, SODIUM CHLORIDE, SODIUM BICARBONATE, POTASSIUM CHLORIDE AND SODIUM PHOSPHATE DIBASIC DIHYDRATE 12.5 ML/KG/HR: 3.68; 3.05; 6.34; 3.09; .314; .187 INJECTION INTRAVENOUS at 08:06

## 2024-08-21 RX ADMIN — OXYCODONE HYDROCHLORIDE 10 MG: 10 TABLET ORAL at 04:17

## 2024-08-21 RX ADMIN — Medication 1 MG/HR: at 13:05

## 2024-08-21 RX ADMIN — WHITE PETROLATUM 57.7 %-MINERAL OIL 31.9 % EYE OINTMENT: at 00:31

## 2024-08-21 RX ADMIN — GADOBUTROL 8 ML: 604.72 INJECTION INTRAVENOUS at 21:49

## 2024-08-21 RX ADMIN — HYDROMORPHONE HYDROCHLORIDE 0.2 MG: 0.2 INJECTION, SOLUTION INTRAMUSCULAR; INTRAVENOUS; SUBCUTANEOUS at 22:16

## 2024-08-21 RX ADMIN — DEXMEDETOMIDINE HYDROCHLORIDE IN SODIUM CHLORIDE 1.2 MCG/KG/HR: 4 INJECTION INTRAVENOUS at 20:04

## 2024-08-21 RX ADMIN — OXYCODONE HYDROCHLORIDE 15 MG: 5 TABLET ORAL at 19:40

## 2024-08-21 RX ADMIN — ACETYLCYSTEINE 4 ML: 100 SOLUTION ORAL; RESPIRATORY (INHALATION) at 19:36

## 2024-08-21 RX ADMIN — CALCIUM CHLORIDE, MAGNESIUM CHLORIDE, SODIUM CHLORIDE, SODIUM BICARBONATE, POTASSIUM CHLORIDE AND SODIUM PHOSPHATE DIBASIC DIHYDRATE 12.5 ML/KG/HR: 3.68; 3.05; 6.34; 3.09; .314; .187 INJECTION INTRAVENOUS at 23:57

## 2024-08-21 RX ADMIN — LEVOTHYROXINE SODIUM 25 MCG: 0.03 TABLET ORAL at 08:38

## 2024-08-21 RX ADMIN — OXYCODONE HYDROCHLORIDE 15 MG: 5 TABLET ORAL at 16:36

## 2024-08-21 RX ADMIN — CALCIUM CHLORIDE, MAGNESIUM CHLORIDE, SODIUM CHLORIDE, SODIUM BICARBONATE, POTASSIUM CHLORIDE AND SODIUM PHOSPHATE DIBASIC DIHYDRATE 12.5 ML/KG/HR: 3.68; 3.05; 6.34; 3.09; .314; .187 INJECTION INTRAVENOUS at 01:12

## 2024-08-21 RX ADMIN — OXYCODONE HYDROCHLORIDE 10 MG: 10 TABLET ORAL at 00:31

## 2024-08-21 RX ADMIN — QUETIAPINE FUMARATE 25 MG: 25 TABLET ORAL at 19:40

## 2024-08-21 RX ADMIN — OXYCODONE HYDROCHLORIDE 10 MG: 10 TABLET ORAL at 11:51

## 2024-08-21 RX ADMIN — Medication 40 MG: at 08:36

## 2024-08-21 RX ADMIN — LOPERAMIDE HYDROCHLORIDE 4 MG: 2 CAPSULE ORAL at 19:39

## 2024-08-21 RX ADMIN — VENLAFAXINE 75 MG: 25 TABLET ORAL at 19:39

## 2024-08-21 RX ADMIN — OXYCODONE HYDROCHLORIDE 10 MG: 10 TABLET ORAL at 08:38

## 2024-08-21 RX ADMIN — WHITE PETROLATUM 57.7 %-MINERAL OIL 31.9 % EYE OINTMENT: at 08:39

## 2024-08-21 RX ADMIN — VENLAFAXINE 75 MG: 25 TABLET ORAL at 08:38

## 2024-08-21 RX ADMIN — Medication 60 ML: at 14:09

## 2024-08-21 RX ADMIN — DEXMEDETOMIDINE HYDROCHLORIDE IN SODIUM CHLORIDE 1.2 MCG/KG/HR: 4 INJECTION INTRAVENOUS at 06:33

## 2024-08-21 RX ADMIN — Medication 60 ML: at 08:37

## 2024-08-21 RX ADMIN — DEXMEDETOMIDINE HYDROCHLORIDE IN SODIUM CHLORIDE 1.2 MCG/KG/HR: 4 INJECTION INTRAVENOUS at 17:13

## 2024-08-21 RX ADMIN — DEXMEDETOMIDINE HYDROCHLORIDE IN SODIUM CHLORIDE 1.2 MCG/KG/HR: 4 INJECTION INTRAVENOUS at 09:26

## 2024-08-21 RX ADMIN — ACETAMINOPHEN 975 MG: 325 TABLET, FILM COATED ORAL at 16:35

## 2024-08-21 RX ADMIN — IPRATROPIUM BROMIDE AND ALBUTEROL SULFATE 3 ML: .5; 3 SOLUTION RESPIRATORY (INHALATION) at 11:59

## 2024-08-21 RX ADMIN — CHLORHEXIDINE GLUCONATE 0.12% ORAL RINSE 15 ML: 1.2 LIQUID ORAL at 08:38

## 2024-08-21 RX ADMIN — DEXMEDETOMIDINE HYDROCHLORIDE IN SODIUM CHLORIDE 1.2 MCG/KG/HR: 4 INJECTION INTRAVENOUS at 04:48

## 2024-08-21 RX ADMIN — CALCIUM CHLORIDE, MAGNESIUM CHLORIDE, SODIUM CHLORIDE, SODIUM BICARBONATE, POTASSIUM CHLORIDE AND SODIUM PHOSPHATE DIBASIC DIHYDRATE 12.5 ML/KG/HR: 3.68; 3.05; 6.34; 3.09; .314; .187 INJECTION INTRAVENOUS at 01:18

## 2024-08-21 RX ADMIN — LOPERAMIDE HYDROCHLORIDE 4 MG: 2 CAPSULE ORAL at 14:09

## 2024-08-21 RX ADMIN — DEXMEDETOMIDINE HYDROCHLORIDE IN SODIUM CHLORIDE 1.2 MCG/KG/HR: 4 INJECTION INTRAVENOUS at 12:23

## 2024-08-21 RX ADMIN — IPRATROPIUM BROMIDE AND ALBUTEROL SULFATE 3 ML: .5; 3 SOLUTION RESPIRATORY (INHALATION) at 16:27

## 2024-08-21 RX ADMIN — ACETYLCYSTEINE 4 ML: 100 SOLUTION ORAL; RESPIRATORY (INHALATION) at 08:23

## 2024-08-21 ASSESSMENT — ACTIVITIES OF DAILY LIVING (ADL)
ADLS_ACUITY_SCORE: 59
ADLS_ACUITY_SCORE: 55
ADLS_ACUITY_SCORE: 59
ADLS_ACUITY_SCORE: 65
ADLS_ACUITY_SCORE: 59
ADLS_ACUITY_SCORE: 57
ADLS_ACUITY_SCORE: 55
ADLS_ACUITY_SCORE: 59
ADLS_ACUITY_SCORE: 59
ADLS_ACUITY_SCORE: 55
ADLS_ACUITY_SCORE: 59
ADLS_ACUITY_SCORE: 57
ADLS_ACUITY_SCORE: 59
ADLS_ACUITY_SCORE: 59
ADLS_ACUITY_SCORE: 55
ADLS_ACUITY_SCORE: 57
ADLS_ACUITY_SCORE: 55
ADLS_ACUITY_SCORE: 59
ADLS_ACUITY_SCORE: 55

## 2024-08-21 NOTE — PROGRESS NOTES
Nephrology Progress Note  08/21/2024       Mrs Flaherty is a  53 yof w/ Anxiety, depression, fibromyalgia, hypothyroidism, asthma, HLD, MURIEL on CPAP, expressive aphasia, and hypertension who was admitted to an OSH with community acquired pneumonia on 8/3 s/p intubation.  Found to have severe ARDS requiring paralysis and proning, transferred here on 8/8 for management and initiated on CKRT for severe acidemia in the setting of oligoanuric CHACORTA.  Started CRRT on 8/9 for volume management.      Interval History :   Mrs Flaherty continues on CRRT post VV ECMO decannulation. Continuing to pull 0.5-1L daily on low dose pressor to optimize pulm status.  Labs stable on 4k baths, checking BID and continuing 0-50cc/h fluid removal.  Once more stable and intake comes down a bit we should be able to consider iHD but still needed 2.5L of UF to be slightly negative yesterday.      Assessment & Recommendations:   CHACORTA-Baseline Cr 0.6, at baseline on admission.  CHACORTA due to septic shock in setting of ARDS, UA on 8/6/2024 essentially bland (30 protein but no blood or casts), no dedicated renal imaging.  No dedicated renal imaging at this time.  Started CRRT 8/9 for volume management, now anuric.    -CHACORTA due to hypoperfusion, started CRRT on 8/9.    -Access is LFV temp line.   -CRRT, 4k baths, 0-50cc/h volume goal.     -Dialysis consent signed and in chart.      Volume status-Net even yesterday, ordered for 0-50cc/h today.      Electrolytes/pH-Labs stable on 4k baths.      Ca/phos/pth-No acute issues, Phos mildly up but no major intervention needed today.     Anemia-Hgb 7.9, acute management per team.      Nutrition-Vital TF started 8/9.    Time spent: 55 minutes on this date of encounter for chart review, physical exam, medical decision making and co-ordination of care.     Seen and discussed with Dr Donato    Recommendations were communicated to primary team via verbal communication.     DOREEN Sequeira CNS  Clinical Nurse  "Specialist  [phone]      Review of Systems:   I reviewed the following systems:  ROS not done due to vent/sedation.     Physical Exam:   I/O last 3 completed shifts:  In: 2681.71 [I.V.:1033.71; Other:3; NG/GT:805]  Out: 2925.3 [Other:2450.3; Stool:475]   BP 98/46   Pulse 83   Temp 99.1  F (37.3  C)   Resp 19   Ht 1.575 m (5' 2\")   Wt 83.1 kg (183 lb 3.2 oz)   SpO2 100%   BMI 33.51 kg/m       GENERAL APPEARANCE: Intubated and sedated and paralyzed  Pulmonary: Intubated and sedated,   CV: regular rhythm, normal rate   - Edema 1-2+ diffuse  GI: soft, nontender, normal bowel sounds  MS: no evidence of inflammation in joints, no muscle tenderness  SKIN:  warm, dry  NEURO: No focal deficits    Labs:   All labs reviewed by me  Electrolytes/Renal -   Recent Labs   Lab Test 08/21/24  0815 08/21/24  0358 08/20/24  2331 08/20/24  2154 08/20/24  1533 08/20/24  1528 08/20/24  0732 08/20/24  0413   NA  --  138  --  137  --  136  --  136   POTASSIUM  --  4.1  --  4.1  --  4.7  --  4.2   CHLORIDE  --  106  --  106  --  105  --  104   CO2  --  19*  --  21*  --  20*  --  21*   BUN  --  40.6*  --  42.0*  --  40.7*  --  36.0*   CR  --  1.73*  --  1.60*  --  1.47*  --  1.48*   * 111*  112*   < > 112*   < > 187*   < > 132*   QUAN  --  8.2*  --  8.0*  --  8.3*  --  8.1*   MAG  --  2.4*  --  2.4*  --  2.4*  --  2.4*   PHOS  --  5.7*  --   --   --  6.1*  --  5.7*    < > = values in this interval not displayed.       CBC -   Recent Labs   Lab Test 08/21/24 0358 08/20/24  2154 08/20/24  0413   WBC 18.3* 15.9* 12.9*   HGB 7.9* 7.5* 8.1*    249 188       LFTs -   Recent Labs   Lab Test 08/21/24  0358 08/20/24  1528 08/20/24  0413 08/19/24  0949 08/19/24  0358   ALKPHOS 93  --  104  --  101   BILITOTAL 0.2  --  0.2  --  0.4   ALT 17  --  18  --  16   AST 24  --  25  --  29   PROTTOTAL 5.8*  --  5.6*  --  5.7*   ALBUMIN 3.1* 3.1* 3.1*   < > 3.2*    < > = values in this interval not displayed.       Iron Panel - No lab " results found.        Current Medications:  Current Facility-Administered Medications   Medication Dose Route Frequency Provider Last Rate Last Admin    acetylcysteine (MUCOMYST) 10 % nebulizer solution 4 mL  4 mL Nebulization 4x Daily Barry Hatch MD   4 mL at 08/21/24 0823    amitriptyline (ELAVIL) tablet 50 mg  50 mg Oral or Feeding Tube At Bedtime Barry Hatch MD   50 mg at 08/20/24 2134    artificial tears ophthalmic ointment   Both Eyes Q8H Tiara Martin CNP   Given at 08/21/24 0839    [START ON 8/22/2024] budesonide (PULMICORT) neb solution 1 mg  1 mg Nebulization Daily Andres Payne PA-C        chlorhexidine (PERIDEX) 0.12 % solution 15 mL  15 mL Mouth/Throat Q12H Giovanny Guidry PA-C   15 mL at 08/21/24 0838    fiber modular (BANATROL TF) packet 1 packet  1 packet Per Feeding Tube TID Cal Lisa MD   1 packet at 08/21/24 0838    insulin aspart (NovoLOG) injection (RAPID ACTING)  1-12 Units Subcutaneous Q4H Aniceto Adame MD   1 Units at 08/20/24 2026    ipratropium - albuterol 0.5 mg/2.5 mg/3 mL (DUONEB) neb solution 3 mL  3 mL Nebulization 4x daily Barry Hatch MD   3 mL at 08/21/24 0823    levothyroxine (SYNTHROID/LEVOTHROID) tablet 25 mcg  25 mcg Per Feeding Tube QAM AC Giovanny Guidry PA-C   25 mcg at 08/21/24 0838    loperamide (IMODIUM) capsule 4 mg  4 mg Oral or Feeding Tube Q6H Barry Hatch MD   4 mg at 08/21/24 0838    multivitamin RENAL (RENAVITE RX/NEPHROVITE) tablet 1 tablet  1 tablet Oral or Feeding Tube Daily Giovanny Guidry PA-C   1 tablet at 08/21/24 0837    oxyCODONE IR (ROXICODONE) tablet 10 mg  10 mg Oral or Feeding Tube Q4H Aniceto Adame MD   10 mg at 08/21/24 0838    pantoprazole (PROTONIX) 2 mg/mL suspension 40 mg  40 mg Per Feeding Tube QAM Barry Hdez MD   40 mg at 08/21/24 0836    Prosource TF20 ENfit Compatibl EN LIQD (PROSOURCE TF20) packet 60 mL  1 packet Per Feeding Tube TID Giovanny Guidry, PA-C   60  mL at 08/21/24 0837    venlafaxine (EFFEXOR) tablet 75 mg  75 mg Oral or Feeding Tube BID Barry Hatch MD   75 mg at 08/21/24 0838     Current Facility-Administered Medications   Medication Dose Route Frequency Provider Last Rate Last Admin    dexmedeTOMIDine (PRECEDEX) 4 mcg/mL in sodium chloride 0.9 % 100 mL infusion  0.1-1.2 mcg/kg/hr (Dosing Weight) Intravenous Continuous de Tiara Macedo CNP 26.7 mL/hr at 08/21/24 1100 1.2 mcg/kg/hr at 08/21/24 1100    dextrose 10% infusion   Intravenous Continuous PRN Giovanny Guidry PA-C        dialysate for CVVHD & CVVHDF (Phoxillum BK4/2.5)  12.5 mL/kg/hr CRRT Continuous Dakota Dorsey APRN CNS 1,100 mL/hr at 08/21/24 0806 12.5 mL/kg/hr at 08/21/24 0806    heparin (porcine) 20,000 units in 20 mL ANTICOAGULANT infusion (syringe from pharmacy)  500 Units/hr CRRT Continuous Dakota Dorsey APRN CNS 0.5 mL/hr at 08/21/24 1100 500 Units/hr at 08/21/24 1100    HYDROmorphone (DILAUDID) 0.2 mg/mL infusion ADULT/PEDS GREATER than or EQUAL to 20 kg  0.3-1.5 mg/hr Intravenous Continuous Dong Rico PA-C 5 mL/hr at 08/21/24 1100 1 mg/hr at 08/21/24 1100    midazolam (VERSED) 100 mg/100 mL NS infusion - ADULT  1 mg/hr Intravenous Continuous de La MaterTiara CNP   Stopped at 08/21/24 1047    No POST-filter replacement required   Does not apply Continuous PRN Dakota Dorsey APRN CNS        norepinephrine (LEVOPHED) 16 mg in  mL infusion MAX CONC CENTRAL LINE  0.01-0.6 mcg/kg/min (Dosing Weight) Intravenous Continuous Cal Lisa MD 4.2 mL/hr at 08/21/24 1000 0.05 mcg/kg/min at 08/21/24 1000    PRE-filter replacement solution for CVVHD & CVVHDF (Phoxillum BK4/2.5)  12.5 mL/kg/hr CRRT Continuous Dakota Dorsey, APRN CNS 1,100 mL/hr at 08/21/24 0806 12.5 mL/kg/hr at 08/21/24 0806       I, Emmanuelle Donato, saw and evaluated this patient as part of a shared visit.  I have reviewed and discussed with the  advanced practice provider their history, physical and plan.  I have personally performed the substantive portion of the medical decision making for this visit - please see the TRI's documentation for the full details  I personally reviewed the vital signs, medications, labs and imaging.    Key findings and management decisions made by me:  CHACORTA in setting of respiratory failure/ ARDS, on CRRT since 8/9.  FiO2% still significant, but improved slightly, with UF goal of 50/hr net negative    Emmanuelle Dave Donato  Date of Service (when I saw the patient): 8/21

## 2024-08-21 NOTE — PROGRESS NOTES
Care Management Follow Up    Length of Stay (days): 13    Expected Discharge Date:  TBD-Patient remains on the Intensive Care Unit on CRRT after decannulation from VV ECMO.     Concerns to be Addressed: discharge planning     Patient plan of care discussed at interdisciplinary rounds: Yes    Anticipated Discharge Disposition: Home, Skilled Nursing Facility, Transitional Care/TBD     Anticipated Discharge Services: Home Care-TBD, Housekeeping/[employer]  Anticipated Discharge DME: TB    Additional Information:     Ms. Flaherty was discussed in Interdisciplinary Team Rounds this morning and she continues to require hospitalization in the acute care hospital setting.  Inpatient cares continue per MD team and the Department of Care Management will continue to follow for transition needs.  Dialysis consent per chart review signed and is in chart.  _____________________________    Katina Carranza,  B.S.N., R.N., P.H.N.  Administrative Nurse Supervisor [facility]  Care Coordinator Nurse Float/[facility] Today Only Via yet[employer].  [facility]

## 2024-08-21 NOTE — PROGRESS NOTES
CRRT STATUS NOTE    DATA:  Time:  1710  Pressures WNL:  YES  Filter Status:  WDL    Problems Reported/Alarms Noted:  none    Supplies Present:  YES    ASSESSMENT:  Patient Net Fluid Balance:  Net - 68 ml since MN 8/21/24    Vital Signs:  Temp: 99.5  F (37.5  C) Temp src: Esophageal   Pulse: 109   Resp: 28 SpO2: 98 % O2 Device: Mechanical Ventilator            Labs:   Recent Labs   Lab 08/21/24  1617 08/21/24  1143 08/21/24  0815 08/21/24  0358 08/20/24  2331 08/20/24  2154 08/20/24  0732 08/20/24  0413   WBC  --   --   --  18.3*  --  15.9*  --  12.9*   HGB  --   --   --  7.9*  --  7.5*  --  8.1*   MCV  --   --   --  100  --  100  --  97   PLT  --   --   --  261  --  249  --  188     --   --  138  --  137   < > 136   POTASSIUM 4.9  --   --  4.1  --  4.1   < > 4.2   CHLORIDE 102  --   --  106  --  106   < > 104   CO2 20*  --   --  19*  --  21*   < > 21*   BUN 42.5*  --   --  40.6*  --  42.0*   < > 36.0*   CR 1.51*  --   --  1.73*  --  1.60*   < > 1.48*   ANIONGAP 13  --   --  13  --  10   < > 11   QUAN 8.4*  --   --  8.2*  --  8.0*   < > 8.1*   *  184* 164*   < > 111*  112*   < > 112*   < > 132*   ALBUMIN 3.2*  --   --  3.1*  --   --    < > 3.1*   PROTTOTAL  --   --   --  5.8*  --   --   --  5.6*   BILITOTAL  --   --   --  0.2  --   --   --  0.2   ALKPHOS  --   --   --  93  --   --   --  104   ALT  --   --   --  17  --   --   --  18   AST  --   --   --  24  --   --   --  25    < > = values in this interval not displayed.                  Goals of Therapy:  0-50 ml/hr    INTERVENTIONS:   none    PLAN:  Continue plan of care. Call CRRT resource RN with any questions or concerns.

## 2024-08-21 NOTE — PLAN OF CARE
Major Shift Events: Bronchoscopy today. Charged bladder stimulator and put into MRI mode at 1700. Mother, Doreen, notified to mail  dock for  to [facility].    Neuro: RASS -1/-2. Opens eyes spontaneously. Wiggles toes to command but does not follow any other commands nor any move any other extremities at this time. Dilaudid gtt stopped and switched to scheduled oxycodone and tylenol per SICU orders.    CV:Tmax 37.7 via esophageal probe; blood warmer on CRRT is off; nba hugger on for surface warming to prevent shivering. SR/ST, HR 's. Levo titrated between off and 0.05 mcg/kg/min  to keep MAP>65 or SBP> 110; levo titrated off since 1307. SBP goal < 140; hypertensive with dilaudid gtt off; PRN 10mg IVP hydralazine given for SBP sustained > 140.    Pulm: Vent Mode: PC-AC FiO2 55%, RR 18, Pinsp 18-23, PEEP 10. Pt overbreathes vent with RR 20s-30s. Bronchoscopy at bedside today; samples sent to lab. Small amount of clear, red-streaked secretions inline ETT.    GI: TF through [place] at goal rate of 35 mL/hr with standard FWF of 30 ml Q4H. Rectal tube in place with 400mL watery diarrhea this shift; scheduled immodium and fiber given.    : Anuric; on CRRT. Goal 0-50mL/hr; overall goal -500-1L per day; CRRT net fluid removal today so far since midnight is 90mL net fluid removal    Skin: Tongue lesion.  Lips with scabbed/lesion areas that bleed slightly at times. Right groin & Right internal jugular (old ECMO sites)-sutures in place.  Forehead abrasion and left foot/ankle bruised from pre-hospital fall. Scattered bruising. Blanchable redness on sacrum, coccyx and bilateral heel with protective Mepilex dressings in place over each.     Drips:   Versed @ 1 mg/hr  Precedex @ 1.2 mcg/kg/hr  Levophed OFF    Labs:  K+ 4.9  Mag 2.5  Phos 6.4  iCal 4.7    Plan: MRI of head this evening. Wean sedation.     Goal Outcome Evaluation:      Plan of Care Reviewed With: patient, family    Overall Patient Progress: no  changeOverall Patient Progress: no change    Outcome Evaluation: Levo off. Dilaudid gtt off. Following commands intermittently and wiggles toes to command.

## 2024-08-21 NOTE — PROGRESS NOTES
[facility]    ICU Progress Note       Date of Admission:  8/8/2024    Assessment: Critical Care   Massiel Flaherty is a 53 year old female who was admitted to [facility]. Past medical hx of Anxiety/depression, fibromyalgia, hypothyroidism, asthma, HLD, MURIEL on CPAP, spells, off on anti seizure medications, expressive aphasia, hypertension was seen at [facility] and was placed on Augmentin outpatient on 7/30/24. She admitted to the hospital on 8/3/24 for fatigue, fever and dyspnea and intubated 8/6/24 at OSH, proned and paralyzed without improvement. Transferred to [facility] 8/8/24, hypoxic with high plateaus/peaks and placed on VV ECMO and CRRT.     Today's Changes:  - Talk with Family or [employer] to assist with MRI compatibility and charging   - Last day of steroid use  - ET tube repositioning today. Will advance 2cm. Confirm with bronch  - Bronchoscopy today  - Start budesonide tomorrow  - Remove rectal tube. Replace with rectal pouch    Plan: Critical Care   Neurological:  # Fibromyalgia   # Hx myalgic encephalomyelitis   # Acute pain  # Sedation and analgesia for vent compliance   - Monitor neurological status. Delirium preventions and precautions.   - Pain: Gtt: Dilaudid gtt - wean as able   Scheduled: oxy 10 mg increase to q 4   PRN:  Dilaudid boluses, oxy 10 mg q 4hr   - Sedation plan: Gtt: Versed - wean as able, Precedex - wean as able   - Resumed PTA Venlafaxine and Amitriptyline      # Hx spells with staring and BUE posturing previously described as pseudoseizures   # Hx history of GTC seizures and myoclonic epilepsy, weaned off AEDs   # C/f hypoxic ischemic injury   # Diffuse cerebral edema - improved  - Head CT 8/9: Findings concerning for diffuse cerebral edema with  diffuse blurring of the gray-white differentiation. Findings concerning for hypoxicischemic injury.   - Sodium goals liberalized to 140-145  - Repeat Head CT 8/15 continued improvement in gray  white matter differentiation and continued effacement of the sulci and ventricles.   - MRI Brain wwo contrast after decannulation. Will try to obtain today.   - NCC signed off.      Pulmonary:   #ARDS s/p VV ECMO cannulation 8/8- decannulated on 8/18   # Asthma  # MURIEL on home CPAP   FiO2 (%): 55 %, Resp: 22, Inspiratory Pressure (cm H2O) (Drager Jessie): 18, Vent Mode: PCV Plus assist, Resp Rate (Set): 18 breaths/min, PEEP (cm H2O): 10 cmH2O, Resp Rate (Set): 18 breaths/min, PEEP (cm H2O): 10 cmH2O    - Chest CT 8/9: Diffuse patchy groundglass and consolidative densities throughout  the lungs with small bilateral pleural effusions. Findings may  represent severe pulmonary edema, and/or  infectious /inflammatory  pathology, or ARDS. Multiple prominent and a few enlarged mediastinal lymph nodes,  possibly reactive.  - 8/11: Restart veletri continuous neb  - 8/11: Increase inspiratory pressure to 35 to attempt achieving slightly higher tidal volumes   - 8/12: Decadron 20mg daily x 5 days, then 10mg x 5 days. Today is last day of steroid use  - 8/14 Bronchoscopy with mucous plugging.  Continue Mucomyst and Duonebs q4 hours. No acute indication to repeat bronchoscopy today.   - 8/15 Stop Veletri  - 8/18 pt decannulated off of VV ECMO   - Pt switched back to PC  - SpO2 goals >92%, PaO2 goals >60   - Wean vent as able  - VAP bundle while intubated      Cardiovascular:    # Hyperlipidemia  # Hypotension  - MAP >65  - Prior HTN, Clonidine on hold   - NE for pressors support wean as able; Titration based on SBP goals >110, MAP >65   - Repeat ECHO 8/12: Left ventricular size, wall motion and function are normal. The ejection fraction is 60-65%. Flattened septum is consistent with right ventricular pressure and volume overload. Right ventricular function, chamber size, wall motion, and thickness are normal.  - Fluctuation of blood pressure. 0.00-0.05 levophed overnight.        Gastroenterology/Nutrition:  #Moderate protein  calorie malnutrition  # GERD   # Non-infectious Diarrhea  - Protonix (home medication)   - Diet: TF @ goal with fiber supplementation   - Rectal tube in place with 2L of output /24 hours  - Scheduled loperamide 4 mg q 6hr       Fluids/Electrolytes/Renal:  # Acute kidney injury  - Cr today is 1.73 from 1.60  - Continue CRRT; fluid goal net even  - CRRT: 2450; -243cc in 24 hours   -  Heparinized CRRT circuit   - Strict I&Os   - Avoid nephrotoxins as able      Endocrine:  # Hypothyroidism   # Steroid and stress induced hyperglycemia  - Restarted PTA synthroid   - Goal to keep BG< 180 for optimal wound healing. Range:    - 8/14: Lantus 16 units daily  - High sliding scale insulin         ID:  # Leukocytosis- improving   # Concern for PID   PER OSH: 8/6: RSV, COVID, parainfluenza,  negative . Legionella and pneumococcal urine antigens negative. Patient has been on greater than 20 mg prednisone daily for 18 days.  CULTURES: 8/8/2024: Respiratory viral panel negative. Blood cultures negative, MRSA nasal negative, Legionella urine antigen negative, respiratory culture negative.PCR trachea for PJP is negative.  COVID: Negative. Viral panel-negative PJP  - ID consulted   - 8/11: Gyn consulted for right adnexal finding on CT. Low suspicion for abscess but would treat as we are currently. Collection too small for surgical intervention and too small for IR drainage as well. Could follow up with pelvic MRI, however we will treat as current with IV antibiotics.    - EBV PCR, per ID likely reactivation I/n the setting of acute illness.   - IGG levels low, ID recommend IVIG infusion, done 8/15  - 8/15 Check MRSA swab negative Repeat BC  with NGTD  - 8/14 BAL: Pseudomonas aeruginosa grown in the setting of Cefepime since 8/3.  8/16 Changed to Zosyn. Follow susceptibilities.   - Karius returned with EBV and HSV, along with EBV positivity in BAL, per ID represents reactivation  in critical illness, no treatment at this time.   -  "Pt has been afebrile T max of 99.5  overnight, leukocytosis 18.3 from 15.9.   - Zosyn discontinued on 8/19   - Continue to monitor fever curve and inflammatory markers as appropriate     Antimicrobials:   - Azithromycin- stopped 8/11   - Cefepime 8/3 (started at OSH)-8/16  - Amphotericin - stopped 8/13   - Flagyl 8/11-8/16  - Zosyn 8/16- 8/19    Bronch     Heme:     # Acute blood loss anemia   #Anemia of critical illness  - Transfuse if hgb <7.0 or signs/symptoms of hypoperfusion. Monitor and trend.  No transfusions in the last 24 hours.   - Heparinize CRRT circuit      Musculoskeletal:  # Weakness and deconditioning of critical illness  # Left ankle injury pre-hospitalization    - Physical and occupational therapy when able.      Skin:  # Ecchymosis - BLE   # Left toe duskiness   - Bilateral lower leg ecchymosis   - WOC consult   - 8/13: Worsening duskiness to left 3rd, 4th toes noted; Off pressors   - 8/20: no changes in L 3rd, 4th toes      Lines/ tubes/ drains:  Gonzalez Catheter: Not present  Lines: PRESENT      CVC Double Lumen Left Femoral Hemodialysis/CRRT-Site Assessment: WDL  CVC Triple Lumen Left Internal jugular-Site Assessment: WDL  Arterial Line 08/07/24 Radial-Site Assessment: WDL        Prophylaxis:  - DVT Prophylaxis: Heparinized CRRT circuit  - PUD Prophylaxis: PPI    Code Status: Full Code      Disposition:  - ICU    The patient's care was discussed with the Attending Physician, Dr. Calixto, Chief Resident/Fellow, and SICU Team.    Cal Lisa MD  [facility]  Securely message with [employer] (more info)  Text page via [facility] Paging/Directory     Clinically Significant Risk Factors              # Hypoalbuminemia: Lowest albumin = 2.2 g/dL at 8/10/2024  9:47 AM, will monitor as appropriate               # Obesity: Estimated body mass index is 33.51 kg/m  as calculated from the following:    Height as of this encounter: 1.575 m (5' 2\").    Weight as of this " encounter: 83.1 kg (183 lb 3.2 oz).   # Moderate Malnutrition: based on nutrition assessment    # Financial/Environmental Concerns: none        ______________________________________________________________________    Interval History   The patient had fluctuating blood pressures throughout the night. The patient could not have her MRI completed due to questions about MRI compatibility of and also charging. Apparently, patient was able to wiggle toes and squeeze hands of evening RN twice (8/21). Circuit clotted around 1:30am.    Physical Exam   Vital Signs: Temp: 99.9  F (37.7  C) Temp src: Esophageal BP: 98/46 Pulse: 99   Resp: (!) 31 SpO2: 98 % O2 Device: Mechanical Ventilator    Weight: 183 lbs 3.24 oz  Neuro: Intubated and Sedated. Wiggles toes in BLE, no movement of BUE to noxious stimuli. NAD.   HEENT: Normocephalic, atraumatic. PERRL, and nonicteric.   CV: RRR on monitor, all extremities well perfused   Respiratory: Normal respiratory effort on PC inspiratory pressure 18, PEEP 10, FiO2 55%, equal chest rise b/l   GI: Abdomen soft and non-tender to light palpation. Non-distended. Rectal tube in place with green watery stools 475 ml /24 hrs.   : Anuric  MSK: See neuro.   Skin:  L great toe pale with ecchymosis. No rashes or skin lesions.     Data   I reviewed all medications, new labs and imaging results over the last 24 hours.  Arterial Blood Gases   Recent Labs   Lab 08/21/24  0556 08/21/24  0358 08/21/24  0111 08/20/24  2331   PH 7.29* 7.27* 7.30* 7.31*   PCO2 46* 49* 48* 46*   PO2 68* 70* 72* 74*   HCO3 22 22 23 23       Complete Blood Count   Recent Labs   Lab 08/21/24  0358 08/20/24  2154 08/20/24  0413 08/19/24  2246   WBC 18.3* 15.9* 12.9* 15.5*   HGB 7.9* 7.5* 8.1* 8.3*    249 188 205       Basic Metabolic Panel  Recent Labs   Lab 08/21/24  0358 08/20/24  2331 08/20/24  2154 08/20/24  1533 08/20/24  1528 08/20/24  0732 08/20/24  0413     --  137  --  136  --  136   POTASSIUM 4.1  --  4.1   --  4.7  --  4.2   CHLORIDE 106  --  106  --  105  --  104   CO2 19*  --  21*  --  20*  --  21*   BUN 40.6*  --  42.0*  --  40.7*  --  36.0*   CR 1.73*  --  1.60*  --  1.47*  --  1.48*   *  112* 90 112*   < > 187*   < > 132*    < > = values in this interval not displayed.       Liver Function Tests  Recent Labs   Lab 08/21/24  0358 08/20/24  1528 08/20/24  0413 08/19/24  1544 08/19/24  0949 08/19/24  0358 08/18/24  1208 08/18/24  0433   AST 24  --  25  --   --  29  --  34   ALT 17  --  18  --   --  16  --  18   ALKPHOS 93  --  104  --   --  101  --  112   BILITOTAL 0.2  --  0.2  --   --  0.4  --  0.5   ALBUMIN 3.1* 3.1* 3.1* 3.1*   < > 3.2*   < > 3.3*    < > = values in this interval not displayed.       Pancreatic Enzymes  No lab results found in last 7 days.    Coagulation Profile  Recent Labs   Lab 08/18/24  0946 08/18/24  0433 08/17/24  2154 08/17/24  1545   PTT 32 45* 47* 49*       IMAGING:  Recent Results (from the past 24 hour(s))   XR Chest Port 1 View    Impression    RESIDENT PRELIMINARY INTERPRETATION  Impression:   1. Endotracheal tube tip is in the midthoracic trachea approximately  4.9 cm above the ivy. Slight advancement of esophageal temperature  probe into the distal esophagus. The remaining support devices are  stable.  2. No significant change to the diffuse patchy airspace opacities.  Continued layering basilar effusions.

## 2024-08-21 NOTE — PLAN OF CARE
Goal Outcome Evaluation:      Plan of Care Reviewed With: patient    Overall Patient Progress: no changeOverall Patient Progress: no change    Outcome Evaluation: Variable BP and respiratory rate with anxiety or pain. CRRT net negative fluid removal goal of 0 - 50 ml/hr met.  Unable to do MRI of brain this shift as bladder stimulator must be 100 % charged for MRI  mode    D/I:  Neuro:  Spontaneously opens eyes.  Wiggles toe to request but no other extremity movement this shift.  Jesenia hugger on for surface warming to prevent shivering.  Tmax 37.6 Esophageal.  Blood warmer on CRRT is OFF  CV:  SR/ST 's with no ectopy.  Levo titrated between off and 0.05 mcg/kg/min  to keep MAP>65 or SBP> 110  No blood products or boluses given this shift.  Lytes Replacement:  None  Pulm:   Vent Mode: PC-AC FIO2=55%.  RR= 18. Pinsp= 18. PEEP= 10.  Pt overbreathes vent @ 20's to 30's.  GI:  NJ tube feeding @ goal rate of  35 mL/hr with standard FW of 30 ml Q 4 hrs. Rectal tube in place.   Rectal tube in place with 100 ml watery diarrhea this shift.  :  Anuric with 261 ml on bladder scan @ 0230.  CRRT net fluid removal  yesterday over 24 hrs ending @ midnight was 518 ml ( ~ averaging 22 ml/hr ) net fluid removal.  CRRT net fluid removal today so far since midnight is 50 ml ( ~ averaging 6 ml/hr ) net fluid removal  Skin  Tongue lesion.  Lips with scabbed/lesion areas that bleed slightly @ times.  Right groin & Right internal jugular (old ECMO sites) both have a suture in place.  Forehead abrasion and left foot/ankle bruised from pre-hospital fall.  Scattered bruising.  Blanchable redness on sacrum, coccyx and bilateral heel with protective Mepilex dressings in place over each.   Drips:   Versed @ 1 mg/hr  Dilaudid @ 1 mg/hr  Precedex @ 1.2 mcg/kg/hr  Levophed @ 0.02 mcg/kg/min.  Labs:  Hgb= 7.5 @ 2200 & 7.9 @ 0400.    A/P:  Titrate Levophed to keep MAP > 65 or SBP > 110.  Titrate FIO2 to keep PaO2 > 60% with SpO2 > 92% as MD  ordered.  Current ordered goal for CRRT net fluid removal  is 0 - 50 ml/hr. Bladder scan Q shift.   MRI was not able to do MRI of brain this shift as bladder stimulator was found to be at 20% charge when Pt brought down @ 2100 this shift.  MRI states stimulator must be at 100% charge for them to safely do ordered MRI of brain.  Unable to get Pt's external recharger to appropriately connect to Pt's internal bladder stimulator this shift.  Plan to check with family or call [employer] help line which is available [phone] M-F.   Plan is to complete ordered MRI of brain as soon as Pt's internal bladder stimulator can be 100% charged as required for the devices MRI mode.

## 2024-08-21 NOTE — PROCEDURES
THORACIC SURGERY BEDSIDE PROCEDURE   NAME: Massiel Flaherty   MRN: [id number]  : 1970  Diagnosis:  Respiratory failure   Procedure: Flexible bronchoscopy   Specimen: Right bronchial BAL    Premedication: 2mg Versed  Procedure Meds:   3cc of 1% topical lidocaine solution at the ivy  Procedure: A timeout was called to review the case and patient information. The patient was positioned supine. The bronchoscope was passed without difficulty through the ET tube until the ivy appeared into view, where appropriate apposition was achieved.  Bilateral tracheobronchial trees were inspected closely to the level of the subsegmental bronchi.  Secretions were found to be mild in amount and thickness.  These were lavaged and evacuated without difficulty. 20mL of saline was utilized for BAL. BAL samples were sent for culture and gram stain. The procedure was completed and the patient tolerated the procedure well and without complications.      Findings: Mildly thick, clear secretions in the upper airways, minimal edema. Mildly thick, clear secretions at branch tracheobronchial tree.    Plan: PRN for respiratory decompensation or concerns of mucous plugging.     The SICU Fellow was available at bedside and SICU staff, Dr. Calixto, was available for assistance throughout the entire procedure.      Cal Lisa MD  SICU Resident    Attending addendum:  I was present for entire procedure.

## 2024-08-22 ENCOUNTER — APPOINTMENT (OUTPATIENT)
Dept: GENERAL RADIOLOGY | Facility: CLINIC | Age: 54
DRG: 003 | End: 2024-08-22
Attending: STUDENT IN AN ORGANIZED HEALTH CARE EDUCATION/TRAINING PROGRAM
Payer: MEDICAID

## 2024-08-22 LAB
ALBUMIN SERPL BCG-MCNC: 2.9 G/DL (ref 3.5–5.2)
ALBUMIN SERPL BCG-MCNC: 3 G/DL (ref 3.5–5.2)
ALLEN'S TEST: ABNORMAL
ALP SERPL-CCNC: 96 U/L (ref 40–150)
ALT SERPL W P-5'-P-CCNC: 18 U/L (ref 0–50)
ANION GAP SERPL CALCULATED.3IONS-SCNC: 11 MMOL/L (ref 7–15)
ANION GAP SERPL CALCULATED.3IONS-SCNC: 9 MMOL/L (ref 7–15)
AST SERPL W P-5'-P-CCNC: 28 U/L (ref 0–45)
ATRIAL RATE - MUSE: 111 BPM
BASE EXCESS BLDA CALC-SCNC: -1.9 MMOL/L (ref -3–3)
BASE EXCESS BLDA CALC-SCNC: -2.8 MMOL/L (ref -3–3)
BASE EXCESS BLDA CALC-SCNC: -2.8 MMOL/L (ref -3–3)
BASE EXCESS BLDA CALC-SCNC: -3 MMOL/L (ref -3–3)
BASE EXCESS BLDA CALC-SCNC: -3.1 MMOL/L (ref -3–3)
BASE EXCESS BLDA CALC-SCNC: -3.3 MMOL/L (ref -3–3)
BASE EXCESS BLDA CALC-SCNC: -3.5 MMOL/L (ref -3–3)
BILIRUB DIRECT SERPL-MCNC: <0.2 MG/DL (ref 0–0.3)
BILIRUB SERPL-MCNC: 0.2 MG/DL
BLD PROD TYP BPU: NORMAL
BLOOD COMPONENT TYPE: NORMAL
BUN SERPL-MCNC: 39.1 MG/DL (ref 6–20)
BUN SERPL-MCNC: 40.1 MG/DL (ref 6–20)
CA-I BLD-MCNC: 4.6 MG/DL (ref 4.4–5.2)
CA-I BLD-MCNC: 4.7 MG/DL (ref 4.4–5.2)
CALCIUM SERPL-MCNC: 7.8 MG/DL (ref 8.8–10.4)
CALCIUM SERPL-MCNC: 8.2 MG/DL (ref 8.8–10.4)
CHLORIDE SERPL-SCNC: 104 MMOL/L (ref 98–107)
CHLORIDE SERPL-SCNC: 106 MMOL/L (ref 98–107)
CODING SYSTEM: NORMAL
COHGB MFR BLD: 100 % (ref 96–97)
COHGB MFR BLD: 94.9 % (ref 96–97)
COHGB MFR BLD: 95.8 % (ref 96–97)
COHGB MFR BLD: 96.6 % (ref 96–97)
COHGB MFR BLD: 98.9 % (ref 96–97)
COHGB MFR BLD: 99 % (ref 96–97)
COHGB MFR BLD: 99.5 % (ref 96–97)
CREAT SERPL-MCNC: 1.6 MG/DL (ref 0.51–0.95)
CREAT SERPL-MCNC: 1.65 MG/DL (ref 0.51–0.95)
CROSSMATCH: NORMAL
DIASTOLIC BLOOD PRESSURE - MUSE: NORMAL MMHG
EGFRCR SERPLBLD CKD-EPI 2021: 37 ML/MIN/1.73M2
EGFRCR SERPLBLD CKD-EPI 2021: 38 ML/MIN/1.73M2
ERYTHROCYTE [DISTWIDTH] IN BLOOD BY AUTOMATED COUNT: 18.3 % (ref 10–15)
GLUCOSE BLD-MCNC: 133 MG/DL (ref 70–99)
GLUCOSE BLD-MCNC: 139 MG/DL (ref 70–99)
GLUCOSE BLDC GLUCOMTR-MCNC: 128 MG/DL (ref 70–99)
GLUCOSE BLDC GLUCOMTR-MCNC: 155 MG/DL (ref 70–99)
GLUCOSE BLDC GLUCOMTR-MCNC: 178 MG/DL (ref 70–99)
GLUCOSE BLDC GLUCOMTR-MCNC: 181 MG/DL (ref 70–99)
GLUCOSE SERPL-MCNC: 138 MG/DL (ref 70–99)
GLUCOSE SERPL-MCNC: 142 MG/DL (ref 70–99)
HCO3 BLD-SCNC: 22 MMOL/L (ref 21–28)
HCO3 BLD-SCNC: 23 MMOL/L (ref 21–28)
HCO3 BLD-SCNC: 23 MMOL/L (ref 21–28)
HCO3 SERPL-SCNC: 21 MMOL/L (ref 22–29)
HCO3 SERPL-SCNC: 21 MMOL/L (ref 22–29)
HCT VFR BLD AUTO: 22.4 % (ref 35–47)
HGB BLD-MCNC: 7 G/DL (ref 11.7–15.7)
HGB BLD-MCNC: 8.5 G/DL (ref 11.7–15.7)
INTERPRETATION ECG - MUSE: NORMAL
ISSUE DATE AND TIME: NORMAL
LACTATE SERPL-SCNC: 0.7 MMOL/L (ref 0.7–2)
MAGNESIUM SERPL-MCNC: 2.4 MG/DL (ref 1.7–2.3)
MAGNESIUM SERPL-MCNC: 2.5 MG/DL (ref 1.7–2.3)
MCH RBC QN AUTO: 31 PG (ref 26.5–33)
MCHC RBC AUTO-ENTMCNC: 31.3 G/DL (ref 31.5–36.5)
MCV RBC AUTO: 99 FL (ref 78–100)
O2/TOTAL GAS SETTING VFR VENT: 40 %
O2/TOTAL GAS SETTING VFR VENT: 40 %
O2/TOTAL GAS SETTING VFR VENT: 50 %
P AXIS - MUSE: 48 DEGREES
PCO2 BLD: 37 MM HG (ref 35–45)
PCO2 BLD: 38 MM HG (ref 35–45)
PCO2 BLD: 40 MM HG (ref 35–45)
PEEP: 10 CM H2O
PH BLD: 7.35 [PH] (ref 7.35–7.45)
PH BLD: 7.35 [PH] (ref 7.35–7.45)
PH BLD: 7.36 [PH] (ref 7.35–7.45)
PH BLD: 7.36 [PH] (ref 7.35–7.45)
PH BLD: 7.37 [PH] (ref 7.35–7.45)
PH BLD: 7.37 [PH] (ref 7.35–7.45)
PH BLD: 7.38 [PH] (ref 7.35–7.45)
PHOSPHATE SERPL-MCNC: 4.9 MG/DL (ref 2.5–4.5)
PHOSPHATE SERPL-MCNC: 5.4 MG/DL (ref 2.5–4.5)
PLATELET # BLD AUTO: 229 10E3/UL (ref 150–450)
PO2 BLD: 100 MM HG (ref 80–105)
PO2 BLD: 102 MM HG (ref 80–105)
PO2 BLD: 105 MM HG (ref 80–105)
PO2 BLD: 120 MM HG (ref 80–105)
PO2 BLD: 67 MM HG (ref 80–105)
PO2 BLD: 70 MM HG (ref 80–105)
PO2 BLD: 77 MM HG (ref 80–105)
POTASSIUM SERPL-SCNC: 3.8 MMOL/L (ref 3.4–5.3)
POTASSIUM SERPL-SCNC: 4 MMOL/L (ref 3.4–5.3)
PR INTERVAL - MUSE: 146 MS
PROT SERPL-MCNC: 5.7 G/DL (ref 6.4–8.3)
QRS DURATION - MUSE: 84 MS
QT - MUSE: 316 MS
QTC - MUSE: 429 MS
R AXIS - MUSE: 17 DEGREES
RBC # BLD AUTO: 2.26 10E6/UL (ref 3.8–5.2)
SAO2 % BLDA: 92 % (ref 92–100)
SAO2 % BLDA: 93 % (ref 92–100)
SAO2 % BLDA: 94 % (ref 92–100)
SAO2 % BLDA: 96 % (ref 92–100)
SAO2 % BLDA: 97 % (ref 92–100)
SODIUM SERPL-SCNC: 136 MMOL/L (ref 135–145)
SODIUM SERPL-SCNC: 136 MMOL/L (ref 135–145)
SYSTOLIC BLOOD PRESSURE - MUSE: NORMAL MMHG
T AXIS - MUSE: 47 DEGREES
TRIGL SERPL-MCNC: 293 MG/DL
UFH PPP CHRO-ACNC: <0.1 IU/ML
UNIT ABO/RH: NORMAL
UNIT NUMBER: NORMAL
UNIT STATUS: NORMAL
UNIT TYPE ISBT: 9500
VENTRICULAR RATE- MUSE: 111 BPM
WBC # BLD AUTO: 17.9 10E3/UL (ref 4–11)

## 2024-08-22 PROCEDURE — G0463 HOSPITAL OUTPT CLINIC VISIT: HCPCS | Mod: 25

## 2024-08-22 PROCEDURE — 71045 X-RAY EXAM CHEST 1 VIEW: CPT | Mod: 26 | Performed by: RADIOLOGY

## 2024-08-22 PROCEDURE — 250N000013 HC RX MED GY IP 250 OP 250 PS 637

## 2024-08-22 PROCEDURE — 250N000009 HC RX 250: Performed by: NURSE PRACTITIONER

## 2024-08-22 PROCEDURE — 83735 ASSAY OF MAGNESIUM: CPT | Performed by: CLINICAL NURSE SPECIALIST

## 2024-08-22 PROCEDURE — 82040 ASSAY OF SERUM ALBUMIN: CPT | Performed by: CLINICAL NURSE SPECIALIST

## 2024-08-22 PROCEDURE — 94003 VENT MGMT INPAT SUBQ DAY: CPT

## 2024-08-22 PROCEDURE — 90947 DIALYSIS REPEATED EVAL: CPT

## 2024-08-22 PROCEDURE — 250N000013 HC RX MED GY IP 250 OP 250 PS 637: Performed by: PHYSICIAN ASSISTANT

## 2024-08-22 PROCEDURE — 250N000013 HC RX MED GY IP 250 OP 250 PS 637: Performed by: SURGERY

## 2024-08-22 PROCEDURE — 250N000013 HC RX MED GY IP 250 OP 250 PS 637: Performed by: STUDENT IN AN ORGANIZED HEALTH CARE EDUCATION/TRAINING PROGRAM

## 2024-08-22 PROCEDURE — 999N000157 HC STATISTIC RCP TIME EA 10 MIN

## 2024-08-22 PROCEDURE — 82330 ASSAY OF CALCIUM: CPT | Performed by: CLINICAL NURSE SPECIALIST

## 2024-08-22 PROCEDURE — 71045 X-RAY EXAM CHEST 1 VIEW: CPT

## 2024-08-22 PROCEDURE — 94681 O2 UPTK CO2 OUTP % O2 XTRC: CPT

## 2024-08-22 PROCEDURE — 84478 ASSAY OF TRIGLYCERIDES: CPT | Performed by: STUDENT IN AN ORGANIZED HEALTH CARE EDUCATION/TRAINING PROGRAM

## 2024-08-22 PROCEDURE — 99233 SBSQ HOSP IP/OBS HIGH 50: CPT | Mod: FS | Performed by: INTERNAL MEDICINE

## 2024-08-22 PROCEDURE — 85027 COMPLETE CBC AUTOMATED: CPT | Performed by: PHYSICIAN ASSISTANT

## 2024-08-22 PROCEDURE — 82805 BLOOD GASES W/O2 SATURATION: CPT

## 2024-08-22 PROCEDURE — 94640 AIRWAY INHALATION TREATMENT: CPT | Mod: 76

## 2024-08-22 PROCEDURE — 94640 AIRWAY INHALATION TREATMENT: CPT

## 2024-08-22 PROCEDURE — 200N000002 HC R&B ICU UMMC

## 2024-08-22 PROCEDURE — P9016 RBC LEUKOCYTES REDUCED: HCPCS

## 2024-08-22 PROCEDURE — 85520 HEPARIN ASSAY: CPT | Performed by: SURGERY

## 2024-08-22 PROCEDURE — 250N000009 HC RX 250: Performed by: STUDENT IN AN ORGANIZED HEALTH CARE EDUCATION/TRAINING PROGRAM

## 2024-08-22 PROCEDURE — 82947 ASSAY GLUCOSE BLOOD QUANT: CPT

## 2024-08-22 PROCEDURE — 85018 HEMOGLOBIN: CPT

## 2024-08-22 PROCEDURE — 83605 ASSAY OF LACTIC ACID: CPT

## 2024-08-22 PROCEDURE — 82248 BILIRUBIN DIRECT: CPT | Performed by: CLINICAL NURSE SPECIALIST

## 2024-08-22 PROCEDURE — 80053 COMPREHEN METABOLIC PANEL: CPT | Performed by: CLINICAL NURSE SPECIALIST

## 2024-08-22 PROCEDURE — 250N000009 HC RX 250: Performed by: SURGERY

## 2024-08-22 PROCEDURE — 250N000009 HC RX 250: Performed by: CLINICAL NURSE SPECIALIST

## 2024-08-22 PROCEDURE — 250N000011 HC RX IP 250 OP 636: Performed by: CLINICAL NURSE SPECIALIST

## 2024-08-22 PROCEDURE — 99291 CRITICAL CARE FIRST HOUR: CPT

## 2024-08-22 RX ORDER — ACYCLOVIR 400 MG/1
400 TABLET ORAL 3 TIMES DAILY
Status: DISCONTINUED | OUTPATIENT
Start: 2024-08-22 | End: 2024-08-22

## 2024-08-22 RX ORDER — ACYCLOVIR 200 MG/5ML
400 SUSPENSION ORAL 2 TIMES DAILY
Status: DISCONTINUED | OUTPATIENT
Start: 2024-08-22 | End: 2024-08-23

## 2024-08-22 RX ORDER — GABAPENTIN 250 MG/5ML
300 SOLUTION ORAL EVERY 12 HOURS SCHEDULED
Status: DISCONTINUED | OUTPATIENT
Start: 2024-08-22 | End: 2024-08-22

## 2024-08-22 RX ORDER — QUETIAPINE FUMARATE 50 MG/1
50 TABLET, FILM COATED ORAL 2 TIMES DAILY
Status: DISCONTINUED | OUTPATIENT
Start: 2024-08-22 | End: 2024-08-23

## 2024-08-22 RX ORDER — GABAPENTIN 300 MG/1
300 CAPSULE ORAL 2 TIMES DAILY
Status: DISCONTINUED | OUTPATIENT
Start: 2024-08-22 | End: 2024-08-22

## 2024-08-22 RX ORDER — GABAPENTIN 250 MG/5ML
300 SOLUTION ORAL EVERY 12 HOURS SCHEDULED
Status: DISCONTINUED | OUTPATIENT
Start: 2024-08-22 | End: 2024-08-23

## 2024-08-22 RX ORDER — MAGNESIUM SULFATE 1 G/100ML
1-2 INJECTION INTRAVENOUS EVERY 8 HOURS PRN
Status: DISCONTINUED | OUTPATIENT
Start: 2024-08-22 | End: 2024-08-22

## 2024-08-22 RX ORDER — CLONAZEPAM 0.5 MG/1
1 TABLET ORAL 2 TIMES DAILY
Status: DISCONTINUED | OUTPATIENT
Start: 2024-08-22 | End: 2024-08-23

## 2024-08-22 RX ORDER — ATORVASTATIN CALCIUM 10 MG/1
10 TABLET, FILM COATED ORAL DAILY
Status: DISCONTINUED | OUTPATIENT
Start: 2024-08-22 | End: 2024-10-01 | Stop reason: HOSPADM

## 2024-08-22 RX ORDER — MAGNESIUM SULFATE HEPTAHYDRATE 40 MG/ML
2 INJECTION, SOLUTION INTRAVENOUS EVERY 8 HOURS PRN
Status: DISCONTINUED | OUTPATIENT
Start: 2024-08-22 | End: 2024-08-24

## 2024-08-22 RX ORDER — OXYCODONE HYDROCHLORIDE 10 MG/1
10 TABLET ORAL EVERY 4 HOURS
Status: DISCONTINUED | OUTPATIENT
Start: 2024-08-22 | End: 2024-08-24

## 2024-08-22 RX ADMIN — IPRATROPIUM BROMIDE AND ALBUTEROL SULFATE 3 ML: .5; 3 SOLUTION RESPIRATORY (INHALATION) at 16:18

## 2024-08-22 RX ADMIN — VENLAFAXINE 75 MG: 25 TABLET ORAL at 08:49

## 2024-08-22 RX ADMIN — LOPERAMIDE HYDROCHLORIDE 4 MG: 2 CAPSULE ORAL at 08:50

## 2024-08-22 RX ADMIN — HEPARIN SODIUM 500 UNITS/HR: 1000 INJECTION, SOLUTION INTRAVENOUS; SUBCUTANEOUS at 21:25

## 2024-08-22 RX ADMIN — CALCIUM CHLORIDE, MAGNESIUM CHLORIDE, SODIUM CHLORIDE, SODIUM BICARBONATE, POTASSIUM CHLORIDE AND SODIUM PHOSPHATE DIBASIC DIHYDRATE 12.5 ML/KG/HR: 3.68; 3.05; 6.34; 3.09; .314; .187 INJECTION INTRAVENOUS at 15:38

## 2024-08-22 RX ADMIN — LOPERAMIDE HYDROCHLORIDE 4 MG: 2 CAPSULE ORAL at 14:14

## 2024-08-22 RX ADMIN — CLONAZEPAM 1 MG: 0.5 TABLET ORAL at 19:43

## 2024-08-22 RX ADMIN — CALCIUM CHLORIDE, MAGNESIUM CHLORIDE, SODIUM CHLORIDE, SODIUM BICARBONATE, POTASSIUM CHLORIDE AND SODIUM PHOSPHATE DIBASIC DIHYDRATE 12.5 ML/KG/HR: 3.68; 3.05; 6.34; 3.09; .314; .187 INJECTION INTRAVENOUS at 20:04

## 2024-08-22 RX ADMIN — GABAPENTIN 300 MG: 250 SUSPENSION ORAL at 19:43

## 2024-08-22 RX ADMIN — CALCIUM CHLORIDE, MAGNESIUM CHLORIDE, SODIUM CHLORIDE, SODIUM BICARBONATE, POTASSIUM CHLORIDE AND SODIUM PHOSPHATE DIBASIC DIHYDRATE 12.5 ML/KG/HR: 3.68; 3.05; 6.34; 3.09; .314; .187 INJECTION INTRAVENOUS at 09:44

## 2024-08-22 RX ADMIN — IPRATROPIUM BROMIDE AND ALBUTEROL SULFATE 3 ML: .5; 3 SOLUTION RESPIRATORY (INHALATION) at 20:32

## 2024-08-22 RX ADMIN — Medication 60 ML: at 08:53

## 2024-08-22 RX ADMIN — WHITE PETROLATUM 57.7 %-MINERAL OIL 31.9 % EYE OINTMENT: at 08:52

## 2024-08-22 RX ADMIN — CHLORHEXIDINE GLUCONATE 0.12% ORAL RINSE 15 ML: 1.2 LIQUID ORAL at 19:43

## 2024-08-22 RX ADMIN — QUETIAPINE FUMARATE 50 MG: 50 TABLET ORAL at 20:51

## 2024-08-22 RX ADMIN — Medication 60 ML: at 20:51

## 2024-08-22 RX ADMIN — OXYCODONE HYDROCHLORIDE 10 MG: 10 TABLET ORAL at 08:49

## 2024-08-22 RX ADMIN — CALCIUM CHLORIDE, MAGNESIUM CHLORIDE, SODIUM CHLORIDE, SODIUM BICARBONATE, POTASSIUM CHLORIDE AND SODIUM PHOSPHATE DIBASIC DIHYDRATE 12.5 ML/KG/HR: 3.68; 3.05; 6.34; 3.09; .314; .187 INJECTION INTRAVENOUS at 02:50

## 2024-08-22 RX ADMIN — BUDESONIDE 1 MG: 1 INHALANT ORAL at 08:55

## 2024-08-22 RX ADMIN — CHLORHEXIDINE GLUCONATE 0.12% ORAL RINSE 15 ML: 1.2 LIQUID ORAL at 08:49

## 2024-08-22 RX ADMIN — Medication 1 TABLET: at 08:49

## 2024-08-22 RX ADMIN — DEXMEDETOMIDINE HYDROCHLORIDE IN SODIUM CHLORIDE 0.9 MCG/KG/HR: 4 INJECTION INTRAVENOUS at 23:06

## 2024-08-22 RX ADMIN — DEXMEDETOMIDINE HYDROCHLORIDE IN SODIUM CHLORIDE 1.2 MCG/KG/HR: 4 INJECTION INTRAVENOUS at 07:08

## 2024-08-22 RX ADMIN — DEXMEDETOMIDINE HYDROCHLORIDE IN SODIUM CHLORIDE 1.2 MCG/KG/HR: 4 INJECTION INTRAVENOUS at 11:21

## 2024-08-22 RX ADMIN — DEXMEDETOMIDINE HYDROCHLORIDE IN SODIUM CHLORIDE 1.2 MCG/KG/HR: 4 INJECTION INTRAVENOUS at 18:26

## 2024-08-22 RX ADMIN — DEXMEDETOMIDINE HYDROCHLORIDE IN SODIUM CHLORIDE 1.2 MCG/KG/HR: 4 INJECTION INTRAVENOUS at 03:51

## 2024-08-22 RX ADMIN — GABAPENTIN 300 MG: 250 SUSPENSION ORAL at 10:40

## 2024-08-22 RX ADMIN — OXYCODONE HYDROCHLORIDE 10 MG: 10 TABLET ORAL at 21:48

## 2024-08-22 RX ADMIN — OXYCODONE HYDROCHLORIDE 10 MG: 10 TABLET ORAL at 14:13

## 2024-08-22 RX ADMIN — LOPERAMIDE HYDROCHLORIDE 4 MG: 2 CAPSULE ORAL at 19:43

## 2024-08-22 RX ADMIN — ACETYLCYSTEINE 4 ML: 100 SOLUTION ORAL; RESPIRATORY (INHALATION) at 08:55

## 2024-08-22 RX ADMIN — LOPERAMIDE HYDROCHLORIDE 4 MG: 2 CAPSULE ORAL at 01:55

## 2024-08-22 RX ADMIN — VENLAFAXINE 75 MG: 25 TABLET ORAL at 20:51

## 2024-08-22 RX ADMIN — ACETYLCYSTEINE 4 ML: 100 SOLUTION ORAL; RESPIRATORY (INHALATION) at 12:55

## 2024-08-22 RX ADMIN — CALCIUM CHLORIDE, MAGNESIUM CHLORIDE, SODIUM CHLORIDE, SODIUM BICARBONATE, POTASSIUM CHLORIDE AND SODIUM PHOSPHATE DIBASIC DIHYDRATE 12.5 ML/KG/HR: 3.68; 3.05; 6.34; 3.09; .314; .187 INJECTION INTRAVENOUS at 02:51

## 2024-08-22 RX ADMIN — ACETYLCYSTEINE 4 ML: 100 SOLUTION ORAL; RESPIRATORY (INHALATION) at 20:32

## 2024-08-22 RX ADMIN — AMITRIPTYLINE HYDROCHLORIDE 50 MG: 50 TABLET, FILM COATED ORAL at 21:48

## 2024-08-22 RX ADMIN — Medication 60 ML: at 14:14

## 2024-08-22 RX ADMIN — Medication 40 MG: at 08:50

## 2024-08-22 RX ADMIN — ACETYLCYSTEINE 4 ML: 100 SOLUTION ORAL; RESPIRATORY (INHALATION) at 16:18

## 2024-08-22 RX ADMIN — CALCIUM CHLORIDE, MAGNESIUM CHLORIDE, SODIUM CHLORIDE, SODIUM BICARBONATE, POTASSIUM CHLORIDE AND SODIUM PHOSPHATE DIBASIC DIHYDRATE 12.5 ML/KG/HR: 3.68; 3.05; 6.34; 3.09; .314; .187 INJECTION INTRAVENOUS at 20:06

## 2024-08-22 RX ADMIN — ACETAMINOPHEN 975 MG: 325 TABLET, FILM COATED ORAL at 23:48

## 2024-08-22 RX ADMIN — CLONAZEPAM 1 MG: 0.5 TABLET ORAL at 09:04

## 2024-08-22 RX ADMIN — WHITE PETROLATUM 57.7 %-MINERAL OIL 31.9 % EYE OINTMENT: at 16:16

## 2024-08-22 RX ADMIN — DEXMEDETOMIDINE HYDROCHLORIDE IN SODIUM CHLORIDE 1.2 MCG/KG/HR: 4 INJECTION INTRAVENOUS at 15:15

## 2024-08-22 RX ADMIN — OXYCODONE HYDROCHLORIDE 10 MG: 10 TABLET ORAL at 03:51

## 2024-08-22 RX ADMIN — WHITE PETROLATUM 57.7 %-MINERAL OIL 31.9 % EYE OINTMENT: at 23:47

## 2024-08-22 RX ADMIN — LEVOTHYROXINE SODIUM 25 MCG: 0.03 TABLET ORAL at 08:49

## 2024-08-22 RX ADMIN — ATORVASTATIN CALCIUM 10 MG: 10 TABLET, FILM COATED ORAL at 09:04

## 2024-08-22 RX ADMIN — OXYCODONE HYDROCHLORIDE 10 MG: 10 TABLET ORAL at 18:02

## 2024-08-22 RX ADMIN — ACYCLOVIR 400 MG: 200 SUSPENSION ORAL at 18:02

## 2024-08-22 RX ADMIN — IPRATROPIUM BROMIDE AND ALBUTEROL SULFATE 3 ML: .5; 3 SOLUTION RESPIRATORY (INHALATION) at 12:55

## 2024-08-22 RX ADMIN — ACETAMINOPHEN 975 MG: 325 TABLET, FILM COATED ORAL at 08:49

## 2024-08-22 RX ADMIN — QUETIAPINE FUMARATE 50 MG: 50 TABLET ORAL at 09:04

## 2024-08-22 RX ADMIN — HEPARIN SODIUM 500 UNITS/HR: 1000 INJECTION, SOLUTION INTRAVENOUS; SUBCUTANEOUS at 01:33

## 2024-08-22 RX ADMIN — ACETAMINOPHEN 975 MG: 325 TABLET, FILM COATED ORAL at 16:00

## 2024-08-22 RX ADMIN — IPRATROPIUM BROMIDE AND ALBUTEROL SULFATE 3 ML: .5; 3 SOLUTION RESPIRATORY (INHALATION) at 08:55

## 2024-08-22 ASSESSMENT — ACTIVITIES OF DAILY LIVING (ADL)
ADLS_ACUITY_SCORE: 61
ADLS_ACUITY_SCORE: 69
ADLS_ACUITY_SCORE: 61
ADLS_ACUITY_SCORE: 61
ADLS_ACUITY_SCORE: 59
ADLS_ACUITY_SCORE: 61
ADLS_ACUITY_SCORE: 59
ADLS_ACUITY_SCORE: 63
ADLS_ACUITY_SCORE: 65
ADLS_ACUITY_SCORE: 61
ADLS_ACUITY_SCORE: 59
ADLS_ACUITY_SCORE: 61
ADLS_ACUITY_SCORE: 59
ADLS_ACUITY_SCORE: 61
ADLS_ACUITY_SCORE: 59
ADLS_ACUITY_SCORE: 59
ADLS_ACUITY_SCORE: 61
ADLS_ACUITY_SCORE: 61
ADLS_ACUITY_SCORE: 65

## 2024-08-22 NOTE — PLAN OF CARE
Major Shift Events:  Patient opens eyes and will follow simple commands, no movement in BUE's.  Perll.  Full body tremors at times, not correlating with patient temp. SR-ST, maps >65 with pressor titration.  Tmax 37.9 when off CRRT.  PCV+ 50/18/25/10, Lungs coarse, scant amount secretions suctioned via ETT.  NJ with TF at goal, RT in place with 225 out this shift.  CRRT goal 0-50, achieving.  Lesions on lips and tongue; acyclovir started.  Precedex and versed infusing.    Plan: Titrate sedation and pressor as able.  NPO at MN for tunneled HD line placement.  For vital signs and complete assessments, please see documentation flowsheets.

## 2024-08-22 NOTE — PROGRESS NOTES
[facility]    ICU Progress Note       Date of Admission:  8/8/2024    Assessment: Critical Care   Massiel Flaherty is a 53 year old female who was admitted to [facility]. Past medical hx of Anxiety/depression, fibromyalgia, hypothyroidism, asthma, HLD, MURIEL on CPAP, spells, off on anti seizure medications, expressive aphasia, hypertension was seen at [facility] and was placed on Augmentin outpatient on 7/30/24. She admitted to the hospital on 8/3/24 for fatigue, fever and dyspnea and intubated 8/6/24 at OSH, proned and paralyzed without improvement. Transferred to [facility] 8/8/24, hypoxic with high plateaus/peaks and placed on /VV ECMO and CRRT.  Decannulated from VV ECMO 8/18    Today's Changes:  - Titrate down/off versed gtt  - Start gabapentin, clonazepam  - Lactic add-on   - Restart ABX if clinically worsening  - Change vent to 25/10  - ABG prn  - Discuss possible need for tunneled HD line  - UOP goal net negative 1L  - Restart PTA statin    Plan: Critical Care   Neurological:  # Sedation and analgesia for vent compliance   # Fibromyalgia   # Hx myalgic encephalomyelitis   # Acute pain  # Hx anxiety/depression  # Concern for ICU delirium   - Monitor neurological status. Delirium preventions and precautions.   - Pain: Scheduled: tylenol, oxycodone   PRN: tylenol, dilaudid, oxycodone  - Sedation plan:  Gtt: Precedex gtt and Versed gtt    - Attempt to titrate off versed gtt while starting oral clonazepam   PRN: Versed bumps  - Start clonazepam 1mg BID  - Start gabapentin 300mg BID  - Increase seroquel to 50mg BID  - PTA Venlafaxine and Amitriptyline     # Hx spells with staring and BUE posturing previously described as pseudoseizures   # Hx of GTC seizures and myoclonic epilepsy, weaned off AEDs   # C/f hypoxic ischemic injury   # Diffuse cerebral edema - improved  - Sodium goals liberalized to 140-145  - 8/21: MRI Brain: no acute intracranial pathology. No findings to  suggest anoxic brain injury.  - NCC signed off.      Pulmonary:   # ARDS s/p VV ECMO cannulation 8/8- decannulated on 8/18   # Asthma  # MURIEL on home CPAP   FiO2 (%): 50 %, Resp: 24, Inspiratory Pressure (cm H2O) (Drager Jessie): 25, Vent Mode: PCV Plus assist, Resp Rate (Set): 18 breaths/min, PEEP (cm H2O): 10 cmH2O, Resp Rate (Set): 18 breaths/min, PEEP (cm H2O): 10 cmH2O  Bronch performed yesterday, 8/21: miildly thick, clear secretions in the upper airways, minimal edema. Mildly thick, clear secretions at branch tracheobronchial tree.  - SpO2 goals >92%, PaO2 goals >60   - PC/AC: 25/10  - CXR 8/22: cont bilateral opacities and effusions (slightly worse)  - BAL specimen sent 8/21: 2+ GNB  - Scheduled pulmicort, mucomyst, and duonebs   - Wean vent as able  - VAP bundle while intubated   - Diuresis plan below    Cardiovascular:    # Hyperlipidemia  # Hypotension  # Hx HTN  Per nursing, BPs labile requiring restarting levophed early this morning after scheduled oxycodone dose  - NE for pressors support wean as able  - Add on lactate to check for s/s hypoperfusion. LFTs WNL, pt is anuric.  - MAP goal  >65, SBP goals >110  - Start PTA statin  - Hold PTA clonidine     Gastroenterology/Nutrition:  # Moderate protein calorie malnutrition  # Non-infectious Diarrhea  # GERD   - Protonix (home medication)   - Diet: TF @ goal  - Hold bowel regimen d/t loose stools  - Continue scheduled multivitamin, banatrol, prosource packet, and loperamide  - Rectal tube (keep in place for now given femoral HD line and liquid stools)     Fluids/Electrolytes/Renal:  # Acute kidney injury  # Hypermagnesemia  # Hyperphosphatemia   Baseline Cr approx 0.6. Pt is anuric  - Cr today is 1.60 from 1.51  - Continue CRRT; fluid goal net negative 1L    - Attempt to pull more fluid via circuit. Will tolerate increased dose of levophed  - Consider tunneled HD line  - Heparinized CRRT circuit   - Strict I&Os   - Bladder scan q shift  - Avoid nephrotoxins  as able      Endocrine:  # Steroid and stress induced hyperglycemia  # Hypothyroidism   - Goal to keep BG < 180 for optimal wound healing. Range:    - Continue HSSI  - Continue PTA synthroid     ID:  # Leukocytosis  # Hx CAP  # Concern for PID   WBC 17.9 from 18.3, Tmax 100.6  Per family, members of pt's community have been testing positive for West Nile Virus  - LP needed to diagnose West Nile- will defer. IgG and IgM sent 8/21 - follow*  - CXR with increases bilateral opacities (especially left lower lobe) and effusions  - Continue to monitor fever curve and inflammatory markers as appropriate  - If pt clinically worsens, restart ABX  - ID consulted, appreciate recs   - Monitor clinical status off of antimicrobials, has completed empiric course for pneumonia with no other source of infection identified    Cultures:  - BAL  - 8/14: pseudomonas aeruginosa, candida albicans  - 8/21: 2+ GNB  - Blood cultures: NGTD    Antimicrobials:   - Azithromycin- stopped 8/11   - Cefepime 8/3 (started at OSH)-8/16  - Amphotericin - stopped 8/13   - Flagyl 8/11-8/16  - Zosyn 8/16- 8/19    Heme:     # Acute blood loss anemia   # Anemia of critical illness  Hgb 7.0 this morning, received 1 unit PRBCs  - Recheck Hgb 1hr post transfusion  - Transfuse if hgb <7.0 or signs/symptoms of hypoperfusion. Monitor and trend.   - Heparinize CRRT circuit      Musculoskeletal:  # Weakness and deconditioning of critical illness  # Left ankle injury pre-hospitalization    - Physical and occupational therapy when able.      Skin:  # BLE scattered ecchymosis  # Left toe duskiness   - Bilateral lower leg ecchymosis   - WOC consult   - Ecchymosis to left foot, most noticeable to 3rd and 4th toes    Lines/ tubes/ drains:  - ETT  - NJ  - LIJ CVC  - L fem HD  - Radial a-line  - PIV x3  - Rectal tube      Prophylaxis:  - DVT Prophylaxis: Heparinized CRRT circuit  - PUD Prophylaxis: PPI    Code Status: Full Code      Disposition:  - ICU, possible  "transfer to MICU service     The patient's care was discussed with the Attending Physician, Dr. Calixto, Chief Resident/Fellow, and SICU Team.  Critical care time: 40 minutes    DOREEN Duenas CNP  [facility]  Securely message with [employer] (more info)  Text page via [facility] Paging/Directory     Clinically Significant Risk Factors              # Hypoalbuminemia: Lowest albumin = 2.2 g/dL at 8/10/2024  9:47 AM, will monitor as appropriate               # Obesity: Estimated body mass index is 33.31 kg/m  as calculated from the following:    Height as of this encounter: 1.575 m (5' 2\").    Weight as of this encounter: 82.6 kg (182 lb 1.6 oz).   # Moderate Malnutrition: based on nutrition assessment      # Financial/Environmental Concerns: none    ___________________________________________________________    Interval History   The patient had fluctuating blood pressures throughout the night requiring pressors on/off. Per nursing, pt has also has waxing/waning neuro assessment - will move BLE to command intermittently. My assessment this morning, pt briefly opened eyes to voice.    Vital signs:  Temp: 99.9  F (37.7  C) Temp src: Esophageal BP: 114/59 Pulse: 82   Resp: 24 SpO2: 100 % O2 Device: Mechanical Ventilator   Height: 157.5 cm (5' 2\") Weight: 82.6 kg (182 lb 1.6 oz) (Weight is down 0.5 kg from yesterday)  Estimated body mass index is 33.31 kg/m  as calculated from the following:    Height as of this encounter: 1.575 m (5' 2\").    Weight as of this encounter: 82.6 kg (182 lb 1.6 oz).      FiO2 (%): 50 %, Resp: 24, Inspiratory Pressure (cm H2O) (Drager Jessie): 18, Vent Mode: PCV Plus assist, Resp Rate (Set): 18 breaths/min, PEEP (cm H2O): 10 cmH2O, Resp Rate (Set): 18 breaths/min, PEEP (cm H2O): 10 cmH2O      Intake/Output Summary (Last 24 hours) at 8/22/2024 0746  Last data filed at 8/22/2024 0700  Gross per 24 hour   Intake 3214.86 ml   Output 3408.2 " ml   Net -193.34 ml       Physical Exam   Neuro: Intubated and Sedated. Spontaneously wiggles bilateral feet/toes. Does not withdraw to BUE/BLE. NAD.   HEENT: Normocephalic, atraumatic. PERRLA. Subconjunctival hemorrhage present to left eye  CV: RRR. Pitting edema to BLE, BUE  Respiratory: LS CTAB, equal chest rise b/l   GI: Abdomen soft and non-tender to light palpation. Non-distended. Rectal tube in place  : Anuric  MSK: See neuro.   Skin:  L great toe pale with ecchymosis. No rashes or skin lesions.     Data   I reviewed all medications, new labs and imaging results over the last 24 hours.  Arterial Blood Gases   Recent Labs   Lab 08/22/24  0335 08/22/24  0202 08/22/24  0033 08/21/24  2300   PH 7.36 7.35 7.35 7.34*   PCO2 40 40 40 39   PO2 77* 120* 105 79*   HCO3 23 22 22 21       Complete Blood Count   Recent Labs   Lab 08/22/24  0350 08/21/24  0358 08/20/24  2154 08/20/24  0413   WBC 17.9* 18.3* 15.9* 12.9*   HGB 7.0* 7.9* 7.5* 8.1*    261 249 188       Basic Metabolic Panel  Recent Labs   Lab 08/22/24  0348 08/22/24  0335 08/22/24  0033 08/21/24  1951 08/21/24  1617 08/21/24  0815 08/21/24  0358 08/20/24  2331 08/20/24  2154     --   --   --  135  --  138  --  137   POTASSIUM 4.0  --   --   --  4.9  --  4.1  --  4.1   CHLORIDE 104  --   --   --  102  --  106  --  106   CO2 21*  --   --   --  20*  --  19*  --  21*   BUN 40.1*  --   --   --  42.5*  --  40.6*  --  42.0*   CR 1.60*  --   --   --  1.51*  --  1.73*  --  1.60*   * 139* 133* 133* 173*  184*   < > 111*  112*   < > 112*    < > = values in this interval not displayed.       Liver Function Tests  Recent Labs   Lab 08/22/24  0348 08/21/24  1617 08/21/24  0358 08/20/24  1528 08/20/24  0413 08/19/24  0949 08/19/24  0358   AST 28  --  24  --  25  --  29   ALT 18  --  17  --  18  --  16   ALKPHOS 96  --  93  --  104  --  101   BILITOTAL 0.2  --  0.2  --  0.2  --  0.4   ALBUMIN 3.0* 3.2* 3.1* 3.1* 3.1*   < > 3.2*    < > = values in this  interval not displayed.       Pancreatic Enzymes  No lab results found in last 7 days.    Coagulation Profile  Recent Labs   Lab 08/18/24  0946 08/18/24  0433 08/17/24  2154 08/17/24  1545   PTT 32 45* 47* 49*       IMAGING:  Recent Results (from the past 24 hour(s))   XR Chest Port 1 View    Narrative    EXAM: XR CHEST PORT 1 VIEW  8/21/2024 5:21 PM      HISTORY: air leak    COMPARISON: Same day radiograph    FINDINGS: Single view of the chest. Endotracheal tube tip projects in  the mid thoracic trachea. Feeding tube courses out of the  field-of-view. Esophageal temperature probe tip projects over the  distal thoracic esophagus. Left IJ CVC with tip projecting near the  confluence of the innominate veins.    Trachea is midline. Ill-defined cardiomediastinal silhouette. Grossly  unchanged diffuse airspace opacities. Layering pleural effusions. No  discernible pneumothorax.      Impression    IMPRESSION:   1. Unchanged diffuse patchy airspace opacities. No discernible  pneumothorax.  2. Stable support devices.    I have personally reviewed the examination and initial interpretation  and I agree with the findings.    THELMA PEREZ DO         SYSTEM ID:  [id number]   MR Brain w/o & w Contrast    Impression    RESIDENT PRELIMINARY INTERPRETATION  IMPRESSION:  No acute intracranial pathology. No findings to suggest anoxic brain  injury.   XR Chest Port 1 View    Impression    RESIDENT PRELIMINARY INTERPRETATION  Impression:   1. Interval increase of the bilateral dense pulmonary opacities.  Likely a mixed picture of ARDS pattern and pulmonary edema, with  possible superimposed infection.  2. Small-to-moderate layering effusions at the bases.  3. Stable support devices.  4. Nonspecific gasless appearance of the visualized abdomen.

## 2024-08-22 NOTE — PROGRESS NOTES
Nephrology Progress Note  08/22/2024       Mrs Flaherty is a  53 yof w/ Anxiety, depression, fibromyalgia, hypothyroidism, asthma, HLD, MURIEL on CPAP, expressive aphasia, and hypertension who was admitted to an OSH with community acquired pneumonia on 8/3 s/p intubation.  Found to have severe ARDS requiring paralysis and proning, transferred here on 8/8 for management and initiated on CKRT for severe acidemia in the setting of oligoanuric CHACORTA.  Started CRRT on 8/9 for volume management.      Interval History :   Mrs Flaherty continues on CRRT post VV ECMO decannulation 8/19. Have generally been slightly negative daily but net even yesterday.  Continuing 0-50cc/h to optimize volume/pulm status, requesting tunneled line non-urgently.      Assessment & Recommendations:   CHACORTA-Baseline Cr 0.6, at baseline on admission.  CHACORTA due to septic shock in setting of ARDS, UA on 8/6/2024 essentially bland (30 protein but no blood or casts), no dedicated renal imaging.  No dedicated renal imaging at this time.  Started CRRT 8/9 for volume management, now anuric.    -CHACORTA due to hypoperfusion, started CRRT on 8/9.    -Access is LFV temp line 8/19.   -CRRT, 4k baths, 0-50cc/h volume goal.     -Dialysis consent signed and in chart.      Volume status-Net even yesterday, ordered for 0-50cc/h today.      Electrolytes/pH-Labs stable on 4k baths.      Ca/phos/pth-No acute issues, Phos mildly up but no major intervention needed today.     Anemia-Hgb 8.5 after 1u PRBC's this am, acute management per team.      Nutrition-Vital TF started 8/9.    Time spent: 55 minutes on this date of encounter for chart review, physical exam, medical decision making and co-ordination of care.     Seen and discussed with Dr Donato    Recommendations were communicated to primary team via verbal communication.     DOREEN Sequeira CNS  Clinical Nurse Specialist  [phone]      Review of Systems:   I reviewed the following systems:  ROS not done due to  "vent/sedation.     Physical Exam:   I/O last 3 completed shifts:  In: 3220.94 [I.V.:1022.24; Other:3.7; NG/GT:1055]  Out: 3352.5 [Other:2752.5; Stool:600]   /59   Pulse 82   Temp 99.9  F (37.7  C)   Resp 24   Ht 1.575 m (5' 2\")   Wt 82.6 kg (182 lb 1.6 oz)   SpO2 100%   BMI 33.31 kg/m       GENERAL APPEARANCE: Intubated and sedated and paralyzed  Pulmonary: Intubated and sedated,   CV: regular rhythm, normal rate   - Edema 1-2+ diffuse  GI: soft, nontender, normal bowel sounds  MS: no evidence of inflammation in joints, no muscle tenderness  SKIN:  warm, dry  NEURO: No focal deficits    Labs:   All labs reviewed by me  Electrolytes/Renal -   Recent Labs   Lab Test 08/22/24  0819 08/22/24  0348 08/22/24  0335 08/21/24  1951 08/21/24  1617 08/21/24  0815 08/21/24  0358   NA  --  136  --   --  135  --  138   POTASSIUM  --  4.0  --   --  4.9  --  4.1   CHLORIDE  --  104  --   --  102  --  106   CO2  --  21*  --   --  20*  --  19*   BUN  --  40.1*  --   --  42.5*  --  40.6*   CR  --  1.60*  --   --  1.51*  --  1.73*   * 142* 139*   < > 173*  184*   < > 111*  112*   QUAN  --  8.2*  --   --  8.4*  --  8.2*   MAG  --  2.5*  --   --  2.5*  --  2.4*   PHOS  --  5.4*  --   --  6.4*  --  5.7*    < > = values in this interval not displayed.       CBC -   Recent Labs   Lab Test 08/22/24  0350 08/21/24  0358 08/20/24  2154   WBC 17.9* 18.3* 15.9*   HGB 7.0* 7.9* 7.5*    261 249       LFTs -   Recent Labs   Lab Test 08/22/24  0348 08/21/24  1617 08/21/24  0358 08/20/24  1528 08/20/24  0413   ALKPHOS 96  --  93  --  104   BILITOTAL 0.2  --  0.2  --  0.2   ALT 18  --  17  --  18   AST 28  --  24  --  25   PROTTOTAL 5.7*  --  5.8*  --  5.6*   ALBUMIN 3.0* 3.2* 3.1*   < > 3.1*    < > = values in this interval not displayed.       Iron Panel - No lab results found.        Current Medications:  Current Facility-Administered Medications   Medication Dose Route Frequency Provider Last Rate Last Admin    " acetaminophen (TYLENOL) tablet 975 mg  975 mg Oral or Feeding Tube Q8H Andres Payne PA-C   975 mg at 08/21/24 2322    acetylcysteine (MUCOMYST) 10 % nebulizer solution 4 mL  4 mL Nebulization 4x Daily Barry Hatch MD   4 mL at 08/21/24 1936    amitriptyline (ELAVIL) tablet 50 mg  50 mg Oral or Feeding Tube At Bedtime Barry Hatch MD   50 mg at 08/21/24 2211    artificial tears ophthalmic ointment   Both Eyes Q8H de Tiara Macedo, CNP   Given at 08/21/24 2318    budesonide (PULMICORT) neb solution 1 mg  1 mg Nebulization Daily Andres Payne PA-C        chlorhexidine (PERIDEX) 0.12 % solution 15 mL  15 mL Mouth/Throat Q12H Giovanny Guidry PA-C   15 mL at 08/21/24 1939    fiber modular (BANATROL TF) packet 1 packet  1 packet Per Feeding Tube TID Cal Lisa MD   1 packet at 08/21/24 1940    insulin aspart (NovoLOG) injection (RAPID ACTING)  1-12 Units Subcutaneous Q4H Aniceto Adame MD   2 Units at 08/21/24 1640    ipratropium - albuterol 0.5 mg/2.5 mg/3 mL (DUONEB) neb solution 3 mL  3 mL Nebulization 4x daily Barry Hatch MD   3 mL at 08/21/24 1936    levothyroxine (SYNTHROID/LEVOTHROID) tablet 25 mcg  25 mcg Per Feeding Tube QAM AC Giovanny Guidry PA-C   25 mcg at 08/21/24 0838    loperamide (IMODIUM) capsule 4 mg  4 mg Oral or Feeding Tube Q6H Barry Hatch MD   4 mg at 08/22/24 0155    multivitamin RENAL (RENAVITE RX/NEPHROVITE) tablet 1 tablet  1 tablet Oral or Feeding Tube Daily Giovanny Guidry PA-C   1 tablet at 08/21/24 0837    oxyCODONE IR (ROXICODONE) tablet 10 mg  10 mg Oral or Feeding Tube Q4H Anika Mead MD   10 mg at 08/22/24 0351    pantoprazole (PROTONIX) 2 mg/mL suspension 40 mg  40 mg Per Feeding Tube QAM AC Barry Hatch MD   40 mg at 08/21/24 0836    Prosource TF20 ENfit Compatibl EN LIQD (PROSOURCE TF20) packet 60 mL  1 packet Per Feeding Tube TID Giovanny Guidry PA-C   60 mL at 08/21/24 1940    QUEtiapine (SEROquel) tablet 25 mg  25 mg  Oral or Feeding Tube BID Andres Payne PA-C   25 mg at 08/21/24 1940    venlafaxine (EFFEXOR) tablet 75 mg  75 mg Oral or Feeding Tube BID Barry Hatch MD   75 mg at 08/21/24 1939     Current Facility-Administered Medications   Medication Dose Route Frequency Provider Last Rate Last Admin    dexmedeTOMIDine (PRECEDEX) 4 mcg/mL in sodium chloride 0.9 % 100 mL infusion  0.1-1.2 mcg/kg/hr (Dosing Weight) Intravenous Continuous de La MaterTiara CNP 26.7 mL/hr at 08/22/24 0708 1.2 mcg/kg/hr at 08/22/24 0708    dextrose 10% infusion   Intravenous Continuous PRN Giovanny Guidry PA-C        dialysate for CVVHD & CVVHDF (Phoxillum BK4/2.5)  12.5 mL/kg/hr CRRT Continuous Dakota Dorsey APRN CNS 1,100 mL/hr at 08/22/24 0251 12.5 mL/kg/hr at 08/22/24 0251    heparin (porcine) 20,000 units in 20 mL ANTICOAGULANT infusion (syringe from pharmacy)  500 Units/hr CRRT Continuous Dakota Dorsey APRN CNS 0.5 mL/hr at 08/22/24 0700 500 Units/hr at 08/22/24 0700    midazolam (VERSED) 100 mg/100 mL NS infusion - ADULT  1 mg/hr Intravenous Continuous de La Mater, Tiara Carlee CNP   Stopped at 08/22/24 0815    No POST-filter replacement required   Does not apply Continuous PRN Dakota Dorsey APRN CNS        norepinephrine (LEVOPHED) 16 mg in  mL infusion MAX CONC CENTRAL LINE  0.01-0.6 mcg/kg/min (Dosing Weight) Intravenous Continuous Cal Lisa MD 2.5 mL/hr at 08/22/24 0700 0.03 mcg/kg/min at 08/22/24 0700    PRE-filter replacement solution for CVVHD & CVVHDF (Phoxillum BK4/2.5)  12.5 mL/kg/hr CRRT Continuous Dakota Dorsey APRN CNS 1,100 mL/hr at 08/22/24 0250 12.5 mL/kg/hr at 08/22/24 0250       IEmmanuelle, saw and evaluated this patient as part of a shared visit.  I have reviewed and discussed with the advanced practice provider their history, physical and plan.  I have personally performed the substantive portion of the medical decision making for this visit  - please see the TRI's documentation for the full details  I personally reviewed the vital signs, medications, labs and imaging.    Key findings and management decisions made by me:  CHACORTA on CRRT , for volume and solute control.     Emmanuelle Donato  Date of Service (when I saw the patient): 8/22

## 2024-08-22 NOTE — PROGRESS NOTES
CRRT STATUS NOTE    DATA:  Time:  0522  Pressures WNL:  YES  Filter Status:  WDL    Problems Reported/Alarms Noted:  none    Supplies Present:  YES    ASSESSMENT:    Patient Net Fluid Balance:  -34ml net since midnight       Intake/Output Summary (Last 24 hours) at 8/22/2024 0522  Last data filed at 8/22/2024 0500  Gross per 24 hour   Intake 2900.82 ml   Output 2962.2 ml   Net -61.38 ml       Vital Signs: Temp:  [63.3  F (17.4  C)-100.8  F (38.2  C)] 99.7  F (37.6  C)  Pulse:  [] 111  Resp:  [13-41] 29  BP: ()/(45-67) 114/59  MAP:  [59 mmHg-123 mmHg] 76 mmHg  Arterial Line BP: ()/(43-92) 99/60  FiO2 (%):  [50 %-55 %] 50 %  SpO2:  [93 %-100 %] 97 %       Most Recent BMP's:  Recent Labs   Lab Test 08/22/24  0348 08/21/24  1617 08/21/24  0358 08/20/24 2154 08/20/24  1528 08/20/24  0413    135 138 137 136 136   POTASSIUM 4.0 4.9 4.1 4.1 4.7 4.2   CHLORIDE 104 102 106 106 105 104   CO2 21* 20* 19* 21* 20* 21*   BUN 40.1* 42.5* 40.6* 42.0* 40.7* 36.0*   CR 1.60* 1.51* 1.73* 1.60* 1.47* 1.48*   ANIONGAP 11 13 13 10 11 11   QUAN 8.2* 8.4* 8.2* 8.0* 8.3* 8.1*     Most Recent CBC's:  Recent Labs   Lab Test 08/22/24  0350 08/21/24  0358 08/20/24 2154 08/20/24  0413   WBC 17.9* 18.3* 15.9* 12.9*   HGB 7.0* 7.9* 7.5* 8.1*   MCV 99 100 100 97    261 249 188     Most Recent ABG's:  Recent Labs   Lab Test 08/22/24  0335 08/22/24  0202 08/22/24  0033   PH 7.36 7.35 7.35   PO2 77* 120* 105   PCO2 40 40 40   HCO3 23 22 22   THONG -2.8 -3.3* -3.5*       Goals of Therapy:  0-50ml/hr net negative     INTERVENTIONS:   Restarted at 2241 after patient went to MRI.  Charting reviewed. Rounding completed. Treatment plan discussed with bedside nurse.     PLAN:  Continue with current plan of care please contact CRRT resource with any questions or concerns via [employer].

## 2024-08-22 NOTE — PROGRESS NOTES
[facility]  WO Nurse Inpatient Assessment     Consulted for: ECMO Pressure Injury Prevention- De-cannulated 8/18    Patient History (according to provider note(s):       Massiel Flaherty is a 53 year old female who was admitted to [facility].   Past medical hx of Anxiety/depression, fibromyalgia, hypothyroidism, asthma, HLD, MURIEL on CPAP, spells, off on anti seizure medications, expressive aphasia, hypertension was seen at [facility] and was placed on Augmentin outpatient on 7/30/24.   She admitted to the hospital on 8/3/24 for fatigue, fever and dyspnea and intubated 8/6/24 at OSH, proned and paralyzed without improvement. Transferred to [facility] 8/8/24, hypoxic with high plateaus/peaks and placed on VV EMCO and CRRT.     Assessment:      ECMO cannula location: Right IJ ECMO cannula and Right groin ECMO cannula  Areas assessed: Mouth, Ears, Occiput, Hands, Sacrum/Coccyx, Perianal, Feet, and Heels  Positioning tolerance: Good  Date of ECMO cannulation: 8/8/2024- de-cannulated 8/18  Presence of ischemia: No and scattered bruising to bilateral feet and right heel- photos taken and in chart under media tab.  Location of ischemia: N/A  Pressure Injury Present::No  8/15:  Initially some concern for possible tongue wound, photo taken, however mucus easily wiped away shortly after photo and no clear injury visualized, does appear as though patient may be developing thrush though, RN notified. Blanchable erythema present on back of the head, but Z-flow in place and band holding ECMO cannulas does not appear to be consistently applying pressure. EEG monitoring in place, discussed with RN.   8/22: Lips with some open areas- appear to be more likely related to trauma or possible viral lesions. MD and RN notified. Photos available under media tab. Occiput with small scattered erythema likely related to prior EEG monitoring placement and not pressure.   Pressure Injury Prevention  "Interventions In Place:  Optifoam Dressing under ECMO Cannula, Z flow Positioner under head, Pillows for repositioning, TAPs Wedge Positioners in use, Heel off-loading boots, and Mepilex Sacral Dressing        Pressure Injury Prevention Interventions Added:  None- all interventions in place.      Treatment Plan:   Pressure Injury Prevention (PIP) Plan:  If patient is declining pressure injury prevention interventions: Explore reason why and address patient's concerns, Educate on pressure injury risk and prevention intervention(s), If patient is still declining, document \"informed refusal\" , and Ensure Care team is aware ( provider, charge nurse, etc)  Mattress: Follow bed algorithm, reassess daily and order specialty mattress, if indicated.  HOB: Maintain at or below 30 degrees, unless contraindicated  Repositioning in bed: Every 1-2 hours , Left/right positioning; avoid supine, Raise foot of bed prior to raising head of bed, to reduce patient sliding down (shear), and Frequent microturns using positioning wedges, as patient tolerates  Heels: Keep elevated off mattress, Pillows under calves, and Heel lift boots  Protective Dressing: Sacral Mepilex for prevention (#650784),  especially for the agitated patient   Positioning Equipment:Fluidized positioner (#[id number]) under head  Chair positioning: Assist patient to reposition hourly and Do NOT use a donut for sitting (this increases pressure to smaller area and creates a higher potential for injury)    If patient has a buttock pressure injury, or high risk for PI use chair cushion or SPS.  Moisture Management: Perineal cleansing /protection: Follow Incontinence Protocol, Avoid brief in bed, Clean and dry skin folds with bathing , Consider InterDry (#215378) between folds, and Moisturize dry skin  Under Devices: Inspect skin under all medical devices during skin inspection , Ensure tubes are stabilized without tension, and Ensure patient is not lying on medical " devices or equipment when repositioned  Ask provider to discontinue device when no longer needed.    Lip wounds: per unit routine   oral cares and ETT positioning per unit routine. Ensure to disengage tongue from bite block with each tube reposition. Ensure tongue is not stuck between teeth and bite block after each position change. Lubricate lips with Vaseline/ mouth moisturizer every shift.           Orders: Written and Updated    RECOMMEND PRIMARY TEAM ORDER: None, at this time  Education provided: importance of repositioning, plan of care, and Off-loading pressure  Discussed plan of care with: Nurse and Nurse Practitioner  [facility] nurse follow-up plan: signing off  Notify [facility] if wound(s) deteriorate.  Nursing to notify the Provider(s) and re-consult the [facility] Nurse if new skin concern.    DATA:     Current support surface: Standard  Low air loss (DIA pump, Isolibrium, Pulsate)  Containment of urine/stool: Incontinent pad in bed, Indwelling catheter, and External rectal pouch  BMI: Body mass index is 33.31 kg/m .   Active diet order: Orders Placed This Encounter      NPO for Medical/Clinical Reasons Except for: Meds, NPO but receiving Tube Feeding     Output: I/O last 3 completed shifts:  In: 3220.94 [I.V.:1022.24; Other:3.7; NG/GT:1055]  Out: 3352.5 [Other:2752.5; Stool:600]     Labs:   Recent Labs   Lab 08/22/24  0818 08/22/24  0350 08/22/24  0348 08/19/24  1544 08/19/24  0949   ALBUMIN  --   --  3.0*   < > 3.1*   HGB 8.5* 7.0*  --    < > 8.2*   WBC  --  17.9*  --    < > 16.0*   A1C  --   --   --   --  5.7*    < > = values in this interval not displayed.     Pressure injury risk assessment:   Sensory Perception: 1-->completely limited  Moisture: 4-->rarely moist  Activity: 1-->bedfast  Mobility: 1-->completely immobile  Nutrition: 3-->adequate  Friction and Shear: 1-->problem  Kade Score: 11    Jaycee Pal RN, CWOCN  Pager no longer is use, please contact through [employer] group: [facility] Nurse [place]  [facility]  Dept. Office Number: [phone]

## 2024-08-22 NOTE — PROGRESS NOTES
CRRT STATUS NOTE    DATA:  Time:  6:14 PM  Pressures WNL:  YES  Filter Status:  WDL    Problems Reported/Alarms Noted:  High filter pressure, circuit change x 1    Supplies Present:  YES    ASSESSMENT:  Patient Net Fluid Balance:  -200mL  Vital Signs:  B/P: 114/59, T: 98.6, P: 85, R: 20   Goals of Therapy:  0-50ml/hr, not meeting therapy goal due to hemodynamic instability.    INTERVENTIONS:   Circuit change x 1 due to high pressures.    PLAN:  Continue current plan of care.

## 2024-08-22 NOTE — CONSULTS
"      [facility] Consult Service Note  Interventional Radiology  08/22/24   12:35 PM    Consult Requested: \"Placement of tunneled HD line\"    Recommendations/Plan:    Patient is approved for IR tunneled central venous catheter placement on 8/23/24.    Timing of procedure is TBD based on IR staffing/schedule and triage.  Please contact the IR charge RN at [phone] for estimated time of procedure.     Labs WNL for procedure.    Orders entered for procedure, NPO status (post-pyloric tube feeds), and pre procedure IV antibiotics (signed/held).   Medications to be held include: N/A  Informed consent will be completed prior to procedure.     Case and imaging discussed with IR attending Dr. Eduardo Katz MD   Recommendations were reviewed with Mariza Carroll.    History of Present Illness:  Massiel Flaherty is a 53 year old female with a past medical hx of Anxiety/depression, fibromyalgia, hypothyroidism, asthma, HLD, MURIEL on CPAP, spells, off on anti seizure medications, expressive aphasia, hypertension was seen in OSH clinic and was placed on Augmentin outpatient on 7/30/24. She admitted to [facility] for fatigue, fever and dyspnea and intubated 8/6/24 at OSH, proned and paralyzed without improvement. Transferred to [facility] 8/8/24, hypoxic with high plateaus/peaks and placed on VV ECMO and CRRT.  Decannulated from VV ECMO 8/18. Patient has a left internal jugular triple lumen catheter, left femoral HD catheter. Patient is currently intubated. Team kept right internal jugular open for a TCVC. Her WBC has been stable, and they stopped abx on the 19th. She has had low grade fevers, but they are not concerned about infection. All blood cultures have been neg.     Pertinent Imaging Reviewed:     Recent Results (from the past 24 hour(s))   XR Chest Port 1 View    Narrative    EXAM: XR CHEST PORT 1 VIEW  8/21/2024 5:21 PM      HISTORY: air leak    COMPARISON: Same day radiograph    FINDINGS: Single " view of the chest. Endotracheal tube tip projects in  the mid thoracic trachea. Feeding tube courses out of the  field-of-view. Esophageal temperature probe tip projects over the  distal thoracic esophagus. Left IJ CVC with tip projecting near the  confluence of the innominate veins.    Trachea is midline. Ill-defined cardiomediastinal silhouette. Grossly  unchanged diffuse airspace opacities. Layering pleural effusions. No  discernible pneumothorax.      Impression    IMPRESSION:   1. Unchanged diffuse patchy airspace opacities. No discernible  pneumothorax.  2. Stable support devices.    I have personally reviewed the examination and initial interpretation  and I agree with the findings.    THELMA PEREZ DO         SYSTEM ID:  [id number]   MR Brain w/o & w Contrast    Narrative    EXAM: MR BRAIN W/O & W CONTRAST  8/21/2024 9:50 PM     HISTORY:  Hx of myoclonic epilepsy, hx of pseudo seizures, concern for  anoxic brain injury secondary asthma exacerbation requiring VV ECMO  now decannulated       COMPARISON:  CT 8/15/2024, 8/10/2024    TECHNIQUE: Sagittal T1-weighted, coronal T2-weighted, axial T2 FLAIR,  axial susceptibility weighted, and axial diffusion-weighted with ADC  map images of the brain were obtained without intravenous contrast.  After the administration of intravenous contrast axial and coronal  fat-suppressed T1-weighted weighted images were obtained    CONTRAST: gadobutrol (GADAVIST) injection 8 mL.    FINDINGS:    There is no mass effect, midline shift, or intracranial hemorrhage.  The ventricles are proportionate to the cerebral sulci. Diffusion and  susceptibility weighted images are negative for acute/focal  abnormality. Major intracranial vascular structures are within normal  limits.  No suspicious contrast enhancement.  No suspicious abnormality of the skull marrow signal. Moderate  paranasal sinus mucosal thickening. Left greater than right mastoid  effusions. No focal abnormality of the  "pituitary gland, sella, skull  base and upper cervical spinal structures on sagittal images. The  orbits are normal.      Impression    IMPRESSION:  No acute intracranial pathology. No findings to suggest anoxic brain  injury. No MRI finding to explain seizures.    I have personally reviewed the examination and initial interpretation  and I agree with the findings.    LEILA PORTILLO MD         SYSTEM ID:  [id number]   XR Chest Port 1 View    Narrative    Exam: XR CHEST PORT 1 VIEW, 8/22/2024 2:09 AM    Indication: ARDS    Comparison: X-ray chest 8/21/2024, multiple priors.    Findings:   Frontal radiograph of the chest, 0043 hours. Endotracheal tube tip is  in the midthoracic trachea approximately 3.1 cm above the ivy.  Stable placement of esophageal temperature probe, feeding tube, left  IJ central venous catheter. Midline trachea. Continued silhouetting of  the cardiac borders. Increased prominence in diffuse interstitial and  airspace opacities throughout all lung fields. Likely increasing  layering effusions at the bases. No definite pneumothorax. Gasless  abdomen.      Impression    Impression:   1. Interval increase of the bilateral dense pulmonary opacities.  Likely a mixed picture of ARDS pattern and pulmonary edema, with  possible superimposed infection.  2. Small-to-moderate layering effusions at the bases.  3. Stable support devices.  4. Nonspecific gasless appearance of the visualized abdomen.    I have personally reviewed the examination and initial interpretation  and I agree with the findings.    KOKO FLETCHER MD         SYSTEM ID:  [id number]       Expected date of discharge:    TBD    Vitals:   /59   Pulse 90   Temp 98.6  F (37  C)   Resp 27   Ht 1.575 m (5' 2\")   Wt 82.6 kg (182 lb 1.6 oz)   SpO2 95%   BMI 33.31 kg/m      Pertinent Labs Reviewed:  CBC:  Lab Results   Component Value Date    WBC 17.9 (H) 08/22/2024    WBC 18.3 (H) 08/21/2024    WBC 15.9 (H) 08/20/2024     Lab " Results   Component Value Date    HGB 8.5 08/22/2024    HGB 7.0 08/22/2024    HGB 7.9 08/21/2024     Lab Results   Component Value Date     08/22/2024     08/21/2024     08/20/2024    INR:  Lab Results   Component Value Date    INR 1.37 (H) 08/12/2024    PTT 32 08/18/2024          COVID Results:  COVID-19 Antibody Results, Testing for Immunity           No data to display              COVID-19 PCR Results          8/10/2024    09:40   COVID-19 PCR Results   SARS CoV2 PCR Negative     Potassium   Date Value Ref Range Status   08/22/2024 4.0 3.4 - 5.3 mmol/L Final          Sri Ortez PA-C  Interventional Radiology  Pager: [phone]

## 2024-08-22 NOTE — PROGRESS NOTES
"Brief Progress Note     Interval summary note to document updates and changes to care plan/s. Please see daily progress note for full assessment and plan/s.     Interval history: WOC RN notified provider of \"viral\" looking sores to patient's mouth. One small sore also noted to tongue. 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

## 2024-08-22 NOTE — PROGRESS NOTES
Brief Palliative Care Note    Palliative medicine will sign off as Massiel has been clinically stable and no active Palliative Care needs are identified.  If there are future changes clinically for which further goals of care conversation or family meeting would be helpful, or new needs for support for this patient and family, please don't hesitate to contact our service or place order for reconsult.    Sole Nunez PA-C  [facility], Palliative Care  Securely message with the [employer] (learn more here) or  Text page via [facility] Paging/Directory

## 2024-08-22 NOTE — PLAN OF CARE
"Goal Outcome Evaluation:      Plan of Care Reviewed With: patient    Overall Patient Progress: improvingOverall Patient Progress: improving    Outcome Evaluation: MRI Brain done this shift.  Hypotensive after 15 mg Oxycodone Q 4 hr so MD changed back to 10 mg Oxycodone Q 4 hr.  Cells 1 unit given for Hgb of 7.0.    D/I:  Major Shift Events: MRI brain completed this shift.  Bladder stimulator MRI mode turned off after MRI completed and therapy left off.  Bladder stimulator battery was 80% charged on previous shift . Mother, Doreen, notified on previous shift to mail  dock for  to [facility].  Bladder  needs to be recharged ( battery charger battery currently depleted ) so it can be used to charge bladder stimulator to 100% charge. If bladder stimulator battery goes < 10% charge then the communicator will no longer work and if the bladder stimulator battery drains completely a medical procedure will be required for bladder simulator to ever work again.  [employer] rep # and help line # are on the \"sticky note\" of epic.  Neuro:  Spontaneously opens eyes.  Wiggles toe to request but no other extremity movement this shift.  Jesenia hugger on for surface warming to prevent shivering.  Tmax 38.2 Esophageal.  Blood warmer on CRRT is OFF  CV:  SR/ST  with no ectopy.  Tachycardia and tachypnea when Versed Gtt off for the 1 hr Pt at MRI even though 2 mg versed bolus given just before she left.  Levo titrated between off and 0.12 mcg/kg/min  to keep MAP>65 or SBP> 110.  Pt became very hypotensive after scheduled oxycodone 15 mg Q 4 hr given but after MD notified and order changed to 10 mg Oxycodone Q 4 hr.  Pt tolerated Oxycodone 10 mg with no drop in BP after 0400 dose.  One unit cells given this shift.  Boluses & Lytes Replacement:  None  Radial A-line is becoming more and more positional and difficult to draw back blood for ABG's from.  Pulm:   Vent Mode: PC-AC FIO2=50%.  RR= 18. Pinsp= 18. PEEP= " 10.  Pt overbreathes vent @ 20's to 30's but was up to 40 on return from MRI ( resolved with PRN Dilaudid and PRN Versed ).  GI:  NJ tube feeding @ goal rate of  35 mL/hr with standard FW of 30 ml Q 4 hrs. Rectal tube in place.   Rectal tube in place with 200 ml watery diarrhea this shift.  :  Anuric with 225 ml on bladder scan @ 0230.  CRRT net fluid gain yesterday over 24 hrs ending @ midnight was 55 ml ( ~ averaging 2 ml/hr ) net fluid gain.  CRRT net fluid removal today so far since midnight is 259 ml ( ~ averaging 32 ml/hr ) net fluid removal  Skin  Lips with scabbed/lesion areas that bleed slightly @ times.  Right groin & Right internal jugular (old ECMO sites) both have a suture in place.  Forehead abrasion and left foot/ankle bruised from pre-hospital fall.  Scattered bruising.  Blanchable redness on sacrum, coccyx and bilateral heel with protective Mepilex dressings in place over each.   Drips:   Versed @ 1 mg/hr  Precedex @ 1.2 mcg/kg/hr  Levophed @ 0.03 mcg/kg/min.  Labs:  Hgb= 7.0 @ 0400 > 1 unit Cells given.     A/P:  Titrate Levophed to keep MAP > 65 or SBP > 110.  Titrate FIO2 to keep PaO2 > 60% with SpO2 > 92% as MD ordered.  Current ordered goal for CRRT net fluid removal  is 0 - 50 ml/hr. Bladder scan Q shift.

## 2024-08-23 ENCOUNTER — APPOINTMENT (OUTPATIENT)
Dept: INTERVENTIONAL RADIOLOGY/VASCULAR | Facility: CLINIC | Age: 54
DRG: 003 | End: 2024-08-23
Attending: PHYSICIAN ASSISTANT
Payer: MEDICAID

## 2024-08-23 ENCOUNTER — APPOINTMENT (OUTPATIENT)
Dept: GENERAL RADIOLOGY | Facility: CLINIC | Age: 54
DRG: 003 | End: 2024-08-23
Attending: STUDENT IN AN ORGANIZED HEALTH CARE EDUCATION/TRAINING PROGRAM
Payer: MEDICAID

## 2024-08-23 LAB
ALBUMIN SERPL BCG-MCNC: 3 G/DL (ref 3.5–5.2)
ALBUMIN SERPL BCG-MCNC: 3 G/DL (ref 3.5–5.2)
ALLEN'S TEST: ABNORMAL
ALP SERPL-CCNC: 90 U/L (ref 40–150)
ALT SERPL W P-5'-P-CCNC: 16 U/L (ref 0–50)
ANION GAP SERPL CALCULATED.3IONS-SCNC: 10 MMOL/L (ref 7–15)
ANION GAP SERPL CALCULATED.3IONS-SCNC: 11 MMOL/L (ref 7–15)
AST SERPL W P-5'-P-CCNC: 20 U/L (ref 0–45)
BACTERIA BRONCH: NORMAL
BACTERIA BRONCH: NORMAL
BASE EXCESS BLDA CALC-SCNC: -1.4 MMOL/L (ref -3–3)
BASE EXCESS BLDA CALC-SCNC: -1.5 MMOL/L (ref -3–3)
BASE EXCESS BLDA CALC-SCNC: -2.2 MMOL/L (ref -3–3)
BASE EXCESS BLDA CALC-SCNC: -2.4 MMOL/L (ref -3–3)
BILIRUB DIRECT SERPL-MCNC: <0.2 MG/DL (ref 0–0.3)
BILIRUB SERPL-MCNC: 0.2 MG/DL
BLD PROD TYP BPU: NORMAL
BLOOD COMPONENT TYPE: NORMAL
BUN SERPL-MCNC: 27.5 MG/DL (ref 6–20)
BUN SERPL-MCNC: 30.2 MG/DL (ref 6–20)
CA-I BLD-MCNC: 4.7 MG/DL (ref 4.4–5.2)
CA-I BLD-MCNC: 4.7 MG/DL (ref 4.4–5.2)
CALCIUM SERPL-MCNC: 8.2 MG/DL (ref 8.8–10.4)
CALCIUM SERPL-MCNC: 8.4 MG/DL (ref 8.8–10.4)
CHLORIDE SERPL-SCNC: 105 MMOL/L (ref 98–107)
CHLORIDE SERPL-SCNC: 105 MMOL/L (ref 98–107)
CODING SYSTEM: NORMAL
COHGB MFR BLD: 91 % (ref 96–97)
COHGB MFR BLD: 99.2 % (ref 96–97)
COHGB MFR BLD: 99.3 % (ref 96–97)
COHGB MFR BLD: 99.8 % (ref 96–97)
CREAT SERPL-MCNC: 1.51 MG/DL (ref 0.51–0.95)
CREAT SERPL-MCNC: 1.71 MG/DL (ref 0.51–0.95)
CROSSMATCH: NORMAL
EGFRCR SERPLBLD CKD-EPI 2021: 35 ML/MIN/1.73M2
EGFRCR SERPLBLD CKD-EPI 2021: 41 ML/MIN/1.73M2
ERYTHROCYTE [DISTWIDTH] IN BLOOD BY AUTOMATED COUNT: 19.2 % (ref 10–15)
GLUCOSE BLDC GLUCOMTR-MCNC: 112 MG/DL (ref 70–99)
GLUCOSE BLDC GLUCOMTR-MCNC: 114 MG/DL (ref 70–99)
GLUCOSE BLDC GLUCOMTR-MCNC: 118 MG/DL (ref 70–99)
GLUCOSE BLDC GLUCOMTR-MCNC: 118 MG/DL (ref 70–99)
GLUCOSE BLDC GLUCOMTR-MCNC: 122 MG/DL (ref 70–99)
GLUCOSE BLDC GLUCOMTR-MCNC: 127 MG/DL (ref 70–99)
GLUCOSE BLDC GLUCOMTR-MCNC: 137 MG/DL (ref 70–99)
GLUCOSE SERPL-MCNC: 123 MG/DL (ref 70–99)
GLUCOSE SERPL-MCNC: 128 MG/DL (ref 70–99)
HANTAVIRUS IGG SER-ACNC: NEGATIVE
HANTAVIRUS IGM SER-ACNC: NEGATIVE
HCO3 BLD-SCNC: 23 MMOL/L (ref 21–28)
HCO3 BLD-SCNC: 24 MMOL/L (ref 21–28)
HCO3 SERPL-SCNC: 20 MMOL/L (ref 22–29)
HCO3 SERPL-SCNC: 22 MMOL/L (ref 22–29)
HCT VFR BLD AUTO: 24.2 % (ref 35–47)
HGB BLD-MCNC: 6.8 G/DL (ref 11.7–15.7)
HGB BLD-MCNC: 7.5 G/DL (ref 11.7–15.7)
HGB BLD-MCNC: 8.1 G/DL (ref 11.7–15.7)
ISSUE DATE AND TIME: NORMAL
MAGNESIUM SERPL-MCNC: 2.4 MG/DL (ref 1.7–2.3)
MAGNESIUM SERPL-MCNC: 2.5 MG/DL (ref 1.7–2.3)
MCH RBC QN AUTO: 30.4 PG (ref 26.5–33)
MCHC RBC AUTO-ENTMCNC: 31 G/DL (ref 31.5–36.5)
MCV RBC AUTO: 98 FL (ref 78–100)
O2/TOTAL GAS SETTING VFR VENT: 40 %
O2/TOTAL GAS SETTING VFR VENT: 45 %
PCO2 BLD: 38 MM HG (ref 35–45)
PCO2 BLD: 38 MM HG (ref 35–45)
PCO2 BLD: 40 MM HG (ref 35–45)
PCO2 BLD: 44 MM HG (ref 35–45)
PEEP: 10 CM H2O
PH BLD: 7.35 [PH] (ref 7.35–7.45)
PH BLD: 7.36 [PH] (ref 7.35–7.45)
PH BLD: 7.39 [PH] (ref 7.35–7.45)
PH BLD: 7.39 [PH] (ref 7.35–7.45)
PHOSPHATE SERPL-MCNC: 5.1 MG/DL (ref 2.5–4.5)
PHOSPHATE SERPL-MCNC: 5.7 MG/DL (ref 2.5–4.5)
PLATELET # BLD AUTO: 227 10E3/UL (ref 150–450)
PO2 BLD: 103 MM HG (ref 80–105)
PO2 BLD: 105 MM HG (ref 80–105)
PO2 BLD: 59 MM HG (ref 80–105)
PO2 BLD: 86 MM HG (ref 80–105)
POTASSIUM SERPL-SCNC: 4.1 MMOL/L (ref 3.4–5.3)
POTASSIUM SERPL-SCNC: 4.3 MMOL/L (ref 3.4–5.3)
PROT SERPL-MCNC: 5.3 G/DL (ref 6.4–8.3)
RBC # BLD AUTO: 2.47 10E6/UL (ref 3.8–5.2)
SAO2 % BLDA: 89 % (ref 92–100)
SAO2 % BLDA: 97 % (ref 92–100)
SODIUM SERPL-SCNC: 136 MMOL/L (ref 135–145)
SODIUM SERPL-SCNC: 137 MMOL/L (ref 135–145)
UFH PPP CHRO-ACNC: <0.1 IU/ML
UNIT ABO/RH: NORMAL
UNIT NUMBER: NORMAL
UNIT STATUS: NORMAL
UNIT TYPE ISBT: 9500
WBC # BLD AUTO: 16.9 10E3/UL (ref 4–11)
WNV IGG SER IA-ACNC: 0.51 IV
WNV IGM SER IA-ACNC: 0 IV

## 2024-08-23 PROCEDURE — 82947 ASSAY GLUCOSE BLOOD QUANT: CPT | Performed by: CLINICAL NURSE SPECIALIST

## 2024-08-23 PROCEDURE — 250N000011 HC RX IP 250 OP 636: Performed by: STUDENT IN AN ORGANIZED HEALTH CARE EDUCATION/TRAINING PROGRAM

## 2024-08-23 PROCEDURE — 85018 HEMOGLOBIN: CPT

## 2024-08-23 PROCEDURE — 99152 MOD SED SAME PHYS/QHP 5/>YRS: CPT | Mod: GC | Performed by: STUDENT IN AN ORGANIZED HEALTH CARE EDUCATION/TRAINING PROGRAM

## 2024-08-23 PROCEDURE — 80051 ELECTROLYTE PANEL: CPT | Performed by: CLINICAL NURSE SPECIALIST

## 2024-08-23 PROCEDURE — 90947 DIALYSIS REPEATED EVAL: CPT

## 2024-08-23 PROCEDURE — 85027 COMPLETE CBC AUTOMATED: CPT | Performed by: PHYSICIAN ASSISTANT

## 2024-08-23 PROCEDURE — 99233 SBSQ HOSP IP/OBS HIGH 50: CPT | Mod: 25 | Performed by: INTERNAL MEDICINE

## 2024-08-23 PROCEDURE — 250N000009 HC RX 250: Performed by: SURGERY

## 2024-08-23 PROCEDURE — 94640 AIRWAY INHALATION TREATMENT: CPT | Mod: 76

## 2024-08-23 PROCEDURE — 36558 INSERT TUNNELED CV CATH: CPT | Mod: RT | Performed by: STUDENT IN AN ORGANIZED HEALTH CARE EDUCATION/TRAINING PROGRAM

## 2024-08-23 PROCEDURE — 250N000011 HC RX IP 250 OP 636: Mod: JZ | Performed by: NURSE PRACTITIONER

## 2024-08-23 PROCEDURE — 94003 VENT MGMT INPAT SUBQ DAY: CPT

## 2024-08-23 PROCEDURE — 250N000013 HC RX MED GY IP 250 OP 250 PS 637: Performed by: STUDENT IN AN ORGANIZED HEALTH CARE EDUCATION/TRAINING PROGRAM

## 2024-08-23 PROCEDURE — 250N000013 HC RX MED GY IP 250 OP 250 PS 637

## 2024-08-23 PROCEDURE — 999N000185 HC STATISTIC TRANSPORT TIME EA 15 MIN

## 2024-08-23 PROCEDURE — 200N000002 HC R&B ICU UMMC

## 2024-08-23 PROCEDURE — 76937 US GUIDE VASCULAR ACCESS: CPT | Mod: 26 | Performed by: STUDENT IN AN ORGANIZED HEALTH CARE EDUCATION/TRAINING PROGRAM

## 2024-08-23 PROCEDURE — C1769 GUIDE WIRE: HCPCS

## 2024-08-23 PROCEDURE — 250N000009 HC RX 250

## 2024-08-23 PROCEDURE — C1750 CATH, HEMODIALYSIS,LONG-TERM: HCPCS

## 2024-08-23 PROCEDURE — 87040 BLOOD CULTURE FOR BACTERIA: CPT

## 2024-08-23 PROCEDURE — 71045 X-RAY EXAM CHEST 1 VIEW: CPT

## 2024-08-23 PROCEDURE — 82248 BILIRUBIN DIRECT: CPT | Performed by: CLINICAL NURSE SPECIALIST

## 2024-08-23 PROCEDURE — 250N000009 HC RX 250: Performed by: STUDENT IN AN ORGANIZED HEALTH CARE EDUCATION/TRAINING PROGRAM

## 2024-08-23 PROCEDURE — 02H633Z INSERTION OF INFUSION DEVICE INTO RIGHT ATRIUM, PERCUTANEOUS APPROACH: ICD-10-PCS | Performed by: STUDENT IN AN ORGANIZED HEALTH CARE EDUCATION/TRAINING PROGRAM

## 2024-08-23 PROCEDURE — 999N000157 HC STATISTIC RCP TIME EA 10 MIN

## 2024-08-23 PROCEDURE — 82330 ASSAY OF CALCIUM: CPT | Performed by: CLINICAL NURSE SPECIALIST

## 2024-08-23 PROCEDURE — 71045 X-RAY EXAM CHEST 1 VIEW: CPT | Mod: 26 | Performed by: STUDENT IN AN ORGANIZED HEALTH CARE EDUCATION/TRAINING PROGRAM

## 2024-08-23 PROCEDURE — 99152 MOD SED SAME PHYS/QHP 5/>YRS: CPT

## 2024-08-23 PROCEDURE — 272N000504 HC NEEDLE CR4

## 2024-08-23 PROCEDURE — 250N000009 HC RX 250: Performed by: NURSE PRACTITIONER

## 2024-08-23 PROCEDURE — 250N000011 HC RX IP 250 OP 636: Performed by: PHYSICIAN ASSISTANT

## 2024-08-23 PROCEDURE — 83735 ASSAY OF MAGNESIUM: CPT | Performed by: CLINICAL NURSE SPECIALIST

## 2024-08-23 PROCEDURE — 0JH63XZ INSERTION OF TUNNELED VASCULAR ACCESS DEVICE INTO CHEST SUBCUTANEOUS TISSUE AND FASCIA, PERCUTANEOUS APPROACH: ICD-10-PCS | Performed by: STUDENT IN AN ORGANIZED HEALTH CARE EDUCATION/TRAINING PROGRAM

## 2024-08-23 PROCEDURE — 250N000011 HC RX IP 250 OP 636

## 2024-08-23 PROCEDURE — 99207 PR NO BILLABLE SERVICE THIS VISIT: CPT | Performed by: SURGERY

## 2024-08-23 PROCEDURE — 250N000013 HC RX MED GY IP 250 OP 250 PS 637: Performed by: PHYSICIAN ASSISTANT

## 2024-08-23 PROCEDURE — 82805 BLOOD GASES W/O2 SATURATION: CPT

## 2024-08-23 PROCEDURE — 250N000013 HC RX MED GY IP 250 OP 250 PS 637: Performed by: SURGERY

## 2024-08-23 PROCEDURE — 272N000192 HC ACCESSORY CR2

## 2024-08-23 PROCEDURE — 85520 HEPARIN ASSAY: CPT | Performed by: SURGERY

## 2024-08-23 PROCEDURE — 77001 FLUOROGUIDE FOR VEIN DEVICE: CPT | Mod: 26 | Performed by: STUDENT IN AN ORGANIZED HEALTH CARE EDUCATION/TRAINING PROGRAM

## 2024-08-23 PROCEDURE — P9016 RBC LEUKOCYTES REDUCED: HCPCS

## 2024-08-23 PROCEDURE — 250N000009 HC RX 250: Performed by: CLINICAL NURSE SPECIALIST

## 2024-08-23 PROCEDURE — 36415 COLL VENOUS BLD VENIPUNCTURE: CPT

## 2024-08-23 PROCEDURE — 99291 CRITICAL CARE FIRST HOUR: CPT

## 2024-08-23 RX ORDER — QUETIAPINE FUMARATE 25 MG/1
25 TABLET, FILM COATED ORAL 2 TIMES DAILY PRN
Status: DISCONTINUED | OUTPATIENT
Start: 2024-08-23 | End: 2024-08-28

## 2024-08-23 RX ORDER — CETIRIZINE HYDROCHLORIDE 10 MG/1
10 TABLET ORAL DAILY
Status: DISCONTINUED | OUTPATIENT
Start: 2024-08-23 | End: 2024-08-27

## 2024-08-23 RX ORDER — GABAPENTIN 300 MG/1
300 CAPSULE ORAL 3 TIMES DAILY
Status: DISCONTINUED | OUTPATIENT
Start: 2024-08-23 | End: 2024-09-11

## 2024-08-23 RX ORDER — NOREPINEPHRINE BITARTRATE 0.06 MG/ML
.01-.6 INJECTION, SOLUTION INTRAVENOUS CONTINUOUS
Status: DISCONTINUED | OUTPATIENT
Start: 2024-08-23 | End: 2024-09-10

## 2024-08-23 RX ORDER — QUETIAPINE FUMARATE 50 MG/1
50 TABLET, FILM COATED ORAL 2 TIMES DAILY PRN
Status: DISCONTINUED | OUTPATIENT
Start: 2024-08-23 | End: 2024-08-23

## 2024-08-23 RX ORDER — HEPARIN SODIUM 1000 [USP'U]/ML
3 INJECTION, SOLUTION INTRAVENOUS; SUBCUTANEOUS ONCE
Status: DISCONTINUED | OUTPATIENT
Start: 2024-08-23 | End: 2024-08-23

## 2024-08-23 RX ORDER — MONTELUKAST SODIUM 10 MG/1
10 TABLET ORAL AT BEDTIME
Status: DISCONTINUED | OUTPATIENT
Start: 2024-08-23 | End: 2024-08-27

## 2024-08-23 RX ORDER — AMINO ACIDS/PROTEIN HYDROLYS 11G-40/45
1 LIQUID IN PACKET (ML) ORAL 2 TIMES DAILY
Status: DISCONTINUED | OUTPATIENT
Start: 2024-08-23 | End: 2024-09-04

## 2024-08-23 RX ORDER — ACYCLOVIR 200 MG/5ML
400 SUSPENSION ORAL 3 TIMES DAILY
Status: DISCONTINUED | OUTPATIENT
Start: 2024-08-23 | End: 2024-08-24

## 2024-08-23 RX ORDER — CLONAZEPAM 0.5 MG/1
0.5 TABLET ORAL 2 TIMES DAILY
Status: DISCONTINUED | OUTPATIENT
Start: 2024-08-23 | End: 2024-08-25

## 2024-08-23 RX ORDER — GABAPENTIN 250 MG/5ML
300 SOLUTION ORAL 3 TIMES DAILY
Status: DISCONTINUED | OUTPATIENT
Start: 2024-08-23 | End: 2024-08-23

## 2024-08-23 RX ORDER — CEFAZOLIN SODIUM 2 G/100ML
2 INJECTION, SOLUTION INTRAVENOUS
Status: DISCONTINUED | OUTPATIENT
Start: 2024-08-23 | End: 2024-08-23

## 2024-08-23 RX ADMIN — CALCIUM CHLORIDE, MAGNESIUM CHLORIDE, SODIUM CHLORIDE, SODIUM BICARBONATE, POTASSIUM CHLORIDE AND SODIUM PHOSPHATE DIBASIC DIHYDRATE 12.5 ML/KG/HR: 3.68; 3.05; 6.34; 3.09; .314; .187 INJECTION INTRAVENOUS at 19:03

## 2024-08-23 RX ADMIN — ACETAMINOPHEN 975 MG: 325 TABLET, FILM COATED ORAL at 17:27

## 2024-08-23 RX ADMIN — Medication 40 MG: at 07:43

## 2024-08-23 RX ADMIN — CLONAZEPAM 0.5 MG: 0.5 TABLET ORAL at 20:04

## 2024-08-23 RX ADMIN — GABAPENTIN 300 MG: 300 CAPSULE ORAL at 14:04

## 2024-08-23 RX ADMIN — OXYCODONE HYDROCHLORIDE 10 MG: 10 TABLET ORAL at 17:27

## 2024-08-23 RX ADMIN — IPRATROPIUM BROMIDE AND ALBUTEROL SULFATE 3 ML: .5; 3 SOLUTION RESPIRATORY (INHALATION) at 17:12

## 2024-08-23 RX ADMIN — ACETYLCYSTEINE 4 ML: 100 SOLUTION ORAL; RESPIRATORY (INHALATION) at 08:44

## 2024-08-23 RX ADMIN — ACYCLOVIR 400 MG: 200 SUSPENSION ORAL at 20:04

## 2024-08-23 RX ADMIN — AMITRIPTYLINE HYDROCHLORIDE 50 MG: 50 TABLET, FILM COATED ORAL at 22:06

## 2024-08-23 RX ADMIN — OXYCODONE HYDROCHLORIDE 10 MG: 10 TABLET ORAL at 22:06

## 2024-08-23 RX ADMIN — OXYCODONE HYDROCHLORIDE 10 MG: 10 TABLET ORAL at 01:57

## 2024-08-23 RX ADMIN — CALCIUM CHLORIDE, MAGNESIUM CHLORIDE, SODIUM CHLORIDE, SODIUM BICARBONATE, POTASSIUM CHLORIDE AND SODIUM PHOSPHATE DIBASIC DIHYDRATE 12.5 ML/KG/HR: 3.68; 3.05; 6.34; 3.09; .314; .187 INJECTION INTRAVENOUS at 10:46

## 2024-08-23 RX ADMIN — HEPARIN SODIUM 2000 UNITS: 1000 INJECTION INTRAVENOUS; SUBCUTANEOUS at 16:53

## 2024-08-23 RX ADMIN — ACETYLCYSTEINE 4 ML: 100 SOLUTION ORAL; RESPIRATORY (INHALATION) at 20:32

## 2024-08-23 RX ADMIN — CHLORHEXIDINE GLUCONATE 0.12% ORAL RINSE 15 ML: 1.2 LIQUID ORAL at 19:50

## 2024-08-23 RX ADMIN — WHITE PETROLATUM 57.7 %-MINERAL OIL 31.9 % EYE OINTMENT: at 07:44

## 2024-08-23 RX ADMIN — LEVOTHYROXINE SODIUM 25 MCG: 0.03 TABLET ORAL at 07:43

## 2024-08-23 RX ADMIN — ACETAMINOPHEN 975 MG: 325 TABLET, FILM COATED ORAL at 23:51

## 2024-08-23 RX ADMIN — DEXMEDETOMIDINE HYDROCHLORIDE IN SODIUM CHLORIDE 1 MCG/KG/HR: 4 INJECTION INTRAVENOUS at 03:33

## 2024-08-23 RX ADMIN — CALCIUM CHLORIDE, MAGNESIUM CHLORIDE, SODIUM CHLORIDE, SODIUM BICARBONATE, POTASSIUM CHLORIDE AND SODIUM PHOSPHATE DIBASIC DIHYDRATE 12.5 ML/KG/HR: 3.68; 3.05; 6.34; 3.09; .314; .187 INJECTION INTRAVENOUS at 23:06

## 2024-08-23 RX ADMIN — HYDROMORPHONE HYDROCHLORIDE 0.2 MG: 0.2 INJECTION, SOLUTION INTRAMUSCULAR; INTRAVENOUS; SUBCUTANEOUS at 04:42

## 2024-08-23 RX ADMIN — VENLAFAXINE 75 MG: 25 TABLET ORAL at 07:44

## 2024-08-23 RX ADMIN — CHLORHEXIDINE GLUCONATE 0.12% ORAL RINSE 15 ML: 1.2 LIQUID ORAL at 08:05

## 2024-08-23 RX ADMIN — ACYCLOVIR 400 MG: 200 SUSPENSION ORAL at 14:04

## 2024-08-23 RX ADMIN — ACYCLOVIR 400 MG: 200 SUSPENSION ORAL at 08:05

## 2024-08-23 RX ADMIN — LOPERAMIDE HYDROCHLORIDE 4 MG: 2 CAPSULE ORAL at 14:04

## 2024-08-23 RX ADMIN — HEPARIN SODIUM 2000 UNITS: 1000 INJECTION INTRAVENOUS; SUBCUTANEOUS at 16:54

## 2024-08-23 RX ADMIN — IPRATROPIUM BROMIDE AND ALBUTEROL SULFATE 3 ML: .5; 3 SOLUTION RESPIRATORY (INHALATION) at 20:32

## 2024-08-23 RX ADMIN — GABAPENTIN 300 MG: 300 CAPSULE ORAL at 20:04

## 2024-08-23 RX ADMIN — MIDAZOLAM 2 MG: 1 INJECTION INTRAMUSCULAR; INTRAVENOUS at 16:11

## 2024-08-23 RX ADMIN — CLONAZEPAM 1 MG: 0.5 TABLET ORAL at 07:43

## 2024-08-23 RX ADMIN — CALCIUM CHLORIDE, MAGNESIUM CHLORIDE, SODIUM CHLORIDE, SODIUM BICARBONATE, POTASSIUM CHLORIDE AND SODIUM PHOSPHATE DIBASIC DIHYDRATE 12.5 ML/KG/HR: 3.68; 3.05; 6.34; 3.09; .314; .187 INJECTION INTRAVENOUS at 18:59

## 2024-08-23 RX ADMIN — OXYCODONE HYDROCHLORIDE 10 MG: 10 TABLET ORAL at 05:51

## 2024-08-23 RX ADMIN — CEFAZOLIN SODIUM 2 G: 2 INJECTION, SOLUTION INTRAVENOUS at 16:09

## 2024-08-23 RX ADMIN — LOPERAMIDE HYDROCHLORIDE 4 MG: 2 CAPSULE ORAL at 01:57

## 2024-08-23 RX ADMIN — ACETAMINOPHEN 975 MG: 325 TABLET, FILM COATED ORAL at 07:43

## 2024-08-23 RX ADMIN — MONTELUKAST 10 MG: 10 TABLET, FILM COATED ORAL at 22:06

## 2024-08-23 RX ADMIN — ACETYLCYSTEINE 4 ML: 100 SOLUTION ORAL; RESPIRATORY (INHALATION) at 17:12

## 2024-08-23 RX ADMIN — ACETYLCYSTEINE 4 ML: 100 SOLUTION ORAL; RESPIRATORY (INHALATION) at 11:59

## 2024-08-23 RX ADMIN — Medication 60 ML: at 07:44

## 2024-08-23 RX ADMIN — LOPERAMIDE HYDROCHLORIDE 4 MG: 2 CAPSULE ORAL at 20:03

## 2024-08-23 RX ADMIN — IPRATROPIUM BROMIDE AND ALBUTEROL SULFATE 3 ML: .5; 3 SOLUTION RESPIRATORY (INHALATION) at 11:59

## 2024-08-23 RX ADMIN — CETIRIZINE HYDROCHLORIDE 10 MG: 10 TABLET, FILM COATED ORAL at 10:14

## 2024-08-23 RX ADMIN — CALCIUM CHLORIDE, MAGNESIUM CHLORIDE, SODIUM CHLORIDE, SODIUM BICARBONATE, POTASSIUM CHLORIDE AND SODIUM PHOSPHATE DIBASIC DIHYDRATE 12.5 ML/KG/HR: 3.68; 3.05; 6.34; 3.09; .314; .187 INJECTION INTRAVENOUS at 01:03

## 2024-08-23 RX ADMIN — DEXMEDETOMIDINE HYDROCHLORIDE IN SODIUM CHLORIDE 0.9 MCG/KG/HR: 4 INJECTION INTRAVENOUS at 08:05

## 2024-08-23 RX ADMIN — CALCIUM CHLORIDE, MAGNESIUM CHLORIDE, SODIUM CHLORIDE, SODIUM BICARBONATE, POTASSIUM CHLORIDE AND SODIUM PHOSPHATE DIBASIC DIHYDRATE 12.5 ML/KG/HR: 3.68; 3.05; 6.34; 3.09; .314; .187 INJECTION INTRAVENOUS at 05:36

## 2024-08-23 RX ADMIN — GABAPENTIN 300 MG: 300 CAPSULE ORAL at 10:14

## 2024-08-23 RX ADMIN — OXYCODONE HYDROCHLORIDE 10 MG: 10 TABLET ORAL at 14:04

## 2024-08-23 RX ADMIN — MIDAZOLAM 1 MG: 1 INJECTION INTRAMUSCULAR; INTRAVENOUS at 17:52

## 2024-08-23 RX ADMIN — VENLAFAXINE 75 MG: 25 TABLET ORAL at 20:02

## 2024-08-23 RX ADMIN — Medication 1 TABLET: at 07:43

## 2024-08-23 RX ADMIN — NOREPINEPHRINE BITARTRATE 0.06 MCG/KG/MIN: 0.06 INJECTION, SOLUTION INTRAVENOUS at 03:35

## 2024-08-23 RX ADMIN — WHITE PETROLATUM 57.7 %-MINERAL OIL 31.9 % EYE OINTMENT: at 17:33

## 2024-08-23 RX ADMIN — DEXMEDETOMIDINE HYDROCHLORIDE IN SODIUM CHLORIDE 1.2 MCG/KG/HR: 4 INJECTION INTRAVENOUS at 22:13

## 2024-08-23 RX ADMIN — MIDAZOLAM HYDROCHLORIDE 1 MG/HR: 1 INJECTION, SOLUTION INTRAVENOUS at 03:33

## 2024-08-23 RX ADMIN — LOPERAMIDE HYDROCHLORIDE 4 MG: 2 CAPSULE ORAL at 07:43

## 2024-08-23 RX ADMIN — CALCIUM CHLORIDE, MAGNESIUM CHLORIDE, SODIUM CHLORIDE, SODIUM BICARBONATE, POTASSIUM CHLORIDE AND SODIUM PHOSPHATE DIBASIC DIHYDRATE 12.5 ML/KG/HR: 3.68; 3.05; 6.34; 3.09; .314; .187 INJECTION INTRAVENOUS at 05:34

## 2024-08-23 RX ADMIN — CALCIUM CHLORIDE, MAGNESIUM CHLORIDE, SODIUM CHLORIDE, SODIUM BICARBONATE, POTASSIUM CHLORIDE AND SODIUM PHOSPHATE DIBASIC DIHYDRATE 12.5 ML/KG/HR: 3.68; 3.05; 6.34; 3.09; .314; .187 INJECTION INTRAVENOUS at 01:01

## 2024-08-23 RX ADMIN — DEXMEDETOMIDINE HYDROCHLORIDE IN SODIUM CHLORIDE 0.9 MCG/KG/HR: 4 INJECTION INTRAVENOUS at 12:46

## 2024-08-23 RX ADMIN — Medication 60 ML: at 19:59

## 2024-08-23 RX ADMIN — IPRATROPIUM BROMIDE AND ALBUTEROL SULFATE 3 ML: .5; 3 SOLUTION RESPIRATORY (INHALATION) at 08:44

## 2024-08-23 RX ADMIN — ATORVASTATIN CALCIUM 10 MG: 10 TABLET, FILM COATED ORAL at 07:44

## 2024-08-23 RX ADMIN — BUDESONIDE 1 MG: 1 INHALANT ORAL at 08:44

## 2024-08-23 RX ADMIN — WHITE PETROLATUM 57.7 %-MINERAL OIL 31.9 % EYE OINTMENT: at 23:46

## 2024-08-23 RX ADMIN — OXYCODONE HYDROCHLORIDE 10 MG: 10 TABLET ORAL at 10:14

## 2024-08-23 ASSESSMENT — ACTIVITIES OF DAILY LIVING (ADL)
ADLS_ACUITY_SCORE: 61
ADLS_ACUITY_SCORE: 65
ADLS_ACUITY_SCORE: 65
ADLS_ACUITY_SCORE: 61
ADLS_ACUITY_SCORE: 65
ADLS_ACUITY_SCORE: 62
ADLS_ACUITY_SCORE: 61
ADLS_ACUITY_SCORE: 65
ADLS_ACUITY_SCORE: 65
ADLS_ACUITY_SCORE: 61
ADLS_ACUITY_SCORE: 65
ADLS_ACUITY_SCORE: 64
ADLS_ACUITY_SCORE: 65
ADLS_ACUITY_SCORE: 61
ADLS_ACUITY_SCORE: 65

## 2024-08-23 NOTE — PROGRESS NOTES
CRRT STATUS NOTE    DATA:  Time:  0520  Pressures WNL:  YES  Filter Status:  WDL    Problems Reported/Alarms Noted:  none    Supplies Present:  YES    ASSESSMENT:    Patient Net Fluid Balance: net negative 300ml since midnight       Intake/Output Summary (Last 24 hours) at 8/23/2024 0520  Last data filed at 8/23/2024 0500  Gross per 24 hour   Intake 2789.68 ml   Output 3504.6 ml   Net -714.92 ml       Vital Signs: Temp:  [98.6  F (37  C)-100.4  F (38  C)] 100.2  F (37.9  C)  Pulse:  [] 108  Resp:  [16-31] 26  MAP:  [49 mmHg-99 mmHg] 86 mmHg  Arterial Line BP: ()/(35-77) 129/67  FiO2 (%):  [40 %-50 %] 45 %  SpO2:  [94 %-100 %] 99 %       Most Recent BMP's:  Recent Labs   Lab Test 08/23/24  0344 08/22/24  1605 08/22/24  0348 08/21/24  1617 08/21/24 0358 08/20/24  2154    136 136 135 138 137   POTASSIUM 4.1 3.8 4.0 4.9 4.1 4.1   CHLORIDE 105 106 104 102 106 106   CO2 22 21* 21* 20* 19* 21*   BUN 30.2* 39.1* 40.1* 42.5* 40.6* 42.0*   CR 1.51* 1.65* 1.60* 1.51* 1.73* 1.60*   ANIONGAP 10 9 11 13 13 10   QUAN 8.2* 7.8* 8.2* 8.4* 8.2* 8.0*     Most Recent CBC's:  Recent Labs   Lab Test 08/23/24  0344 08/22/24  0818 08/22/24  0350 08/21/24  0358 08/20/24  2154   WBC 16.9*  --  17.9* 18.3* 15.9*   HGB 7.5* 8.5* 7.0* 7.9* 7.5*   MCV 98  --  99 100 100     --  229 261 249     Most Recent ABG's:  Recent Labs   Lab Test 08/23/24  0344 08/23/24  0010 08/22/24  1605   PH 7.35 7.39 7.37   PO2 59* 105 102   PCO2 44 38 40   HCO3 24 23 23   THONG -1.4 -2.2 -1.9       Goals of Therapy:  0-50ml/hr net negative     INTERVENTIONS:   Clot noted in CRRT line - set changed at 2137.  Charting reviewed. Rounding completed. Treatment plan discussed with bedside nurse.     PLAN:  Continue with current plan of care please contact CRRT resource with any questions or concerns via [employer].

## 2024-08-23 NOTE — PROGRESS NOTES
[facility]    ICU Progress Note       Date of Admission:  8/8/2024    Assessment: Critical Care   Massiel Flaherty is a 53 year old female who was admitted to [facility]. Past medical hx of Anxiety/depression, fibromyalgia, hypothyroidism, asthma, HLD, MURIEL on CPAP, spells, off on anti seizure medications, expressive aphasia, hypertension was seen at [facility] and was placed on Augmentin outpatient on 7/30/24. She admitted to the hospital on 8/3/24 for fatigue, fever and dyspnea and intubated 8/6/24 at OSH, proned and paralyzed without improvement. Transferred to [facility] 8/8/24, hypoxic with high plateaus/peaks and placed on /VV ECMO and CRRT.  Decannulated from VV ECMO 8/18    Today's Changes:  - Tunneled HD per IR  - Stop versed gtt  - PRN versed   - Seroquel PRN  - Change gabapentin to TID  - Decrease clonazepam dose  - Start PTA zyrtec and montelukast  - Hgb recheck at 1000  - Blood culture  - Increase TF goal rate      Plan: Critical Care   Neurological:  # Sedation and analgesia for vent compliance   # Fibromyalgia   # Hx myalgic encephalomyelitis   # Acute pain  # Hx anxiety/depression  # Concern for ICU delirium   - Monitor neurological status. Delirium preventions and precautions.   - Pain: Scheduled: tylenol, oxycodone   PRN: tylenol, dilaudid, oxycodone  - Sedation plan:  Gtt: Precedex gtt, discontinue versed gtt    PRN: Versed 1-2mg q1 hr  - Decrease clonazepam 0.5 mg BID  - Increase gabapentin 300mg to TID   - Change seroquel 25mg BID PRN  - PTA Venlafaxine and Amitriptyline     # Hx spells with staring and BUE posturing previously described as pseudoseizures   # Hx of GTC seizures and myoclonic epilepsy, weaned off AEDs   # C/f hypoxic ischemic injury   # Diffuse cerebral edema - improved  - Sodium goals liberalized to 140-145  - 8/21: MRI Brain: no acute intracranial pathology. No findings to suggest anoxic brain injury.  - NCC signed off.      HEENT:  #  Mouth / tongue sores  # Hx seasonal allergies  # Hx alopecia  Mouth sores present, which could have viral etiology. 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  - Acyclovir 400mg BID x5 days  - Start PTA zyrtec   - Hold PTA hydroxychloroquine    Pulmonary:   # Acute hypoxic respiratory failure c/b ARDS s/p VV ECMO cannulation 8/8- decannulated on 8/18   # Hx CAP  # Asthma  # MURIEL on home CPAP   FiO2 (%): 45 %, Resp: 26, Inspiratory Pressure (cm H2O) (Drager Jessie): 25, Vent Mode: PCV Plus assist, Resp Rate (Set): 18 breaths/min, PEEP (cm H2O): 10 cmH2O, Resp Rate (Set): 18 breaths/min, PEEP (cm H2O): 10 cmH2O  - PC/AC: 25/10  - SpO2 goals >92%, PaO2 goals >60   - CXR 8/23: slightly worsening bilateral opacities and effusions  - BAL specimen sent 8/21: pseudomonas aeruginosa    - Previously positive for this on culture from 8/14  - Start PTA singular at bedtime  - Scheduled pulmicort, mucomyst, and duonebs   - Wean vent as able  - VAP bundle while intubated   - Diuresis plan below    Cardiovascular:    # Hyperlipidemia  # Hypotension  # Hx HTN  Levophed titrated off overnight  - MAP goal  >65, SBP goals >110  - PTA statin  - Hold PTA clonidine     Gastroenterology/Nutrition:  # Moderate protein calorie malnutrition  # Non-infectious Diarrhea  # GERD   - Protonix (home medication)   - Nutrition consulted, appreciate recs  - Diet: TF via NJ  - Increase goal rate per nutrition  - Hold bowel regimen d/t loose stools  - Change suspension meds to other form as able  - Continue scheduled multivitamin, banatrol, prosource packet, and loperamide  - Rectal tube (keep in place for now given femoral HD line and liquid stools)   - Discontinue femoral HD line when tunneled line placed     Fluids/Electrolytes/Renal:  # Acute kidney injury  # Hypermagnesemia  # Hyperphosphatemia   Baseline Cr approx 0.6. Pt is anuric  - Cr today is 1.51 from 1.65  - Continue CRRT; fluid goal net negative 1L   - Heparinized CRRT circuit   - Strict I&Os   -  Bladder scan q shift  - Avoid nephrotoxins as able      Endocrine:  # Steroid and stress induced hyperglycemia  # Hypothyroidism   - Goal to keep BG < 180 for optimal wound healing. Range:    - Continue HSSI  - Continue PTA synthroid     ID:  # Leukocytosis  # Hx CAP  # Concern for PID   WBC 16.9 from 17.9, Tmax 100.4, lactate from 8/22 WNL  This morning Tmax 100.9 while on CRRT   Per family, members of pt's community have been testing positive for West Nile Virus  - LP needed to diagnose West Nile- will defer. IgG and IgM sent 8/21 - follow*  - Repeat blood culture given fever on CRRT  - CXR with slightly worsening bilateral opacities and effusions  - Continue to monitor fever curve and inflammatory markers as appropriate  - If pt clinically worsens, restart ABX (cefepime or zosyn)  - ID consulted, appreciate recs   - Monitor clinical status off of antimicrobials, has completed empiric course for pneumonia with no other source of infection identified    Cultures:  - BAL  - 8/14: pseudomonas aeruginosa, candida albicans  - 8/21: pseudomonas aeruginosa  - Blood cultures: NGTD   - 8/23: in process    Antimicrobials:   - Azithromycin- stopped 8/11   - Cefepime 8/3 (started at OSH)-8/16  - Amphotericin - stopped 8/13   - Flagyl 8/11-8/16  - Zosyn 8/16- 8/19    Heme:     # Acute blood loss anemia   # Anemia of critical illness  Hgb 7.5 from 8.5. Pt had CRRT circuit go down x2 overnight. Last time blood was unable to be returned  - Recheck Hgb at 1000  - Transfuse if hgb <7.0 or signs/symptoms of hypoperfusion. Monitor and trend.   - Heparinize CRRT circuit      Musculoskeletal:  # Weakness and deconditioning of critical illness  # Left ankle injury pre-hospitalization    - Physical and occupational therapy when able.      Skin:  # BLE scattered ecchymosis  # Left toe duskiness   - Bilateral lower leg ecchymosis   - WOC consult   - Ecchymosis to left foot, most noticeable to 3rd and 4th toes    Lines/ tubes/  "drains:  - ETT  - NJ  - LIJ CVC  - L fem HD - remove after tunneled line placed  - Radial a-line  - PIV x3  - Rectal tube - remove  - Tunneled HD line to be placed by IR      Prophylaxis:  - DVT Prophylaxis: Heparinized CRRT circuit  - PUD Prophylaxis: PPI    Code Status: Full Code      Disposition:  - ICU    The patient's care was discussed with the Attending Physician, Dr. Calixto, Chief Resident/Fellow, and SICU Team.  Critical care time: 40 minutes      DOREEN Duenas [facility]  Securely message with Dr. Z (more info)  Text page via [employer] Paging/Directory     Clinically Significant Risk Factors              # Hypoalbuminemia: Lowest albumin = 2.2 g/dL at 8/10/2024  9:47 AM, will monitor as appropriate               # Obesity: Estimated body mass index is 33.31 kg/m  as calculated from the following:    Height as of this encounter: 1.575 m (5' 2\").    Weight as of this encounter: 82.6 kg (182 lb 1.6 oz).   # Moderate Malnutrition: based on nutrition assessment      # Financial/Environmental Concerns: none    ___________________________________________________________    Interval History   Levophed titrated to off overnight but did have an instance of hypotension related to Seroquel administration per nursing. CRRT circuit went down x2, unable to return blood with last \"downed\" circuit.     Vital signs:  Temp: 100.2  F (37.9  C) Temp src: Esophageal   Pulse: 108   Resp: 26 SpO2: 99 % O2 Device: Mechanical Ventilator   Height: 157.5 cm (5' 2\") Weight: 84.1 kg (185 lb 6.5 oz)  Estimated body mass index is 33.91 kg/m  as calculated from the following:    Height as of this encounter: 1.575 m (5' 2\").    Weight as of this encounter: 84.1 kg (185 lb 6.5 oz).    FiO2 (%): 45 %, Resp: 26, Inspiratory Pressure (cm H2O) (Drager Jessie): 25, Vent Mode: PCV Plus assist, Resp Rate (Set): 18 breaths/min, PEEP (cm H2O): 10 cmH2O, Resp Rate (Set): 18 " breaths/min, PEEP (cm H2O): 10 cmH2O      Intake/Output Summary (Last 24 hours) at 8/23/2024 0639  Last data filed at 8/23/2024 0600  Gross per 24 hour   Intake 2497.98 ml   Output 3311.8 ml   Net -813.82 ml         Physical Exam   Neuro: Intubated and Sedated. Opens eyes spontaneously, follows commands to BLE. Able to shrug shoulders, and appears to have minimal gross movement with noxious stimuli to BUE. NAD.   HEENT: Normocephalic, atraumatic. PERRLA. Subconjunctival hemorrhage present to left eye  CV: RRR. Pitting edema to BLE, BUE  Respiratory: LS CTAB, equal chest rise b/l   GI: Abdomen soft and non-tender to light palpation. Non-distended. Rectal tube in place  : Anuric  MSK: See neuro.   Skin:  L great toe pale with ecchymosis. Mild BLE scattered ecchymosis.     Data   I reviewed all medications, new labs and imaging results over the last 24 hours.  Arterial Blood Gases   Recent Labs   Lab 08/23/24  0344 08/23/24  0010 08/22/24  1605 08/22/24  0928   PH 7.35 7.39 7.37 7.38   PCO2 44 38 40 37   PO2 59* 105 102 67*   HCO3 24 23 23 22       Complete Blood Count   Recent Labs   Lab 08/23/24  0344 08/22/24  0818 08/22/24  0350 08/21/24  0358 08/20/24  2154   WBC 16.9*  --  17.9* 18.3* 15.9*   HGB 7.5* 8.5* 7.0* 7.9* 7.5*     --  229 261 249       Basic Metabolic Panel  Recent Labs   Lab 08/23/24  0344 08/23/24  0011 08/22/24  2016 08/22/24  1605 08/22/24  0819 08/22/24  0348 08/21/24  1951 08/21/24  1617     --   --  136  --  136  --  135   POTASSIUM 4.1  --   --  3.8  --  4.0  --  4.9   CHLORIDE 105  --   --  106  --  104  --  102   CO2 22  --   --  21*  --  21*  --  20*   BUN 30.2*  --   --  39.1*  --  40.1*  --  42.5*   CR 1.51*  --   --  1.65*  --  1.60*  --  1.51*   *  128* 137* 178* 138*   < > 142*   < > 173*  184*    < > = values in this interval not displayed.       Liver Function Tests  Recent Labs   Lab 08/23/24  0344 08/22/24  1605 08/22/24  0348 08/21/24  1617 08/21/24  0358  08/20/24  1528 08/20/24  0413   AST 20  --  28  --  24  --  25   ALT 16  --  18  --  17  --  18   ALKPHOS 90  --  96  --  93  --  104   BILITOTAL 0.2  --  0.2  --  0.2  --  0.2   ALBUMIN 3.0* 2.9* 3.0* 3.2* 3.1*   < > 3.1*    < > = values in this interval not displayed.       Pancreatic Enzymes  No lab results found in last 7 days.    Coagulation Profile  Recent Labs   Lab 08/18/24  0946 08/18/24  0433 08/17/24  2154 08/17/24  1545   PTT 32 45* 47* 49*       IMAGING:  Recent Results (from the past 24 hour(s))   XR Chest Port 1 View    Impression    RESIDENT PRELIMINARY INTERPRETATION  Impression:   1. No significant change in the diffuse interstitial airspace  opacities most suspicious for mixed picture including ARDS pattern,  pulmonary edema, and likely a superimposed infection.  2. Continued small moderate bilateral pleural effusions.  3. Stable support devices.

## 2024-08-23 NOTE — PLAN OF CARE
.ICU End of Shift Summary. See flowsheets for vital signs and detailed assessment.    Major Changes: tremulous and tachycardic when attempted to wean precedex, increase pressor need after night time meds (Seroquel suspected culprit), more awake this AM    Neuro: pupils equal and reactive, attempts to track, wiggles toes to command, no mvmt in UE, pain meds given per orders, sedated with versed and precedex  Cardiac: SR-ST  when resting, 100-120 when awake, tmax 100.6, BP labile managed with levo  Respiratory: 40-50% RR 18 25/10, lungs clear- small thick creamy secretions, some bleeding in mouth from surrounding lesions  GI/: TF stopped at 0000 per orders, rectal tube in place, bladder scanned for 200  CRRT pulling 0-50, set changed x2 unable to return blood, 4K baths  Skin: scattered bruising, old internal jugular and R groin ecmo sites  Drips: levo 0-0.09, dex 0.9-1.2, versed 1 off at 0700  Labs: Hgb 7.5    Plan: Continue plan of care    DANNY Muniz  August 23, 2024        Goal Outcome Evaluation:      Plan of Care Reviewed With: patient    Overall Patient Progress: no changeOverall Patient Progress: no change    Outcome Evaluation: CRRT continued, wiggles toes to command

## 2024-08-23 NOTE — PROGRESS NOTES
CRRT STATUS NOTE    DATA:  Time:  1805  Pressures WNL:  YES  Filter Status:  WDL    Problems Reported/Alarms Noted:  None    Supplies Present:  YES    ASSESSMENT:  Patient Net Fluid Balance:  +636.5ml since 8/23/24 @ 0000    Vital Signs:  Temp: (!) 101  F (38.3  C) Temp src: Esophageal BP: 123/68 Pulse: 107   Resp: 19 SpO2: 100 % O2 Device: Mechanical Ventilator            Labs:   Recent Labs   Lab 08/23/24  1553 08/23/24  1550 08/23/24  1147 08/23/24  1024 08/23/24  0803 08/23/24  0344 08/22/24 2016 08/22/24  1605 08/22/24  0819 08/22/24  0818 08/22/24  0350 08/22/24  0348 08/21/24  0815 08/21/24  0358   WBC  --   --   --   --   --  16.9*  --   --   --   --  17.9*  --   --  18.3*   HGB  --   --   --  6.8*  --  7.5*  --   --   --  8.5* 7.0*  --   --  7.9*   MCV  --   --   --   --   --  98  --   --   --   --  99  --   --  100   PLT  --   --   --   --   --  227  --   --   --   --  229  --   --  261   NA  --  136  --   --   --  137  --  136  --   --   --  136   < > 138   POTASSIUM  --  4.3  --   --   --  4.1  --  3.8  --   --   --  4.0   < > 4.1   CHLORIDE  --  105  --   --   --  105  --  106  --   --   --  104   < > 106   CO2  --  20*  --   --   --  22  --  21*  --   --   --  21*   < > 19*   BUN  --  27.5*  --   --   --  30.2*  --  39.1*  --   --   --  40.1*   < > 40.6*   CR  --  1.71*  --   --   --  1.51*  --  1.65*  --   --   --  1.60*   < > 1.73*   ANIONGAP  --  11  --   --   --  10  --  9  --   --   --  11   < > 13   QUAN  --  8.4*  --   --   --  8.2*  --  7.8*  --   --   --  8.2*   < > 8.2*   * 123* 112*  --    < > 114*  128*   < > 138*   < >  --   --  142*   < > 111*  112*   ALBUMIN  --  3.0*  --   --   --  3.0*  --  2.9*  --   --   --  3.0*   < > 3.1*   PROTTOTAL  --   --   --   --   --  5.3*  --   --   --   --   --  5.7*  --  5.8*   BILITOTAL  --   --   --   --   --  0.2  --   --   --   --   --  0.2  --  0.2   ALKPHOS  --   --   --   --   --  90  --   --   --   --   --  96  --  93   ALT  --   --   --    --   --  16  --   --   --   --   --  18  --  17   AST  --   --   --   --   --  20  --   --   --   --   --  28  --  24    < > = values in this interval not displayed.                  Goals of Therapy:  0-50cc/h     INTERVENTIONS:   Rounded on patient w/ bedside RN and stocked supplies. Orders and labs reviewed. Attempted recirc for IR procedure, clotted off during recirc. New set placed at 1753.    PLAN:  Continue to meet fluid removal goals as ordered / tolerated. See CRRT nurse w/ questions or concerns..

## 2024-08-23 NOTE — PROGRESS NOTES
Nephrology Progress Note  08/23/2024       Mrs Flaherty is a  53 yof w/ Anxiety, depression, fibromyalgia, hypothyroidism, asthma, HLD, MURIEL on CPAP, expressive aphasia, and hypertension who was admitted to an OSH with community acquired pneumonia on 8/3 s/p intubation.  Found to have severe ARDS requiring paralysis and proning, transferred here on 8/8 for management and initiated on CKRT for severe acidemia in the setting of oligoanuric CHACORTA.  Started CRRT on 8/9 for volume management.      Interval History :   Mrs Flaherty continues on CRRT post VV ECMO decannulation 8/19. Planning tunneled line and working on recovery from pulm standpoint.  Anuric since CRRT initiation, anticipate it will take some time but she does have normal renal fx at baseline so has reasonable chance of recovery now that hemodynamics are generally stable.      Assessment & Recommendations:   CHACORTA-Baseline Cr 0.6, at baseline on admission.  CHACORTA due to septic shock in setting of ARDS, UA on 8/6/2024 essentially bland (30 protein but no blood or casts).  No dedicated renal imaging at this time.  Started CRRT 8/9 for volume management, now anuric.    -CHACORTA due to hypoperfusion, started CRRT on 8/9.    -Access is LFV temp line 8/19.   -CRRT, 4k baths, 0-50cc/h volume goal.     -Dialysis consent signed and in chart.      Volume status-Net even yesterday, ordered for 0-50cc/h today.      Electrolytes/pH-Labs stable on 4k baths.      Ca/phos/pth-No acute issues, Phos mildly up but no major intervention needed today.     Anemia-Hgb 6.8 this am, down from 8.5 yesterday am after 1u PRBC's, acute management per team.      Nutrition-Vital TF started 8/9.    Time spent: 45 minutes on this date of encounter for chart review, physical exam, medical decision making and co-ordination of care.     Seen and discussed with Dr Donato    Recommendations were communicated to primary team via verbal communication.     DOREEN Sequeira CNS  Clinical Nurse  "Specialist  [phone]      Review of Systems:   I reviewed the following systems:  ROS not done due to vent/sedation.     Physical Exam:   I/O last 3 completed shifts:  In: 2497.98 [I.V.:871.98; NG/GT:1031]  Out: 3311.8 [Other:2711.8; Stool:600]   /54   Pulse 107   Temp (!) 100.8  F (38.2  C)   Resp 24   Ht 1.575 m (5' 2\")   Wt 84.1 kg (185 lb 6.5 oz)   SpO2 100%   BMI 33.91 kg/m       GENERAL APPEARANCE: Intubated and sedated and paralyzed  Pulmonary: Intubated and sedated,   CV: regular rhythm, normal rate   - Edema 1-2+ diffuse  GI: soft, nontender, normal bowel sounds  MS: no evidence of inflammation in joints, no muscle tenderness  SKIN:  warm, dry  NEURO: No focal deficits    Labs:   All labs reviewed by me  Electrolytes/Renal -   Recent Labs   Lab Test 08/23/24  1147 08/23/24  0803 08/23/24  0344 08/22/24 2016 08/22/24  1605 08/22/24  0819 08/22/24  0348   NA  --   --  137  --  136  --  136   POTASSIUM  --   --  4.1  --  3.8  --  4.0   CHLORIDE  --   --  105  --  106  --  104   CO2  --   --  22  --  21*  --  21*   BUN  --   --  30.2*  --  39.1*  --  40.1*   CR  --   --  1.51*  --  1.65*  --  1.60*   * 118* 114*  128*   < > 138*   < > 142*   QUAN  --   --  8.2*  --  7.8*  --  8.2*   MAG  --   --  2.5*  --  2.4*  --  2.5*   PHOS  --   --  5.1*  --  4.9*  --  5.4*    < > = values in this interval not displayed.       CBC -   Recent Labs   Lab Test 08/23/24  1024 08/23/24  0344 08/22/24  0818 08/22/24  0350 08/21/24  0358   WBC  --  16.9*  --  17.9* 18.3*   HGB 6.8* 7.5* 8.5* 7.0* 7.9*   PLT  --  227  --  229 261       LFTs -   Recent Labs   Lab Test 08/23/24  0344 08/22/24  1605 08/22/24  0348 08/21/24  1617 08/21/24  0358   ALKPHOS 90  --  96  --  93   BILITOTAL 0.2  --  0.2  --  0.2   ALT 16  --  18  --  17   AST 20  --  28  --  24   PROTTOTAL 5.3*  --  5.7*  --  5.8*   ALBUMIN 3.0* 2.9* 3.0*   < > 3.1*    < > = values in this interval not displayed.       Iron Panel - No lab results " found.        Current Medications:  Current Facility-Administered Medications   Medication Dose Route Frequency Provider Last Rate Last Admin    acetaminophen (TYLENOL) tablet 975 mg  975 mg Oral or Feeding Tube Q8H Andres Payne PA-C   975 mg at 08/23/24 0743    acetylcysteine (MUCOMYST) 10 % nebulizer solution 4 mL  4 mL Nebulization 4x Daily Barry Hatch MD   4 mL at 08/23/24 1159    acyclovir (ZOVIRAX) suspension 400 mg  400 mg Oral or Feeding Tube TID Barry Hatch MD   400 mg at 08/23/24 1404    amitriptyline (ELAVIL) tablet 50 mg  50 mg Oral or Feeding Tube At Bedtime Barry Hatch MD   50 mg at 08/22/24 2148    artificial tears ophthalmic ointment   Both Eyes Q8H Tiara Martin, CNP   Given at 08/23/24 0744    atorvastatin (LIPITOR) tablet 10 mg  10 mg Oral or Feeding Tube Daily Francisco Welsh MD   10 mg at 08/23/24 0744    budesonide (PULMICORT) neb solution 1 mg  1 mg Nebulization Daily Andres Payne PA-C   1 mg at 08/23/24 0844    cetirizine (zyrTEC) tablet 10 mg  10 mg Oral Daily Aniceto Adame MD   10 mg at 08/23/24 1014    chlorhexidine (PERIDEX) 0.12 % solution 15 mL  15 mL Mouth/Throat Q12H Giovanny Guidry PA-C   15 mL at 08/23/24 0805    clonazePAM (klonoPIN) tablet 0.5 mg  0.5 mg Oral or Feeding Tube BID Aniceto Adame MD        fiber modular (BANATROL TF) packet 1 packet  1 packet Per Feeding Tube TID Cal Lisa MD   1 packet at 08/23/24 1404    gabapentin (NEURONTIN) capsule 300 mg  300 mg Oral or Feeding Tube TID Barry Hatch MD   300 mg at 08/23/24 1404    insulin aspart (NovoLOG) injection (RAPID ACTING)  1-12 Units Subcutaneous Q4H Aniceto Adame MD   2 Units at 08/22/24 2022    ipratropium - albuterol 0.5 mg/2.5 mg/3 mL (DUONEB) neb solution 3 mL  3 mL Nebulization 4x daily Barry Hatch MD   3 mL at 08/23/24 1156    levothyroxine (SYNTHROID/LEVOTHROID) tablet 25 mcg  25 mcg Per Feeding Tube QAM AC  Giovanny Guidry PA-C   25 mcg at 08/23/24 0743    loperamide (IMODIUM) capsule 4 mg  4 mg Oral or Feeding Tube Q6H Barry Hatch MD   4 mg at 08/23/24 1404    montelukast (SINGULAIR) tablet 10 mg  10 mg Oral At Bedtime Aniceto Adame MD        multivitamin RENAL (RENAVITE RX/NEPHROVITE) tablet 1 tablet  1 tablet Oral or Feeding Tube Daily Giovanny Guidry PA-C   1 tablet at 08/23/24 0743    oxyCODONE IR (ROXICODONE) tablet 10 mg  10 mg Oral or Feeding Tube Q4H Anika Mead MD   10 mg at 08/23/24 1404    pantoprazole (PROTONIX) 2 mg/mL suspension 40 mg  40 mg Per Feeding Tube QAM AC Barry Hatch MD   40 mg at 08/23/24 0743    Prosource TF20 [employer]fit Compatibl EN LIQD (PROSOURCE TF20) packet 60 mL  1 packet Per Feeding Tube BID Giovanny Guidry PA-C        venlafaxine (EFFEXOR) tablet 75 mg  75 mg Oral or Feeding Tube BID Barry Hatch MD   75 mg at 08/23/24 0744     Current Facility-Administered Medications   Medication Dose Route Frequency Provider Last Rate Last Admin    dexmedeTOMIDine (PRECEDEX) 4 mcg/mL in sodium chloride 0.9 % 100 mL infusion  0.1-1.2 mcg/kg/hr (Dosing Weight) Intravenous Continuous de La Mater, Tiara Bejarano, CNP 20 mL/hr at 08/23/24 1300 0.9 mcg/kg/hr at 08/23/24 1300    dextrose 10% infusion   Intravenous Continuous PRN Giovanny Guidry PA-C        dialysate for CVVHD & CVVHDF (Phoxillum BK4/2.5)  12.5 mL/kg/hr CRRT Continuous Dakota Dorsey APRN CNS 1,100 mL/hr at 08/23/24 1046 12.5 mL/kg/hr at 08/23/24 1046    heparin (porcine) 20,000 units in 20 mL ANTICOAGULANT infusion (syringe from pharmacy)  500 Units/hr CRRT Continuous Dakota Dorsey APRN CNS 0.5 mL/hr at 08/22/24 2125 500 Units/hr at 08/22/24 2125    No POST-filter replacement required   Does not apply Continuous PRN Dakota Dorsey, DOREEN CNS        PRE-filter replacement solution for CVVHD & CVVHDF (Phoxillum BK4/2.5)  12.5 mL/kg/hr CRRT Continuous Dakota Dorsey APRN CNS 1,100  mL/hr at 08/23/24 1046 12.5 mL/kg/hr at 08/23/24 1046       I, Emmanuelle Donato, saw and evaluated this patient as part of a shared visit.  I have reviewed and discussed with the advanced practice provider their history, physical and plan.  I have personally performed the substantive portion of the medical decision making for this visit - please see the TRI's documentation for the full details  I personally reviewed the vital signs, medications, labs and imaging.    Key findings and management decisions made by me:  CHACORTA on CRRT, since 8/9.  FIO2 % is improved to 45% which is encouraging, continue mild negative balance as tolerated.      Emmanuelle Donato  Date of Service (when I saw the patient): 8/23

## 2024-08-23 NOTE — PROGRESS NOTES
Brief Progress Note     Interval summary note to document updates and changes to care plan/s. Please see daily progress note for full assessment and plan/s.     Interval history: Hgb recheck 6.8   Pt remains febrile on CRRT   - 120s, possibly 2/2 metabolic response (fevers) vs. pain vs. lightening sedation vs. precedex    Plan:   - 1 unit PRBCs  - Hgb recheck 1 hour s/p transfusion   - Blood culture: in process  - Continue to monitor HR, notify team if sustains > 120s (especially after fever breaks)  - Levophed gtt discontinued     Dispo: ICU     DOREEN Duenas CNP   08/23/2024  11:12 AM

## 2024-08-23 NOTE — PLAN OF CARE
Major Shift Events:  Continues to follow commands, unable to move BUE but grimaces to pain. Sedation managed adequately with Dex. Tmax 38.5, scheduled tylenol given with some relief. Levo off at 0400, MAPs maintained without pressors.Art line positional at times. CRRT goal 0-50, off from 7733-7930 due to delay in IR. Tunneled line placed in RIJ. Rectal tube output about 300, anuric with bladder stim in place. Fem non tunneled line removed.     Plan: Continue to pull fluid as able on CRRT.   For vital signs and complete assessments, please see documentation flowsheets.      Goal Outcome Evaluation:   Problem: Adult Inpatient Plan of Care  Goal: Plan of Care Review  Description: The Plan of Care Review/Shift note should be completed every shift.  The Outcome Evaluation is a brief statement about your assessment that the patient is improving, declining, or no change.  This information will be displayed automatically on your shift  note.  Flowsheets (Taken 8/23/2024 8707)  Outcome Evaluation: CRRT continued, tunnel line placed, following commands  Plan of Care Reviewed With: patient  Overall Patient Progress: no change

## 2024-08-23 NOTE — PROCEDURES
[facility]    Procedure: Right IJ tunneled CVC    Date/Time: 8/23/2024 5:01 PM    Performed by: Angélica Lyon MD  Authorized by: Benjy Faustin MD  IR Fellow Physician:    Pre Procedure Diagnosis: Dialysis  Post Procedure Diagnosis: Dialysis    UNIVERSAL PROTOCOL   Site Marked: NA  Prior Images Obtained and Reviewed:  Yes  Required items: Required blood products, implants, devices and special equipment available    Patient identity confirmed:  Verbally with patient, arm band, provided demographic data and hospital-assigned identification number  Patient was reevaluated immediately before administering moderate or deep sedation or anesthesia  Confirmation Checklist:  Patient's identity using two indicators, relevant allergies, procedure was appropriate and matched the consent or emergent situation and correct equipment/implants were available  Time out: Immediately prior to the procedure a time out was called    Universal Protocol: the Joint Commission Universal Protocol was followed    Preparation: Patient was prepped and draped in usual sterile fashion       ANESTHESIA    Anesthesia:  Local infiltration  Local Anesthetic:  Lidocaine 1% without epinephrine      SEDATION    Patient Sedated: No    See dictated procedure note for full details.  Findings: 14.5 Fr dual lumen dialysis catheter placed and ready to use.    Specimens: none    Procedural Complications: None    Condition: Stable    Plan: Bed rest for 1 hour      PROCEDURE    Patient Tolerance:  Patient tolerated the procedure well with no immediate complications  Length of time physician/provider present for 1:1 monitoring during sedation:  0 min and 23-37 min

## 2024-08-23 NOTE — IR NOTE
Patient Name: Massiel Flaherty  Medical Record Number: [id number]  Today's Date: 8/23/2024    Procedure: Tunneled Central Venous Catheter Placement  Proceduralist: Dr. Faustin & Dr. Lyon  Pathology present: N/A    Procedure Start: 1617  Procedure end: 1655  Sedation medications administered: on sedation from the ICu     Report given to: Rancho DUGGAN RN   : N/A    Other Notes: Pt arrived to IR room 4 from . Consent reviewed. Pt denies any questions or concerns regarding procedure. Pt positioned supine and monitored per protocol. Pt tolerated procedure without any noted complications. Pt transferred back to . CVC flushed with heparin, ready to use.

## 2024-08-23 NOTE — PROGRESS NOTES
CLINICAL NUTRITION SERVICES - REASSESSMENT NOTE     Nutrition Prescription    Malnutrition Status:    Moderate malnutrition in the context of acute illness    Recommendations already ordered by Registered Dietitian (RD):  Increase kcals based on metabolic cart study: Vital AF 1.2 @ 50 ml/hr + 2 pkts prosource TF20 provides 1200 ml volume, 1600 kcals (27 kcal/kg or 63% MREE), 130 g pro (2.2 g/kg), 65 g Fat, 133 g CHO, 6 g fiber, 973 ml free water.  With banatrol, total fiber = 21 gms daily    Future/Additional Recommendations:  - If hyperphosphatmia worsens or if pt develops hyperkalemia, change to KF renal - [employer] Renal (or equivalent) @ 35 mL/hr (840 mL/day) + 3 pkts prosource TF20 to provide 1752 kcal (29 kcal/kg or 69% MREE), 127 g protein (2.1g/kg), 143 g CHO, 17 g fiber, and 563 mL free water daily.  Discontinue Banatrol given higher fiber content of KF renal  - Monitor stool trends  - Monitor renal function, ability to transition to iHD (with lower protein provisions)  - Repeat metabolic cart study week of 8/26     EVALUATION OF THE PROGRESS TOWARD GOALS   Diet: NPO  Nutrition Support:     Vital AF 1.2 @ 35 ml/hr + 3 pkts prosource TF20 provides 840 ml volume, 1248 kcals (21 kcal/kg), 123 g pro (2.1 g/kg), 45 g Fat, 93 g CHO, 4 g fiber, 681 ml free water.     Access: NJT  FWF: 30 ml q 4 hours    Intake:  Feeds on hold for tunneled HD line today, running consistently at goal over past 7 days per I/O's    NEW FINDINGS   Obtained metabolic cart study 8/22 @ 0927 with the following results: MREE = 2540 kcals/day (equiv to 42 kcal/kg/day) with RQ = 0.65.  Pt received 840 ml ml of TF + 3 pkts prosource TF20 in 24 hours preceding the study providing 1248 kcals (49 % MREE).  RQ within physiologic range; RQ  logical given provisions (hypocaloric) received prior to study.  Would aim energy intakes minimally at 60 to 80% (obese, ventilated) of this MREE (equiv to 25 to 34 kcal/kg/day).    Weight: 84.1 kg - down from  admit but near suspected dry weight of 83 kg.  Continue dosing weight of 60 kg (adjusted)    Labs:  Na+ 137  K+ 4.1  BUN 30.2 (H)  Cr 1.51 (H)  Mg++ 2.5 (H)  Phos 5.1 (H) - persistent mild elevation    Meds: reviewed and notable for: high resistance novolog, synthroid, imodium 4mg q 6 hours, renavite, acyclovir suspension, gabapentin suspension    GI: ongoing loose stools, rectal tube with 600 ml out yesterday.  EN is semi elemental, with banatrol TID (total fiber intake 19 g per day), imodium 4mg q 6 hours.  Suspension meds to be limited per discussion in rounds.    Skin: WOCN following for ECMO cannulation site, mouth sores. Skin beneath nasal bridle was inspected - some pressure noted on inside of nare, skin intact and tube adjusted to hang midline.    Renal: CRRT.  Renal formula not indicated with stable K+, mild phos elevation and may exacerbate stooling    Respiratory: Prior VV ECMO (decannulated 8/18), pt remains intubated    MALNUTRITION  % Intake: No decreased intake noted  % Weight Loss: None noted  Subcutaneous Fat Loss: Facial region:  mild  Muscle Loss: Temporal:  mild, Upper arm (bicep, tricep):  moderate, Lower arm  (forearm):  mild, Dorsal hand:  mild, and Upper leg (quadricep, hamstring):  mild  Fluid Accumulation/Edema: Mild  Malnutrition Diagnosis: Moderate malnutrition in the context of acute illness    Previous Goals   Total avg nutritional intake to meet a minimum of 20 kcal/kg and 2 g PRO/kg daily (per dosing wt 60 kg).   Evaluation: Met    Previous Nutrition Diagnosis  Inadequate protein-energy intake related to Intubation with EN not yet resumed as evidenced by currently meeting 0% kcal protein needs   Evaluation: Resolved    CURRENT NUTRITION DIAGNOSIS  Increased nutrient needs (calorie and protein) related to catabolic illness, CRRT as evidenced by results of metabolic cart study and assessed protein needs of 2-2.5g/kg    INTERVENTIONS  Implementation  Collaboration with other providers -  "SICU rounds, Pharm D - limiting suspension meds    Goals  Total avg nutritional intake to meet a minimum of 25 kcal/kg and 2 g PRO/kg daily (per dosing wt 60 kg).    Monitoring/Evaluation  Progress toward goals will be monitored and evaluated per protocol.    Abbey Evans, RD, LD, CNSC  Available on [employer] - can search by name or 4E Clinical Dietitian  **Clinical Nutrition is no longer available via pager  Weekend/Holiday RD - \"Weekend Clinical Dietitian\" on [employer]    "

## 2024-08-23 NOTE — IR NOTE
Patient Name: Massiel Flaherty  Medical Record Number: [id number]  Today's Date: 8/23/2024    Procedure: Image guided tunneled central venous catheter placement  Proceduralist: ***  Pathology present: NA    Procedure Start: ***  Procedure end: ***  Sedation medications administered: ***     Report given to: ***  : NA    Other Notes: Pt arrived to IR room 4 from . Consent reviewed. Pt denies any questions or concerns regarding procedure. Pt positioned supine and monitored per protocol. Pt tolerated procedure without any noted complications. Pt transferred back to .

## 2024-08-24 ENCOUNTER — APPOINTMENT (OUTPATIENT)
Dept: GENERAL RADIOLOGY | Facility: CLINIC | Age: 54
DRG: 003 | End: 2024-08-24
Attending: STUDENT IN AN ORGANIZED HEALTH CARE EDUCATION/TRAINING PROGRAM
Payer: MEDICAID

## 2024-08-24 LAB
ABO/RH(D): NORMAL
ALBUMIN SERPL BCG-MCNC: 2.7 G/DL (ref 3.5–5.2)
ALBUMIN SERPL BCG-MCNC: 2.8 G/DL (ref 3.5–5.2)
ALBUMIN SERPL BCG-MCNC: 2.8 G/DL (ref 3.5–5.2)
ALLEN'S TEST: ABNORMAL
ALLEN'S TEST: ABNORMAL
ALP SERPL-CCNC: 78 U/L (ref 40–150)
ALT SERPL W P-5'-P-CCNC: 14 U/L (ref 0–50)
ANION GAP SERPL CALCULATED.3IONS-SCNC: 10 MMOL/L (ref 7–15)
ANION GAP SERPL CALCULATED.3IONS-SCNC: 11 MMOL/L (ref 7–15)
ANION GAP SERPL CALCULATED.3IONS-SCNC: 12 MMOL/L (ref 7–15)
ANTIBODY SCREEN: NEGATIVE
AST SERPL W P-5'-P-CCNC: 27 U/L (ref 0–45)
BACTERIA ASPIRATE CULT: ABNORMAL
BASE EXCESS BLDA CALC-SCNC: -1.9 MMOL/L (ref -3–3)
BASE EXCESS BLDA CALC-SCNC: -1.9 MMOL/L (ref -3–3)
BILIRUB DIRECT SERPL-MCNC: <0.2 MG/DL (ref 0–0.3)
BILIRUB SERPL-MCNC: 0.2 MG/DL
BLD PROD TYP BPU: NORMAL
BLOOD COMPONENT TYPE: NORMAL
BUN SERPL-MCNC: 21.3 MG/DL (ref 6–20)
BUN SERPL-MCNC: 24.4 MG/DL (ref 6–20)
BUN SERPL-MCNC: 25.8 MG/DL (ref 6–20)
CA-I BLD-MCNC: 4.5 MG/DL (ref 4.4–5.2)
CA-I BLD-MCNC: 4.6 MG/DL (ref 4.4–5.2)
CA-I BLD-MCNC: 4.6 MG/DL (ref 4.4–5.2)
CALCIUM SERPL-MCNC: 7.7 MG/DL (ref 8.8–10.4)
CALCIUM SERPL-MCNC: 7.9 MG/DL (ref 8.8–10.4)
CALCIUM SERPL-MCNC: 7.9 MG/DL (ref 8.8–10.4)
CHLORIDE SERPL-SCNC: 103 MMOL/L (ref 98–107)
CHLORIDE SERPL-SCNC: 104 MMOL/L (ref 98–107)
CHLORIDE SERPL-SCNC: 105 MMOL/L (ref 98–107)
CODING SYSTEM: NORMAL
COHGB MFR BLD: 97.2 % (ref 96–97)
COHGB MFR BLD: 97.4 % (ref 96–97)
CREAT SERPL-MCNC: 1.3 MG/DL (ref 0.51–0.95)
CREAT SERPL-MCNC: 1.34 MG/DL (ref 0.51–0.95)
CREAT SERPL-MCNC: 1.49 MG/DL (ref 0.51–0.95)
CROSSMATCH: NORMAL
EGFRCR SERPLBLD CKD-EPI 2021: 42 ML/MIN/1.73M2
EGFRCR SERPLBLD CKD-EPI 2021: 47 ML/MIN/1.73M2
EGFRCR SERPLBLD CKD-EPI 2021: 49 ML/MIN/1.73M2
ERYTHROCYTE [DISTWIDTH] IN BLOOD BY AUTOMATED COUNT: 18.8 % (ref 10–15)
ERYTHROCYTE [DISTWIDTH] IN BLOOD BY AUTOMATED COUNT: 18.9 % (ref 10–15)
ERYTHROCYTE [DISTWIDTH] IN BLOOD BY AUTOMATED COUNT: 19.1 % (ref 10–15)
GLUCOSE BLDC GLUCOMTR-MCNC: 118 MG/DL (ref 70–99)
GLUCOSE BLDC GLUCOMTR-MCNC: 125 MG/DL (ref 70–99)
GLUCOSE BLDC GLUCOMTR-MCNC: 141 MG/DL (ref 70–99)
GLUCOSE BLDC GLUCOMTR-MCNC: 149 MG/DL (ref 70–99)
GLUCOSE BLDC GLUCOMTR-MCNC: 154 MG/DL (ref 70–99)
GLUCOSE SERPL-MCNC: 151 MG/DL (ref 70–99)
GLUCOSE SERPL-MCNC: 161 MG/DL (ref 70–99)
GLUCOSE SERPL-MCNC: 172 MG/DL (ref 70–99)
GRAM STAIN RESULT: ABNORMAL
GRAM STAIN RESULT: ABNORMAL
HCO3 BLD-SCNC: 23 MMOL/L (ref 21–28)
HCO3 BLD-SCNC: 24 MMOL/L (ref 21–28)
HCO3 SERPL-SCNC: 21 MMOL/L (ref 22–29)
HCO3 SERPL-SCNC: 21 MMOL/L (ref 22–29)
HCO3 SERPL-SCNC: 22 MMOL/L (ref 22–29)
HCT VFR BLD AUTO: 20.9 % (ref 35–47)
HCT VFR BLD AUTO: 22.2 % (ref 35–47)
HCT VFR BLD AUTO: 22.6 % (ref 35–47)
HGB BLD-MCNC: 6.6 G/DL (ref 11.7–15.7)
HGB BLD-MCNC: 7 G/DL (ref 11.7–15.7)
HGB BLD-MCNC: 7 G/DL (ref 11.7–15.7)
HGB BLD-MCNC: 7.2 G/DL (ref 11.7–15.7)
ISSUE DATE AND TIME: NORMAL
MAGNESIUM SERPL-MCNC: 2.3 MG/DL (ref 1.7–2.3)
MCH RBC QN AUTO: 30.3 PG (ref 26.5–33)
MCH RBC QN AUTO: 30.9 PG (ref 26.5–33)
MCH RBC QN AUTO: 31.3 PG (ref 26.5–33)
MCHC RBC AUTO-ENTMCNC: 31.5 G/DL (ref 31.5–36.5)
MCHC RBC AUTO-ENTMCNC: 31.6 G/DL (ref 31.5–36.5)
MCHC RBC AUTO-ENTMCNC: 31.9 G/DL (ref 31.5–36.5)
MCV RBC AUTO: 96 FL (ref 78–100)
MCV RBC AUTO: 97 FL (ref 78–100)
MCV RBC AUTO: 99 FL (ref 78–100)
O2/TOTAL GAS SETTING VFR VENT: 40 %
O2/TOTAL GAS SETTING VFR VENT: 50 %
PCO2 BLD: 42 MM HG (ref 35–45)
PCO2 BLD: 43 MM HG (ref 35–45)
PEEP: 10 CM H2O
PH BLD: 7.35 [PH] (ref 7.35–7.45)
PH BLD: 7.36 [PH] (ref 7.35–7.45)
PHOSPHATE SERPL-MCNC: 5.2 MG/DL (ref 2.5–4.5)
PLATELET # BLD AUTO: 169 10E3/UL (ref 150–450)
PLATELET # BLD AUTO: 192 10E3/UL (ref 150–450)
PLATELET # BLD AUTO: 201 10E3/UL (ref 150–450)
PO2 BLD: 82 MM HG (ref 80–105)
PO2 BLD: 82 MM HG (ref 80–105)
POTASSIUM SERPL-SCNC: 3.9 MMOL/L (ref 3.4–5.3)
POTASSIUM SERPL-SCNC: 4.2 MMOL/L (ref 3.4–5.3)
POTASSIUM SERPL-SCNC: 4.2 MMOL/L (ref 3.4–5.3)
PROT SERPL-MCNC: 5 G/DL (ref 6.4–8.3)
RBC # BLD AUTO: 2.11 10E6/UL (ref 3.8–5.2)
RBC # BLD AUTO: 2.31 10E6/UL (ref 3.8–5.2)
RBC # BLD AUTO: 2.33 10E6/UL (ref 3.8–5.2)
SAO2 % BLDA: 95 % (ref 92–100)
SAO2 % BLDA: 95 % (ref 92–100)
SODIUM SERPL-SCNC: 136 MMOL/L (ref 135–145)
SODIUM SERPL-SCNC: 136 MMOL/L (ref 135–145)
SODIUM SERPL-SCNC: 137 MMOL/L (ref 135–145)
SPECIMEN EXPIRATION DATE: NORMAL
UFH PPP CHRO-ACNC: <0.1 IU/ML
UNIT ABO/RH: NORMAL
UNIT NUMBER: NORMAL
UNIT STATUS: NORMAL
UNIT TYPE ISBT: 9500
WBC # BLD AUTO: 12.4 10E3/UL (ref 4–11)
WBC # BLD AUTO: 16 10E3/UL (ref 4–11)
WBC # BLD AUTO: 16.6 10E3/UL (ref 4–11)

## 2024-08-24 PROCEDURE — 86923 COMPATIBILITY TEST ELECTRIC: CPT

## 2024-08-24 PROCEDURE — 94003 VENT MGMT INPAT SUBQ DAY: CPT

## 2024-08-24 PROCEDURE — 250N000013 HC RX MED GY IP 250 OP 250 PS 637

## 2024-08-24 PROCEDURE — 85520 HEPARIN ASSAY: CPT | Performed by: SURGERY

## 2024-08-24 PROCEDURE — 250N000009 HC RX 250: Performed by: SURGERY

## 2024-08-24 PROCEDURE — 85018 HEMOGLOBIN: CPT | Performed by: SURGERY

## 2024-08-24 PROCEDURE — 250N000013 HC RX MED GY IP 250 OP 250 PS 637: Performed by: PHYSICIAN ASSISTANT

## 2024-08-24 PROCEDURE — 82248 BILIRUBIN DIRECT: CPT | Performed by: CLINICAL NURSE SPECIALIST

## 2024-08-24 PROCEDURE — 86900 BLOOD TYPING SEROLOGIC ABO: CPT | Performed by: SURGERY

## 2024-08-24 PROCEDURE — 250N000009 HC RX 250: Performed by: STUDENT IN AN ORGANIZED HEALTH CARE EDUCATION/TRAINING PROGRAM

## 2024-08-24 PROCEDURE — 71045 X-RAY EXAM CHEST 1 VIEW: CPT | Mod: 26 | Performed by: STUDENT IN AN ORGANIZED HEALTH CARE EDUCATION/TRAINING PROGRAM

## 2024-08-24 PROCEDURE — 90947 DIALYSIS REPEATED EVAL: CPT

## 2024-08-24 PROCEDURE — 82040 ASSAY OF SERUM ALBUMIN: CPT | Performed by: CLINICAL NURSE SPECIALIST

## 2024-08-24 PROCEDURE — 82330 ASSAY OF CALCIUM: CPT | Performed by: STUDENT IN AN ORGANIZED HEALTH CARE EDUCATION/TRAINING PROGRAM

## 2024-08-24 PROCEDURE — 71045 X-RAY EXAM CHEST 1 VIEW: CPT

## 2024-08-24 PROCEDURE — 85520 HEPARIN ASSAY: CPT | Performed by: STUDENT IN AN ORGANIZED HEALTH CARE EDUCATION/TRAINING PROGRAM

## 2024-08-24 PROCEDURE — 85027 COMPLETE CBC AUTOMATED: CPT | Performed by: PHYSICIAN ASSISTANT

## 2024-08-24 PROCEDURE — 250N000009 HC RX 250: Performed by: CLINICAL NURSE SPECIALIST

## 2024-08-24 PROCEDURE — P9016 RBC LEUKOCYTES REDUCED: HCPCS

## 2024-08-24 PROCEDURE — 94640 AIRWAY INHALATION TREATMENT: CPT

## 2024-08-24 PROCEDURE — 82330 ASSAY OF CALCIUM: CPT | Performed by: CLINICAL NURSE SPECIALIST

## 2024-08-24 PROCEDURE — 999N000157 HC STATISTIC RCP TIME EA 10 MIN

## 2024-08-24 PROCEDURE — 250N000013 HC RX MED GY IP 250 OP 250 PS 637: Performed by: SURGERY

## 2024-08-24 PROCEDURE — 83735 ASSAY OF MAGNESIUM: CPT | Performed by: STUDENT IN AN ORGANIZED HEALTH CARE EDUCATION/TRAINING PROGRAM

## 2024-08-24 PROCEDURE — 99291 CRITICAL CARE FIRST HOUR: CPT

## 2024-08-24 PROCEDURE — 85018 HEMOGLOBIN: CPT

## 2024-08-24 PROCEDURE — 250N000011 HC RX IP 250 OP 636

## 2024-08-24 PROCEDURE — 94640 AIRWAY INHALATION TREATMENT: CPT | Mod: 76

## 2024-08-24 PROCEDURE — 84075 ASSAY ALKALINE PHOSPHATASE: CPT | Performed by: CLINICAL NURSE SPECIALIST

## 2024-08-24 PROCEDURE — 250N000013 HC RX MED GY IP 250 OP 250 PS 637: Performed by: STUDENT IN AN ORGANIZED HEALTH CARE EDUCATION/TRAINING PROGRAM

## 2024-08-24 PROCEDURE — 82040 ASSAY OF SERUM ALBUMIN: CPT | Performed by: STUDENT IN AN ORGANIZED HEALTH CARE EDUCATION/TRAINING PROGRAM

## 2024-08-24 PROCEDURE — 83735 ASSAY OF MAGNESIUM: CPT | Performed by: CLINICAL NURSE SPECIALIST

## 2024-08-24 PROCEDURE — 84155 ASSAY OF PROTEIN SERUM: CPT | Performed by: CLINICAL NURSE SPECIALIST

## 2024-08-24 PROCEDURE — 80053 COMPREHEN METABOLIC PANEL: CPT | Performed by: CLINICAL NURSE SPECIALIST

## 2024-08-24 PROCEDURE — 84460 ALANINE AMINO (ALT) (SGPT): CPT | Performed by: CLINICAL NURSE SPECIALIST

## 2024-08-24 PROCEDURE — 85027 COMPLETE CBC AUTOMATED: CPT | Performed by: STUDENT IN AN ORGANIZED HEALTH CARE EDUCATION/TRAINING PROGRAM

## 2024-08-24 PROCEDURE — 82805 BLOOD GASES W/O2 SATURATION: CPT

## 2024-08-24 PROCEDURE — 200N000002 HC R&B ICU UMMC

## 2024-08-24 PROCEDURE — 86901 BLOOD TYPING SEROLOGIC RH(D): CPT | Performed by: SURGERY

## 2024-08-24 PROCEDURE — 250N000011 HC RX IP 250 OP 636: Performed by: CLINICAL NURSE SPECIALIST

## 2024-08-24 PROCEDURE — 250N000009 HC RX 250: Performed by: NURSE PRACTITIONER

## 2024-08-24 PROCEDURE — 99233 SBSQ HOSP IP/OBS HIGH 50: CPT | Performed by: STUDENT IN AN ORGANIZED HEALTH CARE EDUCATION/TRAINING PROGRAM

## 2024-08-24 PROCEDURE — 82247 BILIRUBIN TOTAL: CPT | Performed by: CLINICAL NURSE SPECIALIST

## 2024-08-24 RX ORDER — OXYCODONE HYDROCHLORIDE 5 MG/1
5 TABLET ORAL EVERY 4 HOURS
Status: DISCONTINUED | OUTPATIENT
Start: 2024-08-24 | End: 2024-08-26

## 2024-08-24 RX ORDER — CALCIUM CHLORIDE, MAGNESIUM CHLORIDE, SODIUM CHLORIDE, SODIUM BICARBONATE, POTASSIUM CHLORIDE AND SODIUM PHOSPHATE DIBASIC DIHYDRATE 3.68; 3.05; 6.34; 3.09; .314; .187 G/L; G/L; G/L; G/L; G/L; G/L
INJECTION INTRAVENOUS CONTINUOUS
Status: DISCONTINUED | OUTPATIENT
Start: 2024-08-24 | End: 2024-08-29

## 2024-08-24 RX ORDER — ACYCLOVIR 200 MG/5ML
400 SUSPENSION ORAL 3 TIMES DAILY
Status: COMPLETED | OUTPATIENT
Start: 2024-08-24 | End: 2024-08-27

## 2024-08-24 RX ORDER — POTASSIUM CHLORIDE 29.8 MG/ML
20 INJECTION INTRAVENOUS EVERY 8 HOURS PRN
Status: DISCONTINUED | OUTPATIENT
Start: 2024-08-24 | End: 2024-08-29

## 2024-08-24 RX ORDER — MAGNESIUM SULFATE HEPTAHYDRATE 40 MG/ML
2 INJECTION, SOLUTION INTRAVENOUS EVERY 8 HOURS PRN
Status: DISCONTINUED | OUTPATIENT
Start: 2024-08-24 | End: 2024-08-29

## 2024-08-24 RX ORDER — CALCIUM CHLORIDE, MAGNESIUM CHLORIDE, SODIUM CHLORIDE, SODIUM BICARBONATE, POTASSIUM CHLORIDE AND SODIUM PHOSPHATE DIBASIC DIHYDRATE 3.68; 3.05; 6.34; 3.09; .314; .187 G/L; G/L; G/L; G/L; G/L; G/L
12.5 INJECTION INTRAVENOUS CONTINUOUS
Status: DISCONTINUED | OUTPATIENT
Start: 2024-08-24 | End: 2024-08-29

## 2024-08-24 RX ORDER — CALCIUM GLUCONATE 20 MG/ML
2 INJECTION, SOLUTION INTRAVENOUS EVERY 8 HOURS PRN
Status: DISCONTINUED | OUTPATIENT
Start: 2024-08-24 | End: 2024-08-29

## 2024-08-24 RX ADMIN — DEXMEDETOMIDINE HYDROCHLORIDE IN SODIUM CHLORIDE 0.6 MCG/KG/HR: 4 INJECTION INTRAVENOUS at 18:11

## 2024-08-24 RX ADMIN — OXYCODONE HYDROCHLORIDE 5 MG: 5 TABLET ORAL at 21:44

## 2024-08-24 RX ADMIN — VENLAFAXINE 75 MG: 25 TABLET ORAL at 07:41

## 2024-08-24 RX ADMIN — IPRATROPIUM BROMIDE AND ALBUTEROL SULFATE 3 ML: .5; 3 SOLUTION RESPIRATORY (INHALATION) at 20:28

## 2024-08-24 RX ADMIN — WHITE PETROLATUM 57.7 %-MINERAL OIL 31.9 % EYE OINTMENT: at 07:43

## 2024-08-24 RX ADMIN — MIDAZOLAM 1 MG: 1 INJECTION INTRAMUSCULAR; INTRAVENOUS at 15:52

## 2024-08-24 RX ADMIN — QUETIAPINE FUMARATE 25 MG: 25 TABLET ORAL at 20:32

## 2024-08-24 RX ADMIN — Medication 1 TABLET: at 07:41

## 2024-08-24 RX ADMIN — LOPERAMIDE HYDROCHLORIDE 4 MG: 2 CAPSULE ORAL at 02:02

## 2024-08-24 RX ADMIN — OXYCODONE HYDROCHLORIDE 5 MG: 5 TABLET ORAL at 18:11

## 2024-08-24 RX ADMIN — ACETYLCYSTEINE 4 ML: 100 SOLUTION ORAL; RESPIRATORY (INHALATION) at 16:19

## 2024-08-24 RX ADMIN — ACETYLCYSTEINE 4 ML: 100 SOLUTION ORAL; RESPIRATORY (INHALATION) at 09:04

## 2024-08-24 RX ADMIN — LEVOTHYROXINE SODIUM 25 MCG: 0.03 TABLET ORAL at 06:38

## 2024-08-24 RX ADMIN — ACETYLCYSTEINE 4 ML: 100 SOLUTION ORAL; RESPIRATORY (INHALATION) at 20:28

## 2024-08-24 RX ADMIN — GABAPENTIN 300 MG: 300 CAPSULE ORAL at 07:42

## 2024-08-24 RX ADMIN — CALCIUM CHLORIDE, MAGNESIUM CHLORIDE, SODIUM CHLORIDE, SODIUM BICARBONATE, POTASSIUM CHLORIDE AND SODIUM PHOSPHATE DIBASIC DIHYDRATE 12.5 ML/KG/HR: 3.68; 3.05; 6.34; 3.09; .314; .187 INJECTION INTRAVENOUS at 09:28

## 2024-08-24 RX ADMIN — DEXMEDETOMIDINE HYDROCHLORIDE IN SODIUM CHLORIDE 1.2 MCG/KG/HR: 4 INJECTION INTRAVENOUS at 02:33

## 2024-08-24 RX ADMIN — DEXMEDETOMIDINE HYDROCHLORIDE IN SODIUM CHLORIDE 1.2 MCG/KG/HR: 4 INJECTION INTRAVENOUS at 09:26

## 2024-08-24 RX ADMIN — OXYCODONE HYDROCHLORIDE 10 MG: 10 TABLET ORAL at 06:01

## 2024-08-24 RX ADMIN — IPRATROPIUM BROMIDE AND ALBUTEROL SULFATE 3 ML: .5; 3 SOLUTION RESPIRATORY (INHALATION) at 09:04

## 2024-08-24 RX ADMIN — DEXMEDETOMIDINE HYDROCHLORIDE IN SODIUM CHLORIDE 1 MCG/KG/HR: 4 INJECTION INTRAVENOUS at 14:15

## 2024-08-24 RX ADMIN — Medication 60 ML: at 07:43

## 2024-08-24 RX ADMIN — IPRATROPIUM BROMIDE AND ALBUTEROL SULFATE 3 ML: .5; 3 SOLUTION RESPIRATORY (INHALATION) at 12:12

## 2024-08-24 RX ADMIN — DEXMEDETOMIDINE HYDROCHLORIDE IN SODIUM CHLORIDE 0.7 MCG/KG/HR: 4 INJECTION INTRAVENOUS at 20:32

## 2024-08-24 RX ADMIN — GABAPENTIN 300 MG: 300 CAPSULE ORAL at 13:08

## 2024-08-24 RX ADMIN — ACETAMINOPHEN 975 MG: 325 TABLET, FILM COATED ORAL at 07:41

## 2024-08-24 RX ADMIN — Medication 40 MG: at 06:38

## 2024-08-24 RX ADMIN — MONTELUKAST 10 MG: 10 TABLET, FILM COATED ORAL at 21:44

## 2024-08-24 RX ADMIN — BUDESONIDE 1 MG: 1 INHALANT ORAL at 09:05

## 2024-08-24 RX ADMIN — CALCIUM CHLORIDE, MAGNESIUM CHLORIDE, SODIUM CHLORIDE, SODIUM BICARBONATE, POTASSIUM CHLORIDE AND SODIUM PHOSPHATE DIBASIC DIHYDRATE 12.5 ML/KG/HR: 3.68; 3.05; 6.34; 3.09; .314; .187 INJECTION INTRAVENOUS at 04:34

## 2024-08-24 RX ADMIN — MIDAZOLAM 1 MG: 1 INJECTION INTRAMUSCULAR; INTRAVENOUS at 18:29

## 2024-08-24 RX ADMIN — GABAPENTIN 300 MG: 300 CAPSULE ORAL at 19:53

## 2024-08-24 RX ADMIN — DEXMEDETOMIDINE HYDROCHLORIDE IN SODIUM CHLORIDE 1.2 MCG/KG/HR: 4 INJECTION INTRAVENOUS at 07:01

## 2024-08-24 RX ADMIN — Medication 60 ML: at 19:55

## 2024-08-24 RX ADMIN — ACETYLCYSTEINE 4 ML: 100 SOLUTION ORAL; RESPIRATORY (INHALATION) at 12:12

## 2024-08-24 RX ADMIN — CLONAZEPAM 0.5 MG: 0.5 TABLET ORAL at 07:41

## 2024-08-24 RX ADMIN — WHITE PETROLATUM 57.7 %-MINERAL OIL 31.9 % EYE OINTMENT: at 15:54

## 2024-08-24 RX ADMIN — OXYCODONE HYDROCHLORIDE 5 MG: 5 TABLET ORAL at 09:26

## 2024-08-24 RX ADMIN — CALCIUM CHLORIDE, MAGNESIUM CHLORIDE, SODIUM CHLORIDE, SODIUM BICARBONATE, POTASSIUM CHLORIDE AND SODIUM PHOSPHATE DIBASIC DIHYDRATE 12.5 ML/KG/HR: 3.68; 3.05; 6.34; 3.09; .314; .187 INJECTION INTRAVENOUS at 18:35

## 2024-08-24 RX ADMIN — VENLAFAXINE 75 MG: 25 TABLET ORAL at 19:52

## 2024-08-24 RX ADMIN — OXYCODONE HYDROCHLORIDE 5 MG: 5 TABLET ORAL at 13:08

## 2024-08-24 RX ADMIN — LOPERAMIDE HYDROCHLORIDE 4 MG: 2 CAPSULE ORAL at 07:41

## 2024-08-24 RX ADMIN — CETIRIZINE HYDROCHLORIDE 10 MG: 10 TABLET, FILM COATED ORAL at 07:41

## 2024-08-24 RX ADMIN — ACETAMINOPHEN 975 MG: 325 TABLET, FILM COATED ORAL at 15:52

## 2024-08-24 RX ADMIN — CHLORHEXIDINE GLUCONATE 0.12% ORAL RINSE 15 ML: 1.2 LIQUID ORAL at 19:52

## 2024-08-24 RX ADMIN — OXYCODONE HYDROCHLORIDE 10 MG: 10 TABLET ORAL at 02:02

## 2024-08-24 RX ADMIN — IPRATROPIUM BROMIDE AND ALBUTEROL SULFATE 3 ML: .5; 3 SOLUTION RESPIRATORY (INHALATION) at 16:19

## 2024-08-24 RX ADMIN — ACYCLOVIR 400 MG: 200 SUSPENSION ORAL at 07:42

## 2024-08-24 RX ADMIN — MIDAZOLAM 1 MG: 1 INJECTION INTRAMUSCULAR; INTRAVENOUS at 06:57

## 2024-08-24 RX ADMIN — LOPERAMIDE HYDROCHLORIDE 4 MG: 2 CAPSULE ORAL at 19:53

## 2024-08-24 RX ADMIN — ACYCLOVIR 400 MG: 200 SUSPENSION ORAL at 19:54

## 2024-08-24 RX ADMIN — CHLORHEXIDINE GLUCONATE 0.12% ORAL RINSE 15 ML: 1.2 LIQUID ORAL at 07:41

## 2024-08-24 RX ADMIN — MIDAZOLAM 2 MG: 1 INJECTION INTRAMUSCULAR; INTRAVENOUS at 20:32

## 2024-08-24 RX ADMIN — ACYCLOVIR 400 MG: 200 SUSPENSION ORAL at 13:09

## 2024-08-24 RX ADMIN — CLONAZEPAM 0.5 MG: 0.5 TABLET ORAL at 19:53

## 2024-08-24 RX ADMIN — AMITRIPTYLINE HYDROCHLORIDE 50 MG: 50 TABLET, FILM COATED ORAL at 21:44

## 2024-08-24 RX ADMIN — CALCIUM CHLORIDE, MAGNESIUM CHLORIDE, SODIUM CHLORIDE, SODIUM BICARBONATE, POTASSIUM CHLORIDE AND SODIUM PHOSPHATE DIBASIC DIHYDRATE: 3.68; 3.05; 6.34; 3.09; .314; .187 INJECTION INTRAVENOUS at 12:07

## 2024-08-24 RX ADMIN — ATORVASTATIN CALCIUM 10 MG: 10 TABLET, FILM COATED ORAL at 07:41

## 2024-08-24 RX ADMIN — LOPERAMIDE HYDROCHLORIDE 4 MG: 2 CAPSULE ORAL at 13:08

## 2024-08-24 RX ADMIN — HEPARIN SODIUM 500 UNITS/HR: 1000 INJECTION, SOLUTION INTRAVENOUS; SUBCUTANEOUS at 05:59

## 2024-08-24 ASSESSMENT — ACTIVITIES OF DAILY LIVING (ADL)
ADLS_ACUITY_SCORE: 58
ADLS_ACUITY_SCORE: 59
ADLS_ACUITY_SCORE: 58
ADLS_ACUITY_SCORE: 62
ADLS_ACUITY_SCORE: 58
ADLS_ACUITY_SCORE: 58
ADLS_ACUITY_SCORE: 59
ADLS_ACUITY_SCORE: 58
ADLS_ACUITY_SCORE: 58
ADLS_ACUITY_SCORE: 62
ADLS_ACUITY_SCORE: 58
ADLS_ACUITY_SCORE: 59
ADLS_ACUITY_SCORE: 62
ADLS_ACUITY_SCORE: 59
ADLS_ACUITY_SCORE: 62
ADLS_ACUITY_SCORE: 62
ADLS_ACUITY_SCORE: 58
ADLS_ACUITY_SCORE: 58

## 2024-08-24 NOTE — PROGRESS NOTES
SURGICAL ICU PROGRESS NOTE  08/24/2024        Date of Service (when I saw the patient): 08/24/2024    ASSESSMENT:  Massiel Flaherty is a 53 year old female who was admitted to [facility]. Past medical hx of Anxiety/depression, fibromyalgia, hypothyroidism, asthma, HLD, MURIEL on CPAP, spells, off on anti seizure medications, expressive aphasia, hypertension was seen at [facility] and was placed on Augmentin outpatient on 7/30/24. She admitted to the hospital on 8/3/24 for fatigue, fever and dyspnea and intubated 8/6/24 at OSH, proned and paralyzed without improvement. Transferred to [facility] 8/8/24, hypoxic with high plateaus/peaks and placed on VV ECMO and CRRT. Decannulated from VV ECMO 8/18.     CHANGES and MAJOR THINGS TODAY:   - Decreased scheduled oxycodone  - Intermittent hypoxic episodes noted this AM. Continue to pull fluids and optimize neuro status.     PLAN:    Neurological:  # Acute pain  # Sedation and analgesia for vent compliance   # Concern for ICU delirium   # Hx Fibromyalgia   # Hx myalgic encephalomyelitis   # Hx anxiety/depression  - Monitor neurological status. Delirium preventions and precautions.   - Pain: Scheduled: tylenol, decrease oxycodone 5mg q4  PRN: tylenol, dilaudid, oxycodone  - Sedation plan:  Gtt: Precedex gtt              PRN: Versed 1-2mg q1 hr  - Clonazepam 0.5 mg BID, plan to wean tomorrow  - Gabapentin 300mg to TID   - Seroquel 25mg BID PRN  - PTA Venlafaxine and Amitriptyline      # Hx spells with staring and BUE posturing previously described as pseudoseizures   # Hx of GTC seizures and myoclonic epilepsy, weaned off AEDs   # C/f hypoxic ischemic injury   # Diffuse cerebral edema - improved  - Sodium goals liberalized to 140-145  - 8/21: MRI Brain: no acute intracranial pathology. No findings to suggest anoxic brain injury.  - NCC signed off.     HEENT:  # Mouth / tongue sores  # Hx seasonal allergies  # Hx alopecia  Mouth sores present, which could have viral etiology. 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  - Acyclovir 400mg BID x5 days  - Start PTA zyrtec   - Hold PTA hydroxychloroquine    Pulmonary:  # Acute hypoxic respiratory failure c/b ARDS s/p VV ECMO cannulation 8/8- decannulated on 8/18   # Hx CAP  # Asthma  # MURIEL on home CPAP   FiO2 (%): 40 %, Resp: 23, Inspiratory Pressure (cm H2O) (Drager Jessie): 25, Vent Mode: PCV Plus assist, Resp Rate (Set): 18 breaths/min, PEEP (cm H2O): 10 cmH2O, Resp Rate (Set): 18 breaths/min, PEEP (cm H2O): 10 cmH2O  - PC/AC: 25/10  - SpO2 goals >92%, PaO2 goals >60   - PTA singular at bedtime  - Scheduled pulmicort, mucomyst, and duonebs   - Wean vent as able, no plans for PST today due to hypoxic episodes reported this morning.   - VAP bundle while intubated     Cardiovascular:    # Hyperlipidemia  # Hypotension  # Hx HTN  - MAP goal  >65, SBP goals >110  - PRN norepinephrine briefly used overnight.  - PTA statin, atorvastatin  - Hold PTA clonidine    GI/Nutrition:    # Moderate protein calorie malnutrition  # Non-infectious Diarrhea  # GERD   - Protonix (home medication)   - Nutrition consulted, appreciate recs  - Diet: TF via NJ  - Increase goal rate per nutrition  - Hold bowel regimen d/t loose stools  - Change suspension meds to other form as able  - Continue scheduled multivitamin, banatrol, prosource packet, and loperamide  - Rectal tube, continues with high output. Keep today.     Fluids/Electrolytes/Renal:  # Acute kidney injury  Baseline Cr approx 0.6. Pt is anuric  - Continue CRRT; fluid goal net negative 1L   - Heparinized CRRT circuit, will d/w renal if can increase to LIS gtt.  - Strict I&Os   - Bladder scan q shift  - Avoid nephrotoxins as able     Endocrine:  # Steroid and stress induced hyperglycemia  # Hypothyroidism   - Goal to keep BG < 180 for optimal wound healing.   - Continue HSSI, no coverage needed yesterday while NPO for procedure.  - Continue PTA synthroid    Infectious disease:   # Leukocytosis  # Hx CAP  - Continue to monitor  "fever curve and inflammatory markers as appropriate  - ID consulted, appreciate recs. Recommending monitoring off antimicrobials.   - Acyclovir started 8/22 for HSI, see HEENT    # Concern for West Nile virus  Per family, members of pt's community have been testing positive for West Nile Virus  - LP needed to diagnose West Nile- will defer. IgG and IgM sent 8/21, value back at 0.51 which is interpreted as negative.      Hematology:    # Acute blood loss anemia   # Anemia of critical illness  - Transfuse if hgb <7.0 or signs/symptoms of hypoperfusion. Monitor and trend.   - Heparinize CRRT circuit      Musculoskeletal:  # Weakness and deconditioning of critical illness  # Left ankle injury pre-hospitalization    - Physical and occupational therapy when able.     Skin:  # BLE scattered ecchymosis  # Left toe duskiness   - Bilateral lower leg ecchymosis   - WOC consult   - Ecchymosis to left foot, most noticeable to 3rd and 4th toes    General Cares/Prophylaxis:    DVT Prophylaxis: Heparin through CRRT circuit  GI Prophylaxis: PPI  Restraints: Restraints for medical healing needed: NO    Lines/ tubes/ drains:  - ETT  - NJ  - LIJ CVC  - Radial a-line  - PIV x3  - Rectal tube   - Tunneled HD line    Disposition:  - Surgical ICU     Patient seen, findings and plan discussed with surgical ICU staff, Dr. Calixto.    Time spent on this Encounter   Billing:  I spent 40 minutes bedside and on the inpatient unit today managing the critical care of Massiel Flaherty in relation to the issues listed in this note.      Aysha Em, APRN CNP  Clinically Significant Risk Factors              # Hypoalbuminemia: Lowest albumin = 2.2 g/dL at 8/10/2024  9:47 AM, will monitor as appropriate               # Obesity: Estimated body mass index is 33.39 kg/m  as calculated from the following:    Height as of this encounter: 1.575 m (5' 2\").    Weight as of this encounter: 82.8 kg (182 lb 8.7 oz).   # Moderate Malnutrition: " based on nutrition assessment    # Financial/Environmental Concerns: none                        ====================================  INTERVAL HISTORY:   Overall course reviewed. Patient evaluated at bedside. Does follow commands weakly on upper and lower limbs. Coughing spells with associated hypoxia, resolves with suctioning and time. Unable to complete ROS given lethargic, sedated state.       OBJECTIVE:   1. VITAL SIGNS:   Temp:  [97.2  F (36.2  C)-101  F (38.3  C)] 100  F (37.8  C)  Pulse:  [] 86  Resp:  [15-32] 23  BP: ()/(41-77) 121/62  MAP:  [55 mmHg-104 mmHg] 83 mmHg  Arterial Line BP: ()/(41-90) 136/58  FiO2 (%):  [40 %-45 %] 40 %  SpO2:  [89 %-100 %] 99 %  FiO2 (%): 40 %, Resp: 23, Inspiratory Pressure (cm H2O) (Drager Jessie): 25, Vent Mode: PCV Plus assist, Resp Rate (Set): 18 breaths/min, PEEP (cm H2O): 10 cmH2O, Resp Rate (Set): 18 breaths/min, PEEP (cm H2O): 10 cmH2O    2. INTAKE/ OUTPUT:   I/O last 3 completed shifts:  In: 2477.19 [I.V.:722.19; NG/GT:1015]  Out: 1908 [Other:1458; Stool:450]    3. PHYSICAL EXAMINATION:  General: laying in bed  HEENT: normocephalic, atraumatic, PERRLA, subconjunctival hemorrhage to left eye  Neuro: Intubated, sedated. RASS -2,   Pulm/Resp: Clear breath sounds bilaterally without rhonchi, crackles or wheeze, breathing non-labored on ventilator.  CV: RRR, pitting edema  Abdomen: Soft, non-distended, non-tender, no rebound tenderness or guarding, no masses  : Anuric  Incisions/Skin: left great toe pale with ecchymosis, mild BLE scattered echhymosis  MSK/Extremities: 2+ peripheral edema, moving all extremities, peripheral pulses intact, calves soft, extremities well perfused    4. INVESTIGATIONS:   Arterial Blood Gases   Recent Labs   Lab 08/24/24  0415 08/23/24  2203 08/23/24  0635 08/23/24  0344   PH 7.36 7.36 7.39 7.35   PCO2 42 40 38 44   PO2 82 103 86 59*   HCO3 23 23 23 24     Complete Blood Count   Recent Labs   Lab 08/24/24  0414 08/23/24 2203  08/23/24  1024 08/23/24  0344 08/22/24  0818 08/22/24  0350 08/21/24  0358   WBC 12.4*  --   --  16.9*  --  17.9* 18.3*   HGB 7.0* 8.1* 6.8* 7.5*   < > 7.0* 7.9*     --   --  227  --  229 261    < > = values in this interval not displayed.     Basic Metabolic Panel  Recent Labs   Lab 08/24/24  0418 08/24/24  0414 08/23/24  2348 08/23/24 2014 08/23/24  1553 08/23/24  1550 08/23/24  0803 08/23/24 0344 08/22/24 2016 08/22/24  1605   NA  --  136  --   --   --  136  --  137  --  136   POTASSIUM  --  3.9  --   --   --  4.3  --  4.1  --  3.8   CHLORIDE  --  105  --   --   --  105  --  105  --  106   CO2  --  21*  --   --   --  20*  --  22  --  21*   BUN  --  25.8*  --   --   --  27.5*  --  30.2*  --  39.1*   CR  --  1.49*  --   --   --  1.71*  --  1.51*  --  1.65*   * 151* 127* 122*   < > 123*   < > 114*  128*   < > 138*    < > = values in this interval not displayed.     Liver Function Tests  Recent Labs   Lab 08/24/24 0414 08/23/24  1550 08/23/24 0344 08/22/24  1605 08/22/24 0348 08/21/24  1617 08/21/24  0358   AST 27  --  20  --  28  --  24   ALT 14  --  16  --  18  --  17   ALKPHOS 78  --  90  --  96  --  93   BILITOTAL 0.2  --  0.2  --  0.2  --  0.2   ALBUMIN 2.7* 3.0* 3.0* 2.9* 3.0*   < > 3.1*    < > = values in this interval not displayed.     Pancreatic Enzymes  No lab results found in last 7 days.  Coagulation Profile  Recent Labs   Lab 08/18/24  0946 08/18/24  0433 08/17/24  2154 08/17/24  1545   PTT 32 45* 47* 49*         5. RADIOLOGY:   Recent Results (from the past 24 hour(s))   IR CVC Tunnel Placement > 5 Yrs of Age    Narrative    PROCEDURE: Tunneled central venous catheter placement     Procedural Personnel  Attending physician(s): BARB GALEANO MD  Fellow physician(s): Angélica Lyon MD  Resident physician(s): None  Advanced practice provider(s): None    Procedure Date (mm/dd/yyyy): 8/23/2024    Pre-procedure diagnosis: Dialysis  Post-procedure diagnosis: Same  Indication:  Other-dialysis  Additional clinical history: NA    Complications: No immediate complications.      Impression    IMPRESSION:    Insertion of right-sided 14.5 Mongolian x 23 cm tunneled dual-lumen  catheter, with tip in the expected location of the right atrium.    Plan:   1. Bed rest for 1 hour.  2. The catheter may be used immediately.  _______________________________________________________________    PROCEDURE SUMMARY:  - Venous access with ultrasound guidance  - Tunneled central venous catheter insertion with fluoroscopic  guidance  - Additional procedure(s): None    PROCEDURE DETAILS:    Pre-procedure  Consent: Informed consent for the procedure including risks, benefits  and alternatives was obtained and time-out was performed prior to the  procedure.  Preparation (MIPS): The site was prepared and draped using all  elements of maximal sterile barrier technique including sterile  gloves, sterile gown, cap, mask, large sterile sheet, sterile  ultrasound probe cover, hand hygiene and cutaneous antisepsis with 2%  chlorhexidine.   Medical reason for site preparation exception (MIPS): Not applicable    Anesthesia/sedation  Level of anesthesia/sedation: Moderate sedation (conscious sedation)  Anesthesia/sedation administered by: Independent trained observer  under attending supervision with continuous monitoring of the  patient?s level of consciousness and physiologic status  Total intra-service sedation time (minutes): 30    Access  Local anesthesia was administered. The vessel was sonographically  evaluated and determined to be patent. Real time ultrasound was used  to visualize needle entry into the vessel and a permanent image was  stored.  Laterality: Right  Vein accessed: Internal jugular vein  Access technique: Micropuncture set with 21 gauge needle    Venography  Indication for venography: Not applicable  Vein catheterized: Not applicable  Findings: Not applicable    Catheter placement  An incision was made  near the venous access site and the catheter was  tunneled subcutaneously to the venous access site. The catheter was  advanced via a peel-away sheath into the vein under fluoroscopic  guidance. Catheter tip location was fluoroscopically verified and a  permanent image was stored.  Catheter placed: Palindrome  Catheter length (cm): 23  Catheter size (Palauan): 14.5  Lumens: Dual-lumen   Power injectable: Yes  Catheter tip: right atrium  Catheter flush: Heparin (1000 units/mL)    Closure  A sterile dressing was applied.  Access site closure technique: Tissue adhesive  Catheter securement technique: Non-absorbable suture    Contrast  Contrast agent: None  Contrast volume (mL): 0    Radiation Dose  Fluoroscopy time (minutes): 2.7  Reference air kerma (mGy): 8    Additional Details  Additional description of procedure: None  Registry event: V/3/f  Device used: None  Equipment details: None  Unique Device Identifiers: Not available  Specimens removed: None  Estimated blood loss (mL): Less than 10  Standardized report: [email or web address]_v3.1    Attestation  Signer name: BARB GALEANO MD  I attest that I was present for the entire procedure. I reviewed the  stored images and agree with the report as written.       XR Chest Port 1 View    Impression    RESIDENT PRELIMINARY INTERPRETATION  Impression:   1. Placement large-bore right IJ dialysis catheter with tip at the  cavoatrial junction. Remainder support devices stable  2. Slight improved aeration of the lungs with mild decrease in basilar  mixed interstitial and airspace opacities most consistent with  ARDS/pulmonary edema.       =========================================

## 2024-08-24 NOTE — PROGRESS NOTES
"  Nephrology Progress Note  08/24/2024         Assessment & Recommendations:   Mrs Flaherty is a  53 yof w/ Anxiety, depression, fibromyalgia, hypothyroidism, asthma, HLD, MURIEL on CPAP, expressive aphasia, and hypertension who was admitted to an OSH with community acquired pneumonia on 8/3 s/p intubation.  Found to have severe ARDS requiring paralysis and proning, transferred here on 8/8 for management and initiated on CKRT for severe acidemia in the setting of oligoanuric CHACORTA.  Started CRRT on 8/9 for volume management. Remains anuric. Clotting circuit despite low-dose heparin.     # CHACORTA, suspected ATN in setting of shock, ARDS  Continue CRRT, though added a Xa parameter for increasing heparin to prevent clotting of circuit (was fixed dose heparin before this). Continue 4K baths, standard 25ml/kg/hr clearance, with goal net negative up to 50ml/hr (goal not achieved in last 24hrs).    # hypervolemia - UF as above        Recommendations were communicated to primary team via this note.     Danny Myrick MD   Division of Nephrology and Hypertension  Divergence  Vocera Web Console      Interval History :   Nursing and provider notes from last 24 hours reviewed.  In the last 24 hours Massiel Flaherty is repeatedly clotting the circuit. She remains intubated, unable to answer questions, but is opening eyes and following directions.     Review of Systems:   Unable to obtain due to intubation     Physical Exam:   I/O last 3 completed shifts:  In: 2781.45 [I.V.:721.45; NG/GT:955]  Out: 2197 [Other:1597; Stool:600]   /62   Pulse 106   Temp 99.1  F (37.3  C)   Resp 19   Ht 1.575 m (5' 2\")   Wt 82.8 kg (182 lb 8.7 oz)   SpO2 98%   BMI 33.39 kg/m       GENERAL APPEARANCE: NAD  EYES: no scleral icterus  PULM: lungs clear anteriorly, intubated and mechanically ventilated   CV: regular rhythm, normal rate, no rub      -edema - 1+ LE edema bilat   GI: soft, ND  INTEGUMENT: no rash on exposed skin  NEURO: opens eyes " "to voice and follows commands (such as \"look right\")  Access is LFV temp line 8/19.     Labs:   All labs reviewed by me  Electrolytes/Renal -   Recent Labs   Lab Test 08/24/24  1202 08/24/24  1158 08/24/24  0759 08/24/24  0418 08/24/24  0414 08/23/24  1553 08/23/24  1550   NA  --  136  --   --  136  --  136   POTASSIUM  --  4.2  --   --  3.9  --  4.3   CHLORIDE  --  103  --   --  105  --  105   CO2  --  21*  --   --  21*  --  20*   BUN  --  24.4*  --   --  25.8*  --  27.5*   CR  --  1.34*  --   --  1.49*  --  1.71*   * 172* 125*   < > 151*   < > 123*   QUAN  --  7.9*  --   --  7.7*  --  8.4*   MAG  --  2.3  --   --  2.3  --  2.4*   PHOS  --  5.2*  --   --  5.2*  --  5.7*    < > = values in this interval not displayed.       CBC -   Recent Labs   Lab Test 08/24/24  1158 08/24/24  0414 08/23/24  2203 08/23/24  1024 08/23/24  0344   WBC 16.0* 12.4*  --   --  16.9*   HGB 7.2* 7.0* 8.1*   < > 7.5*    169  --   --  227    < > = values in this interval not displayed.       LFTs -   Recent Labs   Lab Test 08/24/24  1158 08/24/24  0414 08/23/24  1550 08/23/24  0344 08/22/24  1605 08/22/24  0348   ALKPHOS  --  78  --  90  --  96   BILITOTAL  --  0.2  --  0.2  --  0.2   ALT  --  14  --  16  --  18   AST  --  27  --  20  --  28   PROTTOTAL  --  5.0*  --  5.3*  --  5.7*   ALBUMIN 2.8* 2.7* 3.0* 3.0*   < > 3.0*    < > = values in this interval not displayed.       Iron Panel - No lab results found.      Imaging:  All imaging studies reviewed by me.     Current Medications:  Current Facility-Administered Medications   Medication Dose Route Frequency Provider Last Rate Last Admin    acetaminophen (TYLENOL) tablet 975 mg  975 mg Oral or Feeding Tube Q8H Andres Payne PA-C   975 mg at 08/24/24 0741    acetylcysteine (MUCOMYST) 10 % nebulizer solution 4 mL  4 mL Nebulization 4x Daily Barry Hatch MD   4 mL at 08/24/24 1212    acyclovir (ZOVIRAX) suspension 400 mg  400 mg Oral or Feeding Tube TID Jomar Horne, " Confidence, MD   400 mg at 08/24/24 1309    amitriptyline (ELAVIL) tablet 50 mg  50 mg Oral or Feeding Tube At Bedtime Barry Hatch MD   50 mg at 08/23/24 2206    artificial tears ophthalmic ointment   Both Eyes Q8H get Macedo Tiaraforrest Bejarano, CNP   Given at 08/24/24 0743    atorvastatin (LIPITOR) tablet 10 mg  10 mg Oral or Feeding Tube Daily Francisco Welsh MD   10 mg at 08/24/24 0741    budesonide (PULMICORT) neb solution 1 mg  1 mg Nebulization Daily Andres Payne PA-C   1 mg at 08/24/24 0905    cetirizine (zyrTEC) tablet 10 mg  10 mg Oral Daily Aniceto Adame MD   10 mg at 08/24/24 0741    chlorhexidine (PERIDEX) 0.12 % solution 15 mL  15 mL Mouth/Throat Q12H Giovanny Guidry PA-C   15 mL at 08/24/24 0741    clonazePAM (klonoPIN) tablet 0.5 mg  0.5 mg Oral or Feeding Tube BID Aniceto Adame MD   0.5 mg at 08/24/24 0741    fiber modular (BANATROL TF) packet 1 packet  1 packet Per Feeding Tube TID Cal Lisa MD   1 packet at 08/24/24 0743    gabapentin (NEURONTIN) capsule 300 mg  300 mg Oral or Feeding Tube TID Barry Hacth MD   300 mg at 08/24/24 1308    insulin aspart (NovoLOG) injection (RAPID ACTING)  1-12 Units Subcutaneous Q4H Aniceto Adame MD   1 Units at 08/24/24 1208    ipratropium - albuterol 0.5 mg/2.5 mg/3 mL (DUONEB) neb solution 3 mL  3 mL Nebulization 4x daily Barry Hatch MD   3 mL at 08/24/24 1212    levothyroxine (SYNTHROID/LEVOTHROID) tablet 25 mcg  25 mcg Per Feeding Tube QAM AC Giovanny Guidry PA-C   25 mcg at 08/24/24 0638    loperamide (IMODIUM) capsule 4 mg  4 mg Oral or Feeding Tube Q6H Barry Hatch MD   4 mg at 08/24/24 1308    montelukast (SINGULAIR) tablet 10 mg  10 mg Oral At Bedtime Aniceto Adame MD   10 mg at 08/23/24 8296    multivitamin RENAL (RENAVITE RX/NEPHROVITE) tablet 1 tablet  1 tablet Oral or Feeding Tube Daily Her-Giovanny Spence PA-C   1 tablet at 08/24/24 0741    oxyCODONE (ROXICODONE) tablet 5  mg  5 mg Oral or Feeding Tube Q4H Aysha Em, APRN CNP   5 mg at 08/24/24 1308    pantoprazole (PROTONIX) 2 mg/mL suspension 40 mg  40 mg Per Feeding Tube QAM AC Barry Hatch MD   40 mg at 08/24/24 0638    Prosource TF20 ENfit Compatibl EN LIQD (PROSOURCE TF20) packet 60 mL  1 packet Per Feeding Tube BID Giovanny Guidry PA-C   60 mL at 08/24/24 0743    venlafaxine (EFFEXOR) tablet 75 mg  75 mg Oral or Feeding Tube BID Barry Hatch MD   75 mg at 08/24/24 0741     Current Facility-Administered Medications   Medication Dose Route Frequency Provider Last Rate Last Admin    dexmedeTOMIDine (PRECEDEX) 4 mcg/mL in sodium chloride 0.9 % 100 mL infusion  0.1-1.2 mcg/kg/hr (Dosing Weight) Intravenous Continuous de La MaterTiara CNP 22.2 mL/hr at 08/24/24 1212 1 mcg/kg/hr at 08/24/24 1212    dextrose 10% infusion   Intravenous Continuous PRN Giovanny Guidry PA-C        dialysate for CVVHD & CVVHDF (Phoxillum BK4/2.5)  12.5 mL/kg/hr CRRT Continuous Danny Myrick MD        heparin (porcine) 20,000 units in 20 mL ANTICOAGULANT infusion (syringe from pharmacy)  500-1,500 Units/hr CRRT Continuous Danny Myrick MD 0.5 mL/hr at 08/24/24 1208 500 Units/hr at 08/24/24 1208    norepinephrine (LEVOPHED) 16 mg in  mL infusion MAX CONC CENTRAL LINE  0.01-0.6 mcg/kg/min (Dosing Weight) Intravenous Continuous Elier Hutchins MD 3.3 mL/hr at 08/24/24 1200 0.04 mcg/kg/min at 08/24/24 1200    POST-filter replacement solution for CVVHD & CVVHDF (Phoxillum BK4/2.5)   CRRT Continuous Danny Myrick  mL/hr at 08/24/24 1207 New Bag at 08/24/24 1207    PRE-filter replacement solution for CVVHD & CVVHDF (Phoxillum BK4/2.5)  12.5 mL/kg/hr CRRT Continuous Danny Myrick MD Daniel Patrick Murphy, MD

## 2024-08-24 NOTE — PROGRESS NOTES
CRRT STATUS NOTE    DATA:  Time:  6:00 AM  Pressures WNL:  YES  Filter Status:  WDL    Problems Reported/Alarms Noted:  None.    Supplies Present:  YES    ASSESSMENT:  Patient Net Fluid Balance:  Net +653 ml @ midnight, net +58 @ 0600.  Vital Signs:  HR 86, /58 (83), SpO2 99%   Labs:  K 3.9, Mg 2.3, Phos 5.2, iCa 4.5, Hgb 7.0, Plt 169  Goals of Therapy:  0-50ml/hr    INTERVENTIONS:   Unable to pull fluid after IR initially due to hypotension requiring levo. Filter clotted off @ 0216 (set lasted ~8 hours), unable to return blood. Hgb drop of 8.1 to 7.0 this am.     PLAN:  Continue to monitor circuit daily and change set q72 hours or PRN for clotting/clogging. Please call CRRT RN with any quesitons/problems.

## 2024-08-24 NOTE — PLAN OF CARE
Goal Outcome Evaluation:      Plan of Care Reviewed With: patient    Overall Patient Progress: no changeOverall Patient Progress: no change    Outcome Evaluation: CRRT continued without any issues. Patient remains intubated and sedated with 1.2 of Precedex.    Major Shift Events: CRRT Set Change @ 0216    Neuro: Opens eyes to voice. PERRLA. Patient follows commands in BLE and is able to shrug her shoulders, but does not have any fine motor movement in her BUE . Precedex gtt for sedation. T-Max 38.2    CV: NSR in the 70's-80's at rest and becomes tachycardic when awake into the 110's. Off/on Levophed to maintain hemodynamic goals. MAP > 65. Pulses easily dopplered.    Pulm: ETT @ 26. PC-AC. FiO2 40%, RR 18, Pressure Control 25, PEEP 10. Minimal cloudy white secretions. Lung sounds are clear and equal bilaterally.    GI: NG @ 91. TF restarted with a new rate of 50 mL/hr. Tolerating well. FWF 30 q4. FMS in place with 200 mLof loose green drainage.     : Oliguric. Bladder scanned with 258 mL. CRRT remains on with a goal of 0-50 mL/hr to be pulled off.    Skin: Bilateral groin sites, previous RIJ site, old scar across RLQ of abdomen, abrasion on forehead, bleeding lesions in mouth, scattered bruising    Drains: N/A    Drips: Levophed off/on 0-0.6, Precedex 1.0    Labs: N/A

## 2024-08-25 ENCOUNTER — APPOINTMENT (OUTPATIENT)
Dept: GENERAL RADIOLOGY | Facility: CLINIC | Age: 54
DRG: 003 | End: 2024-08-25
Attending: STUDENT IN AN ORGANIZED HEALTH CARE EDUCATION/TRAINING PROGRAM
Payer: MEDICAID

## 2024-08-25 LAB
ALBUMIN SERPL BCG-MCNC: 2.8 G/DL (ref 3.5–5.2)
ALBUMIN SERPL BCG-MCNC: 2.9 G/DL (ref 3.5–5.2)
ALBUMIN SERPL BCG-MCNC: 2.9 G/DL (ref 3.5–5.2)
ALLEN'S TEST: ABNORMAL
ALP SERPL-CCNC: 84 U/L (ref 40–150)
ALT SERPL W P-5'-P-CCNC: 13 U/L (ref 0–50)
ANION GAP SERPL CALCULATED.3IONS-SCNC: 10 MMOL/L (ref 7–15)
ANION GAP SERPL CALCULATED.3IONS-SCNC: 12 MMOL/L (ref 7–15)
ANION GAP SERPL CALCULATED.3IONS-SCNC: 9 MMOL/L (ref 7–15)
AST SERPL W P-5'-P-CCNC: 27 U/L (ref 0–45)
BASE EXCESS BLDA CALC-SCNC: -1.3 MMOL/L (ref -3–3)
BILIRUB DIRECT SERPL-MCNC: <0.2 MG/DL (ref 0–0.3)
BILIRUB SERPL-MCNC: 0.2 MG/DL
BUN SERPL-MCNC: 18.1 MG/DL (ref 6–20)
BUN SERPL-MCNC: 20.3 MG/DL (ref 6–20)
BUN SERPL-MCNC: 20.7 MG/DL (ref 6–20)
CA-I BLD-MCNC: 4.6 MG/DL (ref 4.4–5.2)
CA-I BLD-MCNC: 4.7 MG/DL (ref 4.4–5.2)
CA-I BLD-MCNC: 4.8 MG/DL (ref 4.4–5.2)
CALCIUM SERPL-MCNC: 8.1 MG/DL (ref 8.8–10.4)
CALCIUM SERPL-MCNC: 8.1 MG/DL (ref 8.8–10.4)
CALCIUM SERPL-MCNC: 8.3 MG/DL (ref 8.8–10.4)
CHLORIDE SERPL-SCNC: 103 MMOL/L (ref 98–107)
CHLORIDE SERPL-SCNC: 104 MMOL/L (ref 98–107)
CHLORIDE SERPL-SCNC: 105 MMOL/L (ref 98–107)
COHGB MFR BLD: 99.6 % (ref 96–97)
CREAT SERPL-MCNC: 1.18 MG/DL (ref 0.51–0.95)
CREAT SERPL-MCNC: 1.21 MG/DL (ref 0.51–0.95)
CREAT SERPL-MCNC: 1.25 MG/DL (ref 0.51–0.95)
EGFRCR SERPLBLD CKD-EPI 2021: 51 ML/MIN/1.73M2
EGFRCR SERPLBLD CKD-EPI 2021: 53 ML/MIN/1.73M2
EGFRCR SERPLBLD CKD-EPI 2021: 55 ML/MIN/1.73M2
ERYTHROCYTE [DISTWIDTH] IN BLOOD BY AUTOMATED COUNT: 18.9 % (ref 10–15)
ERYTHROCYTE [DISTWIDTH] IN BLOOD BY AUTOMATED COUNT: 19.6 % (ref 10–15)
ERYTHROCYTE [DISTWIDTH] IN BLOOD BY AUTOMATED COUNT: 19.7 % (ref 10–15)
GLUCOSE BLDC GLUCOMTR-MCNC: 140 MG/DL (ref 70–99)
GLUCOSE BLDC GLUCOMTR-MCNC: 142 MG/DL (ref 70–99)
GLUCOSE BLDC GLUCOMTR-MCNC: 149 MG/DL (ref 70–99)
GLUCOSE BLDC GLUCOMTR-MCNC: 153 MG/DL (ref 70–99)
GLUCOSE BLDC GLUCOMTR-MCNC: 155 MG/DL (ref 70–99)
GLUCOSE BLDC GLUCOMTR-MCNC: 160 MG/DL (ref 70–99)
GLUCOSE SERPL-MCNC: 156 MG/DL (ref 70–99)
GLUCOSE SERPL-MCNC: 162 MG/DL (ref 70–99)
GLUCOSE SERPL-MCNC: 177 MG/DL (ref 70–99)
HCO3 BLD-SCNC: 24 MMOL/L (ref 21–28)
HCO3 SERPL-SCNC: 21 MMOL/L (ref 22–29)
HCO3 SERPL-SCNC: 23 MMOL/L (ref 22–29)
HCO3 SERPL-SCNC: 23 MMOL/L (ref 22–29)
HCT VFR BLD AUTO: 24.3 % (ref 35–47)
HCT VFR BLD AUTO: 25 % (ref 35–47)
HCT VFR BLD AUTO: 25 % (ref 35–47)
HGB BLD-MCNC: 7.6 G/DL (ref 11.7–15.7)
HGB BLD-MCNC: 7.7 G/DL (ref 11.7–15.7)
HGB BLD-MCNC: 7.7 G/DL (ref 11.7–15.7)
MAGNESIUM SERPL-MCNC: 2.4 MG/DL (ref 1.7–2.3)
MCH RBC QN AUTO: 29.6 PG (ref 26.5–33)
MCH RBC QN AUTO: 29.8 PG (ref 26.5–33)
MCH RBC QN AUTO: 30.4 PG (ref 26.5–33)
MCHC RBC AUTO-ENTMCNC: 30.8 G/DL (ref 31.5–36.5)
MCHC RBC AUTO-ENTMCNC: 30.8 G/DL (ref 31.5–36.5)
MCHC RBC AUTO-ENTMCNC: 31.3 G/DL (ref 31.5–36.5)
MCV RBC AUTO: 96 FL (ref 78–100)
MCV RBC AUTO: 97 FL (ref 78–100)
MCV RBC AUTO: 97 FL (ref 78–100)
O2/TOTAL GAS SETTING VFR VENT: 50 %
PCO2 BLD: 43 MM HG (ref 35–45)
PH BLD: 7.36 [PH] (ref 7.35–7.45)
PHOSPHATE SERPL-MCNC: 4.5 MG/DL (ref 2.5–4.5)
PHOSPHATE SERPL-MCNC: 4.5 MG/DL (ref 2.5–4.5)
PHOSPHATE SERPL-MCNC: 4.8 MG/DL (ref 2.5–4.5)
PLATELET # BLD AUTO: 188 10E3/UL (ref 150–450)
PLATELET # BLD AUTO: 190 10E3/UL (ref 150–450)
PLATELET # BLD AUTO: 193 10E3/UL (ref 150–450)
PO2 BLD: 142 MM HG (ref 80–105)
POTASSIUM SERPL-SCNC: 4 MMOL/L (ref 3.4–5.3)
POTASSIUM SERPL-SCNC: 4.1 MMOL/L (ref 3.4–5.3)
POTASSIUM SERPL-SCNC: 4.3 MMOL/L (ref 3.4–5.3)
PROT SERPL-MCNC: 5.5 G/DL (ref 6.4–8.3)
RBC # BLD AUTO: 2.5 10E6/UL (ref 3.8–5.2)
RBC # BLD AUTO: 2.58 10E6/UL (ref 3.8–5.2)
RBC # BLD AUTO: 2.6 10E6/UL (ref 3.8–5.2)
SAO2 % BLDA: 97 % (ref 92–100)
SODIUM SERPL-SCNC: 136 MMOL/L (ref 135–145)
SODIUM SERPL-SCNC: 136 MMOL/L (ref 135–145)
SODIUM SERPL-SCNC: 138 MMOL/L (ref 135–145)
UFH PPP CHRO-ACNC: 0.1 IU/ML
UFH PPP CHRO-ACNC: 0.16 IU/ML
UFH PPP CHRO-ACNC: 0.17 IU/ML
WBC # BLD AUTO: 12 10E3/UL (ref 4–11)
WBC # BLD AUTO: 13.7 10E3/UL (ref 4–11)
WBC # BLD AUTO: 16 10E3/UL (ref 4–11)

## 2024-08-25 PROCEDURE — 82248 BILIRUBIN DIRECT: CPT | Performed by: STUDENT IN AN ORGANIZED HEALTH CARE EDUCATION/TRAINING PROGRAM

## 2024-08-25 PROCEDURE — 85520 HEPARIN ASSAY: CPT | Performed by: SURGERY

## 2024-08-25 PROCEDURE — 82805 BLOOD GASES W/O2 SATURATION: CPT

## 2024-08-25 PROCEDURE — 84520 ASSAY OF UREA NITROGEN: CPT | Performed by: STUDENT IN AN ORGANIZED HEALTH CARE EDUCATION/TRAINING PROGRAM

## 2024-08-25 PROCEDURE — 250N000013 HC RX MED GY IP 250 OP 250 PS 637: Performed by: PHYSICIAN ASSISTANT

## 2024-08-25 PROCEDURE — 90947 DIALYSIS REPEATED EVAL: CPT

## 2024-08-25 PROCEDURE — 94640 AIRWAY INHALATION TREATMENT: CPT | Mod: 76

## 2024-08-25 PROCEDURE — 250N000013 HC RX MED GY IP 250 OP 250 PS 637

## 2024-08-25 PROCEDURE — 85027 COMPLETE CBC AUTOMATED: CPT | Performed by: STUDENT IN AN ORGANIZED HEALTH CARE EDUCATION/TRAINING PROGRAM

## 2024-08-25 PROCEDURE — 250N000013 HC RX MED GY IP 250 OP 250 PS 637: Performed by: SURGERY

## 2024-08-25 PROCEDURE — 250N000011 HC RX IP 250 OP 636: Performed by: STUDENT IN AN ORGANIZED HEALTH CARE EDUCATION/TRAINING PROGRAM

## 2024-08-25 PROCEDURE — 83735 ASSAY OF MAGNESIUM: CPT | Performed by: STUDENT IN AN ORGANIZED HEALTH CARE EDUCATION/TRAINING PROGRAM

## 2024-08-25 PROCEDURE — 82040 ASSAY OF SERUM ALBUMIN: CPT | Performed by: STUDENT IN AN ORGANIZED HEALTH CARE EDUCATION/TRAINING PROGRAM

## 2024-08-25 PROCEDURE — 86140 C-REACTIVE PROTEIN: CPT

## 2024-08-25 PROCEDURE — 250N000009 HC RX 250: Performed by: STUDENT IN AN ORGANIZED HEALTH CARE EDUCATION/TRAINING PROGRAM

## 2024-08-25 PROCEDURE — 82330 ASSAY OF CALCIUM: CPT | Performed by: STUDENT IN AN ORGANIZED HEALTH CARE EDUCATION/TRAINING PROGRAM

## 2024-08-25 PROCEDURE — 250N000009 HC RX 250: Performed by: SURGERY

## 2024-08-25 PROCEDURE — 71045 X-RAY EXAM CHEST 1 VIEW: CPT | Mod: 26 | Performed by: RADIOLOGY

## 2024-08-25 PROCEDURE — 999N000157 HC STATISTIC RCP TIME EA 10 MIN

## 2024-08-25 PROCEDURE — 250N000013 HC RX MED GY IP 250 OP 250 PS 637: Performed by: STUDENT IN AN ORGANIZED HEALTH CARE EDUCATION/TRAINING PROGRAM

## 2024-08-25 PROCEDURE — 90945 DIALYSIS ONE EVALUATION: CPT | Performed by: STUDENT IN AN ORGANIZED HEALTH CARE EDUCATION/TRAINING PROGRAM

## 2024-08-25 PROCEDURE — 94003 VENT MGMT INPAT SUBQ DAY: CPT

## 2024-08-25 PROCEDURE — 85520 HEPARIN ASSAY: CPT | Performed by: STUDENT IN AN ORGANIZED HEALTH CARE EDUCATION/TRAINING PROGRAM

## 2024-08-25 PROCEDURE — 250N000009 HC RX 250: Performed by: NURSE PRACTITIONER

## 2024-08-25 PROCEDURE — 99291 CRITICAL CARE FIRST HOUR: CPT

## 2024-08-25 PROCEDURE — 200N000002 HC R&B ICU UMMC

## 2024-08-25 PROCEDURE — 71045 X-RAY EXAM CHEST 1 VIEW: CPT

## 2024-08-25 RX ORDER — CLONAZEPAM 0.5 MG/1
0.25 TABLET ORAL 2 TIMES DAILY
Status: DISCONTINUED | OUTPATIENT
Start: 2024-08-25 | End: 2024-08-26

## 2024-08-25 RX ORDER — MORPHINE 10 MG/ML
6 TINCTURE ORAL 2 TIMES DAILY
Status: DISCONTINUED | OUTPATIENT
Start: 2024-08-25 | End: 2024-08-26

## 2024-08-25 RX ADMIN — ACETAMINOPHEN 975 MG: 325 TABLET, FILM COATED ORAL at 16:37

## 2024-08-25 RX ADMIN — OXYCODONE HYDROCHLORIDE 5 MG: 5 TABLET ORAL at 01:46

## 2024-08-25 RX ADMIN — LEVOTHYROXINE SODIUM 25 MCG: 0.03 TABLET ORAL at 08:40

## 2024-08-25 RX ADMIN — CHLORHEXIDINE GLUCONATE 0.12% ORAL RINSE 15 ML: 1.2 LIQUID ORAL at 19:39

## 2024-08-25 RX ADMIN — ACETAMINOPHEN 975 MG: 325 TABLET, FILM COATED ORAL at 08:40

## 2024-08-25 RX ADMIN — QUETIAPINE FUMARATE 25 MG: 25 TABLET ORAL at 21:42

## 2024-08-25 RX ADMIN — ACYCLOVIR 400 MG: 200 SUSPENSION ORAL at 14:31

## 2024-08-25 RX ADMIN — CALCIUM CHLORIDE, MAGNESIUM CHLORIDE, SODIUM CHLORIDE, SODIUM BICARBONATE, POTASSIUM CHLORIDE AND SODIUM PHOSPHATE DIBASIC DIHYDRATE 12.5 ML/KG/HR: 3.68; 3.05; 6.34; 3.09; .314; .187 INJECTION INTRAVENOUS at 00:00

## 2024-08-25 RX ADMIN — MONTELUKAST 10 MG: 10 TABLET, FILM COATED ORAL at 21:42

## 2024-08-25 RX ADMIN — CALCIUM CHLORIDE, MAGNESIUM CHLORIDE, SODIUM CHLORIDE, SODIUM BICARBONATE, POTASSIUM CHLORIDE AND SODIUM PHOSPHATE DIBASIC DIHYDRATE 12.5 ML/KG/HR: 3.68; 3.05; 6.34; 3.09; .314; .187 INJECTION INTRAVENOUS at 04:45

## 2024-08-25 RX ADMIN — VENLAFAXINE 75 MG: 25 TABLET ORAL at 08:41

## 2024-08-25 RX ADMIN — WHITE PETROLATUM 57.7 %-MINERAL OIL 31.9 % EYE OINTMENT: at 16:32

## 2024-08-25 RX ADMIN — OXYCODONE HYDROCHLORIDE 5 MG: 5 TABLET ORAL at 05:29

## 2024-08-25 RX ADMIN — CHLORHEXIDINE GLUCONATE 0.12% ORAL RINSE 15 ML: 1.2 LIQUID ORAL at 08:40

## 2024-08-25 RX ADMIN — ACYCLOVIR 400 MG: 200 SUSPENSION ORAL at 08:42

## 2024-08-25 RX ADMIN — CALCIUM CHLORIDE, MAGNESIUM CHLORIDE, SODIUM CHLORIDE, SODIUM BICARBONATE, POTASSIUM CHLORIDE AND SODIUM PHOSPHATE DIBASIC DIHYDRATE 12.5 ML/KG/HR: 3.68; 3.05; 6.34; 3.09; .314; .187 INJECTION INTRAVENOUS at 19:41

## 2024-08-25 RX ADMIN — Medication 60 ML: at 19:40

## 2024-08-25 RX ADMIN — IPRATROPIUM BROMIDE AND ALBUTEROL SULFATE 3 ML: .5; 3 SOLUTION RESPIRATORY (INHALATION) at 12:37

## 2024-08-25 RX ADMIN — LOPERAMIDE HYDROCHLORIDE 4 MG: 2 CAPSULE ORAL at 14:29

## 2024-08-25 RX ADMIN — OXYCODONE HYDROCHLORIDE 5 MG: 5 TABLET ORAL at 10:22

## 2024-08-25 RX ADMIN — CALCIUM CHLORIDE, MAGNESIUM CHLORIDE, SODIUM CHLORIDE, SODIUM BICARBONATE, POTASSIUM CHLORIDE AND SODIUM PHOSPHATE DIBASIC DIHYDRATE 12.5 ML/KG/HR: 3.68; 3.05; 6.34; 3.09; .314; .187 INJECTION INTRAVENOUS at 09:48

## 2024-08-25 RX ADMIN — Medication 1 TABLET: at 08:41

## 2024-08-25 RX ADMIN — IPRATROPIUM BROMIDE AND ALBUTEROL SULFATE 3 ML: .5; 3 SOLUTION RESPIRATORY (INHALATION) at 16:36

## 2024-08-25 RX ADMIN — DEXMEDETOMIDINE HYDROCHLORIDE IN SODIUM CHLORIDE 0.7 MCG/KG/HR: 4 INJECTION INTRAVENOUS at 08:14

## 2024-08-25 RX ADMIN — CALCIUM CHLORIDE, MAGNESIUM CHLORIDE, SODIUM CHLORIDE, SODIUM BICARBONATE, POTASSIUM CHLORIDE AND SODIUM PHOSPHATE DIBASIC DIHYDRATE 12.5 ML/KG/HR: 3.68; 3.05; 6.34; 3.09; .314; .187 INJECTION INTRAVENOUS at 09:49

## 2024-08-25 RX ADMIN — CETIRIZINE HYDROCHLORIDE 10 MG: 10 TABLET, FILM COATED ORAL at 08:41

## 2024-08-25 RX ADMIN — CLONAZEPAM 0.25 MG: 0.5 TABLET ORAL at 08:41

## 2024-08-25 RX ADMIN — Medication 60 ML: at 08:44

## 2024-08-25 RX ADMIN — ACETAMINOPHEN 975 MG: 325 TABLET, FILM COATED ORAL at 00:01

## 2024-08-25 RX ADMIN — DEXMEDETOMIDINE HYDROCHLORIDE IN SODIUM CHLORIDE 0.7 MCG/KG/HR: 4 INJECTION INTRAVENOUS at 14:53

## 2024-08-25 RX ADMIN — ACETAMINOPHEN 975 MG: 325 TABLET, FILM COATED ORAL at 23:56

## 2024-08-25 RX ADMIN — IPRATROPIUM BROMIDE AND ALBUTEROL SULFATE 3 ML: .5; 3 SOLUTION RESPIRATORY (INHALATION) at 08:46

## 2024-08-25 RX ADMIN — CALCIUM CHLORIDE, MAGNESIUM CHLORIDE, SODIUM CHLORIDE, SODIUM BICARBONATE, POTASSIUM CHLORIDE AND SODIUM PHOSPHATE DIBASIC DIHYDRATE 12.5 ML/KG/HR: 3.68; 3.05; 6.34; 3.09; .314; .187 INJECTION INTRAVENOUS at 14:52

## 2024-08-25 RX ADMIN — CALCIUM CHLORIDE, MAGNESIUM CHLORIDE, SODIUM CHLORIDE, SODIUM BICARBONATE, POTASSIUM CHLORIDE AND SODIUM PHOSPHATE DIBASIC DIHYDRATE: 3.68; 3.05; 6.34; 3.09; .314; .187 INJECTION INTRAVENOUS at 14:53

## 2024-08-25 RX ADMIN — ACETYLCYSTEINE 4 ML: 100 SOLUTION ORAL; RESPIRATORY (INHALATION) at 16:36

## 2024-08-25 RX ADMIN — Medication 40 MG: at 08:43

## 2024-08-25 RX ADMIN — MORPHINE 6 MG: 10 TINCTURE ORAL at 19:40

## 2024-08-25 RX ADMIN — WHITE PETROLATUM 57.7 %-MINERAL OIL 31.9 % EYE OINTMENT: at 08:44

## 2024-08-25 RX ADMIN — GABAPENTIN 300 MG: 300 CAPSULE ORAL at 19:39

## 2024-08-25 RX ADMIN — ACYCLOVIR 400 MG: 200 SUSPENSION ORAL at 19:40

## 2024-08-25 RX ADMIN — MORPHINE 6 MG: 10 TINCTURE ORAL at 10:22

## 2024-08-25 RX ADMIN — OXYCODONE HYDROCHLORIDE 5 MG: 5 TABLET ORAL at 14:30

## 2024-08-25 RX ADMIN — LOPERAMIDE HYDROCHLORIDE 4 MG: 2 CAPSULE ORAL at 01:46

## 2024-08-25 RX ADMIN — DEXMEDETOMIDINE HYDROCHLORIDE IN SODIUM CHLORIDE 0.7 MCG/KG/HR: 4 INJECTION INTRAVENOUS at 01:46

## 2024-08-25 RX ADMIN — ACETYLCYSTEINE 4 ML: 100 SOLUTION ORAL; RESPIRATORY (INHALATION) at 08:47

## 2024-08-25 RX ADMIN — HEPARIN SODIUM 1000 UNITS/HR: 1000 INJECTION, SOLUTION INTRAVENOUS; SUBCUTANEOUS at 07:55

## 2024-08-25 RX ADMIN — LOPERAMIDE HYDROCHLORIDE 4 MG: 2 CAPSULE ORAL at 19:39

## 2024-08-25 RX ADMIN — CALCIUM CHLORIDE, MAGNESIUM CHLORIDE, SODIUM CHLORIDE, SODIUM BICARBONATE, POTASSIUM CHLORIDE AND SODIUM PHOSPHATE DIBASIC DIHYDRATE 12.5 ML/KG/HR: 3.68; 3.05; 6.34; 3.09; .314; .187 INJECTION INTRAVENOUS at 14:53

## 2024-08-25 RX ADMIN — VENLAFAXINE 75 MG: 25 TABLET ORAL at 19:39

## 2024-08-25 RX ADMIN — CLONAZEPAM 0.25 MG: 0.5 TABLET ORAL at 19:39

## 2024-08-25 RX ADMIN — LOPERAMIDE HYDROCHLORIDE 4 MG: 2 CAPSULE ORAL at 08:40

## 2024-08-25 RX ADMIN — DEXMEDETOMIDINE HYDROCHLORIDE IN SODIUM CHLORIDE 0.7 MCG/KG/HR: 4 INJECTION INTRAVENOUS at 20:55

## 2024-08-25 RX ADMIN — GABAPENTIN 300 MG: 300 CAPSULE ORAL at 14:30

## 2024-08-25 RX ADMIN — BUDESONIDE 1 MG: 1 INHALANT ORAL at 08:47

## 2024-08-25 RX ADMIN — ATORVASTATIN CALCIUM 10 MG: 10 TABLET, FILM COATED ORAL at 08:41

## 2024-08-25 RX ADMIN — ACETYLCYSTEINE 4 ML: 100 SOLUTION ORAL; RESPIRATORY (INHALATION) at 12:37

## 2024-08-25 RX ADMIN — OXYCODONE HYDROCHLORIDE 5 MG: 5 TABLET ORAL at 21:42

## 2024-08-25 RX ADMIN — DEXMEDETOMIDINE HYDROCHLORIDE IN SODIUM CHLORIDE 1 MCG/KG/HR: 4 INJECTION INTRAVENOUS at 23:56

## 2024-08-25 RX ADMIN — IPRATROPIUM BROMIDE AND ALBUTEROL SULFATE 3 ML: .5; 3 SOLUTION RESPIRATORY (INHALATION) at 20:10

## 2024-08-25 RX ADMIN — GABAPENTIN 300 MG: 300 CAPSULE ORAL at 08:41

## 2024-08-25 RX ADMIN — OXYCODONE HYDROCHLORIDE 5 MG: 5 TABLET ORAL at 17:44

## 2024-08-25 RX ADMIN — ACETYLCYSTEINE 4 ML: 100 SOLUTION ORAL; RESPIRATORY (INHALATION) at 20:10

## 2024-08-25 RX ADMIN — AMITRIPTYLINE HYDROCHLORIDE 50 MG: 50 TABLET, FILM COATED ORAL at 21:42

## 2024-08-25 ASSESSMENT — ACTIVITIES OF DAILY LIVING (ADL)
ADLS_ACUITY_SCORE: 63
ADLS_ACUITY_SCORE: 59
ADLS_ACUITY_SCORE: 63
ADLS_ACUITY_SCORE: 59
ADLS_ACUITY_SCORE: 63
ADLS_ACUITY_SCORE: 63
ADLS_ACUITY_SCORE: 59
ADLS_ACUITY_SCORE: 63
ADLS_ACUITY_SCORE: 59
ADLS_ACUITY_SCORE: 63
ADLS_ACUITY_SCORE: 59
ADLS_ACUITY_SCORE: 59
ADLS_ACUITY_SCORE: 63
ADLS_ACUITY_SCORE: 63
ADLS_ACUITY_SCORE: 59
ADLS_ACUITY_SCORE: 59
ADLS_ACUITY_SCORE: 63
ADLS_ACUITY_SCORE: 63
ADLS_ACUITY_SCORE: 59
ADLS_ACUITY_SCORE: 59
ADLS_ACUITY_SCORE: 63

## 2024-08-25 NOTE — PROGRESS NOTES
SURGICAL ICU PROGRESS NOTE  08/25/2024        Date of Service (when I saw the patient): 08/25/2024    ASSESSMENT:  Massiel Flaherty is a 53 year old female who was admitted to [facility]. Past medical hx of Anxiety/depression, fibromyalgia, hypothyroidism, asthma, HLD, MURIEL on CPAP, spells, off on anti seizure medications, expressive aphasia, hypertension was seen at [facility] and was placed on Augmentin outpatient on 7/30/24. She admitted to the hospital on 8/3/24 for fatigue, fever and dyspnea and intubated 8/6/24 at OSH, proned and paralyzed without improvement. Transferred to [facility] 8/8/24, hypoxic with high plateaus/peaks and placed on VV ECMO and CRRT. Decannulated from VV ECMO 8/18.     CHANGES and MAJOR THINGS TODAY:   - Decrease clonazepam  - Decrease frequency of versed  - Increase activity  - Drop peep to 8  - Add tincture of opium for high stool output.    PLAN:    Neurological:  # Acute pain  # Sedation and analgesia for vent compliance   # Concern for ICU delirium   # Hx Fibromyalgia   # Hx myalgic encephalomyelitis   # Hx anxiety/depression  - Monitor neurological status. Delirium preventions and precautions.   - Pain: Scheduled: tylenol, oxycodone 5mg q4  PRN: tylenol, dilaudid, oxycodone  - Sedation plan:  Gtt: Precedex gtt              PRN: Versed 1-2mg q4 hr  - Clonazepam decrease to 0.25 mg BID  - Gabapentin 300mg TID   - Seroquel 25mg BID PRN  - PTA Venlafaxine and Amitriptyline   - Delirium precautions, optimize environment to regulate day/night normalcy     # Hx spells with staring and BUE posturing previously described as pseudoseizures   # Hx of GTC seizures and myoclonic epilepsy, weaned off AEDs   # C/f hypoxic ischemic injury   # Diffuse cerebral edema - improved  - Sodium goals liberalized to 140-145  - 8/21: MRI Brain: no acute intracranial pathology. No findings to suggest anoxic brain injury.  - NCC signed off.     HEENT:  # Mouth / tongue sores  # Hx seasonal allergies  # Hx  alopecia  Mouth sores present, which could have viral etiology. 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  - Acyclovir 400mg BID x5 days  - Start PTA zyrtec   - Hold PTA hydroxychloroquine    Pulmonary:  # Acute hypoxic respiratory failure c/b ARDS   # s/p VV ECMO 8/8 - 8/18   # Hx CAP  # Asthma  # MURIEL on home CPAP   FiO2 (%): 50 %, Resp: 24, Inspiratory Pressure (cm H2O) (Drager Jessie): 25, Vent Mode: PCV Plus assist, Resp Rate (Set): 18 breaths/min, PEEP (cm H2O): 10 cmH2O, Resp Rate (Set): 18 breaths/min, PEEP (cm H2O): 10 cmH2O  - PC/AC: 25/10 --> peep 8 today (8/25)  - SpO2 goals >92%, PaO2 goals >60   - PTA singular at bedtime  - Scheduled pulmicort, mucomyst, and duonebs   - Wean vent as able, will need to be on lower peep before PST or consideration of tracheostomy.  - VAP bundle while intubated     Cardiovascular:    # Hyperlipidemia  # Hypotension  # Hx HTN  - MAP goal  >65, SBP goals >110  - PRN norepinephrine, on/off overnight, max of 0.06.  - PTA statin, atorvastatin  - Hold PTA clonidine    GI/Nutrition:    # Moderate protein calorie malnutrition  # Non-infectious Diarrhea  # GERD   - Protonix (home medication)   - Nutrition consulted, appreciate recs  - Diet: TF via NJ  - Hold bowel regimen d/t loose stools  - Change suspension meds to other form as able  - Continue scheduled multivitamin, banatrol, prosource packet, and loperamide  - 8/25: Add tincture of opium for high stool output  - Rectal tube, continues with high output. Keep today.     Fluids/Electrolytes/Renal:  # Acute kidney injury  Baseline Cr approx 0.6. Pt is anuric  - Continue CRRT; fluid goal net negative 1L   - Heparinized CRRT circuit, low intensity protocol. Last clotted 8/24 PM  - Strict I&Os   - Bladder scan q shift  - Avoid nephrotoxins as able     Endocrine:  # Steroid and stress induced hyperglycemia  # Hypothyroidism   - Goal to keep BG < 180 for optimal wound healing.   - Continue HSSI, 5 units in last 24 hours.  - Continue PTA  synthroid    Infectious disease:   # Leukocytosis  # Hx CAP  - Continue to monitor fever curve and inflammatory markers as appropriate  - ID consulted, appreciate recs. Recommending monitoring off antimicrobials.   - Acyclovir started 8/22 for HSI, see HEENT    # Concern for West Nile virus  Per family, members of pt's community have been testing positive for West Nile Virus  - LP needed to diagnose West Nile- will defer. IgG and IgM sent 8/21, value back at 0.51 which is interpreted as negative.      Hematology:    # Acute blood loss anemia   # Anemia of critical illness  - Transfuse if hgb <7.0 or signs/symptoms of hypoperfusion. Monitor and trend.   - Heparinize CRRT circuit      Musculoskeletal:  # Weakness and deconditioning of critical illness  # Left ankle injury pre-hospitalization    - Physical and occupational therapy when able.     Skin:  # BLE scattered ecchymosis  # Left toe duskiness   - Bilateral lower leg ecchymosis   - WOC consult   - Ecchymosis to left foot, most noticeable to 3rd and 4th toes    General Cares/Prophylaxis:    DVT Prophylaxis: Heparin through CRRT circuit  GI Prophylaxis: PPI  Restraints: Restraints for medical healing needed: NO    Lines/ tubes/ drains:  - ETT (8/8)  - NJ (8/9)  - LIJ CVC (8/8)  - Radial a-line (8/7)  - PIV x3  - Rectal tube (8/17)  - Tunneled HD line (8/23)    Disposition:  - Surgical ICU     Patient seen, findings and plan discussed with surgical ICU staff, Dr. Calixto.    Time spent on this Encounter   Billing:  I spent 40 minutes bedside and on the inpatient unit today managing the critical care of Massiel Flaherty in relation to the issues listed in this note.      Aysha Em, APRN CNP  Clinically Significant Risk Factors              # Hypoalbuminemia: Lowest albumin = 2.2 g/dL at 8/10/2024  9:47 AM, will monitor as appropriate               # Obesity: Estimated body mass index is 32.94 kg/m  as calculated from the following:    Height as of  "this encounter: 1.575 m (5' 2\").    Weight as of this encounter: 81.7 kg (180 lb 1.9 oz).   # Moderate Malnutrition: based on nutrition assessment      # Financial/Environmental Concerns: none                        ====================================  INTERVAL HISTORY:   Overall course reviewed. Patient evaluated at bedside. Does follow commands weakly on upper and lower limbs. Endorses some discomfort in oropharynx. Unable to complete ROS given lethargic, sedated state.       OBJECTIVE:   1. VITAL SIGNS:   Temp:  [97.7  F (36.5  C)-100.4  F (38  C)] 99.3  F (37.4  C)  Pulse:  [] 102  Resp:  [16-33] 24  MAP:  [43 mmHg-88 mmHg] 88 mmHg  Arterial Line BP: ()/(38-64) 134/64  FiO2 (%):  [50 %] 50 %  SpO2:  [91 %-100 %] 100 %  FiO2 (%): 50 %, Resp: 24, Inspiratory Pressure (cm H2O) (Drager Jessie): 25, Vent Mode: PCV Plus assist, Resp Rate (Set): 18 breaths/min, PEEP (cm H2O): 10 cmH2O, Resp Rate (Set): 18 breaths/min, PEEP (cm H2O): 10 cmH2O    2. INTAKE/ OUTPUT:   I/O last 3 completed shifts:  In: 2929.3 [I.V.:719.3; NG/GT:710]  Out: 3420.3 [Other:2670.3; Stool:750]    3. PHYSICAL EXAMINATION:  General: laying in bed  HEENT: normocephalic, atraumatic, PERRLA, subconjunctival hemorrhage to left eye  Neuro: Intubated, sedated. RASS -1. LOPEZ to command  Pulm/Resp: Clear breath sounds bilaterally without rhonchi, crackles or wheeze, breathing non-labored on ventilator.  CV: RRR, pitting edema  Abdomen: Soft, non-distended, non-tender, no rebound tenderness or guarding, no masses  : Anuric  Incisions/Skin: left great toe pale with ecchymosis, mild BLE scattered echhymosis  MSK/Extremities: 2+ peripheral edema, moving all extremities, peripheral pulses intact, calves soft, extremities well perfused    4. INVESTIGATIONS:   Arterial Blood Gases   Recent Labs   Lab 08/25/24  0320 08/24/24  1812 08/24/24  0415 08/23/24  2203   PH 7.36 7.35 7.36 7.36   PCO2 43 43 42 40   PO2 142* 82 82 103   HCO3 24 24 23 23 "     Complete Blood Count   Recent Labs   Lab 08/25/24 0320 08/24/24  2139 08/24/24  1554 08/24/24  1158 08/24/24  0414   WBC 16.0* 16.6*  --  16.0* 12.4*   HGB 7.7* 6.6* 7.0* 7.2* 7.0*    192  --  201 169     Basic Metabolic Panel  Recent Labs   Lab 08/25/24 0325 08/25/24 0320 08/25/24  0007 08/24/24 1945 08/24/24 1941 08/24/24  1202 08/24/24  1158 08/24/24 0418 08/24/24 0414   NA  --  138  --   --  137  --  136  --  136   POTASSIUM  --  4.0  --   --  4.2  --  4.2  --  3.9   CHLORIDE  --  105  --   --  104  --  103  --  105   CO2  --  21*  --   --  22  --  21*  --  21*   BUN  --  20.3*  --   --  21.3*  --  24.4*  --  25.8*   CR  --  1.25*  --   --  1.30*  --  1.34*  --  1.49*   * 162* 153* 149* 161*   < > 172*   < > 151*    < > = values in this interval not displayed.     Liver Function Tests  Recent Labs   Lab 08/25/24 0320 08/24/24 1941 08/24/24 1158 08/24/24 0414 08/23/24  1550 08/23/24  0344 08/22/24  1605 08/22/24 0348   AST 27  --   --  27  --  20  --  28   ALT 13  --   --  14  --  16  --  18   ALKPHOS 84  --   --  78  --  90  --  96   BILITOTAL 0.2  --   --  0.2  --  0.2  --  0.2   ALBUMIN 2.8* 2.8* 2.8* 2.7*   < > 3.0*   < > 3.0*    < > = values in this interval not displayed.     Pancreatic Enzymes  No lab results found in last 7 days.  Coagulation Profile  Recent Labs   Lab 08/18/24  0946   PTT 32         5. RADIOLOGY:   Recent Results (from the past 24 hour(s))   XR Chest Port 1 View    Impression    RESIDENT PRELIMINARY INTERPRETATION  Impression:   1. Slight increase in the mixed interstitial and airspace opacities,  most prominent in the bases and left lung field.  2. Small bilateral layering effusions. No definite pneumothorax.  3. Slight retraction of esophageal temperature probe now within the  mid esophagus, remaining support devices are stable.       =========================================

## 2024-08-25 NOTE — PROGRESS NOTES
"CRRT STATUS NOTE    DATA:  Time:  1820  Pressures WNL:  YES  Filter Status:  WDL    Problems Reported/Alarms Noted:  None    Supplies Present:  Yes    ASSESSMENT:  Patient Net Fluid Balance:      Intake/Output Summary (Last 24 hours) at 8/25/2024 1819  Last data filed at 8/25/2024 1800  Gross per 24 hour   Intake 3048.52 ml   Output 4517.2 ml   Net -1468.68 ml        Vital Signs:  \/59   Pulse 91   Temp 99  F (37.2  C)   Resp 14   Ht 1.575 m (5' 2\")   Wt 81.7 kg (180 lb 1.9 oz)   SpO2 100%   BMI 32.94 kg/m          Labs:    Lab Results   Component Value Date    WBC 13.7 (H) 08/25/2024    HGB 7.7 (L) 08/25/2024    HCT 25.0 (L) 08/25/2024    MCV 97 08/25/2024     08/25/2024     Lab Results   Component Value Date     08/25/2024    POTASSIUM 4.1 08/25/2024    CHLORIDE 103 08/25/2024    CO2 23 08/25/2024     (H) 08/25/2024     Lab Results   Component Value Date    BUN 20.7 (H) 08/25/2024    CR 1.21 (H) 08/25/2024         Goals of Therapy:   0-50 ml/hr net negative, so long as MAP >=65    INTERVENTIONS: Continue fluid removal per goals of care as patient tolerates. Check filter daily. Change filter q72 hours and PRN. Please call CRRT Resource RN on Jodie with any questions or concerns.     "

## 2024-08-25 NOTE — PROGRESS NOTES
Nephrology Dialysis Note    This patient was seen and examined while on dialysis. Laboratory results and nurses' notes were reviewed.    No changes to management of volume, anemia, BMD, acidosis, or electrolytes except as follows.     Diagnosis - CHACORTA on dialysis (CRRT), anuric     Plan - Continue current CRRT Rx (with heparin, targeting Xa levels), including goal net negative 50ml/hr.    Missael Myrick MD  Nephrology  [phone]

## 2024-08-25 NOTE — PLAN OF CARE
Major Shift Events:  Unable to assess orientation, moves all extremities, and obeys commands. On Precedex for sedation. TMax of 39.0 C. SR/ST. Intermittently on/off NE to keep Maps >65. Able to pull on CRRT. 1 unit of packed RBCs given. No change in vent settings. Frequent aggressive coughing spells. Tube feeds at goal. Continues to have loose, watery stool.     Plan: Continue to work on getting oob. Pressure support if able.    For vital signs and complete assessments, please see documentation flowsheets.

## 2024-08-25 NOTE — PROGRESS NOTES
CRRT STATUS NOTE    DATA:  Time:  0626  Pressures WNL:  YES  Filter Status:  WDL    Problems Reported/Alarms Noted:  None noted per primary RN.     Supplies Present:  YES    ASSESSMENT:  Patient Net Fluid Balance:  -651mL yesterday/-200mL since midnight.    Vital Signs:  Temp: 99.3  F (37.4  C) Temp src: Esophageal   Pulse: 97   Resp: 21 SpO2: 99 % O2 Device: Mechanical Ventilator        Labs:   Recent Labs   Lab 08/25/24  0325 08/25/24  0320 08/25/24  0007 08/24/24  2139 08/24/24 1945 08/24/24  1941 08/24/24  1557 08/24/24  1554 08/24/24  1202 08/24/24  1158 08/24/24  0418 08/24/24 0414   WBC  --  16.0*  --  16.6*  --   --   --   --   --  16.0*  --  12.4*   HGB  --  7.7*  --  6.6*  --   --   --  7.0*  --  7.2*  --  7.0*   MCV  --  96  --  99  --   --   --   --   --  97  --  96   PLT  --  193  --  192  --   --   --   --   --  201  --  169   NA  --  138  --   --   --  137  --   --   --  136  --  136   POTASSIUM  --  4.0  --   --   --  4.2  --   --   --  4.2  --  3.9   CHLORIDE  --  105  --   --   --  104  --   --   --  103  --  105   CO2  --  21*  --   --   --  22  --   --   --  21*  --  21*   BUN  --  20.3*  --   --   --  21.3*  --   --   --  24.4*  --  25.8*   CR  --  1.25*  --   --   --  1.30*  --   --   --  1.34*  --  1.49*   ANIONGAP  --  12  --   --   --  11  --   --   --  12  --  10   QUAN  --  8.1*  --   --   --  7.9*  --   --   --  7.9*  --  7.7*   * 162* 153*  --    < > 161*   < >  --    < > 172*   < > 151*   ALBUMIN  --  2.8*  --   --   --  2.8*  --   --   --  2.8*  --  2.7*   PROTTOTAL  --  5.5*  --   --   --   --   --   --   --   --   --  5.0*   BILITOTAL  --  0.2  --   --   --   --   --   --   --   --   --  0.2   ALKPHOS  --  84  --   --   --   --   --   --   --   --   --  78   ALT  --  13  --   --   --   --   --   --   --   --   --  14   AST  --  27  --   --   --   --   --   --   --   --   --  27    < > = values in this interval not displayed.      Goals of Therapy:      0-50 ml/hr net  negative, so long as MAP >=65       INTERVENTIONS:   None needed.     PLAN:  Continue fluid removal as tolerated per goals of therapy.  Check filter daily.  Change filter q 72 hrs and PRN.  Please contact the CRRT Resource RN on Vocera with any questions/concerns.

## 2024-08-26 ENCOUNTER — APPOINTMENT (OUTPATIENT)
Dept: GENERAL RADIOLOGY | Facility: CLINIC | Age: 54
DRG: 003 | End: 2024-08-26
Attending: STUDENT IN AN ORGANIZED HEALTH CARE EDUCATION/TRAINING PROGRAM
Payer: MEDICAID

## 2024-08-26 ENCOUNTER — APPOINTMENT (OUTPATIENT)
Dept: PHYSICAL THERAPY | Facility: CLINIC | Age: 54
DRG: 003 | End: 2024-08-26
Attending: PHYSICIAN ASSISTANT
Payer: MEDICAID

## 2024-08-26 ENCOUNTER — APPOINTMENT (OUTPATIENT)
Dept: OCCUPATIONAL THERAPY | Facility: CLINIC | Age: 54
DRG: 003 | End: 2024-08-26
Attending: PHYSICIAN ASSISTANT
Payer: MEDICAID

## 2024-08-26 LAB
ALBUMIN SERPL BCG-MCNC: 2.9 G/DL (ref 3.5–5.2)
ALLEN'S TEST: ABNORMAL
ALP SERPL-CCNC: 87 U/L (ref 40–150)
ALT SERPL W P-5'-P-CCNC: 15 U/L (ref 0–50)
ANION GAP SERPL CALCULATED.3IONS-SCNC: 10 MMOL/L (ref 7–15)
ANION GAP SERPL CALCULATED.3IONS-SCNC: 13 MMOL/L (ref 7–15)
ANION GAP SERPL CALCULATED.3IONS-SCNC: 9 MMOL/L (ref 7–15)
AST SERPL W P-5'-P-CCNC: 27 U/L (ref 0–45)
BASE EXCESS BLDA CALC-SCNC: -0.1 MMOL/L (ref -3–3)
BASE EXCESS BLDA CALC-SCNC: -0.1 MMOL/L (ref -3–3)
BASE EXCESS BLDA CALC-SCNC: -0.5 MMOL/L (ref -3–3)
BASE EXCESS BLDA CALC-SCNC: -0.5 MMOL/L (ref -3–3)
BILIRUB DIRECT SERPL-MCNC: <0.2 MG/DL (ref 0–0.3)
BILIRUB SERPL-MCNC: <0.2 MG/DL
BUN SERPL-MCNC: 19.5 MG/DL (ref 6–20)
BUN SERPL-MCNC: 19.8 MG/DL (ref 6–20)
BUN SERPL-MCNC: 21.1 MG/DL (ref 6–20)
CA-I BLD-MCNC: 4.6 MG/DL (ref 4.4–5.2)
CA-I BLD-MCNC: 4.7 MG/DL (ref 4.4–5.2)
CA-I BLD-MCNC: 4.7 MG/DL (ref 4.4–5.2)
CALCIUM SERPL-MCNC: 8.1 MG/DL (ref 8.8–10.4)
CALCIUM SERPL-MCNC: 8.3 MG/DL (ref 8.8–10.4)
CALCIUM SERPL-MCNC: 8.3 MG/DL (ref 8.8–10.4)
CHLORIDE SERPL-SCNC: 104 MMOL/L (ref 98–107)
CHLORIDE SERPL-SCNC: 104 MMOL/L (ref 98–107)
CHLORIDE SERPL-SCNC: 105 MMOL/L (ref 98–107)
CK SERPL-CCNC: 20 U/L (ref 26–192)
COHGB MFR BLD: 96.1 % (ref 96–97)
COHGB MFR BLD: 98.1 % (ref 96–97)
COHGB MFR BLD: 98.5 % (ref 96–97)
COHGB MFR BLD: 99 % (ref 96–97)
CREAT SERPL-MCNC: 1.12 MG/DL (ref 0.51–0.95)
CREAT SERPL-MCNC: 1.13 MG/DL (ref 0.51–0.95)
CREAT SERPL-MCNC: 1.16 MG/DL (ref 0.51–0.95)
CRP SERPL-MCNC: 129 MG/L
CRP SERPL-MCNC: 90.8 MG/L
EGFRCR SERPLBLD CKD-EPI 2021: 56 ML/MIN/1.73M2
EGFRCR SERPLBLD CKD-EPI 2021: 58 ML/MIN/1.73M2
EGFRCR SERPLBLD CKD-EPI 2021: 59 ML/MIN/1.73M2
ERYTHROCYTE [DISTWIDTH] IN BLOOD BY AUTOMATED COUNT: 19.4 % (ref 10–15)
ERYTHROCYTE [DISTWIDTH] IN BLOOD BY AUTOMATED COUNT: 19.5 % (ref 10–15)
ERYTHROCYTE [DISTWIDTH] IN BLOOD BY AUTOMATED COUNT: 19.8 % (ref 10–15)
ERYTHROCYTE [SEDIMENTATION RATE] IN BLOOD BY WESTERGREN METHOD: 87 MM/HR (ref 0–30)
GLUCOSE BLD-MCNC: 152 MG/DL (ref 70–99)
GLUCOSE BLDC GLUCOMTR-MCNC: 135 MG/DL (ref 70–99)
GLUCOSE BLDC GLUCOMTR-MCNC: 148 MG/DL (ref 70–99)
GLUCOSE BLDC GLUCOMTR-MCNC: 154 MG/DL (ref 70–99)
GLUCOSE BLDC GLUCOMTR-MCNC: 159 MG/DL (ref 70–99)
GLUCOSE BLDC GLUCOMTR-MCNC: 163 MG/DL (ref 70–99)
GLUCOSE BLDC GLUCOMTR-MCNC: 202 MG/DL (ref 70–99)
GLUCOSE SERPL-MCNC: 156 MG/DL (ref 70–99)
GLUCOSE SERPL-MCNC: 157 MG/DL (ref 70–99)
GLUCOSE SERPL-MCNC: 175 MG/DL (ref 70–99)
HCO3 BLD-SCNC: 25 MMOL/L (ref 21–28)
HCO3 BLD-SCNC: 26 MMOL/L (ref 21–28)
HCO3 SERPL-SCNC: 23 MMOL/L (ref 22–29)
HCO3 SERPL-SCNC: 23 MMOL/L (ref 22–29)
HCO3 SERPL-SCNC: 24 MMOL/L (ref 22–29)
HCT VFR BLD AUTO: 23 % (ref 35–47)
HCT VFR BLD AUTO: 24.5 % (ref 35–47)
HCT VFR BLD AUTO: 24.9 % (ref 35–47)
HGB BLD-MCNC: 7 G/DL (ref 11.7–15.7)
HGB BLD-MCNC: 7.4 G/DL (ref 11.7–15.7)
HGB BLD-MCNC: 7.6 G/DL (ref 11.7–15.7)
LACTATE SERPL-SCNC: 0.5 MMOL/L (ref 0.7–2)
MAGNESIUM SERPL-MCNC: 2.3 MG/DL (ref 1.7–2.3)
MAGNESIUM SERPL-MCNC: 2.4 MG/DL (ref 1.7–2.3)
MAGNESIUM SERPL-MCNC: 2.4 MG/DL (ref 1.7–2.3)
MCH RBC QN AUTO: 29.8 PG (ref 26.5–33)
MCH RBC QN AUTO: 30.2 PG (ref 26.5–33)
MCH RBC QN AUTO: 30.3 PG (ref 26.5–33)
MCHC RBC AUTO-ENTMCNC: 30.2 G/DL (ref 31.5–36.5)
MCHC RBC AUTO-ENTMCNC: 30.4 G/DL (ref 31.5–36.5)
MCHC RBC AUTO-ENTMCNC: 30.5 G/DL (ref 31.5–36.5)
MCV RBC AUTO: 99 FL (ref 78–100)
O2/TOTAL GAS SETTING VFR VENT: 40 %
PCO2 BLD: 44 MM HG (ref 35–45)
PCO2 BLD: 45 MM HG (ref 35–45)
PCO2 BLD: 45 MM HG (ref 35–45)
PCO2 BLD: 46 MM HG (ref 35–45)
PEEP: 5 CM H2O
PEEP: 5 CM H2O
PEEP: 8 CM H2O
PH BLD: 7.35 [PH] (ref 7.35–7.45)
PH BLD: 7.36 [PH] (ref 7.35–7.45)
PH BLD: 7.36 [PH] (ref 7.35–7.45)
PH BLD: 7.37 [PH] (ref 7.35–7.45)
PHOSPHATE SERPL-MCNC: 4.3 MG/DL (ref 2.5–4.5)
PHOSPHATE SERPL-MCNC: 4.7 MG/DL (ref 2.5–4.5)
PHOSPHATE SERPL-MCNC: 4.8 MG/DL (ref 2.5–4.5)
PLATELET # BLD AUTO: 183 10E3/UL (ref 150–450)
PLATELET # BLD AUTO: 194 10E3/UL (ref 150–450)
PLATELET # BLD AUTO: 204 10E3/UL (ref 150–450)
PO2 BLD: 72 MM HG (ref 80–105)
PO2 BLD: 89 MM HG (ref 80–105)
PO2 BLD: 95 MM HG (ref 80–105)
PO2 BLD: 95 MM HG (ref 80–105)
POTASSIUM SERPL-SCNC: 3.9 MMOL/L (ref 3.4–5.3)
POTASSIUM SERPL-SCNC: 4.1 MMOL/L (ref 3.4–5.3)
POTASSIUM SERPL-SCNC: 4.4 MMOL/L (ref 3.4–5.3)
PROCALCITONIN SERPL IA-MCNC: 1.32 NG/ML
PROT SERPL-MCNC: 5.8 G/DL (ref 6.4–8.3)
RBC # BLD AUTO: 2.32 10E6/UL (ref 3.8–5.2)
RBC # BLD AUTO: 2.48 10E6/UL (ref 3.8–5.2)
RBC # BLD AUTO: 2.51 10E6/UL (ref 3.8–5.2)
RHEUMATOID FACT SERPL-ACNC: <10 IU/ML
SAO2 % BLDA: 94 % (ref 92–100)
SAO2 % BLDA: 96 % (ref 92–100)
SAO2 % BLDA: 96 % (ref 92–100)
SAO2 % BLDA: 97 % (ref 92–100)
SODIUM SERPL-SCNC: 137 MMOL/L (ref 135–145)
SODIUM SERPL-SCNC: 138 MMOL/L (ref 135–145)
SODIUM SERPL-SCNC: 140 MMOL/L (ref 135–145)
UFH PPP CHRO-ACNC: 0.14 IU/ML
UFH PPP CHRO-ACNC: 0.21 IU/ML
UFH PPP CHRO-ACNC: 0.22 IU/ML
WBC # BLD AUTO: 11.6 10E3/UL (ref 4–11)
WBC # BLD AUTO: 12.1 10E3/UL (ref 4–11)
WBC # BLD AUTO: 12.5 10E3/UL (ref 4–11)

## 2024-08-26 PROCEDURE — 86431 RHEUMATOID FACTOR QUANT: CPT

## 2024-08-26 PROCEDURE — 71045 X-RAY EXAM CHEST 1 VIEW: CPT | Mod: 26 | Performed by: RADIOLOGY

## 2024-08-26 PROCEDURE — 85520 HEPARIN ASSAY: CPT | Performed by: SURGERY

## 2024-08-26 PROCEDURE — 82550 ASSAY OF CK (CPK): CPT

## 2024-08-26 PROCEDURE — 97530 THERAPEUTIC ACTIVITIES: CPT | Mod: GP

## 2024-08-26 PROCEDURE — 999N000157 HC STATISTIC RCP TIME EA 10 MIN

## 2024-08-26 PROCEDURE — 250N000009 HC RX 250: Performed by: STUDENT IN AN ORGANIZED HEALTH CARE EDUCATION/TRAINING PROGRAM

## 2024-08-26 PROCEDURE — 250N000013 HC RX MED GY IP 250 OP 250 PS 637

## 2024-08-26 PROCEDURE — 82085 ASSAY OF ALDOLASE: CPT

## 2024-08-26 PROCEDURE — 86036 ANCA SCREEN EACH ANTIBODY: CPT

## 2024-08-26 PROCEDURE — 85027 COMPLETE CBC AUTOMATED: CPT | Performed by: STUDENT IN AN ORGANIZED HEALTH CARE EDUCATION/TRAINING PROGRAM

## 2024-08-26 PROCEDURE — 82947 ASSAY GLUCOSE BLOOD QUANT: CPT

## 2024-08-26 PROCEDURE — 250N000013 HC RX MED GY IP 250 OP 250 PS 637: Performed by: PHYSICIAN ASSISTANT

## 2024-08-26 PROCEDURE — 97535 SELF CARE MNGMENT TRAINING: CPT | Mod: GO

## 2024-08-26 PROCEDURE — 85520 HEPARIN ASSAY: CPT | Performed by: STUDENT IN AN ORGANIZED HEALTH CARE EDUCATION/TRAINING PROGRAM

## 2024-08-26 PROCEDURE — 250N000009 HC RX 250: Performed by: NURSE PRACTITIONER

## 2024-08-26 PROCEDURE — 97166 OT EVAL MOD COMPLEX 45 MIN: CPT | Mod: GO

## 2024-08-26 PROCEDURE — 86235 NUCLEAR ANTIGEN ANTIBODY: CPT

## 2024-08-26 PROCEDURE — 82784 ASSAY IGA/IGD/IGG/IGM EACH: CPT

## 2024-08-26 PROCEDURE — 36415 COLL VENOUS BLD VENIPUNCTURE: CPT | Performed by: STUDENT IN AN ORGANIZED HEALTH CARE EDUCATION/TRAINING PROGRAM

## 2024-08-26 PROCEDURE — 250N000009 HC RX 250: Performed by: SURGERY

## 2024-08-26 PROCEDURE — 82330 ASSAY OF CALCIUM: CPT | Performed by: STUDENT IN AN ORGANIZED HEALTH CARE EDUCATION/TRAINING PROGRAM

## 2024-08-26 PROCEDURE — 82805 BLOOD GASES W/O2 SATURATION: CPT

## 2024-08-26 PROCEDURE — 82248 BILIRUBIN DIRECT: CPT | Performed by: STUDENT IN AN ORGANIZED HEALTH CARE EDUCATION/TRAINING PROGRAM

## 2024-08-26 PROCEDURE — 86331 IMMUNODIFFUSION OUCHTERLONY: CPT

## 2024-08-26 PROCEDURE — 82040 ASSAY OF SERUM ALBUMIN: CPT | Performed by: STUDENT IN AN ORGANIZED HEALTH CARE EDUCATION/TRAINING PROGRAM

## 2024-08-26 PROCEDURE — 94640 AIRWAY INHALATION TREATMENT: CPT | Mod: 76

## 2024-08-26 PROCEDURE — 82785 ASSAY OF IGE: CPT

## 2024-08-26 PROCEDURE — 86003 ALLG SPEC IGE CRUDE XTRC EA: CPT

## 2024-08-26 PROCEDURE — 99233 SBSQ HOSP IP/OBS HIGH 50: CPT | Performed by: STUDENT IN AN ORGANIZED HEALTH CARE EDUCATION/TRAINING PROGRAM

## 2024-08-26 PROCEDURE — 90947 DIALYSIS REPEATED EVAL: CPT

## 2024-08-26 PROCEDURE — 86140 C-REACTIVE PROTEIN: CPT

## 2024-08-26 PROCEDURE — 84145 PROCALCITONIN (PCT): CPT

## 2024-08-26 PROCEDURE — 80069 RENAL FUNCTION PANEL: CPT | Performed by: STUDENT IN AN ORGANIZED HEALTH CARE EDUCATION/TRAINING PROGRAM

## 2024-08-26 PROCEDURE — 83735 ASSAY OF MAGNESIUM: CPT | Performed by: STUDENT IN AN ORGANIZED HEALTH CARE EDUCATION/TRAINING PROGRAM

## 2024-08-26 PROCEDURE — 250N000011 HC RX IP 250 OP 636: Performed by: STUDENT IN AN ORGANIZED HEALTH CARE EDUCATION/TRAINING PROGRAM

## 2024-08-26 PROCEDURE — 250N000013 HC RX MED GY IP 250 OP 250 PS 637: Performed by: STUDENT IN AN ORGANIZED HEALTH CARE EDUCATION/TRAINING PROGRAM

## 2024-08-26 PROCEDURE — 71045 X-RAY EXAM CHEST 1 VIEW: CPT

## 2024-08-26 PROCEDURE — 85652 RBC SED RATE AUTOMATED: CPT

## 2024-08-26 PROCEDURE — 86200 CCP ANTIBODY: CPT

## 2024-08-26 PROCEDURE — 86698 HISTOPLASMA ANTIBODY: CPT

## 2024-08-26 PROCEDURE — 200N000002 HC R&B ICU UMMC

## 2024-08-26 PROCEDURE — 86038 ANTINUCLEAR ANTIBODIES: CPT

## 2024-08-26 PROCEDURE — 83605 ASSAY OF LACTIC ACID: CPT

## 2024-08-26 PROCEDURE — 97530 THERAPEUTIC ACTIVITIES: CPT | Mod: GO

## 2024-08-26 PROCEDURE — 83876 ASSAY MYELOPEROXIDASE: CPT

## 2024-08-26 PROCEDURE — 97163 PT EVAL HIGH COMPLEX 45 MIN: CPT | Mod: GP

## 2024-08-26 PROCEDURE — 86606 ASPERGILLUS ANTIBODY: CPT

## 2024-08-26 PROCEDURE — 94003 VENT MGMT INPAT SUBQ DAY: CPT

## 2024-08-26 PROCEDURE — 250N000013 HC RX MED GY IP 250 OP 250 PS 637: Performed by: SURGERY

## 2024-08-26 PROCEDURE — 999N000253 HC STATISTIC WEANING TRIALS

## 2024-08-26 PROCEDURE — 97110 THERAPEUTIC EXERCISES: CPT | Mod: GP

## 2024-08-26 PROCEDURE — 87385 HISTOPLASMA CAPSUL AG IA: CPT

## 2024-08-26 RX ORDER — CLONAZEPAM 0.5 MG/1
0.25 TABLET ORAL 2 TIMES DAILY
Status: COMPLETED | OUTPATIENT
Start: 2024-08-26 | End: 2024-08-26

## 2024-08-26 RX ORDER — MORPHINE 10 MG/ML
6 TINCTURE ORAL EVERY 6 HOURS
Status: DISCONTINUED | OUTPATIENT
Start: 2024-08-26 | End: 2024-08-30

## 2024-08-26 RX ORDER — OXYCODONE HYDROCHLORIDE 5 MG/1
5 TABLET ORAL EVERY 6 HOURS
Status: DISCONTINUED | OUTPATIENT
Start: 2024-08-26 | End: 2024-08-27

## 2024-08-26 RX ORDER — HYDROMORPHONE HCL IN WATER/PF 6 MG/30 ML
0.2 PATIENT CONTROLLED ANALGESIA SYRINGE INTRAVENOUS EVERY 6 HOURS PRN
Status: DISCONTINUED | OUTPATIENT
Start: 2024-08-26 | End: 2024-08-26

## 2024-08-26 RX ADMIN — HEPARIN SODIUM 1200 UNITS/HR: 1000 INJECTION, SOLUTION INTRAVENOUS; SUBCUTANEOUS at 02:16

## 2024-08-26 RX ADMIN — CHLORHEXIDINE GLUCONATE 0.12% ORAL RINSE 15 ML: 1.2 LIQUID ORAL at 20:08

## 2024-08-26 RX ADMIN — CALCIUM CHLORIDE, MAGNESIUM CHLORIDE, SODIUM CHLORIDE, SODIUM BICARBONATE, POTASSIUM CHLORIDE AND SODIUM PHOSPHATE DIBASIC DIHYDRATE 12.5 ML/KG/HR: 3.68; 3.05; 6.34; 3.09; .314; .187 INJECTION INTRAVENOUS at 21:52

## 2024-08-26 RX ADMIN — Medication 6 MG: at 13:53

## 2024-08-26 RX ADMIN — DEXMEDETOMIDINE HYDROCHLORIDE IN SODIUM CHLORIDE 1.1 MCG/KG/HR: 4 INJECTION INTRAVENOUS at 20:32

## 2024-08-26 RX ADMIN — CALCIUM CHLORIDE, MAGNESIUM CHLORIDE, SODIUM CHLORIDE, SODIUM BICARBONATE, POTASSIUM CHLORIDE AND SODIUM PHOSPHATE DIBASIC DIHYDRATE 12.5 ML/KG/HR: 3.68; 3.05; 6.34; 3.09; .314; .187 INJECTION INTRAVENOUS at 05:37

## 2024-08-26 RX ADMIN — CALCIUM CHLORIDE, MAGNESIUM CHLORIDE, SODIUM CHLORIDE, SODIUM BICARBONATE, POTASSIUM CHLORIDE AND SODIUM PHOSPHATE DIBASIC DIHYDRATE 12.5 ML/KG/HR: 3.68; 3.05; 6.34; 3.09; .314; .187 INJECTION INTRAVENOUS at 00:03

## 2024-08-26 RX ADMIN — LOPERAMIDE HYDROCHLORIDE 4 MG: 2 CAPSULE ORAL at 20:08

## 2024-08-26 RX ADMIN — HEPARIN SODIUM 1200 UNITS/HR: 1000 INJECTION, SOLUTION INTRAVENOUS; SUBCUTANEOUS at 18:08

## 2024-08-26 RX ADMIN — MONTELUKAST 10 MG: 10 TABLET, FILM COATED ORAL at 21:43

## 2024-08-26 RX ADMIN — ATORVASTATIN CALCIUM 10 MG: 10 TABLET, FILM COATED ORAL at 07:55

## 2024-08-26 RX ADMIN — CLONAZEPAM 0.25 MG: 0.5 TABLET ORAL at 20:17

## 2024-08-26 RX ADMIN — ACYCLOVIR 400 MG: 200 SUSPENSION ORAL at 13:53

## 2024-08-26 RX ADMIN — CLONAZEPAM 0.25 MG: 0.5 TABLET ORAL at 07:55

## 2024-08-26 RX ADMIN — LOPERAMIDE HYDROCHLORIDE 4 MG: 2 CAPSULE ORAL at 13:53

## 2024-08-26 RX ADMIN — GABAPENTIN 300 MG: 300 CAPSULE ORAL at 13:53

## 2024-08-26 RX ADMIN — DEXMEDETOMIDINE HYDROCHLORIDE IN SODIUM CHLORIDE 1 MCG/KG/HR: 4 INJECTION INTRAVENOUS at 13:40

## 2024-08-26 RX ADMIN — ACETYLCYSTEINE 4 ML: 100 SOLUTION ORAL; RESPIRATORY (INHALATION) at 16:08

## 2024-08-26 RX ADMIN — ACYCLOVIR 400 MG: 200 SUSPENSION ORAL at 08:24

## 2024-08-26 RX ADMIN — ACETYLCYSTEINE 4 ML: 100 SOLUTION ORAL; RESPIRATORY (INHALATION) at 19:34

## 2024-08-26 RX ADMIN — IPRATROPIUM BROMIDE AND ALBUTEROL SULFATE 3 ML: .5; 3 SOLUTION RESPIRATORY (INHALATION) at 19:34

## 2024-08-26 RX ADMIN — LEVOTHYROXINE SODIUM 25 MCG: 0.03 TABLET ORAL at 07:54

## 2024-08-26 RX ADMIN — VENLAFAXINE 75 MG: 25 TABLET ORAL at 20:08

## 2024-08-26 RX ADMIN — GABAPENTIN 300 MG: 300 CAPSULE ORAL at 20:08

## 2024-08-26 RX ADMIN — IPRATROPIUM BROMIDE AND ALBUTEROL SULFATE 3 ML: .5; 3 SOLUTION RESPIRATORY (INHALATION) at 16:08

## 2024-08-26 RX ADMIN — Medication 40 MG: at 08:24

## 2024-08-26 RX ADMIN — CALCIUM CHLORIDE, MAGNESIUM CHLORIDE, SODIUM CHLORIDE, SODIUM BICARBONATE, POTASSIUM CHLORIDE AND SODIUM PHOSPHATE DIBASIC DIHYDRATE 12.5 ML/KG/HR: 3.68; 3.05; 6.34; 3.09; .314; .187 INJECTION INTRAVENOUS at 05:38

## 2024-08-26 RX ADMIN — Medication 6 MG: at 20:08

## 2024-08-26 RX ADMIN — IPRATROPIUM BROMIDE AND ALBUTEROL SULFATE 3 ML: .5; 3 SOLUTION RESPIRATORY (INHALATION) at 11:22

## 2024-08-26 RX ADMIN — AMITRIPTYLINE HYDROCHLORIDE 50 MG: 50 TABLET, FILM COATED ORAL at 21:42

## 2024-08-26 RX ADMIN — OXYCODONE HYDROCHLORIDE 5 MG: 5 TABLET ORAL at 18:13

## 2024-08-26 RX ADMIN — Medication 60 ML: at 08:38

## 2024-08-26 RX ADMIN — ACETAMINOPHEN 975 MG: 325 TABLET, FILM COATED ORAL at 17:01

## 2024-08-26 RX ADMIN — CETIRIZINE HYDROCHLORIDE 10 MG: 10 TABLET, FILM COATED ORAL at 07:55

## 2024-08-26 RX ADMIN — OXYCODONE HYDROCHLORIDE 5 MG: 5 TABLET ORAL at 17:02

## 2024-08-26 RX ADMIN — DEXMEDETOMIDINE HYDROCHLORIDE IN SODIUM CHLORIDE 1 MCG/KG/HR: 4 INJECTION INTRAVENOUS at 05:03

## 2024-08-26 RX ADMIN — DEXMEDETOMIDINE HYDROCHLORIDE IN SODIUM CHLORIDE 1 MCG/KG/HR: 4 INJECTION INTRAVENOUS at 09:34

## 2024-08-26 RX ADMIN — Medication 60 ML: at 20:17

## 2024-08-26 RX ADMIN — GABAPENTIN 300 MG: 300 CAPSULE ORAL at 07:56

## 2024-08-26 RX ADMIN — OXYCODONE HYDROCHLORIDE 5 MG: 5 TABLET ORAL at 11:41

## 2024-08-26 RX ADMIN — ACETAMINOPHEN 975 MG: 325 TABLET, FILM COATED ORAL at 07:55

## 2024-08-26 RX ADMIN — ACETYLCYSTEINE 4 ML: 100 SOLUTION ORAL; RESPIRATORY (INHALATION) at 08:06

## 2024-08-26 RX ADMIN — ACETYLCYSTEINE 4 ML: 100 SOLUTION ORAL; RESPIRATORY (INHALATION) at 11:22

## 2024-08-26 RX ADMIN — Medication 1 TABLET: at 07:55

## 2024-08-26 RX ADMIN — CHLORHEXIDINE GLUCONATE 0.12% ORAL RINSE 15 ML: 1.2 LIQUID ORAL at 08:25

## 2024-08-26 RX ADMIN — MORPHINE 6 MG: 10 TINCTURE ORAL at 07:56

## 2024-08-26 RX ADMIN — CALCIUM CHLORIDE, MAGNESIUM CHLORIDE, SODIUM CHLORIDE, SODIUM BICARBONATE, POTASSIUM CHLORIDE AND SODIUM PHOSPHATE DIBASIC DIHYDRATE: 3.68; 3.05; 6.34; 3.09; .314; .187 INJECTION INTRAVENOUS at 15:10

## 2024-08-26 RX ADMIN — BUDESONIDE 1 MG: 1 INHALANT ORAL at 08:05

## 2024-08-26 RX ADMIN — OXYCODONE HYDROCHLORIDE 5 MG: 5 TABLET ORAL at 01:15

## 2024-08-26 RX ADMIN — LOPERAMIDE HYDROCHLORIDE 4 MG: 2 CAPSULE ORAL at 01:15

## 2024-08-26 RX ADMIN — CALCIUM CHLORIDE, MAGNESIUM CHLORIDE, SODIUM CHLORIDE, SODIUM BICARBONATE, POTASSIUM CHLORIDE AND SODIUM PHOSPHATE DIBASIC DIHYDRATE 12.5 ML/KG/HR: 3.68; 3.05; 6.34; 3.09; .314; .187 INJECTION INTRAVENOUS at 10:57

## 2024-08-26 RX ADMIN — OXYCODONE HYDROCHLORIDE 5 MG: 5 TABLET ORAL at 11:45

## 2024-08-26 RX ADMIN — LOPERAMIDE HYDROCHLORIDE 4 MG: 2 CAPSULE ORAL at 07:55

## 2024-08-26 RX ADMIN — CALCIUM CHLORIDE, MAGNESIUM CHLORIDE, SODIUM CHLORIDE, SODIUM BICARBONATE, POTASSIUM CHLORIDE AND SODIUM PHOSPHATE DIBASIC DIHYDRATE 12.5 ML/KG/HR: 3.68; 3.05; 6.34; 3.09; .314; .187 INJECTION INTRAVENOUS at 15:10

## 2024-08-26 RX ADMIN — IPRATROPIUM BROMIDE AND ALBUTEROL SULFATE 3 ML: .5; 3 SOLUTION RESPIRATORY (INHALATION) at 08:05

## 2024-08-26 RX ADMIN — OXYCODONE HYDROCHLORIDE 5 MG: 5 TABLET ORAL at 05:03

## 2024-08-26 RX ADMIN — VENLAFAXINE 75 MG: 25 TABLET ORAL at 07:54

## 2024-08-26 RX ADMIN — DEXMEDETOMIDINE HYDROCHLORIDE IN SODIUM CHLORIDE 1 MCG/KG/HR: 4 INJECTION INTRAVENOUS at 17:34

## 2024-08-26 RX ADMIN — OXYCODONE HYDROCHLORIDE 5 MG: 5 TABLET ORAL at 21:43

## 2024-08-26 RX ADMIN — OXYCODONE HYDROCHLORIDE 5 MG: 5 TABLET ORAL at 08:49

## 2024-08-26 RX ADMIN — ACYCLOVIR 400 MG: 200 SUSPENSION ORAL at 20:08

## 2024-08-26 ASSESSMENT — ACTIVITIES OF DAILY LIVING (ADL)
ADLS_ACUITY_SCORE: 59
ADLS_ACUITY_SCORE: 63
ADLS_ACUITY_SCORE: 61
ADLS_ACUITY_SCORE: 59
ADLS_ACUITY_SCORE: 59
ADLS_ACUITY_SCORE: 63
ADLS_ACUITY_SCORE: 63
ADLS_ACUITY_SCORE: 55
ADLS_ACUITY_SCORE: 59
ADLS_ACUITY_SCORE: 61
ADLS_ACUITY_SCORE: 59
ADLS_ACUITY_SCORE: 61
ADLS_ACUITY_SCORE: 55
ADLS_ACUITY_SCORE: 61
ADLS_ACUITY_SCORE: 59
ADLS_ACUITY_SCORE: 61

## 2024-08-26 NOTE — PROGRESS NOTES
SURGICAL ICU PROGRESS NOTE  08/26/2024        Date of Service (when I saw the patient): 08/26/2024    ASSESSMENT:  Massiel Flaherty is a 53 year old female who was admitted to [facility]. Past medical hx of Anxiety/depression, fibromyalgia, hypothyroidism, asthma, HLD, MURIEL on CPAP, spells, off on anti seizure medications, expressive aphasia, hypertension was seen at [facility] and was placed on Augmentin outpatient on 7/30/24. She admitted to the hospital on 8/3/24 for fatigue, fever and dyspnea and intubated 8/6/24 at OSH, proned and paralyzed without improvement. Transferred to [facility] 8/8/24, hypoxic with high plateaus/peaks and placed on VV ECMO and CRRT. Decannulated from VV ECMO 8/18.     CHANGES and MAJOR THINGS TODAY:   - Decrease frequency of scheduled oxycodone   - Discontinue PRN Versed, PRN hydromorphone   - PST today   - Switched over to VC   - Fluid goal net negative -500 to -1000 ml on CRRT     PLAN:    Neurological:  # Acute pain  # Sedation and analgesia for vent compliance   # Concern for ICU delirium   # Hx Fibromyalgia   # Hx myalgic encephalomyelitis   # Hx anxiety/depression  - Monitor neurological status. Delirium preventions and precautions.   - Pain: Scheduled: tylenol, oxycodone 5mg q 6hr (decreased from q 4hr)   PRN: tylenol, oxycodone  - Sedation plan:  Gtt: Precedex gtt - wean as able   - Clonazepam to 0.25 mg BID and off tomorrow   - Gabapentin 300mg TID   - Seroquel 25mg BID PRN  - PTA Venlafaxine and Amitriptyline   - Delirium precautions, optimize environment to regulate day/night normalcy     # Hx spells with staring and BUE posturing previously described as pseudoseizures   # Hx of GTC seizures and myoclonic epilepsy, weaned off AEDs   # C/f hypoxic ischemic injury   # Diffuse cerebral edema - improved  - Sodium goals liberalized to 140-145  - 8/21: MRI Brain: no acute intracranial pathology. No findings to suggest anoxic brain injury.  - NCC signed off.     HEENT:  # Mouth /  tongue sores  # Hx seasonal allergies  # Hx alopecia  Mouth sores present, which could have viral etiology. 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  - Acyclovir 400mg BID x5 days  - Restarted PTA zyrtec   - Hold PTA hydroxychloroquine    Pulmonary:  # Acute hypoxic respiratory failure c/b ARDS   # s/p VV ECMO 8/8 - 8/18   # Hx CAP  # Asthma  # MURIEL on home CPAP   FiO2 (%): 40 %, Resp: 18, Inspiratory Pressure (cm H2O) (Drager Jessie): 25, Vent Mode: PCV Plus assist, Resp Rate (Set): 18 breaths/min, PEEP (cm H2O): 8 cmH2O, Resp Rate (Set): 18 breaths/min, PEEP (cm H2O): 8 cmH2O  - SpO2 goals >92%, PaO2 goals >60   - PTA singular at bedtime  - Scheduled pulmicort, mucomyst, and duonebs   - Wean vent as able  - PST today   - VAP bundle while intubated   - CXR today, per my review, similar compared to previous     Cardiovascular:    # Hyperlipidemia  # Hypotension  # Hx HTN  - MAP goal  >65, SBP goals >110  - NE for pressor support, wean as krysten   - Restarted PTA atorvastatin  - Hold PTA clonidine    GI/Nutrition:    # Moderate protein calorie malnutrition  # Non-infectious Diarrhea  # GERD   - Protonix (home medication)   - Nutrition consulted, appreciate recs  - Diet: TF via NJ  - Hold bowel regimen d/t loose stools  - Change suspension meds to other form as able  - Continue scheduled multivitamin, banatrol, prosource packet, and loperamide  - Increase frequency of tincture of opium for high stool output  - Rectal tube, continues with high output. Keep today.     Fluids/Electrolytes/Renal:  # Acute kidney injury  Baseline Cr approx 0.6. Pt is anuric  - Continue CRRT; fluid goal net negative -500 ml to -1000 ml   - Heparinized CRRT circuit, low intensity protocol. Last clotted 8/24 PM  - Strict I&Os   - Bladder scan q shift  - Avoid nephrotoxins as able     Endocrine:  # Steroid and stress induced hyperglycemia  # Hypothyroidism   - Goal to keep BG < 180 for optimal wound healing.   - Continue High SSI  - Continue PTA  synthroid    Infectious disease:   # Leukocytosis  # Hx CAP  - Continue to monitor fever curve and inflammatory markers as appropriate  - Tmax today 99 F, WBC down trending 12.1 from 16.0   - ID consulted, appreciate recs. Recommending monitoring off antimicrobials.   - Acyclovir started 8/22 for HSI, see HEENT    # Concern for West Nile virus  Per family, members of pt's community have been testing positive for West Nile Virus  - LP needed to diagnose West Nile- will defer. IgG and IgM sent 8/21, value back at 0.51 which is interpreted as negative.      Hematology:    # Acute blood loss anemia   # Anemia of critical illness  - Transfuse if hgb <7.0 or signs/symptoms of hypoperfusion. Monitor and trend.   - Heparinize CRRT circuit      Musculoskeletal:  # Weakness and deconditioning of critical illness  # Left ankle injury pre-hospitalization    - Physical and occupational therapy when able.     Skin:  # BLE scattered ecchymosis  # Left toe duskiness   - Bilateral lower leg ecchymosis   - WOC consult   - Ecchymosis to left foot, most noticeable to 3rd and 4th toes    General Cares/Prophylaxis:    DVT Prophylaxis: Heparin through CRRT circuit  GI Prophylaxis: PPI  Restraints: Restraints for medical healing needed: NO    Lines/ tubes/ drains:  - ETT (8/8)  - NJ (8/9)  - LIJ CVC (8/8)  - Radial a-line (8/7)  - PIV x3  - Rectal tube (8/17)  - Tunneled HD line (8/23)    Disposition: SICU     Discussed plan today with patient and their family member. All questions were answered. They are in agreement with plan.      Patient seen, findings and plan discussed with SICU staff, Dr. Hatch .     Total Critical Care time spent by me, excluding procedures, was 36 minutes.         Andres Payne PA-C  Clinically Significant Risk Factors              # Hypoalbuminemia: Lowest albumin = 2.2 g/dL at 8/10/2024  9:47 AM, will monitor as appropriate               # Obesity: Estimated body mass index is 32.94 kg/m  as calculated from  "the following:    Height as of this encounter: 1.575 m (5' 2\").    Weight as of this encounter: 81.7 kg (180 lb 1.9 oz).   # Moderate Malnutrition: based on nutrition assessment      # Financial/Environmental Concerns: none                        ====================================  INTERVAL HISTORY:   Pt states she slept well overnight.       OBJECTIVE:   1. VITAL SIGNS:   Temp:  [97.2  F (36.2  C)-99.7  F (37.6  C)] 99.1  F (37.3  C)  Pulse:  [] 85  Resp:  [12-29] 18  BP: (122)/(59) 122/59  MAP:  [57 mmHg-99 mmHg] 72 mmHg  Arterial Line BP: ()/(42-72) 108/53  FiO2 (%):  [40 %-50 %] 40 %  SpO2:  [94 %-100 %] 98 %  FiO2 (%): 40 %, Resp: 18, Inspiratory Pressure (cm H2O) (Drager Jessie): 25, Vent Mode: PCV Plus assist, Resp Rate (Set): 18 breaths/min, PEEP (cm H2O): 8 cmH2O, Resp Rate (Set): 18 breaths/min, PEEP (cm H2O): 8 cmH2O    2. INTAKE/ OUTPUT:   I/O last 3 completed shifts:  In: 2780.45 [I.V.:650.45; NG/GT:930]  Out: 4198.7 [Other:2798.7; Stool:1400]    3. PHYSICAL EXAMINATION:  Neuro: Intubated. Sedated. Awake. Answers questions by nodding and shaking head. Follows commands; wiggles toes in BLE. NAD.   HEENT: Normocephalic, atraumatic. PERRL, and nonicteric.   CV: RRR on monitor, S1S2, all extremities well perfused   Respiratory: Normal respiratory effort on PC RR 18, FiO2 40% , PEEP 8, inspiratory pressure 25, inspiratory time 0.85 , equal chest rise b/l   GI: Abdomen soft and non-tender to light palpation. Non-distended   : Anuric.   MSK: See neuro.   Skin: Scattered contusions on L foot.         4. INVESTIGATIONS:   Arterial Blood Gases   Recent Labs   Lab 08/26/24  0343 08/25/24  0320 08/24/24  1812 08/24/24  0415   PH 7.35 7.36 7.35 7.36   PCO2 46* 43 43 42   PO2 95 142* 82 82   HCO3 25 24 24 23     Complete Blood Count   Recent Labs   Lab 08/26/24  0343 08/25/24  1953 08/25/24  1308 08/25/24  0320   WBC 12.1* 12.0* 13.7* 16.0*   HGB 7.4* 7.6* 7.7* 7.7*    188 190 193     Basic " Metabolic Panel  Recent Labs   Lab 08/26/24 0348 08/26/24 0343 08/26/24  0007 08/25/24 1957 08/25/24 1953 08/25/24  1628 08/25/24  1308 08/25/24  0325 08/25/24  0320   NA  --  137  --   --  136  --  136  --  138   POTASSIUM  --  3.9  --   --  4.3  --  4.1  --  4.0   CHLORIDE  --  104  --   --  104  --  103  --  105   CO2  --  23  --   --  23  --  23  --  21*   BUN  --  19.8  --   --  18.1  --  20.7*  --  20.3*   CR  --  1.13*  --   --  1.18*  --  1.21*  --  1.25*   * 175* 154* 160* 177*   < > 156*   < > 162*    < > = values in this interval not displayed.     Liver Function Tests  Recent Labs   Lab 08/26/24 0343 08/25/24 1953 08/25/24 1308 08/25/24 0320 08/24/24  1158 08/24/24  0414 08/23/24  1550 08/23/24 0344   AST 27  --   --  27  --  27  --  20   ALT 15  --   --  13  --  14  --  16   ALKPHOS 87  --   --  84  --  78  --  90   BILITOTAL <0.2  --   --  0.2  --  0.2  --  0.2   ALBUMIN 2.9* 2.9* 2.9* 2.8*   < > 2.7*   < > 3.0*    < > = values in this interval not displayed.     Pancreatic Enzymes  No lab results found in last 7 days.  Coagulation Profile  No lab results found in last 7 days.        5. RADIOLOGY:   No results found for this or any previous visit (from the past 24 hour(s)).      =========================================

## 2024-08-26 NOTE — PROGRESS NOTES
"  Nephrology Progress Note  08/26/2024         Assessment & Recommendations:   Mrs Flaherty is a  53 yof w/ Anxiety, depression, fibromyalgia, hypothyroidism, asthma, HLD, MURIEL on CPAP, expressive aphasia, and hypertension who was admitted to an OSH with community acquired pneumonia on 8/3 s/p intubation.  Found to have severe ARDS requiring paralysis and proning, transferred here on 8/8 for management and initiated on CKRT for severe acidemia in the setting of oligoanuric CHACORTA.  Started CRRT on 8/9 for volume management. Remains anuric. Clotting circuit despite low-dose heparin.     # CHACORTA, suspected ATN in setting of shock, ARDS  Continue CRRT (with heparin dosed by Xa level protocol due to filter clotting). Continue 4K baths, standard 25ml/kg/hr clearance, with goal net negative up to 50ml/hr.  -550ml UOP on 8/26, will see if this continues as patient was anuric in prior days     # hypervolemia - UF as above    # electrolytes  No current issues (mild hypophos ok, calcium corrects upward, mild hypermag ok).     Recommendations were communicated to primary team via this note.     Danny Myrcik MD   Division of Nephrology and Hypertension  Care IT  [facility] [employer]      Interval History :   Nursing and provider notes from last 24 hours reviewed.  In the last 24 hours Massiel Flaherty has been net negative on CRRT. No report of currently clotting issue from what I see in notes. Labs reflective of CRRT provided.     Review of Systems:   Unable to obtain due to intubation     Physical Exam:   I/O last 3 completed shifts:  In: 2780.45 [I.V.:650.45; NG/GT:930]  Out: 4198.7 [Other:2798.7; Stool:1400]   /59   Pulse 104   Temp 99.1  F (37.3  C)   Resp 21   Ht 1.575 m (5' 2\")   Wt 81.7 kg (180 lb 1.9 oz)   SpO2 97%   BMI 32.94 kg/m       GENERAL APPEARANCE: NAD  EYES: no scleral icterus  PULM: lungs clear anteriorly, intubated and mechanically ventilated   CV: regular rhythm, normal rate, no rub      " "-edema - 1+ LE edema bilat   GI: soft, ND  INTEGUMENT: no rash on exposed skin  NEURO: opens eyes to voice and follows commands (such as \"look right\")  Access is LFV temp line 8/19.     Labs:   All labs reviewed by me  Electrolytes/Renal -   Recent Labs   Lab Test 08/26/24  1325 08/26/24  1317 08/26/24  0853 08/26/24 0348 08/26/24 0343 08/25/24 1957 08/25/24 1953   NA  --  140  --   --  137  --  136   POTASSIUM  --  4.1  --   --  3.9  --  4.3   CHLORIDE  --  104  --   --  104  --  104   CO2  --  23  --   --  23  --  23   BUN  --  21.1*  --   --  19.8  --  18.1   CR  --  1.12*  --   --  1.13*  --  1.18*   * 156* 202*   < > 175*   < > 177*   QUAN  --  8.3*  --   --  8.3*  --  8.3*   MAG  --  2.4*  --   --  2.4*  --  2.4*   PHOS  --  4.8*  --   --  4.7*  --  4.5    < > = values in this interval not displayed.       CBC -   Recent Labs   Lab Test 08/26/24 1317 08/26/24 0343 08/25/24 1953   WBC 12.5* 12.1* 12.0*   HGB 7.6* 7.4* 7.6*    194 188       LFTs -   Recent Labs   Lab Test 08/26/24  1317 08/26/24  0343 08/25/24 1953 08/25/24  1308 08/25/24  0320 08/24/24  1158 08/24/24  0414   ALKPHOS  --  87  --   --  84  --  78   BILITOTAL  --  <0.2  --   --  0.2  --  0.2   ALT  --  15  --   --  13  --  14   AST  --  27  --   --  27  --  27   PROTTOTAL  --  5.8*  --   --  5.5*  --  5.0*   ALBUMIN 2.9* 2.9* 2.9*   < > 2.8*   < > 2.7*    < > = values in this interval not displayed.       Iron Panel - No lab results found.      Imaging:  All imaging studies reviewed by me.     Current Medications:  Current Facility-Administered Medications   Medication Dose Route Frequency Provider Last Rate Last Admin    acetaminophen (TYLENOL) tablet 975 mg  975 mg Oral or Feeding Tube Q8H Andres Payne PA-C   975 mg at 08/26/24 0755    acetylcysteine (MUCOMYST) 10 % nebulizer solution 4 mL  4 mL Nebulization 4x Daily Barry Hatch MD   4 mL at 08/26/24 1122    acyclovir (ZOVIRAX) suspension 400 mg  400 mg Oral or " Feeding Tube TID Aniceto Adame MD   400 mg at 08/26/24 1353    amitriptyline (ELAVIL) tablet 50 mg  50 mg Oral or Feeding Tube At Bedtime Barry Hatch MD   50 mg at 08/25/24 2142    atorvastatin (LIPITOR) tablet 10 mg  10 mg Oral or Feeding Tube Daily Francisco Welsh MD   10 mg at 08/26/24 0755    budesonide (PULMICORT) neb solution 1 mg  1 mg Nebulization Daily Andres Payne PA-C   1 mg at 08/26/24 0805    cetirizine (zyrTEC) tablet 10 mg  10 mg Oral Daily Aniceto Adame MD   10 mg at 08/26/24 0755    chlorhexidine (PERIDEX) 0.12 % solution 15 mL  15 mL Mouth/Throat Q12H Giovanny Guidry PA-C   15 mL at 08/26/24 0825    clonazePAM (klonoPIN) half-tab 0.25 mg  0.25 mg Oral or Feeding Tube BID Francisco Welsh MD        fiber modular (BANATROL TF) packet 1 packet  1 packet Per Feeding Tube TID Cal Lisa MD   1 packet at 08/26/24 1353    gabapentin (NEURONTIN) capsule 300 mg  300 mg Oral or Feeding Tube TID Barry Hatch MD   300 mg at 08/26/24 1353    insulin aspart (NovoLOG) injection (RAPID ACTING)  1-12 Units Subcutaneous Q4H Aniceto Adame MD   3 Units at 08/26/24 0855    ipratropium - albuterol 0.5 mg/2.5 mg/3 mL (DUONEB) neb solution 3 mL  3 mL Nebulization 4x daily Barry Hatch MD   3 mL at 08/26/24 1122    levothyroxine (SYNTHROID/LEVOTHROID) tablet 25 mcg  25 mcg Per Feeding Tube QAM AC Giovanny Guidry PA-C   25 mcg at 08/26/24 0754    loperamide (IMODIUM) capsule 4 mg  4 mg Oral or Feeding Tube Q6H Barry Hatch MD   4 mg at 08/26/24 1353    montelukast (SINGULAIR) tablet 10 mg  10 mg Oral At Bedtime Aniceto Adame MD   10 mg at 08/25/24 2142    multivitamin RENAL (RENAVITE RX/NEPHROVITE) tablet 1 tablet  1 tablet Oral or Feeding Tube Daily Her-Giovanny Spence PA-C   1 tablet at 08/26/24 9694    opium tincture 10 MG/ML (1%) liquid 6 mg  6 mg Oral or Feeding Tube Q6H Francisco Welsh MD   6 mg at 08/26/24 1353    oxyCODONE  (ROXICODONE) tablet 5 mg  5 mg Oral or Feeding Tube Q6H Andres Payne PA-C   5 mg at 08/26/24 1145    pantoprazole (PROTONIX) 2 mg/mL suspension 40 mg  40 mg Per Feeding Tube QAM AC Barry Hatch MD   40 mg at 08/26/24 0824    Prosource TF20 ENfit Compatibl EN LIQD (PROSOURCE TF20) packet 60 mL  1 packet Per Feeding Tube BID Giovanny Guidry PA-C   60 mL at 08/26/24 0838    venlafaxine (EFFEXOR) tablet 75 mg  75 mg Oral or Feeding Tube BID Barry Hatch MD   75 mg at 08/26/24 0754     Current Facility-Administered Medications   Medication Dose Route Frequency Provider Last Rate Last Admin    dexmedeTOMIDine (PRECEDEX) 4 mcg/mL in sodium chloride 0.9 % 100 mL infusion  0.1-1.2 mcg/kg/hr (Dosing Weight) Intravenous Continuous Tiara Martin CNP 22.2 mL/hr at 08/26/24 1340 1 mcg/kg/hr at 08/26/24 1340    dextrose 10% infusion   Intravenous Continuous PRN Giovanny Guidry PA-C        dialysate for CVVHD & CVVHDF (Phoxillum BK4/2.5)  12.5 mL/kg/hr CRRT Continuous Danny Myrick MD 1,000 mL/hr at 08/26/24 1057 12.5 mL/kg/hr at 08/26/24 1057    heparin (porcine) 20,000 units in 20 mL ANTICOAGULANT infusion (syringe from pharmacy)  500-1,500 Units/hr CRRT Continuous Danny Myrick MD 1.2 mL/hr at 08/26/24 1400 1,200 Units/hr at 08/26/24 1400    norepinephrine (LEVOPHED) 16 mg in  mL infusion MAX CONC CENTRAL LINE  0.01-0.6 mcg/kg/min (Dosing Weight) Intravenous Continuous Elier Hutchins MD 3.3 mL/hr at 08/26/24 1200 0.04 mcg/kg/min at 08/26/24 1200    POST-filter replacement solution for CVVHD & CVVHDF (Phoxillum BK4/2.5)   CRRT Continuous Danny Myrick  mL/hr at 08/25/24 1453 New Bag at 08/25/24 1453    PRE-filter replacement solution for CVVHD & CVVHDF (Phoxillum BK4/2.5)  12.5 mL/kg/hr CRRT Continuous Danny Myrick MD 1,000 mL/hr at 08/26/24 1057 12.5 mL/kg/hr at 08/26/24 1057     Danny Myrick MD

## 2024-08-26 NOTE — PROGRESS NOTES
Care Management Follow Up    Length of Stay (days): 18    Expected Discharge Date:  TBD      Concerns to be Addressed: discharge planning     Patient plan of care discussed at interdisciplinary rounds: Yes  Additional Information:    Per chart review and discussion at multidisciplinary rounds patient remains intubated,  on pressors, CRRT. Pressure support trials will be considered.     Care Management team will continue to follow for discharge needs during this hospitalization.     Natalie Piedra MSN, MA, CCM, RN  4C/4A/4E ICU Care Coordinator  Phone:[phone]  Available via [employer]

## 2024-08-26 NOTE — PROGRESS NOTES
"   08/26/24 1400   Appointment Info   Signing Clinician's Name / Credentials (OT) FELISA Giron   Living Environment   Transportation Anticipated family or friend will provide   Living Environment Comments Per chart review: \"Lives with her adult son Mook. Pt sometimes lives with her sister (Katerina) and mother (Doreen) in their handicap accessible house . Pt was receiving services prior to admission.\" Will continue to obtain information as family present, or patient extubated able to effectively communicate   Self-Care   Usual Activity Tolerance good   Current Activity Tolerance fair   Equipment Currently Used at Home cane, straight;shower chair;walker, standard;wheelchair, manual   Fall history within last six months yes   Number of times patient has fallen within last six months 10   General Information   Onset of Illness/Injury or Date of Surgery 08/08/24   Referring Physician Giovanny Guidry PA-C   Patient/Family Therapy Goal Statement (OT) did not state   Additional Occupational Profile Info/Pertinent History of Current Problem per chart Massiel Flaherty is a 53 year old female who was admitted to [facility]. Past medical hx of Anxiety/depression, fibromyalgia, hypothyroidism, asthma, HLD, MURIEL on CPAP, spells, off on anti seizure medications, expressive aphasia, hypertension was seen at [facility] and was placed on Augmentin outpatient on 7/30/24. She admitted to the hospital on 8/3/24 for fatigue, fever and dyspnea and intubated 8/6/24 at OSH, proned and paralyzed without improvement. Transferred to [facility] 8/8/24, hypoxic with high plateaus/peaks and placed on VV ECMO and CRRT. Decannulated from VV ECMO 8/18.   Existing Precautions/Restrictions fall;cardiac;seizures   Cognitive Status Examination   Cognitive Status Comments Pt follows simple commands consistently with some delay. Endorses not being oriented to place/time   Sensory   Sensory Quick Adds sensation intact   Posture   Posture forward head " position   Range of Motion Comprehensive   Comment, General Range of Motion BUE WFL   Strength Comprehensive (MMT)   Comment, General Manual Muscle Testing (MMT) Assessment generalized weakness, 2/5 w/BUE grossly, able to shrug shoulders against gravity   Coordination   Upper Extremity Coordination Right UE impaired;Left UE impaired   Bed Mobility   Comment (Bed Mobility) rolls with max - total Ax2   Transfers   Transfer Comments OH lift   Balance   Balance Comments sitting balance with min A x 2   Activities of Daily Living   BADL Assessment/Intervention bathing;upper body dressing;lower body dressing;grooming;toileting;feeding   Bathing Assessment/Intervention   Marilla Level (Bathing) dependent (less than 25% patient effort)   Comment, (Bathing) per clinical judgement   Upper Body Dressing Assessment/Training   Comment, (Upper Body Dressing) per clinical judgement   Marilla Level (Upper Body Dressing) maximum assist (25% patient effort)   Lower Body Dressing Assessment/Training   Comment, (Lower Body Dressing) per clinical judgement   Marilla Level (Lower Body Dressing) dependent (less than 25% patient effort)   Grooming Assessment/Training   Marilla Level (Grooming) maximum assist (25% patient effort)   Eating/Self Feeding   Marilla Level (Feeding) maximum assist (25% patient effort)   Comment, (Feeding) per clinical judgement   Toileting   Comment, (Toileting) per clinical judgement   Marilla Level (Toileting) dependent (less than 25% patient effort)   Clinical Impression   Criteria for Skilled Therapeutic Interventions Met (OT) Yes, treatment indicated   OT Diagnosis pt is below baseline for ADLs   OT Problem List-Impairments impacting ADL problems related to;activity tolerance impaired;balance;cognition;coordination;mobility;motor control;strength;postural control;pain   Assessment of Occupational Performance 5 or more Performance Deficits   Identified Performance Deficits  dressing, bathing, toileting, g/h, self feeding   Planned Therapy Interventions (OT) ADL retraining;IADL retraining;cognition;motor coordination training;strengthening;transfer training;home program guidelines;progressive activity/exercise;risk factor education;fine motor coordination training   Clinical Decision Making Complexity (OT) detailed assessment/moderate complexity   Risk & Benefits of therapy have been explained evaluation/treatment results reviewed;care plan/treatment goals reviewed;risks/benefits reviewed;current/potential barriers reviewed;participants voiced agreement with care plan;participants included;patient   Clinical Impression Comments pt presents significantly below baseline, limited by overall deconditioning/medical status. pt will benefit from skilled OT to progress toward PLOF   OT Total Evaluation Time   OT Eval, Moderate Complexity Minutes (77620) 6   OT Goals   Therapy Frequency (OT) 5 times/week   OT Predicted Duration/Target Date for Goal Attainment 10/04/24   OT Goals Hygiene/Grooming;Upper Body Dressing;Lower Body Dressing;Upper Body Bathing;Toilet Transfer/Toileting;Cognition   OT: Hygiene/Grooming supervision/stand-by assist   OT: Upper Body Dressing Supervision/stand-by assist   OT: Lower Body Dressing Minimal assist   OT: Upper Body Bathing Minimal assist   OT: Toilet Transfer/Toileting Minimal assist   OT: Cognitive Patient/caregiver will verbalize understanding of cognitive assessment results/recommendations as needed for safe discharge planning   Interventions   Interventions Quick Adds Self-Care/Home Management;Therapeutic Activity   Self-Care/Home Management   Self-Care/Home Mgmt/ADL, Compensatory, Meal Prep Minutes (25806) 8   Symptoms Noted During/After Treatment (Meal Preparation/Planning Training) fatigue   Treatment Detail/Skilled Intervention Facilitated face level g/h to progress pt alertness, improve UE strength and coord for ADLs. Pt challenged to reach to target  with BUE to prep for ADLs. Pt reaches to target x4 each UE with support at elbow/hand from therapist. Pt requires A to initiate movement. Pt then washes face with maxA, Th supports at elbow and hand to guide pt hand to face. Total A later for thoroughness.   Therapeutic Activities   Therapeutic Activity Minutes (86440) 16   Symptoms noted during/after treatment fatigue   Treatment Detail/Skilled Intervention Facilitated OOB activity to progress pt activity tolerance and alertness for ADLs. Co-treat/eval w/PT to progress pt safely. Time spent setting up room/lines to increase pt IND/safety. pt rolls x2 with max Ax2  for sling placement. pt transferred from supine > recliner with Ax2 and OH lift. Once in recliner, pt challenged to sit forward into unsupported position. Th A w/placing UEs on armrests, pt sits forward x 2 with mod A x2, Xu x 1-2 to maintain upright posture for approx 20s on each trial. Pt then guioded through gentle AAROM of cervical spine as pt favors head turn towards L-side. Pt able to A in turning head to midline, limited ROM past midline towards R-side. Gentle, prolonged stretch provided and pt encouraged to postion in midline as able. Pt smiles, nods/shakles head yes/no throughout session, improved alertness and participation OOB. Pt VSS on CPAP/PS 40% Fio2 8/8.   OT Discharge Planning   OT Plan EOB trunk control w/sling, seated ADLs, UE strengthening   OT Discharge Recommendation (DC Rec) Transitional Care Facility   OT Rationale for DC Rec Pt significantly below baseline, likely to require rehab stay at d/c to progress IND   OT Brief overview of current status OH lift   Total Session Time   Timed Code Treatment Minutes 24   Total Session Time (sum of timed and untimed services) 30

## 2024-08-26 NOTE — PLAN OF CARE
Major Shift Events:  Unable to assess orientation, moves all extremities, and obeys commands. On Precedex for sedation. SR/ST. NE to keep Maps >65. Able to pull on CRRT. No change in vent settings. Frequent aggressive coughing spells. Tube feeds at goal. Continues to have loose, watery stool.      Plan: Continue to work on getting oob. Pressure support if able.     For vital signs and complete assessments, please see documentation flowsheets.

## 2024-08-26 NOTE — PLAN OF CARE
Problem: Adult Inpatient Plan of Care  Goal: Patient-Specific Goal (Individualized)  Description: Patient will wean vent and be able to pressure support in the next two days working towards extubation vs. Later assessing need for trach and slower vent weaning.  Will also tolerate CRRT without increasing pressor needs within MAP parameters.  Outcome: Progressing  Flowsheets (Taken 8/25/2024 1902)  Patient/Family-Specific Goals (Include Timeframe): Levo remained low dose, able to pull on CRRT, goal met.  PEEP down to 8 and fiO2 down to 40%.   Goal Outcome Evaluation:       ICU End of Shift Summary. See flowsheets for vital signs and detailed assessment.    Changes this shift: Patient more alert and responsive w/yes and no head nods and some intellible mouthing of words.  No PRNs, only scheduled meds pt reported pain controlled. Peep down to 8 and fio2 recently down to 30%, all well tolerated.  Labs unremarkable excepting the 10A, heparin in CRRT increased see MAR.    Plan: Recheck 10A at 22:00.  Continue to wean vent as able and tolerated.  Consider cough suppressant.  Wean levo as able. Continue to meet CRRT goals. Start to pressure support and work with PT and OT tomorrow.

## 2024-08-26 NOTE — PLAN OF CARE
Goal Outcome Evaluation:             Pt pressure supported 8/5 40% for ~ 7 hours.   Pt continues to have intermittent coughing jags.  SR/ST 80's-100's.  Tmax 37.5 with CRRT heater set at 36C.  Straight cath for 550 dark matthew urine with sediment.  CRRT set changed at ~ 1700 for increasing filter pressures.  Pt moves all extremities to command, nods yes/no to questions appropriately, and is mouthing words, which are appropriate when decipherable.    Plan to pressure support tomorrow and continue to work towards extubation.

## 2024-08-26 NOTE — PROGRESS NOTES
CRRT STATUS NOTE    DATA:  Time:  5:55 AM  Pressures WNL:  YES  Filter Status:  WDL    Problems Reported/Alarms Noted:  None    Supplies Present:  YES    ASSESSMENT:  Patient Net Fluid Balance:  -1522 mL 8/25, -177 mL since MN  Vital Signs:  Temp:  [97.2  F (36.2  C)-99.7  F (37.6  C)] 99.1  F (37.3  C)  Pulse:  [] 91  Resp:  [12-29] 20  BP: (122)/(59) 122/59  MAP:  [57 mmHg-99 mmHg] 87 mmHg  Arterial Line BP: ()/(42-72) 133/61  FiO2 (%):  [40 %-50 %] 40 %  SpO2:  [94 %-100 %] 98 %    Labs:    Lab Results   Component Value Date    WBC 12.1 (H) 08/26/2024    HGB 7.4 (L) 08/26/2024    HCT 24.5 (L) 08/26/2024     08/26/2024     08/26/2024    POTASSIUM 3.9 08/26/2024    CHLORIDE 104 08/26/2024    CO2 23 08/26/2024    BUN 19.8 08/26/2024    CR 1.13 (H) 08/26/2024     (H) 08/26/2024    DD 2.92 (H) 08/18/2024    AST 27 08/26/2024    ALT 15 08/26/2024    ALKPHOS 87 08/26/2024    BILITOTAL <0.2 08/26/2024    INR 1.37 (H) 08/12/2024       Goals of Therapy:  0-50 ml/hr net negative, so long as MAP >=65     INTERVENTIONS:   None needed    PLAN:  Continue plan of care. Contact CRRT Resource via [employer] with any questions or concerns.

## 2024-08-26 NOTE — PROGRESS NOTES
CRRT STATUS NOTE    DATA:  Time:  5:38 PM  Pressures WNL:  Yes  Filter Status:  WDL    Problems Reported/Alarms Noted:  None    Supplies Present:  Yes    ASSESSMENT:  Patient Net Fluid Balance:  Net IO Since Admission: -10,110.09 mL [08/26/24 1738]     Intake/Output Summary (Last 24 hours) at 8/26/2024 1738  Last data filed at 8/26/2024 1700  Gross per 24 hour   Intake 2853.77 ml   Output 4399.4 ml   Net -1545.63 ml      Vitals:  Temp:  [96.4  F (35.8  C)-100  F (37.8  C)] 99.7  F (37.6  C)  Pulse:  [] 107  Resp:  [12-28] 22  MAP:  [57 mmHg-109 mmHg] 98 mmHg  Arterial Line BP: ()/(43-76) 159/69  FiO2 (%):  [40 %] 40 %  SpO2:  [93 %-100 %] 100 %   Labs:    Lab Results   Component Value Date     08/26/2024    POTASSIUM 4.1 08/26/2024    CR 1.12 (H) 08/26/2024    BUN 21.1 (H) 08/26/2024    MAG 2.4 (H) 08/26/2024    PHOS 4.8 (H) 08/26/2024    WBC 12.5 (H) 08/26/2024    HGB 7.6 (L) 08/26/2024    HCT 24.9 (L) 08/26/2024     08/26/2024     Goals of Therapy: 0-50 ml/hr net negative, so long as MAP >=65     INTERVENTIONS:   Review flow sheets and discuss plan of care with bedside nurse and nephrology team.    PLAN:  Continue fluid removal as tolerated per goals of therapy.  Check filter daily and change filter q 72 hrs and PRN.  Please contact the CRRT resource RN with any questions/concerns.

## 2024-08-26 NOTE — PROGRESS NOTES
"   08/26/24 1030   Appointment Info   Signing Clinician's Name / Credentials (PT) Sophia Palma DPT   Rehab Comments (PT) seizure prec   Living Environment   Transportation Anticipated family or friend will provide   Living Environment Comments Per chart review: \"Lives with her adult son Mook. Pt sometimes lives with her sister (Katerina) and mother (Doreen) in their handicap accessible house . Pt was receiving services prior to admission.\" Will continue to obtain information as family present, or patient extubated able to effectively communicate   Self-Care   Usual Activity Tolerance good   Current Activity Tolerance fair   Equipment Currently Used at Home cane, straight;shower chair;walker, standard;wheelchair, manual   Fall history within last six months yes   Number of times patient has fallen within last six months 10   General Information   Onset of Illness/Injury or Date of Surgery 08/08/24   Referring Physician Giovanny Guidry PA-C   Patient/Family Therapy Goals Statement (PT) None stated 2/2 intubation   Pertinent History of Current Problem (include personal factors and/or comorbidities that impact the POC) Massiel Flaherty is a 53 year old female who was admitted to [facility]. Past medical hx of Anxiety/depression, fibromyalgia, hypothyroidism, asthma, HLD, MURIEL on CPAP, spells, off on anti seizure medications, expressive aphasia, hypertension was seen at [facility] and was placed on Augmentin outpatient on 7/30/24. She admitted to the hospital on 8/3/24 for fatigue, fever and dyspnea and intubated 8/6/24 at OSH, proned and paralyzed without improvement. Transferred to [facility] 8/8/24, hypoxic with high plateaus/peaks and placed on VV ECMO and CRRT. Decannulated from VV ECMO 8/18.   Existing Precautions/Restrictions fall;oxygen therapy device and L/min   Cognition   Affect/Mental Status (Cognition) flat/blunted affect   Orientation Status (Cognition) disoriented to;time;person;situation;verbal cues/prompts " needed for orientation   Follows Commands (Cognition) follows one-step commands;50-74% accuracy;follows two-step commands;0-24% accuracy   Pain Assessment   Patient Currently in Pain No   Integumentary/Edema   Integumentary/Edema other (describe)   Integumentary/Edema Comments multiple PI on mouth   Posture    Posture Forward head position;Protracted shoulders   Range of Motion (ROM)   ROM Comment BLE AROM limited by weakness, WFL PROM   Strength (Manual Muscle Testing)   Strength (Manual Muscle Testing) Deficits observed during functional mobility   Strength Comments LLE weaker than RLE, RLE: 3/5 knee extension, 2+/5 hip flexion, LLE: 2/5 knee extension   Bed Mobility   Comment, (Bed Mobility) Rolling bilaterally with maxA x 2   Transfers   Comment, (Transfers) Bed >chair with OH lift   Gait/Stairs (Locomotion)   Comment, (Gait/Stairs) Unable 2/2 weakness   Balance   Balance other (describe)   Sitting Balance: Static poor balance   Sitting Balance: Dynamic poor balance   Balance Quick Add Sitting balance: Static;Sitting balance: dynamic   Sensory Examination   Sensory Perception Comments Pt wears glasses for reading   Coordination   Coordination other (see comments)   Coordination Comments confounded by weakness   Clinical Impression   Criteria for Skilled Therapeutic Intervention Yes, treatment indicated   PT Diagnosis (PT) Impaired functional mobility   Influenced by the following impairments Generalized weakness, impaired balance, reduced activity tolerance   Functional limitations due to impairments Decreased IND with bed mobility, transfers, gait   Clinical Presentation (PT Evaluation Complexity) unstable   Clinical Presentation Rationale Clinical judgment, [facility]   Clinical Decision Making (Complexity) high complexity   Planned Therapy Interventions (PT) balance training;bed mobility training;gait training;home exercise program;patient/family education;stair training;strengthening;transfer training;progressive  activity/exercise;ROM (range of motion);neuromuscular re-education   Risk & Benefits of therapy have been explained evaluation/treatment results reviewed;care plan/treatment goals reviewed;risks/benefits reviewed;patient   PT Total Evaluation Time   PT Rafal, High Complexity Minutes (44183) 7   Physical Therapy Goals   PT Frequency 6x/week   PT Predicted Duration/Target Date for Goal Attainment 09/27/24   PT Goals Bed Mobility;Transfers;Gait;Stairs   PT: Bed Mobility Minimal assist;Supine to/from sit   PT: Transfers Minimal assist;Sit to/from stand;Bed to/from chair;Assistive device   PT: Gait Minimal assist;10 feet;Assistive device   PT: Stairs   (insert stair goal as appropriate)   PT Discharge Planning   PT Plan Moveo   PT Discharge Recommendation (DC Rec) Acute Rehab Center-Motivated patient will benefit from intensive, interdisciplinary therapy.  Anticipate will be able to tolerate 3 hours of therapy per day   PT Rationale for DC Rec Pt significantly below assumed functional baseline, will need to clarify baseline with family/SW as able. Would benefit from intensive therapies to progress IND with function prior to DC to home.   PT Brief overview of current status A x 2 OH lift   Total Session Time   Timed Code Treatment Minutes 35   Total Session Time (sum of timed and untimed services) 42

## 2024-08-26 NOTE — PROGRESS NOTES
MEDICAL ICU H&P  08/26/2024    Date of Hospital Admission: 8/8/24  Date of ICU Admission: 8/8/24  Reason for Critical Care Admission: Acute hypoxic respiratory failure requiring mechanical ventilation  Date of Service (when I saw the patient): 08/26/2024    ASSESSMENT: Massiel Flaherty is a 53 year old female with PMH Anxiety/depression, fibromyalgia, c/f nonepileptic seizures not on AEDs, hypothyroidism, asthma, HLD, MURIEL on CPAP, expressive aphasia, hypertension  who was admitted on 8/3/2024 for fatigue, fever and dyspnea and intubated 8/6/24 at OSH for ARDS, proned and paralyzed without improvement. Transferred to [facility] 8/8/24, hypoxic with high plateaus/peaks and placed on VV ECMO and CRRT. Decannulated from VV ECMO 8/18 and transferred to MICU 8/26/24 for ongoing management.     CHANGES and MAJOR THINGS TODAY:   - Transferred from SICU for ongoing medical support  - Tolerating PST well at 8/5  - making some urine but still requiring CRRT (500ml straight cathed)  - Initiated ILD workup given unclear etiology of ILD    PLAN:    Neuro:  # Pain and sedation  # Acute pain  # Sedation and analgesia for vent compliance   # Concern for ICU delirium   # Hx Fibromyalgia   # Hx myalgic encephalomyelitis   # Hx anxiety/depression  - Monitor neurological status. Delirium preventions and precautions.   - Pain: Scheduled: tylenol, oxycodone 5mg q 6hr (decreased from q 4hr)   PRN: tylenol, oxycodone  - Sedation plan:  Gtt: Precedex gtt - wean as able   - Clonazepam to 0.25 mg BID and off tomorrow (8/27)  - Gabapentin 300mg TID   - Seroquel 25mg BID PRN  - PTA Venlafaxine and Amitriptyline   - Delirium precautions, optimize environment to regulate day/night normalcy     # Hx spells with staring and BUE posturing previously described as nonepileptic seizures   # Hx of GTC seizures and myoclonic epilepsy, weaned off AEDs   # Diffuse cerebral edema - improved  - Sodium goals liberalized to 140-145  - 8/21: MRI Brain: no acute  intracranial pathology. No findings to suggest anoxic brain injury.     Pulmonary:  # Acute hypoxic respiratory failure c/b ARDS   # s/p VV ECMO 8/8 - 8/18   # Hx CAP  # Asthma  # MURIEL on home CPAP   FiO2 (%): 40 %, Resp: 18, Inspiratory Pressure (cm H2O) (Drager Jessie): 25, Vent Mode: CPAP/PS, Resp Rate (Set): 18 breaths/min, Tidal Volume (Set, mL): 420 mL, PEEP (cm H2O): (S) 5 cmH2O, Pressure Support (cm H2O): 8 cmH2O, Resp Rate (Set): 18 breaths/min, Tidal Volume (Set, mL): 420 mL, PEEP (cm H2O): (S) 5 cmH2O  PFT dated 9/8/2020 demonstrated normal spirometry with mild diffusion impairment and mild restriction (FEV1/FVC 80%, FEV1 2.59, DLCO 40%). CT abdomen chest 3/9/2020 demonstrated scarring and fibrosis bilaterally which correlated with the decreased DLCO. The patient also has a history of MURIEL on CPAP therapy as currently managed by her provider in [place]. Unclear what triggered ARDS or why she has had such severe hospital course but will further investigate ILD.  - ILD w/u aldolase, EVELIO, RF, CCP, ANCA, MPO, SSA Ro and La, Scleroderma antibody, Radha 1,  hypersensity pneumonitis, IGG subclasses,  aspergillus, blastomyces, histoplasma   - SpO2 goals >92%, PaO2 goals >60   - PTA singular at bedtime  - Scheduled pulmicort, mucomyst, and duonebs   - Wean vent as able  - PST today   - VAP bundle while intubated   - CXR stable    Cardiovascular:  # Hyperlipidemia  # Hypotension  # Hx HTN  - MAP goal  >65, SBP goals >110  - NE for pressor support, wean as able   - Restarted PTA atorvastatin  - hold PTA clonidine    GI/Nutrition:  # Moderate protein calorie malnutrition  # Non-infectious Diarrhea  # GERD   - Protonix (home medication)   - Nutrition consulted, appreciate recs  - Diet: TF via NJ  - Hold bowel regimen d/t loose stools  - Change suspension meds to other form as able  - Continue scheduled multivitamin, banatrol, prosource packet, and loperamide  - Increase frequency of tincture of opium for high stool  output  - Rectal tube, continues with high output. Keep today.     Renal/Fluids/Electrolytes:  # Acute kidney injury  Baseline Cr approx 0.6. Pt was anuric here but now making some urine, likely prerenal due to shock.  - Continue CRRT; fluid goal net negative -500 ml to -1000 ml   - Heparinized CRRT circuit, low intensity protocol. Last clotted 8/24 PM  - Strict I&Os   - Bladder scan q shift  - Avoid nephrotoxins as able     Endocrine:  # Steroid and stress induced hyperglycemia  # Hypothyroidism   - Goal to keep BG < 180 for optimal wound healing.   - Continue High SSI  - Continue PTA synthroid    ID:  # Leukocytosis  # Hx CAP  - Continue to monitor fever curve and inflammatory markers as appropriate  - Tmax today 99 F, WBC down trending 12.1 from 16.0   - ID consulted, appreciate recs. Recommending monitoring off antimicrobials.   - Acyclovir started 8/22 for HSI, see HEENT    # HSV-1  Mouth sores present, which could have viral etiology. 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 Monitor and trend.   - Heparinize CRRT circuit      Musculoskeletal/Rheum:  # Alopecia  - Hold PTA hydroxychloroquine     # Weakness and deconditioning of critical illness  # Left ankle injury pre-hospitalization    - Physical and occupational therapy when able.     Skin:  # BLE scattered ecchymosis  # Left toe duskiness   - Bilateral lower leg ecchymosis   - WOC consult   - Ecchymosis to left foot, most noticeable to 3rd and 4th toes    General Cares/Prophylaxis:    DVT Prophylaxis: Heparin through CRRT circuit  GI Prophylaxis: PPI  Restraints: no  Code Status: Full    Lines/tubes/drains:  - ETT (8/8)  - NJ (8/9)  - LIJ CVC (8/8)  - Radial a-line (8/7)  - PIV x3  - Rectal tube (8/17)  - Tunneled HD line (8/23)    Disposition:  - Medical ICU     Patient seen and findings/plan discussed with medical  "ICU staff, Dr. De Anda.    Gaudencio De Souza MD      Attending note:  Patient seen, examined and discussed with the Resident physician. All data reviewed. Agree with the assessment and plan as outlined in the above note.    Radha De Anda MD  [phone]      Clinically Significant Risk Factors              # Hypoalbuminemia: Lowest albumin = 2.2 g/dL at 8/10/2024  9:47 AM, will monitor as appropriate               # Obesity: Estimated body mass index is 32.94 kg/m  as calculated from the following:    Height as of this encounter: 1.575 m (5' 2\").    Weight as of this encounter: 81.7 kg (180 lb 1.9 oz).   # Moderate Malnutrition: based on nutrition assessment    # Financial/Environmental Concerns: none                -----------------------------------------------------------------------    HISTORY PRESENTING ILLNESS:   Patient sitting in chair, ventilated but opens eyes, follows commands and nods head yes or no and in no acute distress. Tolerating PST per nursing since earlier today, although does seem weak in terms of strength in her extremities. She can lift her head and cough well. Otherwise no concerns this afternoon.     Per H&P, was seen at [facility] and was placed on Augmentin outpatient on 7/30/24 for presumed CAP, had further fatigue, fever and dyspnea so admitted to OSH 8/3/24 with CXR showing multifocal bilateral opacities. CT chest neg for PE, she was broadened to azithromycin and cefepime. She received a dose on solumedrol x1. She was initially on high flow, BiPAP, progressively got worse requiring intubation on 8/6/24. Difficulties with intubation and oxygenation requiring increased sedation, paralysis and eventual proning so transferred to [facility] for VV ECMO 8/8/24.     PAST MEDICAL HISTORY:   No past medical history on file.  SURGICAL HISTORY:  No past surgical history on file.  SOCIAL HISTORY:  Social History     Socioeconomic History    Marital status:      Social Determinants of Health "     Financial Resource Strain: Unknown (8/22/2024)    Financial Resource Strain     Within the past 12 months, have you or your family members you live with been unable to get utilities (heat, electricity) when it was really needed?: Patient unable to answer   Food Insecurity: Unknown (8/22/2024)    Food Insecurity     Within the past 12 months, did you worry that your food would run out before you got money to buy more?: Patient unable to answer     Within the past 12 months, did the food you bought just not last and you didn t have money to get more?: Patient unable to answer   Transportation Needs: Unknown (8/22/2024)    Transportation Needs     Within the past 12 months, has lack of transportation kept you from medical appointments, getting your medicines, non-medical meetings or appointments, work, or from getting things that you need?: Patient unable to answer   Physical Activity: Insufficiently Active (4/10/2023)    Received from [facility]    Exercise Vital Sign     Days of Exercise per Week: 2 days     Minutes of Exercise per Session: 30 min   Stress: Stress Concern Present (9/8/2020)    Received from [facility]    [employer] - Occupational Stress Questionnaire     Feeling of Stress : Very much   Social Connections: Moderately Isolated (9/8/2020)    Received from [facility]    Social Connection and Isolation Panel [NHANES]     Frequency of Communication with Friends and Family: More than three times a week     Frequency of Social Gatherings with Friends and Family: Twice a week     Attends [employer] Services: 1 to 4 times per year     Active Member of Clubs or Organizations: No     Attends Club or Organization Meetings: Never     Marital Status:    Interpersonal Safety: Unknown (8/25/2024)    Interpersonal Safety     Do you feel physically and emotionally safe where you currently live?: Patient unable to answer      Within the past 12 months, have you been hit, slapped, kicked or otherwise physically hurt by someone?: Patient unable to answer     Within the past 12 months, have you been humiliated or emotionally abused in other ways by your partner or ex-partner?: Patient unable to answer   Housing Stability: Unknown (8/22/2024)    Housing Stability     Do you have housing? : Patient unable to answer     Are you worried about losing your housing?: Patient unable to answer     FAMILY HISTORY:   No family history on file.  ALLERGIES:   Allergies   Allergen Reactions    Celecoxib Unknown    Sulfa Antibiotics Hives     Per careeverywhere    Tetracycline Hives     Per careeverywhere     MEDICATIONS:  Current Facility-Administered Medications   Medication Dose Route Frequency Provider Last Rate Last Admin    acetaminophen (TYLENOL) tablet 650 mg  650 mg Oral or Feeding Tube Q4H PRN Giovanny Guidry PA-C   650 mg at 08/19/24 0748    Or    acetaminophen (TYLENOL) Suppository 650 mg  650 mg Rectal Q4H PRN Giovanny Guidry PA-C        acetaminophen (TYLENOL) tablet 975 mg  975 mg Oral or Feeding Tube Q8H Andres Payne PA-C   975 mg at 08/26/24 0755    acetylcysteine (MUCOMYST) 10 % nebulizer solution 4 mL  4 mL Nebulization 4x Daily Barry Hatch MD   4 mL at 08/26/24 1122    acyclovir (ZOVIRAX) suspension 400 mg  400 mg Oral or Feeding Tube TID Aniceto Adame MD   400 mg at 08/26/24 0824    amitriptyline (ELAVIL) tablet 50 mg  50 mg Oral or Feeding Tube At Bedtime Barry Hatch MD   50 mg at 08/25/24 2142    atorvastatin (LIPITOR) tablet 10 mg  10 mg Oral or Feeding Tube Daily Francisco Welsh MD   10 mg at 08/26/24 0755    budesonide (PULMICORT) neb solution 1 mg  1 mg Nebulization Daily Andres Payne PA-C   1 mg at 08/26/24 0805    calcium gluconate 2 g in  mL intermittent infusion  2 g Intravenous Q8H PRN Danny Myrick MD        calcium gluconate 4 g in sodium chloride 0.9 % 100 mL  intermittent infusion  4 g Intravenous Q8H PRN Danny Myrick MD        cetirizine (zyrTEC) tablet 10 mg  10 mg Oral Daily Aniceto Adame MD   10 mg at 08/26/24 0755    chlorhexidine (PERIDEX) 0.12 % solution 15 mL  15 mL Mouth/Throat Q12H Giovanny Guidry PA-C   15 mL at 08/26/24 0825    clonazePAM (klonoPIN) half-tab 0.25 mg  0.25 mg Oral or Feeding Tube BID Francisco Welsh MD        dexmedeTOMIDine (PRECEDEX) 4 mcg/mL in sodium chloride 0.9 % 100 mL infusion  0.1-1.2 mcg/kg/hr (Dosing Weight) Intravenous Continuous de La MaterTiara CNP 22.2 mL/hr at 08/26/24 1100 1 mcg/kg/hr at 08/26/24 1100    dextrose 10% infusion   Intravenous Continuous PRN Giovanny Guidry PA-C        glucose gel 15-30 g  15-30 g Oral Q15 Min PRN Aniceto Adame MD        Or    dextrose 50 % injection 25-50 mL  25-50 mL Intravenous Q15 Min PRN Aniceto Adame MD        Or    glucagon injection 1 mg  1 mg Subcutaneous Q15 Min PRN Aniceto Adame MD        dialysate for CVVHD & CVVHDF (Phoxillum BK4/2.5)  12.5 mL/kg/hr CRRT Continuous Danny Myrick MD 1,000 mL/hr at 08/26/24 1057 12.5 mL/kg/hr at 08/26/24 1057    fiber modular (BANATROL TF) packet 1 packet  1 packet Per Feeding Tube TID Cal Lisa MD   1 packet at 08/26/24 0825    gabapentin (NEURONTIN) capsule 300 mg  300 mg Oral or Feeding Tube TID Barry Hatch MD   300 mg at 08/26/24 0756    heparin (porcine) 20,000 units in 20 mL ANTICOAGULANT infusion (syringe from pharmacy)  500-1,500 Units/hr CRRT Continuous Danny Myrick MD 1.2 mL/hr at 08/26/24 1000 1,200 Units/hr at 08/26/24 1000    insulin aspart (NovoLOG) injection (RAPID ACTING)  1-12 Units Subcutaneous Q4H Aniceto Adame MD   3 Units at 08/26/24 0855    ipratropium - albuterol 0.5 mg/2.5 mg/3 mL (DUONEB) neb solution 3 mL  3 mL Nebulization 4x daily Barry Hatch MD   3 mL at 08/26/24 1122    levothyroxine  (SYNTHROID/LEVOTHROID) tablet 25 mcg  25 mcg Per Feeding Tube QAM AC Giovanny Guidry PA-C   25 mcg at 08/26/24 0754    loperamide (IMODIUM) capsule 4 mg  4 mg Oral or Feeding Tube Q6H Barry Hatch MD   4 mg at 08/26/24 0755    magnesium sulfate 2 g in 50 mL sterile water intermittent infusion  2 g Intravenous Q8H PRN Danny Myrick MD        montelukast (SINGULAIR) tablet 10 mg  10 mg Oral At Bedtime Aniceto Adame MD   10 mg at 08/25/24 2142    multivitamin RENAL (RENAVITE RX/NEPHROVITE) tablet 1 tablet  1 tablet Oral or Feeding Tube Daily Giovanny Guidry PA-C   1 tablet at 08/26/24 0755    naloxone (NARCAN) injection 0.2 mg  0.2 mg Intravenous Q2 Min PRN Barry Hatch MD        Or    naloxone (NARCAN) injection 0.4 mg  0.4 mg Intravenous Q2 Min PRN Barry Hatch MD        Or    naloxone (NARCAN) injection 0.2 mg  0.2 mg Intramuscular Q2 Min PRN Barry Hatch MD        Or    naloxone (NARCAN) injection 0.4 mg  0.4 mg Intramuscular Q2 Min PRN Barry Hatch MD        norepinephrine (LEVOPHED) 16 mg in  mL infusion MAX CONC CENTRAL LINE  0.01-0.6 mcg/kg/min (Dosing Weight) Intravenous Continuous Elier Hutchins MD 3.3 mL/hr at 08/26/24 1100 0.04 mcg/kg/min at 08/26/24 1100    opium tincture 10 MG/ML (1%) liquid 6 mg  6 mg Oral or Feeding Tube Q6H Francisco Welsh MD        oxyCODONE (ROXICODONE) tablet 5 mg  5 mg Oral or Feeding Tube Q6H Andres Payne PA-C        oxyCODONE (ROXICODONE) tablet 5 mg  5 mg Oral or Feeding Tube Q6H PRN Andres Payne PA-C   5 mg at 08/26/24 1141    pantoprazole (PROTONIX) 2 mg/mL suspension 40 mg  40 mg Per Feeding Tube QAM AC Barry Hatch MD   40 mg at 08/26/24 0824    POST-filter replacement solution for CVVHD & CVVHDF (Phoxillum BK4/2.5)   CRRT Continuous Danny Myrick  mL/hr at 08/25/24 1453 New Bag at 08/25/24 1453    potassium chloride 20 mEq in 50 mL intermittent infusion  20 mEq Intravenous Q8H  PRN Danny Myrick MD        PRE-filter replacement solution for CVVHD & CVVHDF (Phoxillum BK4/2.5)  12.5 mL/kg/hr CRRT Continuous Danny Myrick MD 1,000 mL/hr at 08/26/24 1057 12.5 mL/kg/hr at 08/26/24 1057    Prosource TF20 ENfit Compatibl EN LIQD (PROSOURCE TF20) packet 60 mL  1 packet Per Feeding Tube BID -Giovanny Spence PA-C   60 mL at 08/26/24 0838    QUEtiapine (SEROquel) tablet 25 mg  25 mg Oral or Feeding Tube BID PRN Aniceto Adame MD   25 mg at 08/25/24 2142    sodium phosphate 15 mmol in sodium chloride 0.9 % 250 mL intermittent infusion  15 mmol Intravenous Q8H PRN Danny Myrick MD        venlafaxine (EFFEXOR) tablet 75 mg  75 mg Oral or Feeding Tube BID Barry Hatch MD   75 mg at 08/26/24 0754       PHYSICAL EXAMINATION:  Temp:  [96.4  F (35.8  C)-99.3  F (37.4  C)] 98.8  F (37.1  C)  Pulse:  [] 114  Resp:  [12-27] 22  MAP:  [57 mmHg-109 mmHg] 106 mmHg  Arterial Line BP: ()/(42-76) 168/75  FiO2 (%):  [40 %-50 %] 40 %  SpO2:  [93 %-100 %] 100 %  Neuro: Intubated. Sedated. Awake. Answers questions by nodding and shaking head. Follows commands; wiggles toes in BLE. NAD.   HEENT: Normocephalic, atraumatic. PERRL, and nonicteric.   CV: RRR on monitor, S1S2, all extremities well perfused   Respiratory: Normal respiratory effort on PC RR 18, FiO2 40% , PEEP 8, inspiratory pressure 25, inspiratory time 0.85 , equal chest rise b/l   GI: Abdomen soft and non-tender to light palpation. Non-distended   MSK: See neuro.   Skin: Scattered contusions on L foot.     LABS: Reviewed.   Arterial Blood Gases   Recent Labs   Lab 08/26/24  0850 08/26/24  0343 08/25/24  0320 08/24/24  1812   PH 7.36 7.35 7.36 7.35   PCO2 45 46* 43 43   PO2 89 95 142* 82   HCO3 26 25 24 24     Complete Blood Count   Recent Labs   Lab 08/26/24 0343 08/25/24 1953 08/25/24  1308 08/25/24  0320   WBC 12.1* 12.0* 13.7* 16.0*   HGB 7.4* 7.6* 7.7* 7.7*    188 190 193     Basic  Metabolic Panel  Recent Labs   Lab 08/26/24  0853 08/26/24  0348 08/26/24  0343 08/26/24  0007 08/25/24 1957 08/25/24 1953 08/25/24  1628 08/25/24  1308 08/25/24  0325 08/25/24  0320   NA  --   --  137  --   --  136  --  136  --  138   POTASSIUM  --   --  3.9  --   --  4.3  --  4.1  --  4.0   CHLORIDE  --   --  104  --   --  104  --  103  --  105   CO2  --   --  23  --   --  23  --  23  --  21*   BUN  --   --  19.8  --   --  18.1  --  20.7*  --  20.3*   CR  --   --  1.13*  --   --  1.18*  --  1.21*  --  1.25*   * 163* 175* 154*   < > 177*   < > 156*   < > 162*    < > = values in this interval not displayed.     Liver Function Tests  Recent Labs   Lab 08/26/24 0343 08/25/24 1953 08/25/24  1308 08/25/24  0320 08/24/24  1158 08/24/24  0414 08/23/24  1550 08/23/24  0344   AST 27  --   --  27  --  27  --  20   ALT 15  --   --  13  --  14  --  16   ALKPHOS 87  --   --  84  --  78  --  90   BILITOTAL <0.2  --   --  0.2  --  0.2  --  0.2   ALBUMIN 2.9* 2.9* 2.9* 2.8*   < > 2.7*   < > 3.0*    < > = values in this interval not displayed.     Coagulation Profile  No lab results found in last 7 days.    IMAGING:  Recent Results (from the past 24 hour(s))   XR Chest Port 1 View    Narrative    EXAM: XR CHEST PORT 1 VIEW 8/26/2024 1:15 AM      HISTORY: ARDS.    COMPARISON: Chest radiograph 8/25/2024    TECHNIQUE: Frontal view of the chest.    FINDINGS: Enteric tube courses beyond the field-of-view. Right  dual-lumen internal jugular central venous catheter with tip  projecting over the right atrium. Endotracheal tube in the mid  thoracic trachea. Esophageal temperature probe overlying the mid  thoracic esophagus. Left internal jugular central venous catheter with  tip projecting over the high SVC.   Trachea is midline. Stable cardiac silhouette. Small bilateral pleural  effusions. No focal consolidative opacity. Patchy diffuse lung  opacities.    IMPRESSION  Stable diffuse streaky opacities, likely atelectasis and/or  edema.  Stable small bilateral pleural effusions.    I have personally reviewed the examination and initial interpretation  and I agree with the findings.    MAYELA DEJESUS MD         SYSTEM ID:  [id number]

## 2024-08-27 ENCOUNTER — APPOINTMENT (OUTPATIENT)
Dept: GENERAL RADIOLOGY | Facility: CLINIC | Age: 54
DRG: 003 | End: 2024-08-27
Attending: PHYSICIAN ASSISTANT
Payer: MEDICAID

## 2024-08-27 ENCOUNTER — APPOINTMENT (OUTPATIENT)
Dept: OCCUPATIONAL THERAPY | Facility: CLINIC | Age: 54
DRG: 003 | End: 2024-08-27
Attending: INTERNAL MEDICINE
Payer: MEDICAID

## 2024-08-27 ENCOUNTER — APPOINTMENT (OUTPATIENT)
Dept: GENERAL RADIOLOGY | Facility: CLINIC | Age: 54
DRG: 003 | End: 2024-08-27
Attending: STUDENT IN AN ORGANIZED HEALTH CARE EDUCATION/TRAINING PROGRAM
Payer: MEDICAID

## 2024-08-27 ENCOUNTER — APPOINTMENT (OUTPATIENT)
Dept: PHYSICAL THERAPY | Facility: CLINIC | Age: 54
DRG: 003 | End: 2024-08-27
Attending: INTERNAL MEDICINE
Payer: MEDICAID

## 2024-08-27 LAB
ABO/RH(D): NORMAL
ALBUMIN SERPL BCG-MCNC: 3 G/DL (ref 3.5–5.2)
ALBUMIN SERPL BCG-MCNC: 3.2 G/DL (ref 3.5–5.2)
ALBUMIN SERPL BCG-MCNC: 3.3 G/DL (ref 3.5–5.2)
ALLEN'S TEST: ABNORMAL
ALP SERPL-CCNC: 94 U/L (ref 40–150)
ALT SERPL W P-5'-P-CCNC: 15 U/L (ref 0–50)
ANA SER QL IF: NEGATIVE
ANCA AB PATTERN SER IF-IMP: NORMAL
ANION GAP SERPL CALCULATED.3IONS-SCNC: 10 MMOL/L (ref 7–15)
ANION GAP SERPL CALCULATED.3IONS-SCNC: 10 MMOL/L (ref 7–15)
ANION GAP SERPL CALCULATED.3IONS-SCNC: 11 MMOL/L (ref 7–15)
ANTIBODY SCREEN: NEGATIVE
AST SERPL W P-5'-P-CCNC: 24 U/L (ref 0–45)
BASE EXCESS BLDA CALC-SCNC: -0.5 MMOL/L (ref -3–3)
BASE EXCESS BLDA CALC-SCNC: -0.8 MMOL/L (ref -3–3)
BASE EXCESS BLDA CALC-SCNC: 0 MMOL/L (ref -3–3)
BASE EXCESS BLDA CALC-SCNC: 0.4 MMOL/L (ref -3–3)
BILIRUB DIRECT SERPL-MCNC: <0.2 MG/DL (ref 0–0.3)
BILIRUB SERPL-MCNC: <0.2 MG/DL
BUN SERPL-MCNC: 16.4 MG/DL (ref 6–20)
BUN SERPL-MCNC: 18.5 MG/DL (ref 6–20)
BUN SERPL-MCNC: 19 MG/DL (ref 6–20)
C-ANCA TITR SER IF: NORMAL {TITER}
CA-I BLD-MCNC: 4.7 MG/DL (ref 4.4–5.2)
CALCIUM SERPL-MCNC: 8.3 MG/DL (ref 8.8–10.4)
CALCIUM SERPL-MCNC: 8.6 MG/DL (ref 8.8–10.4)
CALCIUM SERPL-MCNC: 8.7 MG/DL (ref 8.8–10.4)
CCP AB SER IA-ACNC: 0.7 U/ML
CHLORIDE SERPL-SCNC: 103 MMOL/L (ref 98–107)
CHLORIDE SERPL-SCNC: 105 MMOL/L (ref 98–107)
CHLORIDE SERPL-SCNC: 105 MMOL/L (ref 98–107)
COHGB MFR BLD: 84.9 % (ref 96–97)
COHGB MFR BLD: 94.5 % (ref 96–97)
COHGB MFR BLD: 97.4 % (ref 96–97)
COHGB MFR BLD: 98.9 % (ref 96–97)
CREAT SERPL-MCNC: 0.98 MG/DL (ref 0.51–0.95)
CREAT SERPL-MCNC: 1.04 MG/DL (ref 0.51–0.95)
CREAT SERPL-MCNC: 1.11 MG/DL (ref 0.51–0.95)
EGFRCR SERPLBLD CKD-EPI 2021: 59 ML/MIN/1.73M2
EGFRCR SERPLBLD CKD-EPI 2021: 64 ML/MIN/1.73M2
EGFRCR SERPLBLD CKD-EPI 2021: 69 ML/MIN/1.73M2
ENA SCL70 IGG SER IA-ACNC: <0.6 U/ML
ENA SCL70 IGG SER IA-ACNC: NEGATIVE
ENA SS-A AB SER IA-ACNC: <0.5 U/ML
ENA SS-A AB SER IA-ACNC: NEGATIVE
ENA SS-B IGG SER IA-ACNC: <0.6 U/ML
ENA SS-B IGG SER IA-ACNC: NEGATIVE
ERYTHROCYTE [DISTWIDTH] IN BLOOD BY AUTOMATED COUNT: 19.3 % (ref 10–15)
ERYTHROCYTE [DISTWIDTH] IN BLOOD BY AUTOMATED COUNT: 19.5 % (ref 10–15)
ERYTHROCYTE [DISTWIDTH] IN BLOOD BY AUTOMATED COUNT: 19.5 % (ref 10–15)
GLUCOSE BLD-MCNC: 152 MG/DL (ref 70–99)
GLUCOSE BLD-MCNC: 157 MG/DL (ref 70–99)
GLUCOSE BLDC GLUCOMTR-MCNC: 129 MG/DL (ref 70–99)
GLUCOSE BLDC GLUCOMTR-MCNC: 159 MG/DL (ref 70–99)
GLUCOSE SERPL-MCNC: 136 MG/DL (ref 70–99)
GLUCOSE SERPL-MCNC: 157 MG/DL (ref 70–99)
GLUCOSE SERPL-MCNC: 161 MG/DL (ref 70–99)
HCO3 BLD-SCNC: 25 MMOL/L (ref 21–28)
HCO3 BLD-SCNC: 25 MMOL/L (ref 21–28)
HCO3 BLD-SCNC: 26 MMOL/L (ref 21–28)
HCO3 BLD-SCNC: 26 MMOL/L (ref 21–28)
HCO3 SERPL-SCNC: 23 MMOL/L (ref 22–29)
HCO3 SERPL-SCNC: 23 MMOL/L (ref 22–29)
HCO3 SERPL-SCNC: 25 MMOL/L (ref 22–29)
HCT VFR BLD AUTO: 23.2 % (ref 35–47)
HCT VFR BLD AUTO: 25.8 % (ref 35–47)
HCT VFR BLD AUTO: 26.2 % (ref 35–47)
HGB BLD-MCNC: 7 G/DL (ref 11.7–15.7)
HGB BLD-MCNC: 7.8 G/DL (ref 11.7–15.7)
HGB BLD-MCNC: 7.9 G/DL (ref 11.7–15.7)
IGE SERPL-ACNC: 8 KU/L (ref 0–114)
IGG SERPL-MCNC: 717 MG/DL (ref 610–1616)
IGG SERPL-MCNC: 717 MG/DL (ref 610–1616)
IGG1 SER-MCNC: 509 MG/DL (ref 382–929)
IGG2 SER-MCNC: 65 MG/DL (ref 242–700)
IGG3 SER-MCNC: 10 MG/DL (ref 22–176)
IGG4 SER-MCNC: 20 MG/DL (ref 4–86)
MAGNESIUM SERPL-MCNC: 2.4 MG/DL (ref 1.7–2.3)
MCH RBC QN AUTO: 29.9 PG (ref 26.5–33)
MCH RBC QN AUTO: 30 PG (ref 26.5–33)
MCH RBC QN AUTO: 30.2 PG (ref 26.5–33)
MCHC RBC AUTO-ENTMCNC: 30.2 G/DL (ref 31.5–36.5)
MCV RBC AUTO: 100 FL (ref 78–100)
MCV RBC AUTO: 99 FL (ref 78–100)
MCV RBC AUTO: 99 FL (ref 78–100)
MYELOPEROXIDASE AB SER IA-ACNC: 0.3 U/ML
MYELOPEROXIDASE AB SER IA-ACNC: NEGATIVE
O2/TOTAL GAS SETTING VFR VENT: 10 %
O2/TOTAL GAS SETTING VFR VENT: 40 %
O2/TOTAL GAS SETTING VFR VENT: 7 %
O2/TOTAL GAS SETTING VFR VENT: 8 %
PCO2 BLD: 45 MM HG (ref 35–45)
PCO2 BLD: 45 MM HG (ref 35–45)
PCO2 BLD: 46 MM HG (ref 35–45)
PCO2 BLD: 47 MM HG (ref 35–45)
PEEP: 5 CM H2O
PH BLD: 7.35 [PH] (ref 7.35–7.45)
PH BLD: 7.37 [PH] (ref 7.35–7.45)
PHOSPHATE SERPL-MCNC: 4.2 MG/DL (ref 2.5–4.5)
PHOSPHATE SERPL-MCNC: 4.3 MG/DL (ref 2.5–4.5)
PHOSPHATE SERPL-MCNC: 4.5 MG/DL (ref 2.5–4.5)
PLATELET # BLD AUTO: 197 10E3/UL (ref 150–450)
PLATELET # BLD AUTO: 220 10E3/UL (ref 150–450)
PLATELET # BLD AUTO: 221 10E3/UL (ref 150–450)
PO2 BLD: 45 MM HG (ref 80–105)
PO2 BLD: 68 MM HG (ref 80–105)
PO2 BLD: 83 MM HG (ref 80–105)
PO2 BLD: 95 MM HG (ref 80–105)
POTASSIUM SERPL-SCNC: 4.1 MMOL/L (ref 3.4–5.3)
POTASSIUM SERPL-SCNC: 4.3 MMOL/L (ref 3.4–5.3)
POTASSIUM SERPL-SCNC: 4.4 MMOL/L (ref 3.4–5.3)
PROT SERPL-MCNC: 5.7 G/DL (ref 6.4–8.3)
RBC # BLD AUTO: 2.34 10E6/UL (ref 3.8–5.2)
RBC # BLD AUTO: 2.6 10E6/UL (ref 3.8–5.2)
RBC # BLD AUTO: 2.62 10E6/UL (ref 3.8–5.2)
SAO2 % BLDA: 83 % (ref 92–100)
SAO2 % BLDA: 92 % (ref 92–100)
SAO2 % BLDA: 95 % (ref 92–100)
SAO2 % BLDA: 96 % (ref 92–100)
SODIUM SERPL-SCNC: 138 MMOL/L (ref 135–145)
SODIUM SERPL-SCNC: 138 MMOL/L (ref 135–145)
SODIUM SERPL-SCNC: 139 MMOL/L (ref 135–145)
SPECIMEN EXPIRATION DATE: NORMAL
SUBCLASSES, PERCENT: 84 %
UFH PPP CHRO-ACNC: 0.27 IU/ML
UFH PPP CHRO-ACNC: 0.31 IU/ML
UFH PPP CHRO-ACNC: 0.33 IU/ML
WBC # BLD AUTO: 10.2 10E3/UL (ref 4–11)
WBC # BLD AUTO: 11 10E3/UL (ref 4–11)
WBC # BLD AUTO: 11.3 10E3/UL (ref 4–11)

## 2024-08-27 PROCEDURE — 71045 X-RAY EXAM CHEST 1 VIEW: CPT

## 2024-08-27 PROCEDURE — 80069 RENAL FUNCTION PANEL: CPT | Performed by: STUDENT IN AN ORGANIZED HEALTH CARE EDUCATION/TRAINING PROGRAM

## 2024-08-27 PROCEDURE — 250N000013 HC RX MED GY IP 250 OP 250 PS 637

## 2024-08-27 PROCEDURE — 82533 TOTAL CORTISOL: CPT

## 2024-08-27 PROCEDURE — 94003 VENT MGMT INPAT SUBQ DAY: CPT

## 2024-08-27 PROCEDURE — 85027 COMPLETE CBC AUTOMATED: CPT | Performed by: STUDENT IN AN ORGANIZED HEALTH CARE EDUCATION/TRAINING PROGRAM

## 2024-08-27 PROCEDURE — 82310 ASSAY OF CALCIUM: CPT | Performed by: STUDENT IN AN ORGANIZED HEALTH CARE EDUCATION/TRAINING PROGRAM

## 2024-08-27 PROCEDURE — 82330 ASSAY OF CALCIUM: CPT | Performed by: STUDENT IN AN ORGANIZED HEALTH CARE EDUCATION/TRAINING PROGRAM

## 2024-08-27 PROCEDURE — 250N000009 HC RX 250: Performed by: SURGERY

## 2024-08-27 PROCEDURE — 999N000157 HC STATISTIC RCP TIME EA 10 MIN

## 2024-08-27 PROCEDURE — 250N000009 HC RX 250

## 2024-08-27 PROCEDURE — 99233 SBSQ HOSP IP/OBS HIGH 50: CPT | Performed by: STUDENT IN AN ORGANIZED HEALTH CARE EDUCATION/TRAINING PROGRAM

## 2024-08-27 PROCEDURE — 999N000253 HC STATISTIC WEANING TRIALS

## 2024-08-27 PROCEDURE — 250N000011 HC RX IP 250 OP 636: Performed by: STUDENT IN AN ORGANIZED HEALTH CARE EDUCATION/TRAINING PROGRAM

## 2024-08-27 PROCEDURE — 82248 BILIRUBIN DIRECT: CPT | Performed by: STUDENT IN AN ORGANIZED HEALTH CARE EDUCATION/TRAINING PROGRAM

## 2024-08-27 PROCEDURE — 74018 RADEX ABDOMEN 1 VIEW: CPT | Mod: 26 | Performed by: RADIOLOGY

## 2024-08-27 PROCEDURE — 90947 DIALYSIS REPEATED EVAL: CPT

## 2024-08-27 PROCEDURE — 250N000009 HC RX 250: Performed by: STUDENT IN AN ORGANIZED HEALTH CARE EDUCATION/TRAINING PROGRAM

## 2024-08-27 PROCEDURE — 86900 BLOOD TYPING SEROLOGIC ABO: CPT | Performed by: SURGERY

## 2024-08-27 PROCEDURE — 83735 ASSAY OF MAGNESIUM: CPT | Performed by: STUDENT IN AN ORGANIZED HEALTH CARE EDUCATION/TRAINING PROGRAM

## 2024-08-27 PROCEDURE — 85520 HEPARIN ASSAY: CPT | Performed by: STUDENT IN AN ORGANIZED HEALTH CARE EDUCATION/TRAINING PROGRAM

## 2024-08-27 PROCEDURE — 250N000013 HC RX MED GY IP 250 OP 250 PS 637: Performed by: STUDENT IN AN ORGANIZED HEALTH CARE EDUCATION/TRAINING PROGRAM

## 2024-08-27 PROCEDURE — 999N000259 HC STATISTIC EXTUBATION

## 2024-08-27 PROCEDURE — 97530 THERAPEUTIC ACTIVITIES: CPT | Mod: GP

## 2024-08-27 PROCEDURE — 94640 AIRWAY INHALATION TREATMENT: CPT

## 2024-08-27 PROCEDURE — 94640 AIRWAY INHALATION TREATMENT: CPT | Mod: 76

## 2024-08-27 PROCEDURE — 85520 HEPARIN ASSAY: CPT | Performed by: SURGERY

## 2024-08-27 PROCEDURE — 250N000009 HC RX 250: Performed by: NURSE PRACTITIONER

## 2024-08-27 PROCEDURE — 86923 COMPATIBILITY TEST ELECTRIC: CPT

## 2024-08-27 PROCEDURE — 200N000002 HC R&B ICU UMMC

## 2024-08-27 PROCEDURE — 82947 ASSAY GLUCOSE BLOOD QUANT: CPT

## 2024-08-27 PROCEDURE — 250N000013 HC RX MED GY IP 250 OP 250 PS 637: Performed by: PHYSICIAN ASSISTANT

## 2024-08-27 PROCEDURE — 85014 HEMATOCRIT: CPT | Performed by: STUDENT IN AN ORGANIZED HEALTH CARE EDUCATION/TRAINING PROGRAM

## 2024-08-27 PROCEDURE — 97530 THERAPEUTIC ACTIVITIES: CPT | Mod: GO

## 2024-08-27 PROCEDURE — 99291 CRITICAL CARE FIRST HOUR: CPT | Mod: GC | Performed by: INTERNAL MEDICINE

## 2024-08-27 PROCEDURE — 86901 BLOOD TYPING SEROLOGIC RH(D): CPT | Performed by: SURGERY

## 2024-08-27 PROCEDURE — 82805 BLOOD GASES W/O2 SATURATION: CPT

## 2024-08-27 PROCEDURE — 74018 RADEX ABDOMEN 1 VIEW: CPT

## 2024-08-27 PROCEDURE — 97110 THERAPEUTIC EXERCISES: CPT | Mod: GP

## 2024-08-27 PROCEDURE — 250N000013 HC RX MED GY IP 250 OP 250 PS 637: Performed by: SURGERY

## 2024-08-27 PROCEDURE — 71045 X-RAY EXAM CHEST 1 VIEW: CPT | Mod: 26 | Performed by: RADIOLOGY

## 2024-08-27 PROCEDURE — 80053 COMPREHEN METABOLIC PANEL: CPT | Performed by: STUDENT IN AN ORGANIZED HEALTH CARE EDUCATION/TRAINING PROGRAM

## 2024-08-27 RX ORDER — MONTELUKAST SODIUM 10 MG/1
10 TABLET ORAL AT BEDTIME
Status: DISCONTINUED | OUTPATIENT
Start: 2024-08-27 | End: 2024-10-01 | Stop reason: HOSPADM

## 2024-08-27 RX ORDER — CETIRIZINE HYDROCHLORIDE 10 MG/1
10 TABLET ORAL DAILY
Status: DISCONTINUED | OUTPATIENT
Start: 2024-08-28 | End: 2024-10-01 | Stop reason: HOSPADM

## 2024-08-27 RX ADMIN — ACETYLCYSTEINE 4 ML: 100 SOLUTION ORAL; RESPIRATORY (INHALATION) at 11:16

## 2024-08-27 RX ADMIN — Medication 40 MG: at 08:10

## 2024-08-27 RX ADMIN — GABAPENTIN 300 MG: 300 CAPSULE ORAL at 08:08

## 2024-08-27 RX ADMIN — Medication 6 MG: at 19:36

## 2024-08-27 RX ADMIN — LOPERAMIDE HYDROCHLORIDE 4 MG: 2 CAPSULE ORAL at 19:36

## 2024-08-27 RX ADMIN — DEXMEDETOMIDINE HYDROCHLORIDE IN SODIUM CHLORIDE 1.1 MCG/KG/HR: 4 INJECTION INTRAVENOUS at 06:06

## 2024-08-27 RX ADMIN — IPRATROPIUM BROMIDE AND ALBUTEROL SULFATE 3 ML: .5; 3 SOLUTION RESPIRATORY (INHALATION) at 20:52

## 2024-08-27 RX ADMIN — CALCIUM CHLORIDE, MAGNESIUM CHLORIDE, SODIUM CHLORIDE, SODIUM BICARBONATE, POTASSIUM CHLORIDE AND SODIUM PHOSPHATE DIBASIC DIHYDRATE 12.5 ML/KG/HR: 3.68; 3.05; 6.34; 3.09; .314; .187 INJECTION INTRAVENOUS at 22:46

## 2024-08-27 RX ADMIN — CALCIUM CHLORIDE, MAGNESIUM CHLORIDE, SODIUM CHLORIDE, SODIUM BICARBONATE, POTASSIUM CHLORIDE AND SODIUM PHOSPHATE DIBASIC DIHYDRATE 12.5 ML/KG/HR: 3.68; 3.05; 6.34; 3.09; .314; .187 INJECTION INTRAVENOUS at 13:22

## 2024-08-27 RX ADMIN — OXYCODONE HYDROCHLORIDE 2.5 MG: 5 TABLET ORAL at 17:08

## 2024-08-27 RX ADMIN — ACETAMINOPHEN 975 MG: 325 TABLET, FILM COATED ORAL at 15:22

## 2024-08-27 RX ADMIN — CALCIUM CHLORIDE, MAGNESIUM CHLORIDE, SODIUM CHLORIDE, SODIUM BICARBONATE, POTASSIUM CHLORIDE AND SODIUM PHOSPHATE DIBASIC DIHYDRATE 12.5 ML/KG/HR: 3.68; 3.05; 6.34; 3.09; .314; .187 INJECTION INTRAVENOUS at 18:03

## 2024-08-27 RX ADMIN — CALCIUM CHLORIDE, MAGNESIUM CHLORIDE, SODIUM CHLORIDE, SODIUM BICARBONATE, POTASSIUM CHLORIDE AND SODIUM PHOSPHATE DIBASIC DIHYDRATE 12.5 ML/KG/HR: 3.68; 3.05; 6.34; 3.09; .314; .187 INJECTION INTRAVENOUS at 08:19

## 2024-08-27 RX ADMIN — Medication 60 ML: at 19:36

## 2024-08-27 RX ADMIN — CETIRIZINE HYDROCHLORIDE 10 MG: 10 TABLET, FILM COATED ORAL at 08:08

## 2024-08-27 RX ADMIN — DEXMEDETOMIDINE HYDROCHLORIDE IN SODIUM CHLORIDE 0.9 MCG/KG/HR: 4 INJECTION INTRAVENOUS at 13:54

## 2024-08-27 RX ADMIN — OXYCODONE HYDROCHLORIDE 5 MG: 5 TABLET ORAL at 05:59

## 2024-08-27 RX ADMIN — OXYCODONE HYDROCHLORIDE 5 MG: 5 TABLET ORAL at 12:05

## 2024-08-27 RX ADMIN — HEPARIN SODIUM 1300 UNITS/HR: 1000 INJECTION, SOLUTION INTRAVENOUS; SUBCUTANEOUS at 09:15

## 2024-08-27 RX ADMIN — VENLAFAXINE 75 MG: 25 TABLET ORAL at 08:08

## 2024-08-27 RX ADMIN — CALCIUM CHLORIDE, MAGNESIUM CHLORIDE, SODIUM CHLORIDE, SODIUM BICARBONATE, POTASSIUM CHLORIDE AND SODIUM PHOSPHATE DIBASIC DIHYDRATE 12.5 ML/KG/HR: 3.68; 3.05; 6.34; 3.09; .314; .187 INJECTION INTRAVENOUS at 01:34

## 2024-08-27 RX ADMIN — MONTELUKAST 10 MG: 10 TABLET, FILM COATED ORAL at 21:48

## 2024-08-27 RX ADMIN — DEXMEDETOMIDINE HYDROCHLORIDE IN SODIUM CHLORIDE 1.2 MCG/KG/HR: 4 INJECTION INTRAVENOUS at 21:57

## 2024-08-27 RX ADMIN — ACETAMINOPHEN 975 MG: 325 TABLET, FILM COATED ORAL at 08:07

## 2024-08-27 RX ADMIN — LOPERAMIDE HYDROCHLORIDE 4 MG: 2 CAPSULE ORAL at 01:29

## 2024-08-27 RX ADMIN — Medication 5 MG: at 21:48

## 2024-08-27 RX ADMIN — ACYCLOVIR 400 MG: 200 SUSPENSION ORAL at 08:09

## 2024-08-27 RX ADMIN — VENLAFAXINE 75 MG: 25 TABLET ORAL at 19:36

## 2024-08-27 RX ADMIN — IPRATROPIUM BROMIDE AND ALBUTEROL SULFATE 3 ML: .5; 3 SOLUTION RESPIRATORY (INHALATION) at 16:52

## 2024-08-27 RX ADMIN — DEXMEDETOMIDINE HYDROCHLORIDE IN SODIUM CHLORIDE 1.1 MCG/KG/HR: 4 INJECTION INTRAVENOUS at 08:32

## 2024-08-27 RX ADMIN — Medication 1 TABLET: at 08:08

## 2024-08-27 RX ADMIN — IPRATROPIUM BROMIDE AND ALBUTEROL SULFATE 3 ML: .5; 3 SOLUTION RESPIRATORY (INHALATION) at 11:16

## 2024-08-27 RX ADMIN — AMITRIPTYLINE HYDROCHLORIDE 50 MG: 50 TABLET, FILM COATED ORAL at 21:48

## 2024-08-27 RX ADMIN — Medication 6 MG: at 08:08

## 2024-08-27 RX ADMIN — BUDESONIDE 1 MG: 1 INHALANT ORAL at 07:42

## 2024-08-27 RX ADMIN — ACETYLCYSTEINE 4 ML: 100 SOLUTION ORAL; RESPIRATORY (INHALATION) at 07:42

## 2024-08-27 RX ADMIN — IPRATROPIUM BROMIDE AND ALBUTEROL SULFATE 3 ML: .5; 3 SOLUTION RESPIRATORY (INHALATION) at 07:42

## 2024-08-27 RX ADMIN — GABAPENTIN 300 MG: 300 CAPSULE ORAL at 15:19

## 2024-08-27 RX ADMIN — ACETYLCYSTEINE 4 ML: 100 SOLUTION ORAL; RESPIRATORY (INHALATION) at 20:52

## 2024-08-27 RX ADMIN — ACYCLOVIR 400 MG: 200 SUSPENSION ORAL at 15:20

## 2024-08-27 RX ADMIN — Medication 6 MG: at 01:29

## 2024-08-27 RX ADMIN — LOPERAMIDE HYDROCHLORIDE 4 MG: 2 CAPSULE ORAL at 08:08

## 2024-08-27 RX ADMIN — Medication 60 ML: at 08:17

## 2024-08-27 RX ADMIN — ATORVASTATIN CALCIUM 10 MG: 10 TABLET, FILM COATED ORAL at 08:08

## 2024-08-27 RX ADMIN — LEVOTHYROXINE SODIUM 25 MCG: 0.03 TABLET ORAL at 08:08

## 2024-08-27 RX ADMIN — DEXMEDETOMIDINE HYDROCHLORIDE IN SODIUM CHLORIDE 1 MCG/KG/HR: 4 INJECTION INTRAVENOUS at 18:46

## 2024-08-27 RX ADMIN — CHLORHEXIDINE GLUCONATE 0.12% ORAL RINSE 15 ML: 1.2 LIQUID ORAL at 08:08

## 2024-08-27 RX ADMIN — Medication 6 MG: at 15:19

## 2024-08-27 RX ADMIN — ACETAMINOPHEN 975 MG: 325 TABLET, FILM COATED ORAL at 00:34

## 2024-08-27 RX ADMIN — GABAPENTIN 300 MG: 300 CAPSULE ORAL at 19:36

## 2024-08-27 RX ADMIN — CALCIUM CHLORIDE, MAGNESIUM CHLORIDE, SODIUM CHLORIDE, SODIUM BICARBONATE, POTASSIUM CHLORIDE AND SODIUM PHOSPHATE DIBASIC DIHYDRATE: 3.68; 3.05; 6.34; 3.09; .314; .187 INJECTION INTRAVENOUS at 18:03

## 2024-08-27 RX ADMIN — LOPERAMIDE HYDROCHLORIDE 4 MG: 2 CAPSULE ORAL at 15:19

## 2024-08-27 RX ADMIN — DEXMEDETOMIDINE HYDROCHLORIDE IN SODIUM CHLORIDE 1.1 MCG/KG/HR: 4 INJECTION INTRAVENOUS at 01:15

## 2024-08-27 RX ADMIN — NOREPINEPHRINE BITARTRATE 0.02 MCG/KG/MIN: 0.06 INJECTION, SOLUTION INTRAVENOUS at 01:37

## 2024-08-27 RX ADMIN — OXYCODONE HYDROCHLORIDE 5 MG: 5 TABLET ORAL at 00:34

## 2024-08-27 RX ADMIN — ACETYLCYSTEINE 4 ML: 100 SOLUTION ORAL; RESPIRATORY (INHALATION) at 16:52

## 2024-08-27 ASSESSMENT — ACTIVITIES OF DAILY LIVING (ADL)
ADLS_ACUITY_SCORE: 59
ADLS_ACUITY_SCORE: 55
ADLS_ACUITY_SCORE: 59
ADLS_ACUITY_SCORE: 63
ADLS_ACUITY_SCORE: 59
ADLS_ACUITY_SCORE: 55
ADLS_ACUITY_SCORE: 63
ADLS_ACUITY_SCORE: 59
ADLS_ACUITY_SCORE: 55
ADLS_ACUITY_SCORE: 59
ADLS_ACUITY_SCORE: 55
ADLS_ACUITY_SCORE: 63
ADLS_ACUITY_SCORE: 55

## 2024-08-27 NOTE — PROGRESS NOTES
CRRT STATUS NOTE    DATA:  Time:  0545  Pressures WNL:  YES  Filter Status:  WDL    Problems Reported/Alarms Noted:  none    Supplies Present:  YES    ASSESSMENT:    Patient Net Fluid Balance:  -355.94 net since midnight       Intake/Output Summary (Last 24 hours) at 8/27/2024 0545  Last data filed at 8/27/2024 0500  Gross per 24 hour   Intake 2903.21 ml   Output 4480 ml   Net -1576.79 ml       Vital Signs: Temp:  [96.4  F (35.8  C)-100  F (37.8  C)] 98.6  F (37  C)  Pulse:  [] 73  Resp:  [13-28] 19  MAP:  [60 mmHg-109 mmHg] 60 mmHg  Arterial Line BP: (103-171)/(38-76) 107/43  FiO2 (%):  [40 %] 40 %  SpO2:  [92 %-100 %] 100 %       Most Recent BMP's:  Recent Labs   Lab Test 08/27/24 0323 08/26/24 2003 08/26/24 1317 08/26/24  0343 08/25/24 1953 08/25/24  1308    138 140 137 136 136   POTASSIUM 4.1 4.4 4.1 3.9 4.3 4.1   CHLORIDE 105 105 104 104 104 103   CO2 23 24 23 23 23 23   BUN 18.5 19.5 21.1* 19.8 18.1 20.7*   CR 1.11* 1.16* 1.12* 1.13* 1.18* 1.21*   ANIONGAP 10 9 13 10 9 10   QUAN 8.3* 8.1* 8.3* 8.3* 8.3* 8.1*     Most Recent CBC's:  Recent Labs   Lab Test 08/27/24 0323 08/26/24 2003 08/26/24 1317 08/26/24  0343   WBC 11.0 11.6* 12.5* 12.1*   HGB 7.0* 7.0* 7.6* 7.4*   MCV 99 99 99 99    183 204 194     Most Recent ABG's:  Recent Labs   Lab Test 08/27/24 0336 08/26/24 2003 08/26/24  1318   PH 7.35 7.36 7.37   PO2 95 72* 95   PCO2 45 45 44   HCO3 25 25 25   THONG -0.8 -0.5 -0.1       Goals of Therapy:  0-50 ml/hr net negative, as long as MAP>=65    INTERVENTIONS:   Charting reviewed. Rounding completed. Treatment plan discussed with bedside nurse.     PLAN:  Continue with current plan of care please contact CRRT resource with any questions or concerns via [facility].

## 2024-08-27 NOTE — PLAN OF CARE
Goal Outcome Evaluation:           Overall Patient Progress: no changeOverall Patient Progress: no change    Outcome Evaluation: BP labilie with hypertension after coughing episiodes and hypotension when Pt falls asleep.   Vent PIP elevated to the 40s after coughing spells and takes a long time to drop back down to the teens. Pts O2 sats often drop to the 80s briefly after her coughing spells. Ordered CRRT net fluid removal goal met this shift. Mother phoned hospital for Pt update near start of shift.    D/I:  Neuro:  Spontaneously opens eyes.  Wiggles fingers and toes to request.  Tmax 37.3 Esophageal.  Blood warmer on CRRT is set @ 35 degrees Celsius to prevent a higher fever.  CV:  SR/ST 's with a rare PVC.  Levo titrated off and on frequently (between off and 0.04 mcg/kg/min ) to keep MAP>65.  Pt hypertensive after coughing and hypotensive when she falls asleep.  No blood products or boluses given this shift.  Lytes Replacement:  None  Pulm:   Vent Mode: CMV 40/18/420/5.  Episodes of PIP in the 40's @ times that take a while to drop back down to the 10s.  Pt frequently overbreathes vent in the 20's.  Episodes of harsh coughing spells that drop O2 Sat down to the 80's and cause hypertension that both are slow to recover from.  GI:  NJ tube feeding @ goal rate of  50 mL/hr with standard FWF of 30 ml Q 4 hrs.  Rectal tube in place with 250 ml watery diarrhea this shift.  :  Anuric. Pt was straight cathed on previous shift.  CRRT net fluid removal  yesterday over 24 hrs ending @ midnight was 1,344 ml ( ~ averaging 56 ml/hr ) net fluid removal.  CRRT net fluid removal today so far since midnight is 445 ml ( ~ averaging 56 ml/hr ) net fluid removal  Skin  Forehead abrasion and left foot/ankle bruised from pre-hospital fall.  Both Lip corners have scabbed/lesioned areas that bleed slightly @ times.  Right groin & Right internal jugular old ECMO sites are healing  Scattered bruising. Faded LUQ abdominal  scar.  Blanchable redness on sacrum, coccyx and left heel with protective Mepilex dressings in place.   Drips:   Levophed @ 0.02 mcg/kg/min.  Precedex @ 1.2 mcg/kg/hr  Heparin @ 1,300 units/hr   Labs:  Hgb= 7.0 @ 2200 & 7.0 @ 0400. MICU resident on call ( Carlos Cerna ) notified. Call if < 7.0  Xa= 0.14 @ 2200  > Heparin increased by 100 unit/hr >  Xa = 0.27 @ 0400 in ordered Xa  goal range of 0.2 - 0.4.  Next Xa level due @ 1000.    A/P:  Bladder stimulator  that was mailed by Pt's mother arrived on previous shift.  Bladder stimulator  charged this shift and then used to charge Pt's internal bladder neuro stimulator battery to 100%.   Pt's internal neuro stimulator battery,  battery , communicator battery and recharger battery are now all charged to 100%. Confirmed that internal neuro stimulator MRI mode is currently off ( Deactivated ) to save battery power.  MRI mode will need to be activated ( turned on )  just prior to any future MRI if needed.  Titrate Levo Gtt as needed to keep MAP > 65.  Plan to get Pt up in chair again today on day shift. Also plan to pt Pt on Pressure support again on day shift today as tolerated.

## 2024-08-27 NOTE — PLAN OF CARE
Major Shift Events:  Patient extubated, currently on 7-8L via oxymask.  Alert to self and place.  Endorses occasional back/neck pain, relieved with scheduled pain meds.  Levo on this morning to maintain map >65, off since extubation.  Up in the chair x2, worked with therapies.  Failed bedside swallow.  TF at goal, bladder scanned for 16mL.  Rectal tube with 550 mL out this shift.    Plan: Wean respiratory support as able.  Maintain precedex drip for anxiety.  For vital signs and complete assessments, please see documentation flowsheets.

## 2024-08-27 NOTE — PROGRESS NOTES
CRRT STATUS NOTE    DATA:  Time:  1816  Pressures WNL:  YES  Filter Status:  WDL    Problems Reported/Alarms Noted:  None    Supplies Present:  YES    ASSESSMENT:  Patient Net Fluid Balance:  -603 ml since 0000    Vital Signs:  Temp: 98.5  F (36.9  C) Temp src: Axillary BP: 139/64 Pulse: 107   Resp: 24 SpO2: 90 % O2 Device: Oxi Plus Oxygen Delivery: 7 LPM          Labs:   Recent Labs   Lab 08/27/24  1401 08/27/24  1359 08/27/24  0823 08/27/24  0336 08/27/24  0323 08/26/24  2324 08/26/24 2003 08/26/24 0348 08/26/24 0343   WBC  --  10.2  --   --  11.0  --  11.6*   < > 12.1*   HGB  --  7.8*  --   --  7.0*  --  7.0*   < > 7.4*   MCV  --  99  --   --  99  --  99   < > 99   PLT  --  220  --   --  197  --  183   < > 194   NA  --  139  --   --  138  --  138   < > 137   POTASSIUM  --  4.4  --   --  4.1  --  4.4   < > 3.9   CHLORIDE  --  103  --   --  105  --  105   < > 104   CO2  --  25  --   --  23  --  24   < > 23   BUN  --  19.0  --   --  18.5  --  19.5   < > 19.8   CR  --  1.04*  --   --  1.11*  --  1.16*   < > 1.13*   ANIONGAP  --  11  --   --  10  --  9   < > 10   QUAN  --  8.6*  --   --  8.3*  --  8.1*   < > 8.3*   * 136* 159*   < > 161*   < > 152*  157*   < > 175*   ALBUMIN  --  3.3*  --   --  3.0*  --  2.9*   < > 2.9*   PROTTOTAL  --   --   --   --  5.7*  --   --   --  5.8*   BILITOTAL  --   --   --   --  <0.2  --   --   --  <0.2   ALKPHOS  --   --   --   --  94  --   --   --  87   ALT  --   --   --   --  15  --   --   --  15   AST  --   --   --   --  24  --   --   --  27    < > = values in this interval not displayed.                  Goals of Therapy:  changed to I=O d/t concerns of intravascular volume depletion.    INTERVENTIONS:   Review flow sheets and discuss care plan with bedside RN and nephrology team.    PLAN:  Continue fluid removal goals as able. Check filter daily and change filter Q72 hours and PRN. Please contact the CRRT Resource RN on Vocera with questions/concerns.

## 2024-08-27 NOTE — PROGRESS NOTES
[facility], [place], Procedure Note          Extubation:       Massiel Flaherty  MRN# [id number]   August 27, 2024         Patient extubated at: 1:10 PM   Supplemental Oxygen: Via nasal cannula at 4 liters per minute   Cough: The cough is strong   Secretion Mode: Able to clear   Secretion Amount: Small amount, moderately thin and clear in color   Respiratory Exam:: Breath sounds: equal and clear     Location: bilaterally   Skin Exam:: Patient color: natural   Patient Status: Currently appears comfortable   Arterial Blood Gasses: pH: 7.35     pO2: 95     pCO2: 75     HOC3: 25         Recorded by Beverley Fajardo, RT

## 2024-08-27 NOTE — PROGRESS NOTES
MEDICAL ICU PROGRESS NOTE  08/27/2024      Date of Service (when I saw the patient): 08/27/2024    ASSESSMENT: Massiel Flaherty is a 53 year old female with PMH Anxiety/depression, fibromyalgia, c/f nonepileptic seizures not on AEDs, hypothyroidism, asthma, HLD, MURIEL on CPAP, expressive aphasia, hypertension  who was admitted on 8/3/2024 for fatigue, fever and dyspnea and intubated 8/6/24 at OSH for ARDS, proned and paralyzed without improvement. Transferred to [facility] 8/8/24, hypoxic with high plateaus/peaks and placed on VV ECMO and CRRT. Decannulated from VV ECMO 8/18 and transferred to MICU 8/26/24 for ongoing management.     CHANGES and MAJOR THINGS TODAY:   - Plan for extubation today given tolerating PST yesterday and following commands  - Initiated ILD workup given unclear etiology of ILD, labs still in process  - Assess swallow after extubation and advance diet as able    PLAN:    Neuro:  # Pain and sedation  # Acute pain  # Sedation and analgesia for vent compliance   # Concern for ICU delirium   # Hx Fibromyalgia   # Hx myalgic encephalomyelitis   # Hx anxiety/depression  - Monitor neurological status. Delirium preventions and precautions.   - Pain: Scheduled: tylenol, oxycodone 5mg q 6hr (decreased from q 4hr)   PRN: tylenol, oxycodone  - Sedation plan:  Gtt: Precedex gtt - wean as able   - Restart PTA clonidine after extubation (0.1 mg)  - Gabapentin 300mg TID   - Seroquel 25mg BID PRN  - PTA Venlafaxine and Amitriptyline   - Delirium precautions, optimize environment to regulate day/night normalcy     # Hx spells with staring and BUE posturing previously described as nonepileptic seizures   # Hx of GTC seizures and myoclonic epilepsy, weaned off AEDs   # Diffuse cerebral edema - improved  - Sodium goals liberalized to 140-145  - 8/21: MRI Brain: no acute intracranial pathology. No findings to suggest anoxic brain injury.     Pulmonary:  # Acute hypoxic respiratory failure c/b ARDS   # s/p VV ECMO 8/8  - 8/18   # Hx CAP  # Asthma  # MURIEL on home CPAP   FiO2 (%): 40 %, Resp: 17, Inspiratory Pressure (cm H2O) (Drager Jessie): 25, Vent Mode: CMV/AC, Resp Rate (Set): 18 breaths/min, Tidal Volume (Set, mL): 420 mL, PEEP (cm H2O): 5 cmH2O, Pressure Support (cm H2O): 8 cmH2O, Resp Rate (Set): 18 breaths/min, Tidal Volume (Set, mL): 420 mL, PEEP (cm H2O): 5 cmH2O  PFT dated 9/8/2020 demonstrated normal spirometry with mild diffusion impairment and mild restriction (FEV1/FVC 80%, FEV1 2.59, DLCO 40%). CT abdomen chest 3/9/2020 demonstrated scarring and fibrosis bilaterally which correlated with the decreased DLCO. The patient also has a history of MURIEL on CPAP therapy as currently managed by her provider in [place]. Unclear what triggered ARDS or why she has had such severe hospital course but will further investigate ILD.  - ILD w/u aldolase, EVELIO, RF, CCP, ANCA, MPO, SSA Ro and La, Scleroderma antibody, Radha 1,  hypersensity pneumonitis, IGG subclasses,  aspergillus, blastomyces, histoplasma   - SpO2 goals >92%, PaO2 goals >60   - PTA singular at bedtime  - Scheduled pulmicort, mucomyst, and duonebs   - Wean vent as able  - PST today   - VAP bundle while intubated   - CXR stable    Cardiovascular:  # Hyperlipidemia  # Hypotension  # Hx HTN  - MAP goal >65, SBP goals >110  - NE for pressor support, wean as able and also will try to reduce amount pulling on CRRT  - Restarted PTA atorvastatin  - hold PTA clonidine but plant to restart as above    GI/Nutrition:  # Moderate protein calorie malnutrition  # Non-infectious Diarrhea  # GERD   - Protonix (home medication)   - Nutrition consulted, appreciate recs  - Diet: TF via NJ, assess swallow after extubation  - Hold bowel regimen d/t loose stools  - Change suspension meds to other form as able  - Continue scheduled multivitamin, banatrol, prosource packet, and loperamide  - Increase frequency of tincture of opium for high stool output  - Rectal tube, continues with high  output. Keep today.     Renal/Fluids/Electrolytes:  # Acute kidney injury  Baseline Cr approx 0.6. Pt was anuric here but now making some urine, likely prerenal due to shock.  - Continue CRRT; fluid goal net negative -500 ml to -1000 ml   - Heparinized CRRT circuit, low intensity protocol. Last clotted 8/24 PM  - Strict I&Os   - Bladder scan q shift  - Avoid nephrotoxins as able     Endocrine:  # Steroid and stress induced hyperglycemia  # Hypothyroidism   - Goal to keep BG < 180 for optimal wound healing.   - Continue High SSI  - Continue PTA synthroid    ID:  # Leukocytosis  # Hx CAP  - Continue to monitor fever curve and inflammatory markers as appropriate  - ID consulted, appreciate recs. Recommending monitoring off antimicrobials.     # HSV-1  Mouth sores present, which could have viral etiology. 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 Monitor and trend.   - Heparinize CRRT circuit      Musculoskeletal/Rheum:  # Alopecia  - Hold PTA hydroxychloroquine     # Weakness and deconditioning of critical illness  # Left ankle injury pre-hospitalization    - Physical and occupational therapy when able.     Skin:  # BLE scattered ecchymosis  # Left toe duskiness   - Bilateral lower leg ecchymosis   - WOC consult   - Ecchymosis to left foot, most noticeable to 3rd and 4th toes    General Cares/Prophylaxis:    DVT Prophylaxis: Heparin through CRRT circuit  GI Prophylaxis: PPI  Restraints: no  Code Status: Full    Lines/tubes/drains:  - ETT (8/8)  - NJ (8/9)  - LIJ CVC (8/8)  - Radial a-line (8/7)  - PIV x3  - Rectal tube (8/17)  - Tunneled HD line (8/23)    Disposition:  - Medical ICU     Patient seen and findings/plan discussed with medical ICU staff, Dr. De Anda.    Gaudencio De Souza MD    Attending note:  Patient seen, examined and discussed with the Resident physician. All data  "reviewed including vital signs, medications, laboratory studies and imaging.  I agree with the assessment and plan as outlined in the above note.  The patient remains critically ill with acute respiratory failure, CHACORTA on CRRT, seizures, and ARDS.  I personally adjusted the ventilator to treat the acute respiratory failure and followed volume status and electrolytes in the setting of CHACORTA.  Minimal ventilator settings, tolerating SBT.  Will assess for extubation in am, follow volume status closely.    Total Critical care time excluding time for  teaching and procedures was 40 minutes.    Radha De Anda MD  [phone]    Clinically Significant Risk Factors              # Hypoalbuminemia: Lowest albumin = 2.2 g/dL at 8/10/2024  9:47 AM, will monitor as appropriate               # Obesity: Estimated body mass index is 32.26 kg/m  as calculated from the following:    Height as of this encounter: 1.575 m (5' 2\").    Weight as of this encounter: 80 kg (176 lb 5.9 oz).   # Moderate Malnutrition: based on nutrition assessment    # Financial/Environmental Concerns: none                        ====================================  INTERVAL HISTORY:   Doing well this morning, sitting in a chair, follows commands to take deep breath but does not want to cough due to discomfort    OBJECTIVE:   1. VITAL SIGNS:   Temp:  [96.4  F (35.8  C)-100  F (37.8  C)] 98.2  F (36.8  C)  Pulse:  [] 75  Resp:  [13-28] 17  MAP:  [60 mmHg-109 mmHg] 69 mmHg  Arterial Line BP: (104-171)/(38-76) 122/48  FiO2 (%):  [40 %] 40 %  SpO2:  [92 %-100 %] 100 %  FiO2 (%): 40 %, Resp: 17, Inspiratory Pressure (cm H2O) (Drager Jessie): 25, Vent Mode: CMV/AC, Resp Rate (Set): 18 breaths/min, Tidal Volume (Set, mL): 420 mL, PEEP (cm H2O): 5 cmH2O, Pressure Support (cm H2O): 8 cmH2O, Resp Rate (Set): 18 breaths/min, Tidal Volume (Set, mL): 420 mL, PEEP (cm H2O): 5 cmH2O  2. INTAKE/ OUTPUT:   I/O last 3 completed shifts:  In: 2895.74 [I.V.:720.74; NG/GT:975]  Out: " 4086.1 [Urine:550; Other:2786.1; Stool:750]    3. PHYSICAL EXAMINATION:  General: Intubated. Awake sitting in chair. Answers questions by nodding and shaking head.   Neuro: Follows commands; wiggles toes in BLE. NAD.   HEENT: Normocephalic, atraumatic. PERRL, and nonicteric.   CV: RRR on monitor, S1S2, all extremities well perfused   Respiratory: Normal respiratory effort on PC RR 18, FiO2 40% , PEEP 8, inspiratory pressure 25, inspiratory time 0.85 , equal chest rise b/l   GI: Abdomen soft and non-tender to light palpation. Non-distended   MSK: See neuro.   Skin: Scattered contusions on L foot.     4. LABS:   Arterial Blood Gases   Recent Labs   Lab 08/27/24 0336 08/26/24 2003 08/26/24  1318 08/26/24  0850   PH 7.35 7.36 7.37 7.36   PCO2 45 45 44 45   PO2 95 72* 95 89   HCO3 25 25 25 26     Complete Blood Count   Recent Labs   Lab 08/27/24 0323 08/26/24 2003 08/26/24  1317 08/26/24  0343   WBC 11.0 11.6* 12.5* 12.1*   HGB 7.0* 7.0* 7.6* 7.4*    183 204 194     Basic Metabolic Panel  Recent Labs   Lab 08/27/24 0336 08/27/24 0323 08/26/24  2324 08/26/24 2003 08/26/24  1325 08/26/24  1317 08/26/24  0348 08/26/24  0343   NA  --  138  --  138  --  140  --  137   POTASSIUM  --  4.1  --  4.4  --  4.1  --  3.9   CHLORIDE  --  105  --  105  --  104  --  104   CO2  --  23  --  24  --  23  --  23   BUN  --  18.5  --  19.5  --  21.1*  --  19.8   CR  --  1.11*  --  1.16*  --  1.12*  --  1.13*   * 161* 148* 152*  157*   < > 156*   < > 175*    < > = values in this interval not displayed.     Liver Function Tests  Recent Labs   Lab 08/27/24  0323 08/26/24 2003 08/26/24  1317 08/26/24  0343 08/25/24  1308 08/25/24  0320 08/24/24  1158 08/24/24  0414   AST 24  --   --  27  --  27  --  27   ALT 15  --   --  15  --  13  --  14   ALKPHOS 94  --   --  87  --  84  --  78   BILITOTAL <0.2  --   --  <0.2  --  0.2  --  0.2   ALBUMIN 3.0* 2.9* 2.9* 2.9*   < > 2.8*   < > 2.7*    < > = values in this interval not  displayed.     Coagulation Profile  No lab results found in last 7 days.    5. RADIOLOGY:   No results found for this or any previous visit (from the past 24 hour(s)).

## 2024-08-27 NOTE — PROGRESS NOTES
"  Nephrology Progress Note  08/27/2024         Assessment & Recommendations:   Mrs Flaherty is a  53 yof w/ Anxiety, depression, fibromyalgia, hypothyroidism, asthma, HLD, MURIEL on CPAP, expressive aphasia, and hypertension who was admitted to an OSH with community acquired pneumonia on 8/3 s/p intubation.  Found to have severe ARDS requiring paralysis and proning, transferred here on 8/8 for management and initiated on CKRT for severe acidemia in the setting of oligoanuric CHACORTA.  Started CRRT on 8/9 for volume management.     # anuric CHACORTA, suspected ATN in setting of shock, ARDS  Continue CRRT (with heparin dosed by Xa level protocol due to filter clotting). Continue 4K baths, standard 25ml/kg/hr clearance. Primary team concerned for intravascular volume depletion impending, so reduced I/Os goal to I=Os after net removing fluid.    # hypervolemia - UF as above    # electrolytes  No current issues (mild hypophos ok, calcium corrects upward, mild hypermag ok).     Recommendations were communicated to primary team via this note.     Danny Myrick MD   Division of Nephrology and Hypertension  [facility]  Vocera Web Console      Interval History :   Nursing and provider notes from last 24 hours reviewed.  In the last 24 hours Massiel Flaherty has been net negative on CRRT 1.5L. Nurse confirms that UOP yesterday was straight cath after several days.    Review of Systems:   Unable to obtain due to intubation     Physical Exam:   I/O last 3 completed shifts:  In: 2555.06 [I.V.:725.06; NG/GT:680]  Out: 3801.4 [Other:3001.4; Stool:800]   /64   Pulse 108   Temp 98.4  F (36.9  C)   Resp 21   Ht 1.575 m (5' 2\")   Wt 80 kg (176 lb 5.9 oz)   SpO2 100%   BMI 32.26 kg/m       GENERAL APPEARANCE: NAD  EYES: no scleral icterus  PULM: lungs clear anteriorly, intubated and mechanically ventilated   CV: regular rhythm, normal rate, no rub      -edema - 1+ LE edema bilat   GI: soft, ND  INTEGUMENT: no rash on exposed " skin  NEURO: opens eyes to voice and tracks   Access is Netccm temp line 8/19.     Labs:   All labs reviewed by me  Electrolytes/Renal -   Recent Labs   Lab Test 08/27/24  1401 08/27/24  1359 08/27/24  0823 08/27/24  0336 08/27/24  0323 08/26/24  2324 08/26/24 2003   NA  --  139  --   --  138  --  138   POTASSIUM  --  4.4  --   --  4.1  --  4.4   CHLORIDE  --  103  --   --  105  --  105   CO2  --  25  --   --  23  --  24   BUN  --  19.0  --   --  18.5  --  19.5   CR  --  1.04*  --   --  1.11*  --  1.16*   * 136* 159*   < > 161*   < > 152*  157*   QUAN  --  8.6*  --   --  8.3*  --  8.1*   MAG  --  2.4*  --   --  2.4*  --  2.3   PHOS  --  4.5  --   --  4.2  --  4.3    < > = values in this interval not displayed.       CBC -   Recent Labs   Lab Test 08/27/24  1359 08/27/24  0323 08/26/24 2003   WBC 10.2 11.0 11.6*   HGB 7.8* 7.0* 7.0*    197 183       LFTs -   Recent Labs   Lab Test 08/27/24  1359 08/27/24  0323 08/26/24 2003 08/26/24  1317 08/26/24  0343 08/25/24  1308 08/25/24  0320   ALKPHOS  --  94  --   --  87  --  84   BILITOTAL  --  <0.2  --   --  <0.2  --  0.2   ALT  --  15  --   --  15  --  13   AST  --  24  --   --  27 --  27   PROTTOTAL  --  5.7*  --   --  5.8*  --  5.5*   ALBUMIN 3.3* 3.0* 2.9*   < > 2.9*   < > 2.8*    < > = values in this interval not displayed.       Iron Panel - No lab results found.      Imaging:  All imaging studies reviewed by me.     Current Medications:  Current Facility-Administered Medications   Medication Dose Route Frequency Provider Last Rate Last Admin    acetaminophen (TYLENOL) tablet 975 mg  975 mg Oral or Feeding Tube Q8H Andres Payne PA-C   975 mg at 08/27/24 1522    acetylcysteine (MUCOMYST) 10 % nebulizer solution 4 mL  4 mL Nebulization 4x Daily Barry Hatch MD   4 mL at 08/27/24 1116    amitriptyline (ELAVIL) tablet 50 mg  50 mg Oral or Feeding Tube At Bedtime Barry Hatch MD   50 mg at 08/26/24 2142    atorvastatin (LIPITOR) tablet 10  mg  10 mg Oral or Feeding Tube Daily Francisco Welsh MD   10 mg at 08/27/24 0808    budesonide (PULMICORT) neb solution 1 mg  1 mg Nebulization Daily Andres Payne PA-C   1 mg at 08/27/24 0742    [START ON 8/28/2024] cetirizine (zyrTEC) tablet 10 mg  10 mg Oral or Feeding Tube Daily Barry Hatch MD        fiber modular (BANATROL TF) packet 1 packet  1 packet Per Feeding Tube TID Cal Lisa MD   1 packet at 08/27/24 1520    gabapentin (NEURONTIN) capsule 300 mg  300 mg Oral or Feeding Tube TID Barry Hatch MD   300 mg at 08/27/24 1519    insulin aspart (NovoLOG) injection (RAPID ACTING)  1-12 Units Subcutaneous Q4H Aniceto Adame MD   1 Units at 08/27/24 0823    ipratropium - albuterol 0.5 mg/2.5 mg/3 mL (DUONEB) neb solution 3 mL  3 mL Nebulization 4x daily Barry Hatch MD   3 mL at 08/27/24 1116    levothyroxine (SYNTHROID/LEVOTHROID) tablet 25 mcg  25 mcg Per Feeding Tube QAM AC Giovanny Guidry PA-C   25 mcg at 08/27/24 0808    loperamide (IMODIUM) capsule 4 mg  4 mg Oral or Feeding Tube Q6H Barry Hatch MD   4 mg at 08/27/24 1519    montelukast (SINGULAIR) tablet 10 mg  10 mg Oral or Feeding Tube At Bedtime Barry Hatch MD        multivitamin RENAL (RENAVITE RX/NEPHROVITE) tablet 1 tablet  1 tablet Oral or Feeding Tube Daily Giovanny Guidry PA-C   1 tablet at 08/27/24 0808    opium tincture 10 MG/ML (1%) liquid 6 mg  6 mg Oral or Feeding Tube Q6H Francisco Welsh MD   6 mg at 08/27/24 1519    oxyCODONE (ROXICODONE) tablet 5 mg  5 mg Oral or Feeding Tube Q6H Andres Payne PA-C   5 mg at 08/27/24 1205    pantoprazole (PROTONIX) 2 mg/mL suspension 40 mg  40 mg Per Feeding Tube QAM AC Barry Hatch MD   40 mg at 08/27/24 0810    Prosource TF20 ENfit Compatibl EN LIQD (PROSOURCE TF20) packet 60 mL  1 packet Per Feeding Tube BID Her-Giovanny Spence PA-C   60 mL at 08/27/24 0817    venlafaxine (EFFEXOR) tablet 75 mg  75 mg Oral or Feeding Tube BID Barry Hatch  MD Vinicio   75 mg at 08/27/24 0808     Current Facility-Administered Medications   Medication Dose Route Frequency Provider Last Rate Last Admin    dexmedeTOMIDine (PRECEDEX) 4 mcg/mL in sodium chloride 0.9 % 100 mL infusion  0.1-1.2 mcg/kg/hr (Dosing Weight) Intravenous Continuous de Tiara Macedo, CNP 20 mL/hr at 08/27/24 1400 0.9 mcg/kg/hr at 08/27/24 1400    dextrose 10% infusion   Intravenous Continuous PRN Giovanny Guidry PA-C        dialysate for CVVHD & CVVHDF (Phoxillum BK4/2.5)  12.5 mL/kg/hr CRRT Continuous Danny Myrick MD 1,000 mL/hr at 08/27/24 1322 12.5 mL/kg/hr at 08/27/24 1322    heparin (porcine) 20,000 units in 20 mL ANTICOAGULANT infusion (syringe from pharmacy)  500-1,500 Units/hr CRRT Continuous Danny Myrick MD 1.3 mL/hr at 08/27/24 0915 1,300 Units/hr at 08/27/24 0915    norepinephrine (LEVOPHED) 16 mg in  mL infusion MAX CONC CENTRAL LINE  0.01-0.6 mcg/kg/min (Dosing Weight) Intravenous Continuous Elier Hutchins MD   Stopped at 08/27/24 1120    POST-filter replacement solution for CVVHD & CVVHDF (Phoxillum BK4/2.5)   CRRT Continuous Danny Myrick  mL/hr at 08/26/24 1510 New Bag at 08/26/24 1510    PRE-filter replacement solution for CVVHD & CVVHDF (Phoxillum BK4/2.5)  12.5 mL/kg/hr CRRT Continuous Danny Myrick MD 1,000 mL/hr at 08/27/24 1322 12.5 mL/kg/hr at 08/27/24 1322     Danny Myrick MD

## 2024-08-28 ENCOUNTER — APPOINTMENT (OUTPATIENT)
Dept: PHYSICAL THERAPY | Facility: CLINIC | Age: 54
DRG: 003 | End: 2024-08-28
Attending: INTERNAL MEDICINE
Payer: MEDICAID

## 2024-08-28 ENCOUNTER — APPOINTMENT (OUTPATIENT)
Dept: GENERAL RADIOLOGY | Facility: CLINIC | Age: 54
DRG: 003 | End: 2024-08-28
Attending: INTERNAL MEDICINE
Payer: MEDICAID

## 2024-08-28 ENCOUNTER — APPOINTMENT (OUTPATIENT)
Dept: OCCUPATIONAL THERAPY | Facility: CLINIC | Age: 54
DRG: 003 | End: 2024-08-28
Attending: INTERNAL MEDICINE
Payer: MEDICAID

## 2024-08-28 LAB
ACANTHOCYTES BLD QL SMEAR: ABNORMAL
ALBUMIN SERPL BCG-MCNC: 3.2 G/DL (ref 3.5–5.2)
ALBUMIN SERPL BCG-MCNC: 3.2 G/DL (ref 3.5–5.2)
ALBUMIN SERPL BCG-MCNC: 3.5 G/DL (ref 3.5–5.2)
ALDOLASE SERPL-CCNC: 6 U/L
ALLEN'S TEST: ABNORMAL
ALP SERPL-CCNC: 95 U/L (ref 40–150)
ALT SERPL W P-5'-P-CCNC: 20 U/L (ref 0–50)
ANION GAP SERPL CALCULATED.3IONS-SCNC: 10 MMOL/L (ref 7–15)
ANION GAP SERPL CALCULATED.3IONS-SCNC: 10 MMOL/L (ref 7–15)
ANION GAP SERPL CALCULATED.3IONS-SCNC: 13 MMOL/L (ref 7–15)
AST SERPL W P-5'-P-CCNC: 31 U/L (ref 0–45)
AUER BODIES BLD QL SMEAR: ABNORMAL
BACTERIA BLD CULT: NO GROWTH
BASE EXCESS BLDA CALC-SCNC: -0.3 MMOL/L (ref -3–3)
BASE EXCESS BLDA CALC-SCNC: -0.5 MMOL/L (ref -3–3)
BASE EXCESS BLDA CALC-SCNC: -0.8 MMOL/L (ref -3–3)
BASE EXCESS BLDA CALC-SCNC: -1.1 MMOL/L (ref -3–3)
BASE EXCESS BLDA CALC-SCNC: -2.1 MMOL/L (ref -3–3)
BASE EXCESS BLDA CALC-SCNC: -2.2 MMOL/L (ref -3–3)
BASE EXCESS BLDA CALC-SCNC: -3.2 MMOL/L (ref -3–3)
BASO STIPL BLD QL SMEAR: ABNORMAL
BILIRUB DIRECT SERPL-MCNC: <0.2 MG/DL (ref 0–0.3)
BILIRUB SERPL-MCNC: 0.2 MG/DL
BITE CELLS BLD QL SMEAR: ABNORMAL
BLISTER CELLS BLD QL SMEAR: ABNORMAL
BUN SERPL-MCNC: 18.3 MG/DL (ref 6–20)
BUN SERPL-MCNC: 19.4 MG/DL (ref 6–20)
BUN SERPL-MCNC: 21.4 MG/DL (ref 6–20)
BURR CELLS BLD QL SMEAR: ABNORMAL
CA-I BLD-MCNC: 4.8 MG/DL (ref 4.4–5.2)
CA-I BLD-MCNC: 4.8 MG/DL (ref 4.4–5.2)
CA-I BLD-MCNC: 4.9 MG/DL (ref 4.4–5.2)
CALCIUM SERPL-MCNC: 8.5 MG/DL (ref 8.8–10.4)
CALCIUM SERPL-MCNC: 8.7 MG/DL (ref 8.8–10.4)
CALCIUM SERPL-MCNC: 9 MG/DL (ref 8.8–10.4)
CHLORIDE SERPL-SCNC: 103 MMOL/L (ref 98–107)
CHLORIDE SERPL-SCNC: 105 MMOL/L (ref 98–107)
CHLORIDE SERPL-SCNC: 105 MMOL/L (ref 98–107)
COHGB MFR BLD: 94.5 % (ref 96–97)
COHGB MFR BLD: 96.2 % (ref 96–97)
COHGB MFR BLD: 97.7 % (ref 96–97)
COHGB MFR BLD: 98.2 % (ref 96–97)
COHGB MFR BLD: 99.3 % (ref 96–97)
COHGB MFR BLD: >100 % (ref 96–97)
COHGB MFR BLD: >100 % (ref 96–97)
CORTIS SERPL-MCNC: 25.2 UG/DL
CREAT SERPL-MCNC: 0.94 MG/DL (ref 0.51–0.95)
CREAT SERPL-MCNC: 0.97 MG/DL (ref 0.51–0.95)
CREAT SERPL-MCNC: 0.97 MG/DL (ref 0.51–0.95)
DACRYOCYTES BLD QL SMEAR: ABNORMAL
EGFRCR SERPLBLD CKD-EPI 2021: 70 ML/MIN/1.73M2
EGFRCR SERPLBLD CKD-EPI 2021: 70 ML/MIN/1.73M2
EGFRCR SERPLBLD CKD-EPI 2021: 72 ML/MIN/1.73M2
ELLIPTOCYTES BLD QL SMEAR: ABNORMAL
ERYTHROCYTE [DISTWIDTH] IN BLOOD BY AUTOMATED COUNT: 19.3 % (ref 10–15)
ERYTHROCYTE [DISTWIDTH] IN BLOOD BY AUTOMATED COUNT: 19.4 % (ref 10–15)
ERYTHROCYTE [DISTWIDTH] IN BLOOD BY AUTOMATED COUNT: 19.4 % (ref 10–15)
FRAGMENTS BLD QL SMEAR: ABNORMAL
GLUCOSE BLD-MCNC: 164 MG/DL (ref 70–99)
GLUCOSE BLD-MCNC: 168 MG/DL (ref 70–99)
GLUCOSE BLDC GLUCOMTR-MCNC: 139 MG/DL (ref 70–99)
GLUCOSE BLDC GLUCOMTR-MCNC: 140 MG/DL (ref 70–99)
GLUCOSE BLDC GLUCOMTR-MCNC: 145 MG/DL (ref 70–99)
GLUCOSE BLDC GLUCOMTR-MCNC: 177 MG/DL (ref 70–99)
GLUCOSE SERPL-MCNC: 166 MG/DL (ref 70–99)
GLUCOSE SERPL-MCNC: 177 MG/DL (ref 70–99)
GLUCOSE SERPL-MCNC: 193 MG/DL (ref 70–99)
HCO3 BLD-SCNC: 24 MMOL/L (ref 21–28)
HCO3 BLD-SCNC: 25 MMOL/L (ref 21–28)
HCO3 BLD-SCNC: 25 MMOL/L (ref 21–28)
HCO3 BLD-SCNC: 26 MMOL/L (ref 21–28)
HCO3 BLD-SCNC: 26 MMOL/L (ref 21–28)
HCO3 SERPL-SCNC: 21 MMOL/L (ref 22–29)
HCO3 SERPL-SCNC: 23 MMOL/L (ref 22–29)
HCO3 SERPL-SCNC: 24 MMOL/L (ref 22–29)
HCT VFR BLD AUTO: 24.8 % (ref 35–47)
HCT VFR BLD AUTO: 26.1 % (ref 35–47)
HCT VFR BLD AUTO: 28.3 % (ref 35–47)
HGB BLD-MCNC: 7.5 G/DL (ref 11.7–15.7)
HGB BLD-MCNC: 8 G/DL (ref 11.7–15.7)
HGB BLD-MCNC: 8.4 G/DL (ref 11.7–15.7)
HGB C CRYSTALS: ABNORMAL
HOWELL-JOLLY BOD BLD QL SMEAR: ABNORMAL
MAGNESIUM SERPL-MCNC: 2.3 MG/DL (ref 1.7–2.3)
MAGNESIUM SERPL-MCNC: 2.3 MG/DL (ref 1.7–2.3)
MAGNESIUM SERPL-MCNC: 2.4 MG/DL (ref 1.7–2.3)
MCH RBC QN AUTO: 30.4 PG (ref 26.5–33)
MCH RBC QN AUTO: 30.5 PG (ref 26.5–33)
MCH RBC QN AUTO: 30.9 PG (ref 26.5–33)
MCHC RBC AUTO-ENTMCNC: 29.7 G/DL (ref 31.5–36.5)
MCHC RBC AUTO-ENTMCNC: 30.2 G/DL (ref 31.5–36.5)
MCHC RBC AUTO-ENTMCNC: 30.7 G/DL (ref 31.5–36.5)
MCV RBC AUTO: 100 FL (ref 78–100)
MCV RBC AUTO: 101 FL (ref 78–100)
MCV RBC AUTO: 103 FL (ref 78–100)
NEUTS HYPERSEG BLD QL SMEAR: ABNORMAL
O2/TOTAL GAS SETTING VFR VENT: 12 %
O2/TOTAL GAS SETTING VFR VENT: 50 %
O2/TOTAL GAS SETTING VFR VENT: 70 %
O2/TOTAL GAS SETTING VFR VENT: 90 %
PCO2 BLD: 44 MM HG (ref 35–45)
PCO2 BLD: 48 MM HG (ref 35–45)
PCO2 BLD: 48 MM HG (ref 35–45)
PCO2 BLD: 49 MM HG (ref 35–45)
PCO2 BLD: 51 MM HG (ref 35–45)
PCO2 BLD: 52 MM HG (ref 35–45)
PCO2 BLD: 52 MM HG (ref 35–45)
PH BLD: 7.27 [PH] (ref 7.35–7.45)
PH BLD: 7.3 [PH] (ref 7.35–7.45)
PH BLD: 7.31 [PH] (ref 7.35–7.45)
PH BLD: 7.34 [PH] (ref 7.35–7.45)
PH BLD: 7.36 [PH] (ref 7.35–7.45)
PHOSPHATE SERPL-MCNC: 4.2 MG/DL (ref 2.5–4.5)
PHOSPHATE SERPL-MCNC: 4.5 MG/DL (ref 2.5–4.5)
PHOSPHATE SERPL-MCNC: 4.9 MG/DL (ref 2.5–4.5)
PLAT MORPH BLD: ABNORMAL
PLATELET # BLD AUTO: 210 10E3/UL (ref 150–450)
PLATELET # BLD AUTO: 228 10E3/UL (ref 150–450)
PLATELET # BLD AUTO: 294 10E3/UL (ref 150–450)
PO2 BLD: 111 MM HG (ref 80–105)
PO2 BLD: 141 MM HG (ref 80–105)
PO2 BLD: 183 MM HG (ref 80–105)
PO2 BLD: 68 MM HG (ref 80–105)
PO2 BLD: 77 MM HG (ref 80–105)
PO2 BLD: 87 MM HG (ref 80–105)
PO2 BLD: 88 MM HG (ref 80–105)
POLYCHROMASIA BLD QL SMEAR: SLIGHT
POTASSIUM SERPL-SCNC: 4.2 MMOL/L (ref 3.4–5.3)
POTASSIUM SERPL-SCNC: 4.4 MMOL/L (ref 3.4–5.3)
POTASSIUM SERPL-SCNC: 4.7 MMOL/L (ref 3.4–5.3)
PROT SERPL-MCNC: 6.2 G/DL (ref 6.4–8.3)
RBC # BLD AUTO: 2.47 10E6/UL (ref 3.8–5.2)
RBC # BLD AUTO: 2.59 10E6/UL (ref 3.8–5.2)
RBC # BLD AUTO: 2.75 10E6/UL (ref 3.8–5.2)
RBC AGGLUT BLD QL: ABNORMAL
RBC MORPH BLD: ABNORMAL
ROULEAUX BLD QL SMEAR: ABNORMAL
SAO2 % BLDA: 92 % (ref 92–100)
SAO2 % BLDA: 94 % (ref 92–100)
SAO2 % BLDA: 96 % (ref 92–100)
SAO2 % BLDA: 96 % (ref 92–100)
SAO2 % BLDA: 97 % (ref 92–100)
SAO2 % BLDA: 98 % (ref 92–100)
SAO2 % BLDA: 98 % (ref 92–100)
SICKLE CELLS BLD QL SMEAR: ABNORMAL
SMUDGE CELLS BLD QL SMEAR: ABNORMAL
SODIUM SERPL-SCNC: 137 MMOL/L (ref 135–145)
SODIUM SERPL-SCNC: 138 MMOL/L (ref 135–145)
SODIUM SERPL-SCNC: 139 MMOL/L (ref 135–145)
SPHEROCYTES BLD QL SMEAR: ABNORMAL
STOMATOCYTES BLD QL SMEAR: ABNORMAL
TARGETS BLD QL SMEAR: ABNORMAL
TOXIC GRANULES BLD QL SMEAR: ABNORMAL
UFH PPP CHRO-ACNC: 0.3 IU/ML
VARIANT LYMPHS BLD QL SMEAR: ABNORMAL
WBC # BLD AUTO: 11.5 10E3/UL (ref 4–11)
WBC # BLD AUTO: 11.6 10E3/UL (ref 4–11)
WBC # BLD AUTO: 21.8 10E3/UL (ref 4–11)

## 2024-08-28 PROCEDURE — 82040 ASSAY OF SERUM ALBUMIN: CPT | Performed by: STUDENT IN AN ORGANIZED HEALTH CARE EDUCATION/TRAINING PROGRAM

## 2024-08-28 PROCEDURE — 250N000013 HC RX MED GY IP 250 OP 250 PS 637

## 2024-08-28 PROCEDURE — 200N000002 HC R&B ICU UMMC

## 2024-08-28 PROCEDURE — 250N000009 HC RX 250: Performed by: NURSE PRACTITIONER

## 2024-08-28 PROCEDURE — 97530 THERAPEUTIC ACTIVITIES: CPT | Mod: GP

## 2024-08-28 PROCEDURE — 82330 ASSAY OF CALCIUM: CPT | Performed by: STUDENT IN AN ORGANIZED HEALTH CARE EDUCATION/TRAINING PROGRAM

## 2024-08-28 PROCEDURE — 90947 DIALYSIS REPEATED EVAL: CPT

## 2024-08-28 PROCEDURE — 99233 SBSQ HOSP IP/OBS HIGH 50: CPT | Performed by: STUDENT IN AN ORGANIZED HEALTH CARE EDUCATION/TRAINING PROGRAM

## 2024-08-28 PROCEDURE — 250N000011 HC RX IP 250 OP 636: Performed by: STUDENT IN AN ORGANIZED HEALTH CARE EDUCATION/TRAINING PROGRAM

## 2024-08-28 PROCEDURE — 250N000013 HC RX MED GY IP 250 OP 250 PS 637: Performed by: PHYSICIAN ASSISTANT

## 2024-08-28 PROCEDURE — 99291 CRITICAL CARE FIRST HOUR: CPT | Mod: GC | Performed by: INTERNAL MEDICINE

## 2024-08-28 PROCEDURE — 82805 BLOOD GASES W/O2 SATURATION: CPT

## 2024-08-28 PROCEDURE — 94640 AIRWAY INHALATION TREATMENT: CPT

## 2024-08-28 PROCEDURE — 97110 THERAPEUTIC EXERCISES: CPT | Mod: GP

## 2024-08-28 PROCEDURE — 250N000013 HC RX MED GY IP 250 OP 250 PS 637: Performed by: SURGERY

## 2024-08-28 PROCEDURE — 999N000157 HC STATISTIC RCP TIME EA 10 MIN

## 2024-08-28 PROCEDURE — 250N000009 HC RX 250: Performed by: STUDENT IN AN ORGANIZED HEALTH CARE EDUCATION/TRAINING PROGRAM

## 2024-08-28 PROCEDURE — 82947 ASSAY GLUCOSE BLOOD QUANT: CPT

## 2024-08-28 PROCEDURE — 83735 ASSAY OF MAGNESIUM: CPT | Performed by: STUDENT IN AN ORGANIZED HEALTH CARE EDUCATION/TRAINING PROGRAM

## 2024-08-28 PROCEDURE — 250N000009 HC RX 250

## 2024-08-28 PROCEDURE — 94003 VENT MGMT INPAT SUBQ DAY: CPT

## 2024-08-28 PROCEDURE — 71045 X-RAY EXAM CHEST 1 VIEW: CPT

## 2024-08-28 PROCEDURE — 250N000009 HC RX 250: Performed by: SURGERY

## 2024-08-28 PROCEDURE — 82947 ASSAY GLUCOSE BLOOD QUANT: CPT | Performed by: STUDENT IN AN ORGANIZED HEALTH CARE EDUCATION/TRAINING PROGRAM

## 2024-08-28 PROCEDURE — 85520 HEPARIN ASSAY: CPT | Performed by: SURGERY

## 2024-08-28 PROCEDURE — 97530 THERAPEUTIC ACTIVITIES: CPT | Mod: GO

## 2024-08-28 PROCEDURE — 250N000013 HC RX MED GY IP 250 OP 250 PS 637: Performed by: STUDENT IN AN ORGANIZED HEALTH CARE EDUCATION/TRAINING PROGRAM

## 2024-08-28 PROCEDURE — 94660 CPAP INITIATION&MGMT: CPT

## 2024-08-28 PROCEDURE — 250N000011 HC RX IP 250 OP 636

## 2024-08-28 PROCEDURE — 85027 COMPLETE CBC AUTOMATED: CPT | Performed by: STUDENT IN AN ORGANIZED HEALTH CARE EDUCATION/TRAINING PROGRAM

## 2024-08-28 PROCEDURE — 94640 AIRWAY INHALATION TREATMENT: CPT | Mod: 76

## 2024-08-28 PROCEDURE — 82248 BILIRUBIN DIRECT: CPT | Performed by: STUDENT IN AN ORGANIZED HEALTH CARE EDUCATION/TRAINING PROGRAM

## 2024-08-28 PROCEDURE — 999N000215 HC STATISTIC HFNC ADULT NON-CPAP

## 2024-08-28 PROCEDURE — 71045 X-RAY EXAM CHEST 1 VIEW: CPT | Mod: 26 | Performed by: RADIOLOGY

## 2024-08-28 RX ORDER — PROCHLORPERAZINE MALEATE 5 MG
10 TABLET ORAL EVERY 6 HOURS PRN
Status: DISCONTINUED | OUTPATIENT
Start: 2024-08-28 | End: 2024-10-01 | Stop reason: HOSPADM

## 2024-08-28 RX ORDER — ONDANSETRON 4 MG/1
4 TABLET, ORALLY DISINTEGRATING ORAL EVERY 6 HOURS PRN
Status: DISCONTINUED | OUTPATIENT
Start: 2024-08-28 | End: 2024-08-28

## 2024-08-28 RX ORDER — METHOCARBAMOL 500 MG/1
500 TABLET, FILM COATED ORAL ONCE
Status: COMPLETED | OUTPATIENT
Start: 2024-08-28 | End: 2024-08-28

## 2024-08-28 RX ORDER — PROCHLORPERAZINE 25 MG
25 SUPPOSITORY, RECTAL RECTAL EVERY 12 HOURS PRN
Status: DISCONTINUED | OUTPATIENT
Start: 2024-08-28 | End: 2024-10-01 | Stop reason: HOSPADM

## 2024-08-28 RX ORDER — HYDROMORPHONE HCL IN WATER/PF 6 MG/30 ML
0.2 PATIENT CONTROLLED ANALGESIA SYRINGE INTRAVENOUS
Status: COMPLETED | OUTPATIENT
Start: 2024-08-28 | End: 2024-08-29

## 2024-08-28 RX ORDER — CLONIDINE HYDROCHLORIDE 0.1 MG/1
0.1 TABLET ORAL DAILY
Status: DISCONTINUED | OUTPATIENT
Start: 2024-08-28 | End: 2024-08-30

## 2024-08-28 RX ORDER — QUETIAPINE FUMARATE 25 MG/1
25-50 TABLET, FILM COATED ORAL 2 TIMES DAILY PRN
Status: DISCONTINUED | OUTPATIENT
Start: 2024-08-28 | End: 2024-09-11

## 2024-08-28 RX ORDER — ONDANSETRON 2 MG/ML
8 INJECTION INTRAMUSCULAR; INTRAVENOUS EVERY 6 HOURS PRN
Status: DISCONTINUED | OUTPATIENT
Start: 2024-08-28 | End: 2024-08-29

## 2024-08-28 RX ADMIN — DEXMEDETOMIDINE HYDROCHLORIDE IN SODIUM CHLORIDE 0.5 MCG/KG/HR: 4 INJECTION INTRAVENOUS at 21:34

## 2024-08-28 RX ADMIN — CALCIUM CHLORIDE, MAGNESIUM CHLORIDE, SODIUM CHLORIDE, SODIUM BICARBONATE, POTASSIUM CHLORIDE AND SODIUM PHOSPHATE DIBASIC DIHYDRATE 12.5 ML/KG/HR: 3.68; 3.05; 6.34; 3.09; .314; .187 INJECTION INTRAVENOUS at 20:51

## 2024-08-28 RX ADMIN — CALCIUM CHLORIDE, MAGNESIUM CHLORIDE, SODIUM CHLORIDE, SODIUM BICARBONATE, POTASSIUM CHLORIDE AND SODIUM PHOSPHATE DIBASIC DIHYDRATE 12.5 ML/KG/HR: 3.68; 3.05; 6.34; 3.09; .314; .187 INJECTION INTRAVENOUS at 08:52

## 2024-08-28 RX ADMIN — AMITRIPTYLINE HYDROCHLORIDE 50 MG: 50 TABLET, FILM COATED ORAL at 21:08

## 2024-08-28 RX ADMIN — PROCHLORPERAZINE EDISYLATE 10 MG: 5 INJECTION INTRAMUSCULAR; INTRAVENOUS at 23:17

## 2024-08-28 RX ADMIN — ACETAMINOPHEN 975 MG: 325 TABLET, FILM COATED ORAL at 08:10

## 2024-08-28 RX ADMIN — DEXMEDETOMIDINE HYDROCHLORIDE IN SODIUM CHLORIDE 1.2 MCG/KG/HR: 4 INJECTION INTRAVENOUS at 02:18

## 2024-08-28 RX ADMIN — ACETYLCYSTEINE 4 ML: 100 SOLUTION ORAL; RESPIRATORY (INHALATION) at 11:34

## 2024-08-28 RX ADMIN — MONTELUKAST 10 MG: 10 TABLET, FILM COATED ORAL at 21:08

## 2024-08-28 RX ADMIN — GABAPENTIN 300 MG: 300 CAPSULE ORAL at 08:10

## 2024-08-28 RX ADMIN — OXYCODONE HYDROCHLORIDE 5 MG: 5 TABLET ORAL at 14:15

## 2024-08-28 RX ADMIN — Medication 60 ML: at 20:00

## 2024-08-28 RX ADMIN — BUDESONIDE 1 MG: 1 INHALANT ORAL at 07:54

## 2024-08-28 RX ADMIN — IPRATROPIUM BROMIDE AND ALBUTEROL SULFATE 3 ML: .5; 3 SOLUTION RESPIRATORY (INHALATION) at 07:54

## 2024-08-28 RX ADMIN — HEPARIN SODIUM 1300 UNITS/HR: 1000 INJECTION, SOLUTION INTRAVENOUS; SUBCUTANEOUS at 00:04

## 2024-08-28 RX ADMIN — ACETYLCYSTEINE 4 ML: 100 SOLUTION ORAL; RESPIRATORY (INHALATION) at 16:03

## 2024-08-28 RX ADMIN — Medication 60 ML: at 08:50

## 2024-08-28 RX ADMIN — QUETIAPINE FUMARATE 25 MG: 25 TABLET ORAL at 19:59

## 2024-08-28 RX ADMIN — METHOCARBAMOL 500 MG: 500 TABLET ORAL at 21:08

## 2024-08-28 RX ADMIN — Medication 6 MG: at 19:59

## 2024-08-28 RX ADMIN — Medication 6 MG: at 05:52

## 2024-08-28 RX ADMIN — VENLAFAXINE 75 MG: 25 TABLET ORAL at 08:10

## 2024-08-28 RX ADMIN — GABAPENTIN 300 MG: 300 CAPSULE ORAL at 19:59

## 2024-08-28 RX ADMIN — LEVOTHYROXINE SODIUM 25 MCG: 0.03 TABLET ORAL at 08:10

## 2024-08-28 RX ADMIN — LOPERAMIDE HYDROCHLORIDE 4 MG: 2 CAPSULE ORAL at 18:18

## 2024-08-28 RX ADMIN — ACETAMINOPHEN 975 MG: 325 TABLET, FILM COATED ORAL at 15:35

## 2024-08-28 RX ADMIN — LOPERAMIDE HYDROCHLORIDE 4 MG: 2 CAPSULE ORAL at 05:52

## 2024-08-28 RX ADMIN — OXYCODONE HYDROCHLORIDE 2.5 MG: 5 TABLET ORAL at 18:18

## 2024-08-28 RX ADMIN — CALCIUM CHLORIDE, MAGNESIUM CHLORIDE, SODIUM CHLORIDE, SODIUM BICARBONATE, POTASSIUM CHLORIDE AND SODIUM PHOSPHATE DIBASIC DIHYDRATE: 3.68; 3.05; 6.34; 3.09; .314; .187 INJECTION INTRAVENOUS at 20:51

## 2024-08-28 RX ADMIN — ONDANSETRON 4 MG: 4 TABLET, ORALLY DISINTEGRATING ORAL at 09:40

## 2024-08-28 RX ADMIN — ACETAMINOPHEN 975 MG: 325 TABLET, FILM COATED ORAL at 00:08

## 2024-08-28 RX ADMIN — QUETIAPINE FUMARATE 25 MG: 25 TABLET ORAL at 00:07

## 2024-08-28 RX ADMIN — ACETYLCYSTEINE 4 ML: 100 SOLUTION ORAL; RESPIRATORY (INHALATION) at 20:11

## 2024-08-28 RX ADMIN — Medication 6 MG: at 00:08

## 2024-08-28 RX ADMIN — VENLAFAXINE 75 MG: 25 TABLET ORAL at 19:59

## 2024-08-28 RX ADMIN — Medication 5 MG: at 21:08

## 2024-08-28 RX ADMIN — ATORVASTATIN CALCIUM 10 MG: 10 TABLET, FILM COATED ORAL at 08:10

## 2024-08-28 RX ADMIN — HYDROMORPHONE HYDROCHLORIDE 0.2 MG: 0.2 INJECTION, SOLUTION INTRAMUSCULAR; INTRAVENOUS; SUBCUTANEOUS at 22:52

## 2024-08-28 RX ADMIN — CLONIDINE HYDROCHLORIDE 0.1 MG: 0.1 TABLET ORAL at 10:18

## 2024-08-28 RX ADMIN — OXYCODONE HYDROCHLORIDE 2.5 MG: 5 TABLET ORAL at 00:07

## 2024-08-28 RX ADMIN — OXYCODONE HYDROCHLORIDE 2.5 MG: 5 TABLET ORAL at 11:38

## 2024-08-28 RX ADMIN — Medication 6 MG: at 11:38

## 2024-08-28 RX ADMIN — IPRATROPIUM BROMIDE AND ALBUTEROL SULFATE 3 ML: .5; 3 SOLUTION RESPIRATORY (INHALATION) at 16:03

## 2024-08-28 RX ADMIN — LOPERAMIDE HYDROCHLORIDE 4 MG: 2 CAPSULE ORAL at 00:08

## 2024-08-28 RX ADMIN — CETIRIZINE HYDROCHLORIDE 10 MG: 10 TABLET, FILM COATED ORAL at 08:10

## 2024-08-28 RX ADMIN — IPRATROPIUM BROMIDE AND ALBUTEROL SULFATE 3 ML: .5; 3 SOLUTION RESPIRATORY (INHALATION) at 20:11

## 2024-08-28 RX ADMIN — Medication 1 TABLET: at 08:10

## 2024-08-28 RX ADMIN — IPRATROPIUM BROMIDE AND ALBUTEROL SULFATE 3 ML: .5; 3 SOLUTION RESPIRATORY (INHALATION) at 11:34

## 2024-08-28 RX ADMIN — OXYCODONE HYDROCHLORIDE 2.5 MG: 5 TABLET ORAL at 05:49

## 2024-08-28 RX ADMIN — CALCIUM CHLORIDE, MAGNESIUM CHLORIDE, SODIUM CHLORIDE, SODIUM BICARBONATE, POTASSIUM CHLORIDE AND SODIUM PHOSPHATE DIBASIC DIHYDRATE 12.5 ML/KG/HR: 3.68; 3.05; 6.34; 3.09; .314; .187 INJECTION INTRAVENOUS at 01:42

## 2024-08-28 RX ADMIN — DEXMEDETOMIDINE HYDROCHLORIDE IN SODIUM CHLORIDE 1.2 MCG/KG/HR: 4 INJECTION INTRAVENOUS at 06:09

## 2024-08-28 RX ADMIN — HEPARIN SODIUM 1300 UNITS/HR: 1000 INJECTION, SOLUTION INTRAVENOUS; SUBCUTANEOUS at 15:24

## 2024-08-28 RX ADMIN — Medication 40 MG: at 08:10

## 2024-08-28 RX ADMIN — CALCIUM CHLORIDE, MAGNESIUM CHLORIDE, SODIUM CHLORIDE, SODIUM BICARBONATE, POTASSIUM CHLORIDE AND SODIUM PHOSPHATE DIBASIC DIHYDRATE 12.5 ML/KG/HR: 3.68; 3.05; 6.34; 3.09; .314; .187 INJECTION INTRAVENOUS at 08:51

## 2024-08-28 RX ADMIN — LOPERAMIDE HYDROCHLORIDE 4 MG: 2 CAPSULE ORAL at 11:38

## 2024-08-28 RX ADMIN — DEXMEDETOMIDINE HYDROCHLORIDE IN SODIUM CHLORIDE 0.8 MCG/KG/HR: 4 INJECTION INTRAVENOUS at 10:48

## 2024-08-28 RX ADMIN — ONDANSETRON 8 MG: 2 INJECTION INTRAMUSCULAR; INTRAVENOUS at 20:16

## 2024-08-28 RX ADMIN — GABAPENTIN 300 MG: 300 CAPSULE ORAL at 14:15

## 2024-08-28 RX ADMIN — ACETYLCYSTEINE 4 ML: 100 SOLUTION ORAL; RESPIRATORY (INHALATION) at 07:54

## 2024-08-28 ASSESSMENT — ACTIVITIES OF DAILY LIVING (ADL)
ADLS_ACUITY_SCORE: 59
ADLS_ACUITY_SCORE: 55
ADLS_ACUITY_SCORE: 59
ADLS_ACUITY_SCORE: 55
ADLS_ACUITY_SCORE: 55
ADLS_ACUITY_SCORE: 59
ADLS_ACUITY_SCORE: 55
ADLS_ACUITY_SCORE: 59
ADLS_ACUITY_SCORE: 55
ADLS_ACUITY_SCORE: 59
ADLS_ACUITY_SCORE: 55
ADLS_ACUITY_SCORE: 59

## 2024-08-28 NOTE — PROGRESS NOTES
CRRT STATUS NOTE    DATA:  Time:  0530  Pressures WNL:  YES  Filter Status:  WDL    Problems Reported/Alarms Noted:  none    Supplies Present:  YES    ASSESSMENT:    Patient Net Fluid Balance:  net even since midnight       Intake/Output Summary (Last 24 hours) at 8/28/2024 0532  Last data filed at 8/28/2024 0500  Gross per 24 hour   Intake 2601.44 ml   Output 2501.4 ml   Net 100.04 ml       Vital Signs: Temp:  [97.3  F (36.3  C)-100.2  F (37.9  C)] 98.6  F (37  C)  Pulse:  [] 87  Resp:  [13-35] 16  BP: (139-148)/(59-64) 139/64  MAP:  [55 mmHg-106 mmHg] 80 mmHg  Arterial Line BP: ()/(39-76) 121/58  FiO2 (%):  [40 %-60 %] 50 %  SpO2:  [85 %-100 %] 100 %       Most Recent BMP's:  Recent Labs   Lab Test 08/28/24 0403 08/27/24 2000 08/27/24 1359 08/27/24 0323 08/26/24 2003 08/26/24  1317    138 139 138 138 140   POTASSIUM 4.2 4.3 4.4 4.1 4.4 4.1   CHLORIDE 105 105 103 105 105 104   CO2 24 23 25 23 24 23   BUN 18.3 16.4 19.0 18.5 19.5 21.1*   CR 0.97* 0.98* 1.04* 1.11* 1.16* 1.12*   ANIONGAP 10 10 11 10 9 13   QUAN 8.5* 8.7* 8.6* 8.3* 8.1* 8.3*     Most Recent CBC's:  Recent Labs   Lab Test 08/28/24 0403 08/27/24 2000 08/27/24 1359 08/27/24 0323   WBC 11.5* 11.3* 10.2 11.0   HGB 7.5* 7.9* 7.8* 7.0*    100 99 99    221 220 197     Most Recent ABG's:  Recent Labs   Lab Test 08/28/24 0403 08/27/24 2356 08/27/24 2028   PH 7.34* 7.36 7.35   PO2 88 68* 68*   PCO2 48* 44 46*   HCO3 25 25 25   THONG -0.5 -0.3 -0.5       Goals of Therapy:  I=O     INTERVENTIONS:   Charting reviewed. Rounding completed. Treatment plan discussed with bedside nurse.     PLAN:  Continue with current plan of care please contact CRRT resource with any questions or concerns via [employer].

## 2024-08-28 NOTE — PROGRESS NOTES
Pt transported to , report to [facility]- c[facility]riano RN.  Pt disconnected from CRRT and transported with RN x2 and RT.  Arrived to MICU A & O to baseline with all belongings : personal care items: including- [employer] bladder stimulator scanner equipment, and eyeglasses which are on her face at the time of transport and on arrival to the room.

## 2024-08-28 NOTE — PROGRESS NOTES
CRRT STATUS NOTE    DATA:  Time:  1840  Pressures WNL:  YES  Filter Status:  WDL    Problems Reported/Alarms Noted:  none      Supplies Present:  YES    ASSESSMENT:  Patient Net Fluid Balance:  -[phone]    Vital Signs:  Temp: 98.6  F (37  C) Temp src: Oral BP: 137/85 (spot check SBP with art line) Pulse: (!) 124   Resp: 25 SpO2: 100 % O2 Device: (S) BiPAP/CPAP Oxygen Delivery: 50 LPM          Labs:  K- 4.4, Mg- 2.3, Phos-4.2, Hgb-8, iCa- 4.8      Goals of Therapy:  -50ml as long as MAP >65    INTERVENTIONS:   Charting reviewed. Rounding completed. Treatment plan discussed with bedside nurse.     PLAN:  Continue current plan of care. Call CRRT resource with questions, concerns, or changes.

## 2024-08-28 NOTE — PROGRESS NOTES
"  Nephrology Progress Note  08/28/2024         Assessment & Recommendations:   Mrs Flaherty is a 52 yo F w/ hx of Anxiety, depression, fibromyalgia, hypothyroidism, asthma, HLD, MURIEL on CPAP, expressive aphasia, and hypertension who was admitted to an OSH with community acquired pneumonia on 8/3 s/p intubation.  Found to have severe ARDS requiring paralysis and proning, transferred here on 8/8 for management and initiated on CKRT for severe acidemia in the setting of oligoanuric HCACORTA.  Started CRRT on 8/9 for volume management.     # anuric CHACORTA, suspected ATN in setting of shock, ARDS  Continue CRRT (with heparin dosed by Xa level protocol due to filter clotting). Continue 4K baths, standard 25ml/kg/hr clearance. Lessened UF on 8/27, and on 8/28 patient had increasing oxygen needs. Thus, increased I/Os goal back to net negative 50ml/hr on 8/28.    # hypervolemia - UF as above    # electrolytes  No current issues (mild hypophos ok, calcium corrects upward, mild hypermag ok).       Recommendations were communicated to primary team via this note.     Danny Myrick MD   Division of Nephrology and Hypertension  [facility]  Vocera Web Console      Interval History :   Nursing and provider notes from last 24 hours reviewed.  In the last 24 hours Massiel Flaherty has increasing oxygen needs today, to FIO2 of 70%, after extubation. UOP was 0. She was mildly net positive yesterday.    Review of Systems:   Unable to obtain due to BIPAP     Physical Exam:   I/O last 3 completed shifts:  In: 2783.99 [I.V.:708.89; Other:0.1; NG/GT:900]  Out: 2404.6 [Other:1679.6; Stool:725]   /85   Pulse 106   Temp 98.6  F (37  C) (Oral)   Resp 23   Ht 1.575 m (5' 2\")   Wt 79 kg (174 lb 2.6 oz)   SpO2 98%   BMI 31.85 kg/m       GENERAL APPEARANCE: ill but NAD  HEENT: MMM, anicteric  PULM: lungs clear anteriorly, on BIPAP  CV: regular rhythm, normal rate, no rub      -edema - 1+ LE edema bilat   GI: soft, ND  INTEGUMENT: no rash " on exposed skin  NEURO: attempting to speak (but inaudible due to BIPAP)  Access is LFV temp line 8/19.     Labs:   All labs reviewed by me  Electrolytes/Renal -   Recent Labs   Lab Test 08/28/24  1534 08/28/24  1147 08/28/24  1134 08/28/24  0813 08/28/24  0403 08/27/24  2356 08/27/24 2000   NA  --  138  --   --  139  --  138   POTASSIUM  --  4.4  --   --  4.2  --  4.3   CHLORIDE  --  105  --   --  105  --  105   CO2  --  23  --   --  24  --  23   BUN  --  21.4*  --   --  18.3  --  16.4   CR  --  0.94  --   --  0.97*  --  0.98*   * 177* 140*   < > 164*  166*   < > 152*  157*   QUAN  --  8.7*  --   --  8.5*  --  8.7*   MAG  --  2.3  --   --  2.4*  --  2.4*   PHOS  --  4.2  --   --  4.5  --  4.3    < > = values in this interval not displayed.       CBC -   Recent Labs   Lab Test 08/28/24  1147 08/28/24  0403 08/27/24  2000   WBC 11.6* 11.5* 11.3*   HGB 8.0* 7.5* 7.9*    228 221       LFTs -   Recent Labs   Lab Test 08/28/24  1147 08/28/24  0403 08/27/24 2000 08/27/24  1359 08/27/24  0323 08/26/24  1317 08/26/24  0343   ALKPHOS  --  95  --   --  94  --  87   BILITOTAL  --  0.2  --   --  <0.2  --  <0.2   ALT  --  20  --   --  15  --  15   AST  --  31  --   --  24  --  27   PROTTOTAL  --  6.2*  --   --  5.7*  --  5.8*   ALBUMIN 3.2* 3.2* 3.2*   < > 3.0*   < > 2.9*    < > = values in this interval not displayed.       Iron Panel - No lab results found.      Imaging:  All imaging studies reviewed by me.     Current Medications:  Current Facility-Administered Medications   Medication Dose Route Frequency Provider Last Rate Last Admin    acetaminophen (TYLENOL) tablet 975 mg  975 mg Oral or Feeding Tube Q8H Andres Payne PA-C   975 mg at 08/28/24 1535    acetylcysteine (MUCOMYST) 10 % nebulizer solution 4 mL  4 mL Nebulization 4x Daily Barry Hatch MD   4 mL at 08/28/24 1600    amitriptyline (ELAVIL) tablet 50 mg  50 mg Oral or Feeding Tube At Bedtime Barry Hatch MD   50 mg at 08/27/24 9453     atorvastatin (LIPITOR) tablet 10 mg  10 mg Oral or Feeding Tube Daily Francisco Welsh MD   10 mg at 08/28/24 0810    budesonide (PULMICORT) neb solution 1 mg  1 mg Nebulization Daily Andres Payne PA-C   1 mg at 08/28/24 0754    cetirizine (zyrTEC) tablet 10 mg  10 mg Oral or Feeding Tube Daily Barry Hatch MD   10 mg at 08/28/24 0810    cloNIDine (CATAPRES) tablet 0.1 mg  0.1 mg Oral Daily Otilia Sun APRN CNP   0.1 mg at 08/28/24 1018    fiber modular (BANATROL TF) packet 1 packet  1 packet Per Feeding Tube TID Cal Lisa MD   1 packet at 08/28/24 1416    gabapentin (NEURONTIN) capsule 300 mg  300 mg Oral or Feeding Tube TID Barry Hatch MD   300 mg at 08/28/24 1415    insulin aspart (NovoLOG) injection (RAPID ACTING)  1-12 Units Subcutaneous Q4H Aniceto Adame MD   1 Units at 08/28/24 1535    ipratropium - albuterol 0.5 mg/2.5 mg/3 mL (DUONEB) neb solution 3 mL  3 mL Nebulization 4x daily Barry Hatch MD   3 mL at 08/28/24 1603    levothyroxine (SYNTHROID/LEVOTHROID) tablet 25 mcg  25 mcg Per Feeding Tube QAM AC Giovanny Guidry PA-C   25 mcg at 08/28/24 0810    loperamide (IMODIUM) capsule 4 mg  4 mg Oral or Feeding Tube Q6H Barry Hatch MD   4 mg at 08/28/24 1138    montelukast (SINGULAIR) tablet 10 mg  10 mg Oral or Feeding Tube At Bedtime Barry Hatch MD   10 mg at 08/27/24 2148    multivitamin RENAL (RENAVITE RX/NEPHROVITE) tablet 1 tablet  1 tablet Oral or Feeding Tube Daily Giovanny Guidry PA-C   1 tablet at 08/28/24 0810    opium tincture 10 MG/ML (1%) liquid 6 mg  6 mg Oral or Feeding Tube Q6H Francisco Welsh MD   6 mg at 08/28/24 1138    oxyCODONE IR (ROXICODONE) half-tab 2.5 mg  2.5 mg Oral or Feeding Tube Q6H Gaudencio De Souza MD   2.5 mg at 08/28/24 1138    pantoprazole (PROTONIX) 2 mg/mL suspension 40 mg  40 mg Per Feeding Tube QAM AC Barry Hatch MD   40 mg at 08/28/24 0810    Prosource TF20 ENfit Compatibl EN LIQD (PROSOURCE TF20)  packet 60 mL  1 packet Per Feeding Tube BID Giovanny Guidry PA-C   60 mL at 08/28/24 0850    venlafaxine (EFFEXOR) tablet 75 mg  75 mg Oral or Feeding Tube BID Barry Hatch MD   75 mg at 08/28/24 0810     Current Facility-Administered Medications   Medication Dose Route Frequency Provider Last Rate Last Admin    dexmedeTOMIDine (PRECEDEX) 4 mcg/mL in sodium chloride 0.9 % 100 mL infusion  0.1-1 mcg/kg/hr (Dosing Weight) Intravenous Continuous Otilia Sun APRN CNP 11.1 mL/hr at 08/28/24 1500 0.5 mcg/kg/hr at 08/28/24 1500    dextrose 10% infusion   Intravenous Continuous PRN Giovanny Guidry PA-C        dialysate for CVVHD & CVVHDF (Phoxillum BK4/2.5)  12.5 mL/kg/hr CRRT Continuous Danny Myrick MD 1,000 mL/hr at 08/28/24 0852 12.5 mL/kg/hr at 08/28/24 0852    heparin (porcine) 20,000 units in 20 mL ANTICOAGULANT infusion (syringe from pharmacy)  500-1,500 Units/hr CRRT Continuous Danny Myrick MD 1.3 mL/hr at 08/28/24 1524 1,300 Units/hr at 08/28/24 1524    No lozenges or gum should be given while patient on BIPAP/AVAPS/AVAPS AE   Does not apply Continuous PRN Otilia Sun APRN CNP        norepinephrine (LEVOPHED) 16 mg in  mL infusion MAX CONC CENTRAL LINE  0.01-0.6 mcg/kg/min (Dosing Weight) Intravenous Continuous Elier Hutchins MD   Stopped at 08/28/24 0429    Patient may continue current oral medications   Does not apply Continuous PRN Otilia Sun APRN CNP        POST-filter replacement solution for CVVHD & CVVHDF (Phoxillum BK4/2.5)   CRRT Continuous Danny Myrick  mL/hr at 08/27/24 1803 New Bag at 08/27/24 1803    PRE-filter replacement solution for CVVHD & CVVHDF (Phoxillum BK4/2.5)  12.5 mL/kg/hr CRRT Continuous Danny Myrick MD 1,000 mL/hr at 08/28/24 0851 12.5 mL/kg/hr at 08/28/24 0851     Danny Myrick MD

## 2024-08-28 NOTE — PLAN OF CARE
Goal Outcome Evaluation:      Plan of Care Reviewed With: patient    Overall Patient Progress: decliningOverall Patient Progress: declining    Outcome Evaluation: Pt is hypertensive when awake and hypotensive when asleep.  Increased O2 requirements this shift required increased from 8 to 12 liter on Oxi-plus mask and then switching to High Flow Nasal Cannula @ 60% to keep O2 Sat's > 92%.  Currently ordered CRRT goal of intake = output met this shift.    D/I:  Neuro:  Alert & oriented x 2 but CAM positive.  LOPEZ by following requests.  Back discomfort relieved with scheduled pain med's and with repositioning.  Continues to require Precedex for anxiety.  Tmax 100.3 F Axillary.  CV:  SR/ST 's with no ectopy..  Levo off and on up to 0.04 mcg/kg/min to keep MAP>65.  Pt hypertensive when awake and hypotensive when she falls asleep.   No blood products or boluses given this shift.  Lytes Replacement:  None  Pulm:   Oxi-Plus mask titrated up from 8 liters to 12 liters before being switched by RT to High Flow Nasal Cannula @ 50% FIO2 on 50 lpm to keep O2 Sat's > 92%.  GI:  Pt with hoarse voice and abnormal gag reflex failed swallow prescreen on previous shift so is currently being kept NPO.  NJ tube feeding @ goal rate of  50 mL/hr with standard FWF of 30 ml Q 4 hrs.  Rectal tube in place with 300 ml watery diarrhea this shift.  :  Anuric.   CRRT net fluid removal  yesterday over 24 hrs ending @ midnight was 241 ml ( ~ averaging 10 ml/hr ) net fluid removal.  CRRT net fluid removal today so far since midnight is 10 ml ( averaging I = O ) net fluid removal  Skin  Forehead abrasion and left foot/ankle bruised from pre-hospital fall.  Both Lip corners have scabbed/lesioned areas that bleed slightly @ times.  Right groin & Right internal jugular old ECMO sites are healing  Scattered bruising. Faded LUQ abdominal scar.  Blanchable redness on sacrum, coccyx and  left heel with protective Mepilex dressings in place.   Drips:    Levophed currently off.  Precedex @ 1.2 mcg/kg/hr  Heparin @ 1,300 units/hr through CRRT syringe.  Labs:  Hgb= 7.9 @ 2200 & 7.5 @ 0400.  Xa= 0.31 @ 2200 & 0.3 @ 0400 which is in ordered Xa goal range of 0.2 - 0.4.  Next Xa level due @ 1600.    A/P:  Titrate Levo Gtt as needed to keep MAP > 65.  Currently ordered goal for CRRT of intake = output was met this shift.  Plan to get Pt up in chair again today on day shift.

## 2024-08-28 NOTE — PROGRESS NOTES
MEDICAL ICU PROGRESS NOTE  08/28/2024      Date of Service (when I saw the patient): 08/28/2024    ASSESSMENT: Massiel Flaherty is a 53 year old female with PMH Anxiety/depression, fibromyalgia, c/f nonepileptic seizures not on AEDs, hypothyroidism, asthma, HLD, MURIEL on CPAP, expressive aphasia, hypertension  who was admitted on 8/3/2024 for fatigue, fever and dyspnea and intubated 8/6/24 at OSH for ARDS, proned and paralyzed without improvement. Transferred to [facility] 8/8/24, hypoxic with high plateaus/peaks and placed on VV ECMO and CRRT. Decannulated from VV ECMO 8/18 and transferred to MICU 8/26/24 for ongoing management.     CHANGES and MAJOR THINGS TODAY:     - Volume removal via CRRT with the goal of increased oxygenation, support blood pressures with levophed as needed  - Repeat swallow study as sedation is weaned for transition to PO nutrition  - Wean precedex and start clonidine 0.1, increase Seroquel if worsening delerium  - Pulmonary rehab via OT/PT treatment, suspect deconditioning of respiratory muscles, atelectasis, and prolonged immobility as a component of increased respiratory needs. If worsening recheck ABG, consider respiratory support with BiPAP      Neuro:  # Pain and sedation  # Acute pain  # Sedation and analgesia for vent compliance   # Concern for ICU delirium   # Hx Fibromyalgia   # Hx myalgic encephalomyelitis   # Hx anxiety/depression  - Monitor neurological status. Delirium preventions and precautions.   - Pain: Scheduled: tylenol, oxycodone 5mg q 6hr (decreased from q 4hr)   PRN: tylenol, oxycodone  - Sedation plan: Wean precedex today, start PTA clonidine 0.1 mg  - Gabapentin 300mg TID   - Seroquel 25mg BID PRN  - PTA Venlafaxine and Amitriptyline   - Delirium precautions, optimize environment to regulate day/night normalcy   - If worsening, increase seroquel dose     # Hx spells with staring and BUE posturing previously described as nonepileptic seizures   # Hx of GTC seizures and  myoclonic epilepsy, weaned off AEDs   # Diffuse cerebral edema - improved  - Sodium goals liberalized to 140-145  - 8/21: MRI Brain: no acute intracranial pathology. No findings to suggest anoxic brain injury.      Pulmonary:  # Acute hypoxic respiratory failure c/b ARDS   # s/p VV ECMO 8/8 - 8/18   # Hx CAP  # Asthma  # MURIEL on home CPAP   FiO2 (%): (S) 50 %, Resp: 17, Vent Mode: CPAP/PS, Resp Rate (Set): 18 breaths/min, Tidal Volume (Set, mL): 420 mL, PEEP (cm H2O): 5 cmH2O, Pressure Support (cm H2O): 5 cmH2O, Resp Rate (Set): 18 breaths/min, Tidal Volume (Set, mL): 420 mL, PEEP (cm H2O): 5 cmH2O    PFT dated 9/8/2020 demonstrated normal spirometry with mild diffusion impairment and mild restriction (FEV1/FVC 80%, FEV1 2.59, DLCO 40%). CT abdomen chest 3/9/2020 demonstrated scarring and fibrosis bilaterally which correlated with the decreased DLCO. The patient also has a history of MURIEL on CPAP therapy as currently managed by her provider in [place]. Unclear what triggered ARDS or why she has had such severe hospital course but will further investigate ILD.  - ILD w/u aldolase, EVELIO, RF, CCP, ANCA, MPO, SSA Ro and La, Scleroderma antibody, Radha 1,  hypersensity pneumonitis, IGG subclasses,  aspergillus, blastomyces, histoplasma   - SpO2 goals >92%, PaO2 goals >60   - PTA singular at bedtime  - Scheduled pulmicort, mucomyst, and duonebs   - Wean NC as tolerated, increase to BiPAP if worsening   - CXR stable with reduced opacities today, plan to remove more fluid on CRRT     Cardiovascular:  # Hyperlipidemia  # Hypotension  # Hx HTN  - MAP goal >65, SBP goals >110  - NE for pressor support, wean as able, ok to increase while removing volume via CRRT  - Restarted PTA atorvastatin  - PTA clonidine restart as above     GI/Nutrition:  # Moderate protein calorie malnutrition  # Non-infectious Diarrhea  # GERD   - Protonix (home medication)   - Nutrition consulted, appreciate recs  - Diet: TF via NJ, assess swallow after  sedation weaned, failed previous swallow study  - Hold bowel regimen d/t loose stools  - Change suspension meds to other form as able  - Continue scheduled multivitamin, banatrol, prosource packet, and loperamide  - Increase frequency of tincture of opium for high stool output  - Rectal tube, continues with high output. Keep today.      Renal/Fluids/Electrolytes:  # Acute kidney injury  Baseline Cr approx 0.6. Pt was anuric here but now making some urine, likely prerenal due to shock.  - Continue CRRT; fluid goal net negative -500 ml to -1000 ml   - Heparinized CRRT circuit, low intensity protocol. Last clotted 8/24 PM  - Strict I&Os   - Bladder scan q shift, currently anuric  - Avoid nephrotoxins as able      Endocrine:  # Steroid and stress induced hyperglycemia  # Hypothyroidism   - Goal to keep BG < 180 for optimal wound healing.   - Continue High SSI  - Continue PTA synthroid     ID:  # Leukocytosis  # Hx CAP  - Continue to monitor fever curve and inflammatory markers as appropriate  - ID consulted, appreciate recs. Recommending monitoring off antimicrobials.      # HSV-1  Mouth sores present, which could have viral etiology. 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 Monitor and trend.   - Heparinize CRRT circuit       Musculoskeletal/Rheum:  # Alopecia  - Hold PTA hydroxychloroquine      # Weakness and deconditioning of critical illness  # Left ankle injury pre-hospitalization    - Physical and occupational therapy when able.      Skin:  # BLE scattered ecchymosis  # Left toe duskiness   - Bilateral lower leg ecchymosis   - WOC consult   - Ecchymosis to left foot, most noticeable to 3rd and 4th toes     General Cares/Prophylaxis:    DVT Prophylaxis: Heparin through CRRT circuit  GI Prophylaxis: PPI  Restraints: no  Code Status: Full     Lines/tubes/drains:  - ETT  "(8/8)  - NJ (8/9)  - LIJ CVC (8/8)  - Radial a-line (8/7)  - PIV x3  - Rectal tube (8/17)  - Tunneled HD line (8/23)     Disposition:  - Medical ICU      Patient seen and findings/plan discussed with medical ICU staff, Dr. De Anda.    Fuentes Fowler MD-PhD    Attending note:  Patient seen, examined and discussed with the Resident physician. All data reviewed including vital signs, medications, laboratory studies and imaging.  I agree with the above assessment and plan.  The patient remains critically ill with acute respiratory failure, CHACORTA on CRRT, seizures, and ARDS.  I personally adjusted the ventilator to treat the acute respiratory failure and followed volume status and electrolytes in the setting of CHACORTA.  Extubated yesterday, doing ok but oxygen requirements increasing overnight.   CXR with subtle increase in bilateral interstitial infiltrates.  Will increase volume removal with CRRT.  Work-up for possible ILD has been negative to date; CRP remains elevated but is decreasing.     Total Critical care time excluding time for  teaching and procedures was 40 minutes.     Radha De Anda MD  [phone]    Clinically Significant Risk Factors              # Hypoalbuminemia: Lowest albumin = 2.2 g/dL at 8/10/2024  9:47 AM, will monitor as appropriate               # Obesity: Estimated body mass index is 31.85 kg/m  as calculated from the following:    Height as of this encounter: 1.575 m (5' 2\").    Weight as of this encounter: 79 kg (174 lb 2.6 oz).   # Moderate Malnutrition: based on nutrition assessment    # Financial/Environmental Concerns: none                        ====================================  INTERVAL HISTORY:   Despite successful extubation, she had increasing O2 requirements overnight and is now on 60% fiO2 with 50 LPM  However, patient is increasingly AO. She reports feeling trouble breathing as though she has pneumonia    OBJECTIVE:   1. VITAL SIGNS:   Temp:  [97.3  F (36.3  C)-100.2  F (37.9  C)] 99.3  F " (37.4  C)  Pulse:  [] 101  Resp:  [13-28] 22  BP: (139-141)/(62-64) 141/62  MAP:  [63 mmHg-106 mmHg] 89 mmHg  Arterial Line BP: (100-169)/(46-76) 142/62  FiO2 (%):  [40 %-100 %] 60 %  SpO2:  [85 %-100 %] 97 %  FiO2 (%): 60 %, Resp: 22, Vent Mode: CPAP/PS, Resp Rate (Set): 18 breaths/min, Tidal Volume (Set, mL): 420 mL, PEEP (cm H2O): 5 cmH2O, Pressure Support (cm H2O): 5 cmH2O, Resp Rate (Set): 18 breaths/min, Tidal Volume (Set, mL): 420 mL, PEEP (cm H2O): 5 cmH2O  2. INTAKE/ OUTPUT:   I/O last 3 completed shifts:  In: 2631.04 [I.V.:725.94; Other:0.1; NG/GT:780]  Out: 2463.1 [Other:1763.1; Stool:700]    3. PHYSICAL EXAMINATION:  General:Awake sitting in bed. Answers questions well, AO x 4   Neuro: Follows commands; wiggles toes in BLE. NAD.   HEENT: Normocephalic, atraumatic. PERRL, and nonicteric.   CV: RRR on monitor, S1S2, all extremities well perfused   Respiratory: On HFNC, respiratory chest rise with normal effort, soft, velcro like crackles in lower lung fields   GI: Abdomen soft and non-tender to light palpation. Non-distended   Skin: Scattered contusions on L foot. Scars of bilateral knees, healing bruises and lesions from previous lines and drains.    4. LABS:   Arterial Blood Gases   Recent Labs   Lab 08/28/24  0403 08/27/24  2356 08/27/24 2028 08/27/24 2000   PH 7.34* 7.36 7.35 7.37   PCO2 48* 44 46* 45   PO2 88 68* 68* 45*   HCO3 25 25 25 26     Complete Blood Count   Recent Labs   Lab 08/28/24  0403 08/27/24 2000 08/27/24  1359 08/27/24  0323   WBC 11.5* 11.3* 10.2 11.0   HGB 7.5* 7.9* 7.8* 7.0*    221 220 197     Basic Metabolic Panel  Recent Labs   Lab 08/28/24  0813 08/28/24  0403 08/27/24  2356 08/27/24 2000 08/27/24  1401 08/27/24  1359 08/27/24  0336 08/27/24  0323   NA  --  139  --  138  --  139  --  138   POTASSIUM  --  4.2  --  4.3  --  4.4  --  4.1   CHLORIDE  --  105  --  105  --  103  --  105   CO2  --  24  --  23  --  25  --  23   BUN  --  18.3  --  16.4  --  19.0  --   18.5   CR  --  0.97*  --  0.98*  --  1.04*  --  1.11*   * 164*  166* 168* 152*  157*   < > 136*   < > 161*    < > = values in this interval not displayed.     Liver Function Tests  Recent Labs   Lab 08/28/24  0403 08/27/24 2000 08/27/24  1359 08/27/24  0323 08/26/24  1317 08/26/24  0343 08/25/24  1308 08/25/24  0320   AST 31  --   --  24  --  27  --  27   ALT 20  --   --  15  --  15  --  13   ALKPHOS 95  --   --  94  --  87  --  84   BILITOTAL 0.2  --   --  <0.2  --  <0.2  --  0.2   ALBUMIN 3.2* 3.2* 3.3* 3.0*   < > 2.9*   < > 2.8*    < > = values in this interval not displayed.     Coagulation Profile  No lab results found in last 7 days.    5. RADIOLOGY:   Recent Results (from the past 24 hour(s))   XR Abdomen Port 1 View    Narrative    Examination:  XR ABDOMEN PORT 1 VIEW    Date:  8/27/2024 1:31 PM     Clinical Information: confirm NJT positioning after extubation     Comparison: Abdomen x-ray 8/11/2024    Findings:     Portable AP supine view of the abdomen. Feeding tube tip projecting  postpyloric, in the proximal jejunum. Right lower quadrant device in  stable position. Nonobstructive bowel gas pattern. No acute osseous  abnormalities.      Impression    Impression:  NJ tube tip projects postpyloric, in the proximal jejunum..    I have personally reviewed the examination and initial interpretation  and I agree with the findings.    JENNIFER CHASE MD         SYSTEM ID:  [id number]   XR Chest Port 1 View    Narrative    EXAM: XR CHEST PORT 1 VIEW 8/28/2024 1:40 AM      HISTORY: AHRF complicated by ARDS, undergoing ILD investiagation, pt  extubated yesterday on 7-8L oxymask, now having increased WOB.    COMPARISON: Previous day.     TECHNIQUE: Frontal view of the chest.    FINDINGS: Dual lumen right internal jugular central venous catheter  with tip projecting over the right atrium. Enteric tube courses out of  the field-of-view. Left internal jugular central venous catheter tip  projecting near the  upper SVC. Trachea is midline. Cardiac silhouette  appears stable. No significant pleural effusion. Hazy opacities  throughout the lung fields.      Impression    IMPRESSION:  Patchy opacities throughout the lung fields, appear grossly stable to  mildly improved, may represent atelectasis, edema, or atypical  infection. Probable small bilateral pleural effusions.    I have personally reviewed the examination and initial interpretation  and I agree with the findings.    CORTNEY LAU MD         SYSTEM ID:  [id number]

## 2024-08-28 NOTE — INTERIM SUMMARY
ICU Daily Rounding Checklist     Checklist Response Notes   Can sedation be reduced?  Yes    Can analgesia be reduced? No    Is delirium being assessed, addressed and prevented? Yes    Spontaneous awakening trial and/or Spontaneous breathing trial candidate?  NA    Is the patient at goals for lung protective ventilation? NA    Head of bed elevation (30 degrees)? NA    Skin breakdown assessment (prevention) completed? Yes    Is enteral nutrition at goal? Yes    Bowel program/frequency being addressed? Yes    Total fluid balance goal reviewed?  Yes Target Goals:        negative 250-500 [12h]             500-1L [24h]   Is blood glucose at goal? Yes    Can Antibiotics be narrowed or discontinued? NA      Gastric ulcer prophylaxis?  Yes If non-medication induced coagulopathy (INR-1.5 PTT > 2x normal or Plt<50k), mechanical ventilation 48hr, history of GI bleed/ulcer within past year. TBL, SCI, or burn, or if >= 2 minor risk factors (sepsis, ICU stay 1 week, occult GI bleed > 6 days. glucocorticoid therapy, NSAID use, antiplatelet use)   Deep venous thrombosis prophylaxis? Yes    Early mobility candidate and physical therapy consulted? Yes    Is rolle catheter needed? Yes    Is central venous/arterial catheter needed? Yes    Has the family been updated? Will attempt    Are the patient's goals of care and code status current? Yes      Gaudencio De Souza MD  [facility]   Critical Care Service

## 2024-08-28 NOTE — PLAN OF CARE
Admitted/transferred from: 4A Lateral  Reason for admission/transfer: Lateral transfer to MICU  Patient status upon admission/transfer: VSS, afebrile.   Interventions: NA  Plan: Continue plan of care  2 RN skin assessment: NA  Patient belongings (see Flowsheet - Adult Profile for details): With patient in bed/cart.    Neuro: A/Ox2; confused to time and place intermittently. PERRL. No numbness/tingling in extremities.   CV: SR/ST intermittently. No ectopy. Afebrile. Levo gtt for MAP>65; levo currently off.   Resp: HFNC 50%/50L switched to BiPAP for increased O2 needs. LS coarse/diminished. Weak nonproductive cough. Garbled speech; patient has difficulty with speaking long time.   GI/: Anuric on CRRT. Rectal tube in place for loose, watery stool output.  Skin: scattered scabs on body and face from PTA fall; scattered bruising on body and tops of feet. Bilateral scabs on lips. Red usman on coccyx.   Labs: Repeat ABGs done for increased O2 needs through last 4hr of shift.     Changes this shift: Transferred to  mid shift.     Procedures this shift: None.       Goal Outcome Evaluation:      Plan of Care Reviewed With: patient    Overall Patient Progress: no changeOverall Patient Progress: no change    Outcome Evaluation: see note

## 2024-08-29 ENCOUNTER — APPOINTMENT (OUTPATIENT)
Dept: ULTRASOUND IMAGING | Facility: CLINIC | Age: 54
DRG: 003 | End: 2024-08-29
Payer: MEDICAID

## 2024-08-29 ENCOUNTER — APPOINTMENT (OUTPATIENT)
Dept: GENERAL RADIOLOGY | Facility: CLINIC | Age: 54
DRG: 003 | End: 2024-08-29
Attending: INTERNAL MEDICINE
Payer: MEDICAID

## 2024-08-29 ENCOUNTER — APPOINTMENT (OUTPATIENT)
Dept: GENERAL RADIOLOGY | Facility: CLINIC | Age: 54
DRG: 003 | End: 2024-08-29
Payer: MEDICAID

## 2024-08-29 ENCOUNTER — APPOINTMENT (OUTPATIENT)
Dept: CARDIOLOGY | Facility: CLINIC | Age: 54
DRG: 003 | End: 2024-08-29
Payer: MEDICAID

## 2024-08-29 PROBLEM — N17.9 AKI (ACUTE KIDNEY INJURY) (H): Status: ACTIVE | Noted: 2024-08-29

## 2024-08-29 LAB
ALBUMIN SERPL BCG-MCNC: 3 G/DL (ref 3.5–5.2)
ALBUMIN SERPL BCG-MCNC: 3.4 G/DL (ref 3.5–5.2)
ALBUMIN SERPL BCG-MCNC: 3.5 G/DL (ref 3.5–5.2)
ALLEN'S TEST: ABNORMAL
ALP SERPL-CCNC: 119 U/L (ref 40–150)
ALT SERPL W P-5'-P-CCNC: 21 U/L (ref 0–50)
ANION GAP SERPL CALCULATED.3IONS-SCNC: 11 MMOL/L (ref 7–15)
ANION GAP SERPL CALCULATED.3IONS-SCNC: 11 MMOL/L (ref 7–15)
ANION GAP SERPL CALCULATED.3IONS-SCNC: 12 MMOL/L (ref 7–15)
AST SERPL W P-5'-P-CCNC: 53 U/L (ref 0–45)
BASE EXCESS BLDA CALC-SCNC: -2.7 MMOL/L (ref -3–3)
BASE EXCESS BLDA CALC-SCNC: -2.9 MMOL/L (ref -3–3)
BASE EXCESS BLDA CALC-SCNC: -3.7 MMOL/L (ref -3–3)
BASE EXCESS BLDA CALC-SCNC: -3.9 MMOL/L (ref -3–3)
BASE EXCESS BLDA CALC-SCNC: -4.4 MMOL/L (ref -3–3)
BASE EXCESS BLDA CALC-SCNC: -4.6 MMOL/L (ref -3–3)
BASE EXCESS BLDA CALC-SCNC: -4.9 MMOL/L (ref -3–3)
BASE EXCESS BLDA CALC-SCNC: -5 MMOL/L (ref -3–3)
BASE EXCESS BLDA CALC-SCNC: -6.2 MMOL/L (ref -3–3)
BILIRUB DIRECT SERPL-MCNC: <0.2 MG/DL (ref 0–0.3)
BILIRUB SERPL-MCNC: 0.2 MG/DL
BLD PROD TYP BPU: NORMAL
BLOOD COMPONENT TYPE: NORMAL
BUN SERPL-MCNC: 22.8 MG/DL (ref 6–20)
BUN SERPL-MCNC: 23.6 MG/DL (ref 6–20)
BUN SERPL-MCNC: 25.8 MG/DL (ref 6–20)
CA-I BLD-MCNC: 4.8 MG/DL (ref 4.4–5.2)
CA-I BLD-MCNC: 4.9 MG/DL (ref 4.4–5.2)
CA-I BLD-MCNC: 4.9 MG/DL (ref 4.4–5.2)
CALCIUM SERPL-MCNC: 8.5 MG/DL (ref 8.8–10.4)
CALCIUM SERPL-MCNC: 8.7 MG/DL (ref 8.8–10.4)
CALCIUM SERPL-MCNC: 9.1 MG/DL (ref 8.8–10.4)
CHLORIDE SERPL-SCNC: 103 MMOL/L (ref 98–107)
CODING SYSTEM: NORMAL
COHGB MFR BLD: 86.5 % (ref 96–97)
COHGB MFR BLD: 92.6 % (ref 96–97)
COHGB MFR BLD: 94.6 % (ref 96–97)
COHGB MFR BLD: 95.3 % (ref 96–97)
COHGB MFR BLD: 98.3 % (ref 96–97)
COHGB MFR BLD: 98.6 % (ref 96–97)
COHGB MFR BLD: 98.7 % (ref 96–97)
COHGB MFR BLD: 98.7 % (ref 96–97)
COHGB MFR BLD: 99.8 % (ref 96–97)
CREAT SERPL-MCNC: 1.06 MG/DL (ref 0.51–0.95)
CREAT SERPL-MCNC: 1.09 MG/DL (ref 0.51–0.95)
CREAT SERPL-MCNC: 1.11 MG/DL (ref 0.51–0.95)
CROSSMATCH: NORMAL
CRP SERPL-MCNC: 210 MG/L
EGFRCR SERPLBLD CKD-EPI 2021: 59 ML/MIN/1.73M2
EGFRCR SERPLBLD CKD-EPI 2021: 60 ML/MIN/1.73M2
EGFRCR SERPLBLD CKD-EPI 2021: 63 ML/MIN/1.73M2
ERYTHROCYTE [DISTWIDTH] IN BLOOD BY AUTOMATED COUNT: 19.1 % (ref 10–15)
ERYTHROCYTE [DISTWIDTH] IN BLOOD BY AUTOMATED COUNT: 19.2 % (ref 10–15)
ERYTHROCYTE [DISTWIDTH] IN BLOOD BY AUTOMATED COUNT: 19.3 % (ref 10–15)
ERYTHROCYTE [DISTWIDTH] IN BLOOD BY AUTOMATED COUNT: 19.4 % (ref 10–15)
GLUCOSE BLDC GLUCOMTR-MCNC: 141 MG/DL (ref 70–99)
GLUCOSE BLDC GLUCOMTR-MCNC: 142 MG/DL (ref 70–99)
GLUCOSE BLDC GLUCOMTR-MCNC: 145 MG/DL (ref 70–99)
GLUCOSE BLDC GLUCOMTR-MCNC: 172 MG/DL (ref 70–99)
GLUCOSE BLDC GLUCOMTR-MCNC: 180 MG/DL (ref 70–99)
GLUCOSE BLDC GLUCOMTR-MCNC: 181 MG/DL (ref 70–99)
GLUCOSE SERPL-MCNC: 167 MG/DL (ref 70–99)
GLUCOSE SERPL-MCNC: 205 MG/DL (ref 70–99)
GLUCOSE SERPL-MCNC: 207 MG/DL (ref 70–99)
HCO3 BLD-SCNC: 22 MMOL/L (ref 21–28)
HCO3 BLD-SCNC: 22 MMOL/L (ref 21–28)
HCO3 BLD-SCNC: 23 MMOL/L (ref 21–28)
HCO3 BLD-SCNC: 23 MMOL/L (ref 21–28)
HCO3 BLD-SCNC: 24 MMOL/L (ref 21–28)
HCO3 SERPL-SCNC: 20 MMOL/L (ref 22–29)
HCO3 SERPL-SCNC: 21 MMOL/L (ref 22–29)
HCO3 SERPL-SCNC: 22 MMOL/L (ref 22–29)
HCT VFR BLD AUTO: 20.3 % (ref 35–47)
HCT VFR BLD AUTO: 23.5 % (ref 35–47)
HCT VFR BLD AUTO: 24.8 % (ref 35–47)
HCT VFR BLD AUTO: 27.8 % (ref 35–47)
HGB BLD-MCNC: 6.3 G/DL (ref 11.7–15.7)
HGB BLD-MCNC: 7.1 G/DL (ref 11.7–15.7)
HGB BLD-MCNC: 7.5 G/DL (ref 11.7–15.7)
HGB BLD-MCNC: 8.3 G/DL (ref 11.7–15.7)
ISSUE DATE AND TIME: NORMAL
LACTATE SERPL-SCNC: 0.5 MMOL/L (ref 0.7–2)
LACTATE SERPL-SCNC: 0.7 MMOL/L (ref 0.7–2)
LACTATE SERPL-SCNC: 0.8 MMOL/L (ref 0.7–2)
LVEF ECHO: NORMAL
MAGNESIUM SERPL-MCNC: 2.1 MG/DL (ref 1.7–2.3)
MAGNESIUM SERPL-MCNC: 2.3 MG/DL (ref 1.7–2.3)
MAGNESIUM SERPL-MCNC: 2.5 MG/DL (ref 1.7–2.3)
MCH RBC QN AUTO: 30.6 PG (ref 26.5–33)
MCH RBC QN AUTO: 31.4 PG (ref 26.5–33)
MCH RBC QN AUTO: 31.4 PG (ref 26.5–33)
MCH RBC QN AUTO: 32.1 PG (ref 26.5–33)
MCHC RBC AUTO-ENTMCNC: 29.9 G/DL (ref 31.5–36.5)
MCHC RBC AUTO-ENTMCNC: 30.2 G/DL (ref 31.5–36.5)
MCHC RBC AUTO-ENTMCNC: 30.2 G/DL (ref 31.5–36.5)
MCHC RBC AUTO-ENTMCNC: 31 G/DL (ref 31.5–36.5)
MCV RBC AUTO: 103 FL (ref 78–100)
MCV RBC AUTO: 104 FL (ref 78–100)
MRSA DNA SPEC QL NAA+PROBE: NEGATIVE
O2/TOTAL GAS SETTING VFR VENT: 100 %
O2/TOTAL GAS SETTING VFR VENT: 70 %
O2/TOTAL GAS SETTING VFR VENT: 75 %
O2/TOTAL GAS SETTING VFR VENT: 80 %
O2/TOTAL GAS SETTING VFR VENT: 90 %
PCO2 BLD: 50 MM HG (ref 35–45)
PCO2 BLD: 51 MM HG (ref 35–45)
PCO2 BLD: 54 MM HG (ref 35–45)
PCO2 BLD: 55 MM HG (ref 35–45)
PCO2 BLD: 55 MM HG (ref 35–45)
PCO2 BLD: 56 MM HG (ref 35–45)
PCO2 BLD: 58 MM HG (ref 35–45)
PCO2 BLD: 68 MM HG (ref 35–45)
PCO2 BLD: 90 MM HG (ref 35–45)
PEEP: 5 CM H2O
PEEP: 8 CM H2O
PH BLD: 7.04 [PH] (ref 7.35–7.45)
PH BLD: 7.16 [PH] (ref 7.35–7.45)
PH BLD: 7.22 [PH] (ref 7.35–7.45)
PH BLD: 7.24 [PH] (ref 7.35–7.45)
PH BLD: 7.24 [PH] (ref 7.35–7.45)
PH BLD: 7.28 [PH] (ref 7.35–7.45)
PH BLD: 7.29 [PH] (ref 7.35–7.45)
PHOSPHATE SERPL-MCNC: 4.2 MG/DL (ref 2.5–4.5)
PHOSPHATE SERPL-MCNC: 5.2 MG/DL (ref 2.5–4.5)
PHOSPHATE SERPL-MCNC: 5.3 MG/DL (ref 2.5–4.5)
PLATELET # BLD AUTO: 228 10E3/UL (ref 150–450)
PLATELET # BLD AUTO: 261 10E3/UL (ref 150–450)
PLATELET # BLD AUTO: 264 10E3/UL (ref 150–450)
PLATELET # BLD AUTO: 270 10E3/UL (ref 150–450)
PO2 BLD: 100 MM HG (ref 80–105)
PO2 BLD: 104 MM HG (ref 80–105)
PO2 BLD: 122 MM HG (ref 80–105)
PO2 BLD: 60 MM HG (ref 80–105)
PO2 BLD: 63 MM HG (ref 80–105)
PO2 BLD: 72 MM HG (ref 80–105)
PO2 BLD: 76 MM HG (ref 80–105)
PO2 BLD: 95 MM HG (ref 80–105)
PO2 BLD: 96 MM HG (ref 80–105)
POTASSIUM SERPL-SCNC: 4.1 MMOL/L (ref 3.4–5.3)
POTASSIUM SERPL-SCNC: 4.8 MMOL/L (ref 3.4–5.3)
POTASSIUM SERPL-SCNC: 4.9 MMOL/L (ref 3.4–5.3)
PROCALCITONIN SERPL IA-MCNC: 1.96 NG/ML
PROT SERPL-MCNC: 7 G/DL (ref 6.4–8.3)
RBC # BLD AUTO: 1.96 10E6/UL (ref 3.8–5.2)
RBC # BLD AUTO: 2.26 10E6/UL (ref 3.8–5.2)
RBC # BLD AUTO: 2.39 10E6/UL (ref 3.8–5.2)
RBC # BLD AUTO: 2.71 10E6/UL (ref 3.8–5.2)
SA TARGET DNA: NEGATIVE
SAO2 % BLDA: 84 % (ref 92–100)
SAO2 % BLDA: 90 % (ref 92–100)
SAO2 % BLDA: 92 % (ref 92–100)
SAO2 % BLDA: 93 % (ref 92–100)
SAO2 % BLDA: 95 % (ref 92–100)
SAO2 % BLDA: 96 % (ref 92–100)
SAO2 % BLDA: 97 % (ref 92–100)
SCANNED LAB RESULT: NORMAL
SODIUM SERPL-SCNC: 134 MMOL/L (ref 135–145)
SODIUM SERPL-SCNC: 136 MMOL/L (ref 135–145)
SODIUM SERPL-SCNC: 136 MMOL/L (ref 135–145)
TROPONIN T SERPL HS-MCNC: 38 NG/L
TROPONIN T SERPL HS-MCNC: 44 NG/L
TROPONIN T SERPL HS-MCNC: 58 NG/L
UFH PPP CHRO-ACNC: 0.36 IU/ML
UNIT ABO/RH: NORMAL
UNIT NUMBER: NORMAL
UNIT STATUS: NORMAL
UNIT TYPE ISBT: 9500
WBC # BLD AUTO: 17.2 10E3/UL (ref 4–11)
WBC # BLD AUTO: 21.5 10E3/UL (ref 4–11)
WBC # BLD AUTO: 21.5 10E3/UL (ref 4–11)
WBC # BLD AUTO: 21.6 10E3/UL (ref 4–11)

## 2024-08-29 PROCEDURE — 93970 EXTREMITY STUDY: CPT

## 2024-08-29 PROCEDURE — 84075 ASSAY ALKALINE PHOSPHATASE: CPT | Performed by: STUDENT IN AN ORGANIZED HEALTH CARE EDUCATION/TRAINING PROGRAM

## 2024-08-29 PROCEDURE — 87040 BLOOD CULTURE FOR BACTERIA: CPT

## 2024-08-29 PROCEDURE — 250N000009 HC RX 250

## 2024-08-29 PROCEDURE — 83735 ASSAY OF MAGNESIUM: CPT | Performed by: STUDENT IN AN ORGANIZED HEALTH CARE EDUCATION/TRAINING PROGRAM

## 2024-08-29 PROCEDURE — 86140 C-REACTIVE PROTEIN: CPT | Performed by: INTERNAL MEDICINE

## 2024-08-29 PROCEDURE — 82805 BLOOD GASES W/O2 SATURATION: CPT

## 2024-08-29 PROCEDURE — 258N000003 HC RX IP 258 OP 636

## 2024-08-29 PROCEDURE — 85027 COMPLETE CBC AUTOMATED: CPT | Performed by: STUDENT IN AN ORGANIZED HEALTH CARE EDUCATION/TRAINING PROGRAM

## 2024-08-29 PROCEDURE — P9047 ALBUMIN (HUMAN), 25%, 50ML: HCPCS

## 2024-08-29 PROCEDURE — 93010 ELECTROCARDIOGRAM REPORT: CPT | Performed by: INTERNAL MEDICINE

## 2024-08-29 PROCEDURE — 250N000011 HC RX IP 250 OP 636: Performed by: SURGERY

## 2024-08-29 PROCEDURE — 250N000013 HC RX MED GY IP 250 OP 250 PS 637: Performed by: PHYSICIAN ASSISTANT

## 2024-08-29 PROCEDURE — 250N000011 HC RX IP 250 OP 636

## 2024-08-29 PROCEDURE — 250N000009 HC RX 250: Performed by: STUDENT IN AN ORGANIZED HEALTH CARE EDUCATION/TRAINING PROGRAM

## 2024-08-29 PROCEDURE — 94002 VENT MGMT INPAT INIT DAY: CPT

## 2024-08-29 PROCEDURE — 93306 TTE W/DOPPLER COMPLETE: CPT

## 2024-08-29 PROCEDURE — 80069 RENAL FUNCTION PANEL: CPT | Performed by: STUDENT IN AN ORGANIZED HEALTH CARE EDUCATION/TRAINING PROGRAM

## 2024-08-29 PROCEDURE — 99292 CRITICAL CARE ADDL 30 MIN: CPT | Performed by: SURGERY

## 2024-08-29 PROCEDURE — 258N000003 HC RX IP 258 OP 636: Performed by: SURGERY

## 2024-08-29 PROCEDURE — 87641 MR-STAPH DNA AMP PROBE: CPT

## 2024-08-29 PROCEDURE — 82330 ASSAY OF CALCIUM: CPT | Performed by: STUDENT IN AN ORGANIZED HEALTH CARE EDUCATION/TRAINING PROGRAM

## 2024-08-29 PROCEDURE — 83605 ASSAY OF LACTIC ACID: CPT | Performed by: STUDENT IN AN ORGANIZED HEALTH CARE EDUCATION/TRAINING PROGRAM

## 2024-08-29 PROCEDURE — 87077 CULTURE AEROBIC IDENTIFY: CPT

## 2024-08-29 PROCEDURE — 84460 ALANINE AMINO (ALT) (SGPT): CPT | Performed by: STUDENT IN AN ORGANIZED HEALTH CARE EDUCATION/TRAINING PROGRAM

## 2024-08-29 PROCEDURE — 99291 CRITICAL CARE FIRST HOUR: CPT | Mod: 25 | Performed by: INTERNAL MEDICINE

## 2024-08-29 PROCEDURE — 94644 CONT INHLJ TX 1ST HOUR: CPT

## 2024-08-29 PROCEDURE — 250N000013 HC RX MED GY IP 250 OP 250 PS 637: Performed by: STUDENT IN AN ORGANIZED HEALTH CARE EDUCATION/TRAINING PROGRAM

## 2024-08-29 PROCEDURE — 94003 VENT MGMT INPAT SUBQ DAY: CPT

## 2024-08-29 PROCEDURE — 250N000013 HC RX MED GY IP 250 OP 250 PS 637

## 2024-08-29 PROCEDURE — 93970 EXTREMITY STUDY: CPT | Mod: 26 | Performed by: RADIOLOGY

## 2024-08-29 PROCEDURE — 250N000013 HC RX MED GY IP 250 OP 250 PS 637: Performed by: SURGERY

## 2024-08-29 PROCEDURE — 83605 ASSAY OF LACTIC ACID: CPT

## 2024-08-29 PROCEDURE — 93306 TTE W/DOPPLER COMPLETE: CPT | Mod: 26 | Performed by: STUDENT IN AN ORGANIZED HEALTH CARE EDUCATION/TRAINING PROGRAM

## 2024-08-29 PROCEDURE — 71045 X-RAY EXAM CHEST 1 VIEW: CPT | Mod: 26 | Performed by: RADIOLOGY

## 2024-08-29 PROCEDURE — 250N000009 HC RX 250: Performed by: SURGERY

## 2024-08-29 PROCEDURE — 80076 HEPATIC FUNCTION PANEL: CPT | Performed by: STUDENT IN AN ORGANIZED HEALTH CARE EDUCATION/TRAINING PROGRAM

## 2024-08-29 PROCEDURE — 94640 AIRWAY INHALATION TREATMENT: CPT | Mod: 76

## 2024-08-29 PROCEDURE — 999N000157 HC STATISTIC RCP TIME EA 10 MIN

## 2024-08-29 PROCEDURE — 93005 ELECTROCARDIOGRAM TRACING: CPT

## 2024-08-29 PROCEDURE — 82248 BILIRUBIN DIRECT: CPT | Performed by: STUDENT IN AN ORGANIZED HEALTH CARE EDUCATION/TRAINING PROGRAM

## 2024-08-29 PROCEDURE — 94640 AIRWAY INHALATION TREATMENT: CPT

## 2024-08-29 PROCEDURE — P9016 RBC LEUKOCYTES REDUCED: HCPCS

## 2024-08-29 PROCEDURE — 36415 COLL VENOUS BLD VENIPUNCTURE: CPT

## 2024-08-29 PROCEDURE — 87205 SMEAR GRAM STAIN: CPT

## 2024-08-29 PROCEDURE — 200N000002 HC R&B ICU UMMC

## 2024-08-29 PROCEDURE — 90947 DIALYSIS REPEATED EVAL: CPT

## 2024-08-29 PROCEDURE — 84145 PROCALCITONIN (PCT): CPT | Performed by: STUDENT IN AN ORGANIZED HEALTH CARE EDUCATION/TRAINING PROGRAM

## 2024-08-29 PROCEDURE — 85520 HEPARIN ASSAY: CPT | Performed by: SURGERY

## 2024-08-29 PROCEDURE — 84484 ASSAY OF TROPONIN QUANT: CPT

## 2024-08-29 PROCEDURE — 82040 ASSAY OF SERUM ALBUMIN: CPT | Performed by: STUDENT IN AN ORGANIZED HEALTH CARE EDUCATION/TRAINING PROGRAM

## 2024-08-29 PROCEDURE — 31500 INSERT EMERGENCY AIRWAY: CPT | Mod: GC | Performed by: INTERNAL MEDICINE

## 2024-08-29 PROCEDURE — 94660 CPAP INITIATION&MGMT: CPT

## 2024-08-29 PROCEDURE — 71045 X-RAY EXAM CHEST 1 VIEW: CPT

## 2024-08-29 PROCEDURE — 99233 SBSQ HOSP IP/OBS HIGH 50: CPT | Mod: 25 | Performed by: STUDENT IN AN ORGANIZED HEALTH CARE EDUCATION/TRAINING PROGRAM

## 2024-08-29 PROCEDURE — 82805 BLOOD GASES W/O2 SATURATION: CPT | Performed by: STUDENT IN AN ORGANIZED HEALTH CARE EDUCATION/TRAINING PROGRAM

## 2024-08-29 PROCEDURE — 250N000011 HC RX IP 250 OP 636: Performed by: STUDENT IN AN ORGANIZED HEALTH CARE EDUCATION/TRAINING PROGRAM

## 2024-08-29 PROCEDURE — 85027 COMPLETE CBC AUTOMATED: CPT

## 2024-08-29 PROCEDURE — 80053 COMPREHEN METABOLIC PANEL: CPT | Performed by: STUDENT IN AN ORGANIZED HEALTH CARE EDUCATION/TRAINING PROGRAM

## 2024-08-29 PROCEDURE — 999N000065 XR CHEST PORT 1 VIEW

## 2024-08-29 RX ORDER — LEVALBUTEROL INHALATION SOLUTION 0.63 MG/3ML
0.63 SOLUTION RESPIRATORY (INHALATION) 4 TIMES DAILY
Status: DISCONTINUED | OUTPATIENT
Start: 2024-08-29 | End: 2024-09-17

## 2024-08-29 RX ORDER — QUETIAPINE FUMARATE 50 MG/1
50 TABLET, FILM COATED ORAL ONCE
Status: COMPLETED | OUTPATIENT
Start: 2024-08-29 | End: 2024-08-29

## 2024-08-29 RX ORDER — POTASSIUM CHLORIDE 29.8 MG/ML
20 INJECTION INTRAVENOUS EVERY 8 HOURS PRN
Status: DISCONTINUED | OUTPATIENT
Start: 2024-08-29 | End: 2024-08-29

## 2024-08-29 RX ORDER — POTASSIUM CHLORIDE 29.8 MG/ML
20 INJECTION INTRAVENOUS EVERY 8 HOURS PRN
Status: DISCONTINUED | OUTPATIENT
Start: 2024-08-29 | End: 2024-08-30

## 2024-08-29 RX ORDER — CALCIUM CHLORIDE, MAGNESIUM CHLORIDE, SODIUM CHLORIDE, SODIUM BICARBONATE, POTASSIUM CHLORIDE AND SODIUM PHOSPHATE DIBASIC DIHYDRATE 3.68; 3.05; 6.34; 3.09; .314; .187 G/L; G/L; G/L; G/L; G/L; G/L
12.5 INJECTION INTRAVENOUS CONTINUOUS
Status: DISCONTINUED | OUTPATIENT
Start: 2024-08-30 | End: 2024-08-30

## 2024-08-29 RX ORDER — CALCIUM CHLORIDE, MAGNESIUM CHLORIDE, SODIUM CHLORIDE, SODIUM BICARBONATE, POTASSIUM CHLORIDE AND SODIUM PHOSPHATE DIBASIC DIHYDRATE 3.68; 3.05; 6.34; 3.09; .314; .187 G/L; G/L; G/L; G/L; G/L; G/L
12.5 INJECTION INTRAVENOUS CONTINUOUS
Status: DISCONTINUED | OUTPATIENT
Start: 2024-08-29 | End: 2024-08-29

## 2024-08-29 RX ORDER — VANCOMYCIN HYDROCHLORIDE
1250
Status: DISCONTINUED | OUTPATIENT
Start: 2024-08-30 | End: 2024-08-29

## 2024-08-29 RX ORDER — CEFEPIME HYDROCHLORIDE 2 G/1
2 INJECTION, POWDER, FOR SOLUTION INTRAVENOUS EVERY 8 HOURS
Status: DISCONTINUED | OUTPATIENT
Start: 2024-08-29 | End: 2024-08-29

## 2024-08-29 RX ORDER — VANCOMYCIN HYDROCHLORIDE
1250 EVERY 24 HOURS
Status: DISCONTINUED | OUTPATIENT
Start: 2024-08-30 | End: 2024-08-30

## 2024-08-29 RX ORDER — ALBUMIN (HUMAN) 12.5 G/50ML
50 SOLUTION INTRAVENOUS ONCE
Status: COMPLETED | OUTPATIENT
Start: 2024-08-29 | End: 2024-08-29

## 2024-08-29 RX ORDER — DEXAMETHASONE 4 MG/1
20 TABLET ORAL DAILY
Status: DISCONTINUED | OUTPATIENT
Start: 2024-08-30 | End: 2024-08-30

## 2024-08-29 RX ORDER — CALCIUM GLUCONATE 20 MG/ML
2 INJECTION, SOLUTION INTRAVENOUS EVERY 8 HOURS PRN
Status: DISCONTINUED | OUTPATIENT
Start: 2024-08-29 | End: 2024-08-29

## 2024-08-29 RX ORDER — MAGNESIUM SULFATE HEPTAHYDRATE 40 MG/ML
2 INJECTION, SOLUTION INTRAVENOUS EVERY 8 HOURS PRN
Status: DISCONTINUED | OUTPATIENT
Start: 2024-08-29 | End: 2024-08-30

## 2024-08-29 RX ORDER — PROPOFOL 10 MG/ML
5-75 INJECTION, EMULSION INTRAVENOUS CONTINUOUS
Status: DISCONTINUED | OUTPATIENT
Start: 2024-08-29 | End: 2024-09-03

## 2024-08-29 RX ORDER — MAGNESIUM SULFATE HEPTAHYDRATE 40 MG/ML
2 INJECTION, SOLUTION INTRAVENOUS EVERY 8 HOURS PRN
Status: DISCONTINUED | OUTPATIENT
Start: 2024-08-29 | End: 2024-08-29

## 2024-08-29 RX ORDER — MEROPENEM 1 G/1
1 INJECTION, POWDER, FOR SOLUTION INTRAVENOUS EVERY 8 HOURS
Status: DISCONTINUED | OUTPATIENT
Start: 2024-08-29 | End: 2024-09-01

## 2024-08-29 RX ORDER — MIDAZOLAM HCL IN 0.9 % NACL/PF 1 MG/ML
1-8 PLASTIC BAG, INJECTION (ML) INTRAVENOUS CONTINUOUS
Status: DISCONTINUED | OUTPATIENT
Start: 2024-08-29 | End: 2024-09-15

## 2024-08-29 RX ORDER — PIPERACILLIN SODIUM, TAZOBACTAM SODIUM 4; .5 G/20ML; G/20ML
4.5 INJECTION, POWDER, LYOPHILIZED, FOR SOLUTION INTRAVENOUS EVERY 6 HOURS
Status: DISCONTINUED | OUTPATIENT
Start: 2024-08-29 | End: 2024-08-29

## 2024-08-29 RX ORDER — CALCIUM GLUCONATE 20 MG/ML
2 INJECTION, SOLUTION INTRAVENOUS EVERY 8 HOURS PRN
Status: DISCONTINUED | OUTPATIENT
Start: 2024-08-29 | End: 2024-08-30

## 2024-08-29 RX ORDER — ALBUMIN (HUMAN) 12.5 G/50ML
SOLUTION INTRAVENOUS
Status: COMPLETED
Start: 2024-08-29 | End: 2024-08-29

## 2024-08-29 RX ADMIN — SODIUM CHLORIDE, POTASSIUM CHLORIDE, SODIUM LACTATE AND CALCIUM CHLORIDE 500 ML: 600; 310; 30; 20 INJECTION, SOLUTION INTRAVENOUS at 12:19

## 2024-08-29 RX ADMIN — ALTEPLASE 2 MG: 2.2 INJECTION, POWDER, LYOPHILIZED, FOR SOLUTION INTRAVENOUS at 16:34

## 2024-08-29 RX ADMIN — CALCIUM CHLORIDE, MAGNESIUM CHLORIDE, SODIUM CHLORIDE, SODIUM BICARBONATE, POTASSIUM CHLORIDE AND SODIUM PHOSPHATE DIBASIC DIHYDRATE 12.5 ML/KG/HR: 3.68; 3.05; 6.34; 3.09; .314; .187 INJECTION INTRAVENOUS at 16:36

## 2024-08-29 RX ADMIN — MIDAZOLAM HYDROCHLORIDE 1 MG/HR: 1 INJECTION, SOLUTION INTRAVENOUS at 15:46

## 2024-08-29 RX ADMIN — MEROPENEM 1 G: 1 INJECTION, POWDER, FOR SOLUTION INTRAVENOUS at 23:37

## 2024-08-29 RX ADMIN — PROPOFOL 65 MCG/KG/MIN: 10 INJECTION, EMULSION INTRAVENOUS at 23:38

## 2024-08-29 RX ADMIN — Medication 2.4 UNITS/HR: at 23:46

## 2024-08-29 RX ADMIN — MIDAZOLAM 2 MG: 1 INJECTION INTRAMUSCULAR; INTRAVENOUS at 15:17

## 2024-08-29 RX ADMIN — CALCIUM CHLORIDE, MAGNESIUM CHLORIDE, SODIUM CHLORIDE, SODIUM BICARBONATE, POTASSIUM CHLORIDE AND SODIUM PHOSPHATE DIBASIC DIHYDRATE 12.5 ML/KG/HR: 3.68; 3.05; 6.34; 3.09; .314; .187 INJECTION INTRAVENOUS at 21:47

## 2024-08-29 RX ADMIN — CETIRIZINE HYDROCHLORIDE 10 MG: 10 TABLET, FILM COATED ORAL at 09:25

## 2024-08-29 RX ADMIN — Medication 60 ML: at 20:47

## 2024-08-29 RX ADMIN — QUETIAPINE FUMARATE 50 MG: 50 TABLET ORAL at 11:49

## 2024-08-29 RX ADMIN — OXYCODONE HYDROCHLORIDE 5 MG: 5 TABLET ORAL at 01:17

## 2024-08-29 RX ADMIN — CALCIUM CHLORIDE, MAGNESIUM CHLORIDE, SODIUM CHLORIDE, SODIUM BICARBONATE, POTASSIUM CHLORIDE AND SODIUM PHOSPHATE DIBASIC DIHYDRATE 12.5 ML/KG/HR: 3.68; 3.05; 6.34; 3.09; .314; .187 INJECTION INTRAVENOUS at 00:34

## 2024-08-29 RX ADMIN — IPRATROPIUM BROMIDE AND ALBUTEROL SULFATE 3 ML: .5; 3 SOLUTION RESPIRATORY (INHALATION) at 08:27

## 2024-08-29 RX ADMIN — Medication 10 MG: at 14:59

## 2024-08-29 RX ADMIN — SODIUM CHLORIDE 2000 MG: 9 INJECTION, SOLUTION INTRAVENOUS at 07:52

## 2024-08-29 RX ADMIN — Medication 25 MCG/HR: at 14:42

## 2024-08-29 RX ADMIN — SODIUM CHLORIDE 500 ML: 900 INJECTION, SOLUTION INTRAVENOUS at 17:15

## 2024-08-29 RX ADMIN — IPRATROPIUM BROMIDE 0.5 MG: 0.5 SOLUTION RESPIRATORY (INHALATION) at 12:37

## 2024-08-29 RX ADMIN — ATORVASTATIN CALCIUM 10 MG: 10 TABLET, FILM COATED ORAL at 09:24

## 2024-08-29 RX ADMIN — OXYCODONE HYDROCHLORIDE 2.5 MG: 5 TABLET ORAL at 05:53

## 2024-08-29 RX ADMIN — CEFEPIME HYDROCHLORIDE 2 G: 2 INJECTION, POWDER, FOR SOLUTION INTRAVENOUS at 09:25

## 2024-08-29 RX ADMIN — CALCIUM CHLORIDE, MAGNESIUM CHLORIDE, SODIUM CHLORIDE, SODIUM BICARBONATE, POTASSIUM CHLORIDE AND SODIUM PHOSPHATE DIBASIC DIHYDRATE: 3.68; 3.05; 6.34; 3.09; .314; .187 INJECTION INTRAVENOUS at 16:36

## 2024-08-29 RX ADMIN — IPRATROPIUM BROMIDE 0.5 MG: 0.5 SOLUTION RESPIRATORY (INHALATION) at 16:57

## 2024-08-29 RX ADMIN — ACETYLCYSTEINE 4 ML: 100 SOLUTION ORAL; RESPIRATORY (INHALATION) at 08:27

## 2024-08-29 RX ADMIN — Medication 60 ML: at 09:25

## 2024-08-29 RX ADMIN — CALCIUM CHLORIDE, MAGNESIUM CHLORIDE, SODIUM CHLORIDE, SODIUM BICARBONATE, POTASSIUM CHLORIDE AND SODIUM PHOSPHATE DIBASIC DIHYDRATE 12.5 ML/KG/HR: 3.68; 3.05; 6.34; 3.09; .314; .187 INJECTION INTRAVENOUS at 05:38

## 2024-08-29 RX ADMIN — OXYCODONE HYDROCHLORIDE 2.5 MG: 5 TABLET ORAL at 00:06

## 2024-08-29 RX ADMIN — Medication 6 MG: at 18:19

## 2024-08-29 RX ADMIN — Medication 6 MG: at 05:53

## 2024-08-29 RX ADMIN — QUETIAPINE FUMARATE 50 MG: 25 TABLET ORAL at 05:53

## 2024-08-29 RX ADMIN — ALBUMIN HUMAN 50 G: 0.25 SOLUTION INTRAVENOUS at 15:51

## 2024-08-29 RX ADMIN — Medication 50 MCG/HR: at 08:12

## 2024-08-29 RX ADMIN — Medication 200 MCG/HR: at 20:01

## 2024-08-29 RX ADMIN — Medication 40 MG: at 09:23

## 2024-08-29 RX ADMIN — NOREPINEPHRINE BITARTRATE 0.26 MCG/KG/MIN: 0.06 INJECTION, SOLUTION INTRAVENOUS at 18:02

## 2024-08-29 RX ADMIN — LOPERAMIDE HYDROCHLORIDE 4 MG: 2 CAPSULE ORAL at 18:19

## 2024-08-29 RX ADMIN — CALCIUM CHLORIDE, MAGNESIUM CHLORIDE, SODIUM CHLORIDE, SODIUM BICARBONATE, POTASSIUM CHLORIDE AND SODIUM PHOSPHATE DIBASIC DIHYDRATE 12.5 ML/KG/HR: 3.68; 3.05; 6.34; 3.09; .314; .187 INJECTION INTRAVENOUS at 16:37

## 2024-08-29 RX ADMIN — ONDANSETRON 8 MG: 2 INJECTION INTRAMUSCULAR; INTRAVENOUS at 04:25

## 2024-08-29 RX ADMIN — LOPERAMIDE HYDROCHLORIDE 4 MG: 2 CAPSULE ORAL at 00:06

## 2024-08-29 RX ADMIN — PROPOFOL 65 MCG/KG/MIN: 10 INJECTION, EMULSION INTRAVENOUS at 20:44

## 2024-08-29 RX ADMIN — ACETAMINOPHEN 975 MG: 325 TABLET, FILM COATED ORAL at 09:25

## 2024-08-29 RX ADMIN — MONTELUKAST 10 MG: 10 TABLET, FILM COATED ORAL at 22:26

## 2024-08-29 RX ADMIN — CEFEPIME HYDROCHLORIDE 2 G: 2 INJECTION, POWDER, FOR SOLUTION INTRAVENOUS at 15:51

## 2024-08-29 RX ADMIN — SODIUM BICARBONATE: 84 INJECTION, SOLUTION INTRAVENOUS at 23:52

## 2024-08-29 RX ADMIN — ACETAMINOPHEN 975 MG: 325 TABLET, FILM COATED ORAL at 00:06

## 2024-08-29 RX ADMIN — LEVALBUTEROL HYDROCHLORIDE 0.63 MG: 0.63 SOLUTION RESPIRATORY (INHALATION) at 12:37

## 2024-08-29 RX ADMIN — LOPERAMIDE HYDROCHLORIDE 4 MG: 2 CAPSULE ORAL at 11:49

## 2024-08-29 RX ADMIN — LOPERAMIDE HYDROCHLORIDE 4 MG: 2 CAPSULE ORAL at 05:53

## 2024-08-29 RX ADMIN — Medication 8.89 MG: at 22:57

## 2024-08-29 RX ADMIN — ACETAMINOPHEN 975 MG: 325 TABLET, FILM COATED ORAL at 16:18

## 2024-08-29 RX ADMIN — PROPOFOL 15 MCG/KG/MIN: 10 INJECTION, EMULSION INTRAVENOUS at 08:03

## 2024-08-29 RX ADMIN — DEXMEDETOMIDINE HYDROCHLORIDE IN SODIUM CHLORIDE 0.5 MCG/KG/HR: 4 INJECTION INTRAVENOUS at 04:15

## 2024-08-29 RX ADMIN — SODIUM BICARBONATE 100 MEQ: 84 INJECTION, SOLUTION INTRAVENOUS at 22:49

## 2024-08-29 RX ADMIN — PROCHLORPERAZINE EDISYLATE 10 MG: 5 INJECTION INTRAMUSCULAR; INTRAVENOUS at 05:06

## 2024-08-29 RX ADMIN — IPRATROPIUM BROMIDE 0.5 MG: 0.5 SOLUTION RESPIRATORY (INHALATION) at 21:02

## 2024-08-29 RX ADMIN — WHITE PETROLATUM 57.7 %-MINERAL OIL 31.9 % EYE OINTMENT: at 22:41

## 2024-08-29 RX ADMIN — Medication 6 MG: at 00:06

## 2024-08-29 RX ADMIN — Medication 6 MG: at 11:48

## 2024-08-29 RX ADMIN — HEPARIN SODIUM 1300 UNITS/HR: 1000 INJECTION, SOLUTION INTRAVENOUS; SUBCUTANEOUS at 21:42

## 2024-08-29 RX ADMIN — Medication 2.4 UNITS/HR: at 15:31

## 2024-08-29 RX ADMIN — HYDROMORPHONE HYDROCHLORIDE 0.2 MG: 0.2 INJECTION, SOLUTION INTRAMUSCULAR; INTRAVENOUS; SUBCUTANEOUS at 01:17

## 2024-08-29 RX ADMIN — CISATRACURIUM BESYLATE 3 MCG/KG/MIN: 10 INJECTION INTRAVENOUS at 21:10

## 2024-08-29 RX ADMIN — EPOPROSTENOL 20 NG/KG/MIN: 1.5 INJECTION, POWDER, LYOPHILIZED, FOR SOLUTION INTRAVENOUS at 22:50

## 2024-08-29 RX ADMIN — BUDESONIDE 1 MG: 1 INHALANT ORAL at 08:27

## 2024-08-29 RX ADMIN — LEVALBUTEROL HYDROCHLORIDE 0.63 MG: 0.63 SOLUTION RESPIRATORY (INHALATION) at 16:57

## 2024-08-29 RX ADMIN — LEVOTHYROXINE SODIUM 25 MCG: 0.03 TABLET ORAL at 09:25

## 2024-08-29 RX ADMIN — PROPOFOL 75 MCG/KG/MIN: 10 INJECTION, EMULSION INTRAVENOUS at 15:33

## 2024-08-29 RX ADMIN — Medication 1 TABLET: at 09:24

## 2024-08-29 RX ADMIN — PROPOFOL 65 MCG/KG/MIN: 10 INJECTION, EMULSION INTRAVENOUS at 18:18

## 2024-08-29 RX ADMIN — LEVALBUTEROL HYDROCHLORIDE 0.63 MG: 0.63 SOLUTION RESPIRATORY (INHALATION) at 21:02

## 2024-08-29 RX ADMIN — PROPOFOL 70 MCG/KG/MIN: 10 INJECTION, EMULSION INTRAVENOUS at 10:06

## 2024-08-29 ASSESSMENT — ACTIVITIES OF DAILY LIVING (ADL)
ADLS_ACUITY_SCORE: 59
ADLS_ACUITY_SCORE: 63
ADLS_ACUITY_SCORE: 59
ADLS_ACUITY_SCORE: 63
ADLS_ACUITY_SCORE: 63
ADLS_ACUITY_SCORE: 59
ADLS_ACUITY_SCORE: 63
ADLS_ACUITY_SCORE: 59

## 2024-08-29 NOTE — PROGRESS NOTES
Pt with worsening respiratory acidosis this evening, slightly improved after BIPAP started around 2030. However at the bedside pt reports fatigue and has significant work of breathing. The concern is that in the setting of prolonged critical illness she cannot keep up with her work of breathing. I discussed this with Donna and strongly recommended intubation. She was firm in not wanting intubation at this time stating the reason is she recalls her last intubation and being semi-asleep and semi-aware and does not want that again. But she also states her goals are restorative and she wants to continue to fight to get better. I shared my fear that delaying intubation could lead to a respiratory emergency which would be more dangerous for her. She heard my concerns and still declined intubation at this time. We agreed upon BIPAP and close observation. We updated her son of the situation by phone.     Heather High MD  Pulmonary Critical Care Fellow

## 2024-08-29 NOTE — PROGRESS NOTES
CRRT STATUS NOTE    DATA:  Time:  5:55 PM  Pressures WNL:  Yes  Filter Status:  WDL    Problems Reported/Alarms Noted:  None    Supplies Present:  Yes    ASSESSMENT:  Patient Net Fluid Balance:  Net IO Since Admission: -9,994.66 mL [08/29/24 1755]     Intake/Output Summary (Last 24 hours) at 8/29/2024 1755  Last data filed at 8/29/2024 1701  Gross per 24 hour   Intake 3511.25 ml   Output 2282.8 ml   Net 1228.45 ml      Vitals:  Temp:  [97.3  F (36.3  C)-100.7  F (38.2  C)] 100.1  F (37.8  C)  Pulse:  [111-151] 127  Resp:  [9-34] 13  BP: (135)/(69) 135/69  MAP:  [42 mmHg-252 mmHg] 79 mmHg  Arterial Line BP: ()/() 115/61  FiO2 (%):  [70 %-90 %] 90 %  SpO2:  [52 %-100 %] 98 %   Labs:    Lab Results   Component Value Date     08/29/2024    POTASSIUM 4.9 08/29/2024    CR 1.11 (H) 08/29/2024    BUN 25.8 (H) 08/29/2024    MAG 2.3 08/29/2024    PHOS 5.3 (H) 08/29/2024    WBC 21.6 (H) 08/29/2024    HGB 7.1 (L) 08/29/2024    HCT 23.5 (L) 08/29/2024     08/29/2024           Goals of Therapy:  Pull as tolerated.     INTERVENTIONS:   Review flow sheets and discuss plan of care with bedside nurse and nephrology team.    PLAN:  Continue fluid removal as tolerated per goals of therapy.  Check filter daily and change filter q 72 hrs and PRN.  Please contact the CRRT resource RN at [phone] with any questions/concerns.

## 2024-08-29 NOTE — PROCEDURES
[facility]    Intubation    Date/Time: 8/29/2024 5:58 PM    Performed by: Velez Reyes, German, MD  Authorized by: Velez Reyes, German, MD  Indications: respiratory failure, respiratory distress and hypoxemia  Intubation method: video-assisted      UNIVERSAL PROTOCOL   Site Marked: Yes  Prior Images Obtained and Reviewed:  No  Required items: Required blood products, implants, devices and special equipment available    Patient identity confirmed:  Arm band, provided demographic data, hospital-assigned identification number and anonymous protocol, patient vented/unresponsive  Patient was reevaluated immediately before administering moderate or deep sedation or anesthesia  Confirmation Checklist:  Relevant allergies, procedure was appropriate and matched the consent or emergent situation, correct equipment/implants were available and patient's identity using two indicators  Time out: Immediately prior to the procedure a time out was called    Universal Protocol: the Joint Commission Universal Protocol was followed    Preparation: Patient was prepped and draped in usual sterile fashion      Patient status: paralyzed (RSI)  Preoxygenation: BIPAP.  Paralytic: rocuronium  Sedatives: etomidate  Laryngoscope size: Glidescope.  Tube size: 7.5 mm  Tube type: cuffed  Number of attempts: 1  Cricoid pressure: no  Cords visualized: yes  Post-procedure assessment: chest rise, CXR verification and colorimetric ETCO2  Breath sounds: equal  ETT to lip: 26 cm  Chest x-ray interpreted by me.  Chest x-ray findings: endotracheal tube in appropriate position  Tube secured with: bite block      PROCEDURE    Patient Tolerance:  Patient tolerated the procedure well with no immediate complications  Length of time physician/provider present for 1:1 monitoring during sedation: 15      Germán L. Vélez Reyes, MD, PhD  Pulmonary/Critical Care Fellow  Pager: [phone]    Attending note:  Emergent  intubation for acute respiratory failure.  Diagnosis- acute respiratory failure.  I was present and assisted during all parts of the procedure.  No complications.    Radha De Anda MD  [phone]

## 2024-08-29 NOTE — PROGRESS NOTES
CLINICAL NUTRITION SERVICES - REASSESSMENT NOTE   Nutrition Prescription    Malnutrition Status:    Moderate malnutrition in the context of acute illness    Recommendations already ordered by Registered Dietitian (RD):  EN support - switching to renal formula d/t hyperphosphatemia and rising K+:  Medifocus Renal (or equivalent) @ 40 mL/hr (960 mL/day) + 2 pkts ProSource TF20 to provide 1888 kcal (33 kcal/kg, or 74% MREE from 8/22), 117 g protein (2.05 g pro/kg), 163 g CHO, 19 g fiber, and 643 mL free water daily    -8/29: Once new formula arrives (Medifocus Renal), ok to start at goal rate 40 mL/hr    Discontinued Banatrol TF - will be receiving fiber from new TF formula    Future/Additional Recommendations:  Repeat met cart study once appropriate.    Monitor tolerance and stooling trends with new EN support. If stooling not improving/worsening again, consider adding up to 2 pkts Banatrol TF daily for total of 29 g fiber with TF.    Monitor kcal from propofol, adjust EN support if indicated.     EVALUATION OF THE PROGRESS TOWARD GOALS   Diet: NPO    Enteral Access: NJT    FWF: 30 mL q4h    Nutrition Support: EN  8/9 - 8/22: Vital AF 1.2 @ 35 ml/hr + 3 pkts prosource TF20 provides 840 ml volume, 1248 kcals (21 kcal/kg), 123 g pro (2.1 g/kg), 45 g Fat, 93 g CHO, 4 g fiber, 681 ml free water.     8/23 - Current: Vital AF 1.2 @ 50 ml/hr + 2 pkts prosource TF20 provides 1200 ml volume, 1600 kcals (27 kcal/kg or 63% MREE), 130 g pro (2.2 g/kg), 65 g Fat, 133 g CHO, 6 g fiber, 973 ml free water.     Enteral Intake: Tolerating TF at goal rate.   -->7-day average enteral nutrition infusions: 988 mL TF + 2.14 ProSource protein packets = 1357 kcals (23 kcal/kg, or 53% MREE from 8/22) and 117 g protein (1.95 g protein/kg, or >100% minimum assessed protein needs).     NEW FINDINGS   -Per visit with pt today (8/29): Pt recently intubated, sedated. Not responsive to writer's voice or physical exam other than moving leg slightly  when assessing calf area.    -Wt trends: ~4.9% wt loss over past week, suspect fluid-related and true wt loss  Date/Time Weight Weight Method   08/29/24 0400 80.1 kg (176 lb 9.4 oz) Bed scale   08/28/24 0200 79 kg (174 lb 2.6 oz) - lowest wt this admit Bed scale   08/27/24 0200 80 kg (176 lb 5.9 oz) Bed scale   08/26/24 0400 81.7 kg (180 lb 1.9 oz) Bed scale   08/25/24 0400 81.7 kg (180 lb 1.9 oz) Bed scale   08/24/24 0400 82.8 kg (182 lb 8.7 oz) Bed scale   08/23/24 0600 84.1 kg (185 lb 6.5 oz) Bed scale   08/22/24 0200 82.6 kg (182 lb 1.6 oz) Bed scale   08/21/24 0200 83.1 kg (183 lb 3.2 oz) Bed scale   08/20/24 0500 83.2 kg (183 lb 6.8 oz) --   08/19/24 0400 84.5 kg (186 lb 4.6 oz) Bed scale   08/18/24 0400 85.1 kg (187 lb 9.8 oz) --   08/17/24 0400 83.6 kg (184 lb 4.9 oz) Bed scale   08/16/24 0500 85 kg (187 lb 6.3 oz) Bed scale   08/15/24 0400 87.1 kg (192 lb 0.3 oz) Bed scale   08/14/24 0400 85.5 kg (188 lb 7.9 oz) Bed scale   08/13/24 0600 86.5 kg (190 lb 11.2 oz) Bed scale   08/12/24 0000 87.2 kg (192 lb 3.9 oz) Bed scale   08/10/24 0400 92.5 kg (203 lb 14.8 oz) Bed scale   08/09/24 0600 93 kg (205 lb 0.4 oz) Bed scale   08/08/24 1500 88.9 kg (195 lb 15.8 oz) --      -Labs: Reviewed, notable for:   K+ 4.8 (WNL, uptrended)  Phos 5.2 (H, uptrended)  BUN 22.8 (H, uptrended)  Cr 1.06 (H, up from from prior)    -GI: Rectal tube with 350-1600 mL stool output daily over past week per I/Os. Lowest stool output yesterday 8/28 over past week (350 mL).  Banatrol TF 1 pkt TID. Receiving loperamide and tincture of opium both q6h.    -Neuro: Sedated      -Renal: CRRT    -Respiratory: Intubated this AM, on mechanical ventilation.    -Skin: Per most recent WOCN note on 8/22 - no pressure injuries but some open areas on lips (trauma vs viral). Skin beneath nasal bridle was inspected; appears appropriate, with no skin breakdown or tension on the bridle string.      -Meds & Vitamin/Mineral Supplementation: Reviewed, notable  for:   High dose sliding scale insulin  Imodium  Renavite  Propofol gtt    NEW Dosing Weight: 57 kg (adjusted using driest weight of 79 kg on 8/28 and IBW of 50 kg)     ASSESSED NUTRITION NEEDS - updated 8/29   Estimated Energy Needs: 1524 - 2032 kcals/day (27 - 36 kcal/kg or 60 to 80% MREE from 8/22)   Justification: Maintenance, Obese, and Vented   Estimated Protein Needs: 86 - 114 grams protein/day (1.5 - 2 grams of pro/kg)   Justification: Increased needs, CRRT   Estimated Fluid Needs: 1 mL/kcal   Justification: Per provider pending fluid status     MALNUTRITION  % Intake: Decreased intake does not meet criteria  % Weight Loss: > 2% in 1 week (severe) - counting toward moderate d/t suspect at least partially fluid-related loss  Subcutaneous Fat Loss: None observed  Muscle Loss: Upper arm (bicep, tricep):  Moderate and Upper leg (quadricep, hamstring):  Mild  Fluid Accumulation/Edema: Trace to Mild (1-2+ per RN flowsheets)  Malnutrition Diagnosis: Moderate malnutrition in the context of acute illness    Previous Goals   Total avg nutritional intake to meet a minimum of 25 kcal/kg and 2 g PRO/kg daily (per dosing wt 60 kg).   Evaluation: Met    Previous Nutrition Diagnosis  Increased nutrient needs (calorie and protein) related to catabolic illness, CRRT as evidenced by results of metabolic cart study and assessed protein needs of 2-2.5g/kg   Evaluation: Updated below    CURRENT NUTRITION DIAGNOSIS  Inadequate oral intake related to NPO status in setting of intubation as evidenced by continued need for EN support to meet full nutrition needs.       INTERVENTIONS  Implementation  -Collaboration with other providers: MICU rounds  -Enteral Nutrition - switch to renal formula    Goals  Total avg nutritional intake to meet a minimum of 27 kcal/kg (60% MREE from 8/22) and 1.5 g PRO/kg daily (per NEW dosing wt 57 kg).    Monitoring/Evaluation  Progress toward goals will be monitored and evaluated per protocol.     Ashli  ALMITA Chatman, ALEXANDRA  Available on Vocera - can search by name or unit Dietitian  **Clinical Nutrition is no longer available via pager

## 2024-08-29 NOTE — PLAN OF CARE
ICU End of Shift Summary. See flowsheets for vital signs and detailed assessment.    Changes this shift: Pt A/Ox4 at start of shift but became more disoriented as the night went on. Now pt disoriented to place and time, intermittently situation. Follows commands, pupils equal and reactive. Sinus tach 110-150s, HTN for most of night with some episodes of hypotension requiring levo for short periods. Multiple ABGs drawn, BIPAP settings change to 14/6 and FIO2 increased to 80%. Pt breathing appeared labored, MD attempted to obtain consent from pt to intubate but pt refused (see MD note). BIPAP on for majority of night with small breaks with HFNC at 100% 55L. Pt has weak cough. PT c/o nausea x2 gave IV zofran x2 with no relief, compazine given x2 with relief. TF at goal rate, rectal tube in place. BS for 167. Continue CRRT.     At 0600 pt began to drop her pressures with MAPs into the 50s. Levo restarted and had to be increased to 0.1 within 15 min to maintain MAP>65. Pt also became less responsive, only opening eyes to voice but no longer following commands. Repeat ABG obtained along with a lactic acid.      Plan: Intubate, continue CRRT, wean Levo.       Goal Outcome Evaluation:      Plan of Care Reviewed With: patient    Overall Patient Progress: decliningOverall Patient Progress: declining    Outcome Evaluation: Pt requiring increasing BIPAP needs, confused

## 2024-08-29 NOTE — PROGRESS NOTES
"  Nephrology Progress Note  08/29/2024         Assessment & Recommendations:   Mrs Flaherty is a 52 yo F w/ hx of Anxiety, depression, fibromyalgia, hypothyroidism, asthma, HLD, MURIEL on CPAP, expressive aphasia, and hypertension who was admitted to an OSH with community acquired pneumonia on 8/3 s/p intubation.  Found to have severe ARDS requiring paralysis and proning, transferred here on 8/8 for management and initiated on CKRT for severe acidemia in the setting of oligoanuric CHACORTA.  Started CRRT on 8/9 for volume management.     # anuric CHACORTA, suspected ATN in setting of shock, ARDS  Continue CRRT (with heparin dosed by Xa level protocol due to filter clotting). Continue 4K baths, standard 25ml/kg/hr clearance. Lessened UF on 8/27, and on 8/28 patient had increasing oxygen needs.  -continue net negative goal of 50ml/hr on CRRT    # hypervolemia - UF as above    # electrolytes  No current issues (mild hypophos ok, calcium corrects upward, mild hypermag ok).       Recommendations were communicated to primary team via this note.     Danny Myrick MD   Division of Nephrology and Hypertension  [facility]  Vocera Web Console      Interval History :   Nursing and provider notes from last 24 hours reviewed.  In the last 24 hours Massiel Flaherty was re-intubated. She is also back on pressors. 0mL of UOP again.     Review of Systems:   Unable to obtain due to intubation     Physical Exam:   I/O last 3 completed shifts:  In: 2651.03 [I.V.:426.03; NG/GT:1025]  Out: 3792.9 [Other:3417.9; Stool:375]   /69 (BP Location: Right arm)   Pulse 117   Temp 99.9  F (37.7  C) (Axillary)   Resp 19   Ht 1.575 m (5' 2\")   Wt 80.1 kg (176 lb 9.4 oz)   SpO2 100%   BMI 32.30 kg/m       GENERAL APPEARANCE: critically ill, intubated (again)  HEENT: MMM   PULM: lungs clear anteriorly   CV: regular rhythm, normal rate, no rub      -edema - 1+ LE edema bilat   GI: soft, ND  INTEGUMENT: no rash on exposed skin  NEURO: sedated "   Access is [facility] line 8/19.     Labs:   All labs reviewed by me  Electrolytes/Renal -   Recent Labs   Lab Test 08/29/24  1132 08/29/24  1131 08/29/24  0917 08/29/24  0346 08/28/24  2349 08/28/24 2025   NA  --  136  --  136  --  137   POTASSIUM  --  4.9  --  4.8  --  4.7   CHLORIDE  --  103  --  103  --  103   CO2  --  21*  --  22  --  21*   BUN  --  25.8*  --  22.8*  --  19.4   CR  --  1.11*  --  1.06*  --  0.97*   * 205* 145* 167*   < > 193*   QUAN  --  8.7*  --  9.1  --  9.0   MAG  --  2.3  --  2.5*  --  2.3   PHOS  --  5.3*  --  5.2*  --  4.9*    < > = values in this interval not displayed.       CBC -   Recent Labs   Lab Test 08/29/24  1300 08/29/24  1131 08/29/24  0346   WBC 21.6* 21.5* 21.5*   HGB 7.1* 7.5* 8.3*    270 264       LFTs -   Recent Labs   Lab Test 08/29/24  1131 08/29/24  0346 08/28/24 2025 08/28/24  1147 08/28/24  0403 08/27/24  1359 08/27/24  0323   ALKPHOS  --  119  --   --  95  --  94   BILITOTAL  --  0.2  --   --  0.2  --  <0.2   ALT  --  21  --   --  20  --  15   AST  --  53*  --   --  31  --  24   PROTTOTAL  --  7.0  --   --  6.2*  --  5.7*   ALBUMIN 3.0* 3.4* 3.5   < > 3.2*   < > 3.0*    < > = values in this interval not displayed.       Iron Panel - No lab results found.      Imaging:  All imaging studies reviewed by me.     Current Medications:  Current Facility-Administered Medications   Medication Dose Route Frequency Provider Last Rate Last Admin    acetaminophen (TYLENOL) tablet 975 mg  975 mg Oral or Feeding Tube Q8H Andres Payne PA-C   975 mg at 08/29/24 0925    atorvastatin (LIPITOR) tablet 10 mg  10 mg Oral or Feeding Tube Daily Francisco Welsh MD   10 mg at 08/29/24 0924    budesonide (PULMICORT) neb solution 1 mg  1 mg Nebulization Daily Andres Payne PA-C   1 mg at 08/29/24 0827    ceFEPIme (MAXIPIME) 2 g vial to attach to  mL bag for ADULTS or NS 50 mL bag for PEDS  2 g Intravenous Q8H Fuentes Fowler MD   2 g at 08/29/24 0925    cetirizine  (zyrTEC) tablet 10 mg  10 mg Oral or Feeding Tube Daily Barry Hatch MD   10 mg at 08/29/24 0925    [Held by provider] cloNIDine (CATAPRES) tablet 0.1 mg  0.1 mg Oral Daily Otilia Sun APRN CNP   0.1 mg at 08/28/24 1018    [Held by provider] gabapentin (NEURONTIN) capsule 300 mg  300 mg Oral or Feeding Tube TID Barry Hatch MD   300 mg at 08/28/24 1959    insulin aspart (NovoLOG) injection (RAPID ACTING)  1-12 Units Subcutaneous Q4H Aniceto Adame MD   2 Units at 08/29/24 1148    ipratropium (ATROVENT) 0.02 % neb solution 0.5 mg  0.5 mg Nebulization 4x daily Barry Hatch MD   0.5 mg at 08/29/24 1237    And    levalbuterol (XOPENEX) neb solution 0.63 mg  0.63 mg Nebulization 4x Daily Barry Hatch MD   0.63 mg at 08/29/24 1237    levothyroxine (SYNTHROID/LEVOTHROID) tablet 25 mcg  25 mcg Per Feeding Tube QAM AC Giovanny Guidry PA-C   25 mcg at 08/29/24 0925    loperamide (IMODIUM) capsule 4 mg  4 mg Oral or Feeding Tube Q6H Barry Hatch MD   4 mg at 08/29/24 1149    montelukast (SINGULAIR) tablet 10 mg  10 mg Oral or Feeding Tube At Bedtime Barry Hatch MD   10 mg at 08/28/24 2108    multivitamin RENAL (RENAVITE RX/NEPHROVITE) tablet 1 tablet  1 tablet Oral or Feeding Tube Daily Giovanny Guidry PA-C   1 tablet at 08/29/24 0924    opium tincture 10 MG/ML (1%) liquid 6 mg  6 mg Oral or Feeding Tube Q6H Francisco Welsh MD   6 mg at 08/29/24 1148    [Held by provider] oxyCODONE IR (ROXICODONE) half-tab 2.5 mg  2.5 mg Oral or Feeding Tube Q6H Gaudencio De Souza MD   2.5 mg at 08/29/24 0553    pantoprazole (PROTONIX) 2 mg/mL suspension 40 mg  40 mg Per Feeding Tube QAM AC Barry Hatch MD   40 mg at 08/29/24 0923    Prosource TF20 ENfit Compatibl EN LIQD (PROSOURCE TF20) packet 60 mL  1 packet Per Feeding Tube BID Giovanny Guidry PA-C   60 mL at 08/29/24 0925    [START ON 8/30/2024] vancomycin (VANCOCIN) 1,250 mg in 0.9% NaCl 250 mL intermittent infusion  1,250  mg Intravenous Q18H Barry Hatch MD         Current Facility-Administered Medications   Medication Dose Route Frequency Provider Last Rate Last Admin    dexmedeTOMIDine (PRECEDEX) 4 mcg/mL in sodium chloride 0.9 % 100 mL infusion  0.1-1 mcg/kg/hr (Dosing Weight) Intravenous Continuous Otilia Sun APRN CNP   Stopped at 08/29/24 0621    dextrose 10% infusion   Intravenous Continuous PRN Giovanny Guidry PA-C        dialysate for CVVHD & CVVHDF (Phoxillum BK4/2.5)  12.5 mL/kg/hr CRRT Continuous Danny Myrick MD 1,000 mL/hr at 08/29/24 0538 12.5 mL/kg/hr at 08/29/24 0538    fentaNYL (SUBLIMAZE) infusion   mcg/hr Intravenous Continuous Fuentes Fowler MD 4 mL/hr at 08/29/24 1300 200 mcg/hr at 08/29/24 1300    heparin (porcine) 20,000 units in 20 mL ANTICOAGULANT infusion (syringe from pharmacy)  500-1,500 Units/hr CRRT Continuous Danny Myrick MD 1.3 mL/hr at 08/29/24 1200 1,300 Units/hr at 08/29/24 1200    propofol (DIPRIVAN) infusion  5-75 mcg/kg/min (Dosing Weight) Intravenous Continuous Fuentes Fowler MD 37.3 mL/hr at 08/29/24 1300 70 mcg/kg/min at 08/29/24 1300    And    Medication Instruction   Does not apply Continuous PRN Fuentes Fowler MD        No lozenges or gum should be given while patient on BIPAP/AVAPS/AVAPS AE   Does not apply Continuous PRN Otilia Sun APRN CNP        norepinephrine (LEVOPHED) 16 mg in  mL infusion MAX CONC CENTRAL LINE  0.01-0.6 mcg/kg/min (Dosing Weight) Intravenous Continuous Elier Hutchins MD 20 mL/hr at 08/29/24 1300 0.24 mcg/kg/min at 08/29/24 1300    Patient may continue current oral medications   Does not apply Continuous PRN Otilia Sun APRN CNP        POST-filter replacement solution for CVVHD & CVVHDF (Phoxillum BK4/2.5)   CRRT Continuous Danny Myrick  mL/hr at 08/28/24 2051 New Bag at 08/28/24 2051    PRE-filter replacement solution for CVVHD & CVVHDF (Phoxillum BK4/2.5)  12.5 mL/kg/hr CRRT  Continuous Danny Myrick MD 1,000 mL/hr at 08/29/24 0538 12.5 mL/kg/hr at 08/29/24 0538     Danny Myrick MD

## 2024-08-29 NOTE — PHARMACY-VANCOMYCIN DOSING SERVICE
Pharmacy Vancomycin Initial Note  Date of Service 2024  Patient's  1970  53 year old, female    Indication: Sepsis    Current estimated CrCl = Estimated Creatinine Clearance: 60.2 mL/min (A) (based on SCr of 1.06 mg/dL (H)).    Creatinine for last 3 days  2024:  1:17 PM Creatinine 1.12 mg/dL;  8:03 PM Creatinine 1.16 mg/dL  2024:  3:23 AM Creatinine 1.11 mg/dL;  1:59 PM Creatinine 1.04 mg/dL;  8:00 PM Creatinine 0.98 mg/dL  2024:  4:03 AM Creatinine 0.97 mg/dL; 11:47 AM Creatinine 0.94 mg/dL;  8:25 PM Creatinine 0.97 mg/dL  2024:  3:46 AM Creatinine 1.06 mg/dL    Recent Vancomycin Level(s) for last 3 days  No results found for requested labs within last 3 days.      Vancomycin IV Administrations (past 72 hours)                     vancomycin (VANCOCIN) 2,000 mg in sodium chloride 0.9 % 500 mL intermittent infusion (mg) 2,000 mg New Bag 24 0752                    Nephrotoxins and other renal medications (From now, onward)      Start     Dose/Rate Route Frequency Ordered Stop    24 0400  vancomycin (VANCOCIN) 1,250 mg in 0.9% NaCl 250 mL intermittent infusion         1,250 mg  166.7 mL/hr over 90 Minutes Intravenous EVERY 18 HOURS 24 1105      24 1930  norepinephrine (LEVOPHED) 16 mg in  mL infusion MAX CONC CENTRAL LINE         0.01-0.6 mcg/kg/min × 88.9 kg (Dosing Weight)  0.8-50 mL/hr  Intravenous CONTINUOUS 24 1929              Contrast Orders - past 72 hours (72h ago, onward)      None              Plan:  Start vancomycin wih 2000mg IV once (25 mg/kg) loading dose followed by 1250mg (15 mg/kg) IV q24h.   Vancomycin monitoring method: Trough (Method 2 = manual dose calculation)  Vancomycin therapeutic monitoring goal: 15-20 mg/L  Pharmacy will check vancomycin levels as appropriate in 1-3 Days.    Serum creatinine levels will be ordered daily for the first week of therapy and at least twice weekly for subsequent weeks.      Radha  Nas, formerly [facility]

## 2024-08-29 NOTE — PROGRESS NOTES
CRRT STATUS NOTE    DATA:  Time:   0545 AM  Pressures WNL:  YES  Filter Status:  WDL    Problems Reported/Alarms Noted:  None.    Supplies Present:  YES    ASSESSMENT:  Patient Net Fluid Balance:  Net -302 ml since MN.  Vital Signs:  , /66, MAP 87  Labs:  K 4.8, Mg 2.5, Phos 5.2, iCa 4.9, Hgb 8.3, Plt 264  Goals of Therapy:  -50 ml/hr net negative so long as MAP >=65     INTERVENTIONS:   None.    PLAN:  Continue to monitor circuit daily and change set q72 hours or PRN for clotting/clogging. Please call CRRT RN with any quesitons/problems.

## 2024-08-29 NOTE — PROGRESS NOTES
MEDICAL ICU PROGRESS NOTE  08/29/2024      Date of Service (when I saw the patient): 08/29/2024    ASSESSMENT: Massiel Flaherty is a 53 year old female with PMH Anxiety/depression, fibromyalgia, c/f nonepileptic seizures not on AEDs, hypothyroidism, asthma, HLD, MURIEL on CPAP, expressive aphasia, hypertension  who was admitted on 8/3/2024 for fatigue, fever and dyspnea and intubated 8/6/24 at OSH for ARDS, proned and paralyzed without improvement. Transferred to [facility] 8/8/24, hypoxic with high plateaus/peaks and placed on VV ECMO and CRRT. Decannulated from VV ECMO 8/18 and transferred to MICU 8/26/24 for ongoing management.     CHANGES and MAJOR THINGS TODAY:     - Changed to steady state volume on CRRT  - Concern for tachycardia: volume depletion vs infection  - worsening O2 needs, AMS, respiratory muscle exhaustion prompted intubation this AM.    Neuro:  # Pain and sedation  # Acute pain  # Sedation and analgesia for vent compliance   # Concern for ICU delirium   # Hx Fibromyalgia   # Hx myalgic encephalomyelitis   # Hx anxiety/depression  - Monitor neurological status. Delirium preventions and precautions.   - Pain: Scheduled: tylenol, oxycodone 5mg q 6hr (decreased from q 4hr) held while sedated  PRN: tylenol, oxycodone  - Sedation plan: fentanyl and propofol + precedex  - Gabapentin 300mg TID held while sedated  - Seroquel 25mg BID PRN held while sedated  - PTA Venlafaxine and Amitriptyline discontinued while sedated  - Delirium precautions, optimize environment to regulate day/night normalcy   - If worsening, increase seroquel dose     # Hx spells with staring and BUE posturing previously described as nonepileptic seizures   # Hx of GTC seizures and myoclonic epilepsy, weaned off AEDs   # Diffuse cerebral edema - improved  - Sodium goals liberalized to 140-145  - 8/21: MRI Brain: no acute intracranial pathology. No findings to suggest anoxic brain injury.      Pulmonary:  # Acute hypoxic respiratory failure  c/b ARDS   # s/p VV ECMO 8/8 - 8/18   # Hx CAP  # Asthma  # MURIEL on home CPAP   FiO2 (%): 70 %, Resp: 10, Vent Mode: CMV/AC, Resp Rate (Set): 20 breaths/min, Tidal Volume (Set, mL): 300 mL, PEEP (cm H2O): 8 cmH2O, Resp Rate (Set): 20 breaths/min, Tidal Volume (Set, mL): 300 mL, PEEP (cm H2O): 8 cmH2O    PFT dated 9/8/2020 demonstrated normal spirometry with mild diffusion impairment and mild restriction (FEV1/FVC 80%, FEV1 2.59, DLCO 40%). CT abdomen chest 3/9/2020 demonstrated scarring and fibrosis bilaterally which correlated with the decreased DLCO. The patient also has a history of MURIEL on CPAP therapy as currently managed by her provider in [place]. Unclear what triggered ARDS or why she has had such severe hospital course but will further investigate ILD.  - ILD w/u aldolase, EVELIO, RF, CCP, ANCA, MPO, SSA Ro and La, Scleroderma antibody, Radha 1,  hypersensity pneumonitis, IGG subclasses,  aspergillus, blastomyces, histoplasma   - SpO2 goals >92%, PaO2 goals >60   - PTA singular at bedtime if able  - Scheduled pulmicort, and duonebs   - Monitor respiratory status, wean as tolerated  - XR 8/29 showing slight decrease in lung opacities  - Worsening respiratory status possible due to respiratory muscle fatigue, pneumonia       Cardiovascular:  # Hyperlipidemia  # Hypotension  # Hx HTN  - MAP goal >65, SBP goals >110  - NE for pressor support, wean as able, ok to increase while removing volume via CRRT  - Restarted PTA atorvastatin  - PTA clonidine held while sedated  - EKG today showed sinus tachycardia, tachycardia possible due to precedex withdrawal, volume depletion, infection, O2 needs     GI/Nutrition:  # Moderate protein calorie malnutrition  # Non-infectious Diarrhea  # GERD   - Protonix (home medication)   - Nutrition consulted, appreciate recs  - Diet: TF via NJ, assess swallow after sedation weaned, failed previous swallow study  - Hold bowel regimen d/t loose stools  - Change suspension meds to other  form as able  - Continue scheduled multivitamin, banatrol, prosource packet, and loperamide  - Increase frequency of tincture of opium for high stool output  - Rectal tube, continues with high output. Keep today.      Renal/Fluids/Electrolytes:  # Acute kidney injury  Baseline Cr approx 0.6. Pt was anuric here but now making some urine, likely prerenal due to shock.  - Continue CRRT; fluid goal net zero for the day depending on CXR  - Heparinized CRRT circuit, low intensity protocol. Last clotted 8/24 PM  - Strict I&Os   - Bladder scan q shift, currently anuric  - Avoid nephrotoxins as able      Endocrine:  # Steroid and stress induced hyperglycemia  # Hypothyroidism   - Goal to keep BG < 180 for optimal wound healing.   - Continue High SSI  - Continue PTA synthroid     ID:  # Leukocytosis  # Hx CAP  - Continue to monitor fever curve and inflammatory markers as appropriate  - ID consulted, appreciate recs.   - Due to rigors seen on exam, low CRRT set temp masking any possible fevers, worsening O2 requirements, took blood cultures and started cefepime.      # HSV-1  Mouth sores present, which could have viral etiology. 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 Monitor and trend.   - Heparinize CRRT circuit       Musculoskeletal/Rheum:  # Alopecia  - Hold PTA hydroxychloroquine      # Weakness and deconditioning of critical illness  # Left ankle injury pre-hospitalization    - Physical and occupational therapy when able.      Skin:  # BLE scattered ecchymosis  # Left toe duskiness   - Bilateral lower leg ecchymosis   - WOC consult   - Ecchymosis to left foot, most noticeable to 3rd and 4th toes     General Cares/Prophylaxis:    DVT Prophylaxis: Heparin through CRRT circuit  GI Prophylaxis: PPI  Restraints: no  Code Status: Full     Lines/tubes/drains:  - ETT (8/8)  - NJ (8/9)  -  "LIJ CVC (8/8)  - Radial a-line (8/7)  - PIV x3  - Rectal tube (8/17)  - Tunneled HD line (8/23)     Disposition:  - Medical ICU      Patient seen and findings/plan discussed with medical ICU staff, Dr. De Anda.    Fuentes Fowler MD-PhD      Attending note:  Patient seen, examined and discussed with the Resident physician. All data reviewed including vital signs, medications, laboratory studies and imaging.  I agree with the above assessment and plan.  The patient remains critically ill with acute respiratory failure, CHACORTA on CRRT, seizures, and ARDS.  I personally adjusted the ventilator to treat the acute respiratory failure and followed volume status and electrolytes in the setting of CHACORTA.  Extubated yesterday but increasing oxygen requirement overnight and respiratory distress this morning- will re-intubate. Attempted to increase volume removal with CRRT but now with hypotension- will decrease and administer fluid.  Work-up for possible ILD has been negative to date; CRP remains elevated but was decreasing., will recheck today.     Total Critical care time excluding time for  teaching and procedures was 40 minutes.     Radha De Anda MD  [phone]        Clinically Significant Risk Factors          # Hypocalcemia: Lowest Ca = 8.5 mg/dL in last 2 days, will monitor and replace as appropriate     # Hypoalbuminemia: Lowest albumin = 2.2 g/dL at 8/10/2024  9:47 AM, will monitor as appropriate               # Obesity: Estimated body mass index is 32.3 kg/m  as calculated from the following:    Height as of this encounter: 1.575 m (5' 2\").    Weight as of this encounter: 80.1 kg (176 lb 9.4 oz).   # Moderate Malnutrition: based on nutrition assessment      # Financial/Environmental Concerns: none                        ====================================  INTERVAL HISTORY:   Overnight she had worsening AMS and increased work of breathing. Intubation was preformed in the AM.    OBJECTIVE:   1. VITAL SIGNS:   Temp:  [97.3  F " (36.3  C)-100.7  F (38.2  C)] 100.7  F (38.2  C)  Pulse:  [] 115  Resp:  [10-34] 10  BP: (135-137)/(69-85) 135/69  MAP:  [52 mmHg-252 mmHg] 68 mmHg  Arterial Line BP: ()/(40-79) 100/52  FiO2 (%):  [50 %-90 %] 70 %  SpO2:  [52 %-100 %] 100 %  FiO2 (%): 70 %, Resp: 10, Vent Mode: CMV/AC, Resp Rate (Set): 20 breaths/min, Tidal Volume (Set, mL): 300 mL, PEEP (cm H2O): 8 cmH2O, Resp Rate (Set): 20 breaths/min, Tidal Volume (Set, mL): 300 mL, PEEP (cm H2O): 8 cmH2O  2. INTAKE/ OUTPUT:   I/O last 3 completed shifts:  In: 2651.03 [I.V.:426.03; NG/GT:1025]  Out: 3792.9 [Other:3417.9; Stool:375]    3. PHYSICAL EXAMINATION:  General: Obtunded, increased work of breathing, diaphoretic with rigors  Neuro: Obtunded  HEENT: Normocephalic, atraumatic. PERRL, and nonicteric.   CV: RRR on monitor, S1S2, all extremities well perfused   Respiratory: On HFNC, respiratory chest rise with normal effort, soft, velcro like crackles in lower lung fields   GI: Abdomen soft and non-tender to light palpation. Non-distended   Skin: Scattered contusions on L foot. Scars of bilateral knees, healing bruises and lesions from previous lines and drains.    4. LABS:   Arterial Blood Gases   Recent Labs   Lab 08/29/24  0944 08/29/24  0616 08/29/24  0346 08/28/24  2352   PH 7.22* 7.29* 7.28* 7.31*   PCO2 58* 50* 51* 48*   PO2 96 76* 100 77*   HCO3 23 24 24 24     Complete Blood Count   Recent Labs   Lab 08/29/24  0346 08/28/24  2025 08/28/24  1147 08/28/24  0403   WBC 21.5* 21.8* 11.6* 11.5*   HGB 8.3* 8.4* 8.0* 7.5*    294 210 228     Basic Metabolic Panel  Recent Labs   Lab 08/29/24  0917 08/29/24  0346 08/29/24  0345 08/28/24  2349 08/28/24 2025 08/28/24  1534 08/28/24  1147 08/28/24  0813 08/28/24  0403   NA  --  136  --   --  137  --  138  --  139   POTASSIUM  --  4.8  --   --  4.7  --  4.4  --  4.2   CHLORIDE  --  103  --   --  103  --  105  --  105   CO2  --  22  --   --  21*  --  23  --  24   BUN  --  22.8*  --   --  19.4  --   21.4*  --  18.3   CR  --  1.06*  --   --  0.97*  --  0.94  --  0.97*   * 167* 142* 141* 193*   < > 177*   < > 164*  166*    < > = values in this interval not displayed.     Liver Function Tests  Recent Labs   Lab 08/29/24  0346 08/28/24 2025 08/28/24  1147 08/28/24  0403 08/27/24  1359 08/27/24  0323 08/26/24  1317 08/26/24  0343   AST 53*  --   --  31  --  24  --  27   ALT 21  --   --  20  --  15  --  15   ALKPHOS 119  --   --  95  --  94  --  87   BILITOTAL 0.2  --   --  0.2  --  <0.2  --  <0.2   ALBUMIN 3.4* 3.5 3.2* 3.2*   < > 3.0*   < > 2.9*    < > = values in this interval not displayed.     Coagulation Profile  No lab results found in last 7 days.    5. RADIOLOGY:   No results found for this or any previous visit (from the past 24 hour(s)).

## 2024-08-29 NOTE — PROGRESS NOTES
Pt intubated at 0810 for worsening O2 needs and resp failure. Intubated with 7.5 ETT secured at 23 @ lips. Pt dropped sats during intubation to low teens, eventually recovered to 100% via bag mask ventilation. Pt placed on vent with initial settings 20, 300, +8, 100%. After intubation, pt overbreathing the vent and biting ETT, bite block placed. Mucomyst discontinued, duoneb changed to xopenex d/t tachycardia. RT will continue to follow.    Ronaldo Arceo, RT on 8/29/2024 at 10:49 AM

## 2024-08-30 ENCOUNTER — APPOINTMENT (OUTPATIENT)
Dept: CT IMAGING | Facility: CLINIC | Age: 54
DRG: 003 | End: 2024-08-30
Attending: INTERNAL MEDICINE
Payer: MEDICAID

## 2024-08-30 LAB
ABO/RH(D): NORMAL
ALBUMIN SERPL BCG-MCNC: 2.9 G/DL (ref 3.5–5.2)
ALBUMIN SERPL BCG-MCNC: 3.3 G/DL (ref 3.5–5.2)
ALBUMIN SERPL BCG-MCNC: 3.4 G/DL (ref 3.5–5.2)
ALLEN'S TEST: ABNORMAL
ALP SERPL-CCNC: 182 U/L (ref 40–150)
ALT SERPL W P-5'-P-CCNC: 50 U/L (ref 0–50)
ANION GAP SERPL CALCULATED.3IONS-SCNC: 10 MMOL/L (ref 7–15)
ANION GAP SERPL CALCULATED.3IONS-SCNC: 12 MMOL/L (ref 7–15)
ANION GAP SERPL CALCULATED.3IONS-SCNC: 14 MMOL/L (ref 7–15)
ANTIBODY SCREEN: NEGATIVE
APPEARANCE FLD: ABNORMAL
APTT PPP: 56 SECONDS (ref 22–38)
AST SERPL W P-5'-P-CCNC: 100 U/L (ref 0–45)
ATRIAL RATE - MUSE: 114 BPM
ATRIAL RATE - MUSE: 135 BPM
BACTERIA SPEC CULT: NORMAL
BASE EXCESS BLDA CALC-SCNC: -0.7 MMOL/L (ref -3–3)
BASE EXCESS BLDA CALC-SCNC: -0.8 MMOL/L (ref -3–3)
BASE EXCESS BLDA CALC-SCNC: -1.7 MMOL/L (ref -3–3)
BASE EXCESS BLDA CALC-SCNC: -3 MMOL/L (ref -3–3)
BASE EXCESS BLDA CALC-SCNC: 0.4 MMOL/L (ref -3–3)
BASE EXCESS BLDA CALC-SCNC: 2.3 MMOL/L (ref -3–3)
BASE EXCESS BLDA CALC-SCNC: 2.8 MMOL/L (ref -3–3)
BASE EXCESS BLDA CALC-SCNC: 3.8 MMOL/L (ref -3–3)
BILIRUB DIRECT SERPL-MCNC: 0.33 MG/DL (ref 0–0.3)
BILIRUB SERPL-MCNC: 0.5 MG/DL
BLD PROD TYP BPU: NORMAL
BLOOD COMPONENT TYPE: NORMAL
BUN SERPL-MCNC: 19.1 MG/DL (ref 6–20)
BUN SERPL-MCNC: 21.3 MG/DL (ref 6–20)
BUN SERPL-MCNC: 25.5 MG/DL (ref 6–20)
C PNEUM DNA SPEC QL NAA+PROBE: NOT DETECTED
CA-I BLD-MCNC: 4.4 MG/DL (ref 4.4–5.2)
CA-I BLD-MCNC: 4.5 MG/DL (ref 4.4–5.2)
CA-I BLD-MCNC: 4.6 MG/DL (ref 4.4–5.2)
CA-I BLD-MCNC: 4.7 MG/DL (ref 4.4–5.2)
CALCIUM SERPL-MCNC: 6.9 MG/DL (ref 8.8–10.4)
CALCIUM SERPL-MCNC: 8.4 MG/DL (ref 8.8–10.4)
CALCIUM SERPL-MCNC: 8.5 MG/DL (ref 8.8–10.4)
CELL COUNT BODY FLUID SOURCE: ABNORMAL
CHLORIDE SERPL-SCNC: 100 MMOL/L (ref 98–107)
CHLORIDE SERPL-SCNC: 105 MMOL/L (ref 98–107)
CHLORIDE SERPL-SCNC: 98 MMOL/L (ref 98–107)
CODING SYSTEM: NORMAL
COHGB MFR BLD: 93.3 % (ref 96–97)
COHGB MFR BLD: 94.9 % (ref 96–97)
COHGB MFR BLD: 95.6 % (ref 96–97)
COHGB MFR BLD: 97.1 % (ref 96–97)
COHGB MFR BLD: 97.5 % (ref 96–97)
COHGB MFR BLD: 97.9 % (ref 96–97)
COHGB MFR BLD: 99.2 % (ref 96–97)
COHGB MFR BLD: >100 % (ref 96–97)
COLOR FLD: COLORLESS
CREAT SERPL-MCNC: 0.79 MG/DL (ref 0.51–0.95)
CREAT SERPL-MCNC: 0.93 MG/DL (ref 0.51–0.95)
CREAT SERPL-MCNC: 0.99 MG/DL (ref 0.51–0.95)
CROSSMATCH: NORMAL
CRP SERPL-MCNC: 341 MG/L
DIASTOLIC BLOOD PRESSURE - MUSE: NORMAL MMHG
DIASTOLIC BLOOD PRESSURE - MUSE: NORMAL MMHG
EGFRCR SERPLBLD CKD-EPI 2021: 68 ML/MIN/1.73M2
EGFRCR SERPLBLD CKD-EPI 2021: 73 ML/MIN/1.73M2
EGFRCR SERPLBLD CKD-EPI 2021: 89 ML/MIN/1.73M2
ENA JO1 AB SER IA-ACNC: <0.5 U/ML
ENA JO1 IGG SER-ACNC: NEGATIVE
ERYTHROCYTE [DISTWIDTH] IN BLOOD BY AUTOMATED COUNT: 19.1 % (ref 10–15)
ERYTHROCYTE [DISTWIDTH] IN BLOOD BY AUTOMATED COUNT: 19.1 % (ref 10–15)
FIBRINOGEN PPP-MCNC: 631 MG/DL (ref 170–510)
FLUAV H1 2009 PAND RNA SPEC QL NAA+PROBE: NOT DETECTED
FLUAV H1 RNA SPEC QL NAA+PROBE: NOT DETECTED
FLUAV H3 RNA SPEC QL NAA+PROBE: NOT DETECTED
FLUAV RNA SPEC QL NAA+PROBE: NOT DETECTED
FLUBV RNA SPEC QL NAA+PROBE: NOT DETECTED
GLUCOSE BLDC GLUCOMTR-MCNC: 103 MG/DL (ref 70–99)
GLUCOSE BLDC GLUCOMTR-MCNC: 115 MG/DL (ref 70–99)
GLUCOSE BLDC GLUCOMTR-MCNC: 133 MG/DL (ref 70–99)
GLUCOSE BLDC GLUCOMTR-MCNC: 153 MG/DL (ref 70–99)
GLUCOSE BLDC GLUCOMTR-MCNC: 154 MG/DL (ref 70–99)
GLUCOSE BLDC GLUCOMTR-MCNC: 157 MG/DL (ref 70–99)
GLUCOSE BLDC GLUCOMTR-MCNC: 160 MG/DL (ref 70–99)
GLUCOSE BLDC GLUCOMTR-MCNC: 164 MG/DL (ref 70–99)
GLUCOSE BLDC GLUCOMTR-MCNC: 170 MG/DL (ref 70–99)
GLUCOSE BLDC GLUCOMTR-MCNC: 190 MG/DL (ref 70–99)
GLUCOSE BLDC GLUCOMTR-MCNC: 202 MG/DL (ref 70–99)
GLUCOSE BLDC GLUCOMTR-MCNC: 215 MG/DL (ref 70–99)
GLUCOSE SERPL-MCNC: 119 MG/DL (ref 70–99)
GLUCOSE SERPL-MCNC: 157 MG/DL (ref 70–99)
GLUCOSE SERPL-MCNC: 177 MG/DL (ref 70–99)
GRAM STAIN RESULT: NORMAL
GRAM STAIN RESULT: NORMAL
HADV DNA SPEC QL NAA+PROBE: NOT DETECTED
HCO3 BLD-SCNC: 28 MMOL/L (ref 21–28)
HCO3 BLD-SCNC: 29 MMOL/L (ref 21–28)
HCO3 BLD-SCNC: 30 MMOL/L (ref 21–28)
HCO3 BLD-SCNC: 31 MMOL/L (ref 21–28)
HCO3 SERPL-SCNC: 24 MMOL/L (ref 22–29)
HCO3 SERPL-SCNC: 25 MMOL/L (ref 22–29)
HCO3 SERPL-SCNC: 26 MMOL/L (ref 22–29)
HCOV PNL SPEC NAA+PROBE: NOT DETECTED
HCT VFR BLD AUTO: 20.9 % (ref 35–47)
HCT VFR BLD AUTO: 22.9 % (ref 35–47)
HGB BLD-MCNC: 6.5 G/DL (ref 11.7–15.7)
HGB BLD-MCNC: 7.2 G/DL (ref 11.7–15.7)
HMPV RNA SPEC QL NAA+PROBE: NOT DETECTED
HPIV1 RNA SPEC QL NAA+PROBE: NOT DETECTED
HPIV2 RNA SPEC QL NAA+PROBE: NOT DETECTED
HPIV3 RNA SPEC QL NAA+PROBE: NOT DETECTED
HPIV4 RNA SPEC QL NAA+PROBE: NOT DETECTED
INR PPP: 1.14 (ref 0.85–1.15)
INTERPRETATION ECG - MUSE: NORMAL
INTERPRETATION ECG - MUSE: NORMAL
ISSUE DATE AND TIME: NORMAL
KOH PREPARATION: NORMAL
KOH PREPARATION: NORMAL
L PNEUMO1 AG UR QL IA: NEGATIVE
LYMPHOCYTES NFR FLD MANUAL: 0 %
M PNEUMO DNA SPEC QL NAA+PROBE: NOT DETECTED
MAGNESIUM SERPL-MCNC: 1.8 MG/DL (ref 1.7–2.3)
MAGNESIUM SERPL-MCNC: 2.1 MG/DL (ref 1.7–2.3)
MAGNESIUM SERPL-MCNC: 2.4 MG/DL (ref 1.7–2.3)
MCH RBC QN AUTO: 30.7 PG (ref 26.5–33)
MCH RBC QN AUTO: 31.3 PG (ref 26.5–33)
MCHC RBC AUTO-ENTMCNC: 31.1 G/DL (ref 31.5–36.5)
MCHC RBC AUTO-ENTMCNC: 31.4 G/DL (ref 31.5–36.5)
MCV RBC AUTO: 100 FL (ref 78–100)
MCV RBC AUTO: 99 FL (ref 78–100)
MONOS+MACROS NFR FLD MANUAL: 19 %
NEUTS BAND NFR FLD MANUAL: 81 %
O2/TOTAL GAS SETTING VFR VENT: 100 %
O2/TOTAL GAS SETTING VFR VENT: 100 %
O2/TOTAL GAS SETTING VFR VENT: 60 %
O2/TOTAL GAS SETTING VFR VENT: 60 %
O2/TOTAL GAS SETTING VFR VENT: 80 %
O2/TOTAL GAS SETTING VFR VENT: 80 %
O2/TOTAL GAS SETTING VFR VENT: 90 %
O2/TOTAL GAS SETTING VFR VENT: 90 %
P AXIS - MUSE: 45 DEGREES
P AXIS - MUSE: 55 DEGREES
PCO2 BLD: 54 MM HG (ref 35–45)
PCO2 BLD: 60 MM HG (ref 35–45)
PCO2 BLD: 64 MM HG (ref 35–45)
PCO2 BLD: 66 MM HG (ref 35–45)
PCO2 BLD: 82 MM HG (ref 35–45)
PCO2 BLD: 83 MM HG (ref 35–45)
PCO2 BLD: 85 MM HG (ref 35–45)
PCO2 BLD: 90 MM HG (ref 35–45)
PEEP: 10 CM H2O
PEEP: 2 CM H2O
PEEP: 3 CM H2O
PEEP: 9 CM H2O
PEEP: 9 CM H2O
PH BLD: 7.1 [PH] (ref 7.35–7.45)
PH BLD: 7.14 [PH] (ref 7.35–7.45)
PH BLD: 7.14 [PH] (ref 7.35–7.45)
PH BLD: 7.15 [PH] (ref 7.35–7.45)
PH BLD: 7.27 [PH] (ref 7.35–7.45)
PH BLD: 7.27 [PH] (ref 7.35–7.45)
PH BLD: 7.3 [PH] (ref 7.35–7.45)
PH BLD: 7.34 [PH] (ref 7.35–7.45)
PHOSPHATE SERPL-MCNC: 4 MG/DL (ref 2.5–4.5)
PHOSPHATE SERPL-MCNC: 4.7 MG/DL (ref 2.5–4.5)
PHOSPHATE SERPL-MCNC: 6.9 MG/DL (ref 2.5–4.5)
PLATELET # BLD AUTO: 174 10E3/UL (ref 150–450)
PLATELET # BLD AUTO: 210 10E3/UL (ref 150–450)
PO2 BLD: 100 MM HG (ref 80–105)
PO2 BLD: 103 MM HG (ref 80–105)
PO2 BLD: 122 MM HG (ref 80–105)
PO2 BLD: 68 MM HG (ref 80–105)
PO2 BLD: 70 MM HG (ref 80–105)
PO2 BLD: 80 MM HG (ref 80–105)
PO2 BLD: 82 MM HG (ref 80–105)
PO2 BLD: 98 MM HG (ref 80–105)
POTASSIUM SERPL-SCNC: 3.8 MMOL/L (ref 3.4–5.3)
POTASSIUM SERPL-SCNC: 4.1 MMOL/L (ref 3.4–5.3)
POTASSIUM SERPL-SCNC: 4.4 MMOL/L (ref 3.4–5.3)
PR INTERVAL - MUSE: 132 MS
PR INTERVAL - MUSE: 152 MS
PROT SERPL-MCNC: 6 G/DL (ref 6.4–8.3)
QRS DURATION - MUSE: 74 MS
QRS DURATION - MUSE: 80 MS
QT - MUSE: 262 MS
QT - MUSE: 308 MS
QTC - MUSE: 393 MS
QTC - MUSE: 424 MS
R AXIS - MUSE: 10 DEGREES
R AXIS - MUSE: 19 DEGREES
RBC # BLD AUTO: 2.12 10E6/UL (ref 3.8–5.2)
RBC # BLD AUTO: 2.3 10E6/UL (ref 3.8–5.2)
RSV RNA SPEC QL NAA+PROBE: NOT DETECTED
RSV RNA SPEC QL NAA+PROBE: NOT DETECTED
RV+EV RNA SPEC QL NAA+PROBE: NOT DETECTED
S PNEUM AG SPEC QL: NEGATIVE
SAO2 % BLDA: 91 % (ref 92–100)
SAO2 % BLDA: 92 % (ref 92–100)
SAO2 % BLDA: 93 % (ref 92–100)
SAO2 % BLDA: 95 % (ref 92–100)
SAO2 % BLDA: 96 % (ref 92–100)
SAO2 % BLDA: 98 % (ref 92–100)
SARS-COV-2 RNA RESP QL NAA+PROBE: NEGATIVE
SCANNED LAB RESULT: NORMAL
SODIUM SERPL-SCNC: 136 MMOL/L (ref 135–145)
SODIUM SERPL-SCNC: 138 MMOL/L (ref 135–145)
SODIUM SERPL-SCNC: 140 MMOL/L (ref 135–145)
SPECIMEN EXPIRATION DATE: NORMAL
SYSTOLIC BLOOD PRESSURE - MUSE: NORMAL MMHG
SYSTOLIC BLOOD PRESSURE - MUSE: NORMAL MMHG
T AXIS - MUSE: 40 DEGREES
T AXIS - MUSE: 49 DEGREES
UFH PPP CHRO-ACNC: 0.27 IU/ML
UNIT ABO/RH: NORMAL
UNIT NUMBER: NORMAL
UNIT STATUS: NORMAL
UNIT TYPE ISBT: 9500
VENTRICULAR RATE- MUSE: 114 BPM
VENTRICULAR RATE- MUSE: 135 BPM
WBC # BLD AUTO: 13.1 10E3/UL (ref 4–11)
WBC # BLD AUTO: 14 10E3/UL (ref 4–11)
WBC # FLD AUTO: 510 /UL

## 2024-08-30 PROCEDURE — 82330 ASSAY OF CALCIUM: CPT | Performed by: STUDENT IN AN ORGANIZED HEALTH CARE EDUCATION/TRAINING PROGRAM

## 2024-08-30 PROCEDURE — 87077 CULTURE AEROBIC IDENTIFY: CPT | Performed by: SURGERY

## 2024-08-30 PROCEDURE — 250N000009 HC RX 250: Performed by: STUDENT IN AN ORGANIZED HEALTH CARE EDUCATION/TRAINING PROGRAM

## 2024-08-30 PROCEDURE — 82040 ASSAY OF SERUM ALBUMIN: CPT | Performed by: STUDENT IN AN ORGANIZED HEALTH CARE EDUCATION/TRAINING PROGRAM

## 2024-08-30 PROCEDURE — 999N000157 HC STATISTIC RCP TIME EA 10 MIN

## 2024-08-30 PROCEDURE — 85384 FIBRINOGEN ACTIVITY: CPT

## 2024-08-30 PROCEDURE — P9016 RBC LEUKOCYTES REDUCED: HCPCS

## 2024-08-30 PROCEDURE — 250N000012 HC RX MED GY IP 250 OP 636 PS 637: Performed by: STUDENT IN AN ORGANIZED HEALTH CARE EDUCATION/TRAINING PROGRAM

## 2024-08-30 PROCEDURE — 250N000011 HC RX IP 250 OP 636: Mod: JZ

## 2024-08-30 PROCEDURE — 80069 RENAL FUNCTION PANEL: CPT

## 2024-08-30 PROCEDURE — 87798 DETECT AGENT NOS DNA AMP: CPT

## 2024-08-30 PROCEDURE — 87081 CULTURE SCREEN ONLY: CPT

## 2024-08-30 PROCEDURE — 258N000003 HC RX IP 258 OP 636

## 2024-08-30 PROCEDURE — 94003 VENT MGMT INPAT SUBQ DAY: CPT

## 2024-08-30 PROCEDURE — 82330 ASSAY OF CALCIUM: CPT

## 2024-08-30 PROCEDURE — 250N000011 HC RX IP 250 OP 636

## 2024-08-30 PROCEDURE — 74177 CT ABD & PELVIS W/CONTRAST: CPT | Mod: 26 | Performed by: STUDENT IN AN ORGANIZED HEALTH CARE EDUCATION/TRAINING PROGRAM

## 2024-08-30 PROCEDURE — 31624 DX BRONCHOSCOPE/LAVAGE: CPT | Mod: GC | Performed by: INTERNAL MEDICINE

## 2024-08-30 PROCEDURE — 83735 ASSAY OF MAGNESIUM: CPT

## 2024-08-30 PROCEDURE — 90947 DIALYSIS REPEATED EVAL: CPT

## 2024-08-30 PROCEDURE — 250N000013 HC RX MED GY IP 250 OP 250 PS 637: Performed by: PHYSICIAN ASSISTANT

## 2024-08-30 PROCEDURE — 250N000009 HC RX 250

## 2024-08-30 PROCEDURE — 200N000002 HC R&B ICU UMMC

## 2024-08-30 PROCEDURE — 87385 HISTOPLASMA CAPSUL AG IA: CPT | Performed by: STUDENT IN AN ORGANIZED HEALTH CARE EDUCATION/TRAINING PROGRAM

## 2024-08-30 PROCEDURE — 74177 CT ABD & PELVIS W/CONTRAST: CPT

## 2024-08-30 PROCEDURE — 94645 CONT INHLJ TX EACH ADDL HOUR: CPT

## 2024-08-30 PROCEDURE — 87563 M. GENITALIUM AMP PROBE: CPT

## 2024-08-30 PROCEDURE — 250N000009 HC RX 250: Performed by: SURGERY

## 2024-08-30 PROCEDURE — 89051 BODY FLUID CELL COUNT: CPT

## 2024-08-30 PROCEDURE — 99233 SBSQ HOSP IP/OBS HIGH 50: CPT | Mod: 25 | Performed by: STUDENT IN AN ORGANIZED HEALTH CARE EDUCATION/TRAINING PROGRAM

## 2024-08-30 PROCEDURE — 85014 HEMATOCRIT: CPT

## 2024-08-30 PROCEDURE — 87070 CULTURE OTHR SPECIMN AEROBIC: CPT

## 2024-08-30 PROCEDURE — 85610 PROTHROMBIN TIME: CPT

## 2024-08-30 PROCEDURE — 87205 SMEAR GRAM STAIN: CPT

## 2024-08-30 PROCEDURE — 86900 BLOOD TYPING SEROLOGIC ABO: CPT | Performed by: SURGERY

## 2024-08-30 PROCEDURE — 99291 CRITICAL CARE FIRST HOUR: CPT | Mod: 25 | Performed by: INTERNAL MEDICINE

## 2024-08-30 PROCEDURE — 85520 HEPARIN ASSAY: CPT | Performed by: SURGERY

## 2024-08-30 PROCEDURE — 87581 M.PNEUMON DNA AMP PROBE: CPT

## 2024-08-30 PROCEDURE — 87529 HSV DNA AMP PROBE: CPT

## 2024-08-30 PROCEDURE — 83735 ASSAY OF MAGNESIUM: CPT | Performed by: STUDENT IN AN ORGANIZED HEALTH CARE EDUCATION/TRAINING PROGRAM

## 2024-08-30 PROCEDURE — 250N000011 HC RX IP 250 OP 636: Performed by: STUDENT IN AN ORGANIZED HEALTH CARE EDUCATION/TRAINING PROGRAM

## 2024-08-30 PROCEDURE — 0B9J8ZX DRAINAGE OF LEFT LOWER LUNG LOBE, VIA NATURAL OR ARTIFICIAL OPENING ENDOSCOPIC, DIAGNOSTIC: ICD-10-PCS | Performed by: INTERNAL MEDICINE

## 2024-08-30 PROCEDURE — 71260 CT THORAX DX C+: CPT | Mod: 26 | Performed by: STUDENT IN AN ORGANIZED HEALTH CARE EDUCATION/TRAINING PROGRAM

## 2024-08-30 PROCEDURE — 87102 FUNGUS ISOLATION CULTURE: CPT

## 2024-08-30 PROCEDURE — 87635 SARS-COV-2 COVID-19 AMP PRB: CPT

## 2024-08-30 PROCEDURE — 87899 AGENT NOS ASSAY W/OPTIC: CPT

## 2024-08-30 PROCEDURE — 87449 NOS EACH ORGANISM AG IA: CPT | Performed by: STUDENT IN AN ORGANIZED HEALTH CARE EDUCATION/TRAINING PROGRAM

## 2024-08-30 PROCEDURE — 250N000013 HC RX MED GY IP 250 OP 250 PS 637: Performed by: SURGERY

## 2024-08-30 PROCEDURE — 85027 COMPLETE CBC AUTOMATED: CPT

## 2024-08-30 PROCEDURE — 86901 BLOOD TYPING SEROLOGIC RH(D): CPT | Performed by: SURGERY

## 2024-08-30 PROCEDURE — 82248 BILIRUBIN DIRECT: CPT

## 2024-08-30 PROCEDURE — 82805 BLOOD GASES W/O2 SATURATION: CPT

## 2024-08-30 PROCEDURE — 86923 COMPATIBILITY TEST ELECTRIC: CPT

## 2024-08-30 PROCEDURE — 250N000013 HC RX MED GY IP 250 OP 250 PS 637: Performed by: STUDENT IN AN ORGANIZED HEALTH CARE EDUCATION/TRAINING PROGRAM

## 2024-08-30 PROCEDURE — 87210 SMEAR WET MOUNT SALINE/INK: CPT

## 2024-08-30 PROCEDURE — 85730 THROMBOPLASTIN TIME PARTIAL: CPT

## 2024-08-30 PROCEDURE — 86140 C-REACTIVE PROTEIN: CPT

## 2024-08-30 PROCEDURE — 87486 CHLMYD PNEUM DNA AMP PROBE: CPT

## 2024-08-30 PROCEDURE — 82040 ASSAY OF SERUM ALBUMIN: CPT

## 2024-08-30 PROCEDURE — 87449 NOS EACH ORGANISM AG IA: CPT

## 2024-08-30 PROCEDURE — 250N000013 HC RX MED GY IP 250 OP 250 PS 637

## 2024-08-30 RX ORDER — CALCIUM GLUCONATE 20 MG/ML
2 INJECTION, SOLUTION INTRAVENOUS EVERY 8 HOURS PRN
Status: DISCONTINUED | OUTPATIENT
Start: 2024-08-30 | End: 2024-08-31

## 2024-08-30 RX ORDER — POTASSIUM CHLORIDE 29.8 MG/ML
20 INJECTION INTRAVENOUS EVERY 8 HOURS PRN
Status: DISCONTINUED | OUTPATIENT
Start: 2024-08-30 | End: 2024-08-30

## 2024-08-30 RX ORDER — MAGNESIUM SULFATE HEPTAHYDRATE 40 MG/ML
2 INJECTION, SOLUTION INTRAVENOUS EVERY 8 HOURS PRN
Status: DISCONTINUED | OUTPATIENT
Start: 2024-08-30 | End: 2024-08-30

## 2024-08-30 RX ORDER — CALCIUM CHLORIDE, MAGNESIUM CHLORIDE, SODIUM CHLORIDE, SODIUM BICARBONATE, POTASSIUM CHLORIDE AND SODIUM PHOSPHATE DIBASIC DIHYDRATE 3.68; 3.05; 6.34; 3.09; .314; .187 G/L; G/L; G/L; G/L; G/L; G/L
12.5 INJECTION INTRAVENOUS CONTINUOUS
Status: DISCONTINUED | OUTPATIENT
Start: 2024-08-30 | End: 2024-08-30

## 2024-08-30 RX ORDER — CALCIUM CHLORIDE, MAGNESIUM CHLORIDE, SODIUM CHLORIDE, SODIUM BICARBONATE, POTASSIUM CHLORIDE AND SODIUM PHOSPHATE DIBASIC DIHYDRATE 3.68; 3.05; 6.34; 3.09; .314; .187 G/L; G/L; G/L; G/L; G/L; G/L
12.5 INJECTION INTRAVENOUS CONTINUOUS
Status: DISCONTINUED | OUTPATIENT
Start: 2024-08-30 | End: 2024-08-31

## 2024-08-30 RX ORDER — CALCIUM GLUCONATE 20 MG/ML
2 INJECTION, SOLUTION INTRAVENOUS EVERY 8 HOURS PRN
Status: DISCONTINUED | OUTPATIENT
Start: 2024-08-30 | End: 2024-08-30

## 2024-08-30 RX ORDER — MAGNESIUM SULFATE HEPTAHYDRATE 40 MG/ML
2 INJECTION, SOLUTION INTRAVENOUS EVERY 8 HOURS PRN
Status: DISCONTINUED | OUTPATIENT
Start: 2024-08-30 | End: 2024-08-31

## 2024-08-30 RX ORDER — DEXTROSE MONOHYDRATE 100 MG/ML
INJECTION, SOLUTION INTRAVENOUS CONTINUOUS PRN
Status: DISCONTINUED | OUTPATIENT
Start: 2024-08-30 | End: 2024-10-01 | Stop reason: HOSPADM

## 2024-08-30 RX ORDER — METHYLPREDNISOLONE SODIUM SUCCINATE 125 MG/2ML
125 INJECTION, POWDER, LYOPHILIZED, FOR SOLUTION INTRAMUSCULAR; INTRAVENOUS EVERY 6 HOURS
Status: DISCONTINUED | OUTPATIENT
Start: 2024-08-30 | End: 2024-08-31

## 2024-08-30 RX ORDER — CALCIUM CHLORIDE, MAGNESIUM CHLORIDE, SODIUM CHLORIDE, SODIUM BICARBONATE, POTASSIUM CHLORIDE AND SODIUM PHOSPHATE DIBASIC DIHYDRATE 3.68; 3.05; 6.34; 3.09; .314; .187 G/L; G/L; G/L; G/L; G/L; G/L
INJECTION INTRAVENOUS CONTINUOUS
Status: DISCONTINUED | OUTPATIENT
Start: 2024-08-30 | End: 2024-08-30

## 2024-08-30 RX ORDER — IOPAMIDOL 755 MG/ML
100 INJECTION, SOLUTION INTRAVASCULAR ONCE
Status: COMPLETED | OUTPATIENT
Start: 2024-08-30 | End: 2024-08-30

## 2024-08-30 RX ORDER — POTASSIUM CHLORIDE 29.8 MG/ML
20 INJECTION INTRAVENOUS EVERY 8 HOURS PRN
Status: DISCONTINUED | OUTPATIENT
Start: 2024-08-30 | End: 2024-08-31

## 2024-08-30 RX ADMIN — MEROPENEM 1 G: 1 INJECTION, POWDER, FOR SOLUTION INTRAVENOUS at 06:35

## 2024-08-30 RX ADMIN — LEVOTHYROXINE SODIUM 25 MCG: 0.03 TABLET ORAL at 08:17

## 2024-08-30 RX ADMIN — LOPERAMIDE HYDROCHLORIDE 4 MG: 2 CAPSULE ORAL at 23:44

## 2024-08-30 RX ADMIN — CALCIUM CHLORIDE, MAGNESIUM CHLORIDE, SODIUM CHLORIDE, SODIUM BICARBONATE, POTASSIUM CHLORIDE AND SODIUM PHOSPHATE DIBASIC DIHYDRATE 12.5 ML/KG/HR: 3.68; 3.05; 6.34; 3.09; .314; .187 INJECTION INTRAVENOUS at 13:05

## 2024-08-30 RX ADMIN — SODIUM BICARBONATE: 84 INJECTION, SOLUTION INTRAVENOUS at 21:52

## 2024-08-30 RX ADMIN — ACETAMINOPHEN 975 MG: 325 TABLET, FILM COATED ORAL at 00:30

## 2024-08-30 RX ADMIN — LOPERAMIDE HYDROCHLORIDE 4 MG: 2 CAPSULE ORAL at 18:17

## 2024-08-30 RX ADMIN — LOPERAMIDE HYDROCHLORIDE 4 MG: 2 CAPSULE ORAL at 06:28

## 2024-08-30 RX ADMIN — CISATRACURIUM BESYLATE 8 MCG/KG/MIN: 10 INJECTION INTRAVENOUS at 13:57

## 2024-08-30 RX ADMIN — EPOPROSTENOL 20 NG/KG/MIN: 1.5 INJECTION, POWDER, LYOPHILIZED, FOR SOLUTION INTRAVENOUS at 13:46

## 2024-08-30 RX ADMIN — CISATRACURIUM BESYLATE 8 MCG/KG/MIN: 10 INJECTION INTRAVENOUS at 22:28

## 2024-08-30 RX ADMIN — PROPOFOL 30 MCG/KG/MIN: 10 INJECTION, EMULSION INTRAVENOUS at 12:17

## 2024-08-30 RX ADMIN — SODIUM BICARBONATE 25 MEQ: 84 INJECTION, SOLUTION INTRAVENOUS at 18:22

## 2024-08-30 RX ADMIN — MONTELUKAST 10 MG: 10 TABLET, FILM COATED ORAL at 21:24

## 2024-08-30 RX ADMIN — Medication 6 MG: at 12:21

## 2024-08-30 RX ADMIN — WHITE PETROLATUM 57.7 %-MINERAL OIL 31.9 % EYE OINTMENT: at 04:47

## 2024-08-30 RX ADMIN — CALCIUM CHLORIDE, MAGNESIUM CHLORIDE, SODIUM CHLORIDE, SODIUM BICARBONATE, POTASSIUM CHLORIDE AND SODIUM PHOSPHATE DIBASIC DIHYDRATE 12.5 ML/KG/HR: 3.68; 3.05; 6.34; 3.09; .314; .187 INJECTION INTRAVENOUS at 03:04

## 2024-08-30 RX ADMIN — LEVALBUTEROL HYDROCHLORIDE 0.63 MG: 0.63 SOLUTION RESPIRATORY (INHALATION) at 08:12

## 2024-08-30 RX ADMIN — Medication 2.4 UNITS/HR: at 14:43

## 2024-08-30 RX ADMIN — METHYLPREDNISOLONE SODIUM SUCCINATE 125 MG: 125 INJECTION, POWDER, FOR SOLUTION INTRAMUSCULAR; INTRAVENOUS at 13:01

## 2024-08-30 RX ADMIN — IOPAMIDOL 100 ML: 755 INJECTION, SOLUTION INTRAVENOUS at 15:04

## 2024-08-30 RX ADMIN — SODIUM BICARBONATE 25 MEQ: 84 INJECTION, SOLUTION INTRAVENOUS at 19:06

## 2024-08-30 RX ADMIN — PROPOFOL 30 MCG/KG/MIN: 10 INJECTION, EMULSION INTRAVENOUS at 06:48

## 2024-08-30 RX ADMIN — MIDAZOLAM HYDROCHLORIDE 9 MG/HR: 1 INJECTION, SOLUTION INTRAVENOUS at 02:04

## 2024-08-30 RX ADMIN — INSULIN HUMAN 2 UNITS/HR: 1 INJECTION, SOLUTION INTRAVENOUS at 13:03

## 2024-08-30 RX ADMIN — ACETAMINOPHEN 975 MG: 325 TABLET, FILM COATED ORAL at 08:17

## 2024-08-30 RX ADMIN — ACETAMINOPHEN 650 MG: 325 TABLET, FILM COATED ORAL at 04:42

## 2024-08-30 RX ADMIN — MIDAZOLAM HYDROCHLORIDE 9 MG/HR: 1 INJECTION, SOLUTION INTRAVENOUS at 13:00

## 2024-08-30 RX ADMIN — METHYLPREDNISOLONE SODIUM SUCCINATE 125 MG: 125 INJECTION, POWDER, FOR SOLUTION INTRAMUSCULAR; INTRAVENOUS at 17:50

## 2024-08-30 RX ADMIN — BUDESONIDE 1 MG: 1 INHALANT ORAL at 08:12

## 2024-08-30 RX ADMIN — CALCIUM CHLORIDE, MAGNESIUM CHLORIDE, SODIUM CHLORIDE, SODIUM BICARBONATE, POTASSIUM CHLORIDE AND SODIUM PHOSPHATE DIBASIC DIHYDRATE: 3.68; 3.05; 6.34; 3.09; .314; .187 INJECTION INTRAVENOUS at 13:08

## 2024-08-30 RX ADMIN — ACETAMINOPHEN 975 MG: 325 TABLET, FILM COATED ORAL at 15:39

## 2024-08-30 RX ADMIN — WHITE PETROLATUM 57.7 %-MINERAL OIL 31.9 % EYE OINTMENT: at 12:22

## 2024-08-30 RX ADMIN — METHYLPREDNISOLONE SODIUM SUCCINATE 125 MG: 125 INJECTION, POWDER, FOR SOLUTION INTRAMUSCULAR; INTRAVENOUS at 23:44

## 2024-08-30 RX ADMIN — LOPERAMIDE HYDROCHLORIDE 4 MG: 2 CAPSULE ORAL at 12:22

## 2024-08-30 RX ADMIN — MEROPENEM 1 G: 1 INJECTION, POWDER, FOR SOLUTION INTRAVENOUS at 23:11

## 2024-08-30 RX ADMIN — Medication 6 MG: at 06:28

## 2024-08-30 RX ADMIN — DEXAMETHASONE 20 MG: 4 TABLET ORAL at 08:16

## 2024-08-30 RX ADMIN — Medication 200 MCG/HR: at 08:37

## 2024-08-30 RX ADMIN — CALCIUM CHLORIDE, MAGNESIUM CHLORIDE, SODIUM CHLORIDE, SODIUM BICARBONATE, POTASSIUM CHLORIDE AND SODIUM PHOSPHATE DIBASIC DIHYDRATE 12.5 ML/KG/HR: 3.68; 3.05; 6.34; 3.09; .314; .187 INJECTION INTRAVENOUS at 18:27

## 2024-08-30 RX ADMIN — LEVALBUTEROL HYDROCHLORIDE 0.63 MG: 0.63 SOLUTION RESPIRATORY (INHALATION) at 12:15

## 2024-08-30 RX ADMIN — IPRATROPIUM BROMIDE 0.5 MG: 0.5 SOLUTION RESPIRATORY (INHALATION) at 08:12

## 2024-08-30 RX ADMIN — CALCIUM CHLORIDE, MAGNESIUM CHLORIDE, SODIUM CHLORIDE, SODIUM BICARBONATE, POTASSIUM CHLORIDE AND SODIUM PHOSPHATE DIBASIC DIHYDRATE 12.5 ML/KG/HR: 3.68; 3.05; 6.34; 3.09; .314; .187 INJECTION INTRAVENOUS at 23:43

## 2024-08-30 RX ADMIN — Medication 6 MG: at 00:30

## 2024-08-30 RX ADMIN — IPRATROPIUM BROMIDE 0.5 MG: 0.5 SOLUTION RESPIRATORY (INHALATION) at 20:40

## 2024-08-30 RX ADMIN — Medication 60 ML: at 08:04

## 2024-08-30 RX ADMIN — VANCOMYCIN HYDROCHLORIDE 1250 MG: at 08:52

## 2024-08-30 RX ADMIN — CALCIUM CHLORIDE, MAGNESIUM CHLORIDE, SODIUM CHLORIDE, SODIUM BICARBONATE, POTASSIUM CHLORIDE AND SODIUM PHOSPHATE DIBASIC DIHYDRATE 12.5 ML/KG/HR: 3.68; 3.05; 6.34; 3.09; .314; .187 INJECTION INTRAVENOUS at 18:26

## 2024-08-30 RX ADMIN — MEROPENEM 1 G: 1 INJECTION, POWDER, FOR SOLUTION INTRAVENOUS at 15:37

## 2024-08-30 RX ADMIN — LOPERAMIDE HYDROCHLORIDE 4 MG: 2 CAPSULE ORAL at 00:30

## 2024-08-30 RX ADMIN — Medication 1 TABLET: at 08:17

## 2024-08-30 RX ADMIN — CALCIUM CHLORIDE, MAGNESIUM CHLORIDE, SODIUM CHLORIDE, SODIUM BICARBONATE, POTASSIUM CHLORIDE AND SODIUM PHOSPHATE DIBASIC DIHYDRATE 12.5 ML/KG/HR: 3.68; 3.05; 6.34; 3.09; .314; .187 INJECTION INTRAVENOUS at 08:06

## 2024-08-30 RX ADMIN — HEPARIN SODIUM 1300 UNITS/HR: 1000 INJECTION, SOLUTION INTRAVENOUS; SUBCUTANEOUS at 13:44

## 2024-08-30 RX ADMIN — IPRATROPIUM BROMIDE 0.5 MG: 0.5 SOLUTION RESPIRATORY (INHALATION) at 12:15

## 2024-08-30 RX ADMIN — CISATRACURIUM BESYLATE 8 MCG/KG/MIN: 10 INJECTION INTRAVENOUS at 05:08

## 2024-08-30 RX ADMIN — TOBRAMYCIN 540 MG: 40 INJECTION INTRAMUSCULAR; INTRAVENOUS at 01:02

## 2024-08-30 RX ADMIN — PROPOFOL 20 MCG/KG/MIN: 10 INJECTION, EMULSION INTRAVENOUS at 18:25

## 2024-08-30 RX ADMIN — Medication 60 ML: at 19:02

## 2024-08-30 RX ADMIN — LEVALBUTEROL HYDROCHLORIDE 0.63 MG: 0.63 SOLUTION RESPIRATORY (INHALATION) at 20:40

## 2024-08-30 RX ADMIN — CALCIUM CHLORIDE, MAGNESIUM CHLORIDE, SODIUM CHLORIDE, SODIUM BICARBONATE, POTASSIUM CHLORIDE AND SODIUM PHOSPHATE DIBASIC DIHYDRATE 12.5 ML/KG/HR: 3.68; 3.05; 6.34; 3.09; .314; .187 INJECTION INTRAVENOUS at 08:05

## 2024-08-30 RX ADMIN — Medication 40 MG: at 08:16

## 2024-08-30 RX ADMIN — Medication 200 MCG/HR: at 20:46

## 2024-08-30 RX ADMIN — CETIRIZINE HYDROCHLORIDE 10 MG: 10 TABLET, FILM COATED ORAL at 08:17

## 2024-08-30 RX ADMIN — NOREPINEPHRINE BITARTRATE 0.06 MCG/KG/MIN: 0.06 INJECTION, SOLUTION INTRAVENOUS at 08:36

## 2024-08-30 RX ADMIN — WHITE PETROLATUM 57.7 %-MINERAL OIL 31.9 % EYE OINTMENT: at 20:23

## 2024-08-30 ASSESSMENT — ACTIVITIES OF DAILY LIVING (ADL)
ADLS_ACUITY_SCORE: 65
ADLS_ACUITY_SCORE: 63
ADLS_ACUITY_SCORE: 65
ADLS_ACUITY_SCORE: 65
ADLS_ACUITY_SCORE: 63
ADLS_ACUITY_SCORE: 65
ADLS_ACUITY_SCORE: 63
ADLS_ACUITY_SCORE: 65
ADLS_ACUITY_SCORE: 63
ADLS_ACUITY_SCORE: 63
ADLS_ACUITY_SCORE: 65
ADLS_ACUITY_SCORE: 65
ADLS_ACUITY_SCORE: 63
ADLS_ACUITY_SCORE: 65
ADLS_ACUITY_SCORE: 63
ADLS_ACUITY_SCORE: 65
ADLS_ACUITY_SCORE: 65
ADLS_ACUITY_SCORE: 63
ADLS_ACUITY_SCORE: 63
ADLS_ACUITY_SCORE: 65

## 2024-08-30 NOTE — PROGRESS NOTES
SPIRITUAL HEALTH SERVICES - Consult Note  [facility] 4C  Referral Source/Reason for Visit: Consult    Summary and Recommendations -  Massiel's  called [facility] office and asked to visit patient and family. Family are driving from [place] and [facility] ETA.     Plan: [facility] is available to return to support.     Tisha Hebert  Staff

## 2024-08-30 NOTE — PROGRESS NOTES
CRRT STATUS NOTE    DATA:  Time:  4:38 AM  Pressures WNL:  YES  Filter Status:  WDL    Problems Reported/Alarms Noted:      Supplies Present:  YES    ASSESSMENT:  Patient Net Fluid Balance:  Net positive 3.635L as of midnight, net positive 247L so far this morning.     Vital Signs:    Temp:  [98.4  F (36.9  C)-100.7  F (38.2  C)] 99.3  F (37.4  C)  Pulse:  [] 99  Resp:  [9-34] 32  BP: (116)/(58) 116/58  MAP:  [42 mmHg-179 mmHg] 81 mmHg  Arterial Line BP: ()/() 129/58  FiO2 (%):  [70 %-100 %] 80 %  SpO2:  [80 %-100 %] 95 %      Labs:    Last Renal Panel:  Sodium   Date Value Ref Range Status   08/30/2024 138 135 - 145 mmol/L Final     Potassium   Date Value Ref Range Status   08/30/2024 4.1 3.4 - 5.3 mmol/L Final     Chloride   Date Value Ref Range Status   08/30/2024 100 98 - 107 mmol/L Final     Carbon Dioxide (CO2)   Date Value Ref Range Status   08/30/2024 24 22 - 29 mmol/L Final     Anion Gap   Date Value Ref Range Status   08/30/2024 14 7 - 15 mmol/L Final     Glucose   Date Value Ref Range Status   08/30/2024 157 (H) 70 - 99 mg/dL Final   08/28/2024 164 (H) 70 - 99 mg/dL Final     GLUCOSE BY METER POCT   Date Value Ref Range Status   08/30/2024 153 (H) 70 - 99 mg/dL Final     Urea Nitrogen   Date Value Ref Range Status   08/30/2024 21.3 (H) 6.0 - 20.0 mg/dL Final     Creatinine   Date Value Ref Range Status   08/30/2024 0.99 (H) 0.51 - 0.95 mg/dL Final     GFR Estimate   Date Value Ref Range Status   08/30/2024 68 >60 mL/min/1.73m2 Final     Comment:     eGFR calculated using 2021 CKD-EPI equation.     Calcium   Date Value Ref Range Status   08/30/2024 8.4 (L) 8.8 - 10.4 mg/dL Final     Comment:     Reference intervals for this test were updated on 7/16/2024 to reflect our healthy population more accurately. There may be differences in the flagging of prior results with similar values performed with this method. Those prior results can be interpreted in the context of the updated reference  intervals.     Phosphorus   Date Value Ref Range Status   08/30/2024 4.7 (H) 2.5 - 4.5 mg/dL Final     Albumin   Date Value Ref Range Status   08/30/2024 3.3 (L) 3.5 - 5.2 g/dL Final         Goals of Therapy:  Attempting to achieve, fluid removal limited by hypotension and vasopressor requirement.       INTERVENTIONS/PLAN:    Continue CRRT. Prescription changed in evening to include Sodium bicarbonate post solution and rate changes. Please see MAR, flow sheets, and remainder of chart for further details. .

## 2024-08-30 NOTE — PLAN OF CARE
ICU End of Shift Summary. See flowsheets for vital signs and detailed assessment.    Changes this shift: Pt RASS-4 to -5, pupils equal and reactive. Pt paralyzed at 2100 and proned at 2300. Vent setting changed multiple times with multiple ABGs obtained. Settled on  RR 32 Peep 2 FIO2 %. PIPs remain in the high 50s to low 60s (team aware). Full strength flolan added. Low grade fever Tmax 100, PRN and scheduled tylenol given and adjusted CRRT blood temp to 36C with good effect. Able to wean down Levo after proning, vaso still at straight rate. Rectal tube in place. NJ clamped after prone. CRRT continued. Post filter bath changed to bicarb replacement solution, unable to pull for most of night d/t high pressor needs, started to pull when pressor needs declined. Still net positive for the day, and end 8/29 up 4L. 1 unit PRBC given for Hgb of 6.3. Prop gtt slowly weaned, on versed, fent, and cis. Code status changed to DNR. US of Bilateral LE completed    Plan: Trend ABGs, wean pressors, waiting for family to come from [place].         Goal Outcome Evaluation:      Plan of Care Reviewed With: patient    Overall Patient Progress: decliningOverall Patient Progress: declining    Outcome Evaluation: pt paralyzed and proned, vent setting changed multiple times, remains on pressors and CRRT

## 2024-08-30 NOTE — PROGRESS NOTES
Nephrology Progress Note  08/30/2024         Assessment & Recommendations:   Mrs Flaherty is a 54 yo F w/ hx of Anxiety, depression, fibromyalgia, hypothyroidism, asthma, HLD, MURIEL on CPAP, expressive aphasia, and hypertension who was admitted to an OSH with community acquired pneumonia on 8/3 s/p intubation.  Found to have severe ARDS requiring paralysis and proning, transferred here on 8/8 for management and initiated on CKRT for severe acidemia in the setting of oligoanuric CHACORTA.  Started CRRT on 8/9 for volume management.     # anuric CHACORTA, suspected ATN in setting of shock, ARDS  # respiratory acidosis   Continue CRRT (with heparin dosed by Xa level protocol due to filter clotting daily when on fixed 500 units/hr heparin).   -Continue 4K baths, standard 25ml/kg/hr clearance.  -stopped post-filter bicarb gtt mid-day 8/30, but if PCO2 rises again and if pH drops unacceptably low again, then reach out to on-call nephrology and we will then resume post-filter bicarb with our CRRT  -continue net negative goal of 50ml/hr on CRRT    # hypervolemia - UF as above    # electrolytes  No current urgent issues, though serum calcium is dropping (CRRT order set includes repletion orders).      Recommendations were communicated to primary team via this note.     Danny Myrick MD   Division of Nephrology and Hypertension  [facility]  Vocera Web Console      Interval History :   Nursing and provider notes from last 24 hours reviewed.  In the last 24 hours Massiel Flaherty remains intubated. PCO2 castillo overnight with pH low, so post-filter bicarb gtt on CRRT was begun. Today pH much higher, PCO2 down, and serum bicarb risen to low 30s. Primary team states PCO2 may rise again. Patient with no urine output yesterday but 350ml today (suspect this is straight cath).    Review of Systems:   Unable to obtain due to intubation     Physical Exam:   I/O last 3 completed shifts:  In: 5263.02 [I.V.:3072.82; Other:0.2; NG/GT:700; IV  "Piggyback:500]  Out: 889.9 [Other:464.9; Stool:425]   /58 (BP Location: Right arm)   Pulse 90   Temp 97.9  F (36.6  C)   Resp (!) 32   Ht 1.575 m (5' 2\")   Wt 84.6 kg (186 lb 8.2 oz)   SpO2 95%   BMI 34.11 kg/m       GENERAL APPEARANCE: critically ill, intubated (again)  HEENT: MMM   PULM: lungs clear anteriorly   CV: regular rhythm, normal rate, no rub      -edema - 1+ LE edema bilat   GI: soft, ND  INTEGUMENT: no rash on exposed skin  NEURO: sedated   Access is LFV temp line 8/19.     Labs:   All labs reviewed by me  Electrolytes/Renal -   Recent Labs   Lab Test 08/30/24  1325 08/30/24  1234 08/30/24  1045 08/30/24  0337 08/30/24 0334 08/29/24 1946 08/29/24 1942   NA  --   --  140  --  138  --  134*   POTASSIUM  --   --  3.8  --  4.1  --  4.1   CHLORIDE  --   --  105  --  100  --  103   CO2  --   --  25  --  24  --  20*   BUN  --   --  19.1  --  21.3*  --  23.6*   CR  --   --  0.79  --  0.99*  --  1.09*   * 154* 119*   < > 157*   < > 207*   QUAN  --   --  6.9*  --  8.4*  --  8.5*   MAG  --   --  1.8  --  2.1  --  2.1   PHOS  --   --  4.0  --  4.7*  --  4.2    < > = values in this interval not displayed.       CBC -   Recent Labs   Lab Test 08/30/24  1045 08/30/24 0334 08/29/24 1942   WBC 13.1* 14.0* 17.2*   HGB 6.5* 7.2* 6.3*    210 228       LFTs -   Recent Labs   Lab Test 08/30/24  1045 08/30/24  0334 08/29/24 1942 08/29/24  1131 08/29/24 0346 08/28/24  1147 08/28/24  0403   ALKPHOS  --  182*  --   --  119  --  95   BILITOTAL  --  0.5  --   --  0.2  --  0.2   ALT  --  50  --   --  21  --  20   AST  --  100*  --   --  53*  --  31   PROTTOTAL  --  6.0*  --   --  7.0  --  6.2*   ALBUMIN 2.9* 3.3* 3.5   < > 3.4*   < > 3.2*    < > = values in this interval not displayed.       Iron Panel - No lab results found.      Imaging:  All imaging studies reviewed by me.     Current Medications:  Current Facility-Administered Medications   Medication Dose Route Frequency Provider Last Rate Last " Admin    acetaminophen (TYLENOL) tablet 975 mg  975 mg Oral or Feeding Tube Q8H Andres Payne PA-C   975 mg at 08/30/24 0817    artificial tears ophthalmic ointment   Both Eyes Q8H Carlos Cerna MD   Given at 08/30/24 1222    [Held by provider] atorvastatin (LIPITOR) tablet 10 mg  10 mg Oral or Feeding Tube Daily Francisco Welsh MD   10 mg at 08/29/24 0924    budesonide (PULMICORT) neb solution 1 mg  1 mg Nebulization Daily Andres Payne PA-C   1 mg at 08/30/24 0812    cetirizine (zyrTEC) tablet 10 mg  10 mg Oral or Feeding Tube Daily Barry Hatch MD   10 mg at 08/30/24 0817    [Held by provider] gabapentin (NEURONTIN) capsule 300 mg  300 mg Oral or Feeding Tube TID Barry Hatch MD   300 mg at 08/28/24 1959    ipratropium (ATROVENT) 0.02 % neb solution 0.5 mg  0.5 mg Nebulization 4x daily Barry Hatch MD   0.5 mg at 08/30/24 0812    And    levalbuterol (XOPENEX) neb solution 0.63 mg  0.63 mg Nebulization 4x Daily Barry Hatch MD   0.63 mg at 08/30/24 0812    levothyroxine (SYNTHROID/LEVOTHROID) tablet 25 mcg  25 mcg [facility] Giovanny Guidry PA-C   25 mcg at 08/30/24 0817    loperamide (IMODIUM) capsule 4 mg  4 mg Oral or Feeding Tube Q6H Barry Hatch MD   4 mg at 08/30/24 1222    meropenem (MERREM) 1 g vial to attach to  mL bag  1 g Intravenous Q8H Carlos Cerna MD   1 g at 08/30/24 0635    methylPREDNISolone Na Suc (solu-MEDROL) injection 125 mg  125 mg Intravenous Q6H Gaudencio De Souza MD   125 mg at 08/30/24 1301    montelukast (SINGULAIR) tablet 10 mg  10 mg Oral or Feeding Tube At Bedtime Barry Hatch MD   10 mg at 08/29/24 2226    multivitamin RENAL (RENAVITE RX/NEPHROVITE) tablet 1 tablet  1 tablet Oral or Feeding Tube Daily Her-Giovanny Spence PA-C   1 tablet at 08/30/24 0817    pantoprazole (PROTONIX) 2 mg/mL suspension 40 mg  40 mg Per Feeding Tube [facility] Barry Hdez MD   40 mg at 08/30/24 0816    Prosource TF20 ENfit  Compatibl EN LIQD (PROSOURCE TF20) packet 60 mL  1 packet Per Feeding Tube BID Giovanny Guidry PA-C   60 mL at 08/30/24 0804     Current Facility-Administered Medications   Medication Dose Route Frequency Provider Last Rate Last Admin    cisatracurium (NIMBEX) 200 mg in D5W 100 mL infusion  3-10 mcg/kg/min (Ideal) Intravenous Continuous Carlos Cerna MD 12 mL/hr at 08/30/24 1400 8 mcg/kg/min at 08/30/24 1400    dextrose 10% infusion   Intravenous Continuous PRN Gaudencio De Souza MD        dextrose 10% infusion   Intravenous Continuous PRN Giovanny Guidry PA-C        dialysate for CVVHD & CVVHDF (Phoxillum BK4/2.5)  12.5 mL/kg/hr CRRT Continuous Danny Myrick MD 1,100 mL/hr at 08/30/24 1305 12.5 mL/kg/hr at 08/30/24 1305    epoprostenol (VELETRI) inhalation solution  20 ng/kg/min (Ideal) Nebulization Continuous Carlos Cerna MD 3 mL/hr at 08/30/24 1346 20 ng/kg/min at 08/30/24 1346    fentaNYL (SUBLIMAZE) infusion   mcg/hr Intravenous Continuous Fuentes Fowler MD 4 mL/hr at 08/30/24 1400 200 mcg/hr at 08/30/24 1400    heparin (porcine) 20,000 units in 20 mL ANTICOAGULANT infusion (syringe from pharmacy)  500-1,500 Units/hr CRRT Continuous Danny Myrick MD 1.3 mL/hr at 08/30/24 1400 1,300 Units/hr at 08/30/24 1400    insulin regular (MYXREDLIN) 1 unit/mL infusion  0-24 Units/hr Intravenous Continuous Gaudencio De Souza MD 2 mL/hr at 08/30/24 1400 2 Units/hr at 08/30/24 1400    propofol (DIPRIVAN) infusion  5-75 mcg/kg/min (Dosing Weight) Intravenous Continuous Fuentes Fowler MD 16 mL/hr at 08/30/24 1400 30 mcg/kg/min at 08/30/24 1400    And    Medication Instruction   Does not apply Continuous PRN Fuentes Fowler MD        midazolam (VERSED) 100 mg/100 mL NS infusion - ADULT  1-12 mg/hr Intravenous Continuous Carlos Cerna MD 9 mL/hr at 08/30/24 1400 9 mg/hr at 08/30/24 1400    norepinephrine (LEVOPHED) 16 mg in  mL infusion MAX CONC CENTRAL LINE  0.01-0.6 mcg/kg/min  (Dosing Weight) Intravenous Continuous Elier Hutchins MD 5 mL/hr at 08/30/24 1400 0.06 mcg/kg/min at 08/30/24 1400    POST-filter replacement solution for CVVHD & CVVHDF (Phoxillum BK4/2.5)   CRRT Continuous Danny Myrick  mL/hr at 08/30/24 1308 New Bag at 08/30/24 1308    PRE-filter replacement solution for CVVHD & CVVHDF (Phoxillum BK4/2.5)  12.5 mL/kg/hr CRRT Continuous Danny Myrick MD 1,100 mL/hr at 08/30/24 1305 12.5 mL/kg/hr at 08/30/24 1305    vasopressin 1 unit/mL MAX Conc (PITRESSIN) infusion  2.4 Units/hr Intravenous Continuous Kike Ashby MD 2.4 mL/hr at 08/30/24 1400 2.4 Units/hr at 08/30/24 1400     Danny Myrick MD

## 2024-08-30 NOTE — PROGRESS NOTES
2000 Pt with slight improvement in respiratory acidosis from prior, however pH remains low at 7.22 (this is on Vt of 9 ml/kg, not consistent with lung protective ventilation), and P/F ratio is 78. At the bedside she is asynchronous with ventilator with frequent double triggering, pulling volumes double the 9 ml/kg. She is at high risk of VILI in her current state. We are going to start a continuous paralytic, try to achieve lung protective ventilation, and prone her. She is on 2 pressors to maintain her MAP > 65. She is febrile and we are going to collect a repeat sputum culture.  I called pt's son and mother to share the updates and my concern about her worsening respiratory status. I also shared that if her heart were to stop while receiving multi-organ support, CPR and attempts at resuscitation are unlikely to achieve her stated goals of recovery to living back at home. I recommended DNR. They appreciated the transparency and are going to discuss as a family. They will call back if they have updates. They are also making arrangements to come this weekend rather than next.     Heather High MD  Pulmonary Critical Care Fellow     Addendum 2145 Pt's family called back (mother Doreen and brother Demetrio) and after discussion together and with the medical team they decided to change code status to no CPR. They would like to continue other supports. The family will be here some time tomorrow for further discussion and to be with [facility].     Heather High MD    Addendum 2300 pt with worsening acidosis to pH 7.04 and sats 80-83% after initiating paralytic. Noted she was not achieving her tidal volumes of 350 and minute ventilation had dropped to ~6. Dropping PEEP to zero, her plateau pressures improved and she was able to achieve her tidal volumes, minute ventilation improved to 7-8 range. I think with her very poor compliance she was overdistended with a PEEP of 8. Inhaled epoprostanol was initiated, 2 amps of bicarb  given, and she was proned. Sats droped to a shashank of 62% with proning, then slowly recovered to low 90s. Post filter bicarb started for profound acidosis. Left her on vent settings of CMV-VC, Vt 350, RR 32, FiO2 100%, PEEP 3. Pt's aunt and cousin came to the hospital and were provided a medical update.     Heather High

## 2024-08-30 NOTE — PROGRESS NOTES
MEDICAL ICU PROGRESS NOTE  08/30/2024      Date of Service (when I saw the patient): 08/30/2024    ASSESSMENT: Massiel Flaherty is a 53 year old female with PMH Anxiety/depression, fibromyalgia, c/f nonepileptic seizures not on AEDs, hypothyroidism, asthma, HLD, MURIEL on CPAP, expressive aphasia, hypertension  who was admitted on 8/3/2024 for fatigue, fever and dyspnea and intubated 8/6/24 at OSH for ARDS, proned and paralyzed without improvement. Transferred to [facility] 8/8/24, hypoxic with high plateaus/peaks and placed on VV ECMO and CRRT. Decannulated from VV ECMO 8/18 and transferred to MICU 8/26/24 for ongoing management.     CHANGES and MAJOR THINGS TODAY:   - Proned, deep sedation, paralysis  - Pending BAL for lung sampling for diagnostic labs  - s/p 2 units of blood, CTAP to examine for acute bleed.   - Antibiotics broadened, vancomycin discontinued due to negative MRSA nares      Neuro:  # Pain and sedation  # Acute pain  # Sedation and analgesia for vent compliance   # Concern for ICU delirium   # Hx Fibromyalgia   # Hx myalgic encephalomyelitis   # Hx anxiety/depression  - Monitor neurological status. Delirium preventions and precautions.   - Pain: Scheduled: tylenol, oxycodone 5mg q 6hr (decreased from q 4hr) held while sedated  PRN: tylenol, oxycodone  - Sedation plan: fentanyl, midazolam, and propofol with paralysis via cistracurium  - RASS goal 4-5, BIS goal 30-40  - Gabapentin 300mg TID held while sedated  - Seroquel 25mg BID PRN held while sedated  - PTA Venlafaxine and Amitriptyline discontinued while sedated  - Delirium precautions, optimize environment to regulate day/night normalcy   - If worsening, increase seroquel dose     # Hx spells with staring and BUE posturing previously described as nonepileptic seizures   # Hx of GTC seizures and myoclonic epilepsy, weaned off AEDs   # Diffuse cerebral edema - improved  - Sodium goals liberalized to 140-145  - 8/21: MRI Brain: no acute intracranial  pathology. No findings to suggest anoxic brain injury.      Pulmonary:  # Acute hypoxic respiratory failure c/b ARDS   # s/p VV ECMO 8/8 - 8/18   # Hx CAP  # Asthma  # MURIEL on home CPAP   FiO2 (%): 80 %, Resp: (!) 32, Vent Mode: CMV/AC, Resp Rate (Set): (S) 32 breaths/min, Tidal Volume (Set, mL): 350 mL, PEEP (cm H2O): 2 cmH2O, Resp Rate (Set): (S) 32 breaths/min, Tidal Volume (Set, mL): 350 mL, PEEP (cm H2O): 2 cmH2O      PFT dated 9/8/2020 demonstrated normal spirometry with mild diffusion impairment and mild restriction (FEV1/FVC 80%, FEV1 2.59, DLCO 40%). CT abdomen chest 3/9/2020 demonstrated scarring and fibrosis bilaterally which correlated with the decreased DLCO. The patient also has a history of MURIEL on CPAP therapy as currently managed by her provider in [place]. Unclear what triggered ARDS or why she has had such severe hospital course but will further investigate ILD.  - ILD w/u aldolase, EVELIO, RF, CCP, ANCA, MPO, SSA Ro and La, Scleroderma antibody, Radha 1,  hypersensity pneumonitis, IGG subclasses,  aspergillus, blastomyces, histoplasma   - SpO2 goals >92%, PaO2 goals >60   - PTA singular at bedtime if able  - Scheduled pulmicort, and duonebs   - Monitor respiratory status, wean as tolerated  - XR 8/29 showing slight decrease in lung opacities  - Worsening respiratory status possible due to respiratory muscle fatigue, pneumonia  - Increased infiltrates potentially the result of alveolar hemorrhage, patient now has required 2 Units of blood transfused. BAL pending   BAL sample to be used for cultures & analysis to determine direction of treatment.        Cardiovascular:  # Hyperlipidemia  # Hypotension  # Hx HTN  - MAP goal >65, SBP goals >110  - NE for pressor support, wean as able, ok to increase while removing volume via CRRT  - Vasopressin for pressor support augmentation  - Restarted PTA atorvastatin  - PTA clonidine held while sedated  - EKG today showed sinus tachycardia, tachycardia possible  due to precedex withdrawal, volume depletion, infection, O2 needs  - Echo completed the day prior with no acute change to cardiac function  - Lower extremity ultrasound without vascular pathology, no hematoma or clots     GI/Nutrition:  # Moderate protein calorie malnutrition  # Non-infectious Diarrhea  # GERD   - Protonix (home medication)   - Nutrition consulted, appreciate recs  - Diet: TF via NJ, assess swallow after sedation weaned, failed previous swallow study  - Hold bowel regimen d/t loose stools  - Change suspension meds to other form as able  - Continue scheduled multivitamin, banatrol, prosource packet, and loperamide  - Increase frequency of tincture of opium for high stool output  - Rectal tube, continues with high output. Keep today.      Renal/Fluids/Electrolytes:  # Acute kidney injury  # Renal failure  Baseline Cr approx 0.6. Pt was anuric here but now making some urine, likely prerenal due to shock.  - Continue CRRT; fluid goal net zero for the day depending on CXR  - Heparinized CRRT circuit, low intensity protocol. Last clotted 8/24 PM  - Strict I&Os   - Bladder scan q shift, currently anuric  - Avoid nephrotoxins as able      Endocrine:  # Steroid and stress induced hyperglycemia  # Hypothyroidism   - Goal to keep BG < 180 for optimal wound healing.   - Continue High sliding scale insulin, with rising blood glucose values started insulin drip  - Continue PTA synthroid     ID:  # Leukocytosis  # Hx CAP  - Continue to monitor fever curve and inflammatory markers as appropriate  - ID consulted, appreciate recs.   - Due to rigors seen on exam 8/29, low CRRT set temp masking any possible fevers, worsening O2 requirements, took blood cultures and started antibiotics with cefepime.   - Antibiotics changed to Vancomycin/Meropenem after 1 day, MRSA nares negative, discontinued Vancomycin with coverage maintained with meropenem. 1x dose of tobramycin cap coverage     # HSV-1  Mouth sores present, which  could have viral etiology. 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 Monitor and trend.   - Heparinize CRRT circuit    - CTAP due to acute hemoglobin drop requiring transfusion of 2 x 1 unit of blood 12 hours apart.      Musculoskeletal/Rheum:  # Alopecia  - Hold PTA hydroxychloroquine      # Weakness and deconditioning of critical illness  # Left ankle injury pre-hospitalization    - Physical and occupational therapy when able.      Skin:  # BLE scattered ecchymosis  # Left toe duskiness   - Bilateral lower leg ecchymosis   - WOC consult   - Ecchymosis to left foot, most noticeable to 3rd and 4th toes     General Cares/Prophylaxis:    DVT Prophylaxis: Heparin through CRRT circuit  GI Prophylaxis: PPI  Restraints: no  Code Status: DNAR/OK to intubate     Lines/tubes/drains:  - ETT (8/8)  - NJ (8/9)  - LIJ CVC (8/8)  - Radial a-line (8/7)  - PIV x3  - Rectal tube (8/17)  - Tunneled HD line (8/23)     Disposition:  - Medical ICU      Patient seen and findings/plan discussed with medical ICU staff, Dr. De Anda.    Fuentes Fowler MD-PhD    Attending note:  Patient seen, examined and discussed with the Resident physician. All data reviewed including vital signs, medications, laboratory studies and imaging.  I agree with the above assessment and plan.  The patient remains critically ill with acute respiratory failure, CHACORTA on CRRT, seizures, and ARDS.  I personally adjusted the ventilator to treat the acute respiratory failure, followed volume status and electrolytes in the setting of CHACORTA, and titrated vasopressors.  Increased oxygen requirement overnight with continued bilateral diffuse infiltrates. Will perform bronchoscopy with BAL to assist with diagnosis.  ILD work-up has been negative.  Will start high dose steroids to treat presumed ILD flare versus organizing pneumonia, has  "been steroid responsive earlier this admission; CRP is markedly elevated.  Continue vasopressors.  Family updated.      Total Critical care time excluding time for  teaching and procedures was 40 minutes.     Radha De Anda MD  [phone]        Clinically Significant Risk Factors              # Hypoalbuminemia: Lowest albumin = 2.2 g/dL at 8/10/2024  9:47 AM, will monitor as appropriate               # Obesity: Estimated body mass index is 34.11 kg/m  as calculated from the following:    Height as of this encounter: 1.575 m (5' 2\").    Weight as of this encounter: 84.6 kg (186 lb 8.2 oz).   # Moderate Malnutrition: based on nutrition assessment      # Financial/Environmental Concerns: none              Code Status: No CPR- Pre-arrest intubation OK           ====================================  INTERVAL HISTORY:   Intubated and with increased sedation overnight. Increased acidosis. Patient was proned and paralyzed to allow the ventilator to provide better breaths. Rate changes made to ventillator, PEEP decreased.     OBJECTIVE:   1. VITAL SIGNS:   Temp:  [98.4  F (36.9  C)-100.4  F (38  C)] 99.1  F (37.3  C)  Pulse:  [] 94  Resp:  [11-34] 32  BP: (116)/(58) 116/58  MAP:  [67 mmHg-179 mmHg] 68 mmHg  Arterial Line BP: ()/() 100/51  FiO2 (%):  [75 %-100 %] 80 %  SpO2:  [80 %-100 %] 92 %  FiO2 (%): 80 %, Resp: (!) 32, Vent Mode: CMV/AC, Resp Rate (Set): (S) 32 breaths/min, Tidal Volume (Set, mL): 350 mL, PEEP (cm H2O): 2 cmH2O, Resp Rate (Set): (S) 32 breaths/min, Tidal Volume (Set, mL): 350 mL, PEEP (cm H2O): 2 cmH2O  2. INTAKE/ OUTPUT:   I/O last 3 completed shifts:  In: 5294.22 [I.V.:3104.02; Other:0.2; NG/GT:700; IV Piggyback:500]  Out: 889.9 [Other:464.9; Stool:425]    3. PHYSICAL EXAMINATION:  General: sedated, intubated, proned  Neuro: sedated  HEENT: Normocephalic, atraumatic. PERRL, and nonicteric.   CV: RRR on monitor, S1S2, all extremities well perfused   Respiratory: On ventilator, soft " bilateral ronchi in lung bases  GI: Abdomen not examined due to proning  Skin: Scattered contusions on R+L foot. Scars of bilateral knees, healing bruises and lesions from previous lines and drains.    4. LABS:   Arterial Blood Gases   Recent Labs   Lab 08/30/24  1052 08/30/24  0555 08/30/24 0334 08/30/24  0020   PH 7.30* 7.34* 7.27* 7.14*   PCO2 64* 54* 60* 82*   PO2 68* 122* 103 98   HCO3 31* 29* 28 28     Complete Blood Count   Recent Labs   Lab 08/30/24  1045 08/30/24  0334 08/29/24 1942 08/29/24  1300   WBC 13.1* 14.0* 17.2* 21.6*   HGB 6.5* 7.2* 6.3* 7.1*    210 228 261     Basic Metabolic Panel  Recent Labs   Lab 08/30/24  1045 08/30/24  0757 08/30/24  0337 08/30/24  0334 08/29/24 1946 08/29/24 1942 08/29/24  1132 08/29/24  1131     --   --  138  --  134*  --  136   POTASSIUM 3.8  --   --  4.1  --  4.1  --  4.9   CHLORIDE 105  --   --  100  --  103  --  103   CO2 25  --   --  24  --  20*  --  21*   BUN 19.1  --   --  21.3*  --  23.6*  --  25.8*   CR 0.79  --   --  0.99*  --  1.09*  --  1.11*   * 103* 153* 157*   < > 207*   < > 205*    < > = values in this interval not displayed.     Liver Function Tests  Recent Labs   Lab 08/30/24  1045 08/30/24  0334 08/29/24 1942 08/29/24  1131 08/29/24  0346 08/28/24  1147 08/28/24  0403 08/27/24  1359 08/27/24  0323   AST  --  100*  --   --  53*  --  31  --  24   ALT  --  50  --   --  21  --  20  --  15   ALKPHOS  --  182*  --   --  119  --  95  --  94   BILITOTAL  --  0.5  --   --  0.2  --  0.2  --  <0.2   ALBUMIN 2.9* 3.3* 3.5 3.0* 3.4*   < > 3.2*   < > 3.0*   INR 1.14  --   --   --   --   --   --   --   --     < > = values in this interval not displayed.     Coagulation Profile  Recent Labs   Lab 08/30/24  1045   INR 1.14   PTT 56*       5. RADIOLOGY:   Recent Results (from the past 24 hour(s))   XR Chest Port 1 View    Narrative    Portable chest 1241 hours of 8/29/2024    INDICATION: Sudden hypotension    COMPARISON: 0847 hours earlier  today    Findings: Heart size normal. Mild elevation again the left  hemidiaphragm. Diffuse patchy opacities consistent with ARDS again  noted. Endotracheal intubation again present with tube tip  approximately 4.5 cm above the ivy. Feeding tube beyond the  inferior margin of the image. Large bore right IJ approach catheter  tip in the right atrium. Left IJ catheter tip at the lateral wall of  the proximal SVC.  No new or otherwise developing opacities and no ectopic air  collections.      Impression    IMPRESSION: Diffuse interstitial and airspace opacities consistent  with ARDS. Not significantly changed from approximately 4 hours prior.    ENMANUEL JEFFERSON MD         SYSTEM ID:  [id number]   Echo Complete   Result Value    LVEF  65-70%    Narrative    [id number]  [id number]  [id number]  [id number]^NATY^MARC     [facility],[place]  Echocardiography Laboratory  [address] [place] [id number]     Name: NA ALATORRE  MRN: [id number]  : 1970  Study Date: 2024 12:59 PM  Age: 53 yrs  Gender: Female  [place]: [facility] [place]  Reason For Study: Mitral Regurgitation  Ordering Physician: MARC ALFONSO  Referring Physician: SYSTEM, PROVIDER NOT IN  Performed By: Melony Jiménez     BSA: 1.8 m2  Height: 62 in  Weight: 176 lb  BP: 135/69 mmHg  ______________________________________________________________________________  Procedure  Complete Portable Echo Adult.  ______________________________________________________________________________  Interpretation Summary  Trace mitral insufficiency is present.  Global and regional left ventricular function is hyperkinetic with an EF of  65-70%.  Global right ventricular function is normal.  No pericardial effusion is present.  No significant valvular abnormalities present.  The IVC size is small on positive pressure ventilation.  This study was compared with the study from 24. Interval removal of VV  ECMO  cannulas.  ______________________________________________________________________________  Left Ventricle  Global and regional left ventricular function is hyperkinetic with an EF of  65-70%. Left ventricular wall thickness is normal. Left ventricular size is  normal.     Right Ventricle  The right ventricle is normal size. Global right ventricular function is  normal.     Atria  Both atria appear normal.     Mitral Valve  The mitral valve is normal. Trace mitral insufficiency is present.     Aortic Valve  Aortic valve is normal in structure and function. The aortic valve is  tricuspid. On Doppler interrogation, there is no significant stenosis or  regurgitation.     Tricuspid Valve  The tricuspid valve is normal. Trace tricuspid insufficiency is present. The  peak velocity of the tricuspid regurgitant jet is not obtainable.     Pulmonic Valve  The pulmonic valve is normal.     Vessels  The aorta root cannot be assessed. The thoracic aorta cannot be assessed. The  IVC size is small on positive pressure ventilation.     Pericardium  No pericardial effusion is present.     Miscellaneous  No significant valvular abnormalities present.     Compared to Previous Study  This study was compared with the study from 24 . Interval removal of VV  ECMO cannulas.  ______________________________________________________________________________  MMode/2D Measurements & Calculations  IVSd: 1.1 cm  LVIDd: 3.7 cm  LVIDs: 2.6 cm  LVPWd: 0.84 cm  FS: 30.0 %  LV mass(C)d: 105.0 grams  LV mass(C)dI: 58.0 grams/m2  RWT: 0.46  TAPSE: 2.3 cm     Doppler Measurements & Calculations  Ao V2 max: 231.0 cm/sec  Ao max P.3 mmHg  Ao V2 mean: 159.0 cm/sec  Ao mean P.0 mmHg  Ao V2 VTI: 29.9 cm  RV S Usama: 19.7 cm/sec     ______________________________________________________________________________  Report approved by: Falguni Dozier 2024 01:30 PM         US Lower Extremity Venous Duplex Bilateral    Narrative    EXAMINATION:  DOPPLER VENOUS ULTRASOUND OF BILATERAL LOWER EXTREMITIES,  8/29/2024 10:34 PM     COMPARISON: None.    HISTORY: Swelling    TECHNIQUE:  Gray-scale evaluation with compression, spectral flow and  color Doppler assessment of the deep venous system of both legs from  groin to knee, and then at the ankles.    FINDINGS:  In both lower extremities, the common femoral, femoral, popliteal and  posterior tibial veins demonstrate normal compressibility and blood  flow.      Impression    IMPRESSION:  1.  No evidence of deep venous thrombosis in either lower extremity.    I have personally reviewed the examination and initial interpretation  and I agree with the findings.    MARKUS BOWENS MD         SYSTEM ID:  [id number]

## 2024-08-30 NOTE — CONSULTS
Asked to evaluate patient by chart review for possible VV ECMO candidacy.    In brief, 52 y/o female with ILD admitted with presumed CAP leading to acute hypoxic and hypercarbic respiratory failure/ARDS. Patient cannulated for VV ECMO 8/8, decannulated 8/18. Developed renal failure requiring CRRT 8/9. Now reintubated for hypercarbic respiratory failure in the setting of severe hypoxia with ARDS like picture, presumed secondary to pneumonia.Asked to evaluate whether VV ECMO would be an option if she declines further.    Upon chart review, do not feel that patient would be appropriate for recannulation for VV ECMO, as outcomes are poor for patients who reqruie repeated need for ECMO due to relapse/worsening of same disease process during same hospitalization. In addition, her continued renal failure without any signs of recovery places her in a multi-system organ failure group, which portends a poor prognosis given the lack of meaningful recovery and her new worsening respiratory failure. It also appears from a Social Work note, she is not independent of her ADLs at baseline and thus would likely not be a candidate for organ transplantation should her respiratory or renal function not recover.     Please call with questions    Augustin Hernández  SICU fellow    Discussed with Dr. Branch

## 2024-08-30 NOTE — PROGRESS NOTES
ICU End of Shift Summary. See flowsheets for vital signs and detailed assessment.    Changes this shift: Patient overbreathing the vent, increased sedation, RASS goal -3/-4, maxed on Versed and Fent, weaning Prop, BIS 40s, Trop trending up, -130s, scant secretions, vaso added, L internal jugular white lumen tPA'd and is now working, current vent settings are 500, 80%, RR 20, PEEP 5, PIPs in the 40s-50s.    Plan: Wean Propofol if able, monitor Trop, paralytics & proning will be the next steps if ABGs worsen, BLE US tonight.

## 2024-08-30 NOTE — PROGRESS NOTES
CLINICAL NUTRITION SERVICES - BRIEF NOTE      Reason for RD note: Following up on EN support, kcal from propofol    New Findings/Chart Review:  Nutrition support: On hold since proning yesterday (8/29)    Enteral Access: NJT (most recent AXR on 8/27)    Meds: Reviewed, notable for: Propofol gtt, vaso gtt, norepi gtt    Pt changed to DNR status.    Interventions:  MICU rounds: Ok to restart TF. Discussed lower goal TF rate to accommodate kcal from propofol.    EN support adjustment:  Dina Jing Renal (or equivalent) @ 30 mL/hr (720 mL/day) + 2 pkt ProSource TF20 daily to provide 1456 kcal (26 kcal/kg), 97 g protein (1.7 g pro/kg), 122 g CHO, 14 g fiber, and 482 mL free water daily    +~420-550 kcal/day from propofol = 0562-5584 kcal/day (74-79% MREE which meets goal of 60-80% MREE)    Future/Additional Recommendations:  -Monitor kcal from propofol, adjust EN support as indicated  -Monitor GOC    Nutrition will continue to follow per protocol.    Ashli Chatman, ALMITA, LD  Available on [employer] can search by name or unit Dietitian  **Clinical Nutrition is no longer available via pager

## 2024-08-30 NOTE — PHARMACY-AMINOGLYCOSIDE DOSING SERVICE
Pharmacy Aminoglycoside Initial Note  Date of Service 2024  Patient's  1970  53 year old, female    Dosing Weight 88.9 kg    Indication: Healthcare-Associated Pneumonia    Current estimated CrCl = Estimated Creatinine Clearance: 58.5 mL/min (A) (based on SCr of 1.09 mg/dL (H)).    Creatinine for last 3 days  2024:  3:23 AM Creatinine 1.11 mg/dL;  1:59 PM Creatinine 1.04 mg/dL;  8:00 PM Creatinine 0.98 mg/dL  2024:  4:03 AM Creatinine 0.97 mg/dL; 11:47 AM Creatinine 0.94 mg/dL;  8:25 PM Creatinine 0.97 mg/dL  2024:  3:46 AM Creatinine 1.06 mg/dL; 11:31 AM Creatinine 1.11 mg/dL;  7:42 PM Creatinine 1.09 mg/dL     Nephrotoxins and other renal medications (From now, onward)      Start     Dose/Rate Route Frequency Ordered Stop    24 0800  vancomycin (VANCOCIN) 1,250 mg in 0.9% NaCl 250 mL intermittent infusion         1,250 mg  166.7 mL/hr over 90 Minutes Intravenous EVERY 24 HOURS 24 1557      24 0000  tobramycin (NEBCIN) 540 mg in sodium chloride 0.9 % 50 mL intermittent infusion         6 mg/kg × 88.9 kg (Dosing Weight)  over 60 Minutes Intravenous ONCE 24 2252      24 2314  tobramycin (NEBCIN) place ramirez - receiving intermittent dosing         1 each Intravenous SEE ADMIN INSTRUCTIONS 24 2314      24 1530  vasopressin 1 unit/mL MAX Conc (PITRESSIN) infusion         2.4 Units/hr  2.4 mL/hr  Intravenous CONTINUOUS 24 1514      24 1930  norepinephrine (LEVOPHED) 16 mg in  mL infusion MAX CONC CENTRAL LINE         0.01-0.6 mcg/kg/min × 88.9 kg (Dosing Weight)  0.8-50 mL/hr  Intravenous CONTINUOUS 24 1929              Contrast Orders - past 72 hours (72h ago, onward)      None            Aminoglycoside Levels - past 2 days  No results found for requested labs within last 2 days.    Aminoglycosides IV Administrations (past 72 hours)        No aminoglycosides orders with administrations in past 72 hours.                         Plan:  1.  Start Tobramycin 540 mg (6 mg/kg) IV once now, additional doses to be determined by team in AM.   2.  Target goals based on conventional dosing  3.  Goal peak level:  8-10 mg/L  4.  Goal trough level: <1 mg/L  5.  Pharmacy will continue to follow and check levels as appropriate in 1-3 Days      Akin Brown, [facility]  [id number]

## 2024-08-30 NOTE — PROCEDURES
[facility]   Bronchoscopy Procedure Note    PROCEDURE: Bronchoscopy with BAL    INDICATION: ARDS/H-VAP    CONSENT: Obtained and in the paper chart  The patient's medical record has been reviewed. The indication for the procedure was reviewed. The necessary history and physical examination was performed and reviewed. The risks, benefits and alternatives of the procedure were discussed with the the patient in detail and he had the opportunity to ask questions. I discussed in particular the potential complications including risks of minor or life-threatening bleeding and/or infection, respiratory failure, vocal cord trauma / paralysis, pneumothorax, and discomfort. Sedation risks were also discussed including abnormal heart rhythms, low blood pressure, and respiratory failure. All questions were answered to the best of my ability.      PROCEDURE :   German Velez Reyes, MD, PhD     MEDICATIONS: Deep Sedation. See MAR for details      Sedation Time: 20 minutes of continuous 1:1 monitoring   Time Out:  Performed    The patient was assessed for the adequacy for the procedure and to receive medications.   Mental Status:  Deep Sedation   Pulmonary:  Mechanical Ventilation   CV:  Regular rate, hypotensive requiring pressors   ASA Grade: (IV)  Severe systemic disease     After clinical evaluation and reviewing the indication, risks, alternatives and benefits of the procedure the patient was deemed to be in satisfactory condition to undergo the procedure.       Immediately before administration of medications the patient was re-assessed for adequacy to receive sedatives including the heart rate, respiratory rate, mental status, oxygen saturation, blood pressure and adequacy of pulmonary ventilation.These same parameters were continuously monitored throughout the procedure.    PROCEDURE:  5mL of lidocaine instilled via ET tube with minimal cough. Flexible bronchoscope was inserted through patient's ET tube.  The ET tube was in good position. The ivy was sharp. An airway inspection was done to the subsegmental level which found: mucous burden on the left lower lobe. Otherwise unimpressive airways.. The bronchoscope was then advanced to the lateral subsegment of the LLL and placed into wedge. A bronchial washing was performed where 1220mL of saline was instilled in 60mL aliquots. A total of 30mL were collected.       SAMPLES:   30 mL of bronchial washing fluid was sent for microscopic analysis and cell counts.     COMPLICATIONS: None    Supervised by Dr. Arndt Germán L. Vélez Reyes, MD, PhD  Pulmonary/Critical Care Fellow  Pager: [phone]    Attending note:  Bedside bronchoscopy with BAL for diagnosis of acute respiratory failure. Diagnosis- acute respiratory failure/ARDS.  Uncomplicated procedure>  I was present during the entire viewing period from scope insertion to scope withdrawal.    Radha De Anda MD  [phone]

## 2024-08-31 LAB
1,3 BETA GLUCAN SER-MCNC: 216 PG/ML
A FLAVUS AB SER QL ID: NORMAL
A FUMIGATUS1 AB SER QL ID: NORMAL
A FUMIGATUS2 AB SER QL ID: NORMAL
A FUMIGATUS3 AB SER QL ID: NORMAL
A FUMIGATUS6 AB SER QL ID: NORMAL
A PULLULANS AB SER QL ID: NORMAL
ALBUMIN SERPL BCG-MCNC: 3.4 G/DL (ref 3.5–5.2)
ALLEN'S TEST: ABNORMAL
ALP SERPL-CCNC: 223 U/L (ref 40–150)
ALT SERPL W P-5'-P-CCNC: 42 U/L (ref 0–50)
ANION GAP SERPL CALCULATED.3IONS-SCNC: 11 MMOL/L (ref 7–15)
ANION GAP SERPL CALCULATED.3IONS-SCNC: 12 MMOL/L (ref 7–15)
ANION GAP SERPL CALCULATED.3IONS-SCNC: 13 MMOL/L (ref 7–15)
AST SERPL W P-5'-P-CCNC: 55 U/L (ref 0–45)
BASE EXCESS BLDA CALC-SCNC: 1.2 MMOL/L (ref -3–3)
BASE EXCESS BLDA CALC-SCNC: 2.9 MMOL/L (ref -3–3)
BASE EXCESS BLDA CALC-SCNC: 5.3 MMOL/L (ref -3–3)
BASE EXCESS BLDA CALC-SCNC: 6 MMOL/L (ref -3–3)
BASE EXCESS BLDA CALC-SCNC: 7.7 MMOL/L (ref -3–3)
BILIRUB DIRECT SERPL-MCNC: <0.2 MG/DL (ref 0–0.3)
BILIRUB SERPL-MCNC: 0.2 MG/DL
BUN SERPL-MCNC: 24.7 MG/DL (ref 6–20)
BUN SERPL-MCNC: 26.8 MG/DL (ref 6–20)
BUN SERPL-MCNC: 27.3 MG/DL (ref 6–20)
CA-I BLD-MCNC: 4.6 MG/DL (ref 4.4–5.2)
CA-I BLD-MCNC: 4.6 MG/DL (ref 4.4–5.2)
CA-I BLD-MCNC: 4.7 MG/DL (ref 4.4–5.2)
CALCIUM SERPL-MCNC: 8.6 MG/DL (ref 8.8–10.4)
CALCIUM SERPL-MCNC: 8.7 MG/DL (ref 8.8–10.4)
CALCIUM SERPL-MCNC: 8.8 MG/DL (ref 8.8–10.4)
CHLORIDE SERPL-SCNC: 94 MMOL/L (ref 98–107)
CHLORIDE SERPL-SCNC: 95 MMOL/L (ref 98–107)
CHLORIDE SERPL-SCNC: 96 MMOL/L (ref 98–107)
COHGB MFR BLD: 93.3 % (ref 96–97)
COHGB MFR BLD: 93.7 % (ref 96–97)
COHGB MFR BLD: 96.4 % (ref 96–97)
COHGB MFR BLD: 97.4 % (ref 96–97)
COHGB MFR BLD: 98.3 % (ref 96–97)
CREAT SERPL-MCNC: 0.78 MG/DL (ref 0.51–0.95)
CREAT SERPL-MCNC: 0.8 MG/DL (ref 0.51–0.95)
CREAT SERPL-MCNC: 0.86 MG/DL (ref 0.51–0.95)
CRYPTOC AG SPEC QL: NEGATIVE
EGFRCR SERPLBLD CKD-EPI 2021: 80 ML/MIN/1.73M2
EGFRCR SERPLBLD CKD-EPI 2021: 88 ML/MIN/1.73M2
EGFRCR SERPLBLD CKD-EPI 2021: 90 ML/MIN/1.73M2
ERYTHROCYTE [DISTWIDTH] IN BLOOD BY AUTOMATED COUNT: 19.5 % (ref 10–15)
ERYTHROCYTE [DISTWIDTH] IN BLOOD BY AUTOMATED COUNT: 19.5 % (ref 10–15)
GLUCOSE BLDC GLUCOMTR-MCNC: 133 MG/DL (ref 70–99)
GLUCOSE BLDC GLUCOMTR-MCNC: 140 MG/DL (ref 70–99)
GLUCOSE BLDC GLUCOMTR-MCNC: 141 MG/DL (ref 70–99)
GLUCOSE BLDC GLUCOMTR-MCNC: 142 MG/DL (ref 70–99)
GLUCOSE BLDC GLUCOMTR-MCNC: 143 MG/DL (ref 70–99)
GLUCOSE BLDC GLUCOMTR-MCNC: 146 MG/DL (ref 70–99)
GLUCOSE BLDC GLUCOMTR-MCNC: 147 MG/DL (ref 70–99)
GLUCOSE BLDC GLUCOMTR-MCNC: 148 MG/DL (ref 70–99)
GLUCOSE BLDC GLUCOMTR-MCNC: 149 MG/DL (ref 70–99)
GLUCOSE BLDC GLUCOMTR-MCNC: 150 MG/DL (ref 70–99)
GLUCOSE BLDC GLUCOMTR-MCNC: 151 MG/DL (ref 70–99)
GLUCOSE BLDC GLUCOMTR-MCNC: 152 MG/DL (ref 70–99)
GLUCOSE BLDC GLUCOMTR-MCNC: 153 MG/DL (ref 70–99)
GLUCOSE BLDC GLUCOMTR-MCNC: 153 MG/DL (ref 70–99)
GLUCOSE BLDC GLUCOMTR-MCNC: 157 MG/DL (ref 70–99)
GLUCOSE BLDC GLUCOMTR-MCNC: 160 MG/DL (ref 70–99)
GLUCOSE BLDC GLUCOMTR-MCNC: 164 MG/DL (ref 70–99)
GLUCOSE BLDC GLUCOMTR-MCNC: 181 MG/DL (ref 70–99)
GLUCOSE SERPL-MCNC: 170 MG/DL (ref 70–99)
GLUCOSE SERPL-MCNC: 176 MG/DL (ref 70–99)
GLUCOSE SERPL-MCNC: 217 MG/DL (ref 70–99)
H CAPSUL AG UR QL IA: NOT DETECTED
H CAPSUL AG UR-MCNC: NOT DETECTED NG/ML
HCO3 BLD-SCNC: 30 MMOL/L (ref 21–28)
HCO3 BLD-SCNC: 32 MMOL/L (ref 21–28)
HCO3 BLD-SCNC: 34 MMOL/L (ref 21–28)
HCO3 BLD-SCNC: 34 MMOL/L (ref 21–28)
HCO3 BLD-SCNC: 36 MMOL/L (ref 21–28)
HCO3 SERPL-SCNC: 28 MMOL/L (ref 22–29)
HCO3 SERPL-SCNC: 30 MMOL/L (ref 22–29)
HCO3 SERPL-SCNC: 32 MMOL/L (ref 22–29)
HCT VFR BLD AUTO: 28 % (ref 35–47)
HCT VFR BLD AUTO: 28.7 % (ref 35–47)
HGB BLD-MCNC: 8.5 G/DL (ref 11.7–15.7)
HGB BLD-MCNC: 9 G/DL (ref 11.7–15.7)
MAGNESIUM SERPL-MCNC: 2.4 MG/DL (ref 1.7–2.3)
MAGNESIUM SERPL-MCNC: 2.5 MG/DL (ref 1.7–2.3)
MAGNESIUM SERPL-MCNC: 2.6 MG/DL (ref 1.7–2.3)
MCH RBC QN AUTO: 29.5 PG (ref 26.5–33)
MCH RBC QN AUTO: 30.4 PG (ref 26.5–33)
MCHC RBC AUTO-ENTMCNC: 30.4 G/DL (ref 31.5–36.5)
MCHC RBC AUTO-ENTMCNC: 31.4 G/DL (ref 31.5–36.5)
MCV RBC AUTO: 97 FL (ref 78–100)
MCV RBC AUTO: 97 FL (ref 78–100)
O2/TOTAL GAS SETTING VFR VENT: 40 %
O2/TOTAL GAS SETTING VFR VENT: 45 %
O2/TOTAL GAS SETTING VFR VENT: 45 %
O2/TOTAL GAS SETTING VFR VENT: 50 %
O2/TOTAL GAS SETTING VFR VENT: 60 %
OBSERVATION IMP: POSITIVE
PCO2 BLD: 69 MM HG (ref 35–45)
PCO2 BLD: 71 MM HG (ref 35–45)
PCO2 BLD: 73 MM HG (ref 35–45)
PCO2 BLD: 75 MM HG (ref 35–45)
PCO2 BLD: 75 MM HG (ref 35–45)
PEEP: 9 CM H2O
PH BLD: 7.24 [PH] (ref 7.35–7.45)
PH BLD: 7.24 [PH] (ref 7.35–7.45)
PH BLD: 7.26 [PH] (ref 7.35–7.45)
PH BLD: 7.29 [PH] (ref 7.35–7.45)
PH BLD: 7.3 [PH] (ref 7.35–7.45)
PHOSPHATE SERPL-MCNC: 4.7 MG/DL (ref 2.5–4.5)
PHOSPHATE SERPL-MCNC: 4.9 MG/DL (ref 2.5–4.5)
PHOSPHATE SERPL-MCNC: 5.9 MG/DL (ref 2.5–4.5)
PIGEON SERUM AB QL ID: NORMAL
PLATELET # BLD AUTO: 234 10E3/UL (ref 150–450)
PLATELET # BLD AUTO: 252 10E3/UL (ref 150–450)
PO2 BLD: 63 MM HG (ref 80–105)
PO2 BLD: 65 MM HG (ref 80–105)
PO2 BLD: 79 MM HG (ref 80–105)
PO2 BLD: 85 MM HG (ref 80–105)
PO2 BLD: 90 MM HG (ref 80–105)
POTASSIUM SERPL-SCNC: 3.7 MMOL/L (ref 3.4–5.3)
POTASSIUM SERPL-SCNC: 3.7 MMOL/L (ref 3.4–5.3)
POTASSIUM SERPL-SCNC: 4.1 MMOL/L (ref 3.4–5.3)
PROT SERPL-MCNC: 6.8 G/DL (ref 6.4–8.3)
RBC # BLD AUTO: 2.88 10E6/UL (ref 3.8–5.2)
RBC # BLD AUTO: 2.96 10E6/UL (ref 3.8–5.2)
S RECTIVIRGULA AB SER QL ID: NORMAL
S VIRIDIS AB SER QL ID: NORMAL
SAO2 % BLDA: 91 % (ref 92–100)
SAO2 % BLDA: 92 % (ref 92–100)
SAO2 % BLDA: 94 % (ref 92–100)
SAO2 % BLDA: 95 % (ref 92–100)
SAO2 % BLDA: 96 % (ref 92–100)
SODIUM SERPL-SCNC: 136 MMOL/L (ref 135–145)
SODIUM SERPL-SCNC: 137 MMOL/L (ref 135–145)
SODIUM SERPL-SCNC: 138 MMOL/L (ref 135–145)
T CANDIDUS AB SER QL: NORMAL
TRIGL SERPL-MCNC: 187 MG/DL
UFH PPP CHRO-ACNC: 0.22 IU/ML
UFH PPP CHRO-ACNC: 0.3 IU/ML
UFH PPP CHRO-ACNC: 0.35 IU/ML
WBC # BLD AUTO: 12.4 10E3/UL (ref 4–11)
WBC # BLD AUTO: 12.9 10E3/UL (ref 4–11)

## 2024-08-31 PROCEDURE — 250N000013 HC RX MED GY IP 250 OP 250 PS 637: Performed by: STUDENT IN AN ORGANIZED HEALTH CARE EDUCATION/TRAINING PROGRAM

## 2024-08-31 PROCEDURE — 85027 COMPLETE CBC AUTOMATED: CPT | Performed by: STUDENT IN AN ORGANIZED HEALTH CARE EDUCATION/TRAINING PROGRAM

## 2024-08-31 PROCEDURE — 250N000013 HC RX MED GY IP 250 OP 250 PS 637: Performed by: PHYSICIAN ASSISTANT

## 2024-08-31 PROCEDURE — 82330 ASSAY OF CALCIUM: CPT | Performed by: STUDENT IN AN ORGANIZED HEALTH CARE EDUCATION/TRAINING PROGRAM

## 2024-08-31 PROCEDURE — 99233 SBSQ HOSP IP/OBS HIGH 50: CPT | Performed by: INTERNAL MEDICINE

## 2024-08-31 PROCEDURE — 82248 BILIRUBIN DIRECT: CPT | Performed by: STUDENT IN AN ORGANIZED HEALTH CARE EDUCATION/TRAINING PROGRAM

## 2024-08-31 PROCEDURE — 99291 CRITICAL CARE FIRST HOUR: CPT | Mod: GC

## 2024-08-31 PROCEDURE — 250N000011 HC RX IP 250 OP 636: Performed by: INTERNAL MEDICINE

## 2024-08-31 PROCEDURE — 999N000157 HC STATISTIC RCP TIME EA 10 MIN

## 2024-08-31 PROCEDURE — 82805 BLOOD GASES W/O2 SATURATION: CPT

## 2024-08-31 PROCEDURE — 250N000009 HC RX 250: Performed by: INTERNAL MEDICINE

## 2024-08-31 PROCEDURE — 87305 ASPERGILLUS AG IA: CPT

## 2024-08-31 PROCEDURE — 87899 AGENT NOS ASSAY W/OPTIC: CPT

## 2024-08-31 PROCEDURE — 250N000013 HC RX MED GY IP 250 OP 250 PS 637: Performed by: SURGERY

## 2024-08-31 PROCEDURE — 83735 ASSAY OF MAGNESIUM: CPT | Performed by: STUDENT IN AN ORGANIZED HEALTH CARE EDUCATION/TRAINING PROGRAM

## 2024-08-31 PROCEDURE — 250N000011 HC RX IP 250 OP 636

## 2024-08-31 PROCEDURE — 200N000002 HC R&B ICU UMMC

## 2024-08-31 PROCEDURE — 250N000009 HC RX 250

## 2024-08-31 PROCEDURE — 84478 ASSAY OF TRIGLYCERIDES: CPT | Performed by: SURGERY

## 2024-08-31 PROCEDURE — 250N000009 HC RX 250: Performed by: STUDENT IN AN ORGANIZED HEALTH CARE EDUCATION/TRAINING PROGRAM

## 2024-08-31 PROCEDURE — 258N000003 HC RX IP 258 OP 636

## 2024-08-31 PROCEDURE — 85520 HEPARIN ASSAY: CPT | Performed by: STUDENT IN AN ORGANIZED HEALTH CARE EDUCATION/TRAINING PROGRAM

## 2024-08-31 PROCEDURE — 94645 CONT INHLJ TX EACH ADDL HOUR: CPT

## 2024-08-31 PROCEDURE — 90947 DIALYSIS REPEATED EVAL: CPT

## 2024-08-31 PROCEDURE — 82040 ASSAY OF SERUM ALBUMIN: CPT | Performed by: STUDENT IN AN ORGANIZED HEALTH CARE EDUCATION/TRAINING PROGRAM

## 2024-08-31 PROCEDURE — 84100 ASSAY OF PHOSPHORUS: CPT | Performed by: INTERNAL MEDICINE

## 2024-08-31 PROCEDURE — 250N000009 HC RX 250: Performed by: SURGERY

## 2024-08-31 PROCEDURE — 80069 RENAL FUNCTION PANEL: CPT | Performed by: STUDENT IN AN ORGANIZED HEALTH CARE EDUCATION/TRAINING PROGRAM

## 2024-08-31 PROCEDURE — 250N000011 HC RX IP 250 OP 636: Mod: JZ

## 2024-08-31 PROCEDURE — 82330 ASSAY OF CALCIUM: CPT | Performed by: INTERNAL MEDICINE

## 2024-08-31 PROCEDURE — 94003 VENT MGMT INPAT SUBQ DAY: CPT

## 2024-08-31 PROCEDURE — 85520 HEPARIN ASSAY: CPT | Performed by: SURGERY

## 2024-08-31 PROCEDURE — 83735 ASSAY OF MAGNESIUM: CPT | Performed by: INTERNAL MEDICINE

## 2024-08-31 RX ORDER — CALCIUM GLUCONATE 20 MG/ML
2 INJECTION, SOLUTION INTRAVENOUS EVERY 8 HOURS PRN
Status: DISCONTINUED | OUTPATIENT
Start: 2024-08-31 | End: 2024-09-06

## 2024-08-31 RX ORDER — MAGNESIUM SULFATE HEPTAHYDRATE 40 MG/ML
2 INJECTION, SOLUTION INTRAVENOUS EVERY 8 HOURS PRN
Status: DISCONTINUED | OUTPATIENT
Start: 2024-08-31 | End: 2024-09-06

## 2024-08-31 RX ORDER — CALCIUM CHLORIDE, MAGNESIUM CHLORIDE, SODIUM CHLORIDE, SODIUM BICARBONATE, POTASSIUM CHLORIDE AND SODIUM PHOSPHATE DIBASIC DIHYDRATE 3.68; 3.05; 6.34; 3.09; .314; .187 G/L; G/L; G/L; G/L; G/L; G/L
12.5 INJECTION INTRAVENOUS CONTINUOUS
Status: DISCONTINUED | OUTPATIENT
Start: 2024-08-31 | End: 2024-09-06

## 2024-08-31 RX ORDER — POTASSIUM CHLORIDE 29.8 MG/ML
20 INJECTION INTRAVENOUS EVERY 8 HOURS PRN
Status: DISCONTINUED | OUTPATIENT
Start: 2024-08-31 | End: 2024-09-06

## 2024-08-31 RX ORDER — DEXAMETHASONE SODIUM PHOSPHATE 10 MG/ML
20 INJECTION, SOLUTION INTRAMUSCULAR; INTRAVENOUS DAILY
Status: COMPLETED | OUTPATIENT
Start: 2024-09-01 | End: 2024-09-03

## 2024-08-31 RX ORDER — DEXAMETHASONE SODIUM PHOSPHATE 10 MG/ML
10 INJECTION, SOLUTION INTRAMUSCULAR; INTRAVENOUS DAILY
Status: DISCONTINUED | OUTPATIENT
Start: 2024-09-04 | End: 2024-09-06

## 2024-08-31 RX ADMIN — CISATRACURIUM BESYLATE 5 MCG/KG/MIN: 10 INJECTION INTRAVENOUS at 18:34

## 2024-08-31 RX ADMIN — EPOPROSTENOL 20 NG/KG/MIN: 1.5 INJECTION, POWDER, LYOPHILIZED, FOR SOLUTION INTRAVENOUS at 06:35

## 2024-08-31 RX ADMIN — Medication 200 MCG/HR: at 21:15

## 2024-08-31 RX ADMIN — LEVALBUTEROL HYDROCHLORIDE 0.63 MG: 0.63 SOLUTION RESPIRATORY (INHALATION) at 16:10

## 2024-08-31 RX ADMIN — Medication 40 MG: at 07:43

## 2024-08-31 RX ADMIN — MEROPENEM 1 G: 1 INJECTION, POWDER, FOR SOLUTION INTRAVENOUS at 23:05

## 2024-08-31 RX ADMIN — LEVOTHYROXINE SODIUM 25 MCG: 0.03 TABLET ORAL at 08:11

## 2024-08-31 RX ADMIN — LEVALBUTEROL HYDROCHLORIDE 0.63 MG: 0.63 SOLUTION RESPIRATORY (INHALATION) at 20:35

## 2024-08-31 RX ADMIN — WHITE PETROLATUM 57.7 %-MINERAL OIL 31.9 % EYE OINTMENT: at 20:01

## 2024-08-31 RX ADMIN — PROPOFOL 20 MCG/KG/MIN: 10 INJECTION, EMULSION INTRAVENOUS at 15:20

## 2024-08-31 RX ADMIN — Medication 200 MCG/HR: at 09:20

## 2024-08-31 RX ADMIN — CALCIUM CHLORIDE, MAGNESIUM CHLORIDE, SODIUM CHLORIDE, SODIUM BICARBONATE, POTASSIUM CHLORIDE AND SODIUM PHOSPHATE DIBASIC DIHYDRATE 12.5 ML/KG/HR: 3.68; 3.05; 6.34; 3.09; .314; .187 INJECTION INTRAVENOUS at 12:23

## 2024-08-31 RX ADMIN — ACETAMINOPHEN 975 MG: 325 TABLET, FILM COATED ORAL at 08:11

## 2024-08-31 RX ADMIN — Medication 25 MCG: at 09:23

## 2024-08-31 RX ADMIN — CALCIUM CHLORIDE, MAGNESIUM CHLORIDE, SODIUM CHLORIDE, SODIUM BICARBONATE, POTASSIUM CHLORIDE AND SODIUM PHOSPHATE DIBASIC DIHYDRATE 12.5 ML/KG/HR: 3.68; 3.05; 6.34; 3.09; .314; .187 INJECTION INTRAVENOUS at 23:01

## 2024-08-31 RX ADMIN — CISATRACURIUM BESYLATE 8 MCG/KG/MIN: 10 INJECTION INTRAVENOUS at 06:38

## 2024-08-31 RX ADMIN — IPRATROPIUM BROMIDE 0.5 MG: 0.5 SOLUTION RESPIRATORY (INHALATION) at 08:42

## 2024-08-31 RX ADMIN — WHITE PETROLATUM 57.7 %-MINERAL OIL 31.9 % EYE OINTMENT: at 12:18

## 2024-08-31 RX ADMIN — ACETAMINOPHEN 975 MG: 325 TABLET, FILM COATED ORAL at 15:17

## 2024-08-31 RX ADMIN — PROPOFOL 20 MCG/KG/MIN: 10 INJECTION, EMULSION INTRAVENOUS at 04:11

## 2024-08-31 RX ADMIN — LEVALBUTEROL HYDROCHLORIDE 0.63 MG: 0.63 SOLUTION RESPIRATORY (INHALATION) at 08:42

## 2024-08-31 RX ADMIN — Medication 1 TABLET: at 08:11

## 2024-08-31 RX ADMIN — IPRATROPIUM BROMIDE 0.5 MG: 0.5 SOLUTION RESPIRATORY (INHALATION) at 16:10

## 2024-08-31 RX ADMIN — LOPERAMIDE HYDROCHLORIDE 4 MG: 2 CAPSULE ORAL at 23:44

## 2024-08-31 RX ADMIN — PROPOFOL 30 MCG/KG/MIN: 10 INJECTION, EMULSION INTRAVENOUS at 21:06

## 2024-08-31 RX ADMIN — CALCIUM CHLORIDE, MAGNESIUM CHLORIDE, SODIUM CHLORIDE, SODIUM BICARBONATE, POTASSIUM CHLORIDE AND SODIUM PHOSPHATE DIBASIC DIHYDRATE 12.5 ML/KG/HR: 3.68; 3.05; 6.34; 3.09; .314; .187 INJECTION INTRAVENOUS at 03:56

## 2024-08-31 RX ADMIN — BUDESONIDE 1 MG: 1 INHALANT ORAL at 08:43

## 2024-08-31 RX ADMIN — MEROPENEM 1 G: 1 INJECTION, POWDER, FOR SOLUTION INTRAVENOUS at 08:11

## 2024-08-31 RX ADMIN — EPOPROSTENOL 20 NG/KG/MIN: 1.5 INJECTION, POWDER, LYOPHILIZED, FOR SOLUTION INTRAVENOUS at 23:42

## 2024-08-31 RX ADMIN — INSULIN HUMAN 4 UNITS/HR: 1 INJECTION, SOLUTION INTRAVENOUS at 12:18

## 2024-08-31 RX ADMIN — CALCIUM CHLORIDE, MAGNESIUM CHLORIDE, SODIUM CHLORIDE, SODIUM BICARBONATE, POTASSIUM CHLORIDE AND SODIUM PHOSPHATE DIBASIC DIHYDRATE 12.5 ML/KG/HR: 3.68; 3.05; 6.34; 3.09; .314; .187 INJECTION INTRAVENOUS at 17:00

## 2024-08-31 RX ADMIN — SODIUM BICARBONATE: 84 INJECTION, SOLUTION INTRAVENOUS at 12:14

## 2024-08-31 RX ADMIN — IPRATROPIUM BROMIDE 0.5 MG: 0.5 SOLUTION RESPIRATORY (INHALATION) at 20:35

## 2024-08-31 RX ADMIN — IPRATROPIUM BROMIDE 0.5 MG: 0.5 SOLUTION RESPIRATORY (INHALATION) at 11:46

## 2024-08-31 RX ADMIN — METHYLPREDNISOLONE SODIUM SUCCINATE 125 MG: 125 INJECTION, POWDER, FOR SOLUTION INTRAMUSCULAR; INTRAVENOUS at 05:15

## 2024-08-31 RX ADMIN — Medication 60 ML: at 19:50

## 2024-08-31 RX ADMIN — MIDAZOLAM HYDROCHLORIDE 7 MG/HR: 1 INJECTION, SOLUTION INTRAVENOUS at 18:39

## 2024-08-31 RX ADMIN — LOPERAMIDE HYDROCHLORIDE 4 MG: 2 CAPSULE ORAL at 12:18

## 2024-08-31 RX ADMIN — ACETAMINOPHEN 975 MG: 325 TABLET, FILM COATED ORAL at 23:44

## 2024-08-31 RX ADMIN — Medication 1300 UNITS/HR: at 22:26

## 2024-08-31 RX ADMIN — CALCIUM CHLORIDE, MAGNESIUM CHLORIDE, SODIUM CHLORIDE, SODIUM BICARBONATE, POTASSIUM CHLORIDE AND SODIUM PHOSPHATE DIBASIC DIHYDRATE 12.5 ML/KG/HR: 3.68; 3.05; 6.34; 3.09; .314; .187 INJECTION INTRAVENOUS at 03:55

## 2024-08-31 RX ADMIN — WHITE PETROLATUM 57.7 %-MINERAL OIL 31.9 % EYE OINTMENT: at 04:13

## 2024-08-31 RX ADMIN — ACETAMINOPHEN 975 MG: 325 TABLET, FILM COATED ORAL at 00:23

## 2024-08-31 RX ADMIN — CETIRIZINE HYDROCHLORIDE 10 MG: 10 TABLET, FILM COATED ORAL at 08:11

## 2024-08-31 RX ADMIN — LEVALBUTEROL HYDROCHLORIDE 0.63 MG: 0.63 SOLUTION RESPIRATORY (INHALATION) at 11:46

## 2024-08-31 RX ADMIN — MONTELUKAST 10 MG: 10 TABLET, FILM COATED ORAL at 21:06

## 2024-08-31 RX ADMIN — LOPERAMIDE HYDROCHLORIDE 4 MG: 2 CAPSULE ORAL at 05:15

## 2024-08-31 RX ADMIN — MEROPENEM 1 G: 1 INJECTION, POWDER, FOR SOLUTION INTRAVENOUS at 15:18

## 2024-08-31 RX ADMIN — Medication 60 ML: at 08:11

## 2024-08-31 RX ADMIN — Medication 2.4 UNITS/HR: at 06:11

## 2024-08-31 ASSESSMENT — ACTIVITIES OF DAILY LIVING (ADL)
ADLS_ACUITY_SCORE: 65

## 2024-08-31 NOTE — PROGRESS NOTES
"  Nephrology Progress Note  08/31/2024         Assessment & Recommendations:   Mrs Flaherty is a 54 yo F w/ hx of Anxiety, depression, fibromyalgia, hypothyroidism, asthma, HLD, MURIEL on CPAP, expressive aphasia, and hypertension who was admitted to an OSH with community acquired pneumonia on 8/3 s/p intubation.  Found to have severe ARDS requiring paralysis and proning, transferred here on 8/8 for management and initiated on CKRT for severe acidemia in the setting of oligoanuric CHACORTA.  Started CRRT on 8/9 for volume management.     # anuric CHACORTA, suspected ATN in setting of shock, ARDS  # respiratory acidosis   Continue CRRT (with heparin dosed by Xa level protocol due to filter clotting daily when on fixed 500 units/hr heparin).   -Continue 4K baths, standard 25ml/kg/hr clearance.  -stopped post-filter bicarb gtt mid-day 8/30, but restarted due to acidosis  - add heparin  -continue net negative goal of 50ml/hr on CRRT    # hypervolemia - UF as above    # electrolytes stable      Recommendations were communicated to primary team via this note.     Emmanuelle Donato MD   Division of Nephrology and Hypertension  [facility]  [employer] Web Console      Interval History :   Nursing and provider notes from last 24 hours reviewed.  In the last 24 hours Massiel Flaherty remains intubated. PCO2 castillo overnight with pH low, so post-filter bicarb gtt on CRRT was begun. She has significant respiratory acidosis, CO2 75    Review of Systems:   Unable to obtain due to intubation     Physical Exam:   I/O last 3 completed shifts:  In: 3015.09 [I.V.:1805.09; NG/GT:610]  Out: 6339.5 [Urine:350; Other:5839.5; Stool:150]   /58 (BP Location: Right arm)   Pulse 105   Temp 97.7  F (36.5  C)   Resp (!) 32   Ht 1.575 m (5' 2\")   Wt 80 kg (176 lb 5.9 oz)   SpO2 100%   BMI 32.26 kg/m       GENERAL APPEARANCE: critically ill, intubated (again)  HEENT: MMM   PULM: lungs clear anteriorly   CV: regular rhythm, normal rate, no rub      " -edema - 1+ LE edema bilat   GI: soft, ND  INTEGUMENT: no rash on exposed skin  NEURO: sedated   Access is LFV temp line 8/19.     Labs:   All labs reviewed by me  Electrolytes/Renal -   Recent Labs   Lab Test 08/31/24  1311 08/31/24  1216 08/31/24  1033 08/31/24  1024 08/31/24  0350 08/31/24  0341 08/30/24 2101 08/30/24 2016   NA  --   --   --  138  --  136  --  136   POTASSIUM  --   --   --  3.7  --  4.1  --  4.4   CHLORIDE  --   --   --  95*  --  96*  --  98   CO2  --   --   --  30*  --  28  --  26   BUN  --   --   --  27.3*  --  24.7*  --  25.5*   CR  --   --   --  0.78  --  0.86  --  0.93   * 160* 181* 217*   < > 176*   < > 170*  177*   QUAN  --   --   --  8.6*  --  8.7*  --  8.5*   MAG  --   --   --  2.4*  --  2.6*  --  2.4*   PHOS  --   --   --  4.9*  --  5.9*  --  6.9*    < > = values in this interval not displayed.       CBC -   Recent Labs   Lab Test 08/31/24  1024 08/31/24 0341 08/30/24  1045   WBC 12.4* 12.9* 13.1*   HGB 9.0* 8.5* 6.5*    234 174       LFTs -   Recent Labs   Lab Test 08/31/24  1024 08/31/24  0341 08/30/24 2016 08/30/24  1045 08/30/24  0334 08/29/24  1131 08/29/24 0346   ALKPHOS  --  223*  --   --  182*  --  119   BILITOTAL  --  0.2  --   --  0.5  --  0.2   ALT  --  42  --   --  50  --  21   AST  --  55*  --   --  100*  --  53*   PROTTOTAL  --  6.8  --   --  6.0*  --  7.0   ALBUMIN 3.4* 3.4* 3.4*   < > 3.3*   < > 3.4*    < > = values in this interval not displayed.       Iron Panel - No lab results found.      Imaging:  All imaging studies reviewed by me.     Current Medications:  Current Facility-Administered Medications   Medication Dose Route Frequency Provider Last Rate Last Admin    0.9% Sodium Chloride for DIALYSIS Catheter LOCK - BLUE lumen  10 mL Intracatheter During Dialysis/CRRT (from stock) Gaudencio Wise MD        0.9% Sodium Chloride for DIALYSIS Catheter LOCK - RED lumen  10 mL Intracatheter During Dialysis/CRRT (from stock) Gaudencio Wise MD         acetaminophen (TYLENOL) tablet 975 mg  975 mg Oral or Feeding Tube Q8H Andres Payne PA-C   975 mg at 08/31/24 0811    artificial tears ophthalmic ointment   Both Eyes Q8H Carlos Cerna MD   Given at 08/31/24 1218    [Held by provider] atorvastatin (LIPITOR) tablet 10 mg  10 mg Oral or Feeding Tube Daily Francisco Welsh MD   10 mg at 08/29/24 0924    budesonide (PULMICORT) neb solution 1 mg  1 mg Nebulization Daily Andres Payne PA-C   1 mg at 08/31/24 0843    cetirizine (zyrTEC) tablet 10 mg  10 mg Oral or Feeding Tube Daily Barry Hatch MD   10 mg at 08/31/24 0811    [START ON 9/1/2024] dexAMETHasone PF (DECADRON) injection 20 mg  20 mg Intravenous Daily Mirtha Vogt APRN CNP        Followed by    [START ON 9/4/2024] dexAMETHasone PF (DECADRON) injection 10 mg  10 mg Intravenous Daily Mirtha Vogt APRN CNP        [Held by provider] gabapentin (NEURONTIN) capsule 300 mg  300 mg Oral or Feeding Tube TID Barry Hatch MD   300 mg at 08/28/24 1959    ipratropium (ATROVENT) 0.02 % neb solution 0.5 mg  0.5 mg Nebulization 4x daily Barry Hatch MD   0.5 mg at 08/31/24 1146    And    levalbuterol (XOPENEX) neb solution 0.63 mg  0.63 mg Nebulization 4x Daily Barry Hatch MD   0.63 mg at 08/31/24 1146    levothyroxine (SYNTHROID/LEVOTHROID) tablet 25 mcg  25 mcg Per Feeding Tube QAM AC Giovanny Guidry PA-C   25 mcg at 08/31/24 0811    loperamide (IMODIUM) capsule 4 mg  4 mg Oral or Feeding Tube Q6H Barry Hatch MD   4 mg at 08/31/24 1218    meropenem (MERREM) 1 g vial to attach to  mL bag  1 g Intravenous Q8H Carlos Cerna MD   1 g at 08/31/24 0811    montelukast (SINGULAIR) tablet 10 mg  10 mg Oral or Feeding Tube At Bedtime Barry Hatch MD   10 mg at 08/30/24 2124    multivitamin RENAL (RENAVITE RX/NEPHROVITE) tablet 1 tablet  1 tablet Oral or Feeding Tube Daily Her-Giovanny Spence PA-C   1 tablet at 08/31/24 0811    pantoprazole (PROTONIX) 2 mg/mL  suspension 40 mg  40 mg Per Feeding Tube QAM AC Barry Hatch MD   40 mg at 08/31/24 0743    Prosource TF20 ENfit Compatibl EN LIQD (PROSOURCE TF20) packet 60 mL  1 packet Per Feeding Tube BID Giovanny Guidry PA-C   60 mL at 08/31/24 0811     Current Facility-Administered Medications   Medication Dose Route Frequency Provider Last Rate Last Admin    cisatracurium (NIMBEX) 200 mg in D5W 100 mL infusion  3-10 mcg/kg/min (Ideal) Intravenous Continuous Carlos Cerna MD 9 mL/hr at 08/31/24 1315 6 mcg/kg/min at 08/31/24 1315    dextrose 10% infusion   Intravenous Continuous PRN Gaudencio De Souza MD        dextrose 10% infusion   Intravenous Continuous PRN Giovanny Guidry PA-C        dialysate for CVVHD & CVVHDF (Phoxillum BK4/2.5)  12.5 mL/kg/hr CRRT Continuous Gaudencio Wise MD 1,100 mL/hr at 08/31/24 1223 12.5 mL/kg/hr at 08/31/24 1223    epoprostenol (VELETRI) inhalation solution  20 ng/kg/min (Ideal) Nebulization Continuous Carlos Cerna MD 3 mL/hr at 08/31/24 0635 20 ng/kg/min at 08/31/24 0635    fentaNYL (SUBLIMAZE) infusion   mcg/hr Intravenous Continuous Fuentes Fowler MD 4 mL/hr at 08/31/24 1300 200 mcg/hr at 08/31/24 1300    heparin (porcine) 20,000 units in 20 mL ANTICOAGULANT infusion (syringe from pharmacy)  500-1,500 Units/hr CRRT Continuous Danny Myrick MD 1.3 mL/hr at 08/31/24 1213 1,300 Units/hr at 08/31/24 1213    insulin regular (MYXREDLIN) 1 unit/mL infusion  0-24 Units/hr Intravenous Continuous Gaudencio De Souza MD 4 mL/hr at 08/31/24 1312 4 Units/hr at 08/31/24 1312    propofol (DIPRIVAN) infusion  5-75 mcg/kg/min (Dosing Weight) Intravenous Continuous Fuentes Fowler MD 10.7 mL/hr at 08/31/24 1300 20 mcg/kg/min at 08/31/24 1300    And    Medication Instruction   Does not apply Continuous PRN Fuenets Fowler MD        midazolam (VERSED) 100 mg/100 mL NS infusion - ADULT  1-12 mg/hr Intravenous Continuous Carlos Cerna MD 5 mL/hr at 08/31/24 1300 5 mg/hr  at 08/31/24 1300    No heparin required   Does not apply Continuous PRN Gaudencio Wise MD        norepinephrine (LEVOPHED) 16 mg in  mL infusion MAX CONC CENTRAL LINE  0.01-0.6 mcg/kg/min (Dosing Weight) Intravenous Continuous Elier Hutchins MD   Stopped at 08/30/24 2300    POST-filter replacement solution for CVVHD & CVVHDF (Sodium Bicarbonate in water 150 mEq/L for infusion)   Intravenous Continuous Gaudencio Wise  mL/hr at 08/31/24 1214 New Bag at 08/31/24 1214    PRE-filter replacement solution for CVVHD & CVVHDF (Phoxillum BK4/2.5)  12.5 mL/kg/hr CRRT Continuous Gaudencio Wise MD 1,100 mL/hr at 08/31/24 1223 12.5 mL/kg/hr at 08/31/24 1223    vasopressin 1 unit/mL MAX Conc (PITRESSIN) infusion  2.4 Units/hr Intravenous Continuous Kike Ashby MD 1 mL/hr at 08/31/24 1300 1 Units/hr at 08/31/24 1300     Emmanuelle Dave Donato MD

## 2024-08-31 NOTE — PROGRESS NOTES
"Critical Care Services Progress Note:  I personally examined and evaluated the patient today. I formulated today s plan with the house staff team or resident(s) and agree with the findings and plan in the associated note (see separately attested resident note).   I have evaluated all laboratory values and imaging studies from the past 24 hours.  Summary of hospital course:  53 year old female initially transferred to [facility] on 8/8 for VV-ECMO.  She was diagnosed with ARDS from a suspected pneumonia. Decannulated on 8/18. Extubated on 8/19.  Reintubated on 8/29 for hypoxemia and now critically ill, proned and paralyzed  Overnight events/pertinent findings today:  Prone ventilated overnight.     Data  /58 (BP Location: Right arm)   Pulse 106   Temp 97.7  F (36.5  C)   Resp (!) 32   Ht 1.575 m (5' 2\")   Wt 80 kg (176 lb 5.9 oz)   SpO2 100%   BMI 32.26 kg/m    I/O net negative 2.5 L  FiO2 (%): 45 %, Resp: (!) 32, Vent Mode: CMV/AC, Resp Rate (Set): 32 breaths/min, Tidal Volume (Set, mL): 270 mL, PEEP (cm H2O): 9 cmH2O, Resp Rate (Set): 32 breaths/min, Tidal Volume (Set, mL): 270 mL, PEEP (cm H2O): 9 cmH2O  Pplat while prone is 35    Trace LE edema    Na 138, K 3.7      Sputum culture 8/29 with pseudomonas    Chest CT - extensive GGO and consolidative changes  ECHO is normal      Assessment/plan:    ARDS  CHACORTA on CRRT  Pseudomonas Pneumonia  Shock, likely distributive    Supine during the day, prone at night  Epoprostenol nebulized  Cisatracurium  BIS monitoring  Net negative 1 L via CRRT  Meropenem  Continue current vent settings  MAP goal 65, vasopressin  Fentanyl/versed/propofol    Rest per resident note.    I spent a total of 50 minutes (excluding procedure time) personally providing and directing critical care services at the bedside and on the critical care unit for this patient.     Alec Shelley MD    "

## 2024-08-31 NOTE — PROGRESS NOTES
ICU End of Shift Summary. See flowsheets for vital signs and detailed assessment.    Changes this shift: Continues paralyzed and well sedated, see drips. Proned until noon. Supine ~9hrs until 9pm. Thus far today net -2L from the net +3.6L she was yesterday. Pressors down, sedation slightly down with BIS still 30-40 and no VS changes. ABGs worsened with bicarb gtt removal; adding back on to CRRT.  Pt voided 350mL, sent for legionella/blasto/histo studies. Bronched - many secretons also sent for culture. TF resumed, no stool as TF held 12hrs overnight.  Hgb 6.5 given 1u RBC > 7.2 > 6.5 - concerned, so went to CT; no bleed seen. Heparin continues on CRRT circuit; running well after recirculation today. Started insulin gtt with increased steroids today. Family: Mom, son, and siblings present from [place]. Aware of acuity of situation, DNR status, but hopeful for the best.     Plan:   Follow up Hgb, ABG after bicarb post addition.   Prone 2100

## 2024-08-31 NOTE — PROGRESS NOTES
ICU End of Shift Summary. See flowsheets for vital signs and detailed assessment.    Changes this shift:   Remained proned for 12hrs w [id number] -  until supinated at 0930. Supine pF 144. 7.26, 75, 65, 34. Continues on Bicarb gtt, inhaled veletri full strength, and vent PEEP 9, RR 32, . Fio2 down to 45% today.  PIPS 35 most of today. Remains well paralyzed and sedated with cis, propofol, fentanyl and versed.      Re- proned again at 1800; uneventful; well tolerated.     CRRT pulling net -50mL/hr. Net -800mL today thus far. Weaned pressors: Levo off all day, Vaso off to 1u/hr.     Insulin gtt 3-6u/hr; TF continue. Lytes wnl.     Sent more blood for studies:  crypto,CMV,aspergil. HSV and fungals still pending from yesterday.     Son (PETE), Mom, and siblings at bedside again today. Likely headed back to [place] early Sunday morning.       Plan:   Care coordinator consult ordered for phone care conference this week for updates/planning.     Continuing to wean vent as juan luis.     F/U BAL, urine and blood studies as resulted.

## 2024-08-31 NOTE — PROGRESS NOTES
"CRRT STATUS NOTE    DATA:  Time: 5:19 AM   Pressures WNL:  YES  Filter Status:  WDL    Problems Reported/Alarms Noted:  None    Supplies Present:  YES    ASSESSMENT:  Patient Net Fluid Balance:  At midnight -2489.4mL at 0600 -355mL.  MICU wanted to pull net neg 1-2L goal was met at midnight, currently removing fluid at goals of therapy.    Vital Signs:    Arterial Line BP: 109/56 (77) mmHg  Pulse 94   Temp 98.2  F (36.8  C)   Resp (!) 32   Ht 1.575 m (5' 2\")   Wt 80 kg (176 lb 5.9 oz)   SpO2 100%   BMI 32.26 kg/m       Labs:   Na: 136  K:  4.1  Cr: 0.86  M.6  Phos: 5.9   iCal: 4.7        Goals of Therapy: 50 ml/hr net negative so long as MAP >=65, otherwise decreased towards I=Os     INTERVENTIONS:   None    PLAN:  Continue with treatment goal. Call Resource RN with questions and concerns. [facility] [phone] CRRT resource  "

## 2024-08-31 NOTE — PROGRESS NOTES
CRRT STATUS NOTE    DATA:  Time:  7:34 PM  Pressures WNL:  YES  Filter Status:  WDL    Problems Reported/Alarms Noted:  none    Supplies Present:  YES    ASSESSMENT:  Patient Net Fluid Balance:  -2L since midnight  Vital Signs:    Temp: 97.3  F (36.3  C) Temp src: Esophageal BP: 116/58 Pulse: 99   Resp: (!) 32 SpO2: 95 %        Labs:  K 3.8, ical 4.4   Goals of Therapy:  50ml/hr    INTERVENTIONS:   Switched from bicarb post to 4k, 2 uRBC. Increased rates to 1100    PLAN:  Contact resource RN with any questions or concerns

## 2024-08-31 NOTE — PLAN OF CARE
ICU End of Shift Summary. See flowsheets for vital signs and detailed assessment.    Changes this shift: Prone at 2200, paralyzed and sedated on fentanyl/prop/versed. BIS 30-40, VS levo turned off, vaso still infusing at a straight rate.Bicarb post started with improvement in ABG. TF @ goal, FWF 30 q 4 hrs. Heparin on CRRT circuit discontinued, MICU aware. CRRT continues.     Plan:  Trend ABG, monitor BP and notify provider with changes.

## 2024-08-31 NOTE — PROGRESS NOTES
CRRT STATUS NOTE    DATA:  Time:  4:26 PM  Pressures WNL:  YES  Filter Status:  WDL    Problems Reported/Alarms Noted:  NA    Supplies Present:  YES    ASSESSMENT:  Patient Net Fluid Balance:  (-) 632 ml since MN    Vital Signs:  Temp: 98.2  F (36.8  C) Temp src: Esophageal   Pulse: 98   Resp: (!) 32 SpO2: 99 % O2 Device: Mechanical Ventilator       Labs:  K+ 3.7 Mag 2.4 Phos 4.9 iCal 4.6 Hgb 9.0    Goals of Therapy: 0-50 ml/hr, if BP decreases aim for I=O    INTERVENTIONS:   No interventions performed today    PLAN:  Continue to change circuit q72hr and PRN, continue to work toward fluid status goals, please call CRRT resource with any questions

## 2024-08-31 NOTE — PROGRESS NOTES
MEDICAL ICU PROGRESS NOTE  08/31/2024      Date of Service (when I saw the patient): 08/31/2024    ASSESSMENT: Massiel Flaherty is a 53 year old female with PMH Anxiety/depression, fibromyalgia, c/f nonepileptic seizures not on AEDs, hypothyroidism, asthma, HLD, MURIEL on CPAP, expressive aphasia, hypertension  who was admitted on 8/3/2024 for fatigue, fever and dyspnea and intubated 8/6/24 at OSH for ARDS, proned and paralyzed without improvement. Transferred to [facility] 8/8/24, hypoxic with high plateaus/peaks and placed on VV ECMO and CRRT. Decannulated from VV ECMO 8/18 and transferred to MICU 8/26/24 for ongoing management. Extubated 8/27 then reintubated 8/29 iso worsening AHRF and pseudomonas pneumonia.     CHANGES and MAJOR THINGS TODAY:   - Proned again overnight blood gases with continued hypercapnia but acidosis improved with resumption of post-infusion bicarb   - Deescalate steroids back to decadron  - Added additional infectious workup including cryptococcus, aspergillus, CMV    Neuro:  # Pain and sedation  # Acute pain  # Sedation and analgesia for vent compliance   # Concern for ICU delirium   # Hx Fibromyalgia   # Hx myalgic encephalomyelitis   # Hx anxiety/depression  - Monitor neurological status. Delirium preventions and precautions.   - Pain: Scheduled: tylenol, oxycodone 5mg q 6hr (decreased from q 4hr) held while sedated  PRN: tylenol, oxycodone  - Sedation plan: fentanyl, midazolam, and propofol with paralysis via cistracurium  - RASS goal -4 to -5, BIS goal 30-40  - Gabapentin 300mg TID held while sedated  - Seroquel 25mg BID PRN held while sedated  - PTA Venlafaxine and Amitriptyline discontinued while sedated     # Hx spells with staring and BUE posturing previously described as nonepileptic seizures   # Hx of GTC seizures and myoclonic epilepsy, weaned off AEDs   # Diffuse cerebral edema - improved  - Sodium goals liberalized to 140-145  - 8/21: MRI Brain: no acute intracranial pathology. No  findings to suggest anoxic brain injury.      Pulmonary:  # Acute hypoxic respiratory failure c/b ARDS   # Pseudomonas pneumonia  # s/p VV ECMO 8/8 - 8/18   # Hx CAP  # Asthma  # MURIEL on home CPAP   FiO2 (%): 50 %, Resp: (!) 32, Vent Mode: CMV/AC, Resp Rate (Set): 32 breaths/min, Tidal Volume (Set, mL): 270 mL, PEEP (cm H2O): 9 cmH2O, Resp Rate (Set): 32 breaths/min, Tidal Volume (Set, mL): 270 mL, PEEP (cm H2O): 9 cmH2O    PFT dated 9/8/2020 demonstrated normal spirometry with mild diffusion impairment and mild restriction (FEV1/FVC 80%, FEV1 2.59, DLCO 40%). CT abdomen chest 3/9/2020 demonstrated scarring and fibrosis bilaterally which correlated with the decreased DLCO. The patient also has a history of MURIEL on CPAP therapy as currently managed by her provider in [place]. Unclear what triggered ARDS or why she has had such severe hospital course, further investigated ILD but results all unremarkable. Extubated 8/27, reintubated 8/29 for worsening O2 demands and diffuse opacities iso new/recurrent psuedomonas pneumonia.   - ILD w/u aldolase, EVELIO, RF, CCP, ANCA, MPO, SSA Ro and La, Scleroderma antibody, Radha 1,  hypersensity pneumonitis, IGG subclasses,  aspergillus, blastomyces, histoplasma neg  - SpO2 goals >92%, PaO2 goals >60   - PTA singular at bedtime if able  - Scheduled pulmicort, and duonebs   - Lung protective ventilation strategies given ARDS  - Methylpred 125mg q6H x 24h 8/30 --> transition to 20 mg dex x 5 days, followed by 10mg x 5 days  - Epoprostenol at 20  - Bronch with BAL 8/30, labs in process    Cardiovascular:  # Hyperlipidemia  # Hypotension  # Hx HTN  - MAP goal >65, SBP goals >110  - NE for pressor support, wean as able, ok to increase while removing volume via CRRT  - Vasopressin for pressor support augmentation  - Restarted PTA atorvastatin  - PTA clonidine held while sedated  - Lower extremity ultrasound without vascular pathology, no hematoma or clots     Sinus Tachycardia  Likely 2/2  fluid removal and sepsis as above    GI/Nutrition:  # Moderate protein calorie malnutrition  # Non-infectious Diarrhea  # GERD   - Protonix (home medication)   - Nutrition consulted, appreciate recs  - Diet: TF via NJ, assess swallow after sedation weaned, failed previous swallow study  - Hold bowel regimen d/t loose stools  - Change suspension meds to other form as able  - Continue scheduled multivitamin, banatrol, prosource packet, and loperamide  - Rectal tube, continues with high output. Keep today.      Renal/Fluids/Electrolytes:  # Acute kidney injury  # Renal failure  Baseline Cr approx 0.6. Pt was anuric here but now making some urine, likely prerenal due to shock.  - Continue CRRT; fluid goal net zero for the day depending on CXR  - Heparinized CRRT circuit, low intensity protocol. Last clotted 8/24 PM  - Post circuit bicarb running to compensate for respiratory acidosis due to ARDS and lung protective ventilation strategies  - Strict I&Os   - Bladder scan q shift  - Avoid nephrotoxins as able      Endocrine:  # Steroid and stress induced hyperglycemia  # Hypothyroidism   - Goal to keep BG < 180 for optimal wound healing.   - Continue High sliding scale insulin, with rising blood glucose values started insulin drip 8/30  - Continue PTA synthroid     ID:  # Leukocytosis  # Psuedomonas pneumonia  # Hx CAP  Respiratory cx 8/29 pseudomonas pneumonia.   - Continue to monitor fever curve and inflammatory markers as appropriate  - ID consulted, appreciate recs.   - Due to rigors seen on exam 8/29, low CRRT set temp masking any possible fevers, worsening O2 requirements, took blood cultures and started antibiotics with cefepime.     Abx  - Meropenem 8/29 -  - Vancomycin 8/29- 8/30  - Tobramycin x 1 8/29    # HSV-1  Mouth sores present, which could have viral etiology. 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 Monitor and trend.   - Heparinize CRRT circuit    - CTAP 8/30 due to acute hemoglobin drop requiring transfusion of 2 x 1 unit of blood 12 hours apart. Negative for acute bleed     Musculoskeletal/Rheum:  # Alopecia  - Hold PTA hydroxychloroquine      # Weakness and deconditioning of critical illness  # Left ankle injury pre-hospitalization    - Physical and occupational therapy when able.      Skin:  # BLE scattered ecchymosis  # Left toe duskiness   - Bilateral lower leg ecchymosis   - WOC consult   - Ecchymosis to left foot, most noticeable to 3rd and 4th toes     General Cares/Prophylaxis:    DVT Prophylaxis: Heparin through CRRT circuit  GI Prophylaxis: PPI  Restraints: no  Code Status: DNAR/OK to intubate     Lines/tubes/drains:  - ETT (8/29)  - NJ (8/9)  - LIJ CVC (8/8)  - Radial a-line (8/29)  - PIV x3  - Rectal tube (8/17)  - Tunneled HD line (8/23)     Disposition:  - Medical ICU      Patient seen and findings/plan discussed with medical ICU staff, Dr. Shelley.    Gaudencio De Souza MD-PhD        Clinically Significant Risk Factors              # Hypoalbuminemia: Lowest albumin = 2.2 g/dL at 8/10/2024  9:47 AM, will monitor as appropriate  # Coagulation Defect: INR = 1.14 (Ref range: 0.85 - 1.15) and/or PTT = 56 Seconds (Ref range: 22 - 38 Seconds), will monitor for bleeding              # Obesity: Estimated body mass index is 32.26 kg/m  as calculated from the following:    Height as of this encounter: 1.575 m (5' 2\").    Weight as of this encounter: 80 kg (176 lb 5.9 oz).   # Moderate Malnutrition: based on nutrition assessment      # Financial/Environmental Concerns: none              Code Status: No CPR- Pre-arrest intubation OK           ====================================  INTERVAL HISTORY:   Intubated and with slightly improved oxygenation overnight. Acidosis correcting with post filter bicarb.     OBJECTIVE:   1. VITAL SIGNS:   Temp:  [96.4  F (35.8  C)-99.9  F (37.7 "  C)] 98.2  F (36.8  C)  Pulse:  [] 96  Resp:  [21-37] 32  MAP:  [64 mmHg-183 mmHg] 85 mmHg  Arterial Line BP: ()/(47-73) 120/63  FiO2 (%):  [50 %-100 %] 50 %  SpO2:  [87 %-100 %] 100 %  FiO2 (%): 50 %, Resp: (!) 32, Vent Mode: CMV/AC, Resp Rate (Set): 32 breaths/min, Tidal Volume (Set, mL): 270 mL, PEEP (cm H2O): 9 cmH2O, Resp Rate (Set): 32 breaths/min, Tidal Volume (Set, mL): 270 mL, PEEP (cm H2O): 9 cmH2O  2. INTAKE/ OUTPUT:   I/O last 3 completed shifts:  In: 3015.09 [I.V.:1805.09; NG/GT:610]  Out: 6339.5 [Urine:350; Other:5839.5; Stool:150]    3. PHYSICAL EXAMINATION:  General: sedated, intubated, proned  Neuro: sedated  HEENT: Normocephalic, atraumatic. PERRL, and nonicteric.   CV: RRR on monitor, S1S2, all extremities well perfused   Respiratory: On ventilator, soft bilateral ronchi in lung bases  GI: Abdomen not examined due to proning  Skin: Scattered contusions on R+L foot. Scars of bilateral knees, healing bruises and lesions from previous lines and drains.    4. LABS:   Arterial Blood Gases   Recent Labs   Lab 08/31/24  0341 08/31/24  0006 08/30/24 2016 08/30/24  1837   PH 7.24* 7.24* 7.15* 7.14*   PCO2 75* 69* 83* 85*   PO2 85 90 80 70*   HCO3 32* 30* 29* 29*     Complete Blood Count   Recent Labs   Lab 08/31/24  0341 08/30/24  1045 08/30/24  0334 08/29/24  1942   WBC 12.9* 13.1* 14.0* 17.2*   HGB 8.5* 6.5* 7.2* 6.3*    174 210 228     Basic Metabolic Panel  Recent Labs   Lab 08/31/24  0651 08/31/24  0600 08/31/24  0513 08/31/24  0350 08/31/24  0341 08/30/24  2101 08/30/24 2016 08/30/24  1234 08/30/24  1045 08/30/24 0337 08/30/24 0334   NA  --   --   --   --  136  --  136  --  140  --  138   POTASSIUM  --   --   --   --  4.1  --  4.4  --  3.8  --  4.1   CHLORIDE  --   --   --   --  96*  --  98  --  105  --  100   CO2  --   --   --   --  28  --  26  --  25  --  24   BUN  --   --   --   --  24.7*  --  25.5*  --  19.1  --  21.3*   CR  --   --   --   --  0.86  --  0.93  --  0.79  --   0.99*   * 150* 146* 157* 176*   < > 170*  177*   < > 119*   < > 157*    < > = values in this interval not displayed.     Liver Function Tests  Recent Labs   Lab 08/31/24  0341 08/30/24 2016 08/30/24  1045 08/30/24  0334 08/29/24  1131 08/29/24  0346 08/28/24  1147 08/28/24  0403   AST 55*  --   --  100*  --  53*  --  31   ALT 42  --   --  50  --  21  --  20   ALKPHOS 223*  --   --  182*  --  119  --  95   BILITOTAL 0.2  --   --  0.5  --  0.2  --  0.2   ALBUMIN 3.4* 3.4* 2.9* 3.3*   < > 3.4*   < > 3.2*   INR  --   --  1.14  --   --   --   --   --     < > = values in this interval not displayed.     Coagulation Profile  Recent Labs   Lab 08/30/24  1045   INR 1.14   PTT 56*       5. RADIOLOGY:   Recent Results (from the past 24 hour(s))   CT Chest/Abdomen/Pelvis w Contrast    Narrative    EXAMINATION: CT CHEST/ABDOMEN/PELVIS W CONTRAST, 8/30/2024 3:33 PM    TECHNIQUE:  Helical CT images from the thoracic inlet through the  symphysis pubis were obtained with IV contrast. Contrast dose:  LrxMpr030:100mL    COMPARISON: CT 8/9/2024    HISTORY: Dropping hemoglobin, assess for evidence of bleed (? RP bleed  given hx of femoral VV ecmo access) as well as assess lungs iso ARDS    FINDINGS:    Devices: Endotracheal tube tip terminates in the mid thoracic trachea.  Esophageal temperature probe tip terminates in the mid thoracic  esophagus. Feeding tube tip terminates near the ligament of Treitz.  Right IJ central venous catheter with tip terminating in the right  atrium. Left IJ CVC with tip terminating near the confluence of the  innominate veins.    CHEST:  MEDIASTINUM: Normal heart size. No pericardial effusion. Normal  caliber ascending aorta. Normal caliber main pulmonary artery.  Decreased conspicuity of several enlarged lymph nodes    LUNGS: Central tracheobronchial tree is patent. No pneumothorax or  pleural effusion. Extensive groundglass and consolidative changes  throughout the lungs, unchanged.    Normal  thyroid.    ABDOMEN/PELVIS:  LIVER: Hepatomegaly. No suspicious hepatic lesion. No intrahepatic or  extrahepatic biliary ductal dilation.    GALLBLADDER: Normal gallbladder.     PANCREAS: No focal pancreatic lesion. The main pancreatic duct is not  dilated.    SPLEEN: New subcentimeter hypoattenuating foci involving the upper  splenic pole (series 7, image 41 and 47). Additional subcapsular  hypoattenuating focus measuring 1.8 x 0.8 cm (series 4, image 262).    ADRENAL GLANDS: No focal adrenal nodule.    URINARY TRACT: Diffusely hypoenhancing kidneys without evidence for  hydronephrosis. No cortical medullary differentiation.    BLADDER: Within normal limits.    REPRODUCTIVE ORGANS: Within normal limits.    GASTROINTESTINAL TRACT: Rectal tube. Anastomotic changes at the  rectosigmoid junction. Areas of submucosal fatty deposition within the  proximal colon Normal caliber large and small bowel.  Appendectomy.    PERITONEUM/MESENTERY: No free fluid. No free air.    VESSELS: The aorta and major branch vessels are patent. The main  portal, splenic and proximal superior mesenteric veins are patent. No  significant atherosclerotic disease.    LYMPH NODES: Within normal limits.    BONES: No acute or suspicious osseous lesions.    SOFT TISSUES: Tract related to prior right inferior approach ECMO  cannula. Sacral stimulator device.      Impression    IMPRESSION:   1. No evidence for retroperitoneal bleed. No findings to explain  dropping hemoglobin levels.  2. Several small peripheral splenic hypodensities are noted, which  appear new from 8/9/2024 CT although no contrast was used for the  prior examination. The hypodensity along the inferior pole appears to  have a tiny amount of perisplenic fat stranding changes. Findings  could represent a very small subcapsular infarct or hematoma of the  spleen.  3. Continued findings of ARDS with extensive groundglass and  consolidative changes throughout the lungs. Consolidative  opacities  are mildly improved when compared to 8/9/2024 CT. Previously seen  pleural effusions have decreased.    I have personally reviewed the examination and initial interpretation  and I agree with the findings.    ALDEN TO DO         SYSTEM ID:  [id number]

## 2024-09-01 ENCOUNTER — APPOINTMENT (OUTPATIENT)
Dept: GENERAL RADIOLOGY | Facility: CLINIC | Age: 54
DRG: 003 | End: 2024-09-01
Attending: INTERNAL MEDICINE
Payer: MEDICAID

## 2024-09-01 LAB
ABO/RH(D): NORMAL
ALBUMIN SERPL BCG-MCNC: 3.4 G/DL (ref 3.5–5.2)
ALBUMIN SERPL BCG-MCNC: 3.5 G/DL (ref 3.5–5.2)
ALBUMIN SERPL BCG-MCNC: 3.7 G/DL (ref 3.5–5.2)
ALLEN'S TEST: ABNORMAL
ALP SERPL-CCNC: 187 U/L (ref 40–150)
ALT SERPL W P-5'-P-CCNC: 33 U/L (ref 0–50)
ANION GAP SERPL CALCULATED.3IONS-SCNC: 13 MMOL/L (ref 7–15)
ANION GAP SERPL CALCULATED.3IONS-SCNC: 14 MMOL/L (ref 7–15)
ANION GAP SERPL CALCULATED.3IONS-SCNC: 16 MMOL/L (ref 7–15)
ANTIBODY SCREEN: NEGATIVE
AST SERPL W P-5'-P-CCNC: 32 U/L (ref 0–45)
BACTERIA SPEC CULT: ABNORMAL
BASE EXCESS BLDA CALC-SCNC: -0.1 MMOL/L (ref -3–3)
BASE EXCESS BLDA CALC-SCNC: 2.6 MMOL/L (ref -3–3)
BASE EXCESS BLDA CALC-SCNC: 4.6 MMOL/L (ref -3–3)
BASE EXCESS BLDA CALC-SCNC: 5.2 MMOL/L (ref -3–3)
BASE EXCESS BLDA CALC-SCNC: 7.2 MMOL/L (ref -3–3)
BASE EXCESS BLDA CALC-SCNC: 9.2 MMOL/L (ref -3–3)
BASE EXCESS BLDA CALC-SCNC: 9.6 MMOL/L (ref -3–3)
BILIRUB DIRECT SERPL-MCNC: <0.2 MG/DL (ref 0–0.3)
BILIRUB SERPL-MCNC: 0.2 MG/DL
BUN SERPL-MCNC: 30 MG/DL (ref 6–20)
BUN SERPL-MCNC: 33.3 MG/DL (ref 6–20)
BUN SERPL-MCNC: 34.8 MG/DL (ref 6–20)
CA-I BLD-MCNC: 4.6 MG/DL (ref 4.4–5.2)
CA-I BLD-MCNC: 4.7 MG/DL (ref 4.4–5.2)
CA-I BLD-MCNC: 4.7 MG/DL (ref 4.4–5.2)
CALCIUM SERPL-MCNC: 8.8 MG/DL (ref 8.8–10.4)
CALCIUM SERPL-MCNC: 8.8 MG/DL (ref 8.8–10.4)
CALCIUM SERPL-MCNC: 9 MG/DL (ref 8.8–10.4)
CHLORIDE SERPL-SCNC: 93 MMOL/L (ref 98–107)
CHLORIDE SERPL-SCNC: 93 MMOL/L (ref 98–107)
CHLORIDE SERPL-SCNC: 99 MMOL/L (ref 98–107)
CMV DNA SPEC NAA+PROBE-ACNC: 741 IU/ML
CMV DNA SPEC NAA+PROBE-LOG#: 2.9 {LOG_COPIES}/ML
COHGB MFR BLD: 92.9 % (ref 96–97)
COHGB MFR BLD: 93.6 % (ref 96–97)
COHGB MFR BLD: 95.1 % (ref 96–97)
COHGB MFR BLD: 96 % (ref 96–97)
COHGB MFR BLD: 96.4 % (ref 96–97)
COHGB MFR BLD: 98.8 % (ref 96–97)
COHGB MFR BLD: >100 % (ref 96–97)
CREAT SERPL-MCNC: 0.74 MG/DL (ref 0.51–0.95)
CREAT SERPL-MCNC: 0.79 MG/DL (ref 0.51–0.95)
CREAT SERPL-MCNC: 0.85 MG/DL (ref 0.51–0.95)
CRP SERPL-MCNC: 182 MG/L
EGFRCR SERPLBLD CKD-EPI 2021: 81 ML/MIN/1.73M2
EGFRCR SERPLBLD CKD-EPI 2021: 89 ML/MIN/1.73M2
EGFRCR SERPLBLD CKD-EPI 2021: >90 ML/MIN/1.73M2
ERYTHROCYTE [DISTWIDTH] IN BLOOD BY AUTOMATED COUNT: 18.3 % (ref 10–15)
ERYTHROCYTE [DISTWIDTH] IN BLOOD BY AUTOMATED COUNT: 18.7 % (ref 10–15)
ERYTHROCYTE [DISTWIDTH] IN BLOOD BY AUTOMATED COUNT: 18.8 % (ref 10–15)
GLUCOSE BLDC GLUCOMTR-MCNC: 108 MG/DL (ref 70–99)
GLUCOSE BLDC GLUCOMTR-MCNC: 126 MG/DL (ref 70–99)
GLUCOSE BLDC GLUCOMTR-MCNC: 127 MG/DL (ref 70–99)
GLUCOSE BLDC GLUCOMTR-MCNC: 131 MG/DL (ref 70–99)
GLUCOSE BLDC GLUCOMTR-MCNC: 135 MG/DL (ref 70–99)
GLUCOSE BLDC GLUCOMTR-MCNC: 143 MG/DL (ref 70–99)
GLUCOSE BLDC GLUCOMTR-MCNC: 146 MG/DL (ref 70–99)
GLUCOSE BLDC GLUCOMTR-MCNC: 146 MG/DL (ref 70–99)
GLUCOSE BLDC GLUCOMTR-MCNC: 147 MG/DL (ref 70–99)
GLUCOSE BLDC GLUCOMTR-MCNC: 147 MG/DL (ref 70–99)
GLUCOSE BLDC GLUCOMTR-MCNC: 149 MG/DL (ref 70–99)
GLUCOSE BLDC GLUCOMTR-MCNC: 151 MG/DL (ref 70–99)
GLUCOSE BLDC GLUCOMTR-MCNC: 151 MG/DL (ref 70–99)
GLUCOSE BLDC GLUCOMTR-MCNC: 154 MG/DL (ref 70–99)
GLUCOSE BLDC GLUCOMTR-MCNC: 158 MG/DL (ref 70–99)
GLUCOSE BLDC GLUCOMTR-MCNC: 163 MG/DL (ref 70–99)
GLUCOSE SERPL-MCNC: 132 MG/DL (ref 70–99)
GLUCOSE SERPL-MCNC: 164 MG/DL (ref 70–99)
GLUCOSE SERPL-MCNC: 173 MG/DL (ref 70–99)
HCO3 BLD-SCNC: 26 MMOL/L (ref 21–28)
HCO3 BLD-SCNC: 30 MMOL/L (ref 21–28)
HCO3 BLD-SCNC: 32 MMOL/L (ref 21–28)
HCO3 BLD-SCNC: 33 MMOL/L (ref 21–28)
HCO3 BLD-SCNC: 35 MMOL/L (ref 21–28)
HCO3 BLD-SCNC: 37 MMOL/L (ref 21–28)
HCO3 BLD-SCNC: 37 MMOL/L (ref 21–28)
HCO3 SERPL-SCNC: 23 MMOL/L (ref 22–29)
HCO3 SERPL-SCNC: 28 MMOL/L (ref 22–29)
HCO3 SERPL-SCNC: 29 MMOL/L (ref 22–29)
HCT VFR BLD AUTO: 27.5 % (ref 35–47)
HCT VFR BLD AUTO: 27.9 % (ref 35–47)
HCT VFR BLD AUTO: 30.2 % (ref 35–47)
HGB BLD-MCNC: 8.6 G/DL (ref 11.7–15.7)
HGB BLD-MCNC: 8.6 G/DL (ref 11.7–15.7)
HGB BLD-MCNC: 9.2 G/DL (ref 11.7–15.7)
MAGNESIUM SERPL-MCNC: 2.4 MG/DL (ref 1.7–2.3)
MAGNESIUM SERPL-MCNC: 2.5 MG/DL (ref 1.7–2.3)
MAGNESIUM SERPL-MCNC: 2.5 MG/DL (ref 1.7–2.3)
MCH RBC QN AUTO: 30 PG (ref 26.5–33)
MCH RBC QN AUTO: 30.5 PG (ref 26.5–33)
MCH RBC QN AUTO: 30.6 PG (ref 26.5–33)
MCHC RBC AUTO-ENTMCNC: 30.5 G/DL (ref 31.5–36.5)
MCHC RBC AUTO-ENTMCNC: 30.8 G/DL (ref 31.5–36.5)
MCHC RBC AUTO-ENTMCNC: 31.3 G/DL (ref 31.5–36.5)
MCV RBC AUTO: 98 FL (ref 78–100)
MCV RBC AUTO: 98 FL (ref 78–100)
MCV RBC AUTO: 99 FL (ref 78–100)
O2/TOTAL GAS SETTING VFR VENT: 40 %
O2/TOTAL GAS SETTING VFR VENT: 45 %
O2/TOTAL GAS SETTING VFR VENT: 50 %
PCO2 BLD: 50 MM HG (ref 35–45)
PCO2 BLD: 58 MM HG (ref 35–45)
PCO2 BLD: 63 MM HG (ref 35–45)
PCO2 BLD: 67 MM HG (ref 35–45)
PCO2 BLD: 70 MM HG (ref 35–45)
PCO2 BLD: 72 MM HG (ref 35–45)
PCO2 BLD: 72 MM HG (ref 35–45)
PEEP: 5 CM H2O
PEEP: 9 CM H2O
PH BLD: 7.29 [PH] (ref 7.35–7.45)
PH BLD: 7.3 [PH] (ref 7.35–7.45)
PH BLD: 7.31 [PH] (ref 7.35–7.45)
PH BLD: 7.32 [PH] (ref 7.35–7.45)
PH BLD: 7.32 [PH] (ref 7.35–7.45)
PH BLD: 7.33 [PH] (ref 7.35–7.45)
PH BLD: 7.35 [PH] (ref 7.35–7.45)
PHOSPHATE SERPL-MCNC: 3.3 MG/DL (ref 2.5–4.5)
PHOSPHATE SERPL-MCNC: 4.1 MG/DL (ref 2.5–4.5)
PHOSPHATE SERPL-MCNC: 4.6 MG/DL (ref 2.5–4.5)
PLATELET # BLD AUTO: 275 10E3/UL (ref 150–450)
PLATELET # BLD AUTO: 296 10E3/UL (ref 150–450)
PLATELET # BLD AUTO: 329 10E3/UL (ref 150–450)
PO2 BLD: 157 MM HG (ref 80–105)
PO2 BLD: 63 MM HG (ref 80–105)
PO2 BLD: 66 MM HG (ref 80–105)
PO2 BLD: 68 MM HG (ref 80–105)
PO2 BLD: 76 MM HG (ref 80–105)
PO2 BLD: 76 MM HG (ref 80–105)
PO2 BLD: 95 MM HG (ref 80–105)
POTASSIUM SERPL-SCNC: 3.7 MMOL/L (ref 3.4–5.3)
POTASSIUM SERPL-SCNC: 3.7 MMOL/L (ref 3.4–5.3)
POTASSIUM SERPL-SCNC: 4.5 MMOL/L (ref 3.4–5.3)
PROT SERPL-MCNC: 6.7 G/DL (ref 6.4–8.3)
RBC # BLD AUTO: 2.81 10E6/UL (ref 3.8–5.2)
RBC # BLD AUTO: 2.82 10E6/UL (ref 3.8–5.2)
RBC # BLD AUTO: 3.07 10E6/UL (ref 3.8–5.2)
SAO2 % BLDA: 91 % (ref 92–100)
SAO2 % BLDA: 91 % (ref 92–100)
SAO2 % BLDA: 93 % (ref 92–100)
SAO2 % BLDA: 94 % (ref 92–100)
SAO2 % BLDA: 94 % (ref 92–100)
SAO2 % BLDA: 96 % (ref 92–100)
SAO2 % BLDA: 97 % (ref 92–100)
SODIUM SERPL-SCNC: 134 MMOL/L (ref 135–145)
SODIUM SERPL-SCNC: 136 MMOL/L (ref 135–145)
SODIUM SERPL-SCNC: 138 MMOL/L (ref 135–145)
SPECIMEN EXPIRATION DATE: NORMAL
UFH PPP CHRO-ACNC: 0.28 IU/ML
UFH PPP CHRO-ACNC: 0.34 IU/ML
WBC # BLD AUTO: 10 10E3/UL (ref 4–11)
WBC # BLD AUTO: 10.6 10E3/UL (ref 4–11)
WBC # BLD AUTO: 9.4 10E3/UL (ref 4–11)

## 2024-09-01 PROCEDURE — 250N000011 HC RX IP 250 OP 636

## 2024-09-01 PROCEDURE — 99233 SBSQ HOSP IP/OBS HIGH 50: CPT | Performed by: INTERNAL MEDICINE

## 2024-09-01 PROCEDURE — 258N000003 HC RX IP 258 OP 636

## 2024-09-01 PROCEDURE — 94640 AIRWAY INHALATION TREATMENT: CPT | Mod: 76

## 2024-09-01 PROCEDURE — 82330 ASSAY OF CALCIUM: CPT | Performed by: INTERNAL MEDICINE

## 2024-09-01 PROCEDURE — 94640 AIRWAY INHALATION TREATMENT: CPT

## 2024-09-01 PROCEDURE — 90947 DIALYSIS REPEATED EVAL: CPT

## 2024-09-01 PROCEDURE — 71045 X-RAY EXAM CHEST 1 VIEW: CPT

## 2024-09-01 PROCEDURE — 80053 COMPREHEN METABOLIC PANEL: CPT | Performed by: INTERNAL MEDICINE

## 2024-09-01 PROCEDURE — 83735 ASSAY OF MAGNESIUM: CPT | Performed by: INTERNAL MEDICINE

## 2024-09-01 PROCEDURE — 250N000009 HC RX 250

## 2024-09-01 PROCEDURE — 250N000011 HC RX IP 250 OP 636: Mod: JZ

## 2024-09-01 PROCEDURE — 82805 BLOOD GASES W/O2 SATURATION: CPT

## 2024-09-01 PROCEDURE — 86901 BLOOD TYPING SEROLOGIC RH(D): CPT | Performed by: SURGERY

## 2024-09-01 PROCEDURE — 85520 HEPARIN ASSAY: CPT | Performed by: STUDENT IN AN ORGANIZED HEALTH CARE EDUCATION/TRAINING PROGRAM

## 2024-09-01 PROCEDURE — 99291 CRITICAL CARE FIRST HOUR: CPT

## 2024-09-01 PROCEDURE — 250N000009 HC RX 250: Performed by: SURGERY

## 2024-09-01 PROCEDURE — 250N000011 HC RX IP 250 OP 636: Performed by: INTERNAL MEDICINE

## 2024-09-01 PROCEDURE — 250N000009 HC RX 250: Performed by: INTERNAL MEDICINE

## 2024-09-01 PROCEDURE — 86140 C-REACTIVE PROTEIN: CPT

## 2024-09-01 PROCEDURE — 85027 COMPLETE CBC AUTOMATED: CPT | Performed by: INTERNAL MEDICINE

## 2024-09-01 PROCEDURE — 85520 HEPARIN ASSAY: CPT | Performed by: SURGERY

## 2024-09-01 PROCEDURE — 999N000157 HC STATISTIC RCP TIME EA 10 MIN

## 2024-09-01 PROCEDURE — 82040 ASSAY OF SERUM ALBUMIN: CPT | Performed by: INTERNAL MEDICINE

## 2024-09-01 PROCEDURE — 200N000002 HC R&B ICU UMMC

## 2024-09-01 PROCEDURE — 84155 ASSAY OF PROTEIN SERUM: CPT | Performed by: INTERNAL MEDICINE

## 2024-09-01 PROCEDURE — 82248 BILIRUBIN DIRECT: CPT | Performed by: INTERNAL MEDICINE

## 2024-09-01 PROCEDURE — 250N000009 HC RX 250: Performed by: STUDENT IN AN ORGANIZED HEALTH CARE EDUCATION/TRAINING PROGRAM

## 2024-09-01 PROCEDURE — 82247 BILIRUBIN TOTAL: CPT | Performed by: INTERNAL MEDICINE

## 2024-09-01 PROCEDURE — 86900 BLOOD TYPING SEROLOGIC ABO: CPT | Performed by: SURGERY

## 2024-09-01 PROCEDURE — 250N000013 HC RX MED GY IP 250 OP 250 PS 637: Performed by: SURGERY

## 2024-09-01 PROCEDURE — 84100 ASSAY OF PHOSPHORUS: CPT | Performed by: INTERNAL MEDICINE

## 2024-09-01 PROCEDURE — 250N000013 HC RX MED GY IP 250 OP 250 PS 637: Performed by: PHYSICIAN ASSISTANT

## 2024-09-01 PROCEDURE — 71045 X-RAY EXAM CHEST 1 VIEW: CPT | Mod: 26 | Performed by: RADIOLOGY

## 2024-09-01 PROCEDURE — 250N000013 HC RX MED GY IP 250 OP 250 PS 637: Performed by: STUDENT IN AN ORGANIZED HEALTH CARE EDUCATION/TRAINING PROGRAM

## 2024-09-01 PROCEDURE — 94003 VENT MGMT INPAT SUBQ DAY: CPT

## 2024-09-01 PROCEDURE — 94645 CONT INHLJ TX EACH ADDL HOUR: CPT

## 2024-09-01 PROCEDURE — 84075 ASSAY ALKALINE PHOSPHATASE: CPT | Performed by: INTERNAL MEDICINE

## 2024-09-01 PROCEDURE — 84460 ALANINE AMINO (ALT) (SGPT): CPT | Performed by: INTERNAL MEDICINE

## 2024-09-01 RX ORDER — CALCIUM CHLORIDE, MAGNESIUM CHLORIDE, SODIUM CHLORIDE, SODIUM BICARBONATE, POTASSIUM CHLORIDE AND SODIUM PHOSPHATE DIBASIC DIHYDRATE 3.68; 3.05; 6.34; 3.09; .314; .187 G/L; G/L; G/L; G/L; G/L; G/L
INJECTION INTRAVENOUS CONTINUOUS
Status: DISCONTINUED | OUTPATIENT
Start: 2024-09-01 | End: 2024-09-07

## 2024-09-01 RX ORDER — TOBRAMYCIN INHALATION SOLUTION 300 MG/5ML
300 INHALANT RESPIRATORY (INHALATION)
Status: DISCONTINUED | OUTPATIENT
Start: 2024-09-01 | End: 2024-09-10

## 2024-09-01 RX ORDER — CIPROFLOXACIN 2 MG/ML
400 INJECTION, SOLUTION INTRAVENOUS EVERY 8 HOURS
Status: DISCONTINUED | OUTPATIENT
Start: 2024-09-01 | End: 2024-09-10

## 2024-09-01 RX ADMIN — Medication 60 ML: at 08:02

## 2024-09-01 RX ADMIN — IPRATROPIUM BROMIDE 0.5 MG: 0.5 SOLUTION RESPIRATORY (INHALATION) at 12:24

## 2024-09-01 RX ADMIN — Medication 200 MCG/HR: at 22:10

## 2024-09-01 RX ADMIN — BUDESONIDE 1 MG: 1 INHALANT ORAL at 08:16

## 2024-09-01 RX ADMIN — CALCIUM CHLORIDE, MAGNESIUM CHLORIDE, SODIUM CHLORIDE, SODIUM BICARBONATE, POTASSIUM CHLORIDE AND SODIUM PHOSPHATE DIBASIC DIHYDRATE: 3.68; 3.05; 6.34; 3.09; .314; .187 INJECTION INTRAVENOUS at 13:22

## 2024-09-01 RX ADMIN — PROPOFOL 30 MCG/KG/MIN: 10 INJECTION, EMULSION INTRAVENOUS at 13:33

## 2024-09-01 RX ADMIN — Medication 10 MG: at 22:00

## 2024-09-01 RX ADMIN — Medication 10 MG: at 20:13

## 2024-09-01 RX ADMIN — TOBRAMYCIN 300 MG: 300 SOLUTION RESPIRATORY (INHALATION) at 19:17

## 2024-09-01 RX ADMIN — MIDAZOLAM HYDROCHLORIDE 8 MG/HR: 1 INJECTION, SOLUTION INTRAVENOUS at 08:57

## 2024-09-01 RX ADMIN — IPRATROPIUM BROMIDE 0.5 MG: 0.5 SOLUTION RESPIRATORY (INHALATION) at 19:17

## 2024-09-01 RX ADMIN — Medication 2.4 UNITS/HR: at 19:03

## 2024-09-01 RX ADMIN — Medication 10 MG: at 06:07

## 2024-09-01 RX ADMIN — CALCIUM CHLORIDE, MAGNESIUM CHLORIDE, SODIUM CHLORIDE, SODIUM BICARBONATE, POTASSIUM CHLORIDE AND SODIUM PHOSPHATE DIBASIC DIHYDRATE 12.5 ML/KG/HR: 3.68; 3.05; 6.34; 3.09; .314; .187 INJECTION INTRAVENOUS at 14:30

## 2024-09-01 RX ADMIN — LOPERAMIDE HYDROCHLORIDE 4 MG: 2 CAPSULE ORAL at 23:16

## 2024-09-01 RX ADMIN — MEROPENEM 1 G: 1 INJECTION, POWDER, FOR SOLUTION INTRAVENOUS at 08:13

## 2024-09-01 RX ADMIN — IPRATROPIUM BROMIDE 0.5 MG: 0.5 SOLUTION RESPIRATORY (INHALATION) at 08:16

## 2024-09-01 RX ADMIN — ACETAMINOPHEN 975 MG: 325 TABLET, FILM COATED ORAL at 08:12

## 2024-09-01 RX ADMIN — CISATRACURIUM BESYLATE 6 MCG/KG/MIN: 10 INJECTION INTRAVENOUS at 06:48

## 2024-09-01 RX ADMIN — Medication 50 MCG: at 22:44

## 2024-09-01 RX ADMIN — CALCIUM CHLORIDE, MAGNESIUM CHLORIDE, SODIUM CHLORIDE, SODIUM BICARBONATE, POTASSIUM CHLORIDE AND SODIUM PHOSPHATE DIBASIC DIHYDRATE 12.5 ML/KG/HR: 3.68; 3.05; 6.34; 3.09; .314; .187 INJECTION INTRAVENOUS at 04:14

## 2024-09-01 RX ADMIN — Medication 200 MCG/HR: at 09:30

## 2024-09-01 RX ADMIN — CIPROFLOXACIN 400 MG: 400 INJECTION, SOLUTION INTRAVENOUS at 20:16

## 2024-09-01 RX ADMIN — Medication 10 MG: at 18:17

## 2024-09-01 RX ADMIN — PROPOFOL 50 MCG/KG/MIN: 10 INJECTION, EMULSION INTRAVENOUS at 18:36

## 2024-09-01 RX ADMIN — TOBRAMYCIN 300 MG: 300 SOLUTION RESPIRATORY (INHALATION) at 10:16

## 2024-09-01 RX ADMIN — Medication 40 MG: at 07:54

## 2024-09-01 RX ADMIN — Medication 50 MCG: at 18:17

## 2024-09-01 RX ADMIN — CALCIUM CHLORIDE, MAGNESIUM CHLORIDE, SODIUM CHLORIDE, SODIUM BICARBONATE, POTASSIUM CHLORIDE AND SODIUM PHOSPHATE DIBASIC DIHYDRATE 12.5 ML/KG/HR: 3.68; 3.05; 6.34; 3.09; .314; .187 INJECTION INTRAVENOUS at 19:09

## 2024-09-01 RX ADMIN — LOPERAMIDE HYDROCHLORIDE 4 MG: 2 CAPSULE ORAL at 05:44

## 2024-09-01 RX ADMIN — CALCIUM CHLORIDE, MAGNESIUM CHLORIDE, SODIUM CHLORIDE, SODIUM BICARBONATE, POTASSIUM CHLORIDE AND SODIUM PHOSPHATE DIBASIC DIHYDRATE 12.5 ML/KG/HR: 3.68; 3.05; 6.34; 3.09; .314; .187 INJECTION INTRAVENOUS at 09:00

## 2024-09-01 RX ADMIN — IPRATROPIUM BROMIDE 0.5 MG: 0.5 SOLUTION RESPIRATORY (INHALATION) at 16:00

## 2024-09-01 RX ADMIN — Medication 60 ML: at 20:18

## 2024-09-01 RX ADMIN — CETIRIZINE HYDROCHLORIDE 10 MG: 10 TABLET, FILM COATED ORAL at 08:13

## 2024-09-01 RX ADMIN — LEVALBUTEROL HYDROCHLORIDE 0.63 MG: 0.63 SOLUTION RESPIRATORY (INHALATION) at 19:17

## 2024-09-01 RX ADMIN — Medication 10 MG: at 22:30

## 2024-09-01 RX ADMIN — SODIUM CHLORIDE, POTASSIUM CHLORIDE, SODIUM LACTATE AND CALCIUM CHLORIDE 500 ML: 600; 310; 30; 20 INJECTION, SOLUTION INTRAVENOUS at 20:13

## 2024-09-01 RX ADMIN — LEVOTHYROXINE SODIUM 25 MCG: 0.03 TABLET ORAL at 08:13

## 2024-09-01 RX ADMIN — Medication 10 MG: at 18:37

## 2024-09-01 RX ADMIN — SODIUM BICARBONATE: 84 INJECTION, SOLUTION INTRAVENOUS at 02:51

## 2024-09-01 RX ADMIN — LOPERAMIDE HYDROCHLORIDE 4 MG: 2 CAPSULE ORAL at 18:00

## 2024-09-01 RX ADMIN — LEVALBUTEROL HYDROCHLORIDE 0.63 MG: 0.63 SOLUTION RESPIRATORY (INHALATION) at 12:24

## 2024-09-01 RX ADMIN — LEVALBUTEROL HYDROCHLORIDE 0.63 MG: 0.63 SOLUTION RESPIRATORY (INHALATION) at 16:00

## 2024-09-01 RX ADMIN — Medication 1 TABLET: at 08:14

## 2024-09-01 RX ADMIN — ACETAMINOPHEN 975 MG: 325 TABLET, FILM COATED ORAL at 23:16

## 2024-09-01 RX ADMIN — WHITE PETROLATUM 57.7 %-MINERAL OIL 31.9 % EYE OINTMENT: at 12:03

## 2024-09-01 RX ADMIN — WHITE PETROLATUM 57.7 %-MINERAL OIL 31.9 % EYE OINTMENT: at 04:15

## 2024-09-01 RX ADMIN — PROPOFOL 50 MCG/KG/MIN: 10 INJECTION, EMULSION INTRAVENOUS at 21:58

## 2024-09-01 RX ADMIN — INSULIN HUMAN 5 UNITS/HR: 1 INJECTION, SOLUTION INTRAVENOUS at 13:52

## 2024-09-01 RX ADMIN — ACETAMINOPHEN 975 MG: 325 TABLET, FILM COATED ORAL at 15:30

## 2024-09-01 RX ADMIN — Medication 1300 UNITS/HR: at 13:21

## 2024-09-01 RX ADMIN — CALCIUM CHLORIDE, MAGNESIUM CHLORIDE, SODIUM CHLORIDE, SODIUM BICARBONATE, POTASSIUM CHLORIDE AND SODIUM PHOSPHATE DIBASIC DIHYDRATE 12.5 ML/KG/HR: 3.68; 3.05; 6.34; 3.09; .314; .187 INJECTION INTRAVENOUS at 12:00

## 2024-09-01 RX ADMIN — DEXAMETHASONE SODIUM PHOSPHATE 20 MG: 10 INJECTION, SOLUTION INTRAMUSCULAR; INTRAVENOUS at 08:02

## 2024-09-01 RX ADMIN — EPOPROSTENOL 20 NG/KG/MIN: 1.5 INJECTION, POWDER, LYOPHILIZED, FOR SOLUTION INTRAVENOUS at 15:54

## 2024-09-01 RX ADMIN — CIPROFLOXACIN 400 MG: 400 INJECTION, SOLUTION INTRAVENOUS at 13:30

## 2024-09-01 RX ADMIN — MONTELUKAST 10 MG: 10 TABLET, FILM COATED ORAL at 21:03

## 2024-09-01 RX ADMIN — PROPOFOL 40 MCG/KG/MIN: 10 INJECTION, EMULSION INTRAVENOUS at 06:50

## 2024-09-01 RX ADMIN — NOREPINEPHRINE BITARTRATE 0.03 MCG/KG/MIN: 0.06 INJECTION, SOLUTION INTRAVENOUS at 19:42

## 2024-09-01 RX ADMIN — MIDAZOLAM HYDROCHLORIDE 10 MG/HR: 1 INJECTION, SOLUTION INTRAVENOUS at 20:25

## 2024-09-01 RX ADMIN — LEVALBUTEROL HYDROCHLORIDE 0.63 MG: 0.63 SOLUTION RESPIRATORY (INHALATION) at 08:16

## 2024-09-01 ASSESSMENT — ACTIVITIES OF DAILY LIVING (ADL)
ADLS_ACUITY_SCORE: 65
ADLS_ACUITY_SCORE: 61
ADLS_ACUITY_SCORE: 65

## 2024-09-01 NOTE — PROGRESS NOTES
CRRT STATUS NOTE    DATA:  Time:  6:00 AM  Pressures WNL:  YES  Filter Status:  WDL    Problems Reported/Alarms Noted:  None.    Supplies Present:  YES    ASSESSMENT:  Patient Net Fluid Balance: Net -1.2 L at midnight. Net -253ml @ 0600.  Vitals: T 97.9, , RR 19, /70, MAP 96.  Labs: K -, Mg -,Phos -, iCa 4.6, Hgb -, Plt -   (Labs not resulted as of 0716, passed on to day CRRT Resource to follow-up)  Goals of Therapy: 0-50 ml/hr, if BP is decreasing, aim more toward I=O    INTERVENTIONS:   None.    PLAN:  Continue to monitor circuit daily and change set q72 hours or PRN for clotting/clogging. Please call CRRT RN with any quesitons/problems.      Set to reach max life today at 1701.

## 2024-09-01 NOTE — PROGRESS NOTES
"Critical Care Services Progress Note:  I personally examined and evaluated the patient today. I formulated today s plan with the house staff team or resident(s) and agree with the findings and plan in the associated note (see separately attested resident note).   I have evaluated all laboratory values and imaging studies from the past 24 hours.  Summary of hospital course:  53 year old female initially transferred to [facility] on 8/8 for VV-ECMO.  She was diagnosed with ARDS from a suspected pneumonia. Decannulated on 8/18. Extubated on 8/19.  Reintubated on 8/29 for hypoxemia and now critically ill, proned and paralyzed  Overnight events/pertinent findings today:  Supine during the day yesterday. Proned overnight.  No acute events. Sedation did need to be increased a little bit due to hypertension.  Remains paralyzed.     Data  /58 (BP Location: Right arm)   Pulse 110   Temp 97.9  F (36.6  C)   Resp (!) 32   Ht 1.575 m (5' 2\")   Wt 80 kg (176 lb 5.9 oz)   SpO2 99%   BMI 32.26 kg/m    I/O net negative 2.5 L  FiO2 (%): 40 %, Resp: (!) 32, Vent Mode: CMV/AC, Resp Rate (Set): 32 breaths/min, Tidal Volume (Set, mL): 270 mL, PEEP (cm H2O): 9 cmH2O, Resp Rate (Set): 32 breaths/min, Tidal Volume (Set, mL): 270 mL, PEEP (cm H2O): 9 cmH2O  Pplat while prone is 26 with a PEEP of 5.     Trace LE edema    Na 138, K 3.7    WBC 10.6, Hgb 8.6, Plts 275    Sputum culture 8/29 with pseudomonas    Chest CT - extensive GGO and consolidative changes  ECHO is normal      Assessment/plan:    ARDS  CHACORTA on CRRT  Pseudomonas Pneumonia  Shock improved    Supine during the day. Will make a determination about prone later today  Epoprostenol nebulized  Cisatracurium, will try to stop later today  BIS monitoring  Net negative 1 L via CRRT  Stop NaHCO3 in CRRT  Stop Meropenem given intermediate sensitivity  Decrease PEEP to 5  MAP goal 65  versed/propofol    Rest per resident note.    I spent a total of 45 minutes (excluding " procedure time) personally providing and directing critical care services at the bedside and on the critical care unit for this patient.     Alec Shelley MD

## 2024-09-01 NOTE — PROGRESS NOTES
"  Nephrology Progress Note  09/01/2024         Assessment & Recommendations:   Mrs Flaherty is a 52 yo F w/ hx of Anxiety, depression, fibromyalgia, hypothyroidism, asthma, HLD, MURIEL on CPAP, expressive aphasia, and hypertension who was admitted to an OSH with community acquired pneumonia on 8/3 s/p intubation.  Found to have severe ARDS requiring paralysis and proning, transferred here on 8/8 for management and initiated on CKRT for severe acidemia in the setting of oligoanuric CHACORTA.  Started CRRT on 8/9 for volume management.     # anuric CHACORTA, suspected ATN in setting of shock, ARDS- continue CRRT  # respiratory acidosis   Continue CRRT (with heparin dosed by Xa level protocol due to filter clotting daily when on fixed 500 units/hr heparin).   -Continue 4K baths, standard 25ml/kg/hr clearance.  -stopped post-filter bicarb gtt mid-day 8/30, but restarted due to acidosis now held again  - add heparin  -continue net negative goal of 50ml/hr on CRRT but suspect she is nearing her dry weight    # hypervolemia - UF as above    # electrolytes stable      Recommendations were communicated to primary team via this note.     Emmanuelle Donato MD   Division of Nephrology and Hypertension  [facility] [employer]      Interval History :   Nursing and provider notes from last 24 hours reviewed.  In the last 24 hours Massiel Flaherty remains intubated. PCO2 castillo overnight with pH low, so post-filter bicarb gtt on CRRT was begun.- She seemed a little better and improved respiratory acidosis. Will stop bicarb    Review of Systems:   Unable to obtain due to intubation     Physical Exam:   I/O last 3 completed shifts:  In: 2835.65 [I.V.:1435.65; NG/GT:680]  Out: 3900.8 [Other:3300.8; Stool:600]   /58 (BP Location: Right arm)   Pulse 99   Temp 97  F (36.1  C)   Resp (!) 32   Ht 1.575 m (5' 2\")   Wt 80 kg (176 lb 5.9 oz)   SpO2 99%   BMI 32.26 kg/m       GENERAL APPEARANCE: critically ill, intubated " (again)  HEENT: MMM   PULM: lungs clear anteriorly   CV: regular rhythm, normal rate, no rub      -edema - 1+ LE edema bilat   GI: soft, ND  INTEGUMENT: no rash on exposed skin  NEURO: sedated   Access is LFV temp line 8/19.     Labs:   All labs reviewed by me  Electrolytes/Renal -   Recent Labs   Lab Test 09/01/24  1201 09/01/24  1156 09/01/24  1016 09/01/24  0420 09/01/24 0324 08/31/24 1947 08/31/24 1944   NA  --  134*  --   --  138  --  137   POTASSIUM  --  3.7  --   --  3.7  --  3.7   CHLORIDE  --  93*  --   --  93*  --  94*   CO2  --  28  --   --  29  --  32*   BUN  --  33.3*  --   --  30.0*  --  26.8*   CR  --  0.74  --   --  0.79  --  0.80   * 173* 158*   < > 164*   < > 170*   QUAN  --  9.0  --   --  8.8  --  8.8   MAG  --  2.5*  --   --  2.5*  --  2.5*   PHOS  --  4.1  --   --  4.6*  --  4.7*    < > = values in this interval not displayed.       CBC -   Recent Labs   Lab Test 09/01/24  1156 09/01/24 0324 08/31/24  1024   WBC 10.0 10.6 12.4*   HGB 9.2* 8.6* 9.0*    275 252       LFTs -   Recent Labs   Lab Test 09/01/24  1156 09/01/24 0324 08/31/24 1944 08/31/24  1024 08/31/24  0341 08/30/24  1045 08/30/24  0334   ALKPHOS  --  187*  --   --  223*  --  182*   BILITOTAL  --  0.2  --   --  0.2  --  0.5   ALT  --  33  --   --  42  --  50   AST  --  32  --   --  55*  --  100*   PROTTOTAL  --  6.7  --   --  6.8  --  6.0*   ALBUMIN 3.7 3.4* 3.4*   < > 3.4*   < > 3.3*    < > = values in this interval not displayed.       Iron Panel - No lab results found.      Imaging:  All imaging studies reviewed by me.     Current Medications:  Current Facility-Administered Medications   Medication Dose Route Frequency Provider Last Rate Last Admin    acetaminophen (TYLENOL) tablet 975 mg  975 mg Oral or Feeding Tube Q8H Andres Payne PA-C   975 mg at 09/01/24 0812    artificial tears ophthalmic ointment   Both Eyes Q8H Carlos Cerna MD   Given at 09/01/24 1203    [Held by provider] atorvastatin  (LIPITOR) tablet 10 mg  10 mg Oral or Feeding Tube Daily Francisco Welsh MD   10 mg at 08/29/24 0924    budesonide (PULMICORT) neb solution 1 mg  1 mg Nebulization Daily Andres Payne PA-C   1 mg at 09/01/24 0816    cetirizine (zyrTEC) tablet 10 mg  10 mg Oral or Feeding Tube Daily Barry Hatch MD   10 mg at 09/01/24 0813    ciprofloxacin (CIPRO) infusion 400 mg  400 mg Intravenous Q8H Fuentes Fowler MD   400 mg at 09/01/24 0955    dexAMETHasone PF (DECADRON) injection 20 mg  20 mg Intravenous Daily Mirtha Vogt APRN CNP   20 mg at 09/01/24 0802    Followed by    [START ON 9/4/2024] dexAMETHasone PF (DECADRON) injection 10 mg  10 mg Intravenous Daily Mirtha Vogt APRN CNP        [Held by provider] gabapentin (NEURONTIN) capsule 300 mg  300 mg Oral or Feeding Tube TID Barry Hatch MD   300 mg at 08/28/24 1959    ipratropium (ATROVENT) 0.02 % neb solution 0.5 mg  0.5 mg Nebulization 4x daily Barry Hatch MD   0.5 mg at 09/01/24 1224    And    levalbuterol (XOPENEX) neb solution 0.63 mg  0.63 mg Nebulization 4x Daily Barry Hatch MD   0.63 mg at 09/01/24 1224    levothyroxine (SYNTHROID/LEVOTHROID) tablet 25 mcg  25 mcg Per Feeding Tube QAM AC Giovanny Guidry PA-C   25 mcg at 09/01/24 0813    loperamide (IMODIUM) capsule 4 mg  4 mg Oral or Feeding Tube Q6H Barry Hatch MD   4 mg at 09/01/24 0544    montelukast (SINGULAIR) tablet 10 mg  10 mg Oral or Feeding Tube At Bedtime Barry Hatch MD   10 mg at 08/31/24 2106    multivitamin RENAL (RENAVITE RX/NEPHROVITE) tablet 1 tablet  1 tablet Oral or Feeding Tube Daily Giovanny Guidry PA-C   1 tablet at 09/01/24 0814    pantoprazole (PROTONIX) 2 mg/mL suspension 40 mg  40 mg Per Feeding Tube QAM Barry Hdez MD   40 mg at 09/01/24 0754    Prosource TF20 ENfit Compatibl EN LIQD (PROSOURCE TF20) packet 60 mL  1 packet Per Feeding Tube BID -Giovanny Spence PA-C   60 mL at 09/01/24 0802    tobramycin (PF) (LIZA) neb  solution 300 mg  300 mg Nebulization 2 times daily Gaudencio De Souza MD   300 mg at 09/01/24 1016     Current Facility-Administered Medications   Medication Dose Route Frequency Provider Last Rate Last Admin    cisatracurium (NIMBEX) 200 mg in D5W 100 mL infusion  3-10 mcg/kg/min (Ideal) Intravenous Continuous Carlos Cerna MD 9 mL/hr at 09/01/24 1200 6 mcg/kg/min at 09/01/24 1200    dextrose 10% infusion   Intravenous Continuous PRN Gaudencio De Souza MD        dextrose 10% infusion   Intravenous Continuous PRN Giovanny Guidry PA-C        dialysate for CVVHD & CVVHDF (Phoxillum BK4/2.5)  12.5 mL/kg/hr CRRT Continuous Emmanuelle Donato MD 1,000 mL/hr at 09/01/24 0414 12.5 mL/kg/hr at 09/01/24 0414    epoprostenol (VELETRI) inhalation solution  20 ng/kg/min (Ideal) Nebulization Continuous Carlos Cerna MD 3 mL/hr at 09/01/24 0441 20 ng/kg/min at 09/01/24 0441    fentaNYL (SUBLIMAZE) infusion   mcg/hr Intravenous Continuous Fuentes Fowler MD 4 mL/hr at 09/01/24 1200 200 mcg/hr at 09/01/24 1200    heparin (porcine) 20,000 units in 20 mL ANTICOAGULANT infusion (syringe from pharmacy)  500-1,500 Units/hr CRRT Continuous Emmanuelle Donato MD 1.3 mL/hr at 08/31/24 2226 1,300 Units/hr at 08/31/24 2226    insulin regular (MYXREDLIN) 1 unit/mL infusion  0-24 Units/hr Intravenous Continuous Gaudencio De Souza MD 6 mL/hr at 09/01/24 1200 6 Units/hr at 09/01/24 1200    propofol (DIPRIVAN) infusion  5-75 mcg/kg/min (Dosing Weight) Intravenous Continuous Fuentes Fowler MD 16 mL/hr at 09/01/24 1200 30 mcg/kg/min at 09/01/24 1200    And    Medication Instruction   Does not apply Continuous PRN Fuentes Fowler MD        midazolam (VERSED) 100 mg/100 mL NS infusion - ADULT  1-12 mg/hr Intravenous Continuous Carlos Cerna MD 8 mL/hr at 09/01/24 1200 8 mg/hr at 09/01/24 1200    norepinephrine (LEVOPHED) 16 mg in  mL infusion MAX CONC CENTRAL LINE  0.01-0.6 mcg/kg/min (Dosing Weight) Intravenous  Continuous Elier Hutchins MD   Stopped at 08/30/24 2300    POST-filter replacement solution for CVVHD & CVVHDF (Sodium Bicarbonate in water 150 mEq/L for infusion)   Intravenous Continuous Emmanuelle Donato MD   Paused at 09/01/24 0910    PRE-filter replacement solution for CVVHD & CVVHDF (Phoxillum BK4/2.5)  12.5 mL/kg/hr CRRT Continuous Emmanuelle Donato MD 1,000 mL/hr at 09/01/24 0414 12.5 mL/kg/hr at 09/01/24 0414    vasopressin 1 unit/mL MAX Conc (PITRESSIN) infusion  2.4 Units/hr Intravenous Continuous Kike Ashby MD   Stopped at 08/31/24 1815     Emmanuelle Donato MD

## 2024-09-01 NOTE — PLAN OF CARE
Goal Outcome Evaluation:      Plan of Care Reviewed With: parent, child, family, sibling    Overall Patient Progress: improvingOverall Patient Progress: improving    Outcome Evaluation: Pressors off yesterday; PEEP down 9>5 today. PIPS 28-35. Bicarb gtt stopped. ABG remains wnl. pF >150 while supined today. Still paralyzed and full strength flolan. CRRT pulling 1L. Straight cath 200mL from last 48hrs (5mL/hr UO). CMV came back + in blood.    CRRT restarted circuit, no issue. Heparin in circuit allowed it to run full 72hrs. Xa wnl, 0.28.     Paralytic stopped at 1600 and full twitches back quickly. ABG wnl. pF remains 190. But tachypneic on same 3 sedation agents with RR in the 40s and with excessive shivering (99F). Sedation increased: Prop at 50, Versed at 10. Fentanyl continues at 200mcg/hr.     Plan to prone one more night tonight, then wean flolan tomorrow. Ok to resume cis if needed for proning tonight per Dr Shelley.      ? Start ganciclovir for CMV?     Straight cath every other day.     F/U Cultures pending.

## 2024-09-01 NOTE — PLAN OF CARE
ICU End of Shift Summary. See flowsheets for vital signs and detailed assessment.    Changes this shift: Prone until 09:30. Deep sedation and paralyzed. Sedated on versed, fentanyl and propofol. Hypertensive @ time with repositioning, propofol and versed increased with some improvement. Insulin gtts @ algorithm 4.Tubefeed at goal of 30.     Plan:  Continue with plan of care and notify provider with changes.

## 2024-09-01 NOTE — PROGRESS NOTES
MEDICAL ICU PROGRESS NOTE  09/01/2024      Date of Service (when I saw the patient): 09/01/2024    ASSESSMENT: Massiel Flaherty is a 53 year old female with PMH Anxiety/depression, fibromyalgia, c/f nonepileptic seizures not on AEDs, hypothyroidism, asthma, HLD, MURIEL on CPAP, expressive aphasia, hypertension  who was admitted on 8/3/2024 for fatigue, fever and dyspnea and intubated 8/6/24 at OSH for ARDS, proned and paralyzed without improvement. Transferred to [facility] 8/8/24, hypoxic with high plateaus/peaks and placed on VV ECMO and CRRT. Decannulated from VV ECMO 8/18 and transferred to MICU 8/26/24 for ongoing management. Extubated 8/27 then reintubated 8/29 iso worsening AHRF and pseudomonas pneumonia.      CHANGES and MAJOR THINGS TODAY:   - PF ratio: 314, weaning venti settings, PEEP to 5, decreased bicarb in renal circuit, returned to supine positioning, stopped paralysis  - CXR for assessment of lungs  - Nebulized tobramycin 2x daily  - Net negative 1L goal for CRRT     Neuro:  # Pain and sedation  # Acute pain  # Sedation and analgesia for vent compliance   # Concern for ICU delirium   # Hx Fibromyalgia   # Hx myalgic encephalomyelitis   # Hx anxiety/depression  - Monitor neurological status. Delirium preventions and precautions.   - Pain: Scheduled: tylenol, oxycodone 5mg q 6hr (decreased from q 4hr) held while sedated  PRN: tylenol, oxycodone  - Sedation plan: fentanyl, midazolam, and propofol   - RASS goal -4 to -5, BIS goal 30-40  - Gabapentin 300mg TID held while sedated  - Seroquel 25mg BID PRN held while sedated  - PTA Venlafaxine and Amitriptyline discontinued while sedated     # Hx spells with staring and BUE posturing previously described as nonepileptic seizures   # Hx of GTC seizures and myoclonic epilepsy, weaned off AEDs   # Diffuse cerebral edema - improved  - Sodium goals liberalized to 140-145  - 8/21: MRI Brain: no acute intracranial pathology. No findings to suggest anoxic brain injury.       Pulmonary:  # Acute hypoxic respiratory failure c/b ARDS   # Pseudomonas pneumonia  # s/p VV ECMO 8/8 - 8/18   # Hx CAP  # Asthma  # MURIEL on home CPAP   FiO2 (%): 40 %, Resp: (!) 32, Vent Mode: CMV/AC, Resp Rate (Set): 32 breaths/min, Tidal Volume (Set, mL): 270 mL, PEEP (cm H2O): 9 cmH2O, Resp Rate (Set): 32 breaths/min, Tidal Volume (Set, mL): 270 mL, PEEP (cm H2O): 9 cmH2O       PFT dated 9/8/2020 demonstrated normal spirometry with mild diffusion impairment and mild restriction (FEV1/FVC 80%, FEV1 2.59, DLCO 40%). CT abdomen chest 3/9/2020 demonstrated scarring and fibrosis bilaterally which correlated with the decreased DLCO. The patient also has a history of MURIEL on CPAP therapy as currently managed by her provider in [place]. Unclear what triggered ARDS or why she has had such severe hospital course, further investigated ILD but results all unremarkable. Extubated 8/27, reintubated 8/29 for worsening O2 demands and diffuse opacities iso new/recurrent psuedomonas pneumonia.   - ILD w/u aldolase, EVELIO, RF, CCP, ANCA, MPO, SSA Ro and [place], Scleroderma antibody, Radha 1,  hypersensity pneumonitis, IGG subclasses,  aspergillus, blastomyces, histoplasma neg  - SpO2 goals >92%, PaO2 goals >60   - PTA singular at bedtime if able  - Scheduled pulmicort, and duonebs   - Lung protective ventilation strategies given ARDS  - Methylpred 125mg q6H x 24h 8/30 --> transition to 20 mg dex x 5 days, followed by 10mg x 5 days  - Epoprostenol at 20 nebulized  - Bronch with BAL 8/30, labs in process  - Treatment of potential pneumonia (see below)  - Wean vent as able     Cardiovascular:  # Hyperlipidemia  # Hypotension  # Hx HTN  - MAP goal >65, SBP goals >110  - NE for pressor support, wean as able, ok to increase while removing volume via CRRT  - Vasopressin for pressor support augmentation  - Restarted PTA atorvastatin  - PTA clonidine held while sedated  - Lower extremity ultrasound without vascular pathology, no hematoma or  clots   - Monitor discoloration of extremities for necrosis  - Monitoring Hypertension     Sinus Tachycardia  Likely 2/2 fluid removal and sepsis as above     GI/Nutrition:  # Moderate protein calorie malnutrition  # Non-infectious Diarrhea  # GERD   - Protonix (home medication)   - Nutrition consulted, appreciate recs  - Diet: TF via NJ, assess swallow after sedation weaned, failed previous swallow study  - Hold bowel regimen d/t loose stools  - Change suspension meds to other form as able  - Continue scheduled multivitamin, banatrol, prosource packet, and loperamide  - Rectal tube, continues with high output. Keep today.      Renal/Fluids/Electrolytes:  # Acute kidney injury  # Renal failure  Baseline Cr approx 0.6. Pt was anuric here but now making some urine, likely prerenal due to shock.  - Continue CRRT; fluid goal net zero for the day depending on CXR  - Heparinized CRRT circuit, low intensity protocol. Last clotted 8/24 PM  - Post circuit bicarb running to compensate for respiratory acidosis due to ARDS and lung protective ventilation strategies (discontinued 9/1)  - Strict I&Os   - Bladder scan q shift  - Avoid nephrotoxins as able      Endocrine:  # Steroid and stress induced hyperglycemia  # Hypothyroidism   - Goal to keep BG < 180 for optimal wound healing.   - Continue High sliding scale insulin, with rising blood glucose values started insulin drip 8/30  - Continue PTA synthroid     ID:  # Leukocytosis  # Psuedomonas pneumonia  # Hx CAP  Respiratory cx 8/29 pseudomonas pneumonia.   - Continue to monitor fever curve and inflammatory markers as appropriate  - ID consulted, appreciate recs.   - Due to rigors seen on exam 8/29, low CRRT set temp masking any possible fevers, worsening O2 requirements, took blood cultures and started antibiotics   - Await BAL cultures, follow previous cultures     Abx  - Meropenem 8/29 - 9/1  - Vancomycin 8/29- 8/30  - Tobramycin x 1 8/29  - Tobramycin nebs BID 9/1-  -  "Ciprofloxacin infusion 9/1-      # HSV-1  Mouth sores present, which could have viral etiology. 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 Monitor and trend.   - Heparinize CRRT circuit    - CTAP 8/30 due to acute hemoglobin drop requiring transfusion of 2 x 1 unit of blood 12 hours apart. Negative for acute bleed     Musculoskeletal/Rheum:  # Alopecia  - Hold PTA hydroxychloroquine      # Weakness and deconditioning of critical illness  # Left ankle injury pre-hospitalization    - Physical and occupational therapy when able.      Skin:  # BLE scattered ecchymosis  # Left toe duskiness   - Bilateral lower leg ecchymosis   - WOC consult   - Ecchymosis to left foot, most noticeable to 3rd and 4th toes     General Cares/Prophylaxis:    DVT Prophylaxis: Heparin through CRRT circuit  GI Prophylaxis: PPI  Restraints: no  Code Status: DNAR/OK to intubate     Lines/tubes/drains:  - ETT (8/29)  - NJ (8/9)  - LIJ CVC (8/8)  - Radial a-line (8/29)  - PIV x3  - Rectal tube (8/17)  - Tunneled HD line (8/23)       Disposition:  - Medical ICU     Patient seen and findings/plan discussed with medical ICU staff, Dr. Shelley.    Fuentes Fowler MD-PhD    Clinically Significant Risk Factors              # Hypoalbuminemia: Lowest albumin = 2.2 g/dL at 8/10/2024  9:47 AM, will monitor as appropriate               # Obesity: Estimated body mass index is 32.26 kg/m  as calculated from the following:    Height as of this encounter: 1.575 m (5' 2\").    Weight as of this encounter: 80 kg (176 lb 5.9 oz).   # Moderate Malnutrition: based on nutrition assessment    # Financial/Environmental Concerns: none              Code Status: No CPR- Pre-arrest intubation OK           ====================================  INTERVAL HISTORY:   Vent settings adjusted overnight, blood gas improved to 7.32-7.34. " Hypertensive when moved, resolved without treatment.    OBJECTIVE:   1. VITAL SIGNS:   Temp:  [97.3  F (36.3  C)-98.2  F (36.8  C)] 97.9  F (36.6  C)  Pulse:  [] 106  Resp:  [23-32] 32  MAP:  [56 mmHg-111 mmHg] 80 mmHg  Arterial Line BP: ()/(46-83) 127/59  FiO2 (%):  [40 %-60 %] 40 %  SpO2:  [90 %-100 %] 99 %  FiO2 (%): 40 %, Resp: (!) 32, Vent Mode: CMV/AC, Resp Rate (Set): 32 breaths/min, Tidal Volume (Set, mL): 270 mL, PEEP (cm H2O): 9 cmH2O, Resp Rate (Set): 32 breaths/min, Tidal Volume (Set, mL): 270 mL, PEEP (cm H2O): 9 cmH2O  2. INTAKE/ OUTPUT:   I/O last 3 completed shifts:  In: 2790.2 [I.V.:1410.2; NG/GT:660]  Out: 4194 [Other:3544; Stool:650]    3. PHYSICAL EXAMINATION:  General: Intubated, sedated, proned  HEENT: Normocephalic, atraumatic, BIS leads in place, eyes with ointment dressing  Neuro: RASS -4-5, a refelexic  Pulm/Resp: Bilateral breath sounds audible with bibasilar soft ronchi  CV: RRR, s1/2 normal, no murmur  Abdomen: Soft, non-distended, non-tender examined from back  : deferred   Incisions/Skin: lower leg 1+ edema with ecchymosis present and scattered. Some bluish discoloration of the finger tips, capillary refill normal    4. LABS:   Arterial Blood Gases   Recent Labs   Lab 09/01/24  0324 08/31/24  1944 08/31/24  1025 08/31/24  0833   PH 7.32* 7.30* 7.26* 7.29*   PCO2 72* 73* 75* 71*   PO2 157* 79* 65* 63*   HCO3 37* 36* 34* 34*     Complete Blood Count   Recent Labs   Lab 08/31/24  1024 08/31/24  0341 08/30/24  1045 08/30/24  0334   WBC 12.4* 12.9* 13.1* 14.0*   HGB 9.0* 8.5* 6.5* 7.2*    234 174 210     Basic Metabolic Panel  Recent Labs   Lab 09/01/24  0549 09/01/24  0420 09/01/24  0209 09/01/24  0101 08/31/24  1947 08/31/24  1944 08/31/24  1033 08/31/24  1024 08/31/24  0350 08/31/24  0341 08/30/24  2101 08/30/24 2016   NA  --   --   --   --   --  137  --  138  --  136  --  136   POTASSIUM  --   --   --   --   --  3.7  --  3.7  --  4.1  --  4.4   CHLORIDE  --   --    --   --   --  94*  --  95*  --  96*  --  98   CO2  --   --   --   --   --  32*  --  30*  --  28  --  26   BUN  --   --   --   --   --  26.8*  --  27.3*  --  24.7*  --  25.5*   CR  --   --   --   --   --  0.80  --  0.78  --  0.86  --  0.93   * 135* 146* 147*   < > 170*   < > 217*   < > 176*   < > 170*  177*    < > = values in this interval not displayed.     Liver Function Tests  Recent Labs   Lab 08/31/24  1944 08/31/24  1024 08/31/24  0341 08/30/24 2016 08/30/24  1045 08/30/24  0334 08/29/24  1131 08/29/24  0346 08/28/24  1147 08/28/24  0403   AST  --   --  55*  --   --  100*  --  53*  --  31   ALT  --   --  42  --   --  50  --  21  --  20   ALKPHOS  --   --  223*  --   --  182*  --  119  --  95   BILITOTAL  --   --  0.2  --   --  0.5  --  0.2  --  0.2   ALBUMIN 3.4* 3.4* 3.4* 3.4* 2.9* 3.3*   < > 3.4*   < > 3.2*   INR  --   --   --   --  1.14  --   --   --   --   --     < > = values in this interval not displayed.     Coagulation Profile  Recent Labs   Lab 08/30/24  1045   INR 1.14   PTT 56*       5. RADIOLOGY:   No results found for this or any previous visit (from the past 24 hour(s)).

## 2024-09-02 ENCOUNTER — APPOINTMENT (OUTPATIENT)
Dept: GENERAL RADIOLOGY | Facility: CLINIC | Age: 54
DRG: 003 | End: 2024-09-02
Attending: INTERNAL MEDICINE
Payer: MEDICAID

## 2024-09-02 LAB
ALBUMIN SERPL BCG-MCNC: 3.2 G/DL (ref 3.5–5.2)
ALBUMIN SERPL BCG-MCNC: 3.4 G/DL (ref 3.5–5.2)
ALBUMIN SERPL BCG-MCNC: 3.5 G/DL (ref 3.5–5.2)
ALLEN'S TEST: ABNORMAL
ALP SERPL-CCNC: 156 U/L (ref 40–150)
ALT SERPL W P-5'-P-CCNC: 28 U/L (ref 0–50)
ANION GAP SERPL CALCULATED.3IONS-SCNC: 11 MMOL/L (ref 7–15)
ANION GAP SERPL CALCULATED.3IONS-SCNC: 12 MMOL/L (ref 7–15)
ANION GAP SERPL CALCULATED.3IONS-SCNC: 12 MMOL/L (ref 7–15)
AST SERPL W P-5'-P-CCNC: 28 U/L (ref 0–45)
BACTERIA SPT CULT: ABNORMAL
BASE EXCESS BLDA CALC-SCNC: -0.2 MMOL/L (ref -3–3)
BASE EXCESS BLDA CALC-SCNC: -1.3 MMOL/L (ref -3–3)
BASE EXCESS BLDA CALC-SCNC: -1.6 MMOL/L (ref -3–3)
BASE EXCESS BLDA CALC-SCNC: 0.3 MMOL/L (ref -3–3)
BASE EXCESS BLDA CALC-SCNC: 0.8 MMOL/L (ref -3–3)
BASE EXCESS BLDA CALC-SCNC: 1.6 MMOL/L (ref -3–3)
BILIRUB DIRECT SERPL-MCNC: <0.2 MG/DL (ref 0–0.3)
BILIRUB SERPL-MCNC: 0.2 MG/DL
BUN SERPL-MCNC: 29.6 MG/DL (ref 6–20)
BUN SERPL-MCNC: 33.4 MG/DL (ref 6–20)
BUN SERPL-MCNC: 33.5 MG/DL (ref 6–20)
CA-I BLD-MCNC: 4.7 MG/DL (ref 4.4–5.2)
CA-I BLD-MCNC: 4.7 MG/DL (ref 4.4–5.2)
CA-I BLD-MCNC: 4.8 MG/DL (ref 4.4–5.2)
CALCIUM SERPL-MCNC: 8.6 MG/DL (ref 8.8–10.4)
CALCIUM SERPL-MCNC: 8.6 MG/DL (ref 8.8–10.4)
CALCIUM SERPL-MCNC: 8.7 MG/DL (ref 8.8–10.4)
CHLORIDE SERPL-SCNC: 100 MMOL/L (ref 98–107)
CHLORIDE SERPL-SCNC: 98 MMOL/L (ref 98–107)
CHLORIDE SERPL-SCNC: 98 MMOL/L (ref 98–107)
CMV DNA SPEC NAA+PROBE-ACNC: 920 IU/ML
CMV DNA SPEC NAA+PROBE-LOG#: 3 {LOG_COPIES}/ML
COHGB MFR BLD: 99.6 % (ref 96–97)
COHGB MFR BLD: >100 % (ref 96–97)
CREAT SERPL-MCNC: 0.73 MG/DL (ref 0.51–0.95)
CREAT SERPL-MCNC: 0.75 MG/DL (ref 0.51–0.95)
CREAT SERPL-MCNC: 0.79 MG/DL (ref 0.51–0.95)
CRP SERPL-MCNC: 74.4 MG/L
EGFRCR SERPLBLD CKD-EPI 2021: 89 ML/MIN/1.73M2
EGFRCR SERPLBLD CKD-EPI 2021: >90 ML/MIN/1.73M2
EGFRCR SERPLBLD CKD-EPI 2021: >90 ML/MIN/1.73M2
ERYTHROCYTE [DISTWIDTH] IN BLOOD BY AUTOMATED COUNT: 17.6 % (ref 10–15)
ERYTHROCYTE [DISTWIDTH] IN BLOOD BY AUTOMATED COUNT: 18.1 % (ref 10–15)
ERYTHROCYTE [DISTWIDTH] IN BLOOD BY AUTOMATED COUNT: 18.2 % (ref 10–15)
GALACTOMANNAN AG SERPL QL IA: NEGATIVE
GALACTOMANNAN AG SPEC IA-ACNC: 0.06
GLUCOSE BLDC GLUCOMTR-MCNC: 100 MG/DL (ref 70–99)
GLUCOSE BLDC GLUCOMTR-MCNC: 100 MG/DL (ref 70–99)
GLUCOSE BLDC GLUCOMTR-MCNC: 102 MG/DL (ref 70–99)
GLUCOSE BLDC GLUCOMTR-MCNC: 103 MG/DL (ref 70–99)
GLUCOSE BLDC GLUCOMTR-MCNC: 114 MG/DL (ref 70–99)
GLUCOSE BLDC GLUCOMTR-MCNC: 117 MG/DL (ref 70–99)
GLUCOSE BLDC GLUCOMTR-MCNC: 120 MG/DL (ref 70–99)
GLUCOSE BLDC GLUCOMTR-MCNC: 121 MG/DL (ref 70–99)
GLUCOSE BLDC GLUCOMTR-MCNC: 128 MG/DL (ref 70–99)
GLUCOSE BLDC GLUCOMTR-MCNC: 130 MG/DL (ref 70–99)
GLUCOSE BLDC GLUCOMTR-MCNC: 137 MG/DL (ref 70–99)
GLUCOSE BLDC GLUCOMTR-MCNC: 137 MG/DL (ref 70–99)
GLUCOSE BLDC GLUCOMTR-MCNC: 140 MG/DL (ref 70–99)
GLUCOSE BLDC GLUCOMTR-MCNC: 146 MG/DL (ref 70–99)
GLUCOSE BLDC GLUCOMTR-MCNC: 148 MG/DL (ref 70–99)
GLUCOSE BLDC GLUCOMTR-MCNC: 149 MG/DL (ref 70–99)
GLUCOSE BLDC GLUCOMTR-MCNC: 159 MG/DL (ref 70–99)
GLUCOSE BLDC GLUCOMTR-MCNC: 171 MG/DL (ref 70–99)
GLUCOSE BLDC GLUCOMTR-MCNC: 174 MG/DL (ref 70–99)
GLUCOSE BLDC GLUCOMTR-MCNC: 89 MG/DL (ref 70–99)
GLUCOSE BLDC GLUCOMTR-MCNC: 90 MG/DL (ref 70–99)
GLUCOSE BLDC GLUCOMTR-MCNC: 90 MG/DL (ref 70–99)
GLUCOSE SERPL-MCNC: 151 MG/DL (ref 70–99)
GLUCOSE SERPL-MCNC: 162 MG/DL (ref 70–99)
GLUCOSE SERPL-MCNC: 163 MG/DL (ref 70–99)
GRAM STAIN RESULT: ABNORMAL
GRAM STAIN RESULT: ABNORMAL
HCO3 BLD-SCNC: 23 MMOL/L (ref 21–28)
HCO3 BLD-SCNC: 25 MMOL/L (ref 21–28)
HCO3 BLD-SCNC: 25 MMOL/L (ref 21–28)
HCO3 BLD-SCNC: 26 MMOL/L (ref 21–28)
HCO3 SERPL-SCNC: 20 MMOL/L (ref 22–29)
HCO3 SERPL-SCNC: 22 MMOL/L (ref 22–29)
HCO3 SERPL-SCNC: 24 MMOL/L (ref 22–29)
HCT VFR BLD AUTO: 26.3 % (ref 35–47)
HCT VFR BLD AUTO: 28 % (ref 35–47)
HCT VFR BLD AUTO: 29.6 % (ref 35–47)
HGB BLD-MCNC: 8.2 G/DL (ref 11.7–15.7)
HGB BLD-MCNC: 8.5 G/DL (ref 11.7–15.7)
HGB BLD-MCNC: 9.2 G/DL (ref 11.7–15.7)
HSV1 DNA SPEC QL NAA+PROBE: NEGATIVE
HSV2 DNA SPEC QL NAA+PROBE: NEGATIVE
M GENITALIUM DNA SPEC QL NAA+PROBE: NOT DETECTED
M HOMINIS DNA SPEC QL NAA+PROBE: NOT DETECTED
M PNEUMO DNA SPEC QL NAA+PROBE: NOT DETECTED
MAGNESIUM SERPL-MCNC: 2.1 MG/DL (ref 1.7–2.3)
MAGNESIUM SERPL-MCNC: 2.3 MG/DL (ref 1.7–2.3)
MAGNESIUM SERPL-MCNC: 2.6 MG/DL (ref 1.7–2.3)
MCH RBC QN AUTO: 30 PG (ref 26.5–33)
MCH RBC QN AUTO: 30.5 PG (ref 26.5–33)
MCH RBC QN AUTO: 30.7 PG (ref 26.5–33)
MCHC RBC AUTO-ENTMCNC: 30.4 G/DL (ref 31.5–36.5)
MCHC RBC AUTO-ENTMCNC: 31.1 G/DL (ref 31.5–36.5)
MCHC RBC AUTO-ENTMCNC: 31.2 G/DL (ref 31.5–36.5)
MCV RBC AUTO: 100 FL (ref 78–100)
MCV RBC AUTO: 96 FL (ref 78–100)
MCV RBC AUTO: 99 FL (ref 78–100)
O2/TOTAL GAS SETTING VFR VENT: 40 %
O2/TOTAL GAS SETTING VFR VENT: 50 %
O2/TOTAL GAS SETTING VFR VENT: 50 %
O2/TOTAL GAS SETTING VFR VENT: 55 %
PCO2 BLD: 30 MM HG (ref 35–45)
PCO2 BLD: 33 MM HG (ref 35–45)
PCO2 BLD: 35 MM HG (ref 35–45)
PCO2 BLD: 50 MM HG (ref 35–45)
PCO2 BLD: 55 MM HG (ref 35–45)
PCO2 BLD: 56 MM HG (ref 35–45)
PEEP: 8 CM H2O
PH BLD: 7.27 [PH] (ref 7.35–7.45)
PH BLD: 7.28 [PH] (ref 7.35–7.45)
PH BLD: 7.33 [PH] (ref 7.35–7.45)
PH BLD: 7.46 [PH] (ref 7.35–7.45)
PH BLD: 7.49 [PH] (ref 7.35–7.45)
PH BLD: 7.5 [PH] (ref 7.35–7.45)
PHOSPHATE SERPL-MCNC: 2.6 MG/DL (ref 2.5–4.5)
PHOSPHATE SERPL-MCNC: 4.4 MG/DL (ref 2.5–4.5)
PHOSPHATE SERPL-MCNC: 5.5 MG/DL (ref 2.5–4.5)
PLATELET # BLD AUTO: 226 10E3/UL (ref 150–450)
PLATELET # BLD AUTO: 344 10E3/UL (ref 150–450)
PLATELET # BLD AUTO: 369 10E3/UL (ref 150–450)
PO2 BLD: 100 MM HG (ref 80–105)
PO2 BLD: 122 MM HG (ref 80–105)
PO2 BLD: 131 MM HG (ref 80–105)
PO2 BLD: 145 MM HG (ref 80–105)
PO2 BLD: 174 MM HG (ref 80–105)
PO2 BLD: 192 MM HG (ref 80–105)
POTASSIUM SERPL-SCNC: 3.9 MMOL/L (ref 3.4–5.3)
POTASSIUM SERPL-SCNC: 4 MMOL/L (ref 3.4–5.3)
POTASSIUM SERPL-SCNC: 4.4 MMOL/L (ref 3.4–5.3)
PROT SERPL-MCNC: 6.4 G/DL (ref 6.4–8.3)
RBC # BLD AUTO: 2.73 10E6/UL (ref 3.8–5.2)
RBC # BLD AUTO: 2.79 10E6/UL (ref 3.8–5.2)
RBC # BLD AUTO: 3 10E6/UL (ref 3.8–5.2)
SAO2 % BLDA: 97 % (ref 92–100)
SAO2 % BLDA: 97 % (ref 92–100)
SAO2 % BLDA: 98 % (ref 92–100)
SODIUM SERPL-SCNC: 132 MMOL/L (ref 135–145)
SODIUM SERPL-SCNC: 132 MMOL/L (ref 135–145)
SODIUM SERPL-SCNC: 133 MMOL/L (ref 135–145)
TRIGL SERPL-MCNC: 573 MG/DL
U PARVUM DNA SPEC QL NAA+PROBE: NOT DETECTED
U UREALYTICUM DNA SPEC QL NAA+PROBE: NOT DETECTED
UFH PPP CHRO-ACNC: 0.34 IU/ML
UFH PPP CHRO-ACNC: 0.36 IU/ML
WBC # BLD AUTO: 12.9 10E3/UL (ref 4–11)
WBC # BLD AUTO: 16 10E3/UL (ref 4–11)
WBC # BLD AUTO: 7.4 10E3/UL (ref 4–11)

## 2024-09-02 PROCEDURE — 71045 X-RAY EXAM CHEST 1 VIEW: CPT

## 2024-09-02 PROCEDURE — 258N000003 HC RX IP 258 OP 636: Performed by: STUDENT IN AN ORGANIZED HEALTH CARE EDUCATION/TRAINING PROGRAM

## 2024-09-02 PROCEDURE — 250N000013 HC RX MED GY IP 250 OP 250 PS 637: Performed by: SURGERY

## 2024-09-02 PROCEDURE — 999N000248 HC STATISTIC IV INSERT WITH US BY RN

## 2024-09-02 PROCEDURE — 94645 CONT INHLJ TX EACH ADDL HOUR: CPT

## 2024-09-02 PROCEDURE — 71045 X-RAY EXAM CHEST 1 VIEW: CPT | Mod: 26 | Performed by: RADIOLOGY

## 2024-09-02 PROCEDURE — 82805 BLOOD GASES W/O2 SATURATION: CPT

## 2024-09-02 PROCEDURE — 200N000002 HC R&B ICU UMMC

## 2024-09-02 PROCEDURE — 83735 ASSAY OF MAGNESIUM: CPT | Performed by: INTERNAL MEDICINE

## 2024-09-02 PROCEDURE — 90947 DIALYSIS REPEATED EVAL: CPT

## 2024-09-02 PROCEDURE — 82040 ASSAY OF SERUM ALBUMIN: CPT | Performed by: INTERNAL MEDICINE

## 2024-09-02 PROCEDURE — 85027 COMPLETE CBC AUTOMATED: CPT | Performed by: INTERNAL MEDICINE

## 2024-09-02 PROCEDURE — 99233 SBSQ HOSP IP/OBS HIGH 50: CPT | Performed by: INTERNAL MEDICINE

## 2024-09-02 PROCEDURE — 85520 HEPARIN ASSAY: CPT | Performed by: SURGERY

## 2024-09-02 PROCEDURE — 250N000009 HC RX 250: Performed by: SURGERY

## 2024-09-02 PROCEDURE — 250N000011 HC RX IP 250 OP 636

## 2024-09-02 PROCEDURE — 80053 COMPREHEN METABOLIC PANEL: CPT | Performed by: INTERNAL MEDICINE

## 2024-09-02 PROCEDURE — 250N000009 HC RX 250

## 2024-09-02 PROCEDURE — 258N000003 HC RX IP 258 OP 636

## 2024-09-02 PROCEDURE — 999N000157 HC STATISTIC RCP TIME EA 10 MIN

## 2024-09-02 PROCEDURE — 82330 ASSAY OF CALCIUM: CPT | Performed by: INTERNAL MEDICINE

## 2024-09-02 PROCEDURE — 250N000009 HC RX 250: Performed by: STUDENT IN AN ORGANIZED HEALTH CARE EDUCATION/TRAINING PROGRAM

## 2024-09-02 PROCEDURE — 82248 BILIRUBIN DIRECT: CPT | Performed by: INTERNAL MEDICINE

## 2024-09-02 PROCEDURE — 85014 HEMATOCRIT: CPT | Performed by: INTERNAL MEDICINE

## 2024-09-02 PROCEDURE — 99291 CRITICAL CARE FIRST HOUR: CPT | Mod: GC

## 2024-09-02 PROCEDURE — 250N000011 HC RX IP 250 OP 636: Performed by: INTERNAL MEDICINE

## 2024-09-02 PROCEDURE — 250N000013 HC RX MED GY IP 250 OP 250 PS 637: Performed by: PHYSICIAN ASSISTANT

## 2024-09-02 PROCEDURE — 84478 ASSAY OF TRIGLYCERIDES: CPT

## 2024-09-02 PROCEDURE — 85520 HEPARIN ASSAY: CPT | Performed by: INTERNAL MEDICINE

## 2024-09-02 PROCEDURE — 86140 C-REACTIVE PROTEIN: CPT

## 2024-09-02 PROCEDURE — 250N000009 HC RX 250: Performed by: INTERNAL MEDICINE

## 2024-09-02 PROCEDURE — 94003 VENT MGMT INPAT SUBQ DAY: CPT

## 2024-09-02 RX ORDER — VECURONIUM BROMIDE 1 MG/ML
10 INJECTION, POWDER, LYOPHILIZED, FOR SOLUTION INTRAVENOUS ONCE
Status: COMPLETED | OUTPATIENT
Start: 2024-09-02 | End: 2024-09-02

## 2024-09-02 RX ORDER — VECURONIUM BROMIDE 1 MG/ML
5 INJECTION, POWDER, LYOPHILIZED, FOR SOLUTION INTRAVENOUS ONCE
Status: COMPLETED | OUTPATIENT
Start: 2024-09-02 | End: 2024-09-02

## 2024-09-02 RX ORDER — NYSTATIN 100000/ML
500000 SUSPENSION, ORAL (FINAL DOSE FORM) ORAL 4 TIMES DAILY
Status: DISCONTINUED | OUTPATIENT
Start: 2024-09-02 | End: 2024-09-12

## 2024-09-02 RX ADMIN — MIDAZOLAM HYDROCHLORIDE 10 MG/HR: 1 INJECTION, SOLUTION INTRAVENOUS at 21:56

## 2024-09-02 RX ADMIN — VECURONIUM BROMIDE 5 MG: 10 INJECTION, POWDER, LYOPHILIZED, FOR SOLUTION INTRAVENOUS at 01:56

## 2024-09-02 RX ADMIN — INSULIN HUMAN 1.5 UNITS/HR: 1 INJECTION, SOLUTION INTRAVENOUS at 19:47

## 2024-09-02 RX ADMIN — CIPROFLOXACIN 400 MG: 400 INJECTION, SOLUTION INTRAVENOUS at 20:05

## 2024-09-02 RX ADMIN — CALCIUM CHLORIDE, MAGNESIUM CHLORIDE, SODIUM CHLORIDE, SODIUM BICARBONATE, POTASSIUM CHLORIDE AND SODIUM PHOSPHATE DIBASIC DIHYDRATE 12.5 ML/KG/HR: 3.68; 3.05; 6.34; 3.09; .314; .187 INJECTION INTRAVENOUS at 10:44

## 2024-09-02 RX ADMIN — CISATRACURIUM BESYLATE 6 MCG/KG/MIN: 10 INJECTION INTRAVENOUS at 19:47

## 2024-09-02 RX ADMIN — DEXAMETHASONE SODIUM PHOSPHATE 20 MG: 10 INJECTION, SOLUTION INTRAMUSCULAR; INTRAVENOUS at 08:21

## 2024-09-02 RX ADMIN — LEVALBUTEROL HYDROCHLORIDE 0.63 MG: 0.63 SOLUTION RESPIRATORY (INHALATION) at 12:30

## 2024-09-02 RX ADMIN — VECURONIUM BROMIDE 0.8 MCG/KG/MIN: 10 INJECTION, POWDER, LYOPHILIZED, FOR SOLUTION INTRAVENOUS at 02:19

## 2024-09-02 RX ADMIN — CIPROFLOXACIN 400 MG: 400 INJECTION, SOLUTION INTRAVENOUS at 03:17

## 2024-09-02 RX ADMIN — MIDAZOLAM HYDROCHLORIDE 12 MG/HR: 1 INJECTION, SOLUTION INTRAVENOUS at 03:55

## 2024-09-02 RX ADMIN — WHITE PETROLATUM 57.7 %-MINERAL OIL 31.9 % EYE OINTMENT: at 09:48

## 2024-09-02 RX ADMIN — LEVALBUTEROL HYDROCHLORIDE 0.63 MG: 0.63 SOLUTION RESPIRATORY (INHALATION) at 16:00

## 2024-09-02 RX ADMIN — Medication 40 MG: at 08:21

## 2024-09-02 RX ADMIN — CALCIUM CHLORIDE, MAGNESIUM CHLORIDE, SODIUM CHLORIDE, SODIUM BICARBONATE, POTASSIUM CHLORIDE AND SODIUM PHOSPHATE DIBASIC DIHYDRATE 12.5 ML/KG/HR: 3.68; 3.05; 6.34; 3.09; .314; .187 INJECTION INTRAVENOUS at 16:18

## 2024-09-02 RX ADMIN — LOPERAMIDE HYDROCHLORIDE 4 MG: 2 CAPSULE ORAL at 12:04

## 2024-09-02 RX ADMIN — Medication 10 MG: at 09:43

## 2024-09-02 RX ADMIN — IPRATROPIUM BROMIDE 0.5 MG: 0.5 SOLUTION RESPIRATORY (INHALATION) at 12:30

## 2024-09-02 RX ADMIN — CALCIUM CHLORIDE, MAGNESIUM CHLORIDE, SODIUM CHLORIDE, SODIUM BICARBONATE, POTASSIUM CHLORIDE AND SODIUM PHOSPHATE DIBASIC DIHYDRATE 12.5 ML/KG/HR: 3.68; 3.05; 6.34; 3.09; .314; .187 INJECTION INTRAVENOUS at 06:12

## 2024-09-02 RX ADMIN — Medication 200 MCG/HR: at 10:12

## 2024-09-02 RX ADMIN — PROPOFOL 60 MCG/KG/MIN: 10 INJECTION, EMULSION INTRAVENOUS at 11:16

## 2024-09-02 RX ADMIN — IPRATROPIUM BROMIDE 0.5 MG: 0.5 SOLUTION RESPIRATORY (INHALATION) at 16:00

## 2024-09-02 RX ADMIN — WHITE PETROLATUM 57.7 %-MINERAL OIL 31.9 % EYE OINTMENT: at 18:25

## 2024-09-02 RX ADMIN — CETIRIZINE HYDROCHLORIDE 10 MG: 10 TABLET, FILM COATED ORAL at 08:21

## 2024-09-02 RX ADMIN — PROPOFOL 60 MCG/KG/MIN: 10 INJECTION, EMULSION INTRAVENOUS at 14:14

## 2024-09-02 RX ADMIN — BUDESONIDE 1 MG: 1 INHALANT ORAL at 08:27

## 2024-09-02 RX ADMIN — Medication 175 MCG/HR: at 22:47

## 2024-09-02 RX ADMIN — PROPOFOL 50 MCG/KG/MIN: 10 INJECTION, EMULSION INTRAVENOUS at 21:13

## 2024-09-02 RX ADMIN — LOPERAMIDE HYDROCHLORIDE 4 MG: 2 CAPSULE ORAL at 18:25

## 2024-09-02 RX ADMIN — MIDAZOLAM HYDROCHLORIDE 10 MG/HR: 1 INJECTION, SOLUTION INTRAVENOUS at 12:50

## 2024-09-02 RX ADMIN — LOPERAMIDE HYDROCHLORIDE 4 MG: 2 CAPSULE ORAL at 05:43

## 2024-09-02 RX ADMIN — PROPOFOL 50 MCG/KG/MIN: 10 INJECTION, EMULSION INTRAVENOUS at 17:57

## 2024-09-02 RX ADMIN — CALCIUM CHLORIDE, MAGNESIUM CHLORIDE, SODIUM CHLORIDE, SODIUM BICARBONATE, POTASSIUM CHLORIDE AND SODIUM PHOSPHATE DIBASIC DIHYDRATE 12.5 ML/KG/HR: 3.68; 3.05; 6.34; 3.09; .314; .187 INJECTION INTRAVENOUS at 00:56

## 2024-09-02 RX ADMIN — LEVALBUTEROL HYDROCHLORIDE 0.63 MG: 0.63 SOLUTION RESPIRATORY (INHALATION) at 08:27

## 2024-09-02 RX ADMIN — Medication 1 TABLET: at 08:21

## 2024-09-02 RX ADMIN — EPOPROSTENOL 20 NG/KG/MIN: 1.5 INJECTION, POWDER, LYOPHILIZED, FOR SOLUTION INTRAVENOUS at 07:52

## 2024-09-02 RX ADMIN — Medication 60 ML: at 19:50

## 2024-09-02 RX ADMIN — IPRATROPIUM BROMIDE 0.5 MG: 0.5 SOLUTION RESPIRATORY (INHALATION) at 08:27

## 2024-09-02 RX ADMIN — Medication 1300 UNITS/HR: at 18:28

## 2024-09-02 RX ADMIN — PROPOFOL 65 MCG/KG/MIN: 10 INJECTION, EMULSION INTRAVENOUS at 01:12

## 2024-09-02 RX ADMIN — PROPOFOL 75 MCG/KG/MIN: 10 INJECTION, EMULSION INTRAVENOUS at 06:16

## 2024-09-02 RX ADMIN — CIPROFLOXACIN 400 MG: 400 INJECTION, SOLUTION INTRAVENOUS at 12:04

## 2024-09-02 RX ADMIN — WHITE PETROLATUM 57.7 %-MINERAL OIL 31.9 % EYE OINTMENT: at 02:02

## 2024-09-02 RX ADMIN — LEVOTHYROXINE SODIUM 25 MCG: 0.03 TABLET ORAL at 08:21

## 2024-09-02 RX ADMIN — CALCIUM CHLORIDE, MAGNESIUM CHLORIDE, SODIUM CHLORIDE, SODIUM BICARBONATE, POTASSIUM CHLORIDE AND SODIUM PHOSPHATE DIBASIC DIHYDRATE 12.5 ML/KG/HR: 3.68; 3.05; 6.34; 3.09; .314; .187 INJECTION INTRAVENOUS at 20:44

## 2024-09-02 RX ADMIN — CALCIUM CHLORIDE, MAGNESIUM CHLORIDE, SODIUM CHLORIDE, SODIUM BICARBONATE, POTASSIUM CHLORIDE AND SODIUM PHOSPHATE DIBASIC DIHYDRATE 12.5 ML/KG/HR: 3.68; 3.05; 6.34; 3.09; .314; .187 INJECTION INTRAVENOUS at 20:43

## 2024-09-02 RX ADMIN — PROPOFOL 60 MCG/KG/MIN: 10 INJECTION, EMULSION INTRAVENOUS at 08:35

## 2024-09-02 RX ADMIN — IPRATROPIUM BROMIDE 0.5 MG: 0.5 SOLUTION RESPIRATORY (INHALATION) at 19:17

## 2024-09-02 RX ADMIN — PROPOFOL 75 MCG/KG/MIN: 10 INJECTION, EMULSION INTRAVENOUS at 03:42

## 2024-09-02 RX ADMIN — TOBRAMYCIN 300 MG: 300 SOLUTION RESPIRATORY (INHALATION) at 08:29

## 2024-09-02 RX ADMIN — CISATRACURIUM BESYLATE 3 MCG/KG/MIN: 10 INJECTION INTRAVENOUS at 09:22

## 2024-09-02 RX ADMIN — LEVALBUTEROL HYDROCHLORIDE 0.63 MG: 0.63 SOLUTION RESPIRATORY (INHALATION) at 19:17

## 2024-09-02 RX ADMIN — MONTELUKAST 10 MG: 10 TABLET, FILM COATED ORAL at 22:10

## 2024-09-02 RX ADMIN — Medication 1300 UNITS/HR: at 03:39

## 2024-09-02 RX ADMIN — Medication 60 ML: at 08:24

## 2024-09-02 RX ADMIN — Medication 10 MG: at 10:18

## 2024-09-02 RX ADMIN — VECURONIUM BROMIDE 10 MG: 10 INJECTION, POWDER, LYOPHILIZED, FOR SOLUTION INTRAVENOUS at 03:12

## 2024-09-02 RX ADMIN — TOBRAMYCIN 300 MG: 300 SOLUTION RESPIRATORY (INHALATION) at 19:17

## 2024-09-02 ASSESSMENT — ACTIVITIES OF DAILY LIVING (ADL)
ADLS_ACUITY_SCORE: 65
ADLS_ACUITY_SCORE: 67
ADLS_ACUITY_SCORE: 63
ADLS_ACUITY_SCORE: 67
ADLS_ACUITY_SCORE: 65
ADLS_ACUITY_SCORE: 63
ADLS_ACUITY_SCORE: 69
ADLS_ACUITY_SCORE: 69
ADLS_ACUITY_SCORE: 65
ADLS_ACUITY_SCORE: 63
ADLS_ACUITY_SCORE: 67
ADLS_ACUITY_SCORE: 63
ADLS_ACUITY_SCORE: 69
ADLS_ACUITY_SCORE: 65
ADLS_ACUITY_SCORE: 69
ADLS_ACUITY_SCORE: 63
ADLS_ACUITY_SCORE: 65
ADLS_ACUITY_SCORE: 65
ADLS_ACUITY_SCORE: 63
ADLS_ACUITY_SCORE: 63
ADLS_ACUITY_SCORE: 65

## 2024-09-02 NOTE — PLAN OF CARE
ICU End of Shift Summary. See flowsheets for vital signs and detailed assessment.    Changes this shift: Pressors (Vasopressin and Levo) were restarted at the beginning of the shift due to a MAP in the 40s. The patient was proned at 2200 but did not synchronize with the ventilator despite being maxed out sedation. Despite switching from CMV to PC-AC mode and making multiple adjustments, as well as obtaining several ABGs, synchronization issues persisted. The patient was then paralyzed around 0100. Initially, 5 mg of Vecuronium was administered IV push before starting the paralytic, but this did not improve the situation. An additional 10 mg was given to address the vent dyssynchrony with improvement.RT and MICU settled on a PC-AC mode with an FiO2 of 55%, and a PEEP of 8. CRRT continued with  fluid removal rate set at 0 throughout the shift per MICU's orders due to patient's hemodynamic status.   Plan:  Trend ABGs, monitor vent settings and notify provider with changes.

## 2024-09-02 NOTE — PROGRESS NOTES
"Critical Care Services Progress Note:  I personally examined and evaluated the patient today. I formulated today s plan with the house staff team or resident(s) and agree with the findings and plan in the associated note (see separately attested resident note).   I have evaluated all laboratory values and imaging studies from the past 24 hours.  Summary of hospital course:  53 year old female initially transferred to [facility] on 8/8 for VV-ECMO.  She was diagnosed with ARDS from a suspected pneumonia. Decannulated on 8/18. Extubated on 8/19.  Reintubated on 8/29 for hypoxemia and now critically ill, proned and paralyzed  Overnight events/pertinent findings today:  Supine during the day yesterday. Proned overnight.  Cisatracurium stopped yesterday. Overnight while prone became dysynchronous and hypotensive.  Levo/vaso started, need to be paralyzed again.  Switched to volume control.     Data  /58 (BP Location: Right arm)   Pulse 109   Temp 99  F (37.2  C)   Resp 28   Ht 1.575 m (5' 2\")   Wt 76.2 kg (167 lb 15.9 oz)   SpO2 100%   BMI 30.73 kg/m    I/O net negative -823 mL    Pressure control Pi of 30, PEEP 8, tidal volumes ~300 mL    Trace LE edema    Na 133, K 4.0  CRP 74  WBC 12.9, Hgb 8.5, Plts 344    Sputum culture 8/29 with pseudomonas  BAL culture 8/30 with pseudomonas    Blood CMV PCR + 741    Chest CT - extensive GGO and consolidative changes  ECHO is normal    CXR yesterday while supine with improved aeration of right lung.  Dense LLL consolidation      Assessment/plan:    ARDS  CHACORTA on CRRT  Pseudomonas Pneumonia  Shock     Decompensated overnight off of neuromuscular blockade    Supine during the day. Prone at night  Epoprostenol nebulized  Cisatracurium  BIS monitoring  Net negative 0.5 L via CRRT  Ciprofloxacin  Nebulized LIZA  Switch back to volume control once supine  MAP goal 65, levo/vaso  Wean propofol down given triglycerides  Recheck CMV PCR from blood.        Rest per resident note.    I " spent a total of 55 minutes (excluding procedure time) personally providing and directing critical care services at the bedside and on the critical care unit for this patient.     Alec Shelley MD

## 2024-09-02 NOTE — PROGRESS NOTES
Nephrology Progress Note  09/02/2024         Assessment & Recommendations:   Mrs Flaherty is a 52 yo F w/ hx of Anxiety, depression, fibromyalgia, hypothyroidism, asthma, HLD, MURIEL on CPAP, expressive aphasia, and hypertension who was admitted to an OSH with community acquired pneumonia on 8/3 s/p intubation.  Found to have severe ARDS requiring paralysis and proning, transferred here on 8/8 for management and initiated on CKRT for severe acidemia in the setting of oligoanuric CHACORTA.  Started CRRT on 8/9 for volume management.     # anuric CHACORTA, suspected ATN in setting of shock, ARDS- continue CRRT with mild negative balance goal  # respiratory acidosis   Continue CRRT (with heparin dosed by Xa level protocol due to filter clotting daily when on fixed 500 units/hr heparin).   -Continue 4K baths, standard 25ml/kg/hr clearance.  -stopped post-filter bicarb gtt mid-day 8/30, but restarted due to acidosis now held again  - add heparin  -continue net negative goal of 0-50ml/hr on CRRT but suspect she is nearing her dry weight    # hypervolemia - much improved    # electrolytes stable  # Respiratory acidosis- improved, now has metabolic alkalosis which is improving now off bicarb post filter.      Recommendations were communicated to primary team via this note.     Emmanuelle Donato MD   Division of Nephrology and Hypertension  [employer] - /l?k â€¢ splik/  Vocera Web Console      Interval History :   Nursing and provider notes from last 24 hours reviewed.  In the last 24 hours Massiel Flaherty remains intubated.she had worsening with stopping paralytics, thus restarted. Had softer BPs and thus UF goal lowered.  Today she is positive     Review of Systems:   Unable to obtain due to intubation     Physical Exam:   I/O last 3 completed shifts:  In: 4088.32 [I.V.:2100.72; Other:27.6; NG/GT:740; IV Piggyback:500]  Out: 1935.2 [Other:1410.2; Stool:525]   /58 (BP Location: Right arm)   Pulse 93   Temp 98.6  F (37  C)   Resp 27   Ht  "1.575 m (5' 2\")   Wt 76.2 kg (167 lb 15.9 oz)   SpO2 98%   BMI 30.73 kg/m       GENERAL APPEARANCE: critically ill, intubated (again)  HEENT: MMM   PULM: lungs clear anteriorly   CV: regular rhythm, normal rate, no rub      -edema - 1+ LE edema bilat   GI: soft, ND  INTEGUMENT: no rash on exposed skin  NEURO: sedated   Access is LFV temp line 8/19.     Labs:   All labs reviewed by me  Electrolytes/Renal -   Recent Labs   Lab Test 09/02/24  1518 09/02/24  1415 09/02/24  1301 09/02/24  1208 09/02/24  1131 09/02/24  0502 09/02/24 0403 09/01/24 2205 09/01/24 2004   NA  --   --   --   --  132*  --  133*  --  136   POTASSIUM  --   --   --   --  3.9  --  4.0  --  4.5   CHLORIDE  --   --   --   --  100  --  98  --  99   CO2  --   --   --   --  20*  --  24  --  23   BUN  --   --   --   --  33.5*  --  33.4*  --  34.8*   CR  --   --   --   --  0.73  --  0.79  --  0.85   * 146* 174*   < > 163*   < > 137*  151*   < > 132*   QUAN  --   --   --   --  8.7*  --  8.6*  --  8.8   MAG  --   --   --   --  2.1  --  2.3  --  2.4*   PHOS  --   --   --   --  2.6  --  4.4  --  3.3    < > = values in this interval not displayed.       CBC -   Recent Labs   Lab Test 09/02/24 1131 09/02/24 0403 09/01/24 2004   WBC 7.4 12.9* 9.4   HGB 8.2* 8.5* 8.6*    344 329       LFTs -   Recent Labs   Lab Test 09/02/24  1131 09/02/24  0403 09/01/24  2004 09/01/24  1156 09/01/24  0324 08/31/24  1024 08/31/24  0341   ALKPHOS  --  156*  --   --  187*  --  223*   BILITOTAL  --  0.2  --   --  0.2  --  0.2   ALT  --  28  --   --  33  --  42   AST  --  28  --   --  32  --  55*   PROTTOTAL  --  6.4  --   --  6.7  --  6.8   ALBUMIN 3.2* 3.4* 3.5   < > 3.4*   < > 3.4*    < > = values in this interval not displayed.       Iron Panel - No lab results found.      Imaging:  All imaging studies reviewed by me.     Current Medications:  Current Facility-Administered Medications   Medication Dose Route Frequency Provider Last Rate Last Admin    " artificial tears ophthalmic ointment   Both Eyes Q8H Fuentes Fowler MD   Given at 09/02/24 0948    [Held by provider] atorvastatin (LIPITOR) tablet 10 mg  10 mg Oral or Feeding Tube Daily Francisco Welsh MD   10 mg at 08/29/24 0924    budesonide (PULMICORT) neb solution 1 mg  1 mg Nebulization Daily Andres Payne PA-C   1 mg at 09/02/24 0827    cetirizine (zyrTEC) tablet 10 mg  10 mg Oral or Feeding Tube Daily Barry Hatch MD   10 mg at 09/02/24 0821    ciprofloxacin (CIPRO) infusion 400 mg  400 mg Intravenous Q8H Fuentes Fowler MD   400 mg at 09/02/24 1204    dexAMETHasone PF (DECADRON) injection 20 mg  20 mg Intravenous Daily Mirtha Vogt APRN CNP   20 mg at 09/02/24 0821    Followed by    [START ON 9/4/2024] dexAMETHasone PF (DECADRON) injection 10 mg  10 mg Intravenous Daily Mirtha Vogt APRN CNP        [Held by provider] gabapentin (NEURONTIN) capsule 300 mg  300 mg Oral or Feeding Tube TID Barry Hatch MD   300 mg at 08/28/24 1959    ipratropium (ATROVENT) 0.02 % neb solution 0.5 mg  0.5 mg Nebulization 4x daily Barry Hatch MD   0.5 mg at 09/02/24 1230    And    levalbuterol (XOPENEX) neb solution 0.63 mg  0.63 mg Nebulization 4x Daily Barry Hatch MD   0.63 mg at 09/02/24 1230    levothyroxine (SYNTHROID/LEVOTHROID) tablet 25 mcg  25 mcg Per Feeding Tube QAM AC Giovanny Guidry PA-C   25 mcg at 09/02/24 0821    loperamide (IMODIUM) capsule 4 mg  4 mg Oral or Feeding Tube Q6H Barry Hatch MD   4 mg at 09/02/24 1204    montelukast (SINGULAIR) tablet 10 mg  10 mg Oral or Feeding Tube At Bedtime Barry Hatch MD   10 mg at 09/01/24 2103    multivitamin RENAL (RENAVITE RX/NEPHROVITE) tablet 1 tablet  1 tablet Oral or Feeding Tube Daily Her-Giovanny Spence PA-C   1 tablet at 09/02/24 0821    pantoprazole (PROTONIX) 2 mg/mL suspension 40 mg  40 mg Per Feeding Tube Barry Christine MD   40 mg at 09/02/24 0821    Prosource TF20 ENfit Compatibl EN LIQD  (PROSOURCE TF20) packet 60 mL  1 packet Per Feeding Tube BID Giovanny Guidry PA-C   60 mL at 09/02/24 0824    tobramycin (PF) (LIZA) neb solution 300 mg  300 mg Nebulization 2 times daily Gaudencio De Souza MD   300 mg at 09/02/24 0829     Current Facility-Administered Medications   Medication Dose Route Frequency Provider Last Rate Last Admin    cisatracurium (NIMBEX) 200 mg in D5W 100 mL infusion  3-10 mcg/kg/min (Ideal) Intravenous Continuous Fuentes Fowler MD 7.5 mL/hr at 09/02/24 1400 5 mcg/kg/min at 09/02/24 1400    dextrose 10% infusion   Intravenous Continuous PRN Gaudencio De Souza MD        dextrose 10% infusion   Intravenous Continuous PRN Giovanny Guidry PA-C        dialysate for CVVHD & CVVHDF (Phoxillum BK4/2.5)  12.5 mL/kg/hr CRRT Continuous Emmanuelle Donato MD 1,000 mL/hr at 09/02/24 1044 12.5 mL/kg/hr at 09/02/24 1044    epoprostenol (VELETRI) inhalation solution  5 ng/kg/min (Ideal) Nebulization Continuous Gaudencio De Souza MD 1.5 mL/hr at 09/02/24 1156 10 ng/kg/min at 09/02/24 1156    fentaNYL (SUBLIMAZE) infusion   mcg/hr Intravenous Continuous Fuentes Fowler MD 4 mL/hr at 09/02/24 1400 200 mcg/hr at 09/02/24 1400    heparin (porcine) 20,000 units in 20 mL ANTICOAGULANT infusion (syringe from pharmacy)  500-1,500 Units/hr CRRT Continuous Emmanuelle Donato MD 1.3 mL/hr at 09/02/24 1300 1,300 Units/hr at 09/02/24 1300    insulin regular (MYXREDLIN) 1 unit/mL infusion  0-24 Units/hr Intravenous Continuous Gaudencio De Souza MD 3 mL/hr at 09/02/24 1400 3 Units/hr at 09/02/24 1400    propofol (DIPRIVAN) infusion  5-75 mcg/kg/min (Dosing Weight) Intravenous Continuous Fuentes Fowler MD 26.7 mL/hr at 09/02/24 1434 50 mcg/kg/min at 09/02/24 1434    And    Medication Instruction   Does not apply Continuous PRN Fuentes Fowler MD        midazolam (VERSED) 100 mg/100 mL NS infusion - ADULT  1-12 mg/hr Intravenous Continuous Carlos Cerna MD 10 mL/hr at 09/02/24 1400 10 mg/hr at 09/02/24 1400     norepinephrine (LEVOPHED) 16 mg in  mL infusion MAX CONC CENTRAL LINE  0.01-0.6 mcg/kg/min (Dosing Weight) Intravenous Continuous Elier Hutchins MD 5 mL/hr at 09/02/24 1400 0.06 mcg/kg/min at 09/02/24 1400    POST-filter replacement solution for CVVHD & CVVHDF (Phoxillum BK4/2.5)   CRRT Continuous Emmanuelle Donato  mL/hr at 09/01/24 1322 New Bag at 09/01/24 1322    PRE-filter replacement solution for CVVHD & CVVHDF (Phoxillum BK4/2.5)  12.5 mL/kg/hr CRRT Continuous Emmanuelle Donato MD 1,000 mL/hr at 09/02/24 1044 12.5 mL/kg/hr at 09/02/24 1044    vasopressin 1 unit/mL MAX Conc (PITRESSIN) infusion  2.4 Units/hr Intravenous Continuous Kike Ashby MD   Stopped at 09/01/24 2007     Emmanuelle Donato MD

## 2024-09-02 NOTE — PROGRESS NOTES
MEDICAL ICU PROGRESS NOTE  09/02/2024      Date of Service (when I saw the patient): 09/02/2024    ASSESSMENT: Massiel Flaherty is a 53 year old female with PMH Anxiety/depression, fibromyalgia, c/f nonepileptic seizures not on AEDs, hypothyroidism, asthma, HLD, MURIEL on CPAP, expressive aphasia, hypertension  who was admitted on 8/3/2024 for fatigue, fever and dyspnea and intubated 8/6/24 at OSH for ARDS, proned and paralyzed without improvement. Transferred to [facility] 8/8/24, hypoxic with high plateaus/peaks and placed on VV ECMO and CRRT. Decannulated from VV ECMO 8/18 and transferred to MICU 8/26/24 for ongoing management. Extubated 8/27 then reintubated 8/29 iso worsening AHRF and pseudomonas pneumonia.      CHANGES and MAJOR THINGS TODAY:   - Attempted paralysis holiday but became desynchronous with the vent so restarted on paralytics and increased sedation needs ON  - Needed to restart IV pressors (norepinephrine and vasopressin) ON when proning and to tolerate increase in sedation, weaning off this AM  - CRP continues to downtrend, CMV PCR returned positive yesterday, holding off on treating given CRP trend though if CMV PCR levels rise will start gancyclovir  - Supine then likely re-prone again later today, attempt to decrease sedation needs  - More HTN so will try pulling off more fluid on CRRT with goal net negative 500-1L, but iff continues to have HTN > 180 will start nicardipine drip given hypotensive episodes in evening     Neuro:  # Pain and sedation  # Acute pain  # Sedation and analgesia for vent compliance   # Concern for ICU delirium   # Hx Fibromyalgia   # Hx myalgic encephalomyelitis   # Hx anxiety/depression  - Monitor neurological status. Delirium preventions and precautions.   - Pain: Scheduled: tylenol, oxycodone 5mg q 6hr (decreased from q 4hr) held while sedated  PRN: tylenol, oxycodone  - Sedation plan: fentanyl, midazolam, and propofol   - RASS goal -4 to -5, BIS goal 30-40  -  Gabapentin 300mg TID held while sedated  - Seroquel 25mg BID PRN held while sedated  - PTA Venlafaxine and Amitriptyline discontinued while sedated     # Hx spells with staring and BUE posturing previously described as nonepileptic seizures   # Hx of GTC seizures and myoclonic epilepsy, weaned off AEDs   # Diffuse cerebral edema - improved  - Sodium goals liberalized to 140-145  - 8/21: MRI Brain: no acute intracranial pathology. No findings to suggest anoxic brain injury.      Pulmonary:  # Acute hypoxic respiratory failure c/b ARDS   # Pseudomonas pneumonia  # s/p VV ECMO 8/8 - 8/18   # Hx CAP  # Asthma  # MURIEL on home CPAP   FiO2 (%): 55 %, Resp: 28, Inspiratory Pressure (cm H2O) (Drager Jessie): 30, Vent Mode: (S) PCV Plus assist, Resp Rate (Set): 28 breaths/min, Tidal Volume (Set, mL): 0 mL (pt achieving volumes ~400s), PEEP (cm H2O): 8 cmH2O, Resp Rate (Set): 28 breaths/min, Tidal Volume (Set, mL): 0 mL (pt achieving volumes ~400s), PEEP (cm H2O): 8 cmH2O    PFT dated 9/8/2020 demonstrated normal spirometry with mild diffusion impairment and mild restriction (FEV1/FVC 80%, FEV1 2.59, DLCO 40%). CT abdomen chest 3/9/2020 demonstrated scarring and fibrosis bilaterally which correlated with the decreased DLCO. The patient also has a history of MURIEL on CPAP therapy as currently managed by her provider in [place]. Unclear what triggered ARDS or why she has had such severe hospital course, further investigated ILD but results all unremarkable. Extubated 8/27, reintubated 8/29 for worsening O2 demands and diffuse opacities iso new/recurrent psuedomonas pneumonia. Bronch with BAL 8/30, pseudomonas growing as below.   - ILD w/u aldolase, EVELIO, RF, CCP, ANCA, MPO, SSA Ro and La, Scleroderma antibody, Radha 1,  hypersensity pneumonitis, IGG subclasses,  aspergillus, blastomyces, histoplasma neg  - SpO2 goals >92%, PaO2 goals >60   - PTA singular at bedtime if able  - Scheduled pulmicort, and duonebs   - Lung protective  ventilation strategies given ARDS  - Methylpred 125mg q6H x 24h 8/30 --> transition to 20 mg dex x 5 days, followed by 10mg x 5 days  - Epoprostenol at 20 nebulized  - Wean vent as able     Cardiovascular:  # Hyperlipidemia  # Hypotension  # Hx HTN  - MAP goal >65, SBP goals >110  - NE and Vasopressin for pressor support, wean as able, ok to increase while removing volume via CRRT  - Nicardipine for persistent SBP > 180s  - Restarted PTA atorvastatin  - PTA clonidine held while sedated  - Lower extremity ultrasound without vascular pathology, no hematoma or clots   - Monitor discoloration of extremities for necrosis  - Monitoring Hypertension     Sinus Tachycardia  Likely 2/2 fluid removal and sepsis as above     GI/Nutrition:  # Moderate protein calorie malnutrition  # Non-infectious Diarrhea  # GERD   - Protonix (home medication)   - Nutrition consulted, appreciate recs  - Diet: TF via NJ, assess swallow after sedation weaned, failed previous swallow study  - Hold bowel regimen d/t loose stools  - Change suspension meds to other form as able  - Continue scheduled multivitamin, banatrol, prosource packet, and loperamide  - Rectal tube, continues with high output. Keep today.      Renal/Fluids/Electrolytes:  # Acute kidney injury  # Renal failure  Baseline Cr approx 0.6. Pt was anuric here but now making some urine, likely prerenal due to shock.  - Continue CRRT; fluid goal net negative 500 for the day  - Heparinized CRRT circuit, low intensity protocol. Last clotted 8/24 PM  - Post circuit bicarb running to compensate for respiratory acidosis due to ARDS and lung protective ventilation strategies (discontinued 9/1)  - Strict I&Os   - Bladder scan q shift  - Avoid nephrotoxins as able      Endocrine:  # Steroid and stress induced hyperglycemia  # Hypothyroidism   - Goal to keep BG < 180 for optimal wound healing.   - with rising blood glucose due to steroids started insulin drip 8/30  - Continue PTA synthroid      ID:  # Leukocytosis  # Psuedomonas pneumonia  # Hx CAP  Respiratory cx 8/29 pseudomonas pneumonia. Intermediate susceptability to meropenem, more significant sensitivities to ciprofloxacin  - Continue to monitor fever curve and inflammatory markers as appropriate  - ID consulted, appreciate recs.   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   - if trend continues to rise will start ganciclovir     # HSV-1  Mouth sores present, which could have viral etiology. 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 Monitor and trend.   - Heparinize CRRT circuit    - CTAP 8/30 due to acute hemoglobin drop requiring transfusion of 2 x 1 unit of blood 12 hours apart. Negative for acute bleed     Musculoskeletal/Rheum:  # Alopecia  - Hold PTA hydroxychloroquine      # Weakness and deconditioning of critical illness  # Left ankle injury pre-hospitalization    - Physical and occupational therapy when able.      Skin:  # BLE scattered ecchymosis  # Left toe duskiness   - Bilateral lower leg ecchymosis   - WOC consult   - Ecchymosis to left foot, most noticeable to 3rd and 4th toes     General Cares/Prophylaxis:    DVT Prophylaxis: Heparin through CRRT circuit  GI Prophylaxis: PPI  Restraints: no  Code Status: DNAR/OK to intubate     Lines/tubes/drains:  - ETT (8/29)  - NJ (8/9)  - LIJ CVC (8/8)  - Radial a-line (8/29)  - PIV x3  - Rectal tube (8/17)  - Tunneled HD line (8/23)     Disposition:  - Medical ICU     Patient seen and findings/plan discussed with medical ICU staff, Dr. Shelley.    Gaudencio De Souza  "MD-PhD    Clinically Significant Risk Factors          # Hypocalcemia: Lowest Ca = 8.6 mg/dL in last 2 days, will monitor and replace as appropriate     # Hypoalbuminemia: Lowest albumin = 2.2 g/dL at 8/10/2024  9:47 AM, will monitor as appropriate               # Obesity: Estimated body mass index is 30.73 kg/m  as calculated from the following:    Height as of this encounter: 1.575 m (5' 2\").    Weight as of this encounter: 76.2 kg (167 lb 15.9 oz).   # Moderate Malnutrition: based on nutrition assessment      # Financial/Environmental Concerns: none              Code Status: No CPR- Pre-arrest intubation OK           ====================================  INTERVAL HISTORY:   Desynchronous with vent overnight, restarted paralysis and transitioned to PC. Significantly rise in sedation needs but attempting to come down this morning. Began pulling low tidal volumes this morning on PC so transitioned back to VC.     OBJECTIVE:   1. VITAL SIGNS:   Temp:  [97  F (36.1  C)-99.5  F (37.5  C)] 97.7  F (36.5  C)  Pulse:  [] 95  Resp:  [11-38] 28  MAP:  [48 mmHg-133 mmHg] 73 mmHg  Arterial Line BP: ()/(38-90) 100/56  FiO2 (%):  [40 %-55 %] 55 %  SpO2:  [87 %-100 %] 100 %  FiO2 (%): 55 %, Resp: 28, Inspiratory Pressure (cm H2O) (Drager Jessie): 30, Vent Mode: (S) PCV Plus assist, Resp Rate (Set): 28 breaths/min, Tidal Volume (Set, mL): 0 mL (pt achieving volumes ~400s), PEEP (cm H2O): 8 cmH2O, Resp Rate (Set): 28 breaths/min, Tidal Volume (Set, mL): 0 mL (pt achieving volumes ~400s), PEEP (cm H2O): 8 cmH2O  2. INTAKE/ OUTPUT:   I/O last 3 completed shifts:  In: 4212.05 [I.V.:2204.55; Other:27.5; NG/GT:760; IV Piggyback:500]  Out: 3704 [Urine:200; Other:3004; Stool:500]    3. PHYSICAL EXAMINATION:  General: Intubated, sedated, proned  HEENT: Normocephalic, atraumatic, BIS leads in place, eyes with ointment dressing  Neuro: RASS -4-5, a refelexic  Pulm/Resp: Bilateral breath sounds audible with diffuse coarse breath " sounds, small cuff leak appreciated around ETT  CV: RRR, s1/2 normal, no murmur  Abdomen: Soft, non-distended, non-tender examined from back  : deferred   Incisions/Skin: lower leg 1+ edema with ecchymosis present and scattered. Some bluish discoloration of the finger tips, capillary refill normal    4. LABS:   Arterial Blood Gases   Recent Labs   Lab 09/02/24  0611 09/02/24  0403 09/02/24  0153 09/01/24  2312   PH 7.28* 7.27* 7.33* 7.33*   PCO2 55* 56* 50* 50*   PO2 174* 192* 145* 68*   HCO3 26 26 26 26     Complete Blood Count   Recent Labs   Lab 09/02/24  0403 09/01/24 2004 09/01/24 1156 09/01/24  0324   WBC 12.9* 9.4 10.0 10.6   HGB 8.5* 8.6* 9.2* 8.6*    329 296 275     Basic Metabolic Panel  Recent Labs   Lab 09/02/24  0657 09/02/24  0609 09/02/24  0502 09/02/24  0403 09/01/24  2205 09/01/24 2004 09/01/24  1201 09/01/24  1156 09/01/24  0420 09/01/24  0324   NA  --   --   --  133*  --  136  --  134*  --  138   POTASSIUM  --   --   --  4.0  --  4.5  --  3.7  --  3.7   CHLORIDE  --   --   --  98  --  99  --  93*  --  93*   CO2  --   --   --  24  --  23  --  28  --  29   BUN  --   --   --  33.4*  --  34.8*  --  33.3*  --  30.0*   CR  --   --   --  0.79  --  0.85  --  0.74  --  0.79   * 114* 128* 137*  151*   < > 132*   < > 173*   < > 164*    < > = values in this interval not displayed.     Liver Function Tests  Recent Labs   Lab 09/02/24  0403 09/01/24 2004 09/01/24  1156 09/01/24  0324 08/31/24  1024 08/31/24  0341 08/30/24 2016 08/30/24  1045 08/30/24  0334   AST 28  --   --  32  --  55*  --   --  100*   ALT 28  --   --  33  --  42  --   --  50   ALKPHOS 156*  --   --  187*  --  223*  --   --  182*   BILITOTAL 0.2  --   --  0.2  --  0.2  --   --  0.5   ALBUMIN 3.4* 3.5 3.7 3.4*   < > 3.4*   < > 2.9* 3.3*   INR  --   --   --   --   --   --   --  1.14  --     < > = values in this interval not displayed.     Coagulation Profile  Recent Labs   Lab 08/30/24  1045   INR 1.14   PTT 56*       5.  RADIOLOGY:   Recent Results (from the past 24 hour(s))   XR Chest Port 1 View    Narrative    Exam: XR CHEST PORT 1 VIEW, 9/1/2024 1:21 PM    Comparison: CT 8/30/2024, chest x-ray 8/29/2024    History: ARDS trend when supine    Findings:  Single view of the chest. Esophageal temperature probe projects over  the mid thoracic esophagus. Endotracheal tube tip projects over the  upper thoracic trachea. Right IJ CVC tip attacks over the right  atrium. Feeding tube courses below the field-of-view. Trachea is  midline. Cardiomediastinal silhouette is within normal limits. Stable  to minimally decreased bilateral mixed interstitial and airspace  opacities. No pneumothorax or pleural effusion. The visualized upper  abdomen is unremarkable. No acute osseous abnormalities.      Impression    Impression: Stable to minimally decreased bilateral mixed interstitial  and airspace opacities compatible with ongoing ARDS.    I have personally reviewed the examination and initial interpretation  and I agree with the findings.    PILAR SAHU MD         SYSTEM ID:  [id number]

## 2024-09-02 NOTE — PLAN OF CARE
ICU End of Shift Summary. See flowsheets for vital signs and detailed assessment.    Changes this shift: BIS ranging from 20-40, weaned prop to 50 and versed to 10. Transitioned vec to nimbex; patient briefly began to cough, over breathe the vent, and belly breathe so nimbex was increased to 6; during this time, assessed neuro status and patient is RASS -5, unarousable to sternal rub underneath paralytic. Patient is 0/4 twitches, titrating paralytic to vent synchrony per MICU attending.     Patient was supine from 2622-5993. CXR completed. PEEP weaned to 6. FiO2 weaned to 40%. Flolan weaned off. Trending ABGs with changes. Last P/F ratio was 305 after patient was re-proned.    Blood pressure is labile, norepinephrine between 0 and 0.06 today. Potentially positional left internal jugular line that could be causing swings; discussed with team, will monitor. +2 edema throughout. Unable to meet CRRT goal of 0-50. Continue heparin through CRRT circuit.     TF at goal. Rectal tube in place-loose brown stool. Insulin drip algorithm 3.     No urine output; bladder scan at 1500 for 71ml.    Family at bedside, pleasant and supportive; updated by MICU team and RN.     Plan: remain supine overnight. Family care conference later this week.           Goal Outcome Evaluation:      Plan of Care Reviewed With: parent, child    Overall Patient Progress: improving    Outcome Evaluation: Weaned flolan, peep, fiO2, and levophed.

## 2024-09-02 NOTE — PROGRESS NOTES
CRRT STATUS NOTE    DATA:  Time:  6:00 AM  Pressures WNL:  YES  Filter Status:  WDL    Problems Reported/Alarms Noted:  High return pressure alarm with head turn; resolved with repositioning.     Supplies Present:  YES    ASSESSMENT:  Patient Net Fluid Balance: Net -823 ml at midnight. Net +979 ml @ 0600.  Vitals: T 97.7, HR 96, RR 16, /56, MAP 76.  Labs: K 4.0, Mg 2.3, Phos 4.4, iCa 4.8, Hgb 8.5, Plt 344   Goals of Therapy: 0-50 ml/hr, if BP decreasing, aim more toward I=O    INTERVENTIONS:   None.    PLAN:  Continue to monitor circuit daily and change set q72 hours or PRN for clotting/clogging. Please call CRRT RN with any quesitons/problems.

## 2024-09-02 NOTE — PROGRESS NOTES
CRRT STATUS NOTE    DATA:  Time:  1745  Pressures WNL:  YES  Filter Status:  WDL    Problems Reported/Alarms Noted:  none noted during shift    Supplies Present:  YES    ASSESSMENT:  Patient Net Fluid Balance:  + 530 ml since midnight    Vital Signs:  Temp: 99  F (37.2  C) Temp src: Esophageal BP: 137/80 Pulse: 114   Resp: 24 SpO2: 99 % O2 Device: Mechanical Ventilator            Labs:   Recent Labs   Lab 09/02/24  1610 09/02/24  1518 09/02/24  1415 09/02/24  1208 09/02/24  1131 09/02/24  0502 09/02/24  0403 09/01/24  2205 09/01/24  2004 09/01/24  0420 09/01/24  0324 08/30/24  1234 08/30/24  1045   WBC  --   --   --   --  7.4  --  12.9*  --  9.4   < > 10.6   < > 13.1*   HGB  --   --   --   --  8.2*  --  8.5*  --  8.6*   < > 8.6*   < > 6.5*   MCV  --   --   --   --  96  --  100  --  99   < > 98   < > 99   PLT  --   --   --   --  226  --  344  --  329   < > 275   < > 174   INR  --   --   --   --   --   --   --   --   --   --   --   --  1.14   NA  --   --   --   --  132*  --  133*  --  136   < > 138   < > 140   POTASSIUM  --   --   --   --  3.9  --  4.0  --  4.5   < > 3.7   < > 3.8   CHLORIDE  --   --   --   --  100  --  98  --  99   < > 93*   < > 105   CO2  --   --   --   --  20*  --  24  --  23   < > 29   < > 25   BUN  --   --   --   --  33.5*  --  33.4*  --  34.8*   < > 30.0*   < > 19.1   CR  --   --   --   --  0.73  --  0.79  --  0.85   < > 0.79   < > 0.79   ANIONGAP  --   --   --   --  12  --  11  --  14   < > 16*   < > 10   QUAN  --   --   --   --  8.7*  --  8.6*  --  8.8   < > 8.8   < > 6.9*   * 148* 146*   < > 163*   < > 137*  151*   < > 132*   < > 164*   < > 119*   ALBUMIN  --   --   --   --  3.2*  --  3.4*  --  3.5   < > 3.4*   < > 2.9*   PROTTOTAL  --   --   --   --   --   --  6.4  --   --   --  6.7   < >  --    BILITOTAL  --   --   --   --   --   --  0.2  --   --   --  0.2   < >  --    ALKPHOS  --   --   --   --   --   --  156*  --   --   --  187*   < >  --    ALT  --   --   --   --   --   --  28  --    --   --  33   < >  --    AST  --   --   --   --   --   --  28  --   --   --  32   < >  --     < > = values in this interval not displayed.                  Goals of Therapy:  meeting goals    INTERVENTIONS:   None needed during shift    PLAN:  Continue CRRT as per MD order. Notify CRRT Resource RN with any questions or concerns.

## 2024-09-02 NOTE — CONSULTS
Care Management Follow Up    Length of Stay (days): 25    Expected Discharge Date:       Concerns to be Addressed: discharge planning     Patient plan of care discussed at interdisciplinary rounds: Yes    Anticipated Discharge Disposition: Home, Skilled Nursing Facility, Transitional Care              Anticipated Discharge Services: Home Care, Housekeeping/[employer]  Anticipated Discharge DME: None    Patient/family educated on Medicare website which has current facility and service quality ratings: no  Education Provided on the Discharge Plan: No  Patient/Family in Agreement with the Plan: yes    Referrals Placed by CM/SW:    Private pay costs discussed: Not applicable    Discussed  Partnership in Safe Discharge Planning  document with patient/family: No     Handoff Completed: No, handoff not indicated or clinically appropriate    Additional Information:  Family would like a care conference this week as they have returned to [place]. They are requesting a call-in options.    NOK: young adult son (22), Mom and 2 siblings (SD). Aunt and cousin (Local)    Next Steps: Care management will work on getting this care conference scheduled after the holiday.     Patrick Souza  4C/4E ICU        Social Work and Care Management Department       SEARCHABLE in [facility] - search SOCIAL WORK       [facility] (0800 - 1630) Saturday and Sunday     Units: 4A Vocera, 4C Vocera, & 4E Vocera        Units: 5A [id number] Vocera, 5A 5102-6175 Vocera , BMT SW 1 BMT SW 2, BMT SW 3 & BMT SW 4  5C Off Service 5401 - 5416  5C Off Service [id number]     Units: 6A Vocera & 6B Vocera      Units: 6C Vocera     Units: 7A Vocera & 7B Vocera      Units: 7C Med Surg 7401 thru 7418 and 7C Med Surg 7502 thru 7521      Unit: [facility] ED Vocera & [facility] Obs Vocera     [facility] [place] ([phone]) Saturday and Sunday      Units: 5 Ortho Vocera, 5 Med Surg Vocera & WB ED Vocera     Units: 6 Med Surg Vocera, 8 Med Surg Vocera, & 10 ICU Vocera       After hours Vocera [place] and After Hours Vocera [place]     Saturday & Sunday (1630 - 0000)    Mon-Fri ([phone])     [facility] Recognized Holidays  ([phone])    Units: ALL   - see above VOCERA links to units and after hours

## 2024-09-03 ENCOUNTER — APPOINTMENT (OUTPATIENT)
Dept: GENERAL RADIOLOGY | Facility: CLINIC | Age: 54
DRG: 003 | End: 2024-09-03
Attending: INTERNAL MEDICINE
Payer: MEDICAID

## 2024-09-03 LAB
ALBUMIN SERPL BCG-MCNC: 3.5 G/DL (ref 3.5–5.2)
ALBUMIN SERPL BCG-MCNC: 3.5 G/DL (ref 3.5–5.2)
ALBUMIN SERPL BCG-MCNC: 3.6 G/DL (ref 3.5–5.2)
ALLEN'S TEST: ABNORMAL
ALP SERPL-CCNC: 148 U/L (ref 40–150)
ALT SERPL W P-5'-P-CCNC: 20 U/L (ref 0–50)
ANION GAP SERPL CALCULATED.3IONS-SCNC: 11 MMOL/L (ref 7–15)
ANION GAP SERPL CALCULATED.3IONS-SCNC: 12 MMOL/L (ref 7–15)
ANION GAP SERPL CALCULATED.3IONS-SCNC: 14 MMOL/L (ref 7–15)
AST SERPL W P-5'-P-CCNC: 33 U/L (ref 0–45)
BACTERIA BLD CULT: NO GROWTH
BASE EXCESS BLDA CALC-SCNC: -1.5 MMOL/L (ref -3–3)
BASE EXCESS BLDA CALC-SCNC: -1.6 MMOL/L (ref -3–3)
BASE EXCESS BLDA CALC-SCNC: -1.7 MMOL/L (ref -3–3)
BASE EXCESS BLDA CALC-SCNC: -1.9 MMOL/L (ref -3–3)
BASE EXCESS BLDA CALC-SCNC: -2.1 MMOL/L (ref -3–3)
BASE EXCESS BLDA CALC-SCNC: -2.8 MMOL/L (ref -3–3)
BASE EXCESS BLDA CALC-SCNC: -4.2 MMOL/L (ref -3–3)
BILIRUB DIRECT SERPL-MCNC: <0.2 MG/DL (ref 0–0.3)
BILIRUB SERPL-MCNC: 0.2 MG/DL
BUN SERPL-MCNC: 29.3 MG/DL (ref 6–20)
BUN SERPL-MCNC: 33.1 MG/DL (ref 6–20)
BUN SERPL-MCNC: 33.5 MG/DL (ref 6–20)
CA-I BLD-MCNC: 4.7 MG/DL (ref 4.4–5.2)
CA-I BLD-MCNC: 4.8 MG/DL (ref 4.4–5.2)
CA-I BLD-MCNC: 4.9 MG/DL (ref 4.4–5.2)
CALCIUM SERPL-MCNC: 8.7 MG/DL (ref 8.8–10.4)
CALCIUM SERPL-MCNC: 8.9 MG/DL (ref 8.8–10.4)
CALCIUM SERPL-MCNC: 9 MG/DL (ref 8.8–10.4)
CHLORIDE SERPL-SCNC: 99 MMOL/L (ref 98–107)
COHGB MFR BLD: 97.3 % (ref 96–97)
COHGB MFR BLD: 97.7 % (ref 96–97)
COHGB MFR BLD: 98.6 % (ref 96–97)
COHGB MFR BLD: 98.8 % (ref 96–97)
COHGB MFR BLD: 99.2 % (ref 96–97)
COHGB MFR BLD: 99.4 % (ref 96–97)
COHGB MFR BLD: 99.6 % (ref 96–97)
CREAT SERPL-MCNC: 0.74 MG/DL (ref 0.51–0.95)
CREAT SERPL-MCNC: 0.74 MG/DL (ref 0.51–0.95)
CREAT SERPL-MCNC: 0.78 MG/DL (ref 0.51–0.95)
CRP SERPL-MCNC: 33.6 MG/L
EGFRCR SERPLBLD CKD-EPI 2021: 90 ML/MIN/1.73M2
EGFRCR SERPLBLD CKD-EPI 2021: >90 ML/MIN/1.73M2
EGFRCR SERPLBLD CKD-EPI 2021: >90 ML/MIN/1.73M2
ERYTHROCYTE [DISTWIDTH] IN BLOOD BY AUTOMATED COUNT: 18.3 % (ref 10–15)
ERYTHROCYTE [DISTWIDTH] IN BLOOD BY AUTOMATED COUNT: 18.4 % (ref 10–15)
GLUCOSE BLDC GLUCOMTR-MCNC: 103 MG/DL (ref 70–99)
GLUCOSE BLDC GLUCOMTR-MCNC: 106 MG/DL (ref 70–99)
GLUCOSE BLDC GLUCOMTR-MCNC: 110 MG/DL (ref 70–99)
GLUCOSE BLDC GLUCOMTR-MCNC: 120 MG/DL (ref 70–99)
GLUCOSE BLDC GLUCOMTR-MCNC: 122 MG/DL (ref 70–99)
GLUCOSE BLDC GLUCOMTR-MCNC: 124 MG/DL (ref 70–99)
GLUCOSE BLDC GLUCOMTR-MCNC: 125 MG/DL (ref 70–99)
GLUCOSE BLDC GLUCOMTR-MCNC: 126 MG/DL (ref 70–99)
GLUCOSE BLDC GLUCOMTR-MCNC: 126 MG/DL (ref 70–99)
GLUCOSE BLDC GLUCOMTR-MCNC: 130 MG/DL (ref 70–99)
GLUCOSE BLDC GLUCOMTR-MCNC: 137 MG/DL (ref 70–99)
GLUCOSE BLDC GLUCOMTR-MCNC: 141 MG/DL (ref 70–99)
GLUCOSE BLDC GLUCOMTR-MCNC: 146 MG/DL (ref 70–99)
GLUCOSE BLDC GLUCOMTR-MCNC: 147 MG/DL (ref 70–99)
GLUCOSE BLDC GLUCOMTR-MCNC: 148 MG/DL (ref 70–99)
GLUCOSE BLDC GLUCOMTR-MCNC: 149 MG/DL (ref 70–99)
GLUCOSE BLDC GLUCOMTR-MCNC: 151 MG/DL (ref 70–99)
GLUCOSE BLDC GLUCOMTR-MCNC: 151 MG/DL (ref 70–99)
GLUCOSE BLDC GLUCOMTR-MCNC: 152 MG/DL (ref 70–99)
GLUCOSE BLDC GLUCOMTR-MCNC: 172 MG/DL (ref 70–99)
GLUCOSE BLDC GLUCOMTR-MCNC: 180 MG/DL (ref 70–99)
GLUCOSE BLDC GLUCOMTR-MCNC: 182 MG/DL (ref 70–99)
GLUCOSE SERPL-MCNC: 116 MG/DL (ref 70–99)
GLUCOSE SERPL-MCNC: 166 MG/DL (ref 70–99)
GLUCOSE SERPL-MCNC: 197 MG/DL (ref 70–99)
HCO3 BLD-SCNC: 23 MMOL/L (ref 21–28)
HCO3 BLD-SCNC: 25 MMOL/L (ref 21–28)
HCO3 BLD-SCNC: 26 MMOL/L (ref 21–28)
HCO3 BLD-SCNC: 26 MMOL/L (ref 21–28)
HCO3 BLD-SCNC: 27 MMOL/L (ref 21–28)
HCO3 SERPL-SCNC: 19 MMOL/L (ref 22–29)
HCO3 SERPL-SCNC: 22 MMOL/L (ref 22–29)
HCO3 SERPL-SCNC: 24 MMOL/L (ref 22–29)
HCT VFR BLD AUTO: 29 % (ref 35–47)
HCT VFR BLD AUTO: 30.1 % (ref 35–47)
HGB BLD-MCNC: 8.7 G/DL (ref 11.7–15.7)
HGB BLD-MCNC: 9.2 G/DL (ref 11.7–15.7)
Lab: NORMAL
MAGNESIUM SERPL-MCNC: 2.4 MG/DL (ref 1.7–2.3)
MAGNESIUM SERPL-MCNC: 2.5 MG/DL (ref 1.7–2.3)
MAGNESIUM SERPL-MCNC: 2.5 MG/DL (ref 1.7–2.3)
MCH RBC QN AUTO: 30.3 PG (ref 26.5–33)
MCH RBC QN AUTO: 30.5 PG (ref 26.5–33)
MCHC RBC AUTO-ENTMCNC: 30 G/DL (ref 31.5–36.5)
MCHC RBC AUTO-ENTMCNC: 30.6 G/DL (ref 31.5–36.5)
MCV RBC AUTO: 100 FL (ref 78–100)
MCV RBC AUTO: 101 FL (ref 78–100)
O2/TOTAL GAS SETTING VFR VENT: 40 %
PCO2 BLD: 45 MM HG (ref 35–45)
PCO2 BLD: 47 MM HG (ref 35–45)
PCO2 BLD: 55 MM HG (ref 35–45)
PCO2 BLD: 60 MM HG (ref 35–45)
PCO2 BLD: 62 MM HG (ref 35–45)
PCO2 BLD: 66 MM HG (ref 35–45)
PCO2 BLD: 66 MM HG (ref 35–45)
PEEP: 40 CM H2O
PEEP: 6 CM H2O
PERFORMING LABORATORY: NORMAL
PH BLD: 7.18 [PH] (ref 7.35–7.45)
PH BLD: 7.22 [PH] (ref 7.35–7.45)
PH BLD: 7.24 [PH] (ref 7.35–7.45)
PH BLD: 7.24 [PH] (ref 7.35–7.45)
PH BLD: 7.27 [PH] (ref 7.35–7.45)
PH BLD: 7.32 [PH] (ref 7.35–7.45)
PH BLD: 7.33 [PH] (ref 7.35–7.45)
PHOSPHATE SERPL-MCNC: 5.6 MG/DL (ref 2.5–4.5)
PHOSPHATE SERPL-MCNC: 5.7 MG/DL (ref 2.5–4.5)
PHOSPHATE SERPL-MCNC: 6.1 MG/DL (ref 2.5–4.5)
PLATELET # BLD AUTO: 334 10E3/UL (ref 150–450)
PLATELET # BLD AUTO: 375 10E3/UL (ref 150–450)
PO2 BLD: 107 MM HG (ref 80–105)
PO2 BLD: 108 MM HG (ref 80–105)
PO2 BLD: 109 MM HG (ref 80–105)
PO2 BLD: 112 MM HG (ref 80–105)
PO2 BLD: 90 MM HG (ref 80–105)
PO2 BLD: 95 MM HG (ref 80–105)
PO2 BLD: 99 MM HG (ref 80–105)
POTASSIUM SERPL-SCNC: 4.1 MMOL/L (ref 3.4–5.3)
POTASSIUM SERPL-SCNC: 4.7 MMOL/L (ref 3.4–5.3)
POTASSIUM SERPL-SCNC: 5.3 MMOL/L (ref 3.4–5.3)
PROT SERPL-MCNC: 6.5 G/DL (ref 6.4–8.3)
RBC # BLD AUTO: 2.87 10E6/UL (ref 3.8–5.2)
RBC # BLD AUTO: 3.02 10E6/UL (ref 3.8–5.2)
SAO2 % BLDA: 95 % (ref 92–100)
SAO2 % BLDA: 95 % (ref 92–100)
SAO2 % BLDA: 96 % (ref 92–100)
SAO2 % BLDA: 97 % (ref 92–100)
SAO2 % BLDA: 97 % (ref 92–100)
SODIUM SERPL-SCNC: 132 MMOL/L (ref 135–145)
SODIUM SERPL-SCNC: 132 MMOL/L (ref 135–145)
SODIUM SERPL-SCNC: 135 MMOL/L (ref 135–145)
SPECIMEN STATUS: NORMAL
TEST NAME: NORMAL
TRIGL SERPL-MCNC: 657 MG/DL
UFH PPP CHRO-ACNC: 0.3 IU/ML
UFH PPP CHRO-ACNC: 0.38 IU/ML
UFH PPP CHRO-ACNC: 0.45 IU/ML
WBC # BLD AUTO: 13.1 10E3/UL (ref 4–11)
WBC # BLD AUTO: 15.2 10E3/UL (ref 4–11)

## 2024-09-03 PROCEDURE — 82248 BILIRUBIN DIRECT: CPT | Performed by: INTERNAL MEDICINE

## 2024-09-03 PROCEDURE — 250N000011 HC RX IP 250 OP 636: Performed by: INTERNAL MEDICINE

## 2024-09-03 PROCEDURE — 83735 ASSAY OF MAGNESIUM: CPT | Performed by: CLINICAL NURSE SPECIALIST

## 2024-09-03 PROCEDURE — 85014 HEMATOCRIT: CPT | Performed by: INTERNAL MEDICINE

## 2024-09-03 PROCEDURE — 82330 ASSAY OF CALCIUM: CPT | Performed by: CLINICAL NURSE SPECIALIST

## 2024-09-03 PROCEDURE — 250N000009 HC RX 250

## 2024-09-03 PROCEDURE — 99291 CRITICAL CARE FIRST HOUR: CPT | Mod: FS

## 2024-09-03 PROCEDURE — 250N000009 HC RX 250: Performed by: INTERNAL MEDICINE

## 2024-09-03 PROCEDURE — 258N000003 HC RX IP 258 OP 636

## 2024-09-03 PROCEDURE — 85027 COMPLETE CBC AUTOMATED: CPT | Performed by: INTERNAL MEDICINE

## 2024-09-03 PROCEDURE — 82805 BLOOD GASES W/O2 SATURATION: CPT

## 2024-09-03 PROCEDURE — 82330 ASSAY OF CALCIUM: CPT | Performed by: INTERNAL MEDICINE

## 2024-09-03 PROCEDURE — 84155 ASSAY OF PROTEIN SERUM: CPT | Performed by: INTERNAL MEDICINE

## 2024-09-03 PROCEDURE — 84100 ASSAY OF PHOSPHORUS: CPT | Performed by: CLINICAL NURSE SPECIALIST

## 2024-09-03 PROCEDURE — 250N000011 HC RX IP 250 OP 636

## 2024-09-03 PROCEDURE — 250N000009 HC RX 250: Performed by: STUDENT IN AN ORGANIZED HEALTH CARE EDUCATION/TRAINING PROGRAM

## 2024-09-03 PROCEDURE — 99292 CRITICAL CARE ADDL 30 MIN: CPT | Mod: FS

## 2024-09-03 PROCEDURE — 83735 ASSAY OF MAGNESIUM: CPT | Performed by: INTERNAL MEDICINE

## 2024-09-03 PROCEDURE — 94640 AIRWAY INHALATION TREATMENT: CPT | Mod: 76

## 2024-09-03 PROCEDURE — 250N000013 HC RX MED GY IP 250 OP 250 PS 637

## 2024-09-03 PROCEDURE — 86140 C-REACTIVE PROTEIN: CPT

## 2024-09-03 PROCEDURE — 71045 X-RAY EXAM CHEST 1 VIEW: CPT | Mod: 26 | Performed by: RADIOLOGY

## 2024-09-03 PROCEDURE — 94003 VENT MGMT INPAT SUBQ DAY: CPT

## 2024-09-03 PROCEDURE — 84100 ASSAY OF PHOSPHORUS: CPT | Performed by: INTERNAL MEDICINE

## 2024-09-03 PROCEDURE — 250N000013 HC RX MED GY IP 250 OP 250 PS 637: Performed by: SURGERY

## 2024-09-03 PROCEDURE — 71045 X-RAY EXAM CHEST 1 VIEW: CPT

## 2024-09-03 PROCEDURE — 82805 BLOOD GASES W/O2 SATURATION: CPT | Performed by: INTERNAL MEDICINE

## 2024-09-03 PROCEDURE — 80069 RENAL FUNCTION PANEL: CPT | Performed by: INTERNAL MEDICINE

## 2024-09-03 PROCEDURE — 85520 HEPARIN ASSAY: CPT | Performed by: INTERNAL MEDICINE

## 2024-09-03 PROCEDURE — 250N000009 HC RX 250: Performed by: SURGERY

## 2024-09-03 PROCEDURE — 85520 HEPARIN ASSAY: CPT | Performed by: STUDENT IN AN ORGANIZED HEALTH CARE EDUCATION/TRAINING PROGRAM

## 2024-09-03 PROCEDURE — 84478 ASSAY OF TRIGLYCERIDES: CPT

## 2024-09-03 PROCEDURE — 200N000002 HC R&B ICU UMMC

## 2024-09-03 PROCEDURE — 999N000157 HC STATISTIC RCP TIME EA 10 MIN

## 2024-09-03 PROCEDURE — 85520 HEPARIN ASSAY: CPT | Performed by: SURGERY

## 2024-09-03 PROCEDURE — 90947 DIALYSIS REPEATED EVAL: CPT

## 2024-09-03 PROCEDURE — 250N000013 HC RX MED GY IP 250 OP 250 PS 637: Performed by: PHYSICIAN ASSISTANT

## 2024-09-03 PROCEDURE — 99233 SBSQ HOSP IP/OBS HIGH 50: CPT | Mod: FS | Performed by: CLINICAL NURSE SPECIALIST

## 2024-09-03 RX ORDER — LORAZEPAM 1 MG/1
2 TABLET ORAL EVERY 6 HOURS
Status: DISCONTINUED | OUTPATIENT
Start: 2024-09-03 | End: 2024-09-08

## 2024-09-03 RX ORDER — QUETIAPINE FUMARATE 25 MG/1
25 TABLET, FILM COATED ORAL 2 TIMES DAILY
Status: DISCONTINUED | OUTPATIENT
Start: 2024-09-03 | End: 2024-09-06

## 2024-09-03 RX ORDER — ACETAMINOPHEN 325 MG/1
650 TABLET ORAL EVERY 6 HOURS
Status: DISCONTINUED | OUTPATIENT
Start: 2024-09-03 | End: 2024-09-11

## 2024-09-03 RX ORDER — PROPOFOL 10 MG/ML
5-10 INJECTION, EMULSION INTRAVENOUS CONTINUOUS
Status: DISCONTINUED | OUTPATIENT
Start: 2024-09-03 | End: 2024-09-05

## 2024-09-03 RX ORDER — OXYCODONE HCL 5 MG/5 ML
10 SOLUTION, ORAL ORAL EVERY 6 HOURS
Status: DISCONTINUED | OUTPATIENT
Start: 2024-09-03 | End: 2024-09-08

## 2024-09-03 RX ADMIN — IPRATROPIUM BROMIDE 0.5 MG: 0.5 SOLUTION RESPIRATORY (INHALATION) at 09:25

## 2024-09-03 RX ADMIN — CALCIUM CHLORIDE, MAGNESIUM CHLORIDE, SODIUM CHLORIDE, SODIUM BICARBONATE, POTASSIUM CHLORIDE AND SODIUM PHOSPHATE DIBASIC DIHYDRATE 12.5 ML/KG/HR: 3.68; 3.05; 6.34; 3.09; .314; .187 INJECTION INTRAVENOUS at 12:43

## 2024-09-03 RX ADMIN — Medication 40 MG: at 08:02

## 2024-09-03 RX ADMIN — IPRATROPIUM BROMIDE 0.5 MG: 0.5 SOLUTION RESPIRATORY (INHALATION) at 20:06

## 2024-09-03 RX ADMIN — ACETAMINOPHEN 650 MG: 325 TABLET ORAL at 21:57

## 2024-09-03 RX ADMIN — Medication 300 MCG/HR: at 23:12

## 2024-09-03 RX ADMIN — TOBRAMYCIN 300 MG: 300 SOLUTION RESPIRATORY (INHALATION) at 20:06

## 2024-09-03 RX ADMIN — LOPERAMIDE HYDROCHLORIDE 4 MG: 2 CAPSULE ORAL at 21:57

## 2024-09-03 RX ADMIN — LEVOTHYROXINE SODIUM 25 MCG: 0.03 TABLET ORAL at 08:02

## 2024-09-03 RX ADMIN — MIDAZOLAM HYDROCHLORIDE 16 MG/HR: 1 INJECTION, SOLUTION INTRAVENOUS at 23:41

## 2024-09-03 RX ADMIN — Medication 1 TABLET: at 08:02

## 2024-09-03 RX ADMIN — CALCIUM CHLORIDE, MAGNESIUM CHLORIDE, SODIUM CHLORIDE, SODIUM BICARBONATE, POTASSIUM CHLORIDE AND SODIUM PHOSPHATE DIBASIC DIHYDRATE 12.5 ML/KG/HR: 3.68; 3.05; 6.34; 3.09; .314; .187 INJECTION INTRAVENOUS at 06:41

## 2024-09-03 RX ADMIN — CALCIUM CHLORIDE, MAGNESIUM CHLORIDE, SODIUM CHLORIDE, SODIUM BICARBONATE, POTASSIUM CHLORIDE AND SODIUM PHOSPHATE DIBASIC DIHYDRATE 12.5 ML/KG/HR: 3.68; 3.05; 6.34; 3.09; .314; .187 INJECTION INTRAVENOUS at 22:43

## 2024-09-03 RX ADMIN — LEVALBUTEROL HYDROCHLORIDE 0.63 MG: 0.63 SOLUTION RESPIRATORY (INHALATION) at 20:06

## 2024-09-03 RX ADMIN — NYSTATIN 500000 UNITS: 100000 SUSPENSION ORAL at 08:02

## 2024-09-03 RX ADMIN — Medication 50 MCG: at 11:58

## 2024-09-03 RX ADMIN — Medication 50 MCG: at 13:03

## 2024-09-03 RX ADMIN — CALCIUM CHLORIDE, MAGNESIUM CHLORIDE, SODIUM CHLORIDE, SODIUM BICARBONATE, POTASSIUM CHLORIDE AND SODIUM PHOSPHATE DIBASIC DIHYDRATE: 3.68; 3.05; 6.34; 3.09; .314; .187 INJECTION INTRAVENOUS at 02:27

## 2024-09-03 RX ADMIN — LORAZEPAM 2 MG: 1 TABLET ORAL at 21:57

## 2024-09-03 RX ADMIN — IPRATROPIUM BROMIDE 0.5 MG: 0.5 SOLUTION RESPIRATORY (INHALATION) at 17:09

## 2024-09-03 RX ADMIN — CETIRIZINE HYDROCHLORIDE 10 MG: 10 TABLET, FILM COATED ORAL at 08:02

## 2024-09-03 RX ADMIN — CALCIUM CHLORIDE, MAGNESIUM CHLORIDE, SODIUM CHLORIDE, SODIUM BICARBONATE, POTASSIUM CHLORIDE AND SODIUM PHOSPHATE DIBASIC DIHYDRATE 12.5 ML/KG/HR: 3.68; 3.05; 6.34; 3.09; .314; .187 INJECTION INTRAVENOUS at 01:47

## 2024-09-03 RX ADMIN — QUETIAPINE FUMARATE 25 MG: 25 TABLET ORAL at 20:19

## 2024-09-03 RX ADMIN — Medication 60 ML: at 20:19

## 2024-09-03 RX ADMIN — CISATRACURIUM BESYLATE 6 MCG/KG/MIN: 10 INJECTION INTRAVENOUS at 05:40

## 2024-09-03 RX ADMIN — LEVALBUTEROL HYDROCHLORIDE 0.63 MG: 0.63 SOLUTION RESPIRATORY (INHALATION) at 09:25

## 2024-09-03 RX ADMIN — LORAZEPAM 2 MG: 1 TABLET ORAL at 13:27

## 2024-09-03 RX ADMIN — NYSTATIN 500000 UNITS: 100000 SUSPENSION ORAL at 00:20

## 2024-09-03 RX ADMIN — MONTELUKAST 10 MG: 10 TABLET, FILM COATED ORAL at 21:58

## 2024-09-03 RX ADMIN — NYSTATIN 500000 UNITS: 100000 SUSPENSION ORAL at 12:03

## 2024-09-03 RX ADMIN — Medication 200 MCG/HR: at 14:15

## 2024-09-03 RX ADMIN — Medication 60 ML: at 08:02

## 2024-09-03 RX ADMIN — CIPROFLOXACIN 400 MG: 400 INJECTION, SOLUTION INTRAVENOUS at 04:27

## 2024-09-03 RX ADMIN — QUETIAPINE FUMARATE 25 MG: 25 TABLET ORAL at 15:04

## 2024-09-03 RX ADMIN — CIPROFLOXACIN 400 MG: 400 INJECTION, SOLUTION INTRAVENOUS at 12:03

## 2024-09-03 RX ADMIN — CALCIUM CHLORIDE, MAGNESIUM CHLORIDE, SODIUM CHLORIDE, SODIUM BICARBONATE, POTASSIUM CHLORIDE AND SODIUM PHOSPHATE DIBASIC DIHYDRATE 12.5 ML/KG/HR: 3.68; 3.05; 6.34; 3.09; .314; .187 INJECTION INTRAVENOUS at 22:41

## 2024-09-03 RX ADMIN — PROPOFOL 50 MCG/KG/MIN: 10 INJECTION, EMULSION INTRAVENOUS at 01:02

## 2024-09-03 RX ADMIN — CIPROFLOXACIN 400 MG: 400 INJECTION, SOLUTION INTRAVENOUS at 20:19

## 2024-09-03 RX ADMIN — NYSTATIN 500000 UNITS: 100000 SUSPENSION ORAL at 15:04

## 2024-09-03 RX ADMIN — WHITE PETROLATUM 57.7 %-MINERAL OIL 31.9 % EYE OINTMENT: at 01:47

## 2024-09-03 RX ADMIN — WHITE PETROLATUM 57.7 %-MINERAL OIL 31.9 % EYE OINTMENT: at 18:17

## 2024-09-03 RX ADMIN — MIDAZOLAM HYDROCHLORIDE 14 MG/HR: 1 INJECTION, SOLUTION INTRAVENOUS at 18:11

## 2024-09-03 RX ADMIN — Medication 50 MCG: at 18:30

## 2024-09-03 RX ADMIN — TOBRAMYCIN 300 MG: 300 SOLUTION RESPIRATORY (INHALATION) at 09:24

## 2024-09-03 RX ADMIN — LOPERAMIDE HYDROCHLORIDE 4 MG: 2 CAPSULE ORAL at 00:19

## 2024-09-03 RX ADMIN — IPRATROPIUM BROMIDE 0.5 MG: 0.5 SOLUTION RESPIRATORY (INHALATION) at 12:07

## 2024-09-03 RX ADMIN — Medication 50 MCG: at 14:18

## 2024-09-03 RX ADMIN — MIDAZOLAM HYDROCHLORIDE 8 MG/HR: 1 INJECTION, SOLUTION INTRAVENOUS at 11:09

## 2024-09-03 RX ADMIN — PROPOFOL 25 MCG/KG/MIN: 10 INJECTION, EMULSION INTRAVENOUS at 05:39

## 2024-09-03 RX ADMIN — ACETAMINOPHEN 650 MG: 325 TABLET ORAL at 15:04

## 2024-09-03 RX ADMIN — OXYCODONE HYDROCHLORIDE 10 MG: 5 SOLUTION ORAL at 21:58

## 2024-09-03 RX ADMIN — LEVALBUTEROL HYDROCHLORIDE 0.63 MG: 0.63 SOLUTION RESPIRATORY (INHALATION) at 17:09

## 2024-09-03 RX ADMIN — WHITE PETROLATUM 57.7 %-MINERAL OIL 31.9 % EYE OINTMENT: at 11:12

## 2024-09-03 RX ADMIN — NYSTATIN 500000 UNITS: 100000 SUSPENSION ORAL at 20:19

## 2024-09-03 RX ADMIN — LEVALBUTEROL HYDROCHLORIDE 0.63 MG: 0.63 SOLUTION RESPIRATORY (INHALATION) at 12:07

## 2024-09-03 RX ADMIN — DEXAMETHASONE SODIUM PHOSPHATE 20 MG: 10 INJECTION, SOLUTION INTRAMUSCULAR; INTRAVENOUS at 08:02

## 2024-09-03 RX ADMIN — CISATRACURIUM BESYLATE 5.5 MCG/KG/MIN: 10 INJECTION INTRAVENOUS at 21:50

## 2024-09-03 RX ADMIN — BUDESONIDE 1 MG: 1 INHALANT ORAL at 09:34

## 2024-09-03 RX ADMIN — OXYCODONE HYDROCHLORIDE 10 MG: 5 SOLUTION ORAL at 15:18

## 2024-09-03 RX ADMIN — Medication 1200 UNITS/HR: at 10:06

## 2024-09-03 ASSESSMENT — ACTIVITIES OF DAILY LIVING (ADL)
ADLS_ACUITY_SCORE: 61
ADLS_ACUITY_SCORE: 57
ADLS_ACUITY_SCORE: 57
ADLS_ACUITY_SCORE: 61
ADLS_ACUITY_SCORE: 57
ADLS_ACUITY_SCORE: 57
ADLS_ACUITY_SCORE: 67
ADLS_ACUITY_SCORE: 57
ADLS_ACUITY_SCORE: 61
ADLS_ACUITY_SCORE: 57
ADLS_ACUITY_SCORE: 61
ADLS_ACUITY_SCORE: 57
ADLS_ACUITY_SCORE: 61
ADLS_ACUITY_SCORE: 57
ADLS_ACUITY_SCORE: 57
ADLS_ACUITY_SCORE: 61

## 2024-09-03 NOTE — PLAN OF CARE
Goal Outcome Evaluation:      Plan of Care Reviewed With: patient, family    Overall Patient Progress: decliningOverall Patient Progress: declining    Outcome Evaluation: Supinated around 9:30am. Weaned off propofol, paralytic stopped & restarted. Levo intiated. No plans to reprone tonight.    Major Shift Events:  Supinated around 9:30am. Propofol stopped & paralytic discontinued. ABG showed pH of 7.18, sedation increased and paralytic restarted d/t ABG results and increased WOB. BIS 30's-50's up to 80's w/ turns, managed w/ sedation bolus + scheduled ativan. Vent settings adjusted based on ABG, currently 40% FiO2/RR 24/Tiv 350/PEEP 6. BP's soft w/ increase of sedation, Levo initiated, currently .07.     Plan: Propofol available if necessary for increased sedation need overnight.    For vital signs and complete assessments, please see documentation flowsheets.

## 2024-09-03 NOTE — PROGRESS NOTES
MEDICAL ICU PROGRESS NOTE  09/03/2024    Date of Service (when I saw the patient): 09/03/2024    ASSESSMENT: Massiel Flaherty is a 53 year old female with PMH Anxiety/depression, fibromyalgia, c/f nonepileptic seizures not on AEDs, hypothyroidism, asthma, HLD, MURIEL on CPAP, expressive aphasia, hypertension  who was admitted on 8/3/2024 for fatigue, fever and dyspnea and intubated 8/6/24 at OSH for ARDS, proned and paralyzed without improvement. Transferred to [facility] 8/8/24, hypoxic with high plateaus/peaks and placed on VV ECMO and CRRT. Decannulated from VV ECMO 8/18 and transferred to MICU 8/26/24 for ongoing management. Extubated 8/27 then reintubated 8/29 iso worsening AHRF and pseudomonas pneumonia.     CHANGES and MAJOR THINGS TODAY:  - Plan to supine patient  - Turn off paralytic  - Turn off propofol given increasing triglycerides, increase versed if needed for sedation  - Check ABG following supine  - Fluid goal net even to net negative 500    Neuro:  # Pain and sedation  # Acute pain  # Sedation and analgesia for vent compliance   # Concern for ICU delirium   # Hx Fibromyalgia   # Hx myalgic encephalomyelitis   # Hx anxiety/depression  - Monitor neurological status. Delirium preventions and precautions.   - Pain: Scheduled: tylenol, oxycodone 5mg q 6hr (decreased from q 4hr) held while sedated  PRN: tylenol, oxycodone  - Sedation plan: fentanyl, midazolam, and propofol    - Wean propofol given increasing triglycerides. Increase versed if needed for further sedation  - Cisatracurium 6 mcg  - RASS goal -4 to -5, BIS goal 30-40  - Gabapentin 300mg TID held while sedated  - Seroquel 25mg BID PRN held while sedated  - PTA Venlafaxine and Amitriptyline discontinued while sedated  - Will pause cisatracurium after supined and assess vent synchrony.   - We will check ABGs throughout the afternoon to assess ventilation and oxygenation. We will plan to keep the patient supined, however, if oxygenation is concerning,  we may need to prone the patient again.    # Hx spells with staring and BUE posturing previously described as nonepileptic seizures   # Hx of GTC seizures and myoclonic epilepsy, weaned off AEDs   # Diffuse cerebral edema - improved  - Sodium goals liberalized to 140-145  - 8/21: MRI Brain: no acute intracranial pathology. No findings to suggest anoxic brain injury.     Pulmonary:  # Acute hypoxic respiratory failure c/b ARDS  # Pseudomonas pneumonia  # Mechanical ventilation  # s/p VV ECMO 8/8 - 8/18   # Hx CAP  # Asthma  # MURIEL on home CPAP  PFT dated 9/8/2020 demonstrated normal spirometry with mild diffusion impairment and mild restriction (FEV1/FVC 80%, FEV1 2.59, DLCO 40%). CT abdomen chest 3/9/2020 demonstrated scarring and fibrosis bilaterally which correlated with the decreased DLCO. The patient also has a history of MURIEL on CPAP therapy as currently managed by her provider in [place]. Unclear what triggered ARDS or why she has had such severe hospital course, further investigated ILD but results all unremarkable. Extubated 8/27, reintubated 8/29 for worsening O2 demands and diffuse opacities iso new/recurrent psuedomonas pneumonia. Bronch with BAL 8/30, pseudomonas growing as below.   - ILD w/u aldolase, EVELIO, RF, CCP, ANCA, MPO, SSA Ro and La, Scleroderma antibody, Radha 1,  hypersensity pneumonitis, IGG subclasses,  aspergillus, blastomyces, histoplasma neg  - SpO2 goals >92%, PaO2 goals >60   - PTA singular at bedtime if able  - Scheduled pulmicort, and duonebs   - Lung protective ventilation strategies given ARDS  - Methylpred 125mg q6H x 24h 8/30 --> transition to 20 mg dex x 5 days, followed by 10mg x 5 days  - Epoprostenol currently off  - Wean vent as able  - P/F ratio 280 then 225 prior to proning  - We will supine the patient and follow up ABGs to monitor P/F ratio  FiO2 (%): 40 %, Resp: 24, Inspiratory Pressure (cm H2O) (Drager Jessie): 30, Vent Mode: CMV/AC, Resp Rate (Set): 24 breaths/min, Tidal  Volume (Set, mL): 280 mL, PEEP (cm H2O): 6 cmH2O, Resp Rate (Set): 24 breaths/min, Tidal Volume (Set, mL): 280 mL, PEEP (cm H2O): 6 cmH2O    Cardiovascular:  # Hyperlipidemia  # Hypotension  # Hx HTN  - MAP goal >65, SBP goals >110  - NE and Vasopressin currently off, ok to go back on norepi while removing volume via CRRT  - Nicardipine for persistent SBP > 180s  - PTA atorvastatin  - PTA clonidine held while sedated  - Lower extremity ultrasound without vascular pathology, no hematoma or clots  - Monitor discoloration of extremities for necrosis  - Monitoring Hypertension    Sinus Tachycardia  Likely 2/2 fluid removal and sepsis as above    GI/Nutrition:  # Moderate protein calorie malnutrition  # Non-infectious Diarrhea  # GERD   - Protonix (home medication)   - Nutrition consulted, appreciate recs  - Diet: TF via NJ, assess swallow after extubation, failed previous swallow study  - Hold bowel regimen d/t loose stools  - Change suspension meds to other form as able  - Continue scheduled multivitamin, banatrol, prosource packet, and loperamide  - Rectal tube, continues with high output. Keep today.     Renal/Fluids/Electrolytes:  # Acute kidney injury  # Renal failure  Baseline Cr approx 0.6. Pt was anuric here but now making some urine, likely prerenal due to shock.  - Continue CRRT; fluid goal net negative 500 for the day  - Heparinized CRRT circuit, low intensity protocol. Last clotted 8/24 PM  - Strict I&Os  - Bladder scan q shift  - Avoid nephrotoxins as able    Endocrine:  # Steroid and stress induced hyperglycemia  # Hypothyroidism   - Goal to keep BG < 180 for optimal wound healing.   - with rising blood glucose due to steroids started insulin drip 8/30  - Continue PTA synthroid    ID:  # Leukocytosis  # Psuedomonas pneumonia  # Hx CAP  Respiratory cx 8/29 pseudomonas pneumonia. Intermediate susceptability to meropenem, more significant sensitivities to ciprofloxacin  - Continue to monitor fever curve and  inflammatory markers as appropriate  - ID consulted, appreciate recs.   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   - if trend continues to rise will start ganciclovir    # HSV-1  Mouth sores present, which could have viral etiology. 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 Monitor and trend.   - Heparinize CRRT circuit    - CTAP 8/30 due to acute hemoglobin drop requiring transfusion of 2 x 1 unit of blood 12 hours apart. Negative for acute bleed    Musculoskeletal/Rheum:  # Alopecia  - Hold PTA hydroxychloroquine     # Weakness and deconditioning of critical illness  # Left ankle injury pre-hospitalization    - Physical and occupational therapy when able.     Skin:  # BLE scattered ecchymosis  # Left toe duskiness  - Bilateral lower leg ecchymosis  - WOC consult  - Ecchymosis to left foot, most noticeable to 3rd and 4th toes    General Cares/Prophylaxis:    DVT Prophylaxis: Heparin through CRRT circuit  GI Prophylaxis: PPI  Restraints: no  Code Status: DNR/OK to intubate    Lines/tubes/drains:  - ETT (8/29)  - NJ (8/9)  - LIJ CVC (8/8)  - Radial a-line (8/29)  - PIV x3  - Rectal tube (8/17)  - Tunneled HD line (8/23)    Disposition:  - Medical ICU     Patient seen and findings/plan discussed with medical ICU staff, Dr. Shelley.    I spent a total of 57 minutes (excluding procedure time) personally providing and directing critical care services at the bedside and on the critical care unit for Massiel Padgett PA-C    Clinically Significant Risk Factors  "         # Hypocalcemia: Lowest Ca = 8.6 mg/dL in last 2 days, will monitor and replace as appropriate     # Hypoalbuminemia: Lowest albumin = 2.2 g/dL at 8/10/2024  9:47 AM, will monitor as appropriate               # Obesity: Estimated body mass index is 30.73 kg/m  as calculated from the following:    Height as of this encounter: 1.575 m (5' 2\").    Weight as of this encounter: 76.2 kg (167 lb 15.9 oz).   # Moderate Malnutrition: based on nutrition assessment      # Financial/Environmental Concerns: none        Code Status: No CPR- Pre-arrest intubation OK       ====================================  INTERVAL HISTORY:   Patient remained proned overnight. Norep weaned off and epoprostenol weaned off. PF ratio improving. Otherwise, no significant events overnight.     OBJECTIVE:   1. VITAL SIGNS:   Temp:  [97  F (36.1  C)-99.3  F (37.4  C)] 98.4  F (36.9  C)  Pulse:  [] 108  Resp:  [19-28] 24  BP: (130-146)/(64-80) 130/64  MAP:  [52 mmHg-119 mmHg] 75 mmHg  Arterial Line BP: ()/(39-91) 101/57  FiO2 (%):  [40 %-55 %] 40 %  SpO2:  [96 %-100 %] 98 %  FiO2 (%): 40 %, Resp: 24, Inspiratory Pressure (cm H2O) (Drager Jessie): 30, Vent Mode: CMV/AC, Resp Rate (Set): 24 breaths/min, Tidal Volume (Set, mL): 280 mL, PEEP (cm H2O): 6 cmH2O, Resp Rate (Set): 24 breaths/min, Tidal Volume (Set, mL): 280 mL, PEEP (cm H2O): 6 cmH2O  2. INTAKE/ OUTPUT:   I/O last 3 completed shifts:  In: 3699.57 [I.V.:2129.47; Other:370.1; NG/GT:480]  Out: 3220.1 [Other:2920.1; Stool:300]    3. PHYSICAL EXAMINATION:  General: Intubated, sedated, proned  HEENT: Normocephalic, atraumatic, BIS leads in place, eyes with ointment dressing  Neuro: RASS -4-5, arefelexic  Pulm/Resp: Bilateral breath sounds audible with diffuse coarse breath sounds  CV: Sinus tachycardia, s1/2 normal, no murmur  Abdomen: Soft, non-distended, non-tender examined from back  : deferred   Incisions/Skin: lower leg 1+ edema with ecchymosis present and scattered. Some " bluish discoloration of the finger tips, capillary refill normal    4. LABS:   Arterial Blood Gases   Recent Labs   Lab 09/03/24  0611 09/03/24  0343 09/02/24  1711 09/02/24  1434   PH 7.24* 7.22* 7.49* 7.46*   PCO2 60* 66* 33* 35   PO2 112* 108* 122* 100   HCO3 26 27 25 25     Complete Blood Count   Recent Labs   Lab 09/03/24  0343 09/02/24  2019 09/02/24  1131 09/02/24  0403   WBC 15.2* 16.0* 7.4 12.9*   HGB 9.2* 9.2* 8.2* 8.5*    369 226 344     Basic Metabolic Panel  Recent Labs   Lab 09/03/24  0649 09/03/24  0612 09/03/24 0351 09/03/24 0343 09/02/24 2029 09/02/24 2019 09/02/24  1208 09/02/24  1131 09/02/24  0502 09/02/24  0403   NA  --   --   --  135  --  132*  --  132*  --  133*   POTASSIUM  --   --   --  4.1  --  4.4  --  3.9  --  4.0   CHLORIDE  --   --   --  99  --  98  --  100  --  98   CO2  --   --   --  24  --  22  --  20*  --  24   BUN  --   --   --  29.3*  --  29.6*  --  33.5*  --  33.4*   CR  --   --   --  0.74  --  0.75  --  0.73  --  0.79   * 122* 103* 116*   < > 162*   < > 163*   < > 137*  151*    < > = values in this interval not displayed.     Liver Function Tests  Recent Labs   Lab 09/03/24 0343 09/02/24 2019 09/02/24 1131 09/02/24  0403 09/01/24  1156 09/01/24  0324 08/31/24  1024 08/31/24  0341 08/30/24 2016 08/30/24  1045   AST 33  --   --  28  --  32  --  55*  --   --    ALT 20  --   --  28  --  33  --  42  --   --    ALKPHOS 148  --   --  156*  --  187*  --  223*  --   --    BILITOTAL 0.2  --   --  0.2  --  0.2  --  0.2  --   --    ALBUMIN 3.6 3.5 3.2* 3.4*   < > 3.4*   < > 3.4*   < > 2.9*   INR  --   --   --   --   --   --   --   --   --  1.14    < > = values in this interval not displayed.     Coagulation Profile  Recent Labs   Lab 08/30/24  1045   INR 1.14   PTT 56*     5. RADIOLOGY:   Recent Results (from the past 24 hour(s))   XR Chest Port 1 View    Narrative    Portable chest    INDICATION: History RDS    COMPARISON: Yesterday    Findings: Bibasilar hazy  opacities and some peribronchial  opacification unchanged. No new consolidation. Multiple support tubes  unchanged including esophageal temperature probe, feeding tube, large  bore right IJ catheter comment left IJ [place] catheter an  endotracheal tube.      Impression    IMPRESSION: No significant change from yesterday.    ENMANUEL JEFFERSON MD         SYSTEM ID:  [id number]

## 2024-09-03 NOTE — PLAN OF CARE
Goal Outcome Evaluation:      Plan of Care Reviewed With: patient    Overall Patient Progress: no changeOverall Patient Progress: no change    Outcome Evaluation: move towards weaning of paralytics and sedation.  PS trials when able. prone as needed for improved pulmonary function.    ICU End of Shift Summary. See flowsheets for vital signs and detailed assessment.    Changes this shift: Patient will remain prone until 0800 this a.m..  Continue to sedate and paralyzed. Titration of sedation.  TOF remains 0/4 and BIS in the upper teens to 20's.  Scaley rash covering forehead and rest of face flushed. History of rosacea per family. Unsure if rash is making BIS monitoring difficult.  Bilat soles of feet also flushed.    BP has trended down but arterial waveform slightly dampened.  Will check NBP.  Levo gtt remains off.  Tolerating CRRT and attempt to pull off 50 ml/hr.    Stool output small.    Thick, white oral secretions in small amount.    Morning ABG showing resp acidosis.  Team notified and to bedside.  Increased tV to 300 and will recheck ABG    Plan:  Nystatin initiated. Delay in morning ABG d/t ETT suctioning.  Waiting 15 minutes before sending.

## 2024-09-03 NOTE — PROGRESS NOTES
"CRRT STATUS NOTE    DATA:  Time:  5:33 PM  Pressures WNL:  YES  Filter Status:  WDL    Problems Reported/Alarms Noted:  None reported    Supplies Present:  YES    ASSESSMENT:  Patient Net Fluid Balance:    Intake/Output Summary (Last 24 hours) at 9/3/2024 1733  Last data filed at 9/3/2024 1700  Gross per 24 hour   Intake 3059.54 ml   Output 3740.6 ml   Net -681.06 ml       Vital Signs:  Temp: 99.1  F (37.3  C) Temp src: Esophageal BP: 130/64 Pulse: 100   Resp: 20 SpO2: 98 % O2 Device: Mechanical Ventilator      Labs:    Lab Results   Component Value Date    WBC 13.1 09/03/2024     Lab Results   Component Value Date    RBC 2.87 09/03/2024     Lab Results   Component Value Date    HGB 8.7 09/03/2024     Lab Results   Component Value Date    HCT 29.0 09/03/2024     No components found for: \"MCT\"  Lab Results   Component Value Date     09/03/2024     Lab Results   Component Value Date    MCH 30.3 09/03/2024     Lab Results   Component Value Date    MCHC 30.0 09/03/2024     Lab Results   Component Value Date    RDW 18.4 09/03/2024     Lab Results   Component Value Date     09/03/2024     Last Comprehensive Metabolic Panel:  Lab Results   Component Value Date     (L) 09/03/2024    POTASSIUM 4.7 09/03/2024    CHLORIDE 99 09/03/2024    CO2 19 (L) 09/03/2024    ANIONGAP 14 09/03/2024     (H) 09/03/2024    BUN 33.1 (H) 09/03/2024    CR 0.74 09/03/2024    GFRESTIMATED >90 09/03/2024    QUAN 9.0 09/03/2024           Goals of Therapy:  0-100 without restarting pressors    INTERVENTIONS:   Checked in with bedside RN    PLAN:  Continue with treatment goals. Call CRRT RN on voicera with questions and concerns.      "

## 2024-09-03 NOTE — PROGRESS NOTES
"SPIRITUAL HEALTH SERVICES Follow Up Note  [facility] ([place]) 4C     Summary: Visit with patient Massiel \"Donna\" Reynold's family (mother Doreen, sister Katerina, son Mook, dog Mara) at bedside. Per family, Donna is a member of [employer]. Their  is unable to come for reason of distance; however, Protestant and town are aware and praying for Donna.    Family name restorative goals for Donna while acknowledging the difficulty of her situation. They requested prayer for healing, which I provided, as well as requesting ongoing spiritual support.    Family are mutually supportive and supportive of Donna; they will return home on Sunday.     Plan: Chaplains will follow for spiritual support while Donna is admitted.     Dina Olguin M.Div., [facility]  Staff   Pager [phone]     * Spiritual Health Services remains available 24/7 for emergent requests/referrals, either by having the switchboard page the on-call  or by entering an ASAP/STAT consult in Epic (this will also page the on-call ). Routine Epic consults receive an initial response within 24 hours.*    "

## 2024-09-03 NOTE — PROGRESS NOTES
"Critical Care Services Progress Note:  I personally examined and evaluated the patient today. I formulated today s plan with the house staff team or TRI and agree with the findings and plan in the associated note (see separately attested TRI note).   I have evaluated all laboratory values and imaging studies from the past 24 hours.  Summary of hospital course:  53 year old female initially transferred to [facility] on 8/8 for VV-ECMO.  She was diagnosed with ARDS from a suspected pneumonia. Decannulated on 8/18. Extubated on 8/19.  Reintubated on 8/29 for hypoxemia and now critically ill, proned and paralyzed  Overnight events/pertinent findings today:  Epoprostenol stopped yesterday.  Prone overnight.  Tidal volume slightly increased.      Data  /64   Pulse 118   Temp 98.6  F (37  C)   Resp 24   Ht 1.575 m (5' 2\")   Wt 76.2 kg (167 lb 15.9 oz)   SpO2 96%   BMI 30.73 kg/m    I/O +1.4 L    Na 135, K 4.1  CRP 34  WBC 15.2, Hgb 9.2, Plts 375    Sputum culture 8/29 with pseudomonas  BAL culture 8/30 with pseudomonas    Blood CMV PCR + 741 --> 920    Chest CT - extensive GGO and consolidative changes  ECHO is normal    CXR improved aeration of both R and L lung      Assessment/plan:    ARDS  CHACORTA on CRRT  Pseudomonas Pneumonia  Shock   Reactivation of CMV    Supine during the day  Cisatracurium holiday today  BIS monitoring  Net negative 0.5 L via CRRT  Ciprofloxacin  Nebulized LIZA  Continue invasive mechanical ventilation. Follow up ABG after she has been supine.   MAP goal 65  Stop propofol down given triglycerides  Recheck CMV PCR on 9/6      Rest per TRI note.    I spent a total of 55 minutes (excluding procedure time) personally providing and directing critical care services at the bedside and on the critical care unit for this patient.     Alec Shelley MD    Addendum:   Patient seen again off of neuromuscular blockade.  Significant respiratory muscle use.  Very dysynchronous with the vent.  Since propofol " raised TGs high, will increase limit of versed and fentanyl.  Start PO oxycodone and ativan. Expect this to be a slow wean over time.  Will re-paralyze and optimize vent on volume control.  Family updated at the bedside    Additional critical care time is 23 minutes bringing the total time for the day up to 78 minutes.     Alec Shelley MD

## 2024-09-03 NOTE — PROGRESS NOTES
Care Management Follow Up    Length of Stay (days): 26    Expected Discharge Date:  TBD     Concerns to be Addressed: discharge planning     Patient plan of care discussed at interdisciplinary rounds: Yes    Additional Information:  RNCC was notified by nursing that family requested the Care conference with providers. Spoke with MICU1 team and they will be available 9/4/24 at 1 pm.   RNCC spoke with patient's mother Doreen Flaherty ([phone]), who stated that they receive daily updated from doctors and get all the questions answered. They will not need any care conference this week.     Natalie Piedra, MSN, MA, CCM, RN  4C/4A/4E ICU Care Coordinator  Phone:[phone]  Available via [employer]     For Weekend & Holiday on call RN Care Coordinator:  [facility] [place] ([phone]) Saturday & Sunday; [phone]) [facility] Recognized Holidays   Weekend 4C/4A/4E ICUs /6A Care Coordinator  Available via [employer]

## 2024-09-03 NOTE — PROGRESS NOTES
CRRT STATUS NOTE    DATA:  Time:  6:34 AM  Pressures WNL:  YES  Filter Status:  WDL    Problems Reported/Alarms Noted:  none    Supplies Present:  YES    ASSESSMENT:  Patient Net Fluid Balance:  9/2: Net +1.5L; 9/3: Net +50 mL since midnight.   Vital Signs:  Temp:  [97  F (36.1  C)-99.3  F (37.4  C)] 98.4  F (36.9  C)  Pulse:  [] 114  Resp:  [19-28] 24  BP: (130-146)/(64-80) 130/64  MAP:  [52 mmHg-119 mmHg] 81 mmHg  Arterial Line BP: ()/(39-91) 113/61  FiO2 (%):  [40 %-55 %] 40 %  SpO2:  [96 %-100 %] 98 %  Labs:  K 4.1, Mg 2.5, Phos 6.1, iCa 4.8, Crt 0.74, ABG  7.03--26  Goals of Therapy:  0-50 mL/hr; if BP decreasing aim more toward I=O, meeting goals this am    INTERVENTIONS:   none    PLAN:  Continue fluid removal as tolerated per goals of therapy. Check circuit daily and change circuit q72h and prn. Please contact CRRT resource RN with any questions/concerns.

## 2024-09-03 NOTE — PROGRESS NOTES
Nephrology Progress Note  09/03/2024       Mrs Flaherty is a 52 yo F w/ hx of Anxiety, depression, fibromyalgia, hypothyroidism, asthma, HLD, MURIEL on CPAP, expressive aphasia, and hypertension who was admitted to an OSH with community acquired pneumonia on 8/3 s/p intubation. Found to have severe ARDS requiring paralysis and proning, transferred here on 8/8 for management and initiated on CKRT for severe acidemia in the setting of oligoanuric CHACORTA. Started CRRT on 8/9 for volume management.     Interval History :   Mrs Flaherty continues on CRRT, was net positive yesterday but on track to be a bit negative today off of pressors and is ordered for 0-100cc/h net negative.  Labs stable on 4k baths, will reduce checks to BID to avoid excessive blood draws.      Assessment & Recommendations:   CHACORTA-Baseline Cr 0.6 but not checked since 5/2023 PTA, UA with protein + blood but difficult to interpret in setting of CHACORTA, likely hypotensive ATN. Started CRRT on 8/9, now anuric.    -Access is RIJ tunneled line from 8/23  -CRRT, 4k baths, 0-100cc/h net negative depending on BP's.   -Dialysis consent signed and scanned into media on 8/9    Please avoid placing a PICC line in any patient with CKD III or above, CHACORTA, or ESKD, as this will impact negatively the ability of the patient to have an AV fistula or AV Graft should they need it in the future.  Internal jugular or External Jugular  are the preferred type of access in patients with kidney disease. (Link to guidelines)    Volume status-Off of pressors, Bp's 100's/50's, trying to pull 50-100cc/h as long as BP's are stable.     Electrolytes/pH-No acute issues, bicarb 19 noted to be slightly low but generally has been stable.      Ca/phos/pth-Mg and Phos mildly up, no intervention needed.     Anemia-Hgb 8.7, stable.     Nutrition-Renal TF    Time spent: 45 minutes on this date of encounter for chart review, physical exam, medical decision making and co-ordination of care.     Discussed  "with Dr Donato    Recommendations were communicated to primary team via verbal communication.     DOREEN Sequeira CNS  Clinical Nurse Specialist  [phone]    Review of Systems:   I reviewed the following systems:  ROS not done due to vent/sedation.     Physical Exam:   I/O last 3 completed shifts:  In: 3114.04 [I.V.:1874.04; NG/GT:520]  Out: 3533.7 [Other:3193.7; Stool:340]   /64   Pulse 113   Temp 99  F (37.2  C) (Esophageal)   Resp 24   Ht 1.575 m (5' 2\")   Wt 76.2 kg (167 lb 15.9 oz)   SpO2 98%   BMI 30.73 kg/m       GENERAL APPEARANCE: critically ill, intubated   HEENT: MMM   PULM: lungs clear anteriorly   CV: regular rhythm, normal rate, no rub      -edema - 1+ LE edema   GI: soft, ND  INTEGUMENT: no rash on exposed skin  NEURO: sedated   Access is LFV temp line 8/19.     Labs:   All labs reviewed by me  Electrolytes/Renal -   Recent Labs   Lab Test 09/03/24  1403 09/03/24  1310 09/03/24  1215 09/03/24  0351 09/03/24  0343 09/02/24  2029 09/02/24  2019   NA  --   --  132*  --  135  --  132*   POTASSIUM  --   --  4.7  --  4.1  --  4.4   CHLORIDE  --   --  99  --  99  --  98   CO2  --   --  19*  --  24  --  22   BUN  --   --  33.1*  --  29.3*  --  29.6*   CR  --   --  0.74  --  0.74  --  0.75   * 180* 182*  197*   < > 116*   < > 162*   QUAN  --   --  9.0  --  8.9  --  8.6*   MAG  --   --  2.4*  --  2.5*  --  2.6*   PHOS  --   --  5.6*  --  6.1*  --  5.5*    < > = values in this interval not displayed.       CBC -   Recent Labs   Lab Test 09/03/24  1215 09/03/24  0343 09/02/24  2019   WBC 13.1* 15.2* 16.0*   HGB 8.7* 9.2* 9.2*    375 369       LFTs -   Recent Labs   Lab Test 09/03/24  1215 09/03/24  0343 09/02/24 2019 09/02/24  1131 09/02/24  0403 09/01/24  1156 09/01/24  0324   ALKPHOS  --  148  --   --  156*  --  187*   BILITOTAL  --  0.2  --   --  0.2  --  0.2   ALT  --  20  --   --  28  --  33   AST  --  33  --   --  28  --  32   PROTTOTAL  --  6.5  --   --  6.4  --  6.7 "   ALBUMIN 3.5 3.6 3.5   < > 3.4*   < > 3.4*    < > = values in this interval not displayed.       Iron Panel - No lab results found.        Current Medications:  Current Facility-Administered Medications   Medication Dose Route Frequency Provider Last Rate Last Admin    acetaminophen (TYLENOL) tablet 650 mg  650 mg Oral Q6H Benjy Padgett PA-C        artificial tears ophthalmic ointment   Both Eyes Q8H Fuentes Fowler MD   Given at 09/03/24 1112    [Held by provider] atorvastatin (LIPITOR) tablet 10 mg  10 mg Oral or Feeding Tube Daily Francisco Welsh MD   10 mg at 08/29/24 0924    budesonide (PULMICORT) neb solution 1 mg  1 mg Nebulization Daily Andres Payne PA-C   1 mg at 09/03/24 0934    cetirizine (zyrTEC) tablet 10 mg  10 mg Oral or Feeding Tube Daily Barry Hatch MD   10 mg at 09/03/24 0802    ciprofloxacin (CIPRO) infusion 400 mg  400 mg Intravenous Q8H Fuentes Fowler  mL/hr at 09/03/24 1203 400 mg at 09/03/24 1203    [START ON 9/4/2024] dexAMETHasone PF (DECADRON) injection 10 mg  10 mg Intravenous Daily Mirtha Vogt, APRN CNP        [Held by provider] gabapentin (NEURONTIN) capsule 300 mg  300 mg Oral or Feeding Tube TID Barry Hatch MD   300 mg at 08/28/24 1959    ipratropium (ATROVENT) 0.02 % neb solution 0.5 mg  0.5 mg Nebulization 4x daily Barry Hatch MD   0.5 mg at 09/03/24 1207    And    levalbuterol (XOPENEX) neb solution 0.63 mg  0.63 mg Nebulization 4x Daily Barry Hatch MD   0.63 mg at 09/03/24 1207    levothyroxine (SYNTHROID/LEVOTHROID) tablet 25 mcg  25 mcg Per Feeding Tube QAM AC Giovanny Guidry PA-C   25 mcg at 09/03/24 0802    loperamide (IMODIUM) capsule 4 mg  4 mg Oral or Feeding Tube Q6H Barry Hatch MD   4 mg at 09/03/24 0019    LORazepam (ATIVAN) tablet 2 mg  2 mg Oral Q6H Benjy Padgett PA-C   2 mg at 09/03/24 1327    montelukast (SINGULAIR) tablet 10 mg  10 mg Oral or Feeding Tube At Bedtime Barry Hatch MD   10  mg at 09/02/24 2210    multivitamin RENAL (RENAVITE RX/NEPHROVITE) tablet 1 tablet  1 tablet Oral or Feeding Tube Daily Giovanny Guidry PA-C   1 tablet at 09/03/24 0802    nystatin (MYCOSTATIN) suspension 500,000 Units  500,000 Units Oral 4x Daily Carlos Cerna MD   500,000 Units at 09/03/24 1203    pantoprazole (PROTONIX) 2 mg/mL suspension 40 mg  40 mg Per Feeding Tube QAM AC Barry Hatch MD   40 mg at 09/03/24 0802    Prosource TF20 ENfit Compatibl EN LIQD (PROSOURCE TF20) packet 60 mL  1 packet Per Feeding Tube BID Giovanny Guidry PA-C   60 mL at 09/03/24 0802    QUEtiapine (SEROquel) tablet 25 mg  25 mg Oral BID Benjy Padgett PA-C        tobramycin (PF) (LIZA) neb solution 300 mg  300 mg Nebulization 2 times daily Gaudencio De Souza MD   300 mg at 09/03/24 0924     Current Facility-Administered Medications   Medication Dose Route Frequency Provider Last Rate Last Admin    cisatracurium (NIMBEX) 200 mg in D5W 100 mL infusion  3-10 mcg/kg/min (Ideal) Intravenous Continuous Fuentes Fowler MD 6 mL/hr at 09/03/24 1428 4 mcg/kg/min at 09/03/24 1428    dextrose 10% infusion   Intravenous Continuous PRN Gaudencio De Souza MD        dextrose 10% infusion   Intravenous Continuous PRN Giovanny Guidry PA-C        dialysate for CVVHD & CVVHDF (Phoxillum BK4/2.5)  12.5 mL/kg/hr CRRT Continuous Emmanuelle Donato MD 1,000 mL/hr at 09/03/24 1243 12.5 mL/kg/hr at 09/03/24 1243    fentaNYL (SUBLIMAZE) infusion   mcg/hr Intravenous Continuous Fuentes Fowler MD 4 mL/hr at 09/03/24 1415 200 mcg/hr at 09/03/24 1415    heparin (porcine) 20,000 units in 20 mL ANTICOAGULANT infusion (syringe from pharmacy)  500-1,500 Units/hr CRRT Continuous Emmanuelle Donato MD 1.2 mL/hr at 09/03/24 1400 1,200 Units/hr at 09/03/24 1400    insulin regular (MYXREDLIN) 1 unit/mL infusion  0-24 Units/hr Intravenous Continuous Gaudencio De Souza MD 4 mL/hr at 09/03/24 1400 4 Units/hr at 09/03/24 1400    propofol (DIPRIVAN)  infusion  5-75 mcg/kg/min (Dosing Weight) Intravenous Continuous Fuentes Fowler MD   Stopped at 09/03/24 0930    And    Medication Instruction   Does not apply Continuous PRN Fuentes Fowler MD        midazolam (VERSED) 100 mg/100 mL NS infusion - ADULT  1-12 mg/hr Intravenous Continuous Carlos Cerna MD 12 mL/hr at 09/03/24 1400 12 mg/hr at 09/03/24 1400    norepinephrine (LEVOPHED) 16 mg in  mL infusion MAX CONC CENTRAL LINE  0.01-0.6 mcg/kg/min (Dosing Weight) Intravenous Continuous Elier Hutchins MD   Stopped at 09/02/24 1558    POST-filter replacement solution for CVVHD & CVVHDF (Phoxillum BK4/2.5)   CRRT Continuous Emmanuelle Donato  mL/hr at 09/03/24 0227 New Bag at 09/03/24 0227    PRE-filter replacement solution for CVVHD & CVVHDF (Phoxillum BK4/2.5)  12.5 mL/kg/hr CRRT Continuous Emmanuelle Donato MD 1,000 mL/hr at 09/03/24 1243 12.5 mL/kg/hr at 09/03/24 1243         I, Emmanuelle Donato, saw and evaluated this patient as part of a shared visit.  I have reviewed and discussed with the advanced practice provider their history, physical and plan.  I have personally performed the substantive portion of the medical decision making for this visit - please see the TRI's documentation for the full details  I personally reviewed the vital signs, medications, labs and imaging.    Key findings and management decisions made by me:  CHACORTA on CRRT in setting of ARDS. Continue CRRT for volume and solute control.    Emmanuelle Donato  Date of Service (when I saw the patient): 9/3

## 2024-09-04 ENCOUNTER — APPOINTMENT (OUTPATIENT)
Dept: CT IMAGING | Facility: CLINIC | Age: 54
DRG: 003 | End: 2024-09-04
Payer: MEDICAID

## 2024-09-04 ENCOUNTER — APPOINTMENT (OUTPATIENT)
Dept: GENERAL RADIOLOGY | Facility: CLINIC | Age: 54
DRG: 003 | End: 2024-09-04
Payer: MEDICAID

## 2024-09-04 LAB
ALBUMIN SERPL BCG-MCNC: 3.3 G/DL (ref 3.5–5.2)
ALBUMIN SERPL BCG-MCNC: 3.5 G/DL (ref 3.5–5.2)
ALLEN'S TEST: ABNORMAL
ALP SERPL-CCNC: 112 U/L (ref 40–150)
ALT SERPL W P-5'-P-CCNC: 24 U/L (ref 0–50)
ANION GAP SERPL CALCULATED.3IONS-SCNC: 12 MMOL/L (ref 7–15)
ANION GAP SERPL CALCULATED.3IONS-SCNC: 9 MMOL/L (ref 7–15)
AST SERPL W P-5'-P-CCNC: 26 U/L (ref 0–45)
BACTERIA ASPIRATE CULT: NORMAL
BASE EXCESS BLDA CALC-SCNC: -0.6 MMOL/L (ref -3–3)
BASE EXCESS BLDA CALC-SCNC: -1.4 MMOL/L (ref -3–3)
BASE EXCESS BLDA CALC-SCNC: -1.5 MMOL/L (ref -3–3)
BASE EXCESS BLDA CALC-SCNC: -1.7 MMOL/L (ref -3–3)
BILIRUB DIRECT SERPL-MCNC: <0.2 MG/DL (ref 0–0.3)
BILIRUB SERPL-MCNC: 0.2 MG/DL
BUN SERPL-MCNC: 32.3 MG/DL (ref 6–20)
BUN SERPL-MCNC: 32.9 MG/DL (ref 6–20)
CA-I BLD-MCNC: 4.8 MG/DL (ref 4.4–5.2)
CA-I BLD-MCNC: 4.8 MG/DL (ref 4.4–5.2)
CALCIUM SERPL-MCNC: 8.6 MG/DL (ref 8.8–10.4)
CALCIUM SERPL-MCNC: 8.9 MG/DL (ref 8.8–10.4)
CHLORIDE SERPL-SCNC: 101 MMOL/L (ref 98–107)
CHLORIDE SERPL-SCNC: 102 MMOL/L (ref 98–107)
COHGB MFR BLD: 99.6 % (ref 96–97)
COHGB MFR BLD: 99.8 % (ref 96–97)
COHGB MFR BLD: 99.8 % (ref 96–97)
COHGB MFR BLD: >100 % (ref 96–97)
CREAT SERPL-MCNC: 0.74 MG/DL (ref 0.51–0.95)
CREAT SERPL-MCNC: 0.79 MG/DL (ref 0.51–0.95)
CRP SERPL-MCNC: 32.4 MG/L
EGFRCR SERPLBLD CKD-EPI 2021: 89 ML/MIN/1.73M2
EGFRCR SERPLBLD CKD-EPI 2021: >90 ML/MIN/1.73M2
ERYTHROCYTE [DISTWIDTH] IN BLOOD BY AUTOMATED COUNT: 17.9 % (ref 10–15)
GLUCOSE BLDC GLUCOMTR-MCNC: 105 MG/DL (ref 70–99)
GLUCOSE BLDC GLUCOMTR-MCNC: 105 MG/DL (ref 70–99)
GLUCOSE BLDC GLUCOMTR-MCNC: 106 MG/DL (ref 70–99)
GLUCOSE BLDC GLUCOMTR-MCNC: 109 MG/DL (ref 70–99)
GLUCOSE BLDC GLUCOMTR-MCNC: 109 MG/DL (ref 70–99)
GLUCOSE BLDC GLUCOMTR-MCNC: 110 MG/DL (ref 70–99)
GLUCOSE BLDC GLUCOMTR-MCNC: 115 MG/DL (ref 70–99)
GLUCOSE BLDC GLUCOMTR-MCNC: 116 MG/DL (ref 70–99)
GLUCOSE BLDC GLUCOMTR-MCNC: 126 MG/DL (ref 70–99)
GLUCOSE BLDC GLUCOMTR-MCNC: 131 MG/DL (ref 70–99)
GLUCOSE BLDC GLUCOMTR-MCNC: 133 MG/DL (ref 70–99)
GLUCOSE BLDC GLUCOMTR-MCNC: 133 MG/DL (ref 70–99)
GLUCOSE BLDC GLUCOMTR-MCNC: 136 MG/DL (ref 70–99)
GLUCOSE BLDC GLUCOMTR-MCNC: 140 MG/DL (ref 70–99)
GLUCOSE BLDC GLUCOMTR-MCNC: 159 MG/DL (ref 70–99)
GLUCOSE BLDC GLUCOMTR-MCNC: 163 MG/DL (ref 70–99)
GLUCOSE BLDC GLUCOMTR-MCNC: 180 MG/DL (ref 70–99)
GLUCOSE BLDC GLUCOMTR-MCNC: 205 MG/DL (ref 70–99)
GLUCOSE BLDC GLUCOMTR-MCNC: 208 MG/DL (ref 70–99)
GLUCOSE BLDC GLUCOMTR-MCNC: 212 MG/DL (ref 70–99)
GLUCOSE SERPL-MCNC: 138 MG/DL (ref 70–99)
GLUCOSE SERPL-MCNC: 151 MG/DL (ref 70–99)
HCO3 BLD-SCNC: 24 MMOL/L (ref 21–28)
HCO3 BLD-SCNC: 25 MMOL/L (ref 21–28)
HCO3 SERPL-SCNC: 21 MMOL/L (ref 22–29)
HCO3 SERPL-SCNC: 23 MMOL/L (ref 22–29)
HCT VFR BLD AUTO: 25.6 % (ref 35–47)
HGB BLD-MCNC: 7.8 G/DL (ref 11.7–15.7)
MAGNESIUM SERPL-MCNC: 2.4 MG/DL (ref 1.7–2.3)
MAGNESIUM SERPL-MCNC: 2.6 MG/DL (ref 1.7–2.3)
MCH RBC QN AUTO: 29.9 PG (ref 26.5–33)
MCHC RBC AUTO-ENTMCNC: 30.5 G/DL (ref 31.5–36.5)
MCV RBC AUTO: 98 FL (ref 78–100)
O2/TOTAL GAS SETTING VFR VENT: 40 %
P JIROVECII DNA SPEC QL NAA+PROBE: NOT DETECTED
PCO2 BLD: 43 MM HG (ref 35–45)
PCO2 BLD: 43 MM HG (ref 35–45)
PCO2 BLD: 48 MM HG (ref 35–45)
PCO2 BLD: 49 MM HG (ref 35–45)
PH BLD: 7.32 [PH] (ref 7.35–7.45)
PH BLD: 7.32 [PH] (ref 7.35–7.45)
PH BLD: 7.36 [PH] (ref 7.35–7.45)
PH BLD: 7.37 [PH] (ref 7.35–7.45)
PHOSPHATE SERPL-MCNC: 4.5 MG/DL (ref 2.5–4.5)
PHOSPHATE SERPL-MCNC: 5.3 MG/DL (ref 2.5–4.5)
PLATELET # BLD AUTO: 303 10E3/UL (ref 150–450)
PO2 BLD: 107 MM HG (ref 80–105)
PO2 BLD: 122 MM HG (ref 80–105)
PO2 BLD: 124 MM HG (ref 80–105)
PO2 BLD: 132 MM HG (ref 80–105)
POTASSIUM SERPL-SCNC: 3.9 MMOL/L (ref 3.4–5.3)
POTASSIUM SERPL-SCNC: 4.5 MMOL/L (ref 3.4–5.3)
POTASSIUM SERPL-SCNC: 5.6 MMOL/L (ref 3.4–5.3)
PROT SERPL-MCNC: 5.7 G/DL (ref 6.4–8.3)
RBC # BLD AUTO: 2.61 10E6/UL (ref 3.8–5.2)
SAO2 % BLDA: 97 % (ref 92–100)
SAO2 % BLDA: 98 % (ref 92–100)
SCANNED LAB RESULT: NORMAL
SODIUM SERPL-SCNC: 134 MMOL/L (ref 135–145)
SODIUM SERPL-SCNC: 134 MMOL/L (ref 135–145)
TEST NAME: NORMAL
TRIGL SERPL-MCNC: 441 MG/DL
UFH PPP CHRO-ACNC: 0.22 IU/ML
UFH PPP CHRO-ACNC: 0.31 IU/ML
WBC # BLD AUTO: 16.2 10E3/UL (ref 4–11)

## 2024-09-04 PROCEDURE — 250N000011 HC RX IP 250 OP 636

## 2024-09-04 PROCEDURE — 82330 ASSAY OF CALCIUM: CPT | Performed by: CLINICAL NURSE SPECIALIST

## 2024-09-04 PROCEDURE — 71250 CT THORAX DX C-: CPT | Mod: 26 | Performed by: RADIOLOGY

## 2024-09-04 PROCEDURE — 84155 ASSAY OF PROTEIN SERUM: CPT | Performed by: INTERNAL MEDICINE

## 2024-09-04 PROCEDURE — 83735 ASSAY OF MAGNESIUM: CPT | Performed by: CLINICAL NURSE SPECIALIST

## 2024-09-04 PROCEDURE — 99233 SBSQ HOSP IP/OBS HIGH 50: CPT | Mod: FS | Performed by: CLINICAL NURSE SPECIALIST

## 2024-09-04 PROCEDURE — 250N000009 HC RX 250: Performed by: INTERNAL MEDICINE

## 2024-09-04 PROCEDURE — 250N000009 HC RX 250

## 2024-09-04 PROCEDURE — 84478 ASSAY OF TRIGLYCERIDES: CPT

## 2024-09-04 PROCEDURE — 99292 CRITICAL CARE ADDL 30 MIN: CPT | Mod: FS

## 2024-09-04 PROCEDURE — 250N000013 HC RX MED GY IP 250 OP 250 PS 637

## 2024-09-04 PROCEDURE — 82248 BILIRUBIN DIRECT: CPT | Performed by: INTERNAL MEDICINE

## 2024-09-04 PROCEDURE — 258N000003 HC RX IP 258 OP 636

## 2024-09-04 PROCEDURE — 84132 ASSAY OF SERUM POTASSIUM: CPT | Performed by: STUDENT IN AN ORGANIZED HEALTH CARE EDUCATION/TRAINING PROGRAM

## 2024-09-04 PROCEDURE — 82805 BLOOD GASES W/O2 SATURATION: CPT

## 2024-09-04 PROCEDURE — 250N000013 HC RX MED GY IP 250 OP 250 PS 637: Performed by: SURGERY

## 2024-09-04 PROCEDURE — 250N000009 HC RX 250: Performed by: SURGERY

## 2024-09-04 PROCEDURE — 250N000013 HC RX MED GY IP 250 OP 250 PS 637: Performed by: PHYSICIAN ASSISTANT

## 2024-09-04 PROCEDURE — 250N000011 HC RX IP 250 OP 636: Performed by: INTERNAL MEDICINE

## 2024-09-04 PROCEDURE — 85027 COMPLETE CBC AUTOMATED: CPT | Performed by: PHYSICIAN ASSISTANT

## 2024-09-04 PROCEDURE — 71045 X-RAY EXAM CHEST 1 VIEW: CPT | Mod: 26 | Performed by: RADIOLOGY

## 2024-09-04 PROCEDURE — 85520 HEPARIN ASSAY: CPT | Performed by: INTERNAL MEDICINE

## 2024-09-04 PROCEDURE — 90947 DIALYSIS REPEATED EVAL: CPT

## 2024-09-04 PROCEDURE — 94640 AIRWAY INHALATION TREATMENT: CPT

## 2024-09-04 PROCEDURE — 94640 AIRWAY INHALATION TREATMENT: CPT | Mod: 76

## 2024-09-04 PROCEDURE — 94003 VENT MGMT INPAT SUBQ DAY: CPT

## 2024-09-04 PROCEDURE — 71045 X-RAY EXAM CHEST 1 VIEW: CPT

## 2024-09-04 PROCEDURE — 200N000002 HC R&B ICU UMMC

## 2024-09-04 PROCEDURE — 250N000009 HC RX 250: Performed by: STUDENT IN AN ORGANIZED HEALTH CARE EDUCATION/TRAINING PROGRAM

## 2024-09-04 PROCEDURE — 86140 C-REACTIVE PROTEIN: CPT

## 2024-09-04 PROCEDURE — 71250 CT THORAX DX C-: CPT

## 2024-09-04 PROCEDURE — 80069 RENAL FUNCTION PANEL: CPT | Performed by: CLINICAL NURSE SPECIALIST

## 2024-09-04 PROCEDURE — 84100 ASSAY OF PHOSPHORUS: CPT | Performed by: CLINICAL NURSE SPECIALIST

## 2024-09-04 PROCEDURE — 99291 CRITICAL CARE FIRST HOUR: CPT | Mod: FS

## 2024-09-04 PROCEDURE — 999N000157 HC STATISTIC RCP TIME EA 10 MIN

## 2024-09-04 RX ORDER — AMINO ACIDS/PROTEIN HYDROLYS 11G-40/45
1 LIQUID IN PACKET (ML) ORAL DAILY
Status: DISCONTINUED | OUTPATIENT
Start: 2024-09-05 | End: 2024-09-16

## 2024-09-04 RX ORDER — LOPERAMIDE HCL 2 MG
4 CAPSULE ORAL 4 TIMES DAILY PRN
Status: DISCONTINUED | OUTPATIENT
Start: 2024-09-04 | End: 2024-09-06

## 2024-09-04 RX ADMIN — LEVOTHYROXINE SODIUM 25 MCG: 0.03 TABLET ORAL at 08:01

## 2024-09-04 RX ADMIN — OXYCODONE HYDROCHLORIDE 10 MG: 5 SOLUTION ORAL at 03:38

## 2024-09-04 RX ADMIN — LEVALBUTEROL HYDROCHLORIDE 0.63 MG: 0.63 SOLUTION RESPIRATORY (INHALATION) at 12:56

## 2024-09-04 RX ADMIN — MIDAZOLAM HYDROCHLORIDE 16 MG/HR: 1 INJECTION, SOLUTION INTRAVENOUS at 06:24

## 2024-09-04 RX ADMIN — Medication 60 ML: at 08:02

## 2024-09-04 RX ADMIN — CIPROFLOXACIN 400 MG: 400 INJECTION, SOLUTION INTRAVENOUS at 12:09

## 2024-09-04 RX ADMIN — QUETIAPINE FUMARATE 25 MG: 25 TABLET ORAL at 21:01

## 2024-09-04 RX ADMIN — CALCIUM CHLORIDE, MAGNESIUM CHLORIDE, SODIUM CHLORIDE, SODIUM BICARBONATE, POTASSIUM CHLORIDE AND SODIUM PHOSPHATE DIBASIC DIHYDRATE 12.5 ML/KG/HR: 3.68; 3.05; 6.34; 3.09; .314; .187 INJECTION INTRAVENOUS at 13:52

## 2024-09-04 RX ADMIN — IPRATROPIUM BROMIDE 0.5 MG: 0.5 SOLUTION RESPIRATORY (INHALATION) at 19:17

## 2024-09-04 RX ADMIN — LORAZEPAM 2 MG: 1 TABLET ORAL at 21:02

## 2024-09-04 RX ADMIN — ACETAMINOPHEN 650 MG: 325 TABLET ORAL at 21:02

## 2024-09-04 RX ADMIN — IPRATROPIUM BROMIDE 0.5 MG: 0.5 SOLUTION RESPIRATORY (INHALATION) at 16:00

## 2024-09-04 RX ADMIN — NYSTATIN 500000 UNITS: 100000 SUSPENSION ORAL at 12:09

## 2024-09-04 RX ADMIN — INSULIN HUMAN 5 UNITS/HR: 1 INJECTION, SOLUTION INTRAVENOUS at 13:16

## 2024-09-04 RX ADMIN — Medication 300 MCG/HR: at 15:54

## 2024-09-04 RX ADMIN — IPRATROPIUM BROMIDE 0.5 MG: 0.5 SOLUTION RESPIRATORY (INHALATION) at 08:26

## 2024-09-04 RX ADMIN — LOPERAMIDE HYDROCHLORIDE 4 MG: 2 CAPSULE ORAL at 09:46

## 2024-09-04 RX ADMIN — Medication 50 MCG: at 04:38

## 2024-09-04 RX ADMIN — CETIRIZINE HYDROCHLORIDE 10 MG: 10 TABLET, FILM COATED ORAL at 08:02

## 2024-09-04 RX ADMIN — CALCIUM CHLORIDE, MAGNESIUM CHLORIDE, SODIUM CHLORIDE, SODIUM BICARBONATE, POTASSIUM CHLORIDE AND SODIUM PHOSPHATE DIBASIC DIHYDRATE 12.5 ML/KG/HR: 3.68; 3.05; 6.34; 3.09; .314; .187 INJECTION INTRAVENOUS at 03:26

## 2024-09-04 RX ADMIN — CIPROFLOXACIN 400 MG: 400 INJECTION, SOLUTION INTRAVENOUS at 21:00

## 2024-09-04 RX ADMIN — LEVALBUTEROL HYDROCHLORIDE 0.63 MG: 0.63 SOLUTION RESPIRATORY (INHALATION) at 19:17

## 2024-09-04 RX ADMIN — WHITE PETROLATUM 57.7 %-MINERAL OIL 31.9 % EYE OINTMENT: at 00:00

## 2024-09-04 RX ADMIN — ACETAMINOPHEN 650 MG: 325 TABLET ORAL at 09:46

## 2024-09-04 RX ADMIN — LEVALBUTEROL HYDROCHLORIDE 0.63 MG: 0.63 SOLUTION RESPIRATORY (INHALATION) at 16:00

## 2024-09-04 RX ADMIN — MONTELUKAST 10 MG: 10 TABLET, FILM COATED ORAL at 21:02

## 2024-09-04 RX ADMIN — IPRATROPIUM BROMIDE 0.5 MG: 0.5 SOLUTION RESPIRATORY (INHALATION) at 12:56

## 2024-09-04 RX ADMIN — OXYCODONE HYDROCHLORIDE 10 MG: 5 SOLUTION ORAL at 21:01

## 2024-09-04 RX ADMIN — NYSTATIN 500000 UNITS: 100000 SUSPENSION ORAL at 21:01

## 2024-09-04 RX ADMIN — NYSTATIN 500000 UNITS: 100000 SUSPENSION ORAL at 16:23

## 2024-09-04 RX ADMIN — BUDESONIDE 1 MG: 1 INHALANT ORAL at 08:26

## 2024-09-04 RX ADMIN — Medication 40 MG: at 08:01

## 2024-09-04 RX ADMIN — QUETIAPINE FUMARATE 25 MG: 25 TABLET ORAL at 08:01

## 2024-09-04 RX ADMIN — WHITE PETROLATUM 57.7 %-MINERAL OIL 31.9 % EYE OINTMENT: at 16:23

## 2024-09-04 RX ADMIN — OXYCODONE HYDROCHLORIDE 10 MG: 5 SOLUTION ORAL at 16:22

## 2024-09-04 RX ADMIN — WHITE PETROLATUM 57.7 %-MINERAL OIL 31.9 % EYE OINTMENT: at 08:02

## 2024-09-04 RX ADMIN — LORAZEPAM 2 MG: 1 TABLET ORAL at 09:46

## 2024-09-04 RX ADMIN — LEVALBUTEROL HYDROCHLORIDE 0.63 MG: 0.63 SOLUTION RESPIRATORY (INHALATION) at 08:26

## 2024-09-04 RX ADMIN — NYSTATIN 500000 UNITS: 100000 SUSPENSION ORAL at 08:01

## 2024-09-04 RX ADMIN — CALCIUM CHLORIDE, MAGNESIUM CHLORIDE, SODIUM CHLORIDE, SODIUM BICARBONATE, POTASSIUM CHLORIDE AND SODIUM PHOSPHATE DIBASIC DIHYDRATE: 3.68; 3.05; 6.34; 3.09; .314; .187 INJECTION INTRAVENOUS at 03:25

## 2024-09-04 RX ADMIN — DEXAMETHASONE SODIUM PHOSPHATE 10 MG: 10 INJECTION, SOLUTION INTRAMUSCULAR; INTRAVENOUS at 08:02

## 2024-09-04 RX ADMIN — ACETAMINOPHEN 650 MG: 325 TABLET ORAL at 03:38

## 2024-09-04 RX ADMIN — Medication 1200 UNITS/HR: at 02:05

## 2024-09-04 RX ADMIN — LORAZEPAM 2 MG: 1 TABLET ORAL at 03:38

## 2024-09-04 RX ADMIN — INSULIN HUMAN 5 UNITS/HR: 1 INJECTION, SOLUTION INTRAVENOUS at 12:24

## 2024-09-04 RX ADMIN — CALCIUM CHLORIDE, MAGNESIUM CHLORIDE, SODIUM CHLORIDE, SODIUM BICARBONATE, POTASSIUM CHLORIDE AND SODIUM PHOSPHATE DIBASIC DIHYDRATE 12.5 ML/KG/HR: 3.68; 3.05; 6.34; 3.09; .314; .187 INJECTION INTRAVENOUS at 08:22

## 2024-09-04 RX ADMIN — OXYCODONE HYDROCHLORIDE 10 MG: 5 SOLUTION ORAL at 09:46

## 2024-09-04 RX ADMIN — MIDAZOLAM HYDROCHLORIDE 16 MG/HR: 1 INJECTION, SOLUTION INTRAVENOUS at 18:45

## 2024-09-04 RX ADMIN — Medication 1 TABLET: at 08:01

## 2024-09-04 RX ADMIN — MIDAZOLAM HYDROCHLORIDE 16 MG/HR: 1 INJECTION, SOLUTION INTRAVENOUS at 12:42

## 2024-09-04 RX ADMIN — LOPERAMIDE HYDROCHLORIDE 4 MG: 2 CAPSULE ORAL at 03:38

## 2024-09-04 RX ADMIN — Medication 300 MCG/HR: at 07:31

## 2024-09-04 RX ADMIN — CALCIUM CHLORIDE, MAGNESIUM CHLORIDE, SODIUM CHLORIDE, SODIUM BICARBONATE, POTASSIUM CHLORIDE AND SODIUM PHOSPHATE DIBASIC DIHYDRATE 12.5 ML/KG/HR: 3.68; 3.05; 6.34; 3.09; .314; .187 INJECTION INTRAVENOUS at 20:17

## 2024-09-04 RX ADMIN — CALCIUM CHLORIDE, MAGNESIUM CHLORIDE, SODIUM CHLORIDE, SODIUM BICARBONATE, POTASSIUM CHLORIDE AND SODIUM PHOSPHATE DIBASIC DIHYDRATE 12.5 ML/KG/HR: 3.68; 3.05; 6.34; 3.09; .314; .187 INJECTION INTRAVENOUS at 03:25

## 2024-09-04 RX ADMIN — LORAZEPAM 2 MG: 1 TABLET ORAL at 16:23

## 2024-09-04 RX ADMIN — CALCIUM CHLORIDE, MAGNESIUM CHLORIDE, SODIUM CHLORIDE, SODIUM BICARBONATE, POTASSIUM CHLORIDE AND SODIUM PHOSPHATE DIBASIC DIHYDRATE 12.5 ML/KG/HR: 3.68; 3.05; 6.34; 3.09; .314; .187 INJECTION INTRAVENOUS at 08:21

## 2024-09-04 RX ADMIN — CIPROFLOXACIN 400 MG: 400 INJECTION, SOLUTION INTRAVENOUS at 03:34

## 2024-09-04 RX ADMIN — TOBRAMYCIN 300 MG: 300 SOLUTION RESPIRATORY (INHALATION) at 08:27

## 2024-09-04 RX ADMIN — ACETAMINOPHEN 650 MG: 325 TABLET ORAL at 16:22

## 2024-09-04 RX ADMIN — CISATRACURIUM BESYLATE 5.5 MCG/KG/MIN: 10 INJECTION INTRAVENOUS at 16:30

## 2024-09-04 RX ADMIN — CALCIUM CHLORIDE, MAGNESIUM CHLORIDE, SODIUM CHLORIDE, SODIUM BICARBONATE, POTASSIUM CHLORIDE AND SODIUM PHOSPHATE DIBASIC DIHYDRATE 12.5 ML/KG/HR: 3.68; 3.05; 6.34; 3.09; .314; .187 INJECTION INTRAVENOUS at 20:18

## 2024-09-04 ASSESSMENT — ACTIVITIES OF DAILY LIVING (ADL)
ADLS_ACUITY_SCORE: 57
ADLS_ACUITY_SCORE: 61
ADLS_ACUITY_SCORE: 57
ADLS_ACUITY_SCORE: 57
ADLS_ACUITY_SCORE: 61
ADLS_ACUITY_SCORE: 57
ADLS_ACUITY_SCORE: 61
ADLS_ACUITY_SCORE: 57

## 2024-09-04 NOTE — PLAN OF CARE
Major Shift Events:      Pt on versed,fent gtt whole day. Off paralytic for 5 hours today before increased work of breathing. BIS between 50-60 when paralytic on, per MD titrate more to vent syn than to TOF. 4/4 at 30amps. Pupilometers q4, see flowsheets. Tmax 100, esoph probe, scheduled tylenol and external cooling measures promoted. Remains intubated, breathing at 24. Small amount of secretions. Began breathing into high 30s and more abdominal muscle use after 4.5 hours of paralytic off, turned back on to syn with vent. Remains SR/ST. MAP>65 with levo infusing (on max 12 hours to off). +1 edema thorughout. Bladder scanned for 212 today. TF increased to 45 (goal) with SFWF via small bore in nare. Titrating insulin gtt as indicated with blood sugars. Rectal tube in place with no output on day shift. CRRT on, pulling to be net even per team for the day, heparin in circuit.    Plan: notify md of any acute changes.     For vital signs and complete assessments, please see documentation flowsheets.      Goal Outcome Evaluation:      Plan of Care Reviewed With: patient    Overall Patient Progress: no changeOverall Patient Progress: no change    Outcome Evaluation: on/off levo. was able to breath on cmv settings with paralytic off for 5 hours, then began havin gincreased work of breathing and breathing into high 30s. paralytic was placed back on in anticipation of CT scan (per MD have paralytic on for scan).

## 2024-09-04 NOTE — PLAN OF CARE
CRRT STATUS NOTE    DATA:  Time:  6:00 AM  Pressures WNL:  YES  Filter Status:  WDL    Problems Reported/Alarms Noted:  None.    Supplies Present:  YES    ASSESSMENT:  Patient Net Fluid Balance:  Net -250 ml @ 0600.  Vital Signs:  , /62, MAP 87  Labs:  K 3.9, Mg 2.4, Phos 4.5, iCa 4.8, Hgb 7.8, Plt 303  Goals of Therapy:  Meeting goals of therapy.    INTERVENTIONS:   None.    PLAN:  Continue to monitor circuit daily and change set q72 hours or PRN for clotting/clogging. Please call CRRT RN with any questions/problems.

## 2024-09-04 NOTE — PLAN OF CARE
Major Shift Events:  Pt on and off levophed gtt. Tolerated -500 ml on CRRT by 2400. BP and BIS values spike with any cares/turns.   Plan: Continue to monitor BP and BIS closely and adjust medications as necessary/able. Will continue with the current POC.  For vital signs and complete assessments, please see documentation flowsheets.      Goal Outcome Evaluation:      Plan of Care Reviewed With: patient    Overall Patient Progress: improvingOverall Patient Progress: improving    Outcome Evaluation: Less pressor needs overnight. Labs improved.      Problem: Adult Inpatient Plan of Care  Goal: Plan of Care Review  Description: The Plan of Care Review/Shift note should be completed every shift.  The Outcome Evaluation is a brief statement about your assessment that the patient is improving, declining, or no change.  This information will be displayed automatically on your shift  note.  Outcome: Progressing  Flowsheets (Taken 9/4/2024 0529)  Outcome Evaluation: Less pressor needs overnight. Labs improved.  Plan of Care Reviewed With: patient  Overall Patient Progress: improving     Problem: Adult Inpatient Plan of Care  Goal: Patient-Specific Goal (Individualized)  Description: Patient will wean vent and be able to pressure support in the next two days working towards extubation vs. Later assessing need for trach and slower vent weaning.  Will also tolerate CRRT without increasing pressor needs within MAP parameters.  Outcome: Progressing  Flowsheets  Taken 9/4/2024 0529  Patient/Family-Specific Goals (Include Timeframe): Blood gas improved, may try to stop paralytic again today.  Taken 9/3/2024 2000  Individualized Care Needs: pulmonary support, CRRT,sedation, paralytics  Anxieties, Fears or Concerns: PARDEEP     Problem: Gas Exchange Impaired  Goal: Optimal Gas Exchange  Outcome: Progressing  Intervention: Optimize Oxygenation and Ventilation  Flowsheets  Taken 9/4/2024 0529  Airway/Ventilation Management:   airway patency  maintained   calming measures promoted   humidification applied   pulmonary hygiene promoted  Head of Bed (HOB) Positioning: HOB at 30 degrees  Taken 9/4/2024 0400  Head of Bed (HOB) Positioning: HOB at 30 degrees  Taken 9/4/2024 0200  Head of Bed (HOB) Positioning: HOB at 30 degrees  Taken 9/4/2024 0000  Head of Bed (HOB) Positioning: HOB at 20-30 degrees  Taken 9/3/2024 2200  Head of Bed (HOB) Positioning: HOB at 20-30 degrees  Taken 9/3/2024 2000  Head of Bed (HOB) Positioning: HOB at 20-30 degrees

## 2024-09-04 NOTE — PROGRESS NOTES
Nephrology Progress Note  09/04/2024       Mrs Flaherty is a 54 yo F w/ hx of Anxiety, depression, fibromyalgia, hypothyroidism, asthma, HLD, MURIEL on CPAP, expressive aphasia, and hypertension who was admitted to an OSH with community acquired pneumonia on 8/3 s/p intubation. Found to have severe ARDS requiring paralysis and proning, transferred here on 8/8 for management and initiated on CKRT for severe acidemia in the setting of oligoanuric CHACORTA. Started CRRT on 8/9 for volume management.     Interval History :   Mrs Flaherty continues on CRRT, was net negative 0.5L yesterday and labs are stable on 4k baths. Continues to have tenuous pulm status with need for paralytic but has been stable without proning the past 24h.       Assessment & Recommendations:   CHACORTA-Baseline Cr 0.6 but not checked since 5/2023 PTA, UA with protein + blood but difficult to interpret in setting of CHACORTA, likely hypotensive ATN. Started CRRT on 8/9, now anuric.    -Access is RIJ tunneled line from 8/23  -CRRT, 4k baths, 0-100cc/h net negative depending on BP's.   -Dialysis consent signed and scanned into media on 8/9    Please avoid placing a PICC line in any patient with CKD III or above, CHACORTA, or ESKD, as this will impact negatively the ability of the patient to have an AV fistula or AV Graft should they need it in the future.  Internal jugular or External Jugular  are the preferred type of access in patients with kidney disease. (Link to guidelines)    Volume status-On low dose norepi, Bp's 100's/50's, trying to pull 50-100cc/h as long as BP's are stable.     Electrolytes/pH-No acute issues, bicarb 19 noted to be slightly low but generally has been stable.      Ca/phos/pth-Mg and Phos mildly up, no intervention needed.     Anemia-Hgb 7.8, stable.     Nutrition-Renal TF    Time spent: 45 minutes on this date of encounter for chart review, physical exam, medical decision making and co-ordination of care.     Discussed with   "Breannai    Recommendations were communicated to primary team via verbal communication.     Dakota Dorsey, DOREEN CNS  Clinical Nurse Specialist  [phone]    Review of Systems:   I reviewed the following systems:  ROS not done due to vent/sedation.     Physical Exam:   I/O last 3 completed shifts:  In: 3233.87 [I.V.:1848.87; NG/GT:665]  Out: 3487 [Other:2997; Stool:490]   /64   Pulse 106   Temp 98.4  F (36.9  C) (Esophageal)   Resp 24   Ht 1.575 m (5' 2\")   Wt 77.2 kg (170 lb 3.1 oz)   SpO2 100%   BMI 31.13 kg/m       GENERAL APPEARANCE: critically ill, intubated   HEENT: MMM   PULM: lungs clear anteriorly   CV: regular rhythm, normal rate, no rub      -edema - 1+ LE edema   GI: soft, ND  INTEGUMENT: no rash on exposed skin  NEURO: sedated   Access is LFV temp line 8/19.     Labs:   All labs reviewed by me  Electrolytes/Renal -   Recent Labs   Lab Test 09/04/24  0805 09/04/24  0659 09/04/24  0607 09/04/24  0523 09/04/24  0357 09/03/24  1703 09/03/24  1701 09/03/24  1310 09/03/24  1215   NA  --   --   --   --  134*  --  132*  --  132*   POTASSIUM  --   --   --   --  3.9  --  5.3  --  4.7   CHLORIDE  --   --   --   --  102  --  99  --  99   CO2  --   --   --   --  23  --  22  --  19*   BUN  --   --   --   --  32.3*  --  33.5*  --  33.1*   CR  --   --   --   --  0.74  --  0.78  --  0.74   * 105* 110*   < > 138*   < > 166*   < > 182*  197*   QUAN  --   --   --   --  8.6*  --  8.7*  --  9.0   MAG  --   --   --   --  2.4*  --  2.5*  --  2.4*   PHOS  --   --   --   --  4.5  --  5.7*  --  5.6*    < > = values in this interval not displayed.       CBC -   Recent Labs   Lab Test 09/04/24 0357 09/03/24 1215 09/03/24  0343   WBC 16.2* 13.1* 15.2*   HGB 7.8* 8.7* 9.2*    334 375       LFTs -   Recent Labs   Lab Test 09/04/24 0357 09/03/24  1701 09/03/24 1215 09/03/24  0343 09/02/24  1131 09/02/24  0403   ALKPHOS 112  --   --  148  --  156*   BILITOTAL 0.2  --   --  0.2  --  0.2   ALT 24  --   --  " 20  --  28   AST 26  --   --  33  --  28   PROTTOTAL 5.7*  --   --  6.5  --  6.4   ALBUMIN 3.3* 3.5 3.5 3.6   < > 3.4*    < > = values in this interval not displayed.       Iron Panel - No lab results found.        Current Medications:  Current Facility-Administered Medications   Medication Dose Route Frequency Provider Last Rate Last Admin    acetaminophen (TYLENOL) tablet 650 mg  650 mg Oral Q6H Benjy Padgett PA-C   650 mg at 09/04/24 0338    artificial tears ophthalmic ointment   Both Eyes Q8H Fuentes Fowler MD   Given at 09/04/24 0802    [Held by provider] atorvastatin (LIPITOR) tablet 10 mg  10 mg Oral or Feeding Tube Daily Francisco Welsh MD   10 mg at 08/29/24 0924    budesonide (PULMICORT) neb solution 1 mg  1 mg Nebulization Daily Andres Payne PA-C   1 mg at 09/04/24 0826    cetirizine (zyrTEC) tablet 10 mg  10 mg Oral or Feeding Tube Daily Barry Hatch MD   10 mg at 09/04/24 0802    ciprofloxacin (CIPRO) infusion 400 mg  400 mg Intravenous Q8H Fuentes Fowler  mL/hr at 09/03/24 1203 400 mg at 09/04/24 0334    dexAMETHasone PF (DECADRON) injection 10 mg  10 mg Intravenous Daily Mirtha Vogt, APRN CNP   10 mg at 09/04/24 0802    [Held by provider] gabapentin (NEURONTIN) capsule 300 mg  300 mg Oral or Feeding Tube TID Barry Hatch MD   300 mg at 08/28/24 1959    ipratropium (ATROVENT) 0.02 % neb solution 0.5 mg  0.5 mg Nebulization 4x daily Barry Hatch MD   0.5 mg at 09/04/24 0826    And    levalbuterol (XOPENEX) neb solution 0.63 mg  0.63 mg Nebulization 4x Daily Barry Hatch MD   0.63 mg at 09/04/24 0826    levothyroxine (SYNTHROID/LEVOTHROID) tablet 25 mcg  25 mcg Per Feeding Tube QAM  Giovanny Guidry PA-C   25 mcg at 09/04/24 0801    loperamide (IMODIUM) capsule 4 mg  4 mg Oral or Feeding Tube Q6H Barry Hatch MD   4 mg at 09/04/24 0338    LORazepam (ATIVAN) tablet 2 mg  2 mg Oral Q6H Benjy Padgett PA-C   2 mg at 09/04/24 0338     montelukast (SINGULAIR) tablet 10 mg  10 mg Oral or Feeding Tube At Bedtime Barry Hatch MD   10 mg at 09/03/24 2158    multivitamin RENAL (RENAVITE RX/NEPHROVITE) tablet 1 tablet  1 tablet Oral or Feeding Tube Daily Giovanny Guidry PA-C   1 tablet at 09/04/24 0801    nystatin (MYCOSTATIN) suspension 500,000 Units  500,000 Units Oral 4x Daily Carlos Cerna MD   500,000 Units at 09/04/24 0801    oxyCODONE (ROXICODONE) solution 10 mg  10 mg Oral Q6H Benjy Padgett PA-C   10 mg at 09/04/24 0338    pantoprazole (PROTONIX) 2 mg/mL suspension 40 mg  40 mg Per Feeding Tube QAM AC Barry Hatch MD   40 mg at 09/04/24 0801    Prosource TF20 ENfit Compatibl EN LIQD (PROSOURCE TF20) packet 60 mL  1 packet Per Feeding Tube BID Giovanny Guidry PA-C   60 mL at 09/04/24 0802    QUEtiapine (SEROquel) tablet 25 mg  25 mg Oral BID Benjy Padgett PA-C   25 mg at 09/04/24 0801    tobramycin (PF) (LIZA) neb solution 300 mg  300 mg Nebulization 2 times daily Gaudencio De Souza MD   300 mg at 09/04/24 0827     Current Facility-Administered Medications   Medication Dose Route Frequency Provider Last Rate Last Admin    cisatracurium (NIMBEX) 200 mg in D5W 100 mL infusion  3-10 mcg/kg/min (Ideal) Intravenous Continuous Fuentes Fowler MD 8.3 mL/hr at 09/04/24 0800 5.5 mcg/kg/min at 09/04/24 0800    dextrose 10% infusion   Intravenous Continuous PRN Gaudencio De Souza MD        dextrose 10% infusion   Intravenous Continuous PRN Giovanny Guidry PA-C        dialysate for CVVHD & CVVHDF (Phoxillum BK4/2.5)  12.5 mL/kg/hr CRRT Continuous Emmanuelle Donato MD 1,000 mL/hr at 09/04/24 0822 12.5 mL/kg/hr at 09/04/24 0822    fentaNYL (SUBLIMAZE) infusion   mcg/hr Intravenous Continuous Benjy Padgett PA-C 6 mL/hr at 09/04/24 0800 300 mcg/hr at 09/04/24 0800    heparin (porcine) 20,000 units in 20 mL ANTICOAGULANT infusion (syringe from pharmacy)  500-1,500 Units/hr CRRT Continuous Emmanuelle Donato,  MD 1.2 mL/hr at 09/04/24 0400 1,200 Units/hr at 09/04/24 0400    insulin regular (MYXREDLIN) 1 unit/mL infusion  0-24 Units/hr Intravenous Continuous Gaudencio De Souza MD 1.5 mL/hr at 09/04/24 0800 1.5 Units/hr at 09/04/24 0800    propofol (DIPRIVAN) infusion  5-10 mcg/kg/min (Dosing Weight) Intravenous Continuous Gaudencio De Souza MD        And    Medication Instruction   Does not apply Continuous PRN Gaudencio De Souza MD        midazolam (VERSED) 100 mg/100 mL NS infusion - ADULT  1-16 mg/hr Intravenous Continuous Benjy Padgett PA-C 16 mL/hr at 09/04/24 0800 16 mg/hr at 09/04/24 0800    norepinephrine (LEVOPHED) 16 mg in  mL infusion MAX CONC CENTRAL LINE  0.01-0.6 mcg/kg/min (Dosing Weight) Intravenous Continuous Elier Hutchins MD   Stopped at 09/04/24 0438    POST-filter replacement solution for CVVHD & CVVHDF (Phoxillum BK4/2.5)   CRRT Continuous VinayaktrEmmanuelle dixon  mL/hr at 09/04/24 0325 New Bag at 09/04/24 0325    PRE-filter replacement solution for CVVHD & CVVHDF (Phoxillum BK4/2.5)  12.5 mL/kg/hr CRRT Continuous Emmanuelle Donato MD 1,000 mL/hr at 09/04/24 0821 12.5 mL/kg/hr at 09/04/24 0821       IEmmanuelle, saw and evaluated this patient as part of a shared visit.  I have reviewed and discussed with the advanced practice provider their history, physical and plan.  I have personally performed the substantive portion of the medical decision making for this visit - please see the RTI's documentation for the full details  I personally reviewed the vital signs, medications, labs and imaging.    Key findings and management decisions made by me:  CHACORTA in setting of ARDS, on CRRT. Continue CRRT for volume and solute control.     Emmanuelle Donato  Date of Service (when I saw the patient): 9/4

## 2024-09-04 NOTE — PROGRESS NOTES
"Critical Care Services Progress Note:  I personally examined and evaluated the patient today. I formulated today s plan with the house staff team or TRI and agree with the findings and plan in the associated note (see separately attested TRI note).   I have evaluated all laboratory values and imaging studies from the past 24 hours.  Summary of hospital course:  53 year old female initially transferred to [facility] on 8/8 for VV-ECMO.  She was diagnosed with ARDS from a suspected pneumonia. Decannulated on 8/18. Extubated on 8/19.  Reintubated on 8/29 for hypoxemia and now critically ill, proned and paralyzed  Overnight events/pertinent findings today:  Remained supine overnight.  Attempted stopping Cisatracurium yesterday.  She quickly became dysynchronous on the ventilator that eventually required paralysis again.      Data  /64   Pulse 98   Temp 98.4  F (36.9  C)   Resp 24   Ht 1.575 m (5' 2\")   Wt 77.2 kg (170 lb 3.1 oz)   SpO2 100%   BMI 31.13 kg/m    I/O - 520 mL    Na 134, K 3.9  CRP 32  WBC 16.2, Hgb 7.8, Plts 303    Sputum culture 8/29 with pseudomonas  BAL culture 8/30 with pseudomonas    Blood CMV PCR + 741 --> 920    Chest CT - extensive GGO and consolidative changes  ECHO is normal    CXR improved aeration of both R and L lung      Assessment/plan:    ARDS  CHACORTA on CRRT  Pseudomonas Pneumonia  Shock   Reactivation of CMV    Supine   Cisatracurium holiday today  BIS monitoring  Net even via CRRT  Ciprofloxacin  Nebulized LIZA  Continue invasive mechanical ventilation.   MAP goal 65  Recheck Triglyercides  Recheck CMV PCR on 9/6  Non-contrast chest CT today.    Care conference tomorrow.     Family updated at the bedside    Rest per TRI note.    I spent a total of 55 minutes (excluding procedure time) personally providing and directing critical care services at the bedside and on the critical care unit for this patient.     Alec Shelley MD        "

## 2024-09-04 NOTE — PROGRESS NOTES
MEDICAL ICU PROGRESS NOTE  09/04/2024    Date of Service (when I saw the patient): 09/04/2024    ASSESSMENT: Massiel Flaherty is a 53 year old female with PMH Anxiety/depression, fibromyalgia, c/f nonepileptic seizures not on AEDs, hypothyroidism, asthma, HLD, MURIEL on CPAP, expressive aphasia, hypertension  who was admitted on 8/3/2024 for fatigue, fever and dyspnea and intubated 8/6/24 at OSH for ARDS, proned and paralyzed without improvement. Transferred to [facility] 8/8/24, hypoxic with high plateaus/peaks and placed on VV ECMO and CRRT. Decannulated from VV ECMO 8/18 and transferred to MICU 8/26/24 for ongoing management. Extubated 8/27 then reintubated 8/29 iso worsening AHRF and pseudomonas pneumonia.     CHANGES and MAJOR THINGS TODAY:  - Paralytic vacation  - CT chest w/o  - Fluid goal net even    Neuro:  # Pain and sedation  # Acute pain  # Sedation and analgesia for vent compliance   # Concern for ICU delirium   # Hx Fibromyalgia   # Hx myalgic encephalomyelitis   # Hx anxiety/depression  - Monitor neurological status. Delirium preventions and precautions.   - Pain: Scheduled: tylenol, oxycodone 5mg q 6hr (decreased from q 4hr) held while sedated  PRN: tylenol, oxycodone  - Sedation plan: fentanyl, midazolam   - Oxycodone 10 mg every 6 hours and Ativan 2 mg every 6 hours for further sedation to reach BIS goal  - Propofol weaned for high triglycerides  - Cisatracurium 5.5 mcg  - RASS goal -4 to -5, BIS goal 40-60  - Gabapentin 300mg TID held while sedated  - Seroquel 25mg BID PRN held while sedated  - PTA Venlafaxine and Amitriptyline discontinued while sedated  - Will pause cisatracurium and assess vent synchrony    # Hx spells with staring and BUE posturing previously described as nonepileptic seizures   # Hx of GTC seizures and myoclonic epilepsy, weaned off AEDs   # Diffuse cerebral edema - improved  - Sodium goals liberalized to 140-145  - 8/21: MRI Brain: no acute intracranial pathology. No findings to  suggest anoxic brain injury.     Pulmonary:  # Acute hypoxic respiratory failure c/b ARDS  # Pseudomonas pneumonia  # Mechanical ventilation  # s/p VV ECMO 8/8 - 8/18   # Hx CAP  # Asthma  # MURIEL on home CPAP  PFT dated 9/8/2020 demonstrated normal spirometry with mild diffusion impairment and mild restriction (FEV1/FVC 80%, FEV1 2.59, DLCO 40%). CT abdomen chest 3/9/2020 demonstrated scarring and fibrosis bilaterally which correlated with the decreased DLCO. The patient also has a history of MURIEL on CPAP therapy as currently managed by her provider in [place]. Unclear what triggered ARDS or why she has had such severe hospital course, further investigated ILD but results all unremarkable. Extubated 8/27, reintubated 8/29 for worsening O2 demands and diffuse opacities iso new/recurrent psuedomonas pneumonia. Bronch with BAL 8/30, pseudomonas growing as below.   - ILD w/u aldolase, EVELIO, RF, CCP, ANCA, MPO, SSA [place] and [place], Scleroderma antibody, Radha 1,  hypersensity pneumonitis, IGG subclasses,  aspergillus, blastomyces, histoplasma neg  - SpO2 goals >92%, PaO2 goals >60   - PTA singular at bedtime if able  - Scheduled pulmicort, and duonebs   - Lung protective ventilation strategies given ARDS  - Methylpred 125mg q6H x 24h 8/30 --> transition to 20 mg dex x 5 days, followed by 10mg x 5 days  - Epoprostenol currently off  - Wean vent as able  FiO2 (%): 40 %, Resp: 24, Vent Mode: CMV/AC, Resp Rate (Set): 24 breaths/min, Tidal Volume (Set, mL): 350 mL, PEEP (cm H2O): 6 cmH2O, Resp Rate (Set): 24 breaths/min, Tidal Volume (Set, mL): 350 mL, PEEP (cm H2O): 6 cmH2O    Cardiovascular:  # Hyperlipidemia  # Hypotension  # Hx HTN  - MAP goal >65, SBP goals >110  - NE on and off overnight.  - Nicardipine for persistent SBP > 180s  - PTA atorvastatin  - PTA clonidine held while sedated  - Lower extremity ultrasound without vascular pathology, no hematoma or clots  - Monitor discoloration of extremities for necrosis  - Monitoring  Hypertension    Sinus Tachycardia  Likely 2/2 fluid removal and sepsis as above    GI/Nutrition:  # Moderate protein calorie malnutrition  # Non-infectious Diarrhea  # GERD   - Protonix (home medication)   - Nutrition consulted, appreciate recs  - Diet: TF via NJ, assess swallow after extubation, failed previous swallow study  - Hold bowel regimen d/t loose stools  - Change suspension meds to other form as able  - Continue scheduled multivitamin, banatrol, prosource packet  - loperamide PRN  - Rectal tube, continues with high output. Keep today.     Renal/Fluids/Electrolytes:  # Acute kidney injury  # Renal failure  Baseline Cr approx 0.6. Pt was anuric here but now making some urine, likely prerenal due to shock.  - Continue CRRT; fluid goal net even for the day  - Heparinized CRRT circuit, low intensity protocol. Last clotted 8/24 PM  - Strict I&Os  - Bladder scan q shift  - Avoid nephrotoxins as able    Endocrine:  # Steroid and stress induced hyperglycemia  # Hypothyroidism   - Goal to keep BG < 180 for optimal wound healing.   - with rising blood glucose due to steroids started insulin drip 8/30  - Continue PTA synthroid    ID:  # Leukocytosis  # Psuedomonas pneumonia  # Hx CAP  Respiratory cx 8/29 pseudomonas pneumonia. Intermediate susceptability to meropenem, more significant sensitivities to ciprofloxacin  - Continue to monitor fever curve and inflammatory markers as appropriate  - ID consulted, appreciate recs.   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   - if trend continues to rise will start ganciclovir    # HSV-1  Mouth sores present, which could have viral etiology. 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  -  "Acyclovir 400mg BID x5 days (8/22-8/27)    Hematology:    # Anemia of critical illness  - Transfuse if hgb <7.0 or signs/symptoms of hypoperfusion. Monitor and trend.   - Heparinize CRRT circuit    - CTAP 8/30 due to acute hemoglobin drop requiring transfusion of 2 x 1 unit of blood 12 hours apart. Negative for acute bleed    Musculoskeletal/Rheum:  # Alopecia  - Hold PTA hydroxychloroquine     # Weakness and deconditioning of critical illness  # Left ankle injury pre-hospitalization    - Physical and occupational therapy when able.     Skin:  # BLE scattered ecchymosis  # Left toe duskiness  - Bilateral lower leg ecchymosis  - WOC consult  - Ecchymosis to left foot, most noticeable to 3rd and 4th toes    General Cares/Prophylaxis:  DVT Prophylaxis: Heparin through CRRT circuit  GI Prophylaxis: PPI  Restraints: no  Code Status: DNR/OK to intubate    Lines/tubes/drains:  - ETT (8/29)  - NJ (8/9)  - LIJ CVC (8/8)  - Radial a-line (8/29)  - PIV x3  - Rectal tube (8/17)  - Tunneled HD line (8/23)    Disposition:  - Medical ICU     Patient seen and findings/plan discussed with medical ICU staff, Dr. Shelley.    I spent a total of 45 minutes (excluding procedure time) personally providing and directing critical care services at the bedside and on the critical care unit for Massiel HERNÁNDEZ Reynold Padgett PA-C    Clinically Significant Risk Factors          # Hypocalcemia: Lowest Ca = 8.6 mg/dL in last 2 days, will monitor and replace as appropriate     # Hypoalbuminemia: Lowest albumin = 2.2 g/dL at 8/10/2024  9:47 AM, will monitor as appropriate               # Obesity: Estimated body mass index is 31.13 kg/m  as calculated from the following:    Height as of this encounter: 1.575 m (5' 2\").    Weight as of this encounter: 77.2 kg (170 lb 3.1 oz).   # Moderate Malnutrition: based on nutrition assessment      # Financial/Environmental Concerns: none        Code Status: No CPR- Pre-arrest intubation OK   "     ====================================  INTERVAL HISTORY:   Patient was supined throughout the night. No acute events overnight.     OBJECTIVE:   1. VITAL SIGNS:   Temp:  [97.9  F (36.6  C)-99.5  F (37.5  C)] 98.2  F (36.8  C)  Pulse:  [] 100  Resp:  [19-34] 24  MAP:  [57 mmHg-119 mmHg] 72 mmHg  Arterial Line BP: ()/(42-82) 115/54  FiO2 (%):  [40 %] 40 %  SpO2:  [94 %-100 %] 100 %  FiO2 (%): 40 %, Resp: 24, Vent Mode: CMV/AC, Resp Rate (Set): 24 breaths/min, Tidal Volume (Set, mL): 350 mL, PEEP (cm H2O): 6 cmH2O, Resp Rate (Set): 24 breaths/min, Tidal Volume (Set, mL): 350 mL, PEEP (cm H2O): 6 cmH2O  2. INTAKE/ OUTPUT:   I/O last 3 completed shifts:  In: 3183.54 [I.V.:1828.54; NG/GT:635]  Out: 3722.4 [Other:3232.4; Stool:490]    3. PHYSICAL EXAMINATION:  General: Intubated, sedated  HEENT: Normocephalic, atraumatic, BIS leads in place, eyes with ointment dressing  Neuro: RASS -4-5, arefelexic  Pulm/Resp: Bilateral breath sounds audible with diffuse coarse breath sounds  CV: Sinus tachycardia, s1/2 normal, no murmur  Abdomen: Soft, non-distended, limited 2/2 to sedation and paralysis  : deferred  Incisions/Skin: lower leg 1+ edema with ecchymosis present and scattered. Some bluish discoloration of the finger tips, capillary refill normal    4. LABS:   Arterial Blood Gases   Recent Labs   Lab 09/04/24  0357 09/03/24  1948 09/03/24  1701 09/03/24  1341   PH 7.37 7.33* 7.27* 7.32*   PCO2 43 47* 55* 45   PO2 107* 107* 109* 99   HCO3 25 25 25 23     Complete Blood Count   Recent Labs   Lab 09/04/24  0357 09/03/24  1215 09/03/24  0343 09/02/24 2019   WBC 16.2* 13.1* 15.2* 16.0*   HGB 7.8* 8.7* 9.2* 9.2*    334 375 369     Basic Metabolic Panel  Recent Labs   Lab 09/04/24  0607 09/04/24  0523 09/04/24  0357 09/04/24  0356 09/03/24  1703 09/03/24  1701 09/03/24  1310 09/03/24  1215 09/03/24  0351 09/03/24  0343   NA  --   --  134*  --   --  132*  --  132*  --  135   POTASSIUM  --   --  3.9  --   --   5.3  --  4.7  --  4.1   CHLORIDE  --   --  102  --   --  99  --  99  --  99   CO2  --   --  23  --   --  22  --  19*  --  24   BUN  --   --  32.3*  --   --  33.5*  --  33.1*  --  29.3*   CR  --   --  0.74  --   --  0.78  --  0.74  --  0.74   * 116* 138* 126*   < > 166*   < > 182*  197*   < > 116*    < > = values in this interval not displayed.     Liver Function Tests  Recent Labs   Lab 09/04/24  0357 09/03/24  1701 09/03/24  1215 09/03/24  0343 09/02/24  1131 09/02/24  0403 09/01/24  1156 09/01/24  0324 08/30/24 2016 08/30/24  1045   AST 26  --   --  33  --  28  --  32   < >  --    ALT 24  --   --  20  --  28  --  33   < >  --    ALKPHOS 112  --   --  148  --  156*  --  187*   < >  --    BILITOTAL 0.2  --   --  0.2  --  0.2  --  0.2   < >  --    ALBUMIN 3.3* 3.5 3.5 3.6   < > 3.4*   < > 3.4*   < > 2.9*   INR  --   --   --   --   --   --   --   --   --  1.14    < > = values in this interval not displayed.     Coagulation Profile  Recent Labs   Lab 08/30/24  1045   INR 1.14   PTT 56*     5. RADIOLOGY:   Recent Results (from the past 24 hour(s))   XR Chest Port 1 View    Narrative    Exam: XR CHEST PORT 1 VIEW, 9/3/2024 11:42 AM    Comparison: Radiograph 9/2/2024, 9/1/2024, CT 8/30/2024    History: Worsening ventilation    Findings:  Endotracheal tube tip projects over the mid thoracic trachea.  Esophageal temperature probe in place. Feeding tube tip extends beyond  the field-of-view. Left IJ Central venous catheter tip projects in  stable position. Right IJ central venous catheter tip projects over  the superior right atrium. Trachea is midline. Cardiomediastinal  silhouette is within normal limits. Similar to minimally decreased  hazy bibasilar opacities. No new airspace disease. No pleural effusion  or pneumothorax.      Impression    Impression:   1. Similar to minimally decreased hazy bibasilar opacities.  2. Support devices as described above.    I have personally reviewed the examination and initial  interpretation  and I agree with the findings.    CORTNEY LAU MD         SYSTEM ID:  [id number]

## 2024-09-04 NOTE — PROGRESS NOTES
CLINICAL NUTRITION SERVICES - BRIEF NOTE    See RD note 8/29 for full assessment.     Nutrition Prescription    RECOMMENDATIONS FOR MDs/PROVIDERS TO ORDER:  None currently     Recommendations already ordered by Registered Dietitian (RD):  Repeat metabolic cart study   Increase TF rate now that propofol is off:   Dina Replication Medical Renal (or equivalent) @ 45 mL/hr (1080 mL/day) +1 Prosource TF20 to provide 2024 kcal, 106 g protein, 183 g CHO, 22 g fiber, and 724 mL free water daily      Future/Additional Recommendations:  Monitor metabolic cart study results, adjust TF as appropriate       New findings:   Adjusting TF d/t patient off propofol.     Diet: NPO + TF    Nutrition Support: access: NJT    Formula/schedule/modulars: 8/30-___: DinaQwiki Renal (or equivalent) @ 30 mL/hr (720 mL/day) + 2 pkt ProSource TF20 daily to provide 1456 kcal (26 kcal/kg), 97 g protein (1.7 g pro/kg), 122 g CHO, 14 g fiber, and 482 mL free water daily    + ~ 420-550 kcal/day from propofol = 1977-3428 kcal/day (74-79% MREE which meets goal of 60-80% MREE)     Free water flushes: 30 mL every 4 hours    Intake/Tolerance:  TF at goal rate of 30 ml/hr.     GI:  Last BM: 09/04/24  stool output (mL): 440 mL 9/03, 250 mL 9/02, 600 mL 9/01, 650 mL 8/31, 320 mL 8/30, 280  mL 8/29    Weight:  Most Recent Weight: 77.2 kg (170 lb 3.1 oz)  on 9/4/24 via Bed scale  Body mass index is 31.13 kg/m .  Wt down 11.7 kg from admission. - 4.5 L from admission per I/O.     Meds:  Cipro  Decadron  Levothyroxine  Imodium Q6H  Ativan  Renal multivitamin  Protonix  Continuous: fentanyl; insulin; versed; norepi @ 0.03    Labs:   09/03/24 03:43 09/03/24 12:15 09/03/24 12:17 09/03/24 17:01 09/04/24 03:57   Sodium 135 132 (L)  132 (L) 134 (L)   Potassium 4.1 4.7  5.3 3.9   Chloride 99 99  99 102   Carbon Dioxide (CO2) 24 19 (L)  22 23   Urea Nitrogen 29.3 (H) 33.1 (H)  33.5 (H) 32.3 (H)   Creatinine 0.74 0.74  0.78 0.74   GFR Estimate >90 >90  90 >90   Calcium 8.9 9.0  8.7  "(L) 8.6 (L)   Anion Gap 12 14  11 9   Magnesium 2.5 (H) 2.4 (H)  2.5 (H) 2.4 (H)   Phosphorus 6.1 (H) 5.6 (H)  5.7 (H) 4.5   Albumin 3.6 3.5  3.5 3.3 (L)   Protein Total 6.5    5.7 (L)   Alkaline Phosphatase 148    112   ALT 20    24   AST 33    26   Bilirubin Direct <0.20    <0.20   Bilirubin Total 0.2    0.2   Calcium Ionized Whole Blood 4.8  4.9 4.7 4.8   CRP Inflammation 33.60 (H)    32.40 (H)   Glucose 116 (H) 197 (H)  166 (H) 138 (H)   Triglycerides 657 (H)           Respiratory:   Intubated on mechanical vent    Renal:  CRRT    Implementation  Enteral Nutrition - Modify rate  Metabolic cart study        RD to follow per protocol    Marce Balderas, MS, RDN, LD  4C MICU RD  Vocera - \"4C Clinical Dietitian\"  Weekend/Holiday RD - \"Weekend Clinical Dietitian\"    "

## 2024-09-04 NOTE — PROGRESS NOTES
CRRT STATUS NOTE    DATA:  Time:  1708  Pressures WNL:  YES  Filter Status:  WDL; restarted at 1839    Problems Reported/Alarms Noted:  None, restarted at 72 hours    Supplies Present:  YES    ASSESSMENT:  Patient Net Fluid Balance:  ~300 net negative @ 1700 prior to CT run  Vital Signs:  T: 99.9, HR 120s, BP 90/47 MAPs maintained > 65 w/ levophed, 98% on 40% FiO2 via ventilator, RR 21  Labs:  K+ 3.9, Mag 2.4, Phos 4.5, iCal 4.8  Goals of Therapy:  0-100/hr net negative     INTERVENTIONS:   Circuit changed r/t scheduled changed. Blood returned. Tolerated well.    PLAN:Continue to monitor circuit daily and change set q72 hours or PRN for clotting/clogging. Please call CRRT RN with any questions/problems.

## 2024-09-05 LAB
ABO/RH(D): NORMAL
ALBUMIN SERPL BCG-MCNC: 3.1 G/DL (ref 3.5–5.2)
ALBUMIN SERPL BCG-MCNC: 3.3 G/DL (ref 3.5–5.2)
ALBUMIN UR-MCNC: 50 MG/DL
ALLEN'S TEST: ABNORMAL
ALP SERPL-CCNC: 111 U/L (ref 40–150)
ALT SERPL W P-5'-P-CCNC: 28 U/L (ref 0–50)
AMORPH CRY #/AREA URNS HPF: ABNORMAL /HPF
ANION GAP SERPL CALCULATED.3IONS-SCNC: 10 MMOL/L (ref 7–15)
ANION GAP SERPL CALCULATED.3IONS-SCNC: 12 MMOL/L (ref 7–15)
ANTIBODY SCREEN: NEGATIVE
APPEARANCE UR: ABNORMAL
AST SERPL W P-5'-P-CCNC: 32 U/L (ref 0–45)
BACTERIA #/AREA URNS HPF: ABNORMAL /HPF
BASE EXCESS BLDA CALC-SCNC: -1.3 MMOL/L (ref -3–3)
BASE EXCESS BLDA CALC-SCNC: -1.3 MMOL/L (ref -3–3)
BASE EXCESS BLDA CALC-SCNC: -1.8 MMOL/L (ref -3–3)
BASE EXCESS BLDA CALC-SCNC: -2.9 MMOL/L (ref -3–3)
BILIRUB DIRECT SERPL-MCNC: <0.2 MG/DL (ref 0–0.3)
BILIRUB SERPL-MCNC: 0.2 MG/DL
BILIRUB UR QL STRIP: NEGATIVE
BUN SERPL-MCNC: 29.2 MG/DL (ref 6–20)
BUN SERPL-MCNC: 36.3 MG/DL (ref 6–20)
CA-I BLD-MCNC: 4.7 MG/DL (ref 4.4–5.2)
CA-I BLD-MCNC: 4.8 MG/DL (ref 4.4–5.2)
CALCIUM SERPL-MCNC: 8.2 MG/DL (ref 8.8–10.4)
CALCIUM SERPL-MCNC: 8.6 MG/DL (ref 8.8–10.4)
CHLORIDE SERPL-SCNC: 103 MMOL/L (ref 98–107)
CHLORIDE SERPL-SCNC: 103 MMOL/L (ref 98–107)
COHGB MFR BLD: 99.3 % (ref 96–97)
COHGB MFR BLD: >100 % (ref 96–97)
COLOR UR AUTO: YELLOW
CREAT SERPL-MCNC: 0.76 MG/DL (ref 0.51–0.95)
CREAT SERPL-MCNC: 0.88 MG/DL (ref 0.51–0.95)
CRP SERPL-MCNC: 41.2 MG/L
EGFRCR SERPLBLD CKD-EPI 2021: 78 ML/MIN/1.73M2
EGFRCR SERPLBLD CKD-EPI 2021: >90 ML/MIN/1.73M2
ERYTHROCYTE [DISTWIDTH] IN BLOOD BY AUTOMATED COUNT: 18.3 % (ref 10–15)
GLUCOSE BLDC GLUCOMTR-MCNC: 100 MG/DL (ref 70–99)
GLUCOSE BLDC GLUCOMTR-MCNC: 105 MG/DL (ref 70–99)
GLUCOSE BLDC GLUCOMTR-MCNC: 109 MG/DL (ref 70–99)
GLUCOSE BLDC GLUCOMTR-MCNC: 110 MG/DL (ref 70–99)
GLUCOSE BLDC GLUCOMTR-MCNC: 112 MG/DL (ref 70–99)
GLUCOSE BLDC GLUCOMTR-MCNC: 120 MG/DL (ref 70–99)
GLUCOSE BLDC GLUCOMTR-MCNC: 121 MG/DL (ref 70–99)
GLUCOSE BLDC GLUCOMTR-MCNC: 124 MG/DL (ref 70–99)
GLUCOSE BLDC GLUCOMTR-MCNC: 127 MG/DL (ref 70–99)
GLUCOSE BLDC GLUCOMTR-MCNC: 128 MG/DL (ref 70–99)
GLUCOSE BLDC GLUCOMTR-MCNC: 129 MG/DL (ref 70–99)
GLUCOSE BLDC GLUCOMTR-MCNC: 132 MG/DL (ref 70–99)
GLUCOSE BLDC GLUCOMTR-MCNC: 134 MG/DL (ref 70–99)
GLUCOSE BLDC GLUCOMTR-MCNC: 146 MG/DL (ref 70–99)
GLUCOSE BLDC GLUCOMTR-MCNC: 149 MG/DL (ref 70–99)
GLUCOSE BLDC GLUCOMTR-MCNC: 154 MG/DL (ref 70–99)
GLUCOSE BLDC GLUCOMTR-MCNC: 156 MG/DL (ref 70–99)
GLUCOSE BLDC GLUCOMTR-MCNC: 171 MG/DL (ref 70–99)
GLUCOSE BLDC GLUCOMTR-MCNC: 175 MG/DL (ref 70–99)
GLUCOSE BLDC GLUCOMTR-MCNC: 219 MG/DL (ref 70–99)
GLUCOSE BLDC GLUCOMTR-MCNC: 225 MG/DL (ref 70–99)
GLUCOSE SERPL-MCNC: 137 MG/DL (ref 70–99)
GLUCOSE SERPL-MCNC: 152 MG/DL (ref 70–99)
GLUCOSE UR STRIP-MCNC: NEGATIVE MG/DL
HCO3 BLD-SCNC: 23 MMOL/L (ref 21–28)
HCO3 BLD-SCNC: 24 MMOL/L (ref 21–28)
HCO3 BLD-SCNC: 25 MMOL/L (ref 21–28)
HCO3 BLD-SCNC: 25 MMOL/L (ref 21–28)
HCO3 SERPL-SCNC: 21 MMOL/L (ref 22–29)
HCO3 SERPL-SCNC: 22 MMOL/L (ref 22–29)
HCT VFR BLD AUTO: 26.6 % (ref 35–47)
HGB BLD-MCNC: 7 G/DL (ref 11.7–15.7)
HGB BLD-MCNC: 7.4 G/DL (ref 11.7–15.7)
HGB BLD-MCNC: 7.9 G/DL (ref 11.7–15.7)
HGB UR QL STRIP: NEGATIVE
HYALINE CASTS: 6 /LPF
KETONES UR STRIP-MCNC: NEGATIVE MG/DL
LEUKOCYTE ESTERASE UR QL STRIP: NEGATIVE
MAGNESIUM SERPL-MCNC: 2.2 MG/DL (ref 1.7–2.3)
MAGNESIUM SERPL-MCNC: 2.3 MG/DL (ref 1.7–2.3)
MCH RBC QN AUTO: 29.6 PG (ref 26.5–33)
MCHC RBC AUTO-ENTMCNC: 29.7 G/DL (ref 31.5–36.5)
MCV RBC AUTO: 100 FL (ref 78–100)
NITRATE UR QL: NEGATIVE
O2/TOTAL GAS SETTING VFR VENT: 40 %
PCO2 BLD: 44 MM HG (ref 35–45)
PCO2 BLD: 46 MM HG (ref 35–45)
PCO2 BLD: 49 MM HG (ref 35–45)
PCO2 BLD: 49 MM HG (ref 35–45)
PH BLD: 7.31 [PH] (ref 7.35–7.45)
PH BLD: 7.31 [PH] (ref 7.35–7.45)
PH BLD: 7.32 [PH] (ref 7.35–7.45)
PH BLD: 7.35 [PH] (ref 7.35–7.45)
PH UR STRIP: 5.5 [PH] (ref 5–7)
PHOSPHATE SERPL-MCNC: 5.1 MG/DL (ref 2.5–4.5)
PHOSPHATE SERPL-MCNC: 5.8 MG/DL (ref 2.5–4.5)
PLATELET # BLD AUTO: 378 10E3/UL (ref 150–450)
PO2 BLD: 115 MM HG (ref 80–105)
PO2 BLD: 130 MM HG (ref 80–105)
PO2 BLD: 146 MM HG (ref 80–105)
PO2 BLD: 177 MM HG (ref 80–105)
POTASSIUM SERPL-SCNC: 4.1 MMOL/L (ref 3.4–5.3)
POTASSIUM SERPL-SCNC: 4.6 MMOL/L (ref 3.4–5.3)
POTASSIUM SERPL-SCNC: 5.3 MMOL/L (ref 3.4–5.3)
PROT SERPL-MCNC: 5.9 G/DL (ref 6.4–8.3)
RBC # BLD AUTO: 2.67 10E6/UL (ref 3.8–5.2)
RBC URINE: 5 /HPF
SAO2 % BLDA: 97 % (ref 92–100)
SAO2 % BLDA: 97 % (ref 92–100)
SAO2 % BLDA: 98 % (ref 92–100)
SAO2 % BLDA: 98 % (ref 92–100)
SODIUM SERPL-SCNC: 135 MMOL/L (ref 135–145)
SODIUM SERPL-SCNC: 136 MMOL/L (ref 135–145)
SP GR UR STRIP: 1.02 (ref 1–1.03)
SPECIMEN EXPIRATION DATE: NORMAL
SQUAMOUS EPITHELIAL: 1 /HPF
TRANSITIONAL EPI: <1 /HPF
TRIGL SERPL-MCNC: 460 MG/DL
UFH PPP CHRO-ACNC: 0.14 IU/ML
UFH PPP CHRO-ACNC: 0.17 IU/ML
UFH PPP CHRO-ACNC: 0.32 IU/ML
UROBILINOGEN UR STRIP-MCNC: NORMAL MG/DL
WBC # BLD AUTO: 23.7 10E3/UL (ref 4–11)
WBC URINE: 12 /HPF
YEAST #/AREA URNS HPF: ABNORMAL /HPF

## 2024-09-05 PROCEDURE — 99233 SBSQ HOSP IP/OBS HIGH 50: CPT | Mod: FS | Performed by: CLINICAL NURSE SPECIALIST

## 2024-09-05 PROCEDURE — 250N000011 HC RX IP 250 OP 636

## 2024-09-05 PROCEDURE — 36415 COLL VENOUS BLD VENIPUNCTURE: CPT

## 2024-09-05 PROCEDURE — 200N000002 HC R&B ICU UMMC

## 2024-09-05 PROCEDURE — 258N000003 HC RX IP 258 OP 636

## 2024-09-05 PROCEDURE — 82805 BLOOD GASES W/O2 SATURATION: CPT

## 2024-09-05 PROCEDURE — 82330 ASSAY OF CALCIUM: CPT | Performed by: CLINICAL NURSE SPECIALIST

## 2024-09-05 PROCEDURE — 85520 HEPARIN ASSAY: CPT | Performed by: INTERNAL MEDICINE

## 2024-09-05 PROCEDURE — 250N000011 HC RX IP 250 OP 636: Performed by: SURGERY

## 2024-09-05 PROCEDURE — 81001 URINALYSIS AUTO W/SCOPE: CPT

## 2024-09-05 PROCEDURE — 85018 HEMOGLOBIN: CPT

## 2024-09-05 PROCEDURE — 86850 RBC ANTIBODY SCREEN: CPT | Performed by: SURGERY

## 2024-09-05 PROCEDURE — 87205 SMEAR GRAM STAIN: CPT

## 2024-09-05 PROCEDURE — 999N000157 HC STATISTIC RCP TIME EA 10 MIN

## 2024-09-05 PROCEDURE — 90947 DIALYSIS REPEATED EVAL: CPT

## 2024-09-05 PROCEDURE — 85520 HEPARIN ASSAY: CPT

## 2024-09-05 PROCEDURE — 83735 ASSAY OF MAGNESIUM: CPT | Performed by: CLINICAL NURSE SPECIALIST

## 2024-09-05 PROCEDURE — 250N000009 HC RX 250: Performed by: INTERNAL MEDICINE

## 2024-09-05 PROCEDURE — 258N000003 HC RX IP 258 OP 636: Performed by: SURGERY

## 2024-09-05 PROCEDURE — 82040 ASSAY OF SERUM ALBUMIN: CPT | Performed by: CLINICAL NURSE SPECIALIST

## 2024-09-05 PROCEDURE — 250N000013 HC RX MED GY IP 250 OP 250 PS 637

## 2024-09-05 PROCEDURE — 250N000009 HC RX 250: Performed by: SURGERY

## 2024-09-05 PROCEDURE — 86923 COMPATIBILITY TEST ELECTRIC: CPT

## 2024-09-05 PROCEDURE — 250N000009 HC RX 250

## 2024-09-05 PROCEDURE — 80053 COMPREHEN METABOLIC PANEL: CPT | Performed by: INTERNAL MEDICINE

## 2024-09-05 PROCEDURE — 84132 ASSAY OF SERUM POTASSIUM: CPT | Performed by: CLINICAL NURSE SPECIALIST

## 2024-09-05 PROCEDURE — 84075 ASSAY ALKALINE PHOSPHATASE: CPT | Performed by: INTERNAL MEDICINE

## 2024-09-05 PROCEDURE — 94640 AIRWAY INHALATION TREATMENT: CPT | Mod: 76

## 2024-09-05 PROCEDURE — 84478 ASSAY OF TRIGLYCERIDES: CPT

## 2024-09-05 PROCEDURE — 84100 ASSAY OF PHOSPHORUS: CPT | Performed by: CLINICAL NURSE SPECIALIST

## 2024-09-05 PROCEDURE — 86901 BLOOD TYPING SEROLOGIC RH(D): CPT | Performed by: SURGERY

## 2024-09-05 PROCEDURE — 99291 CRITICAL CARE FIRST HOUR: CPT | Mod: GC

## 2024-09-05 PROCEDURE — 85014 HEMATOCRIT: CPT | Performed by: PHYSICIAN ASSISTANT

## 2024-09-05 PROCEDURE — 250N000013 HC RX MED GY IP 250 OP 250 PS 637: Performed by: PHYSICIAN ASSISTANT

## 2024-09-05 PROCEDURE — 87040 BLOOD CULTURE FOR BACTERIA: CPT

## 2024-09-05 PROCEDURE — 87106 FUNGI IDENTIFICATION YEAST: CPT

## 2024-09-05 PROCEDURE — 250N000013 HC RX MED GY IP 250 OP 250 PS 637: Performed by: SURGERY

## 2024-09-05 PROCEDURE — 86140 C-REACTIVE PROTEIN: CPT

## 2024-09-05 PROCEDURE — 94681 O2 UPTK CO2 OUTP % O2 XTRC: CPT

## 2024-09-05 PROCEDURE — 94003 VENT MGMT INPAT SUBQ DAY: CPT

## 2024-09-05 PROCEDURE — 84460 ALANINE AMINO (ALT) (SGPT): CPT | Performed by: INTERNAL MEDICINE

## 2024-09-05 PROCEDURE — 86900 BLOOD TYPING SEROLOGIC ABO: CPT | Performed by: SURGERY

## 2024-09-05 PROCEDURE — 82247 BILIRUBIN TOTAL: CPT | Performed by: INTERNAL MEDICINE

## 2024-09-05 PROCEDURE — 94640 AIRWAY INHALATION TREATMENT: CPT

## 2024-09-05 PROCEDURE — 82248 BILIRUBIN DIRECT: CPT | Performed by: INTERNAL MEDICINE

## 2024-09-05 RX ORDER — VANCOMYCIN HYDROCHLORIDE
20 EVERY 24 HOURS
Status: DISCONTINUED | OUTPATIENT
Start: 2024-09-05 | End: 2024-09-08

## 2024-09-05 RX ORDER — CALCIUM GLUCONATE 20 MG/ML
1 INJECTION, SOLUTION INTRAVENOUS ONCE
Status: DISCONTINUED | OUTPATIENT
Start: 2024-09-05 | End: 2024-09-05

## 2024-09-05 RX ADMIN — CALCIUM CHLORIDE, MAGNESIUM CHLORIDE, SODIUM CHLORIDE, SODIUM BICARBONATE, POTASSIUM CHLORIDE AND SODIUM PHOSPHATE DIBASIC DIHYDRATE 12.5 ML/KG/HR: 3.68; 3.05; 6.34; 3.09; .314; .187 INJECTION INTRAVENOUS at 20:31

## 2024-09-05 RX ADMIN — CALCIUM CHLORIDE, MAGNESIUM CHLORIDE, SODIUM CHLORIDE, SODIUM BICARBONATE, POTASSIUM CHLORIDE AND SODIUM PHOSPHATE DIBASIC DIHYDRATE 12.5 ML/KG/HR: 3.68; 3.05; 6.34; 3.09; .314; .187 INJECTION INTRAVENOUS at 06:08

## 2024-09-05 RX ADMIN — OXYCODONE HYDROCHLORIDE 10 MG: 5 SOLUTION ORAL at 10:19

## 2024-09-05 RX ADMIN — Medication 300 MCG/HR: at 00:26

## 2024-09-05 RX ADMIN — MIDAZOLAM HYDROCHLORIDE 16 MG/HR: 1 INJECTION, SOLUTION INTRAVENOUS at 18:11

## 2024-09-05 RX ADMIN — OXYCODONE HYDROCHLORIDE 10 MG: 5 SOLUTION ORAL at 04:20

## 2024-09-05 RX ADMIN — OXYCODONE HYDROCHLORIDE 10 MG: 5 SOLUTION ORAL at 21:09

## 2024-09-05 RX ADMIN — Medication 60 ML: at 07:58

## 2024-09-05 RX ADMIN — IPRATROPIUM BROMIDE 0.5 MG: 0.5 SOLUTION RESPIRATORY (INHALATION) at 16:47

## 2024-09-05 RX ADMIN — LEVALBUTEROL HYDROCHLORIDE 0.63 MG: 0.63 SOLUTION RESPIRATORY (INHALATION) at 09:54

## 2024-09-05 RX ADMIN — MIDAZOLAM HYDROCHLORIDE 16 MG/HR: 1 INJECTION, SOLUTION INTRAVENOUS at 12:37

## 2024-09-05 RX ADMIN — CALCIUM CHLORIDE, MAGNESIUM CHLORIDE, SODIUM CHLORIDE, SODIUM BICARBONATE, POTASSIUM CHLORIDE AND SODIUM PHOSPHATE DIBASIC DIHYDRATE 12.5 ML/KG/HR: 3.68; 3.05; 6.34; 3.09; .314; .187 INJECTION INTRAVENOUS at 01:09

## 2024-09-05 RX ADMIN — LEVALBUTEROL HYDROCHLORIDE 0.63 MG: 0.63 SOLUTION RESPIRATORY (INHALATION) at 13:14

## 2024-09-05 RX ADMIN — CIPROFLOXACIN 400 MG: 400 INJECTION, SOLUTION INTRAVENOUS at 04:20

## 2024-09-05 RX ADMIN — CIPROFLOXACIN 400 MG: 400 INJECTION, SOLUTION INTRAVENOUS at 12:10

## 2024-09-05 RX ADMIN — IPRATROPIUM BROMIDE 0.5 MG: 0.5 SOLUTION RESPIRATORY (INHALATION) at 13:14

## 2024-09-05 RX ADMIN — WHITE PETROLATUM 57.7 %-MINERAL OIL 31.9 % EYE OINTMENT: at 07:58

## 2024-09-05 RX ADMIN — GANCICLOVIR SODIUM 200 MG: 500 INJECTION, POWDER, LYOPHILIZED, FOR SOLUTION INTRAVENOUS at 16:17

## 2024-09-05 RX ADMIN — TOBRAMYCIN 300 MG: 300 SOLUTION RESPIRATORY (INHALATION) at 09:55

## 2024-09-05 RX ADMIN — LORAZEPAM 2 MG: 1 TABLET ORAL at 21:09

## 2024-09-05 RX ADMIN — NYSTATIN 500000 UNITS: 100000 SUSPENSION ORAL at 07:58

## 2024-09-05 RX ADMIN — CETIRIZINE HYDROCHLORIDE 10 MG: 10 TABLET, FILM COATED ORAL at 07:58

## 2024-09-05 RX ADMIN — LORAZEPAM 2 MG: 1 TABLET ORAL at 16:04

## 2024-09-05 RX ADMIN — IPRATROPIUM BROMIDE 0.5 MG: 0.5 SOLUTION RESPIRATORY (INHALATION) at 09:54

## 2024-09-05 RX ADMIN — SODIUM CHLORIDE, POTASSIUM CHLORIDE, SODIUM LACTATE AND CALCIUM CHLORIDE 500 ML: 600; 310; 30; 20 INJECTION, SOLUTION INTRAVENOUS at 09:16

## 2024-09-05 RX ADMIN — Medication 40 MG: at 07:58

## 2024-09-05 RX ADMIN — VANCOMYCIN HYDROCHLORIDE 1500 MG: 10 INJECTION, POWDER, LYOPHILIZED, FOR SOLUTION INTRAVENOUS at 20:36

## 2024-09-05 RX ADMIN — Medication 2.5 MG/HR: at 17:42

## 2024-09-05 RX ADMIN — QUETIAPINE FUMARATE 25 MG: 25 TABLET ORAL at 20:40

## 2024-09-05 RX ADMIN — LEVOTHYROXINE SODIUM 25 MCG: 0.03 TABLET ORAL at 07:59

## 2024-09-05 RX ADMIN — WHITE PETROLATUM 57.7 %-MINERAL OIL 31.9 % EYE OINTMENT: at 16:04

## 2024-09-05 RX ADMIN — CALCIUM CHLORIDE, MAGNESIUM CHLORIDE, SODIUM CHLORIDE, SODIUM BICARBONATE, POTASSIUM CHLORIDE AND SODIUM PHOSPHATE DIBASIC DIHYDRATE 12.5 ML/KG/HR: 3.68; 3.05; 6.34; 3.09; .314; .187 INJECTION INTRAVENOUS at 15:35

## 2024-09-05 RX ADMIN — CALCIUM CHLORIDE, MAGNESIUM CHLORIDE, SODIUM CHLORIDE, SODIUM BICARBONATE, POTASSIUM CHLORIDE AND SODIUM PHOSPHATE DIBASIC DIHYDRATE 12.5 ML/KG/HR: 3.68; 3.05; 6.34; 3.09; .314; .187 INJECTION INTRAVENOUS at 20:35

## 2024-09-05 RX ADMIN — LORAZEPAM 2 MG: 1 TABLET ORAL at 04:21

## 2024-09-05 RX ADMIN — CISATRACURIUM BESYLATE 6 MCG/KG/MIN: 10 INJECTION INTRAVENOUS at 03:15

## 2024-09-05 RX ADMIN — DEXAMETHASONE SODIUM PHOSPHATE 10 MG: 10 INJECTION, SOLUTION INTRAMUSCULAR; INTRAVENOUS at 07:58

## 2024-09-05 RX ADMIN — LEVALBUTEROL HYDROCHLORIDE 0.63 MG: 0.63 SOLUTION RESPIRATORY (INHALATION) at 20:18

## 2024-09-05 RX ADMIN — LORAZEPAM 2 MG: 1 TABLET ORAL at 10:19

## 2024-09-05 RX ADMIN — LEVALBUTEROL HYDROCHLORIDE 0.63 MG: 0.63 SOLUTION RESPIRATORY (INHALATION) at 16:47

## 2024-09-05 RX ADMIN — ACETAMINOPHEN 650 MG: 325 TABLET ORAL at 21:09

## 2024-09-05 RX ADMIN — Medication 1 TABLET: at 07:58

## 2024-09-05 RX ADMIN — CIPROFLOXACIN 400 MG: 400 INJECTION, SOLUTION INTRAVENOUS at 22:06

## 2024-09-05 RX ADMIN — Medication 300 MCG/HR: at 08:50

## 2024-09-05 RX ADMIN — NYSTATIN 500000 UNITS: 100000 SUSPENSION ORAL at 16:04

## 2024-09-05 RX ADMIN — ACETAMINOPHEN 650 MG: 325 TABLET ORAL at 04:20

## 2024-09-05 RX ADMIN — WHITE PETROLATUM 57.7 %-MINERAL OIL 31.9 % EYE OINTMENT: at 01:15

## 2024-09-05 RX ADMIN — IPRATROPIUM BROMIDE 0.5 MG: 0.5 SOLUTION RESPIRATORY (INHALATION) at 20:18

## 2024-09-05 RX ADMIN — NYSTATIN 500000 UNITS: 100000 SUSPENSION ORAL at 20:40

## 2024-09-05 RX ADMIN — QUETIAPINE FUMARATE 25 MG: 25 TABLET ORAL at 07:58

## 2024-09-05 RX ADMIN — MIDAZOLAM HYDROCHLORIDE 16 MG/HR: 1 INJECTION, SOLUTION INTRAVENOUS at 06:56

## 2024-09-05 RX ADMIN — ACETAMINOPHEN 650 MG: 325 TABLET ORAL at 16:04

## 2024-09-05 RX ADMIN — MIDAZOLAM HYDROCHLORIDE 16 MG/HR: 1 INJECTION, SOLUTION INTRAVENOUS at 00:52

## 2024-09-05 RX ADMIN — Medication 2.5 MG/HR: at 10:44

## 2024-09-05 RX ADMIN — NYSTATIN 500000 UNITS: 100000 SUSPENSION ORAL at 12:10

## 2024-09-05 RX ADMIN — TOBRAMYCIN 300 MG: 300 SOLUTION RESPIRATORY (INHALATION) at 20:18

## 2024-09-05 RX ADMIN — MONTELUKAST 10 MG: 10 TABLET, FILM COATED ORAL at 21:10

## 2024-09-05 RX ADMIN — ACETAMINOPHEN 650 MG: 325 TABLET ORAL at 10:18

## 2024-09-05 RX ADMIN — CALCIUM CHLORIDE, MAGNESIUM CHLORIDE, SODIUM CHLORIDE, SODIUM BICARBONATE, POTASSIUM CHLORIDE AND SODIUM PHOSPHATE DIBASIC DIHYDRATE: 3.68; 3.05; 6.34; 3.09; .314; .187 INJECTION INTRAVENOUS at 06:08

## 2024-09-05 RX ADMIN — OXYCODONE HYDROCHLORIDE 10 MG: 5 SOLUTION ORAL at 16:03

## 2024-09-05 ASSESSMENT — ACTIVITIES OF DAILY LIVING (ADL)
ADLS_ACUITY_SCORE: 61

## 2024-09-05 NOTE — PROGRESS NOTES
Nephrology Progress Note  09/05/2024       Mrs Flaherty is a 52 yo F w/ hx of Anxiety, depression, fibromyalgia, hypothyroidism, asthma, HLD, MURIEL on CPAP, expressive aphasia, and hypertension who was admitted to an OSH with community acquired pneumonia on 8/3 s/p intubation. Found to have severe ARDS requiring paralysis and proning, transferred here on 8/8 for management and initiated on CKRT for severe acidemia in the setting of oligoanuric CHACORTA. Started CRRT on 8/9 for volume management.     Interval History :   Mrs Flaherty continues on CRRT, was net negative 0.3L yesterday and had increase in pressor needs this am (along with WBC up) concerning for hypovolemia vs distributive shock due to infection.  Given this we will allow her to be positive today unless her situation changes, labs stable on 4k baths.      Assessment & Recommendations:   CHACORTA-Baseline Cr 0.6 but not checked since 5/2023 PTA, UA with protein + blood but difficult to interpret in setting of CHACORTA, likely hypotensive ATN. Started CRRT on 8/9, now anuric.    -Access is RIJ tunneled line from 8/23  -CRRT, 4k baths, I=O as able after IVF bolus this am.    -Dialysis consent signed and scanned into media on 8/9    Please avoid placing a PICC line in any patient with CKD III or above, CHACORTA, or ESKD, as this will impact negatively the ability of the patient to have an AV fistula or AV Graft should they need it in the future.  Internal jugular or External Jugular  are the preferred type of access in patients with kidney disease. (Link to guidelines)    Volume status-On high dose of norepi, allowing net positive today.       Electrolytes/pH-No acute issues, checking BID     Ca/phos/pth-Mg and Phos mildly up, no intervention needed.     Anemia-Hgb 7.9, stable.     Nutrition-Renal TF    Time spent: 45 minutes on this date of encounter for chart review, physical exam, medical decision making and co-ordination of care.     Discussed with   "Breannai    Recommendations were communicated to primary team via verbal communication.     Dakota Dorsey, APRN CNS  Clinical Nurse Specialist  [phone]    Review of Systems:   I reviewed the following systems:  ROS not done due to vent/sedation.     Physical Exam:   I/O last 3 completed shifts:  In: 3351.85 [I.V.:1976.85; NG/GT:475]  Out: 3640.1 [Other:3390.1; Stool:250]   /64   Pulse 104   Temp 99  F (37.2  C)   Resp 24   Ht 1.575 m (5' 2\")   Wt 77.2 kg (170 lb 3.1 oz)   SpO2 98%   BMI 31.13 kg/m       GENERAL APPEARANCE: critically ill, intubated   HEENT: MMM   PULM: lungs clear anteriorly   CV: regular rhythm, normal rate, no rub      -edema - 1+ LE edema   GI: soft, ND  INTEGUMENT: no rash on exposed skin  NEURO: sedated   Access is LFV temp line 8/19.     Labs:   All labs reviewed by me  Electrolytes/Renal -   Recent Labs   Lab Test 09/05/24  1209 09/05/24  1024 09/05/24  0918 09/05/24  0509 09/05/24  0359 09/04/24  2156 09/04/24  2043 09/04/24  1622 09/04/24  1620 09/04/24  0523 09/04/24  0357   NA  --   --   --   --  136  --   --   --  134*  --  134*   POTASSIUM  --   --   --   --  4.1  --  4.5  --  5.6*  --  3.9   CHLORIDE  --   --   --   --  103  --   --   --  101  --  102   CO2  --   --   --   --  21*  --   --   --  21*  --  23   BUN  --   --   --   --  29.2*  --   --   --  32.9*  --  32.3*   CR  --   --   --   --  0.76  --   --   --  0.79  --  0.74   * 175* 146*   < > 152*   < > 115*   < > 151*   < > 138*   QUAN  --   --   --   --  8.6*  --   --   --  8.9  --  8.6*   MAG  --   --   --   --  2.3  --   --   --  2.6*  --  2.4*   PHOS  --   --   --   --  5.1*  --   --   --  5.3*  --  4.5    < > = values in this interval not displayed.       CBC -   Recent Labs   Lab Test 09/05/24 0359 09/04/24 0357 09/03/24  1215   WBC 23.7* 16.2* 13.1*   HGB 7.9* 7.8* 8.7*    303 334       LFTs -   Recent Labs   Lab Test 09/05/24 0359 09/04/24  1620 09/04/24 0357 09/03/24  1215 " 09/03/24  0343   ALKPHOS 111  --  112  --  148   BILITOTAL 0.2  --  0.2  --  0.2   ALT 28  --  24  --  20   AST 32  --  26  --  33   PROTTOTAL 5.9*  --  5.7*  --  6.5   ALBUMIN 3.3* 3.5 3.3*   < > 3.6    < > = values in this interval not displayed.       Iron Panel - No lab results found.        Current Medications:  Current Facility-Administered Medications   Medication Dose Route Frequency Provider Last Rate Last Admin    acetaminophen (TYLENOL) tablet 650 mg  650 mg Oral Q6H Benjy Padgett PA-C   650 mg at 09/05/24 1018    artificial tears ophthalmic ointment   Both Eyes Q8H Fuentes Fowler MD   Given at 09/05/24 0758    [Held by provider] atorvastatin (LIPITOR) tablet 10 mg  10 mg Oral or Feeding Tube Daily Francisco Welsh MD   10 mg at 08/29/24 0924    budesonide (PULMICORT) neb solution 1 mg  1 mg Nebulization Daily Andres Payne PA-C   1 mg at 09/04/24 0826    cetirizine (zyrTEC) tablet 10 mg  10 mg Oral or Feeding Tube Daily Barry Hatch MD   10 mg at 09/05/24 0758    ciprofloxacin (CIPRO) infusion 400 mg  400 mg Intravenous Q8H Fuentes Fowler  mL/hr at 09/03/24 1203 400 mg at 09/05/24 1210    dexAMETHasone PF (DECADRON) injection 10 mg  10 mg Intravenous Daily Mirtha Vogt APRN CNP   10 mg at 09/05/24 0758    [Held by provider] gabapentin (NEURONTIN) capsule 300 mg  300 mg Oral or Feeding Tube TID Barry Hatch MD   300 mg at 08/28/24 1959    ipratropium (ATROVENT) 0.02 % neb solution 0.5 mg  0.5 mg Nebulization 4x daily Barry Hatch MD   0.5 mg at 09/05/24 0954    And    levalbuterol (XOPENEX) neb solution 0.63 mg  0.63 mg Nebulization 4x Daily Barry Hatch MD   0.63 mg at 09/05/24 0954    levothyroxine (SYNTHROID/LEVOTHROID) tablet 25 mcg  25 mcg Per Feeding Tube QAM  Giovanny Guidry PA-C   25 mcg at 09/05/24 0759    LORazepam (ATIVAN) tablet 2 mg  2 mg Oral Q6H Benjy Padgett PA-C   2 mg at 09/05/24 1019    montelukast (SINGULAIR) tablet 10 mg  10  mg Oral or Feeding Tube At Bedtime Barry Hatch MD   10 mg at 09/04/24 2102    multivitamin RENAL (RENAVITE RX/NEPHROVITE) tablet 1 tablet  1 tablet Oral or Feeding Tube Daily Giovanny Guidry PA-C   1 tablet at 09/05/24 0758    nystatin (MYCOSTATIN) suspension 500,000 Units  500,000 Units Oral 4x Daily Carlos Cerna MD   500,000 Units at 09/05/24 1210    oxyCODONE (ROXICODONE) solution 10 mg  10 mg Oral Q6H Benjy Padgett PA-C   10 mg at 09/05/24 1019    pantoprazole (PROTONIX) 2 mg/mL suspension 40 mg  40 mg Per Feeding Tube QAM AC Barry Hatch MD   40 mg at 09/05/24 0758    Prosource TF20 ENfit Compatibl EN LIQD (PROSOURCE TF20) packet 60 mL  1 packet Per Feeding Tube Daily Giovanny Guidry PA-C   60 mL at 09/05/24 0758    QUEtiapine (SEROquel) tablet 25 mg  25 mg Oral BID Benjy Padgett PA-C   25 mg at 09/05/24 0758    tobramycin (PF) (LIZA) neb solution 300 mg  300 mg Nebulization 2 times daily Gaudencio De Souza MD   300 mg at 09/05/24 0955     Current Facility-Administered Medications   Medication Dose Route Frequency Provider Last Rate Last Admin    cisatracurium (NIMBEX) 200 mg in D5W 100 mL infusion  3-10 mcg/kg/min (Ideal) Intravenous Continuous Fuentes Fowler MD   Paused at 09/05/24 1112    dextrose 10% infusion   Intravenous Continuous PRN Gaudencio De Souza MD        dextrose 10% infusion   Intravenous Continuous PRN Giovanny Guidry PA-C        dialysate for CVVHD & CVVHDF (Phoxillum BK4/2.5)  12.5 mL/kg/hr CRRT Continuous Emmanuelle Donato MD 1,000 mL/hr at 09/05/24 0608 12.5 mL/kg/hr at 09/05/24 0608    fentaNYL (SUBLIMAZE) infusion   mcg/hr Intravenous Continuous Benjy Padgett PA-C   Paused at 09/05/24 1132    heparin (porcine) 20,000 units in 20 mL ANTICOAGULANT infusion (syringe from pharmacy)  500-1,500 Units/hr CRRT Continuous Emmanuelle Donato MD 1.2 mL/hr at 09/05/24 0724 1,200 Units/hr at 09/05/24 0724    HYDROmorphone (DILAUDID) 0.2 mg/mL  infusion ADULT/PEDS GREATER than or EQUAL to 20 kg  2.5 mg/hr Intravenous Continuous Fuentes Fowler MD 12.5 mL/hr at 09/05/24 1200 2.5 mg/hr at 09/05/24 1200    insulin regular (MYXREDLIN) 1 unit/mL infusion  0-24 Units/hr Intravenous Continuous Gaudencio De Souza MD 5 mL/hr at 09/05/24 1210 5 Units/hr at 09/05/24 1210    midazolam (VERSED) 100 mg/100 mL NS infusion - ADULT  1-16 mg/hr Intravenous Continuous Benjy Padgett PA-C 16 mL/hr at 09/05/24 1237 16 mg/hr at 09/05/24 1237    norepinephrine (LEVOPHED) 16 mg in  mL infusion MAX CONC CENTRAL LINE  0.01-0.6 mcg/kg/min (Dosing Weight) Intravenous Continuous Elier Hutchins MD 16.7 mL/hr at 09/05/24 1200 0.2 mcg/kg/min at 09/05/24 1200    POST-filter replacement solution for CVVHD & CVVHDF (Phoxillum BK4/2.5)   CRRT Continuous SudeepumitrEmmanuelle dixon  mL/hr at 09/05/24 0608 New Bag at 09/05/24 0608    PRE-filter replacement solution for CVVHD & CVVHDF (Phoxillum BK4/2.5)  12.5 mL/kg/hr CRRT Continuous Emmanuelle Donato MD 1,000 mL/hr at 09/05/24 0608 12.5 mL/kg/hr at 09/05/24 0608       IEmmanuelle, saw and evaluated this patient as part of a shared visit.  I have reviewed and discussed with the advanced practice provider their history, physical and plan.  I have personally performed the substantive portion of the medical decision making for this visit - please see the TRI's documentation for the full details  I personally reviewed the vital signs, medications, labs and imaging.    Key findings and management decisions made by me:  CHACORTA in setting of ARDS on CRRT. Continue CRRT for volume and solute control    Emmanuelle Donato  Date of Service (when I saw the patient): 9/5

## 2024-09-05 NOTE — PHARMACY-VANCOMYCIN DOSING SERVICE
Pharmacy Vancomycin Initial Note  Date of Service 2024  Patient's  1970  53 year old, female    Indication: Healthcare-Associated Pneumonia    Current estimated CrCl = CRRT    Creatinine for last 3 days  2024:  8:19 PM Creatinine 0.75 mg/dL  9/3/2024:  3:43 AM Creatinine 0.74 mg/dL; 12:15 PM Creatinine 0.74 mg/dL;  5:01 PM Creatinine 0.78 mg/dL  2024:  3:57 AM Creatinine 0.74 mg/dL;  4:20 PM Creatinine 0.79 mg/dL  2024:  3:59 AM Creatinine 0.76 mg/dL;  3:43 PM Creatinine 0.88 mg/dL    Recent Vancomycin Level(s) for last 3 days  No results found for requested labs within last 3 days.      Vancomycin IV Administrations (past 72 hours)        No vancomycin orders with administrations in past 72 hours.                    Nephrotoxins and other renal medications (From now, onward)      Start     Dose/Rate Route Frequency Ordered Stop    24 1830  vancomycin (VANCOCIN) 1,500 mg in 0.9% NaCl 250 mL intermittent infusion         20 mg/kg × 80.3 kg  166.7 mL/hr over 90 Minutes Intravenous EVERY 24 HOURS 24 1817      24 1000  tobramycin (PF) (LIZA) neb solution 300 mg         300 mg Nebulization 2 TIMES DAILY RT 24 0837      24 1930  norepinephrine (LEVOPHED) 16 mg in  mL infusion MAX CONC CENTRAL LINE         0.01-0.6 mcg/kg/min × 88.9 kg (Dosing Weight)  0.8-50 mL/hr  Intravenous CONTINUOUS 24 1929              Contrast Orders - past 72 hours (72h ago, onward)      None                Plan:  Start vancomycin  1500 mg IV q12h.   Vancomycin monitoring method: Trough (Method 2 = manual dose calculation)  Vancomycin therapeutic monitoring goal: 15-20 mg/L  Pharmacy will check vancomycin levels as appropriate in 1-3 Days.    Serum creatinine levels will be ordered daily for the first week of therapy and at least twice weekly for subsequent weeks.      Luh Batista, RamonD

## 2024-09-05 NOTE — PLAN OF CARE
Goal Outcome Evaluation:       ICU End of Shift Summary. See flowsheets for vital signs and detailed assessment.    Changes this shift: Patient has been stable in her respiratory status all night.  Cis has been on at 6 and she has been breathing comfortable with the vent.  Patient BP is very labile.  Patient started off levo gtt, and has been on 0.2 most of the shift.  Patient drops when she is resting and increases significantly with activity.  Patient BIS has been mostly between 40-60.  No UOP.  Stool of about 300cc over night.  HR has been about 100 bpm all night.  Slight contractions of muscles in jaw and arm with pain.    Plan:  Patient to maintain.  Try to wean off cis today.  In room informal care conference when family comes in, likely in mid afternoon.  Continue to monitor.

## 2024-09-05 NOTE — PROGRESS NOTES
MEDICAL ICU PROGRESS NOTE  09/05/2024    Date of Service (when I saw the patient): 09/05/2024    ASSESSMENT: Massiel Flaherty is a 53 year old female with PMH Anxiety/depression, fibromyalgia, c/f nonepileptic seizures not on AEDs, hypothyroidism, asthma, HLD, MURIEL on CPAP, expressive aphasia, hypertension  who was admitted on 8/3/2024 for fatigue, fever and dyspnea and intubated 8/6/24 at OSH for ARDS, proned and paralyzed without improvement. Transferred to [facility] 8/8/24, hypoxic with high plateaus/peaks and placed on VV ECMO and CRRT. Decannulated from VV ECMO 8/18 and transferred to MICU 8/26/24 for ongoing management. Extubated 8/27 then reintubated 8/29 iso worsening AHRF and pseudomonas pneumonia.     CHANGES and MAJOR THINGS TODAY:  - Given hypotension yesterday, started ganciclovir and added gram positive coverage with vancomycin as well as repeated infectious workup and consulted ID  - Will d/w gen surgery for trach and peg next week  - Wean versed to 12 and dilaudid to 2 today  - Schedule prednisone taper to start Monday 9/9 when dexamethasone ends    Neuro:  # Pain and sedation  # Acute pain  # Sedation and analgesia for vent compliance   # Concern for ICU delirium   # Hx Fibromyalgia   # Hx myalgic encephalomyelitis   # Hx anxiety/depression  - Monitor neurological status. Delirium preventions and precautions.   - Pain: Scheduled: tylenol, oxycodone 10mg q 6hr, Ativan 2 mg every 6 hours for further sedation to reach BIS goal  PRN: tylenol, oxycodone  - Sedation plan: dilaudid (rotated from fentanyl 9/5), midazolam  - Propofol weaned for high triglycerides  - Cisatracurium for vent and BIS goal  - RASS goal -4 to -5, BIS goal 40-60  - Gabapentin 300mg TID held while sedated  - Seroquel 25mg BID PRN held while sedated  - PTA Venlafaxine and Amitriptyline discontinued while sedated  - Will pause cisatracurium and assess vent synchrony    # Hx spells with staring and BUE posturing previously described as  nonepileptic seizures   # Hx of GTC seizures and myoclonic epilepsy, weaned off AEDs   # Diffuse cerebral edema - improved  - Sodium goals liberalized to 140-145  - 8/21: MRI Brain: no acute intracranial pathology. No findings to suggest anoxic brain injury.     Pulmonary:  # Acute hypoxic respiratory failure c/b ARDS  # Pseudomonas pneumonia  # Mechanical ventilation  # s/p VV ECMO 8/8 - 8/18   # Hx CAP  # Asthma  # MURIEL on home CPAP  PFT dated 9/8/2020 demonstrated normal spirometry with mild diffusion impairment and mild restriction (FEV1/FVC 80%, FEV1 2.59, DLCO 40%). CT abdomen chest 3/9/2020 demonstrated scarring and fibrosis bilaterally which correlated with the decreased DLCO. The patient also has a history of MURIEL on CPAP therapy as currently managed by her provider in [place]. Unclear what triggered ARDS or why she has had such severe hospital course, further investigated ILD but results all unremarkable. Extubated 8/27, reintubated 8/29 for worsening O2 demands and diffuse opacities iso new/recurrent psuedomonas pneumonia. Bronch with BAL 8/30, pseudomonas growing as below.   - ILD w/u aldolase, EVELIO, RF, CCP, ANCA, MPO, SSA Ro and La, Scleroderma antibody, Radha 1,  hypersensity pneumonitis, IGG subclasses,  aspergillus, blastomyces, histoplasma neg  - SpO2 goals >92%, PaO2 goals >60   - PTA singular at bedtime if able  - Scheduled pulmicort, and duonebs   - Lung protective ventilation strategies given ARDS  - Methylpred 125mg q6H x 24h 8/30 --> transition to 20 mg dex x 5 days, followed by 10mg x 5 days  - Epoprostenol currently off  - Wean vent as able  FiO2 (%): 40 %, Resp: 24, Vent Mode: CMV/AC, Resp Rate (Set): 24 breaths/min, Tidal Volume (Set, mL): 350 mL, PEEP (cm H2O): 6 cmH2O, Resp Rate (Set): 24 breaths/min, Tidal Volume (Set, mL): 350 mL, PEEP (cm H2O): 6 cmH2O    Cardiovascular:  # Hyperlipidemia  # Hypotension  # Hx HTN  - MAP goal >65, SBP goals >110  - NE for MAP goal  - PTA  atorvastatin  - PTA clonidine held while sedated  - Lower extremity ultrasound without vascular pathology, no hematoma or clots  - Monitor discoloration of extremities for necrosis  - Monitoring Hypertension    Sinus Tachycardia  Likely 2/2 fluid removal and sepsis as above    GI/Nutrition:  # Moderate protein calorie malnutrition  # Non-infectious Diarrhea  # GERD   - Protonix (home medication)   - Nutrition consulted, appreciate recs  - Diet: TF via NJ, transitioned to trickle feeds 9/5 given pressor requirements  - Hold bowel regimen d/t loose stools  - Change suspension meds to other form as able  - Continue scheduled multivitamin, banatrol, prosource packet  - loperamide PRN  - Rectal tube, continues with high output. Keep today.     Renal/Fluids/Electrolytes:  # Acute kidney injury  # Renal failure  Baseline Cr approx 0.6. Pt was anuric here but now making some urine, likely prerenal due to shock.  - Continue CRRT; fluid goal net even for the day  - Heparinized CRRT circuit, low intensity protocol. Last clotted 8/24 PM  - Strict I&Os  - Bladder scan q shift  - Avoid nephrotoxins as able    Endocrine:  # Steroid and stress induced hyperglycemia  # Hypothyroidism   - Goal to keep BG < 180 for optimal wound healing.   - with rising blood glucose due to steroids started insulin drip 8/30  - Continue PTA synthroid    ID:  # Leukocytosis  # Psuedomonas pneumonia  # Hx CAP  Respiratory cx 8/29 pseudomonas pneumonia. Intermediate susceptability to meropenem, more significant sensitivities to ciprofloxacin  - Continue to monitor fever curve and inflammatory markers as appropriate  - ID consulted, appreciate recs.   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   - if trend continues to rise will start ganciclovir    # HSV-1  Mouth sores present, which could have viral etiology. 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 Monitor and trend.   - Heparinize CRRT circuit    - CTAP 8/30 due to acute hemoglobin drop requiring transfusion of 2 x 1 unit of blood 12 hours apart. Negative for acute bleed    Musculoskeletal/Rheum:  # Alopecia  - Hold PTA hydroxychloroquine     # Weakness and deconditioning of critical illness  # Left ankle injury pre-hospitalization    - Physical and occupational therapy when able.     Skin:  # BLE scattered ecchymosis  # Left toe duskiness  - Bilateral lower leg ecchymosis  - WOC consult  - Ecchymosis to left foot, most noticeable to 3rd and 4th toes    General Cares/Prophylaxis:  DVT Prophylaxis: Heparin through CRRT circuit  GI Prophylaxis: PPI  Restraints: no  Code Status: DNR/OK to intubate    Lines/tubes/drains:  - ETT (8/29)  - NJ (8/9)  - LIJ CVC (8/8)  - Radial a-line (8/29)  - PIV x3  - Rectal tube (8/17)  - Tunneled HD line (8/23)    Disposition:  - Medical ICU     Patient seen and findings/plan discussed with medical ICU staff, Dr. Shelley.    I spent a total of 45 minutes (excluding procedure time) personally providing and directing critical care services at the bedside and on the critical care unit for Massiel De Souza MD    Clinically Significant Risk Factors        # Hyperkalemia: Highest K = 5.6 mmol/L in last 2 days, will monitor as appropriate   # Hypocalcemia: Lowest Ca = 8.6 mg/dL in last 2 days, will monitor and replace as appropriate     # Hypoalbuminemia: Lowest albumin = 2.2 g/dL at 8/10/2024  9:47 AM, will monitor as appropriate               # Obesity: Estimated body mass index is 31.13 kg/m  as calculated from the  "following:    Height as of this encounter: 1.575 m (5' 2\").    Weight as of this encounter: 77.2 kg (170 lb 3.1 oz).   # Moderate Malnutrition: based on nutrition assessment      # Financial/Environmental Concerns: none        Code Status: No CPR- Pre-arrest intubation OK       ====================================  INTERVAL HISTORY:   Remained off paralytics overnight, continued to have labile blood pressures overnight requiring titration of norepinephrine. Will reach out to surgery for trach/peg consideration    OBJECTIVE:   1. VITAL SIGNS:   Temp:  [97.3  F (36.3  C)-99.9  F (37.7  C)] 99.7  F (37.6  C)  Pulse:  [] 99  Resp:  [14-31] 24  MAP:  [51 mmHg-101 mmHg] 73 mmHg  Arterial Line BP: ()/(34-70) 114/51  FiO2 (%):  [40 %] 40 %  SpO2:  [92 %-100 %] 99 %  FiO2 (%): 40 %, Resp: 24, Vent Mode: CMV/AC, Resp Rate (Set): 24 breaths/min, Tidal Volume (Set, mL): 350 mL, PEEP (cm H2O): 6 cmH2O, Resp Rate (Set): 24 breaths/min, Tidal Volume (Set, mL): 350 mL, PEEP (cm H2O): 6 cmH2O  2. INTAKE/ OUTPUT:   I/O last 3 completed shifts:  In: 3351.85 [I.V.:1976.85; NG/GT:475]  Out: 3606.1 [Other:3356.1; Stool:250]    3. PHYSICAL EXAMINATION:  General: Intubated, sedated  HEENT: Normocephalic, atraumatic, eyes with ointment dressing  Neuro: RASS -4-5, arefelexic  Pulm/Resp: Bilateral breath sounds audible with diffuse coarse breath sounds  CV: Sinus tachycardia, s1/2 normal, no murmur  Abdomen: Soft, non-distended, limited 2/2 to sedation  : deferred  Incisions/Skin: lower leg 1+ edema with ecchymosis present and scattered. Some bluish discoloration of the finger tips, capillary refill normal    4. LABS:   Arterial Blood Gases   Recent Labs   Lab 09/05/24  0359 09/04/24  2043 09/04/24  1417 09/04/24  1209   PH 7.32* 7.32* 7.36 7.32*   PCO2 49* 49* 43 48*   PO2 115* 124* 132* 122*   HCO3 25 25 24 25     Complete Blood Count   Recent Labs   Lab 09/05/24  0359 09/04/24  0357 09/03/24  1215 09/03/24  0343   WBC 23.7* " 16.2* 13.1* 15.2*   HGB 7.9* 7.8* 8.7* 9.2*    303 334 375     Basic Metabolic Panel  Recent Labs   Lab 09/05/24  0649 09/05/24  0556 09/05/24  0509 09/05/24  0359 09/04/24 2156 09/04/24 2043 09/04/24 1622 09/04/24  1620 09/04/24  0523 09/04/24  0357 09/03/24 1703 09/03/24 1701   NA  --   --   --  136  --   --   --  134*  --  134*  --  132*   POTASSIUM  --   --   --  4.1  --  4.5  --  5.6*  --  3.9  --  5.3   CHLORIDE  --   --   --  103  --   --   --  101  --  102  --  99   CO2  --   --   --  21*  --   --   --  21*  --  23  --  22   BUN  --   --   --  29.2*  --   --   --  32.9*  --  32.3*  --  33.5*   CR  --   --   --  0.76  --   --   --  0.79  --  0.74  --  0.78   * 154* 156* 152*   < > 115*   < > 151*   < > 138*   < > 166*    < > = values in this interval not displayed.     Liver Function Tests  Recent Labs   Lab 09/05/24  0359 09/04/24  1620 09/04/24  0357 09/03/24  1701 09/03/24  1215 09/03/24  0343 09/02/24  1131 09/02/24  0403 08/30/24  2016 08/30/24  1045   AST 32  --  26  --   --  33  --  28   < >  --    ALT 28  --  24  --   --  20  --  28   < >  --    ALKPHOS 111  --  112  --   --  148  --  156*   < >  --    BILITOTAL 0.2  --  0.2  --   --  0.2  --  0.2   < >  --    ALBUMIN 3.3* 3.5 3.3* 3.5   < > 3.6   < > 3.4*   < > 2.9*   INR  --   --   --   --   --   --   --   --   --  1.14    < > = values in this interval not displayed.     Coagulation Profile  Recent Labs   Lab 08/30/24  1045   INR 1.14   PTT 56*     5. RADIOLOGY:   Recent Results (from the past 24 hour(s))   XR Chest Port 1 View    Narrative    Exam: XR CHEST PORT 1 VIEW, 9/4/2024 9:53 AM    Comparison: Radiograph 9/3/2024, 9/2/2024    History: ETT    Findings:  Portable AP view of the chest. Endotracheal tube tip projects over the  midthoracic trachea. Esophageal temperature probe in place. Bilateral  IJ venous catheter tips project in similar position with the left  again likely against the lateral wall of the upper most SVC and  the  right in the proximal right atrium. Feeding tube tip extends beyond  the field of view. Trachea is midline. Cardiomediastinal silhouette is  within normal limits. Similar streaky bibasilar opacities. No  appreciable pneumothorax or pleural effusion.       Impression    Impression:   1. Endotracheal tube tip projects over the mid thoracic trachea.  Additional support devices project in similar position.  2. Similar streaky bibasilar opacities.    I have personally reviewed the examination and initial interpretation  and I agree with the findings.    ENMANUEL JEFFERSON MD         SYSTEM ID:  [id number]   CT Chest w/o Contrast    Narrative    CT CHEST W/O CONTRAST 9/4/2024 6:37 PM    History: Pulmonary fibrosis    Comparison: CT chest abdomen and pelvis 8/30/2024    Technique: CT of the chest was obtained without intravenous contrast.  Axial, coronal, and sagittal reconstructions were obtained and  reviewed.    Contrast: None    Findings:   Device: Right IJ CVC tip is in the right atrium. Endotracheal tube tip  is in the mid thoracic trachea. Feeding tube tip is beyond the field  of view.  Lungs: Significantly decreased diffuse groundglass and consolidative  opacities. There are diffuse residual groundglass opacities and  scattered nodular opacities. No pneumothorax or pleural effusion.  Airways: Debris in the low trachea.  Vessels: Main pulmonary artery and aorta are normal in caliber. Normal  three-vessel arch  Heart: Heart size is normal without pericardial effusion  Lymph nodes: No suspicious mediastinal or hilar lymphadenopathy.  Thyroid: Within normal limits.  Esophagus: Within normal limits  Upper abdomen: Within normal limits on this noncontrast exam.    Bones and soft tissues: No suspicious axillary lymphadenopathy or soft  tissue mass. No suspicious osseous lesion. Mild multilevel  degenerative changes of the spine.      Impression    Impression: Significantly decreased diffuse ground glass  and  consolidative opacities with residual groundglass and scattered  nodular opacities, suggesting improving ARDS.    I have personally reviewed the examination and initial interpretation  and I agree with the findings.    MAYELA DEJESUS MD         SYSTEM ID:  [id number]

## 2024-09-05 NOTE — PROGRESS NOTES
"Critical Care Services Progress Note:  I personally examined and evaluated the patient today. I formulated today s plan with the house staff team or resident and agree with the findings and plan in the associated note (see separately attested resident note).   I have evaluated all laboratory values and imaging studies from the past 24 hours.  Summary of hospital course:  53 year old female initially transferred to [facility] on 8/8 for VV-ECMO.  She was diagnosed with ARDS from a suspected pneumonia. Decannulated on 8/18. Extubated on 8/19.  Reintubated on 8/29 for hypoxemia and now critically ill, proned and paralyzed  Overnight events/pertinent findings today:  Paralytic holiday yesterday.  Slowly became hypercapnic and dyssynchronous.  Re-paralyzed.  More hypotensive overnight.     Data  /64   Pulse 98   Temp 99.5  F (37.5  C)   Resp 24   Ht 1.575 m (5' 2\")   Wt 77.2 kg (170 lb 3.1 oz)   SpO2 99%   BMI 31.13 kg/m    I/O - 230 mL    Na 136, K 4.1  CRP 41  WBC 23.7, Hgb 7.9, Plts 378    Sputum culture 8/29 with pseudomonas  BAL culture 8/30 with pseudomonas    Blood CMV PCR + 741 --> 920    Chest CT - extensive GGO and consolidative changes  ECHO is normal    CXR improved aeration of both R and L lung    Non-contrast chest CT with improved opacities, but still with GGOs  Assessment/plan:    ARDS  CHACORTA on CRRT  Pseudomonas Pneumonia  Shock   Reactivation of CMV    Supine   Cisatracurium holiday today  BIS monitoring  Net even via CRRT  Ciprofloxacin  Nebulized LIZA  Start ganciclovir  Pan culture  Continue invasive mechanical ventilation.   MAP goal 65  Recheck CMV PCR today    Care conference today for 25 minutes.  Discussed the long recovery, the possible need for dialysis indefinitely, and need for tracheostomy.  They understand all of this.  All questions were answered      Rest per resident note.    I spent a total of 60 minutes (excluding procedure time) personally providing and directing critical care " services at the bedside and on the critical care unit for this patient.   (35 minutes patient care + 25 minutes care conference for a total of 60 minutes)  Alec Shelley MD

## 2024-09-05 NOTE — PROGRESS NOTES
CRRT STATUS NOTE    DATA:  Time:  8:14 AM  Pressures WNL:  YES  Filter Status:  WDL    Problems Reported/Alarms Noted:  yes, system failure after bag change.  Effluent scale needs recalibrating. Exchange machines.     Supplies Present:  YES    ASSESSMENT:  Patient Net Fluid Balance:  9/4 -231  9/5 0700   +134  Vital Signs:  Temp: 99.7  F (37.6  C) Temp src: Esophageal   Pulse: 99   Resp: 24 SpO2: 99 % O2 Device: Mechanical Ventilator  40%.  Paralyzed.  Levo max 0.2, BP labile overnight.     Labs:  K 5.6-4.1  WBC 23.7  Goals of Therapy:  0-100ml/hr, unable to meet due to labile BP    INTERVENTIONS:   Restart @ 0724    PLAN:  Continue per Renal orders. Call CRRT resource via [employer] with concerns.

## 2024-09-05 NOTE — PLAN OF CARE
Major Shift Events:      Pt sedated with versed and dilaudid gtt (transitioned from fent). Paralytic turned off at 11am, see emar, only to turn paralytic back on if pt begins breathing in high 30s or is in distress. Previously was titrating to BIS goal 40-60 and vent sync. Pupil checks q4. Tmax 100.4, scheduled tylenol given and external cooling measures enacted. Pan cultured as well. Remains SR/ST 90-110s throughout shift. MAP>65 with levo (high as 0.2) - very minimally able to titrate down during shift. 500ml LR bolus igven this AM to promote improvement of bp, with no effect. Remains intubated. With paralytic off, breathing at 24-30 without distress. Straight cath today for urine sample/culture. Tube feeds decreased to 10/hr with SFWF. Rectal tube in place. Blood sugars collected and titrating insulin gtt as indicated.  CRRT in place, was down for approx 4 hours today. Heparin in circuit. Goal net even this afternoon (positive this morning to trial if fluid would help bp improvement).    Plan: Notify md of any acute changes. Notify md if needing to restart cis for paralytic and vent sync needs.    For vital signs and complete assessments, please see documentation flowsheets.      Goal Outcome Evaluation:      Plan of Care Reviewed With: patient    Overall Patient Progress: no change    Outcome Evaluation: on 0.2 levo, mainly unable to titrate lower all day. 500ml bolus given this AM with no significant bp improvement. paralytic off for breathing trial to sync to vent.

## 2024-09-05 NOTE — PROGRESS NOTES
CLINICAL NUTRITION SERVICES - REASSESSMENT NOTE     Nutrition Prescription    RECOMMENDATIONS FOR MDs/PROVIDERS TO ORDER:  None currently     Malnutrition Status:    Severe malnutrition in the context of acute illness/disease    Recommendations already ordered by Registered Dietitian (RD):  Continue current TF regimen as ordered  Goal: Dina Ch Renal (or equivalent) @ 45 mL/hr (1080 mL/day) +1 Prosource TF20 to provide 2024 kcal, 106 g protein, 183 g CHO, 22 g fiber, and 724 mL free water daily      Future/Additional Recommendations:  Repeat metabolic cart study PRN  Monitor pressor support  Monitor weight loss     EVALUATION OF THE PROGRESS TOWARD GOALS   Diet: NPO + TF    Nutrition Support: access: NJT     Formula/schedule/modulars: -___: Dina Ch Renal @ 45 mL/hr (1080 mL/day) +1 Prosource TF20 to provide 2024 kcal, 106 g protein, 183 g CHO, 22 g fiber, and 724 mL free water daily      -: Dina Ch Renal @ 30 mL/hr (720 mL/day) + 2 pkt ProSource TF20 daily to provide 1456 kcal (26 kcal/kg), 97 g protein (1.7 g pro/kg), 122 g CHO, 14 g fiber, and 482 mL free water daily    + ~ 420-550 kcal/day from propofol = 7641-3078 kcal/day (74-79% MREE which meets goal of 60-80% MREE)     -: Dina Ch Renal @ 40 mL/hr (960 mL/day) + 2 pkts ProSource TF20 to provide 1888 kcal (33 kcal/kg, or 74% MREE from ), 117 g protein (2.05 g pro/kg), 163 g CHO, 19 g fiber, and 643 mL free water daily     - : Vital AF 1.2 @ 50 ml/hr + 2 pkts prosource TF20 provides 1200 ml volume, 1600 kcals (27 kcal/kg or 63% MREE), 130 g pro (2.2 g/kg), 65 g Fat, 133 g CHO, 6 g fiber, 973 ml free water.      - : Vital AF 1.2 @ 35 ml/hr + 3 pkts prosource TF20 provides 840 ml volume, 1248 kcals (21 kcal/kg), 123 g pro (2.1 g/kg), 45 g Fat, 93 g CHO, 4 g fiber, 681 ml free water.     Free water flushes: 30 mL every 4 hours    Intake/Tolerance: Tube Feedin day average tube feeding infusion of 712 mL + 7 day  average intake of 2 Prosource TF20 packet(s) = average intake of 1441 kcal/day and 97 g protein/day.  TF running at new goal rate of 45 ml/hr.      NEW FINDINGS   Obtained metabolic cart study 9/5 @ 0853 with the following results: MREE = 2799 kcals/day (equiv to 49 kcal/kg adjusted weight/day) with RQ = 0.65.  Pt received 975 ml of TF + 2 Prosource TF20 in 24 hours preceding the study providing 1915 kcals (68 % MREE).  RQ not within physiologic range so unable to use results of this study. Will repeat metabolic cart study.     TF adjusted 9/4 d/t patient off propofol.    GI:  Last BM: 09/05/24  Improving stool output, imodium changed to PRN 9/4  stool output (mL): 250 mL 9/04, 440 mL 9/03, 250 mL 9/02, 600 mL 9/01, 650 mL 8/31, 320 mL 8/30, 280  mL 8/29    Weight:  Most Recent Weight: 77.2 kg (170 lb 3.1 oz)  on 9/4/24 via Bed scale  Body mass index is 31.13 kg/m .  Wt down 11.7 kg from admission. - 3.9 L from admission per I/O.     Meds:  Cipro  Decadron  Levothyroxine  Renal multivitamin  Ativan  Oxycodone   Protonix  Continuous: fentanyl; insulin; versed; norepi @ 0.2    Labs:   09/03/24 17:01 09/04/24 03:57 09/04/24 16:20 09/04/24 20:43 09/05/24 03:59   Sodium 132 (L) 134 (L) 134 (L)  136   Potassium 5.3 3.9 5.6 (H) 4.5 4.1   Chloride 99 102 101  103   Carbon Dioxide (CO2) 22 23 21 (L)  21 (L)   Urea Nitrogen 33.5 (H) 32.3 (H) 32.9 (H)  29.2 (H)   Creatinine 0.78 0.74 0.79  0.76   GFR Estimate 90 >90 89  >90   Calcium 8.7 (L) 8.6 (L) 8.9  8.6 (L)   Anion Gap 11 9 12  12   Magnesium 2.5 (H) 2.4 (H) 2.6 (H)  2.3   Phosphorus 5.7 (H) 4.5 5.3 (H)  5.1 (H)   Albumin 3.5 3.3 (L) 3.5  3.3 (L)   Protein Total  5.7 (L)   5.9 (L)   Alkaline Phosphatase  112   111   ALT  24   28   AST  26   32   Bilirubin Direct  <0.20   <0.20   Bilirubin Total  0.2   0.2   Calcium Ionized Whole Blood 4.7 4.8 4.8  4.8   CRP Inflammation  32.40 (H)   41.20 (H)   Glucose 166 (H) 138 (H) 151 (H)  152 (H)   Triglycerides  441 (H)   460 (H)  "    Respiratory:   Intubated on mechanical vent     Renal:  CRRT    MALNUTRITION  % Intake: Decreased intake does not meet criteria  % Weight Loss: > 5% in 1 month (severe malnutrition)  Subcutaneous Fat Loss: None observed - per RD assessment 8/29, unable to complete updated NFPE today, will follow up  Muscle Loss: Upper arm (bicep, tricep):  Moderate and Upper leg (quadricep, hamstring):  Mild - per RD assessment 8/29, unable to complete updated NFPE today, will follow up  Fluid Accumulation/Edema: Mild  Malnutrition Diagnosis: Severe malnutrition in the context of acute illness/disease    Previous Goals   Total avg nutritional intake to meet a minimum of 27 kcal/kg (60% MREE from 8/22) and 1.5 g PRO/kg daily (per NEW dosing wt 57 kg).  Evaluation: Met protein goal; nearly met calorie goal     Previous Nutrition Diagnosis  Inadequate oral intake related to NPO status in setting of intubation as evidenced by continued need for EN support to meet full nutrition needs.     Evaluation: No change    CURRENT NUTRITION DIAGNOSIS  Inadequate oral intake related to intubation as evidenced by NPO with nutrition support needed to meet nutrition needs.      INTERVENTIONS  Implementation  Enteral Nutrition - continue as ordered  Metabolic cart study      Goals  Total avg nutritional intake to meet a minimum of 27 kcal/kg (60% MREE on 8/22) and 1.5 g PRO/kg daily (per dosing wt 57 kg).    Monitoring/Evaluation  Progress toward goals will be monitored and evaluated per protocol.      Marce Balderas, MS, RDN, LD  4C MICU RD  Vocera - \"4C Clinical Dietitian\"  Weekend/Holiday RD - \"Weekend Clinical Dietitian\"    "

## 2024-09-06 LAB
ALBUMIN SERPL BCG-MCNC: 3.2 G/DL (ref 3.5–5.2)
ALBUMIN SERPL BCG-MCNC: 3.3 G/DL (ref 3.5–5.2)
ALBUMIN SERPL BCG-MCNC: 3.4 G/DL (ref 3.5–5.2)
ALLEN'S TEST: ABNORMAL
ALP SERPL-CCNC: 95 U/L (ref 40–150)
ALT SERPL W P-5'-P-CCNC: 26 U/L (ref 0–50)
ANION GAP SERPL CALCULATED.3IONS-SCNC: 11 MMOL/L (ref 7–15)
ANION GAP SERPL CALCULATED.3IONS-SCNC: 8 MMOL/L (ref 7–15)
ANION GAP SERPL CALCULATED.3IONS-SCNC: 9 MMOL/L (ref 7–15)
AST SERPL W P-5'-P-CCNC: 29 U/L (ref 0–45)
BACTERIA BRONCH: ABNORMAL
BACTERIA BRONCH: NO GROWTH
BASE EXCESS BLDA CALC-SCNC: -0.6 MMOL/L (ref -3–3)
BASE EXCESS BLDA CALC-SCNC: -0.9 MMOL/L (ref -3–3)
BASE EXCESS BLDA CALC-SCNC: 5 MMOL/L (ref -3–3)
BILIRUB DIRECT SERPL-MCNC: <0.2 MG/DL (ref 0–0.3)
BILIRUB SERPL-MCNC: 0.3 MG/DL
BLD PROD TYP BPU: NORMAL
BLOOD COMPONENT TYPE: NORMAL
BUN SERPL-MCNC: 22.9 MG/DL (ref 6–20)
BUN SERPL-MCNC: 23.7 MG/DL (ref 6–20)
BUN SERPL-MCNC: 24.5 MG/DL (ref 6–20)
C DIFF TOX B STL QL: NEGATIVE
CA-I BLD-MCNC: 4.7 MG/DL (ref 4.4–5.2)
CA-I BLD-MCNC: 4.8 MG/DL (ref 4.4–5.2)
CA-I BLD-MCNC: 4.9 MG/DL (ref 4.4–5.2)
CALCIUM SERPL-MCNC: 8.4 MG/DL (ref 8.8–10.4)
CALCIUM SERPL-MCNC: 8.6 MG/DL (ref 8.8–10.4)
CALCIUM SERPL-MCNC: 8.9 MG/DL (ref 8.8–10.4)
CHLORIDE SERPL-SCNC: 102 MMOL/L (ref 98–107)
CHLORIDE SERPL-SCNC: 102 MMOL/L (ref 98–107)
CHLORIDE SERPL-SCNC: 104 MMOL/L (ref 98–107)
CK SERPL-CCNC: 85 U/L (ref 26–192)
CMV DNA SPEC NAA+PROBE-ACNC: 345 IU/ML
CMV DNA SPEC NAA+PROBE-LOG#: 2.5 {LOG_COPIES}/ML
CODING SYSTEM: NORMAL
COHGB MFR BLD: 96.2 % (ref 96–97)
COHGB MFR BLD: 99.7 % (ref 96–97)
COHGB MFR BLD: >100 % (ref 96–97)
CREAT SERPL-MCNC: 0.69 MG/DL (ref 0.51–0.95)
CREAT SERPL-MCNC: 0.7 MG/DL (ref 0.51–0.95)
CREAT SERPL-MCNC: 0.71 MG/DL (ref 0.51–0.95)
CROSSMATCH: NORMAL
CRP SERPL-MCNC: 41.2 MG/L
EGFRCR SERPLBLD CKD-EPI 2021: >90 ML/MIN/1.73M2
ERYTHROCYTE [DISTWIDTH] IN BLOOD BY AUTOMATED COUNT: 18.9 % (ref 10–15)
ERYTHROCYTE [DISTWIDTH] IN BLOOD BY AUTOMATED COUNT: 19.1 % (ref 10–15)
GLUCOSE BLDC GLUCOMTR-MCNC: 101 MG/DL (ref 70–99)
GLUCOSE BLDC GLUCOMTR-MCNC: 109 MG/DL (ref 70–99)
GLUCOSE BLDC GLUCOMTR-MCNC: 114 MG/DL (ref 70–99)
GLUCOSE BLDC GLUCOMTR-MCNC: 115 MG/DL (ref 70–99)
GLUCOSE BLDC GLUCOMTR-MCNC: 116 MG/DL (ref 70–99)
GLUCOSE BLDC GLUCOMTR-MCNC: 119 MG/DL (ref 70–99)
GLUCOSE BLDC GLUCOMTR-MCNC: 125 MG/DL (ref 70–99)
GLUCOSE BLDC GLUCOMTR-MCNC: 130 MG/DL (ref 70–99)
GLUCOSE BLDC GLUCOMTR-MCNC: 133 MG/DL (ref 70–99)
GLUCOSE BLDC GLUCOMTR-MCNC: 134 MG/DL (ref 70–99)
GLUCOSE BLDC GLUCOMTR-MCNC: 139 MG/DL (ref 70–99)
GLUCOSE BLDC GLUCOMTR-MCNC: 141 MG/DL (ref 70–99)
GLUCOSE BLDC GLUCOMTR-MCNC: 83 MG/DL (ref 70–99)
GLUCOSE BLDC GLUCOMTR-MCNC: 85 MG/DL (ref 70–99)
GLUCOSE BLDC GLUCOMTR-MCNC: 96 MG/DL (ref 70–99)
GLUCOSE BLDC GLUCOMTR-MCNC: 99 MG/DL (ref 70–99)
GLUCOSE SERPL-MCNC: 109 MG/DL (ref 70–99)
GLUCOSE SERPL-MCNC: 116 MG/DL (ref 70–99)
GLUCOSE SERPL-MCNC: 133 MG/DL (ref 70–99)
HCO3 BLD-SCNC: 25 MMOL/L (ref 21–28)
HCO3 BLD-SCNC: 25 MMOL/L (ref 21–28)
HCO3 BLD-SCNC: 29 MMOL/L (ref 21–28)
HCO3 SERPL-SCNC: 22 MMOL/L (ref 22–29)
HCO3 SERPL-SCNC: 23 MMOL/L (ref 22–29)
HCO3 SERPL-SCNC: 23 MMOL/L (ref 22–29)
HCT VFR BLD AUTO: 23 % (ref 35–47)
HCT VFR BLD AUTO: 23.4 % (ref 35–47)
HGB BLD-MCNC: 7 G/DL (ref 11.7–15.7)
HGB BLD-MCNC: 7.1 G/DL (ref 11.7–15.7)
ISSUE DATE AND TIME: NORMAL
LDH SERPL L TO P-CCNC: 335 U/L (ref 0–250)
MAGNESIUM SERPL-MCNC: 2.1 MG/DL (ref 1.7–2.3)
MAGNESIUM SERPL-MCNC: 2.2 MG/DL (ref 1.7–2.3)
MAGNESIUM SERPL-MCNC: 2.3 MG/DL (ref 1.7–2.3)
MCH RBC QN AUTO: 30.2 PG (ref 26.5–33)
MCH RBC QN AUTO: 30.4 PG (ref 26.5–33)
MCHC RBC AUTO-ENTMCNC: 30.3 G/DL (ref 31.5–36.5)
MCHC RBC AUTO-ENTMCNC: 30.4 G/DL (ref 31.5–36.5)
MCV RBC AUTO: 100 FL (ref 78–100)
MCV RBC AUTO: 100 FL (ref 78–100)
O2/TOTAL GAS SETTING VFR VENT: 40 %
PCO2 BLD: 42 MM HG (ref 35–45)
PCO2 BLD: 45 MM HG (ref 35–45)
PCO2 BLD: 48 MM HG (ref 35–45)
PEEP: 6 CM H2O
PEEP: 6 CM H2O
PH BLD: 7.32 [PH] (ref 7.35–7.45)
PH BLD: 7.35 [PH] (ref 7.35–7.45)
PH BLD: 7.46 [PH] (ref 7.35–7.45)
PHOSPHATE SERPL-MCNC: 4.9 MG/DL (ref 2.5–4.5)
PHOSPHATE SERPL-MCNC: 5.2 MG/DL (ref 2.5–4.5)
PHOSPHATE SERPL-MCNC: 5.5 MG/DL (ref 2.5–4.5)
PLATELET # BLD AUTO: 240 10E3/UL (ref 150–450)
PLATELET # BLD AUTO: 254 10E3/UL (ref 150–450)
PO2 BLD: 111 MM HG (ref 80–105)
PO2 BLD: 146 MM HG (ref 80–105)
PO2 BLD: 75 MM HG (ref 80–105)
POTASSIUM SERPL-SCNC: 4.8 MMOL/L (ref 3.4–5.3)
POTASSIUM SERPL-SCNC: 5 MMOL/L (ref 3.4–5.3)
POTASSIUM SERPL-SCNC: 5.5 MMOL/L (ref 3.4–5.3)
PROT SERPL-MCNC: 5.8 G/DL (ref 6.4–8.3)
RBC # BLD AUTO: 2.3 10E6/UL (ref 3.8–5.2)
RBC # BLD AUTO: 2.35 10E6/UL (ref 3.8–5.2)
SAO2 % BLDA: 94 % (ref 92–100)
SAO2 % BLDA: 97 % (ref 92–100)
SAO2 % BLDA: 98 % (ref 92–100)
SODIUM SERPL-SCNC: 134 MMOL/L (ref 135–145)
SODIUM SERPL-SCNC: 135 MMOL/L (ref 135–145)
SODIUM SERPL-SCNC: 135 MMOL/L (ref 135–145)
UFH PPP CHRO-ACNC: 0.33 IU/ML
UNIT ABO/RH: NORMAL
UNIT NUMBER: NORMAL
UNIT STATUS: NORMAL
UNIT TYPE ISBT: 9500
WBC # BLD AUTO: 18.8 10E3/UL (ref 4–11)
WBC # BLD AUTO: 22.8 10E3/UL (ref 4–11)

## 2024-09-06 PROCEDURE — 250N000011 HC RX IP 250 OP 636

## 2024-09-06 PROCEDURE — 250N000013 HC RX MED GY IP 250 OP 250 PS 637

## 2024-09-06 PROCEDURE — 80053 COMPREHEN METABOLIC PANEL: CPT | Performed by: INTERNAL MEDICINE

## 2024-09-06 PROCEDURE — P9016 RBC LEUKOCYTES REDUCED: HCPCS

## 2024-09-06 PROCEDURE — 85520 HEPARIN ASSAY: CPT | Performed by: INTERNAL MEDICINE

## 2024-09-06 PROCEDURE — 99233 SBSQ HOSP IP/OBS HIGH 50: CPT | Performed by: INTERNAL MEDICINE

## 2024-09-06 PROCEDURE — 250N000009 HC RX 250: Performed by: STUDENT IN AN ORGANIZED HEALTH CARE EDUCATION/TRAINING PROGRAM

## 2024-09-06 PROCEDURE — 83735 ASSAY OF MAGNESIUM: CPT | Performed by: CLINICAL NURSE SPECIALIST

## 2024-09-06 PROCEDURE — 83615 LACTATE (LD) (LDH) ENZYME: CPT | Performed by: CLINICAL NURSE SPECIALIST

## 2024-09-06 PROCEDURE — 999N000157 HC STATISTIC RCP TIME EA 10 MIN

## 2024-09-06 PROCEDURE — 94640 AIRWAY INHALATION TREATMENT: CPT

## 2024-09-06 PROCEDURE — 258N000003 HC RX IP 258 OP 636: Performed by: SURGERY

## 2024-09-06 PROCEDURE — 82550 ASSAY OF CK (CPK): CPT | Performed by: CLINICAL NURSE SPECIALIST

## 2024-09-06 PROCEDURE — 250N000009 HC RX 250: Performed by: CLINICAL NURSE SPECIALIST

## 2024-09-06 PROCEDURE — 200N000002 HC R&B ICU UMMC

## 2024-09-06 PROCEDURE — 258N000003 HC RX IP 258 OP 636

## 2024-09-06 PROCEDURE — 82805 BLOOD GASES W/O2 SATURATION: CPT

## 2024-09-06 PROCEDURE — 250N000011 HC RX IP 250 OP 636: Performed by: CLINICAL NURSE SPECIALIST

## 2024-09-06 PROCEDURE — 250N000011 HC RX IP 250 OP 636: Performed by: SURGERY

## 2024-09-06 PROCEDURE — 82247 BILIRUBIN TOTAL: CPT | Performed by: INTERNAL MEDICINE

## 2024-09-06 PROCEDURE — 94640 AIRWAY INHALATION TREATMENT: CPT | Mod: 76

## 2024-09-06 PROCEDURE — 82040 ASSAY OF SERUM ALBUMIN: CPT | Performed by: CLINICAL NURSE SPECIALIST

## 2024-09-06 PROCEDURE — 82330 ASSAY OF CALCIUM: CPT | Performed by: CLINICAL NURSE SPECIALIST

## 2024-09-06 PROCEDURE — 87493 C DIFF AMPLIFIED PROBE: CPT

## 2024-09-06 PROCEDURE — 250N000013 HC RX MED GY IP 250 OP 250 PS 637: Performed by: PHYSICIAN ASSISTANT

## 2024-09-06 PROCEDURE — 250N000009 HC RX 250

## 2024-09-06 PROCEDURE — 86140 C-REACTIVE PROTEIN: CPT

## 2024-09-06 PROCEDURE — 90947 DIALYSIS REPEATED EVAL: CPT

## 2024-09-06 PROCEDURE — 84460 ALANINE AMINO (ALT) (SGPT): CPT | Performed by: INTERNAL MEDICINE

## 2024-09-06 PROCEDURE — 85027 COMPLETE CBC AUTOMATED: CPT | Performed by: PHYSICIAN ASSISTANT

## 2024-09-06 PROCEDURE — 84075 ASSAY ALKALINE PHOSPHATASE: CPT | Performed by: INTERNAL MEDICINE

## 2024-09-06 PROCEDURE — 82248 BILIRUBIN DIRECT: CPT | Performed by: INTERNAL MEDICINE

## 2024-09-06 PROCEDURE — 87449 NOS EACH ORGANISM AG IA: CPT

## 2024-09-06 PROCEDURE — 250N000009 HC RX 250: Performed by: INTERNAL MEDICINE

## 2024-09-06 PROCEDURE — 272N000272 HC CONTINUOUS NEBULIZER MICRO PUMP

## 2024-09-06 PROCEDURE — 99291 CRITICAL CARE FIRST HOUR: CPT | Mod: GC

## 2024-09-06 PROCEDURE — 250N000013 HC RX MED GY IP 250 OP 250 PS 637: Performed by: SURGERY

## 2024-09-06 PROCEDURE — 250N000009 HC RX 250: Performed by: SURGERY

## 2024-09-06 PROCEDURE — 250N000011 HC RX IP 250 OP 636: Performed by: INTERNAL MEDICINE

## 2024-09-06 PROCEDURE — 85014 HEMATOCRIT: CPT | Performed by: CLINICAL NURSE SPECIALIST

## 2024-09-06 PROCEDURE — 80069 RENAL FUNCTION PANEL: CPT | Performed by: CLINICAL NURSE SPECIALIST

## 2024-09-06 PROCEDURE — 94003 VENT MGMT INPAT SUBQ DAY: CPT

## 2024-09-06 RX ORDER — PREDNISONE 20 MG/1
40 TABLET ORAL DAILY
Status: DISCONTINUED | OUTPATIENT
Start: 2024-09-09 | End: 2024-09-09

## 2024-09-06 RX ORDER — METRONIDAZOLE 500 MG/100ML
500 INJECTION, SOLUTION INTRAVENOUS EVERY 12 HOURS
Status: DISCONTINUED | OUTPATIENT
Start: 2024-09-06 | End: 2024-09-10

## 2024-09-06 RX ORDER — POTASSIUM CHLORIDE 29.8 MG/ML
20 INJECTION INTRAVENOUS EVERY 8 HOURS PRN
Status: DISCONTINUED | OUTPATIENT
Start: 2024-09-06 | End: 2024-09-11

## 2024-09-06 RX ORDER — CALCIUM CHLORIDE, MAGNESIUM CHLORIDE, DEXTROSE MONOHYDRATE, LACTIC ACID, SODIUM CHLORIDE, SODIUM BICARBONATE AND POTASSIUM CHLORIDE 5.15; 2.03; 22; 5.4; 6.46; 3.09; .157 G/L; G/L; G/L; G/L; G/L; G/L; G/L
INJECTION INTRAVENOUS CONTINUOUS
Status: DISCONTINUED | OUTPATIENT
Start: 2024-09-06 | End: 2024-09-07

## 2024-09-06 RX ORDER — POTASSIUM CHLORIDE 29.8 MG/ML
20 INJECTION INTRAVENOUS EVERY 8 HOURS PRN
Status: DISCONTINUED | OUTPATIENT
Start: 2024-09-06 | End: 2024-09-06

## 2024-09-06 RX ORDER — CALCIUM GLUCONATE 20 MG/ML
2 INJECTION, SOLUTION INTRAVENOUS EVERY 8 HOURS PRN
Status: DISCONTINUED | OUTPATIENT
Start: 2024-09-06 | End: 2024-09-11

## 2024-09-06 RX ORDER — CALCIUM CHLORIDE, MAGNESIUM CHLORIDE, DEXTROSE MONOHYDRATE, LACTIC ACID, SODIUM CHLORIDE, SODIUM BICARBONATE AND POTASSIUM CHLORIDE 5.15; 2.03; 22; 5.4; 6.46; 3.09; .157 G/L; G/L; G/L; G/L; G/L; G/L; G/L
12.5 INJECTION INTRAVENOUS CONTINUOUS
Status: DISCONTINUED | OUTPATIENT
Start: 2024-09-06 | End: 2024-09-07

## 2024-09-06 RX ORDER — MAGNESIUM SULFATE HEPTAHYDRATE 40 MG/ML
2 INJECTION, SOLUTION INTRAVENOUS EVERY 8 HOURS PRN
Status: DISCONTINUED | OUTPATIENT
Start: 2024-09-06 | End: 2024-09-06

## 2024-09-06 RX ORDER — FENTANYL CITRATE 50 UG/ML
50 INJECTION, SOLUTION INTRAMUSCULAR; INTRAVENOUS ONCE
Status: COMPLETED | OUTPATIENT
Start: 2024-09-06 | End: 2024-09-06

## 2024-09-06 RX ORDER — DEXAMETHASONE SODIUM PHOSPHATE 10 MG/ML
10 INJECTION, SOLUTION INTRAMUSCULAR; INTRAVENOUS DAILY
Status: COMPLETED | OUTPATIENT
Start: 2024-09-07 | End: 2024-09-08

## 2024-09-06 RX ORDER — CALCIUM CHLORIDE, MAGNESIUM CHLORIDE, DEXTROSE MONOHYDRATE, LACTIC ACID, SODIUM CHLORIDE, SODIUM BICARBONATE AND POTASSIUM CHLORIDE 5.15; 2.03; 22; 5.4; 6.46; 3.09; .157 G/L; G/L; G/L; G/L; G/L; G/L; G/L
12.5 INJECTION INTRAVENOUS CONTINUOUS
Status: DISCONTINUED | OUTPATIENT
Start: 2024-09-06 | End: 2024-09-06

## 2024-09-06 RX ORDER — MAGNESIUM SULFATE HEPTAHYDRATE 40 MG/ML
2 INJECTION, SOLUTION INTRAVENOUS EVERY 8 HOURS PRN
Status: DISCONTINUED | OUTPATIENT
Start: 2024-09-06 | End: 2024-09-11

## 2024-09-06 RX ORDER — CALCIUM CHLORIDE, MAGNESIUM CHLORIDE, DEXTROSE MONOHYDRATE, LACTIC ACID, SODIUM CHLORIDE, SODIUM BICARBONATE AND POTASSIUM CHLORIDE 5.15; 2.03; 22; 5.4; 6.46; 3.09; .157 G/L; G/L; G/L; G/L; G/L; G/L; G/L
INJECTION INTRAVENOUS CONTINUOUS
Status: DISCONTINUED | OUTPATIENT
Start: 2024-09-06 | End: 2024-09-06

## 2024-09-06 RX ORDER — CALCIUM GLUCONATE 20 MG/ML
2 INJECTION, SOLUTION INTRAVENOUS EVERY 8 HOURS PRN
Status: DISCONTINUED | OUTPATIENT
Start: 2024-09-06 | End: 2024-09-06

## 2024-09-06 RX ADMIN — CALCIUM CHLORIDE, MAGNESIUM CHLORIDE, SODIUM CHLORIDE, SODIUM BICARBONATE, POTASSIUM CHLORIDE AND SODIUM PHOSPHATE DIBASIC DIHYDRATE: 3.68; 3.05; 6.34; 3.09; .314; .187 INJECTION INTRAVENOUS at 11:38

## 2024-09-06 RX ADMIN — IPRATROPIUM BROMIDE 0.5 MG: 0.5 SOLUTION RESPIRATORY (INHALATION) at 16:48

## 2024-09-06 RX ADMIN — MONTELUKAST 10 MG: 10 TABLET, FILM COATED ORAL at 21:33

## 2024-09-06 RX ADMIN — MIDAZOLAM HYDROCHLORIDE 16 MG/HR: 1 INJECTION, SOLUTION INTRAVENOUS at 06:24

## 2024-09-06 RX ADMIN — ACETAMINOPHEN 650 MG: 325 TABLET ORAL at 03:52

## 2024-09-06 RX ADMIN — IPRATROPIUM BROMIDE 0.5 MG: 0.5 SOLUTION RESPIRATORY (INHALATION) at 12:37

## 2024-09-06 RX ADMIN — LEVALBUTEROL HYDROCHLORIDE 0.63 MG: 0.63 SOLUTION RESPIRATORY (INHALATION) at 20:41

## 2024-09-06 RX ADMIN — WHITE PETROLATUM 57.7 %-MINERAL OIL 31.9 % EYE OINTMENT: at 00:28

## 2024-09-06 RX ADMIN — CALCIUM CHLORIDE, MAGNESIUM CHLORIDE, DEXTROSE MONOHYDRATE, LACTIC ACID, SODIUM CHLORIDE, SODIUM BICARBONATE AND POTASSIUM CHLORIDE 12.5 ML/KG/HR: 5.15; 2.03; 22; 5.4; 6.46; 3.09; .157 INJECTION INTRAVENOUS at 17:52

## 2024-09-06 RX ADMIN — IPRATROPIUM BROMIDE 0.5 MG: 0.5 SOLUTION RESPIRATORY (INHALATION) at 08:53

## 2024-09-06 RX ADMIN — CIPROFLOXACIN 400 MG: 400 INJECTION, SOLUTION INTRAVENOUS at 19:51

## 2024-09-06 RX ADMIN — TOBRAMYCIN 300 MG: 300 SOLUTION RESPIRATORY (INHALATION) at 08:53

## 2024-09-06 RX ADMIN — TOBRAMYCIN 300 MG: 300 SOLUTION RESPIRATORY (INHALATION) at 20:41

## 2024-09-06 RX ADMIN — LEVALBUTEROL HYDROCHLORIDE 0.63 MG: 0.63 SOLUTION RESPIRATORY (INHALATION) at 16:48

## 2024-09-06 RX ADMIN — FENTANYL CITRATE 50 MCG: 50 INJECTION, SOLUTION INTRAMUSCULAR; INTRAVENOUS at 22:41

## 2024-09-06 RX ADMIN — LEVALBUTEROL HYDROCHLORIDE 0.63 MG: 0.63 SOLUTION RESPIRATORY (INHALATION) at 12:37

## 2024-09-06 RX ADMIN — CALCIUM CHLORIDE, MAGNESIUM CHLORIDE, DEXTROSE MONOHYDRATE, LACTIC ACID, SODIUM CHLORIDE, SODIUM BICARBONATE AND POTASSIUM CHLORIDE: 5.15; 2.03; 22; 5.4; 6.46; 3.09; .157 INJECTION INTRAVENOUS at 17:52

## 2024-09-06 RX ADMIN — QUETIAPINE FUMARATE 25 MG: 25 TABLET ORAL at 07:50

## 2024-09-06 RX ADMIN — OXYCODONE HYDROCHLORIDE 10 MG: 5 SOLUTION ORAL at 21:33

## 2024-09-06 RX ADMIN — GANCICLOVIR SODIUM 200 MG: 500 INJECTION, POWDER, LYOPHILIZED, FOR SOLUTION INTRAVENOUS at 03:12

## 2024-09-06 RX ADMIN — MIDAZOLAM HYDROCHLORIDE 12 MG/HR: 1 INJECTION, SOLUTION INTRAVENOUS at 13:10

## 2024-09-06 RX ADMIN — Medication 1300 UNITS/HR: at 06:23

## 2024-09-06 RX ADMIN — CETIRIZINE HYDROCHLORIDE 10 MG: 10 TABLET, FILM COATED ORAL at 07:50

## 2024-09-06 RX ADMIN — LORAZEPAM 2 MG: 1 TABLET ORAL at 10:16

## 2024-09-06 RX ADMIN — Medication 60 ML: at 07:51

## 2024-09-06 RX ADMIN — HEPARIN SODIUM 1300 UNITS/HR: 1000 INJECTION, SOLUTION INTRAVENOUS; SUBCUTANEOUS at 22:52

## 2024-09-06 RX ADMIN — ACETAMINOPHEN 650 MG: 325 TABLET ORAL at 10:16

## 2024-09-06 RX ADMIN — BUDESONIDE 1 MG: 1 INHALANT ORAL at 08:53

## 2024-09-06 RX ADMIN — CALCIUM CHLORIDE, MAGNESIUM CHLORIDE, SODIUM CHLORIDE, SODIUM BICARBONATE, POTASSIUM CHLORIDE AND SODIUM PHOSPHATE DIBASIC DIHYDRATE 12.5 ML/KG/HR: 3.68; 3.05; 6.34; 3.09; .314; .187 INJECTION INTRAVENOUS at 16:32

## 2024-09-06 RX ADMIN — CALCIUM CHLORIDE, MAGNESIUM CHLORIDE, SODIUM CHLORIDE, SODIUM BICARBONATE, POTASSIUM CHLORIDE AND SODIUM PHOSPHATE DIBASIC DIHYDRATE 12.5 ML/KG/HR: 3.68; 3.05; 6.34; 3.09; .314; .187 INJECTION INTRAVENOUS at 01:29

## 2024-09-06 RX ADMIN — METRONIDAZOLE 500 MG: 500 INJECTION, SOLUTION INTRAVENOUS at 14:32

## 2024-09-06 RX ADMIN — DEXAMETHASONE SODIUM PHOSPHATE 10 MG: 10 INJECTION, SOLUTION INTRAMUSCULAR; INTRAVENOUS at 07:50

## 2024-09-06 RX ADMIN — NYSTATIN 500000 UNITS: 100000 SUSPENSION ORAL at 07:50

## 2024-09-06 RX ADMIN — Medication 1 TABLET: at 07:50

## 2024-09-06 RX ADMIN — CALCIUM CHLORIDE, MAGNESIUM CHLORIDE, SODIUM CHLORIDE, SODIUM BICARBONATE, POTASSIUM CHLORIDE AND SODIUM PHOSPHATE DIBASIC DIHYDRATE 12.5 ML/KG/HR: 3.68; 3.05; 6.34; 3.09; .314; .187 INJECTION INTRAVENOUS at 06:27

## 2024-09-06 RX ADMIN — ACETAMINOPHEN 650 MG: 325 TABLET ORAL at 21:33

## 2024-09-06 RX ADMIN — CALCIUM CHLORIDE, MAGNESIUM CHLORIDE, SODIUM CHLORIDE, SODIUM BICARBONATE, POTASSIUM CHLORIDE AND SODIUM PHOSPHATE DIBASIC DIHYDRATE 12.5 ML/KG/HR: 3.68; 3.05; 6.34; 3.09; .314; .187 INJECTION INTRAVENOUS at 01:28

## 2024-09-06 RX ADMIN — LORAZEPAM 2 MG: 1 TABLET ORAL at 21:32

## 2024-09-06 RX ADMIN — LORAZEPAM 2 MG: 1 TABLET ORAL at 03:52

## 2024-09-06 RX ADMIN — LORAZEPAM 2 MG: 1 TABLET ORAL at 15:33

## 2024-09-06 RX ADMIN — Medication 2 MG/HR: at 19:26

## 2024-09-06 RX ADMIN — LEVALBUTEROL HYDROCHLORIDE 0.63 MG: 0.63 SOLUTION RESPIRATORY (INHALATION) at 08:53

## 2024-09-06 RX ADMIN — NYSTATIN 500000 UNITS: 100000 SUSPENSION ORAL at 15:33

## 2024-09-06 RX ADMIN — NYSTATIN 500000 UNITS: 100000 SUSPENSION ORAL at 11:44

## 2024-09-06 RX ADMIN — Medication 2.5 MG/HR: at 09:17

## 2024-09-06 RX ADMIN — GANCICLOVIR SODIUM 200 MG: 500 INJECTION, POWDER, LYOPHILIZED, FOR SOLUTION INTRAVENOUS at 15:33

## 2024-09-06 RX ADMIN — LEVOTHYROXINE SODIUM 25 MCG: 0.03 TABLET ORAL at 07:50

## 2024-09-06 RX ADMIN — VANCOMYCIN HYDROCHLORIDE 1500 MG: 10 INJECTION, POWDER, LYOPHILIZED, FOR SOLUTION INTRAVENOUS at 18:06

## 2024-09-06 RX ADMIN — Medication 40 MG: at 07:51

## 2024-09-06 RX ADMIN — CALCIUM CHLORIDE, MAGNESIUM CHLORIDE, DEXTROSE MONOHYDRATE, LACTIC ACID, SODIUM CHLORIDE, SODIUM BICARBONATE AND POTASSIUM CHLORIDE 12.5 ML/KG/HR: 5.15; 2.03; 22; 5.4; 6.46; 3.09; .157 INJECTION INTRAVENOUS at 22:53

## 2024-09-06 RX ADMIN — NYSTATIN 500000 UNITS: 100000 SUSPENSION ORAL at 19:52

## 2024-09-06 RX ADMIN — OXYCODONE HYDROCHLORIDE 10 MG: 5 SOLUTION ORAL at 15:32

## 2024-09-06 RX ADMIN — CALCIUM CHLORIDE, MAGNESIUM CHLORIDE, SODIUM CHLORIDE, SODIUM BICARBONATE, POTASSIUM CHLORIDE AND SODIUM PHOSPHATE DIBASIC DIHYDRATE 12.5 ML/KG/HR: 3.68; 3.05; 6.34; 3.09; .314; .187 INJECTION INTRAVENOUS at 16:30

## 2024-09-06 RX ADMIN — CIPROFLOXACIN 400 MG: 400 INJECTION, SOLUTION INTRAVENOUS at 03:52

## 2024-09-06 RX ADMIN — OXYCODONE HYDROCHLORIDE 10 MG: 5 SOLUTION ORAL at 10:16

## 2024-09-06 RX ADMIN — CIPROFLOXACIN 400 MG: 400 INJECTION, SOLUTION INTRAVENOUS at 11:44

## 2024-09-06 RX ADMIN — INSULIN HUMAN 1 UNITS/HR: 1 INJECTION, SOLUTION INTRAVENOUS at 18:31

## 2024-09-06 RX ADMIN — OXYCODONE HYDROCHLORIDE 10 MG: 5 SOLUTION ORAL at 03:52

## 2024-09-06 RX ADMIN — IPRATROPIUM BROMIDE 0.5 MG: 0.5 SOLUTION RESPIRATORY (INHALATION) at 20:41

## 2024-09-06 RX ADMIN — CALCIUM CHLORIDE, MAGNESIUM CHLORIDE, SODIUM CHLORIDE, SODIUM BICARBONATE, POTASSIUM CHLORIDE AND SODIUM PHOSPHATE DIBASIC DIHYDRATE 12.5 ML/KG/HR: 3.68; 3.05; 6.34; 3.09; .314; .187 INJECTION INTRAVENOUS at 11:26

## 2024-09-06 RX ADMIN — WHITE PETROLATUM 57.7 %-MINERAL OIL 31.9 % EYE OINTMENT: at 07:51

## 2024-09-06 RX ADMIN — CALCIUM CHLORIDE, MAGNESIUM CHLORIDE, SODIUM CHLORIDE, SODIUM BICARBONATE, POTASSIUM CHLORIDE AND SODIUM PHOSPHATE DIBASIC DIHYDRATE 12.5 ML/KG/HR: 3.68; 3.05; 6.34; 3.09; .314; .187 INJECTION INTRAVENOUS at 11:28

## 2024-09-06 RX ADMIN — Medication 2.5 MG/HR: at 01:22

## 2024-09-06 RX ADMIN — MIDAZOLAM HYDROCHLORIDE 16 MG/HR: 1 INJECTION, SOLUTION INTRAVENOUS at 00:28

## 2024-09-06 RX ADMIN — NOREPINEPHRINE BITARTRATE 0.08 MCG/KG/MIN: 0.06 INJECTION, SOLUTION INTRAVENOUS at 13:28

## 2024-09-06 RX ADMIN — ACETAMINOPHEN 650 MG: 325 TABLET ORAL at 15:32

## 2024-09-06 RX ADMIN — MIDAZOLAM HYDROCHLORIDE 12 MG/HR: 1 INJECTION, SOLUTION INTRAVENOUS at 21:32

## 2024-09-06 ASSESSMENT — ACTIVITIES OF DAILY LIVING (ADL)
ADLS_ACUITY_SCORE: 61
ADLS_ACUITY_SCORE: 65
ADLS_ACUITY_SCORE: 61

## 2024-09-06 NOTE — PROGRESS NOTES
[facility] PROGRESS NOTE     Patient:  Massiel Flaherty   Date of birth 1970, Medical record number [id number]  Date of Visit:  09/06/2024  Date of Admission: 8/8/2024  Consult Requester:Barry Hatch MD          Assessment and Plan:   ID Problem List:  Acute hypoxic and hypercapnic respiratory failure c/b ARDS and s/p VV-ECMO (8/8/24-8/18/24)  Etiology unknown, possible CAP vs viral pneumonia vs other  Extubated 8/27, reintubated 8/29 w/ c/f recurrent PsA pneumonia   CT chest wo contrast 9/4/24 -Significantly decreased diffuse ground glass and consolidative opacities with residual groundglass and scattered nodular opacities, suggesting improving ARDS.    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  - normal LFTs,   Possible HSV oral lesions s/p treatment 8/22-28  Fever  Dry cough x1 month prior to admission  CHACORTA requiring CRRT  Cerebral edema; intentional hypernatremia  Subconjunctival hemorrhage  Right adnexal mass  Hypogammaglobulinemia  Elevated AST, alk phos  Anemia  Recent Augmentin, Z-pack and steroid tapers  Antibiotic allergy - Sulfa (hives), tetracycline (hives)  Facial flush/ rash - possibly related to Vancomycin started on 9/5/24. or consider slowing down the rate     Recommendations:  - follow-up sputum cultures, blood cx , UC is neg so far   - can continue ganciclovir for 7 days and reevaluate,  . Duration of gancyclovir for reactivation of  CMV in critically ill patient  is unclear and will need to weigh the risks and benefits. monitor for potential adverse effects, such as bone marrow suppression, elevated ALT, AST and worsening kidney failure. pt is now on CRRT  - temp trending up since cipro was started.( after stopping Zosyn and Meropenem)  consider adding metronidazole  500 mg IV/NG TID for anaerobic coverage and adjust antibiotics per culture and susceptibility.  - check beta D glucan and check sputum for PJP PCR , consider PJP prophylaxis with high dose of steroid eg pentamidine neb q4 week, while on steroid equivalent to Prednisone 20mg or more daily    - check stools for C.diff   - facial flushing could be from IV Vancomycin, pharmacy to check the rate of infusion and consider slowing it down       Assessment:  Massiel Flaherty is a 53 year old female with PMHx significant for asthma, MURIEL on CPAP, HTN, anxiety,depression, expressive aphasia, fibromyalgia, seizure disorder, and hypothyroidism who presented to OSH 8/3/24 with fever, fatigue, dyspnea with c/f CAP and requiring intubation, proning, paralysis and transferred to [facility] 8/8/24 for further cares and now placed on VV ECMO and CHACORTA requiring CRRT. Decannulated from VV ECMO 8/18. Extubated 8/27 then reintubated 8/29 for worsened AHRF. Hospital course c/b recurrent pneumonia.     Has had a chronic cough since 7/2024 for which she sought local care without improvement on steroid tapers, Zpack and Augmentin. Admitted to OSH 8/3 with hypoxia and fever. She received >14 days of Cefepime transitioned to Zosyn without significant improvement. On initial infectious work up, no infectious etiology of presentation identified. She was started on Amphotericin B 8/9 due to high level of concern for endemic fungal infection but this was discontinued 8/13 with return of negative antigen tests and unrevealing bronchoscopy. See below for negative ID work up outlined. Negative EVELIO, ANCA, MPO, proteinase 3 at OSH. She has imaging c/w ongoing ARDS. VV ECMO cannulation 8/8-8/18.      She has had low level positive EBV from BAL which is likely reactivation in setting of critical illness rather than contributing to worsening respiratory status. Her BAL culture from 8/14 grew pansensitive PsA which is consistent with colonization of her  respiratory tract/ET tube (as she was on Cefepime x 2 weeks when this was cultured). A Karius was sent 8/13 with ongoing negative ID work up- which returned with EBV and HSV elevated- but this is reflective of reactivation of previously suppressed viruses in setting of critical illness (does not require treatment). 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     She was extubated 8/27 however developed rigors and worsening respiratory needs on 8/29 per chart and was reintubated and started on empiric meropoenem+vancomycin. BCx negative. SCx with PsA (now resistant to levo, intermediate to ki and ceftazidime). She was switched to ciprofloxacin on 9/1 and started on tobramycin nebs per ICU. A BD glucan was sent and returned elevated at 216. RVP negative. Ureaplasma PCR negative. Legionella uAg negative. Blastomyces uAg negative. Histoplasma galactomannan uAg negative. BAL 8/30 with PSA on culture. Remaining BAL testing negative or pending. CMV PCR blood 741/log 2.9 8/31 and repeat 9/2 of 920/log3. Aspergillus galactomannan negative. CrAg negative. BCx 8/5 pending and SCx 8/5 NGTD.       Thank you for this consult. Disucssed recs with primary team     ID will continue to follow with you.   Dr Chung will take over the [facility] service tomorrow.     Alli Coats MD,M.Med.Sc.  staff, Infectious Diseases             Interim History and Events:     CMV viremia detected- CMV PCR + 741 on 8/31 , 920 on 9/2 am, started ganciclovir on 9/5 pm and 345 on 9/5.  Tc is 99F with a fan directed toward patient,  paralytic agent was discontinue yesterday and is tapering on midazolam. patient opens her eyes but not tracking or responding to questions/commands     no pressure wounds     has P.aeruginosa isolated from her sputum on 8/14, 8/21, 8/29 and 8/30. Patient was on Zosyn from 8/16-8/19 and Meropenem 8/29-9/1 and now on cipro from 9/1 to present and rica  nebs . started on Vancomycin IV on 9/5/24   not much tracheal secretion per RN, clear, thin consistency, no blood. not much/ stable stool  output (in rectal tube), on tube feeding    She is on slow tapering dose of Dexamethasone . WBC has been trending up since 9/2   still on pressor.        Current Antimicrobials   Current:  -Ciprofloxacin 9/1- current   -Tobramycin nebs 9/1- current   -Vancomycin 8/29-30, 9/5-current   -Ganciclovir 9/5- current      Prior:  - amphotericin B 8/9-8/13  - azithromycin 8/9-/811; given at OSH as well  - IV pentamidine given at OSH  - cefepime 8/3-8/15, 8/29  - flagyl 8/11-16  -Zosyn 8/16-8/19  -Meropenem 8/29-9/1  -ACV 8/22-8/28    Physical Examination:  Temp: 100.4  F (38  C) Temp src: Esophageal   Pulse: 113   Resp: 27 SpO2: 100 % O2 Device: Mechanical Ventilator      Vitals:    09/03/24 0800 09/04/24 0419 09/05/24 0000 09/05/24 1200   Weight: 76.2 kg (167 lb 15.9 oz) 77.2 kg (170 lb 3.1 oz) 76.6 kg (168 lb 14 oz) 80.3 kg (177 lb 0.5 oz)    09/06/24 0000   Weight: 76.9 kg (169 lb 8.5 oz)     Constitutional: intubated, sedated, eyes open , non responsive    HEENT: pale   Respiratory: coarse breath sounds   Cardiovascular: tachycardia  with KEYANNA 2/6   GI: Normoactive BS. Abdomen is soft   Skin: Warm and dry. , pinkish disoloration on forehead and bilateral cheeks, left face- lesion/ abrasion   Musculoskeletal: Extremities grossly normal. No tenderness or edema present.   Neurologic:sedated, eyes open but no responsive   VAD: A line, Left IJ PIVs Right IJ   rectal tube     Medications:  Current Facility-Administered Medications   Medication Dose Route Frequency Provider Last Rate Last Admin    acetaminophen (TYLENOL) tablet 650 mg  650 mg Oral Q6H Benjy Padgett PA-C   650 mg at 09/06/24 0352    artificial tears ophthalmic ointment   Both Eyes Q8H Fuentes Fowler MD   Given at 09/06/24 0751    [Held by provider] atorvastatin (LIPITOR) tablet 10 mg  10 mg Oral or Feeding Tube Daily  Francisco Welsh MD   10 mg at 08/29/24 0924    budesonide (PULMICORT) neb solution 1 mg  1 mg Nebulization Daily Andres Payne PA-C   1 mg at 09/04/24 0826    cetirizine (zyrTEC) tablet 10 mg  10 mg Oral or Feeding Tube Daily Barry Hatch MD   10 mg at 09/06/24 0750    ciprofloxacin (CIPRO) infusion 400 mg  400 mg Intravenous Q8H Fuentes Fowler  mL/hr at 09/03/24 1203 400 mg at 09/06/24 0352    dexAMETHasone PF (DECADRON) injection 10 mg  10 mg Intravenous Daily Mirtha Vogt, APRN CNP   10 mg at 09/06/24 0750    [Held by provider] gabapentin (NEURONTIN) capsule 300 mg  300 mg Oral or Feeding Tube TID Barry Hatch MD   300 mg at 08/28/24 1959    ganciclovir (CYTOVENE) 200 mg in D5W 100 mL intermittent infusion  200 mg Intravenous Q12H Gaudencio De Souza  mL/hr at 09/06/24 0312 200 mg at 09/06/24 0312    ipratropium (ATROVENT) 0.02 % neb solution 0.5 mg  0.5 mg Nebulization 4x daily Barry Hatch MD   0.5 mg at 09/05/24 2018    And    levalbuterol (XOPENEX) neb solution 0.63 mg  0.63 mg Nebulization 4x Daily Barry Hatch MD   0.63 mg at 09/05/24 2018    levothyroxine (SYNTHROID/LEVOTHROID) tablet 25 mcg  25 mcg Per Feeding Tube QAM  Giovanny Guidry PA-C   25 mcg at 09/06/24 0750    LORazepam (ATIVAN) tablet 2 mg  2 mg Oral Q6H Benjy Padgett PA-C   2 mg at 09/06/24 0352    montelukast (SINGULAIR) tablet 10 mg  10 mg Oral or Feeding Tube At Bedtime Barry Hatch MD   10 mg at 09/05/24 2110    multivitamin RENAL (RENAVITE RX/NEPHROVITE) tablet 1 tablet  1 tablet Oral or Feeding Tube Daily Giovanny Guidry PA-C   1 tablet at 09/06/24 0750    nystatin (MYCOSTATIN) suspension 500,000 Units  500,000 Units Oral 4x Daily Carlos Cerna MD   500,000 Units at 09/06/24 0750    oxyCODONE (ROXICODONE) solution 10 mg  10 mg Oral Q6H Benjy Padgett PA-C   10 mg at 09/06/24 0352    pantoprazole (PROTONIX) 2 mg/mL suspension 40 mg  40 mg Per Feeding Tube QAM AC  Barry Hatch MD   40 mg at 09/06/24 0751    Prosource TF20 ENfit Compatibl EN LIQD (PROSOURCE TF20) packet 60 mL  1 packet Per Feeding Tube Daily Giovanny Guidry PA-C   60 mL at 09/06/24 0751    QUEtiapine (SEROquel) tablet 25 mg  25 mg Oral BID Benjy Padgett PA-C   25 mg at 09/06/24 0750    tobramycin (PF) (LIZA) neb solution 300 mg  300 mg Nebulization 2 times daily Gaudencio De Souza MD   300 mg at 09/05/24 2018    vancomycin (VANCOCIN) 1,500 mg in 0.9% NaCl 250 mL intermittent infusion  20 mg/kg Intravenous Q24H Barry Hatch .7 mL/hr at 09/05/24 2036 1,500 mg at 09/05/24 2036     Infusions/Drips:  Current Facility-Administered Medications   Medication Dose Route Frequency Provider Last Rate Last Admin    cisatracurium (NIMBEX) 200 mg in D5W 100 mL infusion  3-10 mcg/kg/min (Ideal) Intravenous Continuous Fuentes Fowler MD   Paused at 09/05/24 1112    dextrose 10% infusion   Intravenous Continuous PRN Gaudencio De Souza MD        dextrose 10% infusion   Intravenous Continuous PRN Giovanny Guidry PA-C        dialysate for CVVHD & CVVHDF (Phoxillum BK4/2.5)  12.5 mL/kg/hr CRRT Continuous Emmanuelle Donato MD 1,000 mL/hr at 09/06/24 0129 12.5 mL/kg/hr at 09/06/24 0129    heparin (porcine) 20,000 units in 20 mL ANTICOAGULANT infusion (syringe from pharmacy)  500-1,500 Units/hr CRRT Continuous Emmanuelle Donato MD 1.3 mL/hr at 09/06/24 0800 1,300 Units/hr at 09/06/24 0800    HYDROmorphone (DILAUDID) 0.2 mg/mL infusion ADULT/PEDS GREATER than or EQUAL to 20 kg  2.5 mg/hr Intravenous Continuous Fuentes Fowler MD 12.5 mL/hr at 09/06/24 0800 2.5 mg/hr at 09/06/24 0800    insulin regular (MYXREDLIN) 1 unit/mL infusion  0-24 Units/hr Intravenous Continuous Gaudencio De Souza MD 1 mL/hr at 09/06/24 0800 1 Units/hr at 09/06/24 0800    midazolam (VERSED) 100 mg/100 mL NS infusion - ADULT  1-16 mg/hr Intravenous Continuous Benjy Padgett PA-C 16 mL/hr at 09/06/24 0800 16 mg/hr at 09/06/24  0800    norepinephrine (LEVOPHED) 16 mg in  mL infusion MAX CONC CENTRAL LINE  0.01-0.6 mcg/kg/min (Dosing Weight) Intravenous Continuous Elier Hutchins MD 11.7 mL/hr at 09/06/24 0811 0.14 mcg/kg/min at 09/06/24 0811    POST-filter replacement solution for CVVHD & CVVHDF (Phoxillum BK4/2.5)   CRRT Continuous Emmanuelle Donato  mL/hr at 09/05/24 0608 New Bag at 09/05/24 0608    PRE-filter replacement solution for CVVHD & CVVHDF (Phoxillum BK4/2.5)  12.5 mL/kg/hr CRRT Continuous Emmanuelle Donato MD 1,000 mL/hr at 09/06/24 0627 12.5 mL/kg/hr at 09/06/24 0627     Inflammatory Markers    Recent Labs   Lab Test 08/26/24  1500   SED 87*     Metabolic Studies       Recent Labs   Lab Test 09/06/24  0811 09/06/24  0512 09/06/24  0416 09/06/24  0341 09/06/24  0311 09/06/24  0203 09/05/24  2217 09/05/24  2213 09/05/24  1732 09/05/24  1543 09/05/24  0509 09/05/24  0359 09/04/24  2156 09/04/24  2043 09/04/24  1622 09/04/24  1620 09/04/24  0523 09/04/24  0357 09/03/24  1703 09/03/24  1701 08/30/24  0029 08/29/24  2225 08/29/24  1614 08/29/24  1300 08/26/24  1325 08/26/24  1317 08/19/24  1133 08/19/24  0949 08/09/24  0346 08/09/24  0339   NA  --   --   --  135  --   --   --   --   --  135  --  136  --   --   --  134*  --  134*  --  132*   < >  --    < >  --    < > 140   < > 137  137   < > 139   POTASSIUM  --   --   --  5.0  --   --   --  4.6  --  5.3  --  4.1  --  4.5  --  5.6*  --  3.9  --  5.3   < >  --    < >  --    < > 4.1   < > 3.8  3.8   < > 4.5   CHLORIDE  --   --   --  104  --   --   --   --   --  103  --  103  --   --   --  101  --  102  --  99   < >  --    < >  --    < > 104   < > 105  105   < > 107   CO2  --   --   --  23  --   --   --   --   --  22  --  21*  --   --   --  21*  --  23  --  22   < >  --    < >  --    < > 23   < > 21*  21*   < > 18*   ANIONGAP  --   --   --  8  --   --   --   --   --  10  --  12  --   --   --  12  --  9  --  11   < >  --    < >  --    < > 13   < > 11  11    < > 14   BUN  --   --   --  23.7*  --   --   --   --   --  36.3*  --  29.2*  --   --   --  32.9*  --  32.3*  --  33.5*   < >  --    < >  --    < > 21.1*   < > 41.8*  41.8*   < > 39.7*   CR  --   --   --  0.71  --   --   --   --   --  0.88  --  0.76  --   --   --  0.79  --  0.74  --  0.78   < >  --    < >  --    < > 1.12*   < > 1.74*  1.74*   < > 2.82*   GFRESTIMATED  --   --   --  >90  --   --   --   --   --  78  --  >90  --   --   --  89  --  >90  --  90   < >  --    < >  --    < > 59*   < > 34*  34*   < > 19*   * 134* 116* 116* 85 83   < >  --    < > 120*  137*   < > 152*   < > 115*   < > 151*   < > 138*   < > 166*   < >  --    < >  --    < > 156*   < > 184*  184*   < > 81   A1C  --   --   --   --   --   --   --   --   --   --   --   --   --   --   --   --   --   --   --   --   --   --   --   --   --   --   --  5.7*  --   --    QUAN  --   --   --  8.4*  --   --   --   --   --  8.2*  --  8.6*  --   --   --  8.9  --  8.6*  --  8.7*   < >  --    < >  --    < > 8.3*   < > 7.8*  7.8*   < > 7.9*   PHOS  --   --   --  5.2*  --   --   --   --   --  5.8*  --  5.1*  --   --   --  5.3*  --  4.5  --  5.7*   < >  --    < >  --    < > 4.8*   < > 4.9*   < > 5.2*   MAG  --   --   --  2.2  --   --   --   --   --  2.2  --  2.3  --   --   --  2.6*  --  2.4*  --  2.5*   < >  --    < >  --    < > 2.4*   < > 2.2   < > 2.5*   LACT  --   --   --   --   --   --   --   --   --   --   --   --   --   --   --   --   --   --   --   --   --  0.5*  --  0.8   < >  --    < > 0.7   < > 1.4   CKT  --   --   --   --   --   --   --   --   --   --   --   --   --   --   --   --   --   --   --   --   --   --   --   --   --  20*  --   --   --  155    < > = values in this interval not displayed.     Hepatic Studies    Recent Labs   Lab Test 09/06/24  0341 09/05/24  1543 09/05/24  0359 09/04/24  1620 09/04/24  0357 09/03/24  1701 09/03/24  1215 09/03/24  0343 09/02/24  1131 09/02/24  0403 09/01/24  1156 09/01/24  0324   BILITOTAL 0.3  --  0.2   --  0.2  --   --  0.2  --  0.2  --  0.2   ALKPHOS 95  --  111  --  112  --   --  148  --  156*  --  187*   ALBUMIN 3.3* 3.1* 3.3* 3.5 3.3* 3.5   < > 3.6   < > 3.4*   < > 3.4*   AST 29  --  32  --  26  --   --  33  --  28  --  32   ALT 26  --  28  --  24  --   --  20  --  28  --  33    < > = values in this interval not displayed.     Pancreatitis testing    Recent Labs   Lab Test 09/05/24  0359 09/04/24  0357 09/03/24  0343 09/02/24  0403 08/31/24  0341 08/22/24  0348   TRIG 460* 441* 657* 573* 187* 293*     Hematology Studies      Recent Labs   Lab Test 09/06/24  0341 09/05/24  2213 09/05/24  1313 09/05/24  0359 09/04/24  0357 09/03/24  1215 09/03/24  0343 09/02/24  2019 08/19/24  2246 08/19/24  1544 08/19/24  0949 08/19/24  0358 08/18/24  2155 08/18/24  1945 08/18/24  1559 08/18/24  1208 08/18/24  0946 08/18/24  0433   WBC 22.8*  --   --  23.7* 16.2* 13.1* 15.2* 16.0*   < > 12.5*   < > 18.6* 13.0*   < > 14.9*   < > 13.7* 17.2*   ANEU  --   --   --   --   --   --   --   --   --  11.3*  --  13.6* 9.9*  --  12.8*  --  11.8* 12.6*   ALYM  --   --   --   --   --   --   --   --   --  0.5*  --  2.2 1.2  --  0.6*  --  0.8 2.1   JUAN A  --   --   --   --   --   --   --   --   --  0.5  --  1.1 1.2  --  0.9  --  0.3 0.7   AEOS  --   --   --   --   --   --   --   --   --  0.0  --  0.6 0.4  --  0.3  --  0.5 0.7   HGB 7.1* 7.4* 7.0* 7.9* 7.8* 8.7* 9.2* 9.2*   < > 8.1*   < > 8.6* 8.2*   < > 8.3*   < > 7.9* 8.3*   HCT 23.4*  --   --  26.6* 25.6* 29.0* 30.1* 29.6*   < > 25.1*   < > 27.0* 25.6*   < > 26.0*   < > 25.3* 26.8*     --   --  378 303 334 375 369   < > 152   < > 179 149*   < > 147*   < > 154 161    < > = values in this interval not displayed.     Arterial Blood Gas Testing    Recent Labs   Lab Test 09/06/24  0341 09/05/24  2213 09/05/24  1313 09/05/24  1026 09/05/24  0359   PH 7.32* 7.35 7.31* 7.31* 7.32*   PCO2 48* 44 46* 49* 49*   PO2 75* 177* 146* 130* 115*   HCO3 25 24 23 25 25   O2PER 40 40 40 40 40      Urine  Studies     Recent Labs   Lab Test 09/05/24  1353 08/08/24  1725   URINEPH 5.5 5.5   NITRITE Negative Negative   LEUKEST Negative Trace*   WBCU 12* 7*     Microbiology:  Culture   Date Value Ref Range Status   09/05/2024 No growth after 12 hours  Preliminary   09/05/2024 No growth, less than 1 day  Preliminary   08/30/2024 No growth after 6 days  Preliminary   08/30/2024 Culture negative, monitoring continues  Preliminary   08/30/2024 No growth after 6 days  Preliminary   08/30/2024 1+ Pseudomonas aeruginosa (A)  Final     Comment:     Susceptibilities done on previous cultures   08/29/2024 3+ Pseudomonas aeruginosa (A)  Final   08/29/2024 No Growth  Final   08/23/2024 No Growth  Final   08/21/2024 2+ Pseudomonas aeruginosa (A)  Final   08/21/2024 No Actinomyces like species isolated  Final   08/15/2024 No Growth  Final   08/14/2024 10,000-50,000 CFU/mL Pseudomonas aeruginosa (A)  Final   08/14/2024 Candida albicans (A)  Preliminary   08/09/2024 No Growth  Final   08/09/2024 Candida albicans (A)  Final   08/09/2024 No Growth  Final   08/09/2024 No Legionella species isolated  Final   08/09/2024 No Growth  Final   08/09/2024 No Growth  Final   08/09/2024 No Legionella species isolated  Final   08/09/2024 No Growth  Final   08/08/2024 No Growth  Final   08/08/2024 1+ Candida albicans (A)  Final     Comment:     Susceptibilities not routinely done, refer to antibiogram to view typical susceptibility profiles     GS Culture   Date Value Ref Range Status   08/30/2024 See corresponding culture for results  Final   08/09/2024 See corresponding culture for results  Final   08/09/2024 See corresponding culture for results  Final     Last check of C difficile  C Difficile Toxin B by PCR   Date Value Ref Range Status   08/13/2024 Negative Negative Final     Comment:     A negative result does not exclude actual disease due to C. difficile and may be due to improper collection, handling and storage of the specimen or the number  of organisms in the specimen is below the detection limit of the assay.     Recent Imaging:  CT chest w/o contrast 9/4/24   Impression: Significantly decreased diffuse ground glass and  consolidative opacities with residual groundglass and scattered  nodular opacities, suggesting improving ARDS.    CXR 9/1/24   Impression: Stable to minimally decreased bilateral mixed interstitial  and airspace opacities compatible with ongoing ARDS.    CT CAP 8/30/24  IMPRESSION:   1. No evidence for retroperitoneal bleed. No findings to explain  dropping hemoglobin levels.  2. Several small peripheral splenic hypodensities are noted, which  appear new from 8/9/2024 CT although no contrast was used for the  prior examination. The hypodensity along the inferior pole appears to  have a tiny amount of perisplenic fat stranding changes. Findings  could represent a very small subcapsular infarct or hematoma of the  spleen.  3. Continued findings of ARDS with extensive groundglass and  consolidative changes throughout the lungs. Consolidative opacities  are mildly improved when compared to 8/9/2024 CT. Previously seen  pleural effusions have decreased.    US bilateral LE 8/29/24   IMPRESSION:  1.  No evidence of deep venous thrombosis in either lower extremity.    CXR 8/2924   IMPRESSION: Diffuse interstitial and airspace opacities consistent  with ARDS. Not significantly changed from approximately 4 hours prior.

## 2024-09-06 NOTE — PROGRESS NOTES
CRRT STATUS NOTE    DATA:  Time: 1900  Pressures WNL:  YES  Filter Status:  WDL  Problems Reported/Alarms Noted:  none  Supplies Present:  YES      ASSESSMENT:  Patient Net Fluid Balance:  Yesterday, Net -230mL; Today thus far net +500mL.    Vital Signs: On levo 0.1. cis stopped today. Vented w heavy sedation. B/P: 130/64, T: 99.7, P: 114, R: 21.      Labs:  stable on 4k baths; K 5.3 after pt was off CRRT for 4 hrs (waiting for heparin syringe from pharmacy).     Goals of Therapy:  Net +500mL Bolus then I=O today.    Meeting goals of therapy.       INTERVENTIONS:   Filter clotted at 0600 and again at 1100 - despite Heparin syringe inline;       PLAN:  Follow up next K this evening. If uptrending, nephrology will switch to 2k baths.   Continue CRRT to meet goals of therapy while on pressors.    Please contact CRRT RN 1 (or CRRT RN 2) on Vocera with questions/updates.

## 2024-09-06 NOTE — PROGRESS NOTES
CRRT STATUS NOTE    DATA:  Time:  5:14 PM  Pressures WNL:  Yes  Filter Status:  WDL    Problems Reported/Alarms Noted:  None    Supplies Present:  Yes    ASSESSMENT:  Patient Net Fluid Balance:  Net IO Since Admission: -11,516.72 mL [09/06/24 1714]     Intake/Output Summary (Last 24 hours) at 9/6/2024 1714  Last data filed at 9/6/2024 1700  Gross per 24 hour   Intake 3353.04 ml   Output 3796.8 ml   Net -443.76 ml      Vitals:  Temp:  [97  F (36.1  C)-100.8  F (38.2  C)] 100.4  F (38  C)  Pulse:  [] 114  Resp:  [15-32] 27  MAP:  [60 mmHg-104 mmHg] 77 mmHg  Arterial Line BP: ()/(45-79) 122/57  FiO2 (%):  [40 %] 40 %  SpO2:  [93 %-100 %] 100 %   Labs:    Lab Results   Component Value Date     (L) 09/06/2024    POTASSIUM 5.5 (H) 09/06/2024    CR 0.69 09/06/2024    BUN 24.5 (H) 09/06/2024    MAG 2.3 09/06/2024    PHOS 5.5 (H) 09/06/2024    WBC 22.8 (H) 09/06/2024    HGB 7.1 (L) 09/06/2024    HCT 23.4 (L) 09/06/2024     09/06/2024     Goals of Therapy: I=O    INTERVENTIONS:   Review flow sheets and discuss plan of care with bedside nurse and nephrology team.    PLAN:  Continue fluid removal as tolerated per goals of therapy.  Check filter daily and change filter q 72 hrs and PRN.  Please contact the CRRT resource RN with any questions/concerns.

## 2024-09-06 NOTE — PLAN OF CARE
ICU End of Shift Summary. See flowsheets for vital signs and detailed assessment.    Changes this shift:  RASS of -3, now opens eyes to speech/leaving them open, not tracking or blinking opening on command. Versed and dilaudid gtt weaned down slightly, paralytic has still been off since yesterday. Overbreathing the vent slightly - RR of 26-31, see 1600 ABG results. Vent settings remain unchanged. Sinus rhythm/sinus tach 90s-120s. Levo titrated to keep MAP >65. Tmax of 100.4, nothing grown yet on cultures from yesterday. Minimal output from rectal tube - tested negative for C diff. Insulin gtt continued, 1-2U per hour. K was 5.5 this afternoon, switched from 4K baths to 2K for CRRT. Pt fluid status about net even today. Family updated at bedside.     Plan: Monitor ABGs and respiratory status. Update team with any acute changes. Trach and PEG next week.       Goal Outcome Evaluation:      Plan of Care Reviewed With: patient    Overall Patient Progress: no changeOverall Patient Progress: no change    Outcome Evaluation: See note

## 2024-09-06 NOTE — PROGRESS NOTES
Nephrology Progress Note  09/06/2024       Mrs Flaherty is a 52 yo F w/ hx of Anxiety, depression, fibromyalgia, hypothyroidism, asthma, HLD, MURIEL on CPAP, expressive aphasia, and hypertension who was admitted to an OSH with community acquired pneumonia on 8/3 s/p intubation. Found to have severe ARDS requiring paralysis and proning, transferred here on 8/8 for management and initiated on CKRT for severe acidemia in the setting of oligoanuric CHACORTA. Started CRRT on 8/9 for volume management.     Interval History :   Mrs Flaherty continues on CRRT, was about net even yesterday (~300cc positive) and pressor needs are stable/slightly improved although a bit labile.  Will plan for I=O today, K also has been up and down with last check 5.0, no acidosis or hyperglycemia so will monitor for now, could switch to mix of 2k/4k if it rises further.  Off of paralytic today and has not needed proning in several days.      Update at 5:30pm:  K up to 5.5 without clear etiology (glucose WNL, is actually a bit alkalotic, on renal TF), changing to 2k baths since we are trending in the wrong direction but may be able to switch to mix of 2k/4k baths as it normalizes.  Will check CK & LDH but I do not have a good reason why she would have developed rhabdo or hemolysis and other clearance labs are stable (although Hgb down slightly.      Assessment & Recommendations:   CHACORTA-Baseline Cr 0.6 but not checked since 5/2023 PTA, UA with protein + blood but difficult to interpret in setting of CHACORTA, likely hypotensive ATN. Started CRRT on 8/9, now anuric.    -Access is RIJ tunneled line from 8/23  -CRRT, 4k baths, I=O as able on moderate to high dose norepi.      -Dialysis consent signed and scanned into media on 8/9    Please avoid placing a PICC line in any patient with CKD III or above, CHACORTA, or ESKD, as this will impact negatively the ability of the patient to have an AV fistula or AV Graft should they need it in the future.  Internal jugular or  "External Jugular  are the preferred type of access in patients with kidney disease. (Link to guidelines)    Volume status-On 0.1-0.2 mcg/kg/min     Electrolytes/pH-No acute issues, checking BID     Ca/phos/pth-Mg and Phos mildly up, no intervention needed.     Anemia-Hgb 7.1, down slightly today.       Nutrition-Renal TF    Time spent: 45 minutes on this date of encounter for chart review, physical exam, medical decision making and co-ordination of care.     Discussed with Dr Donato    Recommendations were communicated to primary team via verbal communication.     Dakota Dorsey, DOREEN CNS  Clinical Nurse Specialist  [phone]    Review of Systems:   I reviewed the following systems:  ROS not done due to vent/sedation.     Physical Exam:   I/O last 3 completed shifts:  In: 3993.96 [I.V.:2618.96; NG/GT:460; IV Piggyback:500]  Out: 3492 [Urine:500; Other:2617; Stool:375]   /64   Pulse 113   Temp 100.4  F (38  C)   Resp 27   Ht 1.575 m (5' 2\")   Wt 76.9 kg (169 lb 8.5 oz)   SpO2 100%   BMI 31.01 kg/m       GENERAL APPEARANCE: critically ill, intubated   HEENT: MMM   PULM: lungs clear anteriorly   CV: regular rhythm, normal rate, no rub      -edema - 1+ LE edema   GI: soft, ND  INTEGUMENT: no rash on exposed skin  NEURO: sedated   Access is LFV temp line 8/19.     Labs:   All labs reviewed by me  Electrolytes/Renal -   Recent Labs   Lab Test 09/06/24  0811 09/06/24  0512 09/06/24  0416 09/06/24  0341 09/05/24  2217 09/05/24  2213 09/05/24  1732 09/05/24  1543 09/05/24  0509 09/05/24  0359   NA  --   --   --  135  --   --   --  135  --  136   POTASSIUM  --   --   --  5.0  --  4.6  --  5.3  --  4.1   CHLORIDE  --   --   --  104  --   --   --  103  --  103   CO2  --   --   --  23  --   --   --  22  --  21*   BUN  --   --   --  23.7*  --   --   --  36.3*  --  29.2*   CR  --   --   --  0.71  --   --   --  0.88  --  0.76   * 134* 116* 116*   < >  --    < > 120*  137*   < > 152*   QUAN  --   --   --  " 8.4*  --   --   --  8.2*  --  8.6*   MAG  --   --   --  2.2  --   --   --  2.2  --  2.3   PHOS  --   --   --  5.2*  --   --   --  5.8*  --  5.1*    < > = values in this interval not displayed.       CBC -   Recent Labs   Lab Test 09/06/24  0341 09/05/24  2213 09/05/24  1313 09/05/24  0359 09/04/24  0357   WBC 22.8*  --   --  23.7* 16.2*   HGB 7.1* 7.4* 7.0* 7.9* 7.8*     --   --  378 303       LFTs -   Recent Labs   Lab Test 09/06/24  0341 09/05/24  1543 09/05/24  0359 09/04/24  1620 09/04/24  0357   ALKPHOS 95  --  111  --  112   BILITOTAL 0.3  --  0.2  --  0.2   ALT 26  --  28  --  24   AST 29  --  32  --  26   PROTTOTAL 5.8*  --  5.9*  --  5.7*   ALBUMIN 3.3* 3.1* 3.3*   < > 3.3*    < > = values in this interval not displayed.       Iron Panel - No lab results found.        Current Medications:  Current Facility-Administered Medications   Medication Dose Route Frequency Provider Last Rate Last Admin    acetaminophen (TYLENOL) tablet 650 mg  650 mg Oral Q6H Benjy Padgett PA-C   650 mg at 09/06/24 0352    artificial tears ophthalmic ointment   Both Eyes Q8H Fuentes Fowler MD   Given at 09/06/24 0751    [Held by provider] atorvastatin (LIPITOR) tablet 10 mg  10 mg Oral or Feeding Tube Daily Francisco Welsh MD   10 mg at 08/29/24 0924    budesonide (PULMICORT) neb solution 1 mg  1 mg Nebulization Daily Andres Payne PA-C   1 mg at 09/06/24 0853    cetirizine (zyrTEC) tablet 10 mg  10 mg Oral or Feeding Tube Daily Barry Hatch MD   10 mg at 09/06/24 0750    ciprofloxacin (CIPRO) infusion 400 mg  400 mg Intravenous Q8H Fuentes Fowler  mL/hr at 09/03/24 1203 400 mg at 09/06/24 0352    dexAMETHasone PF (DECADRON) injection 10 mg  10 mg Intravenous Daily Mirtha Vogt, APRN CNP   10 mg at 09/06/24 0750    [Held by provider] gabapentin (NEURONTIN) capsule 300 mg  300 mg Oral or Feeding Tube TID Barry Hatch MD   300 mg at 08/28/24 1959    ganciclovir (CYTOVENE) 200 mg in D5W 100  mL intermittent infusion  200 mg Intravenous Q12H Gaudencio De Souza  mL/hr at 09/06/24 0312 200 mg at 09/06/24 0312    ipratropium (ATROVENT) 0.02 % neb solution 0.5 mg  0.5 mg Nebulization 4x daily Barry Hatch MD   0.5 mg at 09/06/24 0853    And    levalbuterol (XOPENEX) neb solution 0.63 mg  0.63 mg Nebulization 4x Daily Barry Hatch MD   0.63 mg at 09/06/24 0853    levothyroxine (SYNTHROID/LEVOTHROID) tablet 25 mcg  25 mcg Per Feeding Tube [facility] Giovanny Guidry PA-C   25 mcg at 09/06/24 0750    LORazepam (ATIVAN) tablet 2 mg  2 mg Oral Q6H Benjy Padgett PA-C   2 mg at 09/06/24 0352    montelukast (SINGULAIR) tablet 10 mg  10 mg Oral or Feeding Tube At Bedtime Barry Hatch MD   10 mg at 09/05/24 2110    multivitamin RENAL (RENAVITE RX/NEPHROVITE) tablet 1 tablet  1 tablet Oral or Feeding Tube Daily Giovanny Guidry PA-C   1 tablet at 09/06/24 0750    nystatin (MYCOSTATIN) suspension 500,000 Units  500,000 Units Oral 4x Daily Carlos Cerna MD   500,000 Units at 09/06/24 0750    oxyCODONE (ROXICODONE) solution 10 mg  10 mg Oral Q6H Benjy Padgett PA-C   10 mg at 09/06/24 0352    pantoprazole (PROTONIX) 2 mg/mL suspension 40 mg  40 mg Per Feeding Tube [facility] Barry Hatch MD   40 mg at 09/06/24 0751    Prosource TF20 ENfit Compatibl EN LIQD (PROSOURCE TF20) packet 60 mL  1 packet Per Feeding Tube Daily Giovanny Guidry PA-C   60 mL at 09/06/24 0751    QUEtiapine (SEROquel) tablet 25 mg  25 mg Oral BID Benjy Padgett PA-C   25 mg at 09/06/24 0750    tobramycin (PF) (LIZA) neb solution 300 mg  300 mg Nebulization 2 times daily Gaudencio De Souza MD   300 mg at 09/06/24 0853    vancomycin (VANCOCIN) 1,500 mg in 0.9% NaCl 250 mL intermittent infusion  20 mg/kg Intravenous Q24H Barry Hatch .7 mL/hr at 09/05/24 2036 1,500 mg at 09/05/24 2036     Current Facility-Administered Medications   Medication Dose Route Frequency Provider Last Rate Last Admin     cisatracurium (NIMBEX) 200 mg in D5W 100 mL infusion  3-10 mcg/kg/min (Ideal) Intravenous Continuous Fuentes Fowler MD   Paused at 09/05/24 1112    dextrose 10% infusion   Intravenous Continuous PRN Gaudencio De Souza MD        dextrose 10% infusion   Intravenous Continuous PRN Giovanny Guidry PA-C        dialysate for CVVHD & CVVHDF (Phoxillum BK4/2.5)  12.5 mL/kg/hr CRRT Continuous Emmanuelle Donato MD 1,000 mL/hr at 09/06/24 0129 12.5 mL/kg/hr at 09/06/24 0129    heparin (porcine) 20,000 units in 20 mL ANTICOAGULANT infusion (syringe from pharmacy)  500-1,500 Units/hr CRRT Continuous Emmanuelle Donato MD 1.3 mL/hr at 09/06/24 0800 1,300 Units/hr at 09/06/24 0800    HYDROmorphone (DILAUDID) 0.2 mg/mL infusion ADULT/PEDS GREATER than or EQUAL to 20 kg  2.5 mg/hr Intravenous Continuous Fuentes Fowler MD 12.5 mL/hr at 09/06/24 0800 2.5 mg/hr at 09/06/24 0800    insulin regular (MYXREDLIN) 1 unit/mL infusion  0-24 Units/hr Intravenous Continuous Gaudencio De Souza MD 1 mL/hr at 09/06/24 0800 1 Units/hr at 09/06/24 0800    midazolam (VERSED) 100 mg/100 mL NS infusion - ADULT  1-16 mg/hr Intravenous Continuous Benjy Padgett PA-C 16 mL/hr at 09/06/24 0800 16 mg/hr at 09/06/24 0800    norepinephrine (LEVOPHED) 16 mg in  mL infusion MAX CONC CENTRAL LINE  0.01-0.6 mcg/kg/min (Dosing Weight) Intravenous Continuous Elier Hutchins MD 11.7 mL/hr at 09/06/24 0811 0.14 mcg/kg/min at 09/06/24 0811    POST-filter replacement solution for CVVHD & CVVHDF (Phoxillum BK4/2.5)   CRRT Continuous Emmanuelle Donato  mL/hr at 09/05/24 0608 New Bag at 09/05/24 0608    PRE-filter replacement solution for CVVHD & CVVHDF (Phoxillum BK4/2.5)  12.5 mL/kg/hr CRRT Continuous BoumitrEmmanuelle dixon MD 1,000 mL/hr at 09/06/24 0627 12.5 mL/kg/hr at 09/06/24 0627

## 2024-09-06 NOTE — PLAN OF CARE
Goal Outcome Evaluation:       ICU End of Shift Summary. See flowsheets for vital signs and detailed assessment.    Changes this shift: Patient has seemed comfortable much of the shift.  She has been off her paralytic since yesterday.  Patient has started to open eyes and bite down on her ETT.  RT decided she didn't need a bite block yet.  Patient has been stable at 1U/min on insulin gtt.  Patient only needed 2 bumps of dilaudid for the night for increased RR during turns.  Patient is about net even on CRRT.      Plan: Continue to wean off sedation. Continue to monitor vitals.  Continue to monitor.

## 2024-09-06 NOTE — PROGRESS NOTES
"CRRT STATUS NOTE    DATA:  Time: 4:37 AM   Pressures WNL:  YES  Filter Status:  WDL    Problems Reported/Alarms Noted:  None     Supplies Present:  YES    ASSESSMENT:  Patient Net Fluid Balance:  At midnight +381.5mL at 0600 +245mL    Vital Signs:    Arterial Line BP:  101/49 (66) mmHg  Pulse 116   Temp 100  F (37.8  C) (Esophageal)   Resp 26   Ht 1.575 m (5' 2\")   Wt 76.9 kg (169 lb 8.5 oz)   SpO2 98%   BMI 31.01 kg/m       Labs:   Na: 135  K:  5.0  Cr: 0.71  M.2  Phos: 5.2   iCal: 4.8        Goals of Therapy:  allow net positive with IVF bolus this am then can match I=O as able the rest of day     INTERVENTIONS:   None     PLAN:  Continue with treatment goal. Call Resource RN with questions and concerns. Vocera [phone] CRRT resource  "

## 2024-09-06 NOTE — PROGRESS NOTES
"Critical Care Services Progress Note:  I personally examined and evaluated the patient today. I formulated today s plan with the house staff team or resident and agree with the findings and plan in the associated note (see separately attested resident note).   I have evaluated all laboratory values and imaging studies from the past 24 hours.  Summary of hospital course:  53 year old female initially transferred to [facility] on 8/8 for VV-ECMO.  She was diagnosed with ARDS from a suspected pneumonia. Decannulated on 8/18. Extubated on 8/19.  Reintubated on 8/29 for hypoxemia and now critically ill, proned and paralyzed  Overnight events/pertinent findings today:  Remains off paralysis  Started to become more hypotensive and developed a fever last night.  Given Vancomycin and started on ganciclovir    Data  /64   Pulse 107   Temp 99.1  F (37.3  C)   Resp 22   Ht 1.575 m (5' 2\")   Wt 76.9 kg (169 lb 8.5 oz)   SpO2 100%   BMI 31.01 kg/m    I/O +380 mL    Na 135, K 5.0  CRP 41  WBC 22.8, Hgb 7.1, Plts 254    Sputum culture 8/29 with pseudomonas  BAL culture 8/30 with pseudomonas    Blood CMV PCR + 741 --> 920 --> 345  ECHO is normal    Non-contrast chest CT with improved opacities, but still with GGOs  Assessment/plan:    ARDS  CHACORTA on CRRT  Pseudomonas Pneumonia  Shock   Reactivation of CMV    Supine   Remain off paralytic if possible  Decrease versed to 12 and dilaudid to 2  Continue PO oxy and ativan  Net even via CRRT  Ciprofloxacin  Nebulized LIZA  Started ganciclovir given the concerning septic physiology yesterday.  Will consult with ID.    Continue invasive mechanical ventilation.   MAP goal 65  Consult surgery for Trach/PEG    Rest per resident note.    I spent a total of 40 minutes (excluding procedure time) personally providing and directing critical care services at the bedside and on the critical care unit for this patient  Alec Shelley MD        "

## 2024-09-07 ENCOUNTER — APPOINTMENT (OUTPATIENT)
Dept: ULTRASOUND IMAGING | Facility: CLINIC | Age: 54
DRG: 003 | End: 2024-09-07
Payer: MEDICAID

## 2024-09-07 LAB
ALBUMIN SERPL BCG-MCNC: 3.1 G/DL (ref 3.5–5.2)
ALBUMIN SERPL BCG-MCNC: 3.3 G/DL (ref 3.5–5.2)
ALBUMIN SERPL BCG-MCNC: 3.4 G/DL (ref 3.5–5.2)
ALBUMIN UR-MCNC: 50 MG/DL
ALLEN'S TEST: ABNORMAL
ALP SERPL-CCNC: 86 U/L (ref 40–150)
ALT SERPL W P-5'-P-CCNC: 29 U/L (ref 0–50)
ANION GAP SERPL CALCULATED.3IONS-SCNC: 10 MMOL/L (ref 7–15)
ANION GAP SERPL CALCULATED.3IONS-SCNC: 11 MMOL/L (ref 7–15)
ANION GAP SERPL CALCULATED.3IONS-SCNC: 9 MMOL/L (ref 7–15)
APPEARANCE UR: ABNORMAL
AST SERPL W P-5'-P-CCNC: 29 U/L (ref 0–45)
BACTERIA SPT CULT: NO GROWTH
BASE EXCESS BLDA CALC-SCNC: -0.8 MMOL/L (ref -3–3)
BILIRUB DIRECT SERPL-MCNC: <0.2 MG/DL (ref 0–0.3)
BILIRUB SERPL-MCNC: 0.2 MG/DL
BILIRUB UR QL STRIP: NEGATIVE
BUN SERPL-MCNC: 20.3 MG/DL (ref 6–20)
BUN SERPL-MCNC: 25.5 MG/DL (ref 6–20)
BUN SERPL-MCNC: 27.8 MG/DL (ref 6–20)
CA-I BLD-MCNC: 4.8 MG/DL (ref 4.4–5.2)
CA-I BLD-MCNC: 5 MG/DL (ref 4.4–5.2)
CA-I BLD-MCNC: 5.2 MG/DL (ref 4.4–5.2)
CALCIUM SERPL-MCNC: 8.6 MG/DL (ref 8.8–10.4)
CALCIUM SERPL-MCNC: 8.8 MG/DL (ref 8.8–10.4)
CALCIUM SERPL-MCNC: 9.1 MG/DL (ref 8.8–10.4)
CHLORIDE SERPL-SCNC: 101 MMOL/L (ref 98–107)
CHLORIDE SERPL-SCNC: 102 MMOL/L (ref 98–107)
CHLORIDE SERPL-SCNC: 103 MMOL/L (ref 98–107)
COHGB MFR BLD: 99.8 % (ref 96–97)
COLOR UR AUTO: YELLOW
CREAT SERPL-MCNC: 0.65 MG/DL (ref 0.51–0.95)
CREAT SERPL-MCNC: 0.66 MG/DL (ref 0.51–0.95)
CREAT SERPL-MCNC: 0.73 MG/DL (ref 0.51–0.95)
CRP SERPL-MCNC: 39.9 MG/L
EGFRCR SERPLBLD CKD-EPI 2021: >90 ML/MIN/1.73M2
ERYTHROCYTE [DISTWIDTH] IN BLOOD BY AUTOMATED COUNT: 21.2 % (ref 10–15)
GLUCOSE BLDC GLUCOMTR-MCNC: 120 MG/DL (ref 70–99)
GLUCOSE BLDC GLUCOMTR-MCNC: 122 MG/DL (ref 70–99)
GLUCOSE BLDC GLUCOMTR-MCNC: 137 MG/DL (ref 70–99)
GLUCOSE BLDC GLUCOMTR-MCNC: 152 MG/DL (ref 70–99)
GLUCOSE BLDC GLUCOMTR-MCNC: 173 MG/DL (ref 70–99)
GLUCOSE BLDC GLUCOMTR-MCNC: 84 MG/DL (ref 70–99)
GLUCOSE BLDC GLUCOMTR-MCNC: 89 MG/DL (ref 70–99)
GLUCOSE BLDC GLUCOMTR-MCNC: 91 MG/DL (ref 70–99)
GLUCOSE BLDC GLUCOMTR-MCNC: 97 MG/DL (ref 70–99)
GLUCOSE SERPL-MCNC: 110 MG/DL (ref 70–99)
GLUCOSE SERPL-MCNC: 139 MG/DL (ref 70–99)
GLUCOSE SERPL-MCNC: 214 MG/DL (ref 70–99)
GLUCOSE UR STRIP-MCNC: NEGATIVE MG/DL
GRAM STAIN RESULT: NORMAL
GRAM STAIN RESULT: NORMAL
HCO3 BLD-SCNC: 24 MMOL/L (ref 21–28)
HCO3 SERPL-SCNC: 22 MMOL/L (ref 22–29)
HCO3 SERPL-SCNC: 22 MMOL/L (ref 22–29)
HCO3 SERPL-SCNC: 23 MMOL/L (ref 22–29)
HCT VFR BLD AUTO: 25.3 % (ref 35–47)
HGB BLD-MCNC: 7.6 G/DL (ref 11.7–15.7)
HGB UR QL STRIP: NEGATIVE
KETONES UR STRIP-MCNC: NEGATIVE MG/DL
LACTATE SERPL-SCNC: 0.6 MMOL/L (ref 0.7–2)
LEUKOCYTE ESTERASE UR QL STRIP: NEGATIVE
MAGNESIUM SERPL-MCNC: 1.9 MG/DL (ref 1.7–2.3)
MAGNESIUM SERPL-MCNC: 2.2 MG/DL (ref 1.7–2.3)
MAGNESIUM SERPL-MCNC: 2.3 MG/DL (ref 1.7–2.3)
MCH RBC QN AUTO: 29.5 PG (ref 26.5–33)
MCHC RBC AUTO-ENTMCNC: 30 G/DL (ref 31.5–36.5)
MCV RBC AUTO: 98 FL (ref 78–100)
NITRATE UR QL: NEGATIVE
O2/TOTAL GAS SETTING VFR VENT: 35 %
PCO2 BLD: 42 MM HG (ref 35–45)
PEEP: 6 CM H2O
PH BLD: 7.37 [PH] (ref 7.35–7.45)
PH UR STRIP: 5.5 [PH] (ref 5–7)
PHOSPHATE SERPL-MCNC: 3.4 MG/DL (ref 2.5–4.5)
PHOSPHATE SERPL-MCNC: 3.5 MG/DL (ref 2.5–4.5)
PHOSPHATE SERPL-MCNC: 4 MG/DL (ref 2.5–4.5)
PLATELET # BLD AUTO: 171 10E3/UL (ref 150–450)
PO2 BLD: 105 MM HG (ref 80–105)
POTASSIUM SERPL-SCNC: 3.4 MMOL/L (ref 3.4–5.3)
POTASSIUM SERPL-SCNC: 4.1 MMOL/L (ref 3.4–5.3)
POTASSIUM SERPL-SCNC: 4.2 MMOL/L (ref 3.4–5.3)
PROT SERPL-MCNC: 5.4 G/DL (ref 6.4–8.3)
RBC # BLD AUTO: 2.58 10E6/UL (ref 3.8–5.2)
RBC URINE: 2 /HPF
SAO2 % BLDA: 97 % (ref 92–100)
SODIUM SERPL-SCNC: 133 MMOL/L (ref 135–145)
SODIUM SERPL-SCNC: 135 MMOL/L (ref 135–145)
SODIUM SERPL-SCNC: 135 MMOL/L (ref 135–145)
SP GR UR STRIP: 1.02 (ref 1–1.03)
SQUAMOUS EPITHELIAL: <1 /HPF
TRANSITIONAL EPI: 2 /HPF
UFH PPP CHRO-ACNC: 0.25 IU/ML
UROBILINOGEN UR STRIP-MCNC: NORMAL MG/DL
WBC # BLD AUTO: 11.8 10E3/UL (ref 4–11)
WBC URINE: 2 /HPF

## 2024-09-07 PROCEDURE — 90947 DIALYSIS REPEATED EVAL: CPT

## 2024-09-07 PROCEDURE — 93970 EXTREMITY STUDY: CPT | Mod: 26 | Performed by: STUDENT IN AN ORGANIZED HEALTH CARE EDUCATION/TRAINING PROGRAM

## 2024-09-07 PROCEDURE — 83605 ASSAY OF LACTIC ACID: CPT

## 2024-09-07 PROCEDURE — 82330 ASSAY OF CALCIUM: CPT | Performed by: CLINICAL NURSE SPECIALIST

## 2024-09-07 PROCEDURE — 81001 URINALYSIS AUTO W/SCOPE: CPT

## 2024-09-07 PROCEDURE — 94003 VENT MGMT INPAT SUBQ DAY: CPT

## 2024-09-07 PROCEDURE — 258N000003 HC RX IP 258 OP 636: Performed by: SURGERY

## 2024-09-07 PROCEDURE — 93970 EXTREMITY STUDY: CPT | Mod: XS

## 2024-09-07 PROCEDURE — 85520 HEPARIN ASSAY: CPT | Performed by: CLINICAL NURSE SPECIALIST

## 2024-09-07 PROCEDURE — 200N000002 HC R&B ICU UMMC

## 2024-09-07 PROCEDURE — 258N000003 HC RX IP 258 OP 636

## 2024-09-07 PROCEDURE — 250N000009 HC RX 250

## 2024-09-07 PROCEDURE — 94640 AIRWAY INHALATION TREATMENT: CPT

## 2024-09-07 PROCEDURE — 82040 ASSAY OF SERUM ALBUMIN: CPT | Performed by: CLINICAL NURSE SPECIALIST

## 2024-09-07 PROCEDURE — 82805 BLOOD GASES W/O2 SATURATION: CPT

## 2024-09-07 PROCEDURE — 99233 SBSQ HOSP IP/OBS HIGH 50: CPT | Performed by: STUDENT IN AN ORGANIZED HEALTH CARE EDUCATION/TRAINING PROGRAM

## 2024-09-07 PROCEDURE — 250N000013 HC RX MED GY IP 250 OP 250 PS 637: Performed by: PHYSICIAN ASSISTANT

## 2024-09-07 PROCEDURE — 250N000011 HC RX IP 250 OP 636: Performed by: SURGERY

## 2024-09-07 PROCEDURE — 85027 COMPLETE CBC AUTOMATED: CPT | Performed by: PHYSICIAN ASSISTANT

## 2024-09-07 PROCEDURE — 250N000009 HC RX 250: Performed by: SURGERY

## 2024-09-07 PROCEDURE — 250N000013 HC RX MED GY IP 250 OP 250 PS 637

## 2024-09-07 PROCEDURE — 83735 ASSAY OF MAGNESIUM: CPT | Performed by: CLINICAL NURSE SPECIALIST

## 2024-09-07 PROCEDURE — 999N000157 HC STATISTIC RCP TIME EA 10 MIN

## 2024-09-07 PROCEDURE — 99291 CRITICAL CARE FIRST HOUR: CPT | Mod: GC | Performed by: INTERNAL MEDICINE

## 2024-09-07 PROCEDURE — 250N000009 HC RX 250: Performed by: STUDENT IN AN ORGANIZED HEALTH CARE EDUCATION/TRAINING PROGRAM

## 2024-09-07 PROCEDURE — 36415 COLL VENOUS BLD VENIPUNCTURE: CPT

## 2024-09-07 PROCEDURE — 86140 C-REACTIVE PROTEIN: CPT

## 2024-09-07 PROCEDURE — 94640 AIRWAY INHALATION TREATMENT: CPT | Mod: 76

## 2024-09-07 PROCEDURE — 250N000011 HC RX IP 250 OP 636

## 2024-09-07 PROCEDURE — 87106 FUNGI IDENTIFICATION YEAST: CPT

## 2024-09-07 PROCEDURE — 80069 RENAL FUNCTION PANEL: CPT | Performed by: CLINICAL NURSE SPECIALIST

## 2024-09-07 PROCEDURE — 250N000013 HC RX MED GY IP 250 OP 250 PS 637: Performed by: SURGERY

## 2024-09-07 PROCEDURE — 250N000009 HC RX 250: Performed by: CLINICAL NURSE SPECIALIST

## 2024-09-07 PROCEDURE — 87040 BLOOD CULTURE FOR BACTERIA: CPT

## 2024-09-07 PROCEDURE — 250N000012 HC RX MED GY IP 250 OP 636 PS 637

## 2024-09-07 PROCEDURE — 93970 EXTREMITY STUDY: CPT

## 2024-09-07 PROCEDURE — 82248 BILIRUBIN DIRECT: CPT | Performed by: CLINICAL NURSE SPECIALIST

## 2024-09-07 PROCEDURE — 99233 SBSQ HOSP IP/OBS HIGH 50: CPT | Performed by: INTERNAL MEDICINE

## 2024-09-07 PROCEDURE — 250N000011 HC RX IP 250 OP 636: Performed by: CLINICAL NURSE SPECIALIST

## 2024-09-07 RX ORDER — CALCIUM CHLORIDE, MAGNESIUM CHLORIDE, SODIUM CHLORIDE, SODIUM BICARBONATE, POTASSIUM CHLORIDE AND SODIUM PHOSPHATE DIBASIC DIHYDRATE 3.68; 3.05; 6.34; 3.09; .314; .187 G/L; G/L; G/L; G/L; G/L; G/L
12.5 INJECTION INTRAVENOUS CONTINUOUS
Status: DISCONTINUED | OUTPATIENT
Start: 2024-09-07 | End: 2024-09-11

## 2024-09-07 RX ORDER — CALCIUM CHLORIDE, MAGNESIUM CHLORIDE, SODIUM CHLORIDE, SODIUM BICARBONATE, POTASSIUM CHLORIDE AND SODIUM PHOSPHATE DIBASIC DIHYDRATE 3.68; 3.05; 6.34; 3.09; .314; .187 G/L; G/L; G/L; G/L; G/L; G/L
INJECTION INTRAVENOUS CONTINUOUS
Status: DISCONTINUED | OUTPATIENT
Start: 2024-09-07 | End: 2024-09-11

## 2024-09-07 RX ADMIN — CIPROFLOXACIN 400 MG: 400 INJECTION, SOLUTION INTRAVENOUS at 11:54

## 2024-09-07 RX ADMIN — CALCIUM CHLORIDE, MAGNESIUM CHLORIDE, SODIUM CHLORIDE, SODIUM BICARBONATE, POTASSIUM CHLORIDE AND SODIUM PHOSPHATE DIBASIC DIHYDRATE 12.5 ML/KG/HR: 3.68; 3.05; 6.34; 3.09; .314; .187 INJECTION INTRAVENOUS at 14:31

## 2024-09-07 RX ADMIN — NYSTATIN 500000 UNITS: 100000 SUSPENSION ORAL at 16:17

## 2024-09-07 RX ADMIN — MIDAZOLAM HYDROCHLORIDE 8 MG/HR: 1 INJECTION, SOLUTION INTRAVENOUS at 15:15

## 2024-09-07 RX ADMIN — CETIRIZINE HYDROCHLORIDE 10 MG: 10 TABLET, FILM COATED ORAL at 07:44

## 2024-09-07 RX ADMIN — OXYCODONE HYDROCHLORIDE 10 MG: 5 SOLUTION ORAL at 21:31

## 2024-09-07 RX ADMIN — INSULIN ASPART 1 UNITS: 100 INJECTION, SOLUTION INTRAVENOUS; SUBCUTANEOUS at 16:15

## 2024-09-07 RX ADMIN — NYSTATIN 500000 UNITS: 100000 SUSPENSION ORAL at 07:43

## 2024-09-07 RX ADMIN — Medication 60 ML: at 07:44

## 2024-09-07 RX ADMIN — NYSTATIN 500000 UNITS: 100000 SUSPENSION ORAL at 11:54

## 2024-09-07 RX ADMIN — ACETAMINOPHEN 650 MG: 325 TABLET ORAL at 04:04

## 2024-09-07 RX ADMIN — Medication 1.5 MG/HR: at 16:59

## 2024-09-07 RX ADMIN — ACETAMINOPHEN 650 MG: 325 TABLET ORAL at 21:31

## 2024-09-07 RX ADMIN — Medication 2 MG/HR: at 05:18

## 2024-09-07 RX ADMIN — BUDESONIDE 1 MG: 1 INHALANT ORAL at 08:02

## 2024-09-07 RX ADMIN — HEPARIN SODIUM 1300 UNITS/HR: 1000 INJECTION, SOLUTION INTRAVENOUS; SUBCUTANEOUS at 15:07

## 2024-09-07 RX ADMIN — IPRATROPIUM BROMIDE 0.5 MG: 0.5 SOLUTION RESPIRATORY (INHALATION) at 16:23

## 2024-09-07 RX ADMIN — OXYCODONE HYDROCHLORIDE 10 MG: 5 SOLUTION ORAL at 16:08

## 2024-09-07 RX ADMIN — DEXAMETHASONE SODIUM PHOSPHATE 10 MG: 10 INJECTION, SOLUTION INTRAMUSCULAR; INTRAVENOUS at 07:43

## 2024-09-07 RX ADMIN — CALCIUM CHLORIDE, MAGNESIUM CHLORIDE, DEXTROSE MONOHYDRATE, LACTIC ACID, SODIUM CHLORIDE, SODIUM BICARBONATE AND POTASSIUM CHLORIDE 12.5 ML/KG/HR: 5.15; 2.03; 22; 5.4; 6.46; 3.09; .157 INJECTION INTRAVENOUS at 09:24

## 2024-09-07 RX ADMIN — CALCIUM CHLORIDE, MAGNESIUM CHLORIDE, SODIUM CHLORIDE, SODIUM BICARBONATE, POTASSIUM CHLORIDE AND SODIUM PHOSPHATE DIBASIC DIHYDRATE: 3.68; 3.05; 6.34; 3.09; .314; .187 INJECTION INTRAVENOUS at 09:30

## 2024-09-07 RX ADMIN — SODIUM CHLORIDE, POTASSIUM CHLORIDE, SODIUM LACTATE AND CALCIUM CHLORIDE 500 ML: 600; 310; 30; 20 INJECTION, SOLUTION INTRAVENOUS at 18:11

## 2024-09-07 RX ADMIN — OXYCODONE HYDROCHLORIDE 10 MG: 5 SOLUTION ORAL at 10:19

## 2024-09-07 RX ADMIN — CIPROFLOXACIN 400 MG: 400 INJECTION, SOLUTION INTRAVENOUS at 04:24

## 2024-09-07 RX ADMIN — CALCIUM CHLORIDE, MAGNESIUM CHLORIDE, DEXTROSE MONOHYDRATE, LACTIC ACID, SODIUM CHLORIDE, SODIUM BICARBONATE AND POTASSIUM CHLORIDE 12.5 ML/KG/HR: 5.15; 2.03; 22; 5.4; 6.46; 3.09; .157 INJECTION INTRAVENOUS at 04:02

## 2024-09-07 RX ADMIN — Medication 40 MG: at 07:43

## 2024-09-07 RX ADMIN — CALCIUM CHLORIDE, MAGNESIUM CHLORIDE, DEXTROSE MONOHYDRATE, LACTIC ACID, SODIUM CHLORIDE, SODIUM BICARBONATE AND POTASSIUM CHLORIDE 12.5 ML/KG/HR: 5.15; 2.03; 22; 5.4; 6.46; 3.09; .157 INJECTION INTRAVENOUS at 09:08

## 2024-09-07 RX ADMIN — IPRATROPIUM BROMIDE 0.5 MG: 0.5 SOLUTION RESPIRATORY (INHALATION) at 12:48

## 2024-09-07 RX ADMIN — LORAZEPAM 2 MG: 1 TABLET ORAL at 04:04

## 2024-09-07 RX ADMIN — ACETAMINOPHEN 650 MG: 325 TABLET ORAL at 10:18

## 2024-09-07 RX ADMIN — MONTELUKAST 10 MG: 10 TABLET, FILM COATED ORAL at 21:31

## 2024-09-07 RX ADMIN — CALCIUM CHLORIDE, MAGNESIUM CHLORIDE, SODIUM CHLORIDE, SODIUM BICARBONATE, POTASSIUM CHLORIDE AND SODIUM PHOSPHATE DIBASIC DIHYDRATE 12.5 ML/KG/HR: 3.68; 3.05; 6.34; 3.09; .314; .187 INJECTION INTRAVENOUS at 09:25

## 2024-09-07 RX ADMIN — LEVALBUTEROL HYDROCHLORIDE 0.63 MG: 0.63 SOLUTION RESPIRATORY (INHALATION) at 08:02

## 2024-09-07 RX ADMIN — IPRATROPIUM BROMIDE 0.5 MG: 0.5 SOLUTION RESPIRATORY (INHALATION) at 08:02

## 2024-09-07 RX ADMIN — LEVALBUTEROL HYDROCHLORIDE 0.63 MG: 0.63 SOLUTION RESPIRATORY (INHALATION) at 12:48

## 2024-09-07 RX ADMIN — GANCICLOVIR SODIUM 200 MG: 500 INJECTION, POWDER, LYOPHILIZED, FOR SOLUTION INTRAVENOUS at 15:11

## 2024-09-07 RX ADMIN — LEVALBUTEROL HYDROCHLORIDE 0.63 MG: 0.63 SOLUTION RESPIRATORY (INHALATION) at 20:31

## 2024-09-07 RX ADMIN — LORAZEPAM 2 MG: 1 TABLET ORAL at 21:31

## 2024-09-07 RX ADMIN — CALCIUM CHLORIDE, MAGNESIUM CHLORIDE, SODIUM CHLORIDE, SODIUM BICARBONATE, POTASSIUM CHLORIDE AND SODIUM PHOSPHATE DIBASIC DIHYDRATE 12.5 ML/KG/HR: 3.68; 3.05; 6.34; 3.09; .314; .187 INJECTION INTRAVENOUS at 19:27

## 2024-09-07 RX ADMIN — MAGNESIUM SULFATE HEPTAHYDRATE 2 G: 2 INJECTION, SOLUTION INTRAVENOUS at 06:23

## 2024-09-07 RX ADMIN — NYSTATIN 500000 UNITS: 100000 SUSPENSION ORAL at 19:26

## 2024-09-07 RX ADMIN — LEVOTHYROXINE SODIUM 25 MCG: 0.03 TABLET ORAL at 07:44

## 2024-09-07 RX ADMIN — METRONIDAZOLE 500 MG: 500 INJECTION, SOLUTION INTRAVENOUS at 14:07

## 2024-09-07 RX ADMIN — LEVALBUTEROL HYDROCHLORIDE 0.63 MG: 0.63 SOLUTION RESPIRATORY (INHALATION) at 16:23

## 2024-09-07 RX ADMIN — ACETAMINOPHEN 650 MG: 325 TABLET ORAL at 16:08

## 2024-09-07 RX ADMIN — GANCICLOVIR SODIUM 200 MG: 500 INJECTION, POWDER, LYOPHILIZED, FOR SOLUTION INTRAVENOUS at 03:26

## 2024-09-07 RX ADMIN — OXYCODONE HYDROCHLORIDE 10 MG: 5 SOLUTION ORAL at 04:04

## 2024-09-07 RX ADMIN — IPRATROPIUM BROMIDE 0.5 MG: 0.5 SOLUTION RESPIRATORY (INHALATION) at 20:31

## 2024-09-07 RX ADMIN — CALCIUM CHLORIDE, MAGNESIUM CHLORIDE, SODIUM CHLORIDE, SODIUM BICARBONATE, POTASSIUM CHLORIDE AND SODIUM PHOSPHATE DIBASIC DIHYDRATE 12.5 ML/KG/HR: 3.68; 3.05; 6.34; 3.09; .314; .187 INJECTION INTRAVENOUS at 09:28

## 2024-09-07 RX ADMIN — LORAZEPAM 2 MG: 1 TABLET ORAL at 10:19

## 2024-09-07 RX ADMIN — TOBRAMYCIN 300 MG: 300 SOLUTION RESPIRATORY (INHALATION) at 08:02

## 2024-09-07 RX ADMIN — POTASSIUM CHLORIDE 20 MEQ: 29.8 INJECTION, SOLUTION INTRAVENOUS at 05:24

## 2024-09-07 RX ADMIN — MIDAZOLAM HYDROCHLORIDE 12 MG/HR: 1 INJECTION, SOLUTION INTRAVENOUS at 05:20

## 2024-09-07 RX ADMIN — Medication 1 TABLET: at 07:43

## 2024-09-07 RX ADMIN — VANCOMYCIN HYDROCHLORIDE 1500 MG: 10 INJECTION, POWDER, LYOPHILIZED, FOR SOLUTION INTRAVENOUS at 18:03

## 2024-09-07 RX ADMIN — METRONIDAZOLE 500 MG: 500 INJECTION, SOLUTION INTRAVENOUS at 02:15

## 2024-09-07 RX ADMIN — LORAZEPAM 2 MG: 1 TABLET ORAL at 16:08

## 2024-09-07 RX ADMIN — TOBRAMYCIN 300 MG: 300 SOLUTION RESPIRATORY (INHALATION) at 20:31

## 2024-09-07 RX ADMIN — CIPROFLOXACIN 400 MG: 400 INJECTION, SOLUTION INTRAVENOUS at 19:26

## 2024-09-07 ASSESSMENT — ACTIVITIES OF DAILY LIVING (ADL)
ADLS_ACUITY_SCORE: 61
ADLS_ACUITY_SCORE: 61
ADLS_ACUITY_SCORE: 65
ADLS_ACUITY_SCORE: 61
ADLS_ACUITY_SCORE: 65
ADLS_ACUITY_SCORE: 61

## 2024-09-07 NOTE — PROGRESS NOTES
CRRT STATUS NOTE    DATA:  Time:  1743  Pressures WNL:  YES  Filter Status:  WDL    Problems Reported/Alarms Noted:  None    Supplies Present:  YES    ASSESSMENT:  Patient Net Fluid Balance:  -613ml since 0000    Vital Signs:  Temp: 100.2  F (37.9  C) Temp src: Esophageal BP: 109/64 Pulse: 104   Resp: 19 SpO2: 99 % O2 Device: Mechanical Ventilator            Labs:   Recent Labs   Lab 09/07/24  1605 09/07/24  1210 09/07/24  1011 09/07/24  0754 09/07/24  0332 09/06/24 2002 09/06/24 2000 09/06/24  1828 09/06/24  1819 09/06/24  0416 09/06/24  0341   WBC  --   --   --   --  11.8*  --   --   --  18.8*  --  22.8*   HGB  --   --   --   --  7.6*  --   --   --  7.0*  --  7.1*   MCV  --   --   --   --  98  --   --   --  100  --  100   PLT  --   --   --   --  171  --   --   --  240  --  254   NA  --  133*  --   --  135  --  135  --   --    < > 135   POTASSIUM  --  4.2  --   --  3.4  --  4.8  --   --    < > 5.0   CHLORIDE  --  101  --   --  102  --  102  --   --    < > 104   CO2  --  23  --   --  22  --  22  --   --    < > 23   BUN  --  27.8*  --   --  20.3*  --  22.9*  --   --    < > 23.7*   CR  --  0.66  --   --  0.73  --  0.70  --   --    < > 0.71   ANIONGAP  --  9  --   --  11  --  11  --   --    < > 8   QUAN  --  8.8  --   --  9.1  --  8.9  --   --    < > 8.4*   * 214* 152*   < > 97  110*   < > 109*   < >  --    < > 116*   ALBUMIN  --  3.4*  --   --  3.1*  --  3.2*  --   --    < > 3.3*   PROTTOTAL  --   --   --   --  5.4*  --   --   --   --   --  5.8*   BILITOTAL  --   --   --   --  0.2  --   --   --   --   --  0.3   ALKPHOS  --   --   --   --  86  --   --   --   --   --  95   ALT  --   --   --   --  29  --   --   --   --   --  26   AST  --   --   --   --  29  --   --   --   --   --  29    < > = values in this interval not displayed.                  Goals of Therapy:  I=O. Slightly ahead of goal, but I/O cathed at 1700 for 420ml.    INTERVENTIONS:   Solutions switched to 4K baths.    PLAN:  Continue fluid removal  goals as patient tolerates. Check filter daily and change filter Q 72 hours and PRN. Contact CRRT Resource RN with any questions/concerns.

## 2024-09-07 NOTE — PROGRESS NOTES
CRRT STATUS NOTE    DATA:  Time:  0643    Pressures WNL:  YES    Filter Status:  WDL    Problems Reported/Alarms Noted:  No    Supplies Present:  YES    ASSESSMENT:  Patient Net Fluid Balance:    09/06/2024 I/O= -114.50cc    Vital Signs 0000:   T=100.6 (esophageal), UY=729, RR=27, ZZ=424/63 (MAP=82), SPO2=95% on FIO2 35% via vent.    Gtts-  Levophed (on and off overnight)  Heparin gtt (running through CRRT machine)    Labs:    Labs monitored and reviewed.  Potassium and magnesium replaced.    ROUTINE ICU LABS (Last four results)  CMP  Recent Labs   Lab 09/07/24  0332 09/07/24  0216 09/07/24  0104 09/06/24 2002 09/06/24 2000 09/06/24  1553 09/06/24  1547 09/06/24  0416 09/06/24  0341 09/05/24  0509 09/05/24  0359 09/04/24  0523 09/04/24  0357     --   --   --  135  --  134*  --  135   < > 136   < > 134*   POTASSIUM 3.4  --   --   --  4.8  --  5.5*  --  5.0   < > 4.1   < > 3.9   CHLORIDE 102  --   --   --  102  --  102  --  104   < > 103   < > 102   CO2 22  --   --   --  22  --  23  --  23   < > 21*   < > 23   ANIONGAP 11  --   --   --  11  --  9  --  8   < > 12   < > 9   GLC 97  110* 84 89   < > 109*   < > 133*   < > 116*   < > 152*   < > 138*   BUN 20.3*  --   --   --  22.9*  --  24.5*  --  23.7*   < > 29.2*   < > 32.3*   CR 0.73  --   --   --  0.70  --  0.69  --  0.71   < > 0.76   < > 0.74   GFRESTIMATED >90  --   --   --  >90  --  >90  --  >90   < > >90   < > >90   QUAN 9.1  --   --   --  8.9  --  8.6*  --  8.4*   < > 8.6*   < > 8.6*   MAG 1.9  --   --   --  2.1  --  2.3  --  2.2   < > 2.3   < > 2.4*   PHOS 3.4  --   --   --  4.9*  --  5.5*  --  5.2*   < > 5.1*   < > 4.5   PROTTOTAL 5.4*  --   --   --   --   --   --   --  5.8*  --  5.9*  --  5.7*   ALBUMIN 3.1*  --   --   --  3.2*  --  3.4*  --  3.3*   < > 3.3*   < > 3.3*   BILITOTAL 0.2  --   --   --   --   --   --   --  0.3  --  0.2  --  0.2   ALKPHOS 86  --   --   --   --   --   --   --  95  --  111  --  112   AST 29  --   --   --   --   --   --   --   29  --  32  --  26   ALT 29  --   --   --   --   --   --   --  26  --  28  --  24    < > = values in this interval not displayed.     CBC  Recent Labs   Lab 09/07/24  0332 09/06/24  1819 09/06/24  0341 09/05/24  2213 09/05/24  1313 09/05/24  0359   WBC 11.8* 18.8* 22.8*  --   --  23.7*   RBC 2.58* 2.30* 2.35*  --   --  2.67*   HGB 7.6* 7.0* 7.1* 7.4*   < > 7.9*   HCT 25.3* 23.0* 23.4*  --   --  26.6*   MCV 98 100 100  --   --  100   MCH 29.5 30.4 30.2  --   --  29.6   MCHC 30.0* 30.4* 30.3*  --   --  29.7*   RDW 21.2* 19.1* 18.9*  --   --  18.3*    240 254  --   --  378    < > = values in this interval not displayed.     Arterial Blood Gas  Recent Labs   Lab 09/07/24 0332 09/06/24 2000 09/06/24  1547 09/06/24  0341   PH 7.37 7.35 7.46* 7.32*   PCO2 42 45 42 48*   PO2 105 111* 146* 75*   HCO3 24 25 29* 25   O2PER 35 40 40 40     Goals of Therapy:   I=O    INTERVENTIONS:   Text paged  (Night Shift Renal Fellow) about 0332 K=3.4, no new CRRT solution orders placed in [facility] yet, bedside RN and day shift CRRT Resource RN  notified of this.    PLAN:  Continue to monitor.  Change CRRT set q72hrs and PRN.  Call CRRT RN on [employer] phone with questions.  See flowsheets for details.

## 2024-09-07 NOTE — PLAN OF CARE
ICU End of Shift Summary. See flowsheets for vital signs and detailed assessment.    Changes this shift: Pt was off pressors but around 17, her MAP's dropped to low 50's.  bolus is in progress, Levophed restarted.   RN straight cath patient and urine sample was send for analysis.     Plan: wean of pressors if able, wean of sedation per MD orders.         Problem: Adult Inpatient Plan of Care  Goal: Plan of Care Review  Description: The Plan of Care Review/Shift note should be completed every shift.  The Outcome Evaluation is a brief statement about your assessment that the patient is improving, declining, or no change.  This information will be displayed automatically on your shift  note.  Outcome: Progressing  Flowsheets (Taken 9/7/2024 5377)  Outcome Evaluation: less sedations and presssors required  Plan of Care Reviewed With: patient  Overall Patient Progress: improving

## 2024-09-07 NOTE — PROGRESS NOTES
Nephrology Progress Note  09/07/2024       Mrs Flaherty is a 54 yo F w/ hx of Anxiety, depression, fibromyalgia, hypothyroidism, asthma, HLD, MURIEL on CPAP, expressive aphasia, and hypertension who was admitted to an OSH with community acquired pneumonia on 8/3 s/p intubation. Found to have severe ARDS requiring paralysis and proning, transferred here on 8/8 for management and initiated on CKRT for severe acidemia in the setting of oligoanuric CHACORTA. Started CRRT on 8/9 for volume management.     Interval History :   Mrs Flaherty continues on CRRT, was about net even yesterday (~250cc negative) and pressor needs are somewhat improving.  Will plan for I=O today, K improved.  CK normal yesterday. LDH elevated at 335. Hemoglobin stable in the mid 7's      Assessment & Recommendations:   CHACORTA-Baseline Cr 0.6 but not checked since 5/2023 PTA, UA with protein + blood but difficult to interpret in setting of CHACORTA, likely hypotensive ATN. Started CRRT on 8/9, now anuric.    -Access is RIJ tunneled line from 8/23  -CRRT, 4k baths, I=O. Pressor requirements improving so may be able to start pulling soon  -Dialysis consent signed and scanned into media on 8/9    Please avoid placing a PICC line in any patient with CKD III or above, CHACORTA, or ESKD, as this will impact negatively the ability of the patient to have an AV fistula or AV Graft should they need it in the future.  Internal jugular or External Jugular  are the preferred type of access in patients with kidney disease. (Link to guidelines)    Electrolytes/pH- K improving. Back to 4K bath    Ca/phos/pth-Mg and Phos acceptable    Anemia-Hgb 7.6    Nutrition-Renal TF    Time spent: 35 minutes on this date of encounter for chart review, physical exam, medical decision making and co-ordination of care.     Review of Systems:   I reviewed the following systems:  ROS not done due to vent/sedation.     Physical Exam:   I/O last 3 completed shifts:  In: 3713.57 [I.V.:2238.57;  "NG/GT:595]  Out: 3977 [Other:3927; Stool:50]   /64 (BP Location: Right arm)   Pulse 107   Temp 99.5  F (37.5  C) (Esophageal)   Resp 19   Ht 1.575 m (5' 2\")   Wt 77.7 kg (171 lb 4.8 oz)   SpO2 96%   BMI 31.33 kg/m       GENERAL APPEARANCE: critically ill, intubated   HEENT: MMM   PULM: lungs clear anteriorly   CV: regular rhythm, normal rate, no rub      -edema - 1+ LE edema   GI: soft, ND  INTEGUMENT: no rash on exposed skin  NEURO: sedated   Access is LFV temp line 8/19.     Labs:   All labs reviewed by me  Electrolytes/Renal -   Recent Labs   Lab Test 09/07/24 0754 09/07/24 0332 09/06/24 2002 09/06/24 2000 09/06/24  1553 09/06/24  1547   NA  --  135  --  135  --  134*   POTASSIUM  --  3.4  --  4.8  --  5.5*   CHLORIDE  --  102  --  102  --  102   CO2  --  22  --  22  --  23   BUN  --  20.3*  --  22.9*  --  24.5*   CR  --  0.73  --  0.70  --  0.69   * 97  110*   < > 109*   < > 133*   QUAN  --  9.1  --  8.9  --  8.6*   MAG  --  1.9  --  2.1  --  2.3   PHOS  --  3.4  --  4.9*  --  5.5*    < > = values in this interval not displayed.       CBC -   Recent Labs   Lab Test 09/07/24 0332 09/06/24 1819 09/06/24 0341   WBC 11.8* 18.8* 22.8*   HGB 7.6* 7.0* 7.1*    240 254       LFTs -   Recent Labs   Lab Test 09/07/24 0332 09/06/24 2000 09/06/24 1547 09/06/24 0341 09/05/24  1543 09/05/24  0359   ALKPHOS 86  --   --  95  --  111   BILITOTAL 0.2  --   --  0.3  --  0.2   ALT 29  --   --  26  --  28   AST 29  --   --  29  --  32   PROTTOTAL 5.4*  --   --  5.8*  --  5.9*   ALBUMIN 3.1* 3.2* 3.4* 3.3*   < > 3.3*    < > = values in this interval not displayed.       Iron Panel - No lab results found.        Current Medications:  Current Facility-Administered Medications   Medication Dose Route Frequency Provider Last Rate Last Admin    acetaminophen (TYLENOL) tablet 650 mg  650 mg Oral Q6H Benjy Padgett PA-C   650 mg at 09/07/24 0404    [Held by provider] atorvastatin (LIPITOR) tablet " 10 mg  10 mg Oral or Feeding Tube Daily Francisco Welsh MD   10 mg at 08/29/24 0924    budesonide (PULMICORT) neb solution 1 mg  1 mg Nebulization Daily Andres Payne PA-C   1 mg at 09/07/24 0802    cetirizine (zyrTEC) tablet 10 mg  10 mg Oral or Feeding Tube Daily Barry Hatch MD   10 mg at 09/07/24 0744    ciprofloxacin (CIPRO) infusion 400 mg  400 mg Intravenous Q8H Fuentes Fowler  mL/hr at 09/03/24 1203 400 mg at 09/07/24 0424    dexAMETHasone PF (DECADRON) injection 10 mg  10 mg Intravenous Daily Michelet Bowen APRN CNP   10 mg at 09/07/24 0743    Followed by    [START ON 9/9/2024] predniSONE (DELTASONE) tablet 40 mg  40 mg Oral or Feeding Tube Daily Michelet Bowen APRN CNP        [Held by provider] gabapentin (NEURONTIN) capsule 300 mg  300 mg Oral or Feeding Tube TID Barry Hatch MD   300 mg at 08/28/24 1959    ganciclovir (CYTOVENE) 200 mg in D5W 100 mL intermittent infusion  200 mg Intravenous Q12H Gaudencio De Souza  mL/hr at 09/07/24 0326 200 mg at 09/07/24 0326    ipratropium (ATROVENT) 0.02 % neb solution 0.5 mg  0.5 mg Nebulization 4x daily Barry Hatch MD   0.5 mg at 09/07/24 0802    And    levalbuterol (XOPENEX) neb solution 0.63 mg  0.63 mg Nebulization 4x Daily Barry Hatch MD   0.63 mg at 09/07/24 0802    levothyroxine (SYNTHROID/LEVOTHROID) tablet 25 mcg  25 mcg Per Feeding Tube QAM AC Giovanny Guidry PA-C   25 mcg at 09/07/24 0744    LORazepam (ATIVAN) tablet 2 mg  2 mg Oral Q6H Benjy Padgett PA-C   2 mg at 09/07/24 0404    metroNIDAZOLE (FLAGYL) infusion 500 mg  500 mg Intravenous Q12H Gaudencio De Souza MD   500 mg at 09/07/24 0215    montelukast (SINGULAIR) tablet 10 mg  10 mg Oral or Feeding Tube At Bedtime Barry Hatch MD   10 mg at 09/06/24 2133    multivitamin RENAL (RENAVITE RX/NEPHROVITE) tablet 1 tablet  1 tablet Oral or Feeding Tube Daily Her-Giovanny Spence PA-C   1 tablet at 09/07/24 0743    nystatin (MYCOSTATIN) suspension 500,000 Units   500,000 Units Oral 4x Daily Carlos Cerna MD   500,000 Units at 09/07/24 0743    oxyCODONE (ROXICODONE) solution 10 mg  10 mg Oral Q6H Benjy Padgett PA-C   10 mg at 09/07/24 0404    pantoprazole (PROTONIX) 2 mg/mL suspension 40 mg  40 mg Per Feeding Tube QAM AC Barry Hatch MD   40 mg at 09/07/24 0743    Prosource TF20 ENfit Compatibl EN LIQD (PROSOURCE TF20) packet 60 mL  1 packet Per Feeding Tube Daily Giovanny Guidry PA-C   60 mL at 09/07/24 0744    tobramycin (PF) (LIZA) neb solution 300 mg  300 mg Nebulization 2 times daily Gaudencio De Souza MD   300 mg at 09/07/24 0802    vancomycin (VANCOCIN) 1,500 mg in 0.9% NaCl 250 mL intermittent infusion  20 mg/kg Intravenous Q24H Barry Hatch .7 mL/hr at 09/06/24 1806 1,500 mg at 09/06/24 1806     Current Facility-Administered Medications   Medication Dose Route Frequency Provider Last Rate Last Admin    dextrose 10% infusion   Intravenous Continuous PRN Gaudencio De Souza MD        dextrose 10% infusion   Intravenous Continuous PRN Giovanny Guidry PA-C        dialysate for CVVHD & CVVHDF (PrismaSol BGK 2/3.5)  12.5 mL/kg/hr CRRT Continuous Dakota Dorsey APRN CNS 1,000 mL/hr at 09/07/24 0402 12.5 mL/kg/hr at 09/07/24 0402    heparin (porcine) 20,000 units in 20 mL ANTICOAGULANT infusion (syringe from pharmacy)  500-1,500 Units/hr CRRT Continuous Dakota Dorsey APRN CNS 1.3 mL/hr at 09/07/24 0900 1,300 Units/hr at 09/07/24 0900    HYDROmorphone (DILAUDID) 0.2 mg/mL infusion ADULT/PEDS GREATER than or EQUAL to 20 kg  2 mg/hr Intravenous Continuous Michelet Bowen APRN CNP 10 mL/hr at 09/07/24 0800 2 mg/hr at 09/07/24 0800    insulin regular (MYXREDLIN) 1 unit/mL infusion  0-24 Units/hr Intravenous Continuous Gaudencio De Souza MD 0.5 mL/hr at 09/07/24 0800 0.5 Units/hr at 09/07/24 0800    midazolam (VERSED) 100 mg/100 mL NS infusion - ADULT  1-12 mg/hr Intravenous Continuous Michelet Bowen APRN CNP 12 mL/hr at 09/07/24 0800 12 mg/hr at  09/07/24 0800    norepinephrine (LEVOPHED) 16 mg in  mL infusion MAX CONC CENTRAL LINE  0.01-0.6 mcg/kg/min (Dosing Weight) Intravenous Continuous Elier Hutchins MD 1.7 mL/hr at 09/07/24 0828 0.02 mcg/kg/min at 09/07/24 0828    POST-filter replacement solution for CVVHD & CVVHDF (Phoxillum BK4/2.5)   CRRT Continuous Emmanuelle Donato  mL/hr at 09/06/24 1138 New Bag at 09/06/24 1138    POST-filter replacement solution for CVVHD & CVVHDF (PrismaSol BGK 2/3.5)   CRRT Continuous Dakota Dorsey APRN  mL/hr at 09/06/24 1752 New Bag at 09/06/24 1752    PRE-filter replacement solution for CVVHD & CVVHDF (PrismaSol BGK 2/3.5)  12.5 mL/kg/hr CRRT Continuous Dakota Dorsey APRN CNS 1,000 mL/hr at 09/07/24 0402 12.5 mL/kg/hr at 09/07/24 0402

## 2024-09-07 NOTE — PROGRESS NOTES
General Infectious Disease Service - Green Team    Patient:  Msasiel Flaherty, Date of birth 1970, Medical record number [id number]  Date of Admission: 8/8/2024  Date of Visit:  9/7/2024         Assessment and Recommendations:   Problem List:  Acute hypoxic and hypercapnic respiratory failure c/b ARDS and s/p VV-ECMO (8/8/24-8/18/24)  Etiology unknown, possible CAP vs viral pneumonia vs other  Extubated 8/27, reintubated 8/29 w/ c/f recurrent PsA pneumonia   CT chest wo contrast 9/4/24 -Significantly decreased diffuse ground glass and consolidative opacities with residual groundglass and scattered nodular opacities, suggesting improving ARDS.     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  - normal LFTs,   Possible HSV oral lesions s/p treatment 8/22-28  Fever  Dry cough x1 month prior to admission  CHACORTA requiring CRRT  Cerebral edema; intentional hypernatremia  Subconjunctival hemorrhage  Right adnexal mass  Hypogammaglobulinemia  Elevated AST, alk phos  Anemia  Recent Augmentin, Z-pack and steroid tapers  Antibiotic allergy - Sulfa (hives), tetracycline (hives)  Facial flush/ rash - possibly related to Vancomycin started on 9/5/24. or consider slowing down the rate     Discussion:    Massiel Flaherty is a 53 year old female with PMHx significant for asthma, MURIEL on CPAP, HTN, anxiety,depression, expressive aphasia, fibromyalgia, seizure disorder, and hypothyroidism who presented to OSH 8/3/24 with fever, fatigue, dyspnea with c/f CAP and requiring intubation, proning, paralysis and transferred to [facility] 8/8/24 for further cares and now placed on VV ECMO and CHACORTA requiring CRRT. Decannulated from VV ECMO 8/18. Extubated 8/27 then reintubated 8/29 for worsened AHRF. Hospital course c/b recurrent pneumonia.      Has  had a chronic cough since 7/2024 for which she sought local care without improvement on steroid tapers, Zpack and Augmentin. Admitted to OSH 8/3 with hypoxia and fever. She received >14 days of Cefepime transitioned to Zosyn without significant improvement. On initial infectious work up, no infectious etiology of presentation identified. She was started on Amphotericin B 8/9 due to high level of concern for endemic fungal infection but this was discontinued 8/13 with return of negative antigen tests and unrevealing bronchoscopy. Work up showed: Negative Histoplasma serum antigen (x3), urine antigen (x1) and antibody; Negative Blasto serum and urine antigen, Negative Cocci serum antigen, Negative Cryptococcus serum antigen, Negative Aspergillus galactomannan serum antigen x2, Beta D Glucan was negative on 8/9,  Negative Tick borne PCR panel, Negative C diff, Negative Hepatitis A, B and C serology, Negative respiratory panel.   Negative EVELIO, ANCA, MPO, proteinase 3 at OSH. B     She has had low level positive EBV from BAL which is likely low level of replication in setting of critical illness rather than true reactivation. Her BAL culture from 8/14 grew pansensitive PsA which is consistent with colonization of her respiratory tract/ET tube (as she was on Cefepime x 2 weeks when this was cultured). A Karius was sent 8/13 with ongoing negative ID work up- which returned with EBV and HSV elevated- but this is reflective of low level of replication in setting of critical illness (does not require treatment). 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      She was extubated 8/27 however developed rigors and worsening respiratory needs on 8/29 per chart and was reintubated and started on empiric meropoenem+vancomycin. BCx negative. SCx with PsA (now resistant to levo, intermediate to ki and ceftazidime). She was switched to ciprofloxacin on 9/1 and started  "on tobramycin nebs per ICU. A BD glucan was sent and returned elevated at 216. RVP negative. Ureaplasma PCR negative. Legionella uAg negative. Blastomyces uAg negative. Histoplasma galactomannan uAg negative. BAL 8/30 with PSA on culture. Remaining BAL testing negative or pending. CMV PCR blood 741/log 2.9 8/31 and repeat 9/2 of 920/log3 and repeat test on 9/5/24 was 345. ICU team started ganciclovir on 9/5. I suspect that the very low CMV PCR positivity translates low level of replication in the setting of critical illness.     Recommendations:    Can continue ciprofloxacin and metronidazole for now    ICU team also started inhaled tobramycin on 9/1/24    Given no positivity for resistant Gram positive organisms and negative MRSA swab I would favor stopping vancomycin    I suspect that the very low CMV PCR positivity translates low level of replication in the setting of critical illness. I am not entirely sure ganciclovir is needed at this moment. If ICU team strongly favors to continue it, I would then recommend to repeat CMV PCR in 1 week (9/12/24) to determine duration. Otherwise I would favor stopping ganciclovir and continue to monitor CMV PCR weekly without the antiviral    Follow-up sputum cultures, blood cx , UC    Will follow repeat Beta D Glucan and PJP PCR    If PJP PCR is negative and there is plan to continue prednisone 20mg or more daily  for more than 3 weeks, I would recommend to add PJP prophylaxis. Given history of allergy to sulfa, I recommend to obtain G6PD test to determine if dapsone can be used      The General ID team will continue to follow this patient. Please feel free to call with any questions.     EVELIO DE PAZ MD  Date of Service:  09/07/24  Pager: [phone]             Physical Exam:   /64 (BP Location: Right arm)   Pulse 109   Temp 100.2  F (37.9  C)   Resp 16   Ht 1.575 m (5' 2\")   Wt 77.7 kg (171 lb 4.8 oz)   SpO2 97%   BMI 31.33 kg/m   " "      Exam:  GENERAL:  Sedated, intubated, on MV with FiO2 35%. Not on vasopressors  HEAD: Normocephalic and atraumatic  ENT:  Intubated and on MV  LUNGS:  Intubated and on MV  CARDIOVASCULAR:  Regular rate and rhythm, normal S1 S2,  no murmur  NEUROLOGIC:  Sedated  PSYCHIATRIC: Sedated           Laboratory Data:     Creatinine   Date Value Ref Range Status   09/07/2024 0.66 0.51 - 0.95 mg/dL Final   09/07/2024 0.73 0.51 - 0.95 mg/dL Final   09/06/2024 0.70 0.51 - 0.95 mg/dL Final   09/06/2024 0.69 0.51 - 0.95 mg/dL Final   09/06/2024 0.71 0.51 - 0.95 mg/dL Final     WBC Count   Date Value Ref Range Status   09/07/2024 11.8 (H) 4.0 - 11.0 10e3/uL Final   09/06/2024 18.8 (H) 4.0 - 11.0 10e3/uL Final   09/06/2024 22.8 (H) 4.0 - 11.0 10e3/uL Final   09/05/2024 23.7 (H) 4.0 - 11.0 10e3/uL Final   09/04/2024 16.2 (H) 4.0 - 11.0 10e3/uL Final     Hemoglobin   Date Value Ref Range Status   09/07/2024 7.6 (L) 11.7 - 15.7 g/dL Final     Platelet Count   Date Value Ref Range Status   09/07/2024 171 150 - 450 10e3/uL Final     Lab Results   Component Value Date     (L) 09/07/2024    BUN 27.8 (H) 09/07/2024    CO2 23 09/07/2024     No results found for: \"CRP\"   "

## 2024-09-07 NOTE — PLAN OF CARE
ICU End of Shift Summary. See flowsheets for vital signs and detailed assessment.    Changes this shift: Remains unresponsive, not following any commands or tracking, no movement of extremities. Sinus tach 100s-110s. Started shift going up to 0.08 of levo, and now has been weaned off since 0930. Tmax of 100.4, fan and ice packs on, CRRT blood warmer set to 36 degrees. Blood cultures taken again today and also an upper and lower extremity ultrasound to find possible explanation for the fever. If both are negative and still febrile, plans for abdominal CT this afternoon. FiO2 to 35%, otherwise vent settings remain unchanged. Minimal secretions from ETT tube. TF now at goal of 45ml/hr. Minimal OP from rectal tube still. K was 3.4 this morning, CRRT now changed to 4K baths, K was 4.2 at 1200. Family updated at bedside.     Plan: Continue to wean sedation as tolerated. Update team with any acute changes.      Goal Outcome Evaluation:      Plan of Care Reviewed With: patient    Overall Patient Progress: improvingOverall Patient Progress: improving    Outcome Evaluation: See note

## 2024-09-07 NOTE — PLAN OF CARE
ICU End of Shift Summary. See flowsheets for vital signs and detailed assessment.    Changes this shift: Pt remains relatively unresponsive (not tracking or following commands and no obvious independent motor activity observed) but does overbreathe ventilator and has quite labile vital signs with activity (levophed easily weaned off and pt somewhat hypertensive most of the shift, but also borderline needing pressor support again at times).  Pt febrile entire night despite blood warmer set to 36 degrees, scheduled tylenol and icepacks in use, tachycardic in low 100's most of shift.  MD made aware of t max of 100.8 via esophageal probe, no interventions ordered. Pt does use abdominal muscles with breathing, MD observed.  A fentanyl bolus was given per order and no change in breathing pattern.  PRN versed bolus and dilaudid bolus given without improvement in breathing or BP.  O2 weaned from 40% to 35%.  Pt remains on dilaudid and versed gtts, sedation not further weaned 2/2 marked overbreathing and labile blood pressure overnight.     CRRT titrated to I=O per orders.  AM potassium dropped from 4.8 to 3.4 on 2K bath, replacement given and CRRT resource RN paged renal MD to discuss changing solutions again.  Magnesium also replaced.  Pt received 1 unit PRBCs and am Hgb stable.     Tube feeding rate questioned after reading nutrition recommendation on 9/5, MD ordered to titrate from 10 ml (that pt has been on since noon on 9/5) to new goal of 45 ml/hr.  Insulin gtt turned off at start of shift and is not needed yet, will continue to monitor and restart if glucoses rise above 150.      Plan: Continue to monitor and update MD with changes and concerns.  Continue to wean sedation and vent as able.     Problem: Adult Inpatient Plan of Care  Goal: Absence of Hospital-Acquired Illness or Injury  Description: Wean vent settings and pressure support by tomorrow.  Intervention: Identify and Manage Fall Risk  Recent Flowsheet  Documentation  Taken 9/7/2024 0400 by Nadya Alcazar RN  Safety Promotion/Fall Prevention:   clutter free environment maintained   lighting adjusted   increase visualization of patient   patient and family education   room near nurse's station   treat reversible contributory factors   treat underlying cause   activity supervised   room door open   room organization consistent   safety round/check completed   supervised activity  Taken 9/7/2024 0000 by Nadya Alcazar RN  Safety Promotion/Fall Prevention:   clutter free environment maintained   lighting adjusted   increase visualization of patient   patient and family education   room near nurse's station   treat reversible contributory factors   treat underlying cause   activity supervised   room door open   room organization consistent   safety round/check completed   supervised activity  Taken 9/6/2024 2000 by Nadya Alcazar RN  Safety Promotion/Fall Prevention:   clutter free environment maintained   lighting adjusted   increase visualization of patient   patient and family education   room near nurse's station   treat reversible contributory factors   treat underlying cause   activity supervised   room door open   room organization consistent   safety round/check completed   supervised activity  Intervention: Prevent Skin Injury  Recent Flowsheet Documentation  Taken 9/7/2024 0400 by Nadya Alcazar RN  Body Position:   turned   lower extremity elevated   upper extremity elevated   right  Skin Protection:   adhesive use limited   incontinence pads utilized   pulse oximeter probe site changed   silicone foam dressing in place   skin to device areas padded  Device Skin Pressure Protection:   absorbent pad utilized/changed   tubing/devices free from skin contact   adhesive use limited   positioning supports utilized   skin-to-device areas padded  Taken 9/7/2024 0215 by Nadya Alcazar RN  Body Position:   turned   left   heels elevated   upper  extremity elevated  Taken 9/7/2024 0000 by Nadya Alcazar RN  Body Position:   turned   lower extremity elevated   upper extremity elevated   right  Skin Protection:   adhesive use limited   incontinence pads utilized   pulse oximeter probe site changed   silicone foam dressing in place   skin to device areas padded  Device Skin Pressure Protection:   absorbent pad utilized/changed   tubing/devices free from skin contact   adhesive use limited   positioning supports utilized   skin-to-device areas padded  Taken 9/6/2024 2215 by Nadya Alcazar RN  Body Position:   turned   left   heels elevated   upper extremity elevated  Taken 9/6/2024 2000 by Nadya Alcazar RN  Body Position:   turned   lower extremity elevated   upper extremity elevated   right  Skin Protection:   adhesive use limited   incontinence pads utilized   pulse oximeter probe site changed   silicone foam dressing in place   skin to device areas padded  Device Skin Pressure Protection:   absorbent pad utilized/changed   tubing/devices free from skin contact   adhesive use limited   positioning supports utilized   skin-to-device areas padded  Intervention: Prevent and Manage VTE (Venous Thromboembolism) Risk  Recent Flowsheet Documentation  Taken 9/7/2024 0400 by Nadya Alcazar RN  VTE Prevention/Management: SCDs on (sequential compression devices)  Taken 9/7/2024 0000 by Nadya Alcazar RN  VTE Prevention/Management: SCDs on (sequential compression devices)  Taken 9/6/2024 2000 by Nadya Alcazar RN  VTE Prevention/Management: SCDs on (sequential compression devices)  Intervention: Prevent Infection  Recent Flowsheet Documentation  Taken 9/7/2024 0400 by Nadya Alcazar RN  Infection Prevention:   environmental surveillance performed   hand hygiene promoted   personal protective equipment utilized   cohorting utilized   rest/sleep promoted   single patient room provided   equipment surfaces disinfected  Taken 9/7/2024 0000 by  Nadya Alcazar RN  Infection Prevention:   environmental surveillance performed   hand hygiene promoted   personal protective equipment utilized   cohorting utilized   rest/sleep promoted   single patient room provided   equipment surfaces disinfected  Taken 9/6/2024 2000 by Nadya Alcazar RN  Infection Prevention:   environmental surveillance performed   hand hygiene promoted   personal protective equipment utilized   cohorting utilized   rest/sleep promoted   single patient room provided   equipment surfaces disinfected  Goal: Optimal Comfort and Wellbeing  Intervention: Monitor Pain and Promote Comfort  Recent Flowsheet Documentation  Taken 9/7/2024 0400 by Nadya Alcazar RN  Pain Management Interventions:   around-the-clock dosing utilized   care clustered   pillow support provided   quiet environment facilitated   repositioned   rest  Taken 9/7/2024 0000 by Nadya Alcazar RN  Pain Management Interventions:   around-the-clock dosing utilized   care clustered   cold applied   pillow support provided   quiet environment facilitated   repositioned   rest  Intervention: Provide Person-Centered Care  Recent Flowsheet Documentation  Taken 9/7/2024 0400 by Nadya Alcazar RN  Trust Relationship/Rapport:   care explained   reassurance provided  Taken 9/7/2024 0000 by Nadya Alcazar RN  Trust Relationship/Rapport:   care explained   reassurance provided  Taken 9/6/2024 2000 by Nadya Alcazar RN  Trust Relationship/Rapport:   care explained   reassurance provided     Problem: Risk for Delirium  Goal: Optimal Coping  Intervention: Optimize Psychosocial Adjustment to Delirium  Recent Flowsheet Documentation  Taken 9/7/2024 0400 by Nadya Alcazar RN  Supportive Measures: positive reinforcement provided  Taken 9/7/2024 0000 by Nadya Alcazar RN  Supportive Measures: positive reinforcement provided  Taken 9/6/2024 2000 by Nadya Alcazar RN  Supportive Measures: positive reinforcement  provided  Goal: Improved Behavioral Control  Intervention: Prevent and Manage Agitation  Recent Flowsheet Documentation  Taken 9/7/2024 0400 by Nadya Alcazar RN  Environment Familiarity/Consistency: daily routine followed  Taken 9/7/2024 0000 by Nadya Alcazar RN  Environment Familiarity/Consistency: daily routine followed  Taken 9/6/2024 2000 by Nadya Alcazar RN  Environment Familiarity/Consistency: daily routine followed  Intervention: Minimize Safety Risk  Recent Flowsheet Documentation  Taken 9/7/2024 0400 by Nadya Alcazar RN  Communication Enhancement Strategies:   repetition utilized   communication board used   extra time allowed for response   nonverbal strategies used  Enhanced Safety Measures: room near unit station  Trust Relationship/Rapport:   care explained   reassurance provided  Taken 9/7/2024 0000 by Nadya Alcazar RN  Communication Enhancement Strategies:   repetition utilized   communication board used   extra time allowed for response   nonverbal strategies used  Enhanced Safety Measures: room near unit station  Trust Relationship/Rapport:   care explained   reassurance provided  Taken 9/6/2024 2000 by Nadya Alcazar RN  Communication Enhancement Strategies:   repetition utilized   communication board used   extra time allowed for response   nonverbal strategies used  Enhanced Safety Measures: room near unit station  Trust Relationship/Rapport:   care explained   reassurance provided  Goal: Improved Attention and Thought Clarity  Intervention: Maximize Cognitive Function  Recent Flowsheet Documentation  Taken 9/7/2024 0400 by Nadya Alcazar RN  Sensory Stimulation Regulation:   care clustered   lighting decreased   quiet environment promoted   television on   tactile stimulation minimized  Reorientation Measures:   calendar in view   clock in view   reorientation provided  Taken 9/7/2024 0000 by Nadya Alcazar RN  Sensory Stimulation Regulation:   care  clustered   lighting decreased   quiet environment promoted   television on   tactile stimulation minimized  Reorientation Measures:   calendar in view   clock in view   reorientation provided  Taken 9/6/2024 2000 by Nadya Alcazar RN  Sensory Stimulation Regulation:   care clustered   lighting decreased   quiet environment promoted   television on   tactile stimulation minimized  Reorientation Measures:   calendar in view   clock in view   reorientation provided     Problem: Skin Injury Risk Increased  Goal: Skin Health and Integrity  Intervention: Plan: Nurse Driven Intervention: Positioning  Recent Flowsheet Documentation  Taken 9/7/2024 0400 by Nadya Alcazar RN  Plan: Positioning Interventions:   REPOSITION Left/Right (No supine) q2h   HOB 30 degrees or less   OFF-LOAD HEELS with boots   Use wedge positioners  Taken 9/7/2024 0000 by Nadya Alcazar RN  Plan: Positioning Interventions:   REPOSITION Left/Right (No supine) q2h   HOB 30 degrees or less   OFF-LOAD HEELS with boots   Use wedge positioners  Taken 9/6/2024 2000 by Nadya Alcazar RN  Plan: Positioning Interventions:   REPOSITION Left/Right (No supine) q2h   HOB 30 degrees or less   OFF-LOAD HEELS with boots   Use wedge positioners  Intervention: Plan: Nurse Driven Intervention: Moisture Management  Recent Flowsheet Documentation  Taken 9/7/2024 0400 by Nadya Alcazar RN  Moisture Interventions:   Incontinence pad   Fecal collection device   Perineal cleanser  Taken 9/7/2024 0000 by Nadya Alcazar RN  Moisture Interventions:   Incontinence pad   Fecal collection device   Perineal cleanser  Taken 9/6/2024 2000 by Nadya Alcazar RN  Moisture Interventions:   Incontinence pad   Fecal collection device   Perineal cleanser  Bathing/Skin Care:   incontinence care   back care   bath, chlorhexidine wipes   bath, complete   electrode patches/site rotation   dressed/undressed   linen changed  Intervention: Plan: Nurse Driven  Intervention: Friction and Shear  Recent Flowsheet Documentation  Taken 9/7/2024 0400 by Nadya Alcazar RN  Friction/Shear Interventions:   HOB 30 degrees or less   Silicone foam sacral dressing  Taken 9/7/2024 0000 by Nadya Alcazar RN  Friction/Shear Interventions:   HOB 30 degrees or less   Silicone foam sacral dressing  Taken 9/6/2024 2000 by Nadya Alcazar RN  Friction/Shear Interventions:   HOB 30 degrees or less   Silicone foam sacral dressing  Intervention: Optimize Skin Protection  Recent Flowsheet Documentation  Taken 9/7/2024 0400 by Nadya Alcazar RN  Pressure Reduction Techniques:   heels elevated off bed   rest period provided between sit times  Pressure Reduction Devices:   pressure-redistributing mattress utilized   foam padding utilized   heel offloading device utilized   positioning supports utilized  Skin Protection:   adhesive use limited   incontinence pads utilized   pulse oximeter probe site changed   silicone foam dressing in place   skin to device areas padded  Activity Management: bedrest  Head of Bed (HOB) Positioning: HOB at 30 degrees  Taken 9/7/2024 0215 by Nadya Alcazar RN  Head of Bed (HOB) Positioning: HOB at 30 degrees  Taken 9/7/2024 0000 by Nadya Alcazar RN  Pressure Reduction Techniques:   heels elevated off bed   rest period provided between sit times  Pressure Reduction Devices:   pressure-redistributing mattress utilized   foam padding utilized   heel offloading device utilized   positioning supports utilized  Skin Protection:   adhesive use limited   incontinence pads utilized   pulse oximeter probe site changed   silicone foam dressing in place   skin to device areas padded  Activity Management: bedrest  Head of Bed (HOB) Positioning: HOB at 30 degrees  Taken 9/6/2024 2215 by Nadya Alcazar RN  Head of Bed (HOB) Positioning: HOB at 30 degrees  Taken 9/6/2024 2000 by Nadya Alcazar RN  Pressure Reduction Techniques:   heels elevated off bed    rest period provided between sit times  Pressure Reduction Devices:   pressure-redistributing mattress utilized   foam padding utilized   heel offloading device utilized   positioning supports utilized  Skin Protection:   adhesive use limited   incontinence pads utilized   pulse oximeter probe site changed   silicone foam dressing in place   skin to device areas padded  Activity Management: bedrest  Head of Bed (HOB) Positioning: HOB at 30 degrees     Problem: ARDS (Acute Respiratory Distress Syndrome)  Goal: Effective Oxygenation  Intervention: Optimize Oxygenation, Ventilation and Perfusion  Recent Flowsheet Documentation  Taken 9/7/2024 0400 by Nadya Alcazar RN  Lung Protection Measures:   fluid excess minimized   ventilator synchrony promoted   ventilator waveforms monitored  Airway/Ventilation Management:   airway patency maintained   calming measures promoted   pulmonary hygiene promoted  Head of Bed (HOB) Positioning: HOB at 30 degrees  Stabilization Measures: blood products administered  Taken 9/7/2024 0215 by Nadya Alcazar RN  Head of Bed (HOB) Positioning: HOB at 30 degrees  Taken 9/7/2024 0000 by Nadya Alcazar RN  Lung Protection Measures:   fluid excess minimized   ventilator synchrony promoted   ventilator waveforms monitored  Airway/Ventilation Management:   airway patency maintained   calming measures promoted   pulmonary hygiene promoted  Head of Bed (HOB) Positioning: HOB at 30 degrees  Stabilization Measures: blood products administered  Taken 9/6/2024 2215 by Nadya Alcazar RN  Head of Bed (HOB) Positioning: HOB at 30 degrees  Taken 9/6/2024 2000 by Nadya Alcazar RN  Lung Protection Measures:   fluid excess minimized   ventilator synchrony promoted   ventilator waveforms monitored  Airway/Ventilation Management:   airway patency maintained   calming measures promoted   pulmonary hygiene promoted  Head of Bed (HOB) Positioning: HOB at 30 degrees  Stabilization Measures:  blood products administered     Problem: ECLS (Extracorporeal Life Support)  Goal: Optimal Coping with Therapy  Intervention: Optimize Psychosocial Response  Recent Flowsheet Documentation  Taken 9/7/2024 0400 by Nadya Alcazar RN  Supportive Measures: positive reinforcement provided  Taken 9/7/2024 0000 by Nadya Alcazar RN  Supportive Measures: positive reinforcement provided  Taken 9/6/2024 2000 by Nadya Alcazar RN  Supportive Measures: positive reinforcement provided  Goal: Absence of Bleeding  Intervention: Prevent and Manage Bleeding  Recent Flowsheet Documentation  Taken 9/7/2024 0400 by Nadya Alcazar RN  Bleeding Precautions:   gentle oral care promoted   coagulation study results reviewed   foot protection facilitated   monitored for signs of bleeding   blood pressure closely monitored  Bleeding Management: dressing monitored  Taken 9/7/2024 0000 by Nadya Alcazar RN  Bleeding Precautions:   gentle oral care promoted   coagulation study results reviewed   foot protection facilitated   monitored for signs of bleeding   blood pressure closely monitored  Bleeding Management: dressing monitored  Taken 9/6/2024 2000 by Nadya Alcazar RN  Bleeding Precautions:   gentle oral care promoted   coagulation study results reviewed   foot protection facilitated   monitored for signs of bleeding   blood pressure closely monitored  Bleeding Management: dressing monitored  Goal: Optimal Device Function  Intervention: Optimize Device Care and Function  Recent Flowsheet Documentation  Taken 9/7/2024 0400 by Nadya Alcazar RN  Thermoregulation Maintenance:   lightweight clothing/blankets used   tepid bath given   skin exposure increased  Circuit Management:   circuit line warming device in use   tubing repositioned  Taken 9/7/2024 0000 by aNdya Alcazar RN  Thermoregulation Maintenance:   lightweight clothing/blankets used   tepid bath given   skin exposure increased  Circuit Management:    circuit line warming device in use   tubing repositioned  Taken 9/6/2024 2000 by Nadya Alcazar RN  Thermoregulation Maintenance:   lightweight clothing/blankets used   tepid bath given   skin exposure increased  Circuit Management:   circuit line warming device in use   tubing repositioned  Intervention: Reduce Pressure and Protect Skin Integrity  Recent Flowsheet Documentation  Taken 9/7/2024 0400 by Nadya Alcazar RN  Pressure Reduction Techniques:   heels elevated off bed   rest period provided between sit times  Pressure Reduction Devices:   pressure-redistributing mattress utilized   foam padding utilized   heel offloading device utilized   positioning supports utilized  Taken 9/7/2024 0000 by Nadya Alcazar RN  Pressure Reduction Techniques:   heels elevated off bed   rest period provided between sit times  Pressure Reduction Devices:   pressure-redistributing mattress utilized   foam padding utilized   heel offloading device utilized   positioning supports utilized  Taken 9/6/2024 2000 by Nadya Alcazar RN  Pressure Reduction Techniques:   heels elevated off bed   rest period provided between sit times  Pressure Reduction Devices:   pressure-redistributing mattress utilized   foam padding utilized   heel offloading device utilized   positioning supports utilized  Goal: Hemodynamic Stability  Intervention: Optimize Blood Flow, Oxygenation and Ventilation  Recent Flowsheet Documentation  Taken 9/7/2024 0400 by Nadya Alcazar RN  Airway/Ventilation Management:   airway patency maintained   calming measures promoted   pulmonary hygiene promoted  VTE Prevention/Management: SCDs on (sequential compression devices)  Stabilization Measures: blood products administered  Environmental Support:   calm environment promoted   rest periods encouraged   caregiver consistency promoted   distractions minimized   environmental consistency promoted  Taken 9/7/2024 0000 by Nadya Alczaar  RN  Airway/Ventilation Management:   airway patency maintained   calming measures promoted   pulmonary hygiene promoted  VTE Prevention/Management: SCDs on (sequential compression devices)  Stabilization Measures: blood products administered  Environmental Support:   calm environment promoted   rest periods encouraged   caregiver consistency promoted   distractions minimized   environmental consistency promoted  Taken 9/6/2024 2000 by Nadya Alcazar RN  Airway/Ventilation Management:   airway patency maintained   calming measures promoted   pulmonary hygiene promoted  VTE Prevention/Management: SCDs on (sequential compression devices)  Stabilization Measures: blood products administered  Environmental Support:   calm environment promoted   rest periods encouraged   caregiver consistency promoted   distractions minimized   environmental consistency promoted  Goal: Absence of Infection Signs and Symptoms  Intervention: Prevent or Manage Infection  Recent Flowsheet Documentation  Taken 9/7/2024 0400 by Nadya Alcazar RN  Fever Reduction/Comfort Measures:   lightweight bedding   lightweight clothing  Infection Prevention:   environmental surveillance performed   hand hygiene promoted   personal protective equipment utilized   cohorting utilized   rest/sleep promoted   single patient room provided   equipment surfaces disinfected  Infection Management: aseptic technique maintained  Taken 9/7/2024 0000 by Nadya Alcazar RN  Fever Reduction/Comfort Measures:   lightweight bedding   lightweight clothing  Infection Prevention:   environmental surveillance performed   hand hygiene promoted   personal protective equipment utilized   cohorting utilized   rest/sleep promoted   single patient room provided   equipment surfaces disinfected  Infection Management: aseptic technique maintained  Taken 9/6/2024 2000 by Nadya Alcazar RN  Fever Reduction/Comfort Measures:   lightweight bedding   lightweight  clothing  Infection Prevention:   environmental surveillance performed   hand hygiene promoted   personal protective equipment utilized   cohorting utilized   rest/sleep promoted   single patient room provided   equipment surfaces disinfected  Infection Management: aseptic technique maintained     Problem: Gas Exchange Impaired  Goal: Optimal Gas Exchange  Intervention: Optimize Oxygenation and Ventilation  Recent Flowsheet Documentation  Taken 9/7/2024 0400 by Nadya Alcazar, RN  Airway/Ventilation Management:   airway patency maintained   calming measures promoted   pulmonary hygiene promoted  Head of Bed (HOB) Positioning: HOB at 30 degrees  Taken 9/7/2024 0215 by Nadya Alcazar RN  Head of Bed (HOB) Positioning: HOB at 30 degrees  Taken 9/7/2024 0000 by Nadya Alcazar RN  Airway/Ventilation Management:   airway patency maintained   calming measures promoted   pulmonary hygiene promoted  Head of Bed (HOB) Positioning: HOB at 30 degrees  Taken 9/6/2024 2215 by Nadya Alcazar RN  Head of Bed (HOB) Positioning: HOB at 30 degrees  Taken 9/6/2024 2000 by Nadya Alcazar, RN  Airway/Ventilation Management:   airway patency maintained   calming measures promoted   pulmonary hygiene promoted  Head of Bed (HOB) Positioning: HOB at 30 degrees   Goal Outcome Evaluation:

## 2024-09-07 NOTE — PROGRESS NOTES
MEDICAL ICU PROGRESS NOTE  09/06/2024    Date of Service (when I saw the patient): 09/06/2024    ASSESSMENT: Massiel Flaherty is a 53 year old female with PMH Anxiety/depression, fibromyalgia, c/f nonepileptic seizures not on AEDs, hypothyroidism, asthma, HLD, MURIEL on CPAP, expressive aphasia, hypertension  who was admitted on 8/3/2024 for fatigue, fever and dyspnea and intubated 8/6/24 at OSH for ARDS, proned and paralyzed without improvement. Transferred to [facility] 8/8/24, hypoxic with high plateaus/peaks and placed on VV ECMO and CRRT. Decannulated from VV ECMO 8/18 and transferred to MICU 8/26/24 for ongoing management. Extubated 8/27 then reintubated 8/29 iso worsening AHRF and pseudomonas pneumonia.     CHANGES and MAJOR THINGS TODAY:  - Given hypotension yesterday, started ganciclovir and added gram positive coverage with vancomycin as well as repeated infectious workup and consulted ID  - Will d/w gen surgery for trach and peg next week  - Wean versed to 12 and dilaudid to 2 today  - Schedule prednisone taper to start Monday 9/9 when dexamethasone ends    Neuro:  # Pain and sedation  # Acute pain  # Sedation and analgesia for vent compliance   # Concern for ICU delirium   # Hx Fibromyalgia   # Hx myalgic encephalomyelitis   # Hx anxiety/depression  - Monitor neurological status. Delirium preventions and precautions.   - Pain: Scheduled: tylenol, oxycodone 10mg q 6hr, Ativan 2 mg every 6 hours for further sedation to reach BIS goal  PRN: tylenol, oxycodone  - Sedation plan: dilaudid (rotated from fentanyl 9/5), midazolam  - Propofol weaned for high triglycerides  - Cisatracurium for vent and BIS goal  - RASS goal -4 to -5, BIS goal 40-60  - Gabapentin 300mg TID held while sedated  - Seroquel 25mg BID PRN held while sedated  - PTA Venlafaxine and Amitriptyline discontinued while sedated  - Will pause cisatracurium and assess vent synchrony    # Hx spells with staring and BUE posturing previously described as  nonepileptic seizures   # Hx of GTC seizures and myoclonic epilepsy, weaned off AEDs   # Diffuse cerebral edema - improved  - Sodium goals liberalized to 140-145  - 8/21: MRI Brain: no acute intracranial pathology. No findings to suggest anoxic brain injury.     Pulmonary:  # Acute hypoxic respiratory failure c/b ARDS  # Pseudomonas pneumonia  # Mechanical ventilation  # s/p VV ECMO 8/8 - 8/18   # Hx CAP  # Asthma  # MURIEL on home CPAP  PFT dated 9/8/2020 demonstrated normal spirometry with mild diffusion impairment and mild restriction (FEV1/FVC 80%, FEV1 2.59, DLCO 40%). CT abdomen chest 3/9/2020 demonstrated scarring and fibrosis bilaterally which correlated with the decreased DLCO. The patient also has a history of MURIEL on CPAP therapy as currently managed by her provider in [place]. Unclear what triggered ARDS or why she has had such severe hospital course, further investigated ILD but results all unremarkable. Extubated 8/27, reintubated 8/29 for worsening O2 demands and diffuse opacities iso new/recurrent psuedomonas pneumonia. Bronch with BAL 8/30, pseudomonas growing as below.   - ILD w/u aldolase, EVELIO, RF, CCP, ANCA, MPO, SSA Ro and La, Scleroderma antibody, Radha 1,  hypersensity pneumonitis, IGG subclasses,  aspergillus, blastomyces, histoplasma neg  - SpO2 goals >92%, PaO2 goals >60   - PTA singular at bedtime if able  - Scheduled pulmicort, and duonebs   - Lung protective ventilation strategies given ARDS  - Methylpred 125mg q6H x 24h 8/30 --> transition to 20 mg dex x 5 days, followed by 10mg x 5 days  - Epoprostenol currently off  - Wean vent as able  FiO2 (%): (S) 30 %, Resp: 25, Vent Mode: CMV/AC, Resp Rate (Set): 24 breaths/min, Tidal Volume (Set, mL): 350 mL, PEEP (cm H2O): 6 cmH2O, Pressure Support (cm H2O): 5 cmH2O, Resp Rate (Set): 24 breaths/min, Tidal Volume (Set, mL): 350 mL, PEEP (cm H2O): 6 cmH2O    Cardiovascular:  # Hyperlipidemia  # Hypotension  # Hx HTN  - MAP goal >65, SBP goals  >110  - NE for MAP goal  - PTA atorvastatin  - PTA clonidine held while sedated  - Lower extremity ultrasound without vascular pathology, no hematoma or clots  - Monitor discoloration of extremities for necrosis  - Monitoring Hypertension    Sinus Tachycardia  Likely 2/2 fluid removal and sepsis as above    GI/Nutrition:  # Moderate protein calorie malnutrition  # Non-infectious Diarrhea  # GERD   - Protonix (home medication)   - Nutrition consulted, appreciate recs  - Diet: TF via NJ, transitioned to trickle feeds 9/5 given pressor requirements  - Hold bowel regimen d/t loose stools  - Change suspension meds to other form as able  - Continue scheduled multivitamin, banatrol, prosource packet  - loperamide PRN  - Rectal tube, continues with high output. Keep today.     Renal/Fluids/Electrolytes:  # Acute kidney injury  # Renal failure  Baseline Cr approx 0.6. Pt was anuric here but now making some urine, likely prerenal due to shock.  - Continue CRRT; fluid goal net even for the day  - Heparinized CRRT circuit, low intensity protocol. Last clotted 8/24 PM  - Strict I&Os  - Bladder scan q shift  - Avoid nephrotoxins as able    Endocrine:  # Steroid and stress induced hyperglycemia  # Hypothyroidism   - Goal to keep BG < 180 for optimal wound healing.   - with rising blood glucose due to steroids started insulin drip 8/30  - Continue PTA synthroid    ID:  # Leukocytosis  # Psuedomonas pneumonia  # Hx CAP  Respiratory cx 8/29 pseudomonas pneumonia. Intermediate susceptability to meropenem, more significant sensitivities to ciprofloxacin  - Continue to monitor fever curve and inflammatory markers as appropriate  - ID consulted, appreciate recs.   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   - if trend continues to rise will start ganciclovir    # HSV-1  Mouth sores present, which could have viral etiology. 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 Monitor and trend.   - Heparinize CRRT circuit    - CTAP 8/30 due to acute hemoglobin drop requiring transfusion of 2 x 1 unit of blood 12 hours apart. Negative for acute bleed    Musculoskeletal/Rheum:  # Alopecia  - Hold PTA hydroxychloroquine     # Weakness and deconditioning of critical illness  # Left ankle injury pre-hospitalization    - Physical and occupational therapy when able.     Skin:  # BLE scattered ecchymosis  # Left toe duskiness  - Bilateral lower leg ecchymosis  - WOC consult  - Ecchymosis to left foot, most noticeable to 3rd and 4th toes    General Cares/Prophylaxis:  DVT Prophylaxis: Heparin through CRRT circuit  GI Prophylaxis: PPI  Restraints: no  Code Status: DNR/OK to intubate    Lines/tubes/drains:  - ETT (8/29)  - NJ (8/9)  - LIJ CVC (8/8)  - Radial a-line (8/29)  - PIV x3  - Rectal tube (8/17)  - Tunneled HD line (8/23)    Disposition:  - Medical ICU     Patient seen and findings/plan discussed with medical ICU staff, Dr. Shelley.    I spent a total of 45 minutes (excluding procedure time) personally providing and directing critical care services at the bedside and on the critical care unit for Massiel EDGAR De Souza MD    Clinically Significant Risk Factors        # Hyperkalemia: Highest K = 5.5 mmol/L in last 2 days, will monitor as appropriate       # Hypoalbuminemia: Lowest albumin = 2.2 g/dL at 8/10/2024  9:47 AM, will monitor as appropriate               # Obesity: Estimated body mass index is 31.33 kg/m  as calculated from the following:    Height as of this encounter: 1.575 m (5' 2\").    " Weight as of this encounter: 77.7 kg (171 lb 4.8 oz).   # Severe Malnutrition: based on nutrition assessment      # Financial/Environmental Concerns: none        Code Status: No CPR- Pre-arrest intubation OK       ====================================  INTERVAL HISTORY:   Remained off paralytics overnight, continued to have labile blood pressures overnight requiring titration of norepinephrine. Will reach out to surgery for trach/peg consideration    OBJECTIVE:   1. VITAL SIGNS:   Temp:  [98.1  F (36.7  C)-100.9  F (38.3  C)] 98.6  F (37  C)  Pulse:  [] 89  Resp:  [14-31] 25  BP: (109)/(64) 109/64  MAP:  [62 mmHg-247 mmHg] 65 mmHg  Arterial Line BP: ()/(37-74) 97/52  FiO2 (%):  [30 %-40 %] 30 %  SpO2:  [91 %-100 %] 99 %  FiO2 (%): (S) 30 %, Resp: 25, Vent Mode: CMV/AC, Resp Rate (Set): 24 breaths/min, Tidal Volume (Set, mL): 350 mL, PEEP (cm H2O): 6 cmH2O, Pressure Support (cm H2O): 5 cmH2O, Resp Rate (Set): 24 breaths/min, Tidal Volume (Set, mL): 350 mL, PEEP (cm H2O): 6 cmH2O  2. INTAKE/ OUTPUT:   I/O last 3 completed shifts:  In: 3713.57 [I.V.:2458.57; NG/GT:595]  Out: 3977 [Other:3927; Stool:50]    3. PHYSICAL EXAMINATION:  General: Intubated, sedated  HEENT: Normocephalic, atraumatic, eyes with ointment dressing  Neuro: RASS -4-5, arefelexic  Pulm/Resp: Bilateral breath sounds audible with diffuse coarse breath sounds  CV: Sinus tachycardia, s1/2 normal, no murmur  Abdomen: Soft, non-distended, limited 2/2 to sedation  : deferred  Incisions/Skin: lower leg 1+ edema with ecchymosis present and scattered. Some bluish discoloration of the finger tips, capillary refill normal    4. LABS:   Arterial Blood Gases   Recent Labs   Lab 09/07/24  0332 09/06/24  2000 09/06/24  1547 09/06/24  0341   PH 7.37 7.35 7.46* 7.32*   PCO2 42 45 42 48*   PO2 105 111* 146* 75*   HCO3 24 25 29* 25     Complete Blood Count   Recent Labs   Lab 09/07/24  0332 09/06/24  1819 09/06/24  0341 09/05/24  2213 09/05/24  1313  09/05/24 0359   WBC 11.8* 18.8* 22.8*  --   --  23.7*   HGB 7.6* 7.0* 7.1* 7.4*   < > 7.9*    240 254  --   --  378    < > = values in this interval not displayed.     Basic Metabolic Panel  Recent Labs   Lab 09/07/24 0332 09/07/24  0216 09/07/24  0104 09/06/24 2002 09/06/24 2000 09/06/24  1553 09/06/24  1547 09/06/24  0416 09/06/24  0341     --   --   --  135  --  134*  --  135   POTASSIUM 3.4  --   --   --  4.8  --  5.5*  --  5.0   CHLORIDE 102  --   --   --  102  --  102  --  104   CO2 22  --   --   --  22  --  23  --  23   BUN 20.3*  --   --   --  22.9*  --  24.5*  --  23.7*   CR 0.73  --   --   --  0.70  --  0.69  --  0.71   GLC 97  110* 84 89   < > 109*   < > 133*   < > 116*    < > = values in this interval not displayed.     Liver Function Tests  Recent Labs   Lab 09/07/24 0332 09/06/24 2000 09/06/24 1547 09/06/24  0341 09/05/24  1543 09/05/24 0359 09/04/24  1620 09/04/24 0357   AST 29  --   --  29  --  32  --  26   ALT 29  --   --  26  --  28  --  24   ALKPHOS 86  --   --  95  --  111  --  112   BILITOTAL 0.2  --   --  0.3  --  0.2  --  0.2   ALBUMIN 3.1* 3.2* 3.4* 3.3*   < > 3.3*   < > 3.3*    < > = values in this interval not displayed.     Coagulation Profile  No lab results found in last 7 days.    5. RADIOLOGY:   No results found for this or any previous visit (from the past 24 hour(s)).

## 2024-09-07 NOTE — PROGRESS NOTES
MEDICAL ICU PROGRESS NOTE  09/07/2024    Date of Service (when I saw the patient): 09/07/2024    ASSESSMENT: Massiel Flaherty is a 53 year old female with PMH Anxiety/depression, fibromyalgia, c/f nonepileptic seizures not on AEDs, hypothyroidism, asthma, HLD, MURIEL on CPAP, expressive aphasia, hypertension  who was admitted on 8/3/2024 for fatigue, fever and dyspnea and intubated 8/6/24 at OSH for ARDS, proned and paralyzed without improvement. Transferred to [facility] 8/8/24, hypoxic with high plateaus/peaks and placed on VV ECMO and CRRT. Decannulated from VV ECMO 8/18 and transferred to MICU 8/26/24 for ongoing management. Extubated 8/27 then reintubated 8/29 iso worsening AHRF and pseudomonas pneumonia.     CHANGES and MAJOR THINGS TODAY:  - Febrile 100.8 through CRRT (set at 36C) although no other indications for new infection  - Will assess for non infectious etiologies for fever including DVT or medication reactions, if still febrile will get CT abdomen  - Wean dilaudid to 1.5 and versed to 8  - Restart tube feeds after reducing down earlier this week due to norepinephrine needs   - discontinue insulin drip as needing minimal amounts, transition to SSI    Neuro:  # Pain and sedation  # Acute pain  # Sedation and analgesia for vent compliance   # Concern for ICU delirium   # Hx Fibromyalgia   # Hx myalgic encephalomyelitis   # Hx anxiety/depression  - Monitor neurological status. Delirium preventions and precautions.   - Pain: Scheduled: tylenol, oxycodone 10mg q 6hr, Ativan 2 mg every 6 hours for further sedation to reach BIS goal  PRN: tylenol, oxycodone  - Sedation plan: dilaudid (rotated from fentanyl 9/5), midazolam  - discontinued propofol due to elevated triglycerides  - Cisatracurium for vent and BIS goal  - RASS goal -3 to -4  - Gabapentin 300mg TID held while sedated  - Seroquel 25mg BID PRN held while sedated  - PTA Venlafaxine and Amitriptyline discontinued while sedated    # Hx spells with staring  and BUE posturing previously described as nonepileptic seizures   # Hx of GTC seizures and myoclonic epilepsy, weaned off AEDs   # Diffuse cerebral edema - improved  - Sodium goals liberalized to 140-145  - 8/21: MRI Brain: no acute intracranial pathology. No findings to suggest anoxic brain injury.     Pulmonary:  # Acute hypoxic respiratory failure c/b ARDS  # Pseudomonas pneumonia  # Mechanical ventilation  # s/p VV ECMO 8/8 - 8/18   # Hx CAP  # Asthma  # MURIEL on home CPAP  PFT dated 9/8/2020 demonstrated normal spirometry with mild diffusion impairment and mild restriction (FEV1/FVC 80%, FEV1 2.59, DLCO 40%). CT abdomen chest 3/9/2020 demonstrated scarring and fibrosis bilaterally which correlated with the decreased DLCO. The patient also has a history of MURIEL on CPAP therapy as currently managed by her provider in [place]. Unclear what triggered ARDS or why she has had such severe hospital course, further investigated ILD but results all unremarkable. Extubated 8/27, reintubated 8/29 for worsening O2 demands and diffuse opacities iso new/recurrent psuedomonas pneumonia. Bronch with BAL 8/30, pseudomonas growing as below.   - ILD w/u aldolase, EVELIO, RF, CCP, ANCA, MPO, SSA Ro and La, Scleroderma antibody, Radha 1,  hypersensity pneumonitis, IGG subclasses,  aspergillus, blastomyces, histoplasma neg  - SpO2 goals >92%, PaO2 goals >60   - PTA singular at bedtime if able  - Scheduled pulmicort, and duonebs   - Lung protective ventilation strategies given ARDS  - Methylpred 125mg q6H x 24h 8/30 --> transition to 20 mg dex x 5 days, followed by 10mg x 5 days  - Epoprostenol currently off  - Wean vent as able  FiO2 (%): (S) 30 %, Resp: 25, Vent Mode: CMV/AC, Resp Rate (Set): 24 breaths/min, Tidal Volume (Set, mL): 350 mL, PEEP (cm H2O): 6 cmH2O, Pressure Support (cm H2O): 5 cmH2O, Resp Rate (Set): 24 breaths/min, Tidal Volume (Set, mL): 350 mL, PEEP (cm H2O): 6 cmH2O    Cardiovascular:  # Hyperlipidemia  #  Hypotension  # Hx HTN  - MAP goal >65, SBP goals >110  - NE for MAP goal  - PTA atorvastatin  - PTA clonidine held while sedated  - Lower extremity ultrasound without vascular pathology, no hematoma or clots  - Monitor discoloration of extremities for necrosis  - Monitoring Hypertension    Sinus Tachycardia  Likely 2/2 fluid removal and sepsis as above    GI/Nutrition:  # Moderate protein calorie malnutrition  # Non-infectious Diarrhea  # GERD   - Protonix (home medication)   - Nutrition consulted, appreciate recs  - Diet: TF via NJ, transitioned to trickle feeds 9/5 given pressor requirements  - Hold bowel regimen d/t loose stools  - Change suspension meds to other form as able  - Continue scheduled multivitamin, banatrol, prosource packet  - loperamide PRN  - Rectal tube, continues with high output. Keep today    Renal/Fluids/Electrolytes:  # Acute kidney injury  # Renal failure  Baseline Cr approx 0.6. Pt was anuric here but now making some urine, likely prerenal due to shock.  - Continue CRRT; fluid goal net even for the day  - Heparinized CRRT circuit, low intensity protocol. Last clotted 8/24 PM  - Strict I&Os  - Bladder scan q shift  - Avoid nephrotoxins as able    Endocrine:  # Steroid and stress induced hyperglycemia  # Hypothyroidism   - Goal to keep BG < 180 for optimal wound healing.   - with rising blood glucose due to steroids started insulin drip 8/30, discontinued 9/7  - sliding scale insulin   - Continue PTA synthroid    ID:  # Leukocytosis  # Psuedomonas pneumonia  # Hx CAP  Respiratory cx 8/29 pseudomonas pneumonia. Intermediate susceptability to meropenem, more significant sensitivities to ciprofloxacin  - Continue to monitor fever curve and inflammatory markers as appropriate  - ID consulted, appreciate recs.   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   - if trend continues to rise will start ganciclovir    # HSV-1  Mouth sores present, which could have viral etiology. 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 Monitor and trend.   - Heparinize CRRT circuit    - CTAP 8/30 due to acute hemoglobin drop requiring transfusion of 2 x 1 unit of blood 12 hours apart. Negative for acute bleed    Musculoskeletal/Rheum:  # Alopecia  - Hold PTA hydroxychloroquine     # Weakness and deconditioning of critical illness  # Left ankle injury pre-hospitalization    - Physical and occupational therapy when able.     Skin:  # BLE scattered ecchymosis  # Left toe duskiness  - Bilateral lower leg ecchymosis  - WOC consult  - Ecchymosis to left foot, most noticeable to 3rd and 4th toes    General Cares/Prophylaxis:  DVT Prophylaxis: Heparin through CRRT circuit  GI Prophylaxis: PPI  Restraints: no  Code Status: DNR/OK to intubate    Lines/tubes/drains:  - ETT (8/29)  - NJ (8/9)  - LIJ CVC (8/8)  - Radial a-line (8/29)  - PIV x3  - Rectal tube (8/17)  - Tunneled HD line (8/23)    Disposition:  - Medical ICU     Patient seen and findings/plan discussed with medical ICU staff, Dr. Perlman.    I spent a total of 45 minutes (excluding procedure time) personally providing and directing critical care services at the bedside and on the critical care unit for Massiel EDGAR De Souza MD    Clinically Significant Risk Factors        # Hyperkalemia: Highest K = 5.5 mmol/L in last 2 days, will monitor as appropriate       # Hypoalbuminemia: Lowest albumin = 2.2 g/dL at 8/10/2024  9:47 AM, will monitor as appropriate               # Obesity: Estimated body mass index is 31.33  "kg/m  as calculated from the following:    Height as of this encounter: 1.575 m (5' 2\").    Weight as of this encounter: 77.7 kg (171 lb 4.8 oz).   # Severe Malnutrition: based on nutrition assessment      # Financial/Environmental Concerns: none        Code Status: No CPR- Pre-arrest intubation OK       ====================================  INTERVAL HISTORY:   Labile Bps overnight but off norepi this AM, restarted Tfs and now at goal. Febrile through CRRT ON but not new infectious sources.     OBJECTIVE:   1. VITAL SIGNS:   Temp:  [98.1  F (36.7  C)-100.9  F (38.3  C)] 98.6  F (37  C)  Pulse:  [] 89  Resp:  [14-31] 25  BP: (109)/(64) 109/64  MAP:  [62 mmHg-247 mmHg] 65 mmHg  Arterial Line BP: ()/(37-74) 97/52  FiO2 (%):  [30 %-40 %] 30 %  SpO2:  [91 %-100 %] 99 %  FiO2 (%): (S) 30 %, Resp: 25, Vent Mode: CMV/AC, Resp Rate (Set): 24 breaths/min, Tidal Volume (Set, mL): 350 mL, PEEP (cm H2O): 6 cmH2O, Pressure Support (cm H2O): 5 cmH2O, Resp Rate (Set): 24 breaths/min, Tidal Volume (Set, mL): 350 mL, PEEP (cm H2O): 6 cmH2O  2. INTAKE/ OUTPUT:   I/O last 3 completed shifts:  In: 3713.57 [I.V.:2458.57; NG/GT:595]  Out: 3977 [Other:3927; Stool:50]    3. PHYSICAL EXAMINATION:  General: Intubated, sedated  HEENT: Normocephalic, atraumatic, eyes with ointment dressing  Neuro: RASS -4-5, arefelexic  Pulm/Resp: Bilateral breath sounds audible with diffuse coarse breath sounds  CV: Sinus tachycardia, s1/2 normal, no murmur  Abdomen: Soft, non-distended, limited 2/2 to sedation  : deferred  Incisions/Skin: lower leg 1+ edema with ecchymosis present and scattered. Some bluish discoloration of the finger tips, capillary refill normal    4. LABS:   Arterial Blood Gases   Recent Labs   Lab 09/07/24  0332 09/06/24 2000 09/06/24  1547 09/06/24  0341   PH 7.37 7.35 7.46* 7.32*   PCO2 42 45 42 48*   PO2 105 111* 146* 75*   HCO3 24 25 29* 25     Complete Blood Count   Recent Labs   Lab 09/07/24 0332 09/06/24  1819 " 09/06/24 0341 09/05/24  2213 09/05/24  1313 09/05/24  0359   WBC 11.8* 18.8* 22.8*  --   --  23.7*   HGB 7.6* 7.0* 7.1* 7.4*   < > 7.9*    240 254  --   --  378    < > = values in this interval not displayed.     Basic Metabolic Panel  Recent Labs   Lab 09/07/24 0332 09/07/24  0216 09/07/24  0104 09/06/24 2002 09/06/24 2000 09/06/24  1553 09/06/24  1547 09/06/24 0416 09/06/24 0341     --   --   --  135  --  134*  --  135   POTASSIUM 3.4  --   --   --  4.8  --  5.5*  --  5.0   CHLORIDE 102  --   --   --  102  --  102  --  104   CO2 22  --   --   --  22  --  23  --  23   BUN 20.3*  --   --   --  22.9*  --  24.5*  --  23.7*   CR 0.73  --   --   --  0.70  --  0.69  --  0.71   GLC 97  110* 84 89   < > 109*   < > 133*   < > 116*    < > = values in this interval not displayed.     Liver Function Tests  Recent Labs   Lab 09/07/24 0332 09/06/24 2000 09/06/24 1547 09/06/24 0341 09/05/24  1543 09/05/24  0359 09/04/24  1620 09/04/24 0357   AST 29  --   --  29  --  32  --  26   ALT 29  --   --  26  --  28  --  24   ALKPHOS 86  --   --  95  --  111  --  112   BILITOTAL 0.2  --   --  0.3  --  0.2  --  0.2   ALBUMIN 3.1* 3.2* 3.4* 3.3*   < > 3.3*   < > 3.3*    < > = values in this interval not displayed.     Coagulation Profile  No lab results found in last 7 days.    5. RADIOLOGY:   No results found for this or any previous visit (from the past 24 hour(s)).

## 2024-09-08 ENCOUNTER — APPOINTMENT (OUTPATIENT)
Dept: GENERAL RADIOLOGY | Facility: CLINIC | Age: 54
DRG: 003 | End: 2024-09-08
Attending: INTERNAL MEDICINE
Payer: MEDICAID

## 2024-09-08 LAB
ABO/RH(D): NORMAL
ALBUMIN SERPL BCG-MCNC: 3.1 G/DL (ref 3.5–5.2)
ALBUMIN SERPL BCG-MCNC: 3.2 G/DL (ref 3.5–5.2)
ALBUMIN SERPL BCG-MCNC: 3.2 G/DL (ref 3.5–5.2)
ALLEN'S TEST: ABNORMAL
ALP SERPL-CCNC: 85 U/L (ref 40–150)
ALT SERPL W P-5'-P-CCNC: 28 U/L (ref 0–50)
ANION GAP SERPL CALCULATED.3IONS-SCNC: 10 MMOL/L (ref 7–15)
ANION GAP SERPL CALCULATED.3IONS-SCNC: 11 MMOL/L (ref 7–15)
ANION GAP SERPL CALCULATED.3IONS-SCNC: 9 MMOL/L (ref 7–15)
ANTIBODY SCREEN: NEGATIVE
AST SERPL W P-5'-P-CCNC: 28 U/L (ref 0–45)
BACTERIA UR CULT: ABNORMAL
BASE EXCESS BLDA CALC-SCNC: -0.3 MMOL/L (ref -3–3)
BASE EXCESS BLDA CALC-SCNC: -0.9 MMOL/L (ref -3–3)
BASE EXCESS BLDA CALC-SCNC: -1.3 MMOL/L (ref -3–3)
BASE EXCESS BLDA CALC-SCNC: -1.9 MMOL/L (ref -3–3)
BASE EXCESS BLDA CALC-SCNC: -2.1 MMOL/L (ref -3–3)
BASE EXCESS BLDA CALC-SCNC: -2.3 MMOL/L (ref -3–3)
BILIRUB DIRECT SERPL-MCNC: <0.2 MG/DL (ref 0–0.3)
BILIRUB SERPL-MCNC: 0.2 MG/DL
BUN SERPL-MCNC: 24.2 MG/DL (ref 6–20)
BUN SERPL-MCNC: 25.7 MG/DL (ref 6–20)
BUN SERPL-MCNC: 28.2 MG/DL (ref 6–20)
CA-I BLD-MCNC: 4.6 MG/DL (ref 4.4–5.2)
CA-I BLD-MCNC: 4.7 MG/DL (ref 4.4–5.2)
CA-I BLD-MCNC: 4.9 MG/DL (ref 4.4–5.2)
CALCIUM SERPL-MCNC: 8.1 MG/DL (ref 8.8–10.4)
CALCIUM SERPL-MCNC: 8.3 MG/DL (ref 8.8–10.4)
CALCIUM SERPL-MCNC: 8.5 MG/DL (ref 8.8–10.4)
CHLORIDE SERPL-SCNC: 104 MMOL/L (ref 98–107)
CHLORIDE SERPL-SCNC: 104 MMOL/L (ref 98–107)
CHLORIDE SERPL-SCNC: 106 MMOL/L (ref 98–107)
COHGB MFR BLD: 85.5 % (ref 96–97)
COHGB MFR BLD: 90.8 % (ref 96–97)
COHGB MFR BLD: 94.7 % (ref 96–97)
COHGB MFR BLD: 99.1 % (ref 96–97)
COHGB MFR BLD: >100 % (ref 96–97)
COHGB MFR BLD: >100 % (ref 96–97)
CREAT SERPL-MCNC: 0.55 MG/DL (ref 0.51–0.95)
CREAT SERPL-MCNC: 0.6 MG/DL (ref 0.51–0.95)
CREAT SERPL-MCNC: 0.61 MG/DL (ref 0.51–0.95)
CRP SERPL-MCNC: 23.2 MG/L
EGFRCR SERPLBLD CKD-EPI 2021: >90 ML/MIN/1.73M2
ERYTHROCYTE [DISTWIDTH] IN BLOOD BY AUTOMATED COUNT: 21.2 % (ref 10–15)
GLUCOSE BLDC GLUCOMTR-MCNC: 109 MG/DL (ref 70–99)
GLUCOSE BLDC GLUCOMTR-MCNC: 162 MG/DL (ref 70–99)
GLUCOSE BLDC GLUCOMTR-MCNC: 179 MG/DL (ref 70–99)
GLUCOSE BLDC GLUCOMTR-MCNC: 187 MG/DL (ref 70–99)
GLUCOSE BLDC GLUCOMTR-MCNC: 194 MG/DL (ref 70–99)
GLUCOSE BLDC GLUCOMTR-MCNC: 209 MG/DL (ref 70–99)
GLUCOSE BLDC GLUCOMTR-MCNC: 97 MG/DL (ref 70–99)
GLUCOSE SERPL-MCNC: 124 MG/DL (ref 70–99)
GLUCOSE SERPL-MCNC: 214 MG/DL (ref 70–99)
GLUCOSE SERPL-MCNC: 227 MG/DL (ref 70–99)
HCO3 BLD-SCNC: 25 MMOL/L (ref 21–28)
HCO3 BLD-SCNC: 26 MMOL/L (ref 21–28)
HCO3 BLD-SCNC: 26 MMOL/L (ref 21–28)
HCO3 SERPL-SCNC: 21 MMOL/L (ref 22–29)
HCO3 SERPL-SCNC: 22 MMOL/L (ref 22–29)
HCO3 SERPL-SCNC: 23 MMOL/L (ref 22–29)
HCT VFR BLD AUTO: 24.5 % (ref 35–47)
HGB BLD-MCNC: 7.5 G/DL (ref 11.7–15.7)
MAGNESIUM SERPL-MCNC: 2.1 MG/DL (ref 1.7–2.3)
MCH RBC QN AUTO: 30 PG (ref 26.5–33)
MCHC RBC AUTO-ENTMCNC: 30.6 G/DL (ref 31.5–36.5)
MCV RBC AUTO: 98 FL (ref 78–100)
O2/TOTAL GAS SETTING VFR VENT: 100 %
O2/TOTAL GAS SETTING VFR VENT: 50 %
O2/TOTAL GAS SETTING VFR VENT: 65 %
O2/TOTAL GAS SETTING VFR VENT: 80 %
O2/TOTAL GAS SETTING VFR VENT: 80 %
O2/TOTAL GAS SETTING VFR VENT: 90 %
PCO2 BLD: 48 MM HG (ref 35–45)
PCO2 BLD: 48 MM HG (ref 35–45)
PCO2 BLD: 51 MM HG (ref 35–45)
PCO2 BLD: 52 MM HG (ref 35–45)
PCO2 BLD: 58 MM HG (ref 35–45)
PCO2 BLD: 58 MM HG (ref 35–45)
PEEP: 10 CM H2O
PEEP: 6 CM H2O
PEEP: 6 CM H2O
PH BLD: 7.25 [PH] (ref 7.35–7.45)
PH BLD: 7.25 [PH] (ref 7.35–7.45)
PH BLD: 7.29 [PH] (ref 7.35–7.45)
PH BLD: 7.3 [PH] (ref 7.35–7.45)
PH BLD: 7.33 [PH] (ref 7.35–7.45)
PH BLD: 7.34 [PH] (ref 7.35–7.45)
PHOSPHATE SERPL-MCNC: 3.9 MG/DL (ref 2.5–4.5)
PHOSPHATE SERPL-MCNC: 4.2 MG/DL (ref 2.5–4.5)
PHOSPHATE SERPL-MCNC: 4.8 MG/DL (ref 2.5–4.5)
PLATELET # BLD AUTO: 185 10E3/UL (ref 150–450)
PO2 BLD: 169 MM HG (ref 80–105)
PO2 BLD: 236 MM HG (ref 80–105)
PO2 BLD: 53 MM HG (ref 80–105)
PO2 BLD: 61 MM HG (ref 80–105)
PO2 BLD: 71 MM HG (ref 80–105)
PO2 BLD: 99 MM HG (ref 80–105)
POTASSIUM SERPL-SCNC: 3.8 MMOL/L (ref 3.4–5.3)
POTASSIUM SERPL-SCNC: 3.8 MMOL/L (ref 3.4–5.3)
POTASSIUM SERPL-SCNC: 4.3 MMOL/L (ref 3.4–5.3)
PROT SERPL-MCNC: 5.2 G/DL (ref 6.4–8.3)
RBC # BLD AUTO: 2.5 10E6/UL (ref 3.8–5.2)
SAO2 % BLDA: 84 % (ref 92–100)
SAO2 % BLDA: 89 % (ref 92–100)
SAO2 % BLDA: 92 % (ref 92–100)
SAO2 % BLDA: 97 % (ref 92–100)
SAO2 % BLDA: 98 % (ref 92–100)
SAO2 % BLDA: 99 % (ref 92–100)
SODIUM SERPL-SCNC: 136 MMOL/L (ref 135–145)
SODIUM SERPL-SCNC: 136 MMOL/L (ref 135–145)
SODIUM SERPL-SCNC: 138 MMOL/L (ref 135–145)
SPECIMEN EXPIRATION DATE: NORMAL
TRIGL SERPL-MCNC: 309 MG/DL
UFH PPP CHRO-ACNC: 0.16 IU/ML
UFH PPP CHRO-ACNC: 0.42 IU/ML
WBC # BLD AUTO: 15 10E3/UL (ref 4–11)

## 2024-09-08 PROCEDURE — 82805 BLOOD GASES W/O2 SATURATION: CPT

## 2024-09-08 PROCEDURE — 83735 ASSAY OF MAGNESIUM: CPT | Performed by: CLINICAL NURSE SPECIALIST

## 2024-09-08 PROCEDURE — 250N000011 HC RX IP 250 OP 636: Performed by: CLINICAL NURSE SPECIALIST

## 2024-09-08 PROCEDURE — 85520 HEPARIN ASSAY: CPT | Performed by: CLINICAL NURSE SPECIALIST

## 2024-09-08 PROCEDURE — 250N000011 HC RX IP 250 OP 636: Mod: JZ

## 2024-09-08 PROCEDURE — 250N000011 HC RX IP 250 OP 636

## 2024-09-08 PROCEDURE — 90947 DIALYSIS REPEATED EVAL: CPT

## 2024-09-08 PROCEDURE — 250N000013 HC RX MED GY IP 250 OP 250 PS 637

## 2024-09-08 PROCEDURE — 85014 HEMATOCRIT: CPT | Performed by: PHYSICIAN ASSISTANT

## 2024-09-08 PROCEDURE — 86901 BLOOD TYPING SEROLOGIC RH(D): CPT | Performed by: SURGERY

## 2024-09-08 PROCEDURE — 82330 ASSAY OF CALCIUM: CPT | Performed by: CLINICAL NURSE SPECIALIST

## 2024-09-08 PROCEDURE — 94003 VENT MGMT INPAT SUBQ DAY: CPT

## 2024-09-08 PROCEDURE — 200N000002 HC R&B ICU UMMC

## 2024-09-08 PROCEDURE — 82040 ASSAY OF SERUM ALBUMIN: CPT | Performed by: CLINICAL NURSE SPECIALIST

## 2024-09-08 PROCEDURE — 99291 CRITICAL CARE FIRST HOUR: CPT | Mod: GC | Performed by: INTERNAL MEDICINE

## 2024-09-08 PROCEDURE — 250N000009 HC RX 250: Performed by: SURGERY

## 2024-09-08 PROCEDURE — 87205 SMEAR GRAM STAIN: CPT

## 2024-09-08 PROCEDURE — 258N000003 HC RX IP 258 OP 636

## 2024-09-08 PROCEDURE — 94644 CONT INHLJ TX 1ST HOUR: CPT

## 2024-09-08 PROCEDURE — 999N000157 HC STATISTIC RCP TIME EA 10 MIN

## 2024-09-08 PROCEDURE — 71045 X-RAY EXAM CHEST 1 VIEW: CPT

## 2024-09-08 PROCEDURE — 80069 RENAL FUNCTION PANEL: CPT | Performed by: CLINICAL NURSE SPECIALIST

## 2024-09-08 PROCEDURE — 86140 C-REACTIVE PROTEIN: CPT

## 2024-09-08 PROCEDURE — 94640 AIRWAY INHALATION TREATMENT: CPT

## 2024-09-08 PROCEDURE — 86900 BLOOD TYPING SEROLOGIC ABO: CPT | Performed by: SURGERY

## 2024-09-08 PROCEDURE — 250N000009 HC RX 250

## 2024-09-08 PROCEDURE — 82247 BILIRUBIN TOTAL: CPT | Performed by: CLINICAL NURSE SPECIALIST

## 2024-09-08 PROCEDURE — 99233 SBSQ HOSP IP/OBS HIGH 50: CPT | Performed by: STUDENT IN AN ORGANIZED HEALTH CARE EDUCATION/TRAINING PROGRAM

## 2024-09-08 PROCEDURE — 71045 X-RAY EXAM CHEST 1 VIEW: CPT | Mod: 26 | Performed by: RADIOLOGY

## 2024-09-08 PROCEDURE — 272N000272 HC CONTINUOUS NEBULIZER MICRO PUMP

## 2024-09-08 PROCEDURE — 94645 CONT INHLJ TX EACH ADDL HOUR: CPT

## 2024-09-08 PROCEDURE — 82248 BILIRUBIN DIRECT: CPT | Performed by: CLINICAL NURSE SPECIALIST

## 2024-09-08 PROCEDURE — 250N000009 HC RX 250: Performed by: STUDENT IN AN ORGANIZED HEALTH CARE EDUCATION/TRAINING PROGRAM

## 2024-09-08 PROCEDURE — 250N000013 HC RX MED GY IP 250 OP 250 PS 637: Performed by: SURGERY

## 2024-09-08 PROCEDURE — 99233 SBSQ HOSP IP/OBS HIGH 50: CPT | Performed by: INTERNAL MEDICINE

## 2024-09-08 PROCEDURE — 84478 ASSAY OF TRIGLYCERIDES: CPT | Performed by: STUDENT IN AN ORGANIZED HEALTH CARE EDUCATION/TRAINING PROGRAM

## 2024-09-08 PROCEDURE — 94640 AIRWAY INHALATION TREATMENT: CPT | Mod: 76

## 2024-09-08 PROCEDURE — 250N000011 HC RX IP 250 OP 636: Performed by: SURGERY

## 2024-09-08 PROCEDURE — 250N000013 HC RX MED GY IP 250 OP 250 PS 637: Performed by: PHYSICIAN ASSISTANT

## 2024-09-08 RX ORDER — FENTANYL CITRATE 50 UG/ML
INJECTION, SOLUTION INTRAMUSCULAR; INTRAVENOUS
Status: COMPLETED
Start: 2024-09-08 | End: 2024-09-08

## 2024-09-08 RX ORDER — PROPOFOL 10 MG/ML
5-75 INJECTION, EMULSION INTRAVENOUS CONTINUOUS
Status: DISCONTINUED | OUTPATIENT
Start: 2024-09-08 | End: 2024-09-10

## 2024-09-08 RX ORDER — FENTANYL CITRATE 50 UG/ML
100 INJECTION, SOLUTION INTRAMUSCULAR; INTRAVENOUS ONCE
Status: COMPLETED | OUTPATIENT
Start: 2024-09-08 | End: 2024-09-08

## 2024-09-08 RX ORDER — LORAZEPAM 1 MG/1
4 TABLET ORAL EVERY 6 HOURS
Status: DISCONTINUED | OUTPATIENT
Start: 2024-09-08 | End: 2024-09-11

## 2024-09-08 RX ORDER — OXYCODONE HCL 5 MG/5 ML
10 SOLUTION, ORAL ORAL EVERY 4 HOURS
Status: DISCONTINUED | OUTPATIENT
Start: 2024-09-08 | End: 2024-09-13

## 2024-09-08 RX ORDER — DEXMEDETOMIDINE HYDROCHLORIDE 4 UG/ML
1.2-1.5 INJECTION, SOLUTION INTRAVENOUS CONTINUOUS
Status: DISCONTINUED | OUTPATIENT
Start: 2024-09-08 | End: 2024-09-09

## 2024-09-08 RX ADMIN — LEVALBUTEROL HYDROCHLORIDE 0.63 MG: 0.63 SOLUTION RESPIRATORY (INHALATION) at 16:10

## 2024-09-08 RX ADMIN — CALCIUM CHLORIDE, MAGNESIUM CHLORIDE, SODIUM CHLORIDE, SODIUM BICARBONATE, POTASSIUM CHLORIDE AND SODIUM PHOSPHATE DIBASIC DIHYDRATE 12.5 ML/KG/HR: 3.68; 3.05; 6.34; 3.09; .314; .187 INJECTION INTRAVENOUS at 00:24

## 2024-09-08 RX ADMIN — OXYCODONE HYDROCHLORIDE 10 MG: 5 SOLUTION ORAL at 23:59

## 2024-09-08 RX ADMIN — GANCICLOVIR SODIUM 200 MG: 500 INJECTION, POWDER, LYOPHILIZED, FOR SOLUTION INTRAVENOUS at 03:08

## 2024-09-08 RX ADMIN — ACETAMINOPHEN 650 MG: 325 TABLET ORAL at 10:18

## 2024-09-08 RX ADMIN — CALCIUM CHLORIDE, MAGNESIUM CHLORIDE, SODIUM CHLORIDE, SODIUM BICARBONATE, POTASSIUM CHLORIDE AND SODIUM PHOSPHATE DIBASIC DIHYDRATE 12.5 ML/KG/HR: 3.68; 3.05; 6.34; 3.09; .314; .187 INJECTION INTRAVENOUS at 20:56

## 2024-09-08 RX ADMIN — PROPOFOL 30 MCG/KG/MIN: 10 INJECTION, EMULSION INTRAVENOUS at 22:17

## 2024-09-08 RX ADMIN — OXYCODONE HYDROCHLORIDE 10 MG: 5 SOLUTION ORAL at 16:01

## 2024-09-08 RX ADMIN — LORAZEPAM 2 MG: 1 TABLET ORAL at 03:40

## 2024-09-08 RX ADMIN — MIDAZOLAM HYDROCHLORIDE 16 MG/HR: 1 INJECTION, SOLUTION INTRAVENOUS at 23:59

## 2024-09-08 RX ADMIN — IPRATROPIUM BROMIDE 0.5 MG: 0.5 SOLUTION RESPIRATORY (INHALATION) at 11:26

## 2024-09-08 RX ADMIN — INSULIN ASPART 1 UNITS: 100 INJECTION, SOLUTION INTRAVENOUS; SUBCUTANEOUS at 11:56

## 2024-09-08 RX ADMIN — EPOPROSTENOL 20 NG/KG/MIN: 1.5 INJECTION, POWDER, LYOPHILIZED, FOR SOLUTION INTRAVENOUS at 10:41

## 2024-09-08 RX ADMIN — IPRATROPIUM BROMIDE 0.5 MG: 0.5 SOLUTION RESPIRATORY (INHALATION) at 16:10

## 2024-09-08 RX ADMIN — KETAMINE HYDROCHLORIDE 5 MG/HR: 10 INJECTION INTRAMUSCULAR; INTRAVENOUS at 22:10

## 2024-09-08 RX ADMIN — TOBRAMYCIN 300 MG: 300 SOLUTION RESPIRATORY (INHALATION) at 07:41

## 2024-09-08 RX ADMIN — CIPROFLOXACIN 400 MG: 400 INJECTION, SOLUTION INTRAVENOUS at 04:11

## 2024-09-08 RX ADMIN — CIPROFLOXACIN 400 MG: 400 INJECTION, SOLUTION INTRAVENOUS at 11:52

## 2024-09-08 RX ADMIN — CALCIUM CHLORIDE, MAGNESIUM CHLORIDE, SODIUM CHLORIDE, SODIUM BICARBONATE, POTASSIUM CHLORIDE AND SODIUM PHOSPHATE DIBASIC DIHYDRATE 12.5 ML/KG/HR: 3.68; 3.05; 6.34; 3.09; .314; .187 INJECTION INTRAVENOUS at 06:03

## 2024-09-08 RX ADMIN — CIPROFLOXACIN 400 MG: 400 INJECTION, SOLUTION INTRAVENOUS at 19:42

## 2024-09-08 RX ADMIN — MIDAZOLAM HYDROCHLORIDE 12 MG/HR: 1 INJECTION, SOLUTION INTRAVENOUS at 10:45

## 2024-09-08 RX ADMIN — CALCIUM CHLORIDE, MAGNESIUM CHLORIDE, SODIUM CHLORIDE, SODIUM BICARBONATE, POTASSIUM CHLORIDE AND SODIUM PHOSPHATE DIBASIC DIHYDRATE 12.5 ML/KG/HR: 3.68; 3.05; 6.34; 3.09; .314; .187 INJECTION INTRAVENOUS at 11:17

## 2024-09-08 RX ADMIN — CALCIUM CHLORIDE, MAGNESIUM CHLORIDE, SODIUM CHLORIDE, SODIUM BICARBONATE, POTASSIUM CHLORIDE AND SODIUM PHOSPHATE DIBASIC DIHYDRATE 12.5 ML/KG/HR: 3.68; 3.05; 6.34; 3.09; .314; .187 INJECTION INTRAVENOUS at 16:07

## 2024-09-08 RX ADMIN — Medication 2.5 MG/HR: at 12:18

## 2024-09-08 RX ADMIN — CALCIUM CHLORIDE, MAGNESIUM CHLORIDE, SODIUM CHLORIDE, SODIUM BICARBONATE, POTASSIUM CHLORIDE AND SODIUM PHOSPHATE DIBASIC DIHYDRATE 12.5 ML/KG/HR: 3.68; 3.05; 6.34; 3.09; .314; .187 INJECTION INTRAVENOUS at 11:18

## 2024-09-08 RX ADMIN — MONTELUKAST 10 MG: 10 TABLET, FILM COATED ORAL at 21:12

## 2024-09-08 RX ADMIN — Medication 2 MG/HR: at 03:44

## 2024-09-08 RX ADMIN — HEPARIN SODIUM 1400 UNITS/HR: 1000 INJECTION, SOLUTION INTRAVENOUS; SUBCUTANEOUS at 18:41

## 2024-09-08 RX ADMIN — IPRATROPIUM BROMIDE 0.5 MG: 0.5 SOLUTION RESPIRATORY (INHALATION) at 20:20

## 2024-09-08 RX ADMIN — MIDAZOLAM HYDROCHLORIDE 10 MG/HR: 1 INJECTION, SOLUTION INTRAVENOUS at 02:04

## 2024-09-08 RX ADMIN — LORAZEPAM 4 MG: 1 TABLET ORAL at 21:12

## 2024-09-08 RX ADMIN — TOBRAMYCIN 300 MG: 300 SOLUTION RESPIRATORY (INHALATION) at 20:21

## 2024-09-08 RX ADMIN — ACETAMINOPHEN 650 MG: 325 TABLET ORAL at 16:02

## 2024-09-08 RX ADMIN — INSULIN ASPART 1 UNITS: 100 INJECTION, SOLUTION INTRAVENOUS; SUBCUTANEOUS at 16:02

## 2024-09-08 RX ADMIN — DEXMEDETOMIDINE HYDROCHLORIDE 1.2 MCG/KG/HR: 400 INJECTION INTRAVENOUS at 22:42

## 2024-09-08 RX ADMIN — NYSTATIN 500000 UNITS: 100000 SUSPENSION ORAL at 08:06

## 2024-09-08 RX ADMIN — Medication 60 ML: at 08:07

## 2024-09-08 RX ADMIN — FENTANYL CITRATE 100 MCG: 50 INJECTION, SOLUTION INTRAMUSCULAR; INTRAVENOUS at 06:16

## 2024-09-08 RX ADMIN — OXYCODONE HYDROCHLORIDE 10 MG: 5 SOLUTION ORAL at 11:52

## 2024-09-08 RX ADMIN — VASOPRESSIN 2.4 UNITS/HR: 20 INJECTION, SOLUTION INTRAMUSCULAR; SUBCUTANEOUS at 21:00

## 2024-09-08 RX ADMIN — LEVALBUTEROL HYDROCHLORIDE 0.63 MG: 0.63 SOLUTION RESPIRATORY (INHALATION) at 11:26

## 2024-09-08 RX ADMIN — HEPARIN SODIUM 1300 UNITS/HR: 1000 INJECTION, SOLUTION INTRAVENOUS; SUBCUTANEOUS at 04:13

## 2024-09-08 RX ADMIN — CALCIUM CHLORIDE, MAGNESIUM CHLORIDE, SODIUM CHLORIDE, SODIUM BICARBONATE, POTASSIUM CHLORIDE AND SODIUM PHOSPHATE DIBASIC DIHYDRATE: 3.68; 3.05; 6.34; 3.09; .314; .187 INJECTION INTRAVENOUS at 11:18

## 2024-09-08 RX ADMIN — METRONIDAZOLE 500 MG: 500 INJECTION, SOLUTION INTRAVENOUS at 14:02

## 2024-09-08 RX ADMIN — Medication 40 MG: at 08:06

## 2024-09-08 RX ADMIN — FENTANYL CITRATE 100 MCG: 50 INJECTION, SOLUTION INTRAMUSCULAR; INTRAVENOUS at 00:21

## 2024-09-08 RX ADMIN — OXYCODONE HYDROCHLORIDE 10 MG: 5 SOLUTION ORAL at 08:52

## 2024-09-08 RX ADMIN — PROPOFOL 10 MCG/KG/MIN: 10 INJECTION, EMULSION INTRAVENOUS at 17:59

## 2024-09-08 RX ADMIN — OXYCODONE HYDROCHLORIDE 10 MG: 5 SOLUTION ORAL at 03:40

## 2024-09-08 RX ADMIN — LORAZEPAM 4 MG: 1 TABLET ORAL at 16:01

## 2024-09-08 RX ADMIN — Medication 2.5 MG/HR: at 19:42

## 2024-09-08 RX ADMIN — ACETAMINOPHEN 650 MG: 325 TABLET ORAL at 03:40

## 2024-09-08 RX ADMIN — MIDAZOLAM HYDROCHLORIDE 16 MG/HR: 1 INJECTION, SOLUTION INTRAVENOUS at 18:05

## 2024-09-08 RX ADMIN — OXYCODONE HYDROCHLORIDE 10 MG: 5 SOLUTION ORAL at 19:42

## 2024-09-08 RX ADMIN — LORAZEPAM 4 MG: 1 TABLET ORAL at 10:18

## 2024-09-08 RX ADMIN — NYSTATIN 500000 UNITS: 100000 SUSPENSION ORAL at 19:42

## 2024-09-08 RX ADMIN — CISATRACURIUM BESYLATE 3 MCG/KG/MIN: 10 INJECTION INTRAVENOUS at 16:28

## 2024-09-08 RX ADMIN — WHITE PETROLATUM 57.7 %-MINERAL OIL 31.9 % EYE OINTMENT: at 22:52

## 2024-09-08 RX ADMIN — LEVOTHYROXINE SODIUM 25 MCG: 0.03 TABLET ORAL at 08:07

## 2024-09-08 RX ADMIN — LEVALBUTEROL HYDROCHLORIDE 0.63 MG: 0.63 SOLUTION RESPIRATORY (INHALATION) at 07:41

## 2024-09-08 RX ADMIN — ACETAMINOPHEN 650 MG: 325 TABLET ORAL at 21:12

## 2024-09-08 RX ADMIN — BUDESONIDE 1 MG: 1 INHALANT ORAL at 07:41

## 2024-09-08 RX ADMIN — Medication 1 TABLET: at 08:07

## 2024-09-08 RX ADMIN — INSULIN ASPART 1 UNITS: 100 INJECTION, SOLUTION INTRAVENOUS; SUBCUTANEOUS at 20:03

## 2024-09-08 RX ADMIN — WHITE PETROLATUM 57.7 %-MINERAL OIL 31.9 % EYE OINTMENT: at 16:02

## 2024-09-08 RX ADMIN — METRONIDAZOLE 500 MG: 500 INJECTION, SOLUTION INTRAVENOUS at 02:04

## 2024-09-08 RX ADMIN — CETIRIZINE HYDROCHLORIDE 10 MG: 10 TABLET, FILM COATED ORAL at 08:07

## 2024-09-08 RX ADMIN — IPRATROPIUM BROMIDE 0.5 MG: 0.5 SOLUTION RESPIRATORY (INHALATION) at 07:41

## 2024-09-08 RX ADMIN — LEVALBUTEROL HYDROCHLORIDE 0.63 MG: 0.63 SOLUTION RESPIRATORY (INHALATION) at 20:20

## 2024-09-08 RX ADMIN — NYSTATIN 500000 UNITS: 100000 SUSPENSION ORAL at 16:01

## 2024-09-08 RX ADMIN — INSULIN ASPART 1 UNITS: 100 INJECTION, SOLUTION INTRAVENOUS; SUBCUTANEOUS at 08:55

## 2024-09-08 RX ADMIN — DEXAMETHASONE SODIUM PHOSPHATE 10 MG: 10 INJECTION, SOLUTION INTRAMUSCULAR; INTRAVENOUS at 08:06

## 2024-09-08 RX ADMIN — NYSTATIN 500000 UNITS: 100000 SUSPENSION ORAL at 11:52

## 2024-09-08 ASSESSMENT — ACTIVITIES OF DAILY LIVING (ADL)
ADLS_ACUITY_SCORE: 61

## 2024-09-08 NOTE — PROGRESS NOTES
Nephrology Progress Note  09/08/2024       Mrs Flaherty is a 52 yo F w/ hx of Anxiety, depression, fibromyalgia, hypothyroidism, asthma, HLD, MURIEL on CPAP, expressive aphasia, and hypertension who was admitted to an OSH with community acquired pneumonia on 8/3 s/p intubation. Found to have severe ARDS requiring paralysis and proning, transferred here on 8/8 for management and initiated on CKRT for severe acidemia in the setting of oligoanuric CHACORTA. Started CRRT on 8/9 for volume management.     Interval History :   -Continues on CRRT, was net positive about 600cc's yesterday  -Off pressors intermittently this AM. On low dose at the time of my eval.   -Will plan for fluid pull today as tolerated (on very low dose pressors at the time of my eval). Agree that a 24 hour goal of net negative 500cc's or so is very reasonable.   -K improved and on 4K baths     Assessment & Recommendations:   CHACORTA-Baseline Cr 0.6 but not checked since 5/2023 PTA, UA with protein + blood but difficult to interpret in setting of CHACORTA, likely hypotensive ATN. Started CRRT on 8/9, now anuric.    -Access is RIJ tunneled line from 8/23  -CRRT, 4k baths, 0-200cc's net fluid pull per hour with 24 hour goal of net negative 500cc's  -Dialysis consent signed and scanned into media on 8/9    Please avoid placing a PICC line in any patient with CKD III or above, CHACORTA, or ESKD, as this will impact negatively the ability of the patient to have an AV fistula or AV Graft should they need it in the future.  Internal jugular or External Jugular  are the preferred type of access in patients with kidney disease. (Link to guidelines)    Electrolytes/pH- K improving. Continue 4K bath    Ca/phos/pth-Mg and Phos acceptable    Anemia-Hgb 7.5    Nutrition-Renal TF    Time spent: 35 minutes on this date of encounter for chart review, physical exam, medical decision making and co-ordination of care.     Physical Exam:   I/O last 3 completed shifts:  In: 0059.73  "[I.V.:2029.73; Other:1; NG/GT:540; IV Piggyback:500]  Out: 3508.2 [Urine:420; Other:2763.2; Stool:325]   BP (!) 151/71 (BP Location: Right arm)   Pulse 116   Temp 99.5  F (37.5  C) (Esophageal)   Resp (!) 38   Ht 1.575 m (5' 2\")   Wt 77.3 kg (170 lb 6.7 oz)   SpO2 100%   BMI 31.17 kg/m       GENERAL APPEARANCE: critically ill, intubated   HEENT: MMM   PULM: lungs clear anteriorly   CV: regular rhythm, normal rate, no rub      -edema - 1+ LE edema   GI: soft, ND  INTEGUMENT: no rash on exposed skin  NEURO: sedated   Access is LFV temp line 8/19.     Labs:   All labs reviewed by me  Electrolytes/Renal -   Recent Labs   Lab Test 09/08/24 0329 09/08/24 0328 09/08/24  0004 09/07/24 1942 09/07/24  1605 09/07/24  1210     --   --  135  --  133*   POTASSIUM 3.8  --   --  4.1  --  4.2   CHLORIDE 106  --   --  103  --  101   CO2 22  --   --  22  --  23   BUN 24.2*  --   --  25.5*  --  27.8*   CR 0.61  --   --  0.65  --  0.66   * 109* 97 122*  139*   < > 214*   QUAN 8.5*  --   --  8.6*  --  8.8   MAG 2.1  --   --  2.2  --  2.3   PHOS 3.9  --   --  4.0  --  3.5    < > = values in this interval not displayed.       CBC -   Recent Labs   Lab Test 09/08/24 0329 09/07/24 0332 09/06/24  1819   WBC 15.0* 11.8* 18.8*   HGB 7.5* 7.6* 7.0*    171 240       LFTs -   Recent Labs   Lab Test 09/08/24 0329 09/07/24  1942 09/07/24  1210 09/07/24  0332 09/06/24  1547 09/06/24  0341   ALKPHOS 85  --   --  86  --  95   BILITOTAL 0.2  --   --  0.2  --  0.3   ALT 28  --   --  29  --  26   AST 28  --   --  29  --  29   PROTTOTAL 5.2*  --   --  5.4*  --  5.8*   ALBUMIN 3.1* 3.3* 3.4* 3.1*   < > 3.3*    < > = values in this interval not displayed.       Iron Panel - No lab results found.        Current Medications:  Current Facility-Administered Medications   Medication Dose Route Frequency Provider Last Rate Last Admin    acetaminophen (TYLENOL) tablet 650 mg  650 mg Oral Q6H Benjy Padgett PA-C   650 mg at " 09/08/24 0340    [Held by provider] atorvastatin (LIPITOR) tablet 10 mg  10 mg Oral or Feeding Tube Daily Francisco Welsh MD   10 mg at 08/29/24 0924    budesonide (PULMICORT) neb solution 1 mg  1 mg Nebulization Daily Andres Payne PA-C   1 mg at 09/08/24 0741    cetirizine (zyrTEC) tablet 10 mg  10 mg Oral or Feeding Tube Daily Barry Hatch MD   10 mg at 09/08/24 0807    ciprofloxacin (CIPRO) infusion 400 mg  400 mg Intravenous Q8H Fuentes Fowler  mL/hr at 09/03/24 1203 400 mg at 09/08/24 0411    [Held by provider] gabapentin (NEURONTIN) capsule 300 mg  300 mg Oral or Feeding Tube TID Barry Hatch MD   300 mg at 08/28/24 1959    insulin aspart (NovoLOG) injection (RAPID ACTING)  1-7 Units Subcutaneous Q4H Fuentes Fowler MD   1 Units at 09/07/24 1615    ipratropium (ATROVENT) 0.02 % neb solution 0.5 mg  0.5 mg Nebulization 4x daily Barry Hatch MD   0.5 mg at 09/08/24 0741    And    levalbuterol (XOPENEX) neb solution 0.63 mg  0.63 mg Nebulization 4x Daily Barry Hatch MD   0.63 mg at 09/08/24 0741    levothyroxine (SYNTHROID/LEVOTHROID) tablet 25 mcg  25 mcg Per Feeding Tube QAM AC Giovanny Guidry PA-C   25 mcg at 09/08/24 0807    LORazepam (ATIVAN) tablet 4 mg  4 mg Oral Q6H Michelet Bowen APRN CNP        metroNIDAZOLE (FLAGYL) infusion 500 mg  500 mg Intravenous Q12H Gaudencio De Souza MD   500 mg at 09/08/24 0204    montelukast (SINGULAIR) tablet 10 mg  10 mg Oral or Feeding Tube At Bedtime Barry Hatch MD   10 mg at 09/07/24 2131    multivitamin RENAL (RENAVITE RX/NEPHROVITE) tablet 1 tablet  1 tablet Oral or Feeding Tube Daily Giovanny Guidry PA-C   1 tablet at 09/08/24 0807    nystatin (MYCOSTATIN) suspension 500,000 Units  500,000 Units Oral 4x Daily Carlos Cerna MD   500,000 Units at 09/08/24 0806    oxyCODONE (ROXICODONE) solution 10 mg  10 mg Oral Q4H Michelet Bowen APRN CNP   10 mg at 09/08/24 0852    pantoprazole (PROTONIX) 2 mg/mL suspension 40 mg  40  mg Per Feeding Tube QAM AC Barry Hatch MD   40 mg at 09/08/24 0806    [START ON 9/9/2024] predniSONE (DELTASONE) tablet 40 mg  40 mg Oral or Feeding Tube Daily Michelet Bowen APRN CNP        Prosource TF20 ENfit Compatibl EN LIQD (PROSOURCE TF20) packet 60 mL  1 packet Per Feeding Tube Daily Giovanny Guidry PA-C   60 mL at 09/08/24 0807    tobramycin (PF) (LIZA) neb solution 300 mg  300 mg Nebulization 2 times daily Gaudencio De Souza MD   300 mg at 09/08/24 0741     Current Facility-Administered Medications   Medication Dose Route Frequency Provider Last Rate Last Admin    dextrose 10% infusion   Intravenous Continuous PRN Gaudencio De Souza MD        dextrose 10% infusion   Intravenous Continuous PRN Giovanny Guidry PA-C        dialysate for CVVHD & CVVHDF (Phoxillum BK4/2.5)  12.5 mL/kg/hr CRRT Continuous Pito Hayes MD 1,000 mL/hr at 09/08/24 0603 12.5 mL/kg/hr at 09/08/24 0603    heparin (porcine) 20,000 units in 20 mL ANTICOAGULANT infusion (syringe from pharmacy)  500-1,500 Units/hr CRRT Continuous Dakota Dorsey APRN CNS 1.4 mL/hr at 09/08/24 0800 1,400 Units/hr at 09/08/24 0800    HYDROmorphone (DILAUDID) 0.2 mg/mL infusion ADULT/PEDS GREATER than or EQUAL to 20 kg  0.1-2.5 mg/hr Intravenous Continuous Gaudencio De Souza MD 12.5 mL/hr at 09/08/24 0806 2.5 mg/hr at 09/08/24 0806    midazolam (VERSED) 100 mg/100 mL NS infusion - ADULT  1-12 mg/hr Intravenous Continuous Michelet Bowen APRN CNP 12 mL/hr at 09/08/24 0800 12 mg/hr at 09/08/24 0800    norepinephrine (LEVOPHED) 16 mg in  mL infusion MAX CONC CENTRAL LINE  0.01-0.6 mcg/kg/min (Dosing Weight) Intravenous Continuous Elier Hutchins MD   Stopped at 09/08/24 0602    POST-filter replacement solution for CVVHD & CVVHDF (Phoxillum BK4/2.5)   CRRT Continuous Pito Hayes  mL/hr at 09/07/24 0930 New Bag at 09/07/24 0930    PRE-filter replacement solution for CVVHD & CVVHDF (Phoxillum BK4/2.5)  12.5 mL/kg/hr CRRT Continuous Pito Hayes MD  1,000 mL/hr at 09/08/24 0603 12.5 mL/kg/hr at 09/08/24 0603

## 2024-09-08 NOTE — PROGRESS NOTES
General Infectious Disease Service - Green Team    Patient:  Massiel Flaherty, Date of birth 1970, Medical record number [id number]  Date of Admission: 8/8/2024  Date of Visit:  9/8/2024         Assessment and Recommendations:   Problem List:  Acute hypoxic and hypercapnic respiratory failure c/b ARDS and s/p VV-ECMO (8/8/24-8/18/24)  Etiology unknown, possible CAP vs viral pneumonia vs other  Extubated 8/27, reintubated 8/29 w/ c/f recurrent PsA pneumonia   CT chest wo contrast 9/4/24 -Significantly decreased diffuse ground glass and consolidative opacities with residual groundglass and scattered nodular opacities, suggesting improving ARDS.     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  - normal LFTs,   Possible HSV oral lesions s/p treatment 8/22-28  Fever  Dry cough x1 month prior to admission  CHACORTA requiring CRRT  Cerebral edema; intentional hypernatremia  Subconjunctival hemorrhage  Right adnexal mass  Hypogammaglobulinemia  Elevated AST, alk phos  Anemia  Recent Augmentin, Z-pack and steroid tapers  Antibiotic allergy - Sulfa (hives), tetracycline (hives)  Facial flush/ rash - possibly related to Vancomycin started on 9/5/24. or consider slowing down the rate      Discussion:     Massiel Flaherty is a 53 year old female with PMHx significant for asthma, MURIEL on CPAP, HTN, anxiety,depression, expressive aphasia, fibromyalgia, seizure disorder, and hypothyroidism who presented to OSH 8/3/24 with fever, fatigue, dyspnea with c/f CAP and requiring intubation, proning, paralysis and transferred to [facility] 8/8/24 for further cares and now placed on VV ECMO and CHACORTA requiring CRRT. Decannulated from VV ECMO 8/18. Extubated 8/27 then reintubated 8/29 for worsened AHRF. Hospital course c/b recurrent pneumonia.      Has  had a chronic cough since 7/2024 for which she sought local care without improvement on steroid tapers, Zpack and Augmentin. Admitted to OSH 8/3 with hypoxia and fever. She received >14 days of Cefepime transitioned to Zosyn without significant improvement. On initial infectious work up, no infectious etiology of presentation identified. She was started on Amphotericin B 8/9 due to high level of concern for endemic fungal infection but this was discontinued 8/13 with return of negative antigen tests and unrevealing bronchoscopy. Work up showed: Negative Histoplasma serum antigen (x3), urine antigen (x1) and antibody; Negative Blasto serum and urine antigen, Negative Cocci serum antigen, Negative Cryptococcus serum antigen, Negative Aspergillus galactomannan serum antigen x2, Beta D Glucan was negative on 8/9,  Negative Tick borne PCR panel, Negative C diff, Negative Hepatitis A, B and C serology, Negative respiratory panel.   Negative EVELIO, ANCA, MPO, proteinase 3 at OSH. B     She has had low level positive EBV from BAL which is likely low level of replication in setting of critical illness rather than true reactivation. Her BAL culture from 8/14 grew pansensitive PsA which is consistent with colonization of her respiratory tract/ET tube (as she was on Cefepime x 2 weeks when this was cultured). A Karius was sent 8/13 with ongoing negative ID work up- which returned with EBV and HSV elevated- but this is reflective of low level of replication in setting of critical illness (does not require treatment). 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      She was extubated 8/27 however developed rigors and worsening respiratory needs on 8/29 per chart and was reintubated and started on empiric meropoenem+vancomycin. BCx negative. SCx with PsA (now resistant to levo, intermediate to ki and ceftazidime). Meropenem was switched to ciprofloxacin on 9/1 and she  was started on tobramycin nebs per ICU. Vancomycin was topped on 8/30, but restarted empirically on 9/5 In the setting of respiratory worsening. A BD glucan was sent and returned elevated at 216. RVP negative. Ureaplasma PCR negative. Legionella uAg negative. Blastomyces uAg negative. Histoplasma galactomannan uAg negative. BAL 8/30 with PSA on culture. Remaining BAL testing negative or pending. CMV PCR blood 741/log 2.9 8/31 and repeat 9/2 of 920/log3 and repeat test on 9/5/24 was 345. ICU team started ganciclovir on 9/5, but stopped on 9/8/24 since it is more likely that the very low CMV viremia translates low level of replication in the setting of critical illness. Vancomycin stopped o 9/8/24 given lack of growth of Gram positive organisms in cultures. This morning (9/8/24) the patient required increasing O2 requirement (MV 80%) and CXR showed increased diffuse opacities. She also had pink frothy secretions and primary suspicion at this moment is pulmonary edema. Nephrology is planning to remove more fluid during CRRT (goal net negative 500 ml). T max 100.4 F today but improved from yesterday when T max was 100.6 F. CRP trending down.      Recommendations:     Please continue ciprofloxacin and metronidazole for now. Anticipate completion of 10 days of ciprofloxacin and 7 days of metronidazole with end date likely on 9/11/24 (after the last dose of that day)     ICU team also started inhaled tobramycin on 9/1/24      Agree with discontinuation of ganciclovir and vancomycin    Follow-up pending cultures    Will follow repeat Beta D Glucan and PJP PCR    PJP PCR is back negative. If plan to continue prednisone 20mg or more daily  for more than 3 weeks, I would recommend to add PJP prophylaxis. Given history of allergy to sulfa, I recommend to obtain G6PD test to determine if dapsone can be used       The General ID team will continue to follow this patient. Please feel free to call with any questions.     EVELIO KEENAN  "ENOC MARQUES MD  Date of Service:  09/08/24  Pager: [phone]           Physical Exam:   BP (!) 151/71 (BP Location: Right arm)   Pulse 93   Temp 98.8  F (37.1  C)   Resp 28   Ht 1.575 m (5' 2\")   Wt 77.3 kg (170 lb 6.7 oz)   SpO2 99%   BMI 31.17 kg/m         Exam:  GENERAL:  Sedated, intubated, on MV with FiO2 80%. Not on vasopressors  HEAD: Normocephalic and atraumatic  ENT:  Intubated and on MV  LUNGS:  Intubated and on MV  CARDIOVASCULAR:  Regular rate and rhythm, normal S1 S2,  no murmur  NEUROLOGIC:  Sedated  PSYCHIATRIC: Sedated         Laboratory Data:     Creatinine   Date Value Ref Range Status   09/08/2024 0.60 0.51 - 0.95 mg/dL Final   09/08/2024 0.61 0.51 - 0.95 mg/dL Final   09/07/2024 0.65 0.51 - 0.95 mg/dL Final   09/07/2024 0.66 0.51 - 0.95 mg/dL Final   09/07/2024 0.73 0.51 - 0.95 mg/dL Final     WBC Count   Date Value Ref Range Status   09/08/2024 15.0 (H) 4.0 - 11.0 10e3/uL Final   09/07/2024 11.8 (H) 4.0 - 11.0 10e3/uL Final   09/06/2024 18.8 (H) 4.0 - 11.0 10e3/uL Final   09/06/2024 22.8 (H) 4.0 - 11.0 10e3/uL Final   09/05/2024 23.7 (H) 4.0 - 11.0 10e3/uL Final     Hemoglobin   Date Value Ref Range Status   09/08/2024 7.5 (L) 11.7 - 15.7 g/dL Final     Platelet Count   Date Value Ref Range Status   09/08/2024 185 150 - 450 10e3/uL Final     Lab Results   Component Value Date     09/08/2024    BUN 28.2 (H) 09/08/2024    CO2 23 09/08/2024     No results found for: \"CRP\"        Pertinent Recent Microbiology Data:   No results for input(s): \"CULT\", \"SDES\" in the last 168 hours.         Imaging:     Recent Results (from the past 48 hour(s))   US Lower Extremity Venous Duplex Bilateral    Narrative    EXAMINATION: DOPPLER VENOUS ULTRASOUND OF BILATERAL LOWER EXTREMITIES,  9/7/2024 11:06 AM     COMPARISON: 8/29/2024    HISTORY: Fever. Suspicion for DVT    TECHNIQUE:  Gray-scale evaluation with compression, spectral flow and  color Doppler assessment of the deep venous system of both " legs from  groin to knee, and then at the ankles.    FINDINGS:  In both lower extremities, the common femoral, femoral, popliteal and  posterior tibial veins demonstrate normal compressibility and blood  flow.      Impression    IMPRESSION:  1.  No evidence of deep venous thrombosis in either lower extremity.    AL[place] TO DO         SYSTEM ID:  [id number]   XR Chest Port 1 View    Narrative    Exam: XR CHEST PORT 1 VIEW, 9/8/2024 6:51 AM    Indication: 53 intubated for ARDS secondary to ILD, sudden increase in  O2 requirement as well as vent dyssynchrony, assess ET tube and for  worsening infection    Comparison: CT chest 9/4/2024. Chest x-ray 9/4/2024    Findings: Frontal radiograph of the chest. Endotracheal tube tip is  3.7 cm above the ivy. Enteric tube traversing into the abdomen with  the tip out of the field of view.  Esophageal temperature probe in the  mid/lower thoracic esophagus. Left IJ central venous catheter within  the SVC confluence. Right IJ central venous catheter with the tip in  the deep right atrium.    Trachea is midline. Cardiac silhouette is within normal limits. Small  bilateral effusions. Increased diffuse patchy interstitial opacities.  Lung volumes are decreased. No significant pneumothorax.      Impression    Impression:   1. Increased diffuse patchy airspace opacities, as can be seen with  ARDS.  2. Small bilateral effusions.    I have personally reviewed the examination and initial interpretation  and I agree with the findings.    CORTNEY LAU MD         SYSTEM ID:  [id number]

## 2024-09-08 NOTE — PROGRESS NOTES
MEDICAL ICU PROGRESS NOTE  09/08/2024    Date of Service (when I saw the patient): 09/08/2024    ASSESSMENT: Massiel Flaherty is a 53 year old female with PMH Anxiety/depression, fibromyalgia, c/f nonepileptic seizures not on AEDs, hypothyroidism, asthma, HLD, MURIEL on CPAP, expressive aphasia, hypertension  who was admitted on 8/3/2024 for fatigue, fever and dyspnea and intubated 8/6/24 at OSH for ARDS, proned and paralyzed without improvement. Transferred to [facility] 8/8/24, hypoxic with high plateaus/peaks and placed on VV ECMO and CRRT. Decannulated from VV ECMO 8/18 and transferred to MICU 8/26/24 for ongoing management. Extubated 8/27 then reintubated 8/29 iso worsening AHRF and pseudomonas pneumonia.     CHANGES and MAJOR THINGS TODAY:  - Increasing FiO2 needs this morning, increased diffuse opacities on CXR could be pulmonary edema vs worsening   - Pink frothy secretions this AM  - Increase sedation needs this morning as was fighting the vent (increaed drips as well as increased PO oxy frequency to q4h and increased PO ativan to 4 mg q6h)  - Given ongoing FiO2 needs will restart Veletri  - Discontinue vancomycin and gancyclovir, trend CMV tomorrow  - repeat respiratory culture today, likely too unstable for bronch    Neuro:  # Pain and sedation  # Acute pain  # Sedation and analgesia for vent compliance   # Concern for ICU delirium   # Hx Fibromyalgia   # Hx myalgic encephalomyelitis   # Hx anxiety/depression  - Monitor neurological status. Delirium preventions and precautions.   - Pain: Scheduled: tylenol, oxycodone 10mg q 4hr, Ativan 4 mg every 6 hours for further sedation   PRN: tylenol, oxycodone  - Sedation plan: dilaudid (rotated from fentanyl 9/5), midazolam  - discontinued propofol due to elevated triglycerides   - RASS goal -3 to -4  - Gabapentin 300mg TID held while sedated  - Seroquel 25mg BID PRN held while sedated  - PTA Venlafaxine and Amitriptyline discontinued while sedated    # Hx spells with  staring and BUE posturing previously described as nonepileptic seizures   # Hx of GTC seizures and myoclonic epilepsy, weaned off AEDs   # Diffuse cerebral edema - improved  - Sodium goals liberalized to 140-145  - 8/21: MRI Brain: no acute intracranial pathology. No findings to suggest anoxic brain injury.     Pulmonary:  # Acute hypoxic respiratory failure c/b ARDS  # Pseudomonas pneumonia  # Mechanical ventilation  # s/p VV ECMO 8/8 - 8/18   # Hx CAP  # Asthma  # MURIEL on home CPAP  PFT dated 9/8/2020 demonstrated normal spirometry with mild diffusion impairment and mild restriction (FEV1/FVC 80%, FEV1 2.59, DLCO 40%). CT abdomen chest 3/9/2020 demonstrated scarring and fibrosis bilaterally which correlated with the decreased DLCO. The patient also has a history of MURIEL on CPAP therapy as currently managed by her provider in [place]. Unclear what triggered ARDS or why she has had such severe hospital course, further investigated ILD but results all unremarkable. Extubated 8/27, reintubated 8/29 for worsening O2 demands and diffuse opacities iso new/recurrent psuedomonas pneumonia. Bronch with BAL 8/30, pseudomonas growing as below.   - ILD w/u aldolase, EVELIO, RF, CCP, ANCA, MPO, SSA Ro and La, Scleroderma antibody, Radha 1,  hypersensity pneumonitis, IGG subclasses,  aspergillus, blastomyces, histoplasma neg  - SpO2 goals >92%, PaO2 goals >60   - PTA singular at bedtime if able  - Scheduled pulmicort, and duonebs   - Lung protective ventilation strategies given ARDS  - Methylpred 125mg q6H x 24h 8/30 --> transition to 20 mg dex x 5 days, followed by 10mg x 5 days  - Epoprostenol restarted 9/8 at 20  - Wean vent as able  FiO2 (%): (S) 90 %, Resp: 29, Vent Mode: CMV/AC, Resp Rate (Set): 24 breaths/min, Tidal Volume (Set, mL): 350 mL, PEEP (cm H2O): 6 cmH2O, Resp Rate (Set): 24 breaths/min, Tidal Volume (Set, mL): 350 mL, PEEP (cm H2O): 6 cmH2O    Cardiovascular:  # Hyperlipidemia  # Hypotension  # Hx HTN  - MAP goal  >65, SBP goals >110  - NE for MAP goal  - PTA atorvastatin  - PTA clonidine held while sedated  - Lower extremity ultrasound without vascular pathology, no hematoma or clots  - Monitor discoloration of extremities for necrosis  - Monitoring Hypertension    Sinus Tachycardia  Likely 2/2 fluid removal, sepsis, pain and sedation as above    GI/Nutrition:  # Moderate protein calorie malnutrition  # Non-infectious Diarrhea  # GERD   - Protonix (home medication)   - Nutrition consulted, appreciate recs  - Diet: TF via NJ, transitioned to trickle feeds 9/5 given pressor requirements  - Hold bowel regimen d/t loose stools  - Change suspension meds to other form as able  - Continue scheduled multivitamin, banatrol, prosource packet  - loperamide PRN  - Rectal tube, continues with high output. Keep today    Renal/Fluids/Electrolytes:  # Acute kidney injury  # Renal failure  Baseline Cr approx 0.6. Pt was anuric here but now making some urine, likely prerenal due to shock.  - Continue CRRT; fluid goal net even for the day  - Heparinized CRRT circuit, low intensity protocol. Last clotted 8/24 PM  - Strict I&Os  - Bladder scan q shift  - Avoid nephrotoxins as able    Endocrine:  # Steroid and stress induced hyperglycemia  # Hypothyroidism   - Goal to keep BG < 180 for optimal wound healing.   - with rising blood glucose due to steroids started insulin drip 8/30, discontinued 9/7  - sliding scale insulin   - Continue PTA synthroid    ID:  # Leukocytosis  # Psuedomonas pneumonia  # Hx CAP  Respiratory cx 8/29 pseudomonas pneumonia. Intermediate susceptability to meropenem, more significant sensitivities to ciprofloxacin  - Continue to monitor fever curve and inflammatory markers as appropriate  - ID consulted, appreciate recs.   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   - Ganciclovir 9/6 - 9/8, discontinued given ID recs  - trend CMV 9/9    # HSV-1  Mouth sores present, which could have viral etiology. 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 Monitor and trend.   - Heparinize CRRT circuit    - CTAP 8/30 due to acute hemoglobin drop requiring transfusion of 2 x 1 unit of blood 12 hours apart. Negative for acute bleed    Musculoskeletal/Rheum:  # Alopecia  - Hold PTA hydroxychloroquine     # Weakness and deconditioning of critical illness  # Left ankle injury pre-hospitalization    - Physical and occupational therapy when able.     Skin:  # BLE scattered ecchymosis  # Left toe duskiness  - Bilateral lower leg ecchymosis  - WOC consult  - Ecchymosis to left foot, most noticeable to 3rd and 4th toes    General Cares/Prophylaxis:  DVT Prophylaxis: Heparin through CRRT circuit  GI Prophylaxis: PPI  Restraints: no  Code Status: DNR/OK to intubate    Lines/tubes/drains:  - ETT (8/29)  - NJ (8/9)  - LIJ CVC (8/8)  - Radial a-line (8/29)  - PIV x3  - Rectal tube (8/17)  - Tunneled HD line (8/23)    Disposition:  - Medical ICU     Patient seen and findings/plan discussed with medical ICU staff, Dr. Perlman.    I spent a total of 45 minutes (excluding procedure time) personally providing and directing critical care services at the bedside and on the critical care unit for Massiel De Souza MD    Clinically Significant Risk Factors        # Hyperkalemia: Highest K = 5.5 mmol/L in last 2 days, will monitor as appropriate       # Hypoalbuminemia: Lowest albumin = 2.2 g/dL at 8/10/2024  9:47 AM, will monitor as appropriate                # Severe Malnutrition: based on nutrition  assessment      # Financial/Environmental Concerns: none        Code Status: No CPR- Pre-arrest intubation OK       ====================================  INTERVAL HISTORY:   Labile Bps overnight as well as worsening FiO2 needs with worsening bilateral opacities. Needed increasing sedation needs due to vent desynchrony. Pink frothy secretions on suctioning noted.     OBJECTIVE:   1. VITAL SIGNS:   Temp:  [95.9  F (35.5  C)-100.8  F (38.2  C)] 98.6  F (37  C)  Pulse:  [] 115  Resp:  [10-51] 29  BP: (151)/(71) 151/71  MAP:  [54 mmHg-124 mmHg] 94 mmHg  Arterial Line BP: ()/(38-89) 140/69  FiO2 (%):  [35 %-100 %] 90 %  SpO2:  [73 %-100 %] 98 %  FiO2 (%): (S) 90 %, Resp: 29, Vent Mode: CMV/AC, Resp Rate (Set): 24 breaths/min, Tidal Volume (Set, mL): 350 mL, PEEP (cm H2O): 6 cmH2O, Resp Rate (Set): 24 breaths/min, Tidal Volume (Set, mL): 350 mL, PEEP (cm H2O): 6 cmH2O  2. INTAKE/ OUTPUT:   I/O last 3 completed shifts:  In: 4139.73 [I.V.:2029.73; Other:1; NG/GT:540; IV Piggyback:500]  Out: 3508.2 [Urine:420; Other:2763.2; Stool:325]    3. PHYSICAL EXAMINATION:  General: Intubated, sedated  HEENT: Normocephalic, atraumatic, eyes with ointment dressing  Neuro: RASS -4-5, arefelexic  Pulm/Resp: Bilateral breath sounds audible with diffuse coarse breath sounds  CV: Sinus tachycardia, s1/2 normal, no murmur  Abdomen: Soft, non-distended, limited 2/2 to sedation  : deferred  Incisions/Skin: lower leg 1+ edema with ecchymosis present and scattered. Some bluish discoloration of the finger tips, capillary refill normal    4. LABS:   Arterial Blood Gases   Recent Labs   Lab 09/08/24  0615 09/08/24  0329 09/07/24  0332 09/06/24 2000   PH 7.33* 7.34* 7.37 7.35   PCO2 48* 48* 42 45   PO2 71* 236* 105 111*   HCO3 25 26 24 25     Complete Blood Count   Recent Labs   Lab 09/08/24  0329 09/07/24  0332 09/06/24  1819 09/06/24  0341   WBC 15.0* 11.8* 18.8* 22.8*   HGB 7.5* 7.6* 7.0* 7.1*    171 240 254     Basic  Metabolic Panel  Recent Labs   Lab 09/08/24  0329 09/08/24 0328 09/08/24  0004 09/07/24 1942 09/07/24  1605 09/07/24  1210 09/07/24  0754 09/07/24  0332     --   --  135  --  133*  --  135   POTASSIUM 3.8  --   --  4.1  --  4.2  --  3.4   CHLORIDE 106  --   --  103  --  101  --  102   CO2 22  --   --  22  --  23  --  22   BUN 24.2*  --   --  25.5*  --  27.8*  --  20.3*   CR 0.61  --   --  0.65  --  0.66  --  0.73   * 109* 97 122*  139*   < > 214*   < > 97  110*    < > = values in this interval not displayed.     Liver Function Tests  Recent Labs   Lab 09/08/24 0329 09/07/24 1942 09/07/24 1210 09/07/24  0332 09/06/24  1547 09/06/24  0341 09/05/24  1543 09/05/24  0359   AST 28  --   --  29  --  29  --  32   ALT 28  --   --  29  --  26  --  28   ALKPHOS 85  --   --  86  --  95  --  111   BILITOTAL 0.2  --   --  0.2  --  0.3  --  0.2   ALBUMIN 3.1* 3.3* 3.4* 3.1*   < > 3.3*   < > 3.3*    < > = values in this interval not displayed.     Coagulation Profile  No lab results found in last 7 days.    5. RADIOLOGY:   Recent Results (from the past 24 hour(s))   US Lower Extremity Venous Duplex Bilateral    Narrative    EXAMINATION: DOPPLER VENOUS ULTRASOUND OF BILATERAL LOWER EXTREMITIES,  9/7/2024 11:06 AM     COMPARISON: 8/29/2024    HISTORY: Fever. Suspicion for DVT    TECHNIQUE:  Gray-scale evaluation with compression, spectral flow and  color Doppler assessment of the deep venous system of both legs from  groin to knee, and then at the ankles.    FINDINGS:  In both lower extremities, the common femoral, femoral, popliteal and  posterior tibial veins demonstrate normal compressibility and blood  flow.      Impression    IMPRESSION:  1.  No evidence of deep venous thrombosis in either lower extremity.    ALDEN TO DO         SYSTEM ID:  [id number]

## 2024-09-08 NOTE — PLAN OF CARE
ICU End of Shift Summary. See flowsheets for vital signs and detailed assessment.    Changes this shift: Respiratory noncompliant with vent, team consulted, changed vent settings, peep to 10 and FiO2 80%, vol 400, no significant change on blood gases, reports increase sedation, Versed from 12 to 16, Dil from 2 to 2.5, slight improved, met BIS goal <60 to paralyze pt, started paralytic, completed a turn and assessment and rolle placement, patient uncomfortable, RR high 30's, hypertensive, , desat 87-88%, stopped paralytic, notified team, and Prop added. Low grade fever 99. TF unchanged. Rolle placed. CRRT goal -500cc today, patient currently -224cc. Family is updated over the phone.     Plan:  Awaiting resp cx, not enough sputum this shift. Monitor sedation and restart paralytic when patient meets parameters. Continue with POC.     Goal Outcome Evaluation:      Plan of Care Reviewed With: patient    Overall Patient Progress: decliningOverall Patient Progress: declining    Outcome Evaluation: Requiring more peep, FiO2, sedation and paralytic. Continue to monitor.

## 2024-09-08 NOTE — PLAN OF CARE
ICU End of Shift Summary. See flowsheets for vital signs and detailed assessment.    Changes this shift: Pt continues to have eyes open much of the time, eyes are quite red and there is residual scleral edema present.  Pt does not track or follow commands.  Pt continues to have very labile blood pressure as high as 190's, low grade fever, and tachycardia when febrile and tachypnea to the 30's with abdominal accessory muscles in use. Tonight pt also desatted to high 80's several times requiring increase in O2.  PRN dilaudid and versed bumps were given with little effect and resident and staff observed pt's poor sedation and vent tolerance and instructed RN to increase dilaudid and versed gtts and give fentanyl bolus, see flowsheets.    At 0430, pt appeared to fall asleep (eyes no longer open and RR close to set rate on vent).  At this time, MAP also fell to low 60's so levophed was on until pt was turned and suctioned at 06 when she became hypertensive again.  Just after 06, pt dropped O2 sats to low 70's for no apparent reason.  MD and RT called to bedside after pt placed on 100% O2, was suctioned, and sensor changed.  Pt was slow to recover and ABG sent and CXR ordered. ETT secretions pink this am.    CRRT titrated to I=O, slightly positive this am due to hypotension from 0430-06 and set getting exchanged overnight.    Pt is difficult to sedate and does not appear to sleep at night, could consider sleep aide or seroquel?        Plan: Continue to monitor and update MD with changes and concerns.     Problem: Adult Inpatient Plan of Care  Goal: Patient-Specific Goal (Individualized)  Description: Patient will wean vent and be able to pressure support in the next two days working towards extubation vs. Later assessing need for trach and slower vent weaning.  Will also tolerate CRRT without increasing pressor needs within MAP parameters.  Outcome: Not Progressing     Problem: Adult Inpatient Plan of Care  Goal: Plan of  Care Review  Description: The Plan of Care Review/Shift note should be completed every shift.  The Outcome Evaluation is a brief statement about your assessment that the patient is improving, declining, or no change.  This information will be displayed automatically on your shift  note.  Outcome: Not Progressing  Goal: Patient-Specific Goal (Individualized)  Description: Patient will wean vent and be able to pressure support in the next two days working towards extubation vs. Later assessing need for trach and slower vent weaning.  Will also tolerate CRRT without increasing pressor needs within MAP parameters.  Outcome: Not Progressing  Goal: Absence of Hospital-Acquired Illness or Injury  Description: Wean vent settings and pressure support by tomorrow.  Outcome: Not Progressing  Intervention: Identify and Manage Fall Risk  Recent Flowsheet Documentation  Taken 9/8/2024 0400 by Nadya Alcazar, RN  Safety Promotion/Fall Prevention:   clutter free environment maintained   lighting adjusted   increase visualization of patient   patient and family education   room near nurse's station   treat reversible contributory factors   treat underlying cause   activity supervised   room door open   room organization consistent   safety round/check completed   supervised activity  Taken 9/8/2024 0000 by Nadya Alcazar, RN  Safety Promotion/Fall Prevention:   clutter free environment maintained   lighting adjusted   increase visualization of patient   patient and family education   room near nurse's station   treat reversible contributory factors   treat underlying cause   activity supervised   room door open   room organization consistent   safety round/check completed   supervised activity  Taken 9/7/2024 2000 by Nadya Alcazar, RN  Safety Promotion/Fall Prevention:   clutter free environment maintained   lighting adjusted   increase visualization of patient   patient and family education   room near nurse's station    treat reversible contributory factors   treat underlying cause   activity supervised   room door open   room organization consistent   safety round/check completed   supervised activity  Intervention: Prevent Skin Injury  Recent Flowsheet Documentation  Taken 9/8/2024 0400 by Nadya Alcazar RN  Body Position:   turned   lower extremity elevated   upper extremity elevated   right  Skin Protection:   adhesive use limited   incontinence pads utilized   pulse oximeter probe site changed   silicone foam dressing in place   skin to device areas padded  Device Skin Pressure Protection:   absorbent pad utilized/changed   tubing/devices free from skin contact   adhesive use limited   positioning supports utilized   skin-to-device areas padded  Taken 9/8/2024 0145 by Nadya Alcazar RN  Body Position:   turned   right   heels elevated   upper extremity elevated  Taken 9/8/2024 0000 by Nadya Alcazar RN  Body Position:   turned   lower extremity elevated   upper extremity elevated   left  Skin Protection:   adhesive use limited   incontinence pads utilized   pulse oximeter probe site changed   silicone foam dressing in place   skin to device areas padded  Device Skin Pressure Protection:   absorbent pad utilized/changed   tubing/devices free from skin contact   adhesive use limited   positioning supports utilized   skin-to-device areas padded  Taken 9/7/2024 2200 by Nadya Alcazar RN  Body Position:   turned   left   heels elevated   upper extremity elevated  Taken 9/7/2024 2000 by Nadya Alcazar RN  Body Position:   turned   right   lower extremity elevated   upper extremity elevated  Skin Protection:   adhesive use limited   incontinence pads utilized   pulse oximeter probe site changed   silicone foam dressing in place   skin to device areas padded  Device Skin Pressure Protection:   absorbent pad utilized/changed   tubing/devices free from skin contact   adhesive use limited   positioning supports  utilized   skin-to-device areas padded  Intervention: Prevent and Manage VTE (Venous Thromboembolism) Risk  Recent Flowsheet Documentation  Taken 9/8/2024 0400 by Nadya Alcazar RN  VTE Prevention/Management: SCDs on (sequential compression devices)  Taken 9/8/2024 0000 by Nadya Alcazar RN  VTE Prevention/Management: SCDs on (sequential compression devices)  Taken 9/7/2024 2000 by Nadya Alcazar RN  VTE Prevention/Management: SCDs on (sequential compression devices)  Intervention: Prevent Infection  Recent Flowsheet Documentation  Taken 9/8/2024 0400 by Nadya Alcazar RN  Infection Prevention:   environmental surveillance performed   hand hygiene promoted   personal protective equipment utilized   cohorting utilized   rest/sleep promoted   single patient room provided   equipment surfaces disinfected  Taken 9/8/2024 0000 by Nadya Alcazar RN  Infection Prevention:   environmental surveillance performed   hand hygiene promoted   personal protective equipment utilized   cohorting utilized   rest/sleep promoted   single patient room provided   equipment surfaces disinfected  Taken 9/7/2024 2000 by Nadya Alcazar RN  Infection Prevention:   environmental surveillance performed   hand hygiene promoted   personal protective equipment utilized   cohorting utilized   rest/sleep promoted   single patient room provided   equipment surfaces disinfected  Goal: Optimal Comfort and Wellbeing  Outcome: Not Progressing  Intervention: Monitor Pain and Promote Comfort  Recent Flowsheet Documentation  Taken 9/8/2024 0400 by Nadya Alcazar RN  Pain Management Interventions:   care clustered   around-the-clock dosing utilized   pillow support provided   quiet environment facilitated   repositioned   rest  Taken 9/8/2024 0000 by Nadya Alcazar RN  Pain Management Interventions:   around-the-clock dosing utilized   care clustered   medication (see MAR)   pillow support provided   quiet environment  facilitated   repositioned   rest  Taken 9/7/2024 2000 by Nadya Alcazar, RN  Pain Management Interventions:   care clustered   around-the-clock dosing utilized   pillow support provided   quiet environment facilitated   repositioned   rest  Intervention: Provide Person-Centered Care  Recent Flowsheet Documentation  Taken 9/8/2024 0400 by Nadya Alcazar, RN  Trust Relationship/Rapport:   care explained   reassurance provided  Taken 9/8/2024 0000 by Nadya Alcazar, RN  Trust Relationship/Rapport:   care explained   reassurance provided  Taken 9/7/2024 2000 by Nadya Alcazar, RN  Trust Relationship/Rapport:   care explained   reassurance provided  Goal: Readiness for Transition of Care  Outcome: Not Progressing   Goal Outcome Evaluation:

## 2024-09-08 NOTE — PROGRESS NOTES
CRRT STATUS NOTE    DATA:  Time:  1719  Pressures WNL:  YES  Filter Status:  WDL    Problems Reported/Alarms Noted:  none    Supplies Present:  YES    ASSESSMENT:  Patient Net Fluid Balance:  Net + 248 ml since MN 9/8/24    Vital Signs:  Temp: 98.2  F (36.8  C) Temp src: Esophageal BP: (!) 151/71 Pulse: 91   Resp: 30 SpO2: 100 % O2 Device: Mechanical Ventilator      Pt is on 1 pressor.      Labs:   Recent Labs   Lab 09/08/24  1601 09/08/24  1406 09/08/24  1155 09/08/24  0855 09/08/24  0329 09/08/24  0004 09/07/24  1942 09/07/24  0754 09/07/24  0332 09/06/24  1828 09/06/24  1819   WBC  --   --   --   --  15.0*  --   --   --  11.8*  --  18.8*   HGB  --   --   --   --  7.5*  --   --   --  7.6*  --  7.0*   MCV  --   --   --   --  98  --   --   --  98  --  100   PLT  --   --   --   --  185  --   --   --  171  --  240   NA  --   --  136  --  138  --  135   < > 135   < >  --    POTASSIUM  --   --  4.3  --  3.8  --  4.1   < > 3.4   < >  --    CHLORIDE  --   --  104  --  106  --  103   < > 102   < >  --    CO2  --   --  23  --  22  --  22   < > 22   < >  --    BUN  --   --  28.2*  --  24.2*  --  25.5*   < > 20.3*   < >  --    CR  --   --  0.60  --  0.61  --  0.65   < > 0.73   < >  --    ANIONGAP  --   --  9  --  10  --  10   < > 11   < >  --    QUAN  --   --  8.1*  --  8.5*  --  8.6*   < > 9.1   < >  --    * 209* 214*   < > 124*   < > 122*  139*   < > 97  110*   < >  --    ALBUMIN  --   --  3.2*  --  3.1*  --  3.3*   < > 3.1*   < >  --    PROTTOTAL  --   --   --   --  5.2*  --   --   --  5.4*  --   --    BILITOTAL  --   --   --   --  0.2  --   --   --  0.2  --   --    ALKPHOS  --   --   --   --  85  --   --   --  86  --   --    ALT  --   --   --   --  28  --   --   --  29  --   --    AST  --   --   --   --  28  --   --   --  29  --   --     < > = values in this interval not displayed.                  Goals of Therapy:  0-200 ml/hr. Net goal of - 500cc next 24 hrs.    INTERVENTIONS:   none    PLAN:  Continue plan of  care. Call CRRT resource RN with any questions or concerns.

## 2024-09-08 NOTE — PROGRESS NOTES
CRRT STATUS NOTE    DATA:  Time:  0622  Pressures WNL:  YES  Filter Status:  WDL    Problems Reported/Alarms Noted:  none    Supplies Present:  YES    ASSESSMENT:    Patient Net Fluid Balance:  +384ml net since midnight       Intake/Output Summary (Last 24 hours) at 9/8/2024 0622  Last data filed at 9/8/2024 0600  Gross per 24 hour   Intake 4139.73 ml   Output 3508.2 ml   Net 631.53 ml       Vital Signs: Temp:  [95.9  F (35.5  C)-100.8  F (38.2  C)] 99.7  F (37.6  C)  Pulse:  [] 109  Resp:  [10-51] 27  BP: (151)/(71) 151/71  MAP:  [54 mmHg-124 mmHg] 102 mmHg  Arterial Line BP: ()/(38-89) 153/73  FiO2 (%):  [35 %-100 %] 50 %  SpO2:  [73 %-100 %] 94 %       Most Recent BMP's:  Recent Labs   Lab Test 09/08/24 0329 09/07/24  1942 09/07/24  1210 09/07/24  0332 09/06/24 2000 09/06/24  1547    135 133* 135 135 134*   POTASSIUM 3.8 4.1 4.2 3.4 4.8 5.5*   CHLORIDE 106 103 101 102 102 102   CO2 22 22 23 22 22 23   BUN 24.2* 25.5* 27.8* 20.3* 22.9* 24.5*   CR 0.61 0.65 0.66 0.73 0.70 0.69   ANIONGAP 10 10 9 11 11 9   QUAN 8.5* 8.6* 8.8 9.1 8.9 8.6*     Most Recent CBC's:  Recent Labs   Lab Test 09/08/24 0329 09/07/24 0332 09/06/24 1819 09/06/24  0341   WBC 15.0* 11.8* 18.8* 22.8*   HGB 7.5* 7.6* 7.0* 7.1*   MCV 98 98 100 100    171 240 254     Most Recent ABG's:  Recent Labs   Lab Test 09/08/24 0615 09/08/24 0329 09/07/24  0332   PH 7.33* 7.34* 7.37   PO2 71* 236* 105   PCO2 48* 48* 42   HCO3 25 26 24   THONG -0.9 -0.3 -0.8       Goals of Therapy:  I=O     INTERVENTIONS:   Circuit changed at 0432  Charting reviewed. Rounding completed. Treatment plan discussed with bedside nurse.     PLAN:  Continue with current plan of care please contact CRRT resource with any questions or concerns via Player [employer].

## 2024-09-09 LAB
1,3 BETA GLUCAN SER-MCNC: 84 PG/ML
ALBUMIN SERPL BCG-MCNC: 2.9 G/DL (ref 3.5–5.2)
ALBUMIN SERPL BCG-MCNC: 3.1 G/DL (ref 3.5–5.2)
ALBUMIN SERPL BCG-MCNC: 3.1 G/DL (ref 3.5–5.2)
ALLEN'S TEST: ABNORMAL
ALP SERPL-CCNC: 93 U/L (ref 40–150)
ALT SERPL W P-5'-P-CCNC: 26 U/L (ref 0–50)
ANION GAP SERPL CALCULATED.3IONS-SCNC: 12 MMOL/L (ref 7–15)
ANION GAP SERPL CALCULATED.3IONS-SCNC: 12 MMOL/L (ref 7–15)
ANION GAP SERPL CALCULATED.3IONS-SCNC: 9 MMOL/L (ref 7–15)
AST SERPL W P-5'-P-CCNC: 42 U/L (ref 0–45)
BASE EXCESS BLDA CALC-SCNC: -0.7 MMOL/L (ref -3–3)
BASE EXCESS BLDA CALC-SCNC: -1.7 MMOL/L (ref -3–3)
BASE EXCESS BLDA CALC-SCNC: -1.7 MMOL/L (ref -3–3)
BASE EXCESS BLDA CALC-SCNC: -1.8 MMOL/L (ref -3–3)
BILIRUB DIRECT SERPL-MCNC: <0.2 MG/DL (ref 0–0.3)
BILIRUB SERPL-MCNC: 0.2 MG/DL
BUN SERPL-MCNC: 21.5 MG/DL (ref 6–20)
BUN SERPL-MCNC: 21.7 MG/DL (ref 6–20)
BUN SERPL-MCNC: 22.6 MG/DL (ref 6–20)
CA-I BLD-MCNC: 4.6 MG/DL (ref 4.4–5.2)
CALCIUM SERPL-MCNC: 8 MG/DL (ref 8.8–10.4)
CALCIUM SERPL-MCNC: 8.3 MG/DL (ref 8.8–10.4)
CALCIUM SERPL-MCNC: 8.5 MG/DL (ref 8.8–10.4)
CHLORIDE SERPL-SCNC: 104 MMOL/L (ref 98–107)
CHLORIDE SERPL-SCNC: 105 MMOL/L (ref 98–107)
CHLORIDE SERPL-SCNC: 105 MMOL/L (ref 98–107)
CMV DNA SPEC NAA+PROBE-ACNC: 390 IU/ML
CMV DNA SPEC NAA+PROBE-LOG#: 2.6 {LOG_COPIES}/ML
COHGB MFR BLD: 94.4 % (ref 96–97)
COHGB MFR BLD: 95.1 % (ref 96–97)
COHGB MFR BLD: 97.7 % (ref 96–97)
COHGB MFR BLD: >100 % (ref 96–97)
CREAT SERPL-MCNC: 0.46 MG/DL (ref 0.51–0.95)
CREAT SERPL-MCNC: 0.48 MG/DL (ref 0.51–0.95)
CREAT SERPL-MCNC: 0.48 MG/DL (ref 0.51–0.95)
CRP SERPL-MCNC: 205 MG/L
EGFRCR SERPLBLD CKD-EPI 2021: >90 ML/MIN/1.73M2
ERYTHROCYTE [DISTWIDTH] IN BLOOD BY AUTOMATED COUNT: 20 % (ref 10–15)
GLUCOSE BLDC GLUCOMTR-MCNC: 126 MG/DL (ref 70–99)
GLUCOSE BLDC GLUCOMTR-MCNC: 137 MG/DL (ref 70–99)
GLUCOSE BLDC GLUCOMTR-MCNC: 190 MG/DL (ref 70–99)
GLUCOSE BLDC GLUCOMTR-MCNC: 190 MG/DL (ref 70–99)
GLUCOSE BLDC GLUCOMTR-MCNC: 194 MG/DL (ref 70–99)
GLUCOSE BLDC GLUCOMTR-MCNC: 203 MG/DL (ref 70–99)
GLUCOSE BLDC GLUCOMTR-MCNC: 206 MG/DL (ref 70–99)
GLUCOSE SERPL-MCNC: 151 MG/DL (ref 70–99)
GLUCOSE SERPL-MCNC: 219 MG/DL (ref 70–99)
GLUCOSE SERPL-MCNC: 233 MG/DL (ref 70–99)
HCO3 BLD-SCNC: 24 MMOL/L (ref 21–28)
HCO3 BLD-SCNC: 24 MMOL/L (ref 21–28)
HCO3 BLD-SCNC: 25 MMOL/L (ref 21–28)
HCO3 BLD-SCNC: 25 MMOL/L (ref 21–28)
HCO3 SERPL-SCNC: 21 MMOL/L (ref 22–29)
HCO3 SERPL-SCNC: 22 MMOL/L (ref 22–29)
HCO3 SERPL-SCNC: 22 MMOL/L (ref 22–29)
HCT VFR BLD AUTO: 23.1 % (ref 35–47)
HGB BLD-MCNC: 7.2 G/DL (ref 11.7–15.7)
LACTATE SERPL-SCNC: 0.9 MMOL/L (ref 0.7–2)
MAGNESIUM SERPL-MCNC: 2 MG/DL (ref 1.7–2.3)
MAGNESIUM SERPL-MCNC: 2 MG/DL (ref 1.7–2.3)
MAGNESIUM SERPL-MCNC: 2.2 MG/DL (ref 1.7–2.3)
MCH RBC QN AUTO: 29.8 PG (ref 26.5–33)
MCHC RBC AUTO-ENTMCNC: 31.2 G/DL (ref 31.5–36.5)
MCV RBC AUTO: 96 FL (ref 78–100)
O2/TOTAL GAS SETTING VFR VENT: 40 %
O2/TOTAL GAS SETTING VFR VENT: 45 %
O2/TOTAL GAS SETTING VFR VENT: 50 %
O2/TOTAL GAS SETTING VFR VENT: 65 %
OBSERVATION IMP: POSITIVE
PCO2 BLD: 43 MM HG (ref 35–45)
PCO2 BLD: 44 MM HG (ref 35–45)
PCO2 BLD: 45 MM HG (ref 35–45)
PCO2 BLD: 49 MM HG (ref 35–45)
PEEP: 10 CM H2O
PH BLD: 7.31 [PH] (ref 7.35–7.45)
PH BLD: 7.34 [PH] (ref 7.35–7.45)
PH BLD: 7.35 [PH] (ref 7.35–7.45)
PH BLD: 7.37 [PH] (ref 7.35–7.45)
PHOSPHATE SERPL-MCNC: 3.1 MG/DL (ref 2.5–4.5)
PHOSPHATE SERPL-MCNC: 3.3 MG/DL (ref 2.5–4.5)
PHOSPHATE SERPL-MCNC: 3.5 MG/DL (ref 2.5–4.5)
PLATELET # BLD AUTO: 153 10E3/UL (ref 150–450)
PO2 BLD: 149 MM HG (ref 80–105)
PO2 BLD: 64 MM HG (ref 80–105)
PO2 BLD: 70 MM HG (ref 80–105)
PO2 BLD: 81 MM HG (ref 80–105)
POTASSIUM SERPL-SCNC: 3.5 MMOL/L (ref 3.4–5.3)
POTASSIUM SERPL-SCNC: 3.5 MMOL/L (ref 3.4–5.3)
POTASSIUM SERPL-SCNC: 3.8 MMOL/L (ref 3.4–5.3)
PROT SERPL-MCNC: 5.3 G/DL (ref 6.4–8.3)
RBC # BLD AUTO: 2.42 10E6/UL (ref 3.8–5.2)
SAO2 % BLDA: 92 % (ref 92–100)
SAO2 % BLDA: 93 % (ref 92–100)
SAO2 % BLDA: 95 % (ref 92–100)
SAO2 % BLDA: 99 % (ref 92–100)
SODIUM SERPL-SCNC: 135 MMOL/L (ref 135–145)
SODIUM SERPL-SCNC: 138 MMOL/L (ref 135–145)
SODIUM SERPL-SCNC: 139 MMOL/L (ref 135–145)
TRIGL SERPL-MCNC: 379 MG/DL
UFH PPP CHRO-ACNC: 0.25 IU/ML
UFH PPP CHRO-ACNC: 0.27 IU/ML
UFH PPP CHRO-ACNC: 0.31 IU/ML
WBC # BLD AUTO: 15 10E3/UL (ref 4–11)

## 2024-09-09 PROCEDURE — 85520 HEPARIN ASSAY: CPT | Performed by: CLINICAL NURSE SPECIALIST

## 2024-09-09 PROCEDURE — 250N000011 HC RX IP 250 OP 636: Performed by: SURGERY

## 2024-09-09 PROCEDURE — 82330 ASSAY OF CALCIUM: CPT | Performed by: CLINICAL NURSE SPECIALIST

## 2024-09-09 PROCEDURE — 250N000011 HC RX IP 250 OP 636

## 2024-09-09 PROCEDURE — 94640 AIRWAY INHALATION TREATMENT: CPT | Mod: 76

## 2024-09-09 PROCEDURE — 250N000009 HC RX 250

## 2024-09-09 PROCEDURE — 86140 C-REACTIVE PROTEIN: CPT

## 2024-09-09 PROCEDURE — 258N000003 HC RX IP 258 OP 636

## 2024-09-09 PROCEDURE — 250N000013 HC RX MED GY IP 250 OP 250 PS 637: Performed by: SURGERY

## 2024-09-09 PROCEDURE — 250N000011 HC RX IP 250 OP 636: Performed by: CLINICAL NURSE SPECIALIST

## 2024-09-09 PROCEDURE — 80069 RENAL FUNCTION PANEL: CPT | Performed by: CLINICAL NURSE SPECIALIST

## 2024-09-09 PROCEDURE — 250N000011 HC RX IP 250 OP 636: Mod: JZ

## 2024-09-09 PROCEDURE — 99233 SBSQ HOSP IP/OBS HIGH 50: CPT | Performed by: INTERNAL MEDICINE

## 2024-09-09 PROCEDURE — 94640 AIRWAY INHALATION TREATMENT: CPT

## 2024-09-09 PROCEDURE — 250N000009 HC RX 250: Performed by: STUDENT IN AN ORGANIZED HEALTH CARE EDUCATION/TRAINING PROGRAM

## 2024-09-09 PROCEDURE — 999N000157 HC STATISTIC RCP TIME EA 10 MIN

## 2024-09-09 PROCEDURE — 250N000013 HC RX MED GY IP 250 OP 250 PS 637

## 2024-09-09 PROCEDURE — 80053 COMPREHEN METABOLIC PANEL: CPT | Performed by: CLINICAL NURSE SPECIALIST

## 2024-09-09 PROCEDURE — 85520 HEPARIN ASSAY: CPT

## 2024-09-09 PROCEDURE — 99291 CRITICAL CARE FIRST HOUR: CPT | Mod: GC | Performed by: INTERNAL MEDICINE

## 2024-09-09 PROCEDURE — 258N000003 HC RX IP 258 OP 636: Performed by: SURGERY

## 2024-09-09 PROCEDURE — 999N000127 HC STATISTIC PERIPHERAL IV START W US GUIDANCE

## 2024-09-09 PROCEDURE — 84478 ASSAY OF TRIGLYCERIDES: CPT

## 2024-09-09 PROCEDURE — 94645 CONT INHLJ TX EACH ADDL HOUR: CPT

## 2024-09-09 PROCEDURE — 82955 ASSAY OF G6PD ENZYME: CPT

## 2024-09-09 PROCEDURE — 83735 ASSAY OF MAGNESIUM: CPT | Performed by: CLINICAL NURSE SPECIALIST

## 2024-09-09 PROCEDURE — 250N000012 HC RX MED GY IP 250 OP 636 PS 637

## 2024-09-09 PROCEDURE — 85027 COMPLETE CBC AUTOMATED: CPT | Performed by: PHYSICIAN ASSISTANT

## 2024-09-09 PROCEDURE — 83605 ASSAY OF LACTIC ACID: CPT

## 2024-09-09 PROCEDURE — 90947 DIALYSIS REPEATED EVAL: CPT

## 2024-09-09 PROCEDURE — 250N000009 HC RX 250: Performed by: SURGERY

## 2024-09-09 PROCEDURE — 82310 ASSAY OF CALCIUM: CPT | Performed by: CLINICAL NURSE SPECIALIST

## 2024-09-09 PROCEDURE — 82805 BLOOD GASES W/O2 SATURATION: CPT

## 2024-09-09 PROCEDURE — 200N000002 HC R&B ICU UMMC

## 2024-09-09 PROCEDURE — 250N000013 HC RX MED GY IP 250 OP 250 PS 637: Performed by: PHYSICIAN ASSISTANT

## 2024-09-09 PROCEDURE — 82248 BILIRUBIN DIRECT: CPT | Performed by: CLINICAL NURSE SPECIALIST

## 2024-09-09 PROCEDURE — 94003 VENT MGMT INPAT SUBQ DAY: CPT

## 2024-09-09 RX ORDER — ACETAMINOPHEN 325 MG/1
650-1000 TABLET ORAL EVERY 4 HOURS PRN
Status: DISCONTINUED | OUTPATIENT
Start: 2024-09-09 | End: 2024-10-01

## 2024-09-09 RX ORDER — DEXAMETHASONE SODIUM PHOSPHATE 10 MG/ML
10 INJECTION, SOLUTION INTRAMUSCULAR; INTRAVENOUS EVERY 24 HOURS
Status: DISCONTINUED | OUTPATIENT
Start: 2024-09-10 | End: 2024-09-12

## 2024-09-09 RX ORDER — ACETAMINOPHEN 650 MG/1
650-1000 SUPPOSITORY RECTAL EVERY 4 HOURS PRN
Status: DISCONTINUED | OUTPATIENT
Start: 2024-09-09 | End: 2024-10-01

## 2024-09-09 RX ORDER — DEXAMETHASONE SODIUM PHOSPHATE 10 MG/ML
4 INJECTION, SOLUTION INTRAMUSCULAR; INTRAVENOUS ONCE
Status: COMPLETED | OUTPATIENT
Start: 2024-09-09 | End: 2024-09-09

## 2024-09-09 RX ADMIN — IPRATROPIUM BROMIDE 0.5 MG: 0.5 SOLUTION RESPIRATORY (INHALATION) at 07:55

## 2024-09-09 RX ADMIN — CISATRACURIUM BESYLATE 3 MCG/KG/MIN: 10 INJECTION INTRAVENOUS at 09:58

## 2024-09-09 RX ADMIN — CALCIUM CHLORIDE, MAGNESIUM CHLORIDE, SODIUM CHLORIDE, SODIUM BICARBONATE, POTASSIUM CHLORIDE AND SODIUM PHOSPHATE DIBASIC DIHYDRATE 12.5 ML/KG/HR: 3.68; 3.05; 6.34; 3.09; .314; .187 INJECTION INTRAVENOUS at 07:01

## 2024-09-09 RX ADMIN — WHITE PETROLATUM 57.7 %-MINERAL OIL 31.9 % EYE OINTMENT: at 14:42

## 2024-09-09 RX ADMIN — Medication 2.5 MG/HR: at 11:23

## 2024-09-09 RX ADMIN — CIPROFLOXACIN 400 MG: 400 INJECTION, SOLUTION INTRAVENOUS at 11:34

## 2024-09-09 RX ADMIN — MIDAZOLAM HYDROCHLORIDE 16 MG/HR: 1 INJECTION, SOLUTION INTRAVENOUS at 12:02

## 2024-09-09 RX ADMIN — Medication 40 MG: at 08:20

## 2024-09-09 RX ADMIN — MIDAZOLAM HYDROCHLORIDE 16 MG/HR: 1 INJECTION, SOLUTION INTRAVENOUS at 22:42

## 2024-09-09 RX ADMIN — NYSTATIN 500000 UNITS: 100000 SUSPENSION ORAL at 15:07

## 2024-09-09 RX ADMIN — CALCIUM CHLORIDE, MAGNESIUM CHLORIDE, SODIUM CHLORIDE, SODIUM BICARBONATE, POTASSIUM CHLORIDE AND SODIUM PHOSPHATE DIBASIC DIHYDRATE 12.5 ML/KG/HR: 3.68; 3.05; 6.34; 3.09; .314; .187 INJECTION INTRAVENOUS at 22:18

## 2024-09-09 RX ADMIN — KETAMINE HYDROCHLORIDE 45 MG/HR: 100 INJECTION, SOLUTION, CONCENTRATE INTRAMUSCULAR; INTRAVENOUS at 09:25

## 2024-09-09 RX ADMIN — ACETAMINOPHEN 650 MG: 325 TABLET ORAL at 22:18

## 2024-09-09 RX ADMIN — IPRATROPIUM BROMIDE 0.5 MG: 0.5 SOLUTION RESPIRATORY (INHALATION) at 20:56

## 2024-09-09 RX ADMIN — Medication 2.4 UNITS/HR: at 08:59

## 2024-09-09 RX ADMIN — LORAZEPAM 4 MG: 1 TABLET ORAL at 09:17

## 2024-09-09 RX ADMIN — LORAZEPAM 4 MG: 1 TABLET ORAL at 03:29

## 2024-09-09 RX ADMIN — PREDNISONE 40 MG: 20 TABLET ORAL at 08:19

## 2024-09-09 RX ADMIN — Medication 60 ML: at 08:20

## 2024-09-09 RX ADMIN — TOBRAMYCIN 300 MG: 300 SOLUTION RESPIRATORY (INHALATION) at 07:55

## 2024-09-09 RX ADMIN — TOBRAMYCIN 300 MG: 300 SOLUTION RESPIRATORY (INHALATION) at 20:56

## 2024-09-09 RX ADMIN — MIDAZOLAM HYDROCHLORIDE 16 MG/HR: 1 INJECTION, SOLUTION INTRAVENOUS at 06:08

## 2024-09-09 RX ADMIN — CALCIUM CHLORIDE, MAGNESIUM CHLORIDE, SODIUM CHLORIDE, SODIUM BICARBONATE, POTASSIUM CHLORIDE AND SODIUM PHOSPHATE DIBASIC DIHYDRATE 12.5 ML/KG/HR: 3.68; 3.05; 6.34; 3.09; .314; .187 INJECTION INTRAVENOUS at 22:16

## 2024-09-09 RX ADMIN — Medication 1 TABLET: at 08:19

## 2024-09-09 RX ADMIN — HEPARIN SODIUM 1300 UNITS/HR: 1000 INJECTION, SOLUTION INTRAVENOUS; SUBCUTANEOUS at 10:44

## 2024-09-09 RX ADMIN — DEXMEDETOMIDINE HYDROCHLORIDE 1.2 MCG/KG/HR: 400 INJECTION INTRAVENOUS at 01:48

## 2024-09-09 RX ADMIN — DEXAMETHASONE SODIUM PHOSPHATE 4 MG: 10 INJECTION, SOLUTION INTRAMUSCULAR; INTRAVENOUS at 09:47

## 2024-09-09 RX ADMIN — ACETAMINOPHEN 650 MG: 325 TABLET ORAL at 09:17

## 2024-09-09 RX ADMIN — DEXMEDETOMIDINE HYDROCHLORIDE 1.2 MCG/KG/HR: 400 INJECTION INTRAVENOUS at 05:14

## 2024-09-09 RX ADMIN — EPOPROSTENOL 20 NG/KG/MIN: 1.5 INJECTION, POWDER, LYOPHILIZED, FOR SOLUTION INTRAVENOUS at 02:36

## 2024-09-09 RX ADMIN — NYSTATIN 500000 UNITS: 100000 SUSPENSION ORAL at 11:35

## 2024-09-09 RX ADMIN — CALCIUM CHLORIDE, MAGNESIUM CHLORIDE, SODIUM CHLORIDE, SODIUM BICARBONATE, POTASSIUM CHLORIDE AND SODIUM PHOSPHATE DIBASIC DIHYDRATE: 3.68; 3.05; 6.34; 3.09; .314; .187 INJECTION INTRAVENOUS at 12:10

## 2024-09-09 RX ADMIN — CALCIUM CHLORIDE, MAGNESIUM CHLORIDE, SODIUM CHLORIDE, SODIUM BICARBONATE, POTASSIUM CHLORIDE AND SODIUM PHOSPHATE DIBASIC DIHYDRATE 12.5 ML/KG/HR: 3.68; 3.05; 6.34; 3.09; .314; .187 INJECTION INTRAVENOUS at 01:50

## 2024-09-09 RX ADMIN — LEVALBUTEROL HYDROCHLORIDE 0.63 MG: 0.63 SOLUTION RESPIRATORY (INHALATION) at 20:56

## 2024-09-09 RX ADMIN — LEVALBUTEROL HYDROCHLORIDE 0.63 MG: 0.63 SOLUTION RESPIRATORY (INHALATION) at 16:33

## 2024-09-09 RX ADMIN — METRONIDAZOLE 500 MG: 500 INJECTION, SOLUTION INTRAVENOUS at 01:49

## 2024-09-09 RX ADMIN — CETIRIZINE HYDROCHLORIDE 10 MG: 10 TABLET, FILM COATED ORAL at 08:19

## 2024-09-09 RX ADMIN — MONTELUKAST 10 MG: 10 TABLET, FILM COATED ORAL at 22:18

## 2024-09-09 RX ADMIN — NYSTATIN 500000 UNITS: 100000 SUSPENSION ORAL at 20:52

## 2024-09-09 RX ADMIN — CALCIUM CHLORIDE, MAGNESIUM CHLORIDE, SODIUM CHLORIDE, SODIUM BICARBONATE, POTASSIUM CHLORIDE AND SODIUM PHOSPHATE DIBASIC DIHYDRATE 12.5 ML/KG/HR: 3.68; 3.05; 6.34; 3.09; .314; .187 INJECTION INTRAVENOUS at 17:10

## 2024-09-09 RX ADMIN — Medication 2.5 MG/HR: at 03:29

## 2024-09-09 RX ADMIN — CALCIUM CHLORIDE, MAGNESIUM CHLORIDE, SODIUM CHLORIDE, SODIUM BICARBONATE, POTASSIUM CHLORIDE AND SODIUM PHOSPHATE DIBASIC DIHYDRATE 12.5 ML/KG/HR: 3.68; 3.05; 6.34; 3.09; .314; .187 INJECTION INTRAVENOUS at 12:06

## 2024-09-09 RX ADMIN — VASOPRESSIN 2.4 UNITS/HR: 20 INJECTION, SOLUTION INTRAMUSCULAR; SUBCUTANEOUS at 04:01

## 2024-09-09 RX ADMIN — METRONIDAZOLE 500 MG: 500 INJECTION, SOLUTION INTRAVENOUS at 13:28

## 2024-09-09 RX ADMIN — INSULIN ASPART 1 UNITS: 100 INJECTION, SOLUTION INTRAVENOUS; SUBCUTANEOUS at 00:08

## 2024-09-09 RX ADMIN — LEVOTHYROXINE SODIUM 25 MCG: 0.03 TABLET ORAL at 08:18

## 2024-09-09 RX ADMIN — CALCIUM CHLORIDE, MAGNESIUM CHLORIDE, SODIUM CHLORIDE, SODIUM BICARBONATE, POTASSIUM CHLORIDE AND SODIUM PHOSPHATE DIBASIC DIHYDRATE 12.5 ML/KG/HR: 3.68; 3.05; 6.34; 3.09; .314; .187 INJECTION INTRAVENOUS at 12:07

## 2024-09-09 RX ADMIN — MIDAZOLAM HYDROCHLORIDE 16 MG/HR: 1 INJECTION, SOLUTION INTRAVENOUS at 17:26

## 2024-09-09 RX ADMIN — WHITE PETROLATUM 57.7 %-MINERAL OIL 31.9 % EYE OINTMENT: at 22:43

## 2024-09-09 RX ADMIN — ACETAMINOPHEN 650 MG: 325 TABLET ORAL at 15:07

## 2024-09-09 RX ADMIN — WHITE PETROLATUM 57.7 %-MINERAL OIL 31.9 % EYE OINTMENT: at 06:08

## 2024-09-09 RX ADMIN — LEVALBUTEROL HYDROCHLORIDE 0.63 MG: 0.63 SOLUTION RESPIRATORY (INHALATION) at 12:30

## 2024-09-09 RX ADMIN — Medication 2.5 MG/HR: at 19:06

## 2024-09-09 RX ADMIN — IPRATROPIUM BROMIDE 0.5 MG: 0.5 SOLUTION RESPIRATORY (INHALATION) at 12:29

## 2024-09-09 RX ADMIN — OXYCODONE HYDROCHLORIDE 10 MG: 5 SOLUTION ORAL at 08:19

## 2024-09-09 RX ADMIN — CIPROFLOXACIN 400 MG: 400 INJECTION, SOLUTION INTRAVENOUS at 03:29

## 2024-09-09 RX ADMIN — CALCIUM CHLORIDE, MAGNESIUM CHLORIDE, SODIUM CHLORIDE, SODIUM BICARBONATE, POTASSIUM CHLORIDE AND SODIUM PHOSPHATE DIBASIC DIHYDRATE 12.5 ML/KG/HR: 3.68; 3.05; 6.34; 3.09; .314; .187 INJECTION INTRAVENOUS at 17:09

## 2024-09-09 RX ADMIN — BUDESONIDE 1 MG: 1 INHALANT ORAL at 07:55

## 2024-09-09 RX ADMIN — ACETAMINOPHEN 650 MG: 325 TABLET ORAL at 03:29

## 2024-09-09 RX ADMIN — OXYCODONE HYDROCHLORIDE 10 MG: 5 SOLUTION ORAL at 03:29

## 2024-09-09 RX ADMIN — CIPROFLOXACIN 400 MG: 400 INJECTION, SOLUTION INTRAVENOUS at 20:52

## 2024-09-09 RX ADMIN — LEVALBUTEROL HYDROCHLORIDE 0.63 MG: 0.63 SOLUTION RESPIRATORY (INHALATION) at 07:55

## 2024-09-09 RX ADMIN — NYSTATIN 500000 UNITS: 100000 SUSPENSION ORAL at 08:19

## 2024-09-09 RX ADMIN — IPRATROPIUM BROMIDE 0.5 MG: 0.5 SOLUTION RESPIRATORY (INHALATION) at 16:33

## 2024-09-09 RX ADMIN — EPOPROSTENOL 20 NG/KG/MIN: 1.5 INJECTION, POWDER, LYOPHILIZED, FOR SOLUTION INTRAVENOUS at 18:43

## 2024-09-09 ASSESSMENT — ACTIVITIES OF DAILY LIVING (ADL)
ADLS_ACUITY_SCORE: 63
ADLS_ACUITY_SCORE: 61
ADLS_ACUITY_SCORE: 63
ADLS_ACUITY_SCORE: 61

## 2024-09-09 NOTE — PLAN OF CARE
Problem: Adult Inpatient Plan of Care  Goal: Absence of Hospital-Acquired Illness or Injury  Description: Wean vent settings and pressure support by tomorrow.  Intervention: Identify and Manage Fall Risk  Recent Flowsheet Documentation  Taken 9/9/2024 0400 by Nadya Alcazar, RN  Safety Promotion/Fall Prevention:   clutter free environment maintained   lighting adjusted   increase visualization of patient   patient and family education   room near nurse's station   treat reversible contributory factors   treat underlying cause   activity supervised   room door open   room organization consistent   safety round/check completed   supervised activity  Taken 9/9/2024 0000 by Nadya Alcazar, RN  Safety Promotion/Fall Prevention:   clutter free environment maintained   lighting adjusted   increase visualization of patient   patient and family education   room near nurse's station   treat reversible contributory factors   treat underlying cause   activity supervised   room door open   room organization consistent   safety round/check completed   supervised activity  Taken 9/8/2024 2000 by Nadya Alcazar, RN  Safety Promotion/Fall Prevention:   clutter free environment maintained   lighting adjusted   increase visualization of patient   patient and family education   room near nurse's station   treat reversible contributory factors   treat underlying cause   activity supervised   room door open   room organization consistent   safety round/check completed   supervised activity  Intervention: Prevent Skin Injury  Recent Flowsheet Documentation  Taken 9/9/2024 0400 by Nadya Alcazar, RN  Body Position:   turned   lower extremity elevated   upper extremity elevated   weight shifting   right   heels elevated  Skin Protection:   adhesive use limited   incontinence pads utilized   pulse oximeter probe site changed   silicone foam dressing in place   skin to device areas padded  Device Skin Pressure Protection:    absorbent pad utilized/changed   tubing/devices free from skin contact   adhesive use limited   positioning supports utilized   skin-to-device areas padded  Taken 9/9/2024 0200 by Nadya Alcazar RN  Body Position:   turned   left   heels elevated   upper extremity elevated  Taken 9/9/2024 0000 by Nadya Alcazar RN  Body Position:   lower extremity elevated   upper extremity elevated   weight shifting   right  Skin Protection:   adhesive use limited   incontinence pads utilized   pulse oximeter probe site changed   silicone foam dressing in place   skin to device areas padded  Device Skin Pressure Protection:   absorbent pad utilized/changed   tubing/devices free from skin contact   adhesive use limited   positioning supports utilized   skin-to-device areas padded  Taken 9/8/2024 2330 by Nadya Alcazar RN  Body Position:   turned   right   heels elevated   upper extremity elevated  Taken 9/8/2024 2000 by Nadya Alcazar RN  Body Position:   turned   left   lower extremity elevated   upper extremity elevated   weight shifting  Skin Protection:   adhesive use limited   incontinence pads utilized   pulse oximeter probe site changed   silicone foam dressing in place   skin to device areas padded  Device Skin Pressure Protection:   absorbent pad utilized/changed   tubing/devices free from skin contact   adhesive use limited   positioning supports utilized   skin-to-device areas padded  Intervention: Prevent and Manage VTE (Venous Thromboembolism) Risk  Recent Flowsheet Documentation  Taken 9/9/2024 0400 by Nadya Alcazar RN  VTE Prevention/Management: SCDs on (sequential compression devices)  Taken 9/9/2024 0000 by Nadya Alcazar RN  VTE Prevention/Management: SCDs on (sequential compression devices)  Taken 9/8/2024 2000 by Nadya Alcazar RN  VTE Prevention/Management: SCDs on (sequential compression devices)  Intervention: Prevent Infection  Recent Flowsheet Documentation  Taken 9/9/2024 0400  by Nadya Alcazar RN  Infection Prevention:   environmental surveillance performed   hand hygiene promoted   personal protective equipment utilized   cohorting utilized   rest/sleep promoted   single patient room provided   equipment surfaces disinfected  Taken 9/9/2024 0000 by Nadya Alcazar RN  Infection Prevention:   environmental surveillance performed   hand hygiene promoted   personal protective equipment utilized   cohorting utilized   rest/sleep promoted   single patient room provided   equipment surfaces disinfected  Taken 9/8/2024 2000 by Nadya Alcazar RN  Infection Prevention:   environmental surveillance performed   hand hygiene promoted   personal protective equipment utilized   cohorting utilized   rest/sleep promoted   single patient room provided   equipment surfaces disinfected  Goal: Optimal Comfort and Wellbeing  Intervention: Monitor Pain and Promote Comfort  Recent Flowsheet Documentation  Taken 9/9/2024 0400 by Nadya Alcazar RN  Pain Management Interventions:   around-the-clock dosing utilized   care clustered   pillow support provided   quiet environment facilitated   repositioned   rest  Taken 9/9/2024 0000 by Nadya Alcazar RN  Pain Management Interventions:   around-the-clock dosing utilized   care clustered   environmental changes   pillow support provided   quiet environment facilitated   repositioned   rest  Taken 9/8/2024 2000 by Nadya Alcazar RN  Pain Management Interventions:   around-the-clock dosing utilized   care clustered   pillow support provided   quiet environment facilitated   repositioned   rest  Intervention: Provide Person-Centered Care  Recent Flowsheet Documentation  Taken 9/9/2024 0400 by Nadya Alcazar RN  Trust Relationship/Rapport:   care explained   reassurance provided  Taken 9/9/2024 0000 by Nadya Alcazar RN  Trust Relationship/Rapport:   care explained   reassurance provided  Taken 9/8/2024 2000 by Nadya Alcazar RN  Trust  Relationship/Rapport:   care explained   reassurance provided     Problem: Risk for Delirium  Goal: Optimal Coping  Intervention: Optimize Psychosocial Adjustment to Delirium  Recent Flowsheet Documentation  Taken 9/9/2024 0400 by Nadya Alcazar RN  Supportive Measures: positive reinforcement provided  Taken 9/9/2024 0000 by Nadya Alcazar RN  Supportive Measures: positive reinforcement provided  Taken 9/8/2024 2000 by Nadya Alcazar RN  Supportive Measures: positive reinforcement provided  Goal: Improved Behavioral Control  Intervention: Prevent and Manage Agitation  Recent Flowsheet Documentation  Taken 9/9/2024 0400 by Nadya Alcazar RN  Environment Familiarity/Consistency: daily routine followed  Taken 9/9/2024 0000 by Nadya Alcazar RN  Environment Familiarity/Consistency: daily routine followed  Taken 9/8/2024 2000 by Nadya Alcazar RN  Environment Familiarity/Consistency: daily routine followed  Intervention: Minimize Safety Risk  Recent Flowsheet Documentation  Taken 9/9/2024 0400 by Nadya Alcazar RN  Communication Enhancement Strategies:   repetition utilized   extra time allowed for response   nonverbal strategies used  Enhanced Safety Measures: room near unit station  Trust Relationship/Rapport:   care explained   reassurance provided  Taken 9/9/2024 0000 by Nadya Alcazar RN  Communication Enhancement Strategies:   repetition utilized   extra time allowed for response   nonverbal strategies used  Enhanced Safety Measures: room near unit station  Trust Relationship/Rapport:   care explained   reassurance provided  Taken 9/8/2024 2000 by Nadya Alcazar RN  Communication Enhancement Strategies:   repetition utilized   extra time allowed for response   nonverbal strategies used  Enhanced Safety Measures: room near unit station  Trust Relationship/Rapport:   care explained   reassurance provided  Goal: Improved Attention and Thought Clarity  Intervention: Maximize  Cognitive Function  Recent Flowsheet Documentation  Taken 9/9/2024 0400 by Nadya Alcazar RN  Sensory Stimulation Regulation:   care clustered   lighting decreased   quiet environment promoted   television on   tactile stimulation minimized  Reorientation Measures:   calendar in view   clock in view   reorientation provided  Taken 9/9/2024 0000 by Nadya Alcazar RN  Sensory Stimulation Regulation:   care clustered   lighting decreased   quiet environment promoted   television on   tactile stimulation minimized  Reorientation Measures:   calendar in view   clock in view   reorientation provided  Taken 9/8/2024 2000 by Nadya Alcazar RN  Sensory Stimulation Regulation:   care clustered   lighting decreased   quiet environment promoted   television on   tactile stimulation minimized  Reorientation Measures:   calendar in view   clock in view   reorientation provided  Goal: Improved Sleep  Intervention: Promote Sleep  Recent Flowsheet Documentation  Taken 9/9/2024 0400 by Nadya Alcazar RN  Sleep/Rest Enhancement:   comfort measures   consistent schedule promoted   regular sleep/rest pattern promoted  Taken 9/9/2024 0000 by Nadya Alcazar RN  Sleep/Rest Enhancement:   comfort measures   consistent schedule promoted   regular sleep/rest pattern promoted  Taken 9/8/2024 2000 by Nadya Alcazar RN  Sleep/Rest Enhancement:   comfort measures   consistent schedule promoted   regular sleep/rest pattern promoted     Problem: ARDS (Acute Respiratory Distress Syndrome)  Goal: Effective Oxygenation  Intervention: Optimize Oxygenation, Ventilation and Perfusion  Recent Flowsheet Documentation  Taken 9/9/2024 0400 by Nadya Alcazar RN  Lung Protection Measures:   fluid excess minimized   ventilator synchrony promoted   ventilator waveforms monitored  Airway/Ventilation Management:   airway patency maintained   calming measures promoted   pulmonary hygiene promoted  Head of Bed (HOB) Positioning: HOB at 30  degrees  Taken 9/9/2024 0200 by Nadya Alcazar RN  Head of Bed (HOB) Positioning: HOB at 30 degrees  Taken 9/9/2024 0000 by Nadya Alcazar RN  Lung Protection Measures:   fluid excess minimized   ventilator synchrony promoted   ventilator waveforms monitored  Airway/Ventilation Management:   airway patency maintained   calming measures promoted   pulmonary hygiene promoted  Head of Bed (HOB) Positioning: HOB at 30 degrees  Taken 9/8/2024 2330 by Nadya Alcazar RN  Head of Bed (HOB) Positioning: HOB at 30 degrees  Taken 9/8/2024 2000 by Nadya Alcazar RN  Lung Protection Measures:   fluid excess minimized   ventilator synchrony promoted   ventilator waveforms monitored  Airway/Ventilation Management:   airway patency maintained   calming measures promoted   pulmonary hygiene promoted  Head of Bed (HOB) Positioning: HOB at 30 degrees     Problem: ECLS (Extracorporeal Life Support)  Goal: Optimal Coping with Therapy  Intervention: Optimize Psychosocial Response  Recent Flowsheet Documentation  Taken 9/9/2024 0400 by Nadya Alcazar RN  Supportive Measures: positive reinforcement provided  Taken 9/9/2024 0000 by Nadya Alcazar RN  Supportive Measures: positive reinforcement provided  Taken 9/8/2024 2000 by Nadya Alcazar RN  Supportive Measures: positive reinforcement provided  Goal: Absence of Bleeding  Intervention: Prevent and Manage Bleeding  Recent Flowsheet Documentation  Taken 9/9/2024 0400 by Nadya Alcazar RN  Bleeding Precautions:   gentle oral care promoted   coagulation study results reviewed   foot protection facilitated   monitored for signs of bleeding   blood pressure closely monitored  Bleeding Management: dressing monitored  Taken 9/9/2024 0000 by Nadya Alcazar RN  Bleeding Precautions:   gentle oral care promoted   coagulation study results reviewed   foot protection facilitated   monitored for signs of bleeding   blood pressure closely monitored  Bleeding  Management: dressing monitored  Taken 9/8/2024 2000 by Nadya Alcazar RN  Bleeding Precautions:   gentle oral care promoted   coagulation study results reviewed   foot protection facilitated   monitored for signs of bleeding   blood pressure closely monitored  Bleeding Management: dressing monitored  Goal: Optimal Device Function  Intervention: Optimize Device Care and Function  Recent Flowsheet Documentation  Taken 9/9/2024 0400 by Nadya lAcazar RN  Thermoregulation Maintenance:   lightweight clothing/blankets used   tepid bath given   skin exposure increased  Circuit Management:   circuit line warming device in use   tubing repositioned  Taken 9/9/2024 0000 by Nadya Alcazar RN  Thermoregulation Maintenance:   lightweight clothing/blankets used   tepid bath given   skin exposure increased  Circuit Management:   circuit line warming device in use   tubing repositioned  Taken 9/8/2024 2000 by Nadya Alcazar RN  Thermoregulation Maintenance:   lightweight clothing/blankets used   tepid bath given   skin exposure increased  Circuit Management:   circuit line warming device in use   tubing repositioned  Intervention: Reduce Pressure and Protect Skin Integrity  Recent Flowsheet Documentation  Taken 9/9/2024 0400 by Nadya Alcazar RN  Pressure Reduction Techniques:   heels elevated off bed   rest period provided between sit times  Pressure Reduction Devices:   pressure-redistributing mattress utilized   foam padding utilized   heel offloading device utilized   positioning supports utilized  Taken 9/9/2024 0000 by Nadya Alcazar RN  Pressure Reduction Techniques:   heels elevated off bed   rest period provided between sit times  Pressure Reduction Devices:   pressure-redistributing mattress utilized   foam padding utilized   heel offloading device utilized   positioning supports utilized  Taken 9/8/2024 2000 by Nadya Alcazar RN  Pressure Reduction Techniques:   heels elevated off bed   rest  period provided between sit times  Pressure Reduction Devices:   pressure-redistributing mattress utilized   foam padding utilized   heel offloading device utilized   positioning supports utilized  Goal: Hemodynamic Stability  Intervention: Optimize Blood Flow, Oxygenation and Ventilation  Recent Flowsheet Documentation  Taken 9/9/2024 0400 by Nadya Alcazar RN  Airway/Ventilation Management:   airway patency maintained   calming measures promoted   pulmonary hygiene promoted  VTE Prevention/Management: SCDs on (sequential compression devices)  Environmental Support:   calm environment promoted   rest periods encouraged   caregiver consistency promoted   distractions minimized   environmental consistency promoted  Taken 9/9/2024 0000 by Nadya Alcazar RN  Airway/Ventilation Management:   airway patency maintained   calming measures promoted   pulmonary hygiene promoted  VTE Prevention/Management: SCDs on (sequential compression devices)  Environmental Support:   calm environment promoted   rest periods encouraged   caregiver consistency promoted   distractions minimized   environmental consistency promoted  Taken 9/8/2024 2000 by Nadya Alcazar RN  Airway/Ventilation Management:   airway patency maintained   calming measures promoted   pulmonary hygiene promoted  VTE Prevention/Management: SCDs on (sequential compression devices)  Environmental Support:   calm environment promoted   rest periods encouraged   caregiver consistency promoted   distractions minimized   environmental consistency promoted  Goal: Absence of Infection Signs and Symptoms  Intervention: Prevent or Manage Infection  Recent Flowsheet Documentation  Taken 9/9/2024 0400 by Nadya Alcazar RN  Fever Reduction/Comfort Measures:   lightweight bedding   lightweight clothing  Infection Prevention:   environmental surveillance performed   hand hygiene promoted   personal protective equipment utilized   cohorting utilized   rest/sleep  promoted   single patient room provided   equipment surfaces disinfected  Infection Management: aseptic technique maintained  Taken 9/9/2024 0000 by Nadya Alcazar RN  Fever Reduction/Comfort Measures:   lightweight bedding   lightweight clothing  Infection Prevention:   environmental surveillance performed   hand hygiene promoted   personal protective equipment utilized   cohorting utilized   rest/sleep promoted   single patient room provided   equipment surfaces disinfected  Infection Management: aseptic technique maintained  Taken 9/8/2024 2000 by Nadya Alcazar RN  Fever Reduction/Comfort Measures:   lightweight bedding   lightweight clothing  Infection Prevention:   environmental surveillance performed   hand hygiene promoted   personal protective equipment utilized   cohorting utilized   rest/sleep promoted   single patient room provided   equipment surfaces disinfected  Infection Management: aseptic technique maintained   Goal Outcome Evaluation:  ICU End of Shift Summary. See flowsheets for vital signs and detailed assessment.    Changes this shift: Pt had orders for paralytic gtt but was difficult to sedate as evidenced by BIS readings in the 60's, so ketamine gtt started to attain BIS numbers less than 60's before starting paralytic gtt.  Pt's triglycerides were over 300, so precedex gtt started and propofol gtt stopped.  With addition of precedex, pt's BIS level quickly dropped to 30's and 40's, see flowsheet.  TOF remains 3-4/4 on Nimbex gtt of 4 mcg/kg/min and gtt not increased further as pt not overbreathing vent and BIS reading consistently in 30's and 40's indicating adequate sedation.  Pt continues on versed 16 mg/hr, dilaudid 2.5 mg/hr, ketamine 5mg/hr and precedex 1.2 mcg/hr and now is RASS -5.  O2 weaned from 80% to 55%.    Pt remains afebrile tonight with NSR.  BP is requiring support of both levophed and vasopressin gtts to maintain MAP over 65.  PIPs are in high 30's and low 40's  overnight, MD adjusted inspiratory time and O2 weaned from 80% to 55% with improvement blood gas values.  Veletri nebs continue at full strength.  Glucoses elevated overnight and sliding scale increased to high scale, may need long acting insulin or gtt    CRRT titrated to goal removal, heparin gtt decreased to 1300.  Urine output quite scant, could remove rolle and do straight cath as needed.  Tube feeds continue and pt continues to have loose stools.     Plan: Continue to monitor and update MD with changes and concerns.  Await changes to POC and wean vent, sedation, and pressors as able.

## 2024-09-09 NOTE — PROGRESS NOTES
Nephrology Progress Note  09/09/2024       Mrs Flaherty is a 54 yo F w/ hx of Anxiety, depression, fibromyalgia, hypothyroidism, asthma, HLD, MURIEL on CPAP, expressive aphasia, and hypertension who was admitted to an OSH with community acquired pneumonia on 8/3 s/p intubation. Found to have severe ARDS requiring paralysis and proning, transferred here on 8/8 for management and initiated on CKRT for severe acidemia in the setting of oligoanuric CHACORTA. Started CRRT on 8/9 for volume management.     Interval History :   Mrs Flaherty continues on CRRT, pressor needs were improved yesterday but back on norepi and vaso today without clear etiology, cultures negative.  Labs stable on 4k baths after needing 2k's briefly over the weekend, will continue with 4k's today and I=O volume goal.      Assessment & Recommendations:   CHACORTA-Baseline Cr 0.6 but not checked since 5/2023 PTA, UA with protein + blood but difficult to interpret in setting of CHACORTA, likely hypotensive ATN. Started CRRT on 8/9, now anuric.    -Access is RIJ tunneled line from 8/23  -CRRT, 4k baths, I=O as able on norepi and vaso  -Dialysis consent signed and scanned into media on 8/9    Please avoid placing a PICC line in any patient with CKD III or above, CHACORTA, or ESKD, as this will impact negatively the ability of the patient to have an AV fistula or AV Graft should they need it in the future.  Internal jugular or External Jugular  are the preferred type of access in patients with kidney disease. (Link to guidelines)    Volume status-Was nearly net even yesterday, on track for slightly negative today although pressor needs are up, ordered for 0-50cc/h net negative.     Electrolytes/pH-No acute issues, checking BID     Ca/phos/pth-Mg and Phos mildly up, no intervention needed.     Anemia-Hgb 7.2, down slightly today.       Nutrition-Renal TF    Time spent: 45 minutes on this date of encounter for chart review, physical exam, medical decision making and co-ordination  "of care.     Discussed with Dr Perales    Recommendations were communicated to primary team via verbal communication.     DOREEN Sequeira CNS  Clinical Nurse Specialist  [phone]    Review of Systems:   I reviewed the following systems:  ROS not done due to vent/sedation.     Physical Exam:   I/O last 3 completed shifts:  In: 3839.27 [I.V.:2104.27; NG/GT:655]  Out: 4795 [Urine:500; Other:3995; Stool:300]   /59 (BP Location: Right arm)   Pulse 69   Temp 99  F (37.2  C) (Esophageal)   Resp 13   Ht 1.575 m (5' 2\")   Wt 76.8 kg (169 lb 5 oz)   SpO2 96%   BMI 30.97 kg/m       GENERAL APPEARANCE: critically ill, intubated   HEENT: MMM   PULM: lungs clear anteriorly   CV: regular rhythm, normal rate, no rub      -edema - 1+ LE edema   GI: soft, ND  INTEGUMENT: no rash on exposed skin  NEURO: sedated   Access is LFV temp line 8/19.     Labs:   All labs reviewed by me  Electrolytes/Renal -   Recent Labs   Lab Test 09/09/24  0802 09/09/24  0343 09/09/24  0340 09/09/24  0006 09/08/24 2000 09/08/24  1406 09/08/24  1155   NA  --  135  --   --  136  --  136   POTASSIUM  --  3.5  --   --  3.8  --  4.3   CHLORIDE  --  104  --   --  104  --  104   CO2  --  22  --   --  21*  --  23   BUN  --  21.7*  --   --  25.7*  --  28.2*   CR  --  0.48*  --   --  0.55  --  0.60   * 233* 206*   < > 194*  227*   < > 214*   QUAN  --  8.0*  --   --  8.3*  --  8.1*   MAG  --  2.0  --   --  2.1  --  2.1   PHOS  --  3.3  --   --  4.2  --  4.8*    < > = values in this interval not displayed.       CBC -   Recent Labs   Lab Test 09/09/24  0343 09/08/24 0329 09/07/24 0332   WBC 15.0* 15.0* 11.8*   HGB 7.2* 7.5* 7.6*    185 171       LFTs -   Recent Labs   Lab Test 09/09/24 0343 09/08/24 2000 09/08/24 1155 09/08/24 0329 09/07/24  1210 09/07/24  0332   ALKPHOS 93  --   --  85  --  86   BILITOTAL 0.2  --   --  0.2  --  0.2   ALT 26  --   --  28  --  29   AST 42  --   --  28  --  29   PROTTOTAL 5.3*  --   --  5.2*  --  " 5.4*   ALBUMIN 2.9* 3.2* 3.2* 3.1*   < > 3.1*    < > = values in this interval not displayed.       Iron Panel - No lab results found.        Current Medications:  Current Facility-Administered Medications   Medication Dose Route Frequency Provider Last Rate Last Admin    acetaminophen (TYLENOL) tablet 650 mg  650 mg Oral Q6H Benjy Padgett PA-C   650 mg at 09/09/24 0329    artificial tears ophthalmic ointment   Both Eyes Q8H Tanesha Riley MD   Given at 09/09/24 0608    [Held by provider] atorvastatin (LIPITOR) tablet 10 mg  10 mg Oral or Feeding Tube Daily Francisco Welsh MD   10 mg at 08/29/24 0924    budesonide (PULMICORT) neb solution 1 mg  1 mg Nebulization Daily Andres Payne PA-C   1 mg at 09/09/24 0755    cetirizine (zyrTEC) tablet 10 mg  10 mg Oral or Feeding Tube Daily Barry Hatch MD   10 mg at 09/09/24 0819    ciprofloxacin (CIPRO) infusion 400 mg  400 mg Intravenous Q8H Fuentes Fowler  mL/hr at 09/03/24 1203 400 mg at 09/09/24 0329    [START ON 9/10/2024] dexAMETHasone PF (DECADRON) injection 10 mg  10 mg Intravenous Q24H Fuentes Fowler MD        dexAMETHasone PF (DECADRON) injection 4 mg  4 mg Intravenous Once Fuentes Fowler MD        [Held by provider] gabapentin (NEURONTIN) capsule 300 mg  300 mg Oral or Feeding Tube TID Barry Hatch MD   300 mg at 08/28/24 1959    insulin aspart (NovoLOG) injection (RAPID ACTING)  1-12 Units Subcutaneous Q4H Carlos Cerna MD   3 Units at 09/09/24 0829    ipratropium (ATROVENT) 0.02 % neb solution 0.5 mg  0.5 mg Nebulization 4x daily Barry Hatch MD   0.5 mg at 09/09/24 0755    And    levalbuterol (XOPENEX) neb solution 0.63 mg  0.63 mg Nebulization 4x Daily Barry Hatch MD   0.63 mg at 09/09/24 0755    levothyroxine (SYNTHROID/LEVOTHROID) tablet 25 mcg  25 mcg Per Feeding Tube QAM AC -Giovanny Spence PA-C   25 mcg at 09/09/24 0818    LORazepam (ATIVAN) tablet 4 mg  4 mg Oral Q6H Michelet Bowen, APRN CNP   4  mg at 09/09/24 0329    metroNIDAZOLE (FLAGYL) infusion 500 mg  500 mg Intravenous Q12H Gaudencio De Souza MD   500 mg at 09/09/24 0149    montelukast (SINGULAIR) tablet 10 mg  10 mg Oral or Feeding Tube At Bedtime Barry Hatch MD   10 mg at 09/08/24 2112    multivitamin RENAL (RENAVITE RX/NEPHROVITE) tablet 1 tablet  1 tablet Oral or Feeding Tube Daily Giovanny Guidry PA-C   1 tablet at 09/09/24 0819    nystatin (MYCOSTATIN) suspension 500,000 Units  500,000 Units Oral 4x Daily Carlos Cerna MD   500,000 Units at 09/09/24 0819    [Held by provider] oxyCODONE (ROXICODONE) solution 10 mg  10 mg Oral Q4H Michelet Bowen APRN CNP   10 mg at 09/09/24 0819    pantoprazole (PROTONIX) 2 mg/mL suspension 40 mg  40 mg Per Feeding Tube QAM AC Barry Hatch MD   40 mg at 09/09/24 0820    Prosource TF20 ENfit Compatibl EN LIQD (PROSOURCE TF20) packet 60 mL  1 packet Per Feeding Tube Daily Giovanny Guidry PA-C   60 mL at 09/09/24 0820    tobramycin (PF) (LIZA) neb solution 300 mg  300 mg Nebulization 2 times daily Gaudencio De Souza MD   300 mg at 09/09/24 0755     Current Facility-Administered Medications   Medication Dose Route Frequency Provider Last Rate Last Admin    cisatracurium (NIMBEX) 200 mg in D5W 100 mL infusion  3-10 mcg/kg/min (Ideal) Intravenous Continuous Tanesha Rliey MD 6 mL/hr at 09/09/24 0800 4 mcg/kg/min at 09/09/24 0800    dextrose 10% infusion   Intravenous Continuous PRN Gaudencio De Souza MD        dextrose 10% infusion   Intravenous Continuous PRN Giovanny Guidry PA-C        dialysate for CVVHD & CVVHDF (Phoxillum BK4/2.5)  12.5 mL/kg/hr CRRT Continuous Pito Hayes MD 1,000 mL/hr at 09/09/24 0701 12.5 mL/kg/hr at 09/09/24 0701    epoprostenol (VELETRI) inhalation solution  20 ng/kg/min (Ideal) Nebulization Continuous Gaudencio De Souza MD 3 mL/hr at 09/09/24 0412 20 ng/kg/min at 09/09/24 0412    heparin (porcine) 20,000 units in 20 mL ANTICOAGULANT infusion (syringe from pharmacy)  500-1,500  Units/hr CRRT Continuous Dakota Dorsey APRN CNS 1.3 mL/hr at 09/09/24 0800 1,300 Units/hr at 09/09/24 0800    HYDROmorphone (DILAUDID) 0.2 mg/mL infusion ADULT/PEDS GREATER than or EQUAL to 20 kg  0.1-2.5 mg/hr Intravenous Continuous Gaudencio De Souza MD 12.5 mL/hr at 09/09/24 0817 2.5 mg/hr at 09/09/24 0817    ketamine (KETALAR) 25 mg/mL in sodium chloride 0.9 % 100 mL HIGH CONC infusion   mg/hr Intravenous Continuous Barry Hatch MD        [Held by provider] propofol (DIPRIVAN) infusion  5-75 mcg/kg/min (Dosing Weight) Intravenous Continuous Kike Ashby MD   Stopped at 09/08/24 2250    And    Medication Instruction   Does not apply Continuous PRN Kike Ashby MD        midazolam (VERSED) 100 mg/100 mL NS infusion - ADULT  1-16 mg/hr Intravenous Continuous Tanesha Riley MD 16 mL/hr at 09/09/24 0800 16 mg/hr at 09/09/24 0800    norepinephrine (LEVOPHED) 16 mg in  mL infusion MAX CONC CENTRAL LINE  0.01-0.6 mcg/kg/min (Dosing Weight) Intravenous Continuous Elier Hutchins MD 5 mL/hr at 09/09/24 0800 0.06 mcg/kg/min at 09/09/24 0800    POST-filter replacement solution for CVVHD & CVVHDF (Phoxillum BK4/2.5)   CRRT Continuous Pito Hayes  mL/hr at 09/08/24 1118 New Bag at 09/08/24 1118    PRE-filter replacement solution for CVVHD & CVVHDF (Phoxillum BK4/2.5)  12.5 mL/kg/hr CRRT Continuous Pito Hayes MD 1,000 mL/hr at 09/09/24 0701 12.5 mL/kg/hr at 09/09/24 0701    vasopressin 1 unit/mL MAX Conc (PITRESSIN) infusion  2.4 Units/hr Intravenous Continuous Barry Hatch MD

## 2024-09-09 NOTE — PLAN OF CARE
ICU End of Shift Summary. See flowsheets for vital signs and detailed assessment.    Changes this shift: RASS -5. Ketamine at 100, Versed at 16, Dilaudid at 2.5. Titrating sedation and CIS based on TOF goal of 2/4 twitches and Vent synchrony. RR increased to 28 2/2 overbreathing. TOF on 3.5 of CIS 1/4 ulnar 20mA. Off pressors since 0900; Hypertensive up to SYS 160s, MD aware. 45% Fio2, PEEP of 10. Fever masked by CRRT set at 36 degrees celsius. TF at goal. Gonzalez removed 2/2 oliguria/anuria. Pulling on CRRT to achieve goal of 0-50 net neg per hour. Significant stool output.     Plan: Wean Fio2 as able. Trach/PEG once stable. Goal of net neg 1L.   Goal Outcome Evaluation:      Plan of Care Reviewed With: patient and Mother     Overall Patient Progress: no changeOverall Patient Progress: no change    Outcome Evaluation: Decreased FiO2. Paralytic remains on. Off pressors

## 2024-09-09 NOTE — PROGRESS NOTES
General Infectious Disease Service - Green Team  PROGRESS NOTE    Patient:  Massiel Flaherty, Date of birth 1970, Medical record number [id number]  Date of Admission: 8/8/2024  Date of Visit:  9/9/2024         Assessment and Recommendations:   Problem List:  Acute hypoxic and hypercapnic respiratory failure c/b ARDS and s/p VV-ECMO (8/8/24-8/18/24)  Etiology unknown, possible CAP vs viral pneumonia vs other  Extubated 8/27, reintubated 8/29 w/ c/f recurrent PsA pneumonia   CT chest wo contrast 9/4/24 -Significantly decreased diffuse ground glass and consolidative opacities with residual groundglass and scattered nodular opacities, suggesting improving ARDS.     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  - normal LFTs,   Possible HSV oral lesions s/p treatment 8/22-28  Fever  Dry cough x1 month prior to admission  CHACORTA requiring CRRT  Cerebral edema; intentional hypernatremia  Subconjunctival hemorrhage  Right adnexal mass  Hypogammaglobulinemia  Elevated AST, alk phos  Anemia  Recent Augmentin, Z-pack and steroid tapers  Antibiotic allergy - Sulfa (hives), tetracycline (hives)  Facial flush/ rash - possibly related to Vancomycin started on 9/5/24. or consider slowing down the rate      Discussion:     Massiel Flaherty is a 53 year old female with PMHx significant for asthma, MURIEL on CPAP, HTN, anxiety,depression, expressive aphasia, fibromyalgia, seizure disorder, and hypothyroidism who presented to OSH 8/3/24 with fever, fatigue, dyspnea with c/f CAP and requiring intubation, proning, paralysis and transferred to [facility] 8/8/24 for further cares and now placed on VV ECMO and CHACORTA requiring CRRT. Decannulated from VV ECMO 8/18. Extubated 8/27 then reintubated 8/29 for worsened AHRF. Hospital course c/b recurrent  pneumonia.      Has had a chronic cough since 7/2024 for which she sought local care without improvement on steroid tapers, Zpack and Augmentin. Admitted to OSH 8/3 with hypoxia and fever. She received >14 days of Cefepime transitioned to Zosyn without significant improvement. On initial infectious work up, no infectious etiology of presentation identified. She was started on Amphotericin B 8/9 due to high level of concern for endemic fungal infection but this was discontinued 8/13 with return of negative antigen tests and unrevealing bronchoscopy. Work up showed: Negative Histoplasma serum antigen (x3), urine antigen (x1) and antibody; Negative Blasto serum and urine antigen, Negative Cocci serum antigen, Negative Cryptococcus serum antigen, Negative Aspergillus galactomannan serum antigen x2, Beta D Glucan was negative on 8/9,  Negative Tick borne PCR panel, Negative C diff, Negative Hepatitis A, B and C serology, Negative respiratory panel.   Negative EVELIO, ANCA, MPO, proteinase 3 at OSH. B     She has had low level positive EBV from BAL which is likely low level of replication in setting of critical illness rather than true reactivation. Her BAL culture from 8/14 grew pansensitive PsA which is consistent with colonization of her respiratory tract/ET tube (as she was on Cefepime x 2 weeks when this was cultured). A Karius was sent 8/13 with ongoing negative ID work up- which returned with EBV and HSV elevated- but this is reflective of low level of replication in setting of critical illness (does not require treatment). 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      She was extubated 8/27 however developed rigors and worsening respiratory needs on 8/29 per chart and was reintubated and started on empiric meropoenem+vancomycin. BCx negative. SCx with PsA (now resistant to levo, intermediate to ki and ceftazidime). Meropenem was switched to  ciprofloxacin on 9/1 and she was started on tobramycin nebs per ICU. Vancomycin was topped on 8/30, but restarted empirically on 9/5 In the setting of respiratory worsening. A BD glucan was sent and returned elevated at 216. RVP negative. Ureaplasma PCR negative. Legionella uAg negative. Blastomyces uAg negative. Histoplasma galactomannan uAg negative. BAL 8/30 with PSA on culture. Remaining BAL testing negative or pending. CMV PCR blood 741/log 2.9 8/31 and repeat 9/2 of 920/log3 and repeat test on 9/5/24 was 345. ICU team started ganciclovir on 9/5, but stopped on 9/8/24 since it is more likely that the very low CMV viremia translates low level of replication in the setting of critical illness. Vancomycin stopped o 9/8/24 given lack of growth of Gram positive organisms in cultures. She is on MV with FiO2 45% (improved from yesterday after more fluid was pulled during CRRT) she is sedated and on paralytic. She is off vasopressors. No fever in the past 48h. White count 15, but stable from yesterday and improved from the past few days (she is on steroids)     Recommendations:     Please continue ciprofloxacin and metronidazole for now. Anticipate completion of 10 days of ciprofloxacin and 7 days of metronidazole with end date likely on 9/11/24 (after the last dose of that day)     ICU team also started inhaled tobramycin on 9/1/24    CMV PCR from today is 390, which is a very low viremia and most likely translates low level of replication in the setting of critical illness. No treatment indicated at this moment     Follow-up pending cultures     Repeat Beta D Glucan with very low positivity, and PJP PCR negative      If plan to continue prednisone 20mg or more daily for more than 3 weeks, I would recommend to add PJP prophylaxis. Given history of allergy to sulfa, I recommend to obtain G6PD test to determine if dapsone can be used       The General ID team will continue to follow this patient. Please feel free to  "call with any questions.     EVELIO DE PAZ MD  Date of Service: 09/09/24  Pager: [phone]             Physical Exam:   /59 (BP Location: Right arm)   Pulse 106   Temp 98.8  F (37.1  C) (Esophageal)   Resp 28   Ht 1.575 m (5' 2\")   Wt 76.8 kg (169 lb 5 oz)   SpO2 100%   BMI 30.97 kg/m         Exam:  GENERAL:  Sedated, intubated, on MV with FiO2 45%. Not on vasopressors  HEAD: Normocephalic and atraumatic  ENT:  Intubated and on MV  LUNGS:  Intubated and on MV  CARDIOVASCULAR:  Regular rate and rhythm, normal S1 S2,  no murmur  NEUROLOGIC:  Sedated  PSYCHIATRIC: Sedated         Laboratory Data:     Creatinine   Date Value Ref Range Status   09/09/2024 0.46 (L) 0.51 - 0.95 mg/dL Final   09/09/2024 0.48 (L) 0.51 - 0.95 mg/dL Final   09/08/2024 0.55 0.51 - 0.95 mg/dL Final   09/08/2024 0.60 0.51 - 0.95 mg/dL Final   09/08/2024 0.61 0.51 - 0.95 mg/dL Final     WBC Count   Date Value Ref Range Status   09/09/2024 15.0 (H) 4.0 - 11.0 10e3/uL Final   09/08/2024 15.0 (H) 4.0 - 11.0 10e3/uL Final   09/07/2024 11.8 (H) 4.0 - 11.0 10e3/uL Final   09/06/2024 18.8 (H) 4.0 - 11.0 10e3/uL Final   09/06/2024 22.8 (H) 4.0 - 11.0 10e3/uL Final     Hemoglobin   Date Value Ref Range Status   09/09/2024 7.2 (L) 11.7 - 15.7 g/dL Final     Platelet Count   Date Value Ref Range Status   09/09/2024 153 150 - 450 10e3/uL Final     Lab Results   Component Value Date     09/09/2024    BUN 22.6 (H) 09/09/2024    CO2 21 (L) 09/09/2024     No results found for: \"CRP\"        Pertinent Recent Microbiology Data:   No results for input(s): \"CULT\", \"SDES\" in the last 168 hours.         Imaging:     Recent Results (from the past 48 hour(s))   XR Chest Port 1 View    Narrative    Exam: XR CHEST PORT 1 VIEW, 9/8/2024 6:51 AM    Indication: 53 intubated for ARDS secondary to ILD, sudden increase in  O2 requirement as well as vent dyssynchrony, assess ET tube and for  worsening infection    Comparison: CT chest 9/4/2024. Chest " x-ray 9/4/2024    Findings: Frontal radiograph of the chest. Endotracheal tube tip is  3.7 cm above the ivy. Enteric tube traversing into the abdomen with  the tip out of the field of view.  Esophageal temperature probe in the  mid/lower thoracic esophagus. Left IJ central venous catheter within  the SVC confluence. Right IJ central venous catheter with the tip in  the deep right atrium.    Trachea is midline. Cardiac silhouette is within normal limits. Small  bilateral effusions. Increased diffuse patchy interstitial opacities.  Lung volumes are decreased. No significant pneumothorax.      Impression    Impression:   1. Increased diffuse patchy airspace opacities, as can be seen with  ARDS.  2. Small bilateral effusions.    I have personally reviewed the examination and initial interpretation  and I agree with the findings.    CORTNEY LAU MD         SYSTEM ID:  [id number]

## 2024-09-09 NOTE — PROGRESS NOTES
CRRT STATUS NOTE    DATA:  Time:  0537  Pressures WNL:  YES  Filter Status:  WDL    Problems Reported/Alarms Noted:  none    Supplies Present:  YES    ASSESSMENT:    Patient Net Fluid Balance:  positive 21ml net since midnight       Intake/Output Summary (Last 24 hours) at 9/9/2024 0537  Last data filed at 9/9/2024 0500  Gross per 24 hour   Intake 3786.97 ml   Output 4739 ml   Net -952.03 ml       Vital Signs: Temp:  [96.8  F (36  C)-100.4  F (38  C)] 98.1  F (36.7  C)  Pulse:  [] 64  Resp:  [14-38] 24  BP: (130-148)/(53-73) 130/59  MAP:  [59 mmHg-108 mmHg] 72 mmHg  Arterial Line BP: ()/(42-82) 105/53  FiO2 (%):  [50 %-90 %] 50 %  SpO2:  [73 %-100 %] 99 %       Most Recent BMP's:  Recent Labs   Lab Test 09/09/24 0343 09/08/24 2000 09/08/24  1155 09/08/24 0329 09/07/24  1942 09/07/24  1210    136 136 138 135 133*   POTASSIUM 3.5 3.8 4.3 3.8 4.1 4.2   CHLORIDE 104 104 104 106 103 101   CO2 22 21* 23 22 22 23   BUN 21.7* 25.7* 28.2* 24.2* 25.5* 27.8*   CR 0.48* 0.55 0.60 0.61 0.65 0.66   ANIONGAP 9 11 9 10 10 9   QUAN 8.0* 8.3* 8.1* 8.5* 8.6* 8.8     Most Recent CBC's:  Recent Labs   Lab Test 09/09/24 0343 09/08/24 0329 09/07/24 0332 09/06/24  1819   WBC 15.0* 15.0* 11.8* 18.8*   HGB 7.2* 7.5* 7.6* 7.0*   MCV 96 98 98 100    185 171 240     Most Recent ABG's:  Recent Labs   Lab Test 09/09/24 0343 09/09/24  0005 09/08/24 2000   PH 7.35 7.34* 7.29*   PO2 64* 149* 169*   PCO2 44 45 51*   HCO3 24 24 25   THONG -1.7 -1.7 -2.1       Goals of Therapy:  0-200ml/hr net negative fluid     INTERVENTIONS:   Charting reviewed. Rounding completed. Treatment plan discussed with bedside nurse.     PLAN:  Continue with current plan of care please contact CRRT resource with any questions or concerns via [email or web address].     Yes

## 2024-09-09 NOTE — PROGRESS NOTES
MEDICAL ICU PROGRESS NOTE  09/09/2024    Date of Service (when I saw the patient): 09/09/2024    ASSESSMENT: Massiel Flaherty is a 53 year old female with PMH Anxiety/depression, fibromyalgia, c/f nonepileptic seizures not on AEDs, hypothyroidism, asthma, HLD, MURIEL on CPAP, expressive aphasia, hypertension  who was admitted on 8/3/2024 for fatigue, fever and dyspnea and intubated 8/6/24 at OSH for ARDS, proned and paralyzed without improvement. Transferred to [facility] 8/8/24, hypoxic with high plateaus/peaks and placed on VV ECMO and CRRT. Decannulated from VV ECMO 8/18 and transferred to MICU 8/26/24 for ongoing management. Extubated 8/27 then reintubated 8/29 iso worsening AHRF and pseudomonas pneumonia.     CHANGES and MAJOR THINGS TODAY:  - Increasing sedation and paralytic needs overnight, started on ketamine and precedex and paralyzed on cys  - Will increase ketamine infusion rate for sedation rather than analgesia and discontinue precedex as unlikely to help at this sedation level  - Increasing presser requirements started on vasopressin in attempt to wean NE, will wean as able and try to pull off more fluid from CRRT for net negative 1L  - Increased CRP up to 205 from 23, unclear whether any new infectious etiology although given CRRT need, hard to interpret this value  - If worsening presser needs will reduce Tfs to trickle feeds    Neuro:  # Pain and sedation  # Acute pain  # Sedation and analgesia for vent compliance   # Concern for ICU delirium   # Hx Fibromyalgia   # Hx myalgic encephalomyelitis   # Hx anxiety/depression  - Monitor neurological status. Delirium preventions and precautions.   - Pain: Scheduled: tylenol, oxycodone 10mg q 4hr, Ativan 4 mg every 6 hours for further sedation   PRN: tylenol, oxycodone  - Sedation plan: dilaudid (rotated from fentanyl 9/5), midazolam, ketamine (9/9-)  - Restart paralytic with cys  - discontinued propofol due to elevated triglycerides   - RASS goal -4 to -5  -  Gabapentin 300mg TID held while sedated  - Seroquel 25mg BID PRN held while sedated  - PTA Venlafaxine and Amitriptyline discontinued while sedated    # Hx spells with staring and BUE posturing previously described as nonepileptic seizures   # Hx of GTC seizures and myoclonic epilepsy, weaned off AEDs   # Diffuse cerebral edema - improved  - Sodium goals liberalized to 140-145  - 8/21: MRI Brain: no acute intracranial pathology. No findings to suggest anoxic brain injury.     Pulmonary:  # Acute hypoxic respiratory failure c/b ARDS  # Pseudomonas pneumonia  # Mechanical ventilation  # s/p VV ECMO 8/8 - 8/18   # Hx CAP  # Asthma  # MURIEL on home CPAP  PFT dated 9/8/2020 demonstrated normal spirometry with mild diffusion impairment and mild restriction (FEV1/FVC 80%, FEV1 2.59, DLCO 40%). CT abdomen chest 3/9/2020 demonstrated scarring and fibrosis bilaterally which correlated with the decreased DLCO. The patient also has a history of MURIEL on CPAP therapy as currently managed by her provider in [place]. Unclear what triggered ARDS or why she has had such severe hospital course, further investigated ILD but results all unremarkable. Extubated 8/27, reintubated 8/29 for worsening O2 demands and diffuse opacities iso new/recurrent psuedomonas pneumonia. Bronch with BAL 8/30, pseudomonas growing as below.   - ILD w/u aldolase, EVELIO, RF, CCP, ANCA, MPO, SSA Ro and [place], Scleroderma antibody, Radha 1,  hypersensity pneumonitis, IGG subclasses,  aspergillus, blastomyces, histoplasma neg  - SpO2 goals >92%, PaO2 goals >60   - PTA singular at bedtime if able  - Scheduled pulmicort, and duonebs   - Lung protective ventilation strategies given ARDS  - Methylpred 125mg q6H x 24h 8/30 --> transition to 20 mg dex x 5 days, followed by 10mg x 5 days  - Epoprostenol restarted 9/8 at 20  - Wean vent as able  FiO2 (%): 50 %, Resp: 24, Vent Mode: CMV/AC, Resp Rate (Set): 24 breaths/min, Tidal Volume (Set, mL): 400 mL, PEEP (cm H2O): 10  cmH2O, Resp Rate (Set): 24 breaths/min, Tidal Volume (Set, mL): 400 mL, PEEP (cm H2O): 10 cmH2O    Cardiovascular:  # Hyperlipidemia  # Hypotension  # Hx HTN  - MAP goal >65, SBP goals >110  - NE, vaso for MAP goal  - PTA atorvastatin  - PTA clonidine held while sedated  - Lower extremity ultrasound without vascular pathology, no hematoma or clots  - Monitor discoloration of extremities for necrosis  - Monitoring Hypertension    Sinus Tachycardia  Likely 2/2 fluid removal, sepsis, pain and sedation as above    GI/Nutrition:  # Moderate protein calorie malnutrition  # Non-infectious Diarrhea  # GERD   - Protonix (home medication)   - Nutrition consulted, appreciate recs  - Diet: TF via NJ, transitioned to trickle feeds 9/5 given pressor requirements  - Hold bowel regimen d/t loose stools  - Change suspension meds to other form as able  - Continue scheduled multivitamin, banatrol, prosource packet  - loperamide PRN  - Rectal tube, continues with high output. Keep today    Renal/Fluids/Electrolytes:  # Acute kidney injury  # Renal failure  Baseline Cr approx 0.6. Pt was anuric here but now making some urine, likely prerenal due to shock.  - Continue CRRT; fluid goal net negative 1L for the day  - Heparinized CRRT circuit, low intensity protocol. Last clotted 8/24 PM  - Strict I&Os  - Bladder scan q shift  - Avoid nephrotoxins as able    Endocrine:  # Steroid and stress induced hyperglycemia  # Hypothyroidism   - Goal to keep BG < 180 for optimal wound healing.   - with rising blood glucose due to steroids started insulin drip 8/30, discontinued 9/7  - sliding scale insulin   - Continue PTA synthroid    ID:  # Leukocytosis  # Psuedomonas pneumonia  # Hx CAP  Respiratory cx 8/29 pseudomonas pneumonia. Intermediate susceptability to meropenem, more significant sensitivities to ciprofloxacin  - Continue to monitor fever curve and inflammatory markers as appropriate  - ID consulted, appreciate recs.   - Due to rigors seen  on exam 8/29, low CRRT set temp masking any possible fevers, worsening O2 requirements, took blood cultures and started antibiotics    Abx  - Meropenem 8/29 - 9/1  - Vancomycin 8/29- 8/30  - Tobramycin x 1 8/29  - Vancomycin 9/5-9/8    - Tobramycin nebs BID 9/1-  - Ciprofloxacin infusion 9/1-   - Metronidazole 9/6 -     PJP Ppx  Given Dex-ARDS Massiel ferraro remain on steriods for > 3 weeks so will initiate PJP ppx. 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   - Ganciclovir 9/6 - 9/8, discontinued given ID recs  - trend CMV 9/9    # HSV-1  Mouth sores present, which could have viral etiology. 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 Monitor and trend.   - Heparinize CRRT circuit    - CTAP 8/30 due to acute hemoglobin drop requiring transfusion of 2 x 1 unit of blood 12 hours apart. Negative for acute bleed    Musculoskeletal/Rheum:  # Alopecia  - Hold PTA hydroxychloroquine     # Weakness and deconditioning of critical illness  # Left ankle injury pre-hospitalization    - Physical and occupational therapy when able.     Skin:  # BLE scattered ecchymosis  # Left toe duskiness  - Bilateral lower leg ecchymosis  - WOC consult  - Ecchymosis to left foot, most noticeable to 3rd and 4th toes    General Cares/Prophylaxis:  DVT Prophylaxis: Heparin through CRRT circuit  GI Prophylaxis: PPI  Restraints: no  Code Status: DNR/OK to intubate    Lines/tubes/drains:  - ETT (8/29)  - NJ (8/9)  - LIJ CVC (8/8)  - Radial a-line (8/29)  - PIV x3  - Rectal tube (8/17)  - Tunneled HD line (8/23)    Disposition:  - Medical ICU     Patient seen and findings/plan discussed with medical ICU staff, Dr. Dubon.    I spent a total of 45 minutes (excluding procedure time) personally providing and  directing critical care services at the bedside and on the critical care unit for Massiel Oakleytler     Gaudencio De Souza MD    Clinically Significant Risk Factors              # Hypoalbuminemia: Lowest albumin = 2.2 g/dL at 8/10/2024  9:47 AM, will monitor as appropriate                # Severe Malnutrition: based on nutrition assessment      # Financial/Environmental Concerns: none        Code Status: No CPR- Pre-arrest intubation OK       ====================================  INTERVAL HISTORY:   Increasing sedation and paralytic needs overnight, started on ketamine and precedex and paralyzed on cys. Also worsening hypotension requiring addition of vasopressin     OBJECTIVE:   1. VITAL SIGNS:   Temp:  [96.8  F (36  C)-100.4  F (38  C)] 98.8  F (37.1  C)  Pulse:  [] 67  Resp:  [14-38] 24  BP: (130-148)/(53-73) 130/59  MAP:  [51 mmHg-94 mmHg] 75 mmHg  Arterial Line BP: ()/(35-67) 105/54  FiO2 (%):  [50 %-90 %] 50 %  SpO2:  [92 %-100 %] 99 %  FiO2 (%): 50 %, Resp: 24, Vent Mode: CMV/AC, Resp Rate (Set): 24 breaths/min, Tidal Volume (Set, mL): 400 mL, PEEP (cm H2O): 10 cmH2O, Resp Rate (Set): 24 breaths/min, Tidal Volume (Set, mL): 400 mL, PEEP (cm H2O): 10 cmH2O  2. INTAKE/ OUTPUT:   I/O last 3 completed shifts:  In: 3839.27 [I.V.:2104.27; NG/GT:655]  Out: 4795 [Urine:500; Other:3995; Stool:300]    3. PHYSICAL EXAMINATION:  General: Intubated, sedated  HEENT: Normocephalic, atraumatic, eyes with ointment dressing  Neuro: RASS -4-5, arefelexic  Pulm/Resp: Bilateral breath sounds audible with diffuse coarse breath sounds  CV: Sinus tachycardia, s1/2 normal, no murmur  Abdomen: Soft, non-distended, limited 2/2 to sedation  : deferred  Incisions/Skin: lower leg 1+ edema with ecchymosis present and scattered. Some bluish discoloration of the finger tips, capillary refill normal    4. LABS:   Arterial Blood Gases   Recent Labs   Lab 09/09/24  0343 09/09/24  0005 09/08/24 2000 09/08/24  1406   PH 7.35 7.34*  7.29* 7.25*   PCO2 44 45 51* 58*   PO2 64* 149* 169* 53*   HCO3 24 24 25 25     Complete Blood Count   Recent Labs   Lab 09/09/24 0343 09/08/24 0329 09/07/24 0332 09/06/24  1819   WBC 15.0* 15.0* 11.8* 18.8*   HGB 7.2* 7.5* 7.6* 7.0*    185 171 240     Basic Metabolic Panel  Recent Labs   Lab 09/09/24 0343 09/09/24 0340 09/09/24  0006 09/08/24 2000 09/08/24  1406 09/08/24  1155 09/08/24  0855 09/08/24  0329     --   --  136  --  136  --  138   POTASSIUM 3.5  --   --  3.8  --  4.3  --  3.8   CHLORIDE 104  --   --  104  --  104  --  106   CO2 22  --   --  21*  --  23  --  22   BUN 21.7*  --   --  25.7*  --  28.2*  --  24.2*   CR 0.48*  --   --  0.55  --  0.60  --  0.61   * 206* 194* 194*  227*   < > 214*   < > 124*    < > = values in this interval not displayed.     Liver Function Tests  Recent Labs   Lab 09/09/24 0343 09/08/24 2000 09/08/24  1155 09/08/24 0329 09/07/24  1210 09/07/24 0332 09/06/24  1547 09/06/24  0341   AST 42  --   --  28  --  29  --  29   ALT 26  --   --  28  --  29  --  26   ALKPHOS 93  --   --  85  --  86  --  95   BILITOTAL 0.2  --   --  0.2  --  0.2  --  0.3   ALBUMIN 2.9* 3.2* 3.2* 3.1*   < > 3.1*   < > 3.3*    < > = values in this interval not displayed.     Coagulation Profile  No lab results found in last 7 days.    5. RADIOLOGY:   No results found for this or any previous visit (from the past 24 hour(s)).

## 2024-09-09 NOTE — PROGRESS NOTES
ICU Daily Rounding Checklist     Checklist Response Notes   Can sedation be reduced?  No    Can analgesia be reduced? yes Removed po oxy and ativan, on high dose IV meds   Is delirium being assessed, addressed and prevented? Yes    Spontaneous awakening trial and/or Spontaneous breathing trial candidate?  No    Total fluid balance goal reviewed?  Yes Target Goals:        CRRT    Is the patient at goals for lung protective ventilation? Yes    Head of bed elevation (30 degrees)? Yes    Skin breakdown assessment (prevention) completed? Yes    Is enteral nutrition at goal? Yes    Is blood glucose at goal? Yes    Deep venous thrombosis prophylaxis? Yes      Gastric ulcer prophylaxis?  Yes If coagulopathy (INR-1.5 PTT2x normal. Ph<50k), mechanical ventilation 48hr, history of GI bleed/ulcer within past year. TBL, SCI, or burn, or if >= 2 minor risk factors (sepsis, ICU stay 1 week, occult GI bleed > 6 days. glucocorticoid therapy, NSAID use, antiplatelet use)   Can Antibiotics be narrowed or discontinued? No    Early mobility candidate and physical therapy consulted? yes    Is rolle catheter needed? no Will remove today   Is central venous/arterial catheter needed? Yes    Has the family been updated? Yes    Are the patient's goals of care and code status current? Yes

## 2024-09-09 NOTE — PROGRESS NOTES
"CRRT STATUS NOTE    DATA:  Time:  5:52 PM  Pressures WNL:  YES  Filter Status:  WDL    Problems Reported/Alarms Noted:  None reported    Supplies Present:  YES    ASSESSMENT:  Patient Net Fluid Balance:    Intake/Output Summary (Last 24 hours) at 9/9/2024 1752  Last data filed at 9/9/2024 1700  Gross per 24 hour   Intake 3950.76 ml   Output 5346 ml   Net -1395.24 ml       Vital Signs:  Temp: 98.8  F (37.1  C) Temp src: Esophageal BP: 130/59 Pulse: 106   Resp: 28 SpO2: 100 % O2 Device: Mechanical Ventilator      Labs:    Lab Results   Component Value Date    WBC 15.0 09/09/2024     Lab Results   Component Value Date    RBC 2.42 09/09/2024     Lab Results   Component Value Date    HGB 7.2 09/09/2024     Lab Results   Component Value Date    HCT 23.1 09/09/2024     No components found for: \"MCT\"  Lab Results   Component Value Date    MCV 96 09/09/2024     Lab Results   Component Value Date    MCH 29.8 09/09/2024     Lab Results   Component Value Date    MCHC 31.2 09/09/2024     Lab Results   Component Value Date    RDW 20.0 09/09/2024     Lab Results   Component Value Date     09/09/2024     Last Comprehensive Metabolic Panel:  Lab Results   Component Value Date     09/09/2024    POTASSIUM 3.5 09/09/2024    CHLORIDE 105 09/09/2024    CO2 21 (L) 09/09/2024    ANIONGAP 12 09/09/2024     (H) 09/09/2024    BUN 22.6 (H) 09/09/2024    CR 0.46 (L) 09/09/2024    GFRESTIMATED >90 09/09/2024    QUAN 8.3 (L) 09/09/2024         Goals of Therapy:  0-50cc/hr net negative without increasing pressors    INTERVENTIONS:   Checked in with bedside RN    PLAN:  Continue with treatment goals. Call CRRT RN on Vocera with questions and concerns.      "

## 2024-09-10 ENCOUNTER — APPOINTMENT (OUTPATIENT)
Dept: GENERAL RADIOLOGY | Facility: CLINIC | Age: 54
DRG: 003 | End: 2024-09-10
Attending: INTERNAL MEDICINE
Payer: MEDICAID

## 2024-09-10 LAB
ALBUMIN SERPL BCG-MCNC: 2.9 G/DL (ref 3.5–5.2)
ALBUMIN SERPL BCG-MCNC: 3 G/DL (ref 3.5–5.2)
ALBUMIN SERPL BCG-MCNC: 3.2 G/DL (ref 3.5–5.2)
ALLEN'S TEST: ABNORMAL
ALP SERPL-CCNC: 100 U/L (ref 40–150)
ALT SERPL W P-5'-P-CCNC: 22 U/L (ref 0–50)
ANION GAP SERPL CALCULATED.3IONS-SCNC: 11 MMOL/L (ref 7–15)
ANION GAP SERPL CALCULATED.3IONS-SCNC: 12 MMOL/L (ref 7–15)
ANION GAP SERPL CALCULATED.3IONS-SCNC: 12 MMOL/L (ref 7–15)
AST SERPL W P-5'-P-CCNC: 19 U/L (ref 0–45)
BACTERIA BLD CULT: NO GROWTH
BACTERIA UR CULT: ABNORMAL
BASE EXCESS BLDA CALC-SCNC: -0.7 MMOL/L (ref -3–3)
BASE EXCESS BLDA CALC-SCNC: -0.8 MMOL/L (ref -3–3)
BASE EXCESS BLDA CALC-SCNC: 0.9 MMOL/L (ref -3–3)
BILIRUB DIRECT SERPL-MCNC: <0.2 MG/DL (ref 0–0.3)
BILIRUB SERPL-MCNC: 0.2 MG/DL
BUN SERPL-MCNC: 21.7 MG/DL (ref 6–20)
BUN SERPL-MCNC: 26.3 MG/DL (ref 6–20)
BUN SERPL-MCNC: 26.4 MG/DL (ref 6–20)
CA-I BLD-MCNC: 4.7 MG/DL (ref 4.4–5.2)
CALCIUM SERPL-MCNC: 8.3 MG/DL (ref 8.8–10.4)
CALCIUM SERPL-MCNC: 8.5 MG/DL (ref 8.8–10.4)
CALCIUM SERPL-MCNC: 8.6 MG/DL (ref 8.8–10.4)
CHLORIDE SERPL-SCNC: 102 MMOL/L (ref 98–107)
CHLORIDE SERPL-SCNC: 104 MMOL/L (ref 98–107)
CHLORIDE SERPL-SCNC: 104 MMOL/L (ref 98–107)
COHGB MFR BLD: 97.9 % (ref 96–97)
COHGB MFR BLD: 99.1 % (ref 96–97)
COHGB MFR BLD: 99.9 % (ref 96–97)
CREAT SERPL-MCNC: 0.45 MG/DL (ref 0.51–0.95)
CREAT SERPL-MCNC: 0.48 MG/DL (ref 0.51–0.95)
CREAT SERPL-MCNC: 0.49 MG/DL (ref 0.51–0.95)
CRP SERPL-MCNC: 206 MG/L
EGFRCR SERPLBLD CKD-EPI 2021: >90 ML/MIN/1.73M2
ERYTHROCYTE [DISTWIDTH] IN BLOOD BY AUTOMATED COUNT: 20.6 % (ref 10–15)
GLUCOSE BLDC GLUCOMTR-MCNC: 132 MG/DL (ref 70–99)
GLUCOSE BLDC GLUCOMTR-MCNC: 135 MG/DL (ref 70–99)
GLUCOSE BLDC GLUCOMTR-MCNC: 135 MG/DL (ref 70–99)
GLUCOSE BLDC GLUCOMTR-MCNC: 156 MG/DL (ref 70–99)
GLUCOSE BLDC GLUCOMTR-MCNC: 169 MG/DL (ref 70–99)
GLUCOSE BLDC GLUCOMTR-MCNC: 195 MG/DL (ref 70–99)
GLUCOSE SERPL-MCNC: 158 MG/DL (ref 70–99)
GLUCOSE SERPL-MCNC: 189 MG/DL (ref 70–99)
GLUCOSE SERPL-MCNC: 218 MG/DL (ref 70–99)
HCO3 BLD-SCNC: 24 MMOL/L (ref 21–28)
HCO3 BLD-SCNC: 25 MMOL/L (ref 21–28)
HCO3 BLD-SCNC: 25 MMOL/L (ref 21–28)
HCO3 SERPL-SCNC: 21 MMOL/L (ref 22–29)
HCO3 SERPL-SCNC: 22 MMOL/L (ref 22–29)
HCO3 SERPL-SCNC: 22 MMOL/L (ref 22–29)
HCT VFR BLD AUTO: 24.6 % (ref 35–47)
HGB BLD-MCNC: 7.5 G/DL (ref 11.7–15.7)
MAGNESIUM SERPL-MCNC: 2 MG/DL (ref 1.7–2.3)
MAGNESIUM SERPL-MCNC: 2.1 MG/DL (ref 1.7–2.3)
MAGNESIUM SERPL-MCNC: 2.2 MG/DL (ref 1.7–2.3)
MCH RBC QN AUTO: 29.4 PG (ref 26.5–33)
MCHC RBC AUTO-ENTMCNC: 30.5 G/DL (ref 31.5–36.5)
MCV RBC AUTO: 97 FL (ref 78–100)
O2/TOTAL GAS SETTING VFR VENT: 40 %
O2/TOTAL GAS SETTING VFR VENT: 40 %
O2/TOTAL GAS SETTING VFR VENT: 45 %
PCO2 BLD: 36 MM HG (ref 35–45)
PCO2 BLD: 40 MM HG (ref 35–45)
PCO2 BLD: 44 MM HG (ref 35–45)
PEEP: 10 CM H2O
PH BLD: 7.36 [PH] (ref 7.35–7.45)
PH BLD: 7.39 [PH] (ref 7.35–7.45)
PH BLD: 7.45 [PH] (ref 7.35–7.45)
PHOSPHATE SERPL-MCNC: 3.5 MG/DL (ref 2.5–4.5)
PHOSPHATE SERPL-MCNC: 3.8 MG/DL (ref 2.5–4.5)
PHOSPHATE SERPL-MCNC: 4.2 MG/DL (ref 2.5–4.5)
PLATELET # BLD AUTO: 235 10E3/UL (ref 150–450)
PO2 BLD: 103 MM HG (ref 80–105)
PO2 BLD: 85 MM HG (ref 80–105)
PO2 BLD: 93 MM HG (ref 80–105)
POTASSIUM SERPL-SCNC: 3.6 MMOL/L (ref 3.4–5.3)
POTASSIUM SERPL-SCNC: 4.1 MMOL/L (ref 3.4–5.3)
POTASSIUM SERPL-SCNC: 4.3 MMOL/L (ref 3.4–5.3)
PROT SERPL-MCNC: 5.6 G/DL (ref 6.4–8.3)
RBC # BLD AUTO: 2.55 10E6/UL (ref 3.8–5.2)
SAO2 % BLDA: 96 % (ref 92–100)
SAO2 % BLDA: 97 % (ref 92–100)
SAO2 % BLDA: 98 % (ref 92–100)
SODIUM SERPL-SCNC: 136 MMOL/L (ref 135–145)
SODIUM SERPL-SCNC: 137 MMOL/L (ref 135–145)
SODIUM SERPL-SCNC: 137 MMOL/L (ref 135–145)
UFH PPP CHRO-ACNC: 0.24 IU/ML
WBC # BLD AUTO: 16.5 10E3/UL (ref 4–11)

## 2024-09-10 PROCEDURE — 250N000009 HC RX 250

## 2024-09-10 PROCEDURE — 83735 ASSAY OF MAGNESIUM: CPT | Performed by: CLINICAL NURSE SPECIALIST

## 2024-09-10 PROCEDURE — 94003 VENT MGMT INPAT SUBQ DAY: CPT

## 2024-09-10 PROCEDURE — 250N000009 HC RX 250: Performed by: SURGERY

## 2024-09-10 PROCEDURE — 250N000009 HC RX 250: Performed by: STUDENT IN AN ORGANIZED HEALTH CARE EDUCATION/TRAINING PROGRAM

## 2024-09-10 PROCEDURE — 250N000011 HC RX IP 250 OP 636

## 2024-09-10 PROCEDURE — 99233 SBSQ HOSP IP/OBS HIGH 50: CPT | Performed by: INTERNAL MEDICINE

## 2024-09-10 PROCEDURE — 71045 X-RAY EXAM CHEST 1 VIEW: CPT | Mod: 26 | Performed by: RADIOLOGY

## 2024-09-10 PROCEDURE — 250N000013 HC RX MED GY IP 250 OP 250 PS 637

## 2024-09-10 PROCEDURE — 90947 DIALYSIS REPEATED EVAL: CPT

## 2024-09-10 PROCEDURE — 85027 COMPLETE CBC AUTOMATED: CPT | Performed by: PHYSICIAN ASSISTANT

## 2024-09-10 PROCEDURE — 82310 ASSAY OF CALCIUM: CPT | Performed by: CLINICAL NURSE SPECIALIST

## 2024-09-10 PROCEDURE — 258N000003 HC RX IP 258 OP 636

## 2024-09-10 PROCEDURE — 250N000011 HC RX IP 250 OP 636: Performed by: CLINICAL NURSE SPECIALIST

## 2024-09-10 PROCEDURE — 84155 ASSAY OF PROTEIN SERUM: CPT | Performed by: CLINICAL NURSE SPECIALIST

## 2024-09-10 PROCEDURE — 200N000002 HC R&B ICU UMMC

## 2024-09-10 PROCEDURE — 999N000157 HC STATISTIC RCP TIME EA 10 MIN

## 2024-09-10 PROCEDURE — 80069 RENAL FUNCTION PANEL: CPT | Performed by: CLINICAL NURSE SPECIALIST

## 2024-09-10 PROCEDURE — 250N000013 HC RX MED GY IP 250 OP 250 PS 637: Performed by: PHYSICIAN ASSISTANT

## 2024-09-10 PROCEDURE — 99233 SBSQ HOSP IP/OBS HIGH 50: CPT | Performed by: CLINICAL NURSE SPECIALIST

## 2024-09-10 PROCEDURE — 86140 C-REACTIVE PROTEIN: CPT

## 2024-09-10 PROCEDURE — 82805 BLOOD GASES W/O2 SATURATION: CPT

## 2024-09-10 PROCEDURE — 85520 HEPARIN ASSAY: CPT | Performed by: CLINICAL NURSE SPECIALIST

## 2024-09-10 PROCEDURE — 99291 CRITICAL CARE FIRST HOUR: CPT | Mod: GC | Performed by: INTERNAL MEDICINE

## 2024-09-10 PROCEDURE — 250N000011 HC RX IP 250 OP 636: Performed by: SURGERY

## 2024-09-10 PROCEDURE — 71045 X-RAY EXAM CHEST 1 VIEW: CPT

## 2024-09-10 PROCEDURE — 250N000013 HC RX MED GY IP 250 OP 250 PS 637: Performed by: SURGERY

## 2024-09-10 PROCEDURE — 82248 BILIRUBIN DIRECT: CPT | Performed by: CLINICAL NURSE SPECIALIST

## 2024-09-10 PROCEDURE — 82330 ASSAY OF CALCIUM: CPT | Performed by: CLINICAL NURSE SPECIALIST

## 2024-09-10 PROCEDURE — 250N000011 HC RX IP 250 OP 636: Mod: JZ

## 2024-09-10 PROCEDURE — 258N000003 HC RX IP 258 OP 636: Performed by: SURGERY

## 2024-09-10 PROCEDURE — 94640 AIRWAY INHALATION TREATMENT: CPT | Mod: 76

## 2024-09-10 RX ORDER — METRONIDAZOLE 500 MG/100ML
500 INJECTION, SOLUTION INTRAVENOUS EVERY 12 HOURS
Status: COMPLETED | OUTPATIENT
Start: 2024-09-10 | End: 2024-09-11

## 2024-09-10 RX ORDER — HYDRALAZINE HYDROCHLORIDE 10 MG/1
10 TABLET, FILM COATED ORAL 4 TIMES DAILY PRN
Status: DISCONTINUED | OUTPATIENT
Start: 2024-09-10 | End: 2024-09-26

## 2024-09-10 RX ORDER — TOBRAMYCIN INHALATION SOLUTION 300 MG/5ML
300 INHALANT RESPIRATORY (INHALATION)
Status: COMPLETED | OUTPATIENT
Start: 2024-09-10 | End: 2024-09-11

## 2024-09-10 RX ORDER — CIPROFLOXACIN 2 MG/ML
400 INJECTION, SOLUTION INTRAVENOUS EVERY 8 HOURS
Status: COMPLETED | OUTPATIENT
Start: 2024-09-10 | End: 2024-09-11

## 2024-09-10 RX ORDER — PROPOFOL 10 MG/ML
5-75 INJECTION, EMULSION INTRAVENOUS CONTINUOUS
Status: DISCONTINUED | OUTPATIENT
Start: 2024-09-10 | End: 2024-09-12

## 2024-09-10 RX ORDER — CHLORHEXIDINE GLUCONATE ORAL RINSE 1.2 MG/ML
15 SOLUTION DENTAL EVERY 12 HOURS
Status: DISCONTINUED | OUTPATIENT
Start: 2024-09-10 | End: 2024-09-17

## 2024-09-10 RX ORDER — HYDRALAZINE HYDROCHLORIDE 20 MG/ML
10 INJECTION INTRAMUSCULAR; INTRAVENOUS EVERY 4 HOURS PRN
Status: DISCONTINUED | OUTPATIENT
Start: 2024-09-10 | End: 2024-09-10

## 2024-09-10 RX ORDER — PROPOFOL 10 MG/ML
INJECTION, EMULSION INTRAVENOUS
Status: DISCONTINUED
Start: 2024-09-10 | End: 2024-09-11 | Stop reason: HOSPADM

## 2024-09-10 RX ADMIN — CIPROFLOXACIN 400 MG: 400 INJECTION, SOLUTION INTRAVENOUS at 21:16

## 2024-09-10 RX ADMIN — Medication 10 MG: at 16:12

## 2024-09-10 RX ADMIN — CISATRACURIUM BESYLATE 5 MCG/KG/MIN: 10 INJECTION INTRAVENOUS at 03:32

## 2024-09-10 RX ADMIN — ACETAMINOPHEN 650 MG: 325 TABLET ORAL at 21:19

## 2024-09-10 RX ADMIN — BUDESONIDE 1 MG: 1 INHALANT ORAL at 08:07

## 2024-09-10 RX ADMIN — NYSTATIN 500000 UNITS: 100000 SUSPENSION ORAL at 08:03

## 2024-09-10 RX ADMIN — EPOPROSTENOL 10 NG/KG/MIN: 1.5 INJECTION, POWDER, LYOPHILIZED, FOR SOLUTION INTRAVENOUS at 10:48

## 2024-09-10 RX ADMIN — IPRATROPIUM BROMIDE 0.5 MG: 0.5 SOLUTION RESPIRATORY (INHALATION) at 08:06

## 2024-09-10 RX ADMIN — IPRATROPIUM BROMIDE 0.5 MG: 0.5 SOLUTION RESPIRATORY (INHALATION) at 17:53

## 2024-09-10 RX ADMIN — CALCIUM CHLORIDE, MAGNESIUM CHLORIDE, SODIUM CHLORIDE, SODIUM BICARBONATE, POTASSIUM CHLORIDE AND SODIUM PHOSPHATE DIBASIC DIHYDRATE 12.5 ML/KG/HR: 3.68; 3.05; 6.34; 3.09; .314; .187 INJECTION INTRAVENOUS at 18:29

## 2024-09-10 RX ADMIN — CALCIUM CHLORIDE, MAGNESIUM CHLORIDE, SODIUM CHLORIDE, SODIUM BICARBONATE, POTASSIUM CHLORIDE AND SODIUM PHOSPHATE DIBASIC DIHYDRATE 12.5 ML/KG/HR: 3.68; 3.05; 6.34; 3.09; .314; .187 INJECTION INTRAVENOUS at 03:17

## 2024-09-10 RX ADMIN — KETAMINE HYDROCHLORIDE 100 MG/HR: 100 INJECTION, SOLUTION, CONCENTRATE INTRAMUSCULAR; INTRAVENOUS at 07:17

## 2024-09-10 RX ADMIN — HEPARIN SODIUM 1300 UNITS/HR: 1000 INJECTION, SOLUTION INTRAVENOUS; SUBCUTANEOUS at 16:28

## 2024-09-10 RX ADMIN — CALCIUM CHLORIDE, MAGNESIUM CHLORIDE, SODIUM CHLORIDE, SODIUM BICARBONATE, POTASSIUM CHLORIDE AND SODIUM PHOSPHATE DIBASIC DIHYDRATE 12.5 ML/KG/HR: 3.68; 3.05; 6.34; 3.09; .314; .187 INJECTION INTRAVENOUS at 23:31

## 2024-09-10 RX ADMIN — CALCIUM CHLORIDE, MAGNESIUM CHLORIDE, SODIUM CHLORIDE, SODIUM BICARBONATE, POTASSIUM CHLORIDE AND SODIUM PHOSPHATE DIBASIC DIHYDRATE 12.5 ML/KG/HR: 3.68; 3.05; 6.34; 3.09; .314; .187 INJECTION INTRAVENOUS at 13:21

## 2024-09-10 RX ADMIN — Medication 2.5 MG/HR: at 02:58

## 2024-09-10 RX ADMIN — MONTELUKAST 10 MG: 10 TABLET, FILM COATED ORAL at 21:19

## 2024-09-10 RX ADMIN — CIPROFLOXACIN 400 MG: 400 INJECTION, SOLUTION INTRAVENOUS at 04:22

## 2024-09-10 RX ADMIN — WHITE PETROLATUM 57.7 %-MINERAL OIL 31.9 % EYE OINTMENT: at 22:30

## 2024-09-10 RX ADMIN — CIPROFLOXACIN 400 MG: 400 INJECTION, SOLUTION INTRAVENOUS at 11:58

## 2024-09-10 RX ADMIN — WHITE PETROLATUM 57.7 %-MINERAL OIL 31.9 % EYE OINTMENT: at 15:50

## 2024-09-10 RX ADMIN — CALCIUM CHLORIDE, MAGNESIUM CHLORIDE, SODIUM CHLORIDE, SODIUM BICARBONATE, POTASSIUM CHLORIDE AND SODIUM PHOSPHATE DIBASIC DIHYDRATE 12.5 ML/KG/HR: 3.68; 3.05; 6.34; 3.09; .314; .187 INJECTION INTRAVENOUS at 13:23

## 2024-09-10 RX ADMIN — DEXAMETHASONE SODIUM PHOSPHATE 10 MG: 10 INJECTION, SOLUTION INTRAMUSCULAR; INTRAVENOUS at 08:03

## 2024-09-10 RX ADMIN — CHLORHEXIDINE GLUCONATE 0.12% ORAL RINSE 15 ML: 1.2 LIQUID ORAL at 21:17

## 2024-09-10 RX ADMIN — NYSTATIN 500000 UNITS: 100000 SUSPENSION ORAL at 21:17

## 2024-09-10 RX ADMIN — TOBRAMYCIN 300 MG: 300 SOLUTION RESPIRATORY (INHALATION) at 20:37

## 2024-09-10 RX ADMIN — Medication 2.5 MG/HR: at 11:05

## 2024-09-10 RX ADMIN — ACETAMINOPHEN 650 MG: 325 TABLET ORAL at 09:59

## 2024-09-10 RX ADMIN — MIDAZOLAM HYDROCHLORIDE 16 MG/HR: 1 INJECTION, SOLUTION INTRAVENOUS at 10:48

## 2024-09-10 RX ADMIN — ACETAMINOPHEN 650 MG: 325 TABLET ORAL at 04:22

## 2024-09-10 RX ADMIN — LEVALBUTEROL HYDROCHLORIDE 0.63 MG: 0.63 SOLUTION RESPIRATORY (INHALATION) at 17:53

## 2024-09-10 RX ADMIN — PROPOFOL 35 MCG/KG/MIN: 10 INJECTION, EMULSION INTRAVENOUS at 21:48

## 2024-09-10 RX ADMIN — MIDAZOLAM HYDROCHLORIDE 16 MG/HR: 1 INJECTION, SOLUTION INTRAVENOUS at 17:11

## 2024-09-10 RX ADMIN — Medication 40 MG: at 08:03

## 2024-09-10 RX ADMIN — CALCIUM CHLORIDE, MAGNESIUM CHLORIDE, SODIUM CHLORIDE, SODIUM BICARBONATE, POTASSIUM CHLORIDE AND SODIUM PHOSPHATE DIBASIC DIHYDRATE 12.5 ML/KG/HR: 3.68; 3.05; 6.34; 3.09; .314; .187 INJECTION INTRAVENOUS at 08:22

## 2024-09-10 RX ADMIN — IPRATROPIUM BROMIDE 0.5 MG: 0.5 SOLUTION RESPIRATORY (INHALATION) at 13:02

## 2024-09-10 RX ADMIN — CALCIUM CHLORIDE, MAGNESIUM CHLORIDE, SODIUM CHLORIDE, SODIUM BICARBONATE, POTASSIUM CHLORIDE AND SODIUM PHOSPHATE DIBASIC DIHYDRATE 12.5 ML/KG/HR: 3.68; 3.05; 6.34; 3.09; .314; .187 INJECTION INTRAVENOUS at 03:19

## 2024-09-10 RX ADMIN — METRONIDAZOLE 500 MG: 500 INJECTION, SOLUTION INTRAVENOUS at 13:47

## 2024-09-10 RX ADMIN — MIDAZOLAM HYDROCHLORIDE 16 MG/HR: 1 INJECTION, SOLUTION INTRAVENOUS at 04:42

## 2024-09-10 RX ADMIN — LEVALBUTEROL HYDROCHLORIDE 0.63 MG: 0.63 SOLUTION RESPIRATORY (INHALATION) at 20:37

## 2024-09-10 RX ADMIN — ACETAMINOPHEN 650 MG: 325 TABLET ORAL at 15:50

## 2024-09-10 RX ADMIN — MIDAZOLAM HYDROCHLORIDE 16 MG/HR: 1 INJECTION, SOLUTION INTRAVENOUS at 23:30

## 2024-09-10 RX ADMIN — TOBRAMYCIN 300 MG: 300 SOLUTION RESPIRATORY (INHALATION) at 08:07

## 2024-09-10 RX ADMIN — NYSTATIN 500000 UNITS: 100000 SUSPENSION ORAL at 12:02

## 2024-09-10 RX ADMIN — Medication 1 TABLET: at 08:03

## 2024-09-10 RX ADMIN — METRONIDAZOLE 500 MG: 500 INJECTION, SOLUTION INTRAVENOUS at 03:02

## 2024-09-10 RX ADMIN — Medication 2.5 MG/HR: at 18:58

## 2024-09-10 RX ADMIN — WHITE PETROLATUM 57.7 %-MINERAL OIL 31.9 % EYE OINTMENT: at 08:03

## 2024-09-10 RX ADMIN — NYSTATIN 500000 UNITS: 100000 SUSPENSION ORAL at 15:51

## 2024-09-10 RX ADMIN — Medication 10 MG: at 20:23

## 2024-09-10 RX ADMIN — IPRATROPIUM BROMIDE 0.5 MG: 0.5 SOLUTION RESPIRATORY (INHALATION) at 20:37

## 2024-09-10 RX ADMIN — CALCIUM CHLORIDE, MAGNESIUM CHLORIDE, SODIUM CHLORIDE, SODIUM BICARBONATE, POTASSIUM CHLORIDE AND SODIUM PHOSPHATE DIBASIC DIHYDRATE: 3.68; 3.05; 6.34; 3.09; .314; .187 INJECTION INTRAVENOUS at 13:34

## 2024-09-10 RX ADMIN — LEVALBUTEROL HYDROCHLORIDE 0.63 MG: 0.63 SOLUTION RESPIRATORY (INHALATION) at 08:06

## 2024-09-10 RX ADMIN — LEVALBUTEROL HYDROCHLORIDE 0.63 MG: 0.63 SOLUTION RESPIRATORY (INHALATION) at 13:01

## 2024-09-10 RX ADMIN — CHLORHEXIDINE GLUCONATE 0.12% ORAL RINSE 15 ML: 1.2 LIQUID ORAL at 09:59

## 2024-09-10 RX ADMIN — HEPARIN SODIUM 1300 UNITS/HR: 1000 INJECTION, SOLUTION INTRAVENOUS; SUBCUTANEOUS at 01:36

## 2024-09-10 RX ADMIN — CALCIUM CHLORIDE, MAGNESIUM CHLORIDE, SODIUM CHLORIDE, SODIUM BICARBONATE, POTASSIUM CHLORIDE AND SODIUM PHOSPHATE DIBASIC DIHYDRATE 12.5 ML/KG/HR: 3.68; 3.05; 6.34; 3.09; .314; .187 INJECTION INTRAVENOUS at 23:33

## 2024-09-10 RX ADMIN — LEVOTHYROXINE SODIUM 25 MCG: 0.03 TABLET ORAL at 08:03

## 2024-09-10 RX ADMIN — Medication 60 ML: at 08:03

## 2024-09-10 RX ADMIN — CETIRIZINE HYDROCHLORIDE 10 MG: 10 TABLET, FILM COATED ORAL at 08:03

## 2024-09-10 RX ADMIN — PROPOFOL 10 MCG/KG/MIN: 10 INJECTION, EMULSION INTRAVENOUS at 14:23

## 2024-09-10 ASSESSMENT — ACTIVITIES OF DAILY LIVING (ADL)
ADLS_ACUITY_SCORE: 63
ADLS_ACUITY_SCORE: 65
ADLS_ACUITY_SCORE: 65
ADLS_ACUITY_SCORE: 63
ADLS_ACUITY_SCORE: 65
ADLS_ACUITY_SCORE: 65
ADLS_ACUITY_SCORE: 63
ADLS_ACUITY_SCORE: 65
ADLS_ACUITY_SCORE: 65
ADLS_ACUITY_SCORE: 63
ADLS_ACUITY_SCORE: 65
ADLS_ACUITY_SCORE: 63
ADLS_ACUITY_SCORE: 65
ADLS_ACUITY_SCORE: 63
ADLS_ACUITY_SCORE: 65
ADLS_ACUITY_SCORE: 63
ADLS_ACUITY_SCORE: 63

## 2024-09-10 NOTE — PROGRESS NOTES
General Infectious Disease Service - Green Team  Patient:  Massiel Flaherty, Date of birth 1970, Medical record number [id number]  Date of Admission: 8/8/2024  Date of Visit:  9/10/2024         Assessment and Recommendations:   Problem List:  Acute hypoxic and hypercapnic respiratory failure c/b ARDS and s/p VV-ECMO (8/8/24-8/18/24)  Etiology unknown, possible CAP vs viral pneumonia vs other  Extubated 8/27, reintubated 8/29 w/ c/f recurrent PsA pneumonia   CT chest wo contrast 9/4/24 -Significantly decreased diffuse ground glass and consolidative opacities with residual groundglass and scattered nodular opacities, suggesting improving ARDS.     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  - normal LFTs,   Possible HSV oral lesions s/p treatment 8/22-28  Fever  Dry cough x1 month prior to admission  CHACORTA requiring CRRT  Cerebral edema; intentional hypernatremia  Subconjunctival hemorrhage  Right adnexal mass  Hypogammaglobulinemia  Elevated AST, alk phos  Anemia  Recent Augmentin, Z-pack and steroid tapers  Antibiotic allergy - Sulfa (hives), tetracycline (hives)  Facial flush/ rash - possibly related to Vancomycin started on 9/5/24. or consider slowing down the rate      Discussion:     Massiel Flaherty is a 53 year old female with PMHx significant for asthma, MURIEL on CPAP, HTN, anxiety,depression, expressive aphasia, fibromyalgia, seizure disorder, and hypothyroidism who presented to OSH 8/3/24 with fever, fatigue, dyspnea with c/f CAP and requiring intubation, proning, paralysis and transferred to [facility] 8/8/24 for further cares and now placed on VV ECMO and CHACORTA requiring CRRT. Decannulated from VV ECMO 8/18. Extubated 8/27 then reintubated 8/29 for worsened AHRF. Hospital course c/b recurrent pneumonia.      Has  had a chronic cough since 7/2024 for which she sought local care without improvement on steroid tapers, Zpack and Augmentin. Admitted to OSH 8/3 with hypoxia and fever. She received >14 days of Cefepime transitioned to Zosyn without significant improvement. On initial infectious work up, no infectious etiology of presentation identified. She was started on Amphotericin B 8/9 due to high level of concern for endemic fungal infection but this was discontinued 8/13 with return of negative antigen tests and unrevealing bronchoscopy. Work up showed: Negative Histoplasma serum antigen (x3), urine antigen (x1) and antibody; Negative Blasto serum and urine antigen, Negative Cocci serum antigen, Negative Cryptococcus serum antigen, Negative Aspergillus galactomannan serum antigen x2, Beta D Glucan was negative on 8/9,  Negative Tick borne PCR panel, Negative C diff, Negative Hepatitis A, B and C serology, Negative respiratory panel.   Negative EVELIO, ANCA, MPO, proteinase 3 at OSH. B     She has had low level positive EBV from BAL which is likely low level of replication in setting of critical illness rather than true reactivation. Her BAL culture from 8/14 grew pansensitive PsA which is consistent with colonization of her respiratory tract/ET tube (as she was on Cefepime x 2 weeks when this was cultured). A Karius was sent 8/13 with ongoing negative ID work up- which returned with EBV and HSV elevated- but this is reflective of low level of replication in setting of critical illness (does not require treatment). 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      She was extubated 8/27 however developed rigors and worsening respiratory needs on 8/29 per chart and was reintubated and started on empiric meropoenem+vancomycin. BCx negative. SCx with PsA (now resistant to levo, intermediate to ki and ceftazidime). Meropenem was switched to ciprofloxacin on 9/1 and she  was started on tobramycin nebs per ICU. Vancomycin was topped on 8/30, but restarted empirically on 9/5 In the setting of respiratory worsening. A BD glucan was sent and returned elevated at 216. RVP negative. Ureaplasma PCR negative. Legionella uAg negative. Blastomyces uAg negative. Histoplasma galactomannan uAg negative. BAL 8/30 with PSA on culture. Remaining BAL testing negative or pending. CMV PCR blood 741/log 2.9 8/31 and repeat 9/2 of 920/log3 and repeat test on 9/5/24 was 345. ICU team started ganciclovir on 9/5, but stopped on 9/8/24 since it is more likely that the very low CMV viremia translates low level of replication in the setting of critical illness. Vancomycin stopped o 9/8/24 given lack of growth of Gram positive organisms in cultures. She is on MV with FiO2 40% (improved from yesterday after more fluid was pulled during CRRT) she is sedated (off paralytic). She is off vasopressors. No fever in the past 48h. White count 16, but stable from yesterday and improved from the past few days (she is on steroids)     Recommendations:     Please continue ciprofloxacin and metronidazole for now. Anticipate completion of 10 days of ciprofloxacin and 7 days of metronidazole with end date likely on 9/11/24 (after the last dose of that day)     ICU team also started inhaled tobramycin on 9/1/24     CMV PCR from today is 390, which is a very low viremia and most likely translates low level of replication in the setting of critical illness. No treatment indicated at this moment     Follow-up pending cultures     Repeat Beta D Glucan with very low positivity, and PJP PCR negative      If plan to continue prednisone 20mg or more daily for more than 3 weeks, I would recommend to add PJP prophylaxis. Given history of allergy to sulfa, I recommend to obtain G6PD test to determine if dapsone can be used - the test is in process      The General ID team will continue to follow this patient. Please feel free to call  "with any questions.     EVELIO DE PAZ MD  Date of Service:  09/10/24  Pager: [phone]             Physical Exam:   /59 (BP Location: Right arm)   Pulse 110   Temp 99.7  F (37.6  C)   Resp 26   Ht 1.575 m (5' 2\")   Wt 76.1 kg (167 lb 12.3 oz)   SpO2 98%   BMI 30.69 kg/m         Exam:    GENERAL:  Sedated, intubated, on MV with FiO2 40%. Not on vasopressors  HEAD: Normocephalic and atraumatic  ENT:  Intubated and on MV  LUNGS:  Intubated and on MV  CARDIOVASCULAR:  Regular rate and rhythm, normal S1 S2,  no murmur  NEUROLOGIC:  Sedated  PSYCHIATRIC: Sedated           Laboratory Data:     Creatinine   Date Value Ref Range Status   09/10/2024 0.48 (L) 0.51 - 0.95 mg/dL Final   09/10/2024 0.45 (L) 0.51 - 0.95 mg/dL Final   09/09/2024 0.48 (L) 0.51 - 0.95 mg/dL Final   09/09/2024 0.46 (L) 0.51 - 0.95 mg/dL Final   09/09/2024 0.48 (L) 0.51 - 0.95 mg/dL Final     WBC Count   Date Value Ref Range Status   09/10/2024 16.5 (H) 4.0 - 11.0 10e3/uL Final   09/09/2024 15.0 (H) 4.0 - 11.0 10e3/uL Final   09/08/2024 15.0 (H) 4.0 - 11.0 10e3/uL Final   09/07/2024 11.8 (H) 4.0 - 11.0 10e3/uL Final   09/06/2024 18.8 (H) 4.0 - 11.0 10e3/uL Final     Hemoglobin   Date Value Ref Range Status   09/10/2024 7.5 (L) 11.7 - 15.7 g/dL Final     Platelet Count   Date Value Ref Range Status   09/10/2024 235 150 - 450 10e3/uL Final     Lab Results   Component Value Date     09/10/2024    BUN 26.3 (H) 09/10/2024    CO2 21 (L) 09/10/2024     No results found for: \"CRP\"        Pertinent Recent Microbiology Data:   No results for input(s): \"CULT\", \"SDES\" in the last 168 hours.         Imaging:     Recent Results (from the past 48 hour(s))   XR Chest Port 1 View    Narrative    Exam: Chest 1 view portable 30 degrees 9/10/2024 9:21 AM     History:  Trend lines and tubes    Comparison: 9/8/2024    Findings: Cardiac silhouette is nonenlarged. Feeding tube in place.  Endotracheal tube at the clavicles. Right IJ central line " tip at  atriocaval junction. Temperature probe in the mid esophagus. Diffuse  severe airspace and interstitial opacities bilaterally are decreased  with only mild interstitial opacities remain.      Impression    IMPRESSION: Decrease in interstitial and airspace opacities now with  only mild interstitial pulmonary edema.       KVNG LAZARO MD         SYSTEM ID:  [id number]

## 2024-09-10 NOTE — PROGRESS NOTES
Nephrology Progress Note  09/10/2024       Mrs Flaherty is a 52 yo F w/ hx of Anxiety, depression, fibromyalgia, hypothyroidism, asthma, HLD, MURIEL on CPAP, expressive aphasia, and hypertension who was admitted to an OSH with community acquired pneumonia on 8/3 s/p intubation. Found to have severe ARDS requiring paralysis and proning, transferred here on 8/8 for management and initiated on CKRT for severe acidemia in the setting of oligoanuric CHACORTA. Started CRRT on 8/9 for volume management.     Interval History :   Mrs Flaherty continues on CRRT, after being on 2 pressors yesterday she was weaned off and is on hypertensive side this am.  Will increase goal to 50-100cc/h now that she is off of pressors.  High intake is main reason to stay on CRRT modality for now but will look to transition to iHD in the coming days.      Assessment & Recommendations:   CHACORTA-Baseline Cr 0.6 but not checked since 5/2023 PTA, UA with protein + blood but difficult to interpret in setting of CHACORTA, likely hypotensive ATN. Started CRRT on 8/9, now anuric.    -Access is RIJ tunneled line from 8/23  -CRRT, 4k baths, 50-100cc/h.   -Dialysis consent signed and scanned into media on 8/9    Please avoid placing a PICC line in any patient with CKD III or above, CHACORTA, or ESKD, as this will impact negatively the ability of the patient to have an AV fistula or AV Graft should they need it in the future.  Internal jugular or External Jugular  are the preferred type of access in patients with kidney disease. (Link to guidelines)    Volume status-0.5L negative yesterday, increasing goal to 50-100cc/h net negative now that BP's are robust.     Electrolytes/pH-No acute issues, checking BID     Ca/phos/pth-Mg and Phos mildly up, no intervention needed.     Anemia-Hgb 7.5, down slightly today.       Nutrition-Renal TF    Time spent: 45 minutes on this date of encounter for chart review, physical exam, medical decision making and co-ordination of care.  "    Discussed with Dr Perales    Recommendations were communicated to primary team via verbal communication.     DOREEN Sequeira CNS  Clinical Nurse Specialist  [phone]    Review of Systems:   I reviewed the following systems:  ROS not done due to vent/sedation.     Physical Exam:   I/O last 3 completed shifts:  In: 3657.2 [I.V.:2112.2; NG/GT:510]  Out: 3981 [Urine:13; Other:3143; Stool:825]   /59 (BP Location: Right arm)   Pulse 112   Temp 99.3  F (37.4  C)   Resp 20   Ht 1.575 m (5' 2\")   Wt 76.1 kg (167 lb 12.3 oz)   SpO2 99%   BMI 30.69 kg/m       GENERAL APPEARANCE: critically ill, intubated   HEENT: MMM   PULM: lungs clear anteriorly   CV: regular rhythm, normal rate, no rub      -edema - 1+ LE edema   GI: soft, ND  INTEGUMENT: no rash on exposed skin  NEURO: sedated   Access is LFV temp line 8/19.     Labs:   All labs reviewed by me  Electrolytes/Renal -   Recent Labs   Lab Test 09/10/24  0753 09/10/24  0436 09/10/24  0314 09/09/24 2002 09/09/24 2000 09/09/24  1540 09/09/24  1147   NA  --   --  136  --  139  --  138   POTASSIUM  --   --  3.6  --  3.8  --  3.5   CHLORIDE  --   --  102  --  105  --  105   CO2  --   --  22  --  22  --  21*   BUN  --   --  21.7*  --  21.5*  --  22.6*   CR  --   --  0.45*  --  0.48*  --  0.46*   * 135* 158*   < > 151*   < > 219*   QUAN  --   --  8.5*  --  8.5*  --  8.3*   MAG  --   --  2.2  --  2.2  --  2.0   PHOS  --   --  3.5  --  3.5  --  3.1    < > = values in this interval not displayed.       CBC -   Recent Labs   Lab Test 09/10/24  0314 09/09/24 0343 09/08/24  0329   WBC 16.5* 15.0* 15.0*   HGB 7.5* 7.2* 7.5*    153 185       LFTs -   Recent Labs   Lab Test 09/10/24  0314 09/09/24  2000 09/09/24  1147 09/09/24  0343 09/08/24  1155 09/08/24 0329   ALKPHOS 100  --   --  93  --  85   BILITOTAL 0.2  --   --  0.2  --  0.2   ALT 22  --   --  26  --  28   AST 19  --   --  42  --  28   PROTTOTAL 5.6*  --   --  5.3*  --  5.2*   ALBUMIN 3.0* " 3.1* 3.1* 2.9*   < > 3.1*    < > = values in this interval not displayed.       Iron Panel - No lab results found.        Current Medications:  Current Facility-Administered Medications   Medication Dose Route Frequency Provider Last Rate Last Admin    acetaminophen (TYLENOL) tablet 650 mg  650 mg Oral Q6H Benjy Padgett PA-C   650 mg at 09/10/24 0959    artificial tears ophthalmic ointment   Both Eyes Q8H Tanesha Riley MD   Given at 09/10/24 0803    [Held by provider] atorvastatin (LIPITOR) tablet 10 mg  10 mg Oral or Feeding Tube Daily Francisco Welsh MD   10 mg at 08/29/24 0924    budesonide (PULMICORT) neb solution 1 mg  1 mg Nebulization Daily Andres Payne PA-C   1 mg at 09/10/24 0807    cetirizine (zyrTEC) tablet 10 mg  10 mg Oral or Feeding Tube Daily Barry Hatch MD   10 mg at 09/10/24 0803    chlorhexidine (PERIDEX) 0.12 % solution 15 mL  15 mL Mouth/Throat Q12H Karen Angel APRN CNP   15 mL at 09/10/24 0959    ciprofloxacin (CIPRO) infusion 400 mg  400 mg Intravenous Q8H Fuentes Fowler MD        dexAMETHasone PF (DECADRON) injection 10 mg  10 mg Intravenous Q24H Fuentes Fowler MD   10 mg at 09/10/24 0803    [Held by provider] gabapentin (NEURONTIN) capsule 300 mg  300 mg Oral or Feeding Tube TID Barry Hatch MD   300 mg at 08/28/24 1959    insulin aspart (NovoLOG) injection (RAPID ACTING)  1-12 Units Subcutaneous Q4H Carlos Cerna MD   3 Units at 09/09/24 1549    ipratropium (ATROVENT) 0.02 % neb solution 0.5 mg  0.5 mg Nebulization 4x daily Barry Hatch MD   0.5 mg at 09/10/24 0806    And    levalbuterol (XOPENEX) neb solution 0.63 mg  0.63 mg Nebulization 4x Daily Barry Hatch MD   0.63 mg at 09/10/24 0806    levothyroxine (SYNTHROID/LEVOTHROID) tablet 25 mcg  25 mcg Per Feeding Tube QAM AC Her-Giovanny Spence PA-C   25 mcg at 09/10/24 0803    [Held by provider] LORazepam (ATIVAN) tablet 4 mg  4 mg Oral Q6H Michelet Bowen APRN CNP   4 mg at 09/09/24  0917    metroNIDAZOLE (FLAGYL) infusion 500 mg  500 mg Intravenous Q12H Fuentes Fowler MD        montelukast (SINGULAIR) tablet 10 mg  10 mg Oral or Feeding Tube At Bedtime Barry Hatch MD   10 mg at 09/09/24 2218    multivitamin RENAL (RENAVITE RX/NEPHROVITE) tablet 1 tablet  1 tablet Oral or Feeding Tube Daily Giovanny Guidry PA-C   1 tablet at 09/10/24 0803    nystatin (MYCOSTATIN) suspension 500,000 Units  500,000 Units Oral 4x Daily Carlos Cerna MD   500,000 Units at 09/10/24 0803    [Held by provider] oxyCODONE (ROXICODONE) solution 10 mg  10 mg Oral Q4H Michelet Bowen APRN CNP   10 mg at 09/09/24 0819    pantoprazole (PROTONIX) 2 mg/mL suspension 40 mg  40 mg Per Feeding Tube QAM AC Barry Hatch MD   40 mg at 09/10/24 0803    Prosource TF20 ENfit Compatibl EN LIQD (PROSOURCE TF20) packet 60 mL  1 packet Per Feeding Tube Daily Giovanny Guidry PA-C   60 mL at 09/10/24 0803    tobramycin (PF) (LZIA) neb solution 300 mg  300 mg Nebulization 2 times daily Fuentes Fowler MD         Current Facility-Administered Medications   Medication Dose Route Frequency Provider Last Rate Last Admin    [Held by provider] cisatracurium (NIMBEX) 200 mg in D5W 100 mL infusion  3-10 mcg/kg/min (Ideal) Intravenous Continuous Tanesha Riley MD   Stopped at 09/10/24 0850    dextrose 10% infusion   Intravenous Continuous PRN Gaudencio De Souza MD        dextrose 10% infusion   Intravenous Continuous PRN Giovanny Guidry PA-C        dialysate for CVVHD & CVVHDF (Phoxillum BK4/2.5)  12.5 mL/kg/hr CRRT Continuous Pito Hayes MD 1,000 mL/hr at 09/10/24 0822 12.5 mL/kg/hr at 09/10/24 0822    epoprostenol (VELETRI) inhalation solution  10 ng/kg/min (Ideal) Nebulization Continuous Fuentes Fowler MD 1.5 mL/hr at 09/10/24 1048 10 ng/kg/min at 09/10/24 1048    heparin (porcine) 20,000 units in 20 mL ANTICOAGULANT infusion (syringe from pharmacy)  500-1,500 Units/hr CRRT Continuous Dakota Dorsey, DOREEN CNS 1.3 mL/hr  at 09/10/24 1100 1,300 Units/hr at 09/10/24 1100    HYDROmorphone (DILAUDID) 0.2 mg/mL infusion ADULT/PEDS GREATER than or EQUAL to 20 kg  0.1-2.5 mg/hr Intravenous Continuous Gaudencio De Souza MD 12.5 mL/hr at 09/10/24 1105 2.5 mg/hr at 09/10/24 1105    ketamine (KETALAR) 25 mg/mL in sodium chloride 0.9 % 100 mL HIGH CONC infusion   mg/hr Intravenous Continuous Barry Hatch MD 3 mL/hr at 09/10/24 1100 75 mg/hr at 09/10/24 1100    midazolam (VERSED) 100 mg/100 mL NS infusion - ADULT  1-16 mg/hr Intravenous Continuous Tanesha Riley MD 16 mL/hr at 09/10/24 1100 16 mg/hr at 09/10/24 1100    norepinephrine (LEVOPHED) 16 mg in  mL infusion MAX CONC CENTRAL LINE  0.01-0.6 mcg/kg/min (Dosing Weight) Intravenous Continuous Elier Hutchins MD   Stopped at 09/09/24 0956    POST-filter replacement solution for CVVHD & CVVHDF (Phoxillum BK4/2.5)   CRRT Continuous Pito Hayes  mL/hr at 09/09/24 1210 New Bag at 09/09/24 1210    PRE-filter replacement solution for CVVHD & CVVHDF (Phoxillum BK4/2.5)  12.5 mL/kg/hr CRRT Continuous Pito Hayes MD 1,000 mL/hr at 09/10/24 0822 12.5 mL/kg/hr at 09/10/24 0822    vasopressin 1 unit/mL MAX Conc (PITRESSIN) infusion  2.4 Units/hr Intravenous Continuous Barry Hatch MD   Stopped at 09/09/24 0956

## 2024-09-10 NOTE — PLAN OF CARE
Goal Outcome Evaluation:      Plan of Care Reviewed With: patient    Overall Patient Progress: no change    ICU End of Shift Summary. See flowsheets for vital signs and detailed assessment.    Changes this shift:   RASS -5 on versed 16mg, Ketamine 100mg and Dilaudid 2.5mg. Patient's paralytic adjusted to achieve TOF 2/4 Cis 5mg. Hypertensive and tachycardic at the start of the evening as paralytic was adjusted patient BP and HR ave come down slightly patient still remains hypertensive with sBP 150's. CRRT fluid removal at net even.    Plan: Continuing to follow plan of care.

## 2024-09-10 NOTE — PROGRESS NOTES
CRRT STATUS NOTE    DATA:  Time:  5:48 PM  Pressures WNL:  YES  Filter Status:  WDL    Problems Reported/Alarms Noted:  none    Supplies Present:  YES    ASSESSMENT:  Patient Net Fluid Balance:  -232  Vital Signs:  , RR 18, /74, O2 sat 97 on 40% 10 peep  Labs:  K 4.1, M 2.0, Ph 3.8, ical 4.7, hgb 7.5  Goals of Therapy:  , goal changed in afternoon.  Tolerating fluid removal    INTERVENTIONS:   none    PLAN:  Continue fluid removal as tolerated per goals of therapy. Check circuit daily and change circuit q72h and prn. Please contact CRRT resource RN with any questions/concerns.

## 2024-09-10 NOTE — PLAN OF CARE
Goal Outcome Evaluation:      Plan of Care Reviewed With: patient    Overall Patient Progress: improving    Outcome Evaluation: FiO2 at 40%. Paralytic discontinued. Tollerating the vent with the addition of propofol. Remains off pressors.    Problem: Adult Inpatient Plan of Care  Goal: Patient-Specific Goal (Individualized)  Description: Patient will wean vent and be able to pressure support in the next two days working towards extubation vs. Later assessing need for trach and slower vent weaning.  Will also tolerate CRRT without increasing pressor needs within MAP parameters.  9/10/2024 1644 by Haase, James, RN  Outcome: Progressing  Flowsheets (Taken 9/10/2024 1644)  Patient/Family-Specific Goals (Include Timeframe): Blood gas improved, stopped paralytic     ICU End of Shift Summary. See flowsheets for vital signs and detailed assessment.    Changes this shift: Remains sedated at -4 on versed, dilaudid, propofol, and ketamine. Pt has been off paralytics since 0850. Tolerated the vent until 1400 when pt started pulling larger tidal volumes, breath stacking, and desating. Team notified, propofol drip started at 10 which improved vent synchrony; titrated per vitals and vent synchrony. Veletri was decreased from 20 to 10; pt tolerated, tidal volume decreased to 350 and respiration to 25. ABG drawn 1 hour post vent changes, veletri halved, and paralytics discontinued (see results). Had a run of nonsustained SVT; team aware. During cares this morning pt sysotlic was >200; team ordered PRN hydralazine for systolics above 200. No urine output for this shift, bladder scanned for 170 ml. Unable to meet CRRT goal of 1L to 2L negative for the day due to hypotension when pulling.         Plan:  Keep trending ABGs.  Titrate sedation to achieve vent synchrony.

## 2024-09-10 NOTE — PROGRESS NOTES
CRRT STATUS NOTE    DATA:  Time:  6:00 AM  Pressures WNL:  YES  Filter Status:  WDL    Problems Reported/Alarms Noted:  None.    Supplies Present:  YES    ASSESSMENT:  Patient Net Fluid Balance: Net -523 ml at midnight. Net +70 ml @ 0600.  Vitals: T 97.5, , RR 23, /79, .  Labs: K 3.6, Mg 2.2, Phos 3.5, iCa 4.7, Hgb 7.5, Plt 235   Goals of Therapy: 0-50 ml/hr without increasing pressor    Bedside RN reported pt had increase in ectopy with a higher fluid removal rate. Aiming closer to net even - slightly negative, less ectopy seen.     INTERVENTIONS:   None.    PLAN:  Continue to monitor circuit daily and change set q72 hours or PRN for clotting/clogging. Please call CRRT RN with any quesitons/problems.

## 2024-09-11 ENCOUNTER — APPOINTMENT (OUTPATIENT)
Dept: GENERAL RADIOLOGY | Facility: CLINIC | Age: 54
DRG: 003 | End: 2024-09-11
Payer: MEDICAID

## 2024-09-11 LAB
ABO/RH(D): NORMAL
ACANTHOCYTES BLD QL SMEAR: ABNORMAL
ALBUMIN SERPL BCG-MCNC: 2.9 G/DL (ref 3.5–5.2)
ALBUMIN SERPL BCG-MCNC: 3.2 G/DL (ref 3.5–5.2)
ALLEN'S TEST: ABNORMAL
ALP SERPL-CCNC: 101 U/L (ref 40–150)
ALT SERPL W P-5'-P-CCNC: 23 U/L (ref 0–50)
ANION GAP SERPL CALCULATED.3IONS-SCNC: 15 MMOL/L (ref 7–15)
ANION GAP SERPL CALCULATED.3IONS-SCNC: 15 MMOL/L (ref 7–15)
ANTIBODY SCREEN: NEGATIVE
AST SERPL W P-5'-P-CCNC: 16 U/L (ref 0–45)
AUER BODIES BLD QL SMEAR: ABNORMAL
BACTERIA BRONCH: ABNORMAL
BACTERIA SPT CULT: NORMAL
BASE EXCESS BLDA CALC-SCNC: -1.4 MMOL/L (ref -3–3)
BASE EXCESS BLDA CALC-SCNC: -2.7 MMOL/L (ref -3–3)
BASE EXCESS BLDA CALC-SCNC: -5 MMOL/L (ref -3–3)
BASO STIPL BLD QL SMEAR: ABNORMAL
BILIRUB DIRECT SERPL-MCNC: NORMAL MG/DL
BILIRUB SERPL-MCNC: 0.2 MG/DL
BITE CELLS BLD QL SMEAR: ABNORMAL
BLISTER CELLS BLD QL SMEAR: ABNORMAL
BUN SERPL-MCNC: 23.7 MG/DL (ref 6–20)
BUN SERPL-MCNC: 31.9 MG/DL (ref 6–20)
BURR CELLS BLD QL SMEAR: ABNORMAL
CA-I BLD-MCNC: 4.6 MG/DL (ref 4.4–5.2)
CA-I BLD-MCNC: 4.7 MG/DL (ref 4.4–5.2)
CALCIUM SERPL-MCNC: 8.2 MG/DL (ref 8.8–10.4)
CALCIUM SERPL-MCNC: 8.5 MG/DL (ref 8.8–10.4)
CHLORIDE SERPL-SCNC: 100 MMOL/L (ref 98–107)
CHLORIDE SERPL-SCNC: 103 MMOL/L (ref 98–107)
COHGB MFR BLD: 98 % (ref 96–97)
COHGB MFR BLD: 99.7 % (ref 96–97)
COHGB MFR BLD: >100 % (ref 96–97)
CREAT SERPL-MCNC: 0.44 MG/DL (ref 0.51–0.95)
CREAT SERPL-MCNC: 0.55 MG/DL (ref 0.51–0.95)
CRP SERPL-MCNC: 78.8 MG/L
DACRYOCYTES BLD QL SMEAR: ABNORMAL
EGFRCR SERPLBLD CKD-EPI 2021: >90 ML/MIN/1.73M2
EGFRCR SERPLBLD CKD-EPI 2021: >90 ML/MIN/1.73M2
ELLIPTOCYTES BLD QL SMEAR: ABNORMAL
ERYTHROCYTE [DISTWIDTH] IN BLOOD BY AUTOMATED COUNT: 21.1 % (ref 10–15)
FRAGMENTS BLD QL SMEAR: ABNORMAL
G6PD RBC-CCNT: 20.4 U/G HB
GLUCOSE BLDC GLUCOMTR-MCNC: 109 MG/DL (ref 70–99)
GLUCOSE BLDC GLUCOMTR-MCNC: 119 MG/DL (ref 70–99)
GLUCOSE BLDC GLUCOMTR-MCNC: 124 MG/DL (ref 70–99)
GLUCOSE BLDC GLUCOMTR-MCNC: 168 MG/DL (ref 70–99)
GLUCOSE BLDC GLUCOMTR-MCNC: 173 MG/DL (ref 70–99)
GLUCOSE BLDC GLUCOMTR-MCNC: 227 MG/DL (ref 70–99)
GLUCOSE SERPL-MCNC: 138 MG/DL (ref 70–99)
GLUCOSE SERPL-MCNC: 251 MG/DL (ref 70–99)
GRAM STAIN RESULT: NORMAL
GRAM STAIN RESULT: NORMAL
HCO3 BLD-SCNC: 21 MMOL/L (ref 21–28)
HCO3 BLD-SCNC: 22 MMOL/L (ref 21–28)
HCO3 BLD-SCNC: 24 MMOL/L (ref 21–28)
HCO3 SERPL-SCNC: 19 MMOL/L (ref 22–29)
HCO3 SERPL-SCNC: 21 MMOL/L (ref 22–29)
HCT VFR BLD AUTO: 27.8 % (ref 35–47)
HGB BLD-MCNC: 8.8 G/DL (ref 11.7–15.7)
HGB C CRYSTALS: ABNORMAL
HOWELL-JOLLY BOD BLD QL SMEAR: ABNORMAL
MAGNESIUM SERPL-MCNC: 1.9 MG/DL (ref 1.7–2.3)
MAGNESIUM SERPL-MCNC: 2.2 MG/DL (ref 1.7–2.3)
MCH RBC QN AUTO: 30.3 PG (ref 26.5–33)
MCHC RBC AUTO-ENTMCNC: 31.7 G/DL (ref 31.5–36.5)
MCV RBC AUTO: 96 FL (ref 78–100)
NEUTS HYPERSEG BLD QL SMEAR: ABNORMAL
O2/TOTAL GAS SETTING VFR VENT: 35 %
O2/TOTAL GAS SETTING VFR VENT: 35 %
O2/TOTAL GAS SETTING VFR VENT: 40 %
PCO2 BLD: 38 MM HG (ref 35–45)
PCO2 BLD: 39 MM HG (ref 35–45)
PCO2 BLD: 40 MM HG (ref 35–45)
PEEP: 10 CM H2O
PEEP: 8 CM H2O
PEEP: 8 CM H2O
PH BLD: 7.32 [PH] (ref 7.35–7.45)
PH BLD: 7.38 [PH] (ref 7.35–7.45)
PH BLD: 7.39 [PH] (ref 7.35–7.45)
PHOSPHATE SERPL-MCNC: 3.9 MG/DL (ref 2.5–4.5)
PHOSPHATE SERPL-MCNC: 4.5 MG/DL (ref 2.5–4.5)
PLAT MORPH BLD: ABNORMAL
PLATELET # BLD AUTO: 355 10E3/UL (ref 150–450)
PO2 BLD: 109 MM HG (ref 80–105)
PO2 BLD: 126 MM HG (ref 80–105)
PO2 BLD: 89 MM HG (ref 80–105)
POLYCHROMASIA BLD QL SMEAR: SLIGHT
POTASSIUM SERPL-SCNC: 3.6 MMOL/L (ref 3.4–5.3)
POTASSIUM SERPL-SCNC: 4.3 MMOL/L (ref 3.4–5.3)
PROT SERPL-MCNC: 5.9 G/DL (ref 6.4–8.3)
RBC # BLD AUTO: 2.9 10E6/UL (ref 3.8–5.2)
RBC AGGLUT BLD QL: ABNORMAL
RBC MORPH BLD: ABNORMAL
ROULEAUX BLD QL SMEAR: ABNORMAL
SAO2 % BLDA: 96 % (ref 92–100)
SAO2 % BLDA: 97 % (ref 92–100)
SAO2 % BLDA: 98 % (ref 92–100)
SICKLE CELLS BLD QL SMEAR: ABNORMAL
SMUDGE CELLS BLD QL SMEAR: ABNORMAL
SODIUM SERPL-SCNC: 134 MMOL/L (ref 135–145)
SODIUM SERPL-SCNC: 139 MMOL/L (ref 135–145)
SPECIMEN EXPIRATION DATE: NORMAL
SPHEROCYTES BLD QL SMEAR: ABNORMAL
STOMATOCYTES BLD QL SMEAR: ABNORMAL
TARGETS BLD QL SMEAR: SLIGHT
TOXIC GRANULES BLD QL SMEAR: ABNORMAL
TRIGL SERPL-MCNC: 390 MG/DL
UFH PPP CHRO-ACNC: 0.28 IU/ML
VARIANT LYMPHS BLD QL SMEAR: ABNORMAL
WBC # BLD AUTO: 23.1 10E3/UL (ref 4–11)

## 2024-09-11 PROCEDURE — 250N000009 HC RX 250: Performed by: STUDENT IN AN ORGANIZED HEALTH CARE EDUCATION/TRAINING PROGRAM

## 2024-09-11 PROCEDURE — 99232 SBSQ HOSP IP/OBS MODERATE 35: CPT | Performed by: INTERNAL MEDICINE

## 2024-09-11 PROCEDURE — 86900 BLOOD TYPING SEROLOGIC ABO: CPT | Performed by: SURGERY

## 2024-09-11 PROCEDURE — 94640 AIRWAY INHALATION TREATMENT: CPT | Mod: 76

## 2024-09-11 PROCEDURE — 250N000011 HC RX IP 250 OP 636

## 2024-09-11 PROCEDURE — 250N000011 HC RX IP 250 OP 636: Performed by: CLINICAL NURSE SPECIALIST

## 2024-09-11 PROCEDURE — 250N000013 HC RX MED GY IP 250 OP 250 PS 637

## 2024-09-11 PROCEDURE — 86901 BLOOD TYPING SEROLOGIC RH(D): CPT | Performed by: SURGERY

## 2024-09-11 PROCEDURE — 82805 BLOOD GASES W/O2 SATURATION: CPT

## 2024-09-11 PROCEDURE — 82040 ASSAY OF SERUM ALBUMIN: CPT | Performed by: CLINICAL NURSE SPECIALIST

## 2024-09-11 PROCEDURE — 84478 ASSAY OF TRIGLYCERIDES: CPT

## 2024-09-11 PROCEDURE — 85027 COMPLETE CBC AUTOMATED: CPT | Performed by: PHYSICIAN ASSISTANT

## 2024-09-11 PROCEDURE — 250N000013 HC RX MED GY IP 250 OP 250 PS 637: Performed by: PHYSICIAN ASSISTANT

## 2024-09-11 PROCEDURE — 83735 ASSAY OF MAGNESIUM: CPT | Performed by: CLINICAL NURSE SPECIALIST

## 2024-09-11 PROCEDURE — 250N000009 HC RX 250

## 2024-09-11 PROCEDURE — 90947 DIALYSIS REPEATED EVAL: CPT

## 2024-09-11 PROCEDURE — 99291 CRITICAL CARE FIRST HOUR: CPT | Mod: FS

## 2024-09-11 PROCEDURE — 80053 COMPREHEN METABOLIC PANEL: CPT | Performed by: CLINICAL NURSE SPECIALIST

## 2024-09-11 PROCEDURE — 250N000011 HC RX IP 250 OP 636: Performed by: SURGERY

## 2024-09-11 PROCEDURE — 82330 ASSAY OF CALCIUM: CPT | Performed by: CLINICAL NURSE SPECIALIST

## 2024-09-11 PROCEDURE — 250N000009 HC RX 250: Performed by: SURGERY

## 2024-09-11 PROCEDURE — 85520 HEPARIN ASSAY: CPT | Performed by: CLINICAL NURSE SPECIALIST

## 2024-09-11 PROCEDURE — 86140 C-REACTIVE PROTEIN: CPT

## 2024-09-11 PROCEDURE — 999N000157 HC STATISTIC RCP TIME EA 10 MIN

## 2024-09-11 PROCEDURE — 99292 CRITICAL CARE ADDL 30 MIN: CPT | Mod: FS

## 2024-09-11 PROCEDURE — 99223 1ST HOSP IP/OBS HIGH 75: CPT | Mod: GC | Performed by: SURGERY

## 2024-09-11 PROCEDURE — 250N000011 HC RX IP 250 OP 636: Mod: JZ

## 2024-09-11 PROCEDURE — 999N000065 XR CHEST PORT 1 VIEW

## 2024-09-11 PROCEDURE — 250N000013 HC RX MED GY IP 250 OP 250 PS 637: Performed by: SURGERY

## 2024-09-11 PROCEDURE — 258N000003 HC RX IP 258 OP 636: Performed by: SURGERY

## 2024-09-11 PROCEDURE — 82248 BILIRUBIN DIRECT: CPT | Performed by: CLINICAL NURSE SPECIALIST

## 2024-09-11 PROCEDURE — 94003 VENT MGMT INPAT SUBQ DAY: CPT

## 2024-09-11 PROCEDURE — 200N000002 HC R&B ICU UMMC

## 2024-09-11 PROCEDURE — 71045 X-RAY EXAM CHEST 1 VIEW: CPT | Mod: 26 | Performed by: RADIOLOGY

## 2024-09-11 PROCEDURE — 94640 AIRWAY INHALATION TREATMENT: CPT

## 2024-09-11 RX ORDER — ACETAMINOPHEN 325 MG/1
650 TABLET ORAL EVERY 6 HOURS
Status: DISCONTINUED | OUTPATIENT
Start: 2024-09-11 | End: 2024-10-01 | Stop reason: HOSPADM

## 2024-09-11 RX ORDER — GABAPENTIN 300 MG/1
300 CAPSULE ORAL 3 TIMES DAILY
Status: DISCONTINUED | OUTPATIENT
Start: 2024-09-11 | End: 2024-09-11

## 2024-09-11 RX ORDER — ATOVAQUONE 750 MG/5ML
1500 SUSPENSION ORAL DAILY
Status: DISCONTINUED | OUTPATIENT
Start: 2024-09-11 | End: 2024-09-11

## 2024-09-11 RX ORDER — ATOVAQUONE 750 MG/5ML
1500 SUSPENSION ORAL DAILY
Status: DISCONTINUED | OUTPATIENT
Start: 2024-09-12 | End: 2024-10-01 | Stop reason: HOSPADM

## 2024-09-11 RX ORDER — QUETIAPINE FUMARATE 50 MG/1
50 TABLET, FILM COATED ORAL AT BEDTIME
Status: DISCONTINUED | OUTPATIENT
Start: 2024-09-11 | End: 2024-09-17

## 2024-09-11 RX ORDER — LORAZEPAM 1 MG/1
4 TABLET ORAL EVERY 6 HOURS
Status: DISCONTINUED | OUTPATIENT
Start: 2024-09-11 | End: 2024-09-13

## 2024-09-11 RX ORDER — HEPARIN SODIUM 5000 [USP'U]/.5ML
5000 INJECTION, SOLUTION INTRAVENOUS; SUBCUTANEOUS EVERY 8 HOURS
Status: DISCONTINUED | OUTPATIENT
Start: 2024-09-11 | End: 2024-10-01 | Stop reason: HOSPADM

## 2024-09-11 RX ORDER — GABAPENTIN 250 MG/5ML
100 SOLUTION ORAL EVERY 8 HOURS SCHEDULED
Status: DISCONTINUED | OUTPATIENT
Start: 2024-09-11 | End: 2024-09-14

## 2024-09-11 RX ORDER — HEPARIN SODIUM 5000 [USP'U]/.5ML
INJECTION, SOLUTION INTRAVENOUS; SUBCUTANEOUS
Status: COMPLETED
Start: 2024-09-11 | End: 2024-09-11

## 2024-09-11 RX ORDER — QUETIAPINE FUMARATE 25 MG/1
25 TABLET, FILM COATED ORAL 2 TIMES DAILY
Status: DISCONTINUED | OUTPATIENT
Start: 2024-09-11 | End: 2024-09-17

## 2024-09-11 RX ADMIN — PROPOFOL 30 MCG/KG/MIN: 10 INJECTION, EMULSION INTRAVENOUS at 17:04

## 2024-09-11 RX ADMIN — LORAZEPAM 4 MG: 1 TABLET ORAL at 21:23

## 2024-09-11 RX ADMIN — MIDAZOLAM HYDROCHLORIDE 14 MG/HR: 1 INJECTION, SOLUTION INTRAVENOUS at 11:52

## 2024-09-11 RX ADMIN — QUETIAPINE FUMARATE 25 MG: 25 TABLET ORAL at 11:43

## 2024-09-11 RX ADMIN — ALTEPLASE 2 MG: 2.2 INJECTION, POWDER, LYOPHILIZED, FOR SOLUTION INTRAVENOUS at 16:47

## 2024-09-11 RX ADMIN — CETIRIZINE HYDROCHLORIDE 10 MG: 10 TABLET, FILM COATED ORAL at 07:26

## 2024-09-11 RX ADMIN — BUDESONIDE 1 MG: 1 INHALANT ORAL at 07:19

## 2024-09-11 RX ADMIN — GABAPENTIN 300 MG: 300 CAPSULE ORAL at 13:39

## 2024-09-11 RX ADMIN — Medication 10 MG: at 21:40

## 2024-09-11 RX ADMIN — NYSTATIN 500000 UNITS: 100000 SUSPENSION ORAL at 07:26

## 2024-09-11 RX ADMIN — TOBRAMYCIN 300 MG: 300 SOLUTION RESPIRATORY (INHALATION) at 07:19

## 2024-09-11 RX ADMIN — ACETAMINOPHEN 650 MG: 325 TABLET ORAL at 04:05

## 2024-09-11 RX ADMIN — ACETAMINOPHEN 650 MG: 325 TABLET ORAL at 21:17

## 2024-09-11 RX ADMIN — QUETIAPINE FUMARATE 25 MG: 25 TABLET ORAL at 09:43

## 2024-09-11 RX ADMIN — LORAZEPAM 4 MG: 1 TABLET ORAL at 09:43

## 2024-09-11 RX ADMIN — HEPARIN SODIUM 1300 UNITS/HR: 1000 INJECTION, SOLUTION INTRAVENOUS; SUBCUTANEOUS at 04:30

## 2024-09-11 RX ADMIN — CALCIUM CHLORIDE, MAGNESIUM CHLORIDE, SODIUM CHLORIDE, SODIUM BICARBONATE, POTASSIUM CHLORIDE AND SODIUM PHOSPHATE DIBASIC DIHYDRATE 12.5 ML/KG/HR: 3.68; 3.05; 6.34; 3.09; .314; .187 INJECTION INTRAVENOUS at 04:15

## 2024-09-11 RX ADMIN — MIDAZOLAM HYDROCHLORIDE 10 MG/HR: 1 INJECTION, SOLUTION INTRAVENOUS at 20:52

## 2024-09-11 RX ADMIN — Medication 60 ML: at 07:27

## 2024-09-11 RX ADMIN — IPRATROPIUM BROMIDE 0.5 MG: 0.5 SOLUTION RESPIRATORY (INHALATION) at 07:19

## 2024-09-11 RX ADMIN — PROPOFOL 35 MCG/KG/MIN: 10 INJECTION, EMULSION INTRAVENOUS at 07:50

## 2024-09-11 RX ADMIN — ACETAMINOPHEN 650 MG: 325 TABLET ORAL at 15:53

## 2024-09-11 RX ADMIN — Medication 2.5 MG/HR: at 11:09

## 2024-09-11 RX ADMIN — CIPROFLOXACIN 400 MG: 400 INJECTION, SOLUTION INTRAVENOUS at 04:05

## 2024-09-11 RX ADMIN — KETAMINE HYDROCHLORIDE 75 MG/HR: 100 INJECTION, SOLUTION, CONCENTRATE INTRAMUSCULAR; INTRAVENOUS at 18:12

## 2024-09-11 RX ADMIN — NYSTATIN 500000 UNITS: 100000 SUSPENSION ORAL at 11:43

## 2024-09-11 RX ADMIN — Medication 2.5 MG/HR: at 02:51

## 2024-09-11 RX ADMIN — LEVALBUTEROL HYDROCHLORIDE 0.63 MG: 0.63 SOLUTION RESPIRATORY (INHALATION) at 15:00

## 2024-09-11 RX ADMIN — Medication 40 MG: at 07:27

## 2024-09-11 RX ADMIN — PROPOFOL 45 MCG/KG/MIN: 10 INJECTION, EMULSION INTRAVENOUS at 12:25

## 2024-09-11 RX ADMIN — ACETAMINOPHEN 650 MG: 325 TABLET ORAL at 09:43

## 2024-09-11 RX ADMIN — ATOVAQUONE 1500 MG: 750 SUSPENSION ORAL at 09:53

## 2024-09-11 RX ADMIN — LORAZEPAM 4 MG: 1 TABLET ORAL at 15:53

## 2024-09-11 RX ADMIN — IPRATROPIUM BROMIDE 0.5 MG: 0.5 SOLUTION RESPIRATORY (INHALATION) at 15:00

## 2024-09-11 RX ADMIN — HEPARIN SODIUM 5000 UNITS: 5000 INJECTION, SOLUTION INTRAVENOUS; SUBCUTANEOUS at 13:39

## 2024-09-11 RX ADMIN — METRONIDAZOLE 500 MG: 500 INJECTION, SOLUTION INTRAVENOUS at 02:21

## 2024-09-11 RX ADMIN — MONTELUKAST 10 MG: 10 TABLET, FILM COATED ORAL at 21:23

## 2024-09-11 RX ADMIN — CHLORHEXIDINE GLUCONATE 0.12% ORAL RINSE 15 ML: 1.2 LIQUID ORAL at 20:43

## 2024-09-11 RX ADMIN — PROPOFOL 30 MCG/KG/MIN: 10 INJECTION, EMULSION INTRAVENOUS at 22:13

## 2024-09-11 RX ADMIN — GABAPENTIN 100 MG: 250 SUSPENSION ORAL at 17:36

## 2024-09-11 RX ADMIN — LEVALBUTEROL HYDROCHLORIDE 0.63 MG: 0.63 SOLUTION RESPIRATORY (INHALATION) at 07:19

## 2024-09-11 RX ADMIN — MIDAZOLAM HYDROCHLORIDE 16 MG/HR: 1 INJECTION, SOLUTION INTRAVENOUS at 05:43

## 2024-09-11 RX ADMIN — GABAPENTIN 100 MG: 250 SUSPENSION ORAL at 21:25

## 2024-09-11 RX ADMIN — IPRATROPIUM BROMIDE 0.5 MG: 0.5 SOLUTION RESPIRATORY (INHALATION) at 11:00

## 2024-09-11 RX ADMIN — QUETIAPINE FUMARATE 50 MG: 50 TABLET ORAL at 21:23

## 2024-09-11 RX ADMIN — IPRATROPIUM BROMIDE 0.5 MG: 0.5 SOLUTION RESPIRATORY (INHALATION) at 20:47

## 2024-09-11 RX ADMIN — NYSTATIN 500000 UNITS: 100000 SUSPENSION ORAL at 15:53

## 2024-09-11 RX ADMIN — LEVALBUTEROL HYDROCHLORIDE 0.63 MG: 0.63 SOLUTION RESPIRATORY (INHALATION) at 20:47

## 2024-09-11 RX ADMIN — DEXAMETHASONE SODIUM PHOSPHATE 10 MG: 10 INJECTION, SOLUTION INTRAMUSCULAR; INTRAVENOUS at 07:26

## 2024-09-11 RX ADMIN — CHLORHEXIDINE GLUCONATE 0.12% ORAL RINSE 15 ML: 1.2 LIQUID ORAL at 07:26

## 2024-09-11 RX ADMIN — LEVALBUTEROL HYDROCHLORIDE 0.63 MG: 0.63 SOLUTION RESPIRATORY (INHALATION) at 11:00

## 2024-09-11 RX ADMIN — Medication 1 TABLET: at 07:26

## 2024-09-11 RX ADMIN — HEPARIN SODIUM 5000 UNITS: 5000 INJECTION, SOLUTION INTRAVENOUS; SUBCUTANEOUS at 21:23

## 2024-09-11 RX ADMIN — Medication 10 MG: at 00:30

## 2024-09-11 RX ADMIN — LEVOTHYROXINE SODIUM 25 MCG: 0.03 TABLET ORAL at 07:26

## 2024-09-11 RX ADMIN — Medication 2.5 MG/HR: at 19:11

## 2024-09-11 RX ADMIN — NYSTATIN 500000 UNITS: 100000 SUSPENSION ORAL at 20:43

## 2024-09-11 RX ADMIN — Medication 10 MG: at 06:18

## 2024-09-11 ASSESSMENT — ACTIVITIES OF DAILY LIVING (ADL)
ADLS_ACUITY_SCORE: 63

## 2024-09-11 NOTE — PROGRESS NOTES
General Infectious Disease Service - Green Team    Patient:  Massiel Flaherty, Date of birth 1970, Medical record number [id number]  Date of Admission: 8/8/2024  Date of Visit:  9/11/2024         Assessment and Recommendations:   Problem List:  Acute hypoxic and hypercapnic respiratory failure c/b ARDS and s/p VV-ECMO (8/8/24-8/18/24)  Etiology unknown, possible CAP vs viral pneumonia vs other  Extubated 8/27, reintubated 8/29 w/ c/f recurrent PsA pneumonia   CT chest wo contrast 9/4/24 -Significantly decreased diffuse ground glass and consolidative opacities with residual groundglass and scattered nodular opacities, suggesting improving ARDS.     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  - normal LFTs,   Possible HSV oral lesions s/p treatment 8/22-28  Fever  Dry cough x1 month prior to admission  CHACORTA requiring CRRT  Cerebral edema; intentional hypernatremia  Subconjunctival hemorrhage  Right adnexal mass  Hypogammaglobulinemia  Elevated AST, alk phos  Anemia  Recent Augmentin, Z-pack and steroid tapers  Antibiotic allergy - Sulfa (hives), tetracycline (hives)  Facial flush/ rash - possibly related to Vancomycin started on 9/5/24. or consider slowing down the rate      Discussion:     Massiel Flaherty is a 53 year old female with PMHx significant for asthma, MURIEL on CPAP, HTN, anxiety,depression, expressive aphasia, fibromyalgia, seizure disorder, and hypothyroidism who presented to OSH 8/3/24 with fever, fatigue, dyspnea with c/f CAP and requiring intubation, proning, paralysis and transferred to [facility] 8/8/24 for further cares and now placed on VV ECMO and CHACORTA requiring CRRT. Decannulated from VV ECMO 8/18. Extubated 8/27 then reintubated 8/29 for worsened AHRF. Hospital course c/b recurrent pneumonia.       Has had a chronic cough since 7/2024 for which she sought local care without improvement on steroid tapers, Zpack and Augmentin. Admitted to OSH 8/3 with hypoxia and fever. She received >14 days of Cefepime transitioned to Zosyn without significant improvement. On initial infectious work up, no infectious etiology of presentation identified. She was started on Amphotericin B 8/9 due to high level of concern for endemic fungal infection but this was discontinued 8/13 with return of negative antigen tests and unrevealing bronchoscopy. Work up showed: Negative Histoplasma serum antigen (x3), urine antigen (x1) and antibody; Negative Blasto serum and urine antigen, Negative Cocci serum antigen, Negative Cryptococcus serum antigen, Negative Aspergillus galactomannan serum antigen x2, Beta D Glucan was negative on 8/9,  Negative Tick borne PCR panel, Negative C diff, Negative Hepatitis A, B and C serology, Negative respiratory panel.   Negative EVELIO, ANCA, MPO, proteinase 3 at OSH. B     She has had low level positive EBV from BAL which is likely low level of replication in setting of critical illness rather than true reactivation. Her BAL culture from 8/14 grew pansensitive PsA which is consistent with colonization of her respiratory tract/ET tube (as she was on Cefepime x 2 weeks when this was cultured). A Karius was sent 8/13 with ongoing negative ID work up- which returned with EBV and HSV elevated- but this is reflective of low level of replication in setting of critical illness (does not require treatment). 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      She was extubated 8/27 however developed rigors and worsening respiratory needs on 8/29 per chart and was reintubated and started on empiric meropoenem+vancomycin. BCx negative. SCx with PsA (now resistant to levo, intermediate to ki and ceftazidime). Meropenem was switched to ciprofloxacin on 9/1  "and she was started on tobramycin nebs per ICU. Vancomycin was topped on 8/30, but restarted empirically on 9/5 In the setting of respiratory worsening. A BD glucan was sent and returned elevated at 216. RVP negative. Ureaplasma PCR negative. Legionella uAg negative. Blastomyces uAg negative. Histoplasma galactomannan uAg negative. BAL 8/30 with PSA on culture. Remaining BAL testing negative or pending. CMV PCR blood 741/log 2.9 8/31 and repeat 9/2 of 920/log3 and repeat test on 9/5/24 was 345. ICU team started ganciclovir on 9/5, but stopped on 9/8/24 since it is more likely that the very low CMV viremia translates low level of replication in the setting of critical illness. Vancomycin stopped o 9/8/24 given lack of growth of Gram positive organisms in cultures. She is on MV with FiO2 35% (improved from yesterday after more fluid was pulled during CRRT) she is sedated (off paralytic). She is off vasopressors. No fever in the past 48h. White count 23 (she is on steroids)     Recommendations:       Patient completed antibiotic today for HCAP    CMV PCR from 9/9/24  is 390, which is a very low viremia and most likely translates low level of replication in the setting of critical illness. No treatment indicated at this moment    Follow-up pending cultures    Repeat Beta D Glucan with very low positivity, and PJP PCR negative    Patient on atovaquone for PJP prophylaxis.     I will sign off today, but will follow peripherally for 48h. Please call us back if any question or need         EVELIO DE PAZ MD  Date of Service: 09/11/24  Pager: [phone]             Physical Exam:   /59 (BP Location: Right arm)   Pulse 94   Temp 98.8  F (37.1  C)   Resp 23   Ht 1.575 m (5' 2\")   Wt 75.6 kg (166 lb 10.7 oz)   SpO2 100%   BMI 30.48 kg/m         Exam:  GENERAL:  Sedated, intubated, on MV with FiO2 35%. Not on vasopressors  HEAD: Normocephalic and atraumatic  ENT:  Intubated and on MV  LUNGS:  Intubated and " "on MV  CARDIOVASCULAR:  Regular rate and rhythm, normal S1 S2,  no murmur  NEUROLOGIC:  Sedated  PSYCHIATRIC: Sedated         Laboratory Data:     Creatinine   Date Value Ref Range Status   09/11/2024 0.55 0.51 - 0.95 mg/dL Final   09/11/2024 0.44 (L) 0.51 - 0.95 mg/dL Final   09/10/2024 0.49 (L) 0.51 - 0.95 mg/dL Final   09/10/2024 0.48 (L) 0.51 - 0.95 mg/dL Final   09/10/2024 0.45 (L) 0.51 - 0.95 mg/dL Final     WBC Count   Date Value Ref Range Status   09/11/2024 23.1 (H) 4.0 - 11.0 10e3/uL Final   09/10/2024 16.5 (H) 4.0 - 11.0 10e3/uL Final   09/09/2024 15.0 (H) 4.0 - 11.0 10e3/uL Final   09/08/2024 15.0 (H) 4.0 - 11.0 10e3/uL Final   09/07/2024 11.8 (H) 4.0 - 11.0 10e3/uL Final     Hemoglobin   Date Value Ref Range Status   09/11/2024 8.8 (L) 11.7 - 15.7 g/dL Final     Platelet Count   Date Value Ref Range Status   09/11/2024 355 150 - 450 10e3/uL Final     Lab Results   Component Value Date     (L) 09/11/2024    BUN 31.9 (H) 09/11/2024    CO2 19 (L) 09/11/2024     No results found for: \"CRP\"        Pertinent Recent Microbiology Data:   No results for input(s): \"CULT\", \"SDES\" in the last 168 hours.         Imaging:     Recent Results (from the past 48 hour(s))   XR Chest Port 1 View    Narrative    Exam: Chest 1 view portable 30 degrees 9/10/2024 9:21 AM     History:  Trend lines and tubes    Comparison: 9/8/2024    Findings: Cardiac silhouette is nonenlarged. Feeding tube in place.  Endotracheal tube at the clavicles. Right IJ central line tip at  atriocaval junction. Temperature probe in the mid esophagus. Diffuse  severe airspace and interstitial opacities bilaterally are decreased  with only mild interstitial opacities remain.      Impression    IMPRESSION: Decrease in interstitial and airspace opacities now with  only mild interstitial pulmonary edema.       KVNG LAZARO MD         SYSTEM ID:  [id number]   XR Chest Port 1 View    Narrative    Portable chest    INDICATION: Post endotracheal tube " advancement    COMPARISON: Yesterday    FINDINGS: Minimal left lung base streaky opacities unchanged. Heart  size normal. Feeding tube beyond the inferior margin of the image.  Arch bore right IJ catheter tip in the proximal right atrium.  Esophageal temperature probe at the level of the mid to distal  thoracic esophagus.  Endotracheal tube tip approximately 6 cm above the ivy previously  approximately 7.6 cm above the ivy. No definite pneumothorax.      Impression    IMPRESSION: Slight advancement of endotracheal tube with tip now 6 cm  above the ivy. Left lung base mild edema/atelectasis unchanged.    ENMANUEL JEFFERSON MD         SYSTEM ID:  [id number]

## 2024-09-11 NOTE — CONSULTS
[facility]    Consult Note - SICU Service  Date of Admission:  8/8/2024  Consult Requested by: MICU  Reason for Consult: Tracheostomy & PEG         Assessment & Plan: Surgery   Massiel Flaherty is a 53 year old female with PMH Anxiety/depression, fibromyalgia, c/f nonepileptic seizures not on AEDs, hypothyroidism, asthma, HLD, MURIEL on CPAP, expressive aphasia, hypertension who was admitted on 8/3/2024 for fatigue, fever and dyspnea and intubated 8/6/24 at OSH for ARDS, proned and paralyzed without improvement. Transferred to [facility] 8/8/24, hypoxic with high plateaus/peaks and placed on VV ECMO and CRRT. Decannulated from VV ECMO 8/18 and transferred to MICU 8/26/24 for ongoing management. Extubated 8/27 then reintubated 8/29 iso worsening AHRF and pseudomonas pneumonia. Chest CT on 9/4 shows Significantly decreased diffuse ground glass and consolidative opacities suggesting improving ARDS. Patient is currently on FiO2 35//PEEP 8/RR 20. Off paralysis since yesterday off pressors.     - will plan for possible Bedside percutaneous Tracheostomy on Friday, will reassess the patient in the next couple of days to make sure she is still improving and going down on her ventilator settings   - patient has NJT in place without complications, no plan for transfer to another facility soon, no rush for PEG tube at this time, will reassess after Tracheostomy       The patient's care was discussed with the Attending Physician, Dr. Tee  .    Morales Saleh MD  [facility]  Non-urgent messages: Securely message with "[employer] (more info)  Text page via [employer] Paging/Directory     ______________________________________________________________________        Past Medical History:  Anxiety/depression  Fibromyalgia  Hypothyroidism  Asthma,  HLD  HTN  MURIEL on CPAP  Expressive aphasia     Past Surgical History  Colon surgery;  colonoscopy; hysterectomy; tonsillectomy; lap susp uretal sling; lap ovarian cystectomy; Carpal tunnel release; neuro stimulator phase 1 & 2; transvaginal incision cling; vaginal taping    Prior to Admission Medications   Current Facility-Administered Medications   Medication Dose Route Frequency Provider Last Rate Last Admin    acetaminophen (TYLENOL) tablet 650-975 mg  650-975 mg Oral or Feeding Tube Q4H PRN Fuentes Fowler MD        Or    acetaminophen (TYLENOL) Suppository 650-975 mg  650-975 mg Rectal Q4H PRN Fuentes Fowler MD        acetaminophen (TYLENOL) tablet 650 mg  650 mg Oral or Feeding Tube Q6H Barry Hatch MD        alteplase (CATHFLO ACTIVASE) injection 2 mg  2 mg Intravenous Q2H Otilia Sun APRN CNP        [Held by provider] atorvastatin (LIPITOR) tablet 10 mg  10 mg Oral or Feeding Tube Daily Francisco Welsh MD   10 mg at 08/29/24 0924    [START ON 9/12/2024] atovaquone (MEPRON) suspension 1,500 mg  1,500 mg Oral or Feeding Tube Daily Otilia Sun APRN CNP        budesonide (PULMICORT) neb solution 1 mg  1 mg Nebulization Daily Andres Payne PA-C   1 mg at 09/11/24 0719    cetirizine (zyrTEC) tablet 10 mg  10 mg Oral or Feeding Tube Daily Barry Hatch MD   10 mg at 09/11/24 0726    chlorhexidine (PERIDEX) 0.12 % solution 15 mL  15 mL Mouth/Throat Q12H Karen Angel APRN CNP   15 mL at 09/11/24 0726    dexAMETHasone PF (DECADRON) injection 10 mg  10 mg Intravenous Q24H Fuentes Fowler MD   10 mg at 09/11/24 0726    dextrose 10% infusion   Intravenous Continuous PRN Gaudencio De Souza MD        dextrose 10% infusion   Intravenous Continuous PRN Giovanny Guidry PA-C        glucose gel 15-30 g  15-30 g Oral Q15 Min PRN Aniceto Adame MD        Or    dextrose 50 % injection 25-50 mL  25-50 mL Intravenous Q15 Min PRN Aniceto Adame MD        Or    glucagon injection 1 mg  1 mg Subcutaneous Q15 Min PRN Aniceto Adame MD        gabapentin  (NEURONTIN) capsule 300 mg  300 mg Oral or Feeding Tube TID Otilia Sun APRN CNP   300 mg at 09/11/24 1339    heparin ANTICOAGULANT injection 5,000 Units  5,000 Units Subcutaneous Q8H Otilia Sun APRN CNP   5,000 Units at 09/11/24 1339    hydrALAZINE (APRESOLINE) tablet 10 mg  10 mg Oral 4x Daily PRN Gaudencio De Souza MD        hydromorphone (DILAUDID) 0.2 mg/mL bolus dose from infusion pump 0.5 mg  0.5 mg Intravenous Q2H PRN Gaudencio De Souza MD   0.5 mg at 09/11/24 0435    HYDROmorphone (DILAUDID) 0.2 mg/mL infusion ADULT/PEDS GREATER than or EQUAL to 20 kg  0.1-2.5 mg/hr Intravenous Continuous Gaudencio De Souza MD 12.5 mL/hr at 09/11/24 1300 2.5 mg/hr at 09/11/24 1300    insulin aspart (NovoLOG) injection (RAPID ACTING)  1-12 Units Subcutaneous Q4H Carlos Cerna MD   2 Units at 09/11/24 1150    ipratropium (ATROVENT) 0.02 % neb solution 0.5 mg  0.5 mg Nebulization 4x daily Barry Hatch MD   0.5 mg at 09/11/24 1100    And    levalbuterol (XOPENEX) neb solution 0.63 mg  0.63 mg Nebulization 4x Daily Barry Hatch MD   0.63 mg at 09/11/24 1100    ketamine (KETALAR) 25 mg/mL in sodium chloride 0.9 % 100 mL HIGH CONC infusion   mg/hr Intravenous Continuous Barry Hatch MD 3 mL/hr at 09/11/24 1300 75 mg/hr at 09/11/24 1300    levothyroxine (SYNTHROID/LEVOTHROID) tablet 25 mcg  25 mcg Per Feeding Tube QAM AC Giovanny Guidry PA-C   25 mcg at 09/11/24 0726    LORazepam (ATIVAN) tablet 4 mg  4 mg Oral or Feeding Tube Q6H Otilia Sun APRN CNP   4 mg at 09/11/24 0943    propofol (DIPRIVAN) infusion  5-75 mcg/kg/min (Dosing Weight) Intravenous Continuous Gaudencio De Souza MD 24 mL/hr at 09/11/24 1300 45 mcg/kg/min at 09/11/24 1300    And    propofol (DIPRIVAN) bolus from bag or syringe pump  10 mg Intravenous Q15 Min PRN Gaudencio De Souza MD   10 mg at 09/11/24 0618    And    Medication Instruction   Does not apply Continuous PRN Gaudencio De Souza MD        midazolam (VERSED) 1 mg/mL bolus  from syringe/bag pump ADULT  2 mg Intravenous Q1H PRN Michelet Bowen, DOREEN CNP   2 mg at 09/11/24 0614    midazolam (VERSED) 100 mg/100 mL NS infusion - ADULT  1-16 mg/hr Intravenous Continuous Tanesha Riley MD 14 mL/hr at 09/11/24 1300 14 mg/hr at 09/11/24 1300    montelukast (SINGULAIR) tablet 10 mg  10 mg Oral or Feeding Tube At Bedtime Barry Hatch MD   10 mg at 09/10/24 2119    multivitamin RENAL (RENAVITE RX/NEPHROVITE) tablet 1 tablet  1 tablet Oral or Feeding Tube Daily Her-Giovanny Spence PA-C   1 tablet at 09/11/24 0726    naloxone (NARCAN) injection 0.2 mg  0.2 mg Intravenous Q2 Min PRN Barry Hatch MD        Or    naloxone (NARCAN) injection 0.4 mg  0.4 mg Intravenous Q2 Min PRN Barry Hatch MD        Or    naloxone (NARCAN) injection 0.2 mg  0.2 mg Intramuscular Q2 Min PRN Barry Hatch MD        Or    naloxone (NARCAN) injection 0.4 mg  0.4 mg Intramuscular Q2 Min PRN Barry Hatch MD        nystatin (MYCOSTATIN) suspension 500,000 Units  500,000 Units Oral 4x Daily Carlos Cerna MD   500,000 Units at 09/11/24 1143    [Held by provider] oxyCODONE (ROXICODONE) solution 10 mg  10 mg Oral Q4H Michelet Bowen APRN CNP   10 mg at 09/09/24 0819    [Held by provider] oxyCODONE (ROXICODONE) tablet 5 mg  5 mg Oral or Feeding Tube Q6H PRN Andres Payne PA-C   5 mg at 08/29/24 0117    pantoprazole (PROTONIX) 2 mg/mL suspension 40 mg  40 mg Per Feeding Tube QAM AC Barry Hatch MD   40 mg at 09/11/24 0727    prochlorperazine (COMPAZINE) injection 10 mg  10 mg Intravenous Q6H PRN Carlos Cerna MD   10 mg at 08/29/24 0506    Or    prochlorperazine (COMPAZINE) tablet 10 mg  10 mg Oral Q6H PRN Carlos Cerna MD        Or    prochlorperazine (COMPAZINE) suppository 25 mg  25 mg Rectal Q12H PRN Carlos Cerna MD        Prosource TF20 ENfit Compatibl EN LIQD (PROSOURCE TF20) packet 60 mL  1 packet Per Feeding Tube Daily -Giovanny Spence PA-C   60 mL at  09/11/24 0727    QUEtiapine (SEROquel) tablet 25 mg  25 mg Oral or Feeding Tube BID Otilia Sun APRN CNP   25 mg at 09/11/24 1143    QUEtiapine (SEROquel) tablet 50 mg  50 mg Oral or Feeding Tube At Bedtime Otilia Sun APRN CNP                Physical Exam   Vital Signs: Temp: 98.8  F (37.1  C) Temp src: Esophageal   Pulse: 105   Resp: 25 SpO2: 100 % O2 Device: Mechanical Ventilator    Weight: 166 lbs 10.68 oz  Intake/Output Summary (Last 24 hours) at 9/11/2024 1414  Last data filed at 9/11/2024 1400  Gross per 24 hour   Intake 3460.25 ml   Output 4924 ml   Net -1463.75 ml     Constitutional: sedated intubated   ENT: neck with double chin, moderate amount of subcutaneous fat, around 3 fingerbreadth's distance between sternal notch and thyroid cartilage    Respiratory: on mechanical ventilation, FiO2 35//PEEP 8/RR 20  GI: soft abdomen, no distention, lower midline laparotomy scar, pfannenstiel incision scar, right transverse abdominal scar            Data     I have personally reviewed the following data over the past 24 hrs:    23.1 (H)  \   8.8 (L)   / 355     134 (L) 100 31.9 (H) /  251 (H)   4.3 19 (L) 0.55 \     ALT: 23 AST: 16 AP: 101 TBILI: 0.2   ALB: 2.9 (L) TOT PROTEIN: 5.9 (L) LIPASE: N/A     Procal: N/A CRP: 78.80 (H) Lactic Acid: N/A         Imaging results reviewed over the past 24 hrs:   Recent Results (from the past 24 hour(s))   XR Chest Port 1 View    Narrative    Portable chest    INDICATION: Post endotracheal tube advancement    COMPARISON: Yesterday    FINDINGS: Minimal left lung base streaky opacities unchanged. Heart  size normal. Feeding tube beyond the inferior margin of the image.  Arch bore right IJ catheter tip in the proximal right atrium.  Esophageal temperature probe at the level of the mid to distal  thoracic esophagus.  Endotracheal tube tip approximately 6 cm above the ivy previously  approximately 7.6 cm above the ivy. No definite pneumothorax.       Impression    IMPRESSION: Slight advancement of endotracheal tube with tip now 6 cm  above the ivy. Left lung base mild edema/atelectasis unchanged.    ENMANUEL JEFFERSON MD         SYSTEM ID:  [id number]

## 2024-09-11 NOTE — PROGRESS NOTES
MEDICAL ICU PROGRESS NOTE  09/11/2024    Date of Service (when I saw the patient): 09/11/2024    ASSESSMENT: Massiel Flaherty is a 53 year old female with PMH Anxiety/depression, fibromyalgia, c/f nonepileptic seizures not on AEDs, hypothyroidism, asthma, HLD, MURIEL on CPAP, expressive aphasia, hypertension  who was admitted on 8/3/2024 for fatigue, fever and dyspnea and intubated 8/6/24 at OSH for ARDS, proned and paralyzed without improvement. Transferred to [facility] 8/8/24, hypoxic with high plateaus/peaks and placed on VV ECMO and CRRT. Decannulated from VV ECMO 8/18 and transferred to MICU 8/26/24 for ongoing management. Extubated 8/27 then reintubated 8/29 iso worsening AHRF and pseudomonas pneumonia.     CHANGES and MAJOR THINGS TODAY:  - Resumed po Gabapentin, Ativan   - Started scheduled Seroquel 25 BID, 50 HS   - Started pjp ppx with atovaquone   - Attempt to slowly decrease midazolam gtt today   - Okay to increase propofol gtt for today   - Decreased peep to 8   - Increased inspiratory time to reduce breath stacking   - Advanced ETT; repeat chest xray    - CRRT stopped & heparin; likely transitioning to iHD tomorrow   - Start subQ heparin     Neuro:  # Pain and sedation  # Acute pain  # Concern for ICU delirium   # Hx Fibromyalgia   # Hx myalgic encephalomyelitis   # Hx anxiety/depression  - Monitor neurological status. Delirium preventions and precautions.   - Pain: Scheduled: tylenol, oxycodone 10mg q 4hr (currently on hold), resumed Ativan 4 mg every 6 hours for further sedation   PRN: tylenol, oxycodone (on hold)   - Sedation plan: dilaudid (rotated from fentanyl 9/5), midazolam (attempt to reduce to 14 > 12 mg/hr today), ketamine (9/9-)  - Continue propofol; okay to slowly increase rate today if needed   - RASS goal -4 to -5  - Gabapentin 300mg TID resumed   - Started scheduled Seroquel 25 mg BID and 50 mg HS   - PTA Venlafaxine and Amitriptyline discontinued while sedated    # Hx spells with staring  and BUE posturing previously described as nonepileptic seizures   # Hx of GTC seizures and myoclonic epilepsy, weaned off AEDs   # Diffuse cerebral edema - improved  - Sodium goals liberalized to 140-145  - 8/21: MRI Brain: no acute intracranial pathology. No findings to suggest anoxic brain injury.     Pulmonary:  # Acute hypoxic respiratory failure c/b ARDS  # Pseudomonas pneumonia  # Mechanical ventilation  # s/p VV ECMO 8/8 - 8/18   # Hx CAP  # Asthma  # MURIEL on home CPAP  PFT dated 9/8/2020 demonstrated normal spirometry with mild diffusion impairment and mild restriction (FEV1/FVC 80%, FEV1 2.59, DLCO 40%). CT abdomen chest 3/9/2020 demonstrated scarring and fibrosis bilaterally which correlated with the decreased DLCO. The patient also has a history of MURIEL on CPAP therapy as currently managed by her provider in [place]. Unclear what triggered ARDS or why she has had such severe hospital course, further investigated ILD but results all unremarkable. Extubated 8/27, reintubated 8/29 for worsening O2 demands and diffuse opacities iso new/recurrent psuedomonas pneumonia. Bronch with BAL 8/30, pseudomonas growing as below.   - ILD w/u aldolase, EVELIO, RF, CCP, ANCA, MPO, SSA Ro and [place], Scleroderma antibody, Radha 1,  hypersensity pneumonitis, IGG subclasses,  aspergillus, blastomyces, histoplasma neg  - SpO2 goals >92%, PaO2 goals >60   - PTA singular at bedtime if able  - Scheduled pulmicort, and duonebs   - Lung protective ventilation strategies given ARDS  - Methylpred 125mg q6H x 24h 8/30 --> transition to 20 mg dex x 5 days, followed by 10mg x 5 days  - Epoprostenol discontinued last evening, will remain off today   - Continue off paralytic   - Reduced peep to 8   - Increased inspiratory time to reduce breath stacking   - Advanced ETT 3-4 cm  FiO2 (%): 35 %, Resp: 25, Vent Mode: CMV/AC, Resp Rate (Set): 25 breaths/min, Tidal Volume (Set, mL): 350 mL, PEEP (cm H2O): 8 cmH2O, Resp Rate (Set): 25 breaths/min,  Tidal Volume (Set, mL): 350 mL, PEEP (cm H2O): 8 cmH2O    Cardiovascular:  # Hyperlipidemia  # Hypotension: Resolved   # Hx HTN  - MAP goal >65, SBP goals >110  - Has remained off pressors since 9/9   - PTA atorvastatin  - PTA clonidine held while sedated  - Lower extremity ultrasound without vascular pathology, no hematoma or clots  - Monitor discoloration of extremities for necrosis  - PRN hydralazine for SBP > 200     Sinus Tachycardia  Likely 2/2 fluid removal, sepsis, pain and sedation as above    GI/Nutrition:  # Moderate protein calorie malnutrition  # Non-infectious Diarrhea  # GERD   - Protonix (home medication)   - Nutrition consulted, appreciate recs  - Diet: TF via NJ, tolerating   - Hold bowel regimen d/t loose stools  - Continue scheduled multivitamin, banatrol, prosource packet  - loperamide PRN  - Rectal tube, continues with high output    Renal/Fluids/Electrolytes:  # Acute kidney injury: Improving   # Renal failure  Baseline Cr approx 0.6. Pt was anuric here but now making some urine, likely prerenal due to shock.  - Stopped CRRT today > transition to iHD likely tomorrow; fluid goal net negative 1L for the day  - Strict I&Os  - Bladder scan q shift  - Avoid nephrotoxins as able    Endocrine:  # Steroid and stress induced hyperglycemia  # Hypothyroidism   - Goal to keep BG < 180 for optimal wound healing.   - with rising blood glucose due to steroids started insulin drip 8/30, discontinued 9/7  - High dose sliding scale insulin    - Continue PTA synthroid    ID:  # Leukocytosis  # Psuedomonas pneumonia  # Hx CAP  Respiratory cx 8/29 pseudomonas pneumonia. Intermediate susceptability to meropenem, more significant sensitivities to ciprofloxacin  - Continue to monitor fever curve and inflammatory markers as appropriate  - ID consulted, appreciate recs.   - Due to rigors seen on exam 8/29, low CRRT set temp masking any possible fevers, worsening O2 requirements, took blood cultures and started  antibiotics    Abx  - Meropenem 8/29 - 9/1  - Vancomycin 8/29- 8/30  - Tobramycin x 1 8/29  - Vancomycin 9/5-9/8    - Tobramycin nebs BID 9/1-9/11  - Ciprofloxacin infusion 9/1- 9/11  - Metronidazole 9/6 - 9/11    PJP Ppx  Given Dex-ARDS Massiel ferraro remain on steriods for > 3 weeks so will initiate PJP ppx. 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   - Ganciclovir 9/6 - 9/8, discontinued given ID recs  - trend CMV 9/9    # HSV-1  Mouth sores present, which could have viral etiology. 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 Monitor and trend.   - Heparinize CRRT circuit; stopped as transitioning to iHD   - CTAP 8/30 due to acute hemoglobin drop requiring transfusion of 2 x 1 unit of blood 12 hours apart. Negative for acute bleed    Musculoskeletal/Rheum:  # Alopecia  - Hold PTA hydroxychloroquine     # Weakness and deconditioning of critical illness  # Left ankle injury pre-hospitalization    - Physical and occupational therapy when able.     Skin:  # BLE scattered ecchymosis  # Left toe duskiness  - Bilateral lower leg ecchymosis  - WOC consult  - Ecchymosis to left foot, most noticeable to 3rd and 4th toes    General Cares/Prophylaxis:  DVT Prophylaxis: Transitioned to SubQ heparin   GI Prophylaxis: PPI  Restraints: no  Code Status: DNR/OK to intubate     Lines/tubes/drains:  - ETT (8/29)  - NJ (8/9)  - LIJ CVC (8/8)  - Radial a-line (8/29)  - PIV x3  - Rectal tube (8/17)  - Tunneled HD line (8/23)    Disposition:  - Medical ICU     Patient seen and findings/plan discussed with medical ICU staff, Dr. Dubon.    I spent a total of 45 minutes  (excluding procedure time) personally providing and directing critical care services at the bedside and on the critical care unit for Massiel Flaherty     DOREEN Oliver CNP    Clinically Significant Risk Factors              # Hypoalbuminemia: Lowest albumin = 2.2 g/dL at 8/10/2024  9:47 AM, will monitor as appropriate                # Severe Malnutrition: based on nutrition assessment      # Financial/Environmental Concerns: none        Code Status: No CPR- Pre-arrest intubation OK       ====================================  INTERVAL HISTORY:   Increased sedation overnight d/t vent dyssynchrony, otherwise NAD. Remains HDS and off pressors. Notable breath stacking on exam, increased inspiratory time which improved synchrony. Overall oxygenation and ventilation and imaging showing improvement and tolerating slow reduction in sedation, and remains off paralytic.      OBJECTIVE:   1. VITAL SIGNS:   Temp:  [96.4  F (35.8  C)-100  F (37.8  C)] 99.5  F (37.5  C)  Pulse:  [] 109  Resp:  [18-36] 29  MAP:  [65 mmHg-114 mmHg] 91 mmHg  Arterial Line BP: ()/(50-85) 153/67  FiO2 (%):  [40 %] 40 %  SpO2:  [87 %-100 %] 97 %  FiO2 (%): 40 %, Resp: 29, Vent Mode: CMV/AC, Resp Rate (Set): 25 breaths/min, Tidal Volume (Set, mL): 350 mL, PEEP (cm H2O): 10 cmH2O, Resp Rate (Set): 25 breaths/min, Tidal Volume (Set, mL): 350 mL, PEEP (cm H2O): 10 cmH2O  2. INTAKE/ OUTPUT:   I/O last 3 completed shifts:  In: 3543.37 [I.V.:2043.37; NG/GT:420]  Out: 4772.1 [Other:4197.1; Stool:575]    3. PHYSICAL EXAMINATION:  General: Intubated, sedated  HEENT: Normocephalic, atraumatic, eyes with ointment dressing  Neuro: RASS -4-5, arefelexic  Pulm/Resp: Bilateral breath sounds audible with diffuse coarse breath sounds  CV: Sinus tachycardia, s1/2 normal, no murmur  Abdomen: Soft, non-distended, limited 2/2 to sedation  : deferred  Incisions/Skin: lower leg 1+ edema with ecchymosis present and scattered. Some bluish discoloration  of the finger tips, capillary refill normal    4. LABS:   Arterial Blood Gases   Recent Labs   Lab 09/11/24  0309 09/10/24  1724 09/10/24  1010 09/10/24  0314   PH 7.39 7.39 7.36 7.45   PCO2 39 40 44 36   PO2 109* 93 85 103   HCO3 24 24 25 25     Complete Blood Count   Recent Labs   Lab 09/11/24  0309 09/10/24  0314 09/09/24  0343 09/08/24  0329   WBC 23.1* 16.5* 15.0* 15.0*   HGB 8.8* 7.5* 7.2* 7.5*    235 153 185     Basic Metabolic Panel  Recent Labs   Lab 09/11/24 0312 09/11/24 0309 09/11/24  0027 09/10/24  2009 09/10/24  2008 09/10/24  1549 09/10/24  1230 09/10/24  0436 09/10/24  0314   NA  --  139  --   --  137  --  137  --  136   POTASSIUM  --  3.6  --   --  4.3  --  4.1  --  3.6   CHLORIDE  --  103  --   --  104  --  104  --  102   CO2  --  21*  --   --  22  --  21*  --  22   BUN  --  23.7*  --   --  26.4*  --  26.3*  --  21.7*   CR  --  0.44*  --   --  0.49*  --  0.48*  --  0.45*   * 138* 119* 156* 189*   < > 218*   < > 158*    < > = values in this interval not displayed.     Liver Function Tests  Recent Labs   Lab 09/11/24  0309 09/10/24  2008 09/10/24  1230 09/10/24  0314 09/09/24  1147 09/09/24 0343 09/08/24  1155 09/08/24  0329   AST 16  --   --  19  --  42  --  28   ALT 23  --   --  22  --  26  --  28   ALKPHOS 101  --   --  100  --  93  --  85   BILITOTAL 0.2  --   --  0.2  --  0.2  --  0.2   ALBUMIN 3.2* 3.2* 2.9* 3.0*   < > 2.9*   < > 3.1*    < > = values in this interval not displayed.     Coagulation Profile  No lab results found in last 7 days.    5. RADIOLOGY:   Recent Results (from the past 24 hour(s))   XR Chest Port 1 View    Narrative    Exam: Chest 1 view portable 30 degrees 9/10/2024 9:21 AM     History:  Trend lines and tubes    Comparison: 9/8/2024    Findings: Cardiac silhouette is nonenlarged. Feeding tube in place.  Endotracheal tube at the clavicles. Right IJ central line tip at  atriocaval junction. Temperature probe in the mid esophagus. Diffuse  severe airspace  and interstitial opacities bilaterally are decreased  with only mild interstitial opacities remain.      Impression    IMPRESSION: Decrease in interstitial and airspace opacities now with  only mild interstitial pulmonary edema.       KVNG LAZARO MD         SYSTEM ID:  [id number]

## 2024-09-11 NOTE — PLAN OF CARE
Goal Outcome Evaluation:    Plan of Care Reviewed With: patient    Overall Patient Progress: no change    Outcome Evaluation: Tolerating being off paralytic, bolus sedation given to keep patient well sedated.    ICU End of Shift Summary. See flowsheets for vital signs and detailed assessment.    Changes this shift:   RASS -4, this AM patient dyssynchronous with the ventilator continuing to give bolus sedation medications. Unable to increase propofol d/t high triglycerides provider made aware. FiO2 remains at 35% will continue to monitor patient. CRRT able remove fluid at goal rate.    Plan: Continue to monitor patient's hemodynamics and ventilation.         Problem: ARDS (Acute Respiratory Distress Syndrome)  Goal: Effective Oxygenation  Outcome: Progressing  Intervention: Optimize Oxygenation, Ventilation and Perfusion  Recent Flowsheet Documentation  Taken 9/11/2024 0400 by Julia Scott RN  Head of Bed (HOB) Positioning: HOB at 30 degrees  Taken 9/11/2024 0200 by Julia Scott RN  Head of Bed (HOB) Positioning: HOB at 20-30 degrees  Taken 9/11/2024 0000 by Julia Scott RN  Lung Protection Measures:   ventilator synchrony promoted   fluid excess minimized  Airway/Ventilation Management:   airway patency maintained   calming measures promoted   humidification applied   pulmonary hygiene promoted  Head of Bed (HOB) Positioning: HOB at 30 degrees  Stabilization Measures:   legs elevated   fluid resuscitation initiated  Taken 9/10/2024 2200 by Julia Scott RN  Head of Bed (HOB) Positioning: HOB at 20-30 degrees  Taken 9/10/2024 2000 by Julia Scott RN  Lung Protection Measures:   ventilator synchrony promoted   fluid excess minimized  Airway/Ventilation Management:   airway patency maintained   calming measures promoted   humidification applied   pulmonary hygiene promoted  Head of Bed (HOB) Positioning: HOB at 30 degrees  Stabilization Measures:   legs elevated   fluid resuscitation  initiated

## 2024-09-11 NOTE — PROGRESS NOTES
Nephrology Progress Note  09/11/2024       Mrs Flaherty is a 54 yo F w/ hx of Anxiety, depression, fibromyalgia, hypothyroidism, asthma, HLD, MURIEL on CPAP, expressive aphasia, and hypertension who was admitted to an OSH with community acquired pneumonia on 8/3 s/p intubation. Found to have severe ARDS requiring paralysis and proning, transferred here on 8/8 for management and initiated on CKRT for severe acidemia in the setting of oligoanuric CHACORTA. Started CRRT on 8/9 for volume management.     Interval History :   Mrs Flaherty continues on CRRT, after being on 2 pressors 9/9 she quickly weaned off yesterday (after stopping precedex) and now is on hypertensive side despite pulling 1L yesterday.  At low wt for her course, only concerning feature with stopping CRRT is high intake (3.5L yesterday) but given her BP's we should be able to pull enough UF to keep up with iHD modality, likely run tomorrow and will watch for recovery now that BP's are stable.      Assessment & Recommendations:   CHACORTA-Baseline Cr 0.6 but not checked since 5/2023 PTA, UA with protein + blood but difficult to interpret in setting of CHACORTA, likely hypotensive ATN. Started CRRT on 8/9, now anuric.    -Access is RIJ tunneled line from 8/23  -Stopping CRRT today, likely HD tomorrow given her high intake but BP's are stable.    -Dialysis consent signed and scanned into media on 8/9    Please avoid placing a PICC line in any patient with CKD III or above, CHACORTA, or ESKD, as this will impact negatively the ability of the patient to have an AV fistula or AV Graft should they need it in the future.  Internal jugular or External Jugular  are the preferred type of access in patients with kidney disease. (Link to guidelines)    Volume status-1L negative yesterday, negative 0.4L today before stopping CRRT.  Likely run tomorrow for volume given high intake but with high BP's we should be able to manage with iHD.      Electrolytes/pH-No acute issues, checking BID  "    Ca/phos/pth-Mg and Phos mildly up, no intervention needed.     Anemia-Hgb 8.8 stable with last PRBC's 9/6       Nutrition-Renal TF    Time spent: 45 minutes on this date of encounter for chart review, physical exam, medical decision making and co-ordination of care.     Discussed with Dr Perales    Recommendations were communicated to primary team via verbal communication.     DOREEN Sequeira CNS  Clinical Nurse Specialist  [phone]    Review of Systems:   I reviewed the following systems:  ROS not done due to vent/sedation.     Physical Exam:   I/O last 3 completed shifts:  In: 3543.37 [I.V.:2043.37; NG/GT:420]  Out: 4772.1 [Other:4197.1; Stool:575]   /59 (BP Location: Right arm)   Pulse 98   Temp 99  F (37.2  C)   Resp 25   Ht 1.575 m (5' 2\")   Wt 75.6 kg (166 lb 10.7 oz)   SpO2 100%   BMI 30.48 kg/m       GENERAL APPEARANCE: critically ill, intubated   HEENT: MMM   PULM: lungs clear anteriorly   CV: regular rhythm, normal rate, no rub      -edema - 1+ LE edema   GI: soft, ND  INTEGUMENT: no rash on exposed skin  NEURO: sedated   Access is LFV temp line 8/19.     Labs:   All labs reviewed by me  Electrolytes/Renal -   Recent Labs   Lab Test 09/11/24  1158 09/11/24  1150 09/11/24  0742 09/11/24  0312 09/11/24  0309 09/10/24  2009 09/10/24  2008 09/10/24  1549 09/10/24  1230   NA  --   --   --   --  139  --  137  --  137   POTASSIUM  --   --   --   --  3.6  --  4.3  --  4.1   CHLORIDE  --   --   --   --  103  --  104  --  104   CO2  --   --   --   --  21*  --  22  --  21*   BUN  --   --   --   --  23.7*  --  26.4*  --  26.3*   CR  --   --   --   --  0.44*  --  0.49*  --  0.48*   GLC  --  173* 109* 124* 138*   < > 189*   < > 218*   QUAN  --   --   --   --  8.5*  --  8.6*  --  8.3*   MAG 1.9  --   --   --  2.2  --  2.1  --  2.0   PHOS  --   --   --   --  3.9  --  4.2  --  3.8    < > = values in this interval not displayed.       CBC -   Recent Labs   Lab Test 09/11/24  0309 09/10/24  0314 " 09/09/24  0343   WBC 23.1* 16.5* 15.0*   HGB 8.8* 7.5* 7.2*    235 153       LFTs -   Recent Labs   Lab Test 09/11/24  0309 09/10/24  2008 09/10/24  1230 09/10/24  0314 09/09/24  1147 09/09/24  0343   ALKPHOS 101  --   --  100  --  93   BILITOTAL 0.2  --   --  0.2  --  0.2   ALT 23  --   --  22  --  26   AST 16  --   --  19  --  42   PROTTOTAL 5.9*  --   --  5.6*  --  5.3*   ALBUMIN 3.2* 3.2* 2.9* 3.0*   < > 2.9*    < > = values in this interval not displayed.       Iron Panel - No lab results found.        Current Medications:  Current Facility-Administered Medications   Medication Dose Route Frequency Provider Last Rate Last Admin    acetaminophen (TYLENOL) tablet 650 mg  650 mg Oral or Feeding Tube Q6H Barry Hatch MD        [Held by provider] atorvastatin (LIPITOR) tablet 10 mg  10 mg Oral or Feeding Tube Daily Francisco Welsh MD   10 mg at 08/29/24 0924    [START ON 9/12/2024] atovaquone (MEPRON) suspension 1,500 mg  1,500 mg Oral or Feeding Tube Daily Otilia Sun APRN CNP        budesonide (PULMICORT) neb solution 1 mg  1 mg Nebulization Daily Andres Payne PA-C   1 mg at 09/11/24 0719    cetirizine (zyrTEC) tablet 10 mg  10 mg Oral or Feeding Tube Daily Barry Hatch MD   10 mg at 09/11/24 0726    chlorhexidine (PERIDEX) 0.12 % solution 15 mL  15 mL Mouth/Throat Q12H Karen Angel APRN CNP   15 mL at 09/11/24 0726    dexAMETHasone PF (DECADRON) injection 10 mg  10 mg Intravenous Q24H Fuentes Fowler MD   10 mg at 09/11/24 0726    gabapentin (NEURONTIN) capsule 300 mg  300 mg Oral or Feeding Tube TID Otilia Sun APRN CNP        heparin ANTICOAGULANT injection 5,000 Units  5,000 Units Subcutaneous Q8H Otilia Sun, APRN CNP        insulin aspart (NovoLOG) injection (RAPID ACTING)  1-12 Units Subcutaneous Q4H Carlos Cerna MD   2 Units at 09/11/24 1150    ipratropium (ATROVENT) 0.02 % neb solution 0.5 mg  0.5 mg Nebulization 4x daily Barry Hatch MD    0.5 mg at 09/11/24 1100    And    levalbuterol (XOPENEX) neb solution 0.63 mg  0.63 mg Nebulization 4x Daily Barry Hatch MD   0.63 mg at 09/11/24 1100    levothyroxine (SYNTHROID/LEVOTHROID) tablet 25 mcg  25 mcg Per Feeding Tube [facility] Giovanny Guidry PA-C   25 mcg at 09/11/24 0726    LORazepam (ATIVAN) tablet 4 mg  4 mg Oral or Feeding Tube Q6H Otilia Sun APRN CNP   4 mg at 09/11/24 0943    montelukast (SINGULAIR) tablet 10 mg  10 mg Oral or Feeding Tube At Bedtime Barry Hatch MD   10 mg at 09/10/24 2119    multivitamin RENAL (RENAVITE RX/NEPHROVITE) tablet 1 tablet  1 tablet Oral or Feeding Tube Daily Giovanny Guidry PA-C   1 tablet at 09/11/24 0726    nystatin (MYCOSTATIN) suspension 500,000 Units  500,000 Units Oral 4x Daily Carlos Cerna MD   500,000 Units at 09/11/24 1143    [Held by provider] oxyCODONE (ROXICODONE) solution 10 mg  10 mg Oral Q4H Michelet Bowen APRN CNP   10 mg at 09/09/24 0819    pantoprazole (PROTONIX) 2 mg/mL suspension 40 mg  40 mg Per Feeding Tube [facility] Barry Hatch MD   40 mg at 09/11/24 0727    Prosource TF20 [employer]fit Compatibl EN LIQD (PROSOURCE TF20) packet 60 mL  1 packet Per Feeding Tube Daily Giovanny Guidry PA-C   60 mL at 09/11/24 0727    QUEtiapine (SEROquel) tablet 25 mg  25 mg Oral or Feeding Tube BID Otilia Sun APRN CNP   25 mg at 09/11/24 1143    QUEtiapine (SEROquel) tablet 50 mg  50 mg Oral or Feeding Tube At Bedtime Otilia Sun APRN CNP         Current Facility-Administered Medications   Medication Dose Route Frequency Provider Last Rate Last Admin    dextrose 10% infusion   Intravenous Continuous PRN Gaudencio De Souza MD        dextrose 10% infusion   Intravenous Continuous PRN Giovanny Guidry PA-C        HYDROmorphone (DILAUDID) 0.2 mg/mL infusion ADULT/PEDS GREATER than or EQUAL to 20 kg  0.1-2.5 mg/hr Intravenous Continuous Gaudencio De Souza MD 12.5 mL/hr at 09/11/24 1109 2.5 mg/hr at 09/11/24 1109    ketamine (KETALAR) 25  mg/mL in sodium chloride 0.9 % 100 mL HIGH CONC infusion   mg/hr Intravenous Continuous Barry Hatch MD 3 mL/hr at 09/11/24 1100 75 mg/hr at 09/11/24 1100    propofol (DIPRIVAN) infusion  5-75 mcg/kg/min (Dosing Weight) Intravenous Continuous Gaudencio De Souza MD 24 mL/hr at 09/11/24 1225 45 mcg/kg/min at 09/11/24 1225    And    Medication Instruction   Does not apply Continuous PRN Gaudencio De Souza MD        midazolam (VERSED) 100 mg/100 mL NS infusion - ADULT  1-16 mg/hr Intravenous Continuous Tanesha Riley MD 14 mL/hr at 09/11/24 1152 14 mg/hr at 09/11/24 1152

## 2024-09-11 NOTE — PROGRESS NOTES
"CRRT STATUS NOTE    DATA:  Time:  06:29 AM  Pressures WNL:  YES  Filter Status:  WDL    Problems Reported/Alarms Noted:  none      Supplies Present:  YES    ASSESSMENT:  Patient Net Fluid Balance:  Pt was net -1041.7 ml  at midnight for 9/10/24 and is net -331 ml so far today.  Pt is net -2.9L since admission.    Vital Signs:  /59 (BP Location: Right arm)   Pulse 93   Temp 98.6  F (37  C)   Resp 26   Ht 1.575 m (5' 2\")   Wt 76.1 kg (167 lb 12.3 oz)   SpO2 100%   BMI 30.69 kg/m      Labs:  K 3.6, WBC 23.1    Goals of Therapy:  50-100cc net negative with goal of 1-2 L while keeping SBP>100, meeting goal    INTERVENTIONS:   Restarted set    PLAN:  Continue to monitor and remove fluid as ordered.  Contact CRRT resource RN with questions and concerns.  Exchange set q 72 hrs and PRN.     "

## 2024-09-11 NOTE — PLAN OF CARE
ICU End of Shift Summary. See flowsheets for vital signs and detailed assessment.    Changes this shift: RASS -4 to -5. Versed weaned down from 16 to 12, prop down to 30, and seroquil/gabapentin/ativan restarted. Sinus rhythm/sinus tach 90s-100s. Tmax of 99. MAP >65, levo remains off. ETT advanced 4cm, CXR taken. PEEP down to 8, RR down to 20, otherwise vent settings unchanged. Minimal secretions. Straight cath today for 560ml. CRRT discontinued at 1000, plan to do an HD run tomorrow.     Plan: Wean sedation as tolerated/monitor respiratory status. Update team with any acute changes.       Goal Outcome Evaluation:      Plan of Care Reviewed With: patient, family    Overall Patient Progress: no changeOverall Patient Progress: no change    Outcome Evaluation: See note

## 2024-09-12 LAB
ALLEN'S TEST: ABNORMAL
ALLEN'S TEST: YES
ANION GAP SERPL CALCULATED.3IONS-SCNC: 16 MMOL/L (ref 7–15)
ANION GAP SERPL CALCULATED.3IONS-SCNC: 17 MMOL/L (ref 7–15)
ANION GAP SERPL CALCULATED.3IONS-SCNC: 18 MMOL/L (ref 7–15)
B-OH-BUTYR SERPL-SCNC: <0.18 MMOL/L
BACTERIA BLD CULT: NO GROWTH
BASE EXCESS BLDA CALC-SCNC: -5.6 MMOL/L (ref -3–3)
BASE EXCESS BLDA CALC-SCNC: -6.5 MMOL/L (ref -3–3)
BASE EXCESS BLDA CALC-SCNC: -7.5 MMOL/L (ref -3–3)
BASE EXCESS BLDA CALC-SCNC: -7.7 MMOL/L (ref -3–3)
BASE EXCESS BLDA CALC-SCNC: -8.6 MMOL/L (ref -3–3)
BUN SERPL-MCNC: 54 MG/DL (ref 6–20)
BUN SERPL-MCNC: 73.1 MG/DL (ref 6–20)
BUN SERPL-MCNC: 74.6 MG/DL (ref 6–20)
CALCIUM SERPL-MCNC: 8.2 MG/DL (ref 8.8–10.4)
CALCIUM SERPL-MCNC: 8.3 MG/DL (ref 8.8–10.4)
CALCIUM SERPL-MCNC: 8.4 MG/DL (ref 8.8–10.4)
CHLORIDE SERPL-SCNC: 102 MMOL/L (ref 98–107)
CHLORIDE SERPL-SCNC: 97 MMOL/L (ref 98–107)
CHLORIDE SERPL-SCNC: 99 MMOL/L (ref 98–107)
COHGB MFR BLD: 99.2 % (ref 96–97)
COHGB MFR BLD: 99.7 % (ref 96–97)
COHGB MFR BLD: 99.7 % (ref 96–97)
COHGB MFR BLD: 99.9 % (ref 96–97)
COHGB MFR BLD: >100 % (ref 96–97)
CREAT SERPL-MCNC: 0.74 MG/DL (ref 0.51–0.95)
CREAT SERPL-MCNC: 0.82 MG/DL (ref 0.51–0.95)
CREAT SERPL-MCNC: 0.9 MG/DL (ref 0.51–0.95)
EGFRCR SERPLBLD CKD-EPI 2021: 76 ML/MIN/1.73M2
EGFRCR SERPLBLD CKD-EPI 2021: 85 ML/MIN/1.73M2
EGFRCR SERPLBLD CKD-EPI 2021: >90 ML/MIN/1.73M2
ERYTHROCYTE [DISTWIDTH] IN BLOOD BY AUTOMATED COUNT: 20.7 % (ref 10–15)
GLUCOSE BLDC GLUCOMTR-MCNC: 117 MG/DL (ref 70–99)
GLUCOSE BLDC GLUCOMTR-MCNC: 133 MG/DL (ref 70–99)
GLUCOSE BLDC GLUCOMTR-MCNC: 135 MG/DL (ref 70–99)
GLUCOSE BLDC GLUCOMTR-MCNC: 210 MG/DL (ref 70–99)
GLUCOSE BLDC GLUCOMTR-MCNC: 223 MG/DL (ref 70–99)
GLUCOSE BLDC GLUCOMTR-MCNC: 246 MG/DL (ref 70–99)
GLUCOSE SERPL-MCNC: 127 MG/DL (ref 70–99)
GLUCOSE SERPL-MCNC: 246 MG/DL (ref 70–99)
GLUCOSE SERPL-MCNC: 306 MG/DL (ref 70–99)
HCO3 BLD-SCNC: 18 MMOL/L (ref 21–28)
HCO3 BLD-SCNC: 19 MMOL/L (ref 21–28)
HCO3 BLD-SCNC: 20 MMOL/L (ref 21–28)
HCO3 SERPL-SCNC: 17 MMOL/L (ref 22–29)
HCO3 SERPL-SCNC: 17 MMOL/L (ref 22–29)
HCO3 SERPL-SCNC: 18 MMOL/L (ref 22–29)
HCT VFR BLD AUTO: 25 % (ref 35–47)
HGB BLD-MCNC: 7.8 G/DL (ref 11.7–15.7)
LACTATE SERPL-SCNC: 1.1 MMOL/L (ref 0.7–2)
MAGNESIUM SERPL-MCNC: 1.6 MG/DL (ref 1.7–2.3)
MAGNESIUM SERPL-MCNC: 2.1 MG/DL (ref 1.7–2.3)
MAGNESIUM SERPL-MCNC: 2.4 MG/DL (ref 1.7–2.3)
MCH RBC QN AUTO: 30.4 PG (ref 26.5–33)
MCHC RBC AUTO-ENTMCNC: 31.2 G/DL (ref 31.5–36.5)
MCV RBC AUTO: 97 FL (ref 78–100)
O2/TOTAL GAS SETTING VFR VENT: 35 %
O2/TOTAL GAS SETTING VFR VENT: 40 %
PCO2 BLD: 36 MM HG (ref 35–45)
PCO2 BLD: 39 MM HG (ref 35–45)
PCO2 BLD: 40 MM HG (ref 35–45)
PCO2 BLD: 40 MM HG (ref 35–45)
PCO2 BLD: 46 MM HG (ref 35–45)
PEEP: 6 CM H2O
PEEP: 8 CM H2O
PH BLD: 7.22 [PH] (ref 7.35–7.45)
PH BLD: 7.28 [PH] (ref 7.35–7.45)
PH BLD: 7.28 [PH] (ref 7.35–7.45)
PH BLD: 7.31 [PH] (ref 7.35–7.45)
PH BLD: 7.32 [PH] (ref 7.35–7.45)
PHOSPHATE SERPL-MCNC: 5.8 MG/DL (ref 2.5–4.5)
PHOSPHATE SERPL-MCNC: 6.8 MG/DL (ref 2.5–4.5)
PHOSPHATE SERPL-MCNC: 8.7 MG/DL (ref 2.5–4.5)
PLATELET # BLD AUTO: 291 10E3/UL (ref 150–450)
PO2 BLD: 104 MM HG (ref 80–105)
PO2 BLD: 112 MM HG (ref 80–105)
PO2 BLD: 119 MM HG (ref 80–105)
PO2 BLD: 122 MM HG (ref 80–105)
PO2 BLD: 139 MM HG (ref 80–105)
POTASSIUM SERPL-SCNC: 3.5 MMOL/L (ref 3.4–5.3)
POTASSIUM SERPL-SCNC: 4.4 MMOL/L (ref 3.4–5.3)
POTASSIUM SERPL-SCNC: 5.3 MMOL/L (ref 3.4–5.3)
RBC # BLD AUTO: 2.57 10E6/UL (ref 3.8–5.2)
SAO2 % BLDA: 97 % (ref 92–100)
SAO2 % BLDA: 98 % (ref 92–100)
SAO2 % BLDA: 98 % (ref 92–100)
SODIUM SERPL-SCNC: 132 MMOL/L (ref 135–145)
SODIUM SERPL-SCNC: 133 MMOL/L (ref 135–145)
SODIUM SERPL-SCNC: 136 MMOL/L (ref 135–145)
WBC # BLD AUTO: 19.8 10E3/UL (ref 4–11)

## 2024-09-12 PROCEDURE — 84100 ASSAY OF PHOSPHORUS: CPT | Performed by: PHYSICIAN ASSISTANT

## 2024-09-12 PROCEDURE — 82805 BLOOD GASES W/O2 SATURATION: CPT

## 2024-09-12 PROCEDURE — 250N000011 HC RX IP 250 OP 636

## 2024-09-12 PROCEDURE — 200N000002 HC R&B ICU UMMC

## 2024-09-12 PROCEDURE — 94640 AIRWAY INHALATION TREATMENT: CPT

## 2024-09-12 PROCEDURE — 258N000003 HC RX IP 258 OP 636

## 2024-09-12 PROCEDURE — 94003 VENT MGMT INPAT SUBQ DAY: CPT

## 2024-09-12 PROCEDURE — 84100 ASSAY OF PHOSPHORUS: CPT

## 2024-09-12 PROCEDURE — 99291 CRITICAL CARE FIRST HOUR: CPT | Mod: FS

## 2024-09-12 PROCEDURE — 250N000013 HC RX MED GY IP 250 OP 250 PS 637

## 2024-09-12 PROCEDURE — 250N000013 HC RX MED GY IP 250 OP 250 PS 637: Performed by: SURGERY

## 2024-09-12 PROCEDURE — 250N000012 HC RX MED GY IP 250 OP 636 PS 637

## 2024-09-12 PROCEDURE — 99292 CRITICAL CARE ADDL 30 MIN: CPT | Mod: FS

## 2024-09-12 PROCEDURE — 999N000157 HC STATISTIC RCP TIME EA 10 MIN

## 2024-09-12 PROCEDURE — 250N000009 HC RX 250: Performed by: SURGERY

## 2024-09-12 PROCEDURE — 99233 SBSQ HOSP IP/OBS HIGH 50: CPT | Performed by: CLINICAL NURSE SPECIALIST

## 2024-09-12 PROCEDURE — 250N000009 HC RX 250: Performed by: STUDENT IN AN ORGANIZED HEALTH CARE EDUCATION/TRAINING PROGRAM

## 2024-09-12 PROCEDURE — 94640 AIRWAY INHALATION TREATMENT: CPT | Mod: 76

## 2024-09-12 PROCEDURE — 85027 COMPLETE CBC AUTOMATED: CPT | Performed by: PHYSICIAN ASSISTANT

## 2024-09-12 PROCEDURE — 250N000011 HC RX IP 250 OP 636: Mod: JZ

## 2024-09-12 PROCEDURE — 83735 ASSAY OF MAGNESIUM: CPT | Performed by: PHYSICIAN ASSISTANT

## 2024-09-12 PROCEDURE — 83735 ASSAY OF MAGNESIUM: CPT

## 2024-09-12 PROCEDURE — 80048 BASIC METABOLIC PNL TOTAL CA: CPT | Performed by: PHYSICIAN ASSISTANT

## 2024-09-12 PROCEDURE — 82010 KETONE BODYS QUAN: CPT | Performed by: CLINICAL NURSE SPECIALIST

## 2024-09-12 PROCEDURE — 80048 BASIC METABOLIC PNL TOTAL CA: CPT

## 2024-09-12 PROCEDURE — 250N000011 HC RX IP 250 OP 636: Performed by: CLINICAL NURSE SPECIALIST

## 2024-09-12 PROCEDURE — 250N000013 HC RX MED GY IP 250 OP 250 PS 637: Performed by: PHYSICIAN ASSISTANT

## 2024-09-12 PROCEDURE — 94681 O2 UPTK CO2 OUTP % O2 XTRC: CPT

## 2024-09-12 PROCEDURE — 83605 ASSAY OF LACTIC ACID: CPT

## 2024-09-12 PROCEDURE — 82310 ASSAY OF CALCIUM: CPT | Performed by: PHYSICIAN ASSISTANT

## 2024-09-12 RX ORDER — FUROSEMIDE 10 MG/ML
120 INJECTION INTRAMUSCULAR; INTRAVENOUS ONCE
Status: COMPLETED | OUTPATIENT
Start: 2024-09-12 | End: 2024-09-12

## 2024-09-12 RX ORDER — PREDNISONE 20 MG/1
40 TABLET ORAL DAILY
Status: DISCONTINUED | OUTPATIENT
Start: 2024-09-12 | End: 2024-09-16

## 2024-09-12 RX ORDER — MAGNESIUM SULFATE HEPTAHYDRATE 40 MG/ML
2 INJECTION, SOLUTION INTRAVENOUS ONCE
Status: COMPLETED | OUTPATIENT
Start: 2024-09-12 | End: 2024-09-12

## 2024-09-12 RX ORDER — PROPOFOL 10 MG/ML
0-30 INJECTION, EMULSION INTRAVENOUS CONTINUOUS
Status: DISCONTINUED | OUTPATIENT
Start: 2024-09-12 | End: 2024-09-15

## 2024-09-12 RX ORDER — NYSTATIN 100000/ML
500000 SUSPENSION, ORAL (FINAL DOSE FORM) ORAL 4 TIMES DAILY
Status: COMPLETED | OUTPATIENT
Start: 2024-09-12 | End: 2024-09-17

## 2024-09-12 RX ADMIN — NYSTATIN 500000 UNITS: 100000 SUSPENSION ORAL at 11:11

## 2024-09-12 RX ADMIN — CHLORHEXIDINE GLUCONATE 0.12% ORAL RINSE 15 ML: 1.2 LIQUID ORAL at 08:07

## 2024-09-12 RX ADMIN — IPRATROPIUM BROMIDE 0.5 MG: 0.5 SOLUTION RESPIRATORY (INHALATION) at 15:55

## 2024-09-12 RX ADMIN — INSULIN ASPART 2 UNITS: 100 INJECTION, SOLUTION INTRAVENOUS; SUBCUTANEOUS at 19:39

## 2024-09-12 RX ADMIN — ACETAMINOPHEN 650 MG: 325 TABLET ORAL at 15:37

## 2024-09-12 RX ADMIN — INSULIN ASPART 2 UNITS: 100 INJECTION, SOLUTION INTRAVENOUS; SUBCUTANEOUS at 16:59

## 2024-09-12 RX ADMIN — GABAPENTIN 100 MG: 250 SUSPENSION ORAL at 05:52

## 2024-09-12 RX ADMIN — MONTELUKAST 10 MG: 10 TABLET, FILM COATED ORAL at 21:42

## 2024-09-12 RX ADMIN — LEVOTHYROXINE SODIUM 25 MCG: 0.03 TABLET ORAL at 08:07

## 2024-09-12 RX ADMIN — QUETIAPINE FUMARATE 25 MG: 25 TABLET ORAL at 08:07

## 2024-09-12 RX ADMIN — MAGNESIUM SULFATE HEPTAHYDRATE 2 G: 2 INJECTION, SOLUTION INTRAVENOUS at 08:43

## 2024-09-12 RX ADMIN — NYSTATIN 500000 UNITS: 100000 SUSPENSION ORAL at 19:39

## 2024-09-12 RX ADMIN — LEVALBUTEROL HYDROCHLORIDE 0.63 MG: 0.63 SOLUTION RESPIRATORY (INHALATION) at 12:07

## 2024-09-12 RX ADMIN — OXYCODONE HYDROCHLORIDE 10 MG: 5 SOLUTION ORAL at 11:11

## 2024-09-12 RX ADMIN — ATOVAQUONE 1500 MG: 750 SUSPENSION ORAL at 08:09

## 2024-09-12 RX ADMIN — Medication 40 MG: at 08:10

## 2024-09-12 RX ADMIN — Medication 60 ML: at 08:10

## 2024-09-12 RX ADMIN — SODIUM CHLORIDE, POTASSIUM CHLORIDE, SODIUM LACTATE AND CALCIUM CHLORIDE 500 ML: 600; 310; 30; 20 INJECTION, SOLUTION INTRAVENOUS at 15:51

## 2024-09-12 RX ADMIN — LORAZEPAM 4 MG: 1 TABLET ORAL at 10:07

## 2024-09-12 RX ADMIN — ACETAMINOPHEN 650 MG: 325 TABLET ORAL at 21:41

## 2024-09-12 RX ADMIN — NYSTATIN 500000 UNITS: 100000 SUSPENSION ORAL at 08:08

## 2024-09-12 RX ADMIN — Medication 2 MG/HR: at 03:09

## 2024-09-12 RX ADMIN — LEVALBUTEROL HYDROCHLORIDE 0.63 MG: 0.63 SOLUTION RESPIRATORY (INHALATION) at 19:42

## 2024-09-12 RX ADMIN — BUDESONIDE 1 MG: 1 INHALANT ORAL at 07:16

## 2024-09-12 RX ADMIN — LORAZEPAM 4 MG: 1 TABLET ORAL at 15:37

## 2024-09-12 RX ADMIN — ACETAMINOPHEN 650 MG: 325 TABLET ORAL at 03:09

## 2024-09-12 RX ADMIN — HEPARIN SODIUM 5000 UNITS: 5000 INJECTION, SOLUTION INTRAVENOUS; SUBCUTANEOUS at 05:52

## 2024-09-12 RX ADMIN — OXYCODONE HYDROCHLORIDE 10 MG: 5 SOLUTION ORAL at 19:39

## 2024-09-12 RX ADMIN — INSULIN ASPART 3 UNITS: 100 INJECTION, SOLUTION INTRAVENOUS; SUBCUTANEOUS at 13:09

## 2024-09-12 RX ADMIN — OXYCODONE HYDROCHLORIDE 10 MG: 5 SOLUTION ORAL at 15:37

## 2024-09-12 RX ADMIN — DEXAMETHASONE SODIUM PHOSPHATE 10 MG: 10 INJECTION, SOLUTION INTRAMUSCULAR; INTRAVENOUS at 08:08

## 2024-09-12 RX ADMIN — LEVALBUTEROL HYDROCHLORIDE 0.63 MG: 0.63 SOLUTION RESPIRATORY (INHALATION) at 15:55

## 2024-09-12 RX ADMIN — GABAPENTIN 100 MG: 250 SUSPENSION ORAL at 21:41

## 2024-09-12 RX ADMIN — HEPARIN SODIUM 5000 UNITS: 5000 INJECTION, SOLUTION INTRAVENOUS; SUBCUTANEOUS at 11:11

## 2024-09-12 RX ADMIN — LEVALBUTEROL HYDROCHLORIDE 0.63 MG: 0.63 SOLUTION RESPIRATORY (INHALATION) at 07:16

## 2024-09-12 RX ADMIN — FUROSEMIDE 120 MG: 10 INJECTION, SOLUTION INTRAVENOUS at 08:08

## 2024-09-12 RX ADMIN — ACETAMINOPHEN 650 MG: 325 TABLET ORAL at 10:07

## 2024-09-12 RX ADMIN — IPRATROPIUM BROMIDE 0.5 MG: 0.5 SOLUTION RESPIRATORY (INHALATION) at 12:07

## 2024-09-12 RX ADMIN — MIDAZOLAM HYDROCHLORIDE 8 MG/HR: 1 INJECTION, SOLUTION INTRAVENOUS at 07:19

## 2024-09-12 RX ADMIN — Medication 1 TABLET: at 08:08

## 2024-09-12 RX ADMIN — PREDNISONE 40 MG: 20 TABLET ORAL at 10:07

## 2024-09-12 RX ADMIN — GABAPENTIN 100 MG: 250 SUSPENSION ORAL at 13:49

## 2024-09-12 RX ADMIN — IPRATROPIUM BROMIDE 0.5 MG: 0.5 SOLUTION RESPIRATORY (INHALATION) at 19:42

## 2024-09-12 RX ADMIN — QUETIAPINE FUMARATE 25 MG: 25 TABLET ORAL at 11:11

## 2024-09-12 RX ADMIN — PROPOFOL 20 MCG/KG/MIN: 10 INJECTION, EMULSION INTRAVENOUS at 21:40

## 2024-09-12 RX ADMIN — HEPARIN SODIUM 5000 UNITS: 5000 INJECTION, SOLUTION INTRAVENOUS; SUBCUTANEOUS at 19:39

## 2024-09-12 RX ADMIN — LORAZEPAM 4 MG: 1 TABLET ORAL at 03:03

## 2024-09-12 RX ADMIN — PROPOFOL 20 MCG/KG/MIN: 10 INJECTION, EMULSION INTRAVENOUS at 11:02

## 2024-09-12 RX ADMIN — Medication 1.5 MG/HR: at 14:21

## 2024-09-12 RX ADMIN — CETIRIZINE HYDROCHLORIDE 10 MG: 10 TABLET, FILM COATED ORAL at 08:08

## 2024-09-12 RX ADMIN — CHLORHEXIDINE GLUCONATE 0.12% ORAL RINSE 15 ML: 1.2 LIQUID ORAL at 19:39

## 2024-09-12 RX ADMIN — IPRATROPIUM BROMIDE 0.5 MG: 0.5 SOLUTION RESPIRATORY (INHALATION) at 07:16

## 2024-09-12 RX ADMIN — NYSTATIN 500000 UNITS: 100000 SUSPENSION ORAL at 15:37

## 2024-09-12 RX ADMIN — LORAZEPAM 4 MG: 1 TABLET ORAL at 21:42

## 2024-09-12 RX ADMIN — QUETIAPINE FUMARATE 50 MG: 50 TABLET ORAL at 21:41

## 2024-09-12 ASSESSMENT — ACTIVITIES OF DAILY LIVING (ADL)
ADLS_ACUITY_SCORE: 63

## 2024-09-12 NOTE — PROGRESS NOTES
MEDICAL ICU PROGRESS NOTE  09/12/2024    Date of Service (when I saw the patient): 09/12/2024    ASSESSMENT: Massiel Flaherty is a 53 year old female with PMH Anxiety/depression, fibromyalgia, c/f nonepileptic seizures not on AEDs, hypothyroidism, asthma, HLD, MURIEL on CPAP, expressive aphasia, hypertension  who was admitted on 8/3/2024 for fatigue, fever and dyspnea and intubated 8/6/24 at OSH for ARDS, proned and paralyzed without improvement. Transferred to [facility] 8/8/24, hypoxic with high plateaus/peaks and placed on VV ECMO and CRRT. Decannulated from VV ECMO 8/18 and transferred to MICU 8/26/24 for ongoing management. Extubated 8/27 then reintubated 8/29 iso worsening AHRF and pseudomonas pneumonia.     CHANGES and MAJOR THINGS TODAY:  - Reduced RASS -1 to -2   - Continue to reduce gtts: versed, dilaudid, propofol    - Resumed scheduled oxycodone   - Diuretic challenge per Nephrology   - Reduced peep to 6   - Check lactate, ketones   - Shortened steroid taper: Discontinued IV decadron and started prednisone   - Reduced to medium sliding scale insulin     Neuro:  # Pain and sedation  # Acute pain  # Concern for ICU delirium   # Hx Fibromyalgia   # Hx myalgic encephalomyelitis   # Hx anxiety/depression  - Monitor neurological status. Delirium preventions and precautions.   - Pain: Scheduled: tylenol, oxycodone 10mg q 4hr (resumed today), continue Ativan 4 mg every 6 hours for further sedation   PRN: tylenol, oxycodone (resumed)   - Sedation plan: reduce dilaudid (rotated from fentanyl 9/5), midazolam (attempt to reduce more aggressively today), continue ketamine (9/9-)  - Continue to reduce propofol  - Reduce RASS goal -1 to -2  - Gabapentin 100mg TID   - Continue scheduled Seroquel 25 mg BID and 50 mg HS   - PTA Venlafaxine and Amitriptyline discontinued while sedated    # Hx spells with staring and BUE posturing previously described as nonepileptic seizures   # Hx of GTC seizures and myoclonic epilepsy,  weaned off AEDs   # Diffuse cerebral edema - improved  - Sodium goals liberalized to 140-145  - 8/21: MRI Brain: no acute intracranial pathology. No findings to suggest anoxic brain injury.     Pulmonary:  # Acute hypoxic respiratory failure c/b ARDS: Improving   # Pseudomonas pneumonia  # Mechanical ventilation  # s/p VV ECMO 8/8 - 8/18   # Hx CAP  # Asthma  # MURIEL on home CPAP  PFT dated 9/8/2020 demonstrated normal spirometry with mild diffusion impairment and mild restriction (FEV1/FVC 80%, FEV1 2.59, DLCO 40%). CT abdomen chest 3/9/2020 demonstrated scarring and fibrosis bilaterally which correlated with the decreased DLCO. The patient also has a history of MURIEL on CPAP therapy as currently managed by her provider in [place]. Unclear what triggered ARDS or why she has had such severe hospital course, further investigated ILD but results all unremarkable. Extubated 8/27, reintubated 8/29 for worsening O2 demands and diffuse opacities iso new/recurrent psuedomonas pneumonia. Bronch with BAL 8/30, pseudomonas growing as below.   - ILD w/u aldolase, EVELIO, RF, CCP, ANCA, MPO, SSA Ro and [place], Scleroderma antibody, Radha 1,  hypersensity pneumonitis, IGG subclasses,  aspergillus, blastomyces, histoplasma neg  - SpO2 goals >92%, PaO2 goals >60   - PTA singular at bedtime if able  - Scheduled pulmicort, and duonebs   - Lung protective ventilation strategies given ARDS  - Methylpred 125mg q6H x 24h 8/30 --> transition to 20 mg dex x 5 days, followed by 10mg x 3 days   + Shortened steroid taper: discontinued decadron, starting prednisone 40 mg daily   - Reduced peep to 6   - Increased inspiratory time to reduce breath stacking   - Repeat chest xray tomorrow   - Continue to trend ABGs accordingly     FiO2 (%): 30 %, Resp: 25, Vent Mode: CMV/AC, Resp Rate (Set): 20 breaths/min, Tidal Volume (Set, mL): 350 mL, PEEP (cm H2O): 6 cmH2O, Resp Rate (Set): 20 breaths/min, Tidal Volume (Set, mL): 350 mL, PEEP (cm H2O): 6  cmH2O      Cardiovascular:  # Hyperlipidemia  # Hx HTN  - MAP goal >65, SBP goals >110  - Has remained off pressors since 9/9   - PTA atorvastatin  - PTA clonidine held while sedated  - Lower extremity ultrasound without vascular pathology, no hematoma or clots  - Monitor discoloration of extremities for necrosis  - PRN hydralazine for SBP > 200     Sinus Tachycardia  Likely 2/2 fluid removal, sepsis, pain and sedation as above    GI/Nutrition:  # Severe malnutrition (see RD note)   # Non-infectious Diarrhea  # GERD   - Protonix (home medication)   - Nutrition consulted, appreciate recs  - Diet: TF via NJ, tolerating   - Hold bowel regimen d/t loose stools  - Continue scheduled multivitamin, banatrol, prosource packet  - loperamide PRN  - Rectal tube, continues with high output    Renal/Fluids/Electrolytes:  # Acute kidney injury: Improving   # Renal failure  # AGMA   Baseline Cr approx 0.6. Pt was anuric here but now making some urine, likely prerenal due to shock.  - Diuretic challenge with Lasix 120 mg IV x1 with adequate response, holding off on iHD today   - Check lactic, and ketones given new AGMA    + Continue to trend ABGs, BMP    + Holding off on iHD for now, as well as bicarb supplementation but will watch    + Holding off on compensatory hyperventilation given elevated inspiratory time to reduce the risk of air-trapping    - Strict I&Os  - Bladder scan q4hrs; of note does have a bladder stimulator, which is currently off   - Avoid nephrotoxins as able    Endocrine:  # Steroid and stress induced hyperglycemia  # Hypothyroidism   - Goal to keep BG < 180 for optimal wound healing.   - With rising blood glucose due to steroids started insulin drip 8/30, discontinued 9/7  - Reduced to medium sliding scale insulin   - Continue PTA synthroid    ID:  # Leukocytosis  # Psuedomonas pneumonia  # Hx CAP  Respiratory cx 8/29 pseudomonas pneumonia. Intermediate susceptability to meropenem, more significant  sensitivities to ciprofloxacin  - Continue to monitor fever curve and inflammatory markers as appropriate  - ID now signed off     Abx  - Meropenem 8/29 - 9/1  - Vancomycin 8/29- 8/30  - Tobramycin x 1 8/29  - Vancomycin 9/5-9/8    - Tobramycin nebs BID 9/1-9/11  - Ciprofloxacin infusion 9/1- 9/11  - Metronidazole 9/6 - 9/11    PJP Ppx  Given Dex-ARDS Massiel ferraro remain on steriods for > 3 weeks so will initiate PJP ppx. 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   - Ganciclovir 9/6 - 9/8, discontinued given ID recs  - trend CMV 9/9    # HSV-1  Mouth sores present, which could have viral etiology. 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 Monitor and trend.   - Heparinize CRRT circuit; stopped as transitioning to iHD   - CTAP 8/30 due to acute hemoglobin drop requiring transfusion of 2 x 1 unit of blood 12 hours apart. Negative for acute bleed    Musculoskeletal/Rheum:  # Alopecia  - Hold PTA hydroxychloroquine     # Weakness and deconditioning of critical illness  # Left ankle injury pre-hospitalization    - Physical and occupational therapy when able.     Skin:  # BLE scattered ecchymosis  # Left toe duskiness  - Bilateral lower leg ecchymosis  - WOC consult  - Ecchymosis to left foot, most noticeable to 3rd and 4th toes    General Cares/Prophylaxis:  DVT Prophylaxis: SubQ heparin   GI Prophylaxis: PPI  Restraints: no  Code Status: DNR/OK to intubate     Lines/tubes/drains:  - ETT (8/29)  - NJ (8/9)  - LIJ CVC (8/8)  - Radial a-line (8/29)  - PIV x3  - Rectal tube (8/17)  - Tunneled HD line (8/23)    Disposition:  - Medical ICU     Patient seen and findings/plan discussed with medical ICU staff, Dr. Dubon.    I spent a total of 45  minutes (excluding procedure time) personally providing and directing critical care services at the bedside and on the critical care unit for Massiel Flaherty     DOREEN Oliver CNP    Clinically Significant Risk Factors            # Hypomagnesemia: Lowest Mg = 1.6 mg/dL in last 2 days, will replace as needed   # Hypoalbuminemia: Lowest albumin = 2.2 g/dL at 8/10/2024  9:47 AM, will monitor as appropriate                # Severe Malnutrition: based on nutrition assessment      # Financial/Environmental Concerns: none        Code Status: No CPR- Pre-arrest intubation OK       ====================================  INTERVAL HISTORY:   Ventilation and oxygen requirements continue to improve. Reducing sedation and RASS goal today. Otherwise patient remains HDS.     OBJECTIVE:   1. VITAL SIGNS:   Temp:  [97.3  F (36.3  C)-99  F (37.2  C)] 99  F (37.2  C)  Pulse:  [] 100  Resp:  [22-28] 22  MAP:  [69 mmHg-89 mmHg] 75 mmHg  Arterial Line BP: ()/(52-70) 130/52  FiO2 (%):  [30 %-35 %] 30 %  SpO2:  [97 %-100 %] 100 %  FiO2 (%): 30 %, Resp: 22, Vent Mode: CMV/AC, Resp Rate (Set): 20 breaths/min, Tidal Volume (Set, mL): 350 mL, PEEP (cm H2O): 6 cmH2O, Resp Rate (Set): 20 breaths/min, Tidal Volume (Set, mL): 350 mL, PEEP (cm H2O): 6 cmH2O  2. INTAKE/ OUTPUT:   I/O last 3 completed shifts:  In: 2329.22 [I.V.:1194.22; NG/GT:550]  Out: 1979.9 [Urine:560; Other:1019.9; Stool:400]    3. PHYSICAL EXAMINATION:  General: Intubated, sedated  HEENT: Normocephalic, atraumatic, eyes with ointment dressing  Neuro: RASS -4-5, arefelexic  Pulm/Resp: Bilateral breath sounds audible with diffuse coarse breath sounds  CV: Sinus tachycardia, s1/2 normal, no murmur  Abdomen: Soft, non-distended, limited 2/2 to sedation  : deferred  Incisions/Skin: lower leg 1+ edema with ecchymosis present and scattered. Some bluish discoloration of the finger tips, capillary refill normal    4. LABS:   Arterial Blood Gases   Recent Labs    Lab 09/12/24 0316 09/11/24  1745 09/11/24  1033 09/11/24  0309   PH 7.31* 7.32* 7.38 7.39   PCO2 40 40 38 39   PO2 104 89 126* 109*   HCO3 20* 21 22 24     Complete Blood Count   Recent Labs   Lab 09/12/24  0316 09/11/24  0309 09/10/24  0314 09/09/24  0343   WBC 19.8* 23.1* 16.5* 15.0*   HGB 7.8* 8.8* 7.5* 7.2*    355 235 153     Basic Metabolic Panel  Recent Labs   Lab 09/12/24 0321 09/12/24 0316 09/12/24  0006 09/11/24 2050 09/11/24  1600 09/11/24  1158 09/11/24  0312 09/11/24  0309 09/10/24  2009 09/10/24  2008   NA  --  136  --   --   --  134*  --  139  --  137   POTASSIUM  --  3.5  --   --   --  4.3  --  3.6  --  4.3   CHLORIDE  --  102  --   --   --  100  --  103  --  104   CO2  --  18*  --   --   --  19*  --  21*  --  22   BUN  --  54.0*  --   --   --  31.9*  --  23.7*  --  26.4*   CR  --  0.74  --   --   --  0.55  --  0.44*  --  0.49*   * 127* 135* 168*   < > 251*   < > 138*   < > 189*    < > = values in this interval not displayed.     Liver Function Tests  Recent Labs   Lab 09/11/24  1158 09/11/24  0309 09/10/24  2008 09/10/24  1230 09/10/24  0314 09/09/24  1147 09/09/24 0343 09/08/24  1155 09/08/24  0329   AST  --  16  --   --  19  --  42  --  28   ALT  --  23  --   --  22  --  26  --  28   ALKPHOS  --  101  --   --  100  --  93  --  85   BILITOTAL  --  0.2  --   --  0.2  --  0.2  --  0.2   ALBUMIN 2.9* 3.2* 3.2* 2.9* 3.0*   < > 2.9*   < > 3.1*    < > = values in this interval not displayed.     Coagulation Profile  No lab results found in last 7 days.    5. RADIOLOGY:   Recent Results (from the past 24 hour(s))   XR Chest Port 1 View    Narrative    Portable chest    INDICATION: Post endotracheal tube advancement    COMPARISON: Yesterday    FINDINGS: Minimal left lung base streaky opacities unchanged. Heart  size normal. Feeding tube beyond the inferior margin of the image.  Arch bore right IJ catheter tip in the proximal right atrium.  Esophageal temperature probe at the level of  the mid to distal  thoracic esophagus.  Endotracheal tube tip approximately 6 cm above the ivy previously  approximately 7.6 cm above the ivy. No definite pneumothorax.      Impression    IMPRESSION: Slight advancement of endotracheal tube with tip now 6 cm  above the ivy. Left lung base mild edema/atelectasis unchanged.    ENMANUEL JEFFERSON MD         SYSTEM ID:  [id number]

## 2024-09-12 NOTE — PROGRESS NOTES
Nephrology Progress Note  09/12/2024       Mrs Flaherty is a 54 yo F w/ hx of Anxiety, depression, fibromyalgia, hypothyroidism, asthma, HLD, MURIEL on CPAP, expressive aphasia, and hypertension who was admitted to an OSH with community acquired pneumonia on 8/3 s/p intubation. Found to have severe ARDS requiring paralysis and proning, transferred here on 8/8 for management and initiated on CKRT for severe acidemia in the setting of oligoanuric CHACORTA. Started CRRT on 8/9 for volume management.     Interval History :   Mrs Flaherty had CRRT stopped yesterday, had planned for HD today but labs have no urgent issue and she did make ~500cc of UOP yesterday (by straight cath) so can hold off and consider again tomorrow.  Ordered 120mg lasix as challenge, will see response with regards to possible run tomorrow.  Bicarb a bit low and in review was in low 20's even on CRRT, negative lactate and ketones.      Assessment & Recommendations:   CHACORTA-Baseline Cr 0.6 but not checked since 5/2023 PTA, UA with protein + blood but difficult to interpret in setting of CHACORTA, likely hypotensive ATN. Started CRRT on 8/9, now anuric.    -Access is RIJ tunneled line from 8/23  -Stopping CRRT today, likely HD tomorrow given her high intake but BP's are stable.    -Dialysis consent signed and scanned into media on 8/9    Please avoid placing a PICC line in any patient with CKD III or above, CHACORTA, or ESKD, as this will impact negatively the ability of the patient to have an AV fistula or AV Graft should they need it in the future.  Internal jugular or External Jugular  are the preferred type of access in patients with kidney disease. (Link to guidelines)    Volume status-About even yesterday, down 13L for her course overall and is at low wt, giving lasix as challenge today.      Electrolytes/pH-No acute issues, checking BID     Ca/phos/pth-Mg and Phos mildly up, no intervention needed.     Anemia-Hgb 7.8 stable with last PRBC's 9/6    "    Nutrition-Renal TF    Time spent: 45 minutes on this date of encounter for chart review, physical exam, medical decision making and co-ordination of care.     Discussed with Dr Negron    Recommendations were communicated to primary team via verbal communication.     DOREEN Sequeira CNS  Clinical Nurse Specialist  [phone]    Review of Systems:   I reviewed the following systems:  ROS not done due to vent/sedation.     Physical Exam:   I/O last 3 completed shifts:  In: 2824.22 [I.V.:1194.22; NG/GT:550]  Out: 2079.9 [Urine:560; Other:1019.9; Stool:500]   /59 (BP Location: Right arm)   Pulse 107   Temp 99.1  F (37.3  C)   Resp 23   Ht 1.575 m (5' 2\")   Wt 75.6 kg (166 lb 10.7 oz)   SpO2 96%   BMI 30.48 kg/m       GENERAL APPEARANCE: critically ill, intubated   HEENT: MMM   PULM: lungs clear anteriorly   CV: regular rhythm, normal rate, no rub      -edema - 1+ LE edema   GI: soft, ND  INTEGUMENT: no rash on exposed skin  NEURO: sedated   Access is LFV temp line 8/19.     Labs:   All labs reviewed by me  Electrolytes/Renal -   Recent Labs   Lab Test 09/12/24  0822 09/12/24  0321 09/12/24  0316 09/11/24  1600 09/11/24  1158 09/11/24  0312 09/11/24  0309   NA  --   --  136  --  134*  --  139   POTASSIUM  --   --  3.5  --  4.3  --  3.6   CHLORIDE  --   --  102  --  100  --  103   CO2  --   --  18*  --  19*  --  21*   BUN  --   --  54.0*  --  31.9*  --  23.7*   CR  --   --  0.74  --  0.55  --  0.44*   * 117* 127*   < > 251*   < > 138*   QUAN  --   --  8.2*  --  8.2*  --  8.5*   MAG  --   --  1.6*  --  1.9  --  2.2   PHOS  --   --  5.8*  --  4.5  --  3.9    < > = values in this interval not displayed.       CBC -   Recent Labs   Lab Test 09/12/24  0316 09/11/24  0309 09/10/24  0314   WBC 19.8* 23.1* 16.5*   HGB 7.8* 8.8* 7.5*    355 235       LFTs -   Recent Labs   Lab Test 09/11/24  1158 09/11/24  0309 09/10/24  2008 09/10/24  1230 09/10/24  0314 09/09/24  1147 09/09/24  0343   ALKPHOS  " --  101  --   --  100  --  93   BILITOTAL  --  0.2  --   --  0.2  --  0.2   ALT  --  23  --   --  22  --  26   AST  --  16  --   --  19  --  42   PROTTOTAL  --  5.9*  --   --  5.6*  --  5.3*   ALBUMIN 2.9* 3.2* 3.2*   < > 3.0*   < > 2.9*    < > = values in this interval not displayed.       Iron Panel - No lab results found.        Current Medications:  Current Facility-Administered Medications   Medication Dose Route Frequency Provider Last Rate Last Admin    acetaminophen (TYLENOL) tablet 650 mg  650 mg Oral or Feeding Tube Q6H Barry Hatch MD   650 mg at 09/12/24 1007    [Held by provider] atorvastatin (LIPITOR) tablet 10 mg  10 mg Oral or Feeding Tube Daily Francisco Welsh MD   10 mg at 08/29/24 0924    atovaquone (MEPRON) suspension 1,500 mg  1,500 mg Oral or Feeding Tube Daily Otilia Sun APRN CNP   1,500 mg at 09/12/24 0809    budesonide (PULMICORT) neb solution 1 mg  1 mg Nebulization Daily Andres Payne PA-C   1 mg at 09/12/24 0716    cetirizine (zyrTEC) tablet 10 mg  10 mg Oral or Feeding Tube Daily Barry Hatch MD   10 mg at 09/12/24 0808    chlorhexidine (PERIDEX) 0.12 % solution 15 mL  15 mL Mouth/Throat Q12H Karen Angel APRN CNP   15 mL at 09/12/24 0807    gabapentin (NEURONTIN) solution 100 mg  100 mg Oral or Feeding Tube Q8H [facility] Barry Hatch MD   100 mg at 09/12/24 0552    heparin ANTICOAGULANT injection 5,000 Units  5,000 Units Subcutaneous Q8H Otilia Sun APRN CNP   5,000 Units at 09/12/24 0552    insulin aspart (NovoLOG) injection (RAPID ACTING)  1-12 Units Subcutaneous Q4H Carlos Cerna MD   2 Units at 09/11/24 2123    ipratropium (ATROVENT) 0.02 % neb solution 0.5 mg  0.5 mg Nebulization 4x daily Barry Hatch MD   0.5 mg at 09/12/24 0716    And    levalbuterol (XOPENEX) neb solution 0.63 mg  0.63 mg Nebulization 4x Daily Barry Hatch MD   0.63 mg at 09/12/24 0716    levothyroxine (SYNTHROID/LEVOTHROID) tablet 25 mcg  25 mcg Per  Feeding Tube QAM AC Giovanny Guidry PA-C   25 mcg at 09/12/24 0807    LORazepam (ATIVAN) tablet 4 mg  4 mg Oral or Feeding Tube Q6H Otilia Sun APRN CNP   4 mg at 09/12/24 1007    montelukast (SINGULAIR) tablet 10 mg  10 mg Oral or Feeding Tube At Bedtime Barry Hatch MD   10 mg at 09/11/24 2123    multivitamin RENAL (RENAVITE RX/NEPHROVITE) tablet 1 tablet  1 tablet Oral or Feeding Tube Daily Giovanny Guidry PA-C   1 tablet at 09/12/24 0808    nystatin (MYCOSTATIN) suspension 500,000 Units  500,000 Units Oral 4x Daily Fuentes Fowler MD        oxyCODONE (ROXICODONE) solution 10 mg  10 mg Oral Q4H Fuentes Fowler MD   10 mg at 09/09/24 0819    pantoprazole (PROTONIX) 2 mg/mL suspension 40 mg  40 mg Per Feeding Tube [facility] Barry Hatch MD   40 mg at 09/12/24 0810    predniSONE (DELTASONE) tablet 40 mg  40 mg Oral or Feeding Tube Daily Fuentes Fowler MD   40 mg at 09/12/24 1007    Prosource TF20 [employer] EN LIQD (PROSOURCE TF20) packet 60 mL  1 packet Per Feeding Tube Daily Giovanny Guidry PA-C   60 mL at 09/12/24 0810    QUEtiapine (SEROquel) tablet 25 mg  25 mg Oral or Feeding Tube BID Otilia Sun APRN CNP   25 mg at 09/12/24 0807    QUEtiapine (SEROquel) tablet 50 mg  50 mg Oral or Feeding Tube At Bedtime Otilia Sun APRN CNP   50 mg at 09/11/24 2123     Current Facility-Administered Medications   Medication Dose Route Frequency Provider Last Rate Last Admin    dextrose 10% infusion   Intravenous Continuous PRN Gaudencio De Souza MD        dextrose 10% infusion   Intravenous Continuous PRN Giovanny Guidry PA-C        HYDROmorphone (DILAUDID) 0.2 mg/mL infusion ADULT/PEDS GREATER than or EQUAL to 20 kg  0.1-1.5 mg/hr Intravenous Continuous Fuentes Fowler MD 7.5 mL/hr at 09/12/24 1001 1.5 mg/hr at 09/12/24 1001    ketamine (KETALAR) 25 mg/mL in sodium chloride 0.9 % 100 mL HIGH CONC infusion   mg/hr Intravenous Continuous Barry Hatch MD 2 mL/hr at 09/12/24 0800 50  mg/hr at 09/12/24 0800    propofol (DIPRIVAN) infusion  0-30 mcg/kg/min (Dosing Weight) Intravenous Continuous Fuentes Fowler MD 10.7 mL/hr at 09/12/24 1102 20 mcg/kg/min at 09/12/24 1102    And    Medication Instruction   Does not apply Continuous PRN Fuentes Fowler MD        midazolam (VERSED) 100 mg/100 mL NS infusion - ADULT  1-8 mg/hr Intravenous Continuous Fuentes Fowler MD 4 mL/hr at 09/12/24 1001 4 mg/hr at 09/12/24 1001

## 2024-09-12 NOTE — PROGRESS NOTES
CLINICAL NUTRITION SERVICES - REASSESSMENT NOTE     Nutrition Prescription    RECOMMENDATIONS FOR MDs/PROVIDERS TO ORDER:  If considering long term feeding tube, consider retracting current nasal feeding tube to gastric position to assess for tolerance of gastric feeds to help determine if patient would need PEG vs PEGJ.     Malnutrition Status:    Severe malnutrition in the context of acute illness/disease    Recommendations already ordered by Registered Dietitian (RD):  Metabolic cart study ordered   Continue current TF as ordered     Future/Additional Recommendations:  Monitor propofol infusion, if continues to provide significant calories consider adjusting TF  Monitor metabolic cart study results, adjust TF as appropriate pending results.   Monitor weight trends, weight loss significant since admission     EVALUATION OF THE PROGRESS TOWARD GOALS   Diet: NPO + TF    Nutrition Support: access: NJT placed 8/09    Formula/schedule/modulars: 9/04-___: [employer] Renal @ 45 mL/hr (1080 mL/day) +1 Prosource TF20 to provide 2024 kcal, 106 g protein, 183 g CHO, 22 g fiber, and 724 mL free water daily -- off propofol    -- 9/5-9/6 @ 10 ml/hr d/t pressors per provider     Free water flushes: 30 mL every 4 hours    Nutrition Support History:   8/9 - 8/22: Vital AF 1.2 @ 35 ml/hr + 3 pkts prosource TF20 provides 840 ml volume, 1248 kcals (21 kcal/kg), 123 g pro (2.1 g/kg), 45 g Fat, 93 g CHO, 4 g fiber, 681 ml free water.     8/23 - 8/29: Vital AF 1.2 @ 50 ml/hr + 2 pkts prosource TF20 provides 1200 ml volume, 1600 kcals (27 kcal/kg or 63% MREE), 130 g pro (2.2 g/kg), 65 g Fat, 133 g CHO, 6 g fiber, 973 ml free water.     8/29-8/30: [employer] Renal @ 40 mL/hr (960 mL/day) + 2 pkts ProSource TF20 to provide 1888 kcal (33 kcal/kg, or 74% MREE from 8/22), 117 g protein (2.05 g pro/kg), 163 g CHO, 19 g fiber, and 643 mL free water daily     8/30-9/04: [employer] Renal @ 30 mL/hr (720 mL/day) + 2 pkt ProSource TF20 daily to  provide 1456 kcal (26 kcal/kg), 97 g protein (1.7 g pro/kg), 122 g CHO, 14 g fiber, and 482 mL free water daily    +~420-550 kcal/day from propofol = 3021-5061 kcal/day (74-79% MREE which meets goal of 60-80% MREE)     Intake/Tolerance: Tube Feedin day average tube feeding infusion of 848 mL (79 % of goal volume) + 7 day average intake of 1 [employer] TF20 packet(s) (100 % of prescribed packets)  = average intake of 1606 kcal/day and 88 g protein/day.     NEW FINDINGS   Plan for tracheostomy soon then to reassess need for PEG per chart review.     GI:  Last BM: 24  rectal tube output: 400 mL , 600 mL 9/10, 900 mL , 275 mL , 250 mL , 50 mL     Weight:  Most Recent Weight: 75.6 kg (166 lb 10.7 oz)  on 24 via Bed scale  Body mass index is 30.48 kg/m .  Wt down 13.3 kg from admission. - 1.5 L from admission per I/O.     Meds:  Decadron  SSI Q4H  Renal multivitamin   Protonix  Continuous: dilaudid; ketamine; propofol @ 16 ml/hr (422 kcal/d); versed    Labs:   24 03:09 24 11:58 24 03:16   Sodium 139 134 (L) 136   Potassium 3.6 4.3 3.5   Chloride 103 100 102   Carbon Dioxide (CO2) 21 (L) 19 (L) 18 (L)   Urea Nitrogen 23.7 (H) 31.9 (H) 54.0 (H)   Creatinine 0.44 (L) 0.55 0.74   GFR Estimate >90 >90 >90   Calcium 8.5 (L) 8.2 (L) 8.2 (L)   Anion Gap 15 15 16 (H)   Magnesium 2.2 1.9 1.6 (L)   Phosphorus 3.9 4.5 5.8 (H)   Albumin 3.2 (L) 2.9 (L)    Protein Total 5.9 (L)     Alkaline Phosphatase 101     ALT 23     AST 16     Bilirubin Direct See Comment     Bilirubin Total 0.2     Calcium Ionized Whole Blood 4.7 4.6    CRP Inflammation 78.80 (H)     Glucose 138 (H) 251 (H) 127 (H)   Triglycerides 390 (H)       Respiratory:   Intubated    Renal:  Stopped CRRT, possible HD today     Skin:  Assessment of nare/bridle - gauze between the two strings that appears to be alleviating string tension. The left nare appears a little reddened from the string. FT moved to mid-line by RD to  "help alleviate the tension as well.    PHYSICAL FINDINGS  See malnutrition section below.  Skin: Pallor (possible nutrition-related causes: iron, B12, folate; anemia); dry and ecchymotic   Hair: Thin  Nails: Lackluster   Abd: round, obese, semi-firm     MALNUTRITION  % Intake: Decreased intake does not meet criteria  % Weight Loss: > 5% in 1 month (severe malnutrition)  Subcutaneous Fat Loss: None obvious observed   Muscle Loss: Thoracic region (clavicle, acromium bone, pectoral): mild, Upper arm (bicep, tricep): mild/moderate, and Lower arm  (forearm): mild   Fluid Accumulation/Edema: Mild  Malnutrition Diagnosis: Severe malnutrition in the context of acute illness/disease    Previous Goals   Total avg nutritional intake to meet a minimum of 27 kcal/kg (60% MREE on 8/22) and 1.5 g PRO/kg daily (per dosing wt 57 kg).  Evaluation: Met    Previous Nutrition Diagnosis  Inadequate oral intake related to intubation as evidenced by NPO with nutrition support needed to meet nutrition needs.    Evaluation: No change    CURRENT NUTRITION DIAGNOSIS  Inadequate oral intake related to intubation as evidenced by NPO with nutrition support needed to meet nutrition needs.      INTERVENTIONS  Implementation  Enteral Nutrition - continue as ordered   Metabolic cart study      Goals  Total avg nutritional intake to meet a minimum of 27 kcal/kg and 1.5 g PRO/kg daily (per dosing wt 57 kg).    Monitoring/Evaluation  Progress toward goals will be monitored and evaluated per protocol.      Marce Balderas, MS, RDN, LD  4C MICU RD  Vocera - \"4C Clinical Dietitian\"  Weekend/Holiday RD - \"Weekend Clinical Dietitian\"    "

## 2024-09-12 NOTE — PLAN OF CARE
ICU End of Shift Summary. See flowsheets for vital signs and detailed assessment.    Changes this shift:     Rass -4, pupils equal and reactive, decreasing sedation as tolerated. On full support 35/350/8/20 sats >95. Sinus rhythm rates in the 90s, bp wnl. Anuric overnight, rectal tube in place, feeds @goal.    Plan:   HD in am     Plan of Care Reviewed With: patient     Overall Patient Progress: no changeOverall Patient Progress: no change     Outcome Evaluation: see below      Goal Outcome Evaluation:    Problem: Adult Inpatient Plan of Care  Goal: Plan of Care Review  Description: The Plan of Care Review/Shift note should be completed every shift.  The Outcome Evaluation is a brief statement about your assessment that the patient is improving, declining, or no change.  This information will be displayed automatically on your shift  note.  Outcome: Progressing  Goal: Patient-Specific Goal (Individualized)  Description: Patient will wean vent and be able to pressure support in the next two days working towards extubation vs. Later assessing need for trach and slower vent weaning.  Will also tolerate CRRT without increasing pressor needs within MAP parameters.  Outcome: Progressing  Goal: Absence of Hospital-Acquired Illness or Injury  Description: Wean vent settings and pressure support by tomorrow.  Outcome: Progressing  Intervention: Identify and Manage Fall Risk  Recent Flowsheet Documentation  Taken 9/12/2024 0000 by Vasu Orellana, RN  Safety Promotion/Fall Prevention:   activity supervised   clutter free environment maintained   lighting adjusted   room near nurse's station   room door open   safety round/check completed  Taken 9/11/2024 2000 by Vasu Orellana, RN  Safety Promotion/Fall Prevention:   activity supervised   clutter free environment maintained   lighting adjusted   room near nurse's station   room door open   safety round/check completed  Intervention: Prevent Skin Injury  Recent Flowsheet  Documentation  Taken 9/12/2024 0000 by Vasu Orellana RN  Body Position:   turned   left   lower extremity elevated   upper extremity elevated  Device Skin Pressure Protection:   absorbent pad utilized/changed   tubing/devices free from skin contact   positioning supports utilized   pressure points protected  Taken 9/11/2024 2200 by Vasu Orellana RN  Body Position:   turned   left   lower extremity elevated   upper extremity elevated  Taken 9/11/2024 2000 by Vasu Orellana RN  Body Position:   turned   left   lower extremity elevated   upper extremity elevated  Device Skin Pressure Protection:   absorbent pad utilized/changed   tubing/devices free from skin contact   positioning supports utilized   pressure points protected  Intervention: Prevent and Manage VTE (Venous Thromboembolism) Risk  Recent Flowsheet Documentation  Taken 9/12/2024 0000 by Vasu Orellana RN  VTE Prevention/Management: SCDs on (sequential compression devices)  Taken 9/11/2024 2000 by Vasu Orellana RN  VTE Prevention/Management: SCDs on (sequential compression devices)  Intervention: Prevent Infection  Recent Flowsheet Documentation  Taken 9/12/2024 0000 by Vasu Orellana RN  Infection Prevention:   environmental surveillance performed   equipment surfaces disinfected   hand hygiene promoted   rest/sleep promoted   single patient room provided  Taken 9/11/2024 2000 by Vasu Orellana RN  Infection Prevention:   environmental surveillance performed   equipment surfaces disinfected   hand hygiene promoted   rest/sleep promoted   single patient room provided  Goal: Optimal Comfort and Wellbeing  Outcome: Progressing  Intervention: Monitor Pain and Promote Comfort  Recent Flowsheet Documentation  Taken 9/12/2024 0000 by Vasu Orellana RN  Pain Management Interventions:   around-the-clock dosing utilized   care clustered   cold applied   quiet environment facilitated   repositioned   rest   music therapy  Taken  9/11/2024 2000 by Vasu Orellana RN  Pain Management Interventions:   around-the-clock dosing utilized   care clustered   cold applied   quiet environment facilitated   repositioned   rest   music therapy  Intervention: Provide Person-Centered Care  Recent Flowsheet Documentation  Taken 9/12/2024 0000 by Vasu Orellana RN  Trust Relationship/Rapport:   care explained   reassurance provided  Taken 9/11/2024 2000 by Vasu Orellana RN  Trust Relationship/Rapport:   care explained   reassurance provided  Goal: Readiness for Transition of Care  Outcome: Progressing     Problem: Risk for Delirium  Goal: Optimal Coping  Outcome: Progressing  Intervention: Optimize Psychosocial Adjustment to Delirium  Recent Flowsheet Documentation  Taken 9/12/2024 0000 by Vasu Orellana RN  Supportive Measures: positive reinforcement provided  Taken 9/11/2024 2000 by Vasu Orellana RN  Supportive Measures: positive reinforcement provided  Goal: Improved Behavioral Control  Outcome: Progressing  Intervention: Prevent and Manage Agitation  Recent Flowsheet Documentation  Taken 9/12/2024 0000 by Vasu Orellana RN  Environment Familiarity/Consistency: daily routine followed  Taken 9/11/2024 2000 by Vasu Orellana RN  Environment Familiarity/Consistency: daily routine followed  Intervention: Minimize Safety Risk  Recent Flowsheet Documentation  Taken 9/12/2024 0000 by Vasu Orellana RN  Communication Enhancement Strategies:   nonverbal strategies used   one-step directions provided  Enhanced Safety Measures: room near unit station  Trust Relationship/Rapport:   care explained   reassurance provided  Taken 9/11/2024 2000 by Vasu Orellana RN  Communication Enhancement Strategies:   nonverbal strategies used   one-step directions provided  Enhanced Safety Measures: room near unit station  Trust Relationship/Rapport:   care explained   reassurance provided  Goal: Improved Attention and Thought Clarity  Outcome:  Progressing  Intervention: Maximize Cognitive Function  Recent Flowsheet Documentation  Taken 9/12/2024 0000 by Vasu Orellana RN  Sensory Stimulation Regulation:   care clustered   quiet environment promoted   music on   lighting decreased  Reorientation Measures:   calendar in view   reorientation provided   clock in view  Taken 9/11/2024 2000 by Vasu Orellana RN  Sensory Stimulation Regulation:   care clustered   quiet environment promoted   music on   lighting decreased  Reorientation Measures:   calendar in view   reorientation provided   clock in view  Goal: Improved Sleep  Outcome: Progressing     Problem: ARDS (Acute Respiratory Distress Syndrome)  Goal: Effective Oxygenation  Outcome: Progressing  Intervention: Optimize Oxygenation, Ventilation and Perfusion  Recent Flowsheet Documentation  Taken 9/12/2024 0000 by Vasu Orellana RN  Lung Protection Measures:   ventilator synchrony promoted   fluid excess minimized  Airway/Ventilation Management:   airway patency maintained   calming measures promoted   humidification applied   pulmonary hygiene promoted  Head of Bed (HOB) Positioning: HOB at 30 degrees  Stabilization Measures:   legs elevated   fluid resuscitation initiated  Taken 9/11/2024 2200 by Vasu Orellana RN  Head of Bed (HOB) Positioning: HOB at 30 degrees  Taken 9/11/2024 2000 by Vasu Orellana RN  Lung Protection Measures:   ventilator synchrony promoted   fluid excess minimized  Airway/Ventilation Management:   airway patency maintained   calming measures promoted   humidification applied   pulmonary hygiene promoted  Head of Bed (HOB) Positioning: HOB at 30 degrees  Stabilization Measures:   legs elevated   fluid resuscitation initiated

## 2024-09-12 NOTE — PLAN OF CARE
ICU End of Shift Summary. See flowsheets for vital signs and detailed assessment.    Changes this shift: Attempted to wean down sedation more today, pt became desynchronous/tachypneic (RR of 26-30) and became more acidotic. Brought sedation back up slightly and pt is more synchronous with the vent along with her ABGs improving. SR/ST 90s-100s. One 500ml bolus of LR given for low blood pressure, responded well to fluids. Tmax of 99.5. RR changed to 22 and PEEP down to 6 today. Minimal secretions. Pt given lasix today, bladder scanned for >800ml, rolle put in and has made adequate UOP. LABS    Plan: Wean sedation tomorrow. Monitor labs. Update team with any acute changes.       Goal Outcome Evaluation:      Plan of Care Reviewed With: patient, family    Overall Patient Progress: no changeOverall Patient Progress: no change    Outcome Evaluation: See note

## 2024-09-13 ENCOUNTER — APPOINTMENT (OUTPATIENT)
Dept: GENERAL RADIOLOGY | Facility: CLINIC | Age: 54
DRG: 003 | End: 2024-09-13
Payer: MEDICAID

## 2024-09-13 LAB
ALLEN'S TEST: ABNORMAL
ANION GAP SERPL CALCULATED.3IONS-SCNC: 15 MMOL/L (ref 7–15)
BACTERIA SPEC CULT: NORMAL
BASE EXCESS BLDA CALC-SCNC: -4.8 MMOL/L (ref -3–3)
BASE EXCESS BLDA CALC-SCNC: -5.9 MMOL/L (ref -3–3)
BASE EXCESS BLDA CALC-SCNC: -6.1 MMOL/L (ref -3–3)
BUN SERPL-MCNC: 76.6 MG/DL (ref 6–20)
CALCIUM SERPL-MCNC: 8.2 MG/DL (ref 8.8–10.4)
CHLORIDE SERPL-SCNC: 102 MMOL/L (ref 98–107)
COHGB MFR BLD: 98.8 % (ref 96–97)
COHGB MFR BLD: 98.9 % (ref 96–97)
COHGB MFR BLD: 99.7 % (ref 96–97)
CREAT SERPL-MCNC: 0.8 MG/DL (ref 0.51–0.95)
CRP SERPL-MCNC: 63.2 MG/L
EGFRCR SERPLBLD CKD-EPI 2021: 88 ML/MIN/1.73M2
ERYTHROCYTE [DISTWIDTH] IN BLOOD BY AUTOMATED COUNT: 20.5 % (ref 10–15)
GLUCOSE BLDC GLUCOMTR-MCNC: 139 MG/DL (ref 70–99)
GLUCOSE BLDC GLUCOMTR-MCNC: 141 MG/DL (ref 70–99)
GLUCOSE BLDC GLUCOMTR-MCNC: 145 MG/DL (ref 70–99)
GLUCOSE BLDC GLUCOMTR-MCNC: 165 MG/DL (ref 70–99)
GLUCOSE BLDC GLUCOMTR-MCNC: 200 MG/DL (ref 70–99)
GLUCOSE BLDC GLUCOMTR-MCNC: 251 MG/DL (ref 70–99)
GLUCOSE SERPL-MCNC: 157 MG/DL (ref 70–99)
HCO3 BLD-SCNC: 20 MMOL/L (ref 21–28)
HCO3 BLD-SCNC: 20 MMOL/L (ref 21–28)
HCO3 BLD-SCNC: 21 MMOL/L (ref 21–28)
HCO3 SERPL-SCNC: 18 MMOL/L (ref 22–29)
HCT VFR BLD AUTO: 24 % (ref 35–47)
HGB BLD-MCNC: 7.5 G/DL (ref 11.7–15.7)
MAGNESIUM SERPL-MCNC: 1.9 MG/DL (ref 1.7–2.3)
MCH RBC QN AUTO: 30.5 PG (ref 26.5–33)
MCHC RBC AUTO-ENTMCNC: 31.3 G/DL (ref 31.5–36.5)
MCV RBC AUTO: 98 FL (ref 78–100)
O2/TOTAL GAS SETTING VFR VENT: 35 %
PCO2 BLD: 38 MM HG (ref 35–45)
PCO2 BLD: 40 MM HG (ref 35–45)
PCO2 BLD: 40 MM HG (ref 35–45)
PEEP: 5 CM H2O
PEEP: 5 CM H2O
PEEP: 6 CM H2O
PH BLD: 7.31 [PH] (ref 7.35–7.45)
PH BLD: 7.32 [PH] (ref 7.35–7.45)
PH BLD: 7.32 [PH] (ref 7.35–7.45)
PHOSPHATE SERPL-MCNC: 5.6 MG/DL (ref 2.5–4.5)
PLATELET # BLD AUTO: 283 10E3/UL (ref 150–450)
PO2 BLD: 103 MM HG (ref 80–105)
PO2 BLD: 110 MM HG (ref 80–105)
PO2 BLD: 96 MM HG (ref 80–105)
POTASSIUM SERPL-SCNC: 3.5 MMOL/L (ref 3.4–5.3)
RBC # BLD AUTO: 2.46 10E6/UL (ref 3.8–5.2)
SAO2 % BLDA: 96 % (ref 92–100)
SAO2 % BLDA: 97 % (ref 92–100)
SAO2 % BLDA: 97 % (ref 92–100)
SODIUM SERPL-SCNC: 135 MMOL/L (ref 135–145)
WBC # BLD AUTO: 20.3 10E3/UL (ref 4–11)

## 2024-09-13 PROCEDURE — 250N000013 HC RX MED GY IP 250 OP 250 PS 637

## 2024-09-13 PROCEDURE — 83735 ASSAY OF MAGNESIUM: CPT | Performed by: PHYSICIAN ASSISTANT

## 2024-09-13 PROCEDURE — 250N000011 HC RX IP 250 OP 636

## 2024-09-13 PROCEDURE — 82805 BLOOD GASES W/O2 SATURATION: CPT

## 2024-09-13 PROCEDURE — 250N000012 HC RX MED GY IP 250 OP 636 PS 637

## 2024-09-13 PROCEDURE — 94640 AIRWAY INHALATION TREATMENT: CPT | Mod: 76

## 2024-09-13 PROCEDURE — 250N000013 HC RX MED GY IP 250 OP 250 PS 637: Performed by: PHYSICIAN ASSISTANT

## 2024-09-13 PROCEDURE — 80048 BASIC METABOLIC PNL TOTAL CA: CPT | Performed by: PHYSICIAN ASSISTANT

## 2024-09-13 PROCEDURE — 84100 ASSAY OF PHOSPHORUS: CPT | Performed by: PHYSICIAN ASSISTANT

## 2024-09-13 PROCEDURE — 250N000009 HC RX 250: Performed by: SURGERY

## 2024-09-13 PROCEDURE — 200N000002 HC R&B ICU UMMC

## 2024-09-13 PROCEDURE — 250N000011 HC RX IP 250 OP 636: Mod: JZ

## 2024-09-13 PROCEDURE — 94003 VENT MGMT INPAT SUBQ DAY: CPT

## 2024-09-13 PROCEDURE — 999N000157 HC STATISTIC RCP TIME EA 10 MIN

## 2024-09-13 PROCEDURE — 71045 X-RAY EXAM CHEST 1 VIEW: CPT

## 2024-09-13 PROCEDURE — 85027 COMPLETE CBC AUTOMATED: CPT | Performed by: PHYSICIAN ASSISTANT

## 2024-09-13 PROCEDURE — 71045 X-RAY EXAM CHEST 1 VIEW: CPT | Mod: 26 | Performed by: STUDENT IN AN ORGANIZED HEALTH CARE EDUCATION/TRAINING PROGRAM

## 2024-09-13 PROCEDURE — 99291 CRITICAL CARE FIRST HOUR: CPT | Mod: FS

## 2024-09-13 PROCEDURE — 86140 C-REACTIVE PROTEIN: CPT

## 2024-09-13 PROCEDURE — 99292 CRITICAL CARE ADDL 30 MIN: CPT | Mod: FS

## 2024-09-13 PROCEDURE — 258N000003 HC RX IP 258 OP 636: Performed by: SURGERY

## 2024-09-13 PROCEDURE — 250N000013 HC RX MED GY IP 250 OP 250 PS 637: Performed by: SURGERY

## 2024-09-13 PROCEDURE — 250N000009 HC RX 250: Performed by: STUDENT IN AN ORGANIZED HEALTH CARE EDUCATION/TRAINING PROGRAM

## 2024-09-13 RX ORDER — LORAZEPAM 1 MG/1
2 TABLET ORAL EVERY 6 HOURS
Status: DISCONTINUED | OUTPATIENT
Start: 2024-09-13 | End: 2024-09-15

## 2024-09-13 RX ORDER — OXYCODONE HCL 5 MG/5 ML
5 SOLUTION, ORAL ORAL EVERY 6 HOURS PRN
Status: DISCONTINUED | OUTPATIENT
Start: 2024-09-13 | End: 2024-09-18

## 2024-09-13 RX ORDER — OXYCODONE HCL 5 MG/5 ML
5 SOLUTION, ORAL ORAL EVERY 4 HOURS
Status: DISCONTINUED | OUTPATIENT
Start: 2024-09-13 | End: 2024-09-14

## 2024-09-13 RX ADMIN — BUDESONIDE 1 MG: 1 INHALANT ORAL at 08:49

## 2024-09-13 RX ADMIN — NYSTATIN 500000 UNITS: 100000 SUSPENSION ORAL at 12:24

## 2024-09-13 RX ADMIN — OXYCODONE HYDROCHLORIDE 5 MG: 5 SOLUTION ORAL at 03:40

## 2024-09-13 RX ADMIN — QUETIAPINE FUMARATE 25 MG: 25 TABLET ORAL at 07:58

## 2024-09-13 RX ADMIN — QUETIAPINE FUMARATE 25 MG: 25 TABLET ORAL at 12:24

## 2024-09-13 RX ADMIN — KETAMINE HYDROCHLORIDE 50 MG/HR: 100 INJECTION, SOLUTION, CONCENTRATE INTRAMUSCULAR; INTRAVENOUS at 09:52

## 2024-09-13 RX ADMIN — INSULIN ASPART 1 UNITS: 100 INJECTION, SOLUTION INTRAVENOUS; SUBCUTANEOUS at 09:05

## 2024-09-13 RX ADMIN — GABAPENTIN 100 MG: 250 SUSPENSION ORAL at 05:56

## 2024-09-13 RX ADMIN — OXYCODONE HYDROCHLORIDE 5 MG: 5 SOLUTION ORAL at 07:58

## 2024-09-13 RX ADMIN — Medication 60 ML: at 08:04

## 2024-09-13 RX ADMIN — CHLORHEXIDINE GLUCONATE 0.12% ORAL RINSE 15 ML: 1.2 LIQUID ORAL at 07:58

## 2024-09-13 RX ADMIN — IPRATROPIUM BROMIDE 0.5 MG: 0.5 SOLUTION RESPIRATORY (INHALATION) at 08:49

## 2024-09-13 RX ADMIN — NYSTATIN 500000 UNITS: 100000 SUSPENSION ORAL at 17:24

## 2024-09-13 RX ADMIN — ACETAMINOPHEN 650 MG: 325 TABLET ORAL at 03:40

## 2024-09-13 RX ADMIN — PROPOFOL 15 MCG/KG/MIN: 10 INJECTION, EMULSION INTRAVENOUS at 06:49

## 2024-09-13 RX ADMIN — IPRATROPIUM BROMIDE 0.5 MG: 0.5 SOLUTION RESPIRATORY (INHALATION) at 11:22

## 2024-09-13 RX ADMIN — HEPARIN SODIUM 5000 UNITS: 5000 INJECTION, SOLUTION INTRAVENOUS; SUBCUTANEOUS at 12:24

## 2024-09-13 RX ADMIN — OXYCODONE HYDROCHLORIDE 10 MG: 5 SOLUTION ORAL at 00:13

## 2024-09-13 RX ADMIN — OXYCODONE HYDROCHLORIDE 5 MG: 5 SOLUTION ORAL at 20:04

## 2024-09-13 RX ADMIN — MONTELUKAST 10 MG: 10 TABLET, FILM COATED ORAL at 21:36

## 2024-09-13 RX ADMIN — LEVALBUTEROL HYDROCHLORIDE 0.63 MG: 0.63 SOLUTION RESPIRATORY (INHALATION) at 20:49

## 2024-09-13 RX ADMIN — QUETIAPINE FUMARATE 50 MG: 50 TABLET ORAL at 21:36

## 2024-09-13 RX ADMIN — NYSTATIN 500000 UNITS: 100000 SUSPENSION ORAL at 07:58

## 2024-09-13 RX ADMIN — LEVALBUTEROL HYDROCHLORIDE 0.63 MG: 0.63 SOLUTION RESPIRATORY (INHALATION) at 15:52

## 2024-09-13 RX ADMIN — HEPARIN SODIUM 5000 UNITS: 5000 INJECTION, SOLUTION INTRAVENOUS; SUBCUTANEOUS at 03:40

## 2024-09-13 RX ADMIN — ACETAMINOPHEN 975 MG: 325 TABLET ORAL at 20:03

## 2024-09-13 RX ADMIN — GABAPENTIN 100 MG: 250 SUSPENSION ORAL at 21:37

## 2024-09-13 RX ADMIN — INSULIN ASPART 1 UNITS: 100 INJECTION, SOLUTION INTRAVENOUS; SUBCUTANEOUS at 20:23

## 2024-09-13 RX ADMIN — Medication 2 MG/HR: at 00:19

## 2024-09-13 RX ADMIN — OXYCODONE HYDROCHLORIDE 5 MG: 5 SOLUTION ORAL at 21:36

## 2024-09-13 RX ADMIN — LORAZEPAM 2 MG: 1 TABLET ORAL at 21:36

## 2024-09-13 RX ADMIN — ACETAMINOPHEN 650 MG: 325 TABLET ORAL at 17:24

## 2024-09-13 RX ADMIN — INSULIN ASPART 1 UNITS: 100 INJECTION, SOLUTION INTRAVENOUS; SUBCUTANEOUS at 03:53

## 2024-09-13 RX ADMIN — NYSTATIN 500000 UNITS: 100000 SUSPENSION ORAL at 20:04

## 2024-09-13 RX ADMIN — ATOVAQUONE 1500 MG: 750 SUSPENSION ORAL at 07:58

## 2024-09-13 RX ADMIN — OXYCODONE HYDROCHLORIDE 5 MG: 5 SOLUTION ORAL at 12:24

## 2024-09-13 RX ADMIN — LEVALBUTEROL HYDROCHLORIDE 0.63 MG: 0.63 SOLUTION RESPIRATORY (INHALATION) at 08:49

## 2024-09-13 RX ADMIN — Medication 1.8 MG/HR: at 21:48

## 2024-09-13 RX ADMIN — LEVALBUTEROL HYDROCHLORIDE 0.63 MG: 0.63 SOLUTION RESPIRATORY (INHALATION) at 11:22

## 2024-09-13 RX ADMIN — PROPOFOL 15 MCG/KG/MIN: 10 INJECTION, EMULSION INTRAVENOUS at 18:39

## 2024-09-13 RX ADMIN — IPRATROPIUM BROMIDE 0.5 MG: 0.5 SOLUTION RESPIRATORY (INHALATION) at 15:52

## 2024-09-13 RX ADMIN — LORAZEPAM 2 MG: 1 TABLET ORAL at 10:04

## 2024-09-13 RX ADMIN — PREDNISONE 40 MG: 20 TABLET ORAL at 07:58

## 2024-09-13 RX ADMIN — LORAZEPAM 2 MG: 1 TABLET ORAL at 03:40

## 2024-09-13 RX ADMIN — HEPARIN SODIUM 5000 UNITS: 5000 INJECTION, SOLUTION INTRAVENOUS; SUBCUTANEOUS at 20:04

## 2024-09-13 RX ADMIN — Medication 1.9 MG/HR: at 10:40

## 2024-09-13 RX ADMIN — Medication 1 TABLET: at 07:58

## 2024-09-13 RX ADMIN — ACETAMINOPHEN 650 MG: 325 TABLET ORAL at 10:04

## 2024-09-13 RX ADMIN — MIDAZOLAM HYDROCHLORIDE 4 MG/HR: 1 INJECTION, SOLUTION INTRAVENOUS at 01:02

## 2024-09-13 RX ADMIN — LORAZEPAM 2 MG: 1 TABLET ORAL at 17:24

## 2024-09-13 RX ADMIN — CHLORHEXIDINE GLUCONATE 0.12% ORAL RINSE 15 ML: 1.2 LIQUID ORAL at 20:04

## 2024-09-13 RX ADMIN — OXYCODONE HYDROCHLORIDE 5 MG: 5 SOLUTION ORAL at 17:23

## 2024-09-13 RX ADMIN — Medication 40 MG: at 07:59

## 2024-09-13 RX ADMIN — INSULIN ASPART 2 UNITS: 100 INJECTION, SOLUTION INTRAVENOUS; SUBCUTANEOUS at 17:25

## 2024-09-13 RX ADMIN — ACETAMINOPHEN 650 MG: 325 TABLET ORAL at 21:36

## 2024-09-13 RX ADMIN — INSULIN ASPART 3 UNITS: 100 INJECTION, SOLUTION INTRAVENOUS; SUBCUTANEOUS at 12:41

## 2024-09-13 RX ADMIN — IPRATROPIUM BROMIDE 0.5 MG: 0.5 SOLUTION RESPIRATORY (INHALATION) at 20:49

## 2024-09-13 RX ADMIN — CETIRIZINE HYDROCHLORIDE 10 MG: 10 TABLET, FILM COATED ORAL at 07:58

## 2024-09-13 RX ADMIN — LEVOTHYROXINE SODIUM 25 MCG: 0.03 TABLET ORAL at 07:58

## 2024-09-13 ASSESSMENT — ACTIVITIES OF DAILY LIVING (ADL)
ADLS_ACUITY_SCORE: 63
ADLS_ACUITY_SCORE: 59
ADLS_ACUITY_SCORE: 63
ADLS_ACUITY_SCORE: 59
ADLS_ACUITY_SCORE: 63

## 2024-09-13 NOTE — PROGRESS NOTES
Brief note:      Patient remains in the ICU setting. She remains intubated and on MV but tolerating low FiO2 (35%). She is off vasopressor and ICU team is reducing sedation. She completed antibiotics treatment for Pseudomonas HCAP on 9/11/24. She is afebrile. CRP is trending down. Her white count is elevated at 20.3, but she is on steroids for management of ARDS (on a slow taper).    Candida growing in urine culture from 9/7 is likely a colonizer. CMV PCR (serum) from 9/9/24 is 390, which is a very low viremia and most likely translates low level of replication in the setting of critical illness. No treatment indicated at this moment. Beta D Glucan from 9/6/24 with very low positivity (84) and improved fro 8/30 when it was 216. PJP PCR from BAL/washing was negative from 8/30/24.     I will formally sign off at this moment. Recommendations remain the same as stated in my note from 9/11/24.     Please call is back if any question or need. Please call us back if any new positivity from pending cultures    Opal Chung  09/13/24 2010

## 2024-09-13 NOTE — PLAN OF CARE
ICU End of Shift Summary. See flowsheets for vital signs and detailed assessment.    Changes this shift: VSS. 's-110's. Low grade fevers. Rass remain -5. Able to wean Versed from 4mg/hr to 1mg/hr. Dilaudid down to 1.8mg/hr. 35% peep 5 and synchronous. Minimal secretions per ETT. Rectal tube draining. Adequate UOP. TF infusing at goal.     Plan: Slowly wean sedation as tolerated.         Goal Outcome Evaluation:      Plan of Care Reviewed With: patient    Overall Patient Progress: improvingOverall Patient Progress: improving    Outcome Evaluation: VSS. Able to wean Versed slightly this shift. Sync with vent. Remains Rass -5

## 2024-09-13 NOTE — PROGRESS NOTES
"CLINICAL NUTRITION SERVICES - BRIEF NOTE    See RD note 9/12 for full assessment.     Nutrition Prescription    Recommendations already ordered by Registered Dietitian (RD):  Continue current TF as ordered while getting propofol infusion based on current metabolic cart study     Future/Additional Recommendations:  Monitor propofol infusion. Once off propofol infusion, consider increasing TF provisions based on metabolic cart study results and weight loss:   Dina Ch Renal (or equivalent) @ 50 mL/hr (1200 mL/day) to provide 2160 kcal, 96 g protein, 204 g CHO, 24 g fiber, and 804 mL free water daily    Monitor weight loss        New findings:   Obtained metabolic cart study 9/12 @ 1405 with the following results: MREE = 2372 kcals/day (equiv to 42 kcal/kg adjusted weight/day) with RQ = 0.75.  Pt received 1080 ml of TF + 1 Prosource TF20 in 24 hours preceding the study + propofol running between 8-13.3 ml/hr starting on 9/12  providing 4640-3432 kcals (91-94 % MREE).  RQ within physiologic range; RQ logical given provisions (adequately fed) received prior to study.  Would aim energy intakes minimally at % of this MREE (equiv to 33-42 kcal/kg/day).      Diet: NPO + TF     Nutrition Support: access: NJT placed 8/09     Formula/schedule/modulars: 9/04-___: Dina Ch Renal @ 45 mL/hr (1080 mL/day) +1 Prosource TF20 to provide 2024 kcal, 106 g protein, 183 g CHO, 22 g fiber, and 724 mL free water daily -- off propofol    -- 9/5-9/6 @ 10 ml/hr d/t pressors per provider      Free water flushes: 30 mL every 4 hours      Implementation  Enteral Nutrition - continue as ordered   Metabolic cart study        RD to follow per protocol    Marce Balderas, MS, RDN, LD  4C MICU RD  Vocera - \"4C Clinical Dietitian\"  Weekend/Holiday RD - \"Weekend Clinical Dietitian\"    "

## 2024-09-13 NOTE — PROGRESS NOTES
Nephrology Progress Note  09/13/2024       Mrs Flaherty is a 52 yo F w/ hx of Anxiety, depression, fibromyalgia, hypothyroidism, asthma, HLD, MURIEL on CPAP, expressive aphasia, and hypertension who was admitted to an OSH with community acquired pneumonia on 8/3 s/p intubation. Found to have severe ARDS requiring paralysis and proning, transferred here on 8/8 for management and initiated on CKRT for severe acidemia in the setting of oligoanuric CHACORTA. Started CRRT on 8/9 for volume management.     Interval History :   Mrs Flaherty had CRRT stopped 9/11, had planned for HD but has started to make significant UOP (1.8L with 120mg lasix x1 yesterday) and chemistries are reasonable so can hold off today.  Bicarb noted to be a bit low at 18 but pH is reasonable at 7.31, can consider another amp of bicarb if pH drops.     Assessment & Recommendations:   CHACORTA-Baseline Cr 0.6 but not checked since 5/2023 PTA, UA with protein + blood but difficult to interpret in setting of CHACORTA, likely hypotensive ATN. Started CRRT on 8/9, now anuric but stopped on 9/11 and tx to iHD and watching for recovery.    -Access is RIJ tunneled line from 8/23  -Stopped CRRT 9/11, had planned on HD but has shown some signs of early recovery.    -Dialysis consent signed and scanned into media on 8/9    Volume status-Positive 1L yesterday, about even so far today with robust UOP, pulm status stable.      Electrolytes/pH-Bicarb a bit low at 18, negative lactate and ketones, pH reasonable but will monitor closely.      Ca/phos/pth-Mg and Phos mildly up, no intervention needed.     Anemia-Hgb 7.5 stable with last PRBC's 9/6       Nutrition-Renal TF    Time spent: 45 minutes on this date of encounter for chart review, physical exam, medical decision making and co-ordination of care.     Discussed with Dr Negron    Recommendations were communicated to primary team via verbal communication.     DOREEN Sequeira CNS  Clinical Nurse  "Specialist  [phone]    Review of Systems:   I reviewed the following systems:  ROS not done due to vent/sedation.     Physical Exam:   I/O last 3 completed shifts:  In: 3074.1 [I.V.:919.1; NG/GT:575; IV Piggyback:500]  Out: 2495 [Urine:2170; Stool:325]   /52   Pulse 107   Temp 99.7  F (37.6  C)   Resp 25   Ht 1.575 m (5' 2\")   Wt 76.3 kg (168 lb 3.4 oz)   SpO2 97%   BMI 30.77 kg/m       GENERAL APPEARANCE: critically ill, intubated   HEENT: MMM   PULM: lungs clear anteriorly   CV: regular rhythm, normal rate, no rub      -edema - 1+ LE edema   GI: soft, ND  INTEGUMENT: no rash on exposed skin  NEURO: sedated   Access is LFV temp line 8/19.     Labs:   All labs reviewed by me  Electrolytes/Renal -   Recent Labs   Lab Test 09/13/24  0903 09/13/24 0347 09/13/24 0341 09/12/24  1937 09/12/24  1935 09/12/24  1658 09/12/24  1414   NA  --   --  135  --  133*  --  132*   POTASSIUM  --   --  3.5  --  4.4  --  5.3   CHLORIDE  --   --  102  --  99  --  97*   CO2  --   --  18*  --  17*  --  17*   BUN  --   --  76.6*  --  74.6*  --  73.1*   CR  --   --  0.80  --  0.82  --  0.90   * 141* 157*   < > 246*   < > 306*   QUAN  --   --  8.2*  --  8.4*  --  8.3*   MAG  --   --  1.9  --  2.1  --  2.4*   PHOS  --   --  5.6*  --  6.8*  --  8.7*    < > = values in this interval not displayed.       CBC -   Recent Labs   Lab Test 09/13/24 0341 09/12/24 0316 09/11/24  0309   WBC 20.3* 19.8* 23.1*   HGB 7.5* 7.8* 8.8*    291 355       LFTs -   Recent Labs   Lab Test 09/11/24  1158 09/11/24  0309 09/10/24  2008 09/10/24  1230 09/10/24  0314 09/09/24  1147 09/09/24  0343   ALKPHOS  --  101  --   --  100  --  93   BILITOTAL  --  0.2  --   --  0.2  --  0.2   ALT  --  23  --   --  22  --  26   AST  --  16  --   --  19  --  42   PROTTOTAL  --  5.9*  --   --  5.6*  --  5.3*   ALBUMIN 2.9* 3.2* 3.2*   < > 3.0*   < > 2.9*    < > = values in this interval not displayed.       Iron Panel - No lab results " found.        Current Medications:  Current Facility-Administered Medications   Medication Dose Route Frequency Provider Last Rate Last Admin    acetaminophen (TYLENOL) tablet 650 mg  650 mg Oral or Feeding Tube Q6H Barry Hatch MD   650 mg at 09/13/24 0340    [Held by provider] atorvastatin (LIPITOR) tablet 10 mg  10 mg Oral or Feeding Tube Daily Francisco Welsh MD   10 mg at 08/29/24 0924    atovaquone (MEPRON) suspension 1,500 mg  1,500 mg Oral or Feeding Tube Daily Otilia Sun APRN CNP   1,500 mg at 09/13/24 0758    budesonide (PULMICORT) neb solution 1 mg  1 mg Nebulization Daily Andres Payne PA-C   1 mg at 09/13/24 0849    cetirizine (zyrTEC) tablet 10 mg  10 mg Oral or Feeding Tube Daily Barry Hatch MD   10 mg at 09/13/24 0758    chlorhexidine (PERIDEX) 0.12 % solution 15 mL  15 mL Mouth/Throat Q12H Karen Angel APRN CNP   15 mL at 09/13/24 0758    gabapentin (NEURONTIN) solution 100 mg  100 mg Oral or Feeding Tube Q8H TERE Barry Hatch MD   100 mg at 09/13/24 0556    heparin ANTICOAGULANT injection 5,000 Units  5,000 Units Subcutaneous Q8H Otilia Sun APRN CNP   5,000 Units at 09/13/24 0340    insulin aspart (NovoLOG) injection (RAPID ACTING)  1-7 Units Subcutaneous Q4H Otilia Sun APRN CNP   1 Units at 09/13/24 0905    ipratropium (ATROVENT) 0.02 % neb solution 0.5 mg  0.5 mg Nebulization 4x daily Barry Hatch MD   0.5 mg at 09/13/24 0849    And    levalbuterol (XOPENEX) neb solution 0.63 mg  0.63 mg Nebulization 4x Daily Barry Hatch MD   0.63 mg at 09/13/24 0849    levothyroxine (SYNTHROID/LEVOTHROID) tablet 25 mcg  25 mcg Per Feeding Tube QAM AC Giovanny Guidry PA-C   25 mcg at 09/13/24 0758    LORazepam (ATIVAN) tablet 2 mg  2 mg Oral or Feeding Tube Q6H Kike Ashby MD   2 mg at 09/13/24 0340    montelukast (SINGULAIR) tablet 10 mg  10 mg Oral or Feeding Tube At Bedtime Barry Hatch MD   10 mg at 09/12/24 9495     multivitamin RENAL (RENAVITE RX/NEPHROVITE) tablet 1 tablet  1 tablet Oral or Feeding Tube Daily Giovanny Guidry PA-C   1 tablet at 09/13/24 0758    nystatin (MYCOSTATIN) suspension 500,000 Units  500,000 Units Oral 4x Daily Fuentes Fowler MD   500,000 Units at 09/13/24 0758    oxyCODONE (ROXICODONE) solution 5 mg  5 mg Oral Q4H [facility] Hospital[facility] MD   5 mg at 09/13/24 0758    pantoprazole (PROTONIX) 2 mg/mL suspension 40 mg  40 mg Per Feeding Tube QAM AC Barry Hatch MD   40 mg at 09/13/24 0759    predniSONE (DELTASONE) tablet 40 mg  40 mg Oral or Feeding Tube Daily Fuentes Fowler MD   40 mg at 09/13/24 0758    Prosource TF20 [employer] Compatibl EN LIQD (PROSOURCE TF20) packet 60 mL  1 packet Per Feeding Tube Daily Giovanny Guidry PA-C   60 mL at 09/13/24 0804    QUEtiapine (SEROquel) tablet 25 mg  25 mg Oral or Feeding Tube BID Otilia Sun APRN CNP   25 mg at 09/13/24 0758    QUEtiapine (SEROquel) tablet 50 mg  50 mg Oral or Feeding Tube At Bedtime Otilia Sun APRN CNP   50 mg at 09/12/24 2141     Current Facility-Administered Medications   Medication Dose Route Frequency Provider Last Rate Last Admin    dextrose 10% infusion   Intravenous Continuous PRN Gaudencio De Souza MD        dextrose 10% infusion   Intravenous Continuous PRN Giovanny Guidry PA-C        HYDROmorphone (DILAUDID) 0.2 mg/mL infusion ADULT/PEDS GREATER than or EQUAL to 20 kg  0.1-2.5 mg/hr Intravenous Continuous Otilia Sun APRN CNP 9.5 mL/hr at 09/13/24 0908 1.9 mg/hr at 09/13/24 0908    ketamine (KETALAR) 25 mg/mL in sodium chloride 0.9 % 100 mL HIGH CONC infusion   mg/hr Intravenous Continuous Barry Hatch MD 2 mL/hr at 09/13/24 0900 50 mg/hr at 09/13/24 0900    propofol (DIPRIVAN) infusion  0-30 mcg/kg/min (Dosing Weight) Intravenous Continuous Fuentes Fowler MD 8 mL/hr at 09/13/24 0900 15 mcg/kg/min at 09/13/24 0900    And    Medication Instruction   Does not apply Continuous PRN Fuentes Fowler MD         midazolam (VERSED) 100 mg/100 mL NS infusion - ADULT  1-8 mg/hr Intravenous Continuous Fuentes Fowler MD 3 mL/hr at 09/13/24 0900 3 mg/hr at 09/13/24 0900

## 2024-09-13 NOTE — PROGRESS NOTES
MEDICAL ICU PROGRESS NOTE  09/13/2024    Date of Service (when I saw the patient): 09/13/2024    ASSESSMENT: Massiel Flaherty is a 53 year old female with PMH Anxiety/depression, fibromyalgia, c/f nonepileptic seizures not on AEDs, hypothyroidism, asthma, HLD, MURIEL on CPAP, expressive aphasia, hypertension  who was admitted on 8/3/2024 for fatigue, fever and dyspnea and intubated 8/6/24 at OSH for ARDS, proned and paralyzed without improvement. Transferred to [facility] 8/8/24, hypoxic with high plateaus/peaks and placed on VV ECMO and CRRT. Decannulated from VV ECMO 8/18 and transferred to MICU 8/26/24 for ongoing management. Extubated 8/27 then reintubated 8/29 iso worsening AHRF and pseudomonas pneumonia.     CHANGES and MAJOR THINGS TODAY:  - Reduced RASS -1 to -2   - Continue to reduce gtts: versed, dilaudid   - Reduce oxycodone dose to 5 mg q4h, Ativan 2 mg q6h   - Reduced peep to 5    Neuro:  # Pain and sedation  # Acute pain  # Concern for ICU delirium   # Hx Fibromyalgia   # Hx myalgic encephalomyelitis   # Hx anxiety/depression  - Monitor neurological status. Delirium preventions and precautions.   - Pain: Scheduled: tylenol, reduced PO oxycodone 5mg q4hr , reduced Ativan 2 mg every 6hr   PRN: tylenol, oxycodone   - Sedation plan: reduce dilaudid (rotated from fentanyl 9/5), midazolam (goal is to come off), continue ketamine (9/9-)  - Continue propofol   - Reduce RASS goal -1 to -2  - Continue Gabapentin 100mg TID   - Continue scheduled Seroquel 25 mg BID and 50 mg HS   - PTA Venlafaxine and Amitriptyline discontinued while sedated    # Hx spells with staring and BUE posturing previously described as nonepileptic seizures   # Hx of GTC seizures and myoclonic epilepsy, weaned off AEDs   # Diffuse cerebral edema - improved  - Sodium goals liberalized to 140-145  - 8/21: MRI Brain: no acute intracranial pathology. No findings to suggest anoxic brain injury.     Pulmonary:  # Acute hypoxic respiratory failure  c/b ARDS: Improving   # Pseudomonas pneumonia  # Mechanical ventilation  # s/p VV ECMO 8/8 - 8/18   # Hx CAP  # Asthma  # MURIEL on home CPAP  PFT dated 9/8/2020 demonstrated normal spirometry with mild diffusion impairment and mild restriction (FEV1/FVC 80%, FEV1 2.59, DLCO 40%). CT abdomen chest 3/9/2020 demonstrated scarring and fibrosis bilaterally which correlated with the decreased DLCO. The patient also has a history of MURIEL on CPAP therapy as currently managed by her provider in [place]. Unclear what triggered ARDS or why she has had such severe hospital course, further investigated ILD but results all unremarkable. Extubated 8/27, reintubated 8/29 for worsening O2 demands and diffuse opacities iso new/recurrent psuedomonas pneumonia. Bronch with BAL 8/30, pseudomonas growing as below.   - ILD w/u aldolase, EVELIO, RF, CCP, ANCA, MPO, SSA Ro and [place], Scleroderma antibody, Radha 1,  hypersensity pneumonitis, IGG subclasses,  aspergillus, blastomyces, histoplasma neg  - SpO2 goals >92%, PaO2 goals >60   - PTA singular at bedtime if able  - Scheduled pulmicort, and duonebs   - Lung protective ventilation strategies given ARDS  - Methylpred 125mg q6H x 24h 8/30 --> transition to 20 mg dex x 5 days, followed by 10mg x 3 days   + Continue prednisone 40 mg daily   - Reduced peep to 5  - Continue to trend ABGs accordingly     FiO2 (%): 35 %, Resp: 25, Vent Mode: CMV/AC, Resp Rate (Set): 20 breaths/min, Tidal Volume (Set, mL): 350 mL, PEEP (cm H2O): 5 cmH2O, Resp Rate (Set): 20 breaths/min, Tidal Volume (Set, mL): 350 mL, PEEP (cm H2O): 5 cmH2O      Cardiovascular:  # Hyperlipidemia  # Hx HTN  - MAP goal >65, SBP goals >110  - Has remained off pressors since 9/9   - PTA atorvastatin  - PTA clonidine held while sedated  - Lower extremity ultrasound without vascular pathology, no hematoma or clots  - Monitor discoloration of extremities for necrosis  - PRN hydralazine for SBP > 200     Sinus Tachycardia  Likely 2/2 fluid  removal, sepsis, pain and sedation as above    GI/Nutrition:  # Severe malnutrition (see RD note)   # Non-infectious Diarrhea  # GERD   - Protonix (home medication)   - Nutrition consulted, appreciate recs  - Diet: TF via NJ, tolerating   - Hold bowel regimen d/t loose stools  - Continue scheduled multivitamin, banatrol, prosource packet  - loperamide PRN  - Rectal tube, continues with high output    Renal/Fluids/Electrolytes:  # Acute kidney injury: Improving   # Renal failure: Improving   # AGMA: Improving   Baseline Cr approx 0.6. Pt was anuric here but now making some urine, likely prerenal due to shock.  - Continues to improve, holding off on iHD  - Volume goal is net even, may consider diuresis in oncoming days   - Strict I&Os  - Avoid nephrotoxins as able    Endocrine:  # Steroid and stress induced hyperglycemia  # Hypothyroidism   - Goal to keep BG < 180 for optimal wound healing  - Continue medium sliding scale insulin   - Continue PTA synthroid    ID:  # Leukocytosis  # Psuedomonas pneumonia  # Hx CAP  Respiratory cx 8/29 pseudomonas pneumonia. Intermediate susceptability to meropenem, more significant sensitivities to ciprofloxacin  - Continue to monitor fever curve and inflammatory markers as appropriate  - ID now signed off     Abx  - Meropenem 8/29 - 9/1  - Vancomycin 8/29- 8/30  - Tobramycin x 1 8/29  - Vancomycin 9/5-9/8    - Tobramycin nebs BID 9/1-9/11  - Ciprofloxacin infusion 9/1- 9/11  - Metronidazole 9/6 - 9/11    PJP Ppx  Given Dex-ARDS Massiel ferraro remain on steriods for > 3 weeks so will initiate PJP ppx. 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   - Ganciclovir 9/6 - 9/8, discontinued given ID recs  - trend CMV 9/9    # HSV-1  Mouth sores present, which could have viral etiology. 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  - Acyclovir 400mg BID x5 days  (8/22-8/27)    Hematology:    # Anemia of critical illness  - Transfuse if hgb <7.0 or signs/symptoms of hypoperfusion. Monitor and trend.   - CTAP 8/30 due to acute hemoglobin drop requiring transfusion of 2 x 1 unit of blood 12 hours apart. Negative for acute bleed    Musculoskeletal/Rheum:  # Alopecia  - Hold PTA hydroxychloroquine     # Weakness and deconditioning of critical illness  # Left ankle injury pre-hospitalization    - Physical and occupational therapy when able.     Skin:  # BLE scattered ecchymosis  # Left toe duskiness  - Bilateral lower leg ecchymosis  - WOC consult  - Ecchymosis to left foot, most noticeable to 3rd and 4th toes    General Cares/Prophylaxis:  DVT Prophylaxis: SubQ heparin   GI Prophylaxis: PPI  Restraints: no  Code Status: Full Code: Given improvement and current trajectory, discussed this with Mother and changed back to Full Code.     Lines/tubes/drains:  - ETT (8/29)  - NJ (8/9)  - LIJ CVC (8/8)  - Radial a-line (8/29)  - PIV x3  - Rectal tube (8/17)  - Tunneled HD line (8/23)    Disposition:  - Medical ICU     Patient seen and findings/plan discussed with medical ICU staff, Dr. Katz.    I spent a total of 45 minutes (excluding procedure time) personally providing and directing critical care services at the bedside and on the critical care unit for DOREEN Hannon CNP    Clinically Significant Risk Factors            # Hypomagnesemia: Lowest Mg = 1.6 mg/dL in last 2 days, will replace as needed   # Hypoalbuminemia: Lowest albumin = 2.2 g/dL at 8/10/2024  9:47 AM, will monitor as appropriate                # Severe Malnutrition: based on nutrition assessment      # Financial/Environmental Concerns: none        Code Status: No CPR- Pre-arrest intubation OK       ====================================  INTERVAL HISTORY:   Ventilation and oxygen requirements continue to improve. Reducing sedation slowly today in hopes of coming off of versed gtt.  Otherwise HDS.     OBJECTIVE:   1. VITAL SIGNS:   Temp:  [97.9  F (36.6  C)-99.7  F (37.6  C)] 98.8  F (37.1  C)  Pulse:  [] 100  Resp:  [23-29] 25  BP: (108-119)/(58-61) 113/59  MAP:  [55 mmHg-96 mmHg] 65 mmHg  Arterial Line BP: ()/(40-70) 95/49  FiO2 (%):  [30 %-35 %] 35 %  SpO2:  [93 %-100 %] 97 %  FiO2 (%): 35 %, Resp: 25, Vent Mode: CMV/AC, Resp Rate (Set): 22 breaths/min, Tidal Volume (Set, mL): 350 mL, PEEP (cm H2O): 6 cmH2O, Resp Rate (Set): 22 breaths/min, Tidal Volume (Set, mL): 350 mL, PEEP (cm H2O): 6 cmH2O  2. INTAKE/ OUTPUT:   I/O last 3 completed shifts:  In: 3074.1 [I.V.:919.1; NG/GT:575; IV Piggyback:500]  Out: 2495 [Urine:2170; Stool:325]    3. PHYSICAL EXAMINATION:  General: Intubated, sedated  HEENT: Normocephalic, atraumatic, eyes with ointment dressing  Neuro: RASS -4-5, arefelexic  Pulm/Resp: Bilateral breath sounds audible with diffuse coarse breath sounds  CV: Sinus tachycardia, s1/2 normal, no murmur  Abdomen: Soft, non-distended, limited 2/2 to sedation  : deferred  Incisions/Skin: lower leg 1+ edema with ecchymosis present and scattered. Some bluish discoloration of the finger tips, capillary refill normal    4. LABS:   Arterial Blood Gases   Recent Labs   Lab 09/13/24  0341 09/12/24  2220 09/12/24  1614 09/12/24  1414   PH 7.32* 7.32* 7.28* 7.22*   PCO2 38 36 39 46*   PO2 103 112* 139* 119*   HCO3 20* 19* 18* 19*     Complete Blood Count   Recent Labs   Lab 09/13/24  0341 09/12/24  0316 09/11/24  0309 09/10/24  0314   WBC 20.3* 19.8* 23.1* 16.5*   HGB 7.5* 7.8* 8.8* 7.5*    291 355 235     Basic Metabolic Panel  Recent Labs   Lab 09/13/24 0347 09/13/24 0341 09/13/24  0008 09/12/24  1937 09/12/24  1935 09/12/24  1658 09/12/24  1414 09/12/24  0321 09/12/24 0316   NA  --  135  --   --  133*  --  132*  --  136   POTASSIUM  --  3.5  --   --  4.4  --  5.3  --  3.5   CHLORIDE  --  102  --   --  99  --  97*  --  102   CO2  --  18*  --   --  17*  --  17*  --  18*   BUN  --   76.6*  --   --  74.6*  --  73.1*  --  54.0*   CR  --  0.80  --   --  0.82  --  0.90  --  0.74   * 157* 139* 210* 246*   < > 306*   < > 127*    < > = values in this interval not displayed.     Liver Function Tests  Recent Labs   Lab 09/11/24  1158 09/11/24  0309 09/10/24  2008 09/10/24  1230 09/10/24  0314 09/09/24  1147 09/09/24  0343 09/08/24  1155 09/08/24  0329   AST  --  16  --   --  19  --  42  --  28   ALT  --  23  --   --  22  --  26  --  28   ALKPHOS  --  101  --   --  100  --  93  --  85   BILITOTAL  --  0.2  --   --  0.2  --  0.2  --  0.2   ALBUMIN 2.9* 3.2* 3.2* 2.9* 3.0*   < > 2.9*   < > 3.1*    < > = values in this interval not displayed.     Coagulation Profile  No lab results found in last 7 days.    5. RADIOLOGY:   Recent Results (from the past 24 hour(s))   XR Chest Port 1 View    Impression    RESIDENT PRELIMINARY INTERPRETATION  Impression: No significant change from prior. Mild bibasilar  atelectasis/edema.

## 2024-09-13 NOTE — PLAN OF CARE
ICU End of Shift Summary. See flowsheets for vital signs and detailed assessment.    Changes this shift: RASS -4. Bp became soft after oral medications. Team reviewed medications. They halved oral dose. A line was found to be dampened. Team aware and okay with cuff pressures. Team comfortable with vent dyssynchrony as long as sats remain stable.     Plan: Wean sedation as able      Goal Outcome Evaluation:      Plan of Care Reviewed With: patient, family    Overall Patient Progress: no change    Outcome Evaluation: Patient remains intubated and sedated. dilaudid, versed, ketamine, prop gtts running. Working to wean as able.

## 2024-09-14 LAB
ABO/RH(D): NORMAL
ALLEN'S TEST: ABNORMAL
ALLEN'S TEST: ABNORMAL
ANION GAP SERPL CALCULATED.3IONS-SCNC: 16 MMOL/L (ref 7–15)
ANTIBODY SCREEN: NEGATIVE
BASE EXCESS BLDA CALC-SCNC: -1.8 MMOL/L (ref -3–3)
BASE EXCESS BLDA CALC-SCNC: -3.6 MMOL/L (ref -3–3)
BUN SERPL-MCNC: 86 MG/DL (ref 6–20)
CALCIUM SERPL-MCNC: 8.4 MG/DL (ref 8.8–10.4)
CHLORIDE SERPL-SCNC: 105 MMOL/L (ref 98–107)
COHGB MFR BLD: 97.8 % (ref 96–97)
COHGB MFR BLD: 99.1 % (ref 96–97)
CREAT SERPL-MCNC: 0.67 MG/DL (ref 0.51–0.95)
EGFRCR SERPLBLD CKD-EPI 2021: >90 ML/MIN/1.73M2
ERYTHROCYTE [DISTWIDTH] IN BLOOD BY AUTOMATED COUNT: 20.9 % (ref 10–15)
GLUCOSE BLDC GLUCOMTR-MCNC: 120 MG/DL (ref 70–99)
GLUCOSE BLDC GLUCOMTR-MCNC: 139 MG/DL (ref 70–99)
GLUCOSE BLDC GLUCOMTR-MCNC: 147 MG/DL (ref 70–99)
GLUCOSE BLDC GLUCOMTR-MCNC: 189 MG/DL (ref 70–99)
GLUCOSE BLDC GLUCOMTR-MCNC: 224 MG/DL (ref 70–99)
GLUCOSE BLDC GLUCOMTR-MCNC: 260 MG/DL (ref 70–99)
GLUCOSE SERPL-MCNC: 162 MG/DL (ref 70–99)
HCO3 BLD-SCNC: 22 MMOL/L (ref 21–28)
HCO3 BLD-SCNC: 24 MMOL/L (ref 21–28)
HCO3 SERPL-SCNC: 20 MMOL/L (ref 22–29)
HCT VFR BLD AUTO: 22.9 % (ref 35–47)
HGB BLD-MCNC: 7.1 G/DL (ref 11.7–15.7)
MAGNESIUM SERPL-MCNC: 1.5 MG/DL (ref 1.7–2.3)
MAGNESIUM SERPL-MCNC: 2.3 MG/DL (ref 1.7–2.3)
MCH RBC QN AUTO: 30.1 PG (ref 26.5–33)
MCHC RBC AUTO-ENTMCNC: 31 G/DL (ref 31.5–36.5)
MCV RBC AUTO: 97 FL (ref 78–100)
O2/TOTAL GAS SETTING VFR VENT: 30 %
O2/TOTAL GAS SETTING VFR VENT: 35 %
PCO2 BLD: 39 MM HG (ref 35–45)
PCO2 BLD: 42 MM HG (ref 35–45)
PEEP: 5 CM H2O
PEEP: 5 CM H2O
PH BLD: 7.35 [PH] (ref 7.35–7.45)
PH BLD: 7.36 [PH] (ref 7.35–7.45)
PHOSPHATE SERPL-MCNC: 5.1 MG/DL (ref 2.5–4.5)
PLATELET # BLD AUTO: 248 10E3/UL (ref 150–450)
PO2 BLD: 102 MM HG (ref 80–105)
PO2 BLD: 85 MM HG (ref 80–105)
POTASSIUM SERPL-SCNC: 3.7 MMOL/L (ref 3.4–5.3)
RBC # BLD AUTO: 2.36 10E6/UL (ref 3.8–5.2)
SAO2 % BLDA: 95 % (ref 92–100)
SAO2 % BLDA: 96 % (ref 92–100)
SODIUM SERPL-SCNC: 141 MMOL/L (ref 135–145)
SPECIMEN EXPIRATION DATE: NORMAL
TRIGL SERPL-MCNC: 353 MG/DL
WBC # BLD AUTO: 16.2 10E3/UL (ref 4–11)

## 2024-09-14 PROCEDURE — 250N000013 HC RX MED GY IP 250 OP 250 PS 637

## 2024-09-14 PROCEDURE — 250N000011 HC RX IP 250 OP 636

## 2024-09-14 PROCEDURE — 83735 ASSAY OF MAGNESIUM: CPT

## 2024-09-14 PROCEDURE — 250N000009 HC RX 250: Performed by: SURGERY

## 2024-09-14 PROCEDURE — 200N000002 HC R&B ICU UMMC

## 2024-09-14 PROCEDURE — 84100 ASSAY OF PHOSPHORUS: CPT | Performed by: PHYSICIAN ASSISTANT

## 2024-09-14 PROCEDURE — 82805 BLOOD GASES W/O2 SATURATION: CPT

## 2024-09-14 PROCEDURE — 94640 AIRWAY INHALATION TREATMENT: CPT

## 2024-09-14 PROCEDURE — 250N000012 HC RX MED GY IP 250 OP 636 PS 637

## 2024-09-14 PROCEDURE — 86923 COMPATIBILITY TEST ELECTRIC: CPT

## 2024-09-14 PROCEDURE — 86901 BLOOD TYPING SEROLOGIC RH(D): CPT | Performed by: SURGERY

## 2024-09-14 PROCEDURE — 99291 CRITICAL CARE FIRST HOUR: CPT | Mod: FS

## 2024-09-14 PROCEDURE — 84478 ASSAY OF TRIGLYCERIDES: CPT

## 2024-09-14 PROCEDURE — 999N000157 HC STATISTIC RCP TIME EA 10 MIN

## 2024-09-14 PROCEDURE — 94003 VENT MGMT INPAT SUBQ DAY: CPT

## 2024-09-14 PROCEDURE — 86900 BLOOD TYPING SEROLOGIC ABO: CPT | Performed by: SURGERY

## 2024-09-14 PROCEDURE — 80048 BASIC METABOLIC PNL TOTAL CA: CPT | Performed by: PHYSICIAN ASSISTANT

## 2024-09-14 PROCEDURE — 250N000013 HC RX MED GY IP 250 OP 250 PS 637: Performed by: SURGERY

## 2024-09-14 PROCEDURE — 94640 AIRWAY INHALATION TREATMENT: CPT | Mod: 76

## 2024-09-14 PROCEDURE — 85027 COMPLETE CBC AUTOMATED: CPT | Performed by: PHYSICIAN ASSISTANT

## 2024-09-14 PROCEDURE — 250N000013 HC RX MED GY IP 250 OP 250 PS 637: Performed by: PHYSICIAN ASSISTANT

## 2024-09-14 PROCEDURE — 99292 CRITICAL CARE ADDL 30 MIN: CPT | Mod: FS

## 2024-09-14 PROCEDURE — 83735 ASSAY OF MAGNESIUM: CPT | Performed by: PHYSICIAN ASSISTANT

## 2024-09-14 RX ORDER — GABAPENTIN 250 MG/5ML
300 SOLUTION ORAL EVERY 8 HOURS SCHEDULED
Status: DISCONTINUED | OUTPATIENT
Start: 2024-09-14 | End: 2024-10-01 | Stop reason: HOSPADM

## 2024-09-14 RX ORDER — MAGNESIUM SULFATE HEPTAHYDRATE 40 MG/ML
2 INJECTION, SOLUTION INTRAVENOUS ONCE
Status: COMPLETED | OUTPATIENT
Start: 2024-09-14 | End: 2024-09-14

## 2024-09-14 RX ORDER — OXYCODONE HCL 5 MG/5 ML
10 SOLUTION, ORAL ORAL EVERY 4 HOURS
Status: DISCONTINUED | OUTPATIENT
Start: 2024-09-14 | End: 2024-09-15

## 2024-09-14 RX ADMIN — OXYCODONE HYDROCHLORIDE 5 MG: 5 SOLUTION ORAL at 04:58

## 2024-09-14 RX ADMIN — CETIRIZINE HYDROCHLORIDE 10 MG: 10 TABLET, FILM COATED ORAL at 08:12

## 2024-09-14 RX ADMIN — LORAZEPAM 2 MG: 1 TABLET ORAL at 04:57

## 2024-09-14 RX ADMIN — CHLORHEXIDINE GLUCONATE 0.12% ORAL RINSE 15 ML: 1.2 LIQUID ORAL at 08:12

## 2024-09-14 RX ADMIN — IPRATROPIUM BROMIDE 0.5 MG: 0.5 SOLUTION RESPIRATORY (INHALATION) at 16:24

## 2024-09-14 RX ADMIN — LORAZEPAM 2 MG: 1 TABLET ORAL at 09:02

## 2024-09-14 RX ADMIN — OXYCODONE HYDROCHLORIDE 10 MG: 5 SOLUTION ORAL at 16:20

## 2024-09-14 RX ADMIN — INSULIN ASPART 1 UNITS: 100 INJECTION, SOLUTION INTRAVENOUS; SUBCUTANEOUS at 08:12

## 2024-09-14 RX ADMIN — ATOVAQUONE 1500 MG: 750 SUSPENSION ORAL at 08:12

## 2024-09-14 RX ADMIN — Medication 1.5 MG/HR: at 20:42

## 2024-09-14 RX ADMIN — OXYCODONE HYDROCHLORIDE 10 MG: 5 SOLUTION ORAL at 13:16

## 2024-09-14 RX ADMIN — GABAPENTIN 300 MG: 250 SUSPENSION ORAL at 22:52

## 2024-09-14 RX ADMIN — MONTELUKAST 10 MG: 10 TABLET, FILM COATED ORAL at 22:49

## 2024-09-14 RX ADMIN — LEVALBUTEROL HYDROCHLORIDE 0.63 MG: 0.63 SOLUTION RESPIRATORY (INHALATION) at 11:40

## 2024-09-14 RX ADMIN — LORAZEPAM 2 MG: 1 TABLET ORAL at 16:20

## 2024-09-14 RX ADMIN — Medication 1.8 MG/HR: at 08:52

## 2024-09-14 RX ADMIN — OXYCODONE HYDROCHLORIDE 5 MG: 5 SOLUTION ORAL at 00:54

## 2024-09-14 RX ADMIN — PREDNISONE 40 MG: 20 TABLET ORAL at 08:12

## 2024-09-14 RX ADMIN — NYSTATIN 500000 UNITS: 100000 SUSPENSION ORAL at 20:20

## 2024-09-14 RX ADMIN — INSULIN ASPART 1 UNITS: 100 INJECTION, SOLUTION INTRAVENOUS; SUBCUTANEOUS at 20:31

## 2024-09-14 RX ADMIN — QUETIAPINE FUMARATE 25 MG: 25 TABLET ORAL at 11:52

## 2024-09-14 RX ADMIN — LORAZEPAM 2 MG: 1 TABLET ORAL at 22:48

## 2024-09-14 RX ADMIN — HEPARIN SODIUM 5000 UNITS: 5000 INJECTION, SOLUTION INTRAVENOUS; SUBCUTANEOUS at 11:53

## 2024-09-14 RX ADMIN — OXYCODONE HYDROCHLORIDE 10 MG: 5 SOLUTION ORAL at 20:18

## 2024-09-14 RX ADMIN — GABAPENTIN 100 MG: 250 SUSPENSION ORAL at 06:31

## 2024-09-14 RX ADMIN — CHLORHEXIDINE GLUCONATE 0.12% ORAL RINSE 15 ML: 1.2 LIQUID ORAL at 20:20

## 2024-09-14 RX ADMIN — Medication 40 MG: at 08:11

## 2024-09-14 RX ADMIN — IPRATROPIUM BROMIDE 0.5 MG: 0.5 SOLUTION RESPIRATORY (INHALATION) at 11:40

## 2024-09-14 RX ADMIN — NYSTATIN 500000 UNITS: 100000 SUSPENSION ORAL at 16:20

## 2024-09-14 RX ADMIN — INSULIN ASPART 3 UNITS: 100 INJECTION, SOLUTION INTRAVENOUS; SUBCUTANEOUS at 11:53

## 2024-09-14 RX ADMIN — Medication 1 TABLET: at 08:12

## 2024-09-14 RX ADMIN — GABAPENTIN 300 MG: 250 SUSPENSION ORAL at 13:16

## 2024-09-14 RX ADMIN — INSULIN ASPART 2 UNITS: 100 INJECTION, SOLUTION INTRAVENOUS; SUBCUTANEOUS at 16:20

## 2024-09-14 RX ADMIN — QUETIAPINE FUMARATE 50 MG: 50 TABLET ORAL at 22:48

## 2024-09-14 RX ADMIN — IPRATROPIUM BROMIDE 0.5 MG: 0.5 SOLUTION RESPIRATORY (INHALATION) at 09:12

## 2024-09-14 RX ADMIN — NYSTATIN 500000 UNITS: 100000 SUSPENSION ORAL at 09:28

## 2024-09-14 RX ADMIN — OXYCODONE HYDROCHLORIDE 10 MG: 5 SOLUTION ORAL at 09:05

## 2024-09-14 RX ADMIN — LEVALBUTEROL HYDROCHLORIDE 0.63 MG: 0.63 SOLUTION RESPIRATORY (INHALATION) at 16:24

## 2024-09-14 RX ADMIN — HEPARIN SODIUM 5000 UNITS: 5000 INJECTION, SOLUTION INTRAVENOUS; SUBCUTANEOUS at 04:58

## 2024-09-14 RX ADMIN — Medication 60 ML: at 08:13

## 2024-09-14 RX ADMIN — ACETAMINOPHEN 650 MG: 325 TABLET ORAL at 22:54

## 2024-09-14 RX ADMIN — PROPOFOL 10 MCG/KG/MIN: 10 INJECTION, EMULSION INTRAVENOUS at 07:12

## 2024-09-14 RX ADMIN — MAGNESIUM SULFATE HEPTAHYDRATE 2 G: 2 INJECTION, SOLUTION INTRAVENOUS at 06:10

## 2024-09-14 RX ADMIN — HEPARIN SODIUM 5000 UNITS: 5000 INJECTION, SOLUTION INTRAVENOUS; SUBCUTANEOUS at 20:20

## 2024-09-14 RX ADMIN — PROPOFOL 10 MCG/KG/MIN: 10 INJECTION, EMULSION INTRAVENOUS at 20:35

## 2024-09-14 RX ADMIN — NYSTATIN 500000 UNITS: 100000 SUSPENSION ORAL at 11:53

## 2024-09-14 RX ADMIN — LEVALBUTEROL HYDROCHLORIDE 0.63 MG: 0.63 SOLUTION RESPIRATORY (INHALATION) at 21:02

## 2024-09-14 RX ADMIN — LEVOTHYROXINE SODIUM 25 MCG: 0.03 TABLET ORAL at 08:11

## 2024-09-14 RX ADMIN — QUETIAPINE FUMARATE 25 MG: 25 TABLET ORAL at 08:13

## 2024-09-14 RX ADMIN — ACETAMINOPHEN 650 MG: 325 TABLET ORAL at 11:52

## 2024-09-14 RX ADMIN — LEVALBUTEROL HYDROCHLORIDE 0.63 MG: 0.63 SOLUTION RESPIRATORY (INHALATION) at 09:12

## 2024-09-14 RX ADMIN — ACETAMINOPHEN 650 MG: 325 TABLET ORAL at 04:57

## 2024-09-14 ASSESSMENT — ACTIVITIES OF DAILY LIVING (ADL)
ADLS_ACUITY_SCORE: 63

## 2024-09-14 NOTE — PLAN OF CARE
ICU End of Shift Summary. See flowsheets for vital signs and detailed assessment.    Changes this shift: RASS -4, Propofol, dilaudid, and ketamine gtt infusing. -110s.Tmax 100.6. CMV 35%, PEEP 5. Gonzalez with good urine output. Mag 1.5, replaced per protocol     Plan: Wean sedation as able    Goal Outcome Evaluation:      Plan of Care Reviewed With: patient    Overall Patient Progress: no change  Outcome Evaluation: VSS. RASS -4. Versed off

## 2024-09-14 NOTE — PROGRESS NOTES
[id number]    ICU End of Shift Summary. See flowsheets for vital signs and detailed assessment.    Changes this shift: RASS -5, versed weaned off. Propofol, dilaudid, and ketamine gtts infusing. -110s. Tylenol PRN for Tmax 100.6. Maintaining MAP goal without intervention. CMV 35%, PEEP 5. Rectal tube and rolle in place with good output.     Plan: continue plan of care. Wean sedation as tolerated.

## 2024-09-14 NOTE — PROGRESS NOTES
Nephrology    Renal function improved, nephrology will sign off. Call if questions.    Radha Silva MD

## 2024-09-14 NOTE — PROGRESS NOTES
MEDICAL ICU PROGRESS NOTE  09/14/2024    Date of Service (when I saw the patient): 09/14/2024    ASSESSMENT: Massiel Flaherty is a 53 year old female with PMH Anxiety/depression, fibromyalgia, c/f nonepileptic seizures not on AEDs, hypothyroidism, asthma, HLD, MURIEL on CPAP, expressive aphasia, hypertension  who was admitted on 8/3/2024 for fatigue, fever and dyspnea and intubated 8/6/24 at OSH for ARDS, proned and paralyzed without improvement. Transferred to [facility] 8/8/24, hypoxic with high plateaus/peaks and placed on VV ECMO and CRRT. Decannulated from VV ECMO 8/18 and transferred to MICU 8/26/24 for ongoing management. Extubated 8/27 then reintubated 8/29 iso worsening AHRF and pseudomonas pneumonia.     CHANGES and MAJOR THINGS TODAY:  - Continue to reduce sedation: Now off versed, turn off ketamine, reduce dilaudid gtt    + Check triglycerides, can utilize propofol    + Increased Oxycodone to 10 mg q4hr   - Volume goal net even   - Increased Gabapentin to pta dose 300 mg TID   - Pulmicort discontinued, given continued Prednisone     Neuro:  # Pain and sedation  # Concern for ICU delirium   # Hx Fibromyalgia   # Hx myalgic encephalomyelitis   # Hx anxiety/depression  - Monitor neurological status. Delirium preventions and precautions.   - Pain: Scheduled: tylenol, increased oxycodone 10 mg q4hr, continue Ativan 2 mg every q6hr   PRN: tylenol, oxycodone   - Sedation plan: reduce dilaudid, midazolam now off, turn ketamine off (at the level where we should not wean because would cause hallucinations)   - Continue propofol; can utilize as coming off of other drips  - Continue RASS goal -1 to -2  - Increase Gabapentin to 300mg TID   - Continue scheduled Seroquel 25 mg BID and 50 mg HS   - PTA Venlafaxine and Amitriptyline discontinued while sedated    # Hx spells with staring and BUE posturing previously described as nonepileptic seizures   # Hx of GTC seizures and myoclonic epilepsy, weaned off AEDs   # Diffuse  cerebral edema - improved  - Sodium goals liberalized to 140-145  - 8/21: MRI Brain: no acute intracranial pathology. No findings to suggest anoxic brain injury.     Pulmonary:  # Acute hypoxic respiratory failure c/b ARDS: Improving   # Pseudomonas pneumonia  # Mechanical ventilation  # s/p VV ECMO 8/8 - 8/18   # Hx CAP  # Asthma  # MURIEL on home CPAP  PFT dated 9/8/2020 demonstrated normal spirometry with mild diffusion impairment and mild restriction (FEV1/FVC 80%, FEV1 2.59, DLCO 40%). CT abdomen chest 3/9/2020 demonstrated scarring and fibrosis bilaterally which correlated with the decreased DLCO. The patient also has a history of MURIEL on CPAP therapy as currently managed by her provider in [place]. Unclear what triggered ARDS or why she has had such severe hospital course, further investigated ILD but results all unremarkable. Extubated 8/27, reintubated 8/29 for worsening O2 demands and diffuse opacities iso new/recurrent psuedomonas pneumonia. Bronch with BAL 8/30, pseudomonas growing as below.   - ILD w/u aldolase, EVELIO, RF, CCP, ANCA, MPO, SSA Ro and La, Scleroderma antibody, Radha 1,  hypersensity pneumonitis, IGG subclasses,  aspergillus, blastomyces, histoplasma neg  - SpO2 goals >92%, PaO2 goals >60   - PTA singular at bedtime if able  - Scheduled duonebs   - Discontinued Pulmicort given steroids as below   - Lung protective ventilation strategies given ARDS  - Methylpred 125mg q6H x 24h 8/30 --> transition to 20 mg dex x 5 days, followed by 10mg x 3 days   + Continue prednisone 40 mg daily   - Continue to trend ABGs accordingly     FiO2 (%): 35 %, Resp: 24, Vent Mode: CMV/AC, Resp Rate (Set): 20 breaths/min, Tidal Volume (Set, mL): 350 mL, PEEP (cm H2O): 5 cmH2O, Resp Rate (Set): 20 breaths/min, Tidal Volume (Set, mL): 350 mL, PEEP (cm H2O): 5 cmH2O      Cardiovascular:  # Hyperlipidemia  # Hx HTN  - MAP goal >65, SBP goals >110  - Has remained off pressors since 9/9   - PTA atorvastatin  - PTA  clonidine held while sedated  - Lower extremity ultrasound without vascular pathology, no hematoma or clots  - Monitor discoloration of extremities for necrosis  - PRN hydralazine for SBP > 200     Sinus Tachycardia  Likely 2/2 fluid removal, sepsis, pain and sedation as above    GI/Nutrition:  # Severe malnutrition (see RD note)   # Non-infectious Diarrhea  # GERD   - Protonix (home medication)   - Nutrition consulted, appreciate recs  - Diet: TF via NJ, tolerating   - Hold bowel regimen d/t loose stools  - Continue scheduled multivitamin, banatrol, prosource packet  - loperamide PRN  - Rectal tube, continues with high output    Renal/Fluids/Electrolytes:  # Acute kidney injury: Improving   # Renal failure: Improving   # AGMA: Improving   Baseline Cr approx 0.6. Pt was anuric here but now making some urine, likely prerenal due to shock.  - Continues to improve, holding off on iHD  - Volume goal is net even, may consider diuresis in oncoming days   - Strict I&Os  - Avoid nephrotoxins as able    Endocrine:  # Steroid and stress induced hyperglycemia  # Hypothyroidism   - Goal to keep BG < 180 for optimal wound healing  - Continue medium sliding scale insulin   - Continue PTA synthroid    ID:  # Leukocytosis  # Psuedomonas pneumonia  # Hx CAP  Respiratory cx 8/29 pseudomonas pneumonia. Intermediate susceptability to meropenem, more significant sensitivities to ciprofloxacin  - Continue to monitor fever curve and inflammatory markers as appropriate  - ID now signed off     Abx  - Meropenem 8/29 - 9/1  - Vancomycin 8/29- 8/30  - Tobramycin x 1 8/29  - Vancomycin 9/5-9/8    - Tobramycin nebs BID 9/1-9/11  - Ciprofloxacin infusion 9/1- 9/11  - Metronidazole 9/6 - 9/11    PJP Ppx  Given Dex-ARDS Massiel brody ferraro remain on steriods for > 3 weeks so will initiate PJP ppx. Given history of allergy to sulfa, will obtain G6PD test to determine if dapsone can be used   - Continue atovaquone 1,500 mg daily     CMV  viremia  CMV PCR in blood positive 8/31.   - Ganciclovir 9/6 - 9/8, discontinued given ID recs  - trend CMV 9/9    # HSV-1  Mouth sores present, which could have viral etiology. 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 Monitor and trend.   - CTAP 8/30 due to acute hemoglobin drop requiring transfusion of 2 x 1 unit of blood 12 hours apart. Negative for acute bleed    Musculoskeletal/Rheum:  # Alopecia  - Hold PTA hydroxychloroquine     # Weakness and deconditioning of critical illness  # Left ankle injury pre-hospitalization    - Physical and occupational therapy when able.     Skin:  # BLE scattered ecchymosis  # Left toe duskiness  - Bilateral lower leg ecchymosis  - WOC consult  - Ecchymosis to left foot, most noticeable to 3rd and 4th toes    General Cares/Prophylaxis:  DVT Prophylaxis: SubQ heparin   GI Prophylaxis: PPI  Restraints: no  Code Status: Full Code: Given improvement and current trajectory, discussed this with Mother and changed back to Full Code.     Lines/tubes/drains:  - ETT (8/29)  - NJ (8/9)  - LIJ CVC (8/8)  - Radial a-line (8/29)  - PIV x3  - Rectal tube (8/17)  - Tunneled HD line (8/23)    Disposition:  - Medical ICU     Patient seen and findings/plan discussed with medical ICU staff, Dr. Dubno.    Total critical care time includes 45 minutes.     DOREEN Oliver CNP    Clinically Significant Risk Factors            # Hypomagnesemia: Lowest Mg = 1.5 mg/dL in last 2 days, will replace as needed   # Hypoalbuminemia: Lowest albumin = 2.2 g/dL at 8/10/2024  9:47 AM, will monitor as appropriate                # Severe Malnutrition: based on nutrition assessment      # Financial/Environmental Concerns: none            ====================================  INTERVAL HISTORY:   Ventilation and oxygen requirements continue to improve. Continuing to reduce  sedation, now off versed gtt. Plan to trial turning ketamine off, and reducing dilaudid gtt. Otherwise HDS.     OBJECTIVE:   1. VITAL SIGNS:   Temp:  [97  F (36.1  C)-100.6  F (38.1  C)] 99  F (37.2  C)  Pulse:  [103-118] 105  Resp:  [20-28] 24  BP: (107-141)/(50-93) 115/59  FiO2 (%):  [35 %] 35 %  SpO2:  [95 %-100 %] 98 %  FiO2 (%): 35 %, Resp: 24, Vent Mode: CMV/AC, Resp Rate (Set): 20 breaths/min, Tidal Volume (Set, mL): 350 mL, PEEP (cm H2O): 5 cmH2O, Resp Rate (Set): 20 breaths/min, Tidal Volume (Set, mL): 350 mL, PEEP (cm H2O): 5 cmH2O    2. INTAKE/ OUTPUT:   I/O last 3 completed shifts:  In: 2429.44 [I.V.:704.44; NG/GT:645]  Out: 2193 [Urine:1968; Stool:225]    3. PHYSICAL EXAMINATION:  General: Intubated, sedated  HEENT: Normocephalic, atraumatic, eyes with ointment dressing  Neuro: RASS -4-5, arefelexic  Pulm/Resp: Bilateral breath sounds audible with diffuse coarse breath sounds  CV: Sinus tachycardia, s1/2 normal, no murmur  Abdomen: Soft, non-distended, limited 2/2 to sedation  : deferred  Incisions/Skin: lower leg 1+ edema with ecchymosis present and scattered. Some bluish discoloration of the finger tips, capillary refill normal    4. LABS:   Arterial Blood Gases   Recent Labs   Lab 09/14/24  0338 09/13/24  1723 09/13/24  0904 09/13/24  0341   PH 7.35 7.32* 7.31* 7.32*   PCO2 39 40 40 38   PO2 102 96 110* 103   HCO3 22 21 20* 20*     Complete Blood Count   Recent Labs   Lab 09/14/24  0338 09/13/24  0341 09/12/24  0316 09/11/24  0309   WBC 16.2* 20.3* 19.8* 23.1*   HGB 7.1* 7.5* 7.8* 8.8*    283 291 355     Basic Metabolic Panel  Recent Labs   Lab 09/14/24  0806 09/14/24  0353 09/14/24  0338 09/14/24  0059 09/13/24  0347 09/13/24 0341 09/12/24 1937 09/12/24  1935 09/12/24  1658 09/12/24  1414   NA  --   --  141  --   --  135  --  133*  --  132*   POTASSIUM  --   --  3.7  --   --  3.5  --  4.4  --  5.3   CHLORIDE  --   --  105  --   --  102  --  99  --  97*   CO2  --   --  20*  --   --  18*   --  17*  --  17*   BUN  --   --  86.0*  --   --  76.6*  --  74.6*  --  73.1*   CR  --   --  0.67  --   --  0.80  --  0.82  --  0.90   * 139* 162* 120*   < > 157*   < > 246*   < > 306*    < > = values in this interval not displayed.     Liver Function Tests  Recent Labs   Lab 09/11/24  1158 09/11/24  0309 09/10/24  2008 09/10/24  1230 09/10/24  0314 09/09/24  1147 09/09/24  0343 09/08/24  1155 09/08/24  0329   AST  --  16  --   --  19  --  42  --  28   ALT  --  23  --   --  22  --  26  --  28   ALKPHOS  --  101  --   --  100  --  93  --  85   BILITOTAL  --  0.2  --   --  0.2  --  0.2  --  0.2   ALBUMIN 2.9* 3.2* 3.2* 2.9* 3.0*   < > 2.9*   < > 3.1*    < > = values in this interval not displayed.     Coagulation Profile  No lab results found in last 7 days.    5. RADIOLOGY:   No results found for this or any previous visit (from the past 24 hour(s)).

## 2024-09-14 NOTE — PLAN OF CARE
Major Shift Events:  No acute neuro changes. Ketamine off at 1030. Remains tachy in 100s-110s, MAP maintained >65 without intervention. CMV 30%/350/20/8, continues to overbreathe vent. Adequate UOP with rolle, continues to have loose stools. TF at goal with SFWF.     Plan: Continue to wean sedation and ventilator. Plan for possible trach on Monday.    For vital signs and complete assessments, please see documentation flowsheets.        Goal Outcome Evaluation:      Plan of Care Reviewed With: patient    Overall Patient Progress: no changeOverall Patient Progress: no change    Outcome Evaluation: VSS RASS -4, Ketamine off today      Problem: Mechanical Ventilation Invasive  Goal: Absence of Ventilator-Induced Lung Injury  Intervention: Prevent Ventilator-Associated Pneumonia  Recent Flowsheet Documentation  Taken 9/14/2024 1600 by Beryl Holden RN  Oral Care:   swabbed with antiseptic solution   lip/mouth moisturizer applied   suction provided  Head of Bed (HOB) Positioning: HOB at 30 degrees  Taken 9/14/2024 1400 by Beryl Holden RN  Head of Bed (HOB) Positioning: HOB at 30 degrees  Taken 9/14/2024 1200 by Beryl Hodlen RN  Oral Care:   swabbed with antiseptic solution   lip/mouth moisturizer applied   suction provided  Head of Bed (HOB) Positioning: HOB at 30 degrees  Taken 9/14/2024 1000 by Beryl Holden RN  Head of Bed (HOB) Positioning: HOB at 30 degrees  Taken 9/14/2024 0800 by Beryl Holden RN  Oral Care:   swabbed with antiseptic solution   lip/mouth moisturizer applied   suction provided  Head of Bed (HOB) Positioning: HOB at 30 degrees

## 2024-09-15 ENCOUNTER — APPOINTMENT (OUTPATIENT)
Dept: GENERAL RADIOLOGY | Facility: CLINIC | Age: 54
DRG: 003 | End: 2024-09-15
Payer: MEDICAID

## 2024-09-15 LAB
ALBUMIN UR-MCNC: 20 MG/DL
ALLEN'S TEST: ABNORMAL
ANION GAP SERPL CALCULATED.3IONS-SCNC: 11 MMOL/L (ref 7–15)
APPEARANCE UR: ABNORMAL
BASE EXCESS BLDA CALC-SCNC: 0.4 MMOL/L (ref -3–3)
BASE EXCESS BLDA CALC-SCNC: 1.3 MMOL/L (ref -3–3)
BASE EXCESS BLDA CALC-SCNC: 1.9 MMOL/L (ref -3–3)
BILIRUB UR QL STRIP: NEGATIVE
BLD PROD TYP BPU: NORMAL
BLOOD COMPONENT TYPE: NORMAL
BUN SERPL-MCNC: 76.1 MG/DL (ref 6–20)
CALCIUM SERPL-MCNC: 8.7 MG/DL (ref 8.8–10.4)
CHLORIDE SERPL-SCNC: 110 MMOL/L (ref 98–107)
CODING SYSTEM: NORMAL
COHGB MFR BLD: 95.7 % (ref 96–97)
COHGB MFR BLD: 98.9 % (ref 96–97)
COHGB MFR BLD: >100 % (ref 96–97)
COLOR UR AUTO: YELLOW
CREAT SERPL-MCNC: 0.53 MG/DL (ref 0.51–0.95)
CROSSMATCH: NORMAL
CRP SERPL-MCNC: 51.5 MG/L
EGFRCR SERPLBLD CKD-EPI 2021: >90 ML/MIN/1.73M2
ERYTHROCYTE [DISTWIDTH] IN BLOOD BY AUTOMATED COUNT: 21.4 % (ref 10–15)
GLUCOSE BLDC GLUCOMTR-MCNC: 160 MG/DL (ref 70–99)
GLUCOSE BLDC GLUCOMTR-MCNC: 160 MG/DL (ref 70–99)
GLUCOSE BLDC GLUCOMTR-MCNC: 164 MG/DL (ref 70–99)
GLUCOSE BLDC GLUCOMTR-MCNC: 178 MG/DL (ref 70–99)
GLUCOSE BLDC GLUCOMTR-MCNC: 190 MG/DL (ref 70–99)
GLUCOSE BLDC GLUCOMTR-MCNC: 259 MG/DL (ref 70–99)
GLUCOSE BLDC GLUCOMTR-MCNC: 267 MG/DL (ref 70–99)
GLUCOSE SERPL-MCNC: 187 MG/DL (ref 70–99)
GLUCOSE UR STRIP-MCNC: NEGATIVE MG/DL
HCO3 BLD-SCNC: 26 MMOL/L (ref 21–28)
HCO3 BLD-SCNC: 26 MMOL/L (ref 21–28)
HCO3 BLD-SCNC: 27 MMOL/L (ref 21–28)
HCO3 SERPL-SCNC: 24 MMOL/L (ref 22–29)
HCT VFR BLD AUTO: 22.7 % (ref 35–47)
HGB BLD-MCNC: 7 G/DL (ref 11.7–15.7)
HGB UR QL STRIP: ABNORMAL
ISSUE DATE AND TIME: NORMAL
KETONES UR STRIP-MCNC: NEGATIVE MG/DL
LACTATE SERPL-SCNC: 1.5 MMOL/L (ref 0.7–2)
LEUKOCYTE ESTERASE UR QL STRIP: ABNORMAL
LIPASE SERPL-CCNC: 71 U/L (ref 13–60)
MAGNESIUM SERPL-MCNC: 1.8 MG/DL (ref 1.7–2.3)
MCH RBC QN AUTO: 30.3 PG (ref 26.5–33)
MCHC RBC AUTO-ENTMCNC: 30.8 G/DL (ref 31.5–36.5)
MCV RBC AUTO: 98 FL (ref 78–100)
NITRATE UR QL: NEGATIVE
O2/TOTAL GAS SETTING VFR VENT: 35 %
PCO2 BLD: 41 MM HG (ref 35–45)
PCO2 BLD: 41 MM HG (ref 35–45)
PCO2 BLD: 43 MM HG (ref 35–45)
PEEP: 5 CM H2O
PH BLD: 7.38 [PH] (ref 7.35–7.45)
PH BLD: 7.41 [PH] (ref 7.35–7.45)
PH BLD: 7.42 [PH] (ref 7.35–7.45)
PH UR STRIP: 5.5 [PH] (ref 5–7)
PHOSPHATE SERPL-MCNC: 3.6 MG/DL (ref 2.5–4.5)
PLATELET # BLD AUTO: 248 10E3/UL (ref 150–450)
PO2 BLD: 119 MM HG (ref 80–105)
PO2 BLD: 71 MM HG (ref 80–105)
PO2 BLD: 96 MM HG (ref 80–105)
POTASSIUM SERPL-SCNC: 3.7 MMOL/L (ref 3.4–5.3)
RBC # BLD AUTO: 2.31 10E6/UL (ref 3.8–5.2)
RBC URINE: 7 /HPF
SAO2 % BLDA: 93 % (ref 92–100)
SAO2 % BLDA: 96 % (ref 92–100)
SAO2 % BLDA: 97 % (ref 92–100)
SODIUM SERPL-SCNC: 145 MMOL/L (ref 135–145)
SP GR UR STRIP: 1.02 (ref 1–1.03)
SQUAMOUS EPITHELIAL: 6 /HPF
UNIT ABO/RH: NORMAL
UNIT NUMBER: NORMAL
UNIT STATUS: NORMAL
UNIT TYPE ISBT: 9500
UROBILINOGEN UR STRIP-MCNC: 2 MG/DL
WBC # BLD AUTO: 14.1 10E3/UL (ref 4–11)
WBC URINE: 20 /HPF

## 2024-09-15 PROCEDURE — 99292 CRITICAL CARE ADDL 30 MIN: CPT | Mod: FS

## 2024-09-15 PROCEDURE — 94640 AIRWAY INHALATION TREATMENT: CPT

## 2024-09-15 PROCEDURE — 250N000013 HC RX MED GY IP 250 OP 250 PS 637: Performed by: SURGERY

## 2024-09-15 PROCEDURE — 83690 ASSAY OF LIPASE: CPT

## 2024-09-15 PROCEDURE — 71045 X-RAY EXAM CHEST 1 VIEW: CPT | Mod: 26 | Performed by: RADIOLOGY

## 2024-09-15 PROCEDURE — 82805 BLOOD GASES W/O2 SATURATION: CPT | Performed by: INTERNAL MEDICINE

## 2024-09-15 PROCEDURE — 82805 BLOOD GASES W/O2 SATURATION: CPT

## 2024-09-15 PROCEDURE — 85027 COMPLETE CBC AUTOMATED: CPT | Performed by: PHYSICIAN ASSISTANT

## 2024-09-15 PROCEDURE — 250N000013 HC RX MED GY IP 250 OP 250 PS 637

## 2024-09-15 PROCEDURE — 99291 CRITICAL CARE FIRST HOUR: CPT | Mod: FS

## 2024-09-15 PROCEDURE — 71045 X-RAY EXAM CHEST 1 VIEW: CPT

## 2024-09-15 PROCEDURE — 94640 AIRWAY INHALATION TREATMENT: CPT | Mod: 76

## 2024-09-15 PROCEDURE — P9016 RBC LEUKOCYTES REDUCED: HCPCS

## 2024-09-15 PROCEDURE — 250N000009 HC RX 250: Performed by: SURGERY

## 2024-09-15 PROCEDURE — 250N000013 HC RX MED GY IP 250 OP 250 PS 637: Performed by: PHYSICIAN ASSISTANT

## 2024-09-15 PROCEDURE — 80048 BASIC METABOLIC PNL TOTAL CA: CPT | Performed by: PHYSICIAN ASSISTANT

## 2024-09-15 PROCEDURE — 86140 C-REACTIVE PROTEIN: CPT

## 2024-09-15 PROCEDURE — 94003 VENT MGMT INPAT SUBQ DAY: CPT

## 2024-09-15 PROCEDURE — 999N000157 HC STATISTIC RCP TIME EA 10 MIN

## 2024-09-15 PROCEDURE — 83735 ASSAY OF MAGNESIUM: CPT | Performed by: PHYSICIAN ASSISTANT

## 2024-09-15 PROCEDURE — 200N000002 HC R&B ICU UMMC

## 2024-09-15 PROCEDURE — 36415 COLL VENOUS BLD VENIPUNCTURE: CPT

## 2024-09-15 PROCEDURE — 81001 URINALYSIS AUTO W/SCOPE: CPT

## 2024-09-15 PROCEDURE — 87086 URINE CULTURE/COLONY COUNT: CPT

## 2024-09-15 PROCEDURE — 83605 ASSAY OF LACTIC ACID: CPT

## 2024-09-15 PROCEDURE — 250N000011 HC RX IP 250 OP 636

## 2024-09-15 PROCEDURE — 87040 BLOOD CULTURE FOR BACTERIA: CPT

## 2024-09-15 PROCEDURE — 84100 ASSAY OF PHOSPHORUS: CPT | Performed by: PHYSICIAN ASSISTANT

## 2024-09-15 PROCEDURE — 250N000012 HC RX MED GY IP 250 OP 636 PS 637

## 2024-09-15 PROCEDURE — 250N000009 HC RX 250

## 2024-09-15 RX ORDER — DEXMEDETOMIDINE HYDROCHLORIDE 4 UG/ML
.1-1.2 INJECTION, SOLUTION INTRAVENOUS CONTINUOUS
Status: DISCONTINUED | OUTPATIENT
Start: 2024-09-15 | End: 2024-09-16

## 2024-09-15 RX ORDER — OXYCODONE HCL 5 MG/5 ML
5 SOLUTION, ORAL ORAL EVERY 4 HOURS
Status: DISCONTINUED | OUTPATIENT
Start: 2024-09-15 | End: 2024-09-17

## 2024-09-15 RX ORDER — LORAZEPAM 1 MG/1
1 TABLET ORAL EVERY 6 HOURS
Status: DISCONTINUED | OUTPATIENT
Start: 2024-09-15 | End: 2024-09-17

## 2024-09-15 RX ADMIN — LORAZEPAM 1 MG: 1 TABLET ORAL at 22:48

## 2024-09-15 RX ADMIN — INSULIN ASPART 1 UNITS: 100 INJECTION, SOLUTION INTRAVENOUS; SUBCUTANEOUS at 05:29

## 2024-09-15 RX ADMIN — OXYCODONE HYDROCHLORIDE 5 MG: 5 SOLUTION ORAL at 12:20

## 2024-09-15 RX ADMIN — Medication 1.5 MG/HR: at 23:40

## 2024-09-15 RX ADMIN — GABAPENTIN 300 MG: 250 SUSPENSION ORAL at 14:31

## 2024-09-15 RX ADMIN — LORAZEPAM 2 MG: 1 TABLET ORAL at 05:13

## 2024-09-15 RX ADMIN — GABAPENTIN 300 MG: 250 SUSPENSION ORAL at 08:12

## 2024-09-15 RX ADMIN — OXYCODONE HYDROCHLORIDE 10 MG: 5 SOLUTION ORAL at 08:34

## 2024-09-15 RX ADMIN — ACETAMINOPHEN 650 MG: 325 TABLET ORAL at 22:48

## 2024-09-15 RX ADMIN — INSULIN ASPART 1 UNITS: 100 INJECTION, SOLUTION INTRAVENOUS; SUBCUTANEOUS at 01:54

## 2024-09-15 RX ADMIN — Medication 1.5 MG/HR: at 10:18

## 2024-09-15 RX ADMIN — LORAZEPAM 1 MG: 1 TABLET ORAL at 16:04

## 2024-09-15 RX ADMIN — Medication 60 ML: at 07:59

## 2024-09-15 RX ADMIN — ACETAMINOPHEN 650 MG: 325 TABLET ORAL at 11:06

## 2024-09-15 RX ADMIN — Medication 1 TABLET: at 12:19

## 2024-09-15 RX ADMIN — MONTELUKAST 10 MG: 10 TABLET, FILM COATED ORAL at 22:48

## 2024-09-15 RX ADMIN — DEXMEDETOMIDINE HYDROCHLORIDE 0.5 MCG/KG/HR: 400 INJECTION INTRAVENOUS at 22:56

## 2024-09-15 RX ADMIN — INSULIN ASPART 1 UNITS: 100 INJECTION, SOLUTION INTRAVENOUS; SUBCUTANEOUS at 23:46

## 2024-09-15 RX ADMIN — HEPARIN SODIUM 5000 UNITS: 5000 INJECTION, SOLUTION INTRAVENOUS; SUBCUTANEOUS at 20:49

## 2024-09-15 RX ADMIN — INSULIN ASPART 1 UNITS: 100 INJECTION, SOLUTION INTRAVENOUS; SUBCUTANEOUS at 20:50

## 2024-09-15 RX ADMIN — QUETIAPINE FUMARATE 25 MG: 25 TABLET ORAL at 08:13

## 2024-09-15 RX ADMIN — QUETIAPINE FUMARATE 50 MG: 50 TABLET ORAL at 22:48

## 2024-09-15 RX ADMIN — LORAZEPAM 1 MG: 1 TABLET ORAL at 10:18

## 2024-09-15 RX ADMIN — LEVALBUTEROL HYDROCHLORIDE 0.63 MG: 0.63 SOLUTION RESPIRATORY (INHALATION) at 20:03

## 2024-09-15 RX ADMIN — CETIRIZINE HYDROCHLORIDE 10 MG: 10 TABLET, FILM COATED ORAL at 08:13

## 2024-09-15 RX ADMIN — LEVALBUTEROL HYDROCHLORIDE 0.63 MG: 0.63 SOLUTION RESPIRATORY (INHALATION) at 15:57

## 2024-09-15 RX ADMIN — INSULIN ASPART 3 UNITS: 100 INJECTION, SOLUTION INTRAVENOUS; SUBCUTANEOUS at 15:46

## 2024-09-15 RX ADMIN — QUETIAPINE FUMARATE 25 MG: 25 TABLET ORAL at 14:31

## 2024-09-15 RX ADMIN — NYSTATIN 500000 UNITS: 100000 SUSPENSION ORAL at 15:47

## 2024-09-15 RX ADMIN — ATOVAQUONE 1500 MG: 750 SUSPENSION ORAL at 08:11

## 2024-09-15 RX ADMIN — OXYCODONE HYDROCHLORIDE 10 MG: 5 SOLUTION ORAL at 05:31

## 2024-09-15 RX ADMIN — DEXMEDETOMIDINE HYDROCHLORIDE 0.2 MCG/KG/HR: 400 INJECTION INTRAVENOUS at 11:22

## 2024-09-15 RX ADMIN — OXYCODONE HYDROCHLORIDE 5 MG: 5 SOLUTION ORAL at 20:49

## 2024-09-15 RX ADMIN — CHLORHEXIDINE GLUCONATE 0.12% ORAL RINSE 15 ML: 1.2 LIQUID ORAL at 07:58

## 2024-09-15 RX ADMIN — LEVALBUTEROL HYDROCHLORIDE 0.63 MG: 0.63 SOLUTION RESPIRATORY (INHALATION) at 11:37

## 2024-09-15 RX ADMIN — OXYCODONE HYDROCHLORIDE 5 MG: 5 SOLUTION ORAL at 17:26

## 2024-09-15 RX ADMIN — INSULIN ASPART 3 UNITS: 100 INJECTION, SOLUTION INTRAVENOUS; SUBCUTANEOUS at 12:40

## 2024-09-15 RX ADMIN — LEVALBUTEROL HYDROCHLORIDE 0.63 MG: 0.63 SOLUTION RESPIRATORY (INHALATION) at 07:41

## 2024-09-15 RX ADMIN — GABAPENTIN 300 MG: 250 SUSPENSION ORAL at 22:48

## 2024-09-15 RX ADMIN — NYSTATIN 500000 UNITS: 100000 SUSPENSION ORAL at 20:49

## 2024-09-15 RX ADMIN — IPRATROPIUM BROMIDE 0.5 MG: 0.5 SOLUTION RESPIRATORY (INHALATION) at 11:37

## 2024-09-15 RX ADMIN — HEPARIN SODIUM 5000 UNITS: 5000 INJECTION, SOLUTION INTRAVENOUS; SUBCUTANEOUS at 05:13

## 2024-09-15 RX ADMIN — INSULIN ASPART 2 UNITS: 100 INJECTION, SOLUTION INTRAVENOUS; SUBCUTANEOUS at 08:17

## 2024-09-15 RX ADMIN — HEPARIN SODIUM 5000 UNITS: 5000 INJECTION, SOLUTION INTRAVENOUS; SUBCUTANEOUS at 12:21

## 2024-09-15 RX ADMIN — Medication 40 MG: at 08:11

## 2024-09-15 RX ADMIN — CHLORHEXIDINE GLUCONATE 0.12% ORAL RINSE 15 ML: 1.2 LIQUID ORAL at 20:49

## 2024-09-15 RX ADMIN — NYSTATIN 500000 UNITS: 100000 SUSPENSION ORAL at 08:34

## 2024-09-15 RX ADMIN — IPRATROPIUM BROMIDE 0.5 MG: 0.5 SOLUTION RESPIRATORY (INHALATION) at 07:41

## 2024-09-15 RX ADMIN — LEVOTHYROXINE SODIUM 25 MCG: 0.03 TABLET ORAL at 08:13

## 2024-09-15 RX ADMIN — OXYCODONE HYDROCHLORIDE 10 MG: 5 SOLUTION ORAL at 02:04

## 2024-09-15 RX ADMIN — ACETAMINOPHEN 650 MG: 325 TABLET ORAL at 05:13

## 2024-09-15 RX ADMIN — PREDNISONE 40 MG: 20 TABLET ORAL at 08:13

## 2024-09-15 RX ADMIN — NYSTATIN 500000 UNITS: 100000 SUSPENSION ORAL at 14:31

## 2024-09-15 RX ADMIN — IPRATROPIUM BROMIDE 0.5 MG: 0.5 SOLUTION RESPIRATORY (INHALATION) at 15:57

## 2024-09-15 RX ADMIN — IPRATROPIUM BROMIDE 0.5 MG: 0.5 SOLUTION RESPIRATORY (INHALATION) at 20:03

## 2024-09-15 RX ADMIN — ACETAMINOPHEN 650 MG: 325 TABLET ORAL at 17:26

## 2024-09-15 ASSESSMENT — ACTIVITIES OF DAILY LIVING (ADL)
ADLS_ACUITY_SCORE: 59

## 2024-09-15 NOTE — PLAN OF CARE
Plan of Care Reviewed With: patient    Overall Patient Progress: no change  ICU End of Shift Summary. See flowsheets for vital signs and detailed assessment.    Changes this shift: RASS -4, Propofol weaned off. Dilaudid gtt @ 1.5 mg/hr.-120s.Tmax 101.3. Tylenol given. BC drawn. Gonzalez exchanged, UA sent. Lactic 1.5. CMV 35%, PEEP 5. Abrasion found n left ear. Hgb 7, 1 unit RBC ordered. MAP dropped less than 60 briefly, improved after Propofol off.    Plan: Give 1 unit RBC. Trach/ PEG Monday  Outcome Evaluation: RASS -4, Propofol off. Dilaudid gtt @ 1.5 mg/hr

## 2024-09-15 NOTE — PROGRESS NOTES
MEDICAL ICU PROGRESS NOTE  09/15/2024    Date of Service (when I saw the patient): 09/15/2024    ASSESSMENT: Massiel Flaherty is a 53 year old female with PMH Anxiety/depression, fibromyalgia, c/f nonepileptic seizures not on AEDs, hypothyroidism, asthma, HLD, MURIEL on CPAP, expressive aphasia, hypertension  who was admitted on 8/3/2024 for fatigue, fever and dyspnea and intubated 8/6/24 at OSH for ARDS, proned and paralyzed without improvement. Transferred to [facility] 8/8/24, hypoxic with high plateaus/peaks and placed on VV ECMO and CRRT. Decannulated from VV ECMO 8/18 and transferred to MICU 8/26/24 for ongoing management. Extubated 8/27 then reintubated 8/29 iso worsening AHRF and pseudomonas pneumonia.     CHANGES and MAJOR THINGS TODAY:  - Continue to wean sedation: Off propofol, weaning dilaudid  - Added Precedex to assist sedation wean   - Reduced oxycodone 5 mg q4h and ativan 1 mg q6h   - Volume goal net even   - Febrile overnight, urine and blood cx sent   - Transfused 1 unit(s) prbc   - Increased tidal volume 380       Neuro:  # Pain and sedation  # Concern for ICU delirium   # Hx Fibromyalgia   # Hx myalgic encephalomyelitis   # Hx anxiety/depression  - Monitor neurological status. Delirium preventions and precautions.   - Pain: Scheduled: tylenol, reduced oxycodone 5 mg q4hr, and Ativan 1 mg every q6hr   PRN: tylenol, oxycodone   - Sedation plan: reduce dilaudid, versed, ketamine remain off   - Propofol now off  - Added Precedex to assist coming down on sedation   - Continue RASS goal -1 to -2  - Continue pta Gabapentin to 300mg TID   - Continue scheduled Seroquel 25 mg BID and 50 mg HS   - PTA Venlafaxine and Amitriptyline discontinued while sedated    # Hx spells with staring and BUE posturing previously described as nonepileptic seizures   # Hx of GTC seizures and myoclonic epilepsy, weaned off AEDs   # Diffuse cerebral edema - improved  - Sodium goals liberalized to 140-145  - 8/21: MRI Brain: no  acute intracranial pathology. No findings to suggest anoxic brain injury.     Pulmonary:  # Acute hypoxic respiratory failure c/b ARDS: Improving   # Pseudomonas pneumonia  # Mechanical ventilation  # s/p VV ECMO 8/8 - 8/18   # Hx CAP  # Asthma  # MURIEL on home CPAP  PFT dated 9/8/2020 demonstrated normal spirometry with mild diffusion impairment and mild restriction (FEV1/FVC 80%, FEV1 2.59, DLCO 40%). CT abdomen chest 3/9/2020 demonstrated scarring and fibrosis bilaterally which correlated with the decreased DLCO. The patient also has a history of MURIEL on CPAP therapy as currently managed by her provider in [place]. Unclear what triggered ARDS or why she has had such severe hospital course, further investigated ILD but results all unremarkable. Extubated 8/27, reintubated 8/29 for worsening O2 demands and diffuse opacities iso new/recurrent psuedomonas pneumonia. Bronch with BAL 8/30, pseudomonas growing as below.   - ILD w/u aldolase, EVELIO, RF, CCP, ANCA, MPO, SSA Ro and La, Scleroderma antibody, Radha 1,  hypersensity pneumonitis, IGG subclasses,  aspergillus, blastomyces, histoplasma neg  - SpO2 goals >92%, PaO2 goals >60   - PTA singular at bedtime if able  - Scheduled duonebs   - Discontinued Pulmicort given steroids as below   - Methylpred 125mg q6H x 24h 8/30 --> transition to 20 mg dex x 5 days, followed by 10mg x 3 days   + Continue prednisone 40 mg daily (tentative steroid weaning plan)   - Continue to trend ABGs accordingly   - Repeat chest xray tomorrow   - Increased tidal volume to 380, to help reduce breath stacking     FiO2 (%): 35 %, Resp: 26, Vent Mode: CMV/AC, Resp Rate (Set): 20 breaths/min, Tidal Volume (Set, mL): 380 mL, PEEP (cm H2O): 5 cmH2O, Resp Rate (Set): 20 breaths/min, Tidal Volume (Set, mL): 380 mL, PEEP (cm H2O): 5 cmH2O      Cardiovascular:  # Hyperlipidemia  # Hx HTN  - MAP goal >65, SBP goals >110  - Has remained off pressors since 9/9   - PTA atorvastatin  - PTA clonidine held  while sedated  - Lower extremity ultrasound without vascular pathology, no hematoma or clots  - Monitor discoloration of extremities for necrosis  - PRN hydralazine for SBP > 200     Sinus Tachycardia  Likely 2/2 fluid removal, sepsis, pain and sedation as above    GI/Nutrition:  # Severe malnutrition (see RD note)   # Non-infectious Diarrhea  # GERD   - Protonix (home medication)   - Nutrition consulted, appreciate recs  - Diet: TF via NJ, tolerating   - Hold bowel regimen d/t loose stools  - Continue scheduled multivitamin, banatrol, prosource packet  - loperamide PRN  - Rectal tube, continues with high output    Renal/Fluids/Electrolytes:  # Acute kidney injury: Improving   # Renal failure: Improving   # AGMA: Improving   Baseline Cr approx 0.6. Pt was anuric here but now making some urine, likely prerenal due to shock.  - Continues to improve, holding off on iHD  - Volume goal is net even, may consider diuresis in oncoming days   - Strict I&Os  - Avoid nephrotoxins as able    Endocrine:  # Steroid and stress induced hyperglycemia  # Hypothyroidism   - Goal to keep BG < 180 for optimal wound healing  - Continue medium sliding scale insulin   - Continue PTA synthroid    ID:  # Leukocytosis  # Psuedomonas pneumonia  # Hx CAP  Respiratory cx 8/29 pseudomonas pneumonia. Intermediate susceptability to meropenem, more significant sensitivities to ciprofloxacin  - Continue to monitor fever curve and inflammatory markers as appropriate   + Febrile overnight, BC, UC sent and repeat chest xray    + Continue to monitor off abx today   - ID now signed off     Abx  - Meropenem 8/29 - 9/1  - Vancomycin 8/29- 8/30  - Tobramycin x 1 8/29  - Vancomycin 9/5-9/8    - Tobramycin nebs BID 9/1-9/11  - Ciprofloxacin infusion 9/1- 9/11  - Metronidazole 9/6 - 9/11    PJP Ppx  Given Dex-ARDS Massiel skinner ronan remain on steriods for > 3 weeks so will initiate PJP ppx. Given history of allergy to sulfa, will obtain G6PD test to  determine if dapsone can be used   - Continue atovaquone 1,500 mg daily     CMV viremia  CMV PCR in blood positive 8/31.   - Ganciclovir 9/6 - 9/8, discontinued given ID recs  - Trend CMV 9/9    # HSV-1  Mouth sores present, which could have viral etiology. 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 Monitor and trend.   - CTAP 8/30 due to acute hemoglobin drop requiring transfusion of 2 x 1 unit of blood 12 hours apart. Negative for acute bleed  - Borderline hgb, no overt signs of bleeding. Received 1 unit PRBC     Musculoskeletal/Rheum:  # Alopecia  - Hold PTA hydroxychloroquine     # Weakness and deconditioning of critical illness  # Left ankle injury pre-hospitalization    - PT/OT when able     Skin:  # BLE scattered ecchymosis  # Left toe duskiness  - Bilateral lower leg ecchymosis  - WOC consult  - Ecchymosis to left foot, most noticeable to 3rd and 4th toes    General Cares/Prophylaxis:  DVT Prophylaxis: SubQ heparin   GI Prophylaxis: PPI  Restraints: no  Code Status: Full Code      Lines/tubes/drains:  - ETT (8/29)  - NJ (8/9)  - LIJ CVC (8/8)  - Radial a-line (8/29)  - PIV x3  - Rectal tube (8/17)  - Tunneled HD line (8/23)    Disposition:  - Medical ICU     Patient seen and findings/plan discussed with medical ICU staff, Dr. Dubon.    Total critical care time includes 45 minutes.     DOREEN Oliver CNP    Clinically Significant Risk Factors            # Hypomagnesemia: Lowest Mg = 1.5 mg/dL in last 2 days, will replace as needed   # Hypoalbuminemia: Lowest albumin = 2.2 g/dL at 8/10/2024  9:47 AM, will monitor as appropriate                # Severe Malnutrition: based on nutrition assessment      # Financial/Environmental Concerns: none            ====================================  INTERVAL HISTORY:   Continuing to wean sedation. Now off propofol, added  precedex to assist coming down on sedation. Slowly waking up, but not following commands or tracking, may consider CT head in oncoming days if continues to not respond. Otherwise hemodynamically stable.     OBJECTIVE:   1. VITAL SIGNS:   Temp:  [95.9  F (35.5  C)-101.3  F (38.5  C)] 100.9  F (38.3  C)  Pulse:  [103-131] 118  Resp:  [22-26] 24  BP: ()/(43-82) 127/74  FiO2 (%):  [30 %-35 %] 35 %  SpO2:  [92 %-99 %] 97 %  FiO2 (%): 35 %, Resp: 24, Vent Mode: CMV/AC, Resp Rate (Set): 20 breaths/min, Tidal Volume (Set, mL): 350 mL, PEEP (cm H2O): 5 cmH2O, Resp Rate (Set): 20 breaths/min, Tidal Volume (Set, mL): 350 mL, PEEP (cm H2O): 5 cmH2O    2. INTAKE/ OUTPUT:   I/O last 3 completed shifts:  In: 2141.1 [I.V.:466.1; NG/GT:640]  Out: 2615 [Urine:2090; Stool:525]    3. PHYSICAL EXAMINATION:  General: Intubated, sedated  HEENT: Normocephalic, atraumatic, eyes with ointment dressing  Neuro: RASS -4-5, arefelexic  Pulm/Resp: Bilateral breath sounds audible with diffuse coarse breath sounds  CV: Sinus tachycardia, s1/2 normal, no murmur  Abdomen: Soft, non-distended, limited 2/2 to sedation  : deferred  Incisions/Skin: lower leg 1+ edema with ecchymosis present and scattered. Some bluish discoloration of the finger tips, capillary refill normal    4. LABS:   Arterial Blood Gases   Recent Labs   Lab 09/15/24  0519 09/15/24  0147 09/14/24  1619 09/14/24  0338   PH 7.41 7.38 7.36 7.35   PCO2 41 43 42 39   PO2 96 71* 85 102   HCO3 26 26 24 22     Complete Blood Count   Recent Labs   Lab 09/15/24  0519 09/14/24  0338 09/13/24  0341 09/12/24  0316   WBC 14.1* 16.2* 20.3* 19.8*   HGB 7.0* 7.1* 7.5* 7.8*    248 283 291     Basic Metabolic Panel  Recent Labs   Lab 09/15/24  0529 09/15/24  0519 09/15/24  0152 09/14/24 2024 09/14/24  0353 09/14/24  0338 09/13/24  0347 09/13/24  0341 09/12/24 1937 09/12/24 1935   NA  --  145  --   --   --  141  --  135  --  133*   POTASSIUM  --  3.7  --   --   --  3.7  --  3.5  --  4.4    CHLORIDE  --  110*  --   --   --  105  --  102  --  99   CO2  --  24  --   --   --  20*  --  18*  --  17*   BUN  --  76.1*  --   --   --  86.0*  --  76.6*  --  74.6*   CR  --  0.53  --   --   --  0.67  --  0.80  --  0.82   * 187* 160* 147*   < > 162*   < > 157*   < > 246*    < > = values in this interval not displayed.     Liver Function Tests  Recent Labs   Lab 09/11/24  1158 09/11/24  0309 09/10/24  2008 09/10/24  1230 09/10/24  0314 09/09/24  1147 09/09/24  0343   AST  --  16  --   --  19  --  42   ALT  --  23  --   --  22  --  26   ALKPHOS  --  101  --   --  100  --  93   BILITOTAL  --  0.2  --   --  0.2  --  0.2   ALBUMIN 2.9* 3.2* 3.2* 2.9* 3.0*   < > 2.9*    < > = values in this interval not displayed.     Coagulation Profile  No lab results found in last 7 days.    5. RADIOLOGY:   Recent Results (from the past 24 hour(s))   XR Chest Port 1 View    Impression    RESIDENT PRELIMINARY INTERPRETATION  Impression: Mildly increased bibasilar streaky opacities, likely  atelectasis/edema. No focal consolidation.

## 2024-09-15 NOTE — PLAN OF CARE
ICU End of Shift Summary. See flowsheets for vital signs and detailed assessment.    Changes this shift: Continued on dilaudid gtt 1.5/hr, dex started, currently at 0.3/hr and scheduled PO ativan/oxycodone cut in half.  RASS 0 to -1, opens eyes spontaneously, still not following commands or consistently tracking.  Does cough incessantly at times requiring suctioning and reassurance to stop, moderate amt cloudy secretions per ETT.  1 unit PRBC transfused for Hg 7.0 this am, no s/s bleeding.  pO2 119 on abg on 35% FiO2 but dropped sats to 88% when FiO2 decreased to 30%.  Temp 99 to 101 during shift with scheduled tylenol.  -120, MAP >65 per cuff.      Plan:  Possible trach tomorrow per report.  Mom called and was updated.       Goal Outcome Evaluation:      Plan of Care Reviewed With: patient, parent    Overall Patient Progress: improvingOverall Patient Progress: improving    Outcome Evaluation: RASS 0 to -1, dex started, dilaudid gtt 1.5

## 2024-09-16 ENCOUNTER — APPOINTMENT (OUTPATIENT)
Dept: GENERAL RADIOLOGY | Facility: CLINIC | Age: 54
DRG: 003 | End: 2024-09-16
Payer: MEDICAID

## 2024-09-16 LAB
ALLEN'S TEST: ABNORMAL
ANION GAP SERPL CALCULATED.3IONS-SCNC: 12 MMOL/L (ref 7–15)
ANION GAP SERPL CALCULATED.3IONS-SCNC: 8 MMOL/L (ref 7–15)
ANION GAP SERPL CALCULATED.3IONS-SCNC: 9 MMOL/L (ref 7–15)
BACTERIA UR CULT: NO GROWTH
BASE EXCESS BLDA CALC-SCNC: -3.5 MMOL/L (ref -3–3)
BASE EXCESS BLDA CALC-SCNC: 1.9 MMOL/L (ref -3–3)
BASE EXCESS BLDA CALC-SCNC: 3.1 MMOL/L (ref -3–3)
BASE EXCESS BLDA CALC-SCNC: 3.3 MMOL/L (ref -3–3)
BUN SERPL-MCNC: 39.8 MG/DL (ref 6–20)
BUN SERPL-MCNC: 54.2 MG/DL (ref 6–20)
BUN SERPL-MCNC: 61.6 MG/DL (ref 6–20)
C DIFF TOX B STL QL: NEGATIVE
CALCIUM SERPL-MCNC: 6.4 MG/DL (ref 8.8–10.4)
CALCIUM SERPL-MCNC: 8.5 MG/DL (ref 8.8–10.4)
CALCIUM SERPL-MCNC: 8.8 MG/DL (ref 8.8–10.4)
CHLORIDE SERPL-SCNC: 111 MMOL/L (ref 98–107)
CHLORIDE SERPL-SCNC: 115 MMOL/L (ref 98–107)
CHLORIDE SERPL-SCNC: 120 MMOL/L (ref 98–107)
COHGB MFR BLD: 97.6 % (ref 96–97)
COHGB MFR BLD: 99.6 % (ref 96–97)
COHGB MFR BLD: >100 % (ref 96–97)
COHGB MFR BLD: >100 % (ref 96–97)
CREAT SERPL-MCNC: 0.31 MG/DL (ref 0.51–0.95)
CREAT SERPL-MCNC: 0.44 MG/DL (ref 0.51–0.95)
CREAT SERPL-MCNC: 0.47 MG/DL (ref 0.51–0.95)
EGFRCR SERPLBLD CKD-EPI 2021: >90 ML/MIN/1.73M2
ERYTHROCYTE [DISTWIDTH] IN BLOOD BY AUTOMATED COUNT: 22.1 % (ref 10–15)
ERYTHROCYTE [DISTWIDTH] IN BLOOD BY AUTOMATED COUNT: 22.1 % (ref 10–15)
GLUCOSE BLDC GLUCOMTR-MCNC: 115 MG/DL (ref 70–99)
GLUCOSE BLDC GLUCOMTR-MCNC: 185 MG/DL (ref 70–99)
GLUCOSE BLDC GLUCOMTR-MCNC: 198 MG/DL (ref 70–99)
GLUCOSE BLDC GLUCOMTR-MCNC: 225 MG/DL (ref 70–99)
GLUCOSE BLDC GLUCOMTR-MCNC: 239 MG/DL (ref 70–99)
GLUCOSE BLDC GLUCOMTR-MCNC: 269 MG/DL (ref 70–99)
GLUCOSE SERPL-MCNC: 126 MG/DL (ref 70–99)
GLUCOSE SERPL-MCNC: 221 MG/DL (ref 70–99)
GLUCOSE SERPL-MCNC: 223 MG/DL (ref 70–99)
HCO3 BLD-SCNC: 21 MMOL/L (ref 21–28)
HCO3 BLD-SCNC: 27 MMOL/L (ref 21–28)
HCO3 BLD-SCNC: 28 MMOL/L (ref 21–28)
HCO3 BLD-SCNC: 28 MMOL/L (ref 21–28)
HCO3 SERPL-SCNC: 20 MMOL/L (ref 22–29)
HCO3 SERPL-SCNC: 25 MMOL/L (ref 22–29)
HCO3 SERPL-SCNC: 26 MMOL/L (ref 22–29)
HCT VFR BLD AUTO: 23.6 % (ref 35–47)
HCT VFR BLD AUTO: 23.7 % (ref 35–47)
HGB BLD-MCNC: 7.3 G/DL (ref 11.7–15.7)
HGB BLD-MCNC: 7.4 G/DL (ref 11.7–15.7)
MAGNESIUM SERPL-MCNC: 1.5 MG/DL (ref 1.7–2.3)
MAGNESIUM SERPL-MCNC: 2 MG/DL (ref 1.7–2.3)
MAGNESIUM SERPL-MCNC: 2.2 MG/DL (ref 1.7–2.3)
MAGNESIUM SERPL-MCNC: 2.6 MG/DL (ref 1.7–2.3)
MCH RBC QN AUTO: 29.9 PG (ref 26.5–33)
MCH RBC QN AUTO: 30.3 PG (ref 26.5–33)
MCHC RBC AUTO-ENTMCNC: 30.8 G/DL (ref 31.5–36.5)
MCHC RBC AUTO-ENTMCNC: 31.4 G/DL (ref 31.5–36.5)
MCV RBC AUTO: 97 FL (ref 78–100)
MCV RBC AUTO: 97 FL (ref 78–100)
O2/TOTAL GAS SETTING VFR VENT: 0 %
O2/TOTAL GAS SETTING VFR VENT: 35 %
O2/TOTAL GAS SETTING VFR VENT: 35 %
O2/TOTAL GAS SETTING VFR VENT: 40 %
PCO2 BLD: 34 MM HG (ref 35–45)
PCO2 BLD: 41 MM HG (ref 35–45)
PCO2 BLD: 43 MM HG (ref 35–45)
PCO2 BLD: 43 MM HG (ref 35–45)
PEEP: 5 CM H2O
PH BLD: 7.4 [PH] (ref 7.35–7.45)
PH BLD: 7.42 [PH] (ref 7.35–7.45)
PH BLD: 7.42 [PH] (ref 7.35–7.45)
PH BLD: 7.43 [PH] (ref 7.35–7.45)
PHOSPHATE SERPL-MCNC: 2.4 MG/DL (ref 2.5–4.5)
PHOSPHATE SERPL-MCNC: 3.2 MG/DL (ref 2.5–4.5)
PHOSPHATE SERPL-MCNC: 3.4 MG/DL (ref 2.5–4.5)
PLATELET # BLD AUTO: 246 10E3/UL (ref 150–450)
PLATELET # BLD AUTO: 298 10E3/UL (ref 150–450)
PO2 BLD: 110 MM HG (ref 80–105)
PO2 BLD: 128 MM HG (ref 80–105)
PO2 BLD: 147 MM HG (ref 80–105)
PO2 BLD: 78 MM HG (ref 80–105)
POTASSIUM SERPL-SCNC: 3.1 MMOL/L (ref 3.4–5.3)
POTASSIUM SERPL-SCNC: 3.2 MMOL/L (ref 3.4–5.3)
POTASSIUM SERPL-SCNC: 4.1 MMOL/L (ref 3.4–5.3)
POTASSIUM SERPL-SCNC: 4.3 MMOL/L (ref 3.4–5.3)
RBC # BLD AUTO: 2.44 10E6/UL (ref 3.8–5.2)
RBC # BLD AUTO: 2.44 10E6/UL (ref 3.8–5.2)
SAO2 % BLDA: 95 % (ref 92–100)
SAO2 % BLDA: 97 % (ref 92–100)
SAO2 % BLDA: 97 % (ref 92–100)
SAO2 % BLDA: 98 % (ref 92–100)
SODIUM SERPL-SCNC: 146 MMOL/L (ref 135–145)
SODIUM SERPL-SCNC: 148 MMOL/L (ref 135–145)
SODIUM SERPL-SCNC: 152 MMOL/L (ref 135–145)
WBC # BLD AUTO: 10.2 10E3/UL (ref 4–11)
WBC # BLD AUTO: 10.3 10E3/UL (ref 4–11)

## 2024-09-16 PROCEDURE — 250N000013 HC RX MED GY IP 250 OP 250 PS 637

## 2024-09-16 PROCEDURE — 74018 RADEX ABDOMEN 1 VIEW: CPT

## 2024-09-16 PROCEDURE — 94003 VENT MGMT INPAT SUBQ DAY: CPT

## 2024-09-16 PROCEDURE — 85027 COMPLETE CBC AUTOMATED: CPT | Performed by: PHYSICIAN ASSISTANT

## 2024-09-16 PROCEDURE — 99292 CRITICAL CARE ADDL 30 MIN: CPT | Mod: FS

## 2024-09-16 PROCEDURE — 71045 X-RAY EXAM CHEST 1 VIEW: CPT | Mod: 26 | Performed by: RADIOLOGY

## 2024-09-16 PROCEDURE — 999N000157 HC STATISTIC RCP TIME EA 10 MIN

## 2024-09-16 PROCEDURE — 84100 ASSAY OF PHOSPHORUS: CPT

## 2024-09-16 PROCEDURE — 83735 ASSAY OF MAGNESIUM: CPT

## 2024-09-16 PROCEDURE — 84132 ASSAY OF SERUM POTASSIUM: CPT

## 2024-09-16 PROCEDURE — 80048 BASIC METABOLIC PNL TOTAL CA: CPT | Performed by: PHYSICIAN ASSISTANT

## 2024-09-16 PROCEDURE — 250N000011 HC RX IP 250 OP 636

## 2024-09-16 PROCEDURE — 250N000011 HC RX IP 250 OP 636: Performed by: SURGERY

## 2024-09-16 PROCEDURE — 82805 BLOOD GASES W/O2 SATURATION: CPT

## 2024-09-16 PROCEDURE — 71045 X-RAY EXAM CHEST 1 VIEW: CPT | Mod: 77

## 2024-09-16 PROCEDURE — 250N000012 HC RX MED GY IP 250 OP 636 PS 637

## 2024-09-16 PROCEDURE — 250N000013 HC RX MED GY IP 250 OP 250 PS 637: Performed by: SURGERY

## 2024-09-16 PROCEDURE — 200N000002 HC R&B ICU UMMC

## 2024-09-16 PROCEDURE — 80048 BASIC METABOLIC PNL TOTAL CA: CPT

## 2024-09-16 PROCEDURE — 80048 BASIC METABOLIC PNL TOTAL CA: CPT | Performed by: SURGERY

## 2024-09-16 PROCEDURE — 999N000253 HC STATISTIC WEANING TRIALS

## 2024-09-16 PROCEDURE — 999N000259 HC STATISTIC EXTUBATION

## 2024-09-16 PROCEDURE — 99291 CRITICAL CARE FIRST HOUR: CPT | Mod: FS

## 2024-09-16 PROCEDURE — 83735 ASSAY OF MAGNESIUM: CPT | Performed by: SURGERY

## 2024-09-16 PROCEDURE — 250N000009 HC RX 250: Performed by: SURGERY

## 2024-09-16 PROCEDURE — 87493 C DIFF AMPLIFIED PROBE: CPT

## 2024-09-16 PROCEDURE — 250N000013 HC RX MED GY IP 250 OP 250 PS 637: Performed by: PHYSICIAN ASSISTANT

## 2024-09-16 PROCEDURE — 74018 RADEX ABDOMEN 1 VIEW: CPT | Mod: 26 | Performed by: RADIOLOGY

## 2024-09-16 PROCEDURE — 84100 ASSAY OF PHOSPHORUS: CPT | Performed by: PHYSICIAN ASSISTANT

## 2024-09-16 PROCEDURE — 85014 HEMATOCRIT: CPT

## 2024-09-16 PROCEDURE — 71045 X-RAY EXAM CHEST 1 VIEW: CPT

## 2024-09-16 PROCEDURE — 84132 ASSAY OF SERUM POTASSIUM: CPT | Performed by: SURGERY

## 2024-09-16 PROCEDURE — 83735 ASSAY OF MAGNESIUM: CPT | Performed by: PHYSICIAN ASSISTANT

## 2024-09-16 PROCEDURE — 94640 AIRWAY INHALATION TREATMENT: CPT | Mod: 76

## 2024-09-16 RX ORDER — POTASSIUM CHLORIDE 29.8 MG/ML
20 INJECTION INTRAVENOUS
Status: COMPLETED | OUTPATIENT
Start: 2024-09-16 | End: 2024-09-17

## 2024-09-16 RX ORDER — CHLOROTHIAZIDE SODIUM 500 MG/1
500 INJECTION INTRAVENOUS ONCE
Status: COMPLETED | OUTPATIENT
Start: 2024-09-16 | End: 2024-09-16

## 2024-09-16 RX ORDER — CARBOXYMETHYLCELLULOSE SODIUM 5 MG/ML
1 SOLUTION/ DROPS OPHTHALMIC
Status: DISCONTINUED | OUTPATIENT
Start: 2024-09-16 | End: 2024-10-01 | Stop reason: HOSPADM

## 2024-09-16 RX ORDER — PREDNISONE 10 MG/1
10 TABLET ORAL DAILY
Status: DISCONTINUED | OUTPATIENT
Start: 2024-09-23 | End: 2024-09-21

## 2024-09-16 RX ORDER — POTASSIUM CHLORIDE 1.5 G/1.58G
40 POWDER, FOR SOLUTION ORAL ONCE
Status: COMPLETED | OUTPATIENT
Start: 2024-09-16 | End: 2024-09-16

## 2024-09-16 RX ORDER — FUROSEMIDE 10 MG/ML
20 INJECTION INTRAMUSCULAR; INTRAVENOUS ONCE
Status: COMPLETED | OUTPATIENT
Start: 2024-09-16 | End: 2024-09-16

## 2024-09-16 RX ORDER — MAGNESIUM SULFATE HEPTAHYDRATE 40 MG/ML
4 INJECTION, SOLUTION INTRAVENOUS ONCE
Status: COMPLETED | OUTPATIENT
Start: 2024-09-16 | End: 2024-09-16

## 2024-09-16 RX ORDER — PREDNISONE 20 MG/1
40 TABLET ORAL DAILY
Status: COMPLETED | OUTPATIENT
Start: 2024-09-17 | End: 2024-09-18

## 2024-09-16 RX ORDER — PREDNISONE 20 MG/1
20 TABLET ORAL DAILY
Status: DISCONTINUED | OUTPATIENT
Start: 2024-09-18 | End: 2024-09-21

## 2024-09-16 RX ADMIN — POTASSIUM & SODIUM PHOSPHATES POWDER PACK 280-160-250 MG 1 PACKET: 280-160-250 PACK at 12:56

## 2024-09-16 RX ADMIN — Medication 1.5 MG/HR: at 13:31

## 2024-09-16 RX ADMIN — CHLORHEXIDINE GLUCONATE 0.12% ORAL RINSE 15 ML: 1.2 LIQUID ORAL at 07:54

## 2024-09-16 RX ADMIN — FUROSEMIDE 20 MG: 10 INJECTION, SOLUTION INTRAMUSCULAR; INTRAVENOUS at 23:28

## 2024-09-16 RX ADMIN — NYSTATIN 500000 UNITS: 100000 SUSPENSION ORAL at 12:57

## 2024-09-16 RX ADMIN — ACETAMINOPHEN 650 MG: 325 TABLET ORAL at 12:56

## 2024-09-16 RX ADMIN — POTASSIUM & SODIUM PHOSPHATES POWDER PACK 280-160-250 MG 1 PACKET: 280-160-250 PACK at 08:02

## 2024-09-16 RX ADMIN — ACETAMINOPHEN 650 MG: 325 TABLET ORAL at 23:38

## 2024-09-16 RX ADMIN — IPRATROPIUM BROMIDE 0.5 MG: 0.5 SOLUTION RESPIRATORY (INHALATION) at 11:48

## 2024-09-16 RX ADMIN — LORAZEPAM 1 MG: 1 TABLET ORAL at 04:32

## 2024-09-16 RX ADMIN — DEXTROSE MONOHYDRATE 500 ML: 50 INJECTION, SOLUTION INTRAVENOUS at 23:13

## 2024-09-16 RX ADMIN — INSULIN ASPART 2 UNITS: 100 INJECTION, SOLUTION INTRAVENOUS; SUBCUTANEOUS at 15:51

## 2024-09-16 RX ADMIN — NYSTATIN 500000 UNITS: 100000 SUSPENSION ORAL at 15:51

## 2024-09-16 RX ADMIN — HEPARIN SODIUM 5000 UNITS: 5000 INJECTION, SOLUTION INTRAVENOUS; SUBCUTANEOUS at 19:49

## 2024-09-16 RX ADMIN — OXYCODONE HYDROCHLORIDE 5 MG: 5 SOLUTION ORAL at 15:51

## 2024-09-16 RX ADMIN — QUETIAPINE FUMARATE 50 MG: 50 TABLET ORAL at 22:05

## 2024-09-16 RX ADMIN — OXYCODONE HYDROCHLORIDE 5 MG: 5 SOLUTION ORAL at 12:56

## 2024-09-16 RX ADMIN — LORAZEPAM 1 MG: 1 TABLET ORAL at 22:05

## 2024-09-16 RX ADMIN — HEPARIN SODIUM 5000 UNITS: 5000 INJECTION, SOLUTION INTRAVENOUS; SUBCUTANEOUS at 12:56

## 2024-09-16 RX ADMIN — IPRATROPIUM BROMIDE 0.5 MG: 0.5 SOLUTION RESPIRATORY (INHALATION) at 20:03

## 2024-09-16 RX ADMIN — CETIRIZINE HYDROCHLORIDE 10 MG: 10 TABLET, FILM COATED ORAL at 07:54

## 2024-09-16 RX ADMIN — LORAZEPAM 1 MG: 1 TABLET ORAL at 15:51

## 2024-09-16 RX ADMIN — OXYCODONE HYDROCHLORIDE 5 MG: 5 SOLUTION ORAL at 19:49

## 2024-09-16 RX ADMIN — HEPARIN SODIUM 5000 UNITS: 5000 INJECTION, SOLUTION INTRAVENOUS; SUBCUTANEOUS at 04:32

## 2024-09-16 RX ADMIN — OXYCODONE HYDROCHLORIDE 5 MG: 5 SOLUTION ORAL at 00:59

## 2024-09-16 RX ADMIN — QUETIAPINE FUMARATE 25 MG: 25 TABLET ORAL at 07:54

## 2024-09-16 RX ADMIN — GABAPENTIN 300 MG: 250 SUSPENSION ORAL at 22:05

## 2024-09-16 RX ADMIN — OXYCODONE HYDROCHLORIDE 5 MG: 5 SOLUTION ORAL at 07:54

## 2024-09-16 RX ADMIN — PROCHLORPERAZINE EDISYLATE 10 MG: 5 INJECTION INTRAMUSCULAR; INTRAVENOUS at 07:58

## 2024-09-16 RX ADMIN — LORAZEPAM 1 MG: 1 TABLET ORAL at 10:00

## 2024-09-16 RX ADMIN — NYSTATIN 500000 UNITS: 100000 SUSPENSION ORAL at 19:49

## 2024-09-16 RX ADMIN — INSULIN ASPART 2 UNITS: 100 INJECTION, SOLUTION INTRAVENOUS; SUBCUTANEOUS at 04:32

## 2024-09-16 RX ADMIN — LEVALBUTEROL HYDROCHLORIDE 0.63 MG: 0.63 SOLUTION RESPIRATORY (INHALATION) at 20:03

## 2024-09-16 RX ADMIN — ACETAMINOPHEN 650 MG: 325 TABLET ORAL at 17:44

## 2024-09-16 RX ADMIN — LEVALBUTEROL HYDROCHLORIDE 0.63 MG: 0.63 SOLUTION RESPIRATORY (INHALATION) at 07:55

## 2024-09-16 RX ADMIN — INSULIN ASPART 3 UNITS: 100 INJECTION, SOLUTION INTRAVENOUS; SUBCUTANEOUS at 13:04

## 2024-09-16 RX ADMIN — POTASSIUM & SODIUM PHOSPHATES POWDER PACK 280-160-250 MG 1 PACKET: 280-160-250 PACK at 05:45

## 2024-09-16 RX ADMIN — LEVOTHYROXINE SODIUM 25 MCG: 0.03 TABLET ORAL at 07:54

## 2024-09-16 RX ADMIN — INSULIN ASPART 2 UNITS: 100 INJECTION, SOLUTION INTRAVENOUS; SUBCUTANEOUS at 07:55

## 2024-09-16 RX ADMIN — GABAPENTIN 300 MG: 250 SUSPENSION ORAL at 13:04

## 2024-09-16 RX ADMIN — POTASSIUM CHLORIDE 20 MEQ: 29.8 INJECTION, SOLUTION INTRAVENOUS at 23:02

## 2024-09-16 RX ADMIN — CHLOROTHIAZIDE SODIUM 500 MG: 500 INJECTION, POWDER, LYOPHILIZED, FOR SOLUTION INTRAVENOUS at 23:05

## 2024-09-16 RX ADMIN — INSULIN ASPART 1 UNITS: 100 INJECTION, SOLUTION INTRAVENOUS; SUBCUTANEOUS at 23:44

## 2024-09-16 RX ADMIN — Medication 40 MG: at 07:54

## 2024-09-16 RX ADMIN — DEXTROSE MONOHYDRATE 1000 ML: 50 INJECTION, SOLUTION INTRAVENOUS at 06:36

## 2024-09-16 RX ADMIN — ACETAMINOPHEN 650 MG: 325 TABLET ORAL at 04:32

## 2024-09-16 RX ADMIN — ATOVAQUONE 1500 MG: 750 SUSPENSION ORAL at 07:54

## 2024-09-16 RX ADMIN — Medication 1 TABLET: at 12:56

## 2024-09-16 RX ADMIN — NYSTATIN 500000 UNITS: 100000 SUSPENSION ORAL at 07:54

## 2024-09-16 RX ADMIN — PREDNISONE 40 MG: 20 TABLET ORAL at 07:54

## 2024-09-16 RX ADMIN — IPRATROPIUM BROMIDE 0.5 MG: 0.5 SOLUTION RESPIRATORY (INHALATION) at 07:55

## 2024-09-16 RX ADMIN — MAGNESIUM SULFATE IN WATER 4 G: 40 INJECTION, SOLUTION INTRAVENOUS at 05:45

## 2024-09-16 RX ADMIN — LEVALBUTEROL HYDROCHLORIDE 0.63 MG: 0.63 SOLUTION RESPIRATORY (INHALATION) at 15:05

## 2024-09-16 RX ADMIN — GABAPENTIN 300 MG: 250 SUSPENSION ORAL at 05:45

## 2024-09-16 RX ADMIN — LEVALBUTEROL HYDROCHLORIDE 0.63 MG: 0.63 SOLUTION RESPIRATORY (INHALATION) at 11:48

## 2024-09-16 RX ADMIN — Medication 60 ML: at 07:55

## 2024-09-16 RX ADMIN — OXYCODONE HYDROCHLORIDE 5 MG: 5 SOLUTION ORAL at 04:32

## 2024-09-16 RX ADMIN — IPRATROPIUM BROMIDE 0.5 MG: 0.5 SOLUTION RESPIRATORY (INHALATION) at 15:05

## 2024-09-16 RX ADMIN — POTASSIUM CHLORIDE 40 MEQ: 1.5 POWDER, FOR SOLUTION ORAL at 05:45

## 2024-09-16 RX ADMIN — MONTELUKAST 10 MG: 10 TABLET, FILM COATED ORAL at 22:05

## 2024-09-16 RX ADMIN — QUETIAPINE FUMARATE 25 MG: 25 TABLET ORAL at 12:56

## 2024-09-16 ASSESSMENT — ACTIVITIES OF DAILY LIVING (ADL)
ADLS_ACUITY_SCORE: 59

## 2024-09-16 NOTE — PROGRESS NOTES
CLINICAL NUTRITION SERVICES - BRIEF NOTE    See RD note 9/12 for full assessment.     Nutrition Prescription    RECOMMENDATIONS FOR MDs/PROVIDERS TO ORDER:  Consider anti-diarrheal medications to help manage stool output, patient is getting adequate fiber from formula.     Recommendations already ordered by Registered Dietitian (RD):  Slightly increase TF rate now that patient is off propofol infusion.   Dina Ch Renal (or equivalent) @ 50 mL/hr (1200 mL/day) to provide 2160 kcal (91% MREE on 9/12), 96 g protein, 204 g CHO, 24 g fiber, and 804 mL free water daily      Future/Additional Recommendations:  Monitor K and Phos, if continue to be low consider adjusting to semi-elemental formula to see if it improves absorption        New findings:   Pt off propofol, slightly adjusting TF to meet higher nutrition needs d/t weight loss. Discussed high stool output with provider.     Diet: NPO + TF    Nutrition Support: access: NJT    Formula/schedule/modulars: 9/04-___: Dina Ch Renal @ 45 mL/hr (1080 mL/day) +1 Prosource TF20 to provide 2024 kcal, 106 g protein, 183 g CHO, 22 g fiber, and 724 mL free water daily     Free water flushes: 100 mL every 2 hours    GI:  Last BM: 09/16/24  C dif negative 9/06/24  rectal tube output: 3125 mL overnight; 1925 mL 9/15; 525 mL 9/14; 325 mL 9/13; 275 mL 9/12    Weight:  Most Recent Weight: 75.9 kg (167 lb 5.3 oz)    Body mass index is 30.6 kg/m .  Wt down 13 kg from admission.    Meds:  Reviewed     Labs:   09/15/24 05:19 09/16/24 04:29   Sodium 145 152 (H)   Potassium 3.7 3.2 (L)   Chloride 110 (H) 115 (H)   Carbon Dioxide (CO2) 24 25   Urea Nitrogen 76.1 (H) 61.6 (H)   Creatinine 0.53 0.47 (L)   GFR Estimate >90 >90   Calcium 8.7 (L) 8.8   Anion Gap 11 12   Magnesium 1.8 1.5 (L)   Phosphorus 3.6 2.4 (L)   CRP Inflammation 51.50 (H)    Glucose 187 (H) 221 (H)       Respiratory:   Intubated on mechanical vent       Implementation  Collaboration with other providers  Enteral  "Nutrition - Modify rate       RD to follow per protocol    Marce Balderas, MS, RDN, LD  [facility]  Vocera - \"4C Clinical Dietitian\"  Weekend/Holiday RD - \"Weekend Clinical Dietitian\"    "

## 2024-09-16 NOTE — CONSULTS
[facility]  WO Nurse Inpatient Assessment     Consulted for: left ear     Summary: new abrasion to left ear 9/15 per bedside RN     Patient History (according to provider note(s):      Massiel Flaherty is a 53 year old female with PMH Anxiety/depression, fibromyalgia, c/f nonepileptic seizures not on AEDs, hypothyroidism, asthma, HLD, MURIEL on CPAP, expressive aphasia, hypertension who was admitted on 8/3/2024 for fatigue, fever and dyspnea and intubated 8/6/24 at OSH for ARDS, proned and paralyzed without improvement. Transferred to [facility] 8/8/24, hypoxic with high plateaus/peaks and placed on VV ECMO and CRRT. Decannulated from VV ECMO 8/18 and transferred to MICU 8/26/24 for ongoing management. Extubated 8/27 then reintubated 8/29 iso worsening AHRF and pseudomonas pneumonia.     Assessment:      Areas visualized during today's visit: Focused: bilateral ears     Pressure Injury Location: left ear    Last photo: 9/16  Wound type: Pressure Injury     Pressure Injury Stage: Deep Tissue Pressure Injury (DTPI), hospital acquired   Wound history/plan of care:   unknown cause of PI, pt does favor left side     Wound base: 100 % Maroon, Purple, and Epidermis     Palpation of the wound bed: normal      Drainage: none     Description of drainage: none     Measurements (length x width x depth, in cm) 0.6  x 0.4  x  0 cm      Tunneling N/A     Undermining N/A  Periwound skin: Intact      Color: normal and consistent with surrounding tissue      Temperature: normal   Odor: none  Pain: no grimacing or signs of discomfort, none  Pain intervention prior to dressing change: N/A  Treatment goal: Protection  STATUS: initial assessment  Supplies ordered: supplies stored on unit     Treatment Plan:     Left ear wound(s): Daily  cleanse with saline and pat dry. Cover with mini mepilex dressing. Hollow out Z-patsy when positioned on left.      Orders: Written    RECOMMEND PRIMARY TEAM ORDER:  None, at this time  Education provided: plan of care and wound progress  Discussed plan of care with: Nurse  [facility] nurse follow-up plan: twice weekly  Notify WO if wound(s) deteriorate.  Nursing to notify the Provider(s) and re-consult the [facility] Nurse if new skin concern.    DATA:     Current support surface: Standard  Low air loss (DIA pump, Isolibrium, Pulsate)  Containment of urine/stool: Incontinent pad in bed, Indwelling catheter, and Internal fecal management  BMI: Body mass index is 30.6 kg/m .   Active diet order: None     Output: I/O last 3 completed shifts:  In: 3149.91 [I.V.:478.91; NG/GT:1086; IV Piggyback:250]  Out: 6505 [Urine:2130; Stool:4375]     Labs:   Recent Labs   Lab 09/16/24  0429 09/12/24  0316 09/11/24  1158   ALBUMIN  --   --  2.9*   HGB 7.4*   < >  --    WBC 10.2   < >  --     < > = values in this interval not displayed.     Pressure injury risk assessment:   Sensory Perception: 2-->very limited  Moisture: 3-->occasionally moist  Activity: 1-->bedfast  Mobility: 1-->completely immobile  Nutrition: 3-->adequate  Friction and Shear: 1-->problem  Kade Score: 11    Rosario Ridley RN CWOCN   Pager no longer is use, please contact through [employer] group: [facility] Nurse [place]  Dept. Office Number: [phone]

## 2024-09-16 NOTE — PROGRESS NOTES
[facility], Procedure Note          Extubation:       Massiel Flaherty  MRN# [id number]            Patient extubated at: September 16, 2024, 3:30 PM   Supplemental Oxygen: Via nasal cannula at 4 liters per minute   Cough: The cough is good   Secretion Mode:   PRN suction by self   Secretion Amount: Moderate amount, moderately thick and white  in color   Respiratory Exam:: Breath sounds: moist crackles     Location: all lobes   Skin Exam:: Patient color: natural   Patient Status: Currently appears comfortable   Arterial Blood Gasses: pH: 7.42     pO2: 110     pCO2: 41     HOC3: 27         Recorded by Beverley Fajardo, RT

## 2024-09-16 NOTE — PLAN OF CARE
Neuro: RASS 0/-1. Sedation turned off @1400 to possible extubation this shift. Intermittently follows commands. Pupils not PERRL; MD aware, pupilometer in use.   CV: Tachycardic 120s. MAP>65. Tmax: 100.2; tylenol PRN.   Resp: PS this AM. Scant secretions through tube.   GI/: Gonzalez remains in place. Rectal tube remains in place.  Skin: No new skin concerns.  Labs: Lytes replaced this AM.     Changes this shift:    Procedures this shift:       Goal Outcome Evaluation:      Plan of Care Reviewed With: patient    Overall Patient Progress: improvingOverall Patient Progress: improving    Outcome Evaluation: see note

## 2024-09-16 NOTE — PLAN OF CARE
ICU End of Shift Summary. See flowsheets for vital signs and detailed assessment.    Changes this shift: RASS 0 to -1. Starting to follow some commands. Pupils equal and reactive. Precedex weaned off. TMAX 100.4 at the beginning of the shift. Afebrile for the last several hours, WBC trending down. On scheduled Tylenol and oxycodone. Dilaudid gtt continued. CMV vent settings, FiO2 35%, PEEP 5. Minimal secretions, good cough. Repeat CXR done this morning. Tachy with -110s. Producing stool. Good UOP. Sodium 152 this morning. D5W 1000 mL bolus ordered and free water flush increased to 100 mL q2h. K, mag, and phos replaced. Hgb stable.     Plan: Possible bedside trach placement. PT/OT when able.     Goal Outcome Evaluation:      Plan of Care Reviewed With: patient    Overall Patient Progress: improvingOverall Patient Progress: improving    Outcome Evaluation: RASS 0 to -1. Precedex weaned. Starting to follow some commands.

## 2024-09-16 NOTE — PROGRESS NOTES
MEDICAL ICU PROGRESS NOTE  09/16/2024    Date of Service (when I saw the patient): 09/16/2024    ASSESSMENT: Massiel Flaherty is a 53 year old female with PMH Anxiety/depression, fibromyalgia, c/f nonepileptic seizures not on AEDs, hypothyroidism, asthma, HLD, MURIEL on CPAP, expressive aphasia, hypertension  who was admitted on 8/3/2024 for fatigue, fever and dyspnea and intubated 8/6/24 at OSH for ARDS, proned and paralyzed without improvement. Transferred to [facility] 8/8/24, hypoxic with high plateaus/peaks and placed on VV ECMO and CRRT. Decannulated from VV ECMO 8/18 and transferred to MICU 8/26/24 for ongoing management. Extubated 8/27 then reintubated 8/29 iso worsening AHRF and pseudomonas pneumonia.     CHANGES and MAJOR THINGS TODAY:    Yesterday:  - free water flushes increased, recheck sodium/BMP at 1400  - Recheck electrolytes Q6 hours  - tolerating pressure support well today, ABG stable on pressure support, planning to shut dilaudid off and possible extubation if patient consistently alert  - prednisone dose to decrease to 20 mg on 8/19  - send c dif d/t ongoing diarrhea and patient report of abdominal pain    Neuro:  # Pain and sedation  # Concern for ICU delirium   # Hx Fibromyalgia   # Hx myalgic encephalomyelitis   # Hx anxiety/depression  - Monitor neurological status. Delirium preventions and precautions.   - Pain: Scheduled: tylenol, increased oxycodone 10 mg q4hr, continue Ativan 2 mg every q6hr   PRN: tylenol, oxycodone   - Sedation plan: now just on dilaudid, previous was also on precedex, midazolam and ketamine as well. Will plan to shut off dilaudid to optimize ability to extubate  - Continue scheduled oxycodone and ativan  - Increase Gabapentin to 300mg TID   - Continue scheduled Seroquel 25 mg BID and 50 mg HS   - PTA Venlafaxine and Amitriptyline discontinued while sedated    # Hx spells with staring and BUE posturing previously described as nonepileptic seizures   # Hx of GTC seizures  and myoclonic epilepsy, weaned off AEDs   # Diffuse cerebral edema - improved  - Sodium goals liberalized to 140-145  - 8/21: MRI Brain: no acute intracranial pathology. No findings to suggest anoxic brain injury.     Pulmonary:  # Acute hypoxic respiratory failure c/b ARDS: Improving   # Pseudomonas pneumonia (s/p treatment)  # Mechanical ventilation  # s/p VV ECMO 8/8 - 8/18   # Hx CAP  # Asthma  # MURIEL on home CPAP  PFT dated 9/8/2020 demonstrated normal spirometry with mild diffusion impairment and mild restriction (FEV1/FVC 80%, FEV1 2.59, DLCO 40%). CT abdomen chest 3/9/2020 demonstrated scarring and fibrosis bilaterally which correlated with the decreased DLCO. The patient also has a history of MURIEL on CPAP therapy as currently managed by her provider in [place]. Unclear what triggered ARDS or why she has had such severe hospital course, further investigated ILD but results all unremarkable. Extubated 8/27, reintubated 8/29 for worsening O2 demands and diffuse opacities iso new/recurrent psuedomonas pneumonia. Bronch with BAL 8/30, pseudomonas growing as below.   - ILD w/u aldolase, EVELIO, RF, CCP, ANCA, MPO, SSA Ro and La, Scleroderma antibody, Radha 1,  hypersensity pneumonitis, IGG subclasses,  aspergillus, blastomyces, histoplasma neg  - SpO2 goals >92%, PaO2 goals >60   - PTA singular at bedtime if able  - Scheduled duonebs   - Discontinued Pulmicort given steroids as below   - Lung protective ventilation strategies given ARDS  - Methylpred 125mg q6H x 24h 8/30 --> transition to 20 mg dex x 5 days, followed by 10mg x 3 days   + Continue prednisone 40 mg daily, with plan to taper down to 20 mg in 2 days.   - 9/16: tolerated pressure support, ABG stable on SBT, possible extubation today    FiO2 (%): 35 %, Resp: 26, Vent Mode: CMV/AC, Resp Rate (Set): 20 breaths/min, Tidal Volume (Set, mL): 380 mL, PEEP (cm H2O): 5 cmH2O, Resp Rate (Set): 20 breaths/min, Tidal Volume (Set, mL): 380 mL, PEEP (cm H2O): 5  cmH2O      Cardiovascular:  # Hyperlipidemia  # Hx HTN  - MAP goal >65, SBP goals >110  - Has remained off pressors since 9/9   - PTA atorvastatin  - PTA clonidine held while sedated  - Lower extremity ultrasound without vascular pathology, no hematoma or clots  - Monitor discoloration of extremities for necrosis  - PRN hydralazine for SBP > 200     Sinus Tachycardia  Likely 2/2 fluid removal, sepsis, pain and sedation as above    GI/Nutrition:  # Severe malnutrition (see RD note)   # Non-infectious Diarrhea  # GERD   - Protonix (home medication)   - Nutrition consulted, appreciate recs  - Diet: TF via NJ, tolerating   - Hold bowel regimen d/t loose stools  - Continue scheduled multivitamin, banatrol, prosource packet  - loperamide PRN, consider scheduling if c dif is negative  - Rectal tube, continues with high output    Renal/Fluids/Electrolytes:  # Acute kidney injury: Improving   # Renal failure: Improving   # AGMA: Improving   Baseline Cr approx 0.6. Pt was anuric here but now making some urine, likely prerenal due to shock.  - Continues to improve, holding off on iHD  - Making good urine output, possible post ATN diuresis?  - Strict I&Os  - Avoid nephrotoxins as able  - Q6 hour electrolytes given increased urine output and ongoing high rectal tube output    Endocrine:  # Steroid and stress induced hyperglycemia  # Hypothyroidism   - Goal to keep BG < 180 for optimal wound healing  - Continue medium sliding scale insulin   - Continue PTA synthroid    ID:  # Leukocytosis (Resolved)  # Psuedomonas pneumonia (s/p treatment)  # Hx CAP  Respiratory cx 8/29 pseudomonas pneumonia. Intermediate susceptability to meropenem, more significant sensitivities to ciprofloxacin  - Continue to monitor fever curve and inflammatory markers as appropriate  - ID now signed off     Abx  - Meropenem 8/29 - 9/1  - Vancomycin 8/29- 8/30  - Tobramycin x 1 8/29  - Vancomycin 9/5-9/8    - Tobramycin nebs BID 9/1-9/11  - Ciprofloxacin  infusion 9/1- 9/11  - Metronidazole 9/6 - 9/11    PJP Ppx  Given Dex-ARDS Massiel ferraro remain on steriods for > 3 weeks so will initiate PJP ppx. 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   - Ganciclovir 9/6 - 9/8, discontinued given ID recs  - trend CMV 9/16    # HSV-1  Mouth sores present, which could have viral etiology. 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 Monitor and trend.   - CTAP 8/30 due to acute hemoglobin drop requiring transfusion of 2 x 1 unit of blood 12 hours apart. Negative for acute bleed    Musculoskeletal/Rheum:  # Alopecia  - Hold PTA hydroxychloroquine     # Weakness and deconditioning of critical illness  # Left ankle injury pre-hospitalization    - Physical and occupational therapy when able.     Skin:  # BLE scattered ecchymosis  # Left toe duskiness  - Bilateral lower leg ecchymosis  - WOC consult  - Ecchymosis to left foot, most noticeable to 3rd and 4th toes    General Cares/Prophylaxis:  DVT Prophylaxis: SubQ heparin   GI Prophylaxis: PPI  Restraints: no  Code Status: Full Code    Lines/tubes/drains:  - ETT (8/29)  - NJ (8/9)  - LIJ CVC (8/8)  - Radial a-line (8/29)  - PIV x3  - Rectal tube (8/17)  - Tunneled HD line (8/23)    Disposition:  - Medical ICU     Patient seen and findings/plan discussed with medical ICU staff, Dr. Osborne.    Total critical care time includes 45 minutes.     DOREEN Gonzales CNP      MICU STAFF CRITICAL CARE PROGRESS NOTE:    I saw and examined the patient with the MICU team and concur with findings and A&P as detailed in Mirtha KNIGHT's above note of today.  See my independent note of today    Janes Osborne MD  MICU Staff  [id number]    Clinically Significant Risk  Factors        # Hypokalemia: Lowest K = 3.2 mmol/L in last 2 days, will replace as needed  # Hypernatremia: Highest Na = 152 mmol/L in last 2 days, will monitor as appropriate    # Hypomagnesemia: Lowest Mg = 1.5 mg/dL in last 2 days, will replace as needed   # Hypoalbuminemia: Lowest albumin = 2.2 g/dL at 8/10/2024  9:47 AM, will monitor as appropriate                # Severe Malnutrition: based on nutrition assessment      # Financial/Environmental Concerns: none              ====================================  INTERVAL HISTORY:   Tolerating pressure support well, blood gas stable while on pressure support. Continues to have high output from rectal tube, patient reporting abdominal pain, will send c dif.     OBJECTIVE:   1. VITAL SIGNS:   Temp:  [93.2  F (34  C)-100.8  F (38.2  C)] 97  F (36.1  C)  Pulse:  [101-124] 110  Resp:  [20-28] 26  BP: ()/(45-83) 121/75  FiO2 (%):  [30 %-35 %] 35 %  SpO2:  [94 %-100 %] 97 %  FiO2 (%): 35 %, Resp: 26, Vent Mode: CMV/AC, Resp Rate (Set): 20 breaths/min, Tidal Volume (Set, mL): 380 mL, PEEP (cm H2O): 5 cmH2O, Resp Rate (Set): 20 breaths/min, Tidal Volume (Set, mL): 380 mL, PEEP (cm H2O): 5 cmH2O    2. INTAKE/ OUTPUT:   I/O last 3 completed shifts:  In: 3149.91 [I.V.:478.91; NG/GT:1086; IV Piggyback:250]  Out: 6505 [Urine:2130; Stool:4375]    3. PHYSICAL EXAMINATION:  General: Intubated  HEENT: Normocephalic, atraumatic  Neuro: RASS -1, following commands but delayed response   Pulm/Resp: Bilateral breath sounds audible, rhonchi that cleared with suctioning  CV: Sinus tachycardia, s1/2 normal, no murmur  Abdomen: Soft, tender, bowel sounds hyperactive  : deferred  Incisions/Skin: lower leg ecchymosis present and scattered. Some bluish discoloration of the finger tips, capillary refill normal    4. LABS:   Arterial Blood Gases   Recent Labs   Lab 09/16/24  0429 09/15/24  1248 09/15/24  0519 09/15/24  0147   PH 7.43 7.42 7.41 7.38   PCO2 43 41 41 43   PO2 78* 119* 96  71*   HCO3 28 27 26 26     Complete Blood Count   Recent Labs   Lab 09/16/24  0429 09/15/24  0519 09/14/24  0338 09/13/24  0341   WBC 10.2 14.1* 16.2* 20.3*   HGB 7.4* 7.0* 7.1* 7.5*    248 248 283     Basic Metabolic Panel  Recent Labs   Lab 09/16/24  0431 09/16/24  0429 09/15/24  2345 09/15/24  2049 09/15/24  0529 09/15/24  0519 09/14/24  0353 09/14/24  0338 09/13/24  0347 09/13/24  0341   NA  --  152*  --   --   --  145  --  141  --  135   POTASSIUM  --  3.2*  --   --   --  3.7  --  3.7  --  3.5   CHLORIDE  --  115*  --   --   --  110*  --  105  --  102   CO2  --  25  --   --   --  24  --  20*  --  18*   BUN  --  61.6*  --   --   --  76.1*  --  86.0*  --  76.6*   CR  --  0.47*  --   --   --  0.53  --  0.67  --  0.80   * 221* 178* 164*   < > 187*   < > 162*   < > 157*    < > = values in this interval not displayed.     Liver Function Tests  Recent Labs   Lab 09/11/24  1158 09/11/24  0309 09/10/24  2008 09/10/24  1230 09/10/24  0314   AST  --  16  --   --  19   ALT  --  23  --   --  22   ALKPHOS  --  101  --   --  100   BILITOTAL  --  0.2  --   --  0.2   ALBUMIN 2.9* 3.2* 3.2* 2.9* 3.0*     Coagulation Profile  No lab results found in last 7 days.    5. RADIOLOGY:   No results found for this or any previous visit (from the past 24 hour(s)).

## 2024-09-16 NOTE — PROGRESS NOTES
MICU STAFF CRITICAL CARE PROGRESS NOTE:    I saw and examined the patient with the MICU team and concur with findings and A&P as detailed in Mirtha KNIGHT's note of today    54 yo  PMH Anxiety/depression, fibromyalgia;; asthma, HLD; MURIEL on CPAP; HTN; expressive aphasia; hypothyroidism; ? seizures (no Rx)    8/3 admit @ OSH for fatigue, fever && SOB  8/6 Intubated @ OSH and transferred 8/8 to [facility] from OSH (?) for Pneumonia, ARDS and high vent need placed on VV ECMO and CRRT  On VV ECMO decannulated 8-18  Transferred to MICU team 8-26  Extubated 8-27 and reintubated 8/27 prob due to worsening AHRF & Pseudomonas VAP required paralsis, veletri, and high steroid and sedation Rx  Has completed treatment for Pseudomonas VAP.  No recent pressors  Overdue' for trach but making major weaning progress  Issues with ventilator dyssynchrony - adjust insp time and Vt  Significant neuromuscular weakness  Off CRRT as of 9/12; self-diuresing  Slowly waking up on oral BZDP & hydromorphone    -120 On PSV trial 5/5 x 4 hours   Good O2 Sats  Dilaudid drip (off Precedex now)  ACMV 20/  380 35% 5  7.43/43/78  on ACMV this AM  Following commands  Lungs - some rhonchi clear with suctioning  Tachy RRR no m/g/r  Abd soft; rectal tube with large output  Ext warm  I:O Net negative 2L (4L stool)    Hgb 7.4 stable  WBC 10 (14)  Na 152 K 3.2 to 4.1 with replacement 40 ; HCO3 25;   Mg 1.5, PO4 2.4  CXR:   L HD somewhat clearer than yesterday  ACMV FiO2: 35 %, Resp: 26, RR 20; Vt 380 mL, PEEP: 5 cm    CNS / Pain & Sedation / h/o anxiety/depression;  h/o fibromyalgia; h/o myalgic encephalomyelitis / ? ICU Delirium / h/o  spells  - ? Sz / GTC seizures / myoclonic epilepsy / diffuse cerebral edema - improved  Pain: Scheduled: tylenol, reduced oxycodone 5 mg q4hr, and Ativan 1 mg every q6hr   PRN: tylenol, oxycodone   Sedation: Seroquel 25 mg BID and 50 mg HS; reduce dilaudid to 1.5; versed,ketamine & Propofol now off  Added Precedex to assist  coming down on sedation   RASS goal -1 to -2  Continue pta Gabapentin to 300mg TID   PTA Venlafaxine and Amitriptyline discontinued while sedated  8/21: MRI Brain: no acute intracranial pathology. No findings to suggest anoxic brain injury.      Acute hypoxic respiratory failure c/w ARDS: Improving  / Pseudomonas pneumonia / Mechanical ventilation / s/p VV ECMO 8/8 - 8/18 / h/o asthma / h/o CAP / OSAS on CPAP  PFT 9/8/20 mild restriction & diffusion impairment (FEV1/FVC 80%, FEV1 2.59, DLCO 40%). CT CAP 3/9/2020 bilat lung scarring/fibrosis bilaterally  Extubated 8/27, reintubated 8/29 for worsening O2 demands and diffuse opacities iso new/recurrent psuedomonas pneumonia.   Bronch BAL 8/30, pseudomonas   ILD w/unit(s) negative (aldolase, EVELIO, RF, CCP, ANCA, MPO, SSA Ro and La, Scleroderma antibody, Radha 1,  hypersensity pneumonitis, IgG subclasses,  aspergillus, blastomyces, histoplasma)  On scheduled duonebs & singulair; Pulmicort held while on systemic steroids  On steroid taper wean - @ 40 mg/day from Methylpred 125mg q6H x 24h 8/30 Sunday - Increased tidal volume to 380, to help reduce breath stacking   On PJP prophylaxis  ACMV FiO2: 35 %, Resp: 26, RR 20; Vt 380 mL, PEEP: 5 cm     CV / Hyperlipidemia / h/o HTN / Sinus Tachycardia  MAP goal >65, SBP goals >110;  off pressors since 9/9   PTA atorvastatin & PTA clonidine held while sedated  LE U/S negative  Monitor extremity discoloration for necrosis  PRN hydralazine for SBP > 200      Severe malnutrition / Non-infectious Diarrhea / GERD   Protonix (home medication)   Tolerating NJ TF  Rectal tube for high output / Hold bowel regimen d/t loose stools (loperamide);    Continue scheduled multivitamin, banatrol, prosource packet  Recheck CDiff; consider scheduled imodium     CHACORTA - Improving / AGMA: Improving   Baseline Cr approx 0.6;  was anuric  now making some urine, likely prerenal due to shock; holding off on iHD  Volume goal is net even, may consider diuresis  in oncoming days     Hyperglycemia (Steroids & Stress) / Hypothyroidism   - Goal to keep BG < 180 for optimal wound healing  - Continue medium sliding scale insulin   - Continue PTA synthroid     ID / Leukocytosis / Pseudomonas pneumonia / Hx CAP / CMV Viremia (PCR+ 8/31) / HSV?  Respiratory cx 8/29 pseudomonas pneumonia. Intermediate susceptability to meropenem, more significant sensitivities to ciprofloxacin  Febrile overnight, BC, UC sent and repeat chest xray                Off Abx and ID now signed off   Abx  - Meropenem 8/29 - 9/1  - Vancomycin 8/29- 8/30  - Tobramycin x 1 8/29  - Vancomycin 9/5-9/8    - Tobramycin nebs BID 9/1-9/11  - Ciprofloxacin infusion 9/1- 9/11  - Metronidazole 9/6 - 9/11  - Ganciclovir 9/6 - 9/8   PJP Ppx initiating but h/o sulfa allergy; GPD pending (? Dapsone)  - Continue atovaquone 1,500 mg daily   Trend CMV 9/9   HSV-1  Mouth sores present, which could have viral etiology. 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  - Acyclovir 400mg BID x5 days (8/22-8/27)     Anemia of critical illness  - Transfuse if hgb <7.0 or hypoperfusion  - negative CTAP 8/30 due to acute Hgb drop (got 2 U RBCs)     Musculoskeletal/Rheum:  # Alopecia  - Hold PTA hydroxychloroquine      Weakness and deconditioning of critical illness  Left ankle injury pre-hospitalization; PT/OT when able       BLE scattered ecchymosis / Left toe duskiness/ Bilateral lower leg ecchymosis  - WOC consult  - Ecchymosis to left foot, most noticeable to 3rd and 4th toes    Lines/tubes/drains: ETT (8/29); NJ (8/9); LIJ CVC (8/8); Radial A-line (8/29); PIV x3; Rectal tube (8/17)l  Tunneled HD line (8/23)    (Critical Care 60 minutes cumulative thus far today)    Janes Osborne MD  MICU Staff  [phone]

## 2024-09-17 ENCOUNTER — APPOINTMENT (OUTPATIENT)
Dept: PHYSICAL THERAPY | Facility: CLINIC | Age: 54
DRG: 003 | End: 2024-09-17
Attending: INTERNAL MEDICINE
Payer: MEDICAID

## 2024-09-17 LAB
ABO/RH(D): NORMAL
ALLEN'S TEST: ABNORMAL
ALLEN'S TEST: ABNORMAL
ANION GAP SERPL CALCULATED.3IONS-SCNC: 12 MMOL/L (ref 7–15)
ANION GAP SERPL CALCULATED.3IONS-SCNC: 12 MMOL/L (ref 7–15)
ANION GAP SERPL CALCULATED.3IONS-SCNC: 13 MMOL/L (ref 7–15)
ANION GAP SERPL CALCULATED.3IONS-SCNC: 14 MMOL/L (ref 7–15)
ANION GAP SERPL CALCULATED.3IONS-SCNC: 9 MMOL/L (ref 7–15)
ANTIBODY SCREEN: NEGATIVE
BASE EXCESS BLDA CALC-SCNC: -0.6 MMOL/L (ref -3–3)
BASE EXCESS BLDA CALC-SCNC: 2.1 MMOL/L (ref -3–3)
BASE EXCESS BLDV CALC-SCNC: 4 MMOL/L (ref -3–3)
BUN SERPL-MCNC: 35.8 MG/DL (ref 6–20)
BUN SERPL-MCNC: 39.1 MG/DL (ref 6–20)
BUN SERPL-MCNC: 41.2 MG/DL (ref 6–20)
BUN SERPL-MCNC: 41.5 MG/DL (ref 6–20)
BUN SERPL-MCNC: 44.2 MG/DL (ref 6–20)
CALCIUM SERPL-MCNC: 7.6 MG/DL (ref 8.8–10.4)
CALCIUM SERPL-MCNC: 8.3 MG/DL (ref 8.8–10.4)
CALCIUM SERPL-MCNC: 8.3 MG/DL (ref 8.8–10.4)
CALCIUM SERPL-MCNC: 8.4 MG/DL (ref 8.8–10.4)
CALCIUM SERPL-MCNC: 8.8 MG/DL (ref 8.8–10.4)
CHLORIDE SERPL-SCNC: 103 MMOL/L (ref 98–107)
CHLORIDE SERPL-SCNC: 103 MMOL/L (ref 98–107)
CHLORIDE SERPL-SCNC: 105 MMOL/L (ref 98–107)
CHLORIDE SERPL-SCNC: 110 MMOL/L (ref 98–107)
CHLORIDE SERPL-SCNC: 114 MMOL/L (ref 98–107)
COHGB MFR BLD: 99.7 % (ref 96–97)
COHGB MFR BLD: 99.9 % (ref 96–97)
CREAT SERPL-MCNC: 0.37 MG/DL (ref 0.51–0.95)
CREAT SERPL-MCNC: 0.38 MG/DL (ref 0.51–0.95)
CREAT SERPL-MCNC: 0.38 MG/DL (ref 0.51–0.95)
CREAT SERPL-MCNC: 0.39 MG/DL (ref 0.51–0.95)
CREAT SERPL-MCNC: 0.39 MG/DL (ref 0.51–0.95)
CRP SERPL-MCNC: 37.5 MG/L
EGFRCR SERPLBLD CKD-EPI 2021: >90 ML/MIN/1.73M2
ERYTHROCYTE [DISTWIDTH] IN BLOOD BY AUTOMATED COUNT: 22 % (ref 10–15)
GLUCOSE BLDC GLUCOMTR-MCNC: 153 MG/DL (ref 70–99)
GLUCOSE BLDC GLUCOMTR-MCNC: 175 MG/DL (ref 70–99)
GLUCOSE BLDC GLUCOMTR-MCNC: 197 MG/DL (ref 70–99)
GLUCOSE BLDC GLUCOMTR-MCNC: 231 MG/DL (ref 70–99)
GLUCOSE BLDC GLUCOMTR-MCNC: 273 MG/DL (ref 70–99)
GLUCOSE SERPL-MCNC: 177 MG/DL (ref 70–99)
GLUCOSE SERPL-MCNC: 198 MG/DL (ref 70–99)
GLUCOSE SERPL-MCNC: 206 MG/DL (ref 70–99)
GLUCOSE SERPL-MCNC: 246 MG/DL (ref 70–99)
GLUCOSE SERPL-MCNC: 292 MG/DL (ref 70–99)
HCO3 BLD-SCNC: 24 MMOL/L (ref 21–28)
HCO3 BLD-SCNC: 27 MMOL/L (ref 21–28)
HCO3 BLDV-SCNC: 29 MMOL/L (ref 21–28)
HCO3 SERPL-SCNC: 23 MMOL/L (ref 22–29)
HCO3 SERPL-SCNC: 24 MMOL/L (ref 22–29)
HCO3 SERPL-SCNC: 25 MMOL/L (ref 22–29)
HCO3 SERPL-SCNC: 26 MMOL/L (ref 22–29)
HCO3 SERPL-SCNC: 26 MMOL/L (ref 22–29)
HCT VFR BLD AUTO: 25.7 % (ref 35–47)
HGB BLD-MCNC: 7.7 G/DL (ref 11.7–15.7)
MAGNESIUM SERPL-MCNC: 1.7 MG/DL (ref 1.7–2.3)
MAGNESIUM SERPL-MCNC: 1.9 MG/DL (ref 1.7–2.3)
MAGNESIUM SERPL-MCNC: 2.3 MG/DL (ref 1.7–2.3)
MAGNESIUM SERPL-MCNC: 2.4 MG/DL (ref 1.7–2.3)
MCH RBC QN AUTO: 29.6 PG (ref 26.5–33)
MCHC RBC AUTO-ENTMCNC: 30 G/DL (ref 31.5–36.5)
MCV RBC AUTO: 99 FL (ref 78–100)
O2/TOTAL GAS SETTING VFR VENT: 40 %
O2/TOTAL GAS SETTING VFR VENT: 44 %
O2/TOTAL GAS SETTING VFR VENT: 44 %
OXYHGB MFR BLDV: 76 % (ref 70–75)
PCO2 BLD: 38 MM HG (ref 35–45)
PCO2 BLD: 44 MM HG (ref 35–45)
PCO2 BLDV: 47 MM HG (ref 40–50)
PH BLD: 7.4 [PH] (ref 7.35–7.45)
PH BLD: 7.41 [PH] (ref 7.35–7.45)
PH BLDV: 7.4 [PH] (ref 7.32–7.43)
PHOSPHATE SERPL-MCNC: 3 MG/DL (ref 2.5–4.5)
PHOSPHATE SERPL-MCNC: 3 MG/DL (ref 2.5–4.5)
PHOSPHATE SERPL-MCNC: 3.2 MG/DL (ref 2.5–4.5)
PHOSPHATE SERPL-MCNC: 3.4 MG/DL (ref 2.5–4.5)
PLATELET # BLD AUTO: 317 10E3/UL (ref 150–450)
PO2 BLD: 110 MM HG (ref 80–105)
PO2 BLD: 110 MM HG (ref 80–105)
PO2 BLDV: 41 MM HG (ref 25–47)
POTASSIUM SERPL-SCNC: 3.8 MMOL/L (ref 3.4–5.3)
POTASSIUM SERPL-SCNC: 3.8 MMOL/L (ref 3.4–5.3)
POTASSIUM SERPL-SCNC: 3.9 MMOL/L (ref 3.4–5.3)
POTASSIUM SERPL-SCNC: 4.1 MMOL/L (ref 3.4–5.3)
POTASSIUM SERPL-SCNC: 4.4 MMOL/L (ref 3.4–5.3)
POTASSIUM SERPL-SCNC: 4.4 MMOL/L (ref 3.4–5.3)
RBC # BLD AUTO: 2.6 10E6/UL (ref 3.8–5.2)
SAO2 % BLDA: 97 % (ref 92–100)
SAO2 % BLDA: 97 % (ref 92–100)
SAO2 % BLDV: 78.5 % (ref 70–75)
SODIUM SERPL-SCNC: 142 MMOL/L (ref 135–145)
SODIUM SERPL-SCNC: 142 MMOL/L (ref 135–145)
SODIUM SERPL-SCNC: 143 MMOL/L (ref 135–145)
SODIUM SERPL-SCNC: 146 MMOL/L (ref 135–145)
SODIUM SERPL-SCNC: 146 MMOL/L (ref 135–145)
SPECIMEN EXPIRATION DATE: NORMAL
WBC # BLD AUTO: 10.6 10E3/UL (ref 4–11)

## 2024-09-17 PROCEDURE — 83735 ASSAY OF MAGNESIUM: CPT

## 2024-09-17 PROCEDURE — 86140 C-REACTIVE PROTEIN: CPT

## 2024-09-17 PROCEDURE — 200N000002 HC R&B ICU UMMC

## 2024-09-17 PROCEDURE — 250N000011 HC RX IP 250 OP 636

## 2024-09-17 PROCEDURE — 250N000013 HC RX MED GY IP 250 OP 250 PS 637

## 2024-09-17 PROCEDURE — 250N000013 HC RX MED GY IP 250 OP 250 PS 637: Performed by: SURGERY

## 2024-09-17 PROCEDURE — 94668 MNPJ CHEST WALL SBSQ: CPT

## 2024-09-17 PROCEDURE — 94640 AIRWAY INHALATION TREATMENT: CPT | Mod: 76

## 2024-09-17 PROCEDURE — 85027 COMPLETE CBC AUTOMATED: CPT | Performed by: PHYSICIAN ASSISTANT

## 2024-09-17 PROCEDURE — 999N000157 HC STATISTIC RCP TIME EA 10 MIN

## 2024-09-17 PROCEDURE — 250N000009 HC RX 250

## 2024-09-17 PROCEDURE — 250N000013 HC RX MED GY IP 250 OP 250 PS 637: Performed by: PHYSICIAN ASSISTANT

## 2024-09-17 PROCEDURE — 258N000003 HC RX IP 258 OP 636

## 2024-09-17 PROCEDURE — 80048 BASIC METABOLIC PNL TOTAL CA: CPT

## 2024-09-17 PROCEDURE — 82805 BLOOD GASES W/O2 SATURATION: CPT

## 2024-09-17 PROCEDURE — 97112 NEUROMUSCULAR REEDUCATION: CPT | Mod: GP

## 2024-09-17 PROCEDURE — 84100 ASSAY OF PHOSPHORUS: CPT

## 2024-09-17 PROCEDURE — 94640 AIRWAY INHALATION TREATMENT: CPT

## 2024-09-17 PROCEDURE — 86901 BLOOD TYPING SEROLOGIC RH(D): CPT | Performed by: SURGERY

## 2024-09-17 PROCEDURE — 94667 MNPJ CHEST WALL 1ST: CPT

## 2024-09-17 PROCEDURE — 250N000011 HC RX IP 250 OP 636: Performed by: SURGERY

## 2024-09-17 PROCEDURE — 97530 THERAPEUTIC ACTIVITIES: CPT | Mod: GP

## 2024-09-17 PROCEDURE — 250N000012 HC RX MED GY IP 250 OP 636 PS 637

## 2024-09-17 PROCEDURE — 86900 BLOOD TYPING SEROLOGIC ABO: CPT | Performed by: SURGERY

## 2024-09-17 PROCEDURE — 94660 CPAP INITIATION&MGMT: CPT

## 2024-09-17 RX ORDER — OXYCODONE HCL 5 MG/5 ML
5 SOLUTION, ORAL ORAL EVERY 4 HOURS PRN
Status: DISCONTINUED | OUTPATIENT
Start: 2024-09-17 | End: 2024-09-18

## 2024-09-17 RX ORDER — MAGNESIUM SULFATE HEPTAHYDRATE 40 MG/ML
2 INJECTION, SOLUTION INTRAVENOUS ONCE
Status: COMPLETED | OUTPATIENT
Start: 2024-09-17 | End: 2024-09-17

## 2024-09-17 RX ORDER — BUDESONIDE 1 MG/2ML
1 INHALANT ORAL DAILY
Status: DISCONTINUED | OUTPATIENT
Start: 2024-09-17 | End: 2024-09-22

## 2024-09-17 RX ORDER — LORAZEPAM 0.5 MG/1
0.5 TABLET ORAL EVERY 8 HOURS
Status: DISCONTINUED | OUTPATIENT
Start: 2024-09-17 | End: 2024-09-18

## 2024-09-17 RX ORDER — FUROSEMIDE 10 MG/ML
40 INJECTION INTRAMUSCULAR; INTRAVENOUS ONCE
Status: COMPLETED | OUTPATIENT
Start: 2024-09-17 | End: 2024-09-17

## 2024-09-17 RX ORDER — LEVALBUTEROL TARTRATE 45 UG/1
2 AEROSOL, METERED ORAL EVERY 6 HOURS PRN
Status: DISCONTINUED | OUTPATIENT
Start: 2024-09-17 | End: 2024-09-17

## 2024-09-17 RX ORDER — SODIUM CHLORIDE FOR INHALATION 3 %
3 VIAL, NEBULIZER (ML) INHALATION 4 TIMES DAILY
Status: DISCONTINUED | OUTPATIENT
Start: 2024-09-17 | End: 2024-09-18

## 2024-09-17 RX ORDER — LEVALBUTEROL INHALATION SOLUTION 0.63 MG/3ML
0.63 SOLUTION RESPIRATORY (INHALATION)
Status: DISCONTINUED | OUTPATIENT
Start: 2024-09-17 | End: 2024-10-01 | Stop reason: HOSPADM

## 2024-09-17 RX ADMIN — ATOVAQUONE 1500 MG: 750 SUSPENSION ORAL at 07:53

## 2024-09-17 RX ADMIN — LEVALBUTEROL HYDROCHLORIDE 0.63 MG: 0.63 SOLUTION RESPIRATORY (INHALATION) at 21:02

## 2024-09-17 RX ADMIN — AMINOPHYLLINE 250 MG: 25 INJECTION, SOLUTION INTRAVENOUS at 11:44

## 2024-09-17 RX ADMIN — ACETAMINOPHEN 650 MG: 325 TABLET ORAL at 17:30

## 2024-09-17 RX ADMIN — IPRATROPIUM BROMIDE 0.5 MG: 0.5 SOLUTION RESPIRATORY (INHALATION) at 21:02

## 2024-09-17 RX ADMIN — INSULIN ASPART 2 UNITS: 100 INJECTION, SOLUTION INTRAVENOUS; SUBCUTANEOUS at 04:03

## 2024-09-17 RX ADMIN — OXYCODONE HYDROCHLORIDE 5 MG: 5 SOLUTION ORAL at 07:52

## 2024-09-17 RX ADMIN — ACETAMINOPHEN 650 MG: 325 TABLET ORAL at 11:43

## 2024-09-17 RX ADMIN — LEVOTHYROXINE SODIUM 25 MCG: 0.03 TABLET ORAL at 07:53

## 2024-09-17 RX ADMIN — SODIUM CHLORIDE SOLN NEBU 3% 3 ML: 3 NEBU SOLN at 11:34

## 2024-09-17 RX ADMIN — QUETIAPINE FUMARATE 25 MG: 25 TABLET ORAL at 07:52

## 2024-09-17 RX ADMIN — MAGNESIUM SULFATE HEPTAHYDRATE 2 G: 2 INJECTION, SOLUTION INTRAVENOUS at 20:27

## 2024-09-17 RX ADMIN — LORAZEPAM 0.5 MG: 0.5 TABLET ORAL at 11:44

## 2024-09-17 RX ADMIN — MONTELUKAST 10 MG: 10 TABLET, FILM COATED ORAL at 21:43

## 2024-09-17 RX ADMIN — INSULIN ASPART 3 UNITS: 100 INJECTION, SOLUTION INTRAVENOUS; SUBCUTANEOUS at 11:44

## 2024-09-17 RX ADMIN — LORAZEPAM 0.5 MG: 0.5 TABLET ORAL at 20:28

## 2024-09-17 RX ADMIN — HEPARIN SODIUM 5000 UNITS: 5000 INJECTION, SOLUTION INTRAVENOUS; SUBCUTANEOUS at 11:44

## 2024-09-17 RX ADMIN — SODIUM CHLORIDE SOLN NEBU 3% 3 ML: 3 NEBU SOLN at 15:55

## 2024-09-17 RX ADMIN — LEVALBUTEROL HYDROCHLORIDE 0.63 MG: 0.63 SOLUTION RESPIRATORY (INHALATION) at 11:34

## 2024-09-17 RX ADMIN — BUDESONIDE 1 MG: 1 SUSPENSION RESPIRATORY (INHALATION) at 11:34

## 2024-09-17 RX ADMIN — AMINOPHYLLINE 0.5 MG/KG/HR: 25 INJECTION, SOLUTION INTRAVENOUS at 13:25

## 2024-09-17 RX ADMIN — INSULIN ASPART 1 UNITS: 100 INJECTION, SOLUTION INTRAVENOUS; SUBCUTANEOUS at 20:32

## 2024-09-17 RX ADMIN — Medication 40 MG: at 07:52

## 2024-09-17 RX ADMIN — LEVALBUTEROL HYDROCHLORIDE 0.63 MG: 0.63 SOLUTION RESPIRATORY (INHALATION) at 15:54

## 2024-09-17 RX ADMIN — GABAPENTIN 300 MG: 250 SUSPENSION ORAL at 05:38

## 2024-09-17 RX ADMIN — IPRATROPIUM BROMIDE 0.5 MG: 0.5 SOLUTION RESPIRATORY (INHALATION) at 15:54

## 2024-09-17 RX ADMIN — POTASSIUM CHLORIDE 20 MEQ: 29.8 INJECTION, SOLUTION INTRAVENOUS at 00:03

## 2024-09-17 RX ADMIN — SODIUM CHLORIDE SOLN NEBU 3% 3 ML: 3 NEBU SOLN at 09:33

## 2024-09-17 RX ADMIN — HEPARIN SODIUM 5000 UNITS: 5000 INJECTION, SOLUTION INTRAVENOUS; SUBCUTANEOUS at 04:03

## 2024-09-17 RX ADMIN — SODIUM CHLORIDE SOLN NEBU 3% 3 ML: 3 NEBU SOLN at 21:02

## 2024-09-17 RX ADMIN — GABAPENTIN 300 MG: 250 SUSPENSION ORAL at 13:25

## 2024-09-17 RX ADMIN — IPRATROPIUM BROMIDE 0.5 MG: 0.5 SOLUTION RESPIRATORY (INHALATION) at 11:34

## 2024-09-17 RX ADMIN — NYSTATIN 500000 UNITS: 100000 SUSPENSION ORAL at 07:52

## 2024-09-17 RX ADMIN — GABAPENTIN 300 MG: 250 SUSPENSION ORAL at 21:43

## 2024-09-17 RX ADMIN — ALTEPLASE 2 MG: 2.2 INJECTION, POWDER, LYOPHILIZED, FOR SOLUTION INTRAVENOUS at 14:49

## 2024-09-17 RX ADMIN — HEPARIN SODIUM 5000 UNITS: 5000 INJECTION, SOLUTION INTRAVENOUS; SUBCUTANEOUS at 20:28

## 2024-09-17 RX ADMIN — CETIRIZINE HYDROCHLORIDE 10 MG: 10 TABLET, FILM COATED ORAL at 07:53

## 2024-09-17 RX ADMIN — ALTEPLASE 2 MG: 2.2 INJECTION, POWDER, LYOPHILIZED, FOR SOLUTION INTRAVENOUS at 15:03

## 2024-09-17 RX ADMIN — PREDNISONE 40 MG: 20 TABLET ORAL at 07:52

## 2024-09-17 RX ADMIN — INSULIN ASPART 2 UNITS: 100 INJECTION, SOLUTION INTRAVENOUS; SUBCUTANEOUS at 15:36

## 2024-09-17 RX ADMIN — Medication 1 TABLET: at 11:44

## 2024-09-17 RX ADMIN — FUROSEMIDE 40 MG: 10 INJECTION, SOLUTION INTRAVENOUS at 10:46

## 2024-09-17 RX ADMIN — CHLORHEXIDINE GLUCONATE 0.12% ORAL RINSE 15 ML: 1.2 LIQUID ORAL at 07:52

## 2024-09-17 RX ADMIN — MAGNESIUM SULFATE HEPTAHYDRATE 2 G: 2 INJECTION, SOLUTION INTRAVENOUS at 07:50

## 2024-09-17 RX ADMIN — INSULIN ASPART 1 UNITS: 100 INJECTION, SOLUTION INTRAVENOUS; SUBCUTANEOUS at 07:54

## 2024-09-17 RX ADMIN — ACETAMINOPHEN 650 MG: 325 TABLET ORAL at 05:38

## 2024-09-17 ASSESSMENT — ACTIVITIES OF DAILY LIVING (ADL)
ADLS_ACUITY_SCORE: 63
ADLS_ACUITY_SCORE: 63
ADLS_ACUITY_SCORE: 59
ADLS_ACUITY_SCORE: 59
ADLS_ACUITY_SCORE: 63

## 2024-09-17 NOTE — PROGRESS NOTES
MEDICAL ICU PROGRESS NOTE  09/17/2024    Date of Service (when I saw the patient): 09/17/2024    ASSESSMENT: Massiel Flaherty is a 53 year old female with PMH Anxiety/depression, fibromyalgia, c/f nonepileptic seizures not on AEDs, hypothyroidism, asthma, HLD, MURIEL on CPAP, expressive aphasia, hypertension  who was admitted on 8/3/2024 for fatigue, fever and dyspnea and intubated 8/6/24 at OSH for ARDS, proned and paralyzed without improvement. Transferred to [facility] 8/8/24, hypoxic with high plateaus/peaks and placed on VV ECMO and CRRT. Decannulated from VV ECMO 8/18 and transferred to MICU 8/26/24 for ongoing management. Extubated 8/27 then reintubated 8/29 iso worsening AHRF and pseudomonas pneumonia.     CHANGES and MAJOR THINGS TODAY:  - now on BiPAP 10/5 w/VT 1L  - d/c seroquel  - titrate ativan down to half and space to q8h  - change oxycodone to PRN  - albuterol q4h  - start PTA pulmicort  - start theophylline gtt per pharm  - negative c. Diff antigen yday  - lasix 40 IV  - consider hair dornase if secretions become sticky  - nebs q4h      Yesterday:  - Required BiPAP overnight, increased work of breathing without, difficulty clearing secretions    Neuro:  # Pain and sedation  # Concern for ICU delirium   # Hx Fibromyalgia   # Hx myalgic encephalomyelitis   # Hx anxiety/depression  - Monitor neurological status. Delirium preventions and precautions.   - Pain: Scheduled: tylenol, increased oxycodone 10 mg q4hr, continue Ativan 2 mg every q6hr   PRN: tylenol, oxycodone   9/17: Oxycodone changed to 5 mg q6 PRN, Ativan 0.5 mg q8h PRN  - Sedation plan: now just on dilaudid, previous was also on precedex, midazolam and ketamine as well. Will plan to shut off dilaudid to optimize ability to extubate  - Continue scheduled oxycodone and ativan  - Increase Gabapentin to 300mg TID   - Seroquel 25 mg BID and 50 mg HS, discontinued 9/17 for maximal wakefulness drive  - PTA Venlafaxine and Amitriptyline discontinued  while sedated    # Hx spells with staring and BUE posturing previously described as nonepileptic seizures   # Hx of GTC seizures and myoclonic epilepsy, weaned off AEDs   # Diffuse cerebral edema - improved  - Sodium goals liberalized to 140-145  - 8/21: MRI Brain: no acute intracranial pathology. No findings to suggest anoxic brain injury.     Pulmonary:  # Acute hypoxic respiratory failure c/b ARDS: Improving   # Pseudomonas pneumonia (s/p treatment)  # Mechanical ventilation  # s/p VV ECMO 8/8 - 8/18   # Hx CAP  # Asthma  # MURIEL on home CPAP  PFT dated 9/8/2020 demonstrated normal spirometry with mild diffusion impairment and mild restriction (FEV1/FVC 80%, FEV1 2.59, DLCO 40%). CT abdomen chest 3/9/2020 demonstrated scarring and fibrosis bilaterally which correlated with the decreased DLCO. The patient also has a history of MURIEL on CPAP therapy as currently managed by her provider in [place]. Unclear what triggered ARDS or why she has had such severe hospital course, further investigated ILD but results all unremarkable. Extubated 8/27, reintubated 8/29 for worsening O2 demands and diffuse opacities iso new/recurrent psuedomonas pneumonia. Bronch with BAL 8/30, pseudomonas growing as below.   - ILD w/u aldolase, EVELIO, RF, CCP, ANCA, MPO, SSA Ro and La, Scleroderma antibody, Radha 1,  hypersensity pneumonitis, IGG subclasses,  aspergillus, blastomyces, histoplasma neg  - SpO2 goals >92%, PaO2 goals >60   - PTA singular at bedtime if able  - Scheduled duonebs   - Discontinued Pulmicort given steroids as below   - Lung protective ventilation strategies given ARDS  - Methylpred 125mg q6H x 24h 8/30 --> transition to 20 mg dex x 5 days, followed by 10mg x 3 days   + Continue prednisone 40 mg daily, with plan to taper down to 20 mg in 2 days.   - 9/16: tolerated pressure support, ABG stable on SBT, possible extubation today  - 9/16 extubated to BiPAP, tolerated oxymask on 6 L overnight for 8 hours beforereturning to  BiPAP at 10/5  - May still need trach due to ICU myopathy  - 9/17: Started levalbuterol nebs, PTA pulmicort nebs, saline nebs, theophyline for increased respiratory drive   - Changed theophyline to aminophylline gtt per pharmacy        Cardiovascular:  # Hyperlipidemia  # Hx HTN  - MAP goal >65, SBP goals >110  - Has remained off pressors since 9/9   - PTA atorvastatin  - PTA clonidine held while sedated  - Lower extremity ultrasound without vascular pathology, no hematoma or clots  - Monitor discoloration of extremities for necrosis  - PRN hydralazine for SBP > 200     Sinus Tachycardia  Likely 2/2 fluid removal, sepsis, pain and sedation as above    GI/Nutrition:  # Severe malnutrition (see RD note)   # Non-infectious Diarrhea  # GERD   - Protonix (home medication)   - Nutrition consulted, appreciate recs  - Diet: TF via NJ, tolerating   - Hold bowel regimen d/t loose stools  - Continue scheduled multivitamin, banatrol, prosource packet  - loperamide PRN, consider scheduling if c dif is negative  - Rectal tube, continues with high output    Renal/Fluids/Electrolytes:  # Acute kidney injury: Improving   # Renal failure: Improving   # AGMA: Improving   Baseline Cr approx 0.6. Pt was anuric here but now making some urine, likely prerenal due to shock.  - Continues to improve, holding off on iHD  - Making good urine output, possible post ATN diuresis?  - Strict I&Os  - Avoid nephrotoxins as able  - Q6 hour electrolytes given increased urine output and ongoing high rectal tube output  - diuresis with lasix 40 mg for volume overload    Endocrine:  # Steroid and stress induced hyperglycemia  # Hypothyroidism   - Goal to keep BG < 180 for optimal wound healing  - Continue medium sliding scale insulin   - Continue PTA synthroid    ID:  # Leukocytosis (Resolved)  # Psuedomonas pneumonia (s/p treatment)  # Hx CAP  Respiratory cx 8/29 pseudomonas pneumonia. Intermediate susceptability to meropenem, more significant  sensitivities to ciprofloxacin  - Continue to monitor fever curve and inflammatory markers as appropriate  - ID now signed off     Abx  - Meropenem 8/29 - 9/1  - Vancomycin 8/29- 8/30  - Tobramycin x 1 8/29  - Vancomycin 9/5-9/8    - Tobramycin nebs BID 9/1-9/11  - Ciprofloxacin infusion 9/1- 9/11  - Metronidazole 9/6 - 9/11    PJP Ppx  Given Dex-ARDS Massiel ferraro remain on steriods for > 3 weeks so will initiate PJP ppx. 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   - Ganciclovir 9/6 - 9/8, discontinued given ID recs  - trend CMV 9/16    # HSV-1  Mouth sores present, which could have viral etiology. 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 Monitor and trend.   - CTAP 8/30 due to acute hemoglobin drop requiring transfusion of 2 x 1 unit of blood 12 hours apart. Negative for acute bleed    Musculoskeletal/Rheum:  # Alopecia  - Hold PTA hydroxychloroquine     # Weakness and deconditioning of critical illness  # Left ankle injury pre-hospitalization    - Physical and occupational therapy when able.     Skin:  # BLE scattered ecchymosis  # Left toe duskiness  - Bilateral lower leg ecchymosis  - WOC consult  - Ecchymosis to left foot, most noticeable to 3rd and 4th toes    General Cares/Prophylaxis:  DVT Prophylaxis: SubQ heparin   GI Prophylaxis: PPI  Restraints: no  Code Status: Full Code    Lines/tubes/drains:  - ETT (8/29)  - NJ (8/9)  - LIJ CVC (8/8)  - Radial a-line (8/29)  - PIV x3  - Rectal tube (8/17)  - Tunneled HD line (8/23)    Disposition:  - Medical ICU     Patient seen and findings/plan discussed with medical ICU staff, Dr. Osborne.      Fuentes Fowler MD    MICU STAFF CRITICAL CARE PROGRESS NOTE:    I saw and examined the  patient with the MICU team and concur with findings and A&P as detailed in Dr. Fowler's note of today.  See my independent note of today.    Janes Osborne MD  MICU Staff  [phone]      Clinically Significant Risk Factors        # Hypokalemia: Lowest K = 3.1 mmol/L in last 2 days, will replace as needed  # Hypernatremia: Highest Na = 152 mmol/L in last 2 days, will monitor as appropriate    # Hypomagnesemia: Lowest Mg = 1.5 mg/dL in last 2 days, will replace as needed   # Hypoalbuminemia: Lowest albumin = 2.2 g/dL at 8/10/2024  9:47 AM, will monitor as appropriate                # Severe Malnutrition: based on nutrition assessment      # Financial/Environmental Concerns: none            ====================================  INTERVAL HISTORY:   Tolerated Oxymask for 8 hours overnight, then required BiPAP  2100 - Some increased WOB,  x-ray, ABG and BMP    2200 - increased pulmonary edema, Na 148, ABG OK.    2240 - more tachypnea, given 20 lasix IV, 500 chlorothiazide IV, 500mL D5W, FWF 100mL q1h     Still somnolent, but breathing on BiPAP. Able to attempt response to commands    OBJECTIVE:   1. VITAL SIGNS:   Temp:  [99  F (37.2  C)-100.4  F (38  C)] 99.7  F (37.6  C)  Pulse:  [112-125] 118  Resp:  [17-28] 26  BP: (111-154)/(60-94) 148/92  MAP:  [61 mmHg-127 mmHg] 127 mmHg  Arterial Line BP: ()/() 131/125  FiO2 (%):  [35 %-40 %] 40 %  SpO2:  [95 %-100 %] 99 %  FiO2 (%): 40 %, Resp: 26, Vent Mode: CPAP/PS, Resp Rate (Set): 20 breaths/min, Tidal Volume (Set, mL): 380 mL, PEEP (cm H2O): 5 cmH2O, Pressure Support (cm H2O): 5 cmH2O, Resp Rate (Set): 20 breaths/min, Tidal Volume (Set, mL): 380 mL, PEEP (cm H2O): 5 cmH2O    2. INTAKE/ OUTPUT:   I/O last 3 completed shifts:  In: 4017.55 [I.V.:392.55; NG/GT:1555; IV Piggyback:1000]  Out: 6100 [Urine:2050; Stool:4050]    3. PHYSICAL EXAMINATION:  General: Awake on BiPAP, arousable, but barely. Severely weak  HEENT: Normocephalic, atraumatic  Neuro: RASS 0, following  commands but delayed response. Weak muscular responses, right fingers attempt to squeeze, able to move left toes slightly.   Pulm/Resp: Bilateral breath sounds audible, rhonchi that cleared with suctioning  CV: Sinus tachycardia, s1/2 normal, no murmur, cold extremity tips  Abdomen: Soft, tender, bowel sounds hyperactive  : deferred  Incisions/Skin: lower leg ecchymosis present and scattered. Some bluish discoloration of the finger tips, capillary refill normal    4. LABS:   Arterial Blood Gases   Recent Labs   Lab 09/17/24  0356 09/17/24 0236 09/16/24 2057 09/16/24  1629   PH 7.40 7.41 7.40 7.42   PCO2 44 38 34* 43   PO2 110* 110* 128* 147*   HCO3 27 24 21 28     Complete Blood Count   Recent Labs   Lab 09/17/24  0356 09/16/24 2202 09/16/24  0429 09/15/24  0519   WBC 10.6 10.3 10.2 14.1*   HGB 7.7* 7.3* 7.4* 7.0*    298 246 248     Basic Metabolic Panel  Recent Labs   Lab 09/17/24  0401 09/17/24  0356 09/17/24 0236 09/16/24  2343 09/16/24 2057 09/16/24  1946 09/16/24  1630   NA  --  146* 146*  --  148*  --  146*   POTASSIUM  --  4.1 3.8  3.8  --  3.1*  --  4.3   CHLORIDE  --  110* 114*  --  120*  --  111*   CO2  --  24 23  --  20*  --  26   BUN  --  44.2* 41.2*  --  39.8*  --  54.2*   CR  --  0.39* 0.38*  --  0.31*  --  0.44*   * 206* 198* 185* 126*   < > 223*    < > = values in this interval not displayed.     Liver Function Tests  Recent Labs   Lab 09/11/24  1158 09/11/24  0309 09/10/24  2008 09/10/24  1230   AST  --  16  --   --    ALT  --  23  --   --    ALKPHOS  --  101  --   --    BILITOTAL  --  0.2  --   --    ALBUMIN 2.9* 3.2* 3.2* 2.9*     Coagulation Profile  No lab results found in last 7 days.    5. RADIOLOGY:   Recent Results (from the past 24 hour(s))   XR Abdomen Port 1 View    Narrative    EXAMINATION:  XR ABDOMEN PORT 1 VIEW 9/16/2024     COMPARISON: CT 8/30/2024, abdominal radiograph 8/27/2024., Same-day  chest radiograph    HISTORY: assess feeding tube  placement.    TECHNIQUE: Frontal supine view of the abdomen.    FINDINGS: Feeding tube tip projects over the proximal jejunum. No  abnormally dilated loops of bowel, free air, or pneumatosis in the  visualized abdomen.. No portal venous gas.  Partially visualized right  IJ central venous catheter tip terminates likely within the right  atrium.      Impression    IMPRESSION: Feeding tube tip in the proximal jejunum.    I have personally reviewed the examination and initial interpretation  and I agree with the findings.    THELMA PEREZ DO         SYSTEM ID:  [id number]   XR Chest Port 1 View    Narrative    Exam: XR CHEST PORT 1 VIEW, 9/16/2024 8:30 PM    Comparison: Same day chest x-ray at 0539 hours    History: Course lung sounds with tenuous respiratory status, assessing  for pulmonary edema    Findings:  Single view of the chest. Right IJ central venous catheter tip  projects over the right atrium. Left IJ CVC tip projecting over the  confluence of the brachial cephalic veins. Trachea is midline.  Cardiomediastinal silhouette is within normal limits. Mildly increased  bilateral interstitial opacities. No pneumothorax. Left costophrenic  angle remains indistinct. The visualized upper abdomen is  unremarkable. No acute osseous abnormalities.      Impression    Impression:   1. Mildly increased bilateral interstitial opacities, likely pulmonary  edema.  2. Stable possible left pleural effusion.    I have personally reviewed the examination and initial interpretation  and I agree with the findings.    THELMA PEREZ DO         SYSTEM ID:  [id number]

## 2024-09-17 NOTE — PROGRESS NOTES
MICU STAFF CRITICAL CARE PROGRESS NOTE:     I saw and examined the patient with the MICU team and concur with findings and A&P as detailed in Dr Fowler's note of today     54 yo  PMH Anxiety/depression, fibromyalgia;; asthma, HLD; MURIEL on CPAP; HTN; expressive aphasia; hypothyroidism; ? seizures (no Rx)     8/3 admit @ OSH for fatigue, fever && SOB  8/6 Intubated @ OSH and transferred 8/8 to [facility] from OSH (?) for Pneumonia, ARDS and high vent need placed on VV ECMO and CRRT  On VV ECMO decannulated 8-18  Transferred to MICU team 8-26  Extubated 8-27 and reintubated 8/27 prob due to worsening AHRF & Pseudomonas VAP required paralsis, veletri, and high steroid and sedation Rx  Has completed treatment for Pseudomonas VAP.  No recent pressors  Overdue' for trach but making major weaning progress  Issues with ventilator dyssynchrony - adjust insp time and Vt  Significant neuromuscular weakness  Off CRRT as of 9/12; self-diuresing  Slowly waking up on oral BZDP & hydromorphone    Some increased WOB overnight   ABG stable 7.40/34/128 stable at 9PM; 7.40/44/110 44% @ 4am  CXR some increased pulm edema - given lasix &   hydrochlorothiazide    Currently on BiPAP 10/5 with Vt 1000 mls  Somnolent but arousable;  tries finger squeeze R only: toe wiggle L only  Afebrile (last fever 2 days ago)  115  RR 16-26;  somewhat hypertensive; good O2 sats on either OxyMask or BiPAP  I:O -  Net -670ml;     2.5L UO; lots stool   Lungs - faint on BiPAP  RRR faint S1 and S2 no mg/r appreciated.    Hgb 7.7  WBC 10.6  Na 146 110 HCO3 24;  CRP 37.5  CXR last night - some increased UZRD; mild increase L lower lung zone patchy density; L HD less clear      Acute hypoxic respiratory failure c/w ARDS: Improving  / Pseudomonas pneumonia / Mechanical ventilation / s/p VV ECMO 8/8 - 8/18 / h/o asthma / h/o CAP / OSAS on CPAP  PFT 9/8/20 mild restriction & diffusion impairment (FEV1/FVC 80%, FEV1 2.59, DLCO 40%). CT CAP 3/9/2020 bilat lung  scarring/fibrosis bilaterally  Extubated 8/27, reintubated 8/29  Extubated yesterday afternoon  On scheduled duonebs & singulair; Pulmicort held while on systemic steroids  Steroids taper wean - @ 40 mg/day from Methylpred 125mg q6H x 24h 8/30   On PJP prophylaxis    CNS / Pain & Sedation / h/o anxiety/depression;  h/o fibromyalgia; h/o myalgic encephalomyelitis / ? ICU Delirium / h/o  spells  - ? Sz / GTC seizures / myoclonic epilepsy / diffuse cerebral edema - improved  Pain: Scheduled: tylenol, reduced oxycodone 5 mg q4hr, and Ativan 1 mg every q6hr   PRN: tylenol, oxycodone   Has been on Gabapentin to 300mg TID; Ativan 1 q6H; Seroquel 25 mg BID and 50 mg HS; On oxycodone  PTA Venlafaxine and Amitriptyline discontinued while sedated  Plan decrease sedation     Acute hypoxic respiratory failure c/w ARDS: Improving  / Pseudomonas pneumonia / Mechanical ventilation / s/p VV ECMO 8/8 - 8/18 / h/o asthma / h/o CAP / OSAS on CPAP  PFT 9/8/20 mild restriction & diffusion impairment (FEV1/FVC 80%, FEV1 2.59, DLCO 40%). CT CAP 3/9/2020 bilat lung scarring/fibrosis bilaterally  Extubated 8/27, reintubated 8/29 for worsening O2 demands and diffuse opacities iso new/recurrent psuedomonas pneumonia.   Bronch BAL 8/30, pseudomonas   ILD w/unit(s) negative (aldolase, EVELIO, RF, CCP, ANCA, MPO, SSA Ro and La, Scleroderma antibody, Radha 1,  hypersensity pneumonitis, IgG subclasses,  aspergillus, blastomyces, histoplasma)  On scheduled duonebs & singulair; Pulmicort held while on systemic steroids  On steroid taper wean - prednisone 40 mgs and restart pulmicort nebs  Sunday - Increased tidal volume to 380, to help reduce breath stacking   On PJP prophylaxis  Aminophylline drip to increase resp muscle function and respiratory drive     CV / Hyperlipidemia / h/o HTN / Sinus Tachycardia  MAP goal >65, SBP goals >110;  off pressors since 9/9   PTA atorvastatin & PTA clonidine held while sedated  Monitor extremity discoloration for  necrosis  PRN hydralazine for SBP > 200      Severe malnutrition / Non-infectious Diarrhea / GERD   Protonix (home medication)   Tolerating NJ TF  Rectal tube for high output / Hold bowel regimen d/t loose stools (loperamide);    Continue scheduled multivitamin, banatrol, prosource packet  Recheck CDiff; consider scheduled imodium     CHACORTA - Improving / AGMA: Improving   Baseline Cr approx 0.6;  was anuric  now making some urine, likely prerenal due to shock; holding off on iHD  Volume goal is net even, may consider diuresis in oncoming days      Hyperglycemia (Steroids & Stress) / Hypothyroidism   - Goal to keep BG < 180 for optimal wound healing  - Continue medium sliding scale insulin   - Continue PTA synthroid     ID / Leukocytosis / Pseudomonas pneumonia / Hx CAP / CMV Viremia (PCR+ 8/31) / HSV?  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 Dapsone)  - Continue atovaquone 1,500 mg daily   Trend CMV 9/9     Anemia of critical illness  - Transfuse if hgb <7.0 or hypoperfusion  - negative CTAP 8/30 due to acute Hgb drop (got 2 U RBCs)     Musculoskeletal/Rheum:  Alopecia  Holding PTA hydroxychloroquine      Weakness and deconditioning of critical illness  Left ankle injury pre-hospitalization; PT/OT when able     BLE scattered ecchymosis / Left toe duskiness/ Bilateral lower leg ecchymosis  Wound care consulting  Ecchymosis to left foot, most noticeable to 3rd and 4th toes     Lines/tubes/drains: ETT (8/29); NJ (8/9); LIJ CVC (8/8); Radial  A-line (8/29); PIV x3; Rectal tube (8/17)l  Tunneled HD line (8/23)     (Critical Care 45 minutes cumulative thus far today)     Janes Osborne MD  MICU Staff

## 2024-09-17 NOTE — PLAN OF CARE
Neuro: RASS 0/-1; hard to understand so cannot accurately assess mentation. Follows commands. Up to chair with x2 assist.  CV: Tachycardic 120s-130s. Tmax 99.9. Dependent edema +2/+3. Lasix given x1 this shift.   Resp: Switch between BiPAP/HFNC with RT treatments. LS clear/diminished. Scant oral secretions, moderate NT suction secretions.   GI/: Gonzalez and rectal tube in place within protocols.   Skin: No new skin concerns.  Labs: K/Mg replaced this shift. Recheck WDL.     Changes this shift: Arterial line occluded and was removed. TPa to internal jugular for line patency. Up to chair with therapy this AM.     Procedures this shift: na       Goal Outcome Evaluation:      Plan of Care Reviewed With: patient    Overall Patient Progress: no changeOverall Patient Progress: no change    Outcome Evaluation: see note

## 2024-09-17 NOTE — PLAN OF CARE
ICU End of Shift Summary. See flowsheets for vital signs and detailed assessment.    Changes this shift: Intermittently following commands. Cannot squeeze hands or move toes. Pupils reactive to light. Placed on 6L oxymask from ~10 pm to 6 am. Sodium levels have remained a challenge. Diuril, lasix ordered with bolus fluids with good response. FWF changed to 100 q1. Potassium replaced x2.     Plan: Work to recondition lungs. Work with therapies when able.       Goal Outcome Evaluation:      Plan of Care Reviewed With: patient    Overall Patient Progress: no changeOverall Patient Progress: no change    Outcome Evaluation: Pt placed back on BiPAP @ 0600 due to increased wob.

## 2024-09-18 ENCOUNTER — APPOINTMENT (OUTPATIENT)
Dept: GENERAL RADIOLOGY | Facility: CLINIC | Age: 54
DRG: 003 | End: 2024-09-18
Payer: MEDICAID

## 2024-09-18 ENCOUNTER — APPOINTMENT (OUTPATIENT)
Dept: PHYSICAL THERAPY | Facility: CLINIC | Age: 54
DRG: 003 | End: 2024-09-18
Attending: INTERNAL MEDICINE
Payer: MEDICAID

## 2024-09-18 LAB
ALLEN'S TEST: YES
ANION GAP SERPL CALCULATED.3IONS-SCNC: 11 MMOL/L (ref 7–15)
ANION GAP SERPL CALCULATED.3IONS-SCNC: 12 MMOL/L (ref 7–15)
ANION GAP SERPL CALCULATED.3IONS-SCNC: 12 MMOL/L (ref 7–15)
ANION GAP SERPL CALCULATED.3IONS-SCNC: 15 MMOL/L (ref 7–15)
BASE EXCESS BLDA CALC-SCNC: 4.9 MMOL/L (ref -3–3)
BASE EXCESS BLDV CALC-SCNC: 6.4 MMOL/L (ref -3–3)
BASE EXCESS BLDV CALC-SCNC: 8.2 MMOL/L (ref -3–3)
BUN SERPL-MCNC: 28.6 MG/DL (ref 6–20)
BUN SERPL-MCNC: 28.7 MG/DL (ref 6–20)
BUN SERPL-MCNC: 30.8 MG/DL (ref 6–20)
BUN SERPL-MCNC: 33.3 MG/DL (ref 6–20)
CALCIUM SERPL-MCNC: 8.6 MG/DL (ref 8.8–10.4)
CALCIUM SERPL-MCNC: 8.7 MG/DL (ref 8.8–10.4)
CALCIUM SERPL-MCNC: 8.9 MG/DL (ref 8.8–10.4)
CALCIUM SERPL-MCNC: 9.2 MG/DL (ref 8.8–10.4)
CHLORIDE SERPL-SCNC: 100 MMOL/L (ref 98–107)
CHLORIDE SERPL-SCNC: 101 MMOL/L (ref 98–107)
CHLORIDE SERPL-SCNC: 103 MMOL/L (ref 98–107)
CHLORIDE SERPL-SCNC: 99 MMOL/L (ref 98–107)
CMV DNA SPEC NAA+PROBE-ACNC: 289 IU/ML
CMV DNA SPEC NAA+PROBE-LOG#: 2.5 {LOG_COPIES}/ML
COHGB MFR BLD: 99.7 % (ref 96–97)
CREAT SERPL-MCNC: 0.35 MG/DL (ref 0.51–0.95)
CREAT SERPL-MCNC: 0.35 MG/DL (ref 0.51–0.95)
CREAT SERPL-MCNC: 0.36 MG/DL (ref 0.51–0.95)
CREAT SERPL-MCNC: 0.39 MG/DL (ref 0.51–0.95)
EGFRCR SERPLBLD CKD-EPI 2021: >90 ML/MIN/1.73M2
ERYTHROCYTE [DISTWIDTH] IN BLOOD BY AUTOMATED COUNT: 21.1 % (ref 10–15)
GLUCOSE BLDC GLUCOMTR-MCNC: 157 MG/DL (ref 70–99)
GLUCOSE BLDC GLUCOMTR-MCNC: 170 MG/DL (ref 70–99)
GLUCOSE BLDC GLUCOMTR-MCNC: 176 MG/DL (ref 70–99)
GLUCOSE BLDC GLUCOMTR-MCNC: 182 MG/DL (ref 70–99)
GLUCOSE BLDC GLUCOMTR-MCNC: 200 MG/DL (ref 70–99)
GLUCOSE BLDC GLUCOMTR-MCNC: 212 MG/DL (ref 70–99)
GLUCOSE SERPL-MCNC: 158 MG/DL (ref 70–99)
GLUCOSE SERPL-MCNC: 175 MG/DL (ref 70–99)
GLUCOSE SERPL-MCNC: 186 MG/DL (ref 70–99)
GLUCOSE SERPL-MCNC: 190 MG/DL (ref 70–99)
HCO3 BLD-SCNC: 30 MMOL/L (ref 21–28)
HCO3 BLDV-SCNC: 32 MMOL/L (ref 21–28)
HCO3 BLDV-SCNC: 34 MMOL/L (ref 21–28)
HCO3 SERPL-SCNC: 28 MMOL/L (ref 22–29)
HCO3 SERPL-SCNC: 30 MMOL/L (ref 22–29)
HCT VFR BLD AUTO: 24.7 % (ref 35–47)
HGB BLD-MCNC: 7.6 G/DL (ref 11.7–15.7)
MAGNESIUM SERPL-MCNC: 1.7 MG/DL (ref 1.7–2.3)
MAGNESIUM SERPL-MCNC: 1.9 MG/DL (ref 1.7–2.3)
MAGNESIUM SERPL-MCNC: 2.1 MG/DL (ref 1.7–2.3)
MAGNESIUM SERPL-MCNC: 2.4 MG/DL (ref 1.7–2.3)
MCH RBC QN AUTO: 30.2 PG (ref 26.5–33)
MCHC RBC AUTO-ENTMCNC: 30.8 G/DL (ref 31.5–36.5)
MCV RBC AUTO: 98 FL (ref 78–100)
O2/TOTAL GAS SETTING VFR VENT: 40 %
O2/TOTAL GAS SETTING VFR VENT: 50 %
O2/TOTAL GAS SETTING VFR VENT: 80 %
OXYHGB MFR BLDV: 75 % (ref 70–75)
OXYHGB MFR BLDV: 77 % (ref 70–75)
PCO2 BLD: 45 MM HG (ref 35–45)
PCO2 BLDV: 52 MM HG (ref 40–50)
PCO2 BLDV: 53 MM HG (ref 40–50)
PEEP: 5 CM H2O
PH BLD: 7.43 [PH] (ref 7.35–7.45)
PH BLDV: 7.39 [PH] (ref 7.32–7.43)
PH BLDV: 7.42 [PH] (ref 7.32–7.43)
PHOSPHATE SERPL-MCNC: 3.8 MG/DL (ref 2.5–4.5)
PHOSPHATE SERPL-MCNC: 4.8 MG/DL (ref 2.5–4.5)
PHOSPHATE SERPL-MCNC: 4.9 MG/DL (ref 2.5–4.5)
PHOSPHATE SERPL-MCNC: 5.5 MG/DL (ref 2.5–4.5)
PLATELET # BLD AUTO: 445 10E3/UL (ref 150–450)
PO2 BLD: 100 MM HG (ref 80–105)
PO2 BLDV: 42 MM HG (ref 25–47)
PO2 BLDV: 43 MM HG (ref 25–47)
POTASSIUM SERPL-SCNC: 3.4 MMOL/L (ref 3.4–5.3)
POTASSIUM SERPL-SCNC: 3.7 MMOL/L (ref 3.4–5.3)
POTASSIUM SERPL-SCNC: 3.9 MMOL/L (ref 3.4–5.3)
POTASSIUM SERPL-SCNC: 3.9 MMOL/L (ref 3.4–5.3)
RBC # BLD AUTO: 2.52 10E6/UL (ref 3.8–5.2)
SAO2 % BLDA: 97 % (ref 92–100)
SAO2 % BLDV: 77.4 % (ref 70–75)
SAO2 % BLDV: 79.2 % (ref 70–75)
SODIUM SERPL-SCNC: 141 MMOL/L (ref 135–145)
SODIUM SERPL-SCNC: 142 MMOL/L (ref 135–145)
WBC # BLD AUTO: 14.2 10E3/UL (ref 4–11)

## 2024-09-18 PROCEDURE — 97110 THERAPEUTIC EXERCISES: CPT | Mod: GP

## 2024-09-18 PROCEDURE — 80048 BASIC METABOLIC PNL TOTAL CA: CPT

## 2024-09-18 PROCEDURE — 250N000013 HC RX MED GY IP 250 OP 250 PS 637

## 2024-09-18 PROCEDURE — 250N000012 HC RX MED GY IP 250 OP 636 PS 637

## 2024-09-18 PROCEDURE — 250N000011 HC RX IP 250 OP 636

## 2024-09-18 PROCEDURE — 250N000009 HC RX 250

## 2024-09-18 PROCEDURE — 93010 ELECTROCARDIOGRAM REPORT: CPT | Performed by: INTERNAL MEDICINE

## 2024-09-18 PROCEDURE — 82805 BLOOD GASES W/O2 SATURATION: CPT

## 2024-09-18 PROCEDURE — 36600 WITHDRAWAL OF ARTERIAL BLOOD: CPT

## 2024-09-18 PROCEDURE — 71045 X-RAY EXAM CHEST 1 VIEW: CPT

## 2024-09-18 PROCEDURE — 71045 X-RAY EXAM CHEST 1 VIEW: CPT | Mod: 26 | Performed by: STUDENT IN AN ORGANIZED HEALTH CARE EDUCATION/TRAINING PROGRAM

## 2024-09-18 PROCEDURE — 97112 NEUROMUSCULAR REEDUCATION: CPT | Mod: GP

## 2024-09-18 PROCEDURE — 84100 ASSAY OF PHOSPHORUS: CPT

## 2024-09-18 PROCEDURE — 200N000002 HC R&B ICU UMMC

## 2024-09-18 PROCEDURE — 999N000157 HC STATISTIC RCP TIME EA 10 MIN

## 2024-09-18 PROCEDURE — 97530 THERAPEUTIC ACTIVITIES: CPT | Mod: GP

## 2024-09-18 PROCEDURE — 94640 AIRWAY INHALATION TREATMENT: CPT

## 2024-09-18 PROCEDURE — 83735 ASSAY OF MAGNESIUM: CPT

## 2024-09-18 PROCEDURE — 94640 AIRWAY INHALATION TREATMENT: CPT | Mod: 76

## 2024-09-18 PROCEDURE — 999N000215 HC STATISTIC HFNC ADULT NON-CPAP

## 2024-09-18 PROCEDURE — 82805 BLOOD GASES W/O2 SATURATION: CPT | Performed by: INTERNAL MEDICINE

## 2024-09-18 PROCEDURE — 85027 COMPLETE CBC AUTOMATED: CPT | Performed by: PHYSICIAN ASSISTANT

## 2024-09-18 PROCEDURE — 250N000013 HC RX MED GY IP 250 OP 250 PS 637: Performed by: SURGERY

## 2024-09-18 PROCEDURE — 99291 CRITICAL CARE FIRST HOUR: CPT | Mod: GC | Performed by: INTERNAL MEDICINE

## 2024-09-18 PROCEDURE — 94668 MNPJ CHEST WALL SBSQ: CPT

## 2024-09-18 PROCEDURE — 87070 CULTURE OTHR SPECIMN AEROBIC: CPT

## 2024-09-18 PROCEDURE — 87077 CULTURE AEROBIC IDENTIFY: CPT

## 2024-09-18 PROCEDURE — 31720 CLEARANCE OF AIRWAYS: CPT

## 2024-09-18 PROCEDURE — 93005 ELECTROCARDIOGRAM TRACING: CPT

## 2024-09-18 PROCEDURE — 250N000013 HC RX MED GY IP 250 OP 250 PS 637: Performed by: PHYSICIAN ASSISTANT

## 2024-09-18 RX ORDER — FUROSEMIDE 10 MG/ML
40 INJECTION INTRAMUSCULAR; INTRAVENOUS ONCE
Status: COMPLETED | OUTPATIENT
Start: 2024-09-18 | End: 2024-09-18

## 2024-09-18 RX ORDER — FUROSEMIDE 10 MG/ML
40 INJECTION INTRAMUSCULAR; INTRAVENOUS EVERY 6 HOURS
Status: CANCELLED | OUTPATIENT
Start: 2024-09-18 | End: 2024-09-18

## 2024-09-18 RX ORDER — POTASSIUM CHLORIDE 7.45 MG/ML
10 INJECTION INTRAVENOUS
Status: COMPLETED | OUTPATIENT
Start: 2024-09-18 | End: 2024-09-18

## 2024-09-18 RX ORDER — MAGNESIUM SULFATE HEPTAHYDRATE 40 MG/ML
2 INJECTION, SOLUTION INTRAVENOUS ONCE
Status: COMPLETED | OUTPATIENT
Start: 2024-09-18 | End: 2024-09-18

## 2024-09-18 RX ORDER — LORAZEPAM 0.5 MG/1
0.5 TABLET ORAL 2 TIMES DAILY
Status: DISCONTINUED | OUTPATIENT
Start: 2024-09-18 | End: 2024-09-26

## 2024-09-18 RX ORDER — METOLAZONE 5 MG/1
5 TABLET ORAL ONCE
Status: COMPLETED | OUTPATIENT
Start: 2024-09-18 | End: 2024-09-18

## 2024-09-18 RX ORDER — VENLAFAXINE 37.5 MG/1
37.5 TABLET ORAL 2 TIMES DAILY WITH MEALS
Status: DISCONTINUED | OUTPATIENT
Start: 2024-09-18 | End: 2024-10-01 | Stop reason: HOSPADM

## 2024-09-18 RX ORDER — OXYCODONE HCL 5 MG/5 ML
2.5 SOLUTION, ORAL ORAL EVERY 6 HOURS PRN
Status: DISCONTINUED | OUTPATIENT
Start: 2024-09-18 | End: 2024-09-30

## 2024-09-18 RX ORDER — SODIUM CHLORIDE FOR INHALATION 3 %
3 VIAL, NEBULIZER (ML) INHALATION EVERY 12 HOURS
Status: DISCONTINUED | OUTPATIENT
Start: 2024-09-18 | End: 2024-09-22

## 2024-09-18 RX ORDER — POTASSIUM CHLORIDE 29.8 MG/ML
20 INJECTION INTRAVENOUS
Status: COMPLETED | OUTPATIENT
Start: 2024-09-19 | End: 2024-09-19

## 2024-09-18 RX ORDER — OLANZAPINE 10 MG/1
1.25 INJECTION, POWDER, LYOPHILIZED, FOR SOLUTION INTRAMUSCULAR ONCE
Status: COMPLETED | OUTPATIENT
Start: 2024-09-18 | End: 2024-09-18

## 2024-09-18 RX ORDER — ONDANSETRON 2 MG/ML
4 INJECTION INTRAMUSCULAR; INTRAVENOUS EVERY 6 HOURS PRN
Status: DISCONTINUED | OUTPATIENT
Start: 2024-09-18 | End: 2024-10-01 | Stop reason: HOSPADM

## 2024-09-18 RX ORDER — SODIUM CHLORIDE FOR INHALATION 3 %
3 VIAL, NEBULIZER (ML) INHALATION 2 TIMES DAILY
Status: DISCONTINUED | OUTPATIENT
Start: 2024-09-18 | End: 2024-09-18

## 2024-09-18 RX ORDER — FUROSEMIDE 10 MG/ML
10 INJECTION INTRAMUSCULAR; INTRAVENOUS ONCE
Status: COMPLETED | OUTPATIENT
Start: 2024-09-18 | End: 2024-09-18

## 2024-09-18 RX ADMIN — INSULIN ASPART 2 UNITS: 100 INJECTION, SOLUTION INTRAVENOUS; SUBCUTANEOUS at 19:45

## 2024-09-18 RX ADMIN — IPRATROPIUM BROMIDE 0.5 MG: 0.5 SOLUTION RESPIRATORY (INHALATION) at 11:09

## 2024-09-18 RX ADMIN — AMITRIPTYLINE HYDROCHLORIDE 25 MG: 25 TABLET, FILM COATED ORAL at 22:10

## 2024-09-18 RX ADMIN — IPRATROPIUM BROMIDE 0.5 MG: 0.5 SOLUTION RESPIRATORY (INHALATION) at 08:38

## 2024-09-18 RX ADMIN — PREDNISONE 20 MG: 20 TABLET ORAL at 08:17

## 2024-09-18 RX ADMIN — GABAPENTIN 300 MG: 250 SUSPENSION ORAL at 06:12

## 2024-09-18 RX ADMIN — ATOVAQUONE 1500 MG: 750 SUSPENSION ORAL at 08:20

## 2024-09-18 RX ADMIN — IPRATROPIUM BROMIDE 0.5 MG: 0.5 SOLUTION RESPIRATORY (INHALATION) at 01:24

## 2024-09-18 RX ADMIN — HEPARIN SODIUM 5000 UNITS: 5000 INJECTION, SOLUTION INTRAVENOUS; SUBCUTANEOUS at 03:41

## 2024-09-18 RX ADMIN — LEVALBUTEROL HYDROCHLORIDE 0.63 MG: 0.63 SOLUTION RESPIRATORY (INHALATION) at 08:38

## 2024-09-18 RX ADMIN — OLANZAPINE 1.25 MG: 10 INJECTION, POWDER, FOR SOLUTION INTRAMUSCULAR at 18:15

## 2024-09-18 RX ADMIN — Medication 1 TABLET: at 12:52

## 2024-09-18 RX ADMIN — POTASSIUM CHLORIDE 10 MEQ: 7.46 INJECTION, SOLUTION INTRAVENOUS at 19:39

## 2024-09-18 RX ADMIN — ACETAMINOPHEN 650 MG: 325 TABLET ORAL at 06:12

## 2024-09-18 RX ADMIN — LEVALBUTEROL HYDROCHLORIDE 0.63 MG: 0.63 SOLUTION RESPIRATORY (INHALATION) at 01:24

## 2024-09-18 RX ADMIN — LEVALBUTEROL HYDROCHLORIDE 0.63 MG: 0.63 SOLUTION RESPIRATORY (INHALATION) at 03:35

## 2024-09-18 RX ADMIN — VENLAFAXINE 37.5 MG: 37.5 TABLET ORAL at 18:15

## 2024-09-18 RX ADMIN — PROCHLORPERAZINE EDISYLATE 10 MG: 5 INJECTION INTRAMUSCULAR; INTRAVENOUS at 22:53

## 2024-09-18 RX ADMIN — INSULIN ASPART 1 UNITS: 100 INJECTION, SOLUTION INTRAVENOUS; SUBCUTANEOUS at 16:39

## 2024-09-18 RX ADMIN — SODIUM CHLORIDE SOLN NEBU 3% 3 ML: 3 NEBU SOLN at 20:50

## 2024-09-18 RX ADMIN — LORAZEPAM 0.5 MG: 0.5 TABLET ORAL at 20:22

## 2024-09-18 RX ADMIN — HEPARIN SODIUM 5000 UNITS: 5000 INJECTION, SOLUTION INTRAVENOUS; SUBCUTANEOUS at 12:52

## 2024-09-18 RX ADMIN — BUDESONIDE 1 MG: 1 SUSPENSION RESPIRATORY (INHALATION) at 08:38

## 2024-09-18 RX ADMIN — PROCHLORPERAZINE EDISYLATE 10 MG: 5 INJECTION INTRAMUSCULAR; INTRAVENOUS at 03:28

## 2024-09-18 RX ADMIN — ACETAMINOPHEN 650 MG: 325 TABLET ORAL at 10:57

## 2024-09-18 RX ADMIN — DORNASE ALFA 2.5 MG: 1 SOLUTION RESPIRATORY (INHALATION) at 20:50

## 2024-09-18 RX ADMIN — LEVOTHYROXINE SODIUM 25 MCG: 0.03 TABLET ORAL at 08:17

## 2024-09-18 RX ADMIN — LORAZEPAM 0.5 MG: 0.5 TABLET ORAL at 03:41

## 2024-09-18 RX ADMIN — PREDNISONE 40 MG: 20 TABLET ORAL at 08:17

## 2024-09-18 RX ADMIN — FUROSEMIDE 10 MG: 10 INJECTION, SOLUTION INTRAMUSCULAR; INTRAVENOUS at 22:10

## 2024-09-18 RX ADMIN — LEVALBUTEROL HYDROCHLORIDE 0.63 MG: 0.63 SOLUTION RESPIRATORY (INHALATION) at 20:50

## 2024-09-18 RX ADMIN — IPRATROPIUM BROMIDE 0.5 MG: 0.5 SOLUTION RESPIRATORY (INHALATION) at 03:35

## 2024-09-18 RX ADMIN — INSULIN ASPART 1 UNITS: 100 INJECTION, SOLUTION INTRAVENOUS; SUBCUTANEOUS at 03:44

## 2024-09-18 RX ADMIN — INSULIN ASPART 1 UNITS: 100 INJECTION, SOLUTION INTRAVENOUS; SUBCUTANEOUS at 09:36

## 2024-09-18 RX ADMIN — MONTELUKAST 10 MG: 10 TABLET, FILM COATED ORAL at 22:10

## 2024-09-18 RX ADMIN — INSULIN ASPART 1 UNITS: 100 INJECTION, SOLUTION INTRAVENOUS; SUBCUTANEOUS at 00:37

## 2024-09-18 RX ADMIN — IPRATROPIUM BROMIDE 0.5 MG: 0.5 SOLUTION RESPIRATORY (INHALATION) at 20:50

## 2024-09-18 RX ADMIN — LEVALBUTEROL HYDROCHLORIDE 0.63 MG: 0.63 SOLUTION RESPIRATORY (INHALATION) at 11:09

## 2024-09-18 RX ADMIN — METOLAZONE 5 MG: 5 TABLET ORAL at 10:57

## 2024-09-18 RX ADMIN — INSULIN ASPART 2 UNITS: 100 INJECTION, SOLUTION INTRAVENOUS; SUBCUTANEOUS at 12:52

## 2024-09-18 RX ADMIN — HEPARIN SODIUM 5000 UNITS: 5000 INJECTION, SOLUTION INTRAVENOUS; SUBCUTANEOUS at 20:05

## 2024-09-18 RX ADMIN — CETIRIZINE HYDROCHLORIDE 10 MG: 10 TABLET, FILM COATED ORAL at 08:17

## 2024-09-18 RX ADMIN — SODIUM CHLORIDE SOLN NEBU 3% 3 ML: 3 NEBU SOLN at 11:09

## 2024-09-18 RX ADMIN — ACETAMINOPHEN 650 MG: 325 TABLET ORAL at 18:15

## 2024-09-18 RX ADMIN — Medication 40 MG: at 08:20

## 2024-09-18 RX ADMIN — DORNASE ALFA 2.5 MG: 1 SOLUTION RESPIRATORY (INHALATION) at 11:09

## 2024-09-18 RX ADMIN — GABAPENTIN 300 MG: 250 SUSPENSION ORAL at 16:29

## 2024-09-18 RX ADMIN — IPRATROPIUM BROMIDE 0.5 MG: 0.5 SOLUTION RESPIRATORY (INHALATION) at 16:00

## 2024-09-18 RX ADMIN — FUROSEMIDE 40 MG: 10 INJECTION, SOLUTION INTRAVENOUS at 16:29

## 2024-09-18 RX ADMIN — GABAPENTIN 300 MG: 250 SUSPENSION ORAL at 22:11

## 2024-09-18 RX ADMIN — LEVALBUTEROL HYDROCHLORIDE 0.63 MG: 0.63 SOLUTION RESPIRATORY (INHALATION) at 16:00

## 2024-09-18 RX ADMIN — POTASSIUM CHLORIDE 10 MEQ: 7.46 INJECTION, SOLUTION INTRAVENOUS at 20:45

## 2024-09-18 RX ADMIN — ACETAMINOPHEN 650 MG: 325 TABLET ORAL at 00:32

## 2024-09-18 RX ADMIN — FUROSEMIDE 40 MG: 10 INJECTION, SOLUTION INTRAVENOUS at 10:57

## 2024-09-18 RX ADMIN — MAGNESIUM SULFATE HEPTAHYDRATE 2 G: 2 INJECTION, SOLUTION INTRAVENOUS at 18:15

## 2024-09-18 RX ADMIN — SODIUM CHLORIDE SOLN NEBU 3% 3 ML: 3 NEBU SOLN at 08:38

## 2024-09-18 ASSESSMENT — ACTIVITIES OF DAILY LIVING (ADL)
ADLS_ACUITY_SCORE: 63
ADLS_ACUITY_SCORE: 59
ADLS_ACUITY_SCORE: 63
ADLS_ACUITY_SCORE: 59
ADLS_ACUITY_SCORE: 63
ADLS_ACUITY_SCORE: 59
ADLS_ACUITY_SCORE: 63
ADLS_ACUITY_SCORE: 59
ADLS_ACUITY_SCORE: 63
ADLS_ACUITY_SCORE: 63
ADLS_ACUITY_SCORE: 59
ADLS_ACUITY_SCORE: 63
ADLS_ACUITY_SCORE: 59
ADLS_ACUITY_SCORE: 59
ADLS_ACUITY_SCORE: 63

## 2024-09-18 NOTE — PROGRESS NOTES
MEDICAL ICU PROGRESS NOTE  09/18/2024    Date of Service (when I saw the patient): 09/18/2024    ASSESSMENT: Massiel Flaherty is a 53 year old female with PMH Anxiety/depression, fibromyalgia, c/f nonepileptic seizures not on AEDs, hypothyroidism, asthma, HLD, MURIEL on CPAP, expressive aphasia, hypertension  who was admitted on 8/3/2024 for fatigue, fever and dyspnea and intubated 8/6/24 at OSH for ARDS, proned and paralyzed without improvement. Transferred to [facility] 8/8/24, hypoxic with high plateaus/peaks and placed on VV ECMO and CRRT. Decannulated from VV ECMO 8/18 and transferred to MICU 8/26/24 for ongoing management. Extubated 8/27 then reintubated 8/29 iso worsening AHRF and pseudomonas pneumonia.     CHANGES and MAJOR THINGS TODAY:  - Lasix 40 mg IV x 2 + metolazone 5 goal net output 1.5 to 2L  - Prednisone taper 20 mg x 5 days  - Aggressive suction  - Trial of dornase  - Ativan to BID, decreasing oxycodone to 2.5  - CMV recheck  - Sputum culture  - G6PD ok for dapsone, but remaining on Atovaquone  - Restarted psych meds at 1/2 dose    Neuro:  # Pain and sedation  # Concern for ICU delirium   # Hx Fibromyalgia   # Hx myalgic encephalomyelitis   # Hx anxiety/depression  - Monitor neurological status. Delirium preventions and precautions.   - Pain: Scheduled: tylenol, increased oxycodone 10 mg q4hr, continue Ativan 2 mg every q6hr   PRN: tylenol, oxycodone   9/17: Oxycodone changed to 5 mg q4h PRN, Ativan 0.5 mg q8h PRN  9/18: Oxycodone changed to 2.5 mg q6h PRN, Ativan 0.5 mg BID PRN  - Sedation plan: now just on dilaudid, previous was also on precedex, midazolam and ketamine as well. Will plan to shut off dilaudid to optimize ability to extubate  - Continue scheduled oxycodone and ativan   - Gabapentin to 300mg TID   - Seroquel 25 mg BID and 50 mg HS, discontinued 9/17 for maximal wakefulness drive  - Restarted psych meds at 1/2 dose (venlafaxine and amitriptyline)    # Hx spells with staring and BUE  posturing previously described as nonepileptic seizures   # Hx of GTC seizures and myoclonic epilepsy, weaned off AEDs   # Diffuse cerebral edema - improved  - Sodium goals liberalized to 140-145  - 8/21: MRI Brain: no acute intracranial pathology. No findings to suggest anoxic brain injury.     Pulmonary:  # Acute hypoxic respiratory failure c/b ARDS: Improving   # Pseudomonas pneumonia (s/p treatment)  # Mechanical ventilation  # s/p VV ECMO 8/8 - 8/18   # Hx CAP  # Asthma  # MURIEL on home CPAP  PFT dated 9/8/2020 demonstrated normal spirometry with mild diffusion impairment and mild restriction (FEV1/FVC 80%, FEV1 2.59, DLCO 40%). CT abdomen chest 3/9/2020 demonstrated scarring and fibrosis bilaterally which correlated with the decreased DLCO. The patient also has a history of MURIEL on CPAP therapy as currently managed by her provider in [place]. Unclear what triggered ARDS or why she has had such severe hospital course, further investigated ILD but results all unremarkable. Extubated 8/27, reintubated 8/29 for worsening O2 demands and diffuse opacities iso new/recurrent psuedomonas pneumonia. Bronch with BAL 8/30, pseudomonas growing as below.   - ILD w/u aldolase, EVELIO, RF, CCP, ANCA, MPO, SSA Ro and La, Scleroderma antibody, Radha 1,  hypersensity pneumonitis, IGG subclasses,  aspergillus, blastomyces, histoplasma neg  - SpO2 goals >92%, PaO2 goals >60   - PTA singular at bedtime if able  - Scheduled nebs   - Discontinued Pulmicort given steroids as below   - Lung protective ventilation strategies given ARDS  - Methylpred 125mg q6H x 24h 8/30 --> transition to 20 mg dex x 5 days, followed by 10mg x 3 days   + Continue prednisone 40 mg daily, with plan to taper down to 20 mg in 2 days.  - 9/16: tolerated pressure support, ABG stable on SBT, possible extubation today  - 9/16 extubated to BiPAP, tolerated oxymask on 6 L overnight for 8 hours beforereturning to BiPAP at 10/5  - May still need trach due to ICU  myopathy  - 9/17: Started levalbuterol nebs, PTA pulmicort nebs, saline nebs, theophyline for increased respiratory drive   - Changed theophyline to aminophylline gtt per pharmacy (discontinued after loading dose due to tachycardia)  - 9/18: prednisone taper 20 mg x 5 days, lasix 40 mg IV x 2 + metolazone 5 goal net output 1.5 to 2L, trial of dornase    Cardiovascular:  # Hyperlipidemia  # Hx HTN   - MAP goal >65, SBP goals >110  - Has remained off pressors since 9/9   - PTA atorvastatin  - PTA clonidine held while sedated  - Lower extremity ultrasound without vascular pathology, no hematoma or clots  - Monitor discoloration of extremities for necrosis  - PRN hydralazine for SBP > 200     Sinus Tachycardia  Likely 2/2 fluid removal, sepsis, pain and sedation as above    GI/Nutrition:  # Severe malnutrition (see RD note)   # Non-infectious Diarrhea  # GERD   - Protonix (home medication)   - Nutrition consulted, appreciate recs  - Diet: TF via NJ, tolerating   - Hold bowel regimen d/t loose stools  - Continue scheduled multivitamin, banatrol, prosource packet  - loperamide PRN, consider scheduling if c dif is negative, c diff negative. Stool output concern due to improper documentation, no excessive output  - Rectal tube, continues with high output    Renal/Fluids/Electrolytes:  # Acute kidney injury: Improving   # Renal failure: Improving   # AGMA: Improving   Baseline Cr approx 0.6. Pt was anuric here but now making some urine, likely prerenal due to shock.  - Continues to improve, holding off on iHD  - Making good urine output, possible post ATN diuresis?  - Strict I&Os  - Avoid nephrotoxins as able  - Q6 hour electrolytes given increased urine output and ongoing high rectal tube output  - diuresis with lasix 40 mg for volume overload  - Additional diuresis with metolazone for net output goal of 1.5-2L today    Endocrine:  # Steroid and stress induced hyperglycemia  # Hypothyroidism   - Goal to keep BG < 180 for  optimal wound healing  - Continue medium sliding scale insulin   - Continue PTA synthroid    ID:  # Leukocytosis (Resolved)  # Psuedomonas pneumonia (s/p treatment)  # Hx CAP  Respiratory cx 8/29 pseudomonas pneumonia. Intermediate susceptability to meropenem, more significant sensitivities to ciprofloxacin  - Continue to monitor fever curve and inflammatory markers as appropriate  - ID now signed off     Abx  - Meropenem 8/29 - 9/1  - Vancomycin 8/29 - 8/30  - Tobramycin x 1 8/29  - Vancomycin 9/5 - 9/8    - Tobramycin nebs BID 9/1 - 9/11  - Ciprofloxacin infusion 9/1 - 9/11  - Metronidazole 9/6 - 9/11  - Atovaquone 9/11 -     PJP Ppx  Given Dex-ARDS Massiel ferraro remain on steriods for > 3 weeks so will initiate PJP ppx. Given history of allergy to sulfa, will obtain G6PD test to determine if dapsone can be used: levels are ok, however continuing atovaquone.   - Continue atovaquone 1,500 mg daily     CMV viremia  CMV PCR in blood positive 8/31.   - Ganciclovir 9/6 - 9/8, discontinued given ID recs  - trend CMV 9/16  - CMV levels recheck    # HSV-1  Mouth sores present, which could have viral etiology. 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 Monitor and trend.   - CTAP 8/30 due to acute hemoglobin drop requiring transfusion of 2 x 1 unit of blood 12 hours apart. Negative for acute bleed    Musculoskeletal/Rheum:  # Alopecia  - Hold PTA hydroxychloroquine     # Weakness and deconditioning of critical illness  # Left ankle injury pre-hospitalization    - Physical and occupational therapy when able.     Skin:  # BLE scattered ecchymosis  # Left toe duskiness  - Bilateral lower leg ecchymosis  - WOC consult  - Ecchymosis to left foot, most noticeable to 3rd and 4th toes    General Cares/Prophylaxis:  DVT Prophylaxis: SubQ heparin   GI Prophylaxis: PPI  Restraints:  no  Code Status: Full Code    Lines/tubes/drains:  - ETT (8/29)  - NJ (8/9)  - LIJ CVC (8/8)  - Radial a-line (8/29) Removed 9/17  - PIV x3  - Rectal tube (8/17)  - Tunneled HD line (8/23)    Disposition:  - Medical ICU     Massiel Flaherty was seen and discussed with my supervising physician(s).    Doyle Pelaez, MS4  [employer]  [facility] ICU 1 Service  Electronically signed on 9/18/2024 at 7:59 AM    Note reviewed and findings discussed with MS4. The note is reflective of the assessment and plan for the patient.    Fuentes Fowler MD-PhD  PSTP- PGY - 1    MICU STAFF CRITICAL CARE PROGRESS NOTE:    I saw and examined the patient with the MICU team and concur with findings and A&P as detailed in Dr. Fowler's note of today.  See my independent note of today.    Janes Osborne MD  MICU Staff  2280        Clinically Significant Risk Factors        # Hypokalemia: Lowest K = 3.1 mmol/L in last 2 days, will replace as needed  # Hypernatremia: Highest Na = 148 mmol/L in last 2 days, will monitor as appropriate      # Hypoalbuminemia: Lowest albumin = 2.2 g/dL at 8/10/2024  9:47 AM, will monitor as appropriate                # Severe Malnutrition: based on nutrition assessment      # Financial/Environmental Concerns: none            ====================================  INTERVAL HISTORY:   Tolerated Oxymask for 8 hours overnight, then required BiPAP  2100 - Some increased WOB,  x-ray, ABG and BMP    2200 - increased pulmonary edema, Na 148, ABG OK.    2240 - more tachypnea, given 20 lasix IV, 500 chlorothiazide IV, 500mL D5W, FWF 100mL q1h     Still somnolent, but breathing on BiPAP. Able to attempt response to commands    OBJECTIVE:   1. VITAL SIGNS:   Temp:  [98.8  F (37.1  C)-101.1  F (38.4  C)] 99.1  F (37.3  C)  Pulse:  [105-132] 111  Resp:  [14-27] 24  BP: (119-159)/() 145/92  MAP:  [114 mmHg-127 mmHg] 114 mmHg  Arterial Line BP: (111-132)/(105-124)  111/105  FiO2 (%):  [40 %-80 %] 50 %  SpO2:  [92 %-100 %] 100 %  FiO2 (%): 50 %, Resp: 24    2. INTAKE/ OUTPUT:   I/O last 3 completed shifts:  In: 4100.4 [I.V.:520.4; NG/GT:2380]  Out: 3725 [Urine:3250; Stool:475]    3. PHYSICAL EXAMINATION:  General: Awake on BiPAP, arousable, but sleepy  HEENT: Normocephalic, atraumatic  Neuro: RASS 0, following commands but delayed response. Able to move digits with intention, equally, x4  Pulm/Resp: CTA  CV: Sinus tachycardia, S1/2, no m/r/g  Abdomen: Soft, tender, bowel sounds hyperactive  : deferred  Incisions/Skin: lower leg ecchymosis present and scattered. Some bluish discoloration of the finger tips, capillary refill normal    4. LABS:   Arterial Blood Gases   Recent Labs   Lab 09/18/24  0119 09/17/24  0356 09/17/24  0236 09/16/24 2057   PH 7.43 7.40 7.41 7.40   PCO2 45 44 38 34*   PO2 100 110* 110* 128*   HCO3 30* 27 24 21     Complete Blood Count   Recent Labs   Lab 09/18/24  0407 09/17/24  0356 09/16/24  2202 09/16/24  0429   WBC 14.2* 10.6 10.3 10.2   HGB 7.6* 7.7* 7.3* 7.4*    317 298 246     Basic Metabolic Panel  Recent Labs   Lab 09/18/24  0407 09/18/24  0343 09/18/24  0037 09/17/24  2155 09/17/24  2031 09/17/24  1536 09/17/24  1532 09/17/24  1114     --   --  142  --  142  --  143   POTASSIUM 3.9  --   --  3.9  --  4.4  --  4.4   CHLORIDE 103  --   --  103  --  103  --  105   CO2 28  --   --  25  --  26  --  26   BUN 33.3*  --   --  35.8*  --  39.1*  --  41.5*   CR 0.35*  --   --  0.39*  --  0.38*  --  0.37*   * 176* 182* 177*   < > 246*   < > 273*  292*    < > = values in this interval not displayed.     Liver Function Tests  Recent Labs   Lab 09/11/24  1158   ALBUMIN 2.9*     Coagulation Profile  No lab results found in last 7 days.    5. RADIOLOGY:   No results found for this or any previous visit (from the past 24 hour(s)).

## 2024-09-18 NOTE — PLAN OF CARE
ICU End of Shift Summary. See flowsheets for vital signs and detailed assessment.    Changes this shift: Alert. Oriented to self and place. Follows commands. Scheduled ativan decrease. Endorses pain, tylenol scheduled and PRN. Tmax 100.9. -105. Maps>65. HiFlow 40%, 40L. Clearing secretions. NT suction x3. Emesis x3. Rectal tube in place. TF slowed to 20 for nausea. 60q4 flush. Diureses with 40 of lasix x2. Adequate UO.    Plan: Encourage pulm hygiene. Continue to monitor and report changes to MICU 1.    Goal Outcome Evaluation:      Plan of Care Reviewed With: patient    Overall Patient Progress: improvingOverall Patient Progress: improving    Outcome Evaluation: high flow NC. 40%, 40L. minimal NT suction needed through the day. productive cough.

## 2024-09-18 NOTE — PROGRESS NOTES
MICU STAFF CRITICAL CARE PROGRESS NOTE:     I saw and examined the patient with the MICU team and concur with findings and A&P as detailed in Dr. Norton note of today     52 yo  PMH Anxiety/depression, fibromyalgia;; asthma, HLD; MURIEL on CPAP; HTN; expressive aphasia; hypothyroidism; ? seizures (no Rx)     8/3 admit @ OSH for fatigue, fever && SOB  8/6 Intubated @ OSH and transferred 8/8 to [facility] from OSH (?) for Pneumonia, ARDS and high vent need placed on VV ECMO and CRRT  On VV ECMO decannulated 8-18  Transferred to MICU team 8-26  Extubated 8-27 and reintubated 8/27 prob due to worsening AHRF & Pseudomonas VAP required paralsis, veletri, and high steroid and sedation Rx  Has completed treatment for Pseudomonas VAP.  No recent pressors  Overdue' for trach but making major weaning progress  Issues with ventilator dyssynchrony - adjust insp time and Vt  Significant neuromuscular weakness  Off CRRT as of 9/12; self-diuresing  Slowly waking up on oral BZDP & hydromorphone    Overnight events reviewed - was on BiPAP and intermittently on HFNC  Aspiration with emesis @ 3 AM with stable resp status    Some difficulty with secretions coughing but hard to bring sputum occ and occ NT suctioning  Some nausea and TF paused for 1 hour now; denies pain  Temp 100.4 axillary this AM HFNC 50% 4L  More awake and interactive  Moving hands a bit better and moving all 4 with true wiggling and squeezing hands  Lungs some lower crackles w/o rhonchi, wheezes  RRR Nl S1 and S2 w/o m/g/r appreciated  Abd soft nontender  I:O  Net + 800 mls yest (good UO after Lasix)  Mildly puffy  Hgb 7.6   wBC 14.2K (10.6 yest) Plt 445  Nl lytes K 3.9  Mg 2.1; PO4 3.8;  Ca 8.6  ABG 7.43 / 45 / 100 on HFNC  CXR: lowish lung volume  CDiff negative    CNS / Pain & Sedation / h/o anxiety/depression;  h/o fibromyalgia; h/o myalgic encephalomyelitis / ? ICU Delirium / h/o  spells  - ? Sz / GTC seizures / myoclonic epilepsy / diffuse cerebral edema -  improved  Pain: Scheduled: tylenol, yesterday reduced oxycodone to prn and Ativan 0.5 mg every q8hr   PRN: tylenol, oxycodone   Sedation: Seroquel 25 mg BID and 50 mg HS; reduce dilaudid to 1.5; versed,ketamine & Propofol now off  Continue pta Gabapentin to 300mg TID   PTA Venlafaxine. Clonidine, and Amitriptyline discontinued while sedated  Try decrease ativan to 0.5 BID;       Acute hypoxic respiratory failure c/w ARDS: Improving  / Pseudomonas pneumonia / Mechanical ventilation / s/p VV ECMO 8/8 - 8/18 / h/o asthma / h/o CAP / OSAS on CPAP  Remote PFT 9/8/20 mild restriction & diffusion impairment   CT CAP 3/9/2020 bilat lung scarring/fibrosis bilaterally  Extubated 8/27, reintubated 8/29 for worsening O2 demands and diffuse opacities iso new/recurrent psuedomonas pneumonia.   ILD w/unit(s) was negative  Got aminophylline loading dose and was starting maintenance when got tachy and stopped  On scheduled duonebs & singulair; Pulmicort held while on systemic steroids  Chest physiotherapy 4x /day  Steroid taper  - @ 40 mg/day at present; may hold off weaning further  On PJP prophylaxis  Plan: Metalozone & Lasix with goal of 1.5 - 2L NET out; Reassess this afternoon  DNAse BID nebs     CV / Hyperlipidemia / h/o HTN / Sinus Tachycardia  MAP goal >65, off pressors since 9/9   PTA atorvastatin restarted  PTA clonidine held while sedated  Monitor extremity discoloration for necrosis  PRN hydralazine for SBP > 200      Nnutrition / Non-infectious Diarrhea / GERD   Protonix (home medication)   Tolerating NJ TF  Rectal tube for high output / Hold bowel regimen d/t loose stools (loperamide);    Continue scheduled multivitamin, banatrol, prosource packet  Imodium prn     CHACORTA - Improving / AGMA / Hypernatremia  Baseline Cr approx 0.6;  was anuric  now making some urine, likely prerenal due to shock; holding off on iHD      Hyperglycemia (Steroids & Stress) / Hypothyroidism   - Goal to keep BG < 180 for optimal wound healing  -  Continue medium sliding scale insulin   - Continue PTA synthroid     ID / Leukocytosis / Pseudomonas pneumonia / Hx CAP / CMV Viremia (PCR+ 8/31) / HSV?  Respiratory cx 8/29 pseudomonas pneumonia. Intermediate susceptability to meropenem, more significant sensitivities to ciprofloxacin  Febrile overnight, BC, UC sent and repeat chest xray                Off Abx    PJP Ppx initiating   h/o sulfa allergy; GPD WNL  Continue atovaquone 1,500 mg daily; may switch to dapsone    CMV viremia  With fever will reculture and check PCR    HSV-1  Mouth sores present, which could have viral etiology. 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  - Acyclovir 400mg BID x5 days (8/22-8/27)     Anemia of critical illness  - Transfuse if hgb <7.0 or hypoperfusion  - negative CTAP 8/30 due to acute Hgb drop (got 2 U RBCs)     Musculoskeletal/Rheum:  # Alopecia  Hold PTA hydroxychloroquine      Weakness and deconditioning of critical illness  Left ankle injury pre-hospitalization; PT/OT when able      BLE scattered ecchymosis / Left toe duskiness/ Bilateral lower leg ecchymosis  - WOC consult  - Ecchymosis to left foot, most noticeable to 3rd and 4th toes     Lines/tubes/drains: ETT (8/29); NJ (8/9); LIJ CVC (8/8); Radial A-line (8/29); PIV x3; Rectal tube (8/17)l  Tunneled HD line (8/23)     (Critical Care 45 minutes cumulative thus far today)     Janes Osborne MD  MICU Staff  [phone]

## 2024-09-18 NOTE — PLAN OF CARE
ICU End of Shift Summary. See flowsheets for vital signs and detailed assessment.    Changes this shift: Oriented to self. Intermittently follows commands. Visually tracking. Denies pain. TMAX 101.1, receiving scheduled Tylenol. Afebrile this morning. WBC increased. Sinus tachy, rates 110s. Did not tolerate switch to BiPAP at HS. Switched back to HFNC FiO2 40%, 40 LPM. Frequent coughing and requiring frequent suction. Pt had small emesis this morning around 0400, c/f aspiration. MD notified, CXR and VBG drawn. Electrolytes WNL this morning. Sodium normalizing, free water flushes decreased to 60 mL q4h.     Plan: Decrease FiO2 as able. Continue plan of care.       Goal Outcome Evaluation:      Plan of Care Reviewed With: patient    Overall Patient Progress: no changeOverall Patient Progress: no change    Outcome Evaluation: see note

## 2024-09-19 ENCOUNTER — APPOINTMENT (OUTPATIENT)
Dept: ULTRASOUND IMAGING | Facility: CLINIC | Age: 54
DRG: 003 | End: 2024-09-19
Attending: INTERNAL MEDICINE
Payer: MEDICAID

## 2024-09-19 ENCOUNTER — APPOINTMENT (OUTPATIENT)
Dept: PHYSICAL THERAPY | Facility: CLINIC | Age: 54
DRG: 003 | End: 2024-09-19
Attending: INTERNAL MEDICINE
Payer: MEDICAID

## 2024-09-19 ENCOUNTER — APPOINTMENT (OUTPATIENT)
Dept: GENERAL RADIOLOGY | Facility: CLINIC | Age: 54
DRG: 003 | End: 2024-09-19
Payer: MEDICAID

## 2024-09-19 LAB
ALBUMIN UR-MCNC: 30 MG/DL
ALLEN'S TEST: YES
AMORPH CRY #/AREA URNS HPF: ABNORMAL /HPF
ANION GAP SERPL CALCULATED.3IONS-SCNC: 13 MMOL/L (ref 7–15)
ANION GAP SERPL CALCULATED.3IONS-SCNC: 13 MMOL/L (ref 7–15)
ANION GAP SERPL CALCULATED.3IONS-SCNC: 14 MMOL/L (ref 7–15)
ANION GAP SERPL CALCULATED.3IONS-SCNC: 14 MMOL/L (ref 7–15)
APPEARANCE UR: ABNORMAL
ATRIAL RATE - MUSE: 123 BPM
BASE EXCESS BLDA CALC-SCNC: 2.4 MMOL/L (ref -3–3)
BASE EXCESS BLDV CALC-SCNC: 4.5 MMOL/L (ref -3–3)
BASE EXCESS BLDV CALC-SCNC: 5.3 MMOL/L (ref -3–3)
BASE EXCESS BLDV CALC-SCNC: 5.6 MMOL/L (ref -3–3)
BASE EXCESS BLDV CALC-SCNC: 5.7 MMOL/L (ref -3–3)
BASE EXCESS BLDV CALC-SCNC: 8.2 MMOL/L (ref -3–3)
BILIRUB UR QL STRIP: NEGATIVE
BUN SERPL-MCNC: 27.1 MG/DL (ref 6–20)
BUN SERPL-MCNC: 29.1 MG/DL (ref 6–20)
BUN SERPL-MCNC: 29.3 MG/DL (ref 6–20)
BUN SERPL-MCNC: 29.8 MG/DL (ref 6–20)
C DIFF TOX B STL QL: NEGATIVE
CALCIUM SERPL-MCNC: 8.7 MG/DL (ref 8.8–10.4)
CALCIUM SERPL-MCNC: 8.8 MG/DL (ref 8.8–10.4)
CALCIUM SERPL-MCNC: 8.8 MG/DL (ref 8.8–10.4)
CALCIUM SERPL-MCNC: 9.3 MG/DL (ref 8.8–10.4)
CHLORIDE SERPL-SCNC: 102 MMOL/L (ref 98–107)
CHLORIDE SERPL-SCNC: 103 MMOL/L (ref 98–107)
CHLORIDE SERPL-SCNC: 104 MMOL/L (ref 98–107)
CHLORIDE SERPL-SCNC: 99 MMOL/L (ref 98–107)
COHGB MFR BLD: 98.6 % (ref 96–97)
COLOR UR AUTO: YELLOW
CREAT SERPL-MCNC: 0.42 MG/DL (ref 0.51–0.95)
CREAT SERPL-MCNC: 0.54 MG/DL (ref 0.51–0.95)
CREAT SERPL-MCNC: 0.54 MG/DL (ref 0.51–0.95)
CREAT SERPL-MCNC: 0.57 MG/DL (ref 0.51–0.95)
CRP SERPL-MCNC: 39.1 MG/L
CRP SERPL-MCNC: 47.8 MG/L
DIASTOLIC BLOOD PRESSURE - MUSE: NORMAL MMHG
EGFRCR SERPLBLD CKD-EPI 2021: >90 ML/MIN/1.73M2
ERYTHROCYTE [DISTWIDTH] IN BLOOD BY AUTOMATED COUNT: 21 % (ref 10–15)
GLUCOSE BLDC GLUCOMTR-MCNC: 137 MG/DL (ref 70–99)
GLUCOSE BLDC GLUCOMTR-MCNC: 167 MG/DL (ref 70–99)
GLUCOSE BLDC GLUCOMTR-MCNC: 177 MG/DL (ref 70–99)
GLUCOSE BLDC GLUCOMTR-MCNC: 180 MG/DL (ref 70–99)
GLUCOSE BLDC GLUCOMTR-MCNC: 209 MG/DL (ref 70–99)
GLUCOSE BLDC GLUCOMTR-MCNC: 212 MG/DL (ref 70–99)
GLUCOSE BLDC GLUCOMTR-MCNC: 242 MG/DL (ref 70–99)
GLUCOSE SERPL-MCNC: 161 MG/DL (ref 70–99)
GLUCOSE SERPL-MCNC: 178 MG/DL (ref 70–99)
GLUCOSE SERPL-MCNC: 243 MG/DL (ref 70–99)
GLUCOSE SERPL-MCNC: 267 MG/DL (ref 70–99)
GLUCOSE UR STRIP-MCNC: NEGATIVE MG/DL
HCO3 BLD-SCNC: 27 MMOL/L (ref 21–28)
HCO3 BLDV-SCNC: 30 MMOL/L (ref 21–28)
HCO3 BLDV-SCNC: 30 MMOL/L (ref 21–28)
HCO3 BLDV-SCNC: 31 MMOL/L (ref 21–28)
HCO3 BLDV-SCNC: 31 MMOL/L (ref 21–28)
HCO3 BLDV-SCNC: 34 MMOL/L (ref 21–28)
HCO3 SERPL-SCNC: 26 MMOL/L (ref 22–29)
HCO3 SERPL-SCNC: 27 MMOL/L (ref 22–29)
HCO3 SERPL-SCNC: 27 MMOL/L (ref 22–29)
HCO3 SERPL-SCNC: 29 MMOL/L (ref 22–29)
HCT VFR BLD AUTO: 26.6 % (ref 35–47)
HGB BLD-MCNC: 8 G/DL (ref 11.7–15.7)
HGB UR QL STRIP: ABNORMAL
INTERPRETATION ECG - MUSE: NORMAL
KETONES UR STRIP-MCNC: NEGATIVE MG/DL
LACTATE SERPL-SCNC: 2.3 MMOL/L (ref 0.7–2)
LACTATE SERPL-SCNC: 2.8 MMOL/L (ref 0.7–2)
LEUKOCYTE ESTERASE UR QL STRIP: NEGATIVE
MAGNESIUM SERPL-MCNC: 1.9 MG/DL (ref 1.7–2.3)
MAGNESIUM SERPL-MCNC: 2.2 MG/DL (ref 1.7–2.3)
MAGNESIUM SERPL-MCNC: 2.3 MG/DL (ref 1.7–2.3)
MAGNESIUM SERPL-MCNC: 2.5 MG/DL (ref 1.7–2.3)
MCH RBC QN AUTO: 30.1 PG (ref 26.5–33)
MCHC RBC AUTO-ENTMCNC: 30.1 G/DL (ref 31.5–36.5)
MCV RBC AUTO: 100 FL (ref 78–100)
MRSA DNA SPEC QL NAA+PROBE: NEGATIVE
NITRATE UR QL: NEGATIVE
O2/TOTAL GAS SETTING VFR VENT: 53 %
O2/TOTAL GAS SETTING VFR VENT: 60 %
O2/TOTAL GAS SETTING VFR VENT: 70 %
OXYHGB MFR BLDV: 65 % (ref 70–75)
OXYHGB MFR BLDV: 66 % (ref 70–75)
OXYHGB MFR BLDV: 73 % (ref 70–75)
OXYHGB MFR BLDV: 74 % (ref 70–75)
OXYHGB MFR BLDV: 74 % (ref 70–75)
P AXIS - MUSE: 58 DEGREES
PCO2 BLD: 39 MM HG (ref 35–45)
PCO2 BLDV: 47 MM HG (ref 40–50)
PCO2 BLDV: 49 MM HG (ref 40–50)
PCO2 BLDV: 49 MM HG (ref 40–50)
PCO2 BLDV: 50 MM HG (ref 40–50)
PCO2 BLDV: 54 MM HG (ref 40–50)
PEEP: 8 CM H2O
PH BLD: 7.44 [PH] (ref 7.35–7.45)
PH BLDV: 7.39 [PH] (ref 7.32–7.43)
PH BLDV: 7.41 [PH] (ref 7.32–7.43)
PH BLDV: 7.42 [PH] (ref 7.32–7.43)
PH UR STRIP: 5.5 [PH] (ref 5–7)
PHOSPHATE SERPL-MCNC: 4.6 MG/DL (ref 2.5–4.5)
PHOSPHATE SERPL-MCNC: 4.8 MG/DL (ref 2.5–4.5)
PHOSPHATE SERPL-MCNC: 4.8 MG/DL (ref 2.5–4.5)
PHOSPHATE SERPL-MCNC: 5.3 MG/DL (ref 2.5–4.5)
PLATELET # BLD AUTO: 513 10E3/UL (ref 150–450)
PO2 BLD: 83 MM HG (ref 80–105)
PO2 BLDV: 36 MM HG (ref 25–47)
PO2 BLDV: 38 MM HG (ref 25–47)
PO2 BLDV: 42 MM HG (ref 25–47)
PO2 BLDV: 43 MM HG (ref 25–47)
PO2 BLDV: 43 MM HG (ref 25–47)
POTASSIUM SERPL-SCNC: 3.8 MMOL/L (ref 3.4–5.3)
POTASSIUM SERPL-SCNC: 4.3 MMOL/L (ref 3.4–5.3)
POTASSIUM SERPL-SCNC: 4.3 MMOL/L (ref 3.4–5.3)
POTASSIUM SERPL-SCNC: 4.4 MMOL/L (ref 3.4–5.3)
POTASSIUM SERPL-SCNC: 4.8 MMOL/L (ref 3.4–5.3)
PR INTERVAL - MUSE: 130 MS
PROCALCITONIN SERPL IA-MCNC: 0.51 NG/ML
QRS DURATION - MUSE: 68 MS
QT - MUSE: 316 MS
QTC - MUSE: 452 MS
R AXIS - MUSE: 8 DEGREES
RBC # BLD AUTO: 2.66 10E6/UL (ref 3.8–5.2)
RBC URINE: 4 /HPF
SA TARGET DNA: NEGATIVE
SAO2 % BLDA: 96 % (ref 92–100)
SAO2 % BLDV: 66.5 % (ref 70–75)
SAO2 % BLDV: 67.2 % (ref 70–75)
SAO2 % BLDV: 74.5 % (ref 70–75)
SAO2 % BLDV: 76 % (ref 70–75)
SAO2 % BLDV: 76.1 % (ref 70–75)
SODIUM SERPL-SCNC: 141 MMOL/L (ref 135–145)
SODIUM SERPL-SCNC: 141 MMOL/L (ref 135–145)
SODIUM SERPL-SCNC: 144 MMOL/L (ref 135–145)
SODIUM SERPL-SCNC: 145 MMOL/L (ref 135–145)
SP GR UR STRIP: 1.03 (ref 1–1.03)
SYSTOLIC BLOOD PRESSURE - MUSE: NORMAL MMHG
T AXIS - MUSE: 40 DEGREES
UROBILINOGEN UR STRIP-MCNC: NORMAL MG/DL
VENTRICULAR RATE- MUSE: 123 BPM
WBC # BLD AUTO: 29.8 10E3/UL (ref 4–11)
WBC URINE: 6 /HPF

## 2024-09-19 PROCEDURE — 250N000013 HC RX MED GY IP 250 OP 250 PS 637

## 2024-09-19 PROCEDURE — 87149 DNA/RNA DIRECT PROBE: CPT

## 2024-09-19 PROCEDURE — 82805 BLOOD GASES W/O2 SATURATION: CPT | Performed by: INTERNAL MEDICINE

## 2024-09-19 PROCEDURE — 97530 THERAPEUTIC ACTIVITIES: CPT | Mod: GP

## 2024-09-19 PROCEDURE — 87077 CULTURE AEROBIC IDENTIFY: CPT

## 2024-09-19 PROCEDURE — 93970 EXTREMITY STUDY: CPT

## 2024-09-19 PROCEDURE — 84100 ASSAY OF PHOSPHORUS: CPT

## 2024-09-19 PROCEDURE — 97110 THERAPEUTIC EXERCISES: CPT | Mod: GP

## 2024-09-19 PROCEDURE — 81001 URINALYSIS AUTO W/SCOPE: CPT

## 2024-09-19 PROCEDURE — 250N000011 HC RX IP 250 OP 636

## 2024-09-19 PROCEDURE — 71045 X-RAY EXAM CHEST 1 VIEW: CPT | Mod: 26 | Performed by: RADIOLOGY

## 2024-09-19 PROCEDURE — 80048 BASIC METABOLIC PNL TOTAL CA: CPT

## 2024-09-19 PROCEDURE — 258N000003 HC RX IP 258 OP 636: Performed by: SURGERY

## 2024-09-19 PROCEDURE — 87641 MR-STAPH DNA AMP PROBE: CPT

## 2024-09-19 PROCEDURE — 258N000003 HC RX IP 258 OP 636

## 2024-09-19 PROCEDURE — 83735 ASSAY OF MAGNESIUM: CPT

## 2024-09-19 PROCEDURE — 94640 AIRWAY INHALATION TREATMENT: CPT

## 2024-09-19 PROCEDURE — 999N000157 HC STATISTIC RCP TIME EA 10 MIN

## 2024-09-19 PROCEDURE — 200N000002 HC R&B ICU UMMC

## 2024-09-19 PROCEDURE — 250N000013 HC RX MED GY IP 250 OP 250 PS 637: Performed by: PHYSICIAN ASSISTANT

## 2024-09-19 PROCEDURE — 94660 CPAP INITIATION&MGMT: CPT

## 2024-09-19 PROCEDURE — 36415 COLL VENOUS BLD VENIPUNCTURE: CPT

## 2024-09-19 PROCEDURE — 93970 EXTREMITY STUDY: CPT | Mod: 26 | Performed by: STUDENT IN AN ORGANIZED HEALTH CARE EDUCATION/TRAINING PROGRAM

## 2024-09-19 PROCEDURE — 36600 WITHDRAWAL OF ARTERIAL BLOOD: CPT

## 2024-09-19 PROCEDURE — 82947 ASSAY GLUCOSE BLOOD QUANT: CPT

## 2024-09-19 PROCEDURE — 83605 ASSAY OF LACTIC ACID: CPT

## 2024-09-19 PROCEDURE — 87205 SMEAR GRAM STAIN: CPT

## 2024-09-19 PROCEDURE — 71045 X-RAY EXAM CHEST 1 VIEW: CPT

## 2024-09-19 PROCEDURE — 97112 NEUROMUSCULAR REEDUCATION: CPT | Mod: GP

## 2024-09-19 PROCEDURE — 87493 C DIFF AMPLIFIED PROBE: CPT

## 2024-09-19 PROCEDURE — 250N000009 HC RX 250

## 2024-09-19 PROCEDURE — 94640 AIRWAY INHALATION TREATMENT: CPT | Mod: 76

## 2024-09-19 PROCEDURE — 999N000215 HC STATISTIC HFNC ADULT NON-CPAP

## 2024-09-19 PROCEDURE — 250N000012 HC RX MED GY IP 250 OP 636 PS 637

## 2024-09-19 PROCEDURE — 250N000011 HC RX IP 250 OP 636: Performed by: SURGERY

## 2024-09-19 PROCEDURE — 84145 PROCALCITONIN (PCT): CPT

## 2024-09-19 PROCEDURE — 86140 C-REACTIVE PROTEIN: CPT

## 2024-09-19 PROCEDURE — 82805 BLOOD GASES W/O2 SATURATION: CPT

## 2024-09-19 PROCEDURE — 85014 HEMATOCRIT: CPT | Performed by: PHYSICIAN ASSISTANT

## 2024-09-19 PROCEDURE — 250N000013 HC RX MED GY IP 250 OP 250 PS 637: Performed by: SURGERY

## 2024-09-19 PROCEDURE — 87040 BLOOD CULTURE FOR BACTERIA: CPT

## 2024-09-19 PROCEDURE — G0463 HOSPITAL OUTPT CLINIC VISIT: HCPCS

## 2024-09-19 PROCEDURE — 94668 MNPJ CHEST WALL SBSQ: CPT

## 2024-09-19 RX ORDER — LIDOCAINE HYDROCHLORIDE 20 MG/ML
5 SOLUTION OROPHARYNGEAL ONCE
Status: COMPLETED | OUTPATIENT
Start: 2024-09-19 | End: 2024-09-19

## 2024-09-19 RX ORDER — DIAZEPAM 10 MG/2ML
10 INJECTION, SOLUTION INTRAMUSCULAR; INTRAVENOUS ONCE
Status: DISCONTINUED | OUTPATIENT
Start: 2024-09-19 | End: 2024-09-19

## 2024-09-19 RX ORDER — MAGNESIUM SULFATE HEPTAHYDRATE 40 MG/ML
2 INJECTION, SOLUTION INTRAVENOUS ONCE
Status: COMPLETED | OUTPATIENT
Start: 2024-09-19 | End: 2024-09-19

## 2024-09-19 RX ORDER — CIPROFLOXACIN 2 MG/ML
400 INJECTION, SOLUTION INTRAVENOUS EVERY 12 HOURS
Status: DISCONTINUED | OUTPATIENT
Start: 2024-09-19 | End: 2024-09-19

## 2024-09-19 RX ORDER — ZINC SULFATE 50(220)MG
220 CAPSULE ORAL DAILY
Status: DISCONTINUED | OUTPATIENT
Start: 2024-09-19 | End: 2024-10-01 | Stop reason: HOSPADM

## 2024-09-19 RX ORDER — DIAZEPAM 10 MG/2ML
5 INJECTION, SOLUTION INTRAMUSCULAR; INTRAVENOUS ONCE
Status: COMPLETED | OUTPATIENT
Start: 2024-09-19 | End: 2024-09-19

## 2024-09-19 RX ORDER — POTASSIUM CHLORIDE 29.8 MG/ML
20 INJECTION INTRAVENOUS ONCE
Status: COMPLETED | OUTPATIENT
Start: 2024-09-19 | End: 2024-09-20

## 2024-09-19 RX ORDER — CIPROFLOXACIN 2 MG/ML
400 INJECTION, SOLUTION INTRAVENOUS EVERY 8 HOURS
Status: DISCONTINUED | OUTPATIENT
Start: 2024-09-19 | End: 2024-09-21

## 2024-09-19 RX ORDER — PIPERACILLIN SODIUM, TAZOBACTAM SODIUM 4; .5 G/20ML; G/20ML
4.5 INJECTION, POWDER, LYOPHILIZED, FOR SOLUTION INTRAVENOUS EVERY 6 HOURS
Status: DISCONTINUED | OUTPATIENT
Start: 2024-09-19 | End: 2024-09-21

## 2024-09-19 RX ADMIN — Medication 1 TABLET: at 11:57

## 2024-09-19 RX ADMIN — PIPERACILLIN AND TAZOBACTAM 4.5 G: 4; .5 INJECTION, POWDER, LYOPHILIZED, FOR SOLUTION INTRAVENOUS at 11:08

## 2024-09-19 RX ADMIN — Medication 40 MG: at 14:38

## 2024-09-19 RX ADMIN — PIPERACILLIN AND TAZOBACTAM 4.5 G: 4; .5 INJECTION, POWDER, LYOPHILIZED, FOR SOLUTION INTRAVENOUS at 16:29

## 2024-09-19 RX ADMIN — CIPROFLOXACIN 400 MG: 400 INJECTION, SOLUTION INTRAVENOUS at 11:11

## 2024-09-19 RX ADMIN — POTASSIUM CHLORIDE 20 MEQ: 29.8 INJECTION, SOLUTION INTRAVENOUS at 00:30

## 2024-09-19 RX ADMIN — LORAZEPAM 0.5 MG: 0.5 TABLET ORAL at 19:49

## 2024-09-19 RX ADMIN — GABAPENTIN 300 MG: 250 SUSPENSION ORAL at 06:12

## 2024-09-19 RX ADMIN — DORNASE ALFA 2.5 MG: 1 SOLUTION RESPIRATORY (INHALATION) at 20:27

## 2024-09-19 RX ADMIN — CETIRIZINE HYDROCHLORIDE 10 MG: 10 TABLET, FILM COATED ORAL at 08:39

## 2024-09-19 RX ADMIN — LEVALBUTEROL HYDROCHLORIDE 0.63 MG: 0.63 SOLUTION RESPIRATORY (INHALATION) at 07:15

## 2024-09-19 RX ADMIN — LEVALBUTEROL HYDROCHLORIDE 0.63 MG: 0.63 SOLUTION RESPIRATORY (INHALATION) at 20:27

## 2024-09-19 RX ADMIN — LEVALBUTEROL HYDROCHLORIDE 0.63 MG: 0.63 SOLUTION RESPIRATORY (INHALATION) at 04:22

## 2024-09-19 RX ADMIN — ACETAMINOPHEN 650 MG: 325 TABLET ORAL at 23:34

## 2024-09-19 RX ADMIN — PIPERACILLIN AND TAZOBACTAM 4.5 G: 4; .5 INJECTION, POWDER, LYOPHILIZED, FOR SOLUTION INTRAVENOUS at 22:38

## 2024-09-19 RX ADMIN — INSULIN ASPART 3 UNITS: 100 INJECTION, SOLUTION INTRAVENOUS; SUBCUTANEOUS at 16:28

## 2024-09-19 RX ADMIN — BUDESONIDE 1 MG: 1 SUSPENSION RESPIRATORY (INHALATION) at 07:15

## 2024-09-19 RX ADMIN — LEVALBUTEROL HYDROCHLORIDE 0.63 MG: 0.63 SOLUTION RESPIRATORY (INHALATION) at 13:04

## 2024-09-19 RX ADMIN — ZINC SULFATE 220 MG (50 MG) CAPSULE 220 MG: CAPSULE at 10:47

## 2024-09-19 RX ADMIN — GABAPENTIN 300 MG: 250 SUSPENSION ORAL at 21:48

## 2024-09-19 RX ADMIN — LEVALBUTEROL HYDROCHLORIDE 0.63 MG: 0.63 SOLUTION RESPIRATORY (INHALATION) at 15:44

## 2024-09-19 RX ADMIN — IPRATROPIUM BROMIDE 0.5 MG: 0.5 SOLUTION RESPIRATORY (INHALATION) at 00:03

## 2024-09-19 RX ADMIN — INSULIN ASPART 1 UNITS: 100 INJECTION, SOLUTION INTRAVENOUS; SUBCUTANEOUS at 03:31

## 2024-09-19 RX ADMIN — INSULIN ASPART 2 UNITS: 100 INJECTION, SOLUTION INTRAVENOUS; SUBCUTANEOUS at 12:09

## 2024-09-19 RX ADMIN — ACETAMINOPHEN 650 MG: 325 TABLET ORAL at 06:11

## 2024-09-19 RX ADMIN — IPRATROPIUM BROMIDE 0.5 MG: 0.5 SOLUTION RESPIRATORY (INHALATION) at 04:22

## 2024-09-19 RX ADMIN — OXYCODONE HYDROCHLORIDE 2.5 MG: 5 SOLUTION ORAL at 16:29

## 2024-09-19 RX ADMIN — SODIUM CHLORIDE SOLN NEBU 3% 3 ML: 3 NEBU SOLN at 20:28

## 2024-09-19 RX ADMIN — ACETAMINOPHEN 650 MG: 325 TABLET ORAL at 00:35

## 2024-09-19 RX ADMIN — HEPARIN SODIUM 5000 UNITS: 5000 INJECTION, SOLUTION INTRAVENOUS; SUBCUTANEOUS at 03:31

## 2024-09-19 RX ADMIN — AMITRIPTYLINE HYDROCHLORIDE 25 MG: 25 TABLET, FILM COATED ORAL at 21:48

## 2024-09-19 RX ADMIN — HEPARIN SODIUM 5000 UNITS: 5000 INJECTION, SOLUTION INTRAVENOUS; SUBCUTANEOUS at 19:49

## 2024-09-19 RX ADMIN — LORAZEPAM 0.5 MG: 0.5 TABLET ORAL at 10:47

## 2024-09-19 RX ADMIN — MAGNESIUM SULFATE HEPTAHYDRATE 2 G: 2 INJECTION, SOLUTION INTRAVENOUS at 05:57

## 2024-09-19 RX ADMIN — INSULIN ASPART 2 UNITS: 100 INJECTION, SOLUTION INTRAVENOUS; SUBCUTANEOUS at 20:15

## 2024-09-19 RX ADMIN — DIAZEPAM 5 MG: 10 INJECTION, SOLUTION INTRAMUSCULAR; INTRAVENOUS at 08:38

## 2024-09-19 RX ADMIN — MONTELUKAST 10 MG: 10 TABLET, FILM COATED ORAL at 21:48

## 2024-09-19 RX ADMIN — POTASSIUM CHLORIDE 20 MEQ: 29.8 INJECTION, SOLUTION INTRAVENOUS at 01:33

## 2024-09-19 RX ADMIN — VENLAFAXINE 37.5 MG: 37.5 TABLET ORAL at 08:39

## 2024-09-19 RX ADMIN — IPRATROPIUM BROMIDE 0.5 MG: 0.5 SOLUTION RESPIRATORY (INHALATION) at 15:44

## 2024-09-19 RX ADMIN — LEVOTHYROXINE SODIUM 25 MCG: 0.03 TABLET ORAL at 08:39

## 2024-09-19 RX ADMIN — PREDNISONE 20 MG: 20 TABLET ORAL at 08:39

## 2024-09-19 RX ADMIN — GABAPENTIN 300 MG: 250 SUSPENSION ORAL at 14:38

## 2024-09-19 RX ADMIN — ATOVAQUONE 1500 MG: 750 SUSPENSION ORAL at 14:38

## 2024-09-19 RX ADMIN — ONDANSETRON 4 MG: 2 INJECTION INTRAMUSCULAR; INTRAVENOUS at 20:43

## 2024-09-19 RX ADMIN — ACETAMINOPHEN 650 MG: 325 TABLET ORAL at 11:57

## 2024-09-19 RX ADMIN — LEVALBUTEROL HYDROCHLORIDE 0.63 MG: 0.63 SOLUTION RESPIRATORY (INHALATION) at 00:03

## 2024-09-19 RX ADMIN — SODIUM CHLORIDE SOLN NEBU 3% 3 ML: 3 NEBU SOLN at 07:15

## 2024-09-19 RX ADMIN — IPRATROPIUM BROMIDE 0.5 MG: 0.5 SOLUTION RESPIRATORY (INHALATION) at 20:27

## 2024-09-19 RX ADMIN — SODIUM CHLORIDE 1750 MG: 9 INJECTION, SOLUTION INTRAVENOUS at 06:26

## 2024-09-19 RX ADMIN — ACETAMINOPHEN 650 MG: 325 TABLET ORAL at 16:29

## 2024-09-19 RX ADMIN — DORNASE ALFA 2.5 MG: 1 SOLUTION RESPIRATORY (INHALATION) at 07:15

## 2024-09-19 RX ADMIN — CIPROFLOXACIN 400 MG: 400 INJECTION, SOLUTION INTRAVENOUS at 16:14

## 2024-09-19 RX ADMIN — INSULIN ASPART 1 UNITS: 100 INJECTION, SOLUTION INTRAVENOUS; SUBCUTANEOUS at 00:36

## 2024-09-19 RX ADMIN — SODIUM CHLORIDE, POTASSIUM CHLORIDE, SODIUM LACTATE AND CALCIUM CHLORIDE 250 ML: 600; 310; 30; 20 INJECTION, SOLUTION INTRAVENOUS at 11:17

## 2024-09-19 RX ADMIN — POTASSIUM CHLORIDE 20 MEQ: 29.8 INJECTION, SOLUTION INTRAVENOUS at 23:04

## 2024-09-19 RX ADMIN — HEPARIN SODIUM 5000 UNITS: 5000 INJECTION, SOLUTION INTRAVENOUS; SUBCUTANEOUS at 11:57

## 2024-09-19 RX ADMIN — SODIUM CHLORIDE, POTASSIUM CHLORIDE, SODIUM LACTATE AND CALCIUM CHLORIDE 500 ML: 600; 310; 30; 20 INJECTION, SOLUTION INTRAVENOUS at 04:23

## 2024-09-19 RX ADMIN — PROCHLORPERAZINE EDISYLATE 10 MG: 5 INJECTION INTRAMUSCULAR; INTRAVENOUS at 10:48

## 2024-09-19 RX ADMIN — IPRATROPIUM BROMIDE 0.5 MG: 0.5 SOLUTION RESPIRATORY (INHALATION) at 13:04

## 2024-09-19 RX ADMIN — PIPERACILLIN AND TAZOBACTAM 4.5 G: 4; .5 INJECTION, POWDER, LYOPHILIZED, FOR SOLUTION INTRAVENOUS at 05:15

## 2024-09-19 RX ADMIN — IPRATROPIUM BROMIDE 0.5 MG: 0.5 SOLUTION RESPIRATORY (INHALATION) at 07:15

## 2024-09-19 RX ADMIN — VENLAFAXINE 37.5 MG: 37.5 TABLET ORAL at 18:17

## 2024-09-19 RX ADMIN — OXYCODONE HYDROCHLORIDE 2.5 MG: 5 SOLUTION ORAL at 10:47

## 2024-09-19 ASSESSMENT — ACTIVITIES OF DAILY LIVING (ADL)
ADLS_ACUITY_SCORE: 59
ADLS_ACUITY_SCORE: 63
ADLS_ACUITY_SCORE: 59
ADLS_ACUITY_SCORE: 63
ADLS_ACUITY_SCORE: 59
ADLS_ACUITY_SCORE: 59
ADLS_ACUITY_SCORE: 63
ADLS_ACUITY_SCORE: 59
ADLS_ACUITY_SCORE: 63

## 2024-09-19 NOTE — PHARMACY-VANCOMYCIN DOSING SERVICE
"Pharmacy Vancomycin Initial Note  Date of Service 2024  Patient's  1970  53 year old, female    Indication: [facility]ated Pneumonia    Current estimated CrCl = Estimated Creatinine Clearance: 146.5 mL/min (A) (based on SCr of 0.42 mg/dL (L)).    Creatinine for last 3 days  2024:  4:30 PM Creatinine 0.44 mg/dL;  8:57 PM Creatinine 0.31 mg/dL  2024:  2:36 AM Creatinine 0.38 mg/dL;  3:56 AM Creatinine 0.39 mg/dL; 11:14 AM Creatinine 0.37 mg/dL;  3:36 PM Creatinine 0.38 mg/dL;  9:55 PM Creatinine 0.39 mg/dL  2024:  4:07 AM Creatinine 0.35 mg/dL; 10:55 AM Creatinine 0.35 mg/dL;  4:38 PM Creatinine 0.36 mg/dL; 10:05 PM Creatinine 0.39 mg/dL  2024:  3:25 AM Creatinine 0.42 mg/dL    Recent Vancomycin Level(s) for last 3 days  No results found for requested labs within last 3 days.      Vancomycin IV Administrations (past 72 hours)                     vancomycin (VANCOCIN) 1,750 mg in sodium chloride 0.9 % 250 mL intermittent infusion (mg) 1,750 mg New Bag 24 0626                    Nephrotoxins and other renal medications (From now, onward)      Start     Dose/Rate Route Frequency Ordered Stop    24 0600  vancomycin (VANCOCIN) 1,750 mg in sodium chloride 0.9 % 250 mL intermittent infusion         1,750 mg (central catheter)  over 90 Minutes Intravenous ONCE 24 0522      24 0500  piperacillin-tazobactam (ZOSYN) 4.5 g vial to attach to  mL bag        Note to Pharmacy: For [facility], [facility] and [facility]: For Zosyn-naive patients, use the \"Zosyn initial dose + extended infusion\" order panel.    4.5 g  over 30 Minutes Intravenous EVERY 6 HOURS 24 0415              Contrast Orders - past 72 hours (72h ago, onward)      None            ILoading dose: 1750 mg  Regimen: 1250 mg IV every 12 hours.  Start time: 18:26 on 2024  Exposure target: AUC24 (range)400-600 mg/L.hr   AUC24,ss: 463 mg/L.hr  Probability of AUC24 > 400: 65 %  Ctrough,ss: 13 " mg/L  Probability of Ctrough,ss > 20: 21 %  Probability of nephrotoxicity (Lodise OLVIN 2009): 8 %          Plan:  Give vancomycin  1750 mg IV once, MRSA nares negative: team discontinued.    [facility]a [facility]

## 2024-09-19 NOTE — PROGRESS NOTES
MICU STAFF CRITICAL CARE PROGRESS NOTE:     I saw and examined the patient with the MICU team and concur with findings and A&P as detailed in Dr. Norton note of today     52 yo  PMH Anxiety/depression, fibromyalgia;; asthma, HLD; MURIEL on CPAP; HTN; expressive aphasia; hypothyroidism; ? seizures (no Rx)     8/3 admit @ OSH for fatigue, fever && SOB  8/6 Intubated @ OSH and transferred 8/8 to [facility] from OSH (?) for Pneumonia, ARDS and high vent need placed on VV ECMO and CRRT  On VV ECMO decannulated 8-18  Transferred to MICU team 8-26  Extubated 8-27 and reintubated 8/27 prob due to worsening AHRF & Pseudomonas VAP required paralsis, veletri, and high steroid and sedation Rx  Has completed treatment for Pseudomonas VAP.  No recent pressors  Overdue' for trach but making major weaning progress  Issues with ventilator dyssynchrony - adjust insp time and Vt  Significant neuromuscular weakness  Off CRRT as of 9/12; self-diuresing  Slowly waking up on oral BZDP & hydromorphone    Overnight had some increased HR and RR with 1 episode of emesis. 1 flushed pale and shaking episode with increased secretion.   Cultured up with increased WBC and Begun on Vanco/zosyn with cipro added this AM    -140; RR 24--30  Good BP w/o pressors on HFNC 50% and 60 LPM (now @ 60% FiO2)  Mentating adequately with a lot of cough; c/o some SOB  Coarse crackles present  Cor RRR no m/g/r appreciated  ABd soft nontender  ? Possible diffuse rash - on thighs and back of shoulders per night RN but has known rosacea  I:O net negative 1.5L incl 1L stook  WEight 74.7 (77 yest)    Hgb  8  WBC 29.8 (14K) Plt 513K (440K)  141 4.3 27/0.42  Mg 1.9  PO4 5.3  CRP 39 up to 47.8   Procal 0.51  Lactate 2.3 to 2.8  VBG 7.42/47/43  Sputum: > 25 PMN 4+ GNB 1+ GPC and GPB  MRSA Nares neg  CXR - prob somewhat worse diffuse parenchymal opacities c/w past several days      CNS / Pain & Sedation / h/o anxiety/depression;  h/o fibromyalgia; h/o myalgic  encephalomyelitis / ? ICU Delirium / h/o  spells  - ? Sz / GTC seizures / myoclonic epilepsy / diffuse cerebral edema - improved  Pain: Scheduled: tylenol, yesterday reduced oxycodone to prn and Ativan 0.5 mg every q8hr   PRN: tylenol, oxycodone   Sedation: Seroquel 25 mg BID and 50 mg HS; reduce dilaudid versed,ketamine & Propofol now off  Continue pta Gabapentin to 300mg TID   PTA Venlafaxine. Clonidine, and Amitriptyline discontinued while sedated  Ativan 0.5 BID;  Has not gotten oxycodone in past 24 hours  Doubt this is withdrawal;  will leave meds unchanged today.     Acute hypoxic respiratory failure c/w ARDS: Improving  / Pseudomonas pneumonia / Mechanical ventilation / s/p VV ECMO 8/8 - 8/18 / h/o asthma / h/o CAP / OSAS on CPAP  Remote PFT 9/8/20 mild restriction & diffusion impairment   CT CAP 3/9/2020 bilat lung scarring/fibrosis bilaterally  Extubated 8/27, reintubated 8/29 for worsening O2 demands and diffuse opacities iso new/recurrent psuedomonas pneumonia.   ILD w/unit(s) was negative  Got aminophylline loading dose and was starting maintenance when got tachy and stopped  On scheduled duonebs & singulair; Pulmicort held while on systemic steroids  Chest physiotherapy 4x /day  Steroid taper  - @ 40 mg/day at present; may hold off weaning further  On PJP prophylaxis  DNAse BID nebs  Achieved diuresis - may be a little dry  Events overnight suggest possible new HAP and antibiotic Rx broadened.  Maintain O2 sat > 90%; trial of BiPAP to expand/rest lungs post NT-suctioning  Exclude DVT with LE Dopplers  Goal I:O about even or slightly negative today    CV / Hyperlipidemia / h/o HTN / Sinus Tachycardia  MAP goal >65, off pressors since 9/9   PTA atorvastatin restarted  PTA clonidine held while sedated  Monitor extremity discoloration for necrosis  PRN hydralazine for SBP > 200  Consider small iv fluid bolus 250 mls for higher HR/lower BP     Nnutrition / Non-infectious Diarrhea / GERD   Protonix (home  medication)   Tolerating NJ TF  Rectal tube for high output / Hold bowel regimen d/t loose stools (loperamide);    Continue scheduled multivitamin, banatrol, prosource packet  Imodium prn     CHACORTA - Improving / AGMA / Hypernatremia  Baseline Cr approx 0.6;  was anuric  now making some urine, likely prerenal due to shock; holding off on iHD      Hyperglycemia (Steroids & Stress) / Hypothyroidism   - Goal to keep BG < 180 for optimal wound healing  - Continue medium sliding scale insulin   - Continue PTA synthroid     ID / Leukocytosis / Pseudomonas pneumonia / Hx CAP / CMV Viremia (PCR+ 8/31) / HSV? / ? New HAP  Respiratory cx 8/29 pseudomonas pneumonia. Intermediate susceptability to meropenem, more significant sensitivities to ciprofloxacin  Febrile overnight, BC, UC sent and repeat chest xray                Now on Zosyn and Cipro based on events last night; Consider possibility of resistant Pseudomonas and if we should alter abx  Holding off on restarting Romaine nebs @ present     PJP Ppx initiating   h/o sulfa allergy; GPD WNL  Continue atovaquone 1,500 mg daily; may switch to dapsone     CMV viremia  With fever will reculture and check PCR     HSV-1  Mouth sores present, which could have viral etiology. 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  - Acyclovir 400mg BID x5 days (8/22-8/27)     Anemia of critical illness  - Transfuse if hgb <7.0 or hypoperfusion  - negative CTAP 8/30 due to acute Hgb drop (got 2 U RBCs)     Musculoskeletal/Rheum:  # Alopecia  Hold PTA hydroxychloroquine      Weakness and deconditioning of critical illness  Left ankle injury pre-hospitalization; PT/OT when able      BLE scattered ecchymosis / Left toe duskiness/ Bilateral lower leg ecchymosis  WOC consulted for Ecchymosis to left foot, most noticeable to 3rd and 4th toes     Lines/tubes/drains: NJ (8/9); LIJ CVC (8/8); Radial A-line (8/29); PIV x3; Rectal tube (8/17)l  Tunneled HD line (8/23)  Dayna Logan lives in [place] - OK with trach if  necessary.     (Critical Care 60 minutes cumulative thus far today, exclusive of procedures)     Janes Osborne MD  MICU Staff  [phone]

## 2024-09-19 NOTE — PROGRESS NOTES
[facility]  WO Nurse Inpatient Assessment     Consulted for: left ear     Summary: new abrasion to left ear 9/15 per bedside RN     Patient History (according to provider note(s):      Massiel Flaherty is a 53 year old female with PMH Anxiety/depression, fibromyalgia, c/f nonepileptic seizures not on AEDs, hypothyroidism, asthma, HLD, MURIEL on CPAP, expressive aphasia, hypertension who was admitted on 8/3/2024 for fatigue, fever and dyspnea and intubated 8/6/24 at OSH for ARDS, proned and paralyzed without improvement. Transferred to [facility] 8/8/24, hypoxic with high plateaus/peaks and placed on VV ECMO and CRRT. Decannulated from VV ECMO 8/18 and transferred to MICU 8/26/24 for ongoing management. Extubated 8/27 then reintubated 8/29 iso worsening AHRF and pseudomonas pneumonia.     Assessment:      Areas visualized during today's visit: Focused: bilateral ears     Pressure Injury Location: left ear     9/19        Wound type: Pressure Injury     Pressure Injury Stage:  Deep Tissue Pressure Injury (DTPI), hospital acquired   Wound history/plan of care:   unknown cause of PI, pt does favor left side     Wound base: 100 % Maroon, Purple, and Epidermis     Palpation of the wound bed: normal      Drainage: scant     Description of drainage: none and serosanguinous     Measurements (length x width x depth, in cm) 0.6  x 0.4  x  0 cm      Tunneling N/A     Undermining N/A  Periwound skin: Intact      Color: normal and consistent with surrounding tissue      Temperature: normal   Odor: none  Pain: no grimacing or signs of discomfort, none  Pain intervention prior to dressing change: N/A  Treatment goal: Protection  STATUS: stable  Supplies ordered: supplies stored on unit     Treatment Plan:     Left ear wound(s): Daily  cleanse with saline and pat dry. Cover with mini mepilex dressing. Hollow out Z-patsy when positioned on left.      Orders: Reviewed    RECOMMEND PRIMARY TEAM  ORDER: None, at this time  Education provided: plan of care and wound progress  Discussed plan of care with: Nurse  [facility] nurse follow-up plan: twice weekly  Notify WOC if wound(s) deteriorate.  Nursing to notify the Provider(s) and re-consult the [facility] Nurse if new skin concern.    DATA:     Current support surface: Standard  Low air loss (DIA pump, Isolibrium, Pulsate)  Containment of urine/stool: Incontinent pad in bed, Indwelling catheter, and Internal fecal management  BMI: Body mass index is 30.12 kg/m .   Active diet order: None     Output: I/O last 3 completed shifts:  In: 2235 [I.V.:720; NG/GT:635; IV Piggyback:500]  Out: 3760 [Urine:2760; Stool:1000]     Labs:   Recent Labs   Lab 09/19/24  0325   HGB 8.0*   WBC 29.8*     Pressure injury risk assessment:   Sensory Perception: 2-->very limited  Moisture: 3-->occasionally moist  Activity: 1-->bedfast  Mobility: 1-->completely immobile  Nutrition: 3-->adequate  Friction and Shear: 1-->problem  Kade Score: 11      Pager no longer is use, please contact through SC[employer] group: [facility] Nurse [place]  Dept. Office Number: [phone]

## 2024-09-19 NOTE — PLAN OF CARE
ICU End of Shift Summary. See flowsheets for vital signs and detailed assessment.    Changes this shift: Alert. Oriented to self, place. Follows commands. Scheduled ativan decrease. Endorses pain, tylenol scheduled and oxy PRNx1. Tmax 101+ tylenol and ice applied. -130. Maps>65. 250 LR bolus given for tachycardia in the 130s. Improved.  HiFlow 60%, 60L. BiPAP for 2 hours with improved blood gases. NT suction x3. Rectal tube in place. TF held for nausea.     Plan:  Encourage cough, NT suction as indicated. Continue to monitor and report changes to MICU 1.   Goal Outcome Evaluation:      Plan of Care Reviewed With: patient    Overall Patient Progress: no changeOverall Patient Progress: no change    Outcome Evaluation: BiPAP for 2 hours during day. blood gases improved. returned to High Flow NC tolerating. minimal cough.

## 2024-09-19 NOTE — PROGRESS NOTES
MEDICAL ICU PROGRESS NOTE  09/19/2024    Date of Service (when I saw the patient): 09/19/2024    ASSESSMENT: Massiel Flaherty is a 53 year old female with PMH Anxiety/depression, fibromyalgia, c/f nonepileptic seizures not on AEDs, hypothyroidism, asthma, HLD, MURIEL on CPAP, expressive aphasia, hypertension  who was admitted on 8/3/2024 for fatigue, fever and dyspnea and intubated 8/6/24 at OSH for ARDS, proned and paralyzed without improvement. Transferred to [facility] 8/8/24, hypoxic with high plateaus/peaks and placed on VV ECMO and CRRT. Decannulated from VV ECMO 8/18 and transferred to MICU 8/26/24 for ongoing management. Extubated 8/27 then reintubated 8/29 iso worsening AHRF and pseudomonas pneumonia.     CHANGES and MAJOR THINGS TODAY:  - Increased sputum production overnight as well as worsening tachycardia and tachypnea so obtained respiratory sputum that is found to be growing 4+ GNBs, 2+ GPCs, and 1+ GPBs  - Started on vancomycin and zosyn for aspiration pneumonia/HCAP  - Added on ciprofloxacin due previous pseudomonas sensitivities  - Procal, CRP, lactate elevated  - Also raised concern for benzo withdrawal due to tachycardia and tremulousness but no improved with one time diazepam   - LR bolus 250ml with improvement in tachycardia    Neuro:  # Pain and sedation  # Concern for ICU delirium   # Hx Fibromyalgia   # Hx myalgic encephalomyelitis   # Hx anxiety/depression  - Monitor neurological status. Delirium preventions and precautions.   - Pain: Scheduled: tylenol, increased oxycodone 10 mg q4hr, continue Ativan 2 mg every q6hr   PRN: tylenol, oxycodone   9/18: Oxycodone changed to 2.5 mg q6h PRN, Ativan 0.5 mg BID - continue for now  - Sedation plan: no drips now extubated but required significant sedation while intubated (versed, dilaudid, propofol, and ketamine)  - Continue scheduled oxycodone and ativan   - Gabapentin to 300mg TID   - Seroquel 25 mg BID and 50 mg HS, discontinued 9/17 for maximal  wakefulness drive  - Restarted psych meds at 1/2 dose (venlafaxine and amitriptyline)    # Hx spells with staring and BUE posturing previously described as nonepileptic seizures   # Hx of GTC seizures and myoclonic epilepsy, weaned off AEDs   # Diffuse cerebral edema - improved  - Sodium goals liberalized to 140-145  - 8/21: MRI Brain: no acute intracranial pathology. No findings to suggest anoxic brain injury.     Pulmonary:  # Acute hypoxic respiratory failure c/b ARDS  # Pseudomonas pneumonia (s/p treatment)  # Mechanical ventilation  # s/p VV ECMO 8/8 - 8/18   # Hx CAP  # Asthma  # MURIEL on home CPAP  PFT dated 9/8/2020 demonstrated normal spirometry with mild diffusion impairment and mild restriction (FEV1/FVC 80%, FEV1 2.59, DLCO 40%). CT abdomen chest 3/9/2020 demonstrated scarring and fibrosis bilaterally which correlated with the decreased DLCO. The patient also has a history of MURIEL on CPAP therapy as currently managed by her provider in [place]. Unclear what triggered ARDS or why she has had such severe hospital course, further investigated ILD but results all unremarkable. Extubated 8/27, reintubated 8/29 for worsening O2 demands and diffuse opacities iso new/recurrent psuedomonas pneumonia. Bronch with BAL 8/30, pseudomonas growing as below. Extubated again 9/16, worsening respiratory distress noted 9/19 with repeat polymicrobial growth on respiratory sputum raising c/f aspiration/hospital associated pneumonia.   - ILD w/u aldolase, EVELIO, RF, CCP, ANCA, MPO, SSA Ro and [place], Scleroderma antibody, Radha 1,  hypersensity pneumonitis, IGG subclasses,  aspergillus, blastomyces, histoplasma neg  - SpO2 goals >92%, PaO2 goals >60   - PTA singular at bedtime if able  - Scheduled nebs   - Discontinued Pulmicort given steroids as below   - Lung protective ventilation strategies given ARDS  - Methylpred 125mg q6H x 24h 8/30 --> transition to 20 mg dex x 5 days, followed by 10mg x 3 days   - prednisone taper 40mg then  20 mg x 5 days then 10mg x 5 days  - May still need trach due to ICU myopathy  - 9/17: Started levalbuterol nebs, PTA pulmicort nebs, saline nebs, trialed aminophylline gtt for increased respiratory drive but lead to tachycardia so discontinued  - 9/18: trial of dornase    Cardiovascular:  # Hyperlipidemia  # Hx HTN   - MAP goal >65, SBP goals >110  - Has remained off pressors since 9/9   - PTA atorvastatin  - PTA clonidine held while sedated  - Lower extremity ultrasound without vascular pathology, no hematoma or clots  - Monitor discoloration of extremities for necrosis  - PRN hydralazine for SBP > 200     Sinus Tachycardia  Likely 2/2 fluid removal, sepsis, pain and sedation as above. Also considered withdrawal due to significant opioid and benzo needs while intubated.     GI/Nutrition:  # Severe malnutrition (see RD note)   # Non-infectious Diarrhea  # GERD   - Protonix (home medication)   - Nutrition consulted, appreciate recs  - Diet: TF via NJ, tolerating   - Hold bowel regimen d/t loose stools  - Continue scheduled multivitamin, banatrol, prosource packet  - loperamide PRN, consider scheduling if c dif is negative, c diff negative. Stool output concern due to improper documentation, no excessive output  - Rectal tube, continues with high output    Renal/Fluids/Electrolytes:  # Acute kidney injury: Improving   # Renal failure: Improving   # AGMA: Improving   Baseline Cr approx 0.6. Pt was anuric here but now making some urine, likely prerenal due to shock.  - Continues to improve, holding off on iHD  - Making good urine output, possible post ATN diuresis?  - Strict I&Os  - Avoid nephrotoxins as able  - Q6 hour electrolytes given increased urine output and ongoing high rectal tube output  - diuresis with lasix 40 mg for volume overload  - Additional diuresis with metolazone for net output goal of 1.5-2L today    Elevated lactate  Lactate 2.3 --> 2.8 9/19 concerning for possible sepsis although no hypotension.    - IV fluid bolus  - Trend PRN    Endocrine:  # Steroid and stress induced hyperglycemia  # Hypothyroidism   - Goal to keep BG < 180 for optimal wound healing  - Continue medium sliding scale insulin   - Continue PTA synthroid    ID:  # Leukocytosis   # Concern for aspiration pneumonia/hospital acquired pnemonia (9/19 - )  # Psuedomonas pneumonia (s/p treatment)  # Hx CAP  Respiratory cx 8/29 pseudomonas pneumonia. Intermediate susceptability to meropenem, more significant sensitivities to ciprofloxacin. New leukocytosis, elevated PC, CRP and lactate 9/19 with sputum cx 4+ GNBs, 2+ GPCs, and 1+ GPBs  - Continue to monitor fever curve and inflammatory markers as appropriate  - ID now signed off     Abx  - Meropenem 8/29 - 9/1  - Vancomycin 8/29 - 8/30  - Tobramycin x 1 8/29  - Vancomycin 9/5 - 9/8    - Tobramycin nebs BID 9/1 - 9/11  - Ciprofloxacin infusion 9/1 - 9/11  - Metronidazole 9/6 - 9/11  - Atovaquone 9/11 -   - Vancomycin 9/19 -   - Zosyn 9/19 -   - Ciprofloxacin 9/19 -     PJP Ppx  Given Dex-ARDS Massiel ferraro remain on steriods for > 3 weeks so will initiate PJP ppx. Given history of allergy to sulfa, will obtain G6PD test to determine if dapsone can be used: levels are ok, however continuing atovaquone.   - Continue atovaquone 1,500 mg daily     CMV viremia  CMV PCR in blood positive 8/31.   - Ganciclovir 9/6 - 9/8, discontinued given ID recs  - trend CMV 9/16  - CMV levels recheck    # HSV-1  Mouth sores present, which could have viral etiology. 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 Monitor and trend.   - CTAP 8/30 due to acute hemoglobin drop requiring transfusion of 2 x 1 unit of blood 12 hours apart. Negative for acute bleed    Musculoskeletal/Rheum:  # Alopecia  - Hold PTA hydroxychloroquine     # Weakness and deconditioning of critical illness  #  Left ankle injury pre-hospitalization    - Physical and occupational therapy when able.     Skin:  # BLE scattered ecchymosis  # Left toe duskiness  - Bilateral lower leg ecchymosis  - WOC consult  - Ecchymosis to left foot, most noticeable to 3rd and 4th toes    General Cares/Prophylaxis:  DVT Prophylaxis: SubQ heparin   GI Prophylaxis: PPI  Restraints: no  Code Status: Full Code    Lines/tubes/drains:  - ETT (8/29)  - NJ (8/9)  - LIJ CVC (8/8)  - Radial a-line (8/29) Removed 9/17  - PIV x3  - Rectal tube (8/17)  - Tunneled HD line (8/23)    Disposition:  - Medical ICU     Massiel Flaherty was seen and discussed with my supervising physician(s).    Gaudencio De Souza MD  Internal Medicine-Pediatrics PGY3    MICU STAFF CRITICAL CARE PROGRESS NOTE:    I saw and examined the patient with the MICU team and concur with findings and A&P as detailed in Dr. De Souza's above note of today.  See my independent note of today    Janes Osborne MD  MICU Staff  [id number]        Clinically Significant Risk Factors              # Hypoalbuminemia: Lowest albumin = 2.2 g/dL at 8/10/2024  9:47 AM, will monitor as appropriate                # Severe Malnutrition: based on nutrition assessment      # Financial/Environmental Concerns: none            ====================================  INTERVAL HISTORY:   Increased sputum production overnight as well as worsening tachycardia and tachypnea so obtained respiratory sputum, crp, PC, lactate and CXR. Still following commands and mentating appropriately but increased sputum production.     OBJECTIVE:   1. VITAL SIGNS:   Temp:  [99.4  F (37.4  C)-100.4  F (38  C)] 99.4  F (37.4  C)  Pulse:  [101-142] 141  Resp:  [16-34] 34  BP: (129-170)/() 142/76  FiO2 (%):  [30 %-80 %] 80 %  SpO2:  [96 %-100 %] 100 %  FiO2 (%): 80 %, Resp: (!) 34    2. INTAKE/ OUTPUT:   I/O last 3 completed shifts:  In: 1755 [I.V.:360; NG/GT:755]  Out: 3810 [Urine:2635; Stool:1175]    3. PHYSICAL EXAMINATION:  General: Awake on  HFNC, arousable, but sleepy  HEENT: Normocephalic, atraumatic  Neuro: following commands but delayed response. Able to move digits with intention, equally, x4  Pulm/Resp: Increased WOB, diffuse crackles billaterally and upper airway noises  CV: Sinus tachycardia, S1/2, no m/r/g  Abdomen: Soft, tender, bowel sounds hyperactive  : deferred  Incisions/Skin: lower leg ecchymosis present and scattered. Some bluish discoloration of the finger tips, capillary refill normal    4. LABS:   Arterial Blood Gases   Recent Labs   Lab 09/18/24  0119 09/17/24  0356 09/17/24  0236 09/16/24  2057   PH 7.43 7.40 7.41 7.40   PCO2 45 44 38 34*   PO2 100 110* 110* 128*   HCO3 30* 27 24 21     Complete Blood Count   Recent Labs   Lab 09/19/24  0325 09/18/24  0407 09/17/24  0356 09/16/24  2202   WBC 29.8* 14.2* 10.6 10.3   HGB 8.0* 7.6* 7.7* 7.3*   * 445 317 298     Basic Metabolic Panel  Recent Labs   Lab 09/19/24  0326 09/19/24  0325 09/19/24  0036 09/18/24  2205 09/18/24  1944 09/18/24  1638 09/18/24  1250 09/18/24  1055   NA  --  141  --  142  --  142  --  141   POTASSIUM  --  4.3  4.3  --  3.4  --  3.7  --  3.9   CHLORIDE  --  99  --  99  --  100  --  101   CO2  --  29  --  28  --  30*  --  28   BUN  --  27.1*  --  28.6*  --  28.7*  --  30.8*   CR  --  0.42*  --  0.39*  --  0.36*  --  0.35*   * 178* 167* 158*   < > 157*  175*   < > 186*    < > = values in this interval not displayed.     Liver Function Tests  No lab results found in last 7 days.    Coagulation Profile  No lab results found in last 7 days.    5. RADIOLOGY:   No results found for this or any previous visit (from the past 24 hour(s)).

## 2024-09-19 NOTE — PLAN OF CARE
ICU End of Shift Summary. See flowsheets for vital signs and detailed assessment.    Changes this shift: Neuro unchanged. Following simple commands. Fatiguing d/t declining respiratory effort. On HFNC 80%, 30 LPM. Unable to switch to BiPAP d/t inability to control secretions. Weak cough and respiratory effort, lung sounds coarse and diminished. Pt progressively more tachycardic sinus 130-140s. Intermittently febrile. WBC increased to 29.8, lactic trending up. MD notified. Infectious panel ordered. Vanco, zosyn, and 500 mL bolus given. One large emesis. Concern for aspiration. Sputum culture sent. TF rate reduced. PRN compazine given. Denies pain. Good UOP, diuresed x1.     Goal Outcome Evaluation:      Plan of Care Reviewed With: patient    Overall Patient Progress: decliningOverall Patient Progress: declining    Outcome Evaluation: Increased O2 needs, unable to tolerate secretions. Fatiguing.

## 2024-09-19 NOTE — PROGRESS NOTES
CLINICAL NUTRITION SERVICES - REASSESSMENT NOTE   Nutrition Prescription    RECOMMENDATIONS FOR MDs/PROVIDERS TO ORDER:  Once N/V appears under control - readvance TF rate back toward goal 50 mL/hr    Malnutrition Status:    Severe malnutrition in the context of acute illness    Recommendations already ordered by Registered Dietitian (RD):  Continue EN support & attempt to advance back to goal rate pending GI symptoms don't worsen:  Suspect feeds aren't the cause of pt's N/V (pt is stooling and emesis is bile-like), but out of caution given BiPAP, ok to continue [employer] Renal @ 20 mL/hr until N/V appears more under control. Then recommend advance 10 mL q2-4h back to goal rate as tolerated.     GOAL: [employer] Renal @ 50 mL/hr (1200 mL/day) to provide 2160 kcal (91% of MREE 9/12), 96 g protein, 204 g CHO, 24 g fiber, and 804 mL free water daily       Ordered 220 mg zinc daily x 14 days d/t continues with high stool output (set for 14 days for a definitive end-date and so not ordered indefinitely given risk of copper deficiency with long-term Zn supplementation > 6 weeks).     Future/Additional Recommendations:  Monitor N/V and ability/appropriateness to advance back to goal TF rate     EVALUATION OF THE PROGRESS TOWARD GOALS   Diet: None (NPO)    Enteral Access: NJT    FWF: 60 mL q4h    Nutrition Support: EN  8/9 - 8/22: Vital AF 1.2 @ 35 ml/hr + 3 pkts prosource TF20 = 840 ml volume, 1248 kcals (21 kcal/kg), 123 g pro (2.1 g/kg), 45 g Fat, 93 g CHO, 4 g fiber, 681 ml free water.     8/23 - 8/29: Vital AF 1.2 @ 50 ml/hr + 2 pkts prosource TF20 = 1200 ml volume, 1600 kcals (27 kcal/kg or 63% MREE), 130 g pro (2.2 g/kg), 65 g Fat, 133 g CHO, 6 g fiber, 973 ml free water.     8/29-8/30: [employer] Renal @ 40 mL/hr (960 mL/day) + 2 pkts ProSource TF20 to provide 1888 kcal (33 kcal/kg, or 74% MREE from 8/22), 117 g protein (2.05 g pro/kg), 163 g CHO, 19 g fiber, and 643 mL free water daily     8/30-9/04: [employer]  Renal @ 30 mL/hr (720 mL/day) + 2 pkt ProSource TF20 daily to provide 1456 kcal (26 kcal/kg), 97 g protein (1.7 g pro/kg), 122 g CHO, 14 g fiber, and 482 mL free water daily    +~420-550 kcal/day from propofol = 0620-5012 kcal/day (74-79% MREE which meets goal of 60-80% MREE)     9/04-9/16: [employer] Renal @ 45 mL/hr (1080 mL/day) +1 Prosource TF20 = 2024 kcal, 106 g protein, 183 g CHO, 22 g fiber, and 724 mL free water daily   -- 9/5-9/6 @ 10 ml/hr d/t pressors per provider     9/16-Current: [employer] Renal @ 50 mL/hr (1200 mL/day) to provide 2160 kcal (91% of MREE 9/12), 96 g protein, 204 g CHO, 24 g fiber, and 804 mL free water daily       Enteral Intake: TF rate reduced to 20 mL/hr d/t emesis. Previously had been tolerating TF at goal rate.   -->7-day average enteral nutrition infusions: 1016 mL TF + 0.57 ProSource protein packets = 1874 kcals (33 kcal/kg, or 79% minimum assessed kcal needs) and 93 g protein (1.6 g protein/kg, or >100% minimum assessed protein needs).     NEW FINDINGS   -Per visit with pt and pt's aunt at bedside today (9/19): Pt recently switched over to BiPAP.    -Wt trends: Wt overall down this admit (~15.9% wt loss over past month and a half)  Date/Time Weight Weight Method   09/19/24 0000 74.7 kg (164 lb 10.9 oz) - lowest/driest wt this admit Bed scale   09/18/24 0400 77.5 kg (170 lb 13.7 oz) Bed scale   09/17/24 0000 76.7 kg (169 lb 1.5 oz) --   09/16/24 0400 75.9 kg (167 lb 5.3 oz) Bed scale   09/13/24 0000 76.3 kg (168 lb 3.4 oz) Bed scale   09/12/24 0400 -- Bed scale   09/12/24 0000 -- Bed scale   09/11/24 2000 -- Bed scale   09/11/24 1600 -- Bed scale   09/11/24 1200 -- Bed scale   09/11/24 0800 -- Bed scale   09/11/24 0400 75.6 kg (166 lb 10.7 oz) Bed scale   09/10/24 0400 76.1 kg (167 lb 12.3 oz) Bed scale   09/09/24 0000 76.8 kg (169 lb 5 oz) Bed scale   09/08/24 0000 77.3 kg (170 lb 6.7 oz) Bed scale   09/07/24 0000 77.7 kg (171 lb 4.8 oz) Bed scale   09/06/24 0000 76.9 kg (169  lb 8.5 oz) Bed scale   09/05/24 1200 80.3 kg (177 lb 0.5 oz) Bed scale   09/05/24 0000 76.6 kg (168 lb 14 oz) Bed scale   09/04/24 0419 77.2 kg (170 lb 3.1 oz) Bed scale   09/03/24 0800 76.2 kg (167 lb 15.9 oz) Bed scale   09/02/24 0215 76.2 kg (167 lb 15.9 oz) Bed scale   09/01/24 1200 78 kg (171 lb 15.3 oz) Bed scale   08/31/24 0500 80 kg (176 lb 5.9 oz) Bed scale   08/30/24 0600 84.6 kg (186 lb 8.2 oz) Bed scale   08/29/24 0400 80.1 kg (176 lb 9.4 oz) Bed scale   08/28/24 0200 79 kg (174 lb 2.6 oz) Bed scale   08/27/24 0200 80 kg (176 lb 5.9 oz) Bed scale   08/26/24 0400 81.7 kg (180 lb 1.9 oz) Bed scale   08/25/24 0400 81.7 kg (180 lb 1.9 oz) Bed scale   08/24/24 0400 82.8 kg (182 lb 8.7 oz) Bed scale   08/23/24 0600 84.1 kg (185 lb 6.5 oz) Bed scale   08/22/24 0200 82.6 kg (182 lb 1.6 oz) Bed scale   08/21/24 0200 83.1 kg (183 lb 3.2 oz) Bed scale   08/20/24 0500 83.2 kg (183 lb 6.8 oz) --   08/19/24 0400 84.5 kg (186 lb 4.6 oz) Bed scale   08/18/24 0400 85.1 kg (187 lb 9.8 oz) --   08/17/24 0400 83.6 kg (184 lb 4.9 oz) Bed scale   08/16/24 0500 85 kg (187 lb 6.3 oz) Bed scale   08/15/24 0400 87.1 kg (192 lb 0.3 oz) Bed scale   08/14/24 0400 85.5 kg (188 lb 7.9 oz) Bed scale   08/13/24 0600 86.5 kg (190 lb 11.2 oz) Bed scale   08/12/24 0000 87.2 kg (192 lb 3.9 oz) Bed scale   08/10/24 0400 92.5 kg (203 lb 14.8 oz) Bed scale   08/09/24 0600 93 kg (205 lb 0.4 oz) Bed scale   08/08/24 1500 88.9 kg (195 lb 15.8 oz) --      -Labs: Reviewed, notable for:   BUN 27.1 (H, downtrended)  Phos 5.3 (H) - last had Neutra-Phos on 9/16 per MAR)    -GI: Emesis x 5 yesterday (9/19) - bile-like in appearance (not TF-like) per bedside RN  PRN antiemetics (Zofran, Compazine) - received IV Zyprexa today  Rectal tube with 275-4050 mL stool output daily over past week per I/Os (1175 mL yesterday, 9/18 per I/Os).     -Renal: CHACORTA, improving    -Respiratory: Extubated 9/16. Currently on HFNC - not transitioning to BiPAP d/t inability to  control secretions per RN note this AM.    -Skin: Per most recent WOCN note on 9/16 - DTPI to L ear (initial assessment).  Skin beneath nasal bridle was visualized through clear BiPAP mask; appears appropriate, with no skin breakdown or tension on the bridle string.      -Meds & Vitamin/Mineral Supplementation: Reviewed, notable for:   Medium dose sliding scale insulin  Renavite    NEW Dosing Weight: 56 kg (adjusted using driest weight of 74.7 kg on 9/19 and IBW of 50 kg)     UPDATED ASSESSED NUTRITION NEEDS   Estimated Energy Needs: 2361-6909 kcals/day (34-42 kcal/kg or % MREE from 9/13)   Justification: Maintenance, Obese, and Vented   Estimated Protein Needs:  grams protein/day (1.5 - 2 grams of pro/kg)   Justification: Increased needs, Dialysis   Estimated Fluid Needs: 1 mL/kcal   Justification: Per provider pending fluid status     MALNUTRITION  % Intake: Decreased intake does not meet criteria  % Weight Loss: > 5% in 1 month (severe)  Subcutaneous Fat Loss: None observed  Muscle Loss: Upper arm (bicep, tricep):  Mild/Moderate and Posterior calf:  Mild  Fluid Accumulation/Edema: Trace-Moderate (1-3+ edema per RN flowsheets)  Malnutrition Diagnosis: Severe malnutrition in the context of acute illness    Previous Goals   Total avg nutritional intake to meet a minimum of 27 kcal/kg and 1.5 g PRO/kg daily (per dosing wt 57 kg).   Evaluation: Met    Previous Nutrition Diagnosis  Inadequate oral intake related to intubation as evidenced by NPO with nutrition support needed to meet nutrition needs.   Evaluation: Ongoing, details updated below    CURRENT NUTRITION DIAGNOSIS  Inadequate oral intake related to NPO status in setting of tenuous respiratory status as evidenced by continued need for EN support to meet full nutrition needs at this time.      INTERVENTIONS  Implementation  -Enteral Nutrition - Continue at lower-rate for now and monitor for N/V   -Discussed with bedside RN that it's ok to continue  20 mL/hr for now and monitor for N/V - pt now on BiPAP and would like to ensure N/V is under control before advancing rate in case feeds are contributing.     Goals  Total avg nutritional intake to meet a minimum of 34 kcal/kg (80% MREE on 9/12) and 1.5 g PRO/kg daily (per dosing wt 56 kg).    Monitoring/Evaluation  Progress toward goals will be monitored and evaluated per protocol.

## 2024-09-20 ENCOUNTER — APPOINTMENT (OUTPATIENT)
Dept: CT IMAGING | Facility: CLINIC | Age: 54
DRG: 003 | End: 2024-09-20
Attending: STUDENT IN AN ORGANIZED HEALTH CARE EDUCATION/TRAINING PROGRAM
Payer: MEDICAID

## 2024-09-20 ENCOUNTER — APPOINTMENT (OUTPATIENT)
Dept: GENERAL RADIOLOGY | Facility: CLINIC | Age: 54
DRG: 003 | End: 2024-09-20
Payer: MEDICAID

## 2024-09-20 ENCOUNTER — APPOINTMENT (OUTPATIENT)
Dept: GENERAL RADIOLOGY | Facility: CLINIC | Age: 54
DRG: 003 | End: 2024-09-20
Attending: INTERNAL MEDICINE
Payer: MEDICAID

## 2024-09-20 LAB
ABO/RH(D): NORMAL
ALLEN'S TEST: ABNORMAL
ANION GAP SERPL CALCULATED.3IONS-SCNC: 12 MMOL/L (ref 7–15)
ANION GAP SERPL CALCULATED.3IONS-SCNC: 12 MMOL/L (ref 7–15)
ANTIBODY SCREEN: NEGATIVE
BACTERIA BLD CULT: NO GROWTH
BASE EXCESS BLDA CALC-SCNC: -1 MMOL/L (ref -3–3)
BASE EXCESS BLDA CALC-SCNC: 0.9 MMOL/L (ref -3–3)
BASE EXCESS BLDA CALC-SCNC: 3.1 MMOL/L (ref -3–3)
BASE EXCESS BLDA CALC-SCNC: 6 MMOL/L (ref -3–3)
BASE EXCESS BLDV CALC-SCNC: 1.2 MMOL/L (ref -3–3)
BASE EXCESS BLDV CALC-SCNC: 4 MMOL/L (ref -3–3)
BASE EXCESS BLDV CALC-SCNC: 4.1 MMOL/L (ref -3–3)
BLD PROD TYP BPU: NORMAL
BLOOD COMPONENT TYPE: NORMAL
BUN SERPL-MCNC: 30.2 MG/DL (ref 6–20)
BUN SERPL-MCNC: 31.3 MG/DL (ref 6–20)
CALCIUM SERPL-MCNC: 8.9 MG/DL (ref 8.8–10.4)
CALCIUM SERPL-MCNC: 9.2 MG/DL (ref 8.8–10.4)
CHLORIDE SERPL-SCNC: 102 MMOL/L (ref 98–107)
CHLORIDE SERPL-SCNC: 103 MMOL/L (ref 98–107)
CODING SYSTEM: NORMAL
COHGB MFR BLD: 95.6 % (ref 96–97)
COHGB MFR BLD: 95.6 % (ref 96–97)
COHGB MFR BLD: 98.8 % (ref 96–97)
COHGB MFR BLD: 99.8 % (ref 96–97)
CREAT SERPL-MCNC: 0.63 MG/DL (ref 0.51–0.95)
CREAT SERPL-MCNC: 0.72 MG/DL (ref 0.51–0.95)
CROSSMATCH: NORMAL
EGFRCR SERPLBLD CKD-EPI 2021: >90 ML/MIN/1.73M2
EGFRCR SERPLBLD CKD-EPI 2021: >90 ML/MIN/1.73M2
ERYTHROCYTE [DISTWIDTH] IN BLOOD BY AUTOMATED COUNT: 20.3 % (ref 10–15)
ERYTHROCYTE [DISTWIDTH] IN BLOOD BY AUTOMATED COUNT: 20.5 % (ref 10–15)
ERYTHROCYTE [DISTWIDTH] IN BLOOD BY AUTOMATED COUNT: 21.1 % (ref 10–15)
GLUCOSE BLDC GLUCOMTR-MCNC: 178 MG/DL (ref 70–99)
GLUCOSE BLDC GLUCOMTR-MCNC: 185 MG/DL (ref 70–99)
GLUCOSE BLDC GLUCOMTR-MCNC: 189 MG/DL (ref 70–99)
GLUCOSE BLDC GLUCOMTR-MCNC: 191 MG/DL (ref 70–99)
GLUCOSE BLDC GLUCOMTR-MCNC: 197 MG/DL (ref 70–99)
GLUCOSE BLDC GLUCOMTR-MCNC: 226 MG/DL (ref 70–99)
GLUCOSE SERPL-MCNC: 220 MG/DL (ref 70–99)
GLUCOSE SERPL-MCNC: 266 MG/DL (ref 70–99)
HCO3 BLD-SCNC: 29 MMOL/L (ref 21–28)
HCO3 BLD-SCNC: 30 MMOL/L (ref 21–28)
HCO3 BLD-SCNC: 31 MMOL/L (ref 21–28)
HCO3 BLD-SCNC: 31 MMOL/L (ref 21–28)
HCO3 BLDV-SCNC: 29 MMOL/L (ref 21–28)
HCO3 BLDV-SCNC: 31 MMOL/L (ref 21–28)
HCO3 BLDV-SCNC: 31 MMOL/L (ref 21–28)
HCO3 SERPL-SCNC: 28 MMOL/L (ref 22–29)
HCO3 SERPL-SCNC: 31 MMOL/L (ref 22–29)
HCT VFR BLD AUTO: 22.4 % (ref 35–47)
HCT VFR BLD AUTO: 26.2 % (ref 35–47)
HCT VFR BLD AUTO: 28 % (ref 35–47)
HGB BLD-MCNC: 6.6 G/DL (ref 11.7–15.7)
HGB BLD-MCNC: 7.9 G/DL (ref 11.7–15.7)
HGB BLD-MCNC: 8.2 G/DL (ref 11.7–15.7)
INR PPP: 1.45 (ref 0.85–1.15)
ISSUE DATE AND TIME: NORMAL
LACTATE SERPL-SCNC: 0.9 MMOL/L (ref 0.7–2)
MAGNESIUM SERPL-MCNC: 1.9 MG/DL (ref 1.7–2.3)
MCH RBC QN AUTO: 30 PG (ref 26.5–33)
MCH RBC QN AUTO: 30.1 PG (ref 26.5–33)
MCH RBC QN AUTO: 30.5 PG (ref 26.5–33)
MCHC RBC AUTO-ENTMCNC: 29.3 G/DL (ref 31.5–36.5)
MCHC RBC AUTO-ENTMCNC: 29.5 G/DL (ref 31.5–36.5)
MCHC RBC AUTO-ENTMCNC: 30.2 G/DL (ref 31.5–36.5)
MCV RBC AUTO: 101 FL (ref 78–100)
MCV RBC AUTO: 102 FL (ref 78–100)
MCV RBC AUTO: 103 FL (ref 78–100)
O2/TOTAL GAS SETTING VFR VENT: 100 %
O2/TOTAL GAS SETTING VFR VENT: 60 %
O2/TOTAL GAS SETTING VFR VENT: 65 %
O2/TOTAL GAS SETTING VFR VENT: 70 %
O2/TOTAL GAS SETTING VFR VENT: 70 %
O2/TOTAL GAS SETTING VFR VENT: 75 %
O2/TOTAL GAS SETTING VFR VENT: 75 %
OXYHGB MFR BLDV: 60 % (ref 70–75)
OXYHGB MFR BLDV: 72 % (ref 70–75)
OXYHGB MFR BLDV: 74 % (ref 70–75)
PCO2 BLD: 119 MM HG (ref 35–45)
PCO2 BLD: 50 MM HG (ref 35–45)
PCO2 BLD: 60 MM HG (ref 35–45)
PCO2 BLD: 67 MM HG (ref 35–45)
PCO2 BLDV: 58 MM HG (ref 40–50)
PCO2 BLDV: 61 MM HG (ref 40–50)
PCO2 BLDV: 69 MM HG (ref 40–50)
PEEP: 8 CM H2O
PH BLD: 7.03 [PH] (ref 7.35–7.45)
PH BLD: 7.24 [PH] (ref 7.35–7.45)
PH BLD: 7.31 [PH] (ref 7.35–7.45)
PH BLD: 7.41 [PH] (ref 7.35–7.45)
PH BLDV: 7.24 [PH] (ref 7.32–7.43)
PH BLDV: 7.31 [PH] (ref 7.32–7.43)
PH BLDV: 7.33 [PH] (ref 7.32–7.43)
PHOSPHATE SERPL-MCNC: 5.7 MG/DL (ref 2.5–4.5)
PLAT MORPH BLD: ABNORMAL
PLATELET # BLD AUTO: 367 10E3/UL (ref 150–450)
PLATELET # BLD AUTO: 397 10E3/UL (ref 150–450)
PLATELET # BLD AUTO: ABNORMAL 10*3/UL
PO2 BLD: 198 MM HG (ref 80–105)
PO2 BLD: 69 MM HG (ref 80–105)
PO2 BLD: 81 MM HG (ref 80–105)
PO2 BLD: 88 MM HG (ref 80–105)
PO2 BLDV: 35 MM HG (ref 25–47)
PO2 BLDV: 45 MM HG (ref 25–47)
PO2 BLDV: 46 MM HG (ref 25–47)
POTASSIUM SERPL-SCNC: 4 MMOL/L (ref 3.4–5.3)
POTASSIUM SERPL-SCNC: 4.2 MMOL/L (ref 3.4–5.3)
RBC # BLD AUTO: 2.19 10E6/UL (ref 3.8–5.2)
RBC # BLD AUTO: 2.59 10E6/UL (ref 3.8–5.2)
RBC # BLD AUTO: 2.73 10E6/UL (ref 3.8–5.2)
RBC MORPH BLD: ABNORMAL
SAO2 % BLDA: 93 % (ref 92–100)
SAO2 % BLDA: 93 % (ref 92–100)
SAO2 % BLDA: 96 % (ref 92–100)
SAO2 % BLDA: 97 % (ref 92–100)
SAO2 % BLDV: 61.9 % (ref 70–75)
SAO2 % BLDV: 73.3 % (ref 70–75)
SAO2 % BLDV: 76.6 % (ref 70–75)
SODIUM SERPL-SCNC: 143 MMOL/L (ref 135–145)
SODIUM SERPL-SCNC: 145 MMOL/L (ref 135–145)
SPECIMEN EXPIRATION DATE: NORMAL
UNIT ABO/RH: NORMAL
UNIT NUMBER: NORMAL
UNIT STATUS: NORMAL
UNIT TYPE ISBT: 9500
WBC # BLD AUTO: 12.7 10E3/UL (ref 4–11)
WBC # BLD AUTO: 14 10E3/UL (ref 4–11)
WBC # BLD AUTO: 20.4 10E3/UL (ref 4–11)

## 2024-09-20 PROCEDURE — 74018 RADEX ABDOMEN 1 VIEW: CPT | Mod: 26 | Performed by: RADIOLOGY

## 2024-09-20 PROCEDURE — 82805 BLOOD GASES W/O2 SATURATION: CPT

## 2024-09-20 PROCEDURE — 999N000254 HC STATISTIC VENTILATOR TRANSFER

## 2024-09-20 PROCEDURE — 71045 X-RAY EXAM CHEST 1 VIEW: CPT

## 2024-09-20 PROCEDURE — 84100 ASSAY OF PHOSPHORUS: CPT

## 2024-09-20 PROCEDURE — 87040 BLOOD CULTURE FOR BACTERIA: CPT | Performed by: INTERNAL MEDICINE

## 2024-09-20 PROCEDURE — 250N000011 HC RX IP 250 OP 636: Performed by: SURGERY

## 2024-09-20 PROCEDURE — 31500 INSERT EMERGENCY AIRWAY: CPT | Mod: GC | Performed by: INTERNAL MEDICINE

## 2024-09-20 PROCEDURE — 71045 X-RAY EXAM CHEST 1 VIEW: CPT | Mod: 26 | Performed by: RADIOLOGY

## 2024-09-20 PROCEDURE — 258N000003 HC RX IP 258 OP 636

## 2024-09-20 PROCEDURE — 250N000011 HC RX IP 250 OP 636

## 2024-09-20 PROCEDURE — 31622 DX BRONCHOSCOPE/WASH: CPT | Mod: GC | Performed by: STUDENT IN AN ORGANIZED HEALTH CARE EDUCATION/TRAINING PROGRAM

## 2024-09-20 PROCEDURE — 250N000009 HC RX 250

## 2024-09-20 PROCEDURE — 250N000013 HC RX MED GY IP 250 OP 250 PS 637

## 2024-09-20 PROCEDURE — 85027 COMPLETE CBC AUTOMATED: CPT

## 2024-09-20 PROCEDURE — 250N000013 HC RX MED GY IP 250 OP 250 PS 637: Performed by: PHYSICIAN ASSISTANT

## 2024-09-20 PROCEDURE — 94644 CONT INHLJ TX 1ST HOUR: CPT

## 2024-09-20 PROCEDURE — P9016 RBC LEUKOCYTES REDUCED: HCPCS

## 2024-09-20 PROCEDURE — 94660 CPAP INITIATION&MGMT: CPT

## 2024-09-20 PROCEDURE — 200N000002 HC R&B ICU UMMC

## 2024-09-20 PROCEDURE — 86900 BLOOD TYPING SEROLOGIC ABO: CPT | Performed by: SURGERY

## 2024-09-20 PROCEDURE — 999N000065 XR ABDOMEN PORT 1 VIEW

## 2024-09-20 PROCEDURE — 71045 X-RAY EXAM CHEST 1 VIEW: CPT | Mod: 77

## 2024-09-20 PROCEDURE — 258N000001 HC RX 258

## 2024-09-20 PROCEDURE — 70450 CT HEAD/BRAIN W/O DYE: CPT | Mod: 26 | Performed by: RADIOLOGY

## 2024-09-20 PROCEDURE — 85610 PROTHROMBIN TIME: CPT

## 2024-09-20 PROCEDURE — 74018 RADEX ABDOMEN 1 VIEW: CPT | Mod: 26 | Performed by: STUDENT IN AN ORGANIZED HEALTH CARE EDUCATION/TRAINING PROGRAM

## 2024-09-20 PROCEDURE — 99291 CRITICAL CARE FIRST HOUR: CPT | Mod: 25 | Performed by: INTERNAL MEDICINE

## 2024-09-20 PROCEDURE — 71275 CT ANGIOGRAPHY CHEST: CPT | Mod: 26 | Performed by: RADIOLOGY

## 2024-09-20 PROCEDURE — 94640 AIRWAY INHALATION TREATMENT: CPT | Mod: 76

## 2024-09-20 PROCEDURE — 86923 COMPATIBILITY TEST ELECTRIC: CPT

## 2024-09-20 PROCEDURE — 31622 DX BRONCHOSCOPE/WASH: CPT

## 2024-09-20 PROCEDURE — 85027 COMPLETE CBC AUTOMATED: CPT | Performed by: PHYSICIAN ASSISTANT

## 2024-09-20 PROCEDURE — 72191 CT ANGIOGRAPH PELV W/O&W/DYE: CPT | Mod: 26 | Performed by: RADIOLOGY

## 2024-09-20 PROCEDURE — 94640 AIRWAY INHALATION TREATMENT: CPT

## 2024-09-20 PROCEDURE — 32551 INSERTION OF CHEST TUBE: CPT | Mod: GC | Performed by: INTERNAL MEDICINE

## 2024-09-20 PROCEDURE — 999N000065 XR CHEST PORT 1 VIEW

## 2024-09-20 PROCEDURE — 272N000220 HC BRONCHOSCOPE, DISPOSABLE

## 2024-09-20 PROCEDURE — 250N000013 HC RX MED GY IP 250 OP 250 PS 637: Performed by: SURGERY

## 2024-09-20 PROCEDURE — 250N000009 HC RX 250: Performed by: SURGERY

## 2024-09-20 PROCEDURE — 94668 MNPJ CHEST WALL SBSQ: CPT

## 2024-09-20 PROCEDURE — 83735 ASSAY OF MAGNESIUM: CPT

## 2024-09-20 PROCEDURE — 999N000157 HC STATISTIC RCP TIME EA 10 MIN

## 2024-09-20 PROCEDURE — 94645 CONT INHLJ TX EACH ADDL HOUR: CPT

## 2024-09-20 PROCEDURE — 80048 BASIC METABOLIC PNL TOTAL CA: CPT

## 2024-09-20 PROCEDURE — 70450 CT HEAD/BRAIN W/O DYE: CPT

## 2024-09-20 PROCEDURE — 72191 CT ANGIOGRAPH PELV W/O&W/DYE: CPT

## 2024-09-20 PROCEDURE — 86901 BLOOD TYPING SEROLOGIC RH(D): CPT | Performed by: SURGERY

## 2024-09-20 PROCEDURE — 36415 COLL VENOUS BLD VENIPUNCTURE: CPT | Performed by: INTERNAL MEDICINE

## 2024-09-20 PROCEDURE — 250N000011 HC RX IP 250 OP 636: Performed by: STUDENT IN AN ORGANIZED HEALTH CARE EDUCATION/TRAINING PROGRAM

## 2024-09-20 PROCEDURE — 250N000009 HC RX 250: Performed by: STUDENT IN AN ORGANIZED HEALTH CARE EDUCATION/TRAINING PROGRAM

## 2024-09-20 PROCEDURE — 0W9930Z DRAINAGE OF RIGHT PLEURAL CAVITY WITH DRAINAGE DEVICE, PERCUTANEOUS APPROACH: ICD-10-PCS | Performed by: INTERNAL MEDICINE

## 2024-09-20 PROCEDURE — 83605 ASSAY OF LACTIC ACID: CPT

## 2024-09-20 PROCEDURE — 0BJ08ZZ INSPECTION OF TRACHEOBRONCHIAL TREE, VIA NATURAL OR ARTIFICIAL OPENING ENDOSCOPIC: ICD-10-PCS | Performed by: STUDENT IN AN ORGANIZED HEALTH CARE EDUCATION/TRAINING PROGRAM

## 2024-09-20 PROCEDURE — 71275 CT ANGIOGRAPHY CHEST: CPT

## 2024-09-20 PROCEDURE — 999N000157 HC STATISTIC RCP TIME EA 10 MIN: Mod: 76

## 2024-09-20 RX ORDER — PHENYLEPHRINE HYDROCHLORIDE 10 MG/ML
200 INJECTION INTRAVENOUS ONCE
Status: COMPLETED | OUTPATIENT
Start: 2024-09-20 | End: 2024-09-20

## 2024-09-20 RX ORDER — IOPAMIDOL 755 MG/ML
105 INJECTION, SOLUTION INTRAVASCULAR ONCE
Status: COMPLETED | OUTPATIENT
Start: 2024-09-20 | End: 2024-09-20

## 2024-09-20 RX ORDER — MAGNESIUM OXIDE 400 MG/1
400 TABLET ORAL EVERY 4 HOURS
Status: DISCONTINUED | OUTPATIENT
Start: 2024-09-20 | End: 2024-09-20

## 2024-09-20 RX ORDER — EPINEPHRINE 0.1 MG/ML
1 INJECTION INTRAVENOUS ONCE
Status: COMPLETED | OUTPATIENT
Start: 2024-09-20 | End: 2024-09-20

## 2024-09-20 RX ORDER — HEPARIN SODIUM 5000 [USP'U]/.5ML
5000 INJECTION, SOLUTION INTRAVENOUS; SUBCUTANEOUS EVERY 8 HOURS
Status: CANCELLED | OUTPATIENT
Start: 2024-09-20

## 2024-09-20 RX ORDER — NOREPINEPHRINE BITARTRATE 0.06 MG/ML
.01-.6 INJECTION, SOLUTION INTRAVENOUS CONTINUOUS
Status: DISCONTINUED | OUTPATIENT
Start: 2024-09-20 | End: 2024-09-23

## 2024-09-20 RX ORDER — MAGNESIUM SULFATE HEPTAHYDRATE 40 MG/ML
2 INJECTION, SOLUTION INTRAVENOUS ONCE
Status: COMPLETED | OUTPATIENT
Start: 2024-09-20 | End: 2024-09-20

## 2024-09-20 RX ORDER — FUROSEMIDE 10 MG/ML
40 INJECTION INTRAMUSCULAR; INTRAVENOUS ONCE
Status: COMPLETED | OUTPATIENT
Start: 2024-09-20 | End: 2024-09-20

## 2024-09-20 RX ORDER — FENTANYL CITRATE 50 UG/ML
100 INJECTION, SOLUTION INTRAMUSCULAR; INTRAVENOUS ONCE
Status: COMPLETED | OUTPATIENT
Start: 2024-09-20 | End: 2024-09-20

## 2024-09-20 RX ORDER — PROPOFOL 10 MG/ML
INJECTION, EMULSION INTRAVENOUS
Status: DISCONTINUED
Start: 2024-09-20 | End: 2024-09-20 | Stop reason: HOSPADM

## 2024-09-20 RX ORDER — FENTANYL CITRATE 50 UG/ML
INJECTION, SOLUTION INTRAMUSCULAR; INTRAVENOUS
Status: COMPLETED
Start: 2024-09-20 | End: 2024-09-20

## 2024-09-20 RX ORDER — NOREPINEPHRINE BITARTRATE 0.06 MG/ML
INJECTION, SOLUTION INTRAVENOUS
Status: COMPLETED
Start: 2024-09-20 | End: 2024-09-20

## 2024-09-20 RX ORDER — KETAMINE HYDROCHLORIDE 100 MG/ML
100 INJECTION, SOLUTION INTRAMUSCULAR; INTRAVENOUS ONCE
Status: COMPLETED | OUTPATIENT
Start: 2024-09-20 | End: 2024-09-20

## 2024-09-20 RX ORDER — CHLORHEXIDINE GLUCONATE ORAL RINSE 1.2 MG/ML
15 SOLUTION DENTAL EVERY 12 HOURS
Status: DISCONTINUED | OUTPATIENT
Start: 2024-09-20 | End: 2024-10-01 | Stop reason: HOSPADM

## 2024-09-20 RX ADMIN — Medication 100 MCG/HR: at 13:57

## 2024-09-20 RX ADMIN — DORNASE ALFA 2.5 MG: 1 SOLUTION RESPIRATORY (INHALATION) at 19:57

## 2024-09-20 RX ADMIN — VENLAFAXINE 37.5 MG: 37.5 TABLET ORAL at 08:16

## 2024-09-20 RX ADMIN — SODIUM CHLORIDE, POTASSIUM CHLORIDE, SODIUM LACTATE AND CALCIUM CHLORIDE 250 ML: 600; 310; 30; 20 INJECTION, SOLUTION INTRAVENOUS at 01:20

## 2024-09-20 RX ADMIN — LEVALBUTEROL HYDROCHLORIDE 0.63 MG: 0.63 SOLUTION RESPIRATORY (INHALATION) at 23:36

## 2024-09-20 RX ADMIN — Medication 20 NG/KG/MIN: at 13:21

## 2024-09-20 RX ADMIN — Medication 100 MG: at 12:06

## 2024-09-20 RX ADMIN — PIPERACILLIN AND TAZOBACTAM 4.5 G: 4; .5 INJECTION, POWDER, LYOPHILIZED, FOR SOLUTION INTRAVENOUS at 23:01

## 2024-09-20 RX ADMIN — LEVALBUTEROL HYDROCHLORIDE 0.63 MG: 0.63 SOLUTION RESPIRATORY (INHALATION) at 09:09

## 2024-09-20 RX ADMIN — ROCURONIUM BROMIDE 100 MG: 10 INJECTION, SOLUTION INTRAVENOUS at 14:56

## 2024-09-20 RX ADMIN — FENTANYL CITRATE 100 MCG: 50 INJECTION, SOLUTION INTRAMUSCULAR; INTRAVENOUS at 15:00

## 2024-09-20 RX ADMIN — CIPROFLOXACIN 400 MG: 400 INJECTION, SOLUTION INTRAVENOUS at 15:57

## 2024-09-20 RX ADMIN — KETAMINE HYDROCHLORIDE 45 MG/HR: 100 INJECTION, SOLUTION, CONCENTRATE INTRAMUSCULAR; INTRAVENOUS at 15:12

## 2024-09-20 RX ADMIN — ACETAMINOPHEN 650 MG: 325 TABLET ORAL at 14:37

## 2024-09-20 RX ADMIN — INSULIN ASPART 2 UNITS: 100 INJECTION, SOLUTION INTRAVENOUS; SUBCUTANEOUS at 04:28

## 2024-09-20 RX ADMIN — PIPERACILLIN AND TAZOBACTAM 4.5 G: 4; .5 INJECTION, POWDER, LYOPHILIZED, FOR SOLUTION INTRAVENOUS at 14:26

## 2024-09-20 RX ADMIN — IPRATROPIUM BROMIDE 0.5 MG: 0.5 SOLUTION RESPIRATORY (INHALATION) at 19:57

## 2024-09-20 RX ADMIN — LORAZEPAM 0.5 MG: 0.5 TABLET ORAL at 08:16

## 2024-09-20 RX ADMIN — ROCURONIUM BROMIDE 100 MG: 10 INJECTION, SOLUTION INTRAVENOUS at 12:06

## 2024-09-20 RX ADMIN — INSULIN ASPART 3 UNITS: 100 INJECTION, SOLUTION INTRAVENOUS; SUBCUTANEOUS at 10:36

## 2024-09-20 RX ADMIN — PIPERACILLIN AND TAZOBACTAM 4.5 G: 4; .5 INJECTION, POWDER, LYOPHILIZED, FOR SOLUTION INTRAVENOUS at 04:05

## 2024-09-20 RX ADMIN — MIDAZOLAM 2 MG: 1 INJECTION INTRAMUSCULAR; INTRAVENOUS at 15:02

## 2024-09-20 RX ADMIN — VECURONIUM BROMIDE 0.8 MCG/KG/MIN: 10 INJECTION, POWDER, LYOPHILIZED, FOR SOLUTION INTRAVENOUS at 14:08

## 2024-09-20 RX ADMIN — IPRATROPIUM BROMIDE 0.5 MG: 0.5 SOLUTION RESPIRATORY (INHALATION) at 16:44

## 2024-09-20 RX ADMIN — BUDESONIDE 1 MG: 1 SUSPENSION RESPIRATORY (INHALATION) at 09:09

## 2024-09-20 RX ADMIN — CIPROFLOXACIN 400 MG: 400 INJECTION, SOLUTION INTRAVENOUS at 23:58

## 2024-09-20 RX ADMIN — NOREPINEPHRINE BITARTRATE 0.1 MCG/KG/MIN: 0.06 INJECTION, SOLUTION INTRAVENOUS at 12:20

## 2024-09-20 RX ADMIN — ACETAMINOPHEN 650 MG: 325 TABLET ORAL at 08:16

## 2024-09-20 RX ADMIN — MONTELUKAST 10 MG: 10 TABLET, FILM COATED ORAL at 23:01

## 2024-09-20 RX ADMIN — SODIUM CHLORIDE SOLN NEBU 3% 3 ML: 3 NEBU SOLN at 19:57

## 2024-09-20 RX ADMIN — ROCURONIUM BROMIDE 200 MG: 10 INJECTION, SOLUTION INTRAVENOUS at 14:17

## 2024-09-20 RX ADMIN — Medication 1 TABLET: at 14:37

## 2024-09-20 RX ADMIN — HEPARIN SODIUM 5000 UNITS: 5000 INJECTION, SOLUTION INTRAVENOUS; SUBCUTANEOUS at 04:05

## 2024-09-20 RX ADMIN — BUDESONIDE 1 MG: 1 SUSPENSION RESPIRATORY (INHALATION) at 19:57

## 2024-09-20 RX ADMIN — PHENYLEPHRINE HYDROCHLORIDE 200 MCG: 10 INJECTION INTRAVENOUS at 14:58

## 2024-09-20 RX ADMIN — ACETAMINOPHEN 650 MG: 325 TABLET ORAL at 05:46

## 2024-09-20 RX ADMIN — IPRATROPIUM BROMIDE 0.5 MG: 0.5 SOLUTION RESPIRATORY (INHALATION) at 09:09

## 2024-09-20 RX ADMIN — DEXTROSE MONOHYDRATE 1000 ML: 100 INJECTION, SOLUTION INTRAVENOUS at 03:05

## 2024-09-20 RX ADMIN — FUROSEMIDE 40 MG: 10 INJECTION, SOLUTION INTRAVENOUS at 04:05

## 2024-09-20 RX ADMIN — WHITE PETROLATUM 57.7 %-MINERAL OIL 31.9 % EYE OINTMENT: at 15:39

## 2024-09-20 RX ADMIN — PIPERACILLIN AND TAZOBACTAM 4.5 G: 4; .5 INJECTION, POWDER, LYOPHILIZED, FOR SOLUTION INTRAVENOUS at 17:51

## 2024-09-20 RX ADMIN — INSULIN ASPART 3 UNITS: 100 INJECTION, SOLUTION INTRAVENOUS; SUBCUTANEOUS at 16:00

## 2024-09-20 RX ADMIN — MAGNESIUM SULFATE HEPTAHYDRATE 2 G: 2 INJECTION, SOLUTION INTRAVENOUS at 05:56

## 2024-09-20 RX ADMIN — INSULIN ASPART 2 UNITS: 100 INJECTION, SOLUTION INTRAVENOUS; SUBCUTANEOUS at 20:17

## 2024-09-20 RX ADMIN — IPRATROPIUM BROMIDE 0.5 MG: 0.5 SOLUTION RESPIRATORY (INHALATION) at 00:37

## 2024-09-20 RX ADMIN — ATOVAQUONE 1500 MG: 750 SUSPENSION ORAL at 14:25

## 2024-09-20 RX ADMIN — LEVALBUTEROL HYDROCHLORIDE 0.63 MG: 0.63 SOLUTION RESPIRATORY (INHALATION) at 16:45

## 2024-09-20 RX ADMIN — CETIRIZINE HYDROCHLORIDE 10 MG: 10 TABLET, FILM COATED ORAL at 08:16

## 2024-09-20 RX ADMIN — LEVOTHYROXINE SODIUM 25 MCG: 0.03 TABLET ORAL at 08:16

## 2024-09-20 RX ADMIN — LEVALBUTEROL HYDROCHLORIDE 0.63 MG: 0.63 SOLUTION RESPIRATORY (INHALATION) at 04:38

## 2024-09-20 RX ADMIN — IPRATROPIUM BROMIDE 0.5 MG: 0.5 SOLUTION RESPIRATORY (INHALATION) at 23:36

## 2024-09-20 RX ADMIN — KETAMINE HYDROCHLORIDE 100 MG: 100 INJECTION, SOLUTION, CONCENTRATE INTRAMUSCULAR; INTRAVENOUS at 15:00

## 2024-09-20 RX ADMIN — IOPAMIDOL 105 ML: 755 INJECTION, SOLUTION INTRAVENOUS at 20:46

## 2024-09-20 RX ADMIN — WHITE PETROLATUM 57.7 %-MINERAL OIL 31.9 % EYE OINTMENT: at 23:56

## 2024-09-20 RX ADMIN — EPINEPHRINE 1 MG: 0.1 INJECTION INTRAVENOUS at 14:55

## 2024-09-20 RX ADMIN — SODIUM BICARBONATE 50 MEQ: 84 INJECTION INTRAVENOUS at 14:59

## 2024-09-20 RX ADMIN — CARBOXYMETHYLCELLULOSE SODIUM 1 DROP: 5 SOLUTION/ DROPS OPHTHALMIC at 08:55

## 2024-09-20 RX ADMIN — CIPROFLOXACIN 400 MG: 400 INJECTION, SOLUTION INTRAVENOUS at 10:39

## 2024-09-20 RX ADMIN — CIPROFLOXACIN 400 MG: 400 INJECTION, SOLUTION INTRAVENOUS at 00:19

## 2024-09-20 RX ADMIN — GABAPENTIN 300 MG: 250 SUSPENSION ORAL at 05:46

## 2024-09-20 RX ADMIN — LEVALBUTEROL HYDROCHLORIDE 0.63 MG: 0.63 SOLUTION RESPIRATORY (INHALATION) at 00:37

## 2024-09-20 RX ADMIN — CHLORHEXIDINE GLUCONATE 0.12% ORAL RINSE 15 ML: 1.2 LIQUID ORAL at 20:18

## 2024-09-20 RX ADMIN — IPRATROPIUM BROMIDE 0.5 MG: 0.5 SOLUTION RESPIRATORY (INHALATION) at 04:38

## 2024-09-20 RX ADMIN — SODIUM CHLORIDE SOLN NEBU 3% 3 ML: 3 NEBU SOLN at 09:09

## 2024-09-20 RX ADMIN — DORNASE ALFA 2.5 MG: 1 SOLUTION RESPIRATORY (INHALATION) at 16:45

## 2024-09-20 RX ADMIN — ZINC SULFATE 220 MG (50 MG) CAPSULE 220 MG: CAPSULE at 08:16

## 2024-09-20 RX ADMIN — Medication 200 MG: at 14:17

## 2024-09-20 ASSESSMENT — ACTIVITIES OF DAILY LIVING (ADL)
ADLS_ACUITY_SCORE: 61
ADLS_ACUITY_SCORE: 65
ADLS_ACUITY_SCORE: 61
ADLS_ACUITY_SCORE: 59
ADLS_ACUITY_SCORE: 61
ADLS_ACUITY_SCORE: 61
ADLS_ACUITY_SCORE: 65
ADLS_ACUITY_SCORE: 61
ADLS_ACUITY_SCORE: 65
ADLS_ACUITY_SCORE: 65
ADLS_ACUITY_SCORE: 59
ADLS_ACUITY_SCORE: 61
ADLS_ACUITY_SCORE: 59
ADLS_ACUITY_SCORE: 65
ADLS_ACUITY_SCORE: 61
ADLS_ACUITY_SCORE: 59
ADLS_ACUITY_SCORE: 65
ADLS_ACUITY_SCORE: 61
ADLS_ACUITY_SCORE: 65

## 2024-09-20 NOTE — PROCEDURES
[facility]    Procedure: Chest tube insertion    Date/Time: 9/20/2024 6:34 PM    Performed by: Estela Haas MD  Authorized by: Estela Haas MD  Indications: tension pneumothorax      UNIVERSAL PROTOCOL   Site Marked: NA  Prior Images Obtained and Reviewed:  Yes  Required items: Required blood products, implants, devices and special equipment available    Patient identity confirmed:  Hospital-assigned identification number  NA - No sedation, light sedation, or local anesthesia  Confirmation Checklist:  Procedure was appropriate and matched the consent or emergent situation  Universal Protocol: the Joint Commission Universal Protocol was followed      SEDATION  Patient Sedated: Yes (patient already sedated for intubation)    Vital signs: Vital signs monitored during sedation      PROCEDURE DETAILS  Preparation: skin prepped with ChloraPrep  Placement location: right lateral  Scalpel size: 11  Tube size: 20 Arabic  Dissection instrument: Swapna clamp  Ultrasound guidance: no  Tension pneumothorax heard: yes  Tube connected to: suction  Drainage characteristics: bloody  Drainage amount: 20 ml  Dressing: petrolatum-impregnated gauze  Post-insertion x-ray findings: tube in good position      PROCEDURE    Patient Tolerance:  Patient tolerated the procedure well with no immediate complications  Length of time physician/provider present for 1:1 monitoring during sedation: 45      Estela Haas MD    MICU Staff    I was present at the bedside with Dr. Haas throughout this procedure.  I also reviewed the subsequent CXR confirming chest tube position and resolution of R tension PTX  Above note reviewed and agree.      Janes Osborne MD

## 2024-09-20 NOTE — PROGRESS NOTES
MICU STAFF CRITICAL CARE PROGRESS NOTE:    I saw and examined the patient with the MICU team and concur with findings and A&P as detailed in Dr. Fowler's note of today    54 yo  PMH Anxiety/depression, fibromyalgia;; asthma, HLD; MURIEL on CPAP; HTN; expressive aphasia; hypothyroidism; ? seizures (no Rx)     8/3 admit @ OSH for fatigue, fever && SOB  8/6 Intubated @ OSH and transferred 8/8 to [facility] from OSH (?) for Pneumonia, ARDS and high vent need placed on VV ECMO and CRRT  On VV ECMO decannulated 8-18  Transferred to MICU team 8-26  Extubated 8-27 and reintubated 8/27 prob due to worsening AHRF & Pseudomonas VAP required paralsis, veletri, and high steroid and sedation Rx  Has completed treatment for Pseudomonas VAP.  No recent pressors  Overdue' for trach but making major weaning progress  Issues with ventilator dyssynchrony - adjust insp time and Vt  Significant neuromuscular weakness  Off CRRT as of 9/12; self-diuresing  Slowly waking up on oral BZDP & hydromorphone    Events overnight reviewed with team.febrile to 102.3 and some increased lethargy and increased O2 edema.  CXR c/w edema.  Got small bolus iv fluid 250 and lasix.  BC not done.    Temp 100.3 Ax this AM   -130 RR 24-34 adequate BP  100-110/55-60  Increased WOB   BiAPAP - 15/10  RRR nl S1 & S2 no m/g/r  Anterior bronchial BS no crackles or wheeze  Abd soft nontender  PItting edema  I:O - net 300 mls +    Hgb 6.6 (8) being tx'd  WBC 14 (29.8); Plts 397K  143 4.2 103 28 30.2/0.63  glucose 178  PO4 5.7;  Mg 1.9; lactate 0.9  CRP 47.8    Central VBG 7.33 / 58 4 AM  Micro - Sputum 4+GNB (again)  9/18 Pseudomonas in sputum - sensititvities pending   (390)  CXR  some clearing of L base c/w yesterday's CXR  Doppler LE U/S - hematoma R groin; negative for clots    Acute hypoxic respiratory failure c/w ARDS: Improving  / Pseudomonas pneumonia / Mechanical ventilation / s/p VV ECMO 8/8 - 8/18 / h/o asthma / h/o CAP / OSAS on CPAP @ home  Remote PFT  9/8/20 mild restriction & diffusion impairment   CT CAP 3/9/2020 bilat lung scarring/fibrosis bilaterally  Extubated 8/27, reintubated 8/29 for worsening O2 demands and diffuse opacities iso new/recurrent psuedomonas pneumonia.   ILD w/unit(s) was negative  Got aminophylline loading dose and was starting maintenance when got tachy and stopped  On scheduled duonebs.singulair & pulmicort  Chest physiotherapy 4x /day  Steroid taper  - 20 mg/day at present; holding off weaning further  On PJP prophylaxis  DNAse BID nebs  Concern yesterday for new HAP with antibiotic Rx (zosyn & cipro) broadened.and decreased WBC today  Maintain O2 sat > 90%; trial of BiPAP to expand/rest lungs post NT-suctioning  Goal I:O negative to -0.5L today  ABG this AM while on HFNC     Anemia - prob Acute Blood Loss to R groin hematoma  follow clinically and with Hgb  Holding heparin    CV / Hyperlipidemia / h/o HTN / Sinus Tachycardia  MAP goal >60;  off pressors since 9/9   PTA atorvastatin restarted  PTA clonidine held while sedated  Monitor extremity discoloration for necrosis  PRN hydralazine for SBP > 200  Consider small iv fluid bolus 250 mls for higher HR/lower BP     Nnutrition / Non-infectious Diarrhea / GERD   Protonix (home medication)   Holding NJ TF since yest AM - seemed to correlate with end of nausea  Rectal tube for high output / Hold bowel regimen d/t loose stools (loperamide);    Continue scheduled multivitamin, banatrol, prosource packet  Imodium prn but not getting    CHACORTA -resolved / Hypernatremia - resolved  Baseline Cr approx 0.6; was anuric  now making urine, likely prerenal due to shock; holding off on iHD      Hyperglycemia (Steroids & Stress) / Hypothyroidism   - Goal to keep BG < 180 for optimal wound healing  - Continue medium sliding scale insulin   - Continue PTA synthroid     ID / Leukocytosis / Pseudomonas pneumonia / Hx CAP / CMV Viremia (PCR+ 8/31) / HSV? / ? New HAP  Respiratory cx 8/29 pseudomonas pneumonia.  Intermediate susceptability to meropenem, more significant sensitivities to ciprofloxacin  Repeat BC given fever last night  Now on Zosyn and Cipro based on events last night;   Consider possibility of resistant Pseudomonas and if we should alter abx  Holding off on restarting Romaine nebs @ present     PJP Ppx initiating   h/o sulfa allergy; GPD WNL  Continue atovaquone 1,500 mg daily; may switch to dapsone     CMV viremia  With fever will reculture and check PCR     HSV-1  Mouth sores present, which could have viral etiology. 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  - Acyclovir 400mg BID x5 days (8/22-8/27)     Anemia of critical illness  - Transfuse if hgb <7.0 or hypoperfusion  - negative CTAP 8/30 due to acute Hgb drop (got 2 U RBCs)     Musculoskeletal/Rheum:  # Alopecia  Hold PTA hydroxychloroquine      Weakness and deconditioning of critical illness  Left ankle injury pre-hospitalization; PT/OT when able      BLE scattered ecchymosis / Left toe duskiness/ Bilateral lower leg ecchymosis  WOC consulted for Ecchymosis to left foot, most noticeable to 3rd and 4th toes     Lines/tubes/drains: NJ (8/9); LIJ CVC (8/8); Radial A-line (8/29); PIV x3; Rectal tube (8/17)l  Tunneled HD line (8/23)  Dayna Logan lives in [facility] with trach if necessary.     (Critical Care 60 minutes cumulative thus far today, exclusive of procedures)     Janes Osborne MD  MICU Staff  [id number]

## 2024-09-20 NOTE — PROVIDER NOTIFICATION
MICU providers requested at bedside as RN concerned about respiratory status. Pt lethargic and drowsy, but following commands, oriented to place and self, tachypneic, RR 30-40, BiPAP 15/10 75% fiO2 w/ spO2 90-93%, unable to clear secretions independently d/t weak cough and BiPAP, tachycardic HR 120s, BP stable. Hemoglobin 6.6. Provider at bedside verbally opted for transfusion rather than recheck.     Plan: transfused 1 PRBC for hgb 6.6, monitor respiratory status as RN concerned for need for reintubation

## 2024-09-20 NOTE — INTERIM SUMMARY
Patient developed worsening respiratory acidosis this morning with increased work of breathing and hypoxia on BIPAP in the setting of 2 days of declining respiratory status. Decision was made to intubate, and consent was obtained from the patient's mother, Doreen over the phone. The patient was pre-oxygenated with BIPAP on 100% FiO2 for 30 minutes prior to intubation. Just prior to intubation, she was administered 100 mg ketamine and 100 mg rocuronium. Prior to intubation, the patient's SpO2 was 97% with stable blood pressures. Initial attempt was made without ability to pass the ETT over the cords. Attempt was terminated when patient's SpO2 reached 87%, and the patient was bagged without improvement in her SpO2. Placed an airQ and bagged, however oxygen saturation continued to drop. Second intubation attempt was made with patient's oxygen saturation at 47%. Grade I view was achieved with Mac 4 glidescope  blade, and the ETT was passed without difficulty. Despite successful placement of ETT with positive colorimetric ETCO2 color change and ETT visualized passing through the cords, the patient's oxygen saturation continued to fall with shashank at 15%. Simultaneously, the patient's BP fell to MAP of mid 50s. Phenylephrine push and epi push was given, and a norepinphrine gtt was hung. Bedside ultrasound identified lack of lung sliding on the right, and CXR showed large right pneumothorax so a finger thoracostomy was performed with large rush of air and improvement in oxygen saturation to the mid 70s. CXR showed resolution of the pneumothorax. Hypoxia persisted with SpO2 at ~70%. Patient was re-paralyzed, flolan was ordered, she was deeply sedated with additional ketamine 100 mg, versed 2 mg, and fentanyl 100 mcg. PEEP was increased to ~15. Brief bronchoscopy performed by Dr Kline without evidence of mucous plugging or secretions. Her SpO2 slowly climbed back to 100% over the next 45 minutes, however the patient  continued to have severe respiratory acidosis with difficulty achieving large enough tidal volumes to improve this. She had very high plateau pressures with small tidal volumes, so planning to tolerate high plateau pressures temporarily in order to improve respiratory acidosis. Her RR was increased to 30 as well. Plan to obtain CTA chest to evaluate for PE and CTA abdomen/pelvis to further characterize possible groin hematoma visualized on ultrasound yesterday.     Estela Haas MD  Critical Care Fellow    MICU staff    I was present at the bedside through all the above procedures (bronchoscopy supervised by Dr. Katz) and for the great majority of the time from initiation of intubation until 3:30 PM.  My involvement also is summarized in the additional Critical Care portion includied in the intubation procedure note.     Above reviewed and agree.    Janes sOborne MD

## 2024-09-20 NOTE — PROCEDURES
[facility]    Intubation    Date/Time: 9/20/2024 6:36 PM    Performed by: Estela Haas MD  Authorized by: Estela Haas MD  Indications: respiratory failure, hypercapnia and hypoxemia  Intubation method: video-assisted      UNIVERSAL PROTOCOL   Site Marked: NA  Prior Images Obtained and Reviewed:  NA  Required items: Required blood products, implants, devices and special equipment available    Patient identity confirmed:  Hospital-assigned identification number  Patient was reevaluated immediately before administering moderate or deep sedation or anesthesia  Confirmation Checklist:  Patient's identity using two indicators and procedure was appropriate and matched the consent or emergent situation  Time out: Immediately prior to the procedure a time out was called    Universal Protocol: the Joint Commission Universal Protocol was followed      Patient status: paralyzed (RSI)  Preoxygenation: BIPAP.  Paralytic: rocuronium  Sedatives: ketmine  Laryngoscope size: Mac 4  Tube size: 7.0 mm  Tube type: cuffed  Number of attempts: 2  Ventilation between attempts: IMLA/LMA  Cricoid pressure: yes  Cords visualized: yes  Post-procedure assessment: colorimetric ETCO2  Breath sounds: reduced on right  Cuff inflated: yes  ETT to teeth: 24 cm  Chest x-ray interpreted by me.  Chest x-ray findings: endotracheal tube in appropriate position  Tube secured with: ETT ramirez      PROCEDURE    Patient complications:  Significant or prolonged oxygen desaturation (tension pneumothorax)  Length of time physician/provider present for 1:1 monitoring during sedation: 45      Estela Hasa MD    MICU STAFF CRITICAL CARE PROCEDURE (Intubation) & SUBSEQUENT CC PROGRESS NOTE:      I was physically present and supervising during the entire intubation procedure, including sedation & paralysis, plus the discovery and treatment of the R tension pneumothorax with R pleural space  decompression and subsequent chest tube insertion by Dr. Haas.  Initial intubation attempted by Dr. Kline ([facility] fellow) with excellent visualization of the vocal cords, but difficulty passing the #7 ETT through the cords. First attempt was discontinued and patient was bag mask ventilated with somewhat high resistance but effective chest rise and adequate oxygenation. On 2nd attempt by Dr. Haas, it again was difficult to advance ETT. She used a (relatively floppy) bougie that was directly visualized to go through the VC as a guidewire and successfully advanced ETT into appropriate position - with appropriate color change.  In spite of apparent proper ETT placement and bag ventilation the patient had progressive severe hypoxemia. U/S for sliding lung visualized and stat CXR ordered.  Initially it seemed that there were equal bilat BS, but hand ventilation was difficult. CXR read @ bedside cofirmed large R tension PTX and Dr. Haas made R chest wall incision with hand maintenance of opening.  She could then feel lung inflating and deflating with ventilation.  However oxygenation remained very low 40-50% SaO2.  She became hypotensive and Epi bolus and then NorEpi drip initiated.  Multiple CXR confirmed appropriate ETT position. Given ongoing low O2 saturations, Keely Katz and Keegan Kline performed urgent bedside bronchoscopy - excluding signficant mucous plugs and confirming correct ETT position.     Over the course of the next two hours I was frequently present at the pt's bedside pver multiple visits, reviewing labs and making ventilator adjustments to deal with severe Hypercapnia and resp acidosis with adequate oxygenation (pH ~ 7.0; PaCO2 > 100) on multiple ABGs.  Ventilator was pressure limiting on positive pressure breaths - resulting in lower than set delivered Vt (220 delivered vs ~ 350 mls set).  I adjusted the upper airway P limit to avoid this problem (from 50 to 60). Given very  high peak and particularly plateau P values (40 or more), I adjusted Vt to try to achieve adequate minute ventilation to decrease hypercapnia and resp acidosis  By the time of my signing out to Dr. Katz @ ~ 3:30 PM the Ve was 10 L/min with follow up ABG pending. Oxygenation was adequate on 75-80% FiO2 and 8 PEEP.      I spent an additional 60 minutes devoted to Ms Flaherty''s course since the time of my last note of today and exclusive of all procedures.    Janes Osborne MD  MICU Staff  [phone]

## 2024-09-20 NOTE — PROGRESS NOTES
MEDICAL ICU PROGRESS NOTE  09/20/2024    Date of Service (when I saw the patient): 09/20/2024    ASSESSMENT: Massiel Flaherty is a 53 year old female with PMH Anxiety/depression, fibromyalgia, c/f nonepileptic seizures not on AEDs, hypothyroidism, asthma, HLD, MURIEL on CPAP, expressive aphasia, hypertension  who was admitted on 8/3/2024 for fatigue, fever and dyspnea and intubated 8/6/24 at OSH for ARDS, proned and paralyzed without improvement. Transferred to [facility] 8/8/24, hypoxic with high plateaus/peaks and placed on VV ECMO and CRRT. Decannulated from VV ECMO 8/18 and transferred to MICU 8/26/24 for ongoing management. Extubated 8/27 then reintubated 8/29 iso worsening AHRF and pseudomonas pneumonia. Extubated again 9/16 to BiPAP/HFNC, now 9/20 with tachypnea, increased WOB, fevers, and new lung opacities c/f possible HAP again.    CHANGES and MAJOR THINGS TODAY:  - Reintubated, sedated  - Reduce midazolam sedation  - Holding diuresis   - Hold unnecessary meds  - Plan for tracheostomy  - Fungitell  - Wean vent as tolerated  - EMT consult for tegmen tympani dehiscence  - Trending CRP q48h  - Restarting steroid taper  - Imodium for non-infectious high stool output  - Bronchoscopy  - Started tobramycin, discharge cipro/zosyn  - CXR    Neuro:  # Pain and sedation  # Concern for ICU delirium   # Hx Fibromyalgia   # Hx myalgic encephalomyelitis   # Hx anxiety/depression  - Pain: Oxycodone changed to 2.5 mg q6h PRN, Ativan 0.5 mg BID - continue for now  - Sedation plan: Sedated with midazolam, fentanyl, ketamine, paralysis with vecuronium   - Gabapentin to 300mg TID held while sedated  - Restarted psych meds at 1/2 dose (venlafaxine and amitriptyline) now held while sedated    # Hx spells with staring and BUE posturing previously described as nonepileptic seizures   # Hx of GTC seizures and myoclonic epilepsy, weaned off AEDs   # Diffuse cerebral edema - improved  - Sodium goals liberalized to 140-145  - 8/21: MRI  Brain: no acute intracranial pathology. No findings to suggest anoxic brain injury.   - MRI brain 9/20: no anoxic brain injury    Pulmonary:  # Acute hypoxic respiratory failure c/b ARDS  # Pseudomonas pneumonia (s/p treatment)  # Mechanical ventilation  # s/p VV ECMO 8/8 - 8/18   # Hx CAP  # Asthma  # MURIEL on home CPAP  PFT dated 9/8/2020 demonstrated normal spirometry with mild diffusion impairment and mild restriction (FEV1/FVC 80%, FEV1 2.59, DLCO 40%). CT abdomen chest 3/9/2020 demonstrated scarring and fibrosis bilaterally which correlated with the decreased DLCO. The patient also has a history of MURIEL on CPAP therapy as currently managed by her provider in [place]. Unclear what triggered ARDS or why she has had such severe hospital course, further investigated ILD but results all unremarkable. Extubated 8/27, reintubated 8/29 for worsening O2 demands and diffuse opacities iso new/recurrent psuedomonas pneumonia. Bronch with BAL 8/30, pseudomonas growing as below. Extubated again 9/16, worsening respiratory distress noted 9/19 with repeat polymicrobial growth on respiratory sputum raising c/f aspiration/hospital associated pneumonia. 9/20 with increased WOB, new fevers, and CXR significant for opacities c/f possible HAP again.   - ILD w/u aldolase, EVELIO, RF, CCP, ANCA, MPO, SSA Ro and La, Scleroderma antibody, Radha 1,  hypersensity pneumonitis, IGG subclasses,  aspergillus, blastomyces, histoplasma neg  - SpO2 goals >92%, PaO2 goals >60   - PTA singular at bedtime if able, pulmicort/duonebs  - Lung protective ventilation strategies given ARDS      - 9/17: Started levalbuterol nebs, PTA pulmicort nebs, saline nebs, trialed aminophylline gtt for increased respiratory drive but lead to tachycardia so discontinued  - 9/18: trial of dornase  - 9/20: VBG q4h, placed RR arterial line with ABG x1, reintubated  - Plan for tracheostomy  - Bronchoscopy 9/21  - Steroid taper restarted methylprednisolone 60 q 6 x 2 days  ---> taper to dex 10 mg x 3 days, as tolerated  - Trend CRP q48    Cardiovascular:  # Hyperlipidemia  # Hx HTN   - MAP goal >65, SBP goals >110  - Has remained off pressors since 9/9   - PTA atorvastatin  - PTA clonidine held while sedated  - Lower extremity ultrasound without vascular pathology, no hematoma or clots  - Monitor discoloration of extremities for necrosis  - PRN hydralazine for SBP > 200     Sinus Tachycardia  Likely 2/2 fluid removal, sepsis, pain and sedation as above. Also considered withdrawal due to significant opioid and benzo needs while intubated.     GI/Nutrition:  # Severe malnutrition (see RD note)   # Non-infectious Diarrhea  # GERD   - Protonix (home medication)   - Nutrition consulted, appreciate recs  - Diet: TF restarted at 10, goal 40, 9/21  - Hold bowel regimen d/t loose stools  - Continue scheduled multivitamin, banatrol, prosource packet  - loperamide scheduled  - Rectal tube, continues with high output    Renal/Fluids/Electrolytes:  # Acute kidney injury: Improving   # Renal failure: Improving   # AGMA: Improving   Baseline Cr approx 0.6. Pt was anuric here but now making some urine, likely prerenal due to shock.  - Continues to improve, holding off on iHD  - Making good urine output, possible post ATN diuresis?  - Strict I&Os  - Avoid nephrotoxins as able  - Q6 hour electrolytes given increased urine output and ongoing high rectal tube output  - intermittent diuresis with lasix 40 mg for volume overload  - Additional diuresis with metolazone as needed    Elevated lactate (resolved)  Lactate 2.3 --> 2.8 9/19 concerning for possible sepsis although no hypotension. 9/20 repeat 0.9  - Trend PRN    Endocrine:  # Steroid and stress induced hyperglycemia  # Hypothyroidism   - Goal to keep BG < 180 for optimal wound healing  - Continue medium sliding scale insulin   - Continue PTA synthroid    ID:  # Leukocytosis   # Concern for aspiration pneumonia/hospital acquired pnemonia (9/19 - )  #  Psuedomonas pneumonia (s/p treatment)  # Hx CAP  Respiratory cx 8/29 pseudomonas pneumonia. Intermediate susceptability to meropenem, more significant sensitivities to ciprofloxacin. New leukocytosis, elevated PC, CRP and lactate 9/19 with sputum cx 4+ GNBs, 2+ GPCs, and 1+ GPBs  - Continue to monitor fever curve and inflammatory markers as appropriate  - ID now signed off   - Repeat cultures and sputum growing pseudomonas with similar resistance pattern to previous. Restarted tobramycin nebs    Abx  - Meropenem 8/29 - 9/1  - Vancomycin 8/29 - 8/30  - Tobramycin x 1 8/29  - Vancomycin 9/5 - 9/8    - Tobramycin nebs BID 9/1 - 9/11, 9/21-  - Ciprofloxacin infusion 9/1 - 9/11  - Metronidazole 9/6 - 9/11  - Atovaquone 9/11 -   - Vancomycin 9/19 - 9/20  - Zosyn 9/19 - 9/21  - Ciprofloxacin 9/19 - 9/21    PJP Ppx  Given Dex-ARDS Massiel skinner sigridbrayan remain on steriods for > 3 weeks so will initiate PJP ppx. Given history of allergy to sulfa, will obtain G6PD test to determine if dapsone can be used: levels are ok, however continuing atovaquone.   - Continue atovaquone 1,500 mg daily   - Repeat fungitel 9/21    CMV viremia  CMV PCR in blood positive 8/31.   - Ganciclovir 9/6 - 9/8, discontinued given ID recs  - trend CMV 9/16  - CMV levels recheck 9/18 289    # HSV-1  Mouth sores present, which could have viral etiology. 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 Monitor and trend.   - CTAP 8/30 due to acute hemoglobin drop requiring transfusion of 2 x 1 unit of blood 12 hours apart. Negative for acute bleed at that time.    # Possible Hematoma   Acutely decreased Hgb from 8.0 to 6.6 on 9/20 of unknown etiology that required transfusion x1, however 9/19 LE US notable to 14.9 cm hematoma in R groin near prior canal that was not previously appreciated on CT abdomen c/f possible bleed.    - Recheck Hgb s/p transfusion  - If worsening Hgb or hematoma, then consider CTA/PE to characterize the collection and check if active bleed.    Musculoskeletal/Rheum:  # Alopecia  - Hold PTA hydroxychloroquine     # Weakness and deconditioning of critical illness  # Left ankle injury pre-hospitalization    - Physical and occupational therapy when able.     Skin:  # BLE scattered ecchymosis  # Left toe duskiness  - Bilateral lower leg ecchymosis  - WOC consult  - Ecchymosis to left foot, most noticeable to 3rd and 4th toes    # Peripheral Edema  Likely 2/2 immobility, and currently (9/20) not good candidate for diuresis due to intravascular depletion.  - Net even (or slightly negative) fluid goal.  - Consult OT for lymphedema therapy    General Cares/Prophylaxis:  DVT Prophylaxis: SubQ heparin  GI Prophylaxis: PPI  Restraints: no  Code Status: Full Code    Lines/tubes/drains:  - ETT (9/20)  - NJ (8/9)  - LIJ CVC (8/8)  - Radial a-line (8/29) Removed 9/17  - PIV x3  - Rectal tube (8/17)  - Tunneled HD line (8/23)  - New radial A line, right, 9/20  - Chest tube 9/20    Disposition:  - Medical ICU       Fuentes Fowler MD-PhD  PSTP- PGY - 1    MICU STAFF CRITICAL CARE PROGRESS NOTE:    I saw and examined the patient with the MICU team and concur with findings and A&P as detailed in Dr. Fowler's note of today.  See my independent notes of today.    Janes Osborne MD  MICU Staff  [id number]          Clinically Significant Risk Factors              # Hypoalbuminemia: Lowest albumin = 2.2 g/dL at 8/10/2024  9:47 AM, will monitor as appropriate                # Severe Malnutrition: based on nutrition assessment      # Financial/Environmental Concerns: none            ====================================  INTERVAL HISTORY:   - Called at 2130 that pt had high PIPs in 40s. Adjusted vent settings: , RR 34, PEEP 8. Repeat gas looked better (P:F 125 w/ pH of 7.41.   -came back from CT w/ eyes open. Maxed out on sedation and  paralytic but synchronous with vent. Paused paralytic and pt able to open eyes but remained calm and synchronous with vent. However, after 2 hours, was more anxious and hypoxic. Added versed--needed to be maxed at 16. Added back paralytic. Levophed titrated up. Sedation improved however paralysis not met. Switched from verocurium to cistracurium however, right before switch was made she started responding (0/4 twitches). Decided to hold off on switching.     OBJECTIVE:   1. VITAL SIGNS:   Temp:  [98.4  F (36.9  C)-102.3  F (39.1  C)] 100.1  F (37.8  C)  Pulse:  [105-134] 106  Resp:  [24-35] 24  BP: ()/(53-89) 105/59  FiO2 (%):  [50 %-75 %] 75 %  SpO2:  [85 %-99 %] 96 %  FiO2 (%): 75 %, Resp: 24    2. INTAKE/ OUTPUT:   I/O last 3 completed shifts:  In: 2675 [I.V.:1555; NG/GT:460; IV Piggyback:500]  Out: 2320 [Urine:1220; Stool:1100]    3. PHYSICAL EXAMINATION:  General: Awake on BiPAP, arousable, but sleepy  HEENT: NC/AT  Neuro: intermittently following commands  Pulm/Resp: Increased WOB, diffuse crackles billaterally and upper airway noises  CV: Sinus tachycardia, S1/2, no m/r/g  Abdomen: Soft, tender, bowel sounds present  : deferred  Incisions/Skin: Pitting edema in all extremities, equally. Lower leg ecchymosis present and scattered. LL inguinal area with new ecchymotic discolouration that is tender to palpation.     4. LABS:   Arterial Blood Gases   Recent Labs   Lab 09/19/24  1246 09/18/24  0119 09/17/24  0356 09/17/24  0236   PH 7.44 7.43 7.40 7.41   PCO2 39 45 44 38   PO2 83 100 110* 110*   HCO3 27 30* 27 24     Complete Blood Count   Recent Labs   Lab 09/20/24  0419 09/19/24  0325 09/18/24  0407 09/17/24  0356   WBC 14.0* 29.8* 14.2* 10.6   HGB 6.6* 8.0* 7.6* 7.7*    513* 445 317     Basic Metabolic Panel  Recent Labs   Lab 09/20/24  0426 09/20/24  0419 09/20/24  0307 09/19/24  2339 09/19/24  2155 09/19/24  2004 09/19/24  1626 09/19/24  1149 09/19/24  1147   NA  --  143  --   --  145  --  144   --  141   POTASSIUM  --  4.2  --   --  3.8  --  4.4  --  4.8   CHLORIDE  --  103  --   --  104  --  103  --  102   CO2  --  28  --   --  27  --  27  --  26   BUN  --  30.2*  --   --  29.3*  --  29.8*  --  29.1*   CR  --  0.63  --   --  0.57  --  0.54  --  0.54   * 220* 185* 137* 161*   < > 243*   < > 267*    < > = values in this interval not displayed.     Liver Function Tests  No lab results found in last 7 days.    Coagulation Profile  No lab results found in last 7 days.    5. RADIOLOGY:   Recent Results (from the past 24 hour(s))   US Lower Extremity Venous Duplex Bilateral    Narrative    EXAMINATION: DOPPLER VENOUS ULTRASOUND OF BILATERAL LOWER EXTREMITIES,  9/19/2024 11:04 AM     COMPARISON: Lower extremity venous ultrasound 9/7/2024.    HISTORY: 53-year-old woman, with undifferentiated tachycardia and  increasing FiO2. Rule out DVT.    TECHNIQUE:  Gray-scale evaluation with compression, spectral flow and  color Doppler assessment of the deep venous system of both legs from  groin to knee, and then at the ankles.    FINDINGS:  In both lower extremities, the common femoral, femoral, and popliteal  veins demonstrate normal compressibility and blood flow. The posterior  tibial veins demonstrate normal compressibility. The right and left  peroneal veins are not identified. Duplication of the left femoral  vein.    Subcutaneous anechoic collection with internal septation within the  right groin demonstrating through transmission measuring 3.9 x 2.7 x  14.9 cm without dopplerable flow or soft tissue edema is favored to  represent a post cannulation seroma or resolving hematoma.      Impression    IMPRESSION:  1.  No evidence of deep venous thrombosis in either lower extremity.  2.  The right and left peroneal veins are not identified. Unable to  exclude DVT in these vessels.  3.  Evolving 14.9 cm subcutaneous hematoma within the right groin  adjacent to prior cannulation site.    I have personally reviewed  the examination and initial interpretation  and I agree with the findings.    ALDEN TO DO         SYSTEM ID:  [id number]   XR Chest Port 1 View    Impression    RESIDENT PRELIMINARY INTERPRETATION  IMPRESSION:  Grossly stable diffuse pulmonary opacities likely representing  pulmonary edema, though infection/aspiration can look similarly.

## 2024-09-20 NOTE — PLAN OF CARE
ICU End of Shift Summary. See flowsheets for vital signs and detailed assessment.    Changes this shift: Lethargic in the morning. Became increasingly unresponsive through the day. SR/ST, febrile. Intubated at 1200 for increased work of breathing and retaining CO2. Quickly decompensated. BP support needed. See provider note for details. TF held. Gonzalez and rectal tube in place.     Plan: continue to monitor report changes to MICU 1.     Goal Outcome Evaluation:      Plan of Care Reviewed With: patient    Overall Patient Progress: no changeOverall Patient Progress: no change

## 2024-09-20 NOTE — PLAN OF CARE
ICU End of Shift Summary. See flowsheets for vital signs and detailed assessment.    Changes this shift: Oriented to self and place, increasing lethargy throughout the shift, arouses to touch and voice, minimal extremity movement, follows commands. Labored and tachypneic breathing w/ accessory muscle use. Very weak cough. NT and oral suction utilized for thick yellow secretions, desaturation noted w/ suctioning. BiPAP 15/10 at 75% fiO2. Tachycardic 110-120. Tmax 102.3. PRN tylenol given. 250mL LR bolus given. MAP > 65 off pressors. Poor UOP. 40mg lasix given w/ good response. Gonzalez and rectal tube in place per protocol. Electrolytes replaced. NJ feeds off per MICU team d/t previous aspiration concern, D10 infusing per protocol, BG monitored, s/s insulin given per orders. Hgb 6.6. Plan to transfuse 1unit PRBC this AM.     Approx 0615 pt had 20 second run of SVT, HR > 230, BP stable, converted back to sinus tach on their own    Plan: continue antibiotics, monitor VBG/labs, monitor respiratory status    Goal Outcome Evaluation:      Plan of Care Reviewed With: patient    Overall Patient Progress: decliningOverall Patient Progress: declining    Outcome Evaluation: increasing fiO2 needs on BiPAP, increased abdominal and accessory muscle use, tachypneic and labored breathing, monitoring VGB, chest xray obtained

## 2024-09-20 NOTE — PROVIDER NOTIFICATION
MICU team notified via vocera pt febrile, 102.3 F, tylenol administered. Increased lethargy this evening. Arousing to voice/touch. Tachycardic 110-120s. On BiPAP 70% 15/10.     VBG and CXR ordered by MICU

## 2024-09-20 NOTE — PROCEDURES
[facility]   Bronchoscopy Procedure Note    PROCEDURE: Inspection bronchoscopy     INDICATION: AHRF    CONSENT: Emergent    PROCEDURE :   Keegan Kline MD     MEDICATIONS: Ketamine 100 mg, Rocuronium  100 mg  Sedation Time: 2 minutes of continuous 1:1 monitoring   Time Out:  Emergent    PROCEDURE:  Flexible bronchoscope was inserted through patient's ET tube. The ET tube was in good position. The ivy was sharp. An airway inspection was done to the subsegmental level which found: minimal secretions and no mucus plugging in all major lobes.    SAMPLES:   No fluid collected or sent    COMPLICATIONS: None    Supervised by Dr. Sterling Kline MD  Pulmonary/Critical Care Fellow

## 2024-09-20 NOTE — PROGRESS NOTES
[facility], [place]  Procedure Note           Intubation:       Massiel Flaherty  MRN# [id number]   September 20, 2024, 1200 PM Indication: Respiratory failure           Patient intubated at: September 20, 2024, 1200 PM   Patient informed of: Why intubation was required   Informed consent: Obtained   Cervical spine: Was not stabilized during the procedure   Sedative medication: Was administered during the procedure   Technique used: Fiberoptic visualization with GlideScope   Endotracheal tube size: 7.0 cm with cuff   Number of attempts: 4   Placement confirmed by: Auscultation of bilateral breath sounds  Visualization of bilateral chest wall rise  End-tidal CO2 monitor  Chest X-ray   ET tube repositioning: Was not performed   Tube secured at: 23 cm @ teeth      This procedure was performed with difficulty and he tolerated the procedure poorly with an unsuccessful intubation attempt.      Recorded by Richie Canada, RT

## 2024-09-20 NOTE — PROGRESS NOTES
Time of Care: 1500 - 1930    Shift Events:  Medically sedated on ketamine and fentanyl & medically paralyzed on vecuronium, BIS 60s - 50s (titrating sedation). SR 70s - 90s with PACs, MAP > 65 on levo, Chest tube with minimal output. Intubated today - CMV %// RR 30/ Peep 10, Flolan started and continuous, LS dim/coarse. OG to LIS & NJ clamped, rolle and rectal tube with adequate output, sliding scale insulin - may need to resume D10 if TF remains off. R radial ART line placed.    Plan: Continue with POC.    For complete assessments and vital signs, please refer to flowsheets.

## 2024-09-20 NOTE — PROGRESS NOTES

## 2024-09-21 ENCOUNTER — APPOINTMENT (OUTPATIENT)
Dept: GENERAL RADIOLOGY | Facility: CLINIC | Age: 54
DRG: 003 | End: 2024-09-21
Payer: MEDICAID

## 2024-09-21 LAB
ALLEN'S TEST: ABNORMAL
ANION GAP SERPL CALCULATED.3IONS-SCNC: 16 MMOL/L (ref 7–15)
APPEARANCE FLD: ABNORMAL
BACTERIA SPEC CULT: NORMAL
BACTERIA SPEC CULT: NORMAL
BACTERIA SPT CULT: ABNORMAL
BACTERIA SPT CULT: ABNORMAL
BASE EXCESS BLDA CALC-SCNC: 2.7 MMOL/L (ref -3–3)
BASE EXCESS BLDA CALC-SCNC: 2.9 MMOL/L (ref -3–3)
BASE EXCESS BLDA CALC-SCNC: 3.1 MMOL/L (ref -3–3)
BASE EXCESS BLDA CALC-SCNC: 4.4 MMOL/L (ref -3–3)
BASE EXCESS BLDA CALC-SCNC: 6.1 MMOL/L (ref -3–3)
BASOPHILS # BLD AUTO: 0 10E3/UL (ref 0–0.2)
BASOPHILS NFR BLD AUTO: 0 %
BUN SERPL-MCNC: 33.2 MG/DL (ref 6–20)
CALCIUM SERPL-MCNC: 8.8 MG/DL (ref 8.8–10.4)
CELL COUNT BODY FLUID SOURCE: ABNORMAL
CHLORIDE SERPL-SCNC: 103 MMOL/L (ref 98–107)
COHGB MFR BLD: 92.7 % (ref 96–97)
COHGB MFR BLD: 99.1 % (ref 96–97)
COHGB MFR BLD: 99.4 % (ref 96–97)
COHGB MFR BLD: >100 % (ref 96–97)
COHGB MFR BLD: >100 % (ref 96–97)
COLOR FLD: ABNORMAL
CREAT SERPL-MCNC: 0.82 MG/DL (ref 0.51–0.95)
CRP SERPL-MCNC: 487 MG/L
EGFRCR SERPLBLD CKD-EPI 2021: 85 ML/MIN/1.73M2
ENTEROCOCCUS FAECALIS: NOT DETECTED
ENTEROCOCCUS FAECIUM: NOT DETECTED
EOSINOPHIL # BLD AUTO: 0.3 10E3/UL (ref 0–0.7)
EOSINOPHIL NFR BLD AUTO: 2 %
ERYTHROCYTE [DISTWIDTH] IN BLOOD BY AUTOMATED COUNT: 20.5 % (ref 10–15)
GLUCOSE BLDC GLUCOMTR-MCNC: 127 MG/DL (ref 70–99)
GLUCOSE BLDC GLUCOMTR-MCNC: 169 MG/DL (ref 70–99)
GLUCOSE BLDC GLUCOMTR-MCNC: 176 MG/DL (ref 70–99)
GLUCOSE BLDC GLUCOMTR-MCNC: 187 MG/DL (ref 70–99)
GLUCOSE BLDC GLUCOMTR-MCNC: 214 MG/DL (ref 70–99)
GLUCOSE BLDC GLUCOMTR-MCNC: 95 MG/DL (ref 70–99)
GLUCOSE SERPL-MCNC: 136 MG/DL (ref 70–99)
GRAM STAIN RESULT: ABNORMAL
GRAM STAIN RESULT: NORMAL
HCO3 BLD-SCNC: 28 MMOL/L (ref 21–28)
HCO3 BLD-SCNC: 30 MMOL/L (ref 21–28)
HCO3 BLD-SCNC: 32 MMOL/L (ref 21–28)
HCO3 SERPL-SCNC: 27 MMOL/L (ref 22–29)
HCT VFR BLD AUTO: 23.3 % (ref 35–47)
HGB BLD-MCNC: 7.1 G/DL (ref 11.7–15.7)
IMM GRANULOCYTES # BLD: 0.1 10E3/UL
IMM GRANULOCYTES NFR BLD: 1 %
LISTERIA SPECIES (DETECTED/NOT DETECTED): NOT DETECTED
LYMPHOCYTES # BLD AUTO: 1.6 10E3/UL (ref 0.8–5.3)
LYMPHOCYTES NFR BLD AUTO: 13 %
LYMPHOCYTES NFR FLD MANUAL: 0 %
MAGNESIUM SERPL-MCNC: 2.2 MG/DL (ref 1.7–2.3)
MCH RBC QN AUTO: 29.7 PG (ref 26.5–33)
MCHC RBC AUTO-ENTMCNC: 30.5 G/DL (ref 31.5–36.5)
MCV RBC AUTO: 98 FL (ref 78–100)
MONOCYTES # BLD AUTO: 0.5 10E3/UL (ref 0–1.3)
MONOCYTES NFR BLD AUTO: 4 %
MONOS+MACROS NFR FLD MANUAL: 8 %
NEUTROPHILS # BLD AUTO: 9.5 10E3/UL (ref 1.6–8.3)
NEUTROPHILS NFR BLD AUTO: 79 %
NEUTS BAND NFR FLD MANUAL: 93 %
NRBC # BLD AUTO: 0 10E3/UL
NRBC BLD AUTO-RTO: 0 /100
O2/TOTAL GAS SETTING VFR VENT: 100 %
O2/TOTAL GAS SETTING VFR VENT: 60 %
O2/TOTAL GAS SETTING VFR VENT: 60 %
O2/TOTAL GAS SETTING VFR VENT: 70 %
O2/TOTAL GAS SETTING VFR VENT: 70 %
PCO2 BLD: 44 MM HG (ref 35–45)
PCO2 BLD: 51 MM HG (ref 35–45)
PCO2 BLD: 53 MM HG (ref 35–45)
PEEP: 6 CM H2O
PEEP: 8 CM H2O
PH BLD: 7.38 [PH] (ref 7.35–7.45)
PH BLD: 7.39 [PH] (ref 7.35–7.45)
PH BLD: 7.41 [PH] (ref 7.35–7.45)
PHOSPHATE SERPL-MCNC: 7 MG/DL (ref 2.5–4.5)
PLATELET # BLD AUTO: 442 10E3/UL (ref 150–450)
PO2 BLD: 140 MM HG (ref 80–105)
PO2 BLD: 275 MM HG (ref 80–105)
PO2 BLD: 64 MM HG (ref 80–105)
PO2 BLD: 87 MM HG (ref 80–105)
PO2 BLD: 97 MM HG (ref 80–105)
POTASSIUM SERPL-SCNC: 3.3 MMOL/L (ref 3.4–5.3)
POTASSIUM SERPL-SCNC: 4.1 MMOL/L (ref 3.4–5.3)
RBC # BLD AUTO: 2.39 10E6/UL (ref 3.8–5.2)
SAO2 % BLDA: 90 % (ref 92–100)
SAO2 % BLDA: 96 % (ref 92–100)
SAO2 % BLDA: 97 % (ref 92–100)
SAO2 % BLDA: 98 % (ref 92–100)
SAO2 % BLDA: 99 % (ref 92–100)
SODIUM SERPL-SCNC: 146 MMOL/L (ref 135–145)
STAPHYLOCOCCUS AUREUS: NOT DETECTED
STAPHYLOCOCCUS EPIDERMIDIS: DETECTED
STAPHYLOCOCCUS LUGDUNENSIS: NOT DETECTED
STREPTOCOCCUS AGALACTIAE: NOT DETECTED
STREPTOCOCCUS ANGINOSUS GROUP: NOT DETECTED
STREPTOCOCCUS PNEUMONIAE: NOT DETECTED
STREPTOCOCCUS PYOGENES: NOT DETECTED
STREPTOCOCCUS SPECIES: NOT DETECTED
WBC # BLD AUTO: 12 10E3/UL (ref 4–11)
WBC # BLD AUTO: 12 10E3/UL (ref 4–11)
WBC # FLD AUTO: 540 /UL

## 2024-09-21 PROCEDURE — 87798 DETECT AGENT NOS DNA AMP: CPT | Performed by: INTERNAL MEDICINE

## 2024-09-21 PROCEDURE — 250N000011 HC RX IP 250 OP 636

## 2024-09-21 PROCEDURE — 80048 BASIC METABOLIC PNL TOTAL CA: CPT | Performed by: PHYSICIAN ASSISTANT

## 2024-09-21 PROCEDURE — 85027 COMPLETE CBC AUTOMATED: CPT | Performed by: PHYSICIAN ASSISTANT

## 2024-09-21 PROCEDURE — 250N000011 HC RX IP 250 OP 636: Performed by: SURGERY

## 2024-09-21 PROCEDURE — 82805 BLOOD GASES W/O2 SATURATION: CPT

## 2024-09-21 PROCEDURE — 71045 X-RAY EXAM CHEST 1 VIEW: CPT | Mod: 26 | Performed by: RADIOLOGY

## 2024-09-21 PROCEDURE — 31624 DX BRONCHOSCOPE/LAVAGE: CPT

## 2024-09-21 PROCEDURE — 200N000002 HC R&B ICU UMMC

## 2024-09-21 PROCEDURE — 250N000013 HC RX MED GY IP 250 OP 250 PS 637

## 2024-09-21 PROCEDURE — 87305 ASPERGILLUS AG IA: CPT | Performed by: INTERNAL MEDICINE

## 2024-09-21 PROCEDURE — 87449 NOS EACH ORGANISM AG IA: CPT

## 2024-09-21 PROCEDURE — 80048 BASIC METABOLIC PNL TOTAL CA: CPT | Performed by: SURGERY

## 2024-09-21 PROCEDURE — 85025 COMPLETE CBC W/AUTO DIFF WBC: CPT | Performed by: PHYSICIAN ASSISTANT

## 2024-09-21 PROCEDURE — 258N000003 HC RX IP 258 OP 636

## 2024-09-21 PROCEDURE — 71045 X-RAY EXAM CHEST 1 VIEW: CPT

## 2024-09-21 PROCEDURE — 84100 ASSAY OF PHOSPHORUS: CPT | Performed by: PHYSICIAN ASSISTANT

## 2024-09-21 PROCEDURE — 87205 SMEAR GRAM STAIN: CPT | Performed by: INTERNAL MEDICINE

## 2024-09-21 PROCEDURE — 94645 CONT INHLJ TX EACH ADDL HOUR: CPT

## 2024-09-21 PROCEDURE — 250N000013 HC RX MED GY IP 250 OP 250 PS 637: Performed by: SURGERY

## 2024-09-21 PROCEDURE — 83735 ASSAY OF MAGNESIUM: CPT | Performed by: PHYSICIAN ASSISTANT

## 2024-09-21 PROCEDURE — 250N000009 HC RX 250

## 2024-09-21 PROCEDURE — 87070 CULTURE OTHR SPECIMN AEROBIC: CPT | Performed by: INTERNAL MEDICINE

## 2024-09-21 PROCEDURE — 0B968ZZ DRAINAGE OF RIGHT LOWER LOBE BRONCHUS, VIA NATURAL OR ARTIFICIAL OPENING ENDOSCOPIC: ICD-10-PCS | Performed by: INTERNAL MEDICINE

## 2024-09-21 PROCEDURE — 94640 AIRWAY INHALATION TREATMENT: CPT

## 2024-09-21 PROCEDURE — 84132 ASSAY OF SERUM POTASSIUM: CPT

## 2024-09-21 PROCEDURE — 99291 CRITICAL CARE FIRST HOUR: CPT | Mod: 25 | Performed by: INTERNAL MEDICINE

## 2024-09-21 PROCEDURE — 0B9D8ZX DRAINAGE OF RIGHT MIDDLE LUNG LOBE, VIA NATURAL OR ARTIFICIAL OPENING ENDOSCOPIC, DIAGNOSTIC: ICD-10-PCS | Performed by: INTERNAL MEDICINE

## 2024-09-21 PROCEDURE — 999N000157 HC STATISTIC RCP TIME EA 10 MIN

## 2024-09-21 PROCEDURE — 87077 CULTURE AEROBIC IDENTIFY: CPT | Performed by: INTERNAL MEDICINE

## 2024-09-21 PROCEDURE — 89051 BODY FLUID CELL COUNT: CPT | Performed by: INTERNAL MEDICINE

## 2024-09-21 PROCEDURE — 86140 C-REACTIVE PROTEIN: CPT

## 2024-09-21 PROCEDURE — 31624 DX BRONCHOSCOPE/LAVAGE: CPT | Mod: GC | Performed by: INTERNAL MEDICINE

## 2024-09-21 PROCEDURE — 250N000012 HC RX MED GY IP 250 OP 636 PS 637

## 2024-09-21 PROCEDURE — 250N000013 HC RX MED GY IP 250 OP 250 PS 637: Performed by: PHYSICIAN ASSISTANT

## 2024-09-21 PROCEDURE — 87102 FUNGUS ISOLATION CULTURE: CPT | Performed by: INTERNAL MEDICINE

## 2024-09-21 PROCEDURE — 94003 VENT MGMT INPAT SUBQ DAY: CPT

## 2024-09-21 PROCEDURE — 94668 MNPJ CHEST WALL SBSQ: CPT

## 2024-09-21 RX ORDER — TOBRAMYCIN INHALATION SOLUTION 300 MG/5ML
300 INHALANT RESPIRATORY (INHALATION)
Status: COMPLETED | OUTPATIENT
Start: 2024-09-21 | End: 2024-09-25

## 2024-09-21 RX ORDER — FLUCONAZOLE 100 MG/1
100 TABLET ORAL DAILY
Status: COMPLETED | OUTPATIENT
Start: 2024-09-21 | End: 2024-09-27

## 2024-09-21 RX ORDER — MIDAZOLAM HCL IN 0.9 % NACL/PF 1 MG/ML
1-16 PLASTIC BAG, INJECTION (ML) INTRAVENOUS CONTINUOUS
Status: DISCONTINUED | OUTPATIENT
Start: 2024-09-21 | End: 2024-09-26

## 2024-09-21 RX ORDER — DEXMEDETOMIDINE HYDROCHLORIDE 4 UG/ML
.1-1.2 INJECTION, SOLUTION INTRAVENOUS CONTINUOUS
Status: DISCONTINUED | OUTPATIENT
Start: 2024-09-21 | End: 2024-09-22

## 2024-09-21 RX ORDER — POTASSIUM CHLORIDE 29.8 MG/ML
20 INJECTION INTRAVENOUS
Status: COMPLETED | OUTPATIENT
Start: 2024-09-21 | End: 2024-09-21

## 2024-09-21 RX ORDER — LOPERAMIDE HCL 2 MG
2 CAPSULE ORAL 4 TIMES DAILY PRN
Status: DISCONTINUED | OUTPATIENT
Start: 2024-09-21 | End: 2024-10-01 | Stop reason: HOSPADM

## 2024-09-21 RX ORDER — METHYLPREDNISOLONE SODIUM SUCCINATE 125 MG/2ML
60 INJECTION, POWDER, LYOPHILIZED, FOR SOLUTION INTRAMUSCULAR; INTRAVENOUS EVERY 6 HOURS
Status: DISCONTINUED | OUTPATIENT
Start: 2024-09-21 | End: 2024-09-25

## 2024-09-21 RX ADMIN — LEVALBUTEROL HYDROCHLORIDE 0.63 MG: 0.63 SOLUTION RESPIRATORY (INHALATION) at 23:59

## 2024-09-21 RX ADMIN — IPRATROPIUM BROMIDE 0.5 MG: 0.5 SOLUTION RESPIRATORY (INHALATION) at 04:06

## 2024-09-21 RX ADMIN — SODIUM CHLORIDE SOLN NEBU 3% 3 ML: 3 NEBU SOLN at 19:47

## 2024-09-21 RX ADMIN — INSULIN ASPART 2 UNITS: 100 INJECTION, SOLUTION INTRAVENOUS; SUBCUTANEOUS at 09:36

## 2024-09-21 RX ADMIN — METHYLPREDNISOLONE SODIUM SUCCINATE 62.5 MG: 125 INJECTION, POWDER, FOR SOLUTION INTRAMUSCULAR; INTRAVENOUS at 15:37

## 2024-09-21 RX ADMIN — MONTELUKAST 10 MG: 10 TABLET, FILM COATED ORAL at 21:53

## 2024-09-21 RX ADMIN — TOBRAMYCIN 300 MG: 300 SOLUTION RESPIRATORY (INHALATION) at 20:35

## 2024-09-21 RX ADMIN — SODIUM CHLORIDE SOLN NEBU 3% 3 ML: 3 NEBU SOLN at 08:53

## 2024-09-21 RX ADMIN — DORNASE ALFA 2.5 MG: 1 SOLUTION RESPIRATORY (INHALATION) at 12:17

## 2024-09-21 RX ADMIN — KETAMINE HYDROCHLORIDE 175 MG/HR: 100 INJECTION, SOLUTION, CONCENTRATE INTRAMUSCULAR; INTRAVENOUS at 19:38

## 2024-09-21 RX ADMIN — HEPARIN SODIUM 5000 UNITS: 5000 INJECTION, SOLUTION INTRAVENOUS; SUBCUTANEOUS at 12:58

## 2024-09-21 RX ADMIN — VECURONIUM BROMIDE 1.3 MCG/KG/MIN: 1 INJECTION, POWDER, LYOPHILIZED, FOR SOLUTION INTRAVENOUS at 15:38

## 2024-09-21 RX ADMIN — IPRATROPIUM BROMIDE 0.5 MG: 0.5 SOLUTION RESPIRATORY (INHALATION) at 16:31

## 2024-09-21 RX ADMIN — WHITE PETROLATUM 57.7 %-MINERAL OIL 31.9 % EYE OINTMENT: at 15:37

## 2024-09-21 RX ADMIN — IPRATROPIUM BROMIDE 0.5 MG: 0.5 SOLUTION RESPIRATORY (INHALATION) at 23:59

## 2024-09-21 RX ADMIN — KETAMINE HYDROCHLORIDE 175 MG/HR: 100 INJECTION, SOLUTION, CONCENTRATE INTRAMUSCULAR; INTRAVENOUS at 06:27

## 2024-09-21 RX ADMIN — METHYLPREDNISOLONE SODIUM SUCCINATE 62.5 MG: 125 INJECTION, POWDER, FOR SOLUTION INTRAMUSCULAR; INTRAVENOUS at 09:25

## 2024-09-21 RX ADMIN — POTASSIUM CHLORIDE 20 MEQ: 29.8 INJECTION, SOLUTION INTRAVENOUS at 06:33

## 2024-09-21 RX ADMIN — IPRATROPIUM BROMIDE 0.5 MG: 0.5 SOLUTION RESPIRATORY (INHALATION) at 19:47

## 2024-09-21 RX ADMIN — LEVALBUTEROL HYDROCHLORIDE 0.63 MG: 0.63 SOLUTION RESPIRATORY (INHALATION) at 08:53

## 2024-09-21 RX ADMIN — HEPARIN SODIUM 5000 UNITS: 5000 INJECTION, SOLUTION INTRAVENOUS; SUBCUTANEOUS at 19:34

## 2024-09-21 RX ADMIN — DEXMEDETOMIDINE HYDROCHLORIDE 0.2 MCG/KG/HR: 400 INJECTION INTRAVENOUS at 22:54

## 2024-09-21 RX ADMIN — WHITE PETROLATUM 57.7 %-MINERAL OIL 31.9 % EYE OINTMENT: at 21:53

## 2024-09-21 RX ADMIN — BUDESONIDE 1 MG: 1 SUSPENSION RESPIRATORY (INHALATION) at 12:25

## 2024-09-21 RX ADMIN — CHLORHEXIDINE GLUCONATE 0.12% ORAL RINSE 15 ML: 1.2 LIQUID ORAL at 19:34

## 2024-09-21 RX ADMIN — Medication 200 MCG/HR: at 04:39

## 2024-09-21 RX ADMIN — MIDAZOLAM HYDROCHLORIDE 12 MG/HR: 1 INJECTION, SOLUTION INTRAVENOUS at 23:55

## 2024-09-21 RX ADMIN — PANTOPRAZOLE SODIUM 40 MG: 40 INJECTION, POWDER, FOR SOLUTION INTRAVENOUS at 09:05

## 2024-09-21 RX ADMIN — VECURONIUM BROMIDE 1.7 MCG/KG/MIN: 1 INJECTION, POWDER, LYOPHILIZED, FOR SOLUTION INTRAVENOUS at 06:06

## 2024-09-21 RX ADMIN — LEVALBUTEROL HYDROCHLORIDE 0.63 MG: 0.63 SOLUTION RESPIRATORY (INHALATION) at 12:17

## 2024-09-21 RX ADMIN — METHYLPREDNISOLONE SODIUM SUCCINATE 62.5 MG: 125 INJECTION, POWDER, FOR SOLUTION INTRAMUSCULAR; INTRAVENOUS at 21:53

## 2024-09-21 RX ADMIN — Medication 20 NG/KG/MIN: at 02:37

## 2024-09-21 RX ADMIN — IPRATROPIUM BROMIDE 0.5 MG: 0.5 SOLUTION RESPIRATORY (INHALATION) at 12:17

## 2024-09-21 RX ADMIN — LEVOTHYROXINE SODIUM 25 MCG: 0.03 TABLET ORAL at 09:05

## 2024-09-21 RX ADMIN — Medication 20 NG/KG/MIN: at 19:26

## 2024-09-21 RX ADMIN — INSULIN ASPART 3 UNITS: 100 INJECTION, SOLUTION INTRAVENOUS; SUBCUTANEOUS at 16:23

## 2024-09-21 RX ADMIN — MIDAZOLAM HYDROCHLORIDE 4 MG/HR: 1 INJECTION, SOLUTION INTRAVENOUS at 02:29

## 2024-09-21 RX ADMIN — INSULIN ASPART 2 UNITS: 100 INJECTION, SOLUTION INTRAVENOUS; SUBCUTANEOUS at 20:00

## 2024-09-21 RX ADMIN — MIDAZOLAM HYDROCHLORIDE 14 MG/HR: 1 INJECTION, SOLUTION INTRAVENOUS at 08:30

## 2024-09-21 RX ADMIN — MIDAZOLAM HYDROCHLORIDE 12 MG/HR: 1 INJECTION, SOLUTION INTRAVENOUS at 15:38

## 2024-09-21 RX ADMIN — INSULIN ASPART 2 UNITS: 100 INJECTION, SOLUTION INTRAVENOUS; SUBCUTANEOUS at 12:56

## 2024-09-21 RX ADMIN — ATOVAQUONE 1500 MG: 750 SUSPENSION ORAL at 09:11

## 2024-09-21 RX ADMIN — LEVALBUTEROL HYDROCHLORIDE 0.63 MG: 0.63 SOLUTION RESPIRATORY (INHALATION) at 16:30

## 2024-09-21 RX ADMIN — PIPERACILLIN AND TAZOBACTAM 4.5 G: 4; .5 INJECTION, POWDER, LYOPHILIZED, FOR SOLUTION INTRAVENOUS at 04:35

## 2024-09-21 RX ADMIN — Medication 1 TABLET: at 12:58

## 2024-09-21 RX ADMIN — Medication 200 MCG/HR: at 17:11

## 2024-09-21 RX ADMIN — LEVALBUTEROL HYDROCHLORIDE 0.63 MG: 0.63 SOLUTION RESPIRATORY (INHALATION) at 04:06

## 2024-09-21 RX ADMIN — CETIRIZINE HYDROCHLORIDE 10 MG: 10 TABLET, FILM COATED ORAL at 09:05

## 2024-09-21 RX ADMIN — ZINC SULFATE 220 MG (50 MG) CAPSULE 220 MG: CAPSULE at 09:05

## 2024-09-21 RX ADMIN — FLUCONAZOLE 100 MG: 100 TABLET ORAL at 11:13

## 2024-09-21 RX ADMIN — LEVALBUTEROL HYDROCHLORIDE 0.63 MG: 0.63 SOLUTION RESPIRATORY (INHALATION) at 19:47

## 2024-09-21 RX ADMIN — POTASSIUM CHLORIDE 20 MEQ: 29.8 INJECTION, SOLUTION INTRAVENOUS at 05:42

## 2024-09-21 RX ADMIN — CHLORHEXIDINE GLUCONATE 0.12% ORAL RINSE 15 ML: 1.2 LIQUID ORAL at 09:05

## 2024-09-21 ASSESSMENT — ACTIVITIES OF DAILY LIVING (ADL)
ADLS_ACUITY_SCORE: 61

## 2024-09-21 NOTE — PLAN OF CARE
Goal Outcome Evaluation:  ICU End of Shift Summary. See flowsheets for vital signs and detailed assessment.    Changes this shift: Versed slowly titrated down (BIS monitored with no increase or change in vitals). Vec titrated down. TOF 4/4, but patient remains synchronous with vent - MICU 1 okay with 4/4 twitches as long as patient maintains vent synchrony. Stress dose steroids started. Fio2 decreased to 60%. Repeat gas this afternoon. Bronch at bedside. Tolerated well, samples sent by RT. Peep decreased to 6. Spo2 maintaining>94% with continued vent synchrony. BP labile, very sensitive to levophed. Tmax 100.2. Rash noted to arms, legs, abdomen - MICU came to bedside to asses. Patient also started on diflucan due to possible thrush in mouth per MD. Blood cultures from 9/19 positive - MICU notified. Urine output unchanged. TF started at 1100, noticed increase in stool output this afternoon. Blood sugars trending up.     Plan: Monitor for vent synchrony and adequate sedation. Possible insulin gtt initiation if bg continues to trend up.     Problem: Adult Inpatient Plan of Care  Goal: Plan of Care Review  Description: The Plan of Care Review/Shift note should be completed every shift.  The Outcome Evaluation is a brief statement about your assessment that the patient is improving, declining, or no change.  This information will be displayed automatically on your shift  note.  Outcome: Progressing

## 2024-09-21 NOTE — PROGRESS NOTES
"Bronchoscopy Risk Assessment Guidelines      A. Patient symptoms to consider when assessing pulmonary TB risk are:    I. Cough greater than 3 weeks; and fever, hemoptysis, pleuritic chest    pain, weight loss greater than 10 lbs, night sweats, fatigue, infiltrates on    upper lobes or superior segments of lower lobes, cavitation on chest    x-ray.   B. Patient risk factors to consider when assessing pulmonary TB risk are:    I. Exposure to known TB case, foreign-born persons (within 5 years of    arrival to [place]), residence in a crowded setting (correctional facility,     long-term care center, etc.), persons with HIV or immunosuppression.    Patients with symptoms and risk factors should generally be considered \"suspect risk\" and bronchoscopies should be performed in airborne precautions.    This patient has NO KNOWN RISK of Tuberculosis (proceed with bronchoscopy)    Specimens sent: yes  Complications: None  Scope used: #[id number]  Slim  Attending Physician: Dr. Neetu Kendrick, RT on 9/21/2024 at 4:33 PM    "

## 2024-09-21 NOTE — PROCEDURES
[facility]   Bronchoscopy Procedure Note    PROCEDURE: Bronchoscopy with BAL    INDICATION: Bilateral infiltrates/ARDS    CONSENT: Obtained and in the paper chart  The patient's medical record has been reviewed. The indication for the procedure was reviewed. The necessary history and physical examination was performed and reviewed. The risks, benefits and alternatives of the procedure have previously been discussed with the the patient in detail and she had the opportunity to ask questions - discussed in particular were the potential complications including risks of minor or life-threatening bleeding and/or infection, respiratory failure, vocal cord trauma / paralysis, pneumothorax, and discomfort. Sedation risks were also discussed including abnormal heart rhythms, low blood pressure, and respiratory failure. All questions were answered to the best of my ability.      PROCEDURE :   Terry Huang MD     MEDICATIONS: Currently on continuous sedation/paralysis: Versed, Fentanyl, Ketamine, Vecuronium  Sedation Time: 25 minutes of continuous 1:1 monitoring   Time Out:  Performed    The patient was assessed for the adequacy for the procedure and to receive medications.   Mental Status:  Intubated, sedated, and paralyzed  Airway examination:  Intubated  Pulmonary:  Non labored respirations  CV:  Regular rate     After clinical evaluation and reviewing the indication, risks, alternatives and benefits of the procedure the patient was deemed to be in satisfactory condition to undergo the procedure.      PROCEDURE:  Flexible bronchoscope was inserted through patient's ET tube. The ET tube was in good position. The ivy was sharp. An airway inspection was done to the subsegmental level which found: Bilateral thick secretions R>L (bloody on R), otherwise lungs appeared normal. Thick, bloody secretions coming primarily from the RLL were suctioned using 20mL of saline. The bronchoscope was then  advanced to the RML  and placed into wedge. A BAL was performed where 120mL of saline was instilled in 60mL aliquots. A total of 10mL were collected.       SAMPLES:   2 samples (RLL suctioning - 50cc and RML BAL - 10cc) were sent for microscopic analysis and cell counts.     COMPLICATIONS: None    Supervised by Dr. Neetu Huang DO  Pulmonary/Critical Care Fellow      Attending note:  Bedside bronchoscopy with BAL. Diagnosis of acute respiratory failure/ARDS.  Uncomplicated procedure.  Tolerated well.  Agree with the note above.  I was present during the entire viewing period from scope insertion to scope withdrawal.    Radha De Anda MD  [phone]

## 2024-09-21 NOTE — PROGRESS NOTES
ICU End of Shift Summary. See flowsheets for vital signs and detailed assessment.    Changes this shift: Opening eyes spontaneously at start of shift. Requiring max doses of ketamine, fentanyl, versed, and vec gtts to meet sedation and paralysis goals. Vec titrated to TOF and BIS. SR with occasional PACs. Afebrile. Titrating levo gtt to MAP goal >65. CMV 80%, 280, 34, 8. Veletri @ 20. R CT remains to -20 suction with minimal output. OG to LIS. TFs remain held. Rectal tube and rolle in place. K+ replaced per protocol. Chest, head, and pelvis CT obtained.    Plan: continue plan of care. Titrate vent settings, sedation, pressors as appropriate.

## 2024-09-22 ENCOUNTER — APPOINTMENT (OUTPATIENT)
Dept: ULTRASOUND IMAGING | Facility: CLINIC | Age: 54
DRG: 003 | End: 2024-09-22
Attending: INTERNAL MEDICINE
Payer: MEDICAID

## 2024-09-22 ENCOUNTER — APPOINTMENT (OUTPATIENT)
Dept: GENERAL RADIOLOGY | Facility: CLINIC | Age: 54
DRG: 003 | End: 2024-09-22
Attending: INTERNAL MEDICINE
Payer: MEDICAID

## 2024-09-22 LAB
1,3 BETA GLUCAN SER-MCNC: <31 PG/ML
ALBUMIN SERPL BCG-MCNC: 2.5 G/DL (ref 3.5–5.2)
ALLEN'S TEST: ABNORMAL
ALP SERPL-CCNC: 207 U/L (ref 40–150)
ALT SERPL W P-5'-P-CCNC: 56 U/L (ref 0–50)
ANION GAP SERPL CALCULATED.3IONS-SCNC: 13 MMOL/L (ref 7–15)
ANION GAP SERPL CALCULATED.3IONS-SCNC: 15 MMOL/L (ref 7–15)
APTT PPP: 33 SECONDS (ref 22–38)
AST SERPL W P-5'-P-CCNC: 13 U/L (ref 0–45)
BACTERIA SPT CULT: ABNORMAL
BACTERIA SPT CULT: ABNORMAL
BASE EXCESS BLDA CALC-SCNC: -1.1 MMOL/L (ref -3–3)
BASE EXCESS BLDA CALC-SCNC: 0.3 MMOL/L (ref -3–3)
BASE EXCESS BLDA CALC-SCNC: 0.6 MMOL/L (ref -3–3)
BASE EXCESS BLDA CALC-SCNC: 1.1 MMOL/L (ref -3–3)
BILIRUB DIRECT SERPL-MCNC: <0.2 MG/DL (ref 0–0.3)
BILIRUB SERPL-MCNC: 0.2 MG/DL
BLD PROD TYP BPU: NORMAL
BLOOD COMPONENT TYPE: NORMAL
BUN SERPL-MCNC: 37.5 MG/DL (ref 6–20)
BUN SERPL-MCNC: 40.7 MG/DL (ref 6–20)
CALCIUM SERPL-MCNC: 8.8 MG/DL (ref 8.8–10.4)
CALCIUM SERPL-MCNC: 8.8 MG/DL (ref 8.8–10.4)
CHLORIDE SERPL-SCNC: 106 MMOL/L (ref 98–107)
CHLORIDE SERPL-SCNC: 107 MMOL/L (ref 98–107)
CODING SYSTEM: NORMAL
COHGB MFR BLD: 97.5 % (ref 96–97)
COHGB MFR BLD: 97.6 % (ref 96–97)
COHGB MFR BLD: 98.7 % (ref 96–97)
COHGB MFR BLD: 98.9 % (ref 96–97)
CREAT SERPL-MCNC: 0.87 MG/DL (ref 0.51–0.95)
CREAT SERPL-MCNC: 0.87 MG/DL (ref 0.51–0.95)
CROSSMATCH: NORMAL
EGFRCR SERPLBLD CKD-EPI 2021: 79 ML/MIN/1.73M2
EGFRCR SERPLBLD CKD-EPI 2021: 79 ML/MIN/1.73M2
ERYTHROCYTE [DISTWIDTH] IN BLOOD BY AUTOMATED COUNT: 20 % (ref 10–15)
ERYTHROCYTE [DISTWIDTH] IN BLOOD BY AUTOMATED COUNT: 20.2 % (ref 10–15)
ERYTHROCYTE [DISTWIDTH] IN BLOOD BY AUTOMATED COUNT: 20.5 % (ref 10–15)
FIBRINOGEN PPP-MCNC: 841 MG/DL (ref 170–510)
GLUCOSE BLDC GLUCOMTR-MCNC: 133 MG/DL (ref 70–99)
GLUCOSE BLDC GLUCOMTR-MCNC: 140 MG/DL (ref 70–99)
GLUCOSE BLDC GLUCOMTR-MCNC: 144 MG/DL (ref 70–99)
GLUCOSE BLDC GLUCOMTR-MCNC: 148 MG/DL (ref 70–99)
GLUCOSE BLDC GLUCOMTR-MCNC: 152 MG/DL (ref 70–99)
GLUCOSE BLDC GLUCOMTR-MCNC: 158 MG/DL (ref 70–99)
GLUCOSE BLDC GLUCOMTR-MCNC: 183 MG/DL (ref 70–99)
GLUCOSE BLDC GLUCOMTR-MCNC: 185 MG/DL (ref 70–99)
GLUCOSE BLDC GLUCOMTR-MCNC: 193 MG/DL (ref 70–99)
GLUCOSE BLDC GLUCOMTR-MCNC: 194 MG/DL (ref 70–99)
GLUCOSE BLDC GLUCOMTR-MCNC: 196 MG/DL (ref 70–99)
GLUCOSE BLDC GLUCOMTR-MCNC: 214 MG/DL (ref 70–99)
GLUCOSE BLDC GLUCOMTR-MCNC: 240 MG/DL (ref 70–99)
GLUCOSE BLDC GLUCOMTR-MCNC: 242 MG/DL (ref 70–99)
GLUCOSE BLDC GLUCOMTR-MCNC: 249 MG/DL (ref 70–99)
GLUCOSE SERPL-MCNC: 202 MG/DL (ref 70–99)
GLUCOSE SERPL-MCNC: 232 MG/DL (ref 70–99)
GRAM STAIN RESULT: ABNORMAL
HAPTOGLOB SERPL-MCNC: 267 MG/DL (ref 30–200)
HCO3 BLD-SCNC: 26 MMOL/L (ref 21–28)
HCO3 BLD-SCNC: 27 MMOL/L (ref 21–28)
HCO3 SERPL-SCNC: 24 MMOL/L (ref 22–29)
HCO3 SERPL-SCNC: 25 MMOL/L (ref 22–29)
HCT VFR BLD AUTO: 20.9 % (ref 35–47)
HCT VFR BLD AUTO: 24.2 % (ref 35–47)
HCT VFR BLD AUTO: 24.6 % (ref 35–47)
HGB BLD-MCNC: 6.2 G/DL (ref 11.7–15.7)
HGB BLD-MCNC: 6.5 G/DL (ref 11.7–15.7)
HGB BLD-MCNC: 7.4 G/DL (ref 11.7–15.7)
HGB BLD-MCNC: 7.6 G/DL (ref 11.7–15.7)
INR PPP: 1.29 (ref 0.85–1.15)
ISSUE DATE AND TIME: NORMAL
LDH SERPL L TO P-CCNC: 414 U/L (ref 0–250)
MAGNESIUM SERPL-MCNC: 2.1 MG/DL (ref 1.7–2.3)
MCH RBC QN AUTO: 29.7 PG (ref 26.5–33)
MCH RBC QN AUTO: 29.8 PG (ref 26.5–33)
MCH RBC QN AUTO: 30.2 PG (ref 26.5–33)
MCHC RBC AUTO-ENTMCNC: 29.7 G/DL (ref 31.5–36.5)
MCHC RBC AUTO-ENTMCNC: 30.6 G/DL (ref 31.5–36.5)
MCHC RBC AUTO-ENTMCNC: 30.9 G/DL (ref 31.5–36.5)
MCV RBC AUTO: 100 FL (ref 78–100)
MCV RBC AUTO: 98 FL (ref 78–100)
MCV RBC AUTO: 98 FL (ref 78–100)
O2/TOTAL GAS SETTING VFR VENT: 50 %
O2/TOTAL GAS SETTING VFR VENT: 60 %
OBSERVATION IMP: NEGATIVE
PCO2 BLD: 51 MM HG (ref 35–45)
PCO2 BLD: 52 MM HG (ref 35–45)
PCO2 BLD: 52 MM HG (ref 35–45)
PCO2 BLD: 55 MM HG (ref 35–45)
PEEP: 6 CM H2O
PEEP: 6 CM H2O
PH BLD: 7.28 [PH] (ref 7.35–7.45)
PH BLD: 7.32 [PH] (ref 7.35–7.45)
PH BLD: 7.33 [PH] (ref 7.35–7.45)
PH BLD: 7.33 [PH] (ref 7.35–7.45)
PHOSPHATE SERPL-MCNC: 6.7 MG/DL (ref 2.5–4.5)
PLATELET # BLD AUTO: 265 10E3/UL (ref 150–450)
PLATELET # BLD AUTO: 281 10E3/UL (ref 150–450)
PLATELET # BLD AUTO: 282 10E3/UL (ref 150–450)
PO2 BLD: 100 MM HG (ref 80–105)
PO2 BLD: 89 MM HG (ref 80–105)
PO2 BLD: 90 MM HG (ref 80–105)
PO2 BLD: 93 MM HG (ref 80–105)
POTASSIUM SERPL-SCNC: 4.2 MMOL/L (ref 3.4–5.3)
POTASSIUM SERPL-SCNC: 4.3 MMOL/L (ref 3.4–5.3)
PROT SERPL-MCNC: 5.4 G/DL (ref 6.4–8.3)
RBC # BLD AUTO: 2.09 10E6/UL (ref 3.8–5.2)
RBC # BLD AUTO: 2.48 10E6/UL (ref 3.8–5.2)
RBC # BLD AUTO: 2.52 10E6/UL (ref 3.8–5.2)
RETICS # AUTO: 0.08 10E6/UL (ref 0.03–0.1)
RETICS/RBC NFR AUTO: 3.8 % (ref 0.5–2)
SAO2 % BLDA: 95 % (ref 92–100)
SAO2 % BLDA: 96 % (ref 92–100)
SODIUM SERPL-SCNC: 144 MMOL/L (ref 135–145)
SODIUM SERPL-SCNC: 146 MMOL/L (ref 135–145)
UNIT ABO/RH: NORMAL
UNIT NUMBER: NORMAL
UNIT STATUS: NORMAL
UNIT TYPE ISBT: 9500
WBC # BLD AUTO: 5.2 10E3/UL (ref 4–11)
WBC # BLD AUTO: 6.2 10E3/UL (ref 4–11)
WBC # BLD AUTO: 6.7 10E3/UL (ref 4–11)

## 2024-09-22 PROCEDURE — 82310 ASSAY OF CALCIUM: CPT | Performed by: PHYSICIAN ASSISTANT

## 2024-09-22 PROCEDURE — 85014 HEMATOCRIT: CPT | Performed by: INTERNAL MEDICINE

## 2024-09-22 PROCEDURE — 76882 US LMTD JT/FCL EVL NVASC XTR: CPT | Mod: RT

## 2024-09-22 PROCEDURE — 250N000009 HC RX 250

## 2024-09-22 PROCEDURE — 250N000011 HC RX IP 250 OP 636: Performed by: INTERNAL MEDICINE

## 2024-09-22 PROCEDURE — 82805 BLOOD GASES W/O2 SATURATION: CPT

## 2024-09-22 PROCEDURE — 250N000011 HC RX IP 250 OP 636

## 2024-09-22 PROCEDURE — 999N000157 HC STATISTIC RCP TIME EA 10 MIN

## 2024-09-22 PROCEDURE — 250N000013 HC RX MED GY IP 250 OP 250 PS 637: Performed by: SURGERY

## 2024-09-22 PROCEDURE — 99291 CRITICAL CARE FIRST HOUR: CPT | Mod: GC | Performed by: INTERNAL MEDICINE

## 2024-09-22 PROCEDURE — 84100 ASSAY OF PHOSPHORUS: CPT | Performed by: PHYSICIAN ASSISTANT

## 2024-09-22 PROCEDURE — 94640 AIRWAY INHALATION TREATMENT: CPT

## 2024-09-22 PROCEDURE — 83010 ASSAY OF HAPTOGLOBIN QUANT: CPT

## 2024-09-22 PROCEDURE — 94645 CONT INHLJ TX EACH ADDL HOUR: CPT

## 2024-09-22 PROCEDURE — 94668 MNPJ CHEST WALL SBSQ: CPT

## 2024-09-22 PROCEDURE — 76882 US LMTD JT/FCL EVL NVASC XTR: CPT | Mod: 26 | Performed by: RADIOLOGY

## 2024-09-22 PROCEDURE — 85384 FIBRINOGEN ACTIVITY: CPT

## 2024-09-22 PROCEDURE — P9016 RBC LEUKOCYTES REDUCED: HCPCS

## 2024-09-22 PROCEDURE — 80048 BASIC METABOLIC PNL TOTAL CA: CPT | Performed by: PHYSICIAN ASSISTANT

## 2024-09-22 PROCEDURE — 250N000013 HC RX MED GY IP 250 OP 250 PS 637

## 2024-09-22 PROCEDURE — 83735 ASSAY OF MAGNESIUM: CPT | Performed by: PHYSICIAN ASSISTANT

## 2024-09-22 PROCEDURE — 71045 X-RAY EXAM CHEST 1 VIEW: CPT | Mod: 26 | Performed by: RADIOLOGY

## 2024-09-22 PROCEDURE — 85018 HEMOGLOBIN: CPT

## 2024-09-22 PROCEDURE — 250N000009 HC RX 250: Performed by: INTERNAL MEDICINE

## 2024-09-22 PROCEDURE — 82248 BILIRUBIN DIRECT: CPT

## 2024-09-22 PROCEDURE — 94003 VENT MGMT INPAT SUBQ DAY: CPT

## 2024-09-22 PROCEDURE — 85027 COMPLETE CBC AUTOMATED: CPT | Performed by: PHYSICIAN ASSISTANT

## 2024-09-22 PROCEDURE — 258N000003 HC RX IP 258 OP 636

## 2024-09-22 PROCEDURE — 85045 AUTOMATED RETICULOCYTE COUNT: CPT | Performed by: INTERNAL MEDICINE

## 2024-09-22 PROCEDURE — 200N000002 HC R&B ICU UMMC

## 2024-09-22 PROCEDURE — 85730 THROMBOPLASTIN TIME PARTIAL: CPT

## 2024-09-22 PROCEDURE — 250N000013 HC RX MED GY IP 250 OP 250 PS 637: Performed by: PHYSICIAN ASSISTANT

## 2024-09-22 PROCEDURE — 71045 X-RAY EXAM CHEST 1 VIEW: CPT

## 2024-09-22 PROCEDURE — 83615 LACTATE (LD) (LDH) ENZYME: CPT

## 2024-09-22 PROCEDURE — 85610 PROTHROMBIN TIME: CPT

## 2024-09-22 RX ORDER — DEXMEDETOMIDINE HYDROCHLORIDE 4 UG/ML
.1-1.2 INJECTION, SOLUTION INTRAVENOUS CONTINUOUS
Status: DISCONTINUED | OUTPATIENT
Start: 2024-09-23 | End: 2024-09-28

## 2024-09-22 RX ORDER — FUROSEMIDE 10 MG/ML
40 INJECTION INTRAMUSCULAR; INTRAVENOUS ONCE
Status: COMPLETED | OUTPATIENT
Start: 2024-09-22 | End: 2024-09-22

## 2024-09-22 RX ADMIN — TOBRAMYCIN 300 MG: 300 SOLUTION RESPIRATORY (INHALATION) at 19:24

## 2024-09-22 RX ADMIN — CETIRIZINE HYDROCHLORIDE 10 MG: 10 TABLET, FILM COATED ORAL at 07:38

## 2024-09-22 RX ADMIN — WHITE PETROLATUM 57.7 %-MINERAL OIL 31.9 % EYE OINTMENT: at 05:41

## 2024-09-22 RX ADMIN — CHLORHEXIDINE GLUCONATE 0.12% ORAL RINSE 15 ML: 1.2 LIQUID ORAL at 20:22

## 2024-09-22 RX ADMIN — Medication 50 MCG: at 18:21

## 2024-09-22 RX ADMIN — LEVALBUTEROL HYDROCHLORIDE 0.63 MG: 0.63 SOLUTION RESPIRATORY (INHALATION) at 08:14

## 2024-09-22 RX ADMIN — BUDESONIDE 1 MG: 1 SUSPENSION RESPIRATORY (INHALATION) at 08:15

## 2024-09-22 RX ADMIN — IPRATROPIUM BROMIDE 0.5 MG: 0.5 SOLUTION RESPIRATORY (INHALATION) at 16:13

## 2024-09-22 RX ADMIN — IPRATROPIUM BROMIDE 0.5 MG: 0.5 SOLUTION RESPIRATORY (INHALATION) at 04:18

## 2024-09-22 RX ADMIN — TOBRAMYCIN 300 MG: 300 SOLUTION RESPIRATORY (INHALATION) at 08:15

## 2024-09-22 RX ADMIN — HEPARIN SODIUM 5000 UNITS: 5000 INJECTION, SOLUTION INTRAVENOUS; SUBCUTANEOUS at 12:03

## 2024-09-22 RX ADMIN — LEVALBUTEROL HYDROCHLORIDE 0.63 MG: 0.63 SOLUTION RESPIRATORY (INHALATION) at 16:13

## 2024-09-22 RX ADMIN — IPRATROPIUM BROMIDE 0.5 MG: 0.5 SOLUTION RESPIRATORY (INHALATION) at 08:14

## 2024-09-22 RX ADMIN — HEPARIN SODIUM 5000 UNITS: 5000 INJECTION, SOLUTION INTRAVENOUS; SUBCUTANEOUS at 20:22

## 2024-09-22 RX ADMIN — FLUCONAZOLE 100 MG: 100 TABLET ORAL at 07:41

## 2024-09-22 RX ADMIN — Medication 20 NG/KG/MIN: at 12:16

## 2024-09-22 RX ADMIN — ZINC SULFATE 220 MG (50 MG) CAPSULE 220 MG: CAPSULE at 07:38

## 2024-09-22 RX ADMIN — FUROSEMIDE 40 MG: 10 INJECTION, SOLUTION INTRAVENOUS at 12:03

## 2024-09-22 RX ADMIN — METHYLPREDNISOLONE SODIUM SUCCINATE 62.5 MG: 125 INJECTION, POWDER, FOR SOLUTION INTRAMUSCULAR; INTRAVENOUS at 04:19

## 2024-09-22 RX ADMIN — CHLORHEXIDINE GLUCONATE 0.12% ORAL RINSE 15 ML: 1.2 LIQUID ORAL at 07:38

## 2024-09-22 RX ADMIN — WHITE PETROLATUM 57.7 %-MINERAL OIL 31.9 % EYE OINTMENT: at 14:13

## 2024-09-22 RX ADMIN — INSULIN ASPART 3 UNITS: 100 INJECTION, SOLUTION INTRAVENOUS; SUBCUTANEOUS at 04:19

## 2024-09-22 RX ADMIN — MONTELUKAST 10 MG: 10 TABLET, FILM COATED ORAL at 22:05

## 2024-09-22 RX ADMIN — Medication 200 MCG/HR: at 05:44

## 2024-09-22 RX ADMIN — METHYLPREDNISOLONE SODIUM SUCCINATE 62.5 MG: 125 INJECTION, POWDER, FOR SOLUTION INTRAMUSCULAR; INTRAVENOUS at 10:22

## 2024-09-22 RX ADMIN — Medication 200 MCG/HR: at 17:58

## 2024-09-22 RX ADMIN — IPRATROPIUM BROMIDE 0.5 MG: 0.5 SOLUTION RESPIRATORY (INHALATION) at 11:25

## 2024-09-22 RX ADMIN — HEPARIN SODIUM 5000 UNITS: 5000 INJECTION, SOLUTION INTRAVENOUS; SUBCUTANEOUS at 04:19

## 2024-09-22 RX ADMIN — ATOVAQUONE 1500 MG: 750 SUSPENSION ORAL at 07:40

## 2024-09-22 RX ADMIN — MIDAZOLAM HYDROCHLORIDE 12 MG/HR: 1 INJECTION, SOLUTION INTRAVENOUS at 16:38

## 2024-09-22 RX ADMIN — DORNASE ALFA 2.5 MG: 1 SOLUTION RESPIRATORY (INHALATION) at 00:01

## 2024-09-22 RX ADMIN — METHYLPREDNISOLONE SODIUM SUCCINATE 62.5 MG: 125 INJECTION, POWDER, FOR SOLUTION INTRAMUSCULAR; INTRAVENOUS at 15:58

## 2024-09-22 RX ADMIN — PANTOPRAZOLE SODIUM 40 MG: 40 INJECTION, POWDER, FOR SOLUTION INTRAVENOUS at 07:38

## 2024-09-22 RX ADMIN — INSULIN ASPART 2 UNITS: 100 INJECTION, SOLUTION INTRAVENOUS; SUBCUTANEOUS at 00:00

## 2024-09-22 RX ADMIN — LEVALBUTEROL HYDROCHLORIDE 0.63 MG: 0.63 SOLUTION RESPIRATORY (INHALATION) at 19:23

## 2024-09-22 RX ADMIN — LEVALBUTEROL HYDROCHLORIDE 0.63 MG: 0.63 SOLUTION RESPIRATORY (INHALATION) at 11:25

## 2024-09-22 RX ADMIN — METHYLPREDNISOLONE SODIUM SUCCINATE 62.5 MG: 125 INJECTION, POWDER, FOR SOLUTION INTRAMUSCULAR; INTRAVENOUS at 22:05

## 2024-09-22 RX ADMIN — LEVOTHYROXINE SODIUM 25 MCG: 0.03 TABLET ORAL at 07:38

## 2024-09-22 RX ADMIN — KETAMINE HYDROCHLORIDE 175 MG/HR: 100 INJECTION, SOLUTION, CONCENTRATE INTRAMUSCULAR; INTRAVENOUS at 10:24

## 2024-09-22 RX ADMIN — Medication 50 MCG: at 22:31

## 2024-09-22 RX ADMIN — Medication 50 MCG: at 20:23

## 2024-09-22 RX ADMIN — MIDAZOLAM HYDROCHLORIDE 12 MG/HR: 1 INJECTION, SOLUTION INTRAVENOUS at 08:07

## 2024-09-22 RX ADMIN — IPRATROPIUM BROMIDE 0.5 MG: 0.5 SOLUTION RESPIRATORY (INHALATION) at 19:23

## 2024-09-22 RX ADMIN — DEXMEDETOMIDINE HYDROCHLORIDE 0.7 MCG/KG/HR: 400 INJECTION INTRAVENOUS at 04:24

## 2024-09-22 RX ADMIN — INSULIN ASPART 5 UNITS: 100 INJECTION, SOLUTION INTRAVENOUS; SUBCUTANEOUS at 08:08

## 2024-09-22 RX ADMIN — SODIUM CHLORIDE SOLN NEBU 3% 3 ML: 3 NEBU SOLN at 08:15

## 2024-09-22 RX ADMIN — LEVALBUTEROL HYDROCHLORIDE 0.63 MG: 0.63 SOLUTION RESPIRATORY (INHALATION) at 04:18

## 2024-09-22 RX ADMIN — INSULIN HUMAN 2.5 UNITS/HR: 1 INJECTION, SOLUTION INTRAVENOUS at 09:27

## 2024-09-22 RX ADMIN — Medication 1 TABLET: at 12:03

## 2024-09-22 RX ADMIN — VECURONIUM BROMIDE 1.2 MCG/KG/MIN: 1 INJECTION, POWDER, LYOPHILIZED, FOR SOLUTION INTRAVENOUS at 05:01

## 2024-09-22 RX ADMIN — WHITE PETROLATUM 57.7 %-MINERAL OIL 31.9 % EYE OINTMENT: at 22:05

## 2024-09-22 ASSESSMENT — ACTIVITIES OF DAILY LIVING (ADL)
ADLS_ACUITY_SCORE: 61

## 2024-09-22 NOTE — PROGRESS NOTES
MEDICAL ICU PROGRESS NOTE  09/22/2024      Date of Service (when I saw the patient): 09/22/2024    ASSESSMENT: Massiel Flaherty is a 53 year old female with PMH Anxiety/depression, fibromyalgia, c/f nonepileptic seizures not on AEDs, hypothyroidism, asthma, HLD, MURIEL on CPAP, expressive aphasia, hypertension who was admitted on 8/3/2024 for fatigue, fever and dyspnea and intubated 8/6/24 at OSH for ARDS, proned and paralyzed without improvement. Transferred to [facility] 8/8/24, hypoxic with high plateaus/peaks and placed on VV ECMO and CRRT. Decannulated from VV ECMO 8/18 and transferred to MICU 8/26/24 for ongoing management. Extubated 8/27 then reintubated 8/29 iso worsening AHRF and pseudomonas pneumonia. Extubated again 9/16 to BiPAP/HFNC, re-intubated 9/20 with tachypnea, increased WOB, fevers, and new lung opacities c/f possible HAP vs ILD/vasculitis/organizing pneumonia flare. Course complicated by tension pneumothorax while re-intubated 9/20 now s/p right chest tube.      CHANGES and MAJOR THINGS TODAY:   - Hgb drop to 6.2 this morning, other cell lines down as well but below 7 on repeat so transfused one unit pRBCs and repeated LE US - stable right groin hematoma  - Coags, haptoglobin, LDH ordered given Hgb drop as well  - Lasxi 40 after Hgb transfusion, goal net neg 500ml  - Haptoglobin, LDH, retic to assess for hemolysis  - Discontinue HTS, dornase, CPT  - Transition to insulin drip due to high dose steroids and tube feeds at goal  - Noted to have increase in BIS overnight but ketamine falsely elevates BIS and we cannot reliably use BIS for sedation while on ketamine, discontinued precedex and will continue using train of four and ventilation synchrony as primary measure of sedation and paralysis    PLAN:    Neuro:  # Pain and sedation  # Concern for ICU delirium   # Hx Fibromyalgia   # Hx myalgic encephalomyelitis   # Hx anxiety/depression  - Pain: Oxycodone changed to 2.5 mg q6h PRN, Ativan 0.5 mg BID -  continue for now  - Sedation plan: Sedated with midazolam, fentanyl, ketamine, paralysis with vecuronium   - Gabapentin to 300mg TID held while sedated  - Restarted psych meds at 1/2 dose (venlafaxine and amitriptyline) now held while sedated     # Hx spells with staring and BUE posturing previously described as nonepileptic seizures   # Hx of GTC seizures and myoclonic epilepsy, weaned off AEDs   # Diffuse cerebral edema - improved  - Sodium goals liberalized to 140-145  - 8/21: MRI Brain: no acute intracranial pathology. No findings to suggest anoxic brain injury.   - MRI brain 9/20: no anoxic brain injury     Pulmonary:  # Acute hypoxic respiratory failure c/b ARDS  # Pseudomonas pneumonia (s/p treatment)  # Mechanical ventilation  # s/p VV ECMO 8/8 - 8/18   # Hx CAP  # Asthma  # MURIEL on home CPAP  PFT dated 9/8/2020 demonstrated normal spirometry with mild diffusion impairment and mild restriction (FEV1/FVC 80%, FEV1 2.59, DLCO 40%). CT abdomen chest 3/9/2020 demonstrated scarring and fibrosis bilaterally which correlated with the decreased DLCO. The patient also has a history of MURIEL on CPAP therapy as currently managed by her provider in [place]. Unclear what triggered ARDS or why she has had such severe hospital course, further investigated ILD but results all unremarkable. Extubated 8/27, reintubated 8/29 for worsening O2 demands and diffuse opacities iso new/recurrent psuedomonas pneumonia. Bronch with BAL 8/30, pseudomonas growing as below. Extubated again 9/16, worsening respiratory distress noted 9/19 with repeat polymicrobial growth on respiratory sputum raising c/f aspiration/hospital associated pneumonia. 9/20 with increased WOB, new fevers, and CXR significant for opacities c/f possible HAP vs ILD/vasculitis/organizing pneumonia and was reintubated 9/20.   - ILD w/u aldolase, EVELIO, RF, CCP, ANCA, MPO, SSA Ro and La, Scleroderma antibody, Radha 1,  hypersensity pneumonitis, IGG subclasses,   aspergillus, blastomyces, histoplasma neg  - SpO2 goals >92%, PaO2 goals >60   - PTA singular at bedtime if able, duonebs q4h RT  - discontinue pulmicort due to continued pseudomonas infection  - Lung protective ventilation strategies given ARDS   - Methylpred 60mg q6H 9/21-**, plan for at least 2 days and if CRP trends down will discuss slow taper (may neeed pred 40 for a month before weaning)  - 9/17: Started levalbuterol nebs, PTA pulmicort nebs, saline nebs, trialed aminophylline gtt for increased respiratory drive but lead to tachycardia so discontinued, holding off on further airway clearance while intubated with chest tube in place  - 9/18: trial of dornase - discontinue now that intubated  - Bronchoscopy 9/21      Pneumothorax   Tension pneumothorax during re-intubation 9/20, concern for fibrotic lung disease and possible barotrauma during bag ventilation during re-intubation vs complication of re-intubation in general. Leave in while intubated.   - Chest tube to water seal  - Daily XR     Cardiovascular:  # Hyperlipidemia  # Hx HTN   - MAP goal >65, SBP goals >110  - PTA atorvastatin  - PTA clonidine held while sedated  - Lower extremity ultrasound without vascular pathology, no hematoma or clots  - Monitor discoloration of extremities for necrosis  - PRN hydralazine for SBP > 200      Sinus Tachycardia  Likely 2/2 fluid removal, sepsis, pain and sedation as above. Also considered withdrawal due to significant opioid and benzo needs while intubated.      GI/Nutrition:  # Severe malnutrition (see RD note)   # Non-infectious Diarrhea  # GERD   - Protonix (home medication)   - Nutrition consulted, appreciate recs  - Diet: TF at goal  - Hold bowel regimen d/t loose stools  - Continue scheduled multivitamin, banatrol, prosource packet  - loperamide scheduled  - Rectal tube, continues with high output     Renal/Fluids/Electrolytes:  # Acute kidney injury: Improving   # Renal failure: Improving   # AGMA: Improving    Baseline Cr approx 0.6. Pt was anuric required CRRT here but now making urine, likely prerenal due to shock.  - Continues to improve, holding off on iHD  - Making good urine output  - Strict I&Os  - Avoid nephrotoxins as able  - intermittent diuresis with lasix 40 mg for volume overload    Hypernatremia  - FWF 100ml q4H  - Continue to trend     Elevated lactate (resolved)  Lactate 2.3 --> 2.8 9/19 concerning for possible sepsis although no hypotension. 9/20 repeat 0.9  - Trend PRN     Endocrine:  # Steroid and stress induced hyperglycemia  # Hypothyroidism   - Goal to keep BG < 180 for optimal wound healing  - start insulin drip due to methylpred dosing  - Continue PTA synthroid     ID:  # Leukocytosis   # Concern for aspiration pneumonia/hospital acquired pnemonia (9/19 - )  # Psuedomonas pneumonia (s/p treatment)  # Hx CAP  Respiratory cx 8/29 pseudomonas pneumonia. Intermediate susceptability to meropenem, more significant sensitivities to ciprofloxacin. New leukocytosis, elevated PC, CRP and lactate 9/19 with sputum cx 4+ psuedomonas, 2+ GPCs, and 1+ GPBs could be colonized so waiting for BAL studies 9/21  - Continue to monitor fever curve and inflammatory markers as appropriate  - ID now signed off   - Repeat cultures and sputum growing pseudomonas with similar resistance pattern to previous.   - Restarted tobramycin nebs 9/21     Abx  - Meropenem 8/29 - 9/1  - Vancomycin 8/29 - 8/30  - Tobramycin x 1 8/29  - Vancomycin 9/5 - 9/8    - Tobramycin nebs BID 9/1 - 9/11, 9/21- (tentative plan for 5 day course)   - Ciprofloxacin infusion 9/1 - 9/11  - Metronidazole 9/6 - 9/11  - Atovaquone 9/11 -   - Vancomycin 9/19 - 9/20  - Zosyn 9/19 - 9/21  - Ciprofloxacin 9/19 - 9/21     PJP Ppx  Given Dex-ARDS Massielsaman ferraro remain on steriods for > 3 weeks so will initiate PJP ppx. Given history of allergy to sulfa, will obtain G6PD test to determine if dapsone can be used: levels are ok, however continuing  atovaquone.   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   - Ganciclovir 9/6 - 9/8, discontinued given ID recs  - trend CMV 9/16  - CMV levels recheck 9/18 289    Thrush  Noted 9/21  - Fluconazole x 7 days     # HSV-1  Mouth sores present, which could have viral etiology. 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 Monitor and trend.   - CTAP 8/30 due to acute hemoglobin drop requiring transfusion of 2 x 1 unit of blood 12 hours apart. Negative for acute bleed at that time.     # Right groin Hematoma   Acutely decreased Hgb from 8.0 to 6.6 on 9/20 of unknown etiology that required transfusion x1, however 9/19 LE US notable to 14.9 cm hematoma in R groin near prior canal that was not previously appreciated on CT abdomen c/f possible bleed.   - Stabel on repeat US 9/21     Musculoskeletal/Rheum:  # Alopecia  - Hold PTA hydroxychloroquine      # Weakness and deconditioning of critical illness  # Left ankle injury pre-hospitalization    - Physical and occupational therapy when able.      Skin:  # BLE scattered ecchymosis  # Left toe duskiness  - Bilateral lower leg ecchymosis  - WOC consult  - Ecchymosis to left foot, most noticeable to 3rd and 4th toes     # Peripheral Edema  Likely 2/2 immobility, and currently (9/20) not good candidate for diuresis due to intravascular depletion.  - Net even (or slightly negative) fluid goal.  - Consult OT for lymphedema therapy     General Cares/Prophylaxis:  DVT Prophylaxis: SubQ heparin  GI Prophylaxis: PPI  Restraints: no  Code Status: Full Code     Lines/tubes/drains:  - ETT (9/20)  - NJ (8/9)  - LIJ CVC (8/8)  - Radial a-line (8/29) Removed 9/17  - PIV x3  - Rectal tube (8/17)  - Tunneled HD line (8/23)  - New radial A line, right, 9/20  - Chest tube 9/20      Disposition:  - Medical ICU     Patient seen and findings/plan discussed with medical ICU staff, Dr. De Anda.    Gaudencio De Souza MD    Attending note:  Patient seen, examined and discussed with the Resident physicians.  All data reviewed including vital signs, medications, laboratory studies and imaging.  The patient remains critically ill with acute respiratory failure, ARDS vs ILD (OP), pneumonia and sepsis.  I personally adjusted the ventilator to treat the acute respiratory failure, titrated vasopressors to treat the septic shock and closely followed volume status.  Re-intubated yesterday, worsening bilateral infiltrates; all c/w flare of underlying IIP with probable organizing pneumonia from unclear etiology.  Markedly increased CRP, recheck in am.  Needs slow taper of steroids (would not taper below 40 mg per day and continue this dose for at least one month); on increased steroids at present.  Attempt to further wean sedation.  Unclear of significance of Pseudomonas in sputum, suspect at this stage is more of an colonizer.  Await bronchoscopy cultures, CMV PCR and PJPPCR. .     Total Critical care time excluding time for teaching and procedures was 40 minutes.     Radha De Anda MD  [phone]    Clinically Significant Risk Factors        # Hypokalemia: Lowest K = 3.3 mmol/L in last 2 days, will replace as needed  # Hypernatremia: Highest Na = 146 mmol/L in last 2 days, will monitor as appropriate      # Hypoalbuminemia: Lowest albumin = 2.2 g/dL at 8/10/2024  9:47 AM, will monitor as appropriate  # Coagulation Defect: INR = 1.45 (Ref range: 0.85 - 1.15) and/or PTT = 56 Seconds (Ref range: 22 - 38 Seconds), will monitor for bleeding               # Severe Malnutrition: based on nutrition assessment    # Financial/Environmental Concerns: none              ====================================  INTERVAL HISTORY:   Hgb drop this morning but all other cell lines significantly down as well so repeated to confirm. On repeat  Hgb still below 7 so will transfuse and trend to eval for acute bleed. No evidence of hematemesis, melena, or bright red stool output.     OBJECTIVE:   1. VITAL SIGNS:   Temp:  [97.8  F (36.6  C)-100.1  F (37.8  C)] 97.8  F (36.6  C)  Pulse:  [83-99] 85  Resp:  [34] 34  MAP:  [54 mmHg-113 mmHg] 89 mmHg  Arterial Line BP: ()/(40-81) 119/67  FiO2 (%):  [50 %-70 %] 60 %  SpO2:  [91 %-100 %] 96 %  FiO2 (%): 60 %, Resp: (!) 34, Vent Mode: CMV/AC, Resp Rate (Set): 34 breaths/min, Tidal Volume (Set, mL): 280 mL, PEEP (cm H2O): 6 cmH2O, Resp Rate (Set): 34 breaths/min, Tidal Volume (Set, mL): 280 mL, PEEP (cm H2O): 6 cmH2O  2. INTAKE/ OUTPUT:   I/O last 3 completed shifts:  In: 1841.33 [I.V.:1121.33; NG/GT:360]  Out: 1266 [Urine:832; Emesis/NG output:250; Stool:150; Chest Tube:34]    3. PHYSICAL EXAMINATION:  General: Intubated, sedated  HEENT: NC/AT  Neuro: deeply sedated  Pulm/Resp: Increased WOB, diffuse crackles billaterally and upper airway noises on vent  CV: Sinus tachycardia, S1/2, no m/r/g  Abdomen: Soft, tender, bowel sounds present, right groin hematoma stable  : deferred  Incisions/Skin: Pitting edema in all extremities, equally. Lower leg ecchymosis present and scattered. LL inguinal area with new ecchymotic discolouration that is tender to palpation.     4. LABS:   Arterial Blood Gases   Recent Labs   Lab 09/22/24  0410 09/21/24  2202 09/21/24  1616 09/21/24  0933   PH 7.33* 7.41 7.41 7.41   PCO2 52* 44 44 44   PO2 90 275* 97 87   HCO3 27 28 28 28     Complete Blood Count   Recent Labs   Lab 09/22/24  0410 09/21/24  0427 09/20/24  1312 09/20/24  1052 09/20/24  0419   WBC 5.2 12.0*  12.0* 20.4* 12.7* 14.0*   HGB 6.2* 7.1* 8.2* 7.9* 6.6*    442  --  367 397     Basic Metabolic Panel  Recent Labs   Lab 09/22/24  0411 09/21/24  2358 09/21/24  2304 09/21/24  1948 09/21/24  0935 09/21/24  0933 09/21/24  0432 09/21/24  0427 09/20/24  1537 09/20/24  1312 09/20/24  0426 09/20/24  0419   NA  --   --   146*  --   --   --   --  146*  --  145  --  143   POTASSIUM  --   --  4.2  --   --  4.1  --  3.3*  --  4.0  --  4.2   CHLORIDE  --   --  107  --   --   --   --  103  --  102  --  103   CO2  --   --  24  --   --   --   --  27  --  31*  --  28   BUN  --   --  37.5*  --   --   --   --  33.2*  --  31.3*  --  30.2*   CR  --   --  0.87  --   --   --   --  0.82  --  0.72  --  0.63   * 185* 202* 187*   < >  --    < > 136*   < > 266*   < > 220*    < > = values in this interval not displayed.     Liver Function Tests  Recent Labs   Lab 09/20/24  1312   INR 1.45*     Coagulation Profile  Recent Labs   Lab 09/20/24  1312   INR 1.45*       5. RADIOLOGY:   Recent Results (from the past 24 hour(s))   XR Chest Port 1 View    Narrative    Exam: Chest 1 view portable 60 degree 9/21/2024 11:17 AM     History:  Correlating with CT, assessing PTX, chest tube, ET tube, GGO    Comparison: 9/20/2024    Findings: Right chest tube in place. Endotracheal tube in the mid  trachea. Feeding tube and enteric tube in place. Right IJ line tip in  the mid right atrium. Left IJ line tip at the confluence of innominate  veins. Diffuse interstitial opacities bilaterally. Cardiac silhouette  is not enlarged.      Impression    IMPRESSION: No evidence of pneumothorax    KVNG LAZARO MD         SYSTEM ID:  [id number]

## 2024-09-22 NOTE — PLAN OF CARE
Major Shift Events: Remains intubated, sedated and paralyzed. Unresponsive. TOF 0/4. Pupils equal, round and reactive. BIS on. Vec holiday performed for about an hour and a half. Became tachypenic, tachycardic and hypertensive, Vec restarted. Sinus rhythm, 80s to 90s. Goal of MAP greater than 65. Levo off. T max: 99.1. Edema throughout, 40 mg of lasix given. CMV/AC settings 50%/34/300/6. Flolan at 20. Coarse/diminished lung sounds. OG to LIWS. NJ with TF at 40 mL/hr with Q2hr 50 mL FWF. Gonzalez in place with adequate UOP. Rectal tube in place, output increasing. Hgb overnight low, 1 unit of PBRCs transfused, hgb recheck 7.4. Insulin drip started, on alg 3. Remains on Versed, Vec, Fentanyl, and Ketamine.  Plan: Advance TF to goal of 50 mL/hr at 1900. Continue to wean paralytic as able. Wean vent settings as able.   For vital signs and complete assessments, please see documentation flowsheets.       Miranda Weiner RN on 9/22/2024 at 6:24 PM

## 2024-09-22 NOTE — PROGRESS NOTES
MEDICAL ICU PROGRESS NOTE  09/21/2024    Date of Service (when I saw the patient): 09/21/2024    ASSESSMENT: Massiel Flaherty is a 53 year old female with PMH Anxiety/depression, fibromyalgia, c/f nonepileptic seizures not on AEDs, hypothyroidism, asthma, HLD, MURIEL on CPAP, expressive aphasia, hypertension  who was admitted on 8/3/2024 for fatigue, fever and dyspnea and intubated 8/6/24 at OSH for ARDS, proned and paralyzed without improvement. Transferred to [facility] 8/8/24, hypoxic with high plateaus/peaks and placed on VV ECMO and CRRT. Decannulated from VV ECMO 8/18 and transferred to MICU 8/26/24 for ongoing management. Extubated 8/27 then reintubated 8/29 iso worsening AHRF and pseudomonas pneumonia. Extubated again 9/16 to BiPAP/HFNC, now 9/20 with tachypnea, increased WOB, fevers, and new lung opacities c/f possible HAP again.    CHANGES and MAJOR THINGS TODAY:  - Wean sedation as able  - Start methylpred 60mg q6h for at least 48 hr to see if there is a component of an ILD/vasculitis/organizing pneumonia component with premature steroid wean  - Trend glucose given increased steroids    Neuro:  # Pain and sedation  # Concern for ICU delirium   # Hx Fibromyalgia   # Hx myalgic encephalomyelitis   # Hx anxiety/depression  - Pain: Oxycodone changed to 2.5 mg q6h PRN, Ativan 0.5 mg BID - continue for now  - Sedation plan: Sedated with midazolam, fentanyl, ketamine, paralysis with vecuronium   - Gabapentin to 300mg TID held while sedated  - Restarted psych meds at 1/2 dose (venlafaxine and amitriptyline) now held while sedated    # Hx spells with staring and BUE posturing previously described as nonepileptic seizures   # Hx of GTC seizures and myoclonic epilepsy, weaned off AEDs   # Diffuse cerebral edema - improved  - Sodium goals liberalized to 140-145  - 8/21: MRI Brain: no acute intracranial pathology. No findings to suggest anoxic brain injury.   - MRI brain 9/20: no anoxic brain injury    Pulmonary:  # Acute  hypoxic respiratory failure c/b ARDS  # Pseudomonas pneumonia (s/p treatment)  # Mechanical ventilation  # s/p VV ECMO 8/8 - 8/18   # Hx CAP  # Asthma  # MURIEL on home CPAP  PFT dated 9/8/2020 demonstrated normal spirometry with mild diffusion impairment and mild restriction (FEV1/FVC 80%, FEV1 2.59, DLCO 40%). CT abdomen chest 3/9/2020 demonstrated scarring and fibrosis bilaterally which correlated with the decreased DLCO. The patient also has a history of MURIEL on CPAP therapy as currently managed by her provider in [place]. Unclear what triggered ARDS or why she has had such severe hospital course, further investigated ILD but results all unremarkable. Extubated 8/27, reintubated 8/29 for worsening O2 demands and diffuse opacities iso new/recurrent psuedomonas pneumonia. Bronch with BAL 8/30, pseudomonas growing as below. Extubated again 9/16, worsening respiratory distress noted 9/19 with repeat polymicrobial growth on respiratory sputum raising c/f aspiration/hospital associated pneumonia. 9/20 with increased WOB, new fevers, and CXR significant for opacities c/f possible HAP again.   - ILD w/u aldolase, EVELIO, RF, CCP, ANCA, MPO, SSA Ro and La, Scleroderma antibody, Radha 1,  hypersensity pneumonitis, IGG subclasses,  aspergillus, blastomyces, histoplasma neg  - SpO2 goals >92%, PaO2 goals >60   - PTA singular at bedtime if able, pulmicort/duonebs  - Lung protective ventilation strategies given ARDS  - Methylpred 60mg q6H 9/21-**  - 9/17: Started levalbuterol nebs, PTA pulmicort nebs, saline nebs, trialed aminophylline gtt for increased respiratory drive but lead to tachycardia so discontinued  - 9/18: trial of dornase - discontinue now that intubated  - Bronchoscopy 9/21    Pneumothorax   Tension pneumothorax during re-intubation 9/20, concern for fibrotic lung disease and possible barotrauma during bag ventilation during re-intubation vs complication of re-intubation in general.   - Chest tube to water  seal    Cardiovascular:  # Hyperlipidemia  # Hx HTN   - MAP goal >65, SBP goals >110  - Has remained off pressors since 9/9   - PTA atorvastatin  - PTA clonidine held while sedated  - Lower extremity ultrasound without vascular pathology, no hematoma or clots  - Monitor discoloration of extremities for necrosis  - PRN hydralazine for SBP > 200     Sinus Tachycardia  Likely 2/2 fluid removal, sepsis, pain and sedation as above. Also considered withdrawal due to significant opioid and benzo needs while intubated.     GI/Nutrition:  # Severe malnutrition (see RD note)   # Non-infectious Diarrhea  # GERD   - Protonix (home medication)   - Nutrition consulted, appreciate recs  - Diet: TF restarted at 10, goal 40, 9/21  - Hold bowel regimen d/t loose stools  - Continue scheduled multivitamin, banatrol, prosource packet  - loperamide scheduled  - Rectal tube, continues with high output    Renal/Fluids/Electrolytes:  # Acute kidney injury: Improving   # Renal failure: Improving   # AGMA: Improving   Baseline Cr approx 0.6. Pt was anuric here but now making some urine, likely prerenal due to shock.  - Continues to improve, holding off on iHD  - Making good urine output, possible post ATN diuresis?  - Strict I&Os  - Avoid nephrotoxins as able  - Q6 hour electrolytes given increased urine output and ongoing high rectal tube output  - intermittent diuresis with lasix 40 mg for volume overload  - Additional diuresis with metolazone as needed    Elevated lactate (resolved)  Lactate 2.3 --> 2.8 9/19 concerning for possible sepsis although no hypotension. 9/20 repeat 0.9  - Trend PRN    Endocrine:  # Steroid and stress induced hyperglycemia  # Hypothyroidism   - Goal to keep BG < 180 for optimal wound healing  - Continue medium sliding scale insulin   - Continue PTA synthroid    ID:  # Leukocytosis   # Concern for aspiration pneumonia/hospital acquired pnemonia (9/19 - )  # Psuedomonas pneumonia (s/p treatment)  # Hx  CAP  Respiratory cx 8/29 pseudomonas pneumonia. Intermediate susceptability to meropenem, more significant sensitivities to ciprofloxacin. New leukocytosis, elevated PC, CRP and lactate 9/19 with sputum cx 4+ GNBs, 2+ GPCs, and 1+ GPBs  - Continue to monitor fever curve and inflammatory markers as appropriate  - ID now signed off   - Repeat cultures and sputum growing pseudomonas with similar resistance pattern to previous. Restarted tobramycin nebs    Abx  - Meropenem 8/29 - 9/1  - Vancomycin 8/29 - 8/30  - Tobramycin x 1 8/29  - Vancomycin 9/5 - 9/8    - Tobramycin nebs BID 9/1 - 9/11, 9/21-  - Ciprofloxacin infusion 9/1 - 9/11  - Metronidazole 9/6 - 9/11  - Atovaquone 9/11 -   - Vancomycin 9/19 - 9/20  - Zosyn 9/19 - 9/21  - Ciprofloxacin 9/19 - 9/21    PJP Ppx  Given Dex-ARDS Massiel skinner ronan remain on steriods for > 3 weeks so will initiate PJP ppx. Given history of allergy to sulfa, will obtain G6PD test to determine if dapsone can be used: levels are ok, however continuing atovaquone.   - Continue atovaquone 1,500 mg daily   - Repeat fungitel 9/21    CMV viremia  CMV PCR in blood positive 8/31.   - Ganciclovir 9/6 - 9/8, discontinued given ID recs  - trend CMV 9/16  - CMV levels recheck 9/18 289    # HSV-1  Mouth sores present, which could have viral etiology. 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 Monitor and trend.   - CTAP 8/30 due to acute hemoglobin drop requiring transfusion of 2 x 1 unit of blood 12 hours apart. Negative for acute bleed at that time.    # Possible Hematoma   Acutely decreased Hgb from 8.0 to 6.6 on 9/20 of unknown etiology that required transfusion x1, however 9/19 LE US notable to 14.9 cm hematoma in R groin near prior canal that was not previously appreciated on CT abdomen c/f possible bleed.   - Recheck Hgb s/p transfusion  - If  worsening Hgb or hematoma, then consider CTA/PE to characterize the collection and check if active bleed.    Musculoskeletal/Rheum:  # Alopecia  - Hold PTA hydroxychloroquine     # Weakness and deconditioning of critical illness  # Left ankle injury pre-hospitalization    - Physical and occupational therapy when able.     Skin:  # BLE scattered ecchymosis  # Left toe duskiness  - Bilateral lower leg ecchymosis  - WOC consult  - Ecchymosis to left foot, most noticeable to 3rd and 4th toes    # Peripheral Edema  Likely 2/2 immobility, and currently (9/20) not good candidate for diuresis due to intravascular depletion.  - Net even (or slightly negative) fluid goal.  - Consult OT for lymphedema therapy    General Cares/Prophylaxis:  DVT Prophylaxis: SubQ heparin  GI Prophylaxis: PPI  Restraints: no  Code Status: Full Code    Lines/tubes/drains:  - ETT (9/20)  - NJ (8/9)  - LIJ CVC (8/8)  - Radial a-line (8/29) Removed 9/17  - PIV x3  - Rectal tube (8/17)  - Tunneled HD line (8/23)  - New radial A line, right, 9/20  - Chest tube 9/20    Disposition:  - Medical ICU       Fuentes Fowler MD-PhD  PSTP- PGY - 1    Attending note:  Patient seen, examined and discussed with the Resident physicians.  All data reviewed including vital signs, medications, laboratory studies and imaging.  The patient remains critically ill with acute respiratory failure, ARDS vs ILD (OP), pneumonia and sepsis.  I personally adjusted the ventilator to treat the acute respiratory failure, titrated vasopressors to treat the septic shock and closely followed volume status.  Re-intubated yesterday, worsening bilateral infiltrates; all c/w flare of underlying IIP with probable organizing pneumonia from unclear etiology.  Markedly increased CRP.  Needs slow taper of steroids (would not taper below 40 mg per day and continue this dose for at least one month); will increase steroids at present.  Unclear of significance of Pseudomonas in sputum, suspect at  this stage is more of an colonizer.  Will do bronchoscopy with BAL, evaluate for CMV pneumonitis or PJP as cause of worsening respiratory status.    Total Critical care time excluding time for teaching and procedures was 40 minutes.    Radha De Anda MD  [phone]        Clinically Significant Risk Factors        # Hypokalemia: Lowest K = 3.3 mmol/L in last 2 days, will replace as needed  # Hypernatremia: Highest Na = 146 mmol/L in last 2 days, will monitor as appropriate      # Hypoalbuminemia: Lowest albumin = 2.2 g/dL at 8/10/2024  9:47 AM, will monitor as appropriate  # Coagulation Defect: INR = 1.45 (Ref range: 0.85 - 1.15) and/or PTT = 56 Seconds (Ref range: 22 - 38 Seconds), will monitor for bleeding               # Severe Malnutrition: based on nutrition assessment      # Financial/Environmental Concerns: none            ====================================  INTERVAL HISTORY:   - Called at 2130 that pt had high PIPs in 40s. Adjusted vent settings: , RR 34, PEEP 8. Repeat gas looked better (P:F 125 w/ pH of 7.41.   -came back from CT w/ eyes open. Maxed out on sedation and paralytic but synchronous with vent. Paused paralytic and pt able to open eyes but remained calm and synchronous with vent. However, after 2 hours, was more anxious and hypoxic. Added versed--needed to be maxed at 16. Added back paralytic. Levophed titrated up. Sedation improved however paralysis not met. Switched from verocurium to cistracurium however, right before switch was made she started responding (0/4 twitches). Decided to hold off on switching.     OBJECTIVE:   1. VITAL SIGNS:   Temp:  [97.8  F (36.6  C)-100.1  F (37.8  C)] 97.8  F (36.6  C)  Pulse:  [83-99] 85  Resp:  [34] 34  MAP:  [54 mmHg-113 mmHg] 83 mmHg  Arterial Line BP: ()/(40-81) 112/62  FiO2 (%):  [50 %-70 %] 60 %  SpO2:  [91 %-100 %] 97 %  FiO2 (%): 60 %, Resp: (!) 34, Vent Mode: CMV/AC, Resp Rate (Set): 34 breaths/min, Tidal Volume (Set, mL): 280 mL, PEEP (cm  H2O): 6 cmH2O, Resp Rate (Set): 34 breaths/min, Tidal Volume (Set, mL): 280 mL, PEEP (cm H2O): 6 cmH2O    2. INTAKE/ OUTPUT:   I/O last 3 completed shifts:  In: 1894.67 [I.V.:1434.67; NG/GT:300]  Out: 1176 [Urine:672; Emesis/NG output:250; Stool:200; Chest Tube:54]    3. PHYSICAL EXAMINATION:  General: Awake on BiPAP, arousable, but sleepy  HEENT: NC/AT  Neuro: intermittently following commands  Pulm/Resp: Increased WOB, diffuse crackles billaterally and upper airway noises  CV: Sinus tachycardia, S1/2, no m/r/g  Abdomen: Soft, tender, bowel sounds present  : deferred  Incisions/Skin: Pitting edema in all extremities, equally. Lower leg ecchymosis present and scattered. LL inguinal area with new ecchymotic discolouration that is tender to palpation.     4. LABS:   Arterial Blood Gases   Recent Labs   Lab 09/22/24  0410 09/21/24  2202 09/21/24  1616 09/21/24  0933   PH 7.33* 7.41 7.41 7.41   PCO2 52* 44 44 44   PO2 90 275* 97 87   HCO3 27 28 28 28     Complete Blood Count   Recent Labs   Lab 09/22/24  0410 09/21/24  0427 09/20/24  1312 09/20/24  1052 09/20/24  0419   WBC 5.2 12.0*  12.0* 20.4* 12.7* 14.0*   HGB 6.2* 7.1* 8.2* 7.9* 6.6*    442  --  367 397     Basic Metabolic Panel  Recent Labs   Lab 09/22/24  0411 09/21/24  2358 09/21/24  2304 09/21/24  1948 09/21/24  0935 09/21/24  0933 09/21/24  0432 09/21/24  0427 09/20/24  1537 09/20/24  1312 09/20/24  0426 09/20/24  0419   NA  --   --  146*  --   --   --   --  146*  --  145  --  143   POTASSIUM  --   --  4.2  --   --  4.1  --  3.3*  --  4.0  --  4.2   CHLORIDE  --   --  107  --   --   --   --  103  --  102  --  103   CO2  --   --  24  --   --   --   --  27  --  31*  --  28   BUN  --   --  37.5*  --   --   --   --  33.2*  --  31.3*  --  30.2*   CR  --   --  0.87  --   --   --   --  0.82  --  0.72  --  0.63   * 185* 202* 187*   < >  --    < > 136*   < > 266*   < > 220*    < > = values in this interval not displayed.     Liver Function  Tests  Recent Labs   Lab 09/20/24  1312   INR 1.45*       Coagulation Profile  Recent Labs   Lab 09/20/24  1312   INR 1.45*       5. RADIOLOGY:   Recent Results (from the past 24 hour(s))   XR Chest Port 1 View    Narrative    Exam: Chest 1 view portable 60 degree 9/21/2024 11:17 AM     History:  Correlating with CT, assessing PTX, chest tube, ET tube, GGO    Comparison: 9/20/2024    Findings: Right chest tube in place. Endotracheal tube in the mid  trachea. Feeding tube and enteric tube in place. Right IJ line tip in  the mid right atrium. Left IJ line tip at the confluence of innominate  veins. Diffuse interstitial opacities bilaterally. Cardiac silhouette  is not enlarged.      Impression    IMPRESSION: No evidence of pneumothorax    KVNG LAZARO MD         SYSTEM ID:  [id number]

## 2024-09-22 NOTE — PLAN OF CARE
ICU End of Shift Summary. See flowsheets for vital signs and detailed assessment.    Changes this shift: RASS -5, sedated on ketamine, fentanyl and versed. Requiring more sedation because BIS in the 80's, precedex started BIS now in the 30-40's.Paralyzed on vec. Titrating levo to keep MAP>65. CMV 60%, 280, 34, 6, PIP 30-40's. Tubefeed with minimal output. Gonzalez replaced due to obstruction, rectal tube in place.    Plan:  Continue with plan of care and notify provider with changes.

## 2024-09-22 NOTE — PROGRESS NOTES
Romaine was not given until 2030 due to medication not being sent up/available from pharmacy. Messages were sent at 1224 and 1950 to get medication.

## 2024-09-23 ENCOUNTER — APPOINTMENT (OUTPATIENT)
Dept: GENERAL RADIOLOGY | Facility: CLINIC | Age: 54
DRG: 003 | End: 2024-09-23
Payer: MEDICAID

## 2024-09-23 ENCOUNTER — APPOINTMENT (OUTPATIENT)
Dept: GENERAL RADIOLOGY | Facility: CLINIC | Age: 54
DRG: 003 | End: 2024-09-23
Attending: INTERNAL MEDICINE
Payer: MEDICAID

## 2024-09-23 LAB
ABO/RH(D): NORMAL
ALBUMIN SERPL BCG-MCNC: 2.7 G/DL (ref 3.5–5.2)
ALLEN'S TEST: ABNORMAL
ALP SERPL-CCNC: 462 U/L (ref 40–150)
ALT SERPL W P-5'-P-CCNC: 93 U/L (ref 0–50)
ANION GAP SERPL CALCULATED.3IONS-SCNC: 18 MMOL/L (ref 7–15)
ANTIBODY SCREEN: NEGATIVE
AST SERPL W P-5'-P-CCNC: 57 U/L (ref 0–45)
BACTERIA BLD CULT: ABNORMAL
BACTERIA BLD CULT: ABNORMAL
BACTERIA BRONCH: ABNORMAL
BASE EXCESS BLDA CALC-SCNC: -0.2 MMOL/L (ref -3–3)
BASE EXCESS BLDA CALC-SCNC: -0.6 MMOL/L (ref -3–3)
BASE EXCESS BLDA CALC-SCNC: -1.3 MMOL/L (ref -3–3)
BASE EXCESS BLDA CALC-SCNC: -2.3 MMOL/L (ref -3–3)
BASE EXCESS BLDA CALC-SCNC: -2.3 MMOL/L (ref -3–3)
BILIRUB DIRECT SERPL-MCNC: 0.39 MG/DL (ref 0–0.3)
BILIRUB SERPL-MCNC: 0.5 MG/DL
BUN SERPL-MCNC: 56 MG/DL (ref 6–20)
CALCIUM SERPL-MCNC: 8.6 MG/DL (ref 8.8–10.4)
CHLORIDE SERPL-SCNC: 107 MMOL/L (ref 98–107)
COHGB MFR BLD: 100 % (ref 96–97)
COHGB MFR BLD: 93.3 % (ref 96–97)
COHGB MFR BLD: 99.2 % (ref 96–97)
COHGB MFR BLD: >100 % (ref 96–97)
COHGB MFR BLD: >100 % (ref 96–97)
CREAT SERPL-MCNC: 0.73 MG/DL (ref 0.51–0.95)
CRP SERPL-MCNC: 166 MG/L
EGFRCR SERPLBLD CKD-EPI 2021: >90 ML/MIN/1.73M2
ERYTHROCYTE [DISTWIDTH] IN BLOOD BY AUTOMATED COUNT: 20.8 % (ref 10–15)
GALACTOMANNAN AG BAL QL: NEGATIVE
GALACTOMANNAN AG SPEC IA-ACNC: 0.14
GLUCOSE BLDC GLUCOMTR-MCNC: 119 MG/DL (ref 70–99)
GLUCOSE BLDC GLUCOMTR-MCNC: 119 MG/DL (ref 70–99)
GLUCOSE BLDC GLUCOMTR-MCNC: 122 MG/DL (ref 70–99)
GLUCOSE BLDC GLUCOMTR-MCNC: 127 MG/DL (ref 70–99)
GLUCOSE BLDC GLUCOMTR-MCNC: 133 MG/DL (ref 70–99)
GLUCOSE BLDC GLUCOMTR-MCNC: 134 MG/DL (ref 70–99)
GLUCOSE BLDC GLUCOMTR-MCNC: 137 MG/DL (ref 70–99)
GLUCOSE BLDC GLUCOMTR-MCNC: 139 MG/DL (ref 70–99)
GLUCOSE BLDC GLUCOMTR-MCNC: 140 MG/DL (ref 70–99)
GLUCOSE BLDC GLUCOMTR-MCNC: 141 MG/DL (ref 70–99)
GLUCOSE BLDC GLUCOMTR-MCNC: 148 MG/DL (ref 70–99)
GLUCOSE BLDC GLUCOMTR-MCNC: 161 MG/DL (ref 70–99)
GLUCOSE BLDC GLUCOMTR-MCNC: 93 MG/DL (ref 70–99)
GLUCOSE SERPL-MCNC: 125 MG/DL (ref 70–99)
HCO3 BLD-SCNC: 22 MMOL/L (ref 21–28)
HCO3 BLD-SCNC: 24 MMOL/L (ref 21–28)
HCO3 BLD-SCNC: 24 MMOL/L (ref 21–28)
HCO3 BLD-SCNC: 26 MMOL/L (ref 21–28)
HCO3 BLD-SCNC: 26 MMOL/L (ref 21–28)
HCO3 SERPL-SCNC: 22 MMOL/L (ref 22–29)
HCT VFR BLD AUTO: 24.7 % (ref 35–47)
HGB BLD-MCNC: 7.6 G/DL (ref 11.7–15.7)
MAGNESIUM SERPL-MCNC: 1.7 MG/DL (ref 1.7–2.3)
MCH RBC QN AUTO: 30.4 PG (ref 26.5–33)
MCHC RBC AUTO-ENTMCNC: 30.8 G/DL (ref 31.5–36.5)
MCV RBC AUTO: 99 FL (ref 78–100)
O2/TOTAL GAS SETTING VFR VENT: 50 %
O2/TOTAL GAS SETTING VFR VENT: 60 %
O2/TOTAL GAS SETTING VFR VENT: 65 %
PCO2 BLD: 34 MM HG (ref 35–45)
PCO2 BLD: 42 MM HG (ref 35–45)
PCO2 BLD: 49 MM HG (ref 35–45)
PCO2 BLD: 50 MM HG (ref 35–45)
PCO2 BLD: 53 MM HG (ref 35–45)
PEEP: 6 CM H2O
PH BLD: 7.29 [PH] (ref 7.35–7.45)
PH BLD: 7.3 [PH] (ref 7.35–7.45)
PH BLD: 7.33 [PH] (ref 7.35–7.45)
PH BLD: 7.37 [PH] (ref 7.35–7.45)
PH BLD: 7.41 [PH] (ref 7.35–7.45)
PHOSPHATE SERPL-MCNC: 4.8 MG/DL (ref 2.5–4.5)
PLATELET # BLD AUTO: 323 10E3/UL (ref 150–450)
PO2 BLD: 105 MM HG (ref 80–105)
PO2 BLD: 109 MM HG (ref 80–105)
PO2 BLD: 113 MM HG (ref 80–105)
PO2 BLD: 171 MM HG (ref 80–105)
PO2 BLD: 67 MM HG (ref 80–105)
POTASSIUM SERPL-SCNC: 3.5 MMOL/L (ref 3.4–5.3)
PROT SERPL-MCNC: 5.6 G/DL (ref 6.4–8.3)
RBC # BLD AUTO: 2.5 10E6/UL (ref 3.8–5.2)
SAO2 % BLDA: 90 % (ref 92–100)
SAO2 % BLDA: 97 % (ref 92–100)
SAO2 % BLDA: 98 % (ref 92–100)
SODIUM SERPL-SCNC: 147 MMOL/L (ref 135–145)
SPECIMEN EXPIRATION DATE: NORMAL
TRIGL SERPL-MCNC: 263 MG/DL
WBC # BLD AUTO: 7.9 10E3/UL (ref 4–11)

## 2024-09-23 PROCEDURE — 250N000009 HC RX 250

## 2024-09-23 PROCEDURE — 250N000011 HC RX IP 250 OP 636

## 2024-09-23 PROCEDURE — 258N000003 HC RX IP 258 OP 636

## 2024-09-23 PROCEDURE — 250N000013 HC RX MED GY IP 250 OP 250 PS 637: Performed by: SURGERY

## 2024-09-23 PROCEDURE — 74018 RADEX ABDOMEN 1 VIEW: CPT | Mod: 26 | Performed by: RADIOLOGY

## 2024-09-23 PROCEDURE — 999N000065 XR ABDOMEN PORT 1 VIEW

## 2024-09-23 PROCEDURE — 74018 RADEX ABDOMEN 1 VIEW: CPT | Mod: 26 | Performed by: STUDENT IN AN ORGANIZED HEALTH CARE EDUCATION/TRAINING PROGRAM

## 2024-09-23 PROCEDURE — 999N000157 HC STATISTIC RCP TIME EA 10 MIN

## 2024-09-23 PROCEDURE — 86140 C-REACTIVE PROTEIN: CPT

## 2024-09-23 PROCEDURE — 94640 AIRWAY INHALATION TREATMENT: CPT | Mod: 76

## 2024-09-23 PROCEDURE — 250N000009 HC RX 250: Performed by: INTERNAL MEDICINE

## 2024-09-23 PROCEDURE — 94003 VENT MGMT INPAT SUBQ DAY: CPT

## 2024-09-23 PROCEDURE — 250N000011 HC RX IP 250 OP 636: Mod: JZ

## 2024-09-23 PROCEDURE — 250N000013 HC RX MED GY IP 250 OP 250 PS 637

## 2024-09-23 PROCEDURE — 86850 RBC ANTIBODY SCREEN: CPT | Performed by: SURGERY

## 2024-09-23 PROCEDURE — 94640 AIRWAY INHALATION TREATMENT: CPT

## 2024-09-23 PROCEDURE — 84478 ASSAY OF TRIGLYCERIDES: CPT

## 2024-09-23 PROCEDURE — 86900 BLOOD TYPING SEROLOGIC ABO: CPT | Performed by: SURGERY

## 2024-09-23 PROCEDURE — 250N000013 HC RX MED GY IP 250 OP 250 PS 637: Performed by: PHYSICIAN ASSISTANT

## 2024-09-23 PROCEDURE — G0463 HOSPITAL OUTPT CLINIC VISIT: HCPCS

## 2024-09-23 PROCEDURE — 94645 CONT INHLJ TX EACH ADDL HOUR: CPT

## 2024-09-23 PROCEDURE — 86923 COMPATIBILITY TEST ELECTRIC: CPT

## 2024-09-23 PROCEDURE — 250N000011 HC RX IP 250 OP 636: Performed by: SURGERY

## 2024-09-23 PROCEDURE — 82805 BLOOD GASES W/O2 SATURATION: CPT

## 2024-09-23 PROCEDURE — 84100 ASSAY OF PHOSPHORUS: CPT | Performed by: PHYSICIAN ASSISTANT

## 2024-09-23 PROCEDURE — 82248 BILIRUBIN DIRECT: CPT

## 2024-09-23 PROCEDURE — 85027 COMPLETE CBC AUTOMATED: CPT | Performed by: PHYSICIAN ASSISTANT

## 2024-09-23 PROCEDURE — 71045 X-RAY EXAM CHEST 1 VIEW: CPT

## 2024-09-23 PROCEDURE — 71045 X-RAY EXAM CHEST 1 VIEW: CPT | Mod: 26 | Performed by: RADIOLOGY

## 2024-09-23 PROCEDURE — 99291 CRITICAL CARE FIRST HOUR: CPT | Mod: GC | Performed by: INTERNAL MEDICINE

## 2024-09-23 PROCEDURE — 83735 ASSAY OF MAGNESIUM: CPT | Performed by: PHYSICIAN ASSISTANT

## 2024-09-23 PROCEDURE — 200N000002 HC R&B ICU UMMC

## 2024-09-23 RX ORDER — POTASSIUM CHLORIDE 20MEQ/15ML
20 LIQUID (ML) ORAL ONCE
Status: COMPLETED | OUTPATIENT
Start: 2024-09-23 | End: 2024-09-23

## 2024-09-23 RX ORDER — MAGNESIUM SULFATE HEPTAHYDRATE 40 MG/ML
2 INJECTION, SOLUTION INTRAVENOUS ONCE
Status: COMPLETED | OUTPATIENT
Start: 2024-09-23 | End: 2024-09-23

## 2024-09-23 RX ADMIN — IPRATROPIUM BROMIDE 0.5 MG: 0.5 SOLUTION RESPIRATORY (INHALATION) at 04:12

## 2024-09-23 RX ADMIN — LEVALBUTEROL HYDROCHLORIDE 0.63 MG: 0.63 SOLUTION RESPIRATORY (INHALATION) at 12:31

## 2024-09-23 RX ADMIN — IPRATROPIUM BROMIDE 0.5 MG: 0.5 SOLUTION RESPIRATORY (INHALATION) at 12:31

## 2024-09-23 RX ADMIN — TOBRAMYCIN 300 MG: 300 SOLUTION RESPIRATORY (INHALATION) at 08:25

## 2024-09-23 RX ADMIN — Medication 1 TABLET: at 11:47

## 2024-09-23 RX ADMIN — LEVALBUTEROL HYDROCHLORIDE 0.63 MG: 0.63 SOLUTION RESPIRATORY (INHALATION) at 00:06

## 2024-09-23 RX ADMIN — INSULIN HUMAN 3 UNITS/HR: 1 INJECTION, SOLUTION INTRAVENOUS at 06:42

## 2024-09-23 RX ADMIN — IPRATROPIUM BROMIDE 0.5 MG: 0.5 SOLUTION RESPIRATORY (INHALATION) at 00:06

## 2024-09-23 RX ADMIN — CHLORHEXIDINE GLUCONATE 0.12% ORAL RINSE 15 ML: 1.2 LIQUID ORAL at 20:19

## 2024-09-23 RX ADMIN — CARBOXYMETHYLCELLULOSE SODIUM 1 DROP: 5 SOLUTION/ DROPS OPHTHALMIC at 13:38

## 2024-09-23 RX ADMIN — ATOVAQUONE 1500 MG: 750 SUSPENSION ORAL at 08:22

## 2024-09-23 RX ADMIN — DEXMEDETOMIDINE HYDROCHLORIDE 1.2 MCG/KG/HR: 400 INJECTION INTRAVENOUS at 22:34

## 2024-09-23 RX ADMIN — CETIRIZINE HYDROCHLORIDE 10 MG: 10 TABLET, FILM COATED ORAL at 08:21

## 2024-09-23 RX ADMIN — MIDAZOLAM HYDROCHLORIDE 16 MG/HR: 1 INJECTION, SOLUTION INTRAVENOUS at 18:46

## 2024-09-23 RX ADMIN — METHYLPREDNISOLONE SODIUM SUCCINATE 62.5 MG: 125 INJECTION, POWDER, FOR SOLUTION INTRAMUSCULAR; INTRAVENOUS at 22:11

## 2024-09-23 RX ADMIN — CHLORHEXIDINE GLUCONATE 0.12% ORAL RINSE 15 ML: 1.2 LIQUID ORAL at 08:21

## 2024-09-23 RX ADMIN — KETAMINE HYDROCHLORIDE 175 MG/HR: 100 INJECTION, SOLUTION, CONCENTRATE INTRAMUSCULAR; INTRAVENOUS at 00:46

## 2024-09-23 RX ADMIN — LEVALBUTEROL HYDROCHLORIDE 0.63 MG: 0.63 SOLUTION RESPIRATORY (INHALATION) at 08:25

## 2024-09-23 RX ADMIN — WHITE PETROLATUM 57.7 %-MINERAL OIL 31.9 % EYE OINTMENT: at 13:39

## 2024-09-23 RX ADMIN — Medication 50 MCG: at 18:31

## 2024-09-23 RX ADMIN — IPRATROPIUM BROMIDE 0.5 MG: 0.5 SOLUTION RESPIRATORY (INHALATION) at 08:25

## 2024-09-23 RX ADMIN — POTASSIUM CHLORIDE 20 MEQ: 20 SOLUTION ORAL at 06:14

## 2024-09-23 RX ADMIN — MIDAZOLAM HYDROCHLORIDE 16 MG/HR: 1 INJECTION, SOLUTION INTRAVENOUS at 12:55

## 2024-09-23 RX ADMIN — KETAMINE HYDROCHLORIDE 175 MG/HR: 100 INJECTION, SOLUTION, CONCENTRATE INTRAMUSCULAR; INTRAVENOUS at 15:04

## 2024-09-23 RX ADMIN — LEVALBUTEROL HYDROCHLORIDE 0.63 MG: 0.63 SOLUTION RESPIRATORY (INHALATION) at 16:15

## 2024-09-23 RX ADMIN — WHITE PETROLATUM 57.7 %-MINERAL OIL 31.9 % EYE OINTMENT: at 05:35

## 2024-09-23 RX ADMIN — PANTOPRAZOLE SODIUM 40 MG: 40 INJECTION, POWDER, FOR SOLUTION INTRAVENOUS at 08:21

## 2024-09-23 RX ADMIN — METHYLPREDNISOLONE SODIUM SUCCINATE 62.5 MG: 125 INJECTION, POWDER, FOR SOLUTION INTRAMUSCULAR; INTRAVENOUS at 09:39

## 2024-09-23 RX ADMIN — MIDAZOLAM HYDROCHLORIDE 16 MG/HR: 1 INJECTION, SOLUTION INTRAVENOUS at 06:42

## 2024-09-23 RX ADMIN — METHYLPREDNISOLONE SODIUM SUCCINATE 62.5 MG: 125 INJECTION, POWDER, FOR SOLUTION INTRAMUSCULAR; INTRAVENOUS at 03:53

## 2024-09-23 RX ADMIN — Medication 50 MCG: at 10:00

## 2024-09-23 RX ADMIN — Medication 200 MCG/HR: at 05:33

## 2024-09-23 RX ADMIN — HEPARIN SODIUM 5000 UNITS: 5000 INJECTION, SOLUTION INTRAVENOUS; SUBCUTANEOUS at 11:47

## 2024-09-23 RX ADMIN — WHITE PETROLATUM 57.7 %-MINERAL OIL 31.9 % EYE OINTMENT: at 22:11

## 2024-09-23 RX ADMIN — Medication 20 NG/KG/MIN: at 03:18

## 2024-09-23 RX ADMIN — HEPARIN SODIUM 5000 UNITS: 5000 INJECTION, SOLUTION INTRAVENOUS; SUBCUTANEOUS at 03:53

## 2024-09-23 RX ADMIN — Medication 50 MCG: at 06:44

## 2024-09-23 RX ADMIN — LEVALBUTEROL HYDROCHLORIDE 0.63 MG: 0.63 SOLUTION RESPIRATORY (INHALATION) at 04:12

## 2024-09-23 RX ADMIN — MIDAZOLAM HYDROCHLORIDE 16 MG/HR: 1 INJECTION, SOLUTION INTRAVENOUS at 00:06

## 2024-09-23 RX ADMIN — VECURONIUM BROMIDE 1.7 MCG/KG/MIN: 1 INJECTION, POWDER, LYOPHILIZED, FOR SOLUTION INTRAVENOUS at 07:27

## 2024-09-23 RX ADMIN — DEXMEDETOMIDINE HYDROCHLORIDE 1.2 MCG/KG/HR: 400 INJECTION INTRAVENOUS at 18:55

## 2024-09-23 RX ADMIN — MONTELUKAST 10 MG: 10 TABLET, FILM COATED ORAL at 22:11

## 2024-09-23 RX ADMIN — METHYLPREDNISOLONE SODIUM SUCCINATE 62.5 MG: 125 INJECTION, POWDER, FOR SOLUTION INTRAMUSCULAR; INTRAVENOUS at 15:16

## 2024-09-23 RX ADMIN — ZINC SULFATE 220 MG (50 MG) CAPSULE 220 MG: CAPSULE at 08:21

## 2024-09-23 RX ADMIN — HEPARIN SODIUM 5000 UNITS: 5000 INJECTION, SOLUTION INTRAVENOUS; SUBCUTANEOUS at 20:19

## 2024-09-23 RX ADMIN — DEXMEDETOMIDINE HYDROCHLORIDE 0.2 MCG/KG/HR: 400 INJECTION INTRAVENOUS at 00:11

## 2024-09-23 RX ADMIN — DEXMEDETOMIDINE HYDROCHLORIDE 0.7 MCG/KG/HR: 400 INJECTION INTRAVENOUS at 13:13

## 2024-09-23 RX ADMIN — DEXMEDETOMIDINE HYDROCHLORIDE 0.7 MCG/KG/HR: 400 INJECTION INTRAVENOUS at 07:28

## 2024-09-23 RX ADMIN — IPRATROPIUM BROMIDE 0.5 MG: 0.5 SOLUTION RESPIRATORY (INHALATION) at 20:06

## 2024-09-23 RX ADMIN — MAGNESIUM SULFATE HEPTAHYDRATE 2 G: 2 INJECTION, SOLUTION INTRAVENOUS at 06:14

## 2024-09-23 RX ADMIN — LEVALBUTEROL HYDROCHLORIDE 0.63 MG: 0.63 SOLUTION RESPIRATORY (INHALATION) at 20:06

## 2024-09-23 RX ADMIN — TOBRAMYCIN 300 MG: 300 SOLUTION RESPIRATORY (INHALATION) at 20:24

## 2024-09-23 RX ADMIN — Medication 200 MCG/HR: at 14:51

## 2024-09-23 RX ADMIN — LEVOTHYROXINE SODIUM 25 MCG: 0.03 TABLET ORAL at 08:21

## 2024-09-23 RX ADMIN — Medication 20 NG/KG/MIN: at 18:57

## 2024-09-23 RX ADMIN — Medication 50 MCG: at 00:49

## 2024-09-23 RX ADMIN — IPRATROPIUM BROMIDE 0.5 MG: 0.5 SOLUTION RESPIRATORY (INHALATION) at 16:14

## 2024-09-23 RX ADMIN — FLUCONAZOLE 100 MG: 100 TABLET ORAL at 08:22

## 2024-09-23 ASSESSMENT — ACTIVITIES OF DAILY LIVING (ADL)
ADLS_ACUITY_SCORE: 61

## 2024-09-23 NOTE — PROGRESS NOTES
[facility]  WO Nurse Inpatient Assessment     Consulted for: left ear     Summary: new abrasion to left ear 9/15 per bedside RN     Patient History (according to provider note(s):      Massiel Flaherty is a 53 year old female with PMH Anxiety/depression, fibromyalgia, c/f nonepileptic seizures not on AEDs, hypothyroidism, asthma, HLD, MURIEL on CPAP, expressive aphasia, hypertension who was admitted on 8/3/2024 for fatigue, fever and dyspnea and intubated 8/6/24 at OSH for ARDS, proned and paralyzed without improvement. Transferred to [facility] 8/8/24, hypoxic with high plateaus/peaks and placed on VV ECMO and CRRT. Decannulated from VV ECMO 8/18 and transferred to MICU 8/26/24 for ongoing management. Extubated 8/27 then reintubated 8/29 iso worsening AHRF and pseudomonas pneumonia.     Assessment:      Areas visualized during today's visit: Focused: bilateral ears     Pressure Injury Location: left ear     9/19 9/23   Wound type: Pressure Injury     Pressure Injury Stage: DTPI now evolved to stage 2, hospital acquired   Wound history/plan of care:   unknown cause of PI, pt does favor left side     Wound base: 100 % Dermis     Palpation of the wound bed: normal      Drainage: scant     Description of drainage: serosanguinous     Measurements (length x width x depth, in cm) 0.6  x 0.4  x  0 cm      Tunneling N/A     Undermining N/A  Periwound skin: Intact      Color: normal and consistent with surrounding tissue      Temperature: normal   Odor: none  Pain: no grimacing or signs of discomfort, none  Pain intervention prior to dressing change: N/A  Treatment goal: Protection  STATUS: stable  Supplies ordered: supplies stored on unit     Treatment Plan:     Left ear wound(s): Daily  cleanse with saline and pat dry. Cover with mini mepilex dressing. Hollow out Z-patsy when positioned on left.      Orders: Reviewed    RECOMMEND PRIMARY TEAM ORDER: None, at this  time  Education provided: plan of care and wound progress  Discussed plan of care with: Nurse  [facility] nurse follow-up plan: twice weekly  Notify [facility] if wound(s) deteriorate.  Nursing to notify the Provider(s) and re-consult the [facility] Nurse if new skin concern.    DATA:     Current support surface: Standard  Low air loss (DIA pump, Isolibrium, Pulsate)  Containment of urine/stool: Incontinent pad in bed, Indwelling catheter, and Internal fecal management  BMI: Body mass index is 32.34 kg/m .   Active diet order: None     Output: I/O last 3 completed shifts:  In: 3211.29 [I.V.:1071.29; NG/GT:800]  Out: 2290 [Urine:2095; Emesis/NG output:50; Stool:100; Chest Tube:45]     Labs:   Recent Labs   Lab 09/23/24  0404 09/22/24  1212 09/22/24  0759   ALBUMIN 2.7*  --  2.5*   HGB 7.6*   < >  --    INR  --   --  1.29*   WBC 7.9   < >  --     < > = values in this interval not displayed.     Pressure injury risk assessment:   Sensory Perception: 1-->completely limited  Moisture: 3-->occasionally moist  Activity: 1-->bedfast  Mobility: 1-->completely immobile  Nutrition: 1-->very poor  Friction and Shear: 1-->problem  Kade Score: 8      Pager no longer is use, please contact through [employer] group: [facility] Nurse Allen  Dept. Office Number: [phone]

## 2024-09-23 NOTE — PROCEDURES
"Small Bowel Feeding Tube Reposition  Reason for Feeding Tube Reposition: pulled tube back from post pyloric to gastric position to assess for tolerance of gastric feeds in anticipation of PEG placement.   Cortrak Start Time: 1200  Cortrak End Time: 1215  Medicine Delivered During Procedure: lubricating gel  Reposition Successful: yes per Cortrak tracing pending AXR confirmation      Procedure Complications: none  Final Placement Carlitos at exit of nare: 62 cm - ADDENDUM still post pyloric on xray so pulled back from 72 cm to 62 cm.   Face to Face time with patient: 15 minutes + 5 minutes      Marce Balderas, MS, RDN, LD  4C MICU RD  Vocera - \"4C Clinical Dietitian\"  Weekend/Holiday RD - \"Weekend Clinical Dietitian\"    "

## 2024-09-23 NOTE — PLAN OF CARE
End of Shift Summary (see flowsheet for detailed vital signs and assessments)    Major Shift Events:  Vecuronium gtt stopped at 1232 for paralytic holiday (per team, keep off vec gtt if pt tolerates vent).  Pulled NG tube back to see if pt tolerates gastric TF.  OG clamped.  Potential trach/PEG placed this week.      Neuro: RASS -4/-5 and on Versed gtt 16/ Ketamine 175/ Precedex 1.2/ Fentanyl 200.   CV: SR/ST 80-low 100s. BP stable. MAP goal >65.  Resp: CMV 50%/ 300 TV/ 34 RR/ 6 PEEP. On veletri. Pt got tachypneic at 1800 reposition. PRN versed and fentanyl given. Preceded gtt to max. Turned off lights and music therapy provided helped with RR. Right chest tube to water seal-- 12 mL output during shift.  GI: LBM 9/23. Rectal tube with loose BM. OG to clamp. NG set to TF 50ml/hr 50mL Q2H.   : Gonzalez in with adequate UOP.  Skin: PI left ear (mepilex)/ Chest tube dressing changed today.  Infusions: Versed gtt 16/ Ketamine 175/ Precedex 1.2/ Fentanyl 200/ Insulin gtt 3U Alg 3/ Veletri 20.      Plan: Care of plan continues. Notify team of changes/concerns.  Restart paralytic if pt asynchronous with vent.  Awaiting for BUE wraps from OT for lymphedema.   Potential trach/PEG placed this week.  -Needs ventilation improvement before can place trach  -Needs to tolerate NG gastric TF before can place PEG  -Will then PS and wean off ventilation once trached    Care of Plan Reviewed With: Patient, mother, son    Overall Patient Progress: No major changes

## 2024-09-23 NOTE — PROGRESS NOTES
Shift Summary:    Pt remained intubated and mechanically ventilated on the following vent settings:    FiO2 (%): 50 %, Resp: 30, Vent Mode: CMV/AC, Resp Rate (Set): 34 breaths/min, Tidal Volume (Set, mL): 300 mL, PEEP (cm H2O): 6 cmH2O, Resp Rate (Set): 34 breaths/min, Tidal Volume (Set, mL): 300 mL, PEEP (cm H2O): 6 cmH2O    SBT:  Criteria met for SBT (Y/N): N - poor respiratory status     Plan: rest on full support overnight -- CMV 34, 300, +6, 50%      Assessment: BS crackles, coarse bilaterally and lower lobes. RT suctioned scant of thick white secretions.    Therapy: Pt tolerated nebs with no issues.    Ambu bag, mask, and valve present at bedside.    Ronaldo Arceo, RT on 9/23/2024 at 5:38 PM

## 2024-09-23 NOTE — PROGRESS NOTES
Brief SICU Progress Note    Patient well known to our service following VV ECMO for ARDS thought to be related to CAP. Please see consult note from 9/11 for original Trach/PEG request. Patient successfully extubated 9/16, but re-intubated on 9/20 complicated by right pneumothorax s/p chest tube. SICU reengaged for consideration of Trach/PEG.     At the time of my evaluation, she was paralyzed and sedated. She was hypercapnic with PCO2 with RR of 34. She was oxygenating well with a PEEP of 8 and FiO2 of 50%. She still has some pulmonary improvement to make prior to trach placement, MICU team plans to wean paralysis today.    She has a large amount of soft tissue of her anterior neck, but still has easily palpable anatomy that is likely amenable to percutaneous tracheostomy (though may require XLT trach). The current SICU staff does not perform PEG tubes, so will coordinate for PEG tube placement with EGS staff in the OR given the concern for neck anatomy. CT scan suggests abdominal anatomy is appropriate for PEG as well but with history of upper abdominal laparoscopic surgery, will also plan for possible laparoscopic PEG.    - Will try to coordinate PEG/Trach in OR this week once respiratory status (primarily ventilation) improves  - Per MICU team family aware of likely need for Trach/PEG and agreeable. Will discuss further with them as we get closer to OR date    Discussed with SICU staff, Dr. Carrasco and EGS staff, Dr. Holdne Powell  Surgical Critical Care Fellow      ADDENDUM 9/24:  Patient currently on 60-70% FiO2 and PEEP of 8.  The primary service feels this is due to agitation and ventilator dyssynchrony now that paralysis has been lightened and sedation is being weaned.    I am willing to perform dilational percutaneous tracheostomy as long as SpO2 is adequate on an FiO2 of less than or equal to 50% and PEEP of 5.  She is likely to significantly derecruit during the procedure.  Her anatomy appears  ammenable to dilational percutaneous tracheostomy.    BEN Carrasco MD  Clinical   Anesthesia / Critical Care  *[phone]

## 2024-09-23 NOTE — PLAN OF CARE
ICU End of Shift Summary. See flowsheets for vital signs and detailed assessment.    Changes this shift:  RASS -5, sedated and paralyzed.  Hypertension, tachycardia, and tachypnea; despite increasing paralytic and sedation levels with no improvement, dex was added to the sedation regimen, resulting in improved vital signs with a MAP. Episode of desaturation to the 80's @ time, coarse/diminished  lung sounds. Tubefeed at goal of 50, adequate urine output, rectal tube in place. Insulin gtts alg 3 -140's.Potassium and magnesium replaced.     Plan:  Continue with plan of care and notify provider with changes.

## 2024-09-23 NOTE — PROGRESS NOTES
MEDICAL ICU PROGRESS NOTE  09/23/2024      Date of Service (when I saw the patient): 09/23/2024    ASSESSMENT: Massiel Flaherty is a 53 year old female with PMH Anxiety/depression, fibromyalgia, c/f nonepileptic seizures not on AEDs, hypothyroidism, asthma, HLD, MURIEL on CPAP, expressive aphasia, hypertension who was admitted on 8/3/2024 for fatigue, fever and dyspnea and intubated 8/6/24 at OSH for ARDS, proned and paralyzed without improvement. Transferred to [facility] 8/8/24, hypoxic with high plateaus/peaks and placed on VV ECMO and CRRT. Decannulated from VV ECMO 8/18 and transferred to MICU 8/26/24 for ongoing management. Extubated 8/27 then reintubated 8/29 iso worsening AHRF and pseudomonas pneumonia. Extubated again 9/16 to BiPAP/HFNC, re-intubated 9/20 with tachypnea, increased WOB, fevers, and new lung opacities c/f possible HAP vs ILD/vasculitis/organizing pneumonia flare. Course complicated by tension pneumothorax while re-intubated 9/20 now s/p right chest tube.      CHANGES and MAJOR THINGS TODAY:   - possible Trach/peg today  - Continue to adjust gas settings  - Attempt to wean sedation after trach peg    PLAN:    Neuro:  # Pain and sedation  # Concern for ICU delirium   # Hx Fibromyalgia   # Hx myalgic encephalomyelitis   # Hx anxiety/depression  - Pain: Oxycodone changed to 2.5 mg q6h PRN, Ativan 0.5 mg BID - continue for now  - Sedation plan: Sedated with midazolam, fentanyl, ketamine, paralysis with vecuronium   - Gabapentin to 300mg TID held while sedated  - Restarted psych meds at 1/2 dose (venlafaxine and amitriptyline) now held while sedated     # Hx spells with staring and BUE posturing previously described as nonepileptic seizures   # Hx of GTC seizures and myoclonic epilepsy, weaned off AEDs   # Diffuse cerebral edema - improved  - Sodium goals liberalized to 140-145  - 8/21: MRI Brain: no acute intracranial pathology. No findings to suggest anoxic brain injury.   - MRI brain 9/20: no anoxic  brain injury     Pulmonary:  # Acute hypoxic respiratory failure c/b ARDS  # Pseudomonas pneumonia (s/p treatment)  # Mechanical ventilation  # s/p VV ECMO 8/8 - 8/18   # Hx CAP  # Asthma  # MURIEL on home CPAP  PFT dated 9/8/2020 demonstrated normal spirometry with mild diffusion impairment and mild restriction (FEV1/FVC 80%, FEV1 2.59, DLCO 40%). CT abdomen chest 3/9/2020 demonstrated scarring and fibrosis bilaterally which correlated with the decreased DLCO. The patient also has a history of MURIEL on CPAP therapy as currently managed by her provider in [place]. Unclear what triggered ARDS or why she has had such severe hospital course, further investigated ILD but results all unremarkable. Extubated 8/27, reintubated 8/29 for worsening O2 demands and diffuse opacities iso new/recurrent psuedomonas pneumonia. Bronch with BAL 8/30, pseudomonas growing as below. Extubated again 9/16, worsening respiratory distress noted 9/19 with repeat polymicrobial growth on respiratory sputum raising c/f aspiration/hospital associated pneumonia. 9/20 with increased WOB, new fevers, and CXR significant for opacities c/f possible HAP vs ILD/vasculitis/organizing pneumonia and was reintubated 9/20.   - ILD w/u aldolase, EVELIO, RF, CCP, ANCA, MPO, SSA Ro and La, Scleroderma antibody, Radha 1,  hypersensity pneumonitis, IGG subclasses,  aspergillus, blastomyces, histoplasma neg  - SpO2 goals >92%, PaO2 goals >60   - PTA singular at bedtime if able, duonebs q4h RT  - discontinue pulmicort due to continued pseudomonas infection  - Lung protective ventilation strategies given ARDS   - Methylpred 60mg q6H 9/21-**, plan for at least 2 days and if CRP trends down will discuss slow taper (may neeed pred 40 for a month before weaning)   - Begin Methlypred taper starting 9/25 @ 60 q12  - 9/17: Started levalbuterol nebs, PTA pulmicort nebs, saline nebs, trialed aminophylline gtt for increased respiratory drive but lead to tachycardia so  discontinued, holding off on further airway clearance while intubated with chest tube in place  - 9/18: trial of dornase - discontinue now that intubated  - Bronchoscopy 9/21      Pneumothorax   Tension pneumothorax during re-intubation 9/20, concern for fibrotic lung disease and possible barotrauma during bag ventilation during re-intubation vs complication of re-intubation in general. Leave in while intubated.   - Chest tube to water seal  - Daily XR     Cardiovascular:  # Hyperlipidemia  # Hx HTN   - MAP goal >65, SBP goals >110  - PTA atorvastatin  - PTA clonidine held while sedated  - Lower extremity ultrasound without vascular pathology, no hematoma or clots  - Monitor discoloration of extremities for necrosis  - PRN hydralazine for SBP > 200      Sinus Tachycardia  Likely 2/2 fluid removal, sepsis, pain and sedation as above. Also considered withdrawal due to significant opioid and benzo needs while intubated.      GI/Nutrition:  # Severe malnutrition (see RD note)   # Non-infectious Diarrhea  # GERD   - Protonix (home medication)   - Nutrition consulted, appreciate recs  - Diet: TF at goal  - Hold bowel regimen d/t loose stools  - Continue scheduled multivitamin, banatrol, prosource packet  - loperamide scheduled  - Rectal tube, continues with high output     Renal/Fluids/Electrolytes:  # Acute kidney injury: Improving   # Renal failure: Improving   # AGMA: Improving   Baseline Cr approx 0.6. Pt was anuric required CRRT here but now making urine, likely prerenal due to shock.  - Continues to improve, holding off on iHD  - Making good urine output  - Strict I&Os  - Avoid nephrotoxins as able  - intermittent diuresis with lasix 40 mg for volume overload    Hypernatremia  - FWF 100ml q4H  - Continue to trend     Elevated lactate (resolved)  Lactate 2.3 --> 2.8 9/19 concerning for possible sepsis although no hypotension. 9/20 repeat 0.9  - Trend PRN     Endocrine:  # Steroid and stress induced hyperglycemia  #  Hypothyroidism   - Goal to keep BG < 180 for optimal wound healing  - start insulin drip due to methylpred dosing  - Continue PTA synthroid     ID:  # Leukocytosis   # Concern for aspiration pneumonia/hospital acquired pnemonia (9/19 - )  # Psuedomonas pneumonia (s/p treatment)  # Hx CAP  Respiratory cx 8/29 pseudomonas pneumonia. Intermediate susceptability to meropenem, more significant sensitivities to ciprofloxacin. New leukocytosis, elevated PC, CRP and lactate 9/19 with sputum cx 4+ psuedomonas, 2+ GPCs, and 1+ GPBs could be colonized so waiting for BAL studies 9/21  - Continue to monitor fever curve and inflammatory markers as appropriate  - ID now signed off   - Repeat cultures and sputum growing pseudomonas with similar resistance pattern to previous.   - Restarted tobramycin nebs 9/21     Abx  - Meropenem 8/29 - 9/1  - Vancomycin 8/29 - 8/30  - Tobramycin x 1 8/29  - Vancomycin 9/5 - 9/8    - Tobramycin nebs BID 9/1 - 9/11, 9/21- (tentative plan for 5 day course)   - Ciprofloxacin infusion 9/1 - 9/11  - Metronidazole 9/6 - 9/11  - Atovaquone 9/11 -   - Vancomycin 9/19 - 9/20  - Zosyn 9/19 - 9/21  - Ciprofloxacin 9/19 - 9/21     PJP Ppx  Given Dex-ARDS Massiel skinner sigridbrayan remain on steriods for > 3 weeks so will initiate PJP ppx. Given history of allergy to sulfa, will obtain G6PD test to determine if dapsone can be used: levels are ok, however continuing atovaquone.   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   - Ganciclovir 9/6 - 9/8, discontinued given ID recs  - trend CMV 9/16  - CMV levels recheck 9/18 289    Thrush  Noted 9/21  - Fluconazole x 7 days     # HSV-1  Mouth sores present, which could have viral etiology. 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 Monitor and trend.   - CTAP 8/30  due to acute hemoglobin drop requiring transfusion of 2 x 1 unit of blood 12 hours apart. Negative for acute bleed at that time.     # Right groin Hematoma   Acutely decreased Hgb from 8.0 to 6.6 on 9/20 of unknown etiology that required transfusion x1, however 9/19 LE US notable to 14.9 cm hematoma in R groin near prior canal that was not previously appreciated on CT abdomen c/f possible bleed.   - Stabel on repeat US 9/21     Musculoskeletal/Rheum:  # Alopecia  - Hold PTA hydroxychloroquine      # Weakness and deconditioning of critical illness  # Left ankle injury pre-hospitalization    - Physical and occupational therapy when able.      Skin:  # BLE scattered ecchymosis  # Left toe duskiness  - Bilateral lower leg ecchymosis  - WOC consult  - Ecchymosis to left foot, most noticeable to 3rd and 4th toes     # Peripheral Edema  Likely 2/2 immobility, and currently (9/20) not good candidate for diuresis due to intravascular depletion.  - Net even (or slightly negative) fluid goal.  - Consult OT for lymphedema therapy, changed to STAT since consult was placed 9/19 and has not been seen yet. Needs wraps to move fluid from limbs     General Cares/Prophylaxis:  DVT Prophylaxis: SubQ heparin  GI Prophylaxis: PPI  Restraints: no  Code Status: Full Code     Lines/tubes/drains:  - ETT (9/20)  - NJ (8/9)  - LIJ CVC (8/8)  - Radial a-line (8/29) Removed 9/17  - PIV x3  - Rectal tube (8/17)  - Tunneled HD line (8/23)  - New radial A line, right, 9/20  - Chest tube 9/20     Disposition:  - Medical ICU     Patient seen and findings/plan discussed with medical ICU staff, Dr. Perlman.    Fuentes Fowler MD        Clinically Significant Risk Factors         # Hypernatremia: Highest Na = 147 mmol/L in last 2 days, will monitor as appropriate      # Hypoalbuminemia: Lowest albumin = 2.2 g/dL at 8/10/2024  9:47 AM, will monitor as appropriate    # Coagulation Defect: INR = 1.29 (Ref range: 0.85 - 1.15) and/or PTT = 33 Seconds (Ref  range: 22 - 38 Seconds), will monitor for bleeding               # Severe Malnutrition: based on nutrition assessment    # Financial/Environmental Concerns: none              ====================================  INTERVAL HISTORY:   Paralytic holidaoy overnight, became dysynchronous    OBJECTIVE:   1. VITAL SIGNS:   Temp:  [97.7  F (36.5  C)-99.5  F (37.5  C)] 99  F (37.2  C)  Pulse:  [] 104  Resp:  [34-49] 35  MAP:  [73 mmHg-315 mmHg] 130 mmHg  Arterial Line BP: (103-194)/(54-97) 184/91  FiO2 (%):  [50 %-70 %] 50 %  SpO2:  [87 %-100 %] 98 %  FiO2 (%): (S) 50 %, Resp: (!) 35, Vent Mode: CMV/AC, Resp Rate (Set): 34 breaths/min, Tidal Volume (Set, mL): 300 mL, PEEP (cm H2O): 6 cmH2O, Resp Rate (Set): 34 breaths/min, Tidal Volume (Set, mL): 300 mL, PEEP (cm H2O): 6 cmH2O  2. INTAKE/ OUTPUT:   I/O last 3 completed shifts:  In: 3211.29 [I.V.:1071.29; NG/GT:800]  Out: 2290 [Urine:2095; Emesis/NG output:50; Stool:100; Chest Tube:45]    3. PHYSICAL EXAMINATION:  General: Intubated, sedated  HEENT: NC/AT  Neuro: deeply sedated  Pulm/Resp: Increased WOB, course breath sounds bilaterally, and upper airway noises on vent  CV: Sinus tachycardia, S1/2, no m/r/g  Abdomen: Soft, tender, bowel sounds present, right groin hematoma stable  : deferred  Incisions/Skin: Pitting edema in all extremities, equally. Lower leg ecchymosis present and scattered. LL inguinal area with new ecchymotic discolouration that is tender to palpation.     4. LABS:   Arterial Blood Gases   Recent Labs   Lab 09/23/24  0513 09/23/24  0404 09/22/24  2215 09/22/24  1603   PH 7.33* 7.30* 7.32* 7.33*   PCO2 49* 53* 52* 51*   PO2 171* 67* 93 100   HCO3 26 26 27 27     Complete Blood Count   Recent Labs   Lab 09/23/24  0404 09/22/24  1759 09/22/24  1212 09/22/24  0652 09/22/24  0410   WBC 7.9 6.7 6.2  --  5.2   HGB 7.6* 7.6* 7.4* 6.5* 6.2*    265 281  --  282     Basic Metabolic Panel  Recent Labs   Lab 09/23/24  0556 09/23/24  0404 09/23/24  0403  09/23/24  0158 09/22/24  0411 09/22/24  0410 09/21/24  2358 09/21/24  2304 09/21/24  0935 09/21/24  0933 09/21/24  0432 09/21/24  0427   NA  --  147*  --   --   --  144  --  146*  --   --   --  146*   POTASSIUM  --  3.5  --   --   --  4.3  --  4.2  --  4.1  --  3.3*   CHLORIDE  --  107  --   --   --  106  --  107  --   --   --  103   CO2  --  22  --   --   --  25  --  24  --   --   --  27   BUN  --  56.0*  --   --   --  40.7*  --  37.5*  --   --   --  33.2*   CR  --  0.73  --   --   --  0.87  --  0.87  --   --   --  0.82   * 125* 119* 137*   < > 232*   < > 202*   < >  --    < > 136*    < > = values in this interval not displayed.     Liver Function Tests  Recent Labs   Lab 09/23/24  0404 09/22/24  0759 09/20/24  1312   AST 57* 13  --    ALT 93* 56*  --    ALKPHOS 462* 207*  --    BILITOTAL 0.5 0.2  --    ALBUMIN 2.7* 2.5*  --    INR  --  1.29* 1.45*     Coagulation Profile  Recent Labs   Lab 09/22/24  0759 09/20/24  1312   INR 1.29* 1.45*   PTT 33  --        5. RADIOLOGY:   Recent Results (from the past 24 hour(s))   US Lower Extremity Non Vascular Right    Narrative    Exam: US LOWER EXTREMITY NON VASCULAR RIGHT, 9/22/2024 7:39 AM    Indication: right groin hematoma with Hgb drop, trend size    Comparison: Ultrasound 9/19/2024    Findings:   Grayscale and color Doppler sonographic images of the right groin were  obtained. Within the right groin there is a 15 x 4.3 x 2.4 cm  heterogeneous, predominantly hypoechoic collection. There is no  internal vascularity is visualized. There is surrounding vascularity  seen within the superficial femoral artery.      Impression    Impression: Grossly unchanged right groin hematoma measuring up to 15  cm on today's exam.      I have personally reviewed the examination and initial interpretation  and I agree with the findings.    KVNG LAZARO MD         SYSTEM ID:  [id number]   XR Chest Port 1 View    Impression    RESIDENT PRELIMINARY INTERPRETATION  IMPRESSION:   1.  Stable support devices.  2. Ongoing diffuse mixed interstitial and airspace opacity throughout  the lungs.  3. No pneumothorax.

## 2024-09-24 ENCOUNTER — APPOINTMENT (OUTPATIENT)
Dept: GENERAL RADIOLOGY | Facility: CLINIC | Age: 54
DRG: 003 | End: 2024-09-24
Attending: INTERNAL MEDICINE
Payer: MEDICAID

## 2024-09-24 LAB
ALLEN'S TEST: ABNORMAL
ANION GAP SERPL CALCULATED.3IONS-SCNC: 17 MMOL/L (ref 7–15)
ANION GAP SERPL CALCULATED.3IONS-SCNC: 19 MMOL/L (ref 7–15)
BASE EXCESS BLDA CALC-SCNC: -1.3 MMOL/L (ref -3–3)
BASE EXCESS BLDA CALC-SCNC: -2.2 MMOL/L (ref -3–3)
BASE EXCESS BLDA CALC-SCNC: -2.3 MMOL/L (ref -3–3)
BASE EXCESS BLDA CALC-SCNC: -2.9 MMOL/L (ref -3–3)
BASE EXCESS BLDA CALC-SCNC: -3.4 MMOL/L (ref -3–3)
BASE EXCESS BLDA CALC-SCNC: -4.1 MMOL/L (ref -3–3)
BLD PROD TYP BPU: NORMAL
BLOOD COMPONENT TYPE: NORMAL
BUN SERPL-MCNC: 59 MG/DL (ref 6–20)
BUN SERPL-MCNC: 65.7 MG/DL (ref 6–20)
CALCIUM SERPL-MCNC: 8.3 MG/DL (ref 8.8–10.4)
CALCIUM SERPL-MCNC: 8.6 MG/DL (ref 8.8–10.4)
CHLORIDE SERPL-SCNC: 108 MMOL/L (ref 98–107)
CHLORIDE SERPL-SCNC: 109 MMOL/L (ref 98–107)
CODING SYSTEM: NORMAL
COHGB MFR BLD: 97 % (ref 96–97)
COHGB MFR BLD: 97.1 % (ref 96–97)
COHGB MFR BLD: 97.6 % (ref 96–97)
COHGB MFR BLD: 98.1 % (ref 96–97)
COHGB MFR BLD: 99.9 % (ref 96–97)
COHGB MFR BLD: >100 % (ref 96–97)
CREAT SERPL-MCNC: 0.45 MG/DL (ref 0.51–0.95)
CREAT SERPL-MCNC: 0.57 MG/DL (ref 0.51–0.95)
CROSSMATCH: NORMAL
CRP SERPL-MCNC: 77.9 MG/L
EGFRCR SERPLBLD CKD-EPI 2021: >90 ML/MIN/1.73M2
EGFRCR SERPLBLD CKD-EPI 2021: >90 ML/MIN/1.73M2
ERYTHROCYTE [DISTWIDTH] IN BLOOD BY AUTOMATED COUNT: 20 % (ref 10–15)
GLUCOSE BLDC GLUCOMTR-MCNC: 105 MG/DL (ref 70–99)
GLUCOSE BLDC GLUCOMTR-MCNC: 108 MG/DL (ref 70–99)
GLUCOSE BLDC GLUCOMTR-MCNC: 115 MG/DL (ref 70–99)
GLUCOSE BLDC GLUCOMTR-MCNC: 120 MG/DL (ref 70–99)
GLUCOSE BLDC GLUCOMTR-MCNC: 121 MG/DL (ref 70–99)
GLUCOSE BLDC GLUCOMTR-MCNC: 125 MG/DL (ref 70–99)
GLUCOSE BLDC GLUCOMTR-MCNC: 126 MG/DL (ref 70–99)
GLUCOSE BLDC GLUCOMTR-MCNC: 126 MG/DL (ref 70–99)
GLUCOSE BLDC GLUCOMTR-MCNC: 127 MG/DL (ref 70–99)
GLUCOSE BLDC GLUCOMTR-MCNC: 127 MG/DL (ref 70–99)
GLUCOSE BLDC GLUCOMTR-MCNC: 129 MG/DL (ref 70–99)
GLUCOSE BLDC GLUCOMTR-MCNC: 138 MG/DL (ref 70–99)
GLUCOSE BLDC GLUCOMTR-MCNC: 142 MG/DL (ref 70–99)
GLUCOSE SERPL-MCNC: 114 MG/DL (ref 70–99)
GLUCOSE SERPL-MCNC: 144 MG/DL (ref 70–99)
HCO3 BLD-SCNC: 21 MMOL/L (ref 21–28)
HCO3 BLD-SCNC: 22 MMOL/L (ref 21–28)
HCO3 BLD-SCNC: 22 MMOL/L (ref 21–28)
HCO3 BLD-SCNC: 24 MMOL/L (ref 21–28)
HCO3 SERPL-SCNC: 19 MMOL/L (ref 22–29)
HCO3 SERPL-SCNC: 21 MMOL/L (ref 22–29)
HCT VFR BLD AUTO: 22.7 % (ref 35–47)
HGB BLD-MCNC: 6.9 G/DL (ref 11.7–15.7)
HGB BLD-MCNC: 8.9 G/DL (ref 11.7–15.7)
ISSUE DATE AND TIME: NORMAL
MAGNESIUM SERPL-MCNC: 1.8 MG/DL (ref 1.7–2.3)
MCH RBC QN AUTO: 29.1 PG (ref 26.5–33)
MCHC RBC AUTO-ENTMCNC: 30.4 G/DL (ref 31.5–36.5)
MCV RBC AUTO: 96 FL (ref 78–100)
O2/TOTAL GAS SETTING VFR VENT: 40 %
O2/TOTAL GAS SETTING VFR VENT: 45 %
O2/TOTAL GAS SETTING VFR VENT: 50 %
PCO2 BLD: 33 MM HG (ref 35–45)
PCO2 BLD: 33 MM HG (ref 35–45)
PCO2 BLD: 42 MM HG (ref 35–45)
PCO2 BLD: 45 MM HG (ref 35–45)
PCO2 BLD: 47 MM HG (ref 35–45)
PCO2 BLD: 53 MM HG (ref 35–45)
PEEP: 5 CM H2O
PEEP: 6 CM H2O
PH BLD: 7.26 [PH] (ref 7.35–7.45)
PH BLD: 7.32 [PH] (ref 7.35–7.45)
PH BLD: 7.32 [PH] (ref 7.35–7.45)
PH BLD: 7.34 [PH] (ref 7.35–7.45)
PH BLD: 7.42 [PH] (ref 7.35–7.45)
PH BLD: 7.42 [PH] (ref 7.35–7.45)
PHOSPHATE SERPL-MCNC: 3.6 MG/DL (ref 2.5–4.5)
PLATELET # BLD AUTO: 221 10E3/UL (ref 150–450)
PO2 BLD: 109 MM HG (ref 80–105)
PO2 BLD: 123 MM HG (ref 80–105)
PO2 BLD: 76 MM HG (ref 80–105)
PO2 BLD: 81 MM HG (ref 80–105)
PO2 BLD: 84 MM HG (ref 80–105)
PO2 BLD: 96 MM HG (ref 80–105)
POTASSIUM SERPL-SCNC: 3.8 MMOL/L (ref 3.4–5.3)
POTASSIUM SERPL-SCNC: 4.5 MMOL/L (ref 3.4–5.3)
RBC # BLD AUTO: 2.37 10E6/UL (ref 3.8–5.2)
SAO2 % BLDA: 94 % (ref 92–100)
SAO2 % BLDA: 94 % (ref 92–100)
SAO2 % BLDA: 95 % (ref 92–100)
SAO2 % BLDA: 95 % (ref 92–100)
SAO2 % BLDA: 97 % (ref 92–100)
SAO2 % BLDA: 98 % (ref 92–100)
SODIUM SERPL-SCNC: 146 MMOL/L (ref 135–145)
SODIUM SERPL-SCNC: 147 MMOL/L (ref 135–145)
TRIGL SERPL-MCNC: 267 MG/DL
UNIT ABO/RH: NORMAL
UNIT NUMBER: NORMAL
UNIT STATUS: NORMAL
UNIT TYPE ISBT: 9500
WBC # BLD AUTO: 7.6 10E3/UL (ref 4–11)

## 2024-09-24 PROCEDURE — 250N000013 HC RX MED GY IP 250 OP 250 PS 637

## 2024-09-24 PROCEDURE — 85018 HEMOGLOBIN: CPT

## 2024-09-24 PROCEDURE — 250N000011 HC RX IP 250 OP 636

## 2024-09-24 PROCEDURE — 86140 C-REACTIVE PROTEIN: CPT

## 2024-09-24 PROCEDURE — 82805 BLOOD GASES W/O2 SATURATION: CPT

## 2024-09-24 PROCEDURE — 250N000009 HC RX 250

## 2024-09-24 PROCEDURE — 250N000013 HC RX MED GY IP 250 OP 250 PS 637: Performed by: SURGERY

## 2024-09-24 PROCEDURE — 94640 AIRWAY INHALATION TREATMENT: CPT | Mod: 76

## 2024-09-24 PROCEDURE — 71045 X-RAY EXAM CHEST 1 VIEW: CPT

## 2024-09-24 PROCEDURE — 71045 X-RAY EXAM CHEST 1 VIEW: CPT | Mod: 26 | Performed by: RADIOLOGY

## 2024-09-24 PROCEDURE — 250N000011 HC RX IP 250 OP 636: Performed by: SURGERY

## 2024-09-24 PROCEDURE — 258N000003 HC RX IP 258 OP 636

## 2024-09-24 PROCEDURE — 84478 ASSAY OF TRIGLYCERIDES: CPT

## 2024-09-24 PROCEDURE — 250N000011 HC RX IP 250 OP 636: Mod: JZ

## 2024-09-24 PROCEDURE — 200N000002 HC R&B ICU UMMC

## 2024-09-24 PROCEDURE — 84100 ASSAY OF PHOSPHORUS: CPT | Performed by: PHYSICIAN ASSISTANT

## 2024-09-24 PROCEDURE — 80048 BASIC METABOLIC PNL TOTAL CA: CPT

## 2024-09-24 PROCEDURE — 99291 CRITICAL CARE FIRST HOUR: CPT | Mod: FS | Performed by: INTERNAL MEDICINE

## 2024-09-24 PROCEDURE — 999N000157 HC STATISTIC RCP TIME EA 10 MIN

## 2024-09-24 PROCEDURE — 83735 ASSAY OF MAGNESIUM: CPT | Performed by: PHYSICIAN ASSISTANT

## 2024-09-24 PROCEDURE — 80048 BASIC METABOLIC PNL TOTAL CA: CPT | Performed by: PHYSICIAN ASSISTANT

## 2024-09-24 PROCEDURE — 94645 CONT INHLJ TX EACH ADDL HOUR: CPT

## 2024-09-24 PROCEDURE — 250N000013 HC RX MED GY IP 250 OP 250 PS 637: Performed by: PHYSICIAN ASSISTANT

## 2024-09-24 PROCEDURE — P9016 RBC LEUKOCYTES REDUCED: HCPCS

## 2024-09-24 PROCEDURE — 85027 COMPLETE CBC AUTOMATED: CPT | Performed by: PHYSICIAN ASSISTANT

## 2024-09-24 PROCEDURE — 94003 VENT MGMT INPAT SUBQ DAY: CPT

## 2024-09-24 PROCEDURE — 94640 AIRWAY INHALATION TREATMENT: CPT

## 2024-09-24 PROCEDURE — 99292 CRITICAL CARE ADDL 30 MIN: CPT | Mod: FS | Performed by: INTERNAL MEDICINE

## 2024-09-24 RX ORDER — POTASSIUM CHLORIDE 1.5 G/1.58G
20 POWDER, FOR SOLUTION ORAL ONCE
Status: COMPLETED | OUTPATIENT
Start: 2024-09-24 | End: 2024-09-24

## 2024-09-24 RX ORDER — METOCLOPRAMIDE HYDROCHLORIDE 5 MG/ML
2.5 INJECTION INTRAMUSCULAR; INTRAVENOUS EVERY 8 HOURS
Status: DISCONTINUED | OUTPATIENT
Start: 2024-09-24 | End: 2024-09-24

## 2024-09-24 RX ORDER — FUROSEMIDE 10 MG/ML
40 INJECTION INTRAMUSCULAR; INTRAVENOUS ONCE
Status: COMPLETED | OUTPATIENT
Start: 2024-09-24 | End: 2024-09-24

## 2024-09-24 RX ORDER — FUROSEMIDE 10 MG/ML
20 INJECTION INTRAMUSCULAR; INTRAVENOUS ONCE
Status: COMPLETED | OUTPATIENT
Start: 2024-09-25 | End: 2024-09-24

## 2024-09-24 RX ORDER — MAGNESIUM SULFATE HEPTAHYDRATE 40 MG/ML
2 INJECTION, SOLUTION INTRAVENOUS ONCE
Status: COMPLETED | OUTPATIENT
Start: 2024-09-24 | End: 2024-09-24

## 2024-09-24 RX ORDER — FUROSEMIDE 10 MG/ML
20 INJECTION INTRAMUSCULAR; INTRAVENOUS ONCE
Status: COMPLETED | OUTPATIENT
Start: 2024-09-24 | End: 2024-09-24

## 2024-09-24 RX ADMIN — Medication 50 MCG: at 06:23

## 2024-09-24 RX ADMIN — POTASSIUM CHLORIDE 20 MEQ: 1.5 POWDER, FOR SOLUTION ORAL at 05:40

## 2024-09-24 RX ADMIN — Medication 50 MCG: at 16:27

## 2024-09-24 RX ADMIN — PANTOPRAZOLE SODIUM 40 MG: 40 INJECTION, POWDER, FOR SOLUTION INTRAVENOUS at 08:29

## 2024-09-24 RX ADMIN — HEPARIN SODIUM 5000 UNITS: 5000 INJECTION, SOLUTION INTRAVENOUS; SUBCUTANEOUS at 03:56

## 2024-09-24 RX ADMIN — TOBRAMYCIN 300 MG: 300 SOLUTION RESPIRATORY (INHALATION) at 08:58

## 2024-09-24 RX ADMIN — DEXMEDETOMIDINE HYDROCHLORIDE 1.2 MCG/KG/HR: 400 INJECTION INTRAVENOUS at 06:04

## 2024-09-24 RX ADMIN — LEVOTHYROXINE SODIUM 25 MCG: 0.03 TABLET ORAL at 08:28

## 2024-09-24 RX ADMIN — Medication 20 NG/KG/MIN: at 11:43

## 2024-09-24 RX ADMIN — METHYLPREDNISOLONE SODIUM SUCCINATE 62.5 MG: 125 INJECTION, POWDER, FOR SOLUTION INTRAMUSCULAR; INTRAVENOUS at 22:11

## 2024-09-24 RX ADMIN — WHITE PETROLATUM 57.7 %-MINERAL OIL 31.9 % EYE OINTMENT: at 05:40

## 2024-09-24 RX ADMIN — Medication 1 TABLET: at 12:33

## 2024-09-24 RX ADMIN — MIDAZOLAM HYDROCHLORIDE 16 MG/HR: 1 INJECTION, SOLUTION INTRAVENOUS at 19:06

## 2024-09-24 RX ADMIN — ATOVAQUONE 1500 MG: 750 SUSPENSION ORAL at 08:29

## 2024-09-24 RX ADMIN — METHYLPREDNISOLONE SODIUM SUCCINATE 62.5 MG: 125 INJECTION, POWDER, FOR SOLUTION INTRAMUSCULAR; INTRAVENOUS at 16:41

## 2024-09-24 RX ADMIN — KETAMINE HYDROCHLORIDE 175 MG/HR: 100 INJECTION, SOLUTION, CONCENTRATE INTRAMUSCULAR; INTRAVENOUS at 05:24

## 2024-09-24 RX ADMIN — DEXMEDETOMIDINE HYDROCHLORIDE 1.2 MCG/KG/HR: 400 INJECTION INTRAVENOUS at 17:54

## 2024-09-24 RX ADMIN — LEVALBUTEROL HYDROCHLORIDE 0.63 MG: 0.63 SOLUTION RESPIRATORY (INHALATION) at 04:22

## 2024-09-24 RX ADMIN — DEXMEDETOMIDINE HYDROCHLORIDE 1.2 MCG/KG/HR: 400 INJECTION INTRAVENOUS at 21:28

## 2024-09-24 RX ADMIN — Medication 200 MCG/HR: at 13:01

## 2024-09-24 RX ADMIN — HEPARIN SODIUM 5000 UNITS: 5000 INJECTION, SOLUTION INTRAVENOUS; SUBCUTANEOUS at 12:33

## 2024-09-24 RX ADMIN — IPRATROPIUM BROMIDE 0.5 MG: 0.5 SOLUTION RESPIRATORY (INHALATION) at 11:57

## 2024-09-24 RX ADMIN — IPRATROPIUM BROMIDE 0.5 MG: 0.5 SOLUTION RESPIRATORY (INHALATION) at 20:59

## 2024-09-24 RX ADMIN — Medication 50 MCG: at 09:45

## 2024-09-24 RX ADMIN — DEXMEDETOMIDINE HYDROCHLORIDE 1.2 MCG/KG/HR: 400 INJECTION INTRAVENOUS at 10:18

## 2024-09-24 RX ADMIN — FLUCONAZOLE 100 MG: 100 TABLET ORAL at 08:28

## 2024-09-24 RX ADMIN — Medication 50 MCG: at 12:00

## 2024-09-24 RX ADMIN — FUROSEMIDE 20 MG: 10 INJECTION, SOLUTION INTRAMUSCULAR; INTRAVENOUS at 23:49

## 2024-09-24 RX ADMIN — LEVALBUTEROL HYDROCHLORIDE 0.63 MG: 0.63 SOLUTION RESPIRATORY (INHALATION) at 20:59

## 2024-09-24 RX ADMIN — HEPARIN SODIUM 5000 UNITS: 5000 INJECTION, SOLUTION INTRAVENOUS; SUBCUTANEOUS at 20:06

## 2024-09-24 RX ADMIN — TOBRAMYCIN 300 MG: 300 SOLUTION RESPIRATORY (INHALATION) at 20:59

## 2024-09-24 RX ADMIN — LEVALBUTEROL HYDROCHLORIDE 0.63 MG: 0.63 SOLUTION RESPIRATORY (INHALATION) at 16:20

## 2024-09-24 RX ADMIN — LEVALBUTEROL HYDROCHLORIDE 0.63 MG: 0.63 SOLUTION RESPIRATORY (INHALATION) at 08:58

## 2024-09-24 RX ADMIN — MIDAZOLAM HYDROCHLORIDE 16 MG/HR: 1 INJECTION, SOLUTION INTRAVENOUS at 12:33

## 2024-09-24 RX ADMIN — METHYLPREDNISOLONE SODIUM SUCCINATE 62.5 MG: 125 INJECTION, POWDER, FOR SOLUTION INTRAMUSCULAR; INTRAVENOUS at 10:22

## 2024-09-24 RX ADMIN — DEXMEDETOMIDINE HYDROCHLORIDE 1.2 MCG/KG/HR: 400 INJECTION INTRAVENOUS at 14:01

## 2024-09-24 RX ADMIN — CHLORHEXIDINE GLUCONATE 0.12% ORAL RINSE 15 ML: 1.2 LIQUID ORAL at 08:28

## 2024-09-24 RX ADMIN — CETIRIZINE HYDROCHLORIDE 10 MG: 10 TABLET, FILM COATED ORAL at 08:28

## 2024-09-24 RX ADMIN — MAGNESIUM SULFATE HEPTAHYDRATE 2 G: 2 INJECTION, SOLUTION INTRAVENOUS at 06:53

## 2024-09-24 RX ADMIN — KETAMINE HYDROCHLORIDE 175 MG/HR: 100 INJECTION, SOLUTION, CONCENTRATE INTRAMUSCULAR; INTRAVENOUS at 19:48

## 2024-09-24 RX ADMIN — FUROSEMIDE 40 MG: 10 INJECTION, SOLUTION INTRAVENOUS at 16:42

## 2024-09-24 RX ADMIN — Medication 50 MCG: at 01:11

## 2024-09-24 RX ADMIN — LEVALBUTEROL HYDROCHLORIDE 0.63 MG: 0.63 SOLUTION RESPIRATORY (INHALATION) at 11:57

## 2024-09-24 RX ADMIN — METHYLPREDNISOLONE SODIUM SUCCINATE 62.5 MG: 125 INJECTION, POWDER, FOR SOLUTION INTRAMUSCULAR; INTRAVENOUS at 03:56

## 2024-09-24 RX ADMIN — MONTELUKAST 10 MG: 10 TABLET, FILM COATED ORAL at 22:11

## 2024-09-24 RX ADMIN — ZINC SULFATE 220 MG (50 MG) CAPSULE 220 MG: CAPSULE at 08:28

## 2024-09-24 RX ADMIN — DEXMEDETOMIDINE HYDROCHLORIDE 1.2 MCG/KG/HR: 400 INJECTION INTRAVENOUS at 02:11

## 2024-09-24 RX ADMIN — MIDAZOLAM HYDROCHLORIDE 16 MG/HR: 1 INJECTION, SOLUTION INTRAVENOUS at 01:07

## 2024-09-24 RX ADMIN — CHLORHEXIDINE GLUCONATE 0.12% ORAL RINSE 15 ML: 1.2 LIQUID ORAL at 20:06

## 2024-09-24 RX ADMIN — MIDAZOLAM HYDROCHLORIDE 16 MG/HR: 1 INJECTION, SOLUTION INTRAVENOUS at 07:08

## 2024-09-24 RX ADMIN — IPRATROPIUM BROMIDE 0.5 MG: 0.5 SOLUTION RESPIRATORY (INHALATION) at 04:22

## 2024-09-24 RX ADMIN — Medication 200 MCG/HR: at 02:19

## 2024-09-24 RX ADMIN — IPRATROPIUM BROMIDE 0.5 MG: 0.5 SOLUTION RESPIRATORY (INHALATION) at 08:58

## 2024-09-24 RX ADMIN — IPRATROPIUM BROMIDE 0.5 MG: 0.5 SOLUTION RESPIRATORY (INHALATION) at 16:20

## 2024-09-24 RX ADMIN — FUROSEMIDE 20 MG: 10 INJECTION, SOLUTION INTRAMUSCULAR; INTRAVENOUS at 10:22

## 2024-09-24 ASSESSMENT — ACTIVITIES OF DAILY LIVING (ADL)
ADLS_ACUITY_SCORE: 61
ADLS_ACUITY_SCORE: 65
ADLS_ACUITY_SCORE: 61
ADLS_ACUITY_SCORE: 65
ADLS_ACUITY_SCORE: 61
ADLS_ACUITY_SCORE: 61
ADLS_ACUITY_SCORE: 65
ADLS_ACUITY_SCORE: 61
ADLS_ACUITY_SCORE: 65
ADLS_ACUITY_SCORE: 61
ADLS_ACUITY_SCORE: 61

## 2024-09-24 NOTE — PLAN OF CARE
ICU End of Shift Summary. See flowsheets for vital signs and detailed assessment.    Changes this shift: RASS -5. Receiving max doses of sedation gtts Fentanyl, versed, ketamine, and precedex. Pt has been off paralytic since 1230 yesterday 9/23 and remained synchronous with the vent for most of the night. Intermittent tachypnea and accessory muscle use with cares and turns. Receiving veletri inhalation. Thick cloudy white secretions oral and inline ETT. FiO2 weaned down to 45%. Trending ABGs q6h. Tolerating tube feeds. Blood glucose maintained on insulin drip.     Plan: Trach/PEG when tolerating ventilator without paralytic and gastric tube feeds. Wean FiO2 as tolerated.      Goal Outcome Evaluation:      Plan of Care Reviewed With: patient    Overall Patient Progress: no changeOverall Patient Progress: no change    Outcome Evaluation: Requiring full vent support and receiving max doses of sedation for vent synchrony.

## 2024-09-24 NOTE — PROGRESS NOTES
MEDICAL ICU PROGRESS NOTE  /09/24/2024    Date of Service (when I saw the patient): 09/24/2024    ASSESSMENT: Massiel Flaherty is a 53 year old female with PMH Anxiety/depression, fibromyalgia, c/f nonepileptic seizures not on AEDs, hypothyroidism, asthma, HLD, MURIEL on CPAP, expressive aphasia, hypertension who was admitted on 8/3/2024 for fatigue, fever and dyspnea and intubated 8/6/24 at OSH for ARDS, proned and paralyzed without improvement. Transferred to [facility] 8/8/24, hypoxic with high plateaus/peaks and placed on VV ECMO and CRRT. Decannulated from VV ECMO 8/18 and transferred to MICU 8/26/24 for ongoing management. Extubated 8/27 then reintubated 8/29 iso worsening AHRF and pseudomonas pneumonia. Extubated again 9/16 to BiPAP/HFNC, re-intubated 9/20 with tachypnea, increased WOB, fevers, and new lung opacities c/f possible HAP vs ILD/vasculitis/organizing pneumonia flare. Course complicated by tension pneumothorax while re-intubated 9/20 now s/p right chest tube.      CHANGES and MAJOR THINGS TODAY:   - Decrease RR to 28 and PEEP to 5  - Repeat ABG 1 hour following vent changes  - IV lasix 20 mg x1- goal negative 500 to 1 L - re dose as necessary   - Speak with SICU about scheduling trach/peg       PLAN:    Neuro:  # Pain and sedation  # Concern for ICU delirium   # Hx Fibromyalgia   # Hx myalgic encephalomyelitis   # Hx anxiety/depression  - Pain: Oxycodone 2.5 mg q6hrs PRN, fentanyl bolus PRN   - Sedation plan:   - Sedated with midazolam, fentanyl, ketamine, and precedex infusion  - Vecuronium infusion off since 9/23 at 1200   - Gabapentin to 300mg TID held while sedated  - Psych meds (venlafaxine and amitriptyline) held while sedated     # Hx spells with staring and BUE posturing previously described as nonepileptic seizures   # Hx of GTC seizures and myoclonic epilepsy, weaned off AEDs   # Diffuse cerebral edema - improved  - 8/21: MRI Brain: no acute intracranial pathology. No findings to suggest  anoxic brain injury.   - MRI brain 9/20: no anoxic brain injury  - While extubated, patient able to follow commands and answer yes/no questions      Pulmonary:  # Acute hypoxic respiratory failure c/b ARDS  # Pseudomonas pneumonia (s/p treatment)  # Mechanical ventilation  # s/p VV ECMO 8/8 - 8/18   # Hx CAP  # Asthma  # MURIEL on home CPAP  PFT dated 9/8/2020 demonstrated normal spirometry with mild diffusion impairment and mild restriction (FEV1/FVC 80%, FEV1 2.59, DLCO 40%). CT abdomen chest 3/9/2020 demonstrated scarring and fibrosis bilaterally which correlated with the decreased DLCO. The patient also has a history of MURIEL on CPAP therapy as currently managed by her provider in [place]. Unclear what triggered ARDS or why she has had such severe hospital course, further investigated ILD but results all unremarkable. Extubated 8/27, reintubated 8/29 for worsening O2 demands and diffuse opacities iso new/recurrent psuedomonas pneumonia. Bronch with BAL 8/30, pseudomonas growing as below. Extubated again 9/16, worsening respiratory distress noted 9/19 with repeat polymicrobial growth on respiratory sputum raising c/f aspiration/hospital associated pneumonia. 9/20 with increased WOB, new fevers, and CXR significant for opacities c/f possible HAP vs ILD/vasculitis/organizing pneumonia and was reintubated 9/20.   - ILD w/u aldolase, EVELIO, RF, CCP, ANCA, MPO, SSA Ro and La, Scleroderma antibody, Radha 1,  hypersensity pneumonitis, IGG subclasses,  aspergillus, blastomyces, histoplasma negative  - SpO2 goals >92%, PaO2 goals >60   - PTA singular at bedtime if able, duonebs q4h RT  - Discontinued pulmicort due to continued pseudomonas infection  - Lung protective ventilation strategies given ARDS   - Methylpred 60mg q6H 9/21- current   - CRP trend 9/21 487,  9/23 166, 9/24 77  - Begin Methlypred taper starting 9/25 @ 60 q12  - 9/17: Started levalbuterol nebs, PTA pulmicort nebs, saline nebs, trialed aminophylline gtt for  increased respiratory drive but lead to tachycardia so discontinued, holding off on further airway clearance while intubated with chest tube in place  - 9/18: trial of dornase - discontinue now that intubated  - Bronchoscopy 9/21      Pneumothorax, resolved   Tension pneumothorax during re-intubation 9/20, concern for fibrotic lung disease and possible barotrauma during bag ventilation during re-intubation vs complication of re-intubation in general. Leave in while intubated.   - Chest tube to water seal  - Daily XR     Cardiovascular:  # Hyperlipidemia  # Hx HTN   - MAP goal >65, SBP goals >110  - PTA atorvastatin  - PTA clonidine held while sedated  - Lower extremity ultrasound without vascular pathology, no hematoma or clots  - Monitor discoloration of extremities for necrosis  - PRN hydralazine for SBP > 200      Sinus Tachycardia, resolved   Likely 2/2 fluid removal, sepsis, pain and sedation as above. Also considered withdrawal due to significant opioid and benzo needs while intubated.      GI/Nutrition:  # Severe malnutrition (see RD note)   # Non-infectious Diarrhea  # GERD   - Protonix (home medication)   - Nutrition consulted, appreciate recs  - Diet: TF at goal  - Hold bowel regimen d/t loose stools  - Continue scheduled multivitamin, banatrol, prosource packet  - loperamide scheduled  - Rectal tube, continues with high output     Renal/Fluids/Electrolytes:  # Acute kidney injury, resolved   # AGMA, improving   Baseline Cr approx 0.6. Pt was anuric required CRRT here but now making urine, likely prerenal due to shock.  -  Adequate I/Os, daily weights   - Avoid nephrotoxins as able  - Lasix 20 mg IV x1, goal negative 500 to 1000 ml; re-dose as needed     Hypernatremia  - FWF 100ml q4H  - Continue to trend     Elevated lactate, resovled  Lactate 2.3 --> 2.8 9/19 concerning for possible sepsis although no hypotension. 9/20 repeat 0.9  - Trend PRN     Endocrine:  # Steroid and stress induced hyperglycemia  #  Hypothyroidism   - Goal to keep BG < 180 for optimal wound healing  - Continue on insulin infusion while on high dose steroids   - Continue PTA synthroid     ID:  # Leukocytosis, resolved   # Concern for aspiration pneumonia/hospital acquired pnemonia (9/19 - )  # Psuedomonas pneumonia (s/p treatment)  # Hx CAP  Respiratory cx 8/29 pseudomonas pneumonia. Intermediate susceptability to meropenem, more significant sensitivities to ciprofloxacin. New leukocytosis, elevated PC, CRP and lactate 9/19 with sputum cx 4+ psuedomonas, 2+ GPCs, and 1+ GPBs could be colonized so waiting for BAL studies 9/21  - Continue to monitor fever curve and inflammatory markers as appropriate  - ID now signed off   - Repeat cultures and sputum growing pseudomonas with similar resistance pattern to previous.   - Restarted tobramycin nebs 9/21     Abx  - Meropenem 8/29 - 9/1  - Vancomycin 8/29 - 8/30  - Tobramycin x 1 8/29  - Vancomycin 9/5 - 9/8    - Tobramycin nebs BID 9/1 - 9/11, 9/21- (tentative plan for 5 day course)   - Ciprofloxacin infusion 9/1 - 9/11  - Metronidazole 9/6 - 9/11  - Atovaquone 9/11 -   - Vancomycin 9/19 - 9/20  - Zosyn 9/19 - 9/21  - Ciprofloxacin 9/19 - 9/21     PJP Ppx  Given Dex-ARDS Massiel ferraro remain on steriods for > 3 weeks so will initiate PJP ppx. Given history of allergy to sulfa, will obtain G6PD test to determine if dapsone can be used: levels are ok, however continuing atovaquone.   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   - Ganciclovir 9/6 - 9/8, discontinued given ID recs  - trend CMV 9/16  - CMV levels recheck 9/18 289    Thrush  Noted 9/21  - Fluconazole x 7 days     # HSV-1  Mouth sores present, which could have viral etiology. 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  - Acyclovir 400mg BID x5 days (8/22-8/27)     Hematology:    # Anemia of critical illness  - Hgb 6.9 this AM, given 1 unit pRBCs   - Transfuse if hgb <7.0 or  signs/symptoms of hypoperfusion. Monitor and trend.   - CTAP 8/30 due to acute hemoglobin drop. Negative for acute bleed at that time.     # Right groin Hematoma   Acutely decreased Hgb from 8.0 to 6.6 on 9/20 of unknown etiology that required transfusion x1, however 9/19 LE US notable to 14.9 cm hematoma in R groin near prior canal that was not previously appreciated on CT abdomen c/f possible bleed.   - Stabel on repeat US 9/21     Musculoskeletal/Rheum:  # Alopecia  - Hold PTA hydroxychloroquine      # Weakness and deconditioning of critical illness  # Left ankle injury pre-hospitalization    - Physical and occupational therapy when able.      Skin:  # BLE scattered ecchymosis  # Left toe duskiness  - Bilateral lower leg ecchymosis  - WOC consult     # Peripheral Edema  Likely 2/2 immobility, and currently (9/20) not good candidate for diuresis due to intravascular depletion.  - Consult OT for lymphedema therapy     General Cares/Prophylaxis:  DVT Prophylaxis: SubQ heparin  GI Prophylaxis: PPI  Restraints: no  Code Status: Full Code     Lines/tubes/drains:  - ETT (9/20)  - NJ (8/9)  - LIJ CVC (8/8)  - Radial a-line (8/29) Removed 9/17  - PIV x3  - Rectal tube (8/17)  - Tunneled HD line (8/23)  - New radial A line, right, 9/20  - Chest tube 9/20     Disposition:  - Medical ICU     Patient seen and findings/plan discussed with medical ICU staff, Dr. Perlman.    DOREEN Quinones CNP    Critical Care time: 50 minutes     Clinically Significant Risk Factors         # Hypernatremia: Highest Na = 147 mmol/L in last 2 days, will monitor as appropriate     # Anion Gap Metabolic Acidosis: Highest Anion Gap = 19 mmol/L in last 2 days, will monitor and treat as appropriate  # Hypoalbuminemia: Lowest albumin = 2.2 g/dL at 8/10/2024  9:47 AM, will monitor as appropriate    # Coagulation Defect: INR = 1.29 (Ref range: 0.85 - 1.15) and/or PTT = 33 Seconds (Ref range: 22 - 38 Seconds), will monitor for bleeding                # Severe Malnutrition: based on nutrition assessment    # Financial/Environmental Concerns: none              ====================================  INTERVAL HISTORY:   Patient remained off paralytics all night, no issues. Hemodynamically stable.     OBJECTIVE:   1. VITAL SIGNS:   Temp:  [97.1  F (36.2  C)-99.1  F (37.3  C)] 98.2  F (36.8  C)  Pulse:  [] 84  Resp:  [18-36] 34  BP: (108)/(71) 108/71  MAP:  [66 mmHg-162 mmHg] 92 mmHg  Arterial Line BP: ()/() 127/67  FiO2 (%):  [40 %-100 %] 45 %  SpO2:  [90 %-99 %] 97 %  FiO2 (%): 45 %, Resp: (!) 34, Vent Mode: CMV/AC, Resp Rate (Set): 34 breaths/min, Tidal Volume (Set, mL): 300 mL, PEEP (cm H2O): 6 cmH2O, Resp Rate (Set): 34 breaths/min, Tidal Volume (Set, mL): 300 mL, PEEP (cm H2O): 6 cmH2O  2. INTAKE/ OUTPUT:   I/O last 3 completed shifts:  In: 3387.9 [I.V.:1467.9; NG/GT:820]  Out: 2473 [Urine:1735; Emesis/NG output:100; Stool:600; Chest Tube:38]    3. PHYSICAL EXAMINATION:  General: Intubated, sedated  HEENT: NC/AT  Neuro: deeply sedated  Pulm/Resp: Course breath sounds bilaterally on mechanical vents    CV: RRR, S1/2, no m/r/g  Abdomen: Soft, tender, bowel sounds present, right groin hematoma stable  : Gonzalez with yellow, clear urine   Incisions/Skin: Trace to moderate edema in all extremities, equally. Lower leg ecchymosis present and scattered. LL inguinal area with ecchymotic discoloration     4. LABS:   Arterial Blood Gases   Recent Labs   Lab 09/24/24  0350 09/24/24  0159 09/23/24  2209 09/23/24  1601   PH 7.42 7.42 7.41 7.37   PCO2 33* 33* 34* 42   PO2 76* 123* 105 113*   HCO3 22 21 22 24     Complete Blood Count   Recent Labs   Lab 09/24/24  0350 09/23/24  0404 09/22/24  1759 09/22/24  1212   WBC 7.6 7.9 6.7 6.2   HGB 6.9* 7.6* 7.6* 7.4*    323 265 281     Basic Metabolic Panel  Recent Labs   Lab 09/24/24  0812 09/24/24  0627 09/24/24  0355 09/24/24  0350 09/23/24  0556 09/23/24  0404 09/22/24  0411 09/22/24  0410 09/21/24  8988  09/21/24  2304   NA  --   --   --  147*  --  147*  --  144  --  146*   POTASSIUM  --   --   --  3.8  --  3.5  --  4.3  --  4.2   CHLORIDE  --   --   --  109*  --  107  --  106  --  107   CO2  --   --   --  19*  --  22  --  25  --  24   BUN  --   --   --  65.7*  --  56.0*  --  40.7*  --  37.5*   CR  --   --   --  0.57  --  0.73  --  0.87  --  0.87   * 138* 108* 114*   < > 125*   < > 232*   < > 202*    < > = values in this interval not displayed.     Liver Function Tests  Recent Labs   Lab 09/23/24  0404 09/22/24  0759 09/20/24  1312   AST 57* 13  --    ALT 93* 56*  --    ALKPHOS 462* 207*  --    BILITOTAL 0.5 0.2  --    ALBUMIN 2.7* 2.5*  --    INR  --  1.29* 1.45*     Coagulation Profile  Recent Labs   Lab 09/22/24  0759 09/20/24  1312   INR 1.29* 1.45*   PTT 33  --        5. RADIOLOGY:   Recent Results (from the past 24 hour(s))   XR Abdomen Port 1 View    Narrative    EXAMINATION:  XR ABDOMEN PORT 1 VIEW 9/23/2024 1:05 PM.    COMPARISON: CT and radiograph 9/20/2024.    HISTORY:  Verify small bowel feeding tube bedside placement    FINDINGS: Portable AP view of the chest. Enteric tube tip and sidehole  project over the stomach. Feeding tube tip postpyloric, likely  projecting over the descending duodenum, retracted from prior  radiograph. Paucity of bowel gas. No definite pneumatosis or portal  venous gas. No acute osseous abnormality. Pelvic stimulator device.      Impression    IMPRESSION: Feeding tube tip postpyloric, projecting over the  descending duodenum.    I have personally reviewed the examination and initial interpretation  and I agree with the findings.    MAYELA DEJESUS MD         SYSTEM ID:  [id number]   XR Abdomen Port 1 View    Narrative    Examination:  XR ABDOMEN PORT 1 VIEW    Date:  9/23/2024 3:14 PM     Clinical Information: Verify small bowel feeding tube bedside  placement     Comparison: Same day abdomen x-ray    Findings:     Portable AP view of the abdomen. Feeding tube tip  projects over the  stomach. Stable position of the enteric tube projecting over the  stomach. Nonobstructive bowel gas pattern. No acute osseous  abnormality. Pelvic stimulator device.      Impression    Impression:    1. Repositioning of the feeding tube, the tip projects over the  stomach. Additional enteric tube tip and sidehole are unchanged,  likely in the stomach.    I have personally reviewed the examination and initial interpretation  and I agree with the findings.    ALDEN TO DO         SYSTEM ID:  [id number]   XR Chest Port 1 View    Impression    RESIDENT PRELIMINARY INTERPRETATION  IMPRESSION:   1. Possible focal kink of the right chest tube along the right chest  wall. Correlate for tube function. Support devices are otherwise  stable.  2. Continued diffuse left greater than right mixed interstitial and  airspace opacities.  3. Probable small bilateral pleural effusions.

## 2024-09-24 NOTE — CONSULTS
"SPIRITUAL HEALTH SERVICES Consult Note  [facility] 4C     Summary: Per family's request, provided prayers in the [employer] tradition at patient Massiel \"Donna\" Cupertino's bedside. Both family and [employer] community are in [place], [place].     Plan: Chaplains will follow for spiritual support while Donna is admitted.     Dina Olguin M.Div., [facility]  Staff   Pager [phone]     * Spiritual Health Services remains available 24/7 for emergent requests/referrals, either by having the switchboard page the on-call  or by entering an ASAP/STAT consult in Epic (this will also page the on-call ). Routine Epic consults receive an initial response within 24 hours.*    "

## 2024-09-25 ENCOUNTER — APPOINTMENT (OUTPATIENT)
Dept: GENERAL RADIOLOGY | Facility: CLINIC | Age: 54
DRG: 003 | End: 2024-09-25
Attending: INTERNAL MEDICINE
Payer: MEDICAID

## 2024-09-25 ENCOUNTER — APPOINTMENT (OUTPATIENT)
Dept: GENERAL RADIOLOGY | Facility: CLINIC | Age: 54
DRG: 003 | End: 2024-09-25
Payer: MEDICAID

## 2024-09-25 ENCOUNTER — ANESTHESIA EVENT (OUTPATIENT)
Dept: SURGERY | Facility: CLINIC | Age: 54
DRG: 003 | End: 2024-09-25
Payer: MEDICAID

## 2024-09-25 ENCOUNTER — ANESTHESIA (OUTPATIENT)
Dept: SURGERY | Facility: CLINIC | Age: 54
DRG: 003 | End: 2024-09-25
Payer: MEDICAID

## 2024-09-25 LAB
ALLEN'S TEST: ABNORMAL
ANION GAP SERPL CALCULATED.3IONS-SCNC: 17 MMOL/L (ref 7–15)
BACTERIA BLD CULT: NO GROWTH
BASE EXCESS BLDA CALC-SCNC: -0.2 MMOL/L (ref -3–3)
BASE EXCESS BLDA CALC-SCNC: 0.2 MMOL/L (ref -3–3)
BASE EXCESS BLDA CALC-SCNC: 0.8 MMOL/L (ref -3–3)
BASE EXCESS BLDA CALC-SCNC: 1.4 MMOL/L (ref -3–3)
BUN SERPL-MCNC: 54.5 MG/DL (ref 6–20)
CALCIUM SERPL-MCNC: 8.4 MG/DL (ref 8.8–10.4)
CHLORIDE SERPL-SCNC: 107 MMOL/L (ref 98–107)
COHGB MFR BLD: 97.3 % (ref 96–97)
COHGB MFR BLD: 98.2 % (ref 96–97)
COHGB MFR BLD: 98.8 % (ref 96–97)
COHGB MFR BLD: 99.9 % (ref 96–97)
CREAT SERPL-MCNC: 0.39 MG/DL (ref 0.51–0.95)
CRP SERPL-MCNC: 47.3 MG/L
EGFRCR SERPLBLD CKD-EPI 2021: >90 ML/MIN/1.73M2
ERYTHROCYTE [DISTWIDTH] IN BLOOD BY AUTOMATED COUNT: 20 % (ref 10–15)
GLUCOSE BLDC GLUCOMTR-MCNC: 103 MG/DL (ref 70–99)
GLUCOSE BLDC GLUCOMTR-MCNC: 108 MG/DL (ref 70–99)
GLUCOSE BLDC GLUCOMTR-MCNC: 115 MG/DL (ref 70–99)
GLUCOSE BLDC GLUCOMTR-MCNC: 117 MG/DL (ref 70–99)
GLUCOSE BLDC GLUCOMTR-MCNC: 118 MG/DL (ref 70–99)
GLUCOSE BLDC GLUCOMTR-MCNC: 120 MG/DL (ref 70–99)
GLUCOSE BLDC GLUCOMTR-MCNC: 120 MG/DL (ref 70–99)
GLUCOSE BLDC GLUCOMTR-MCNC: 126 MG/DL (ref 70–99)
GLUCOSE BLDC GLUCOMTR-MCNC: 126 MG/DL (ref 70–99)
GLUCOSE BLDC GLUCOMTR-MCNC: 131 MG/DL (ref 70–99)
GLUCOSE BLDC GLUCOMTR-MCNC: 134 MG/DL (ref 70–99)
GLUCOSE BLDC GLUCOMTR-MCNC: 136 MG/DL (ref 70–99)
GLUCOSE BLDC GLUCOMTR-MCNC: 143 MG/DL (ref 70–99)
GLUCOSE BLDC GLUCOMTR-MCNC: 148 MG/DL (ref 70–99)
GLUCOSE BLDC GLUCOMTR-MCNC: 85 MG/DL (ref 70–99)
GLUCOSE BLDC GLUCOMTR-MCNC: 85 MG/DL (ref 70–99)
GLUCOSE BLDC GLUCOMTR-MCNC: 91 MG/DL (ref 70–99)
GLUCOSE BLDC GLUCOMTR-MCNC: 92 MG/DL (ref 70–99)
GLUCOSE BLDC GLUCOMTR-MCNC: 93 MG/DL (ref 70–99)
GLUCOSE BLDC GLUCOMTR-MCNC: 96 MG/DL (ref 70–99)
GLUCOSE BLDC GLUCOMTR-MCNC: 96 MG/DL (ref 70–99)
GLUCOSE SERPL-MCNC: 154 MG/DL (ref 70–99)
HCO3 BLD-SCNC: 25 MMOL/L (ref 21–28)
HCO3 BLD-SCNC: 26 MMOL/L (ref 21–28)
HCO3 SERPL-SCNC: 23 MMOL/L (ref 22–29)
HCT VFR BLD AUTO: 26.3 % (ref 35–47)
HGB BLD-MCNC: 8.2 G/DL (ref 11.7–15.7)
MAGNESIUM SERPL-MCNC: 1.6 MG/DL (ref 1.7–2.3)
MCH RBC QN AUTO: 29.9 PG (ref 26.5–33)
MCHC RBC AUTO-ENTMCNC: 31.2 G/DL (ref 31.5–36.5)
MCV RBC AUTO: 96 FL (ref 78–100)
O2/TOTAL GAS SETTING VFR VENT: 45 %
O2/TOTAL GAS SETTING VFR VENT: 45 %
O2/TOTAL GAS SETTING VFR VENT: 50 %
O2/TOTAL GAS SETTING VFR VENT: 50 %
P JIROVECII DNA SPEC QL NAA+PROBE: NOT DETECTED
PCO2 BLD: 39 MM HG (ref 35–45)
PCO2 BLD: 40 MM HG (ref 35–45)
PCO2 BLD: 44 MM HG (ref 35–45)
PCO2 BLD: 47 MM HG (ref 35–45)
PEEP: 6 CM H2O
PH BLD: 7.35 [PH] (ref 7.35–7.45)
PH BLD: 7.37 [PH] (ref 7.35–7.45)
PH BLD: 7.42 [PH] (ref 7.35–7.45)
PH BLD: 7.43 [PH] (ref 7.35–7.45)
PHOSPHATE SERPL-MCNC: 2.8 MG/DL (ref 2.5–4.5)
PLATELET # BLD AUTO: 234 10E3/UL (ref 150–450)
PO2 BLD: 103 MM HG (ref 80–105)
PO2 BLD: 108 MM HG (ref 80–105)
PO2 BLD: 81 MM HG (ref 80–105)
PO2 BLD: 86 MM HG (ref 80–105)
POTASSIUM SERPL-SCNC: 4 MMOL/L (ref 3.4–5.3)
RBC # BLD AUTO: 2.74 10E6/UL (ref 3.8–5.2)
SAO2 % BLDA: 94 % (ref 92–100)
SAO2 % BLDA: 95 % (ref 92–100)
SAO2 % BLDA: 96 % (ref 92–100)
SAO2 % BLDA: 97 % (ref 92–100)
SCANNED LAB RESULT: ABNORMAL
SODIUM SERPL-SCNC: 147 MMOL/L (ref 135–145)
WBC # BLD AUTO: 7.6 10E3/UL (ref 4–11)

## 2024-09-25 PROCEDURE — 250N000011 HC RX IP 250 OP 636: Mod: JZ

## 2024-09-25 PROCEDURE — 272N000001 HC OR GENERAL SUPPLY STERILE: Performed by: SURGERY

## 2024-09-25 PROCEDURE — 94640 AIRWAY INHALATION TREATMENT: CPT

## 2024-09-25 PROCEDURE — 250N000013 HC RX MED GY IP 250 OP 250 PS 637: Performed by: SURGERY

## 2024-09-25 PROCEDURE — 71045 X-RAY EXAM CHEST 1 VIEW: CPT | Mod: 26 | Performed by: RADIOLOGY

## 2024-09-25 PROCEDURE — 0B114F4 BYPASS TRACHEA TO CUTANEOUS WITH TRACHEOSTOMY DEVICE, PERCUTANEOUS ENDOSCOPIC APPROACH: ICD-10-PCS | Performed by: ANESTHESIOLOGY

## 2024-09-25 PROCEDURE — 82805 BLOOD GASES W/O2 SATURATION: CPT

## 2024-09-25 PROCEDURE — 43246 EGD PLACE GASTROSTOMY TUBE: CPT | Performed by: ANESTHESIOLOGY

## 2024-09-25 PROCEDURE — 250N000011 HC RX IP 250 OP 636

## 2024-09-25 PROCEDURE — 999N000065 XR CHEST PORT 1 VIEW

## 2024-09-25 PROCEDURE — 80048 BASIC METABOLIC PNL TOTAL CA: CPT | Performed by: PHYSICIAN ASSISTANT

## 2024-09-25 PROCEDURE — 71045 X-RAY EXAM CHEST 1 VIEW: CPT

## 2024-09-25 PROCEDURE — 84100 ASSAY OF PHOSPHORUS: CPT | Performed by: PHYSICIAN ASSISTANT

## 2024-09-25 PROCEDURE — 250N000009 HC RX 250

## 2024-09-25 PROCEDURE — 250N000009 HC RX 250: Performed by: SURGERY

## 2024-09-25 PROCEDURE — 94645 CONT INHLJ TX EACH ADDL HOUR: CPT

## 2024-09-25 PROCEDURE — 258N000003 HC RX IP 258 OP 636

## 2024-09-25 PROCEDURE — 258N000001 HC RX 258

## 2024-09-25 PROCEDURE — 272N000002 HC OR SUPPLY OTHER OPNP: Performed by: SURGERY

## 2024-09-25 PROCEDURE — 99291 CRITICAL CARE FIRST HOUR: CPT | Mod: FS | Performed by: INTERNAL MEDICINE

## 2024-09-25 PROCEDURE — 94003 VENT MGMT INPAT SUBQ DAY: CPT

## 2024-09-25 PROCEDURE — 360N000076 HC SURGERY LEVEL 3, PER MIN: Performed by: SURGERY

## 2024-09-25 PROCEDURE — 83735 ASSAY OF MAGNESIUM: CPT | Performed by: PHYSICIAN ASSISTANT

## 2024-09-25 PROCEDURE — 250N000013 HC RX MED GY IP 250 OP 250 PS 637: Performed by: PHYSICIAN ASSISTANT

## 2024-09-25 PROCEDURE — 250N000009 HC RX 250: Performed by: INTERNAL MEDICINE

## 2024-09-25 PROCEDURE — 250N000013 HC RX MED GY IP 250 OP 250 PS 637

## 2024-09-25 PROCEDURE — 99292 CRITICAL CARE ADDL 30 MIN: CPT | Mod: FS | Performed by: INTERNAL MEDICINE

## 2024-09-25 PROCEDURE — 85027 COMPLETE CBC AUTOMATED: CPT | Performed by: PHYSICIAN ASSISTANT

## 2024-09-25 PROCEDURE — 200N000002 HC R&B ICU UMMC

## 2024-09-25 PROCEDURE — 999N000185 HC STATISTIC TRANSPORT TIME EA 15 MIN

## 2024-09-25 PROCEDURE — 272N000272 HC CONTINUOUS NEBULIZER MICRO PUMP

## 2024-09-25 PROCEDURE — 370N000017 HC ANESTHESIA TECHNICAL FEE, PER MIN: Performed by: SURGERY

## 2024-09-25 PROCEDURE — 94640 AIRWAY INHALATION TREATMENT: CPT | Mod: 76

## 2024-09-25 PROCEDURE — 43246 EGD PLACE GASTROSTOMY TUBE: CPT | Performed by: NURSE ANESTHETIST, CERTIFIED REGISTERED

## 2024-09-25 PROCEDURE — 43246 EGD PLACE GASTROSTOMY TUBE: CPT | Mod: GC | Performed by: SURGERY

## 2024-09-25 PROCEDURE — 999N000157 HC STATISTIC RCP TIME EA 10 MIN

## 2024-09-25 PROCEDURE — 0DH63UZ INSERTION OF FEEDING DEVICE INTO STOMACH, PERCUTANEOUS APPROACH: ICD-10-PCS | Performed by: SURGERY

## 2024-09-25 PROCEDURE — 250N000025 HC SEVOFLURANE, PER MIN: Performed by: SURGERY

## 2024-09-25 PROCEDURE — 86140 C-REACTIVE PROTEIN: CPT

## 2024-09-25 RX ORDER — MAGNESIUM SULFATE HEPTAHYDRATE 40 MG/ML
2 INJECTION, SOLUTION INTRAVENOUS ONCE
Status: COMPLETED | OUTPATIENT
Start: 2024-09-25 | End: 2024-09-25

## 2024-09-25 RX ORDER — FENTANYL CITRATE 50 UG/ML
INJECTION, SOLUTION INTRAMUSCULAR; INTRAVENOUS PRN
Status: DISCONTINUED | OUTPATIENT
Start: 2024-09-25 | End: 2024-09-25

## 2024-09-25 RX ORDER — SODIUM CHLORIDE, SODIUM LACTATE, POTASSIUM CHLORIDE, CALCIUM CHLORIDE 600; 310; 30; 20 MG/100ML; MG/100ML; MG/100ML; MG/100ML
INJECTION, SOLUTION INTRAVENOUS CONTINUOUS PRN
Status: DISCONTINUED | OUTPATIENT
Start: 2024-09-25 | End: 2024-09-25

## 2024-09-25 RX ORDER — LIDOCAINE HYDROCHLORIDE 10 MG/ML
INJECTION, SOLUTION INFILTRATION; PERINEURAL PRN
Status: DISCONTINUED | OUTPATIENT
Start: 2024-09-25 | End: 2024-09-25 | Stop reason: HOSPADM

## 2024-09-25 RX ORDER — ONDANSETRON 2 MG/ML
INJECTION INTRAMUSCULAR; INTRAVENOUS PRN
Status: DISCONTINUED | OUTPATIENT
Start: 2024-09-25 | End: 2024-09-25

## 2024-09-25 RX ORDER — METHYLPREDNISOLONE SODIUM SUCCINATE 125 MG/2ML
60 INJECTION, POWDER, LYOPHILIZED, FOR SOLUTION INTRAMUSCULAR; INTRAVENOUS EVERY 12 HOURS
Status: DISCONTINUED | OUTPATIENT
Start: 2024-09-25 | End: 2024-09-28

## 2024-09-25 RX ORDER — CEFAZOLIN SODIUM/WATER 2 G/20 ML
SYRINGE (ML) INTRAVENOUS PRN
Status: DISCONTINUED | OUTPATIENT
Start: 2024-09-25 | End: 2024-09-25

## 2024-09-25 RX ADMIN — LEVOTHYROXINE SODIUM 25 MCG: 0.03 TABLET ORAL at 07:53

## 2024-09-25 RX ADMIN — Medication 1 TABLET: at 11:13

## 2024-09-25 RX ADMIN — DEXTROSE MONOHYDRATE 1000 ML: 100 INJECTION, SOLUTION INTRAVENOUS at 06:28

## 2024-09-25 RX ADMIN — METHYLPREDNISOLONE SODIUM SUCCINATE 62.5 MG: 125 INJECTION, POWDER, FOR SOLUTION INTRAMUSCULAR; INTRAVENOUS at 03:49

## 2024-09-25 RX ADMIN — CHLORHEXIDINE GLUCONATE 0.12% ORAL RINSE 15 ML: 1.2 LIQUID ORAL at 07:53

## 2024-09-25 RX ADMIN — IPRATROPIUM BROMIDE 0.5 MG: 0.5 SOLUTION RESPIRATORY (INHALATION) at 08:17

## 2024-09-25 RX ADMIN — IPRATROPIUM BROMIDE 0.5 MG: 0.5 SOLUTION RESPIRATORY (INHALATION) at 23:48

## 2024-09-25 RX ADMIN — TOBRAMYCIN 300 MG: 300 SOLUTION RESPIRATORY (INHALATION) at 20:12

## 2024-09-25 RX ADMIN — FLUCONAZOLE 100 MG: 100 TABLET ORAL at 07:53

## 2024-09-25 RX ADMIN — Medication 20 NG/KG/MIN: at 03:04

## 2024-09-25 RX ADMIN — IPRATROPIUM BROMIDE 0.5 MG: 0.5 SOLUTION RESPIRATORY (INHALATION) at 16:32

## 2024-09-25 RX ADMIN — LEVALBUTEROL HYDROCHLORIDE 0.63 MG: 0.63 SOLUTION RESPIRATORY (INHALATION) at 20:12

## 2024-09-25 RX ADMIN — IPRATROPIUM BROMIDE 0.5 MG: 0.5 SOLUTION RESPIRATORY (INHALATION) at 00:09

## 2024-09-25 RX ADMIN — Medication 50 MCG: at 20:02

## 2024-09-25 RX ADMIN — HEPARIN SODIUM 5000 UNITS: 5000 INJECTION, SOLUTION INTRAVENOUS; SUBCUTANEOUS at 19:39

## 2024-09-25 RX ADMIN — Medication 200 MCG/HR: at 01:16

## 2024-09-25 RX ADMIN — METHYLPREDNISOLONE SODIUM SUCCINATE 62.5 MG: 125 INJECTION, POWDER, FOR SOLUTION INTRAMUSCULAR; INTRAVENOUS at 16:23

## 2024-09-25 RX ADMIN — LEVALBUTEROL HYDROCHLORIDE 0.63 MG: 0.63 SOLUTION RESPIRATORY (INHALATION) at 16:31

## 2024-09-25 RX ADMIN — IPRATROPIUM BROMIDE 0.5 MG: 0.5 SOLUTION RESPIRATORY (INHALATION) at 04:22

## 2024-09-25 RX ADMIN — MIDAZOLAM 2 MG: 1 INJECTION INTRAMUSCULAR; INTRAVENOUS at 12:07

## 2024-09-25 RX ADMIN — Medication 200 MCG/HR: at 14:08

## 2024-09-25 RX ADMIN — MIDAZOLAM HYDROCHLORIDE 16 MG/HR: 1 INJECTION, SOLUTION INTRAVENOUS at 06:42

## 2024-09-25 RX ADMIN — MIDAZOLAM HYDROCHLORIDE 16 MG/HR: 1 INJECTION, SOLUTION INTRAVENOUS at 01:27

## 2024-09-25 RX ADMIN — DEXMEDETOMIDINE HYDROCHLORIDE 1.2 MCG/KG/HR: 400 INJECTION INTRAVENOUS at 01:17

## 2024-09-25 RX ADMIN — LEVALBUTEROL HYDROCHLORIDE 0.63 MG: 0.63 SOLUTION RESPIRATORY (INHALATION) at 23:49

## 2024-09-25 RX ADMIN — MIDAZOLAM HYDROCHLORIDE 10 MG/HR: 1 INJECTION, SOLUTION INTRAVENOUS at 18:11

## 2024-09-25 RX ADMIN — LEVALBUTEROL HYDROCHLORIDE 0.63 MG: 0.63 SOLUTION RESPIRATORY (INHALATION) at 08:17

## 2024-09-25 RX ADMIN — PANTOPRAZOLE SODIUM 40 MG: 40 INJECTION, POWDER, FOR SOLUTION INTRAVENOUS at 07:53

## 2024-09-25 RX ADMIN — DEXMEDETOMIDINE HYDROCHLORIDE 1.2 MCG/KG/HR: 400 INJECTION INTRAVENOUS at 14:46

## 2024-09-25 RX ADMIN — ATOVAQUONE 1500 MG: 750 SUSPENSION ORAL at 07:53

## 2024-09-25 RX ADMIN — MIDAZOLAM HYDROCHLORIDE 16 MG/HR: 1 INJECTION, SOLUTION INTRAVENOUS at 11:53

## 2024-09-25 RX ADMIN — FENTANYL CITRATE 100 MCG: 50 INJECTION INTRAMUSCULAR; INTRAVENOUS at 13:58

## 2024-09-25 RX ADMIN — LEVALBUTEROL HYDROCHLORIDE 0.63 MG: 0.63 SOLUTION RESPIRATORY (INHALATION) at 04:22

## 2024-09-25 RX ADMIN — INSULIN HUMAN 1.5 UNITS/HR: 1 INJECTION, SOLUTION INTRAVENOUS at 01:37

## 2024-09-25 RX ADMIN — CHLORHEXIDINE GLUCONATE 0.12% ORAL RINSE 15 ML: 1.2 LIQUID ORAL at 19:39

## 2024-09-25 RX ADMIN — DEXMEDETOMIDINE HYDROCHLORIDE 1.2 MCG/KG/HR: 400 INJECTION INTRAVENOUS at 07:53

## 2024-09-25 RX ADMIN — ONDANSETRON 4 MG: 2 INJECTION INTRAMUSCULAR; INTRAVENOUS at 13:40

## 2024-09-25 RX ADMIN — HEPARIN SODIUM 5000 UNITS: 5000 INJECTION, SOLUTION INTRAVENOUS; SUBCUTANEOUS at 03:49

## 2024-09-25 RX ADMIN — DEXMEDETOMIDINE HYDROCHLORIDE 1.2 MCG/KG/HR: 400 INJECTION INTRAVENOUS at 11:13

## 2024-09-25 RX ADMIN — Medication 50 MG: at 12:10

## 2024-09-25 RX ADMIN — KETAMINE HYDROCHLORIDE 175 MG/HR: 100 INJECTION, SOLUTION, CONCENTRATE INTRAMUSCULAR; INTRAVENOUS at 10:19

## 2024-09-25 RX ADMIN — DEXMEDETOMIDINE HYDROCHLORIDE 1.2 MCG/KG/HR: 400 INJECTION INTRAVENOUS at 04:18

## 2024-09-25 RX ADMIN — LEVALBUTEROL HYDROCHLORIDE 0.63 MG: 0.63 SOLUTION RESPIRATORY (INHALATION) at 00:09

## 2024-09-25 RX ADMIN — SODIUM CHLORIDE, POTASSIUM CHLORIDE, SODIUM LACTATE AND CALCIUM CHLORIDE: 600; 310; 30; 20 INJECTION, SOLUTION INTRAVENOUS at 12:58

## 2024-09-25 RX ADMIN — Medication 200 MG: at 13:57

## 2024-09-25 RX ADMIN — MONTELUKAST 10 MG: 10 TABLET, FILM COATED ORAL at 21:34

## 2024-09-25 RX ADMIN — DEXMEDETOMIDINE HYDROCHLORIDE 1.2 MCG/KG/HR: 400 INJECTION INTRAVENOUS at 21:47

## 2024-09-25 RX ADMIN — DEXMEDETOMIDINE HYDROCHLORIDE 1.2 MCG/KG/HR: 400 INJECTION INTRAVENOUS at 18:09

## 2024-09-25 RX ADMIN — Medication 30 MG: at 12:49

## 2024-09-25 RX ADMIN — Medication 2 G: at 12:13

## 2024-09-25 RX ADMIN — Medication 20 NG/KG/MIN: at 20:05

## 2024-09-25 RX ADMIN — MAGNESIUM SULFATE HEPTAHYDRATE 2 G: 2 INJECTION, SOLUTION INTRAVENOUS at 05:22

## 2024-09-25 RX ADMIN — IPRATROPIUM BROMIDE 0.5 MG: 0.5 SOLUTION RESPIRATORY (INHALATION) at 20:12

## 2024-09-25 ASSESSMENT — ACTIVITIES OF DAILY LIVING (ADL)
ADLS_ACUITY_SCORE: 65
ADLS_ACUITY_SCORE: 61
ADLS_ACUITY_SCORE: 65
ADLS_ACUITY_SCORE: 61
ADLS_ACUITY_SCORE: 65

## 2024-09-25 ASSESSMENT — ENCOUNTER SYMPTOMS: SEIZURES: 1

## 2024-09-25 NOTE — CONSULTS
"    Interventional Radiology  [facility] Consult Service Note  09/25/24   3:26 PM    Consult Requested: Tunneled CVC removal    Recommendations/Plan:    Patient will be added to IR schedule on next week for a right internal jugular cuffed tunneled central venous catheter removal. Timing of procedure is TBD based on staffing/schedule and triage.    This is a 53 year old female with history of right internal jugular high-flow cuffed dual lumen tunneled CVC placed for CRRT on 8/23/24. Patient's team requesting non-urgent removal, as she has renal recovery and catheter is no longer needed. She will be on the unit until mid/late next week.      Labs WNL for procedure.   Preprocedural orders entered, as well as orders for procedure. No NPO required.    Consent will be done prior to procedure.     Please contact the IR charge RN at [phone] for estimated time of procedure.     Recommendations were reviewed with requesting team.        Pertinent Imaging Reviewed: X-ray, 9/25/24, 9/24/24    Expected date of discharge:  TBD    Vitals:   /71 (BP Location: Left arm)   Pulse 68   Temp 98.6  F (37  C) (Axillary)   Resp 26   Ht 1.575 m (5' 2\")   Wt 77.6 kg (171 lb 1.2 oz)   SpO2 96%   BMI 31.29 kg/m      Pertinent Labs:   Lab Results   Component Value Date    WBC 7.6 09/25/2024    WBC 7.6 09/24/2024    WBC 7.9 09/23/2024     Lab Results   Component Value Date    HGB 8.2 09/25/2024    HGB 8.9 09/24/2024    HGB 6.9 09/24/2024     Lab Results   Component Value Date     09/25/2024     09/24/2024     09/23/2024     Lab Results   Component Value Date    INR 1.29 (H) 09/22/2024    PTT 33 09/22/2024     Lab Results   Component Value Date    POTASSIUM 4.0 09/25/2024        COVID-19 Antibody Results, Testing for Immunity           No data to display              COVID-19 PCR Results          8/10/2024    09:40 8/30/2024    10:58   COVID-19 PCR Results   SARS CoV2 PCR Negative  Negative  "       Venkat Schilling PA-C  Interventional Radiology  Pager: [phone]

## 2024-09-25 NOTE — PROCEDURES
PROCEDURE NOTE: PERCUTANEOUS DILATIONAL TRACHEOSTOMY:  Indication: hypoxic respiratory failure; inability to wean from the ventilator  Consent: on chart  Timeout done with nursing    Sedation as charted and paralytic per anesthesia team.  100% FiO2 and volume control ventilation.     First tracheal ring identified with ultrasound, and confirmed lack of thyroid or vessels in the target area for incision.      Chlorhexidine, sterile towels, hand hygiene, gloves, gown, mask, bouffant.    2cm skin nick.  Blunt dissection to strap muscles.  ETT backed up to 19cm with bronchoscope.  Needle under direct visualization between first and second tracheal ring.  Wire up.  Needle out.  Dilator over wire under direct bronchoscopic visualization.  Blue rhino over wire under direct bronchoscopic visualization.  8.0 Shiley over introducer and wire under direct bronchoscopic visualiztion.  Wire / dilator out, inner cannula placed; bronchoscope through trach confirmed good position.  Cuff up.  Hooked up to ventilator with good tidal volumes and CO2 return.  Orally extubated and posterior oropharynx suctioned.  Secured with two loose stitches, strap ties around neck.      No desaturation event, arrhythmias, or other complication.  Post-procedure CXR pending.    I was present and scrubbed for the entire procedure.    Therapeutic anticoagulation, if indicated for this patient, can begin 1h after completion of the procedure.  Minor blood oozing around the trach site is to be expected, especially with therapeutic anticoagulation.  The SICU team will perform the initial trach change and downsizing in ~10 days if the patient is still hospitalized and doing well at that time.  It is ok to attempt trach dome / speech valve / swallow studies before then if clinically indicated, although I anticipate that speech and swallow attempts may be difficult until the trach is downsized.    Please reach out to the SICU fellow on call, or me personally,  with questions.    BEN Carrasco MD  Clinical   Anesthesia / Critical Care  *[phone]

## 2024-09-25 NOTE — ANESTHESIA CARE TRANSFER NOTE
Patient: Massiel Flaherty    Procedure: Procedure(s):  Endoscopic Insertion of Percutaneous Gastrostomy Tube, ESOPHAGOGASTRODUODENOSCOPY  CREATION, TRACHEOSTOMY, PERCUTANEOUS, Flexibile Bronchoscopy       Diagnosis: Acute respiratory failure with hypoxia [J96.01]  Diagnosis Additional Information: No value filed.    Anesthesia Type:   General     Note:    Oropharynx: oropharynx clear of all foreign objects and ventilatory support (new trach in place)  Level of Consciousness: iatrogenic sedation  Patient oxygen source: Pt on transport vent with velitri.  Level of Supplemental Oxygen (L/min / FiO2): 50%  Independent Airway: airway patency not satisfactory and stable  Dentition: dentition unchanged  Vital Signs Stable: post-procedure vital signs reviewed and stable  Report to RN Given: handoff report given  Patient transferred to: ICU    ICU Handoff: Call for PAUSE to initiate/utilize ICU HANDOFF, Identified Patient, Identified Responsible Provider, Reviewed the Pertinent Medical History, Discussed Surgical Course, Reviewed Intra-OP Anesthesia Management and Issues during Anesthesia, Set Expectations for Post Procedure Period and Allowed Opportunity for Questions and Acknowledgement of Understanding      Vitals:  Vitals Value Taken Time   /84 1405   Temp     Pulse 79 1405   Resp 26 1405   SpO2 99% 1405       Electronically Signed By: Danae Lopez  September 25, 2024  2:10 PM

## 2024-09-25 NOTE — PROGRESS NOTES
MEDICAL ICU PROGRESS NOTE  /09/25/2024    Date of Service (when I saw the patient): 09/25/2024    ASSESSMENT: Massiel Flaherty is a 53 year old female with PMH Anxiety/depression, fibromyalgia, c/f nonepileptic seizures not on AEDs, hypothyroidism, asthma, HLD, MURIEL on CPAP, expressive aphasia, hypertension who was admitted on 8/3/2024 for fatigue, fever and dyspnea and intubated 8/6/24 at OSH for ARDS, proned and paralyzed without improvement. Transferred to [facility] 8/8/24, hypoxic with high plateaus/peaks and placed on VV ECMO and CRRT. Decannulated from VV ECMO 8/18 and transferred to MICU 8/26/24 for ongoing management. Extubated 8/27 then reintubated 8/29 iso worsening AHRF and pseudomonas pneumonia. Extubated again 9/16 to BiPAP/HFNC, re-intubated 9/20 with tachypnea, increased WOB, fevers, and new lung opacities c/f possible HAP vs ILD/vasculitis/organizing pneumonia flare. Course complicated by tension pneumothorax while re-intubated 9/20 now s/p right chest tube.      CHANGES and MAJOR THINGS TODAY:   - Decrease PEEP to 5, continue to titrate fiO2  - Start steroid taper, plan for 60 q12 x 3 days  - Stop date added to tobramycin nebs and Fluconazole   - Monitor I/O- goal negative 500-1000 ml today, dose diuretics as needed    - Trach/peg planned for this afternoon   - Remove dialysis catheter       PLAN:    Neuro:  # Pain and sedation  # Concern for ICU delirium   # Hx Fibromyalgia   # Hx myalgic encephalomyelitis   # Hx anxiety/depression  - Pain: Oxycodone 2.5 mg q6hrs PRN, fentanyl bolus PRN   - Sedation plan:   - Sedated with midazolam, fentanyl, ketamine, and precedex infusion  - Vecuronium infusion off since 9/23 at 1200   - After trach and peg, plan will start sedation wean with midazolam being the first medication to titrate down   - Gabapentin to 300mg TID held while sedated  - Psych meds (venlafaxine and amitriptyline) held while sedated     # Hx spells with staring and BUE posturing previously  described as nonepileptic seizures   # Hx of GTC seizures and myoclonic epilepsy, weaned off AEDs   # Diffuse cerebral edema - improved  - 8/21: MRI Brain: no acute intracranial pathology. No findings to suggest anoxic brain injury.   - MRI brain 9/20: no anoxic brain injury  - While extubated, patient able to follow commands and answer yes/no questions      Pulmonary:  # Acute hypoxic respiratory failure c/b ARDS  # Pseudomonas pneumonia (s/p treatment)  # Mechanical ventilation  # s/p VV ECMO 8/8 - 8/18   # Hx CAP  # Asthma  # MURIEL on home CPAP  PFT dated 9/8/2020 demonstrated normal spirometry with mild diffusion impairment and mild restriction (FEV1/FVC 80%, FEV1 2.59, DLCO 40%). CT abdomen chest 3/9/2020 demonstrated scarring and fibrosis bilaterally which correlated with the decreased DLCO. The patient also has a history of MURIEL on CPAP therapy as currently managed by her provider in [place]. Unclear what triggered ARDS or why she has had such severe hospital course, further investigated ILD but results all unremarkable. Extubated 8/27, reintubated 8/29 for worsening O2 demands and diffuse opacities iso new/recurrent psuedomonas pneumonia. Bronch with BAL 8/30, pseudomonas growing as below. Extubated again 9/16, worsening respiratory distress noted 9/19 with repeat polymicrobial growth on respiratory sputum raising c/f aspiration/hospital associated pneumonia. 9/20 with increased WOB, new fevers, and CXR significant for opacities c/f possible HAP vs ILD/vasculitis/organizing pneumonia and was reintubated 9/20.   - ILD w/u aldolase, EVELIO, RF, CCP, ANCA, MPO, SSA Ro and La, Scleroderma antibody, Radha 1,  hypersensity pneumonitis, IGG subclasses,  aspergillus, blastomyces, histoplasma negative  - SpO2 goals >92%, PaO2 goals >60   - PTA singular at bedtime if able, duonebs q4h RT  - Discontinued pulmicort due to continued pseudomonas infection  - Lung protective ventilation strategies given ARDS   - Steroids:  -  CRP trend 9/21 487,  9/23 166, 9/24 77, 9/25 47  - Methylpred 60mg q6H 9/21- 9/24  - Begin Methlypred taper today 9/25 @ 60 q12 x3 days following with 60 daily   - ABGs q6hrs, likely okay to de-escalate to daily and with vent changes following trach   - 9/17: Started levalbuterol nebs, PTA pulmicort nebs, saline nebs, trialed aminophylline gtt for increased respiratory drive but lead to tachycardia so discontinued, holding off on further airway clearance while intubated with chest tube in place  - 9/18: trial of dornase - discontinue now that intubated  - Bronchoscopy 9/21  - 9/25: Tolerated vent changes overnight, decrease PEEP this AM to 5  - Plan for tracheostomy today around 1400     FiO2 (%): 50 %, Resp: 26, Vent Mode: CMV/AC, Resp Rate (Set): 26 breaths/min, Tidal Volume (Set, mL): 420 mL, PEEP (cm H2O): 6 cmH2O, Resp Rate (Set): 26 breaths/min, Tidal Volume (Set, mL): 420 mL, PEEP (cm H2O): 6 cmH2O       Pneumothorax, resolved   Tension pneumothorax during re-intubation 9/20, concern for fibrotic lung disease and possible barotrauma during bag ventilation during re-intubation vs complication of re-intubation in general. Leave in while intubated.   - Chest tube to water seal  - Daily XR  - Plan to trial clamping chest tube tomorrow if all goes well with trach/peg with hopes to remove later this week     Cardiovascular:  # Hyperlipidemia  # Hx HTN   # Sinus Tachycardia, resolved   - MAP goal >65, SBP goals >110  - PTA atorvastatin  - PTA clonidine held while sedated  - Lower extremity ultrasound without vascular pathology, no hematoma or clots  - Monitor discoloration of extremities for necrosis  - PRN hydralazine for SBP > 200      GI/Nutrition:  # Severe malnutrition (see RD note)   # Non-infectious Diarrhea  # GERD   - Protonix (home medication)   - Nutrition consulted, appreciate recs  - Diet: TF at goal, on high fiber feed   - Hold bowel regimen d/t loose stools  - Continue scheduled multivitamin  -  Loperamide PRN  - Rectal tube, continues with high output  - NJ pulled back to gastric on 9/23 to ensure patient tolerates gastric feeds, patient tolerating. Plan for peg tube placement today      Renal/Fluids/Electrolytes:  # Acute kidney injury, resolved   # AGMA, improving   # Elevated lactate, resovled  Baseline Cr approx 0.6. Pt was anuric required CRRT here but now making urine, likely prerenal due to shock.  - Adequate I/Os, daily weights   - Avoid nephrotoxins as able  - RN managed electrolyte replacement protocol  - Diuresis:   - Received lasix 20 mg, 40 mg, 20 mg on 9/24   - Continue today with goal negative 500 to 1000 ml   - Will dose lasix this afternoon following procedure     Hypernatremia  - FWF 100ml q4H  - Continue to trend    Endocrine:  # Steroid and stress induced hyperglycemia  # Hypothyroidism   - Goal to keep BG < 180 for optimal wound healing  - Continue on insulin infusion while on high dose steroids   - Continue PTA synthroid     ID:  # Leukocytosis, resolved   # Concern for aspiration pneumonia/hospital acquired pnemonia (9/19 - )  # Psuedomonas pneumonia (s/p treatment)  # Hx CAP  Respiratory cx 8/29 pseudomonas pneumonia. Intermediate susceptability to meropenem, more significant sensitivities to ciprofloxacin. New leukocytosis, elevated PC, CRP and lactate 9/19 with sputum cx 4+ psuedomonas, 2+ GPCs, and 1+ GPBs could be colonized so waiting for BAL studies 9/21  - Continue to monitor fever curve and inflammatory markers as appropriate  - ID now signed off   - Repeat cultures and sputum growing pseudomonas with similar resistance pattern to previous.   - Restarted tobramycin nebs 9/21     Abx  - Meropenem 8/29 - 9/1  - Vancomycin 8/29 - 8/30  - Tobramycin x 1 8/29  - Vancomycin 9/5 - 9/8    - Tobramycin nebs BID 9/1 - 9/11, 9/21- current (5 day course, stop date added)   - Ciprofloxacin infusion 9/1 - 9/11  - Metronidazole 9/6 - 9/11  - Atovaquone 9/11 -   - Vancomycin 9/19 - 9/20  -  Zosyn 9/19 - 9/21  - Ciprofloxacin 9/19 - 9/21     PJP Ppx  Given Dex-ARDS Massiel ferraro remain on steriods for > 3 weeks so will initiate PJP ppx. Given history of allergy to sulfa, will obtain G6PD test to determine if dapsone can be used: levels are ok, however continuing atovaquone.   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   - Ganciclovir 9/6 - 9/8, discontinued given ID recs  - trend CMV 9/16  - CMV levels recheck 9/18 289    Thrush  Noted 9/21  - Fluconazole x 7 days (stop date added)      # HSV-1  Mouth sores present, which could have viral etiology. 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  - Acyclovir 400mg BID x5 days (8/22-8/27)     Hematology:    # Anemia of critical illness  # Right groin Hematoma   Acutely decreased Hgb from 8.0 to 6.6 on 9/20 of unknown etiology that required transfusion x1, however 9/19 LE US notable to 14.9 cm hematoma in R groin near prior canal that was not previously appreciated on CT abdomen c/f possible bleed.   - Hematoma stable on repeat US 9/21  - Received 1 unit pRBCs 9/24 for hgb 6.9  - Daily CBC  - Transfuse if hgb <7.0 or signs/symptoms of hypoperfusion. Monitor and trend.      Musculoskeletal/Rheum:  # Alopecia  - Hold PTA hydroxychloroquine      # Weakness and deconditioning of critical illness  # Left ankle injury pre-hospitalization    - Physical and occupational therapy when able.      Skin:  # BLE scattered ecchymosis  # Left toe duskiness  # Peripheral Edema  - WOC consult  - OT consult for lymphedema therapy     General Cares/Prophylaxis:  DVT Prophylaxis: SubQ heparin  GI Prophylaxis: PPI  Restraints: no  Family: Mother updated over the phone   Code Status: Full Code     Lines/tubes/drains:  - ETT (9/20)  - NJ (8/9)  - LIJ CVC (8/8)  - PIV x3  - Rectal tube (8/17)  - Tunneled HD line (8/23)- plan to remove today   - New radial A line, right, 9/20  - Chest tube 9/20     Disposition:  - Medical  ICU     Patient seen and findings/plan discussed with medical ICU staff, Dr. Perlman.    DOREEN Quinones CNP    Critical Care time: 40 minutes     Clinically Significant Risk Factors         # Hypernatremia: Highest Na = 147 mmol/L in last 2 days, will monitor as appropriate    # Hypomagnesemia: Lowest Mg = 1.6 mg/dL in last 2 days, will replace as needed  # Anion Gap Metabolic Acidosis: Highest Anion Gap = 19 mmol/L in last 2 days, will monitor and treat as appropriate  # Hypoalbuminemia: Lowest albumin = 2.2 g/dL at 8/10/2024  9:47 AM, will monitor as appropriate                  # Severe Malnutrition: based on nutrition assessment    # Financial/Environmental Concerns: none              ====================================  INTERVAL HISTORY:   No acute overnight events. Patient tolerated vent changes overnight. Plan for trach/peg this afternoon     OBJECTIVE:   1. VITAL SIGNS:   Temp:  [98  F (36.7  C)-99  F (37.2  C)] 99  F (37.2  C)  Pulse:  [] 80  Resp:  [19-39] 32  MAP:  [75 mmHg-241 mmHg] 95 mmHg  Arterial Line BP: (107-244)/() 128/72  FiO2 (%):  [45 %-60 %] 60 %  SpO2:  [84 %-98 %] 98 %  FiO2 (%): (S) 60 %, Resp: (!) 32, Vent Mode: CMV/AC, Resp Rate (Set): 26 breaths/min, Tidal Volume (Set, mL): 420 mL, PEEP (cm H2O): 6 cmH2O, Resp Rate (Set): 26 breaths/min, Tidal Volume (Set, mL): 420 mL, PEEP (cm H2O): 6 cmH2O  2. INTAKE/ OUTPUT:   I/O last 3 completed shifts:  In: 4033.4 [I.V.:1618.4; NG/GT:940]  Out: 4094 [Urine:3850; Stool:200; Chest Tube:44]    3. PHYSICAL EXAMINATION:  General: Intubated, sedated  HEENT: NC/AT  Neuro: deeply sedated  Pulm/Resp: Course breath sounds bilaterally on mechanical vents    CV: RRR, S1/2, no m/r/g  Abdomen: Soft, tender, bowel sounds present, right groin hematoma stable  : Gonzalez with yellow, clear urine   Incisions/Skin: Trace to mild edema in all extremities, equally. Lower leg ecchymosis present and scattered. LL inguinal area with ecchymotic  discoloration     4. LABS:   Arterial Blood Gases   Recent Labs   Lab 09/25/24 0400 09/24/24 2200 09/24/24 1942 09/24/24  1710   PH 7.35 7.34* 7.32* 7.26*   PCO2 47* 45 47* 53*   PO2 103 109* 81 96   HCO3 26 24 24 24     Complete Blood Count   Recent Labs   Lab 09/25/24 0400 09/24/24  1432 09/24/24  0350 09/23/24  0404 09/22/24  1759   WBC 7.6  --  7.6 7.9 6.7   HGB 8.2* 8.9* 6.9* 7.6* 7.6*     --  221 323 265     Basic Metabolic Panel  Recent Labs   Lab 09/25/24  0610 09/25/24  0521 09/25/24 0405 09/25/24 0400 09/24/24 1946 09/24/24 1942 09/24/24  0355 09/24/24  0350 09/23/24  0556 09/23/24  0404   NA  --   --   --  147*  --  146*  --  147*  --  147*   POTASSIUM  --   --   --  4.0  --  4.5  --  3.8  --  3.5   CHLORIDE  --   --   --  107  --  108*  --  109*  --  107   CO2  --   --   --  23  --  21*  --  19*  --  22   BUN  --   --   --  54.5*  --  59.0*  --  65.7*  --  56.0*   CR  --   --   --  0.39*  --  0.45*  --  0.57  --  0.73   GLC 96 103* 143* 154*   < > 144*   < > 114*   < > 125*    < > = values in this interval not displayed.     Liver Function Tests  Recent Labs   Lab 09/23/24 0404 09/22/24 0759 09/20/24  1312   AST 57* 13  --    ALT 93* 56*  --    ALKPHOS 462* 207*  --    BILITOTAL 0.5 0.2  --    ALBUMIN 2.7* 2.5*  --    INR  --  1.29* 1.45*     Coagulation Profile  Recent Labs   Lab 09/22/24 0759 09/20/24  1312   INR 1.29* 1.45*   PTT 33  --        5. RADIOLOGY:   Recent Results (from the past 24 hour(s))   XR Chest Port 1 View    Impression    RESIDENT PRELIMINARY INTERPRETATION  IMPRESSION:   1. Stable bilateral mixed interstitial and airspace opacities  throughout the lungs. Ongoing small bilateral pleural effusions.  2. Stable support devices, including right chest tube with possible  kink along the right chest wall.

## 2024-09-25 NOTE — PROGRESS NOTES
ICU Daily Rounding Checklist     Checklist Response Notes   Can sedation be reduced?  Yes Following trach/peg   Can analgesia be reduced? Yes Following trach/peg   Is delirium being assessed, addressed and prevented? Yes Limited with current sedation    Spontaneous awakening trial and/or Spontaneous breathing trial candidate?  No    Total fluid balance goal reviewed?  Yes Target Goals: negative  500 ml to 1 L [24h]   Is the patient at goals for lung protective ventilation? No TV increased overnight for vent dyssynchrony; airway pressures remain stable    Head of bed elevation (30 degrees)? Yes    Skin breakdown assessment (prevention) completed? Yes    Is enteral nutrition at goal? Yes    Is blood glucose at goal? Yes    Deep venous thrombosis prophylaxis? Yes      Gastric ulcer prophylaxis?  Yes If coagulopathy (INR-1.5 PTT2x normal. Ph<50k), mechanical ventilation 48hr, history of GI bleed/ulcer within past year. TBL, SCI, or burn, or if >= 2 minor risk factors (sepsis, ICU stay 1 week, occult GI bleed > 6 days. glucocorticoid therapy, NSAID use, antiplatelet use)   Can Antibiotics be narrowed or discontinued? No    Early mobility candidate and physical therapy consulted? Yes    Is rolle catheter needed? Yes Diuresis    Is central venous/arterial catheter needed? Yes/No Plan to remove dialysis line    Has the family been updated? Yes    Are the patient's goals of care and code status current? Yes

## 2024-09-25 NOTE — BRIEF OP NOTE
[facility]    Brief Operative Note    Pre-operative diagnosis: Acute respiratory failure with hypoxia [J96.01]  Post-operative diagnosis Same as pre-operative diagnosis    Procedure: ESOPHAGOGASTRODUODENOSCOPY, WITH PEG TUBE INSERTION, N/A - Abdomen  CREATION, TRACHEOSTOMY, PERCUTANEOUS, N/A - Neck    Surgeon: Surgeons and Role:     * Barry Hatch MD - Primary     * Cheryle Renae MD - Assisting     * Morales Saleh MD - Assisting     * Hieu Powell MD - Fellow - Assisting  Anesthesia: General   Estimated Blood Loss: Minimal    Drains: None  Specimens: * No specimens in log *  Findings:   Distal mild esophagitis . PEG tube placed, bumper at 3.5 cm at the skin.  Complications: None.  Implants: * No implants in log *    OK for meds through PEG in 4 hours  OK for TFs to start tomorrow AM  Sutures to remain in place for 1-2 weeks    Hieu Powell  Surgical Critical Care Fellow

## 2024-09-25 NOTE — OP NOTE
"Operative Note     Pre-operative diagnosis:         Acute respiratory failure with hypoxia [J96.01]  Post-operative diagnosis        Same as pre-operative diagnosis     Procedure:      ESOPHAGOGASTRODUODENOSCOPY, WITH PEG TUBE INSERTION, N/A - Abdomen  CREATION, TRACHEOSTOMY, PERCUTANEOUS, N/A - Neck     Surgeon:         Surgeons and Role:     * Barry Hatch MD - Primary     * Cheryle Renae MD - Assisting     * Morales Saleh MD - Assisting     * Hieu Powell MD - Fellow - Assisting  Anesthesia:     General             Estimated Blood Loss: Minimal     Drains: None  Specimens:     * No specimens in log *  Findings:                     Distal mild esophagitis . PEG tube placed, bumper at 3.5 cm at the skin.  Complications:            None.  Implants:         * No implants in log *    Clinical history:    The patient's family was provided full informed consent; the procedure's risks and benefits were discussed. The risks discussed included the possible risks of bleeding, infection, and injury to the bowel. The family understood these risks and wished to proceed.    Procedure:      The patient was taken to the main OR.  Under general anesthesia, the patient underwent a percutaneous tracheostomy procedure with Dr. Caldera.  Following the completion of the percutaneous tracheostomy procedure the team re-scrubbed and re-draped.  After a \"time out\" procedure to confirm the patient's identification the abdomen was prepped and draped in a sterile fashion.   The EGD was performed, and esophagus demonstrated signs of subacute esophagitis; however, the stomach appeared normal. The GEG tube placement procedure was performed using the a wire through the needle technique Theresaky \"pull\" technique.  Transillumination was confirmed at the anterior abdominal wall. The one-to-one gastric indentation was confirmed with the finger pressure on the anterior abdominal wall after infiltration with 1% lidocaine at the site " "of the transillumination. An 8 mm transverse incision was made using a #11 blade. The needle was passed to the stomach, and the wire was grasped with the snare. The wire was pulled up to the oral cavity, and the Ponsky \"PULL\" PEG tube was attached to the wire. The PEG tube was pulled into the proper position, and the tube was positioned at a level of 3.5 cm at the skin. The PEG tube was sutured at the level of the skin with 2-0 sutures. The tube was placed to gravity. The patient tolerated the procedure well.  No compicatoins noted.  I was present and supervised the entire procedure.      Barry Hatch M.D., Ph.D.   "

## 2024-09-25 NOTE — ANESTHESIA PREPROCEDURE EVALUATION
Anesthesia Pre-Procedure Evaluation    Patient: Massiel Flaherty   MRN: [id number] : 1970        Procedure : Procedure(s):  ESOPHAGOGASTRODUODENOSCOPY, WITH PEG TUBE INSERTION  CREATION, TRACHEOSTOMY, PERCUTANEOUS          No past medical history on file.   Past Surgical History:   Procedure Laterality Date    IR CVC TUNNEL PLACEMENT > 5 YRS OF AGE  2024      Allergies   Allergen Reactions    Celecoxib Unknown    Sulfa Antibiotics Hives     Per careeverywhere    Tetracycline Hives     Per careeverywhere      Social History     Tobacco Use    Smoking status: Not on file    Smokeless tobacco: Not on file   Substance Use Topics    Alcohol use: Not on file      Wt Readings from Last 1 Encounters:   24 77.6 kg (171 lb 1.2 oz)        Anesthesia Evaluation   Pt has had prior anesthetic.         ROS/MED HX  ENT/Pulmonary: Comment: Expressive aphagia    (+) sleep apnea,                     asthma                  Neurologic:     (+)       seizures,                         Cardiovascular:     (+) Dyslipidemia hypertension- -   -  - -                                      METS/Exercise Tolerance:     Hematologic:       Musculoskeletal: Comment: Fibromylagia      GI/Hepatic:       Renal/Genitourinary:     (+) renal disease,  Pt does not require dialysis,           Endo:     (+)          thyroid problem, hypothyroidism,           Psychiatric/Substance Use:     (+) psychiatric history anxiety and depression       Infectious Disease:       Malignancy:       Other:            Physical Exam    Airway   unable to assess          Respiratory Devices and Support    ETT:      Dental           Cardiovascular             Pulmonary                   OUTSIDE LABS:  CBC:   Lab Results   Component Value Date    WBC 7.6 2024    WBC 7.6 2024    HGB 8.2 (L) 2024    HGB 8.9 (L) 2024    HCT 26.3 (L) 2024    HCT 22.7 (L) 2024     2024     2024     BMP:   Lab Results  "  Component Value Date     (H) 09/25/2024     (H) 09/24/2024    POTASSIUM 4.0 09/25/2024    POTASSIUM 4.5 09/24/2024    CHLORIDE 107 09/25/2024    CHLORIDE 108 (H) 09/24/2024    CO2 23 09/25/2024    CO2 21 (L) 09/24/2024    BUN 54.5 (H) 09/25/2024    BUN 59.0 (H) 09/24/2024    CR 0.39 (L) 09/25/2024    CR 0.45 (L) 09/24/2024     (H) 09/25/2024     (H) 09/25/2024     COAGS:   Lab Results   Component Value Date    PTT 33 09/22/2024    INR 1.29 (H) 09/22/2024    FIBR 841 (H) 09/22/2024     POC: No results found for: \"BGM\", \"HCG\", \"HCGS\"  HEPATIC:   Lab Results   Component Value Date    ALBUMIN 2.7 (L) 09/23/2024    PROTTOTAL 5.6 (L) 09/23/2024    ALT 93 (H) 09/23/2024    AST 57 (H) 09/23/2024    ALKPHOS 462 (H) 09/23/2024    BILITOTAL 0.5 09/23/2024     OTHER:   Lab Results   Component Value Date    PH 7.43 09/25/2024    LACT 0.9 09/20/2024    A1C 5.7 (H) 08/19/2024    QUAN 8.4 (L) 09/25/2024    PHOS 2.8 09/25/2024    MAG 1.6 (L) 09/25/2024    LIPASE 71 (H) 09/15/2024    SED 87 (H) 08/26/2024       Anesthesia Plan    ASA Status:  4    NPO Status:  NPO Appropriate    Anesthesia Type: General.     - Airway: ETT   Induction: Inhalation.   Maintenance: Balanced.        Consents    Anesthesia Plan(s) and associated risks, benefits, and realistic alternatives discussed. Questions answered and patient/representative(s) expressed understanding.     - Discussed:     - Discussed with:  Implied consent/emergency            Postoperative Care    Pain management: IV analgesics.   PONV prophylaxis: Ondansetron (or other 5HT-3)     Comments:    Other Comments: Massiel Flaherty is a 53 year old female with PMH Anxiety/depression, fibromyalgia, c/f nonepileptic seizures not on AEDs, hypothyroidism, asthma, HLD, MURIEL on CPAP, expressive aphasia, hypertension who was admitted on 8/3/2024 for fatigue, fever and dyspnea and intubated 8/6/24 at OSH for ARDS, proned and paralyzed without improvement. Transferred to " [facility] 8/8/24, hypoxic with high plateaus/peaks and placed on VV ECMO and CRRT. Decannulated from VV ECMO 8/18 and transferred to MICU 8/26/24 for ongoing management. Extubated 8/27 then reintubated 8/29 iso worsening AHRF and pseudomonas pneumonia. Extubated again 9/16 to BiPAP/HFNC, re-intubated 9/20 with tachypnea, increased WOB, fevers, and new lung opacities c/f possible HAP vs ILD/vasculitis/organizing pneumonia flare. Course complicated by tension pneumothorax while re-intubated 9/20 now s/p right chest tube.           Leonardo Johnson MD    I have reviewed the pertinent notes and labs in the chart from the past 30 days and (re)examined the patient.  Any updates or changes from those notes are reflected in this note.

## 2024-09-25 NOTE — PLAN OF CARE
ICU End of Shift Summary. See flowsheets for vital signs and detailed assessment.    Changes this shift: Remains RASS -5, sedated with versed, ketamine, fentanyl, and precedex. HR 60-80, SR. PIP increase to upper 40s around 1800; TV adjusted from 420 to 350. See flowsheets for detailed vent changes. Current vent settings CMV, FiO2 45%, RR 26, , PEEP 5. Trach and PEG placed this afternoon. Okay to use PEG for meds after 1800. Gonzalez and rectal tube remain in place.     Plan: Continue current plan of care. TF can be resumed starting tomorrow. IR to remove tunneled HD line 10/1. Waiting on ABG results from TV adjustment.    Goal Outcome Evaluation:      Plan of Care Reviewed With: patient    Overall Patient Progress: no change    Outcome Evaluation: maintaining gas exchange, decreased FiO2 and sedation

## 2024-09-25 NOTE — PROCEDURES
[facility]      Procedure note     Procedure : Percutaneous endoscopic gastrostomy (PEG) tube placement     Abdominal part : Hieu Powell MD, Cheryle Renae MD.  EGD : Morales Saleh MD  Supervising attending : Barry Hatch MD     Anesthesia : GA      PROCEDURE: After informed consent was checked, Time out confirmed right patient and procedure. Patient in the supine position, patient on ventilatory support through his tracheostomy. An EGD was performed from the mouth. Findings were significant for esophagitis, normal stomach. The stomach was transilluminated after insufflation and an optimal position for the PEG tube was identified using indentation. The skin was infiltrated with local and the needle and sheath were inserted through the abdomen into the stomach under direct visualization. The needle was removed and a guidewire was inserted through the sheath. The guidewire was grasped from above with a snare by the endoscopist. It was pulled out completely and the PEG tube was secured to the guidewire.    The guidewire and PEG tube were then pulled through the mouth and esophagus and snug to the abdominal wall. There was no evidence of active bleeding. The tube was fixed at 3.5 cm at the level of skin. The Bolster was placed on the PEG site and fixed to the skin with prolene 2-0 4 stitches. The patient tolerated the procedure well.     Post-Procedure instructions:   Tube feedings can be started tomorrow morning, meds can be started in 4 hours      Morales Saleh MD  PGY2 surgery resident  Pager ([phone]), Jodie

## 2024-09-25 NOTE — PLAN OF CARE
ICU End of Shift Summary. See flowsheets for vital signs and detailed assessment.    Changes this shift: RASS -5, fent 200, versed 16, ket 175, dex 1.2, SR 80-90s, SBPs up to 180s during movement, no chest tube output, 20 lasix given x1, current vent settings 50%, 420, 26 & 6, veletri at 20, NPO since midnight for procedure today, OG clamped, insulin running at 1.5 in alg 3, mag replaced.    Plan: Scheduled for EGD & PEG placement in OR @1400, trach?      Goal Outcome Evaluation:      Plan of Care Reviewed With: patient    Overall Patient Progress: no change    Outcome Evaluation: see note

## 2024-09-25 NOTE — PROGRESS NOTES
"Care Management Follow Up    Length of Stay (days): 48    Expected Discharge Date:  TBD      Concerns to be Addressed: discharge planning     Patient plan of care discussed at interdisciplinary rounds: Yes    Anticipated Discharge Disposition:  LTACH     Additional Information:  Per Chart review and Multi-disciplinary rounds discussion patient will have Tracheostomy placement and PEG placement. Still requires ventilator support and multi- medication sedation. Continues gentle diuresis.  Discussed with MICU team, family receives regular updates. No Care Conference needs for this week.     Next Steps:   1. Follow for discharge needs. Patient will likely need LTACH.   2. Patient has \" Return transfer agreement\" for Augustin Piedra, MSN, MA, CCM, RN  4C/4A/4E ICU Care Coordinator  Phone:[phone]  Available via [employer]     For Weekend & Holiday on call RN Care Coordinator:  [facility] ([phone]) Saturday & Sunday; [phone]) [facility] Recognized Holidays   Weekend 4C/4A/4E ICUs /6A Care Coordinator  Available via [employer]     "

## 2024-09-25 NOTE — PLAN OF CARE
End of Shift Summary (see flowsheet for detailed vital signs and assessments)    Major Shift Events:  Multiple vent setting changes done today.  60mg IV lasix given.      Neuro: RASS -5/ On multiple sedation meds (listed down below under infusions) to keep patient synchronous with vent.  CV: BP stable. SR to ST 80-100s HR.   Resp: CMV vent settings 50% FIO2/ 300 TV/ 34 RR/ 6 PEEP. Pt overbreathes vent at times using excessive abdominal muscles. Vent changes made and neb tx given. ABGs drawn. PRN versed and fentanyl to help with dyssynchronous with vent. Veletri at 20.  GI: LBM 9/25. Rectal tube in place with loose brown stools. OG to clamp. NG to gastric feeds 50ml/hr with 50ml q2h flushes. Patient tolerating TF.  : Gonzalez in place with adequate UOP.  Skin: L ear PI dressing changed today.  Lines: L internal jugular/ R internal jugular for HD/ 2 PIV/ R radial arterial line  Infusions: Precedex 1.2/ Versed 16/ Ketamine 175/ Fentanyl 200/ Insulin gtt 1.5 (Alg 3).      Plan: Care of plan continues. Notify team of changes/concerns.  NPO at 0400. Trach placement planned for tomorrow 9/25 1400.  Notify team if patient dyssynchronous with vent and potentially paralyze.    Care of Plan Reviewed With: Patient, mother, and son updated on POC    Overall Patient Progress:  Declining, multiple vent setting changes

## 2024-09-26 ENCOUNTER — APPOINTMENT (OUTPATIENT)
Dept: GENERAL RADIOLOGY | Facility: CLINIC | Age: 54
DRG: 003 | End: 2024-09-26
Attending: INTERNAL MEDICINE
Payer: MEDICAID

## 2024-09-26 ENCOUNTER — APPOINTMENT (OUTPATIENT)
Dept: GENERAL RADIOLOGY | Facility: CLINIC | Age: 54
DRG: 003 | End: 2024-09-26
Payer: MEDICAID

## 2024-09-26 LAB
ABO/RH(D): NORMAL
ALLEN'S TEST: ABNORMAL
ANION GAP SERPL CALCULATED.3IONS-SCNC: 11 MMOL/L (ref 7–15)
ANION GAP SERPL CALCULATED.3IONS-SCNC: 14 MMOL/L (ref 7–15)
ANTIBODY SCREEN: NEGATIVE
BASE EXCESS BLDA CALC-SCNC: 1.1 MMOL/L (ref -3–3)
BASE EXCESS BLDA CALC-SCNC: 1.3 MMOL/L (ref -3–3)
BASE EXCESS BLDA CALC-SCNC: 1.8 MMOL/L (ref -3–3)
BASE EXCESS BLDA CALC-SCNC: 2.1 MMOL/L (ref -3–3)
BASE EXCESS BLDA CALC-SCNC: 2.7 MMOL/L (ref -3–3)
BASE EXCESS BLDA CALC-SCNC: 2.8 MMOL/L (ref -3–3)
BASE EXCESS BLDA CALC-SCNC: 4 MMOL/L (ref -3–3)
BUN SERPL-MCNC: 34 MG/DL (ref 6–20)
BUN SERPL-MCNC: 42.2 MG/DL (ref 6–20)
CALCIUM SERPL-MCNC: 8 MG/DL (ref 8.8–10.4)
CALCIUM SERPL-MCNC: 8.1 MG/DL (ref 8.8–10.4)
CHLORIDE SERPL-SCNC: 108 MMOL/L (ref 98–107)
CHLORIDE SERPL-SCNC: 108 MMOL/L (ref 98–107)
COHGB MFR BLD: 93.6 % (ref 96–97)
COHGB MFR BLD: 97.1 % (ref 96–97)
COHGB MFR BLD: 99.9 % (ref 96–97)
COHGB MFR BLD: >100 % (ref 96–97)
CREAT SERPL-MCNC: 0.28 MG/DL (ref 0.51–0.95)
CREAT SERPL-MCNC: 0.31 MG/DL (ref 0.51–0.95)
EGFRCR SERPLBLD CKD-EPI 2021: >90 ML/MIN/1.73M2
EGFRCR SERPLBLD CKD-EPI 2021: >90 ML/MIN/1.73M2
ERYTHROCYTE [DISTWIDTH] IN BLOOD BY AUTOMATED COUNT: 19.6 % (ref 10–15)
GLUCOSE BLDC GLUCOMTR-MCNC: 100 MG/DL (ref 70–99)
GLUCOSE BLDC GLUCOMTR-MCNC: 101 MG/DL (ref 70–99)
GLUCOSE BLDC GLUCOMTR-MCNC: 105 MG/DL (ref 70–99)
GLUCOSE BLDC GLUCOMTR-MCNC: 110 MG/DL (ref 70–99)
GLUCOSE BLDC GLUCOMTR-MCNC: 111 MG/DL (ref 70–99)
GLUCOSE BLDC GLUCOMTR-MCNC: 113 MG/DL (ref 70–99)
GLUCOSE BLDC GLUCOMTR-MCNC: 117 MG/DL (ref 70–99)
GLUCOSE BLDC GLUCOMTR-MCNC: 118 MG/DL (ref 70–99)
GLUCOSE BLDC GLUCOMTR-MCNC: 118 MG/DL (ref 70–99)
GLUCOSE BLDC GLUCOMTR-MCNC: 119 MG/DL (ref 70–99)
GLUCOSE BLDC GLUCOMTR-MCNC: 124 MG/DL (ref 70–99)
GLUCOSE BLDC GLUCOMTR-MCNC: 124 MG/DL (ref 70–99)
GLUCOSE BLDC GLUCOMTR-MCNC: 125 MG/DL (ref 70–99)
GLUCOSE BLDC GLUCOMTR-MCNC: 130 MG/DL (ref 70–99)
GLUCOSE BLDC GLUCOMTR-MCNC: 133 MG/DL (ref 70–99)
GLUCOSE BLDC GLUCOMTR-MCNC: 134 MG/DL (ref 70–99)
GLUCOSE BLDC GLUCOMTR-MCNC: 135 MG/DL (ref 70–99)
GLUCOSE BLDC GLUCOMTR-MCNC: 141 MG/DL (ref 70–99)
GLUCOSE BLDC GLUCOMTR-MCNC: 93 MG/DL (ref 70–99)
GLUCOSE BLDC GLUCOMTR-MCNC: 97 MG/DL (ref 70–99)
GLUCOSE BLDC GLUCOMTR-MCNC: 97 MG/DL (ref 70–99)
GLUCOSE SERPL-MCNC: 130 MG/DL (ref 70–99)
GLUCOSE SERPL-MCNC: 98 MG/DL (ref 70–99)
HCO3 BLD-SCNC: 26 MMOL/L (ref 21–28)
HCO3 BLD-SCNC: 26 MMOL/L (ref 21–28)
HCO3 BLD-SCNC: 27 MMOL/L (ref 21–28)
HCO3 BLD-SCNC: 28 MMOL/L (ref 21–28)
HCO3 BLD-SCNC: 29 MMOL/L (ref 21–28)
HCO3 SERPL-SCNC: 23 MMOL/L (ref 22–29)
HCO3 SERPL-SCNC: 26 MMOL/L (ref 22–29)
HCT VFR BLD AUTO: 23.5 % (ref 35–47)
HGB BLD-MCNC: 7.4 G/DL (ref 11.7–15.7)
MAGNESIUM SERPL-MCNC: 1.7 MG/DL (ref 1.7–2.3)
MAGNESIUM SERPL-MCNC: 1.7 MG/DL (ref 1.7–2.3)
MCH RBC QN AUTO: 30.2 PG (ref 26.5–33)
MCHC RBC AUTO-ENTMCNC: 31.5 G/DL (ref 31.5–36.5)
MCV RBC AUTO: 96 FL (ref 78–100)
O2/TOTAL GAS SETTING VFR VENT: 40 %
O2/TOTAL GAS SETTING VFR VENT: 60 %
O2/TOTAL GAS SETTING VFR VENT: 70 %
PCO2 BLD: 38 MM HG (ref 35–45)
PCO2 BLD: 41 MM HG (ref 35–45)
PCO2 BLD: 42 MM HG (ref 35–45)
PCO2 BLD: 46 MM HG (ref 35–45)
PCO2 BLD: 47 MM HG (ref 35–45)
PCO2 BLD: 49 MM HG (ref 35–45)
PCO2 BLD: 51 MM HG (ref 35–45)
PEEP: 5 CM H2O
PH BLD: 7.34 [PH] (ref 7.35–7.45)
PH BLD: 7.36 [PH] (ref 7.35–7.45)
PH BLD: 7.39 [PH] (ref 7.35–7.45)
PH BLD: 7.4 [PH] (ref 7.35–7.45)
PH BLD: 7.41 [PH] (ref 7.35–7.45)
PH BLD: 7.44 [PH] (ref 7.35–7.45)
PH BLD: 7.44 [PH] (ref 7.35–7.45)
PHOSPHATE SERPL-MCNC: 3 MG/DL (ref 2.5–4.5)
PLATELET # BLD AUTO: 233 10E3/UL (ref 150–450)
PO2 BLD: 127 MM HG (ref 80–105)
PO2 BLD: 132 MM HG (ref 80–105)
PO2 BLD: 166 MM HG (ref 80–105)
PO2 BLD: 179 MM HG (ref 80–105)
PO2 BLD: 204 MM HG (ref 80–105)
PO2 BLD: 69 MM HG (ref 80–105)
PO2 BLD: 76 MM HG (ref 80–105)
POTASSIUM SERPL-SCNC: 4 MMOL/L (ref 3.4–5.3)
POTASSIUM SERPL-SCNC: 4.2 MMOL/L (ref 3.4–5.3)
RBC # BLD AUTO: 2.45 10E6/UL (ref 3.8–5.2)
SAO2 % BLDA: 91 % (ref 92–100)
SAO2 % BLDA: 94 % (ref 92–100)
SAO2 % BLDA: 97 % (ref 92–100)
SAO2 % BLDA: 98 % (ref 92–100)
SODIUM SERPL-SCNC: 145 MMOL/L (ref 135–145)
SODIUM SERPL-SCNC: 145 MMOL/L (ref 135–145)
SPECIMEN EXPIRATION DATE: NORMAL
WBC # BLD AUTO: 5.7 10E3/UL (ref 4–11)

## 2024-09-26 PROCEDURE — 250N000011 HC RX IP 250 OP 636

## 2024-09-26 PROCEDURE — 82805 BLOOD GASES W/O2 SATURATION: CPT

## 2024-09-26 PROCEDURE — 86901 BLOOD TYPING SEROLOGIC RH(D): CPT | Performed by: SURGERY

## 2024-09-26 PROCEDURE — 250N000013 HC RX MED GY IP 250 OP 250 PS 637

## 2024-09-26 PROCEDURE — 87077 CULTURE AEROBIC IDENTIFY: CPT

## 2024-09-26 PROCEDURE — 250N000011 HC RX IP 250 OP 636: Mod: JZ

## 2024-09-26 PROCEDURE — 80048 BASIC METABOLIC PNL TOTAL CA: CPT | Performed by: PHYSICIAN ASSISTANT

## 2024-09-26 PROCEDURE — 71045 X-RAY EXAM CHEST 1 VIEW: CPT | Mod: 77

## 2024-09-26 PROCEDURE — 258N000003 HC RX IP 258 OP 636

## 2024-09-26 PROCEDURE — 250N000009 HC RX 250

## 2024-09-26 PROCEDURE — 87205 SMEAR GRAM STAIN: CPT

## 2024-09-26 PROCEDURE — 84100 ASSAY OF PHOSPHORUS: CPT | Performed by: PHYSICIAN ASSISTANT

## 2024-09-26 PROCEDURE — 258N000001 HC RX 258

## 2024-09-26 PROCEDURE — 99291 CRITICAL CARE FIRST HOUR: CPT | Mod: FS

## 2024-09-26 PROCEDURE — G0463 HOSPITAL OUTPT CLINIC VISIT: HCPCS

## 2024-09-26 PROCEDURE — 93010 ELECTROCARDIOGRAM REPORT: CPT | Performed by: INTERNAL MEDICINE

## 2024-09-26 PROCEDURE — 250N000013 HC RX MED GY IP 250 OP 250 PS 637: Performed by: SURGERY

## 2024-09-26 PROCEDURE — 250N000011 HC RX IP 250 OP 636: Performed by: SURGERY

## 2024-09-26 PROCEDURE — 200N000002 HC R&B ICU UMMC

## 2024-09-26 PROCEDURE — 71045 X-RAY EXAM CHEST 1 VIEW: CPT

## 2024-09-26 PROCEDURE — 83735 ASSAY OF MAGNESIUM: CPT

## 2024-09-26 PROCEDURE — 87040 BLOOD CULTURE FOR BACTERIA: CPT

## 2024-09-26 PROCEDURE — 85027 COMPLETE CBC AUTOMATED: CPT | Performed by: PHYSICIAN ASSISTANT

## 2024-09-26 PROCEDURE — 80048 BASIC METABOLIC PNL TOTAL CA: CPT

## 2024-09-26 PROCEDURE — 71045 X-RAY EXAM CHEST 1 VIEW: CPT | Mod: 26 | Performed by: STUDENT IN AN ORGANIZED HEALTH CARE EDUCATION/TRAINING PROGRAM

## 2024-09-26 PROCEDURE — 94003 VENT MGMT INPAT SUBQ DAY: CPT

## 2024-09-26 PROCEDURE — 94645 CONT INHLJ TX EACH ADDL HOUR: CPT

## 2024-09-26 PROCEDURE — 83735 ASSAY OF MAGNESIUM: CPT | Performed by: PHYSICIAN ASSISTANT

## 2024-09-26 PROCEDURE — 250N000009 HC RX 250: Performed by: INTERNAL MEDICINE

## 2024-09-26 PROCEDURE — 93005 ELECTROCARDIOGRAM TRACING: CPT

## 2024-09-26 PROCEDURE — 999N000157 HC STATISTIC RCP TIME EA 10 MIN

## 2024-09-26 PROCEDURE — 250N000013 HC RX MED GY IP 250 OP 250 PS 637: Performed by: PHYSICIAN ASSISTANT

## 2024-09-26 PROCEDURE — 86900 BLOOD TYPING SEROLOGIC ABO: CPT | Performed by: SURGERY

## 2024-09-26 RX ORDER — LABETALOL HYDROCHLORIDE 5 MG/ML
10 INJECTION, SOLUTION INTRAVENOUS EVERY 6 HOURS PRN
Status: DISCONTINUED | OUTPATIENT
Start: 2024-09-26 | End: 2024-10-01 | Stop reason: HOSPADM

## 2024-09-26 RX ORDER — FUROSEMIDE 10 MG/ML
40 INJECTION INTRAMUSCULAR; INTRAVENOUS ONCE
Status: COMPLETED | OUTPATIENT
Start: 2024-09-26 | End: 2024-09-26

## 2024-09-26 RX ORDER — MIDAZOLAM HCL IN 0.9 % NACL/PF 1 MG/ML
0-8 PLASTIC BAG, INJECTION (ML) INTRAVENOUS CONTINUOUS
Status: DISCONTINUED | OUTPATIENT
Start: 2024-09-26 | End: 2024-10-01

## 2024-09-26 RX ORDER — QUETIAPINE FUMARATE 50 MG/1
50 TABLET, FILM COATED ORAL AT BEDTIME
Status: DISCONTINUED | OUTPATIENT
Start: 2024-09-26 | End: 2024-09-27

## 2024-09-26 RX ORDER — LORAZEPAM 2 MG/ML
1 CONCENTRATE ORAL EVERY 6 HOURS
Status: DISCONTINUED | OUTPATIENT
Start: 2024-09-26 | End: 2024-09-27

## 2024-09-26 RX ORDER — MAGNESIUM SULFATE HEPTAHYDRATE 40 MG/ML
2 INJECTION, SOLUTION INTRAVENOUS ONCE
Status: COMPLETED | OUTPATIENT
Start: 2024-09-26 | End: 2024-09-26

## 2024-09-26 RX ORDER — QUETIAPINE FUMARATE 25 MG/1
25 TABLET, FILM COATED ORAL 2 TIMES DAILY
Status: DISCONTINUED | OUTPATIENT
Start: 2024-09-26 | End: 2024-09-27

## 2024-09-26 RX ORDER — LORAZEPAM 2 MG/ML
0.5 INJECTION INTRAMUSCULAR 3 TIMES DAILY PRN
Status: DISCONTINUED | OUTPATIENT
Start: 2024-09-26 | End: 2024-09-30

## 2024-09-26 RX ORDER — HYDRALAZINE HYDROCHLORIDE 10 MG/1
10 TABLET, FILM COATED ORAL 4 TIMES DAILY PRN
Status: DISCONTINUED | OUTPATIENT
Start: 2024-09-26 | End: 2024-09-27

## 2024-09-26 RX ORDER — QUETIAPINE FUMARATE 25 MG/1
25 TABLET, FILM COATED ORAL 2 TIMES DAILY
Status: DISCONTINUED | OUTPATIENT
Start: 2024-09-26 | End: 2024-09-26

## 2024-09-26 RX ADMIN — IPRATROPIUM BROMIDE 0.5 MG: 0.5 SOLUTION RESPIRATORY (INHALATION) at 20:54

## 2024-09-26 RX ADMIN — Medication 200 MCG/HR: at 02:12

## 2024-09-26 RX ADMIN — LORAZEPAM 0.5 MG: 2 INJECTION INTRAMUSCULAR; INTRAVENOUS at 12:28

## 2024-09-26 RX ADMIN — ATOVAQUONE 1500 MG: 750 SUSPENSION ORAL at 08:08

## 2024-09-26 RX ADMIN — LEVOTHYROXINE SODIUM 25 MCG: 0.03 TABLET ORAL at 08:08

## 2024-09-26 RX ADMIN — DEXMEDETOMIDINE HYDROCHLORIDE 1.2 MCG/KG/HR: 400 INJECTION INTRAVENOUS at 06:53

## 2024-09-26 RX ADMIN — KETAMINE HYDROCHLORIDE 175 MG/HR: 100 INJECTION, SOLUTION, CONCENTRATE INTRAMUSCULAR; INTRAVENOUS at 21:11

## 2024-09-26 RX ADMIN — QUETIAPINE FUMARATE 50 MG: 50 TABLET ORAL at 22:19

## 2024-09-26 RX ADMIN — Medication 50 MCG: at 02:09

## 2024-09-26 RX ADMIN — KETAMINE HYDROCHLORIDE 175 MG/HR: 100 INJECTION, SOLUTION, CONCENTRATE INTRAMUSCULAR; INTRAVENOUS at 21:09

## 2024-09-26 RX ADMIN — DEXMEDETOMIDINE HYDROCHLORIDE 1.2 MCG/KG/HR: 400 INJECTION INTRAVENOUS at 21:11

## 2024-09-26 RX ADMIN — FUROSEMIDE 40 MG: 10 INJECTION, SOLUTION INTRAVENOUS at 14:23

## 2024-09-26 RX ADMIN — Medication 200 MCG/HR: at 14:32

## 2024-09-26 RX ADMIN — DEXTROSE MONOHYDRATE 1000 ML: 100 INJECTION, SOLUTION INTRAVENOUS at 01:53

## 2024-09-26 RX ADMIN — KETAMINE HYDROCHLORIDE 175 MG/HR: 100 INJECTION, SOLUTION, CONCENTRATE INTRAMUSCULAR; INTRAVENOUS at 02:43

## 2024-09-26 RX ADMIN — QUETIAPINE FUMARATE 25 MG: 25 TABLET ORAL at 16:39

## 2024-09-26 RX ADMIN — MONTELUKAST 10 MG: 10 TABLET, FILM COATED ORAL at 22:19

## 2024-09-26 RX ADMIN — MAGNESIUM SULFATE HEPTAHYDRATE 2 G: 2 INJECTION, SOLUTION INTRAVENOUS at 06:48

## 2024-09-26 RX ADMIN — METHYLPREDNISOLONE SODIUM SUCCINATE 62.5 MG: 125 INJECTION, POWDER, FOR SOLUTION INTRAMUSCULAR; INTRAVENOUS at 16:39

## 2024-09-26 RX ADMIN — Medication 1 TABLET: at 12:40

## 2024-09-26 RX ADMIN — LEVALBUTEROL HYDROCHLORIDE 0.63 MG: 0.63 SOLUTION RESPIRATORY (INHALATION) at 12:05

## 2024-09-26 RX ADMIN — INSULIN HUMAN 1.5 UNITS/HR: 1 INJECTION, SOLUTION INTRAVENOUS at 22:20

## 2024-09-26 RX ADMIN — CHLORHEXIDINE GLUCONATE 0.12% ORAL RINSE 15 ML: 1.2 LIQUID ORAL at 08:08

## 2024-09-26 RX ADMIN — Medication 10 NG/KG/MIN: at 13:35

## 2024-09-26 RX ADMIN — METHYLPREDNISOLONE SODIUM SUCCINATE 62.5 MG: 125 INJECTION, POWDER, FOR SOLUTION INTRAMUSCULAR; INTRAVENOUS at 03:23

## 2024-09-26 RX ADMIN — CETIRIZINE HYDROCHLORIDE 10 MG: 10 TABLET, FILM COATED ORAL at 08:08

## 2024-09-26 RX ADMIN — ZINC SULFATE 220 MG (50 MG) CAPSULE 220 MG: CAPSULE at 08:08

## 2024-09-26 RX ADMIN — Medication 50 MCG: at 22:09

## 2024-09-26 RX ADMIN — IPRATROPIUM BROMIDE 0.5 MG: 0.5 SOLUTION RESPIRATORY (INHALATION) at 08:30

## 2024-09-26 RX ADMIN — IPRATROPIUM BROMIDE 0.5 MG: 0.5 SOLUTION RESPIRATORY (INHALATION) at 16:44

## 2024-09-26 RX ADMIN — DEXMEDETOMIDINE HYDROCHLORIDE 1.2 MCG/KG/HR: 400 INJECTION INTRAVENOUS at 17:26

## 2024-09-26 RX ADMIN — LEVALBUTEROL HYDROCHLORIDE 0.63 MG: 0.63 SOLUTION RESPIRATORY (INHALATION) at 16:44

## 2024-09-26 RX ADMIN — LORAZEPAM 1 MG: 2 LIQUID ORAL at 14:02

## 2024-09-26 RX ADMIN — Medication 40 MG: at 08:08

## 2024-09-26 RX ADMIN — ACETAMINOPHEN 975 MG: 325 TABLET ORAL at 16:40

## 2024-09-26 RX ADMIN — MIDAZOLAM HYDROCHLORIDE 10 MG/HR: 1 INJECTION, SOLUTION INTRAVENOUS at 04:19

## 2024-09-26 RX ADMIN — Medication 50 MCG: at 16:16

## 2024-09-26 RX ADMIN — Medication 50 MCG: at 12:59

## 2024-09-26 RX ADMIN — DEXMEDETOMIDINE HYDROCHLORIDE 1.2 MCG/KG/HR: 400 INJECTION INTRAVENOUS at 03:22

## 2024-09-26 RX ADMIN — FLUCONAZOLE 100 MG: 100 TABLET ORAL at 08:08

## 2024-09-26 RX ADMIN — HEPARIN SODIUM 5000 UNITS: 5000 INJECTION, SOLUTION INTRAVENOUS; SUBCUTANEOUS at 12:40

## 2024-09-26 RX ADMIN — HEPARIN SODIUM 5000 UNITS: 5000 INJECTION, SOLUTION INTRAVENOUS; SUBCUTANEOUS at 03:23

## 2024-09-26 RX ADMIN — DEXMEDETOMIDINE HYDROCHLORIDE 1.2 MCG/KG/HR: 400 INJECTION INTRAVENOUS at 10:52

## 2024-09-26 RX ADMIN — CHLORHEXIDINE GLUCONATE 0.12% ORAL RINSE 15 ML: 1.2 LIQUID ORAL at 20:48

## 2024-09-26 RX ADMIN — IPRATROPIUM BROMIDE 0.5 MG: 0.5 SOLUTION RESPIRATORY (INHALATION) at 12:05

## 2024-09-26 RX ADMIN — DEXMEDETOMIDINE HYDROCHLORIDE 1.2 MCG/KG/HR: 400 INJECTION INTRAVENOUS at 13:56

## 2024-09-26 RX ADMIN — LEVALBUTEROL HYDROCHLORIDE 0.63 MG: 0.63 SOLUTION RESPIRATORY (INHALATION) at 08:30

## 2024-09-26 RX ADMIN — HYDRALAZINE HYDROCHLORIDE 10 MG: 10 TABLET ORAL at 13:15

## 2024-09-26 RX ADMIN — LEVALBUTEROL HYDROCHLORIDE 0.63 MG: 0.63 SOLUTION RESPIRATORY (INHALATION) at 20:54

## 2024-09-26 RX ADMIN — HEPARIN SODIUM 5000 UNITS: 5000 INJECTION, SOLUTION INTRAVENOUS; SUBCUTANEOUS at 20:48

## 2024-09-26 ASSESSMENT — ACTIVITIES OF DAILY LIVING (ADL)
ADLS_ACUITY_SCORE: 65

## 2024-09-26 NOTE — PROGRESS NOTES
MEDICAL ICU PROGRESS NOTE  /09/26/2024    Date of Service (when I saw the patient): 09/26/2024    ASSESSMENT: Massiel Flaherty is a 53 year old female with PMH Anxiety/depression, fibromyalgia, c/f nonepileptic seizures not on AEDs, hypothyroidism, asthma, HLD, MURIEL on CPAP, expressive aphasia, hypertension who was admitted on 8/3/2024 for fatigue, fever and dyspnea and intubated 8/6/24 at OSH for ARDS, proned and paralyzed without improvement. Transferred to [facility] 8/8/24, hypoxic with high plateaus/peaks and placed on VV ECMO and CRRT. Decannulated from VV ECMO 8/18 and transferred to MICU 8/26/24 for ongoing management. Extubated 8/27 then reintubated 8/29 iso worsening AHRF and pseudomonas pneumonia. Extubated again 9/16 to BiPAP/HFNC, re-intubated 9/20 with tachypnea, increased WOB, fevers, and new lung opacities c/f possible HAP vs ILD/vasculitis/organizing pneumonia flare. Course complicated by tension pneumothorax while re-intubated 9/20 now s/p right chest tube.      CHANGES and MAJOR THINGS TODAY:   - Got Trach and PEG yesterday  - Continue to decrease sedation, plan to discontinue versed, PRN ativan will be available  - if tolerating versed being off, trial reducing ketamine as well  - restart tube feeds  - SW consult for discharge planning/LTACH referral  - still needs tunneled dialysis line removed. If pending discharge arranged prior to line removal, contact IR to see if removal can be done sooner.  - reduce veletri to 10  - clamp chest tube today, CXR tomorrow morning  - diuresis goal: net even      PLAN:    Neuro:  # Pain and sedation  # Concern for ICU delirium   # Hx Fibromyalgia   # Hx myalgic encephalomyelitis   # Hx anxiety/depression  - Pain: Oxycodone 2.5 mg q6hrs PRN, fentanyl bolus PRN   - Sedation plan:   - Sedated with fentanyl, ketamine, and precedex infusion. Versed discontinued today (9/26), PRN ativan will be available.   - Vecuronium infusion off since 9/23 at 1200   - Gabapentin to  300mg TID held while sedated  - Psych meds (venlafaxine and amitriptyline) held while sedated     # Hx spells with staring and BUE posturing previously described as nonepileptic seizures   # Hx of GTC seizures and myoclonic epilepsy, weaned off AEDs   # Diffuse cerebral edema - improved  - 8/21: MRI Brain: no acute intracranial pathology. No findings to suggest anoxic brain injury.   - MRI brain 9/20: no anoxic brain injury  - While extubated, patient able to follow commands and answer yes/no questions      Pulmonary:  # Acute hypoxic respiratory failure c/b ARDS  # Pseudomonas pneumonia (s/p treatment)  # Mechanical ventilation  # s/p VV ECMO 8/8 - 8/18   # Hx CAP  # Asthma  # MURIEL on home CPAP  # S/p tracheostomy  PFT dated 9/8/2020 demonstrated normal spirometry with mild diffusion impairment and mild restriction (FEV1/FVC 80%, FEV1 2.59, DLCO 40%). CT abdomen chest 3/9/2020 demonstrated scarring and fibrosis bilaterally which correlated with the decreased DLCO. The patient also has a history of MURIEL on CPAP therapy as currently managed by her provider in [place]. Unclear what triggered ARDS or why she has had such severe hospital course, further investigated ILD but results all unremarkable. Extubated 8/27, reintubated 8/29 for worsening O2 demands and diffuse opacities iso new/recurrent psuedomonas pneumonia. Bronch with BAL 8/30, pseudomonas growing as below. Extubated again 9/16, worsening respiratory distress noted 9/19 with repeat polymicrobial growth on respiratory sputum raising c/f aspiration/hospital associated pneumonia. 9/20 with increased WOB, new fevers, and CXR significant for opacities c/f possible HAP vs ILD/vasculitis/organizing pneumonia and was reintubated 9/20.   - ILD w/u aldolase, EVELIO, RF, CCP, ANCA, MPO, SSA Ro and La, Scleroderma antibody, Radha 1,  hypersensity pneumonitis, IGG subclasses,  aspergillus, blastomyces, histoplasma negative  - SpO2 goals >92%, PaO2 goals >60   - PTA singular  at bedtime   - xopenex and ipratropium Q4 hours  - pulmicort currently on hold  - Lung protective ventilation strategies given ARDS   - Steroids:  - CRP trend 9/21 487,  9/23 166, 9/24 77, 9/25 47  - Methylpred 60mg q6H 9/21- 9/24  - Begin Methlypred taper 9/25 @ 60 q12 x3 days following with 60 daily   - ABGs q6hrs, if remaining stable with veletri reduction, can likely switch to daily and PRN      FiO2 (%): 70 %, Resp: 26, Vent Mode: CMV/AC, Resp Rate (Set): 26 breaths/min, Tidal Volume (Set, mL): 350 mL, PEEP (cm H2O): 5 cmH2O, Resp Rate (Set): 26 breaths/min, Tidal Volume (Set, mL): 350 mL, PEEP (cm H2O): 5 cmH2O       Pneumothorax, resolved   Tension pneumothorax during re-intubation 9/20, concern for fibrotic lung disease and possible barotrauma during bag ventilation during re-intubation vs complication of re-intubation in general. Leave in while intubated.   - Chest tube to water seal  - Daily XR  - Plan to trial clamping chest tube tomorrow if all goes well with trach/peg with hopes to remove later this week     Cardiovascular:  # Hyperlipidemia  # Hx HTN   # Sinus Tachycardia, resolved   - MAP goal >65, SBP goals >110  - PTA atorvastatin  - PTA clonidine held while sedated  - Lower extremity ultrasound without vascular pathology, no hematoma or clots  - Monitor discoloration of extremities for necrosis  - PRN hydralazine for SBP > 200      GI/Nutrition:  # Severe malnutrition (see RD note)   # Non-infectious Diarrhea  # GERD   # s/p PEG on 9/25/24  - Protonix (home medication)   - Nutrition consulted, appreciate recs  - Diet: TF at goal, on high fiber feed   - Hold bowel regimen d/t loose stools  - Continue scheduled multivitamin  - Loperamide PRN  - Rectal tube, output slowing down, continue to monitor    Renal/Fluids/Electrolytes:  # Acute kidney injury, resolved   # AGMA, improving   # Elevated lactate, resovled  Baseline Cr approx 0.6. Pt was anuric required CRRT here but now making urine, likely  prerenal due to shock.  - Adequate I/Os, daily weights   - Avoid nephrotoxins as able  - RN managed electrolyte replacement protocol  - Diuresis:   - Received lasix 20 mg, 40 mg, 20 mg on 9/24   - 9/26: Goal net even today    Hypernatremia (currently resolved)  - FWF 100ml q4H  - Continue to trend    Endocrine:  # Steroid and stress induced hyperglycemia  # Hypothyroidism   - Goal to keep BG < 180 for optimal wound healing  - Continue on insulin infusion while on high dose steroids   - Continue PTA synthroid  - continue insulin drip today while on high dose steroids     ID:  # Leukocytosis, resolved   # Concern for aspiration pneumonia/hospital acquired pnemonia (9/19 - )  # Psuedomonas pneumonia (s/p treatment)  # Hx CAP  Respiratory cx 8/29 pseudomonas pneumonia. Intermediate susceptability to meropenem, more significant sensitivities to ciprofloxacin. New leukocytosis, elevated PC, CRP and lactate 9/19 with sputum cx 4+ psuedomonas, 2+ GPCs, and 1+ GPBs could be colonized , BAL studies 9/21 positive for pseudomonas  - Continue to monitor fever curve and inflammatory markers as appropriate  - ID now signed off   - Repeat cultures and sputum growing pseudomonas with similar resistance pattern to previous.      Abx  - Meropenem 8/29 - 9/1  - Vancomycin 8/29 - 8/30  - Tobramycin x 1 8/29  - Vancomycin 9/5 - 9/8    - Tobramycin nebs BID 9/1 - 9/11, 9/21- 9/25  - Ciprofloxacin infusion 9/1 - 9/11  - Metronidazole 9/6 - 9/11  - Atovaquone 9/11 -   - Vancomycin 9/19 - 9/20  - Zosyn 9/19 - 9/21  - Ciprofloxacin 9/19 - 9/21     PJP Ppx  Given Dex-ARDS Massiel ferraro remain on steriods for > 3 weeks so will initiate PJP ppx. Given history of allergy to sulfa, will obtain G6PD test to determine if dapsone can be used: levels are ok, however continuing atovaquone.   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   - Ganciclovir 9/6 - 9/8, discontinued given ID recs  - CMV levels  recheck 9/18 shows continued downtrending, level at 289    Thrush  Noted 9/21  - Fluconazole x 7 days (stop date added)      # HSV-1  Mouth sores present, which could have viral etiology. 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  - Acyclovir 400mg BID x5 days (8/22-8/27)     Hematology:    # Anemia of critical illness  # Right groin Hematoma   Acutely decreased Hgb from 8.0 to 6.6 on 9/20 of unknown etiology that required transfusion x1, however 9/19 LE US notable to 14.9 cm hematoma in R groin near prior canal that was not previously appreciated on CT abdomen c/f possible bleed.   - Hematoma stable on repeat US 9/21  - Received 1 unit pRBCs 9/24 for hgb 6.9  - Daily CBC  - Transfuse if hgb <7.0 or signs/symptoms of hypoperfusion. Monitor and trend.      Musculoskeletal/Rheum:  # Alopecia  - Hold PTA hydroxychloroquine      # Weakness and deconditioning of critical illness  # Left ankle injury pre-hospitalization    - Physical and occupational therapy when able.      Skin:  # BLE scattered ecchymosis  # Left toe duskiness  # Peripheral Edema  - WOC consult  - OT consult for lymphedema therapy     General Cares/Prophylaxis:  DVT Prophylaxis: SubQ heparin  GI Prophylaxis: PPI  Restraints: no  Family: Will try to call mother  Code Status: Full Code     Lines/tubes/drains:  - ETT (9/20)  - LIJ CVC (8/8)  - PIV x3  - Rectal tube (8/17)  - Tunneled HD line (8/23)- plan to remove soon, awaiting IR to schedule  - New radial A line, right, 9/20  - Chest tube 9/20  - Tracheostomy 9/25  - PEG 9/25     Disposition:  - Medical ICU     Patient seen and findings/plan discussed with medical ICU staff, Dr. Perlman.    Mirtha Vogt, APRN CNP    Critical Care time: 35 minutes     Clinically Significant Risk Factors         # Hypernatremia: Highest Na = 147 mmol/L in last 2 days, will monitor as appropriate    # Hypomagnesemia: Lowest Mg = 1.6 mg/dL in last 2 days, will replace as needed   # Hypoalbuminemia: Lowest albumin = 2.2 g/dL at  8/10/2024  9:47 AM, will monitor as appropriate                  # Severe Malnutrition: based on nutrition assessment    # Financial/Environmental Concerns: none              ====================================  INTERVAL HISTORY:   Patient got tracheostomy and PEG tube yesterday. FiO2 needs are decreasing. Tube feeds to be restarted today. Veletri to be reduced. Planning to continue to reduce sedation today.     OBJECTIVE:   1. VITAL SIGNS:   Temp:  [97.9  F (36.6  C)-98.6  F (37  C)] 98.2  F (36.8  C)  Pulse:  [67-89] 77  Resp:  [17-31] 26  MAP:  [69 mmHg-120 mmHg] 95 mmHg  Arterial Line BP: ()/(49-92) 130/71  FiO2 (%):  [45 %-70 %] 70 %  SpO2:  [92 %-100 %] 96 %  FiO2 (%): 70 %, Resp: 26, Vent Mode: CMV/AC, Resp Rate (Set): 26 breaths/min, Tidal Volume (Set, mL): 350 mL, PEEP (cm H2O): 5 cmH2O, Resp Rate (Set): 26 breaths/min, Tidal Volume (Set, mL): 350 mL, PEEP (cm H2O): 5 cmH2O  2. INTAKE/ OUTPUT:   I/O last 3 completed shifts:  In: 2965.06 [I.V.:2905.06; NG/GT:60]  Out: 2290 [Urine:2105; Stool:175; Chest Tube:10]    3. PHYSICAL EXAMINATION:  General: sedated  HEENT: NC/AT  Neuro: deeply sedated  Pulm/Resp: tracheostomy present, lung sounds clear, tolerating mechanical ventilation  CV: RRR, S1/2, no m/r/g  Abdomen: Soft, tender, bowel sounds present, right groin hematoma stable  : Gonzalez with yellow, clear urine   Incisions/Skin: Trace to mild edema in all extremities, equally. Lower leg ecchymosis present and scattered. LL inguinal area with ecchymotic discoloration     4. LABS:   Arterial Blood Gases   Recent Labs   Lab 09/26/24  0345 09/26/24  0024 09/25/24  1901 09/25/24  1609   PH 7.44 7.40 7.37 7.42   PCO2 38 42 44 39   PO2 166* 204* 81 86   HCO3 26 26 26 25     Complete Blood Count   Recent Labs   Lab 09/26/24  0345 09/25/24  0400 09/24/24  1432 09/24/24  0350 09/23/24  0404   WBC 5.7 7.6  --  7.6 7.9   HGB 7.4* 8.2* 8.9* 6.9* 7.6*    234  --  221 323     Basic Metabolic Panel  Recent Labs    Lab 09/26/24  0701 09/26/24  0612 09/26/24  0512 09/26/24  0418 09/26/24  0345 09/25/24  0405 09/25/24  0400 09/24/24  1946 09/24/24 1942 09/24/24  0355 09/24/24  0350   NA  --   --   --   --  145  --  147*  --  146*  --  147*   POTASSIUM  --   --   --   --  4.0  --  4.0  --  4.5  --  3.8   CHLORIDE  --   --   --   --  108*  --  107  --  108*  --  109*   CO2  --   --   --   --  23  --  23  --  21*  --  19*   BUN  --   --   --   --  42.2*  --  54.5*  --  59.0*  --  65.7*   CR  --   --   --   --  0.31*  --  0.39*  --  0.45*  --  0.57   * 141* 124* 105* 98   < > 154*   < > 144*   < > 114*    < > = values in this interval not displayed.     Liver Function Tests  Recent Labs   Lab 09/23/24  0404 09/22/24  0759 09/20/24  1312   AST 57* 13  --    ALT 93* 56*  --    ALKPHOS 462* 207*  --    BILITOTAL 0.5 0.2  --    ALBUMIN 2.7* 2.5*  --    INR  --  1.29* 1.45*     Coagulation Profile  Recent Labs   Lab 09/22/24  0759 09/20/24  1312   INR 1.29* 1.45*   PTT 33  --        5. RADIOLOGY:   Recent Results (from the past 24 hour(s))   XR Chest Port 1 View    Narrative    EXAM: XR CHEST PORT 1 VIEW 9/25/2024 3:41 PM    INDICATION: Post-tracheostomy placement    COMPARISON: Same day chest radiograph 2:26    TECHNIQUE: Single portable semiupright AP view of the chest.     FINDINGS:   Central venous catheter tip in the mid right atrium. Tracheotomy tube  in the upper thoracic trachea. Left IJ central venous catheter tip  distal innominate vein/confluence at the SVC. Right chest tube in  stable position. Interval removal of enteric tubes and endotracheal  tube.    Dense patchy opacities in the mid to lower lung zones bilaterally,  similar compared to prior. Blunting of the bilateral costophrenic  angles. Soft tissues are unremarkable. No acute osseous abnormality.  No pneumothorax.      Impression    IMPRESSION:   1. Interval replacement of endotracheal and enteric tubes with a  tracheostomy tube situated in the upper  thoracic trachea. No evidence  of pneumothorax.  2. Stable patchy airspace opacities in the mid to lower lung zones  bilaterally, similar compared to prior. Stable bilateral small pleural  effusions.    I have personally reviewed the examination and initial interpretation  and I agree with the findings.    MARKUS BOWENS MD         SYSTEM ID:  [id number]   XR Chest Port 1 View    Impression    RESIDENT PRELIMINARY INTERPRETATION  IMPRESSION:   1. Stable support devices.  2. Stable mixed interstitial and airspace opacities throughout the  lungs and small bilateral pleural effusions.

## 2024-09-26 NOTE — PLAN OF CARE
ICU End of Shift Summary. See flowsheets for vital signs and detailed assessment.    Changes this shift: Remains RASS -5. At the start of the shift versed was discontinued and ketamine was titrated off through the morning. Ketamine restarted at 1245. Around 1600 pt RR increased to the 40s; versed restarted. Currently on versed, ketamine, precedex, and fentanyl. Veletri decreased to 10ng/kg/min. Current vent settings: CMV, FiO2 60%, RR 26, , PEEP 5. HR , SR/ST. See flowsheets for detailed vent changes. Hydralazine given for SBP greater than 200. Restarted TF to goal 50ml/hr with FWF 30ml Q4. Chest tube was clamped today. Rectal tube and rolle remain in place.     Plan:  Continue current plan of care. IR will plan to remove TCVC 9/27. Sputum sample.     Goal Outcome Evaluation:      Plan of Care Reviewed With: patient    Overall Patient Progress: no change    Outcome Evaluation: Improve oxygenation and vent compliance

## 2024-09-26 NOTE — PROGRESS NOTES
[facility]  WO Nurse Inpatient Assessment     Consulted for: left ear     Summary: new abrasion to left ear 9/15 per bedside RN     Patient History (according to provider note(s):      Massiel Flaherty is a 53 year old female with PMH Anxiety/depression, fibromyalgia, c/f nonepileptic seizures not on AEDs, hypothyroidism, asthma, HLD, MURIEL on CPAP, expressive aphasia, hypertension who was admitted on 8/3/2024 for fatigue, fever and dyspnea and intubated 8/6/24 at OSH for ARDS, proned and paralyzed without improvement. Transferred to [facility] 8/8/24, hypoxic with high plateaus/peaks and placed on VV ECMO and CRRT. Decannulated from VV ECMO 8/18 and transferred to MICU 8/26/24 for ongoing management. Extubated 8/27 then reintubated 8/29 iso worsening AHRF and pseudomonas pneumonia.     Assessment:      Areas visualized during today's visit: Focused: bilateral ears     Pressure Injury Location: left ear     9/23 9/26   Wound type: Pressure Injury     Pressure Injury Stage: DTPI now evolved to stage 2, hospital acquired   Wound history/plan of care:   unknown cause of PI, pt does favor left side   Wound base: 100 % Dermis and Dry drainage     Palpation of the wound bed: normal      Drainage: scant     Description of drainage: serosanguinous     Measurements (length x width x depth, in cm) 0.4  x 0.2  x  0 cm      Tunneling N/A     Undermining N/A  Periwound skin: Intact      Color: normal and consistent with surrounding tissue      Temperature: normal   Odor: none  Pain: no grimacing or signs of discomfort, none  Pain intervention prior to dressing change: N/A  Treatment goal: Protection  STATUS: improving  Supplies ordered: supplies stored on unit     Treatment Plan:     Left ear wound(s): BID cleanse with saline and pat dry. Apply Vaseline to wound. Leave open to air.    Orders: Updated    RECOMMEND PRIMARY TEAM ORDER: None, at this time  Education provided:  plan of care and wound progress  Discussed plan of care with: Nurse  [facility] nurse follow-up plan: weekly  Notify [facility] if wound(s) deteriorate.  Nursing to notify the Provider(s) and re-consult the [facility] Nurse if new skin concern.    DATA:     Current support surface: Standard  Low air loss (DIA pump, Isolibrium, Pulsate)  Containment of urine/stool: Incontinent pad in bed, Indwelling catheter, and Internal fecal management  BMI: Body mass index is 31.73 kg/m .   Active diet order: Orders Placed This Encounter      NPO per Anesthesia Guidelines for Procedure/Surgery Except for: Meds     Output: I/O last 3 completed shifts:  In: 2965.06 [I.V.:2905.06; NG/GT:60]  Out: 2290 [Urine:2105; Stool:175; Chest Tube:10]     Labs:   Recent Labs   Lab 09/26/24  0345 09/24/24  0350 09/23/24  0404 09/22/24  1212 09/22/24  0759   ALBUMIN  --   --  2.7*  --  2.5*   HGB 7.4*   < > 7.6*   < >  --    INR  --   --   --   --  1.29*   WBC 5.7   < > 7.9   < >  --     < > = values in this interval not displayed.     Pressure injury risk assessment:   Sensory Perception: 1-->completely limited  Moisture: 3-->occasionally moist  Activity: 1-->bedfast  Mobility: 1-->completely immobile  Nutrition: 3-->adequate  Friction and Shear: 1-->problem  Kade Score: 10      Pager no longer is use, please contact through [employer] group: [facility] Nurse [place]  Dept. Office Number: [phone]

## 2024-09-26 NOTE — CONSULTS
IR consult for RIJ TCVC removal.    Please see consult from 9/25 Ricardo Schilling PA-C. IR will plan for TCVC removal 9/27.    Ro Mann DNP, APRN  Interventional Radiology   IR on-call pager: [phone]

## 2024-09-26 NOTE — CONSULTS
Care Management Follow Up    Length of Stay (days): 49    Expected Discharge Date: TBD      Concerns to be Addressed: discharge planning     Patient plan of care discussed at interdisciplinary rounds: Yes    Anticipated Discharge Disposition: transfer back to the referring hospital vs LTACH       Anticipated Discharge Services: vent weaning and rehab  Anticipated Discharge DME: TBD    Patient/family educated on Medicare website which has current facility and service quality ratings: no  Education Provided on the Discharge Plan: Yes  Patient/Family in Agreement with the Plan: yes    Referrals Placed by CM/SW: yes, call placed to the referring hospital [facility]   Private pay costs discussed: Not applicable    Discussed  Partnership in Safe Discharge Planning  document with patient/family: No     Handoff Completed: No, handoff not indicated or clinically appropriate    Additional Information:  Pt had trach and PEG placement done yesterday, 9/25/24.  Care management consulted for LTACH placement.    Per chart review, pt has a transfer agreement from [facility].  RNCC contacted [facility] # [phone] to check bed availability and if they can accept pt when medically stable.  RNCC spoke to Cal at [facility].  Cal stated they can accept pt if she still has hospital level of care needs.  Cal stated the ICU team can call the [facility] center once pt is medically stable to have provider to provider report.  The one call department will connect our provider to their ICU provider.  Once pt is accepted by their ICU provider they will arrange air transport.  RNCC shared the above info with MICU team, DOREEN Moore.  Per discussion with the team, pt is not medically ready to be transfer today.   Pt needs tunneled cath removal and being weaned from sedation.    Next Steps:   MICU team will call [facility] when pt is medically stable for transfer. One call [facility] #  [phone].  [facility] will arrange air transport if accepted.  RNCC will cont to follow the plan of care.      Marylou Silverio RN, PHN, BSN  4A and 4E/ ICU  Care Coordinator  Phone: [phone]  Pager: [phone]      [facility] - search CARE COORDINATOR       [place] ([phone]) Saturday & Sunday; ([phone]) [facility] Recognized Holidays     Units: 5A Onc Vocera & 5C Vocera Pager: [phone]    Units: 6B Vocera & 6C Vocera  Pager: [phone]    Units: 7A SOT RNCC Vocera, 7B Med Surg Vocera, & 7C Med Surg Vocera  Pager: [phone]    Units: 6A Vocera & 4A CVICU Vocera, 4C MICU Vocera, and 4E SICU Vocera   Pager: [phone]    Units: 5 Ortho Vocera & 5 Med Surg Vocera  Pager: [phone]    Units: 6 Med Surg Vocera & 8 Med Surg Vocera  Pager [phone]

## 2024-09-26 NOTE — PLAN OF CARE
ICU End of Shift Summary. See flowsheets for vital signs and detailed assessment.    Changes this shift: Minimal changes this shift, RASS -5 versed 10, dex 1.2, ket 175, fent 200, trached & PEG yesterday AM, current vent settings 40%, , RR 26, PEEP 5, not overbreathing the vent as much this shift.    Plan: Wean FiO2, day team to reevaluate RASS goal before continuing to wean sedation, HD line to be pulled in IR 10/1, restart tube feeds, chest tube dressing      Goal Outcome Evaluation:    Plan of Care Reviewed With: patient    Overall Patient Progress: no change    Outcome Evaluation: see end of shift note

## 2024-09-26 NOTE — PROGRESS NOTES
CLINICAL NUTRITION SERVICES - REASSESSMENT NOTE     Nutrition Prescription    RECOMMENDATIONS FOR MDs/PROVIDERS TO ORDER:  None currently     Malnutrition Status:    Severe malnutrition in the context of acute illness/disease    Recommendations already ordered by Registered Dietitian (RD):  Resume TF at 25 ml/hr, if tolerating after 4 hours advance back to goal rate.   Goal: [employer] Renal @ 50 mL/hr (1200 mL/day) to provide 2160 kcal (91% of MREE ), 96 g protein, 204 g CHO, 24 g fiber, and 804 mL free water daily     Future/Additional Recommendations:  Monitor TF intakes/advancement via new PEG  Monitor stool output      EVALUATION OF THE PROGRESS TOWARD GOALS   Diet: NPO + TF    Nutrition Support: access: PEG placed 24    Formula/schedule/modulars: -___: [employer] Renal @ 50 mL/hr (1200 mL/day) to provide 2160 kcal (91% of MREE ), 96 g protein, 204 g CHO, 24 g fiber, and 804 mL free water daily     Free water flushes: 100 mL every 4 hours    Intake/Tolerance: Tube Feedin day average tube feeding infusion of 534 mL (45 % of goal volume) = average intake of 961 kcal/day and 43 g protein/day.  TF held for PEG placement, ok to resume TF via PEG today per surgery.      NEW FINDINGS   POD#1 trach and PEG placement. Provider asking about loose stools, discussed pt had adequate fiber in TF with some room to add additional but do not anticipate slightly more fiber will make significant difference in stool output so encouraged use of PRN imodium if loose stools continue.     GI:  Last BM: 24  rectal tube output: 575 mL , 200 mL , 450 mL , 100 mL , 200 mL , 575 mL     Weight:  Most Recent Weight: 78.7 kg (173 lb 8 oz)  on 24 via Bed scale  Body mass index is 31.73 kg/m .  Wt down 10.2 kg from admission. - 3.4 L from admission per I/O.     Meds:  Levothyroxine  Ativan (held)  Renal multivitamin  Protonix  Zinc sulfate 200 mg -10/03  Continuous: precedex; IVF D10  @ 50 ml/hr; veletri; fentanyl; insulin; ketamine; versed    Labs:   09/24/24 03:50 09/24/24 19:42 09/24/24 22:00 09/25/24 04:00 09/26/24 03:45   Sodium 147 (H) 146 (H)  147 (H) 145   Potassium 3.8 4.5  4.0 4.0   Chloride 109 (H) 108 (H)  107 108 (H)   Carbon Dioxide (CO2) 19 (L) 21 (L)  23 23   Urea Nitrogen 65.7 (H) 59.0 (H)  54.5 (H) 42.2 (H)   Creatinine 0.57 0.45 (L)  0.39 (L) 0.31 (L)   GFR Estimate >90 >90  >90 >90   Calcium 8.3 (L) 8.6 (L)  8.4 (L) 8.1 (L)   Anion Gap 19 (H) 17 (H)  17 (H) 14   Magnesium 1.8   1.6 (L) 1.7   Phosphorus 3.6   2.8 3.0   CRP Inflammation 77.90 (H)   47.30 (H)    Glucose 114 (H) 144 (H)  154 (H) 98   Triglycerides   267 (H)         Respiratory:   Trach    MALNUTRITION  % Intake: </= 50% for >/= 5 days (severe)  % Weight Loss: > 7.5% in 3 months (severe malnutrition)  Subcutaneous Fat Loss: Facial region:  mild   Muscle Loss: Temporal:  mild  Fluid Accumulation/Edema: Mild  Malnutrition Diagnosis: Severe malnutrition in the context of acute illness/disease    Previous Goals   Total avg nutritional intake to meet a minimum of 34 kcal/kg (80% MREE on 9/12) and 1.5 g PRO/kg daily (per dosing wt 56 kg).  Evaluation: Not met    Previous Nutrition Diagnosis  Inadequate oral intake related to NPO status in setting of tenuous respiratory status as evidenced by continued need for EN support to meet full nutrition needs at this time.    Evaluation: No change    CURRENT NUTRITION DIAGNOSIS  Inadequate oral intake related to respiratory needs as evidenced by reliance on enteral nutrition support to meet nutrition needs.      INTERVENTIONS  Implementation  Collaboration with other providers  Enteral Nutrition - resume via PEG      Goals  Total avg nutritional intake to meet a minimum of 34 kcal/kg (80% MREE on 9/12) and 1.5 g PRO/kg daily (per dosing wt 56 kg).    Monitoring/Evaluation  Progress toward goals will be monitored and evaluated per protocol.      Marce Balderas, MS, RDN, LD  4C  "MICU RD  Vocera - \" Clinical Dietitian\"  Weekend/Holiday RD - \"Weekend Clinical Dietitian\"    "

## 2024-09-26 NOTE — ANESTHESIA POSTPROCEDURE EVALUATION
Patient: Massiel Flaherty    Procedure: Procedure(s):  Endoscopic Insertion of Percutaneous Gastrostomy Tube, ESOPHAGOGASTRODUODENOSCOPY  CREATION, TRACHEOSTOMY, PERCUTANEOUS  Bronchoscopy flexible       Anesthesia Type:  General    Note:  Disposition: Inpatient   Postop Pain Control: Uneventful            Sign Out: Well controlled pain   PONV: No   Neuro/Psych: Uneventful            Sign Out: Acceptable/Baseline neuro status   Airway/Respiratory: Uneventful            Sign Out: AIRWAY IN SITU/Resp. Support               Airway in situ/Resp. Support: Tracheostomy                 Reason: Planned Pre-op   CV/Hemodynamics: Uneventful            Sign Out: Acceptable CV status; No obvious hypovolemia; No obvious fluid overload   Other NRE: NONE   DID A NON-ROUTINE EVENT OCCUR? No           Last vitals:  Vitals:    09/26/24 1230 09/26/24 1245 09/26/24 1300   BP:      Pulse: (!) 123 (!) 132 (!) 128   Resp:      Temp:      SpO2: 95% 90% 92%       Electronically Signed By: Leonardo Johnson MD  September 26, 2024  1:43 PM

## 2024-09-26 NOTE — PROGRESS NOTES
[facility]    Progress Note - SICU Service       Date of Admission:  8/8/2024    Assessment & Plan: Surgery   Massiel Flaherty is a 53 year old female with PMH Anxiety/depression, fibromyalgia, c/f nonepileptic seizures not on AEDs, hypothyroidism, asthma, HLD, MURIEL on CPAP, expressive aphasia, hypertension who was admitted on 8/3/2024 for fatigue, fever and dyspnea and intubated 8/6/24 at OSH for ARDS, proned and paralyzed without improvement. Transferred to [facility] 8/8/24, hypoxic with high plateaus/peaks and placed on VV ECMO and CRRT. Decannulated from VV ECMO 8/18 and transferred to MICU 8/26/24 for ongoing management. Extubated 8/27 then reintubated 8/29 iso worsening AHRF and pseudomonas pneumonia. Extubated again 9/16 to BiPAP/HFNC, re-intubated 9/20 with tachypnea, increased WOB, fevers, and new lung opacities c/f possible HAP vs ILD/vasculitis/organizing pneumonia flare. Course complicated by tension pneumothorax while re-intubated 9/20       Now she is Day 1 s/p percutaneous tracheostomy & PEG tube placement, no post op concerns.   - Can start enteral feeding thorough PEG today   - Tracheostomy and PEG tube sutures to be removed in 1 week (order placed)   - we will sign off, call us again please if needed        The patient's care was discussed with the Attending Physician, Dr. Carrasco .    Morales Saleh MD   PGY2 surgery  [facility]  Non-urgent messages: Securely message with [employer] (more info)  Text page via [facility] Paging/Directory     ______________________________________________________________________    Interval History   Stable, minimal vent settings no bleeding from surgical sites, tolerating meds through PEG    Physical Exam   Vital Signs: Temp: 98.2  F (36.8  C) Temp src: Axillary   Pulse: 77   Resp: 26 SpO2: 92 % O2 Device: Mechanical Ventilator    Weight: 173 lbs 8.03 oz  Intake/Output Summary  (Last 24 hours) at 9/26/2024 0646  Last data filed at 9/26/2024 0600  Gross per 24 hour   Intake 2965.06 ml   Output 2290 ml   Net 675.06 ml     Respiratory: on mechanical ventilation through tracheostomy, no secretions or bleeding around the tracheostomy, minimal vent settings Fio2 40 PEEP 5   GI: soft abdomen, no bleeding or secretions around PEG site          Data     I have personally reviewed the following data over the past 24 hrs:    5.7  \   7.4 (L)   / 233     145 108 (H) 42.2 (H) /  141 (H)   4.0 23 0.31 (L) \       Imaging results reviewed over the past 24 hrs:   Recent Results (from the past 24 hour(s))   XR Chest Port 1 View    Narrative    EXAM: XR CHEST PORT 1 VIEW 9/25/2024 3:41 PM    INDICATION: Post-tracheostomy placement    COMPARISON: Same day chest radiograph 2:26    TECHNIQUE: Single portable semiupright AP view of the chest.     FINDINGS:   Central venous catheter tip in the mid right atrium. Tracheotomy tube  in the upper thoracic trachea. Left IJ central venous catheter tip  distal innominate vein/confluence at the SVC. Right chest tube in  stable position. Interval removal of enteric tubes and endotracheal  tube.    Dense patchy opacities in the mid to lower lung zones bilaterally,  similar compared to prior. Blunting of the bilateral costophrenic  angles. Soft tissues are unremarkable. No acute osseous abnormality.  No pneumothorax.      Impression    IMPRESSION:   1. Interval replacement of endotracheal and enteric tubes with a  tracheostomy tube situated in the upper thoracic trachea. No evidence  of pneumothorax.  2. Stable patchy airspace opacities in the mid to lower lung zones  bilaterally, similar compared to prior. Stable bilateral small pleural  effusions.    I have personally reviewed the examination and initial interpretation  and I agree with the findings.    MARKUS BOWENS MD         SYSTEM ID:  [id number]   XR Chest Port 1 View    Impression    RESIDENT PRELIMINARY  INTERPRETATION  IMPRESSION:   1. Stable support devices.  2. Stable mixed interstitial and airspace opacities throughout the  lungs and small bilateral pleural effusions.

## 2024-09-27 ENCOUNTER — APPOINTMENT (OUTPATIENT)
Dept: GENERAL RADIOLOGY | Facility: CLINIC | Age: 54
DRG: 003 | End: 2024-09-27
Attending: INTERNAL MEDICINE
Payer: MEDICAID

## 2024-09-27 ENCOUNTER — APPOINTMENT (OUTPATIENT)
Dept: INTERVENTIONAL RADIOLOGY/VASCULAR | Facility: CLINIC | Age: 54
DRG: 003 | End: 2024-09-27
Attending: PHYSICIAN ASSISTANT
Payer: MEDICAID

## 2024-09-27 ENCOUNTER — APPOINTMENT (OUTPATIENT)
Dept: GENERAL RADIOLOGY | Facility: CLINIC | Age: 54
DRG: 003 | End: 2024-09-27
Payer: MEDICAID

## 2024-09-27 LAB
ALLEN'S TEST: ABNORMAL
ALLEN'S TEST: ABNORMAL
ANION GAP SERPL CALCULATED.3IONS-SCNC: 11 MMOL/L (ref 7–15)
BACTERIA SPEC CULT: NO GROWTH
BACTERIA SPEC CULT: NO GROWTH
BASE EXCESS BLDA CALC-SCNC: 3.6 MMOL/L (ref -3–3)
BASE EXCESS BLDA CALC-SCNC: 3.8 MMOL/L (ref -3–3)
BUN SERPL-MCNC: 27.6 MG/DL (ref 6–20)
CALCIUM SERPL-MCNC: 8 MG/DL (ref 8.8–10.4)
CHLORIDE SERPL-SCNC: 110 MMOL/L (ref 98–107)
COHGB MFR BLD: 98.8 % (ref 96–97)
COHGB MFR BLD: 98.8 % (ref 96–97)
CREAT SERPL-MCNC: 0.25 MG/DL (ref 0.51–0.95)
CRP SERPL-MCNC: 49.7 MG/L
EGFRCR SERPLBLD CKD-EPI 2021: >90 ML/MIN/1.73M2
ERYTHROCYTE [DISTWIDTH] IN BLOOD BY AUTOMATED COUNT: 19.5 % (ref 10–15)
GLUCOSE BLDC GLUCOMTR-MCNC: 108 MG/DL (ref 70–99)
GLUCOSE BLDC GLUCOMTR-MCNC: 112 MG/DL (ref 70–99)
GLUCOSE BLDC GLUCOMTR-MCNC: 129 MG/DL (ref 70–99)
GLUCOSE BLDC GLUCOMTR-MCNC: 131 MG/DL (ref 70–99)
GLUCOSE BLDC GLUCOMTR-MCNC: 134 MG/DL (ref 70–99)
GLUCOSE BLDC GLUCOMTR-MCNC: 141 MG/DL (ref 70–99)
GLUCOSE BLDC GLUCOMTR-MCNC: 159 MG/DL (ref 70–99)
GLUCOSE BLDC GLUCOMTR-MCNC: 159 MG/DL (ref 70–99)
GLUCOSE BLDC GLUCOMTR-MCNC: 162 MG/DL (ref 70–99)
GLUCOSE BLDC GLUCOMTR-MCNC: 166 MG/DL (ref 70–99)
GLUCOSE BLDC GLUCOMTR-MCNC: 185 MG/DL (ref 70–99)
GLUCOSE BLDC GLUCOMTR-MCNC: 187 MG/DL (ref 70–99)
GLUCOSE BLDC GLUCOMTR-MCNC: 210 MG/DL (ref 70–99)
GLUCOSE BLDC GLUCOMTR-MCNC: 214 MG/DL (ref 70–99)
GLUCOSE BLDC GLUCOMTR-MCNC: 231 MG/DL (ref 70–99)
GLUCOSE BLDC GLUCOMTR-MCNC: 95 MG/DL (ref 70–99)
GLUCOSE SERPL-MCNC: 155 MG/DL (ref 70–99)
HCO3 BLD-SCNC: 29 MMOL/L (ref 21–28)
HCO3 BLD-SCNC: 30 MMOL/L (ref 21–28)
HCO3 SERPL-SCNC: 25 MMOL/L (ref 22–29)
HCT VFR BLD AUTO: 26.2 % (ref 35–47)
HGB BLD-MCNC: 8 G/DL (ref 11.7–15.7)
MAGNESIUM SERPL-MCNC: 1.6 MG/DL (ref 1.7–2.3)
MAGNESIUM SERPL-MCNC: 1.8 MG/DL (ref 1.7–2.3)
MCH RBC QN AUTO: 30.2 PG (ref 26.5–33)
MCHC RBC AUTO-ENTMCNC: 30.5 G/DL (ref 31.5–36.5)
MCV RBC AUTO: 99 FL (ref 78–100)
O2/TOTAL GAS SETTING VFR VENT: 50 %
O2/TOTAL GAS SETTING VFR VENT: 70 %
PCO2 BLD: 46 MM HG (ref 35–45)
PCO2 BLD: 54 MM HG (ref 35–45)
PEEP: 5 CM H2O
PEEP: 5 CM H2O
PH BLD: 7.36 [PH] (ref 7.35–7.45)
PH BLD: 7.4 [PH] (ref 7.35–7.45)
PHOSPHATE SERPL-MCNC: 2 MG/DL (ref 2.5–4.5)
PHOSPHATE SERPL-MCNC: 2.8 MG/DL (ref 2.5–4.5)
PLATELET # BLD AUTO: 254 10E3/UL (ref 150–450)
PO2 BLD: 108 MM HG (ref 80–105)
PO2 BLD: 95 MM HG (ref 80–105)
POTASSIUM SERPL-SCNC: 3.5 MMOL/L (ref 3.4–5.3)
POTASSIUM SERPL-SCNC: 4.5 MMOL/L (ref 3.4–5.3)
RBC # BLD AUTO: 2.65 10E6/UL (ref 3.8–5.2)
SAO2 % BLDA: 96 % (ref 92–100)
SAO2 % BLDA: 96 % (ref 92–100)
SODIUM SERPL-SCNC: 146 MMOL/L (ref 135–145)
WBC # BLD AUTO: 7 10E3/UL (ref 4–11)

## 2024-09-27 PROCEDURE — 250N000011 HC RX IP 250 OP 636

## 2024-09-27 PROCEDURE — 84100 ASSAY OF PHOSPHORUS: CPT

## 2024-09-27 PROCEDURE — 99291 CRITICAL CARE FIRST HOUR: CPT | Mod: FS | Performed by: INTERNAL MEDICINE

## 2024-09-27 PROCEDURE — 999N000157 HC STATISTIC RCP TIME EA 10 MIN

## 2024-09-27 PROCEDURE — 85014 HEMATOCRIT: CPT | Performed by: PHYSICIAN ASSISTANT

## 2024-09-27 PROCEDURE — 82805 BLOOD GASES W/O2 SATURATION: CPT

## 2024-09-27 PROCEDURE — 36589 REMOVAL TUNNELED CV CATH: CPT

## 2024-09-27 PROCEDURE — 83735 ASSAY OF MAGNESIUM: CPT

## 2024-09-27 PROCEDURE — 999N000065 XR CHEST 1 VIEW

## 2024-09-27 PROCEDURE — 86140 C-REACTIVE PROTEIN: CPT

## 2024-09-27 PROCEDURE — 250N000013 HC RX MED GY IP 250 OP 250 PS 637: Performed by: PHYSICIAN ASSISTANT

## 2024-09-27 PROCEDURE — 0JPTXXZ REMOVAL OF TUNNELED VASCULAR ACCESS DEVICE FROM TRUNK SUBCUTANEOUS TISSUE AND FASCIA, EXTERNAL APPROACH: ICD-10-PCS | Performed by: PHYSICIAN ASSISTANT

## 2024-09-27 PROCEDURE — 250N000013 HC RX MED GY IP 250 OP 250 PS 637

## 2024-09-27 PROCEDURE — 200N000002 HC R&B ICU UMMC

## 2024-09-27 PROCEDURE — 94645 CONT INHLJ TX EACH ADDL HOUR: CPT

## 2024-09-27 PROCEDURE — 02PAX3Z REMOVAL OF INFUSION DEVICE FROM HEART, EXTERNAL APPROACH: ICD-10-PCS | Performed by: PHYSICIAN ASSISTANT

## 2024-09-27 PROCEDURE — 250N000013 HC RX MED GY IP 250 OP 250 PS 637: Performed by: SURGERY

## 2024-09-27 PROCEDURE — 94003 VENT MGMT INPAT SUBQ DAY: CPT

## 2024-09-27 PROCEDURE — 36589 REMOVAL TUNNELED CV CATH: CPT | Performed by: PHYSICIAN ASSISTANT

## 2024-09-27 PROCEDURE — 272N000452 HC KIT SHRLOCK 5FR POWER PICC TRIPLE LUMEN

## 2024-09-27 PROCEDURE — 84132 ASSAY OF SERUM POTASSIUM: CPT

## 2024-09-27 PROCEDURE — 250N000009 HC RX 250

## 2024-09-27 PROCEDURE — 272N000473 HC KIT, VPS RHYTHM STYLET

## 2024-09-27 PROCEDURE — 83735 ASSAY OF MAGNESIUM: CPT | Performed by: PHYSICIAN ASSISTANT

## 2024-09-27 PROCEDURE — 71045 X-RAY EXAM CHEST 1 VIEW: CPT

## 2024-09-27 PROCEDURE — 84100 ASSAY OF PHOSPHORUS: CPT | Performed by: PHYSICIAN ASSISTANT

## 2024-09-27 PROCEDURE — 71045 X-RAY EXAM CHEST 1 VIEW: CPT | Mod: 26 | Performed by: RADIOLOGY

## 2024-09-27 PROCEDURE — 80048 BASIC METABOLIC PNL TOTAL CA: CPT | Performed by: PHYSICIAN ASSISTANT

## 2024-09-27 PROCEDURE — 258N000003 HC RX IP 258 OP 636

## 2024-09-27 PROCEDURE — 36569 INSJ PICC 5 YR+ W/O IMAGING: CPT

## 2024-09-27 PROCEDURE — 99292 CRITICAL CARE ADDL 30 MIN: CPT | Mod: FS | Performed by: INTERNAL MEDICINE

## 2024-09-27 RX ORDER — QUETIAPINE FUMARATE 100 MG/1
100 TABLET, FILM COATED ORAL AT BEDTIME
Status: DISCONTINUED | OUTPATIENT
Start: 2024-09-27 | End: 2024-10-01 | Stop reason: HOSPADM

## 2024-09-27 RX ORDER — DEXMEDETOMIDINE HYDROCHLORIDE 4 UG/ML
.1-1.2 INJECTION, SOLUTION INTRAVENOUS CONTINUOUS
Status: DISCONTINUED | OUTPATIENT
Start: 2024-09-27 | End: 2024-10-01 | Stop reason: HOSPADM

## 2024-09-27 RX ORDER — MAGNESIUM SULFATE HEPTAHYDRATE 40 MG/ML
2 INJECTION, SOLUTION INTRAVENOUS ONCE
Status: COMPLETED | OUTPATIENT
Start: 2024-09-27 | End: 2024-09-27

## 2024-09-27 RX ORDER — LORAZEPAM 2 MG/ML
2 CONCENTRATE ORAL EVERY 6 HOURS
Status: DISCONTINUED | OUTPATIENT
Start: 2024-09-27 | End: 2024-09-30

## 2024-09-27 RX ORDER — HEPARIN SODIUM,PORCINE 10 UNIT/ML
5-20 VIAL (ML) INTRAVENOUS EVERY 24 HOURS
Status: DISCONTINUED | OUTPATIENT
Start: 2024-09-27 | End: 2024-10-01 | Stop reason: HOSPADM

## 2024-09-27 RX ORDER — QUETIAPINE FUMARATE 50 MG/1
50 TABLET, FILM COATED ORAL 2 TIMES DAILY
Status: DISCONTINUED | OUTPATIENT
Start: 2024-09-27 | End: 2024-10-01 | Stop reason: HOSPADM

## 2024-09-27 RX ORDER — MAGNESIUM SULFATE HEPTAHYDRATE 40 MG/ML
2 INJECTION, SOLUTION INTRAVENOUS ONCE
Status: COMPLETED | OUTPATIENT
Start: 2024-09-27 | End: 2024-09-28

## 2024-09-27 RX ORDER — HYDRALAZINE HYDROCHLORIDE 20 MG/ML
10 INJECTION INTRAMUSCULAR; INTRAVENOUS EVERY 6 HOURS PRN
Status: DISCONTINUED | OUTPATIENT
Start: 2024-09-27 | End: 2024-10-01 | Stop reason: HOSPADM

## 2024-09-27 RX ORDER — HEPARIN SODIUM,PORCINE 10 UNIT/ML
5-20 VIAL (ML) INTRAVENOUS
Status: DISCONTINUED | OUTPATIENT
Start: 2024-09-27 | End: 2024-10-01 | Stop reason: HOSPADM

## 2024-09-27 RX ORDER — POTASSIUM CHLORIDE 20MEQ/15ML
20 LIQUID (ML) ORAL ONCE
Status: COMPLETED | OUTPATIENT
Start: 2024-09-27 | End: 2024-09-27

## 2024-09-27 RX ORDER — LIDOCAINE 40 MG/G
CREAM TOPICAL
Status: ACTIVE | OUTPATIENT
Start: 2024-09-27 | End: 2024-09-30

## 2024-09-27 RX ADMIN — LEVOTHYROXINE SODIUM 25 MCG: 0.03 TABLET ORAL at 09:58

## 2024-09-27 RX ADMIN — QUETIAPINE FUMARATE 100 MG: 100 TABLET ORAL at 22:37

## 2024-09-27 RX ADMIN — CETIRIZINE HYDROCHLORIDE 10 MG: 10 TABLET, FILM COATED ORAL at 09:58

## 2024-09-27 RX ADMIN — IPRATROPIUM BROMIDE 0.5 MG: 0.5 SOLUTION RESPIRATORY (INHALATION) at 00:15

## 2024-09-27 RX ADMIN — DEXMEDETOMIDINE HYDROCHLORIDE 0.6 MCG/KG/HR: 400 INJECTION INTRAVENOUS at 15:08

## 2024-09-27 RX ADMIN — HEPARIN SODIUM 5000 UNITS: 5000 INJECTION, SOLUTION INTRAVENOUS; SUBCUTANEOUS at 04:38

## 2024-09-27 RX ADMIN — CHLORHEXIDINE GLUCONATE 0.12% ORAL RINSE 15 ML: 1.2 LIQUID ORAL at 09:57

## 2024-09-27 RX ADMIN — QUETIAPINE FUMARATE 50 MG: 50 TABLET ORAL at 18:03

## 2024-09-27 RX ADMIN — POTASSIUM & SODIUM PHOSPHATES POWDER PACK 280-160-250 MG 1 PACKET: 280-160-250 PACK at 18:03

## 2024-09-27 RX ADMIN — Medication 1 TABLET: at 12:36

## 2024-09-27 RX ADMIN — DEXMEDETOMIDINE HYDROCHLORIDE 0.6 MCG/KG/HR: 400 INJECTION INTRAVENOUS at 18:28

## 2024-09-27 RX ADMIN — LEVALBUTEROL HYDROCHLORIDE 0.63 MG: 0.63 SOLUTION RESPIRATORY (INHALATION) at 08:51

## 2024-09-27 RX ADMIN — METHYLPREDNISOLONE SODIUM SUCCINATE 62.5 MG: 125 INJECTION, POWDER, FOR SOLUTION INTRAMUSCULAR; INTRAVENOUS at 18:03

## 2024-09-27 RX ADMIN — LEVALBUTEROL HYDROCHLORIDE 0.63 MG: 0.63 SOLUTION RESPIRATORY (INHALATION) at 03:39

## 2024-09-27 RX ADMIN — DEXMEDETOMIDINE HYDROCHLORIDE 0.6 MCG/KG/HR: 400 INJECTION INTRAVENOUS at 11:30

## 2024-09-27 RX ADMIN — LEVALBUTEROL HYDROCHLORIDE 0.63 MG: 0.63 SOLUTION RESPIRATORY (INHALATION) at 12:24

## 2024-09-27 RX ADMIN — IPRATROPIUM BROMIDE 0.5 MG: 0.5 SOLUTION RESPIRATORY (INHALATION) at 12:24

## 2024-09-27 RX ADMIN — QUETIAPINE FUMARATE 25 MG: 25 TABLET ORAL at 06:23

## 2024-09-27 RX ADMIN — HEPARIN SODIUM 5000 UNITS: 5000 INJECTION, SOLUTION INTRAVENOUS; SUBCUTANEOUS at 12:36

## 2024-09-27 RX ADMIN — Medication 50 MCG: at 04:23

## 2024-09-27 RX ADMIN — Medication 50 MCG: at 10:31

## 2024-09-27 RX ADMIN — Medication 200 MCG/HR: at 02:38

## 2024-09-27 RX ADMIN — DEXMEDETOMIDINE HYDROCHLORIDE 1.2 MCG/KG/HR: 400 INJECTION INTRAVENOUS at 00:53

## 2024-09-27 RX ADMIN — ZINC SULFATE 220 MG (50 MG) CAPSULE 220 MG: CAPSULE at 09:58

## 2024-09-27 RX ADMIN — ATOVAQUONE 1500 MG: 750 SUSPENSION ORAL at 09:57

## 2024-09-27 RX ADMIN — LORAZEPAM 2 MG: 2 LIQUID ORAL at 19:58

## 2024-09-27 RX ADMIN — IPRATROPIUM BROMIDE 0.5 MG: 0.5 SOLUTION RESPIRATORY (INHALATION) at 08:51

## 2024-09-27 RX ADMIN — IPRATROPIUM BROMIDE 0.5 MG: 0.5 SOLUTION RESPIRATORY (INHALATION) at 20:28

## 2024-09-27 RX ADMIN — DEXMEDETOMIDINE HYDROCHLORIDE 1.2 MCG/KG/HR: 400 INJECTION INTRAVENOUS at 04:38

## 2024-09-27 RX ADMIN — LIDOCAINE HYDROCHLORIDE ANHYDROUS 5 ML: 10 INJECTION, SOLUTION INFILTRATION at 17:01

## 2024-09-27 RX ADMIN — Medication 40 MG: at 09:57

## 2024-09-27 RX ADMIN — LORAZEPAM 2 MG: 2 LIQUID ORAL at 15:08

## 2024-09-27 RX ADMIN — POTASSIUM & SODIUM PHOSPHATES POWDER PACK 280-160-250 MG 1 PACKET: 280-160-250 PACK at 12:35

## 2024-09-27 RX ADMIN — KETAMINE HYDROCHLORIDE 175 MG/HR: 100 INJECTION, SOLUTION, CONCENTRATE INTRAMUSCULAR; INTRAVENOUS at 11:08

## 2024-09-27 RX ADMIN — POTASSIUM & SODIUM PHOSPHATES POWDER PACK 280-160-250 MG 1 PACKET: 280-160-250 PACK at 09:58

## 2024-09-27 RX ADMIN — IPRATROPIUM BROMIDE 0.5 MG: 0.5 SOLUTION RESPIRATORY (INHALATION) at 03:39

## 2024-09-27 RX ADMIN — DEXMEDETOMIDINE HYDROCHLORIDE 1.2 MCG/KG/HR: 400 INJECTION INTRAVENOUS at 08:42

## 2024-09-27 RX ADMIN — LORAZEPAM 0.5 MG: 2 INJECTION INTRAMUSCULAR; INTRAVENOUS at 11:03

## 2024-09-27 RX ADMIN — Medication 200 MCG/HR: at 14:20

## 2024-09-27 RX ADMIN — POTASSIUM CHLORIDE 20 MEQ: 20 SOLUTION ORAL at 09:58

## 2024-09-27 RX ADMIN — LEVALBUTEROL HYDROCHLORIDE 0.63 MG: 0.63 SOLUTION RESPIRATORY (INHALATION) at 00:15

## 2024-09-27 RX ADMIN — MONTELUKAST 10 MG: 10 TABLET, FILM COATED ORAL at 22:37

## 2024-09-27 RX ADMIN — LEVALBUTEROL HYDROCHLORIDE 0.63 MG: 0.63 SOLUTION RESPIRATORY (INHALATION) at 20:28

## 2024-09-27 RX ADMIN — CHLORHEXIDINE GLUCONATE 0.12% ORAL RINSE 15 ML: 1.2 LIQUID ORAL at 19:58

## 2024-09-27 RX ADMIN — HEPARIN SODIUM 5000 UNITS: 5000 INJECTION, SOLUTION INTRAVENOUS; SUBCUTANEOUS at 19:58

## 2024-09-27 RX ADMIN — HYDRALAZINE HYDROCHLORIDE 10 MG: 20 INJECTION INTRAMUSCULAR; INTRAVENOUS at 10:48

## 2024-09-27 RX ADMIN — MIDAZOLAM IN SODIUM CHLORIDE 5 MG/HR: 1 INJECTION INTRAVENOUS at 03:15

## 2024-09-27 RX ADMIN — LABETALOL HYDROCHLORIDE 10 MG: 5 INJECTION, SOLUTION INTRAVENOUS at 08:42

## 2024-09-27 RX ADMIN — METHYLPREDNISOLONE SODIUM SUCCINATE 62.5 MG: 125 INJECTION, POWDER, FOR SOLUTION INTRAMUSCULAR; INTRAVENOUS at 04:38

## 2024-09-27 RX ADMIN — MAGNESIUM SULFATE HEPTAHYDRATE 2 G: 2 INJECTION, SOLUTION INTRAVENOUS at 10:00

## 2024-09-27 RX ADMIN — FLUCONAZOLE 100 MG: 100 TABLET ORAL at 09:58

## 2024-09-27 ASSESSMENT — ACTIVITIES OF DAILY LIVING (ADL)
ADLS_ACUITY_SCORE: 63
ADLS_ACUITY_SCORE: 65
ADLS_ACUITY_SCORE: 63
ADLS_ACUITY_SCORE: 63
ADLS_ACUITY_SCORE: 65
ADLS_ACUITY_SCORE: 63
ADLS_ACUITY_SCORE: 65
ADLS_ACUITY_SCORE: 63

## 2024-09-27 NOTE — PLAN OF CARE
ICU End of Shift Summary. See flowsheets for vital signs and detailed assessment.    Changes this shift:   RASS -5 sedation unchanged overnight, FiO2 titrated down to 50%. Patient has had increased coughing even with is undisturbed and overbreathing. BP have increased this AM due to the coughing, still patient has not needed labetalol. Continuing to monitor. Adequate UOP and increased loose BM's.      Plan: Continue to follow plan of care. Wean sedation as able to.    Goal Outcome Evaluation:    Plan of Care Reviewed With: patient    Overall Patient Progress: no change    Outcome Evaluation: improving oxygenation.

## 2024-09-27 NOTE — PROCEDURES
[facility]    Procedure: IR Procedure Note    Date/Time: 9/27/2024 3:01 PM    Performed by: Michael Orosco Chi, PA-C  Authorized by: Michael Orosco Chi, PA-C  IR Fellow Physician:    Pre Procedure Diagnosis: CHACORTA  Post Procedure Diagnosis: catheter no longer needed    UNIVERSAL PROTOCOL   Site Marked: NA  Prior Images Obtained and Reviewed:  Yes  Required items: Required blood products, implants, devices and special equipment available    Patient identity confirmed:  Verbally with patient, arm band, provided demographic data and hospital-assigned identification number  Patient was reevaluated immediately before administering moderate or deep sedation or anesthesia  Confirmation Checklist:  Patient's identity using two indicators, relevant allergies, procedure was appropriate and matched the consent or emergent situation and correct equipment/implants were available  Time out: Immediately prior to the procedure a time out was called    Universal Protocol: the Joint Commission Universal Protocol was followed    Preparation: Patient was prepped and draped in usual sterile fashion       ANESTHESIA    Anesthesia:  Local infiltration  Local Anesthetic:  Lidocaine 1% without epinephrine      SEDATION    Patient Sedated: No    See dictated procedure note for full details.  Findings: TCVC in place    Specimens: none    Procedural Complications: None    Condition: Stable    Plan: Bedrest 30 minutes, head of bed up 45 degrees      PROCEDURE  Describe Procedure: Completed removal of right tunneled double lumen cvc in its entirety.      See dictated note under imaging for more detailed information.  Patient Tolerance:  Patient tolerated the procedure well with no immediate complications  Length of time physician/provider present for 1:1 monitoring during sedation:  0 min

## 2024-09-27 NOTE — PROGRESS NOTES
MEDICAL ICU PROGRESS NOTE  /09/27/2024    Date of Service (when I saw the patient): 09/27/2024    ASSESSMENT: Massiel Flaherty is a 53 year old female with PMH Anxiety/depression, fibromyalgia, c/f nonepileptic seizures not on AEDs, hypothyroidism, asthma, HLD, MURIEL on CPAP, expressive aphasia, hypertension who was admitted on 8/3/2024 for fatigue, fever and dyspnea and intubated 8/6/24 at OSH for ARDS, proned and paralyzed without improvement. Transferred to [facility] 8/8/24, hypoxic with high plateaus/peaks and placed on VV ECMO and CRRT. Decannulated from VV ECMO 8/18 and transferred to MICU 8/26/24 for ongoing management. Extubated 8/27 then reintubated 8/29 iso worsening AHRF and pseudomonas pneumonia. Extubated again 9/16 to BiPAP/HFNC, re-intubated 9/20 with tachypnea, increased WOB, fevers, and new lung opacities c/f possible HAP vs ILD/vasculitis/organizing pneumonia flare. Course complicated by tension pneumothorax while re-intubated 9/20 now s/p right chest tube.      CHANGES and MAJOR THINGS TODAY:   - Tachycardic and tachypneic with attempted sedation wean yesterday, ketamine and versed restarted  - attempted to discontinue precedex today, however patient is not tolerating and is again tachycardic and tachypneic  - veletri turned off, could possibly be contributing to increasing FiO2 needs, increased WOB, currently awaiting follow up ABG to evaluate  - Increase ativan to 2 mg Q6 hours in an attempt to wean versed drip  - Tunneled dialysis catheter to be removed by IR today  - clamped chest tube clamped yesterday, CXR stable, will remove today  - tmax 102.9 yesterday evening: blood culture and sputum culture sent  - sputum grew 1+ gram negative bacilli; had fever yesterday, will await culture results, considering restarting tobramycin  - seroquel 25 mg BID and 50 mg at HS added yesterday afternoon, will increase dose to 50 mg BID and 100 mg at HS due to difficulty weaning sedation  - was diuresed with 40  mg IV lasix yesterday afternoon d/t worsening tachypnea, good urine output. Diuresis goal net negative today   - Trial off of veletri today, restart if patient not tolerating  - Remove central line, remove art line after chest tube and tunneled line removed today  - PRN IV hydralazine and PRN labetolol for SBP <180      PLAN:    Neuro:  # Pain and sedation  # Concern for ICU delirium   # Hx Fibromyalgia   # Hx myalgic encephalomyelitis   # Hx anxiety/depression  - Pain: Oxycodone 2.5 mg q6hrs PRN, fentanyl bolus PRN   - Sedation plan:   - Sedated with fentanyl, ketamine, versed, and precedex infusion. Plan for 9/27: attempted to discontinue precedex, however patient has not tolerated thus far, attempt to wean versed if able, administer 1 mg ativan every 6 hours.  - Vecuronium infusion off since 9/23 at 1200   - Gabapentin to 300mg TID held while sedated  - Psych meds (venlafaxine and amitriptyline) held while sedated  - RASS -3 to -4 for now, will attempt to wean sedation as tolerated and liberalize RASS as able     # Hx spells with staring and BUE posturing previously described as nonepileptic seizures   # Hx of GTC seizures and myoclonic epilepsy, weaned off AEDs   # Diffuse cerebral edema - improved  - 8/21: MRI Brain: no acute intracranial pathology. No findings to suggest anoxic brain injury.   - MRI brain 9/20: no anoxic brain injury  - While extubated, patient able to follow commands and answer yes/no questions   - continue to monitor neurological exam     Pulmonary:  # Acute hypoxic respiratory failure c/b ARDS  # Pseudomonas pneumonia  # Mechanical ventilation  # s/p VV ECMO 8/8 - 8/18   # Hx CAP  # Asthma  # MURIEL on home CPAP  # S/p tracheostomy  PFT dated 9/8/2020 demonstrated normal spirometry with mild diffusion impairment and mild restriction (FEV1/FVC 80%, FEV1 2.59, DLCO 40%). CT abdomen chest 3/9/2020 demonstrated scarring and fibrosis bilaterally which correlated with the decreased DLCO. The patient  also has a history of MURIEL on CPAP therapy as currently managed by her provider in [place]. Unclear what triggered ARDS or why she has had such severe hospital course, further investigated ILD but results all unremarkable. Extubated 8/27, reintubated 8/29 for worsening O2 demands and diffuse opacities iso new/recurrent psuedomonas pneumonia. Bronch with BAL 8/30, pseudomonas growing as below. Extubated again 9/16, worsening respiratory distress noted 9/19 with repeat polymicrobial growth on respiratory sputum raising c/f aspiration/hospital associated pneumonia. 9/20 with increased WOB, new fevers, and CXR significant for opacities c/f possible HAP vs ILD/vasculitis/organizing pneumonia and was reintubated 9/20. Now s/p tracheostomy on 9/25. As of 9/27, having difficulty weaning sedation, FiO2 needs increasing with attempted sedation and veletri wean. Sputum showing 1+ GNB, possibly pseudomonas again. Query colonization versus ongoing infection as patient was febrile last night to 102.9.   - ILD w/u aldolase, EVELIO, RF, CCP, ANCA, MPO, SSA Ro and La, Scleroderma antibody, Radha 1,  hypersensity pneumonitis, IGG subclasses,  aspergillus, blastomyces, histoplasma negative  - SpO2 goals >92%, PaO2 goals >60   - PTA singular at bedtime   - xopenex and ipratropium Q4 hours  - pulmicort currently on hold  - Lung protective ventilation strategies given ARDS   - Steroids:  - CRP trend 9/21 487,  9/23 166, 9/24 77, 9/25 47, 9/27 49.70  - Methylpred 60mg q6H 9/21- 9/24  - Begin Methlypred taper 9/25 @ 60 q12 x3 days following with 60 daily if patient stable  - 9/27: ABG stable overnight, reduced frequency of ABGs to daily and PRN however may need to re-evaluate d/t difficulty weaning sedation that increased patient's FiO2 needs      FiO2 (%): 50 %, Resp: 30, Vent Mode: CMV/AC, Resp Rate (Set): 26 breaths/min, Tidal Volume (Set, mL): 350 mL, PEEP (cm H2O): 5 cmH2O, Resp Rate (Set): 26 breaths/min, Tidal Volume (Set, mL): 350  mL, PEEP (cm H2O): 5 cmH2O       Pneumothorax, resolved   Tension pneumothorax during re-intubation 9/20, concern for fibrotic lung disease and possible barotrauma during bag ventilation during re-intubation vs complication of re-intubation in general. Leave in while intubated.   - Chest tube to water seal  - Daily XR  - Chest tube clamped on 9/26, CXR showed no recurrence of pneumothorax on 9/27, will plan to remove chest tube today.      Cardiovascular:  # Hyperlipidemia  # Hx HTN   # Sinus Tachycardia  - MAP goal >65, SBP goals >110  - PTA atorvastatin  - PTA clonidine held while sedated  - Lower extremity ultrasound without vascular pathology, no hematoma or clots  - Monitor discoloration of extremities for necrosis  - PRN hydralazine or labetolol for SBP > 180, plan to use labetolol rather than hydralazine if tachycardic  - 9/27: tachycardia returned with attempted sedation wean on 9/26, improved when patient re-sedated, tachycardia returned with attempted sedation wean today     GI/Nutrition:  # Severe malnutrition (see RD note)   # Non-infectious Diarrhea  # GERD   # s/p PEG on 9/25/24  - Protonix (home medication)   - Nutrition consulted, appreciate recs  - Diet: TF at goal, on high fiber feed   - Hold bowel regimen d/t loose stools  - Continue scheduled multivitamin  - Loperamide PRN  - Rectal tube, output slowing down, continue to monitor    Renal/Fluids/Electrolytes:  # Acute kidney injury, resolved   # AGMA, improving   # Elevated lactate, resovled  Baseline Cr approx 0.6. Pt was anuric required CRRT here but now making urine, likely prerenal due to shock.  - Adequate I/Os, daily weights   - Avoid nephrotoxins as able  - RN managed electrolyte replacement protocol  - Diuresis:   - Received lasix 20 mg, 40 mg, 20 mg on 9/24   - 9/26: diuresed with 40 mg IV lasix   - 9/27: goal net even, will diurese if needed    Hypernatremia (currently resolved)  - FWF 100ml q4H  - Continue to trend    Endocrine:  #  Steroid and stress induced hyperglycemia  # Hypothyroidism   - Goal to keep BG < 180 for optimal wound healing  - Continue on insulin infusion while on high dose steroids   - Continue PTA synthroid  - continue insulin drip today while on high dose steroids, plan to transition to sliding scale in coming days     ID:  # Leukocytosis, resolved   # Concern for aspiration pneumonia/hospital acquired pnemonia (9/19 - )  # Pseudomonas pneumonia (s/p treatment) with c/f possible ongoing infection (GNB + sputum and fever on 9/26)  # Hx CAP  Respiratory cx 8/29 pseudomonas pneumonia. Intermediate susceptability to meropenem, more significant sensitivities to ciprofloxacin. New leukocytosis, elevated PC, CRP and lactate 9/19 with sputum cx 4+ psuedomonas, 2+ GPCs, and 1+ GPBs could be colonized , BAL studies 9/21 positive for pseudomonas. Sputum from 9/26 showing 1+ GNB, patient had a fever to 102.9 as well. C/f possible ongoing infection.   - Continue to monitor fever curve and inflammatory markers as appropriate  - ID now signed off   - Repeat cultures and sputum growing pseudomonas with similar resistance pattern to previous.   - 9/27: If fever recurs or patient clinically deteriorates, may consider resuming pseudomonas treatment today     Abx  - Meropenem 8/29 - 9/1  - Vancomycin 8/29 - 8/30  - Tobramycin x 1 8/29  - Vancomycin 9/5 - 9/8    - Tobramycin nebs BID 9/1 - 9/11, 9/21- 9/25  - Ciprofloxacin infusion 9/1 - 9/11  - Metronidazole 9/6 - 9/11  - Atovaquone 9/11 -   - Vancomycin 9/19 - 9/20  - Zosyn 9/19 - 9/21  - Ciprofloxacin 9/19 - 9/21     PJP Ppx  Given Dex-ARDS Massiel ferraro remain on steriods for > 3 weeks so will initiate PJP ppx. Given history of allergy to sulfa, will obtain G6PD test to determine if dapsone can be used: levels are ok, however continuing atovaquone.   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   - Ganciclovir 9/6 - 9/8, discontinued given  ID recs  - CMV levels recheck 9/18 shows continued downtrending, level at 289    Thrush  Noted 9/21  - Fluconazole x 7 days (stop date added)      # HSV-1  Mouth sores present, which could have viral etiology. 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  - Acyclovir 400mg BID x5 days (8/22-8/27)     Hematology:    # Anemia of critical illness  # Right groin Hematoma   Acutely decreased Hgb from 8.0 to 6.6 on 9/20 of unknown etiology that required transfusion x1, however 9/19 LE US notable to 14.9 cm hematoma in R groin near prior canal that was not previously appreciated on CT abdomen c/f possible bleed.   - Hematoma stable on repeat US 9/21  - Received 1 unit pRBCs 9/24 for hgb 6.9  - Daily CBC  - Transfuse if hgb <7.0 or signs/symptoms of hypoperfusion. Monitor and trend.      Musculoskeletal/Rheum:  # Alopecia  - Hold PTA hydroxychloroquine      # Weakness and deconditioning of critical illness  # Left ankle injury pre-hospitalization    - Physical and occupational therapy when able.      Skin:  # BLE scattered ecchymosis  # Left toe duskiness  # Peripheral Edema  - WOC consult  - OT consult for lymphedema therapy     General Cares/Prophylaxis:  DVT Prophylaxis: SubQ heparin  GI Prophylaxis: PPI  Restraints: no  Family: Will try to call mother  Code Status: Full Code     Lines/tubes/drains:  - LIJ CVC (8/8), tentative plan to remove today  - PIV x3  - Rectal tube (8/17)  - Tunneled HD line (8/23)- plan to remove soon,currently on IR schedule for today  - New radial A line, right, 9/20, tentative plan to remove today  - Chest tube 9/20, plan to remove today  - Tracheostomy 9/25  - PEG 9/25     Disposition:  - Medical ICU     Patient seen and findings/plan discussed with medical ICU staff, Dr. Perlman.    Mirtha Vogt, APRN CNP    Critical Care time: 45 minutes     Clinically Significant Risk Factors         # Hypernatremia: Highest Na = 146 mmol/L in last 2 days, will monitor as appropriate    # Hypomagnesemia: Lowest Mg =  1.6 mg/dL in last 2 days, will replace as needed   # Hypoalbuminemia: Lowest albumin = 2.2 g/dL at 8/10/2024  9:47 AM, will monitor as appropriate                  # Severe Malnutrition: based on nutrition assessment    # Financial/Environmental Concerns: none              ====================================  INTERVAL HISTORY:   Attempting sedation wean again today. With precedex off patient is again tachypneic and tachycardic and requiring more oxygen. Veletri was also stopped this morning which may be contributing, currently awaiting follow up ABG. Patient had fever of 102.9 yesterday evening, sputum grew 1+ GNB, considering possible ongoing pseudomonas infection vs colonization and fever of other etiology. No leukocytosis on labs.     OBJECTIVE:   1. VITAL SIGNS:   Temp:  [99  F (37.2  C)-102.9  F (39.4  C)] 99.5  F (37.5  C)  Pulse:  [] 102  Resp:  [26-42] 30  MAP:  [71 mmHg-185 mmHg] 111 mmHg  Arterial Line BP: (101-229)/() 167/77  FiO2 (%):  [40 %-70 %] 50 %  SpO2:  [90 %-100 %] 97 %  FiO2 (%): 50 %, Resp: 30, Vent Mode: CMV/AC, Resp Rate (Set): 26 breaths/min, Tidal Volume (Set, mL): 350 mL, PEEP (cm H2O): 5 cmH2O, Resp Rate (Set): 26 breaths/min, Tidal Volume (Set, mL): 350 mL, PEEP (cm H2O): 5 cmH2O  2. INTAKE/ OUTPUT:   I/O last 3 completed shifts:  In: 2835.37 [I.V.:1475.37; NG/GT:360]  Out: 4100 [Urine:3115; Stool:975; Chest Tube:10]    3. PHYSICAL EXAMINATION:  General: sedated  HEENT: NC/AT  Neuro: deeply sedated  Pulm/Resp: tracheostomy present, lung sounds course, tachypneic.  CV: RRR, S1/2, no m/r/g  Abdomen: Soft, tender, bowel sounds present, right groin hematoma stable  : Gonzalez with yellow, clear urine   Incisions/Skin: Trace to mild edema in all extremities, equally. Lower leg ecchymosis present and scattered. LL inguinal area with ecchymotic discoloration     4. LABS:   Arterial Blood Gases   Recent Labs   Lab 09/27/24  0419 09/26/24  2341 09/26/24  1651 09/26/24  1505   PH 7.40  7.41 7.39 7.36   PCO2 46* 47* 46* 49*   PO2 95 132* 179* 127*   HCO3 29* 29* 28 28     Complete Blood Count   Recent Labs   Lab 09/27/24 0419 09/26/24 0345 09/25/24 0400 09/24/24  1432 09/24/24  0350   WBC 7.0 5.7 7.6  --  7.6   HGB 8.0* 7.4* 8.2* 8.9* 6.9*    233 234  --  221     Basic Metabolic Panel  Recent Labs   Lab 09/27/24  0603 09/27/24 0419 09/27/24 0418 09/27/24  0200 09/26/24  2211 09/26/24 2008 09/26/24 0418 09/26/24 0345 09/25/24  0405 09/25/24  0400   NA  --  146*  --   --   --  145  --  145  --  147*   POTASSIUM  --  3.5  --   --   --  4.2  --  4.0  --  4.0   CHLORIDE  --  110*  --   --   --  108*  --  108*  --  107   CO2  --  25  --   --   --  26  --  23  --  23   BUN  --  27.6*  --   --   --  34.0*  --  42.2*  --  54.5*   CR  --  0.25*  --   --   --  0.28*  --  0.31*  --  0.39*   * 155* 141* 112*   < > 130*   < > 98   < > 154*    < > = values in this interval not displayed.     Liver Function Tests  Recent Labs   Lab 09/23/24  0404 09/22/24  0759 09/20/24  1312   AST 57* 13  --    ALT 93* 56*  --    ALKPHOS 462* 207*  --    BILITOTAL 0.5 0.2  --    ALBUMIN 2.7* 2.5*  --    INR  --  1.29* 1.45*     Coagulation Profile  Recent Labs   Lab 09/22/24 0759 09/20/24  1312   INR 1.29* 1.45*   PTT 33  --        5. RADIOLOGY:   Recent Results (from the past 24 hour(s))   XR Chest Port 1 View    Narrative    Exam: XR CHEST PORT 1 VIEW, 9/26/2024 1:28 PM    Comparison: 9/26/2024    History: increasing FiO2 needs, tachypneic, chest tube for pneumo  currently clamped    Findings:  Single AP portable semiupright view of the chest. Tracheostomy tube.  Right IJ central venous catheter tip in the right atrium. Left IJ  central venous catheter tip at the brachiocephalic confluence. Right  chest tube.    Trachea is midline. The cardiac silhouette is obscured. Diffuse mixed  opacities. Possible small effusions.. No discernible pneumothorax. The  upper abdomen is unremarkable.      Impression     Impression:     1. Increased diffuse mixed the left pulmonary opacities.  2. Stable support devices.  3. Possible small effusions    I have personally reviewed the examination and initial interpretation  and I agree with the findings.    OLIVIER SALVADOR MD         SYSTEM ID:  [id number]

## 2024-09-28 ENCOUNTER — APPOINTMENT (OUTPATIENT)
Dept: GENERAL RADIOLOGY | Facility: CLINIC | Age: 54
DRG: 003 | End: 2024-09-28
Payer: MEDICAID

## 2024-09-28 LAB
ALLEN'S TEST: ABNORMAL
ANION GAP SERPL CALCULATED.3IONS-SCNC: 11 MMOL/L (ref 7–15)
ANION GAP SERPL CALCULATED.3IONS-SCNC: 9 MMOL/L (ref 7–15)
BASE EXCESS BLDA CALC-SCNC: 5.5 MMOL/L (ref -3–3)
BUN SERPL-MCNC: 21.4 MG/DL (ref 6–20)
BUN SERPL-MCNC: 22.4 MG/DL (ref 6–20)
CALCIUM SERPL-MCNC: 8 MG/DL (ref 8.8–10.4)
CALCIUM SERPL-MCNC: 8.3 MG/DL (ref 8.8–10.4)
CHLORIDE SERPL-SCNC: 113 MMOL/L (ref 98–107)
CHLORIDE SERPL-SCNC: 115 MMOL/L (ref 98–107)
COHGB MFR BLD: 99.8 % (ref 96–97)
CREAT SERPL-MCNC: 0.21 MG/DL (ref 0.51–0.95)
CREAT SERPL-MCNC: 0.22 MG/DL (ref 0.51–0.95)
EGFRCR SERPLBLD CKD-EPI 2021: >90 ML/MIN/1.73M2
EGFRCR SERPLBLD CKD-EPI 2021: >90 ML/MIN/1.73M2
ERYTHROCYTE [DISTWIDTH] IN BLOOD BY AUTOMATED COUNT: 19.9 % (ref 10–15)
GLUCOSE BLDC GLUCOMTR-MCNC: 102 MG/DL (ref 70–99)
GLUCOSE BLDC GLUCOMTR-MCNC: 105 MG/DL (ref 70–99)
GLUCOSE BLDC GLUCOMTR-MCNC: 106 MG/DL (ref 70–99)
GLUCOSE BLDC GLUCOMTR-MCNC: 111 MG/DL (ref 70–99)
GLUCOSE BLDC GLUCOMTR-MCNC: 113 MG/DL (ref 70–99)
GLUCOSE BLDC GLUCOMTR-MCNC: 114 MG/DL (ref 70–99)
GLUCOSE BLDC GLUCOMTR-MCNC: 117 MG/DL (ref 70–99)
GLUCOSE BLDC GLUCOMTR-MCNC: 122 MG/DL (ref 70–99)
GLUCOSE BLDC GLUCOMTR-MCNC: 127 MG/DL (ref 70–99)
GLUCOSE BLDC GLUCOMTR-MCNC: 136 MG/DL (ref 70–99)
GLUCOSE BLDC GLUCOMTR-MCNC: 147 MG/DL (ref 70–99)
GLUCOSE BLDC GLUCOMTR-MCNC: 154 MG/DL (ref 70–99)
GLUCOSE BLDC GLUCOMTR-MCNC: 157 MG/DL (ref 70–99)
GLUCOSE BLDC GLUCOMTR-MCNC: 158 MG/DL (ref 70–99)
GLUCOSE BLDC GLUCOMTR-MCNC: 169 MG/DL (ref 70–99)
GLUCOSE BLDC GLUCOMTR-MCNC: 172 MG/DL (ref 70–99)
GLUCOSE BLDC GLUCOMTR-MCNC: 174 MG/DL (ref 70–99)
GLUCOSE BLDC GLUCOMTR-MCNC: 97 MG/DL (ref 70–99)
GLUCOSE SERPL-MCNC: 118 MG/DL (ref 70–99)
GLUCOSE SERPL-MCNC: 131 MG/DL (ref 70–99)
HCO3 BLD-SCNC: 31 MMOL/L (ref 21–28)
HCO3 SERPL-SCNC: 27 MMOL/L (ref 22–29)
HCO3 SERPL-SCNC: 29 MMOL/L (ref 22–29)
HCT VFR BLD AUTO: 24.2 % (ref 35–47)
HGB BLD-MCNC: 7.2 G/DL (ref 11.7–15.7)
MAGNESIUM SERPL-MCNC: 2.3 MG/DL (ref 1.7–2.3)
MCH RBC QN AUTO: 30 PG (ref 26.5–33)
MCHC RBC AUTO-ENTMCNC: 29.8 G/DL (ref 31.5–36.5)
MCV RBC AUTO: 101 FL (ref 78–100)
O2/TOTAL GAS SETTING VFR VENT: 60 %
PCO2 BLD: 51 MM HG (ref 35–45)
PEEP: 5 CM H2O
PH BLD: 7.39 [PH] (ref 7.35–7.45)
PHOSPHATE SERPL-MCNC: 1.9 MG/DL (ref 2.5–4.5)
PHOSPHATE SERPL-MCNC: 3 MG/DL (ref 2.5–4.5)
PLATELET # BLD AUTO: 245 10E3/UL (ref 150–450)
PO2 BLD: 140 MM HG (ref 80–105)
POTASSIUM SERPL-SCNC: 4 MMOL/L (ref 3.4–5.3)
POTASSIUM SERPL-SCNC: 4.5 MMOL/L (ref 3.4–5.3)
RBC # BLD AUTO: 2.4 10E6/UL (ref 3.8–5.2)
SAO2 % BLDA: 97 % (ref 92–100)
SODIUM SERPL-SCNC: 151 MMOL/L (ref 135–145)
SODIUM SERPL-SCNC: 151 MMOL/L (ref 135–145)
SODIUM SERPL-SCNC: 153 MMOL/L (ref 135–145)
WBC # BLD AUTO: 9 10E3/UL (ref 4–11)

## 2024-09-28 PROCEDURE — 84100 ASSAY OF PHOSPHORUS: CPT

## 2024-09-28 PROCEDURE — 85027 COMPLETE CBC AUTOMATED: CPT | Performed by: PHYSICIAN ASSISTANT

## 2024-09-28 PROCEDURE — 94640 AIRWAY INHALATION TREATMENT: CPT | Mod: 76

## 2024-09-28 PROCEDURE — 82805 BLOOD GASES W/O2 SATURATION: CPT

## 2024-09-28 PROCEDURE — 80048 BASIC METABOLIC PNL TOTAL CA: CPT | Performed by: PHYSICIAN ASSISTANT

## 2024-09-28 PROCEDURE — 71045 X-RAY EXAM CHEST 1 VIEW: CPT | Mod: 26 | Performed by: RADIOLOGY

## 2024-09-28 PROCEDURE — 250N000011 HC RX IP 250 OP 636

## 2024-09-28 PROCEDURE — 84100 ASSAY OF PHOSPHORUS: CPT | Performed by: PHYSICIAN ASSISTANT

## 2024-09-28 PROCEDURE — 250N000009 HC RX 250

## 2024-09-28 PROCEDURE — 250N000013 HC RX MED GY IP 250 OP 250 PS 637

## 2024-09-28 PROCEDURE — 84295 ASSAY OF SERUM SODIUM: CPT

## 2024-09-28 PROCEDURE — 94003 VENT MGMT INPAT SUBQ DAY: CPT

## 2024-09-28 PROCEDURE — 94640 AIRWAY INHALATION TREATMENT: CPT

## 2024-09-28 PROCEDURE — 71045 X-RAY EXAM CHEST 1 VIEW: CPT

## 2024-09-28 PROCEDURE — 999N000157 HC STATISTIC RCP TIME EA 10 MIN

## 2024-09-28 PROCEDURE — 99291 CRITICAL CARE FIRST HOUR: CPT | Mod: FS

## 2024-09-28 PROCEDURE — 80048 BASIC METABOLIC PNL TOTAL CA: CPT

## 2024-09-28 PROCEDURE — 258N000003 HC RX IP 258 OP 636

## 2024-09-28 PROCEDURE — 250N000013 HC RX MED GY IP 250 OP 250 PS 637: Performed by: SURGERY

## 2024-09-28 PROCEDURE — 250N000009 HC RX 250: Performed by: INTERNAL MEDICINE

## 2024-09-28 PROCEDURE — 83735 ASSAY OF MAGNESIUM: CPT | Performed by: PHYSICIAN ASSISTANT

## 2024-09-28 PROCEDURE — 200N000002 HC R&B ICU UMMC

## 2024-09-28 PROCEDURE — 250N000013 HC RX MED GY IP 250 OP 250 PS 637: Performed by: PHYSICIAN ASSISTANT

## 2024-09-28 RX ADMIN — IPRATROPIUM BROMIDE 0.5 MG: 0.5 SOLUTION RESPIRATORY (INHALATION) at 08:07

## 2024-09-28 RX ADMIN — LEVALBUTEROL HYDROCHLORIDE 0.63 MG: 0.63 SOLUTION RESPIRATORY (INHALATION) at 20:47

## 2024-09-28 RX ADMIN — QUETIAPINE FUMARATE 50 MG: 50 TABLET ORAL at 05:43

## 2024-09-28 RX ADMIN — POTASSIUM & SODIUM PHOSPHATES POWDER PACK 280-160-250 MG 2 PACKET: 280-160-250 PACK at 12:48

## 2024-09-28 RX ADMIN — Medication 50 MCG: at 17:35

## 2024-09-28 RX ADMIN — Medication 1 TABLET: at 12:47

## 2024-09-28 RX ADMIN — LEVALBUTEROL HYDROCHLORIDE 0.63 MG: 0.63 SOLUTION RESPIRATORY (INHALATION) at 08:07

## 2024-09-28 RX ADMIN — LORAZEPAM 2 MG: 2 LIQUID ORAL at 08:07

## 2024-09-28 RX ADMIN — LEVALBUTEROL HYDROCHLORIDE 0.63 MG: 0.63 SOLUTION RESPIRATORY (INHALATION) at 16:01

## 2024-09-28 RX ADMIN — KETAMINE HYDROCHLORIDE 175 MG/HR: 100 INJECTION, SOLUTION, CONCENTRATE INTRAMUSCULAR; INTRAVENOUS at 16:17

## 2024-09-28 RX ADMIN — KETAMINE HYDROCHLORIDE 175 MG/HR: 100 INJECTION, SOLUTION, CONCENTRATE INTRAMUSCULAR; INTRAVENOUS at 01:52

## 2024-09-28 RX ADMIN — LORAZEPAM 2 MG: 2 LIQUID ORAL at 20:14

## 2024-09-28 RX ADMIN — Medication 40 MG: at 08:07

## 2024-09-28 RX ADMIN — CETIRIZINE HYDROCHLORIDE 10 MG: 10 TABLET, FILM COATED ORAL at 07:56

## 2024-09-28 RX ADMIN — QUETIAPINE FUMARATE 100 MG: 100 TABLET ORAL at 22:02

## 2024-09-28 RX ADMIN — Medication 200 MCG/HR: at 02:30

## 2024-09-28 RX ADMIN — ATOVAQUONE 1500 MG: 750 SUSPENSION ORAL at 08:07

## 2024-09-28 RX ADMIN — Medication 200 MCG/HR: at 14:56

## 2024-09-28 RX ADMIN — HEPARIN SODIUM 5000 UNITS: 5000 INJECTION, SOLUTION INTRAVENOUS; SUBCUTANEOUS at 12:47

## 2024-09-28 RX ADMIN — POTASSIUM & SODIUM PHOSPHATES POWDER PACK 280-160-250 MG 2 PACKET: 280-160-250 PACK at 09:18

## 2024-09-28 RX ADMIN — IPRATROPIUM BROMIDE 0.5 MG: 0.5 SOLUTION RESPIRATORY (INHALATION) at 00:28

## 2024-09-28 RX ADMIN — HEPARIN SODIUM 5000 UNITS: 5000 INJECTION, SOLUTION INTRAVENOUS; SUBCUTANEOUS at 03:36

## 2024-09-28 RX ADMIN — MONTELUKAST 10 MG: 10 TABLET, FILM COATED ORAL at 22:02

## 2024-09-28 RX ADMIN — QUETIAPINE FUMARATE 50 MG: 50 TABLET ORAL at 13:27

## 2024-09-28 RX ADMIN — LEVALBUTEROL HYDROCHLORIDE 0.63 MG: 0.63 SOLUTION RESPIRATORY (INHALATION) at 04:38

## 2024-09-28 RX ADMIN — LEVOTHYROXINE SODIUM 25 MCG: 0.03 TABLET ORAL at 08:07

## 2024-09-28 RX ADMIN — IPRATROPIUM BROMIDE 0.5 MG: 0.5 SOLUTION RESPIRATORY (INHALATION) at 16:01

## 2024-09-28 RX ADMIN — LEVALBUTEROL HYDROCHLORIDE 0.63 MG: 0.63 SOLUTION RESPIRATORY (INHALATION) at 12:09

## 2024-09-28 RX ADMIN — LORAZEPAM 2 MG: 2 LIQUID ORAL at 13:27

## 2024-09-28 RX ADMIN — LORAZEPAM 2 MG: 2 LIQUID ORAL at 02:04

## 2024-09-28 RX ADMIN — MAGNESIUM SULFATE HEPTAHYDRATE 2 G: 2 INJECTION, SOLUTION INTRAVENOUS at 00:00

## 2024-09-28 RX ADMIN — HEPARIN SODIUM 5000 UNITS: 5000 INJECTION, SOLUTION INTRAVENOUS; SUBCUTANEOUS at 20:14

## 2024-09-28 RX ADMIN — ZINC SULFATE 220 MG (50 MG) CAPSULE 220 MG: CAPSULE at 07:56

## 2024-09-28 RX ADMIN — MIDAZOLAM IN SODIUM CHLORIDE 5 MG/HR: 1 INJECTION INTRAVENOUS at 01:08

## 2024-09-28 RX ADMIN — METHYLPREDNISOLONE SODIUM SUCCINATE 62.5 MG: 125 INJECTION, POWDER, FOR SOLUTION INTRAMUSCULAR; INTRAVENOUS at 03:36

## 2024-09-28 RX ADMIN — IPRATROPIUM BROMIDE 0.5 MG: 0.5 SOLUTION RESPIRATORY (INHALATION) at 12:10

## 2024-09-28 RX ADMIN — Medication 50 MCG: at 22:57

## 2024-09-28 RX ADMIN — INSULIN HUMAN 1 UNITS/HR: 1 INJECTION, SOLUTION INTRAVENOUS at 13:21

## 2024-09-28 RX ADMIN — LEVALBUTEROL HYDROCHLORIDE 0.63 MG: 0.63 SOLUTION RESPIRATORY (INHALATION) at 00:28

## 2024-09-28 RX ADMIN — POTASSIUM & SODIUM PHOSPHATES POWDER PACK 280-160-250 MG 2 PACKET: 280-160-250 PACK at 05:43

## 2024-09-28 RX ADMIN — IPRATROPIUM BROMIDE 0.5 MG: 0.5 SOLUTION RESPIRATORY (INHALATION) at 20:45

## 2024-09-28 RX ADMIN — DEXMEDETOMIDINE HYDROCHLORIDE 0.6 MCG/KG/HR: 400 INJECTION INTRAVENOUS at 09:24

## 2024-09-28 RX ADMIN — DEXMEDETOMIDINE HYDROCHLORIDE 0.6 MCG/KG/HR: 400 INJECTION INTRAVENOUS at 17:37

## 2024-09-28 RX ADMIN — IPRATROPIUM BROMIDE 0.5 MG: 0.5 SOLUTION RESPIRATORY (INHALATION) at 04:39

## 2024-09-28 RX ADMIN — CHLORHEXIDINE GLUCONATE 0.12% ORAL RINSE 15 ML: 1.2 LIQUID ORAL at 20:14

## 2024-09-28 RX ADMIN — DEXMEDETOMIDINE HYDROCHLORIDE 0.6 MCG/KG/HR: 400 INJECTION INTRAVENOUS at 01:05

## 2024-09-28 RX ADMIN — CHLORHEXIDINE GLUCONATE 0.12% ORAL RINSE 15 ML: 1.2 LIQUID ORAL at 08:07

## 2024-09-28 ASSESSMENT — ACTIVITIES OF DAILY LIVING (ADL)
ADLS_ACUITY_SCORE: 63
ADLS_ACUITY_SCORE: 59
ADLS_ACUITY_SCORE: 63
ADLS_ACUITY_SCORE: 59
ADLS_ACUITY_SCORE: 63
ADLS_ACUITY_SCORE: 59

## 2024-09-28 NOTE — PROGRESS NOTES
ICU End of Shift Summary. See flowsheets for vital signs and detailed assessment.    Changes this shift: Pt still requires large amounts of sedation to maintain stable hemodynamically. However, with scheduled ativan, versed gtt was able to be weaned down by 2 mg/hr. Free water 200 ml every 2 hours for elevated sodium. Removed rolle and placed external catheter. Rectal tube remains in place due to loose stools.    Plan:  Continue to wean versed gtt tomorrow (by 2 mg/hr) and trend sodium levels.

## 2024-09-28 NOTE — PROGRESS NOTES
ICU End of Shift Summary. See flowsheets for vital signs and detailed assessment.    Changes this shift: PRN hydralazine & labetalol for  (each given x 1 this shift). Sedation for RASS goal -2 to -3. Insulin gtt (algorithm #1). IR removed chest tube at bedside, CXR post removal.   Vascular Access nurse placed left PICC. Left internal jugular CVC removed. Arterial line in place, orders to remove it.   No change in vent settings, but veletri was stopped.        Plan:  Wean sedation as tolerated.

## 2024-09-28 NOTE — PROGRESS NOTES
Morning sodium=151 (up from prior, 146). Free water increased to 200 ml every 3 hours. 16:30 rechecked sodium=153. Called MICU team and new orders to increase free water to 200 ml every 2 hours. Recheck sodium at 21:00.

## 2024-09-28 NOTE — PLAN OF CARE
ICU End of Shift Summary. See flowsheets for vital signs and detailed assessment.    Changes this shift: RASS -4. Wincing to pain. Insulin gtt running on algorithm 3. O2 titrated down to 60%. Gonzalez remains. A line removed. Patient had a small run of tachy with hypotension, team aware.     Plan: Continue the plan of care and wean when able.       Goal Outcome Evaluation:      Plan of Care Reviewed With: patient    Overall Patient Progress: no change    Outcome Evaluation: A-line removed, transitioned to hourly cuff pressures. insulin gtt remains. algorithm 3. Mag replaced.

## 2024-09-29 LAB
ANION GAP SERPL CALCULATED.3IONS-SCNC: 11 MMOL/L (ref 7–15)
BACTERIA SPT CULT: ABNORMAL
BUN SERPL-MCNC: 22 MG/DL (ref 6–20)
CALCIUM SERPL-MCNC: 8.6 MG/DL (ref 8.8–10.4)
CHLORIDE SERPL-SCNC: 109 MMOL/L (ref 98–107)
CREAT SERPL-MCNC: 0.19 MG/DL (ref 0.51–0.95)
CRP SERPL-MCNC: 42.8 MG/L
EGFRCR SERPLBLD CKD-EPI 2021: >90 ML/MIN/1.73M2
ERYTHROCYTE [DISTWIDTH] IN BLOOD BY AUTOMATED COUNT: 20.1 % (ref 10–15)
GLUCOSE BLDC GLUCOMTR-MCNC: 101 MG/DL (ref 70–99)
GLUCOSE BLDC GLUCOMTR-MCNC: 103 MG/DL (ref 70–99)
GLUCOSE BLDC GLUCOMTR-MCNC: 105 MG/DL (ref 70–99)
GLUCOSE BLDC GLUCOMTR-MCNC: 106 MG/DL (ref 70–99)
GLUCOSE BLDC GLUCOMTR-MCNC: 108 MG/DL (ref 70–99)
GLUCOSE BLDC GLUCOMTR-MCNC: 122 MG/DL (ref 70–99)
GLUCOSE BLDC GLUCOMTR-MCNC: 123 MG/DL (ref 70–99)
GLUCOSE BLDC GLUCOMTR-MCNC: 124 MG/DL (ref 70–99)
GLUCOSE BLDC GLUCOMTR-MCNC: 125 MG/DL (ref 70–99)
GLUCOSE BLDC GLUCOMTR-MCNC: 127 MG/DL (ref 70–99)
GLUCOSE BLDC GLUCOMTR-MCNC: 145 MG/DL (ref 70–99)
GLUCOSE BLDC GLUCOMTR-MCNC: 149 MG/DL (ref 70–99)
GLUCOSE BLDC GLUCOMTR-MCNC: 183 MG/DL (ref 70–99)
GLUCOSE BLDC GLUCOMTR-MCNC: 190 MG/DL (ref 70–99)
GLUCOSE BLDC GLUCOMTR-MCNC: 86 MG/DL (ref 70–99)
GLUCOSE SERPL-MCNC: 142 MG/DL (ref 70–99)
GRAM STAIN RESULT: ABNORMAL
GRAM STAIN RESULT: ABNORMAL
HCO3 SERPL-SCNC: 29 MMOL/L (ref 22–29)
HCT VFR BLD AUTO: 25.4 % (ref 35–47)
HGB BLD-MCNC: 7.6 G/DL (ref 11.7–15.7)
MAGNESIUM SERPL-MCNC: 1.6 MG/DL (ref 1.7–2.3)
MCH RBC QN AUTO: 30.4 PG (ref 26.5–33)
MCHC RBC AUTO-ENTMCNC: 29.9 G/DL (ref 31.5–36.5)
MCV RBC AUTO: 102 FL (ref 78–100)
PHOSPHATE SERPL-MCNC: 2.7 MG/DL (ref 2.5–4.5)
PLATELET # BLD AUTO: 306 10E3/UL (ref 150–450)
POTASSIUM SERPL-SCNC: 4.2 MMOL/L (ref 3.4–5.3)
RBC # BLD AUTO: 2.5 10E6/UL (ref 3.8–5.2)
SODIUM SERPL-SCNC: 146 MMOL/L (ref 135–145)
SODIUM SERPL-SCNC: 149 MMOL/L (ref 135–145)
SODIUM SERPL-SCNC: 149 MMOL/L (ref 135–145)
WBC # BLD AUTO: 11.1 10E3/UL (ref 4–11)

## 2024-09-29 PROCEDURE — 85027 COMPLETE CBC AUTOMATED: CPT | Performed by: PHYSICIAN ASSISTANT

## 2024-09-29 PROCEDURE — 250N000013 HC RX MED GY IP 250 OP 250 PS 637

## 2024-09-29 PROCEDURE — 94640 AIRWAY INHALATION TREATMENT: CPT

## 2024-09-29 PROCEDURE — 999N000157 HC STATISTIC RCP TIME EA 10 MIN

## 2024-09-29 PROCEDURE — 84100 ASSAY OF PHOSPHORUS: CPT | Performed by: PHYSICIAN ASSISTANT

## 2024-09-29 PROCEDURE — 250N000011 HC RX IP 250 OP 636

## 2024-09-29 PROCEDURE — 250N000013 HC RX MED GY IP 250 OP 250 PS 637: Performed by: PHYSICIAN ASSISTANT

## 2024-09-29 PROCEDURE — 84295 ASSAY OF SERUM SODIUM: CPT

## 2024-09-29 PROCEDURE — 258N000003 HC RX IP 258 OP 636

## 2024-09-29 PROCEDURE — 84295 ASSAY OF SERUM SODIUM: CPT | Performed by: PHYSICIAN ASSISTANT

## 2024-09-29 PROCEDURE — 258N000003 HC RX IP 258 OP 636: Performed by: SURGERY

## 2024-09-29 PROCEDURE — 86140 C-REACTIVE PROTEIN: CPT

## 2024-09-29 PROCEDURE — 99291 CRITICAL CARE FIRST HOUR: CPT | Mod: GC | Performed by: INTERNAL MEDICINE

## 2024-09-29 PROCEDURE — 83735 ASSAY OF MAGNESIUM: CPT | Performed by: PHYSICIAN ASSISTANT

## 2024-09-29 PROCEDURE — 250N000011 HC RX IP 250 OP 636: Performed by: SURGERY

## 2024-09-29 PROCEDURE — 250N000009 HC RX 250

## 2024-09-29 PROCEDURE — 94003 VENT MGMT INPAT SUBQ DAY: CPT

## 2024-09-29 PROCEDURE — 200N000002 HC R&B ICU UMMC

## 2024-09-29 PROCEDURE — 250N000012 HC RX MED GY IP 250 OP 636 PS 637

## 2024-09-29 PROCEDURE — 250N000009 HC RX 250: Performed by: SURGERY

## 2024-09-29 PROCEDURE — 94640 AIRWAY INHALATION TREATMENT: CPT | Mod: 76

## 2024-09-29 PROCEDURE — 250N000013 HC RX MED GY IP 250 OP 250 PS 637: Performed by: SURGERY

## 2024-09-29 PROCEDURE — 80048 BASIC METABOLIC PNL TOTAL CA: CPT

## 2024-09-29 RX ORDER — PREDNISONE 20 MG/1
40 TABLET ORAL DAILY
Status: DISCONTINUED | OUTPATIENT
Start: 2024-10-05 | End: 2024-10-01 | Stop reason: HOSPADM

## 2024-09-29 RX ORDER — PREDNISONE 10 MG/1
10 TABLET ORAL DAILY
Status: DISCONTINUED | OUTPATIENT
Start: 2024-10-14 | End: 2024-10-01 | Stop reason: HOSPADM

## 2024-09-29 RX ORDER — PREDNISONE 50 MG/1
50 TABLET ORAL DAILY
Status: DISCONTINUED | OUTPATIENT
Start: 2024-10-02 | End: 2024-10-01 | Stop reason: HOSPADM

## 2024-09-29 RX ORDER — MAGNESIUM SULFATE HEPTAHYDRATE 40 MG/ML
2 INJECTION, SOLUTION INTRAVENOUS ONCE
Status: COMPLETED | OUTPATIENT
Start: 2024-09-29 | End: 2024-09-29

## 2024-09-29 RX ORDER — PREDNISONE 20 MG/1
20 TABLET ORAL DAILY
Status: DISCONTINUED | OUTPATIENT
Start: 2024-10-11 | End: 2024-10-01 | Stop reason: HOSPADM

## 2024-09-29 RX ADMIN — Medication 1 TABLET: at 12:07

## 2024-09-29 RX ADMIN — IPRATROPIUM BROMIDE 0.5 MG: 0.5 SOLUTION RESPIRATORY (INHALATION) at 11:29

## 2024-09-29 RX ADMIN — HEPARIN SODIUM 5000 UNITS: 5000 INJECTION, SOLUTION INTRAVENOUS; SUBCUTANEOUS at 20:15

## 2024-09-29 RX ADMIN — POTASSIUM PHOSPHATE, MONOBASIC POTASSIUM PHOSPHATE, DIBASIC 9 MMOL: 224; 236 INJECTION, SOLUTION, CONCENTRATE INTRAVENOUS at 06:53

## 2024-09-29 RX ADMIN — MIDAZOLAM IN SODIUM CHLORIDE 2 MG/HR: 1 INJECTION INTRAVENOUS at 15:32

## 2024-09-29 RX ADMIN — DEXMEDETOMIDINE HYDROCHLORIDE 0.6 MCG/KG/HR: 400 INJECTION INTRAVENOUS at 09:15

## 2024-09-29 RX ADMIN — QUETIAPINE FUMARATE 50 MG: 50 TABLET ORAL at 13:13

## 2024-09-29 RX ADMIN — CETIRIZINE HYDROCHLORIDE 10 MG: 10 TABLET, FILM COATED ORAL at 08:18

## 2024-09-29 RX ADMIN — IPRATROPIUM BROMIDE 0.5 MG: 0.5 SOLUTION RESPIRATORY (INHALATION) at 00:44

## 2024-09-29 RX ADMIN — LEVOTHYROXINE SODIUM 25 MCG: 0.03 TABLET ORAL at 08:19

## 2024-09-29 RX ADMIN — QUETIAPINE FUMARATE 100 MG: 100 TABLET ORAL at 21:17

## 2024-09-29 RX ADMIN — PREDNISONE 60 MG: 10 TABLET ORAL at 08:18

## 2024-09-29 RX ADMIN — LEVALBUTEROL HYDROCHLORIDE 0.63 MG: 0.63 SOLUTION RESPIRATORY (INHALATION) at 00:44

## 2024-09-29 RX ADMIN — MONTELUKAST 10 MG: 10 TABLET, FILM COATED ORAL at 21:17

## 2024-09-29 RX ADMIN — CHLORHEXIDINE GLUCONATE 0.12% ORAL RINSE 15 ML: 1.2 LIQUID ORAL at 08:19

## 2024-09-29 RX ADMIN — LEVALBUTEROL HYDROCHLORIDE 0.63 MG: 0.63 SOLUTION RESPIRATORY (INHALATION) at 11:29

## 2024-09-29 RX ADMIN — HEPARIN SODIUM 5000 UNITS: 5000 INJECTION, SOLUTION INTRAVENOUS; SUBCUTANEOUS at 05:51

## 2024-09-29 RX ADMIN — Medication 40 MG: at 08:18

## 2024-09-29 RX ADMIN — LEVALBUTEROL HYDROCHLORIDE 0.63 MG: 0.63 SOLUTION RESPIRATORY (INHALATION) at 08:09

## 2024-09-29 RX ADMIN — LEVALBUTEROL HYDROCHLORIDE 0.63 MG: 0.63 SOLUTION RESPIRATORY (INHALATION) at 20:11

## 2024-09-29 RX ADMIN — CHLORHEXIDINE GLUCONATE 0.12% ORAL RINSE 15 ML: 1.2 LIQUID ORAL at 20:15

## 2024-09-29 RX ADMIN — Medication 50 MCG: at 10:38

## 2024-09-29 RX ADMIN — IPRATROPIUM BROMIDE 0.5 MG: 0.5 SOLUTION RESPIRATORY (INHALATION) at 16:21

## 2024-09-29 RX ADMIN — LORAZEPAM 2 MG: 2 LIQUID ORAL at 20:16

## 2024-09-29 RX ADMIN — Medication 50 MCG: at 13:06

## 2024-09-29 RX ADMIN — ZINC SULFATE 220 MG (50 MG) CAPSULE 220 MG: CAPSULE at 08:19

## 2024-09-29 RX ADMIN — LORAZEPAM 2 MG: 2 LIQUID ORAL at 08:18

## 2024-09-29 RX ADMIN — KETAMINE HYDROCHLORIDE 175 MG/HR: 100 INJECTION, SOLUTION, CONCENTRATE INTRAMUSCULAR; INTRAVENOUS at 06:26

## 2024-09-29 RX ADMIN — IPRATROPIUM BROMIDE 0.5 MG: 0.5 SOLUTION RESPIRATORY (INHALATION) at 20:11

## 2024-09-29 RX ADMIN — LEVALBUTEROL HYDROCHLORIDE 0.63 MG: 0.63 SOLUTION RESPIRATORY (INHALATION) at 04:42

## 2024-09-29 RX ADMIN — DEXMEDETOMIDINE HYDROCHLORIDE 0.7 MCG/KG/HR: 400 INJECTION INTRAVENOUS at 16:38

## 2024-09-29 RX ADMIN — HEPARIN SODIUM 5000 UNITS: 5000 INJECTION, SOLUTION INTRAVENOUS; SUBCUTANEOUS at 12:08

## 2024-09-29 RX ADMIN — QUETIAPINE FUMARATE 50 MG: 50 TABLET ORAL at 05:51

## 2024-09-29 RX ADMIN — Medication 150 MCG/HR: at 18:39

## 2024-09-29 RX ADMIN — MAGNESIUM SULFATE HEPTAHYDRATE 2 G: 2 INJECTION, SOLUTION INTRAVENOUS at 05:52

## 2024-09-29 RX ADMIN — DEXMEDETOMIDINE HYDROCHLORIDE 0.6 MCG/KG/HR: 400 INJECTION INTRAVENOUS at 02:09

## 2024-09-29 RX ADMIN — IPRATROPIUM BROMIDE 0.5 MG: 0.5 SOLUTION RESPIRATORY (INHALATION) at 04:42

## 2024-09-29 RX ADMIN — LEVALBUTEROL HYDROCHLORIDE 0.63 MG: 0.63 SOLUTION RESPIRATORY (INHALATION) at 16:21

## 2024-09-29 RX ADMIN — IPRATROPIUM BROMIDE 0.5 MG: 0.5 SOLUTION RESPIRATORY (INHALATION) at 08:09

## 2024-09-29 RX ADMIN — LORAZEPAM 2 MG: 2 LIQUID ORAL at 13:13

## 2024-09-29 RX ADMIN — KETAMINE HYDROCHLORIDE 175 MG/HR: 100 INJECTION, SOLUTION, CONCENTRATE INTRAMUSCULAR; INTRAVENOUS at 20:56

## 2024-09-29 RX ADMIN — LORAZEPAM 2 MG: 2 LIQUID ORAL at 02:09

## 2024-09-29 RX ADMIN — Medication 150 MCG/HR: at 02:57

## 2024-09-29 RX ADMIN — ATOVAQUONE 1500 MG: 750 SUSPENSION ORAL at 08:19

## 2024-09-29 ASSESSMENT — ACTIVITIES OF DAILY LIVING (ADL)
ADLS_ACUITY_SCORE: 63
ADLS_ACUITY_SCORE: 59
ADLS_ACUITY_SCORE: 63
ADLS_ACUITY_SCORE: 59
ADLS_ACUITY_SCORE: 59
ADLS_ACUITY_SCORE: 63

## 2024-09-29 NOTE — PROGRESS NOTES
MEDICAL ICU PROGRESS NOTE  /09/29/2024    Date of Service (when I saw the patient): 09/29/2024    ASSESSMENT: Massiel Flaherty is a 53 year old female with PMH Anxiety/depression, fibromyalgia, c/f nonepileptic seizures not on AEDs, hypothyroidism, asthma, HLD, MURIEL on CPAP, expressive aphasia, hypertension who was admitted on 8/3/2024 for fatigue, fever and dyspnea and intubated 8/6/24 at OSH for ARDS, proned and paralyzed without improvement. Transferred to [facility] 8/8/24, hypoxic with high plateaus/peaks and placed on VV ECMO and CRRT. Decannulated from VV ECMO 8/18 and transferred to MICU 8/26/24 for ongoing management. Extubated 8/27 then reintubated 8/29 iso worsening AHRF and pseudomonas pneumonia. Extubated again 9/16 to BiPAP/HFNC, re-intubated 9/20 with tachypnea, increased WOB, fevers, and new lung opacities c/f possible HAP vs ILD/vasculitis/organizing pneumonia flare. Course complicated by tension pneumothorax while re-intubated 9/20 now s/p right chest tube.      CHANGES and MAJOR THINGS TODAY:   - Increase free water flushes to 300 ml Q2 hours  - continue to titrate sedation as tolerated today. Patient on scheduled Ativan. Attempt to reduce versed slowly throughout course of day.  - Would consider scheduling oxycodone when starting to reduce fentanyl    PLAN:    Neuro:  # Pain and sedation  # Concern for ICU delirium   # Hx Fibromyalgia   # Hx myalgic encephalomyelitis   # Hx anxiety/depression  - Pain: Oxycodone 2.5 mg q6hrs PRN, fentanyl bolus PRN   - Sedation plan:   - Sedated with fentanyl, ketamine, versed, and precedex infusion. Plan for 9/28: Precedex has been reduced yesterday (now at 0.6). Patient on scheduled ativan, 2 mg Q6 hours. Plan to wean versed drip as tolerated today. After which plan to wean ketamine  - Vecuronium infusion off since 9/23 at 1200   - Gabapentin to 300mg TID held while sedated  - Psych meds (venlafaxine and amitriptyline) held while sedated  - RASS -2 to -3,  will  attempt to wean sedation as tolerated and liberalize RASS as able     # Hx spells with staring and BUE posturing previously described as nonepileptic seizures   # Hx of GTC seizures and myoclonic epilepsy, weaned off AEDs   # Diffuse cerebral edema - improved  - 8/21: MRI Brain: no acute intracranial pathology. No findings to suggest anoxic brain injury.   - MRI brain 9/20: no anoxic brain injury  - While extubated, patient was able to follow commands and answer yes/no questions   - continue to monitor neurological exam     Pulmonary:  # Acute hypoxic respiratory failure c/b ARDS  # Pseudomonas pneumonia  # Mechanical ventilation  # s/p VV ECMO 8/8 - 8/18   # Hx CAP  # Asthma  # MURIEL on home CPAP  # S/p tracheostomy  PFT dated 9/8/2020 demonstrated normal spirometry with mild diffusion impairment and mild restriction (FEV1/FVC 80%, FEV1 2.59, DLCO 40%). CT abdomen chest 3/9/2020 demonstrated scarring and fibrosis bilaterally which correlated with the decreased DLCO. The patient also has a history of MURIEL on CPAP therapy as currently managed by her provider in [place]. Unclear what triggered ARDS or why she has had such severe hospital course, further investigated ILD but results all unremarkable. Extubated 8/27, reintubated 8/29 for worsening O2 demands and diffuse opacities iso new/recurrent psuedomonas pneumonia. Bronch with BAL 8/30, pseudomonas growing as below. Extubated again 9/16, worsening respiratory distress noted 9/19 with repeat polymicrobial growth on respiratory sputum raising c/f aspiration/hospital associated pneumonia. 9/20 with increased WOB, new fevers, and CXR significant for opacities c/f possible HAP vs ILD/vasculitis/organizing pneumonia and was reintubated 9/20. Now s/p tracheostomy on 9/25. 9/28: Sputum positive for pseudomonas again. Query colonization versus ongoing infection as patient was febrile on 9/26 and has low grade temperature elevations on 9/27 and 9/28. Could consider  precedex as a possible etiology for temperature elevation as well.   - ILD w/u aldolase, EVEILO, RF, CCP, ANCA, MPO, SSA [place] and [place], Scleroderma antibody, Radha 1,  hypersensity pneumonitis, IGG subclasses,  aspergillus, blastomyces, histoplasma negative  - SpO2 goals >92%, PaO2 goals >60   - PTA singular at bedtime   - xopenex and ipratropium Q4 hours  - pulmicort currently on hold  - Lung protective ventilation strategies given ARDS   - Steroids:  - CRP  downtrending  - Methylpred 60mg q6H 9/21- 9/24, 60 mg BID daily 9/25-9/27, 60 mg daily on 9/28  - 9/28: start 60 mg prednisone daily on 9/29 with taper ordered  - ABG daily and PRN      FiO2 (%): 50 %, Resp: (!) 31, Vent Mode: CMV/AC, Resp Rate (Set): 26 breaths/min, Tidal Volume (Set, mL): 350 mL, PEEP (cm H2O): 5 cmH2O, Resp Rate (Set): 26 breaths/min, Tidal Volume (Set, mL): 350 mL, PEEP (cm H2O): 5 cmH2O       Pneumothorax, resolved   Tension pneumothorax during re-intubation 9/20, concern for fibrotic lung disease and possible barotrauma during bag ventilation during re-intubation vs complication of re-intubation in general. Leave in while intubated.   - chest tube removed on 9/27 with no recurrence of pneumothorax      Cardiovascular:  # Hyperlipidemia  # Hx HTN   # Sinus Tachycardia  - MAP goal >65, SBP goals >110  - PTA atorvastatin  - PTA clonidine held while sedated  - Lower extremity ultrasound without vascular pathology, no hematoma or clots  - Monitor discoloration of extremities for necrosis  - PRN hydralazine or labetolol for SBP > 180, plan to use labetolol rather than hydralazine if tachycardic  - 9/28: tachycardia and hypertension improved today, likely was related to patient not tolerating sedation wean. Now improved with increased scheduled oral ativan.     GI/Nutrition:  # Severe malnutrition (see RD note)   # Non-infectious Diarrhea  # GERD   # s/p PEG on 9/25/24  - Protonix (home medication)   - Nutrition consulted, appreciate recs  - Diet: TF at  goal, on high fiber feed   - Hold bowel regimen d/t loose stools  - Continue scheduled multivitamin  - Loperamide PRN  - Rectal tube, output slowing down, continue to monitor  - If rectal tube output continues to decrease, nurse to assess for ability to remove rectal tube    Renal/Fluids/Electrolytes:  # Acute kidney injury, resolved   # AGMA, improving   # Elevated lactate, resovled  Baseline Cr approx 0.6. Pt was anuric required CRRT here but now making urine, likely prerenal due to shock.  - Adequate I/Os, daily weights   - Avoid nephrotoxins as able  - RN managed electrolyte replacement protocol  - Diuresis:   - 9/28: goal net even, will diurese if needed    Hypernatremia, improving   - 9/28: increase FWF 300ml Q2H  - Continue to trend    Endocrine:  # Steroid and stress induced hyperglycemia  # Hypothyroidism   - Goal to keep BG < 180 for optimal wound healing  - Continue PTA synthroid  - continue insulin drip, plan to transition to sliding scale in coming days     ID:  # Leukocytosis, resolved   # Concern for aspiration pneumonia/hospital acquired pnemonia (9/19 - )  # Pseudomonas pneumonia (s/p treatment) with c/f possible ongoing infection vs colonization (pseudomonas 1+ in sputum and fever on 9/26)   # Hx CAP  Respiratory cx 8/29 pseudomonas pneumonia. Intermediate susceptability to meropenem, more significant sensitivities to ciprofloxacin. New leukocytosis, elevated PC, CRP and lactate 9/19 with sputum cx 4+ psuedomonas, 2+ GPCs, and 1+ GPBs could be colonized , BAL studies 9/21 positive for pseudomonas. Sputum from 9/26 showing 1+ GNB, patient had a fever to 102.9 as well. C/f possible ongoing infection.   - Continue to monitor fever curve and inflammatory markers as appropriate  - ID now signed off   - Repeat cultures and sputum growing pseudomonas with similar resistance pattern to previous.   - 9/28: If fever recurs or patient clinically deteriorates, may consider resuming pseudomonas treatment      Abx  - Meropenem 8/29 - 9/1  - Vancomycin 8/29 - 8/30  - Tobramycin x 1 8/29  - Vancomycin 9/5 - 9/8    - Tobramycin nebs BID 9/1 - 9/11, 9/21- 9/25  - Ciprofloxacin infusion 9/1 - 9/11  - Metronidazole 9/6 - 9/11  - Atovaquone 9/11 -   - Vancomycin 9/19 - 9/20  - Zosyn 9/19 - 9/21  - Ciprofloxacin 9/19 - 9/21     PJP Ppx  Given Dex-ARDS Massiel will likely remain on steriods for > 3 weeks so will initiate PJP ppx. Given history of allergy to sulfa, will obtain G6PD test to determine if dapsone can be used: levels are ok, however continuing atovaquone.   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   - Ganciclovir 9/6 - 9/8, discontinued given ID recs  - CMV levels recheck 9/18 shows continued downtrending, level at 289    Thrush  Noted 9/21  - s/p Fluconazole x 7 days      # HSV-1  Mouth sores present, which could have viral etiology. 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  - Acyclovir 400mg BID x5 days (8/22-8/27)     Hematology:    # Anemia of critical illness  # Right groin Hematoma   Acutely decreased Hgb from 8.0 to 6.6 on 9/20 of unknown etiology that required transfusion x1, however 9/19 LE US notable to 14.9 cm hematoma in R groin near prior canal that was not previously appreciated on CT abdomen c/f possible bleed.   - Hematoma stable on repeat US 9/21  - Received 1 unit pRBCs 9/24 for hgb 6.9  - Daily CBC  - Transfuse if hgb <7.0 or signs/symptoms of hypoperfusion. Monitor and trend.      Musculoskeletal/Rheum:  # Alopecia  - Hold PTA hydroxychloroquine      # Weakness and deconditioning of critical illness  # Left ankle injury pre-hospitalization    - Physical and occupational therapy when able.      Skin:  # BLE scattered ecchymosis  # Left toe duskiness  # Peripheral Edema  - WOC consult  - OT consult for lymphedema therapy     General Cares/Prophylaxis:  DVT Prophylaxis: SubQ heparin  GI Prophylaxis: PPI  Restraints: no  Family: Will try to call mother  Code  Status: Full Code     Lines/tubes/drains:  - PIV x3  - Rectal tube (8/17)  - Tracheostomy 9/25  - PEG 9/25  - PICC 9/27     Disposition:  - Medical ICU     Patient seen and findings/plan discussed with medical ICU staff, Dr. Perlman.    Dong Junior MD    Critical Care time: 35 minutes     Clinically Significant Risk Factors         # Hypernatremia: Highest Na = 153 mmol/L in last 2 days, will monitor as appropriate    # Hypomagnesemia: Lowest Mg = 1.6 mg/dL in last 2 days, will replace as needed   # Hypoalbuminemia: Lowest albumin = 2.2 g/dL at 8/10/2024  9:47 AM, will monitor as appropriate                  # Severe Malnutrition: based on nutrition assessment    # Financial/Environmental Concerns: none              ====================================  INTERVAL HISTORY:   Tolerating gradual sedation wean  Free water flushes increased    OBJECTIVE:   1. VITAL SIGNS:   Temp:  [98.3  F (36.8  C)-99.8  F (37.7  C)] 98.7  F (37.1  C)  Pulse:  [] 111  Resp:  [27-42] 31  BP: (109-172)/() 163/107  FiO2 (%):  [45 %-100 %] 50 %  SpO2:  [89 %-99 %] 91 %  FiO2 (%): 50 %, Resp: (!) 31, Vent Mode: CMV/AC, Resp Rate (Set): 26 breaths/min, Tidal Volume (Set, mL): 350 mL, PEEP (cm H2O): 5 cmH2O, Resp Rate (Set): 26 breaths/min, Tidal Volume (Set, mL): 350 mL, PEEP (cm H2O): 5 cmH2O  2. INTAKE/ OUTPUT:   I/O last 3 completed shifts:  In: 4124.64 [I.V.:844.64; NG/GT:2080]  Out: 1725 [Urine:1025; Stool:700]    3. PHYSICAL EXAMINATION:  General: sedated  HEENT: NC/AT  Neuro: deeply sedated  Pulm/Resp: tracheostomy present, lung sounds course, intermittently desynchronous with vent  CV: RRR, S1/2, no m/r/g  Abdomen: Soft, tender, bowel sounds present, right groin hematoma stable  : Gonzalez with yellow, clear urine   Incisions/Skin: Trace to mild edema in all extremities, equally.     4. LABS:   Arterial Blood Gases   Recent Labs   Lab 09/28/24  0328 09/27/24  1133 09/27/24  0419 09/26/24  2341   PH 7.39 7.36 7.40 7.41    PCO2 51* 54* 46* 47*   PO2 140* 108* 95 132*   HCO3 31* 30* 29* 29*     Complete Blood Count   Recent Labs   Lab 09/29/24  0404 09/28/24  0328 09/27/24 0419 09/26/24  0345   WBC 11.1* 9.0 7.0 5.7   HGB 7.6* 7.2* 8.0* 7.4*    245 254 233     Basic Metabolic Panel  Recent Labs   Lab 09/29/24  1331 09/29/24  1311 09/29/24  1124 09/29/24  0816 09/29/24  0652 09/29/24  0410 09/29/24  0404 09/29/24  0201 09/29/24  0144 09/28/24 2154 09/28/24 2025 09/28/24  1641 09/28/24  1636 09/28/24  0401 09/28/24  0328 09/27/24 1956 09/27/24 1951 09/27/24  0603 09/27/24 0419   *  --   --   --   --   --  149*  --  149*  --  151*  --  153*  --  151*  --   --   --  146*   POTASSIUM  --   --   --   --   --   --  4.2  --   --   --   --   --  4.5  --  4.0  --  4.5  --  3.5   CHLORIDE  --   --   --   --   --   --  109*  --   --   --   --   --  115*  --  113*  --   --   --  110*   CO2  --   --   --   --   --   --  29  --   --   --   --   --  29  --  27  --   --   --  25   BUN  --   --   --   --   --   --  22.0*  --   --   --   --   --  21.4*  --  22.4*  --   --   --  27.6*   CR  --   --   --   --   --   --  0.19*  --   --   --   --   --  0.21*  --  0.22*  --   --   --  0.25*   GLC  --  190* 183* 123* 86   < > 142*   < >  --    < > 97   < > 118*   < > 131*   < >  --    < > 155*    < > = values in this interval not displayed.     Liver Function Tests  Recent Labs   Lab 09/23/24  0404   AST 57*   ALT 93*   ALKPHOS 462*   BILITOTAL 0.5   ALBUMIN 2.7*     Coagulation Profile  No lab results found in last 7 days.      5. RADIOLOGY:   No results found for this or any previous visit (from the past 24 hour(s)).

## 2024-09-29 NOTE — PLAN OF CARE
ICU End of Shift Summary. See flowsheets for vital signs and detailed assessment.    Changes this shift: RASS -2 to -3. Intermittently opening eyes to voice. PERRLA. Tmax 99.8. MAP>65. ST 110s. Short run of VT of 8 beats. 6 shiley. CMV 45% peep 5. PEG TF runnig at 50 200q2 flush. Gonzalez removed voiding. Versed 2, Dex 0.6. Fent 150. Ketamine 175.     Plan: Continue to monitor, report changes to MICU 1.      Goal Outcome Evaluation:      Plan of Care Reviewed With: patient    Overall Patient Progress: no changeOverall Patient Progress: no change    Outcome Evaluation: trialling weening sedation. vent dependent.

## 2024-09-30 LAB
ANION GAP SERPL CALCULATED.3IONS-SCNC: 8 MMOL/L (ref 7–15)
ANION GAP SERPL CALCULATED.3IONS-SCNC: 9 MMOL/L (ref 7–15)
ATRIAL RATE - MUSE: 133 BPM
BUN SERPL-MCNC: 15.7 MG/DL (ref 6–20)
BUN SERPL-MCNC: 16.8 MG/DL (ref 6–20)
CALCIUM SERPL-MCNC: 8.1 MG/DL (ref 8.8–10.4)
CALCIUM SERPL-MCNC: 8.1 MG/DL (ref 8.8–10.4)
CHLORIDE SERPL-SCNC: 101 MMOL/L (ref 98–107)
CHLORIDE SERPL-SCNC: 99 MMOL/L (ref 98–107)
CREAT SERPL-MCNC: 0.15 MG/DL (ref 0.51–0.95)
CREAT SERPL-MCNC: 0.19 MG/DL (ref 0.51–0.95)
DIASTOLIC BLOOD PRESSURE - MUSE: NORMAL MMHG
EGFRCR SERPLBLD CKD-EPI 2021: >90 ML/MIN/1.73M2
EGFRCR SERPLBLD CKD-EPI 2021: >90 ML/MIN/1.73M2
ERYTHROCYTE [DISTWIDTH] IN BLOOD BY AUTOMATED COUNT: 19.5 % (ref 10–15)
GLUCOSE BLDC GLUCOMTR-MCNC: 111 MG/DL (ref 70–99)
GLUCOSE BLDC GLUCOMTR-MCNC: 112 MG/DL (ref 70–99)
GLUCOSE BLDC GLUCOMTR-MCNC: 112 MG/DL (ref 70–99)
GLUCOSE BLDC GLUCOMTR-MCNC: 113 MG/DL (ref 70–99)
GLUCOSE BLDC GLUCOMTR-MCNC: 114 MG/DL (ref 70–99)
GLUCOSE BLDC GLUCOMTR-MCNC: 116 MG/DL (ref 70–99)
GLUCOSE BLDC GLUCOMTR-MCNC: 121 MG/DL (ref 70–99)
GLUCOSE BLDC GLUCOMTR-MCNC: 125 MG/DL (ref 70–99)
GLUCOSE BLDC GLUCOMTR-MCNC: 135 MG/DL (ref 70–99)
GLUCOSE BLDC GLUCOMTR-MCNC: 141 MG/DL (ref 70–99)
GLUCOSE BLDC GLUCOMTR-MCNC: 142 MG/DL (ref 70–99)
GLUCOSE BLDC GLUCOMTR-MCNC: 143 MG/DL (ref 70–99)
GLUCOSE BLDC GLUCOMTR-MCNC: 164 MG/DL (ref 70–99)
GLUCOSE BLDC GLUCOMTR-MCNC: 97 MG/DL (ref 70–99)
GLUCOSE BLDC GLUCOMTR-MCNC: 99 MG/DL (ref 70–99)
GLUCOSE SERPL-MCNC: 124 MG/DL (ref 70–99)
GLUCOSE SERPL-MCNC: 159 MG/DL (ref 70–99)
HCO3 SERPL-SCNC: 28 MMOL/L (ref 22–29)
HCO3 SERPL-SCNC: 30 MMOL/L (ref 22–29)
HCT VFR BLD AUTO: 24 % (ref 35–47)
HGB BLD-MCNC: 7.3 G/DL (ref 11.7–15.7)
INTERPRETATION ECG - MUSE: NORMAL
MAGNESIUM SERPL-MCNC: 1.3 MG/DL (ref 1.7–2.3)
MAGNESIUM SERPL-MCNC: 2.1 MG/DL (ref 1.7–2.3)
MCH RBC QN AUTO: 30.9 PG (ref 26.5–33)
MCHC RBC AUTO-ENTMCNC: 30.4 G/DL (ref 31.5–36.5)
MCV RBC AUTO: 102 FL (ref 78–100)
P AXIS - MUSE: 45 DEGREES
PHOSPHATE SERPL-MCNC: 3.6 MG/DL (ref 2.5–4.5)
PLATELET # BLD AUTO: 331 10E3/UL (ref 150–450)
POTASSIUM SERPL-SCNC: 4.1 MMOL/L (ref 3.4–5.3)
POTASSIUM SERPL-SCNC: 4.3 MMOL/L (ref 3.4–5.3)
PR INTERVAL - MUSE: 132 MS
QRS DURATION - MUSE: 76 MS
QT - MUSE: 270 MS
QTC - MUSE: 401 MS
R AXIS - MUSE: 19 DEGREES
RBC # BLD AUTO: 2.36 10E6/UL (ref 3.8–5.2)
SODIUM SERPL-SCNC: 137 MMOL/L (ref 135–145)
SODIUM SERPL-SCNC: 138 MMOL/L (ref 135–145)
SYSTOLIC BLOOD PRESSURE - MUSE: NORMAL MMHG
T AXIS - MUSE: 65 DEGREES
VENTRICULAR RATE- MUSE: 133 BPM
WBC # BLD AUTO: 11.9 10E3/UL (ref 4–11)

## 2024-09-30 PROCEDURE — 250N000013 HC RX MED GY IP 250 OP 250 PS 637

## 2024-09-30 PROCEDURE — 84100 ASSAY OF PHOSPHORUS: CPT | Performed by: PHYSICIAN ASSISTANT

## 2024-09-30 PROCEDURE — 99292 CRITICAL CARE ADDL 30 MIN: CPT | Mod: 24 | Performed by: STUDENT IN AN ORGANIZED HEALTH CARE EDUCATION/TRAINING PROGRAM

## 2024-09-30 PROCEDURE — 94640 AIRWAY INHALATION TREATMENT: CPT | Mod: 76

## 2024-09-30 PROCEDURE — 250N000013 HC RX MED GY IP 250 OP 250 PS 637: Performed by: PHYSICIAN ASSISTANT

## 2024-09-30 PROCEDURE — 94640 AIRWAY INHALATION TREATMENT: CPT

## 2024-09-30 PROCEDURE — 80048 BASIC METABOLIC PNL TOTAL CA: CPT | Performed by: PHYSICIAN ASSISTANT

## 2024-09-30 PROCEDURE — 250N000011 HC RX IP 250 OP 636: Performed by: SURGERY

## 2024-09-30 PROCEDURE — 83735 ASSAY OF MAGNESIUM: CPT | Performed by: SURGERY

## 2024-09-30 PROCEDURE — 82784 ASSAY IGA/IGD/IGG/IGM EACH: CPT | Performed by: STUDENT IN AN ORGANIZED HEALTH CARE EDUCATION/TRAINING PROGRAM

## 2024-09-30 PROCEDURE — 250N000009 HC RX 250

## 2024-09-30 PROCEDURE — 82310 ASSAY OF CALCIUM: CPT | Performed by: PHYSICIAN ASSISTANT

## 2024-09-30 PROCEDURE — 250N000012 HC RX MED GY IP 250 OP 636 PS 637

## 2024-09-30 PROCEDURE — 80048 BASIC METABOLIC PNL TOTAL CA: CPT

## 2024-09-30 PROCEDURE — 258N000003 HC RX IP 258 OP 636

## 2024-09-30 PROCEDURE — 250N000011 HC RX IP 250 OP 636

## 2024-09-30 PROCEDURE — 85027 COMPLETE CBC AUTOMATED: CPT | Performed by: PHYSICIAN ASSISTANT

## 2024-09-30 PROCEDURE — 999N000157 HC STATISTIC RCP TIME EA 10 MIN

## 2024-09-30 PROCEDURE — 250N000013 HC RX MED GY IP 250 OP 250 PS 637: Performed by: SURGERY

## 2024-09-30 PROCEDURE — 83735 ASSAY OF MAGNESIUM: CPT | Performed by: PHYSICIAN ASSISTANT

## 2024-09-30 PROCEDURE — 250N000009 HC RX 250: Performed by: INTERNAL MEDICINE

## 2024-09-30 PROCEDURE — 200N000002 HC R&B ICU UMMC

## 2024-09-30 PROCEDURE — 94003 VENT MGMT INPAT SUBQ DAY: CPT

## 2024-09-30 PROCEDURE — 99291 CRITICAL CARE FIRST HOUR: CPT | Mod: 24 | Performed by: STUDENT IN AN ORGANIZED HEALTH CARE EDUCATION/TRAINING PROGRAM

## 2024-09-30 RX ORDER — OXYCODONE HYDROCHLORIDE 10 MG/1
10 TABLET ORAL EVERY 6 HOURS
Status: DISCONTINUED | OUTPATIENT
Start: 2024-09-30 | End: 2024-10-01 | Stop reason: HOSPADM

## 2024-09-30 RX ORDER — MAGNESIUM SULFATE HEPTAHYDRATE 40 MG/ML
4 INJECTION, SOLUTION INTRAVENOUS ONCE
Status: COMPLETED | OUTPATIENT
Start: 2024-09-30 | End: 2024-09-30

## 2024-09-30 RX ORDER — LORAZEPAM 2 MG/ML
4 CONCENTRATE ORAL EVERY 6 HOURS
Status: DISCONTINUED | OUTPATIENT
Start: 2024-09-30 | End: 2024-10-01 | Stop reason: HOSPADM

## 2024-09-30 RX ORDER — FUROSEMIDE 10 MG/ML
40 INJECTION INTRAMUSCULAR; INTRAVENOUS ONCE
Status: COMPLETED | OUTPATIENT
Start: 2024-09-30 | End: 2024-09-30

## 2024-09-30 RX ORDER — OXYCODONE HCL 5 MG/5 ML
5 SOLUTION, ORAL ORAL EVERY 6 HOURS PRN
Status: DISCONTINUED | OUTPATIENT
Start: 2024-09-30 | End: 2024-10-01 | Stop reason: HOSPADM

## 2024-09-30 RX ADMIN — Medication 150 MCG/HR: at 10:09

## 2024-09-30 RX ADMIN — LEVALBUTEROL HYDROCHLORIDE 0.63 MG: 0.63 SOLUTION RESPIRATORY (INHALATION) at 12:25

## 2024-09-30 RX ADMIN — OXYCODONE HYDROCHLORIDE 10 MG: 10 TABLET ORAL at 16:19

## 2024-09-30 RX ADMIN — QUETIAPINE FUMARATE 100 MG: 100 TABLET ORAL at 22:58

## 2024-09-30 RX ADMIN — QUETIAPINE FUMARATE 50 MG: 50 TABLET ORAL at 05:05

## 2024-09-30 RX ADMIN — IPRATROPIUM BROMIDE 0.5 MG: 0.5 SOLUTION RESPIRATORY (INHALATION) at 04:32

## 2024-09-30 RX ADMIN — LEVALBUTEROL HYDROCHLORIDE 0.63 MG: 0.63 SOLUTION RESPIRATORY (INHALATION) at 19:59

## 2024-09-30 RX ADMIN — QUETIAPINE FUMARATE 50 MG: 50 TABLET ORAL at 13:13

## 2024-09-30 RX ADMIN — KETAMINE HYDROCHLORIDE 175 MG/HR: 100 INJECTION, SOLUTION, CONCENTRATE INTRAMUSCULAR; INTRAVENOUS at 11:20

## 2024-09-30 RX ADMIN — IPRATROPIUM BROMIDE 0.5 MG: 0.5 SOLUTION RESPIRATORY (INHALATION) at 15:45

## 2024-09-30 RX ADMIN — DEXMEDETOMIDINE HYDROCHLORIDE 0.7 MCG/KG/HR: 400 INJECTION INTRAVENOUS at 00:00

## 2024-09-30 RX ADMIN — Medication 40 MG: at 09:11

## 2024-09-30 RX ADMIN — IPRATROPIUM BROMIDE 0.5 MG: 0.5 SOLUTION RESPIRATORY (INHALATION) at 08:37

## 2024-09-30 RX ADMIN — LORAZEPAM 2 MG: 2 LIQUID ORAL at 02:43

## 2024-09-30 RX ADMIN — IPRATROPIUM BROMIDE 0.5 MG: 0.5 SOLUTION RESPIRATORY (INHALATION) at 00:37

## 2024-09-30 RX ADMIN — OXYCODONE HYDROCHLORIDE 10 MG: 10 TABLET ORAL at 22:58

## 2024-09-30 RX ADMIN — HEPARIN SODIUM 5000 UNITS: 5000 INJECTION, SOLUTION INTRAVENOUS; SUBCUTANEOUS at 05:05

## 2024-09-30 RX ADMIN — LORAZEPAM 2 MG: 2 LIQUID ORAL at 09:08

## 2024-09-30 RX ADMIN — LEVALBUTEROL HYDROCHLORIDE 0.63 MG: 0.63 SOLUTION RESPIRATORY (INHALATION) at 15:45

## 2024-09-30 RX ADMIN — CHLORHEXIDINE GLUCONATE 0.12% ORAL RINSE 15 ML: 1.2 LIQUID ORAL at 20:22

## 2024-09-30 RX ADMIN — HEPARIN SODIUM 5000 UNITS: 5000 INJECTION, SOLUTION INTRAVENOUS; SUBCUTANEOUS at 20:22

## 2024-09-30 RX ADMIN — IPRATROPIUM BROMIDE 0.5 MG: 0.5 SOLUTION RESPIRATORY (INHALATION) at 12:25

## 2024-09-30 RX ADMIN — HEPARIN SODIUM 5000 UNITS: 5000 INJECTION, SOLUTION INTRAVENOUS; SUBCUTANEOUS at 13:13

## 2024-09-30 RX ADMIN — LORAZEPAM 4 MG: 2 LIQUID ORAL at 20:22

## 2024-09-30 RX ADMIN — Medication 1 TABLET: at 13:13

## 2024-09-30 RX ADMIN — LEVALBUTEROL HYDROCHLORIDE 0.63 MG: 0.63 SOLUTION RESPIRATORY (INHALATION) at 08:37

## 2024-09-30 RX ADMIN — CETIRIZINE HYDROCHLORIDE 10 MG: 10 TABLET, FILM COATED ORAL at 09:09

## 2024-09-30 RX ADMIN — FUROSEMIDE 40 MG: 10 INJECTION, SOLUTION INTRAVENOUS at 15:13

## 2024-09-30 RX ADMIN — LEVALBUTEROL HYDROCHLORIDE 0.63 MG: 0.63 SOLUTION RESPIRATORY (INHALATION) at 00:35

## 2024-09-30 RX ADMIN — ATOVAQUONE 1500 MG: 750 SUSPENSION ORAL at 09:11

## 2024-09-30 RX ADMIN — DEXMEDETOMIDINE HYDROCHLORIDE 0.8 MCG/KG/HR: 400 INJECTION INTRAVENOUS at 11:23

## 2024-09-30 RX ADMIN — PREDNISONE 60 MG: 10 TABLET ORAL at 09:09

## 2024-09-30 RX ADMIN — LEVALBUTEROL HYDROCHLORIDE 0.63 MG: 0.63 SOLUTION RESPIRATORY (INHALATION) at 04:32

## 2024-09-30 RX ADMIN — DEXMEDETOMIDINE HYDROCHLORIDE 0.8 MCG/KG/HR: 400 INJECTION INTRAVENOUS at 18:30

## 2024-09-30 RX ADMIN — MONTELUKAST 10 MG: 10 TABLET, FILM COATED ORAL at 22:58

## 2024-09-30 RX ADMIN — LEVOTHYROXINE SODIUM 25 MCG: 0.03 TABLET ORAL at 09:09

## 2024-09-30 RX ADMIN — IPRATROPIUM BROMIDE 0.5 MG: 0.5 SOLUTION RESPIRATORY (INHALATION) at 20:00

## 2024-09-30 RX ADMIN — LORAZEPAM 2 MG: 2 LIQUID ORAL at 13:13

## 2024-09-30 RX ADMIN — DEXMEDETOMIDINE HYDROCHLORIDE 0.8 MCG/KG/HR: 400 INJECTION INTRAVENOUS at 05:24

## 2024-09-30 RX ADMIN — ZINC SULFATE 220 MG (50 MG) CAPSULE 220 MG: CAPSULE at 09:09

## 2024-09-30 RX ADMIN — MAGNESIUM SULFATE IN WATER 4 G: 40 INJECTION, SOLUTION INTRAVENOUS at 09:10

## 2024-09-30 RX ADMIN — CHLORHEXIDINE GLUCONATE 0.12% ORAL RINSE 15 ML: 1.2 LIQUID ORAL at 09:09

## 2024-09-30 RX ADMIN — INSULIN HUMAN 1 UNITS/HR: 1 INJECTION, SOLUTION INTRAVENOUS at 22:58

## 2024-09-30 ASSESSMENT — ACTIVITIES OF DAILY LIVING (ADL)
ADLS_ACUITY_SCORE: 59
ADLS_ACUITY_SCORE: 65
ADLS_ACUITY_SCORE: 59
ADLS_ACUITY_SCORE: 61
ADLS_ACUITY_SCORE: 59
ADLS_ACUITY_SCORE: 59
ADLS_ACUITY_SCORE: 61
ADLS_ACUITY_SCORE: 59
ADLS_ACUITY_SCORE: 61
ADLS_ACUITY_SCORE: 59
ADLS_ACUITY_SCORE: 61
ADLS_ACUITY_SCORE: 59
ADLS_ACUITY_SCORE: 67
ADLS_ACUITY_SCORE: 59
ADLS_ACUITY_SCORE: 65

## 2024-09-30 NOTE — PROGRESS NOTES
MEDICAL ICU PROGRESS NOTE  09/30/2024      Date of Service (when I saw the patient): 09/30/2024    ASSESSMENT: Massiel Flaherty is a 53 year old female with PMH Anxiety/depression, fibromyalgia, c/f nonepileptic seizures not on AEDs, hypothyroidism, asthma, HLD, MURIEL on CPAP, expressive aphasia, hypertension who was admitted on 8/3/2024 for fatigue, fever and dyspnea and intubated 8/6/24 at OSH for ARDS, proned and paralyzed without improvement. Transferred to [facility] 8/8/24, hypoxic with high plateaus/peaks and placed on VV ECMO and CRRT. Decannulated from VV ECMO 8/18 and transferred to MICU 8/26/24 for ongoing management. Extubated 8/27 then reintubated 8/29 iso worsening AHRF and pseudomonas pneumonia. Extubated again 9/16 to BiPAP/HFNC, re-intubated 9/20 with tachypnea, increased WOB, fevers, and new lung opacities c/f possible HAP vs ILD/vasculitis/organizing pneumonia flare. Course complicated by tension pneumothorax while re-intubated 9/20 now s/p right chest tube. S/p tracheostomy 9/25.     CHANGES and MAJOR THINGS TODAY:   - Wean off Ketamine first, then wean Versed  - Lasix 40 mg IV  - Decrease FWF to 30 mL q 4 hours  - BMP at 1800      PLAN:    Neuro:  # Pain and sedation  # Concern for ICU delirium   # Hx Fibromyalgia   # Hx myalgic encephalomyelitis   # Hx anxiety/depression  - Pain: Fentanyl infusion. Oxycodone 2.5 mg q6hrs PRN, fentanyl bolus PRN   - Sedation plan:   - Infusions:  ketamine, versed, and precedex infusion. Scheduled ativan PO, 2 mg Q6 hours.   - Wean off Ketamine first, then wean Versed  - Gabapentin to 300mg TID held while sedated  - Psych meds (venlafaxine and amitriptyline) held while sedated  - RASS -2 to -3,  will attempt to wean sedation as tolerated and liberalize RASS as able     # Hx spells with staring and BUE posturing previously described as nonepileptic seizures   # Hx of GTC seizures and myoclonic epilepsy, weaned off AEDs   # Diffuse cerebral edema - improved  - 8/21:  MRI Brain: no acute intracranial pathology. No findings to suggest anoxic brain injury.   - MRI brain 9/20: no anoxic brain injury  - While extubated, patient was able to follow commands and answer yes/no questions   - continue to monitor neurological exam     Pulmonary:  # Acute hypoxic respiratory failure c/b ARDS  # Pseudomonas pneumonia  # Mechanical ventilation  # s/p VV ECMO 8/8 - 8/18   # Hx CAP  # Asthma  # MURIEL on home CPAP  # S/p tracheostomy  PFT dated 9/8/2020 demonstrated normal spirometry with mild diffusion impairment and mild restriction (FEV1/FVC 80%, FEV1 2.59, DLCO 40%). CT abdomen chest 3/9/2020 demonstrated scarring and fibrosis bilaterally which correlated with the decreased DLCO. The patient also has a history of MURIEL on CPAP therapy as currently managed by her provider in [place]. Unclear what triggered ARDS or why she has had such severe hospital course, further investigated ILD but results all unremarkable. Extubated 8/27, reintubated 8/29 for worsening O2 demands and diffuse opacities iso new/recurrent psuedomonas pneumonia. Bronch with BAL 8/30, pseudomonas growing as below. Extubated again 9/16, worsening respiratory distress noted 9/19 with repeat polymicrobial growth on respiratory sputum raising c/f aspiration/hospital associated pneumonia. 9/20 with increased WOB, new fevers, and CXR significant for opacities c/f possible HAP vs ILD/vasculitis/organizing pneumonia and was reintubated 9/20. Now s/p tracheostomy on 9/25. 9/28: Sputum positive for pseudomonas again. Query colonization versus ongoing infection as patient was febrile on 9/26 and has low grade temperature elevations on 9/27 and 9/28. Could consider precedex as a possible etiology for temperature elevation as well.   - ILD w/u aldolase, EVELIO, RF, CCP, ANCA, MPO, SSA Ro and [place], Scleroderma antibody, Radha 1,  hypersensity pneumonitis, IGG subclasses,  aspergillus, blastomyces, histoplasma negative  - Continues to have  intermittent episodes of tachypnea (RR 40s) and elevated airway pressures (pPlat 40s)   - Will slowly taper sedation as per above and evaluate tolerance from respiratory standpoint  - SpO2 goals >92%, PaO2 goals >60   - PTA singular at bedtime   - xopenex and ipratropium Q4 hours  - pulmicort currently on hold  - Steroids: Prednisone taper given prolonged steroid use  - ABG daily and PRN      FiO2 (%): 60 %, Resp: (!) 31, Vent Mode: CMV/AC, Resp Rate (Set): 26 breaths/min, Tidal Volume (Set, mL): 350 mL, PEEP (cm H2O): 5 cmH2O, Resp Rate (Set): 26 breaths/min, Tidal Volume (Set, mL): 350 mL, PEEP (cm H2O): 5 cmH2O     Pneumothorax, resolved   Tension pneumothorax during re-intubation 9/20, concern for fibrotic lung disease and possible barotrauma during bag ventilation during re-intubation vs complication of re-intubation in general. Leave in while intubated.   - chest tube removed on 9/27 with no recurrence of pneumothorax      Cardiovascular:  # Hyperlipidemia  # Hx HTN   # Sinus Tachycardia  - MAP goal >65, SBP goals >110  - PTA atorvastatin  - PTA clonidine held while sedated  - Lower extremity ultrasound without vascular pathology, no hematoma or clots  - Monitor discoloration of extremities for necrosis  - PRN hydralazine or labetolol for SBP > 180, plan to use labetolol rather than hydralazine if tachycardic     GI/Nutrition:  # Severe malnutrition (see RD note)   # Non-infectious Diarrhea  # GERD   # s/p PEG on 9/25/24  - Protonix (home medication)   - Nutrition consulted, appreciate recs  - Diet: TF at goal, on high fiber feed   - Hold bowel regimen d/t loose stools  - Continue scheduled multivitamin  - Loperamide PRN  - Rectal tube, output slowing down, continue to monitor  - If rectal tube output continues to decrease, nurse to assess for ability to remove rectal tube     Renal/Fluids/Electrolytes:  # Acute kidney injury, resolved   # AGMA, improving   # Elevated lactate, resovled  Baseline Cr approx 0.6.  Pt was anuric required CRRT here but now making urine, likely prerenal due to shock.  - Adequate I/Os, daily weights   - Avoid nephrotoxins as able  - RN managed electrolyte replacement protocol  - Diuresis: Lasix 40 mg IV, goal net negative 500 mL      Hypernatremia, resolved  Na 146 -->138, had been on FWF at 300 mL q 2 hours. Free water deficit -0.6L.   - Decrease FWF to 30 mL q 4 hours  - Repeat BMP 1800     Endocrine:  # Steroid and stress induced hyperglycemia  # Hypothyroidism   - Goal to keep BG < 180 for optimal wound healing  - Continue PTA synthroid  - Continue insulin drip, plan to transition to sliding scale in coming days     ID:  # Leukocytosis, resolved   # Concern for aspiration pneumonia/hospital acquired pnemonia (9/19 - )  # Pseudomonas pneumonia (s/p treatment) with c/f possible ongoing infection vs colonization (pseudomonas 1+ in sputum and fever on 9/26)   # Hx CAP  Respiratory cx 8/29 pseudomonas pneumonia. Intermediate susceptability to meropenem, more significant sensitivities to ciprofloxacin. New leukocytosis, elevated PC, CRP and lactate 9/19 with sputum cx 4+ psuedomonas, 2+ GPCs, and 1+ GPBs could be colonized , BAL studies 9/21 positive for pseudomonas. Sputum from 9/26 showing 1+ GNB, patient had a fever to 102.9 as well. C/f possible ongoing infection.   - Continue to monitor fever curve and inflammatory markers as appropriate  - ID now signed off   - Repeat cultures and sputum growing pseudomonas with similar resistance pattern to previous.   - 9/28: If fever recurs or patient clinically deteriorates, may consider resuming pseudomonas treatment     Abx  - Meropenem 8/29 - 9/1  - Vancomycin 8/29 - 8/30  - Tobramycin x 1 8/29  - Vancomycin 9/5 - 9/8    - Tobramycin nebs BID 9/1 - 9/11, 9/21- 9/25  - Ciprofloxacin infusion 9/1 - 9/11  - Metronidazole 9/6 - 9/11  - Atovaquone 9/11 -   - Vancomycin 9/19 - 9/20  - Zosyn 9/19 - 9/21  - Ciprofloxacin 9/19 - 9/21     PJP Ppx  Given Dex-ARDS  Massiel will likely remain on steriods for > 3 weeks so will initiate PJP ppx. Given history of allergy to sulfa, will obtain G6PD test to determine if dapsone can be used: levels are ok, however continuing atovaquone.   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   - Ganciclovir 9/6 - 9/8, discontinued given ID recs  - CMV levels recheck 9/18 shows continued downtrending, level at 289     Thrush  Noted 9/21  - s/p Fluconazole x 7 days      # HSV-1  Mouth sores present, which could have viral etiology. 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  - Acyclovir 400mg BID x5 days (8/22-8/27)     Hematology:    # Anemia of critical illness  # Right groin Hematoma   Acutely decreased Hgb from 8.0 to 6.6 on 9/20 of unknown etiology that required transfusion x1, however 9/19 LE US notable to 14.9 cm hematoma in R groin near prior canal that was not previously appreciated on CT abdomen c/f possible bleed.   - Hematoma stable on repeat US 9/21  - Received 1 unit pRBCs 9/24 for hgb 6.9  - Daily CBC  - Transfuse if hgb <7.0 or signs/symptoms of hypoperfusion. Monitor and trend.      Musculoskeletal/Rheum:  # Alopecia  - Hold PTA hydroxychloroquine      # Weakness and deconditioning of critical illness  # Left ankle injury pre-hospitalization    - Physical and occupational therapy when able.      Skin:  # BLE scattered ecchymosis  # Left toe duskiness  # Peripheral Edema  - WOC consult  - OT consult for lymphedema therapy     General Cares/Prophylaxis:    DVT Prophylaxis: SubQ heparin  GI Prophylaxis: PPI  Restraints: no  Family:   Code Status: Full Code    Lines/tubes/drains:  - PICC, left triple lumen  - PIVs  - PEG tube  - Trach (Shiley 6 cuffed)    Disposition:  - Medical ICU     Patient seen and findings/plan discussed with medical ICU staff, Dr. Gifford.    Danielle Albert, APRN CNP    41 minutes of critical care time    Clinically Significant Risk Factors         # Hypernatremia: Highest Na  = 153 mmol/L in last 2 days, will monitor as appropriate    # Hypomagnesemia: Lowest Mg = 1.3 mg/dL in last 2 days, will replace as needed   # Hypoalbuminemia: Lowest albumin = 2.2 g/dL at 8/10/2024  9:47 AM, will monitor as appropriate                # Severe Malnutrition: based on nutrition assessment    # Financial/Environmental Concerns: none            ====================================  INTERVAL HISTORY:   No acute events overnight. Remains sedated on Ketamine, Versed, Precedex, and Fentanyl infusions. Noted to become intermittently tachypneic (RR 40s) with elevated plateau pressures (40s), seems to occur spontaneously (not during nursing cares). Remains off pressors.     OBJECTIVE:   1. VITAL SIGNS:   Temp:  [98.3  F (36.8  C)-99.4  F (37.4  C)] 99.4  F (37.4  C)  Pulse:  [] 106  Resp:  [19-42] 31  BP: (109-172)/() 132/75  FiO2 (%):  [45 %-100 %] 60 %  SpO2:  [85 %-99 %] 97 %  FiO2 (%): 60 %, Resp: (!) 31, Vent Mode: CMV/AC, Resp Rate (Set): 26 breaths/min, Tidal Volume (Set, mL): 350 mL, PEEP (cm H2O): 5 cmH2O, Resp Rate (Set): 26 breaths/min, Tidal Volume (Set, mL): 350 mL, PEEP (cm H2O): 5 cmH2O  2. INTAKE/ OUTPUT:   I/O last 3 completed shifts:  In: 5172.27 [I.V.:997.27; NG/GT:2975]  Out: 2500 [Urine:2300; Stool:200]    3. PHYSICAL EXAMINATION:  General: Sedated, NAD  HEENT: NC, AT. PERRL. Tracheostomy site intact.   Neuro: Arouses to voice, opens eyes, does not follow commands  Pulm/Resp: Clear breath sounds bilaterally without rhonchi, crackles or wheeze, breathing non-labored on mechanical ventilation  CV: RRR, S1S2, no murmur, rub, or gallop  Abdomen: Soft, non-distended, hypoactive bowel sounds  : rolle catheter in place, urine yellow and clear  Ext: 1+ generalized edema to bilateral upper/lower extremities. Pulses 2+ radial, pedal  Incisions/Skin: Warm, dry. R groin site hematoma stable    4. LABS:   Arterial Blood Gases   Recent Labs   Lab 09/28/24  0328 09/27/24  1133  09/27/24  0419 09/26/24  2341   PH 7.39 7.36 7.40 7.41   PCO2 51* 54* 46* 47*   PO2 140* 108* 95 132*   HCO3 31* 30* 29* 29*     Complete Blood Count   Recent Labs   Lab 09/30/24  0436 09/29/24  0404 09/28/24 0328 09/27/24 0419   WBC 11.9* 11.1* 9.0 7.0   HGB 7.3* 7.6* 7.2* 8.0*    306 245 254     Basic Metabolic Panel  Recent Labs   Lab 09/30/24  0700 09/30/24  0602 09/30/24  0504 09/30/24  0436 09/29/24  1506 09/29/24  1331 09/29/24  0410 09/29/24  0404 09/29/24  0201 09/29/24  0144 09/28/24  1641 09/28/24  1636 09/28/24  0401 09/28/24 0328   NA  --   --   --  138  --  146*  --  149*  --  149*   < > 153*  --  151*   POTASSIUM  --   --   --  4.3  --   --   --  4.2  --   --   --  4.5  --  4.0   CHLORIDE  --   --   --  101  --   --   --  109*  --   --   --  115*  --  113*   CO2  --   --   --  28  --   --   --  29  --   --   --  29  --  27   BUN  --   --   --  16.8  --   --   --  22.0*  --   --   --  21.4*  --  22.4*   CR  --   --   --  0.15*  --   --   --  0.19*  --   --   --  0.21*  --  0.22*   * 121* 113* 124*   < >  --    < > 142*   < >  --    < > 118*   < > 131*    < > = values in this interval not displayed.     Liver Function Tests  No lab results found in last 7 days.  Coagulation Profile  No lab results found in last 7 days.    5. RADIOLOGY:   No results found for this or any previous visit (from the past 24 hour(s)).

## 2024-09-30 NOTE — PLAN OF CARE
Shift:  0700- 1930      Events:   Pt remains trach'd and sedated. Weaned ketamine down. Decreased free water amount volume. Increased PO sedation doses.       Assessment:     Neuro: Pt is sedated. Not Following commands.     CV: HR is 100's, rhythm is ST. Pulses are palpated. MAP >65.     Pulm:  Lungs are coarse. Vent settings are: AC 20 / PEEP 5/ tv 380 / 60 %. Thick secretions ETT.    : Purewick in place.       GI: Abd is round. PEG tube in place. Loose stools. TF infusing. FW is 30 every 4 hours.     Skin:  Generalized bruising noted. Flushed face.      Drips:    NS -  5  Fentanyl - 150  Precedex - 0.08  Versed - 2  Ketamine- 125  Insulin- 1-3      Lines:    PICC L arm  PIV x 2  Trach 6.0  PEG tube    Shift:    Labs drawn per orders. Hourly rounded and call light in place. Delirium interventions in place. Patient turned every 2-4 hours and PRN. (See flow sheets for additional data). (Labs reviewed). MD informed of all critical labs and patient condition as needed throughout the shift.

## 2024-09-30 NOTE — PLAN OF CARE
ICU End of Shift Summary. See flowsheets for vital signs and detailed assessment.    Changes this shift: Rass -2/+1, Opends eyes to voice but not following commands. ST/SR with HR in 90's and 100's. MAP>65. Trach intact with frequent coughing, minimal white thick secretion and moderate thick tan oral secretions. Team made changes to vent for synchrony and pt more compliant now. Pt received prn bolus of versed for agitation and precedex increased to 0.8. Pt had adequate UOP with two loose stools overnight.     Plan: Continue weaning sedation and ventilation as able.     Goal Outcome Evaluation:      Plan of Care Reviewed With: patient    Overall Patient Progress: no changeOverall Patient Progress: no change    Outcome Evaluation: Pt sedated and vent dependent

## 2024-09-30 NOTE — PROGRESS NOTES
Shift Summary:    Pt remained trached  with Shiley #6 cuffed and mechanically ventilated on the following vent settings:    FiO2 (%): 60 %  Vent Mode: CMV/AC  Resp Rate (Set): 20 breaths/min  Tidal Volume (Set, mL): 380 mL  PEEP (cm H2O): 5 cmH2O    PST: Not done, see below.   09/30/24 0837   Weaning Assessment   Weaning Assessment Complete Y   Safety Screen Weaning Assessment ARWY Unstable/unsafe ariway (usualy a post surgery problem)  (Pt triggering PIPS 40-60, RR 30-40)       Assessment: BS coarse and diminished at the bases bilaterally. RT suctioned small amount of thin clear secretions. Pt intermittently tachypneic to the 40s along with triggering PIPs between 40-60 when agitated and coughing. MD has been notified and is aware.    Therapy: Pt tolerated nebs with no issues.    Ambu bag, mask, and valve present at bedside.     Emergency trach supplies also present at bedside.    RT will continue to follow and monitor pt as appropriate.     Lynette Gamble, RT on 9/30/2024 at 6:23 PM

## 2024-09-30 NOTE — PROGRESS NOTES
ICU End of Shift Summary. See flowsheets for vital signs and detailed assessment.    Changes this shift: Minimal vent settings. Still trying to wean sedation, which has been slow as pt tolerates only slight changes to sedation. Decreased fentanyl and versed.  With sedation weaning, pt has episodes of significant coughing, ST, RR in the 40's, O2 sats 87-90%, RR in the 30's to 40's, and HTN.   Purewick in place with 650 ml in canister, plus a large amount of incont urine x 1 this shift. Removed rectal tube today.     Plan:  Spoke with pt's mother, Doreen, by phone. Gave her updates. Family plans to drive down to see pt on Thursday this week. However, it's possible that pt may transfer back to [place] to continue vent weaning , once sedation is weaned a bit more (per MICU team).

## 2024-10-01 ENCOUNTER — APPOINTMENT (OUTPATIENT)
Dept: ULTRASOUND IMAGING | Facility: CLINIC | Age: 54
DRG: 003 | End: 2024-10-01
Payer: MEDICAID

## 2024-10-01 VITALS
HEIGHT: 62 IN | SYSTOLIC BLOOD PRESSURE: 138 MMHG | WEIGHT: 183.2 LBS | OXYGEN SATURATION: 91 % | TEMPERATURE: 97.8 F | RESPIRATION RATE: 35 BRPM | BODY MASS INDEX: 33.71 KG/M2 | HEART RATE: 120 BPM | DIASTOLIC BLOOD PRESSURE: 90 MMHG

## 2024-10-01 LAB
ABO/RH(D): NORMAL
ALBUMIN SERPL BCG-MCNC: 2.5 G/DL (ref 3.5–5.2)
ALP SERPL-CCNC: 984 U/L (ref 40–150)
ALT SERPL W P-5'-P-CCNC: 437 U/L (ref 0–50)
ANION GAP SERPL CALCULATED.3IONS-SCNC: 10 MMOL/L (ref 7–15)
ANTIBODY SCREEN: NEGATIVE
AST SERPL W P-5'-P-CCNC: 120 U/L (ref 0–45)
ATRIAL RATE - MUSE: 100 BPM
BACTERIA BLD CULT: NO GROWTH
BASE EXCESS BLDV CALC-SCNC: 8.5 MMOL/L (ref -3–3)
BILIRUB DIRECT SERPL-MCNC: 0.59 MG/DL (ref 0–0.3)
BILIRUB SERPL-MCNC: 0.7 MG/DL
BLD PROD TYP BPU: NORMAL
BLOOD COMPONENT TYPE: NORMAL
BUN SERPL-MCNC: 16.6 MG/DL (ref 6–20)
CALCIUM SERPL-MCNC: 8 MG/DL (ref 8.8–10.4)
CHLORIDE SERPL-SCNC: 99 MMOL/L (ref 98–107)
CODING SYSTEM: NORMAL
CREAT SERPL-MCNC: 0.17 MG/DL (ref 0.51–0.95)
CROSSMATCH: NORMAL
CRP SERPL-MCNC: 110 MG/L
DIASTOLIC BLOOD PRESSURE - MUSE: NORMAL MMHG
EGFRCR SERPLBLD CKD-EPI 2021: >90 ML/MIN/1.73M2
ERYTHROCYTE [DISTWIDTH] IN BLOOD BY AUTOMATED COUNT: 19.1 % (ref 10–15)
GLUCOSE BLDC GLUCOMTR-MCNC: 111 MG/DL (ref 70–99)
GLUCOSE BLDC GLUCOMTR-MCNC: 120 MG/DL (ref 70–99)
GLUCOSE BLDC GLUCOMTR-MCNC: 122 MG/DL (ref 70–99)
GLUCOSE BLDC GLUCOMTR-MCNC: 122 MG/DL (ref 70–99)
GLUCOSE BLDC GLUCOMTR-MCNC: 125 MG/DL (ref 70–99)
GLUCOSE BLDC GLUCOMTR-MCNC: 129 MG/DL (ref 70–99)
GLUCOSE BLDC GLUCOMTR-MCNC: 132 MG/DL (ref 70–99)
GLUCOSE BLDC GLUCOMTR-MCNC: 140 MG/DL (ref 70–99)
GLUCOSE BLDC GLUCOMTR-MCNC: 142 MG/DL (ref 70–99)
GLUCOSE BLDC GLUCOMTR-MCNC: 150 MG/DL (ref 70–99)
GLUCOSE BLDC GLUCOMTR-MCNC: 184 MG/DL (ref 70–99)
GLUCOSE SERPL-MCNC: 131 MG/DL (ref 70–99)
HCO3 BLDV-SCNC: 34 MMOL/L (ref 21–28)
HCO3 SERPL-SCNC: 29 MMOL/L (ref 22–29)
HCT VFR BLD AUTO: 22.6 % (ref 35–47)
HGB BLD-MCNC: 6.7 G/DL (ref 11.7–15.7)
HGB BLD-MCNC: 7.2 G/DL (ref 11.7–15.7)
IGG SERPL-MCNC: 437 MG/DL (ref 610–1616)
INTERPRETATION ECG - MUSE: NORMAL
ISSUE DATE AND TIME: NORMAL
MAGNESIUM SERPL-MCNC: 1.7 MG/DL (ref 1.7–2.3)
MAGNESIUM SERPL-MCNC: 1.8 MG/DL (ref 1.7–2.3)
MCH RBC QN AUTO: 29.9 PG (ref 26.5–33)
MCHC RBC AUTO-ENTMCNC: 29.6 G/DL (ref 31.5–36.5)
MCV RBC AUTO: 101 FL (ref 78–100)
O2/TOTAL GAS SETTING VFR VENT: 50 %
OXYHGB MFR BLDV: 76 % (ref 70–75)
P AXIS - MUSE: 39 DEGREES
PCO2 BLDV: 55 MM HG (ref 40–50)
PH BLDV: 7.41 [PH] (ref 7.32–7.43)
PHOSPHATE SERPL-MCNC: 4.1 MG/DL (ref 2.5–4.5)
PLATELET # BLD AUTO: 274 10E3/UL (ref 150–450)
PO2 BLDV: 42 MM HG (ref 25–47)
POTASSIUM SERPL-SCNC: 3.5 MMOL/L (ref 3.4–5.3)
POTASSIUM SERPL-SCNC: 4.4 MMOL/L (ref 3.4–5.3)
PR INTERVAL - MUSE: 146 MS
PROT SERPL-MCNC: 5.1 G/DL (ref 6.4–8.3)
QRS DURATION - MUSE: 76 MS
QT - MUSE: 346 MS
QTC - MUSE: 446 MS
R AXIS - MUSE: 13 DEGREES
RBC # BLD AUTO: 2.24 10E6/UL (ref 3.8–5.2)
SAO2 % BLDV: 79 % (ref 70–75)
SODIUM SERPL-SCNC: 138 MMOL/L (ref 135–145)
SPECIMEN EXPIRATION DATE: NORMAL
SYSTOLIC BLOOD PRESSURE - MUSE: NORMAL MMHG
T AXIS - MUSE: 53 DEGREES
UNIT ABO/RH: NORMAL
UNIT NUMBER: NORMAL
UNIT STATUS: NORMAL
UNIT TYPE ISBT: 9500
VENTRICULAR RATE- MUSE: 100 BPM
WBC # BLD AUTO: 8.1 10E3/UL (ref 4–11)

## 2024-10-01 PROCEDURE — 94640 AIRWAY INHALATION TREATMENT: CPT | Mod: 76

## 2024-10-01 PROCEDURE — 76705 ECHO EXAM OF ABDOMEN: CPT

## 2024-10-01 PROCEDURE — 250N000011 HC RX IP 250 OP 636

## 2024-10-01 PROCEDURE — 76705 ECHO EXAM OF ABDOMEN: CPT | Mod: 26 | Performed by: STUDENT IN AN ORGANIZED HEALTH CARE EDUCATION/TRAINING PROGRAM

## 2024-10-01 PROCEDURE — 250N000013 HC RX MED GY IP 250 OP 250 PS 637: Performed by: SURGERY

## 2024-10-01 PROCEDURE — 83735 ASSAY OF MAGNESIUM: CPT | Performed by: PHYSICIAN ASSISTANT

## 2024-10-01 PROCEDURE — 250N000013 HC RX MED GY IP 250 OP 250 PS 637

## 2024-10-01 PROCEDURE — 86923 COMPATIBILITY TEST ELECTRIC: CPT

## 2024-10-01 PROCEDURE — 94003 VENT MGMT INPAT SUBQ DAY: CPT

## 2024-10-01 PROCEDURE — 85018 HEMOGLOBIN: CPT | Performed by: INTERNAL MEDICINE

## 2024-10-01 PROCEDURE — 86850 RBC ANTIBODY SCREEN: CPT

## 2024-10-01 PROCEDURE — 84100 ASSAY OF PHOSPHORUS: CPT | Performed by: PHYSICIAN ASSISTANT

## 2024-10-01 PROCEDURE — 94640 AIRWAY INHALATION TREATMENT: CPT

## 2024-10-01 PROCEDURE — 99291 CRITICAL CARE FIRST HOUR: CPT | Mod: 24 | Performed by: STUDENT IN AN ORGANIZED HEALTH CARE EDUCATION/TRAINING PROGRAM

## 2024-10-01 PROCEDURE — 84132 ASSAY OF SERUM POTASSIUM: CPT

## 2024-10-01 PROCEDURE — 93005 ELECTROCARDIOGRAM TRACING: CPT

## 2024-10-01 PROCEDURE — P9016 RBC LEUKOCYTES REDUCED: HCPCS

## 2024-10-01 PROCEDURE — 258N000003 HC RX IP 258 OP 636

## 2024-10-01 PROCEDURE — 82805 BLOOD GASES W/O2 SATURATION: CPT

## 2024-10-01 PROCEDURE — 86900 BLOOD TYPING SEROLOGIC ABO: CPT

## 2024-10-01 PROCEDURE — 93010 ELECTROCARDIOGRAM REPORT: CPT | Performed by: INTERNAL MEDICINE

## 2024-10-01 PROCEDURE — 250N000012 HC RX MED GY IP 250 OP 636 PS 637

## 2024-10-01 PROCEDURE — 85027 COMPLETE CBC AUTOMATED: CPT | Performed by: PHYSICIAN ASSISTANT

## 2024-10-01 PROCEDURE — 82947 ASSAY GLUCOSE BLOOD QUANT: CPT | Performed by: PHYSICIAN ASSISTANT

## 2024-10-01 PROCEDURE — 250N000013 HC RX MED GY IP 250 OP 250 PS 637: Performed by: PHYSICIAN ASSISTANT

## 2024-10-01 PROCEDURE — 999N000157 HC STATISTIC RCP TIME EA 10 MIN

## 2024-10-01 PROCEDURE — 83735 ASSAY OF MAGNESIUM: CPT

## 2024-10-01 PROCEDURE — 86140 C-REACTIVE PROTEIN: CPT

## 2024-10-01 PROCEDURE — 99292 CRITICAL CARE ADDL 30 MIN: CPT | Mod: 24 | Performed by: STUDENT IN AN ORGANIZED HEALTH CARE EDUCATION/TRAINING PROGRAM

## 2024-10-01 PROCEDURE — 250N000009 HC RX 250

## 2024-10-01 PROCEDURE — 80048 BASIC METABOLIC PNL TOTAL CA: CPT | Performed by: PHYSICIAN ASSISTANT

## 2024-10-01 PROCEDURE — 82248 BILIRUBIN DIRECT: CPT

## 2024-10-01 RX ORDER — LEVOTHYROXINE SODIUM 25 UG/1
25 TABLET ORAL
Status: SHIPPED
Start: 2024-10-02

## 2024-10-01 RX ORDER — POTASSIUM CHLORIDE 20MEQ/15ML
20 LIQUID (ML) ORAL ONCE
Status: COMPLETED | OUTPATIENT
Start: 2024-10-01 | End: 2024-10-01

## 2024-10-01 RX ORDER — HEPARIN SODIUM 5000 [USP'U]/.5ML
5000 INJECTION, SOLUTION INTRAVENOUS; SUBCUTANEOUS EVERY 8 HOURS
Status: SHIPPED
Start: 2024-10-01

## 2024-10-01 RX ORDER — FUROSEMIDE 10 MG/ML
40 INJECTION INTRAMUSCULAR; INTRAVENOUS ONCE
Status: COMPLETED | OUTPATIENT
Start: 2024-10-01 | End: 2024-10-01

## 2024-10-01 RX ORDER — LABETALOL HYDROCHLORIDE 5 MG/ML
10 INJECTION, SOLUTION INTRAVENOUS EVERY 6 HOURS PRN
Status: SHIPPED
Start: 2024-10-01

## 2024-10-01 RX ORDER — LOPERAMIDE HCL 2 MG
2 CAPSULE ORAL 4 TIMES DAILY PRN
Status: SHIPPED
Start: 2024-10-01

## 2024-10-01 RX ORDER — ONDANSETRON 2 MG/ML
4 INJECTION INTRAMUSCULAR; INTRAVENOUS EVERY 6 HOURS PRN
Status: SHIPPED
Start: 2024-10-01

## 2024-10-01 RX ORDER — OXYCODONE HCL 5 MG/5 ML
5 SOLUTION, ORAL ORAL EVERY 6 HOURS PRN
Status: SHIPPED
Start: 2024-10-01

## 2024-10-01 RX ORDER — AMINO ACIDS/PROTEIN HYDROLYS 11G-40/45
1 LIQUID IN PACKET (ML) ORAL DAILY
Status: DISCONTINUED | OUTPATIENT
Start: 2024-10-01 | End: 2024-10-01 | Stop reason: HOSPADM

## 2024-10-01 RX ORDER — PROCHLORPERAZINE MALEATE 10 MG
10 TABLET ORAL EVERY 6 HOURS PRN
Status: SHIPPED
Start: 2024-10-01

## 2024-10-01 RX ORDER — HEPARIN SODIUM,PORCINE 10 UNIT/ML
5-20 VIAL (ML) INTRAVENOUS EVERY 24 HOURS
Status: SHIPPED
Start: 2024-10-02

## 2024-10-01 RX ORDER — MAGNESIUM SULFATE HEPTAHYDRATE 40 MG/ML
2 INJECTION, SOLUTION INTRAVENOUS ONCE
Status: COMPLETED | OUTPATIENT
Start: 2024-10-01 | End: 2024-10-01

## 2024-10-01 RX ORDER — CHLORHEXIDINE GLUCONATE ORAL RINSE 1.2 MG/ML
15 SOLUTION DENTAL EVERY 12 HOURS
Status: SHIPPED
Start: 2024-10-01

## 2024-10-01 RX ORDER — DEXMEDETOMIDINE HYDROCHLORIDE 4 UG/ML
.1-1.2 INJECTION, SOLUTION INTRAVENOUS CONTINUOUS
Status: SHIPPED
Start: 2024-10-01

## 2024-10-01 RX ORDER — HEPARIN SODIUM,PORCINE 10 UNIT/ML
5-20 VIAL (ML) INTRAVENOUS
Status: SHIPPED
Start: 2024-10-01

## 2024-10-01 RX ORDER — LEVALBUTEROL INHALATION SOLUTION 0.63 MG/3ML
0.63 SOLUTION RESPIRATORY (INHALATION) EVERY 4 HOURS
Status: SHIPPED
Start: 2024-10-01

## 2024-10-01 RX ORDER — DEXTROSE MONOHYDRATE 25 G/50ML
25-50 INJECTION, SOLUTION INTRAVENOUS
Status: DISCONTINUED | OUTPATIENT
Start: 2024-10-01 | End: 2024-10-01

## 2024-10-01 RX ORDER — VIT B COMP NO.3/FOLIC/C/BIOTIN 1 MG-60 MG
1 TABLET ORAL DAILY
Status: SHIPPED
Start: 2024-10-02

## 2024-10-01 RX ORDER — QUETIAPINE FUMARATE 100 MG/1
100 TABLET, FILM COATED ORAL AT BEDTIME
Status: SHIPPED
Start: 2024-10-01

## 2024-10-01 RX ORDER — MONTELUKAST SODIUM 10 MG/1
10 TABLET ORAL AT BEDTIME
Status: SHIPPED
Start: 2024-10-01

## 2024-10-01 RX ORDER — ATOVAQUONE 750 MG/5ML
1500 SUSPENSION ORAL DAILY
Status: SHIPPED
Start: 2024-10-02

## 2024-10-01 RX ORDER — NICOTINE POLACRILEX 4 MG
15-30 LOZENGE BUCCAL
Status: DISCONTINUED | OUTPATIENT
Start: 2024-10-01 | End: 2024-10-01

## 2024-10-01 RX ORDER — PREDNISONE 10 MG/1
TABLET ORAL
Status: SHIPPED
Start: 2024-10-02 | End: 2024-10-17

## 2024-10-01 RX ORDER — AMINO ACIDS/PROTEIN HYDROLYS 11G-40/45
60 LIQUID IN PACKET (ML) ORAL DAILY
Status: SHIPPED
Start: 2024-10-02

## 2024-10-01 RX ORDER — ZINC SULFATE 50(220)MG
220 CAPSULE ORAL DAILY
Status: SHIPPED
Start: 2024-10-02

## 2024-10-01 RX ORDER — CARBOXYMETHYLCELLULOSE SODIUM 5 MG/ML
1 SOLUTION/ DROPS OPHTHALMIC
Status: SHIPPED
Start: 2024-10-01

## 2024-10-01 RX ORDER — HYDRALAZINE HYDROCHLORIDE 20 MG/ML
10 INJECTION INTRAMUSCULAR; INTRAVENOUS EVERY 6 HOURS PRN
Status: SHIPPED
Start: 2024-10-01

## 2024-10-01 RX ORDER — CETIRIZINE HYDROCHLORIDE 10 MG/1
10 TABLET ORAL DAILY
Status: SHIPPED
Start: 2024-10-02

## 2024-10-01 RX ORDER — QUETIAPINE FUMARATE 50 MG/1
50 TABLET, FILM COATED ORAL 2 TIMES DAILY
Status: SHIPPED
Start: 2024-10-02

## 2024-10-01 RX ORDER — OXYCODONE HYDROCHLORIDE 10 MG/1
10 TABLET ORAL EVERY 6 HOURS
Status: SHIPPED
Start: 2024-10-01

## 2024-10-01 RX ORDER — LORAZEPAM 2 MG/ML
4 CONCENTRATE ORAL EVERY 6 HOURS
Status: SHIPPED
Start: 2024-10-01

## 2024-10-01 RX ADMIN — OXYCODONE HYDROCHLORIDE 10 MG: 10 TABLET ORAL at 15:17

## 2024-10-01 RX ADMIN — Medication 60 ML: at 13:02

## 2024-10-01 RX ADMIN — ATOVAQUONE 1500 MG: 750 SUSPENSION ORAL at 08:12

## 2024-10-01 RX ADMIN — IPRATROPIUM BROMIDE 0.5 MG: 0.5 SOLUTION RESPIRATORY (INHALATION) at 00:01

## 2024-10-01 RX ADMIN — PREDNISONE 60 MG: 10 TABLET ORAL at 08:06

## 2024-10-01 RX ADMIN — DEXMEDETOMIDINE HYDROCHLORIDE 0.8 MCG/KG/HR: 400 INJECTION INTRAVENOUS at 06:52

## 2024-10-01 RX ADMIN — IPRATROPIUM BROMIDE 0.5 MG: 0.5 SOLUTION RESPIRATORY (INHALATION) at 08:34

## 2024-10-01 RX ADMIN — FUROSEMIDE 40 MG: 10 INJECTION, SOLUTION INTRAVENOUS at 09:41

## 2024-10-01 RX ADMIN — DEXMEDETOMIDINE HYDROCHLORIDE 0.8 MCG/KG/HR: 400 INJECTION INTRAVENOUS at 20:00

## 2024-10-01 RX ADMIN — LORAZEPAM 4 MG: 2 LIQUID ORAL at 01:47

## 2024-10-01 RX ADMIN — FUROSEMIDE 40 MG: 10 INJECTION, SOLUTION INTRAVENOUS at 15:17

## 2024-10-01 RX ADMIN — HEPARIN SODIUM 5000 UNITS: 5000 INJECTION, SOLUTION INTRAVENOUS; SUBCUTANEOUS at 03:39

## 2024-10-01 RX ADMIN — HEPARIN SODIUM 5000 UNITS: 5000 INJECTION, SOLUTION INTRAVENOUS; SUBCUTANEOUS at 11:16

## 2024-10-01 RX ADMIN — LEVALBUTEROL HYDROCHLORIDE 0.63 MG: 0.63 SOLUTION RESPIRATORY (INHALATION) at 16:21

## 2024-10-01 RX ADMIN — IPRATROPIUM BROMIDE 0.5 MG: 0.5 SOLUTION RESPIRATORY (INHALATION) at 04:14

## 2024-10-01 RX ADMIN — OXYCODONE HYDROCHLORIDE 10 MG: 10 TABLET ORAL at 09:41

## 2024-10-01 RX ADMIN — IPRATROPIUM BROMIDE 0.5 MG: 0.5 SOLUTION RESPIRATORY (INHALATION) at 16:21

## 2024-10-01 RX ADMIN — Medication 1 TABLET: at 11:17

## 2024-10-01 RX ADMIN — Medication 50 MCG: at 20:00

## 2024-10-01 RX ADMIN — LEVALBUTEROL HYDROCHLORIDE 0.63 MG: 0.63 SOLUTION RESPIRATORY (INHALATION) at 08:34

## 2024-10-01 RX ADMIN — DEXMEDETOMIDINE HYDROCHLORIDE 0.8 MCG/KG/HR: 400 INJECTION INTRAVENOUS at 01:45

## 2024-10-01 RX ADMIN — DEXMEDETOMIDINE HYDROCHLORIDE 0.8 MCG/KG/HR: 400 INJECTION INTRAVENOUS at 19:02

## 2024-10-01 RX ADMIN — Medication 40 MG: at 08:12

## 2024-10-01 RX ADMIN — CETIRIZINE HYDROCHLORIDE 10 MG: 10 TABLET, FILM COATED ORAL at 08:06

## 2024-10-01 RX ADMIN — ZINC SULFATE 220 MG (50 MG) CAPSULE 220 MG: CAPSULE at 08:06

## 2024-10-01 RX ADMIN — LEVOTHYROXINE SODIUM 25 MCG: 0.03 TABLET ORAL at 08:06

## 2024-10-01 RX ADMIN — OXYCODONE HYDROCHLORIDE 10 MG: 10 TABLET ORAL at 03:39

## 2024-10-01 RX ADMIN — LORAZEPAM 4 MG: 2 LIQUID ORAL at 08:06

## 2024-10-01 RX ADMIN — DEXMEDETOMIDINE HYDROCHLORIDE 0.8 MCG/KG/HR: 400 INJECTION INTRAVENOUS at 13:02

## 2024-10-01 RX ADMIN — Medication 50 MCG/HR: at 19:52

## 2024-10-01 RX ADMIN — KETAMINE HYDROCHLORIDE 75 MG/HR: 100 INJECTION, SOLUTION, CONCENTRATE INTRAMUSCULAR; INTRAVENOUS at 05:36

## 2024-10-01 RX ADMIN — IPRATROPIUM BROMIDE 0.5 MG: 0.5 SOLUTION RESPIRATORY (INHALATION) at 12:46

## 2024-10-01 RX ADMIN — LORAZEPAM 4 MG: 2 LIQUID ORAL at 15:17

## 2024-10-01 RX ADMIN — LEVALBUTEROL HYDROCHLORIDE 0.63 MG: 0.63 SOLUTION RESPIRATORY (INHALATION) at 12:46

## 2024-10-01 RX ADMIN — LEVALBUTEROL HYDROCHLORIDE 0.63 MG: 0.63 SOLUTION RESPIRATORY (INHALATION) at 04:14

## 2024-10-01 RX ADMIN — LEVALBUTEROL HYDROCHLORIDE 0.63 MG: 0.63 SOLUTION RESPIRATORY (INHALATION) at 00:01

## 2024-10-01 RX ADMIN — DEXMEDETOMIDINE HYDROCHLORIDE 0.8 MCG/KG/HR: 400 INJECTION INTRAVENOUS at 19:51

## 2024-10-01 RX ADMIN — CHLORHEXIDINE GLUCONATE 0.12% ORAL RINSE 15 ML: 1.2 LIQUID ORAL at 08:06

## 2024-10-01 RX ADMIN — MAGNESIUM SULFATE HEPTAHYDRATE 2 G: 2 INJECTION, SOLUTION INTRAVENOUS at 05:17

## 2024-10-01 RX ADMIN — INSULIN ASPART 1 UNITS: 100 INJECTION, SOLUTION INTRAVENOUS; SUBCUTANEOUS at 11:25

## 2024-10-01 RX ADMIN — Medication 150 MCG/HR: at 02:52

## 2024-10-01 RX ADMIN — INSULIN GLARGINE 10 UNITS: 100 INJECTION, SOLUTION SUBCUTANEOUS at 09:46

## 2024-10-01 RX ADMIN — INSULIN ASPART 2 UNITS: 100 INJECTION, SOLUTION INTRAVENOUS; SUBCUTANEOUS at 15:16

## 2024-10-01 RX ADMIN — QUETIAPINE FUMARATE 50 MG: 50 TABLET ORAL at 15:19

## 2024-10-01 RX ADMIN — POTASSIUM CHLORIDE 20 MEQ: 20 SOLUTION ORAL at 05:17

## 2024-10-01 RX ADMIN — QUETIAPINE FUMARATE 50 MG: 50 TABLET ORAL at 05:17

## 2024-10-01 ASSESSMENT — ACTIVITIES OF DAILY LIVING (ADL)
ADLS_ACUITY_SCORE: 63
ADLS_ACUITY_SCORE: 67
ADLS_ACUITY_SCORE: 63
ADLS_ACUITY_SCORE: 67
ADLS_ACUITY_SCORE: 63
ADLS_ACUITY_SCORE: 67
ADLS_ACUITY_SCORE: 67
ADLS_ACUITY_SCORE: 63
ADLS_ACUITY_SCORE: 67

## 2024-10-01 NOTE — PLAN OF CARE
ICU End of Shift Summary. See flowsheets for vital signs and detailed assessment.    Changes this shift: RASS -3/-4, opens eyes intermittently to voice, not following commands, ket off, fent at 150, celeste at 2, dex at 0.8, SR-ST 80-100s, low MAPs after Seroquel, +3 edema in all extremities, 6.0 shiley trach, current vent settings 50% , RR 20, PEEP 5, frequent coughing, insulin gtt at 2, alg 2, purwick in place with good UOP, frequent stooling from coughing, mag & K replaced this AM, 1 U PRBCs for hgb 6.7.    Plan: Sliding scale insulin?, rectal pouch?      Goal Outcome Evaluation:       Plan of Care Reviewed With: patient    Overall Patient Progress: no change    Outcome Evaluation: see end of shift note

## 2024-10-01 NOTE — PROGRESS NOTES
Care Management Discharge Note    Discharge Date:         Discharge Disposition: Transfer back to [facility]    Discharge Services: Hospital return transfer.  MD to MD and RN to RN report completed.  Discharge DME: None    Discharge Transportation: Fixed [facility] as arranged by [facility]    Education Provided on the Discharge Plan: yes, by interdisciplinary team.  Persons Notified of Discharge Plans: patient  Patient/Family in Agreement with the Plan: yes per chart review.    Additional Information:   Per report from bedside RN, call was received stating transportation was on the way with an expected arrival time of 7:30 p.m.  Discharge Packet completed and MD team is finishing Discharge Summary at which time, bedside RN will place in discharge packet.  Inpatient Cares continue per MD team plans of care and the inpatient Care Management Team is available via [employer] as needed til 8:30 p.m.  __________________________  CARLOS Galaviz.S.N., R.N., P.H.N.  Administrative Nurse Supervisor [facility]  Care Coordinator Nurse [facility]  Today only via [employer]  [facility]

## 2024-10-01 NOTE — DISCHARGE SUMMARY
[facility]    Call [phone] with questions    Discharge Summary  [facility]ensive [facility]  Critical Care Service    Date of Admission:  8/8/2024  Date of Discharge:  10/1/2024  Discharging Provider: Wenceslao Gifford  Date of Service (when I saw the patient): 10/01/24    Discharge Diagnoses     History of Present Illness   Massiel Flaherty is a 53 year old female with PMH Anxiety/depression, fibromyalgia, c/f nonepileptic seizures not on AEDs, hypothyroidism, asthma, HLD, MURIEL on CPAP, expressive aphasia, hypertension who was admitted on 8/3/2024 for fatigue, fever and dyspnea and intubated 8/6/24 at OSH for ARDS, proned and paralyzed without improvement. Transferred to [facility] 8/8/24, hypoxic with high plateaus/peaks and placed on VV ECMO and CRRT. Decannulated from VV ECMO 8/18/24 and transferred to medical ICU 8/26/24 for ongoing management. Extubated 8/27/24 then reintubated 8/29/24 in setting of worsening AHRF and pseudomonas pneumonia. Extubated again 9/16/24 to BiPAP/HFNC, required re-intubated 9/20/24 due to tachypnea, increased WOB, fevers. Re-intubation complicated by tension pneumothorax s/p right chest tube (removed 9/27/24). S/p tracheostomy placement 9/25/24.    Hospital Course   Massiel Flaherty was admitted on 8/8/2024.  The following problems were addressed during her hospitalization:    # Encephalopathy  # Concern for ICU delirium  # Hx Fibromyalgia   # Hx myalgic encephalomyelitis   Has required multiple sedative infusions throughout prolonged hospitalization to promote vent synchrony. Now tapering off opioid and sedative infusions.   - Pain:  - Fentanyl infusion at 50 mcg/hr + PRN bolus, weaning  - Oxycodone 10 mg q6hrs (started 9/30)  - Sedation:   - Discontinued Versed 10/1  - Discontinued Ketamine 10/1  - Precedex  gtt   - Scheduled Ativan PO, 4 mg Q6 hours  (increased 9/30 from 2 mg q 6 hours)  - Seroquel 50 mg BID, 100 mg HS (last Qtc  446 on 10/1)  - Gabapentin to 300mg TID held while sedated  - RASS goal 0 to -1     # Hx anxiety/depression  - Psych meds (venlafaxine and amitriptyline) held while sedated    # Hx spells with staring and BUE posturing previously described as nonepileptic seizures   # Hx of GTC seizures and myoclonic epilepsy, weaned off AEDs   # Diffuse cerebral edema - improved  - 8/21: MRI Brain: no acute intracranial pathology. No findings to suggest anoxic brain injury.   - MRI brain 9/20: no anoxic brain injury  - While extubated, patient was able to follow commands and answer yes/no questions   - continue to monitor neurological exam     Pulmonary:  # Acute hypoxic respiratory failure c/b ARDS  # Pseudomonas pneumonia  # s/p VV ECMO 8/8 - 8/18   # S/p tracheostomy 9/25  # Hx CAP  # Asthma  # MURIEL on home CPAP  PFT dated 9/8/2020 demonstrated normal spirometry with mild diffusion impairment and mild restriction (FEV1/FVC 80%, FEV1 2.59, DLCO 40%). CT abdomen chest 3/9/2020 demonstrated scarring and fibrosis bilaterally which correlated with the decreased DLCO. The patient also has a history of MURIEL on CPAP therapy as currently managed by her provider in [place].     ARDS of unclear etiology, further investigated ILD (ILD w/u aldolase, EVELIO, RF, CCP, ANCA, MPO, SSA Ro and La, Scleroderma antibody, Radha 1, hypersensity pneumonitis, IGG subclasses, aspergillus, blastomyces, histoplasma negative). Required VV ECMO from 8/8 - 8/18. Extubated 8/27, reintubated 8/29 for worsening O2 demands and diffuse opacities iso new/recurrent psuedomonas pneumonia. Bronch with BAL 8/30, +pseudomonas. Extubated again 9/16, worsening respiratory distress noted 9/19 with repeat polymicrobial growth on respiratory sputum raising c/f aspiration/hospital associated pneumonia. 9/20 with increased WOB, new fevers, and CXR significant for opacities c/f possible HAP reintubated 9/20. Now s/p tracheostomy on 9/25. 9/28: Sputum positive for pseudomonas  again. Query colonization given afebrile, decreasing WBC, decreasing support on ventilator.    - SpO2 goals >92%, PaO2 goals >60   - Steroids: Prednisone taper given prolonged steroid use  - PTA singular at bedtime   - Xopenex and ipratropium Q4 hours      FiO2 (%): 50 %, Resp: 23, Vent Mode: CMV/AC, Resp Rate (Set): 20 breaths/min, Tidal Volume (Set, mL): 380 mL, PEEP (cm H2O): 5 cmH2O, Resp Rate (Set): 20 breaths/min, Tidal Volume (Set, mL): 380 mL, PEEP (cm H2O): 5 cmH2O     Pneumothorax, resolved   Tension pneumothorax during re-intubation 9/20, concern for fibrotic lung disease and possible barotrauma during bag ventilation during re-intubation vs complication of re-intubation in general. Leave in while intubated.   - chest tube removed on 9/27 with no recurrence of pneumothorax      Cardiovascular:  # Hyperlipidemia  # Hx HTN   # Sinus Tachycardia  - MAP goal >65, SBP goals >110  - PTA atorvastatin  - PTA clonidine held while sedated  - PRN hydralazine or labetolol for SBP > 180, plan to use labetolol rather than hydralazine if tachycardic     GI/Nutrition:  # Severe malnutrition (see RD note)   # Non-infectious Diarrhea  # GERD   # s/p PEG on 9/25/24  - PPI daily (home medication)   - Nutrition consulted, appreciate recs  - Diet: TF at goal rate@ 50 ml/hr, on high fiber feed ([employer] renal support)  - Holding bowel regimen d/t loose stools (last bowel movement 10/1)  - Continue scheduled multivitamin  - Loperamide PRN     # Elevated alk phos  # Transaminitis  10/1 Found to have elevated alk phos, ALT, AST   - Obtain RUQ US --> hepatomegaly but otherwise unremarkable  - Check CMV  - Trend daily hepatic panel      Renal/Fluids/Electrolytes:  # Acute kidney injury, resolved   # AGMA, improving   # Elevated lactate, resovled  Baseline Cr approx 0.6. Pt was anuric required CRRT here but now making urine, likely prerenal due to shock.  - Adequate I/Os, daily weights   - Avoid nephrotoxins as able  - ICU  electrolyte replacement protocol  - Diuresis: Lasix 40 mg IV BID, goal net negative 500 mL   - Check K, Mg q afternoon with diuresis     Hypernatremia, resolved  9/30 Na 146 -->138, had been on FWF at 300 mL q 2 hours. Free water deficit -0.6L.   - FWF 30 mL q 4 hours     Endocrine:  # Steroid and stress induced hyperglycemia  # Hypothyroidism   - Discontinue Insulin gtt 10/1  - Start Lantus 10 units daily 10/1  - Start high intensity sliding scale insulin 10/1  - Goal to keep BG < 180 for optimal wound healing  - Continue PTA synthroid     ID:  # Leukocytosis, resolved   # Concern for aspiration pneumonia/hospital acquired pnemonia (9/19 - )  # Pseudomonas pneumonia (s/p treatment) with c/f possible ongoing infection vs colonization (pseudomonas 1+ in sputum and fever on 9/26)   # Hx CAP  Respiratory cx 8/29 pseudomonas pneumonia. Intermediate susceptability to meropenem, more significant sensitivities to ciprofloxacin. New leukocytosis, elevated PC, CRP and lactate 9/19 with sputum cx 4+ psuedomonas, 2+ GPCs, and 1+ GPBs , BAL studies 9/21 positive for pseudomonas. Sputum from 9/26 1+ pseudomonas  - Continue to monitor fever curve and inflammatory markers as appropriate  - Repeat cultures and sputum growing pseudomonas with similar resistance pattern to previous.   - CRP increased from 42-->110 10/1, however otherwise afebrile, WBC downtrending  - If fever recurs or patient clinically deteriorates, repeat cultures     Abx  - Meropenem 8/29 - 9/1  - Vancomycin 8/29 - 8/30  - Tobramycin x 1 8/29  - Vancomycin 9/5 - 9/8    - Tobramycin nebs BID 9/1 - 9/11, 9/21- 9/25  - Ciprofloxacin infusion 9/1 - 9/11  - Metronidazole 9/6 - 9/11  - Vancomycin 9/19 - 9/20  - Zosyn 9/19 - 9/21  - Ciprofloxacin 9/19 - 9/21     PJP Ppx  Given Dex-ARDS Massiel will likely remain on steriods for > 3 weeks so will initiate PJP ppx. Given history of allergy to sulfa, will obtain G6PD test to determine if dapsone can be used: levels are  ok, however continuing atovaquone.   - Beta D Glucan with very low positivity, and PJP PCR negative   - Continue atovaquone 1,500 mg daily      # CMV viremia  CMV PCR in blood positive 8/31   - Ganciclovir 9/6 - 9/8, discontinued given ID recs  - CMV levels recheck 9/18 shows continued downtrending, level at 289  - Repeat CMV level in process 10/1     # Thrush  Noted 9/21  - s/p Fluconazole x 7 days      # HSV-1  Mouth sores present, which could have viral etiology. 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  - Acyclovir 400mg BID x5 days (8/22-8/27)     Hematology:    # Anemia of critical illness  # Right groin Hematoma   Acutely decreased Hgb from 8.0 to 6.6 on 9/20 of unknown etiology that required transfusion x1, however 9/19 LE US notable to 14.9 cm hematoma in R groin near prior canal that was not previously appreciated on CT abdomen c/f possible bleed. Hematoma stable on repeat US 9/21.Received 1 unit pRBCs 9/24 for hgb 6.9  - Hgb 6.7 this AM, s/p 1 unit PRBC  - No active signs of bleeding, hemodynamically stable, groin site soft/intact.  - Daily CBC  - Transfuse if hgb <7.0 or signs/symptoms of hypoperfusion. Monitor and trend.      Musculoskeletal/Rheum:  # Alopecia  - Holding PTA hydroxychloroquine      # Weakness and deconditioning of critical illness  # Left ankle injury pre-hospitalization    - Physical and occupational therapy when able.      Skin:  # BLE scattered ecchymosis  # Left toe duskiness  # Peripheral Edema  - WOC consult  - OT consult for lymphedema therapy     We appreciate the opportunity of caring for your patient.  If you have any questions feel free to call [phone] and ask that we be paged directly.      Significant Results and Procedures   Results for orders placed or performed during the hospital encounter of 08/08/24 (from the past 24 hour(s))   Glucose by meter   Result Value Ref Range    GLUCOSE BY METER POCT 112 (H) 70 - 99 mg/dL   Glucose by meter   Result Value Ref Range    GLUCOSE BY  METER POCT 97 70 - 99 mg/dL   Glucose by meter   Result Value Ref Range    GLUCOSE BY METER POCT 122 (H) 70 - 99 mg/dL   Glucose by meter   Result Value Ref Range    GLUCOSE BY METER POCT 142 (H) 70 - 99 mg/dL   Glucose by meter   Result Value Ref Range    GLUCOSE BY METER POCT 140 (H) 70 - 99 mg/dL   Glucose by meter   Result Value Ref Range    GLUCOSE BY METER POCT 111 (H) 70 - 99 mg/dL   Glucose by meter   Result Value Ref Range    GLUCOSE BY METER POCT 125 (H) 70 - 99 mg/dL   CBC with platelets   Result Value Ref Range    WBC Count 8.1 4.0 - 11.0 10e3/uL    RBC Count 2.24 (L) 3.80 - 5.20 10e6/uL    Hemoglobin 6.7 (LL) 11.7 - 15.7 g/dL    Hematocrit 22.6 (L) 35.0 - 47.0 %     (H) 78 - 100 fL    MCH 29.9 26.5 - 33.0 pg    MCHC 29.6 (L) 31.5 - 36.5 g/dL    RDW 19.1 (H) 10.0 - 15.0 %    Platelet Count 274 150 - 450 10e3/uL   Basic metabolic panel   Result Value Ref Range    Sodium 138 135 - 145 mmol/L    Potassium 3.5 3.4 - 5.3 mmol/L    Chloride 99 98 - 107 mmol/L    Carbon Dioxide (CO2) 29 22 - 29 mmol/L    Anion Gap 10 7 - 15 mmol/L    Urea Nitrogen 16.6 6.0 - 20.0 mg/dL    Creatinine 0.17 (L) 0.51 - 0.95 mg/dL    GFR Estimate >90 >60 mL/min/1.73m2    Calcium 8.0 (L) 8.8 - 10.4 mg/dL    Glucose 131 (H) 70 - 99 mg/dL   Magnesium   Result Value Ref Range    Magnesium 1.7 1.7 - 2.3 mg/dL   Phosphorus   Result Value Ref Range    Phosphorus 4.1 2.5 - 4.5 mg/dL   CRP inflammation   Result Value Ref Range    CRP Inflammation 110.00 (H) <5.00 mg/L   ABO/Rh type and screen *Canceled*    Narrative    The following orders were created for panel order ABO/Rh type and screen.  Procedure                               Abnormality         Status                     ---------                               -----------         ------                     Adult Type and Screen[[id number]]                                                         Please view results for these tests on the individual orders.   Hepatic panel    Result Value Ref Range    Protein Total 5.1 (L) 6.4 - 8.3 g/dL    Albumin 2.5 (L) 3.5 - 5.2 g/dL    Bilirubin Total 0.7 <=1.2 mg/dL    Alkaline Phosphatase 984 (H) 40 - 150 U/L     (H) 0 - 45 U/L     (H) 0 - 50 U/L    Bilirubin Direct 0.59 (H) 0.00 - 0.30 mg/dL   Glucose by meter   Result Value Ref Range    GLUCOSE BY METER POCT 120 (H) 70 - 99 mg/dL   ABO/Rh type and screen *Canceled*    Narrative    The following orders were created for panel order ABO/Rh type and screen.  Procedure                               Abnormality         Status                     ---------                               -----------         ------                       Please view results for these tests on the individual orders.   Prepare red blood cells (unit)   Result Value Ref Range    Blood Component Type Red Blood Cells     Product Code [id number]     Unit Status Transfused     Unit Number [id number]     CROSSMATCH Compatible     CODING SYSTEM [id number]     ISSUE DATE AND TIME [id number]     UNIT ABO/RH O-     UNIT TYPE ISBT 9500    Glucose by meter   Result Value Ref Range    GLUCOSE BY METER POCT 122 (H) 70 - 99 mg/dL   Glucose by meter   Result Value Ref Range    GLUCOSE BY METER POCT 132 (H) 70 - 99 mg/dL   ABO/Rh type and screen    Narrative    The following orders were created for panel order ABO/Rh type and screen.  Procedure                               Abnormality         Status                     ---------                               -----------         ------                     Adult Type and Screen[[id number]]                            Final result                 Please view results for these tests on the individual orders.   Adult Type and Screen   Result Value Ref Range    ABO/RH(D) O NEG     Antibody Screen Negative Negative    SPECIMEN EXPIRATION DATE [id number]    Glucose by meter   Result Value Ref Range    GLUCOSE BY METER POCT 129 (H) 70 - 99 mg/dL   EKG 12-lead, complete   Result  Value Ref Range    Systolic Blood Pressure  mmHg    Diastolic Blood Pressure  mmHg    Ventricular Rate 100 BPM    Atrial Rate 100 BPM    MI Interval 146 ms    QRS Duration 76 ms     ms    QTc 446 ms    P Axis 39 degrees    R AXIS 13 degrees    T Axis 53 degrees    Interpretation ECG       Sinus rhythm  Normal ECG  When compared with ECG of 26-Sep-2024 15:51,  Premature ventricular complexes are no longer Present  Confirmed by MD LANDON, MANUEL ([id number]) on 10/1/2024 3:52:09 PM     Glucose by meter   Result Value Ref Range    GLUCOSE BY METER POCT 150 (H) 70 - 99 mg/dL   US Abdomen Limited    Narrative    EXAMINATION: Limited Abdominal Ultrasound, 10/1/2024 1:24 PM     COMPARISON: CT chest abdomen and pelvis 8/30/2024.    HISTORY: transaminitis, elevated alk phos    FINDINGS:   Fluid: No evidence of ascites or pleural effusions.    Liver: The liver demonstrates normal echotexture, measuring 21.6 cm in  craniocaudal dimension. There is no focal mass.     Gallbladder: There is no wall thickening, pericholecystic fluid,  positive sonographic Myrick's sign or evidence for cholelithiasis.    Bile Ducts: Both the intra- and extrahepatic biliary system are of  normal caliber.  The common bile duct measures 5.7 mm in diameter.    Pancreas: Visualized portions of the head and body of the pancreas are  unremarkable.     Kidney: The right kidney measures 14.2 cm long. There is no  hydronephrosis or hydroureter, no shadowing renal calculi, cystic  lesion or mass.       Impression    IMPRESSION:   1.  Hepatomegaly. Otherwise unremarkable right upper quadrant.    I have personally reviewed the examination and initial interpretation  and I agree with the findings.    ALDEN TO DO         SYSTEM ID:  [id number]   Glucose by meter   Result Value Ref Range    GLUCOSE BY METER POCT 184 (H) 70 - 99 mg/dL   Blood gas venous   Result Value Ref Range    pH Venous 7.41 7.32 - 7.43    pCO2 Venous 55 (H) 40 - 50 mm Hg    pO2 Venous 42 25  - 47 mm Hg    Bicarbonate Venous 34 (H) 21 - 28 mmol/L    Base Excess/Deficit Venous 8.5 (H) -3.0 - 3.0 mmol/L    FIO2 50     Oxyhemoglobin Venous 76 (H) 70 - 75 %    O2 Sat, Venous 79.0 (H) 70.0 - 75.0 %    Narrative    In healthy individuals, oxyhemoglobin (O2Hb) and oxygen saturation (SO2) are approximately equal. In the presence of dyshemoglobins, oxyhemoglobin can be considerably lower than oxygen saturation.   Hemoglobin   Result Value Ref Range    Hemoglobin 7.2 (L) 11.7 - 15.7 g/dL   Potassium   Result Value Ref Range    Potassium 4.4 3.4 - 5.3 mmol/L   Magnesium   Result Value Ref Range    Magnesium 1.8 1.7 - 2.3 mg/dL     Recent Results (from the past 24 hour(s))   US Abdomen Limited    Narrative    EXAMINATION: Limited Abdominal Ultrasound, 10/1/2024 1:24 PM     COMPARISON: CT chest abdomen and pelvis 8/30/2024.    HISTORY: transaminitis, elevated alk phos    FINDINGS:   Fluid: No evidence of ascites or pleural effusions.    Liver: The liver demonstrates normal echotexture, measuring 21.6 cm in  craniocaudal dimension. There is no focal mass.     Gallbladder: There is no wall thickening, pericholecystic fluid,  positive sonographic Myrick's sign or evidence for cholelithiasis.    Bile Ducts: Both the intra- and extrahepatic biliary system are of  normal caliber.  The common bile duct measures 5.7 mm in diameter.    Pancreas: Visualized portions of the head and body of the pancreas are  unremarkable.     Kidney: The right kidney measures 14.2 cm long. There is no  hydronephrosis or hydroureter, no shadowing renal calculi, cystic  lesion or mass.       Impression    IMPRESSION:   1.  Hepatomegaly. Otherwise unremarkable right upper quadrant.    I have personally reviewed the examination and initial interpretation  and I agree with the findings.    ALDEN TO DO         SYSTEM ID:  [id number]       Consultations This Hospital Stay       Unresulted Labs Ordered in the Past 30 Days of this Admission       Date  and Time Order Name Status Description    10/1/2024  8:43 AM Cytomegalovirus DNA by PCR, Quantitative In process     9/26/2024  4:43 PM Blood Culture Hand, Left Preliminary     9/21/2024  3:00 PM Nocardia culture - BAL Site 1 Preliminary     9/21/2024  3:00 PM Fungus Culture, non-blood - BAL Site 2 Preliminary     9/21/2024  3:00 PM Fungus Culture, non-blood - BAL Site 1 Preliminary     8/18/2024  8:56 AM CONDITIONAL Prepare red blood cells (unit) Preliminary     8/15/2024  3:47 PM CONDITIONAL Prepare red blood cells (unit) Preliminary     8/9/2024  3:47 PM Acid-Fast Bacilli Culture and Stain In process     8/9/2024  3:47 PM Acid-Fast Bacilli Culture and Stain In process             Code Status   Full Code    Primary Care Physician   Miranda Yoder    Physical Exam   Temp: 97.6  F (36.4  C) Temp src: Axillary BP: 129/84 Pulse: 109   Resp: 23 SpO2: 98 % O2 Device: Mechanical Ventilator    Vitals:    09/25/24 2000 09/28/24 0400 09/30/24 2000   Weight: 78.7 kg (173 lb 8 oz) 78.3 kg (172 lb 9.9 oz) 83.1 kg (183 lb 3.2 oz)     Vital Signs with Ranges  Temp:  [97.5  F (36.4  C)-98.4  F (36.9  C)] 97.6  F (36.4  C)  Pulse:  [] 109  Resp:  [18-44] 23  BP: ()/() 129/84  FiO2 (%):  [50 %-60 %] 50 %  SpO2:  [87 %-100 %] 98 %  I/O last 3 completed shifts:  In: 2762.74 [I.V.:819.74; NG/GT:693]  Out: 2950 [Urine:2950]    General: Sedated, NAD  HEENT: NC, AT. PERRL. Tracheostomy site intact.   Neuro: Arouses to voice, opens eyes, does not follow commands  Pulm/Resp: Clear breath sounds bilaterally without rhonchi, crackles or wheeze, breathing non-labored on mechanical ventilation  CV: RRR, S1S2, no murmur, rub, or gallop  Abdomen: Soft, non-distended, hypoactive bowel sounds  : rolle catheter in place, urine yellow and clear  Ext: 2+ generalized edema to bilateral upper/lower extremities. Pulses 2+ radial, pedal  Incisions/Skin: Warm, dry. R groin site hematoma soft     Time Spent on this Encounter   IDong  MD Heriberto, personally saw the patient today and spent greater than 30 minutes discharging this patient.    Discharge Disposition   Transferred to intensive care  Condition at discharge: Stable    Consultations This Hospital Stay   PHYSICAL THERAPY ADULT IP CONSULT  OCCUPATIONAL THERAPY ADULT IP CONSULT  WOUND OSTOMY CONTINENCE NURSE  IP CONSULT  PALLIATIVE CARE ADULT IP CONSULT  WOUND OSTOMY CONTINENCE NURSE  IP CONSULT  NEPHROLOGY GENERAL ADULT IP CONSULT  PHARMACY CRRT IP CONSULT  NUTRITION SERVICES ADULT IP CONSULT  INFECTIOUS DISEASE GENERAL ADULT IP CONSULT  IP PALLIATIVE CARE SOCIAL WORK ADULT CONSULT  PHARMACY IP CONSULT  NURSING TO CONSULT FOR VASCULAR ACCESS CARE IP CONSULT  NEUROLOGY CRITICAL CARE ADULT IP CONSULT  PHARMACY IP CONSULT  GYNECOLOGY IP CONSULT  CARE MANAGEMENT / SOCIAL WORK IP CONSULT  PHARMACY CRRT IP CONSULT  PHARMACY CRRT IP CONSULT  PHARMACY CRRT IP CONSULT  INTERVENTIONAL RADIOLOGY ADULT/PEDS IP CONSULT  PHARMACY CRRT IP CONSULT  PHARMACY CRRT IP CONSULT  PHARMACY TO DOSE VANCO  PHARMACY CRRT IP CONSULT  PHARMACY TO DOSE TOBRAMYCIN  PHARMACY CRRT IP CONSULT  PHARMACY IP CONSULT  SPIRITUAL HEALTH SERVICES IP CONSULT  PHARMACY CRRT IP CONSULT  PHARMACY CRRT IP CONSULT  PHARMACY CRRT IP CONSULT  CARE MANAGEMENT / SOCIAL WORK IP CONSULT  NURSING TO CONSULT FOR VASCULAR ACCESS CARE IP CONSULT  INFECTIOUS DISEASE GENERAL ADULT IP CONSULT  PHARMACY TO DOSE VANCO  SURGERY GENERAL ADULT IP CONSULT  PHARMACY CRRT IP CONSULT  PHARMACY CRRT IP CONSULT  PHARMACY CRRT IP CONSULT  NURSING TO CONSULT FOR VASCULAR ACCESS CARE IP CONSULT  WOUND OSTOMY CONTINENCE NURSE  IP CONSULT  PHYSICAL THERAPY ADULT IP CONSULT  OCCUPATIONAL THERAPY ADULT IP CONSULT  PHARMACY TO DOSE VANCO  NUTRITION SERVICES ADULT IP CONSULT  LYMPHEDEMA THERAPY IP CONSULT  PAIN MANAGEMENT ADULT IP CONSULT  LYMPHEDEMA THERAPY IP CONSULT  SOCIAL WORK IP CONSULT  SPIRITUAL HEALTH SERVICES IP CONSULT  INTERVENTIONAL RADIOLOGY ADULT/PEDS  IP CONSULT  CARE MANAGEMENT / SOCIAL WORK IP CONSULT  INTERVENTIONAL RADIOLOGY ADULT/PEDS IP CONSULT  VASCULAR ACCESS FOR PICC PLACEMENT ADULT IP CONSULT  SPIRITUAL HEALTH SERVICES IP CONSULT    Discharge Orders      Radha 1 Antibody IgG     SSA Ro SIMON Antibody IgG     SSB La SIMON Antibody IgG     Scleroderma Antibody Scl70 SIMON IgG     CRP inflammation     Rheumatoid factor     Cyclic Citrullinated Peptide Antibody IgG     CK total     Current Facility-Administered Medications   Medication Dose Route Frequency Provider Last Rate Last Admin    [Held by provider] acetaminophen (TYLENOL) tablet 650 mg  650 mg Oral or Feeding Tube Q6H Barry Hatch MD   650 mg at 09/20/24 1437    [Held by provider] amitriptyline (ELAVIL) tablet 25 mg  25 mg Oral At Bedtime Fuentes Fowler MD   25 mg at 09/19/24 2148    [Held by provider] atorvastatin (LIPITOR) tablet 10 mg  10 mg Oral or Feeding Tube Daily Francisco Welsh MD   10 mg at 08/29/24 0924    atovaquone (MEPRON) suspension 1,500 mg  1,500 mg Oral or Feeding Tube Daily Otilia Sun APRN CNP   1,500 mg at 10/01/24 0812    carboxymethylcellulose PF (REFRESH PLUS) 0.5 % ophthalmic solution 1 drop  1 drop Both Eyes Q1H PRN Mirtha Vogt APRN CNP   1 drop at 09/23/24 1338    cetirizine (zyrTEC) tablet 10 mg  10 mg Oral or Feeding Tube Daily Barry Hatch MD   10 mg at 10/01/24 0806    chlorhexidine (PERIDEX) 0.12 % solution 15 mL  15 mL Mouth/Throat Q12H Gaudencio De Souza MD   15 mL at 10/01/24 0806    dexmedeTOMIDine (PRECEDEX) 4 mcg/mL in sodium chloride 0.9 % 100 mL infusion  0.1-1.2 mcg/kg/hr (Dosing Weight) Intravenous Continuous Mirtha Vogt APRN CNP 15.7 mL/hr at 10/01/24 1800 0.8 mcg/kg/hr at 10/01/24 1800    dextrose 10% infusion   Intravenous Continuous PRN Gaudencio De Souza MD   Stopped at 09/26/24 1153    glucose gel 15-30 g  15-30 g Oral Q15 Min PRN Aniceto Adame MD        Or    dextrose 50 % injection 25-50 mL  25-50 mL Intravenous Q15 Min  PRN Aniceto Adame MD        Or    glucagon injection 1 mg  1 mg Subcutaneous Q15 Min PRN Aniceto Adame MD        fentaNYL (SUBLIMAZE) 50 mcg/mL bolus from pump  50 mcg Intravenous Q1H PRN Fuentes Fowler MD   50 mcg at 09/29/24 1306    fentaNYL (SUBLIMAZE) infusion   mcg/hr Intravenous Continuous Fuentes Fowler MD 1 mL/hr at 10/01/24 1800 50 mcg/hr at 10/01/24 1800    [Held by provider] gabapentin (NEURONTIN) solution 300 mg  300 mg Oral or Feeding Tube Q8H Otilia Hutchison APRN CNP   300 mg at 09/20/24 0546    heparin ANTICOAGULANT injection 5,000 Units  5,000 Units Subcutaneous Q8H Gaudencio De Souza MD   5,000 Units at 10/01/24 1116    heparin lock flush 10 unit/mL injection 5-20 mL  5-20 mL Intracatheter Q24H Mirtha Vogt APRN CNP        heparin lock flush 10 unit/mL injection 5-20 mL  5-20 mL Intracatheter Q1H PRN Mirtha Vogt APRN CNP        hydrALAZINE (APRESOLINE) injection 10 mg  10 mg Intravenous Q6H PRN Dong Dick MD   10 mg at 09/27/24 1048    insulin aspart (NovoLOG) injection (RAPID ACTING)  1-12 Units Subcutaneous Q4H Dong Dick MD   2 Units at 10/01/24 1516    insulin glargine (LANTUS PEN) injection 10 Units  10 Units Subcutaneous Daily Dong Dick MD   10 Units at 10/01/24 0946    ipratropium (ATROVENT) 0.02 % neb solution 0.5 mg  0.5 mg Nebulization Q4H Mirtah Vogt APRN CNP   0.5 mg at 10/01/24 1621    And    levalbuterol (XOPENEX) neb solution 0.63 mg  0.63 mg Nebulization Q4H Mirtha Vogt APRN CNP   0.63 mg at 10/01/24 1621    labetalol (NORMODYNE/TRANDATE) injection 10 mg  10 mg Intravenous Q6H PRN Mirtha Vogt APRN CNP   10 mg at 09/27/24 0842    levothyroxine (SYNTHROID/LEVOTHROID) tablet 25 mcg  25 mcg Per Feeding Tube QAM AC Giovanny Guidry PA-C   25 mcg at 10/01/24 0806    loperamide (IMODIUM) capsule 2 mg  2 mg Oral 4x Daily PRN Gaudencio De Souza MD        LORazepam (ATIVAN) 2 MG/ML (HIGH CONC) oral solution 4 mg  4 mg  Oral or Feeding Tube Q6H Dong Dick MD   4 mg at 10/01/24 1517    midazolam (VERSED) 1 mg/mL bolus from syringe/bag pump ADULT  1 mg Intravenous Q1H PRN Fuentes Fowler MD   1 mg at 09/30/24 2048    montelukast (SINGULAIR) tablet 10 mg  10 mg Oral or Feeding Tube At Bedtime Barry Hatch MD   10 mg at 09/30/24 2258    multivitamin RENAL (RENAVITE RX/NEPHROVITE) tablet 1 tablet  1 tablet Oral or Feeding Tube Daily Her-Giovanny Spence PA-C   1 tablet at 10/01/24 1117    naloxone (NARCAN) injection 0.2 mg  0.2 mg Intravenous Q2 Min PRN Barry Hatch MD        Or    naloxone (NARCAN) injection 0.4 mg  0.4 mg Intravenous Q2 Min PRN Barry Hathc MD        Or    naloxone (NARCAN) injection 0.2 mg  0.2 mg Intramuscular Q2 Min PRN Barry Hatch MD        Or    naloxone (NARCAN) injection 0.4 mg  0.4 mg Intramuscular Q2 Min PRN Barry Hatch MD        ondansetron (ZOFRAN) injection 4 mg  4 mg Intravenous Q6H PRN Kike Ashby MD   4 mg at 09/19/24 2043    oxyCODONE (ROXICODONE) solution 5 mg  5 mg Oral or Feeding Tube Q6H PRN Dong Dick MD        oxyCODONE IR (ROXICODONE) tablet 10 mg  10 mg Oral or Feeding Tube Q6H Dong Dick MD   10 mg at 10/01/24 1517    pantoprazole (PROTONIX) 2 mg/mL suspension 40 mg  40 mg Per Feeding Tube QAM AC Dong Dick MD   40 mg at 10/01/24 0812    Patient may continue current oral medications   Does not apply Continuous PRN Mirtha Vogt, APRN CNP        [START ON 10/2/2024] predniSONE (DELTASONE) tablet 50 mg  50 mg Oral or Feeding Tube Daily Dong Dick MD        Followed by    [START ON 10/5/2024] predniSONE (DELTASONE) tablet 40 mg  40 mg Oral or Feeding Tube Daily Dong Dick MD        Followed by    [START ON 10/8/2024] predniSONE (DELTASONE) tablet 30 mg  30 mg Oral or Feeding Tube Daily Dong Dick MD        Followed by    [START ON 10/11/2024] predniSONE (DELTASONE) tablet 20 mg  20 mg Oral or Feeding  Tube Daily Dong Dick MD        Followed by    [START ON 10/14/2024] predniSONE (DELTASONE) tablet 10 mg  10 mg Oral or Feeding Tube Daily Dong Dick MD        prochlorperazine (COMPAZINE) injection 10 mg  10 mg Intravenous Q6H PRN Carlos Cerna MD   10 mg at 09/19/24 1048    Or    prochlorperazine (COMPAZINE) tablet 10 mg  10 mg Oral Q6H PRN Carlos Cerna MD        Or    prochlorperazine (COMPAZINE) suppository 25 mg  25 mg Rectal Q12H PRN Carlos Cerna MD        Prosource TF20 ENfit Compatibl EN LIQD (PROSOURCE TF20) packet 60 mL  1 packet Per Feeding Tube Daily Giovanny Guidry PA-C   60 mL at 10/01/24 1302    QUEtiapine (SEROquel) tablet 100 mg  100 mg Oral At Bedtime Mirtha Vogt APRN CNP   100 mg at 09/30/24 2258    QUEtiapine (SEROquel) tablet 50 mg  50 mg Oral BID Mirtha Vogt APRN CNP   50 mg at 10/01/24 1519    sodium chloride (PF) 0.9% PF flush 10-40 mL  10-40 mL Intracatheter Once PRN Mirtha Vogt APRN CNP        [Held by provider] venlafaxine (EFFEXOR) tablet 37.5 mg  37.5 mg Oral BID w/meals Fuentes Fowler MD   37.5 mg at 09/20/24 0816    zinc sulfate (ZINCATE) capsule 220 mg  220 mg Per Feeding Tube Daily Giovanny Guidry PA-C   220 mg at 10/01/24 0806         Allergies   Allergies   Allergen Reactions    Celecoxib Unknown    Sulfa Antibiotics Hives     Per careeverywhere    Tetracycline Hives     Per careeverywhere     Data   Most Recent 3 CBC's:  Recent Labs   Lab Test 10/01/24  1529 10/01/24  0407 09/30/24  0436 09/29/24  0404   WBC  --  8.1 11.9* 11.1*   HGB 7.2* 6.7* 7.3* 7.6*   MCV  --  101* 102* 102*   PLT  --  274 331 306      Most Recent 3 BMP's:  Recent Labs   Lab Test 10/01/24  1529 10/01/24  1515 10/01/24  1124 10/01/24  0750 10/01/24  0510 10/01/24  0407 09/30/24  2034 09/30/24  1842 09/30/24  0504 09/30/24  0436   NA  --   --   --   --   --  138  --  137  --  138   POTASSIUM 4.4  --   --   --   --  3.5  --  4.1  --  4.3   CHLORIDE  --    --   --   --   --  99  --  99  --  101   CO2  --   --   --   --   --  29  --  30*  --  28   BUN  --   --   --   --   --  16.6  --  15.7  --  16.8   CR  --   --   --   --   --  0.17*  --  0.19*  --  0.15*   ANIONGAP  --   --   --   --   --  10  --  8  --  9   QUAN  --   --   --   --   --  8.0*  --  8.1*  --  8.1*   GLC  --  184* 150* 129*   < > 131*   < > 159*   < > 124*    < > = values in this interval not displayed.     Most Recent 2 LFT's:  Recent Labs   Lab Test 10/01/24  0407 09/23/24  0404   * 57*   * 93*   ALKPHOS 984* 462*   BILITOTAL 0.7 0.5     Most Recent INR's and Anticoagulation Dosing History:  Anticoagulation Dose History  More data exists         Latest Ref Rng & Units 8/9/2024 8/10/2024 8/11/2024 8/12/2024 8/30/2024 9/20/2024 9/22/2024   Recent Dosing and Labs   INR 0.85 - 1.15 1.12  1.21  1.25  1.25  1.10  1.06  1.10  1.14  1.24  1.15  1.14  1.11  1.37  1.30  1.14  1.45  1.29       Details          Multiple values from one day are sorted in reverse-chronological order             Most Recent 3 Troponin's:No lab results found.  Most Recent Cholesterol Panel:  Recent Labs   Lab Test 09/24/24  2200   TRIG 267*     Most Recent 6 Bacteria Isolates From Any Culture (See EPIC Reports for Culture Details):No lab results found.  Most Recent TSH, T4 and A1c Labs:  Recent Labs   Lab Test 08/19/24  0949   A1C 5.7*

## 2024-10-01 NOTE — PROGRESS NOTES
Care Management Follow Up    Length of Stay (days): 54    Expected Discharge Date:  10/1/24     Concerns to be Addressed: discharge planning       Additional Information:  Per report from the SW, Bedside RN received a call from [facility] requesting the copy of transfer agreement.  [facility] one call staff reported, they do have open bed today and can accept pt.  They were unable to locate their copy of transfer agreemnt and requested the copy be faxed to them prior arranging transport.  Bedside RN is faxing the copy to fax number # [phone].      RNCC called [facility] [place] # [phone] to follow up about the transfer, to check the accepting provide name, number and RN number for report.  RNCC spoke to N[facility].  [facility] stated they will call the unit and will provide the accepting provider number and nurse once they arranged the transport ( they will arrange air transport once they receive the transfer agreement).  [facility] sated they have open bed for today and they are hoping to have pt transfer today.  [facility] agreed to call the unit and provide  time once they arrange the transport.  Bedside RN and MICU team have been update by SUSHMA about the above plan.    Next Steps:   Pt will transfer back to the referring hospital, [facility] today, 10/1/24.  [facility] will arrange air transport once they receive the copy of the transfer agreement.  [facility] pt placement nurse, Stacey will call the bedside RN after arranging the transport to informed  time and will provide accepting provider and RN number.  Bedside RN will update pt and family once transfer date and time is conformed.  Bedside RN will send copy of discharge IVETT wilburn and the CD with pt.  [facility] [place] # [phone]     Please call ED SW if you have any question.  ED SW is available until 8:00 PM.    Marylou Silverio, RN, PHN, BSN  4A and 4E/ ICU  Care Coordinator  Phone: [phone]  Pager:  [phone]      SEARCHABLE in [employer] - search CARE COORDINATOR       [place] & [place] ([phone]) Saturday & Sunday; ([phone]) [facility] Recognized Holidays     Units: 5A Onc Vocera & 5C Vocera Pager: [phone]    Units: 6B Vocera & 6C Vocera  Pager: [phone]    Units: 7A SOT RNCC Vocera, 7B Med Surg Vocera, & 7C Med Surg Vocera  Pager: [phone]    Units: 6A Vocera & 4A CVICU Vocera, 4C MICU Vocera, and 4E SICU Vocera   Pager: [phone]    Units: 5 Ortho Vocera & 5 Med Surg Vocera  Pager: [phone]    Units: 6 Med Surg Vocera & 8 Med Surg Vocera  Pager [phone]

## 2024-10-01 NOTE — PLAN OF CARE
Shift:  0700- 1930        Events:   Pt remains trach's and sedated. Weaned off of ketamine and versed. Weaned down on Fent. Transfused 1 unit PRBC.       Assessment:     Neuro: Pt is sedated. Follows commands to open eyes only.    CV: HR is 100's, rhythm is ST. Pulses are palpated. MAP >65.     Pulm:  Lungs are coarse. Vent settings are: AC 20 / PEEP 5/ tv 380 / 60 %. Light secretions. Trach 6.0 Shiley.     : Purewick in place.     GI: Abd is round. PEG tube in place. Loose stools. TF infusing. FW is 30 every 4 hours.     Skin: Intact. Generalized bruising noted.     Drips:    NS -  5  Fentanyl - 50  Precedex - 0.8       Lines:  PICC L arm  PIV x 2  6.0 Shiley  PEG tube    Shift:    Labs drawn per orders. Hourly rounded and call light in place. Delirium interventions in place. Patient turned every 2-4 hours and PRN. (See flow sheets for additional data). (Labs reviewed). MD informed of all critical labs and patient condition as needed throughout the shift.

## 2024-10-01 NOTE — CONSULTS
"SPIRITUAL HEALTH SERVICES Consult Note  [facility] ([place] 4C     Summary: Visit with patient Melita \"Donna\" [place] at bedside to provider prayer per family's request. Donna is Gnosticist (EL[employer]). No family present at time of visit; Donna did not seem to wake during visit.     Plan: Chaplains will follow for spiritual support while Donna is admitted.     Dina Olguin M.Div., [facility]  Staff   Pager [phone]     * Spiritual Health Services remains available 24/7 for emergent requests/referrals, either by having the switchboard page the on-call  or by entering an ASAP/STAT consult in Epic (this will also page the on-call ). Routine Epic consults receive an initial response within 24 hours.*    "

## 2024-10-01 NOTE — PROGRESS NOTES
"Care Management Follow Up    Length of Stay (days): 54    Expected Discharge Date:  TBD      Concerns to be Addressed: discharge planning     Patient plan of care discussed at interdisciplinary rounds: Yes    Anticipated Discharge Disposition:  Return back to [facility]        Additional Information:  Per Communication with Dr. Estrella,ICU attending starting return to [facility] Process per \" Return transfer agreement.\"   RNCC spoke with Kathryn at [facility] [place] # [phone]. Provided clinical information, primary RN  and ICU attending contact number. No immediate bed available.     Next Steps:   RNCC to follow with [facility] [place] # [phone] in am for bed availability.   Please update patient's mother, if patient is accepted and will transfer. Patient's family wants to come to [place] for the weekend.     Natalie Piedra, MSN, MA, CCM, RN  4C/4A/4E ICU Care Coordinator  Phone:[phone]  Available via [employer]     For Weekend & Holiday on call RN Care Coordinator:  [place] [facility] ([phone]) Saturday & Sunday; ([phone])  Recognized Holidays   Weekend 4C/4A/4E ICUs /6A Care Coordinator  Available via [employer]        "

## 2024-10-01 NOTE — PROGRESS NOTES
MEDICAL ICU PROGRESS NOTE  10/01/2024      Date of Service (when I saw the patient): 10/01/2024    ASSESSMENT: Massiel Flaherty is a 53 year old female with PMH Anxiety/depression, fibromyalgia, c/f nonepileptic seizures not on AEDs, hypothyroidism, asthma, HLD, MURIEL on CPAP, expressive aphasia, hypertension who was admitted on 8/3/2024 for fatigue, fever and dyspnea and intubated 8/6/24 at OSH for ARDS, proned and paralyzed without improvement. Transferred to [facility] 8/8/24, hypoxic with high plateaus/peaks and placed on VV ECMO and CRRT. Decannulated from VV ECMO 8/18 and transferred to MICU 8/26/24 for ongoing management. Extubated 8/27 then reintubated 8/29 iso worsening AHRF and pseudomonas pneumonia. Extubated again 9/16 to BiPAP/HFNC, re-intubated 9/20 with tachypnea, increased WOB, fevers, and new lung opacities c/f possible HAP vs ILD/vasculitis/organizing pneumonia flare. Course complicated by tension pneumothorax while re-intubated 9/20 now s/p right chest tube. S/p tracheostomy 9/25.     CHANGES and MAJOR THINGS TODAY:   - 1 unit PRBC for Hgb 6.7 this AM  - Lasix 40 mg BID  - Discontinue Ketamine  - Wean off Versed  - Wean Fentanyl  - RUQ US for elevated LFTs, alk phos  - CMV pcr   - EKG for Qtc monitoring  - Discontinue Insulin gtt  - Start Lantus 10 units daily  - Start high intensity sliding scale insulin    PLAN:    Neuro:  # Pain and sedation  # Concern for ICU delirium   # Hx Fibromyalgia   # Hx myalgic encephalomyelitis   # Hx anxiety/depression  - Pain:  - Fentanyl infusion + PRN bolus, wean off  - Oxycodone 10 mg q6hrs  - Sedation:   - Discontinue Versed  - Discontinue Ketamine  - Precedex  gtt  - Scheduled Ativan PO, 4 mg Q6 hours    - Seroquel 50 mg BID, 100 mg HS  - Gabapentin to 300mg TID held while sedated  - Psych meds (venlafaxine and amitriptyline) held while sedated  - RASS goal 0 to -1     # Hx spells with staring and BUE posturing previously described as nonepileptic seizures   # Hx  of GTC seizures and myoclonic epilepsy, weaned off AEDs   # Diffuse cerebral edema - improved  - 8/21: MRI Brain: no acute intracranial pathology. No findings to suggest anoxic brain injury.   - MRI brain 9/20: no anoxic brain injury  - While extubated, patient was able to follow commands and answer yes/no questions   - continue to monitor neurological exam     Pulmonary:  # Acute hypoxic respiratory failure c/b ARDS  # Pseudomonas pneumonia  # Mechanical ventilation  # s/p VV ECMO 8/8 - 8/18   # Hx CAP  # Asthma  # MURIEL on home CPAP  # S/p tracheostomy  PFT dated 9/8/2020 demonstrated normal spirometry with mild diffusion impairment and mild restriction (FEV1/FVC 80%, FEV1 2.59, DLCO 40%). CT abdomen chest 3/9/2020 demonstrated scarring and fibrosis bilaterally which correlated with the decreased DLCO. The patient also has a history of MURIEL on CPAP therapy as currently managed by her provider in [place]. Unclear what triggered ARDS or why she has had such severe hospital course, further investigated ILD but results all unremarkable. Extubated 8/27, reintubated 8/29 for worsening O2 demands and diffuse opacities iso new/recurrent psuedomonas pneumonia. Bronch with BAL 8/30, pseudomonas growing as below. Extubated again 9/16, worsening respiratory distress noted 9/19 with repeat polymicrobial growth on respiratory sputum raising c/f aspiration/hospital associated pneumonia. 9/20 with increased WOB, new fevers, and CXR significant for opacities c/f possible HAP vs ILD/vasculitis/organizing pneumonia and was reintubated 9/20. Now s/p tracheostomy on 9/25. 9/28: Sputum positive for pseudomonas again. Query colonization versus ongoing infection as patient was febrile on 9/26 and has low grade temperature elevations on 9/27 and 9/28. Could consider precedex as a possible etiology for temperature elevation as well.   - ILD w/u aldolase, EVELIO, RF, CCP, ANCA, MPO, SSA Ro and [place], Scleroderma antibody, Radha 1,  hypersensity  pneumonitis, IGG subclasses,  aspergillus, blastomyces, histoplasma negative  - SpO2 goals >92%, PaO2 goals >60   - Steroids: Prednisone taper given prolonged steroid use  - PTA singular at bedtime   - Xopenex and ipratropium Q4 hours  - Pulmicort currently on hold  - ABG daily and PRN      FiO2 (%): 50 %, Resp: 23, Vent Mode: CMV/AC, Resp Rate (Set): 20 breaths/min, Tidal Volume (Set, mL): 380 mL, PEEP (cm H2O): 5 cmH2O, Resp Rate (Set): 20 breaths/min, Tidal Volume (Set, mL): 380 mL, PEEP (cm H2O): 5 cmH2O     Pneumothorax, resolved   Tension pneumothorax during re-intubation 9/20, concern for fibrotic lung disease and possible barotrauma during bag ventilation during re-intubation vs complication of re-intubation in general. Leave in while intubated.   - chest tube removed on 9/27 with no recurrence of pneumothorax      Cardiovascular:  # Hyperlipidemia  # Hx HTN   # Sinus Tachycardia  - MAP goal >65, SBP goals >110  - PTA atorvastatin  - PTA clonidine held while sedated  - PRN hydralazine or labetolol for SBP > 180, plan to use labetolol rather than hydralazine if tachycardic     GI/Nutrition:  # Severe malnutrition (see RD note)   # Non-infectious Diarrhea  # GERD   # s/p PEG on 9/25/24  - Protonix (home medication)   - Nutrition consulted, appreciate recs  - Diet: TF at goal, on high fiber feed   - Hold bowel regimen d/t loose stools  - Continue scheduled multivitamin  - Loperamide PRN  - Rectal tube, output slowing down, continue to monitor  - If rectal tube output continues to decrease, nurse to assess for ability to remove rectal tube    # Elevated alk phos  # Transaminitis  10/1 Found to have elevated alk phos, ALT, AST   - Obtain RUQ US  - Trend daily hepatic panel     Renal/Fluids/Electrolytes:  # Acute kidney injury, resolved   # AGMA, improving   # Elevated lactate, resovled  Baseline Cr approx 0.6. Pt was anuric required CRRT here but now making urine, likely prerenal due to shock.  - Adequate I/Os,  daily weights   - Avoid nephrotoxins as able  - RN managed electrolyte replacement protocol  - Diuresis: Lasix 40 mg IV, goal net negative 500 mL   - Check K, Mg this afternoon     Hypernatremia, resolved  9/30 Na 146 -->138, had been on FWF at 300 mL q 2 hours. Free water deficit -0.6L.   - FWF 30 mL q 4 hours     Endocrine:  # Steroid and stress induced hyperglycemia  # Hypothyroidism   - Discontinue Insulin gtt  - Start Lantus 10 units daily  - Start high intensity sliding scale insulin  - Goal to keep BG < 180 for optimal wound healing  - Continue PTA synthroid    ID:  # Leukocytosis, resolved   # Concern for aspiration pneumonia/hospital acquired pnemonia (9/19 - )  # Pseudomonas pneumonia (s/p treatment) with c/f possible ongoing infection vs colonization (pseudomonas 1+ in sputum and fever on 9/26)   # Hx CAP  Respiratory cx 8/29 pseudomonas pneumonia. Intermediate susceptability to meropenem, more significant sensitivities to ciprofloxacin. New leukocytosis, elevated PC, CRP and lactate 9/19 with sputum cx 4+ psuedomonas, 2+ GPCs, and 1+ GPBs could be colonized , BAL studies 9/21 positive for pseudomonas. Sputum from 9/26 showing 1+ GNB, patient had a fever to 102.9 as well. C/f possible ongoing infection.   - Continue to monitor fever curve and inflammatory markers as appropriate  - ID now signed off   - Repeat cultures and sputum growing pseudomonas with similar resistance pattern to previous.   - 9/28: If fever recurs or patient clinically deteriorates, may consider resuming pseudomonas treatment     Abx  - Meropenem 8/29 - 9/1  - Vancomycin 8/29 - 8/30  - Tobramycin x 1 8/29  - Vancomycin 9/5 - 9/8    - Tobramycin nebs BID 9/1 - 9/11, 9/21- 9/25  - Ciprofloxacin infusion 9/1 - 9/11  - Metronidazole 9/6 - 9/11  - Atovaquone 9/11 -   - Vancomycin 9/19 - 9/20  - Zosyn 9/19 - 9/21  - Ciprofloxacin 9/19 - 9/21     PJP Ppx  Given Dex-ARDS Massiel will likely remain on steriods for > 3 weeks so will initiate  PJP ppx. Given history of allergy to sulfa, will obtain G6PD test to determine if dapsone can be used: levels are ok, however continuing atovaquone.   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   - Ganciclovir 9/6 - 9/8, discontinued given ID recs  - CMV levels recheck 9/18 shows continued downtrending, level at 289  - Repeat CMV level 10/1     Thrush  Noted 9/21  - s/p Fluconazole x 7 days      # HSV-1  Mouth sores present, which could have viral etiology. 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  - Acyclovir 400mg BID x5 days (8/22-8/27)     Hematology:    # Anemia of critical illness  # Right groin Hematoma   Acutely decreased Hgb from 8.0 to 6.6 on 9/20 of unknown etiology that required transfusion x1, however 9/19 LE US notable to 14.9 cm hematoma in R groin near prior canal that was not previously appreciated on CT abdomen c/f possible bleed. Hematoma stable on repeat US 9/21.Received 1 unit pRBCs 9/24 for hgb 6.9  - Hgb 6.7 this AM, s/p 1 unit PRBC  - No active signs of bleeding, hemodynamically stable, groin site soft/intact.  - Daily CBC  - Transfuse if hgb <7.0 or signs/symptoms of hypoperfusion. Monitor and trend.      Musculoskeletal/Rheum:  # Alopecia  - Hold PTA hydroxychloroquine      # Weakness and deconditioning of critical illness  # Left ankle injury pre-hospitalization    - Physical and occupational therapy when able.      Skin:  # BLE scattered ecchymosis  # Left toe duskiness  # Peripheral Edema  - WOC consult  - OT consult for lymphedema therapy     General Cares/Prophylaxis:    DVT Prophylaxis: SubQ heparin  GI Prophylaxis: PPI  Restraints: NA  Family: Son, Mook, updated on phone  Code Status: Full Code    Lines/tubes/drains:  - PICC, left triple lumen  - PIVs  - PEG tube  - Trach (Shiley 6 cuffed)    Disposition:  - Medical ICU     Patient seen and findings/plan discussed with medical ICU staff, Dr. Gifford.    Danielle Albert, APRN CNP    41 minutes of  critical care time    Clinically Significant Risk Factors         # Hypernatremia: Highest Na = 146 mmol/L in last 2 days, will monitor as appropriate    # Hypomagnesemia: Lowest Mg = 1.3 mg/dL in last 2 days, will replace as needed   # Hypoalbuminemia: Lowest albumin = 2.2 g/dL at 8/10/2024  9:47 AM, will monitor as appropriate                # Severe Malnutrition: based on nutrition assessment    # Financial/Environmental Concerns: none            ====================================  INTERVAL HISTORY:   No acute events overnight. Ketamine discontinued this AM, on Fentanyl, Precedex, and Versed gtts. On 50% FiO2, PEEP 5. Not requiring pressors. Having bowel movements. Afebrile.       OBJECTIVE:   1. VITAL SIGNS:   Temp:  [98.2  F (36.8  C)-98.4  F (36.9  C)] 98.4  F (36.9  C)  Pulse:  [] 96  Resp:  [18-39] 23  BP: ()/() 102/67  FiO2 (%):  [50 %-60 %] 50 %  SpO2:  [87 %-100 %] 97 %  FiO2 (%): (S) 50 %, Resp: 23, Vent Mode: CMV/AC, Resp Rate (Set): 20 breaths/min, Tidal Volume (Set, mL): 380 mL, PEEP (cm H2O): 5 cmH2O, Resp Rate (Set): 20 breaths/min, Tidal Volume (Set, mL): 380 mL, PEEP (cm H2O): 5 cmH2O  2. INTAKE/ OUTPUT:   I/O last 3 completed shifts:  In: 3357.16 [I.V.:899.16; NG/GT:1258]  Out: 2200 [Urine:2200]    3. PHYSICAL EXAMINATION:  General: Sedated, NAD  HEENT: NC, AT. PERRL. Tracheostomy site intact.   Neuro: Arouses to voice, opens eyes, does not follow commands  Pulm/Resp: Clear breath sounds bilaterally without rhonchi, crackles or wheeze, breathing non-labored on mechanical ventilation  CV: RRR, S1S2, no murmur, rub, or gallop  Abdomen: Soft, non-distended, hypoactive bowel sounds  : rolle catheter in place, urine yellow and clear  Ext: 2+ generalized edema to bilateral upper/lower extremities. Pulses 2+ radial, pedal  Incisions/Skin: Warm, dry. R groin site hematoma soft     4. LABS:   Arterial Blood Gases   Recent Labs   Lab 09/28/24  0328 09/27/24  1133 09/27/24  0412  09/26/24  2341   PH 7.39 7.36 7.40 7.41   PCO2 51* 54* 46* 47*   PO2 140* 108* 95 132*   HCO3 31* 30* 29* 29*     Complete Blood Count   Recent Labs   Lab 10/01/24  0407 09/30/24  0436 09/29/24  0404 09/28/24  0328   WBC 8.1 11.9* 11.1* 9.0   HGB 6.7* 7.3* 7.6* 7.2*    331 306 245     Basic Metabolic Panel  Recent Labs   Lab 10/01/24  0648 10/01/24  0557 10/01/24  0510 10/01/24  0407 09/30/24  2034 09/30/24  1842 09/30/24  0504 09/30/24  0436 09/29/24  1506 09/29/24  1331 09/29/24  0410 09/29/24  0404   NA  --   --   --  138  --  137  --  138  --  146*  --  149*   POTASSIUM  --   --   --  3.5  --  4.1  --  4.3  --   --   --  4.2   CHLORIDE  --   --   --  99  --  99  --  101  --   --   --  109*   CO2  --   --   --  29  --  30*  --  28  --   --   --  29   BUN  --   --   --  16.6  --  15.7  --  16.8  --   --   --  22.0*   CR  --   --   --  0.17*  --  0.19*  --  0.15*  --   --   --  0.19*   * 122* 120* 131*   < > 159*   < > 124*   < >  --    < > 142*    < > = values in this interval not displayed.     Liver Function Tests  No lab results found in last 7 days.  Coagulation Profile  No lab results found in last 7 days.    5. RADIOLOGY:   No results found for this or any previous visit (from the past 24 hour(s)).

## 2024-10-02 LAB
CMV DNA SPEC NAA+PROBE-ACNC: 1190 IU/ML
CMV DNA SPEC NAA+PROBE-LOG#: 3.1 {LOG_COPIES}/ML

## 2024-10-02 NOTE — PLAN OF CARE
Occupational Therapy Discharge Summary    Reason for therapy discharge:    Discharged to transitional care facility.    Progress towards therapy goal(s). See goals on Care Plan in [facility] electronic health record for goal details.  Goals partially met.  Barriers to achieving goals:   discharge from facility.    Therapy recommendation(s):    Continued therapy is recommended.  Rationale/Recommendations:  decreased ADL I.    Goal Outcome Evaluation:

## 2024-10-02 NOTE — PROGRESS NOTES
Discharged to: [facility] at 2030  Belongings: sent with patient in ambulance - bladder stimulator, glasses, ipad with  (Handed to Flight crew Dariusz saw it placed into the belongings bag), bear socks cards and wooded cross.  AVS (After Visit Summary) discussed with:  Flight nurse updated as this nurse has started shift at 1900. Report given to [facility] by Terry GARCIA nurse.

## 2024-10-05 LAB
ACID FAST STAIN (ARUP): NORMAL

## 2024-10-19 LAB
BACTERIA BRONCH: NO GROWTH

## (undated) DEVICE — SU MONOCRYL 4-0 PS-2 27" UND Y426H

## (undated) DEVICE — BLADE KNIFE SURG 15 371115

## (undated) DEVICE — LINEN TOWEL PACK X5 5464

## (undated) DEVICE — Device

## (undated) DEVICE — DRAPE MAYO STAND 23X54 8337

## (undated) DEVICE — SYR 30ML SLIP TIP W/O NDL 302833

## (undated) DEVICE — LABEL MEDICATION SYSTEM 3303-P

## (undated) DEVICE — ENDO CAP AND TUBING STERILE FOR ENDOGATOR  100130

## (undated) DEVICE — JELLY LUBRICATING SURGILUBE 2OZ TUBE

## (undated) DEVICE — ENDO ADPT BRONCH SWIVEL Y A1002

## (undated) DEVICE — ENDO VALVE BX EVIS MAJ-210

## (undated) DEVICE — SOL WATER IRRIG 1000ML BOTTLE 2F7114

## (undated) DEVICE — GLOVE BIOGEL PI ULTRATOUCH SZ 7.5 41175

## (undated) DEVICE — GLOVE BIOGEL PI MICRO INDICATOR UNDERGLOVE SZ 7.5 48975

## (undated) DEVICE — ENDO VALVE SUCTION BRONCH EVIS MAJ-209

## (undated) DEVICE — LUBRICANT INST KIT ENDO-LUBE 220-90

## (undated) DEVICE — KIT ENDO FIRST STEP DISINFECTANT 200ML W/POUCH EP-4

## (undated) DEVICE — PREP CHLORAPREP 26ML TINTED HI-LITE ORANGE 930815

## (undated) DEVICE — PITCHER STERILE 1000ML  SSK9004A

## (undated) DEVICE — SUCTION MANIFOLD NEPTUNE 2 SYS 4 PORT 0702-020-000

## (undated) DEVICE — SU PROLENE 2-0 SHDA 36" 8523H

## (undated) DEVICE — TUBING SUCTION 10'X3/16" N510

## (undated) RX ORDER — HEPARIN SODIUM 1000 [USP'U]/ML
INJECTION, SOLUTION INTRAVENOUS; SUBCUTANEOUS
Status: DISPENSED
Start: 2024-08-23

## (undated) RX ORDER — CEFAZOLIN SODIUM 2 G/100ML
INJECTION, SOLUTION INTRAVENOUS
Status: DISPENSED
Start: 2024-08-23

## (undated) RX ORDER — FENTANYL CITRATE 50 UG/ML
INJECTION, SOLUTION INTRAMUSCULAR; INTRAVENOUS
Status: DISPENSED
Start: 2024-09-25

## (undated) RX ORDER — BUPIVACAINE HYDROCHLORIDE 2.5 MG/ML
INJECTION, SOLUTION EPIDURAL; INFILTRATION; INTRACAUDAL
Status: DISPENSED
Start: 2024-09-25

## (undated) RX ORDER — ONDANSETRON 2 MG/ML
INJECTION INTRAMUSCULAR; INTRAVENOUS
Status: DISPENSED
Start: 2024-09-25

## (undated) RX ORDER — FENTANYL CITRATE-0.9 % NACL/PF 10 MCG/ML
PLASTIC BAG, INJECTION (ML) INTRAVENOUS
Status: DISPENSED
Start: 2024-09-25

## (undated) RX ORDER — LIDOCAINE HYDROCHLORIDE 10 MG/ML
INJECTION, SOLUTION EPIDURAL; INFILTRATION; INTRACAUDAL; PERINEURAL
Status: DISPENSED
Start: 2024-08-23

## (undated) RX ORDER — LIDOCAINE HYDROCHLORIDE 10 MG/ML
INJECTION, SOLUTION EPIDURAL; INFILTRATION; INTRACAUDAL; PERINEURAL
Status: DISPENSED
Start: 2024-09-27

## (undated) RX ORDER — CEFAZOLIN SODIUM 1 G/3ML
INJECTION, POWDER, FOR SOLUTION INTRAMUSCULAR; INTRAVENOUS
Status: DISPENSED
Start: 2024-09-25